# Patient Record
Sex: MALE | Race: WHITE | NOT HISPANIC OR LATINO | URBAN - METROPOLITAN AREA
[De-identification: names, ages, dates, MRNs, and addresses within clinical notes are randomized per-mention and may not be internally consistent; named-entity substitution may affect disease eponyms.]

---

## 2017-09-24 ENCOUNTER — EMERGENCY (EMERGENCY)
Facility: HOSPITAL | Age: 62
LOS: 1 days | Discharge: HOME | End: 2017-09-24
Attending: EMERGENCY MEDICINE | Admitting: EMERGENCY MEDICINE
Payer: COMMERCIAL

## 2017-09-24 VITALS
SYSTOLIC BLOOD PRESSURE: 140 MMHG | OXYGEN SATURATION: 97 % | DIASTOLIC BLOOD PRESSURE: 81 MMHG | RESPIRATION RATE: 17 BRPM | TEMPERATURE: 98 F | HEART RATE: 67 BPM

## 2017-09-24 VITALS
DIASTOLIC BLOOD PRESSURE: 90 MMHG | RESPIRATION RATE: 16 BRPM | HEART RATE: 68 BPM | SYSTOLIC BLOOD PRESSURE: 144 MMHG | HEIGHT: 67 IN | WEIGHT: 199.96 LBS | OXYGEN SATURATION: 97 % | TEMPERATURE: 98 F

## 2017-09-24 DIAGNOSIS — I10 ESSENTIAL (PRIMARY) HYPERTENSION: ICD-10-CM
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
## 2017-09-24 DIAGNOSIS — S60.466A: ICD-10-CM
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
## 2017-09-24 PROCEDURE — 99284 EMERGENCY DEPT VISIT MOD MDM: CPT | Mod: 25

## 2017-09-24 PROCEDURE — 99283 EMERGENCY DEPT VISIT LOW MDM: CPT

## 2017-09-24 RX ORDER — DIPHENHYDRAMINE HCL 50 MG
25 CAPSULE ORAL ONCE
Qty: 0 | Refills: 0 | Status: COMPLETED | OUTPATIENT
Start: 2017-09-24 | End: 2017-09-24

## 2017-09-24 RX ORDER — IBUPROFEN 200 MG
600 TABLET ORAL ONCE
Qty: 0 | Refills: 0 | Status: COMPLETED | OUTPATIENT
Start: 2017-09-24 | End: 2017-09-24

## 2017-09-24 RX ADMIN — Medication 1 TABLET(S): at 03:56

## 2017-09-24 RX ADMIN — Medication 60 MILLIGRAM(S): at 03:56

## 2017-09-24 RX ADMIN — Medication 25 MILLIGRAM(S): at 03:56

## 2017-09-24 RX ADMIN — Medication 600 MILLIGRAM(S): at 04:25

## 2017-09-24 RX ADMIN — Medication 600 MILLIGRAM(S): at 03:56

## 2017-09-24 NOTE — ED PROVIDER NOTE - PHYSICAL EXAMINATION
right 5th digit: +swelling and erythema and warmth along PIP and proximal phalanges, pain is relieved with extension of digit, no ttp along flexor tendon sheath

## 2017-09-24 NOTE — ED PROVIDER NOTE - MEDICAL DECISION MAKING DETAILS
insect bite to right 5th digit with possible early cellulitis and component of allergic reaction, PO abx, steroids, benadryl, and motrin

## 2017-09-24 NOTE — ED PROVIDER NOTE - OBJECTIVE STATEMENT
62 yo male s/p being stung by an insect/bee? yesterday to his right 5th digit at PIP joint c/o swelling, redness, pain, and itchiness.  No shortness of breath, no difficulty breathing.  Applied benadryl cream and took oral benadryl without much relief.  No fever/chills.  No numbness/tingling.

## 2017-09-24 NOTE — ED ADULT NURSE NOTE - OBJECTIVE STATEMENT
pt is a 62 y/o m presents to ed walk in c/o pain & swelling to right hand 5th digit, reports " I was stung by an insect yesterday". RUE- + swelling noted to hand 5th digit  able to move all digits, NVI, cap refill less than 2 seconds, motor/sensory intact . seen and evaluated by dr. Otoole. meds given as ordered, dispo pending, will continue to monitor.

## 2018-08-20 ENCOUNTER — EMERGENCY (EMERGENCY)
Facility: HOSPITAL | Age: 63
LOS: 1 days | Discharge: HOME | End: 2018-08-20
Attending: EMERGENCY MEDICINE
Payer: COMMERCIAL

## 2018-08-20 VITALS
HEART RATE: 70 BPM | WEIGHT: 199.96 LBS | OXYGEN SATURATION: 97 % | SYSTOLIC BLOOD PRESSURE: 161 MMHG | DIASTOLIC BLOOD PRESSURE: 90 MMHG | RESPIRATION RATE: 16 BRPM | TEMPERATURE: 98 F

## 2018-08-20 PROBLEM — M10.9 GOUT, UNSPECIFIED: Chronic | Status: ACTIVE | Noted: 2017-09-24

## 2018-08-20 PROCEDURE — 70450 CT HEAD/BRAIN W/O DYE: CPT

## 2018-08-20 PROCEDURE — 99284 EMERGENCY DEPT VISIT MOD MDM: CPT | Mod: 25

## 2018-08-20 PROCEDURE — 73030 X-RAY EXAM OF SHOULDER: CPT

## 2018-08-20 PROCEDURE — 72100 X-RAY EXAM L-S SPINE 2/3 VWS: CPT

## 2018-08-20 PROCEDURE — 70450 CT HEAD/BRAIN W/O DYE: CPT | Mod: 26

## 2018-08-20 PROCEDURE — 72125 CT NECK SPINE W/O DYE: CPT

## 2018-08-20 PROCEDURE — 72100 X-RAY EXAM L-S SPINE 2/3 VWS: CPT | Mod: 26

## 2018-08-20 PROCEDURE — 72125 CT NECK SPINE W/O DYE: CPT | Mod: 26

## 2018-08-20 PROCEDURE — 73030 X-RAY EXAM OF SHOULDER: CPT | Mod: 26,RT

## 2018-08-20 PROCEDURE — 99284 EMERGENCY DEPT VISIT MOD MDM: CPT

## 2018-08-20 NOTE — ED PROVIDER NOTE - CARE PLAN
Principal Discharge DX:	Acute pain of right shoulder  Secondary Diagnosis:	Back pain  Secondary Diagnosis:	Injury of head, initial encounter

## 2018-08-20 NOTE — ED PROVIDER NOTE - OBJECTIVE STATEMENT
62 y male presents with states was at work today, gate he was trying to pull down fell and struck his right shoulder,  and right side of his head, he then fell back wards onto ground,  denies loc,  states he did not hit his head on the ground.  denies nausea, no vomiting.  states he has lower back with rom, right shoulder pain with rom, tenderness right lateral scalp.   takes asa daily.  PMD Dr Villalba  patient refused pain medication  PMH: GERD (gastroesophageal reflux disease)  Gout  Hypertension  Prostate ca

## 2018-08-20 NOTE — ED PROVIDER NOTE - PROGRESS NOTE DETAILS
copy of results given,  advised follow up with his orthopedic and pmd , states he sees [facility],  patient states will take otc tylenol or motrin if has pain.  advised if any concerns return to ed copy of results given,  advised follow up with his orthopedic and pmd , states he sees [facility],  patient states will take otc tylenol or motrin if has pain.  advised if any concerns return to ed, patient refused sling

## 2018-08-20 NOTE — ED PROVIDER NOTE - ATTENDING CONTRIBUTION TO CARE
Pt is a 63 yo male who presents to the ED with a cc of pain s/p injury at work.  PMHx of GERD, gout, HTN, prostate cancer. 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  He reports that he was trying to work the lift gate when it got stuck.  In order to fix this he had to manually operate the handle.  He reports that this consists of loosening bolts on the lift.  While attempting to do this the gate fell striking him on the right side of his head, right shoulder and knocking his to the ground landing on his buttocks and low back.  Incident occurred around 11:30.  Pt did not seek medical attention at the time.  Denies LOC.  Pt takes ASA daily but no other blood thinners.  Denies N/V, CP, SOB, abd pain.  Reports pain to his head, right shoulder, and low back.  Denies ext numbness or weakness.  Denies loss of bowel or bladder function.  Pt is right hand dominant.  Tetanus is UTD.   On exam pt lying in bed NAD.  Contusion noted to right parietal scalp region, PERRL, EOMI, TMs clear no septal hematoma, heart RRR, lungs CTA, abd soft NT/ND.  Superficial abrasions noted to anterior right shoulder region no acute aldo deformity noted, sensation intact grossly to arm.  No midline C/T/L TTP no step offs or deformities noted.  TTP to bilateral lumbar paraspinal muscle region, no overlying skin changes noted.  Will obtain x-ray of right shoulder, lumbar region, CT head/cervical spine.  Agree with above plan of care

## 2018-08-20 NOTE — ED ADULT TRIAGE NOTE - CHIEF COMPLAINT QUOTE
c/o abrasion to the back of right shoulder and pain on the side of the head. patient states the lift gate for handicapped van gave out and hit him at around 11:30am. denies LOC.

## 2019-04-01 ENCOUNTER — INPATIENT (INPATIENT)
Facility: HOSPITAL | Age: 64
LOS: 1 days | Discharge: HOME | DRG: 814 | End: 2019-04-03
Attending: INTERNAL MEDICINE | Admitting: INTERNAL MEDICINE
Payer: COMMERCIAL

## 2019-04-01 VITALS
TEMPERATURE: 98 F | HEART RATE: 72 BPM | RESPIRATION RATE: 18 BRPM | WEIGHT: 210.1 LBS | OXYGEN SATURATION: 97 % | SYSTOLIC BLOOD PRESSURE: 163 MMHG | DIASTOLIC BLOOD PRESSURE: 104 MMHG | HEIGHT: 68 IN

## 2019-04-01 DIAGNOSIS — Z90.79 ACQUIRED ABSENCE OF OTHER GENITAL ORGAN(S): Chronic | ICD-10-CM

## 2019-04-01 DIAGNOSIS — R10.9 UNSPECIFIED ABDOMINAL PAIN: ICD-10-CM

## 2019-04-01 LAB
ALBUMIN SERPL ELPH-MCNC: 3.8 G/DL — SIGNIFICANT CHANGE UP (ref 3.3–5)
ALP SERPL-CCNC: 78 U/L — SIGNIFICANT CHANGE UP (ref 40–120)
ALT FLD-CCNC: 30 U/L — SIGNIFICANT CHANGE UP (ref 12–78)
ANION GAP SERPL CALC-SCNC: 9 MMOL/L — SIGNIFICANT CHANGE UP (ref 5–17)
APPEARANCE UR: CLEAR — SIGNIFICANT CHANGE UP
APTT BLD: 156.9 SEC — CRITICAL HIGH (ref 27.5–36.3)
APTT BLD: 168.9 SEC — CRITICAL HIGH (ref 27.5–36.3)
APTT BLD: 30.9 SEC — SIGNIFICANT CHANGE UP (ref 28.5–37)
AST SERPL-CCNC: 27 U/L — SIGNIFICANT CHANGE UP (ref 15–37)
BACTERIA # UR AUTO: ABNORMAL
BASOPHILS # BLD AUTO: 0.02 K/UL — SIGNIFICANT CHANGE UP (ref 0–0.2)
BASOPHILS NFR BLD AUTO: 0.1 % — SIGNIFICANT CHANGE UP (ref 0–2)
BILIRUB SERPL-MCNC: 0.5 MG/DL — SIGNIFICANT CHANGE UP (ref 0.2–1.2)
BILIRUB UR-MCNC: NEGATIVE — SIGNIFICANT CHANGE UP
BUN SERPL-MCNC: 23 MG/DL — SIGNIFICANT CHANGE UP (ref 7–23)
CALCIUM SERPL-MCNC: 8.6 MG/DL — SIGNIFICANT CHANGE UP (ref 8.5–10.1)
CHLORIDE SERPL-SCNC: 106 MMOL/L — SIGNIFICANT CHANGE UP (ref 96–108)
CO2 SERPL-SCNC: 24 MMOL/L — SIGNIFICANT CHANGE UP (ref 22–31)
COLOR SPEC: YELLOW — SIGNIFICANT CHANGE UP
CREAT SERPL-MCNC: 1.3 MG/DL — SIGNIFICANT CHANGE UP (ref 0.5–1.3)
DIFF PNL FLD: ABNORMAL
EOSINOPHIL # BLD AUTO: 0.02 K/UL — SIGNIFICANT CHANGE UP (ref 0–0.5)
EOSINOPHIL NFR BLD AUTO: 0.1 % — SIGNIFICANT CHANGE UP (ref 0–6)
EPI CELLS # UR: NEGATIVE — SIGNIFICANT CHANGE UP
GLUCOSE SERPL-MCNC: 118 MG/DL — HIGH (ref 70–99)
GLUCOSE UR QL: NEGATIVE — SIGNIFICANT CHANGE UP
HCT VFR BLD CALC: 51.9 % — HIGH (ref 39–50)
HCT VFR BLD CALC: 52 % — HIGH (ref 39–50)
HCV AB S/CO SERPL IA: 0.12 S/CO — SIGNIFICANT CHANGE UP (ref 0–0.79)
HCV AB SERPL-IMP: SIGNIFICANT CHANGE UP
HGB BLD-MCNC: 18.2 G/DL — HIGH (ref 13–17)
HGB BLD-MCNC: 18.3 G/DL — HIGH (ref 13–17)
IMM GRANULOCYTES NFR BLD AUTO: 0.4 % — SIGNIFICANT CHANGE UP (ref 0–1.5)
INR BLD: 1.08 RATIO — SIGNIFICANT CHANGE UP (ref 0.88–1.16)
KETONES UR-MCNC: NEGATIVE — SIGNIFICANT CHANGE UP
LACTATE SERPL-SCNC: 1.8 MMOL/L — SIGNIFICANT CHANGE UP (ref 0.7–2)
LEUKOCYTE ESTERASE UR-ACNC: NEGATIVE — SIGNIFICANT CHANGE UP
LIDOCAIN IGE QN: 137 U/L — SIGNIFICANT CHANGE UP (ref 73–393)
LYMPHOCYTES # BLD AUTO: 1.07 K/UL — SIGNIFICANT CHANGE UP (ref 1–3.3)
LYMPHOCYTES # BLD AUTO: 7.3 % — LOW (ref 13–44)
MCHC RBC-ENTMCNC: 29 PG — SIGNIFICANT CHANGE UP (ref 27–34)
MCHC RBC-ENTMCNC: 29.1 PG — SIGNIFICANT CHANGE UP (ref 27–34)
MCHC RBC-ENTMCNC: 35 GM/DL — SIGNIFICANT CHANGE UP (ref 32–36)
MCHC RBC-ENTMCNC: 35.3 GM/DL — SIGNIFICANT CHANGE UP (ref 32–36)
MCV RBC AUTO: 82.5 FL — SIGNIFICANT CHANGE UP (ref 80–100)
MCV RBC AUTO: 82.8 FL — SIGNIFICANT CHANGE UP (ref 80–100)
MONOCYTES # BLD AUTO: 0.54 K/UL — SIGNIFICANT CHANGE UP (ref 0–0.9)
MONOCYTES NFR BLD AUTO: 3.7 % — SIGNIFICANT CHANGE UP (ref 2–14)
NEUTROPHILS # BLD AUTO: 12.86 K/UL — HIGH (ref 1.8–7.4)
NEUTROPHILS NFR BLD AUTO: 88.4 % — HIGH (ref 43–77)
NITRITE UR-MCNC: NEGATIVE — SIGNIFICANT CHANGE UP
NRBC # BLD: 0 /100 WBCS — SIGNIFICANT CHANGE UP (ref 0–0)
NRBC # BLD: 0 /100 WBCS — SIGNIFICANT CHANGE UP (ref 0–0)
PH UR: 7 — SIGNIFICANT CHANGE UP (ref 5–8)
PLATELET # BLD AUTO: 249 K/UL — SIGNIFICANT CHANGE UP (ref 150–400)
PLATELET # BLD AUTO: 261 K/UL — SIGNIFICANT CHANGE UP (ref 150–400)
POTASSIUM SERPL-MCNC: 4 MMOL/L — SIGNIFICANT CHANGE UP (ref 3.5–5.3)
POTASSIUM SERPL-SCNC: 4 MMOL/L — SIGNIFICANT CHANGE UP (ref 3.5–5.3)
PROT SERPL-MCNC: 7.8 G/DL — SIGNIFICANT CHANGE UP (ref 6–8.3)
PROT UR-MCNC: 25 MG/DL
PROTHROM AB SERPL-ACNC: 12.4 SEC — SIGNIFICANT CHANGE UP (ref 10–12.9)
RBC # BLD: 6.28 M/UL — HIGH (ref 4.2–5.8)
RBC # BLD: 6.29 M/UL — HIGH (ref 4.2–5.8)
RBC # FLD: 14.1 % — SIGNIFICANT CHANGE UP (ref 10.3–14.5)
RBC # FLD: 14.1 % — SIGNIFICANT CHANGE UP (ref 10.3–14.5)
RBC CASTS # UR COMP ASSIST: SIGNIFICANT CHANGE UP /HPF (ref 0–4)
SODIUM SERPL-SCNC: 139 MMOL/L — SIGNIFICANT CHANGE UP (ref 135–145)
SP GR SPEC: 1.01 — SIGNIFICANT CHANGE UP (ref 1.01–1.02)
UROBILINOGEN FLD QL: NEGATIVE — SIGNIFICANT CHANGE UP
WBC # BLD: 14.57 K/UL — HIGH (ref 3.8–10.5)
WBC # BLD: 17.9 K/UL — HIGH (ref 3.8–10.5)
WBC # FLD AUTO: 14.57 K/UL — HIGH (ref 3.8–10.5)
WBC # FLD AUTO: 17.9 K/UL — HIGH (ref 3.8–10.5)
WBC UR QL: SIGNIFICANT CHANGE UP

## 2019-04-01 PROCEDURE — 99285 EMERGENCY DEPT VISIT HI MDM: CPT

## 2019-04-01 PROCEDURE — 71046 X-RAY EXAM CHEST 2 VIEWS: CPT | Mod: 26

## 2019-04-01 PROCEDURE — 74019 RADEX ABDOMEN 2 VIEWS: CPT | Mod: 26

## 2019-04-01 PROCEDURE — 93010 ELECTROCARDIOGRAM REPORT: CPT

## 2019-04-01 PROCEDURE — 74177 CT ABD & PELVIS W/CONTRAST: CPT | Mod: 26

## 2019-04-01 RX ORDER — DOCUSATE SODIUM 100 MG
100 CAPSULE ORAL
Qty: 0 | Refills: 0 | Status: DISCONTINUED | OUTPATIENT
Start: 2019-04-01 | End: 2019-04-03

## 2019-04-01 RX ORDER — DILTIAZEM HCL 120 MG
10 CAPSULE, EXT RELEASE 24 HR ORAL ONCE
Qty: 0 | Refills: 0 | Status: COMPLETED | OUTPATIENT
Start: 2019-04-01 | End: 2019-04-01

## 2019-04-01 RX ORDER — ACETAMINOPHEN 500 MG
650 TABLET ORAL EVERY 6 HOURS
Qty: 0 | Refills: 0 | Status: DISCONTINUED | OUTPATIENT
Start: 2019-04-01 | End: 2019-04-03

## 2019-04-01 RX ORDER — LOSARTAN POTASSIUM 100 MG/1
50 TABLET, FILM COATED ORAL DAILY
Qty: 0 | Refills: 0 | Status: DISCONTINUED | OUTPATIENT
Start: 2019-04-01 | End: 2019-04-03

## 2019-04-01 RX ORDER — MORPHINE SULFATE 50 MG/1
4 CAPSULE, EXTENDED RELEASE ORAL ONCE
Qty: 0 | Refills: 0 | Status: DISCONTINUED | OUTPATIENT
Start: 2019-04-01 | End: 2019-04-01

## 2019-04-01 RX ORDER — HYDROMORPHONE HYDROCHLORIDE 2 MG/ML
2 INJECTION INTRAMUSCULAR; INTRAVENOUS; SUBCUTANEOUS EVERY 4 HOURS
Qty: 0 | Refills: 0 | Status: DISCONTINUED | OUTPATIENT
Start: 2019-04-01 | End: 2019-04-01

## 2019-04-01 RX ORDER — HEPARIN SODIUM 5000 [USP'U]/ML
7500 INJECTION INTRAVENOUS; SUBCUTANEOUS EVERY 6 HOURS
Qty: 0 | Refills: 0 | Status: DISCONTINUED | OUTPATIENT
Start: 2019-04-01 | End: 2019-04-02

## 2019-04-01 RX ORDER — HEPARIN SODIUM 5000 [USP'U]/ML
INJECTION INTRAVENOUS; SUBCUTANEOUS
Qty: 25000 | Refills: 0 | Status: DISCONTINUED | OUTPATIENT
Start: 2019-04-01 | End: 2019-04-02

## 2019-04-01 RX ORDER — HYDROMORPHONE HYDROCHLORIDE 2 MG/ML
0.5 INJECTION INTRAMUSCULAR; INTRAVENOUS; SUBCUTANEOUS ONCE
Qty: 0 | Refills: 0 | Status: DISCONTINUED | OUTPATIENT
Start: 2019-04-01 | End: 2019-04-01

## 2019-04-01 RX ORDER — HYDRALAZINE HCL 50 MG
25 TABLET ORAL EVERY 6 HOURS
Qty: 0 | Refills: 0 | Status: DISCONTINUED | OUTPATIENT
Start: 2019-04-01 | End: 2019-04-03

## 2019-04-01 RX ORDER — HEPARIN SODIUM 5000 [USP'U]/ML
7500 INJECTION INTRAVENOUS; SUBCUTANEOUS ONCE
Qty: 0 | Refills: 0 | Status: COMPLETED | OUTPATIENT
Start: 2019-04-01 | End: 2019-04-01

## 2019-04-01 RX ORDER — SODIUM CHLORIDE 9 MG/ML
3 INJECTION INTRAMUSCULAR; INTRAVENOUS; SUBCUTANEOUS ONCE
Qty: 0 | Refills: 0 | Status: COMPLETED | OUTPATIENT
Start: 2019-04-01 | End: 2019-04-01

## 2019-04-01 RX ORDER — PANTOPRAZOLE SODIUM 20 MG/1
40 TABLET, DELAYED RELEASE ORAL DAILY
Qty: 0 | Refills: 0 | Status: DISCONTINUED | OUTPATIENT
Start: 2019-04-01 | End: 2019-04-03

## 2019-04-01 RX ORDER — ONDANSETRON 8 MG/1
4 TABLET, FILM COATED ORAL ONCE
Qty: 0 | Refills: 0 | Status: COMPLETED | OUTPATIENT
Start: 2019-04-01 | End: 2019-04-01

## 2019-04-01 RX ORDER — IOHEXOL 300 MG/ML
30 INJECTION, SOLUTION INTRAVENOUS ONCE
Qty: 0 | Refills: 0 | Status: COMPLETED | OUTPATIENT
Start: 2019-04-01 | End: 2019-04-01

## 2019-04-01 RX ORDER — HYDROMORPHONE HYDROCHLORIDE 2 MG/ML
0.5 INJECTION INTRAMUSCULAR; INTRAVENOUS; SUBCUTANEOUS EVERY 4 HOURS
Qty: 0 | Refills: 0 | Status: DISCONTINUED | OUTPATIENT
Start: 2019-04-01 | End: 2019-04-03

## 2019-04-01 RX ORDER — SODIUM CHLORIDE 9 MG/ML
1000 INJECTION INTRAMUSCULAR; INTRAVENOUS; SUBCUTANEOUS
Qty: 0 | Refills: 0 | Status: COMPLETED | OUTPATIENT
Start: 2019-04-01 | End: 2019-04-01

## 2019-04-01 RX ORDER — HEPARIN SODIUM 5000 [USP'U]/ML
3500 INJECTION INTRAVENOUS; SUBCUTANEOUS EVERY 6 HOURS
Qty: 0 | Refills: 0 | Status: DISCONTINUED | OUTPATIENT
Start: 2019-04-01 | End: 2019-04-02

## 2019-04-01 RX ORDER — METOPROLOL TARTRATE 50 MG
50 TABLET ORAL DAILY
Qty: 0 | Refills: 0 | Status: DISCONTINUED | OUTPATIENT
Start: 2019-04-01 | End: 2019-04-02

## 2019-04-01 RX ADMIN — HEPARIN SODIUM 7500 UNIT(S): 5000 INJECTION INTRAVENOUS; SUBCUTANEOUS at 04:53

## 2019-04-01 RX ADMIN — HYDROMORPHONE HYDROCHLORIDE 0.5 MILLIGRAM(S): 2 INJECTION INTRAMUSCULAR; INTRAVENOUS; SUBCUTANEOUS at 08:23

## 2019-04-01 RX ADMIN — HEPARIN SODIUM 0 UNIT(S)/HR: 5000 INJECTION INTRAVENOUS; SUBCUTANEOUS at 12:30

## 2019-04-01 RX ADMIN — MORPHINE SULFATE 4 MILLIGRAM(S): 50 CAPSULE, EXTENDED RELEASE ORAL at 01:10

## 2019-04-01 RX ADMIN — HEPARIN SODIUM 1100 UNIT(S)/HR: 5000 INJECTION INTRAVENOUS; SUBCUTANEOUS at 23:56

## 2019-04-01 RX ADMIN — HYDROMORPHONE HYDROCHLORIDE 0.5 MILLIGRAM(S): 2 INJECTION INTRAMUSCULAR; INTRAVENOUS; SUBCUTANEOUS at 07:43

## 2019-04-01 RX ADMIN — IOHEXOL 30 MILLILITER(S): 300 INJECTION, SOLUTION INTRAVENOUS at 00:55

## 2019-04-01 RX ADMIN — MORPHINE SULFATE 4 MILLIGRAM(S): 50 CAPSULE, EXTENDED RELEASE ORAL at 04:50

## 2019-04-01 RX ADMIN — MORPHINE SULFATE 4 MILLIGRAM(S): 50 CAPSULE, EXTENDED RELEASE ORAL at 00:55

## 2019-04-01 RX ADMIN — LOSARTAN POTASSIUM 50 MILLIGRAM(S): 100 TABLET, FILM COATED ORAL at 07:56

## 2019-04-01 RX ADMIN — MORPHINE SULFATE 4 MILLIGRAM(S): 50 CAPSULE, EXTENDED RELEASE ORAL at 02:48

## 2019-04-01 RX ADMIN — HYDROMORPHONE HYDROCHLORIDE 0.5 MILLIGRAM(S): 2 INJECTION INTRAMUSCULAR; INTRAVENOUS; SUBCUTANEOUS at 12:02

## 2019-04-01 RX ADMIN — SODIUM CHLORIDE 1000 MILLILITER(S): 9 INJECTION INTRAMUSCULAR; INTRAVENOUS; SUBCUTANEOUS at 02:55

## 2019-04-01 RX ADMIN — SODIUM CHLORIDE 1000 MILLILITER(S): 9 INJECTION INTRAMUSCULAR; INTRAVENOUS; SUBCUTANEOUS at 01:55

## 2019-04-01 RX ADMIN — HEPARIN SODIUM 1400 UNIT(S)/HR: 5000 INJECTION INTRAVENOUS; SUBCUTANEOUS at 15:01

## 2019-04-01 RX ADMIN — HYDROMORPHONE HYDROCHLORIDE 0.5 MILLIGRAM(S): 2 INJECTION INTRAMUSCULAR; INTRAVENOUS; SUBCUTANEOUS at 23:10

## 2019-04-01 RX ADMIN — HEPARIN SODIUM 1700 UNIT(S)/HR: 5000 INJECTION INTRAVENOUS; SUBCUTANEOUS at 04:54

## 2019-04-01 RX ADMIN — PANTOPRAZOLE SODIUM 40 MILLIGRAM(S): 20 TABLET, DELAYED RELEASE ORAL at 12:25

## 2019-04-01 RX ADMIN — HYDROMORPHONE HYDROCHLORIDE 0.5 MILLIGRAM(S): 2 INJECTION INTRAMUSCULAR; INTRAVENOUS; SUBCUTANEOUS at 22:19

## 2019-04-01 RX ADMIN — Medication 100 MILLIGRAM(S): at 18:09

## 2019-04-01 RX ADMIN — ONDANSETRON 4 MILLIGRAM(S): 8 TABLET, FILM COATED ORAL at 00:55

## 2019-04-01 RX ADMIN — HEPARIN SODIUM 0 UNIT(S)/HR: 5000 INJECTION INTRAVENOUS; SUBCUTANEOUS at 22:47

## 2019-04-01 RX ADMIN — Medication 50 MILLIGRAM(S): at 07:56

## 2019-04-01 RX ADMIN — Medication 650 MILLIGRAM(S): at 20:26

## 2019-04-01 RX ADMIN — SODIUM CHLORIDE 1000 MILLILITER(S): 9 INJECTION INTRAMUSCULAR; INTRAVENOUS; SUBCUTANEOUS at 00:55

## 2019-04-01 RX ADMIN — HYDROMORPHONE HYDROCHLORIDE 0.5 MILLIGRAM(S): 2 INJECTION INTRAMUSCULAR; INTRAVENOUS; SUBCUTANEOUS at 17:49

## 2019-04-01 RX ADMIN — HYDROMORPHONE HYDROCHLORIDE 0.5 MILLIGRAM(S): 2 INJECTION INTRAMUSCULAR; INTRAVENOUS; SUBCUTANEOUS at 11:32

## 2019-04-01 RX ADMIN — Medication 10 MILLIGRAM(S): at 04:50

## 2019-04-01 RX ADMIN — Medication 650 MILLIGRAM(S): at 20:45

## 2019-04-01 RX ADMIN — MORPHINE SULFATE 4 MILLIGRAM(S): 50 CAPSULE, EXTENDED RELEASE ORAL at 05:05

## 2019-04-01 RX ADMIN — SODIUM CHLORIDE 3 MILLILITER(S): 9 INJECTION INTRAMUSCULAR; INTRAVENOUS; SUBCUTANEOUS at 00:52

## 2019-04-01 RX ADMIN — MORPHINE SULFATE 4 MILLIGRAM(S): 50 CAPSULE, EXTENDED RELEASE ORAL at 03:03

## 2019-04-01 NOTE — ED PROVIDER NOTE - OBJECTIVE STATEMENT
62 yo M p/w mid abd pain tonight, assoc with nausea, vomiting. No cp/sob/palp. no diarrhea. no weak / dizzy /malaise. no fever/chills/recnet illness. Pt with nl BM this am, usually once daily. No recent trauma. No recent illness. no neck / back pain. no dysuria / hematuria. no agg/allev factors. No prior episodes. no other inj or co.

## 2019-04-01 NOTE — CONSULT NOTE ADULT - ASSESSMENT
63 y.o. male with htn. 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 The patient requires long term AC therapy. I would recommend cardiology input as to the best oral AC agent. The patient was given informed consent.

## 2019-04-01 NOTE — ED PROVIDER NOTE - PMH
GERD (gastroesophageal reflux disease)    Gout    Hypertension    Prostate ca Atrial fibrillation    GERD (gastroesophageal reflux disease)    Gout    Hypertension    Prostate ca

## 2019-04-01 NOTE — ED PROVIDER NOTE - PROGRESS NOTE DETAILS
paged vascular on call Dr. Pack, awaiting call back spoke with Dr. Pack, case discussed, suggested possible to [facility] hospital [facility] for [facility] still on hold with transfer center spoke with Dr. Osman, vascular, case discussed, no need for transfer, can be medically managed at [facility], no immediate surgical intervention needed called Dr. Pack for consult called Dr. Baldemar Freire, cardiology to consult

## 2019-04-01 NOTE — H&P ADULT - NSICDXPASTMEDICALHX_GEN_ALL_CORE_FT
PAST MEDICAL HISTORY:  Atrial fibrillation paroxysmal , seen cardio chose not to be on A/C due to extreme sports    GERD (gastroesophageal reflux disease)     Gout     Hypertension     Prostate ca

## 2019-04-01 NOTE — H&P ADULT - NSHPLABSRESULTS_GEN_ALL_CORE
< from: CT Abdomen and Pelvis w/ Oral Cont and w/ IV Cont (04.01.19 @ 03:41) >      EXAM:  CT ABDOMEN AND PELVIS OC IC                            PROCEDURE DATE:  04/01/2019          INTERPRETATION:  CLINICAL INFORMATION: Abdominal pain, nausea and   vomiting.    TECHNIQUE: Contrast enhanced CT of the abdomen and pelvis was performed   with coronal and sagittal reformats. 95 cc Omnipaque 350 were   administered intravenously and 5 cc were discarded. Omnipaque 300   administered orally.     COMPARISON: None available.    FINDINGS:    LOWER CHEST: Dependent atelectasis.    HEPATOBILIARY: Within normal limits.  PANCREAS: Within normal limits.  SPLEEN: Splenic artery dissection with distal thromboembolus at the   splenic hilum and acute splenic infarct involving the majority of the   spleen.  ADRENALS: Within normal limits.    KIDNEYS/URETERS/BLADDER: Symmetric corticomedullary phase of renal   enhancement. Hypodense bilateral renal lesions not well characterize. No   hydronephrosis or ureteral calculus. Bladder decompressed, limiting   evaluation.  REPRODUCTIVE ORGANS: Prostatectomy.    BOWEL/PERITONEUM: No bowel obstruction or pneumoperitoneum.  Normal   appendix. Portions of the gastrointestinal tract are collapsed, limiting   evaluation.     VESSELS:  Scattered atherosclerotic calcifications. No abdominal aortic   aneurysm. Splenic artery dissection with distal thromboembolus.  LYMPHATICS/RETROPERITONEUM: No lymphadenopathy. No retroperitoneal   hematoma.      SOFT TISSUES: Small fat-containing bilateral inguinal hernias. Tiny   fat-containing umbilical hernia.  BONES: Multilevel spinal degenerative changes.    IMPRESSION: Splenic artery dissection with distal thromboembolus at the   splenic hilum and acute splenic infarct involving the majority of the   spleen.    Call Back:  I discussed this case with  at 4:00 AM on   4/1/2019.  Hospital policies for call back including read back policy   were followed. The verbal communication call back supplements this   written report.

## 2019-04-01 NOTE — H&P ADULT - ASSESSMENT
· Subjective and Objective: 	  History of Present Illness: The patient is a 63 year old male with a history of HTN, gout, paroxysmal atrial fibrillation who presents with abdominal pain. The pain is left-sided and started acutely yesterday evening. Some associated nausea. No chest pain, shortness of breath, dizziness, palpitations. His BP meds have been changed recently. His irbesartan was discontinued due to mild PARKER and his diltiazem was changed to metoprolol due to interaction with colchicine.    Past Medical/Surgical History:  HTN, gout, paroxysmal atrial fibrillation    Medications:  Home Medications:  irbesartan-hydrochlorothiazide 300 mg-12.5 mg oral tablet: 1 tab(s) orally once a day (01 Apr 2019 00:20)  metoprolol tartrate 50 mg oral tablet: 1 tab(s) orally 2 times a day (01 Apr 2019 07:07)  Uloric:  orally  (01 Apr 2019 00:20)      Family History: Non-contributory family history of premature cardiovascular atherosclerotic disease    Social History: No tobacco, alcohol or drug use    admit for iv heparin , will get vascular and cardio opinion , better BP control with meds , and pain meds

## 2019-04-01 NOTE — ED PROVIDER NOTE - GASTROINTESTINAL, MLM
Abdomen soft, pos tender diffuse L side abd (upper / mid, slight lower), no guarding. no rebound. no hsm. no cvat. no  hernias palpated.

## 2019-04-01 NOTE — ED ADULT NURSE NOTE - NSIMPLEMENTINTERV_GEN_ALL_ED
Implemented All Universal Safety Interventions:  [place] to call system. Call bell, personal items and telephone within reach. Instruct patient to call for assistance. Room bathroom lighting operational. Non-slip footwear when patient is off stretcher. Physically safe environment: no spills, clutter or unnecessary equipment. Stretcher in lowest position, wheels locked, appropriate side rails in place.

## 2019-04-01 NOTE — ED ADULT TRIAGE NOTE - CHIEF COMPLAINT QUOTE
Abdominal pain started 6PM, patient states he ate something and noticed having gas pains and the pain got worst a little while after. +Nausea/Vomiting.

## 2019-04-01 NOTE — CONSULT NOTE ADULT - ASSESSMENT
The patient is a 63 year old male with a history of HTN, gout, paroxysmal atrial fibrillation who is admitted with splenic artery dissection with splenic infarct.    Plan:  - Etiology of splenic artery dissection unclear - hypertension related? No personal or family history of vascular issues.  - Restart metoprolol succinate 50 mg daily  - Start losartan 50 mg daily  - Start hydralazine 50 mg q6h prn elevated BP  - Started on heparin drip. Likely will transition to DOAC in the next 24 hours. Patient will likely remain on this for his atrial fibrillation as well.  - Vascular surgery to see patient  - Will also discuss with vascular medicine at [facility]  - May need further CT imaging to screen for other dissections or aneurysms

## 2019-04-01 NOTE — ED PROVIDER NOTE - CARE PLAN
Principal Discharge DX:	Abdominal pain, unspecified abdominal location Principal Discharge DX:	Abdominal pain, unspecified abdominal location  Secondary Diagnosis:	Dissection of other artery  Secondary Diagnosis:	Splenic infarction

## 2019-04-01 NOTE — CONSULT NOTE ADULT - SUBJECTIVE AND OBJECTIVE BOX
History of Present Illness: The patient is a 63 year old male with a history of HTN, gout, paroxysmal atrial fibrillation who presents with abdominal pain. The pain is left-sided and started acutely yesterday evening. Some associated nausea. No chest pain, shortness of breath, dizziness, palpitations. His BP meds have been changed recently. His irbesartan was discontinued due to mild PARKER and his diltiazem was changed to metoprolol due to interaction with colchicine.    Past Medical/Surgical History:  HTN, gout, paroxysmal atrial fibrillation    Medications:  Home Medications:  irbesartan-hydrochlorothiazide 300 mg-12.5 mg oral tablet: 1 tab(s) orally once a day (01 Apr 2019 00:20)  metoprolol tartrate 50 mg oral tablet: 1 tab(s) orally 2 times a day (01 Apr 2019 07:07)  Uloric:  orally  (01 Apr 2019 00:20)      Family History: Non-contributory family history of premature cardiovascular atherosclerotic disease    Social History: No tobacco, alcohol or drug use    Review of Systems:  General: No fevers, chills, weight loss or gain  Skin: No rashes, color changes  Cardiovascular: No chest pain, orthopnea  Respiratory: No shortness of breath, cough  Gastrointestinal: No nausea, abdominal pain  Genitourinary: No incontinence, pain with urination  Musculoskeletal: No pain, swelling, decreased range of motion  Neurological: No headache, weakness  Psychiatric: No depression, anxiety  Endocrine: No weight loss or gain, increased thirst  All other systems are comprehensively negative.    Physical Exam:  Vitals:        Vital Signs Last 24 Hrs  T(C): 37.2 (01 Apr 2019 05:20), Max: 37.2 (01 Apr 2019 05:20)  T(F): 98.9 (01 Apr 2019 05:20), Max: 98.9 (01 Apr 2019 05:20)  HR: 87 (01 Apr 2019 07:00) (65 - 87)  BP: 153/93 (01 Apr 2019 07:00) (149/91 - 167/97)  BP(mean): 115 (01 Apr 2019 07:00) (108 - 115)  RR: 18 (01 Apr 2019 07:00) (15 - 20)  SpO2: 100% (01 Apr 2019 07:00) (97% - 100%)  General: NAD  HEENT: MMM  Neck: No JVD, no carotid bruit  Lungs: CTAB  CV: Irregular, nl S1/S2, no M/R/G  Abdomen: S/NT/ND, +BS  Extremities: No LE edema, no cyanosis  Neuro: AAOx3, non-focal  Skin: No rash    Labs:                        18.2   14.57 )-----------( 261      ( 01 Apr 2019 00:54 )             52.0     04-01    139  |  106  |  23  ----------------------------<  118<H>  4.0   |  24  |  1.30    Ca    8.6      01 Apr 2019 00:54    TPro  7.8  /  Alb  3.8  /  TBili  0.5  /  DBili  x   /  AST  27  /  ALT  30  /  AlkPhos  78  04-01        PT/INR - ( 01 Apr 2019 00:54 )   PT: 12.4 sec;   INR: 1.08 ratio         PTT - ( 01 Apr 2019 00:54 )  PTT:30.9 sec    ECG: AF, LAD, nonspecific ST abnormality

## 2019-04-01 NOTE — CHART NOTE - NSCHARTNOTEFT_GEN_A_CORE
Nursing called to notify of elevated temperature 101. Patient seen and Tylenol 650mg PO x1 administered. Nursing called to notify of elevated temperature 101. Patient seen and examined at bedside. Patient resting comfortably in bed. Patient denied all fever, chills, CP, SOB,  and endorsed minor abd pain.       Vital Signs Last 24 Hrs  T(C): 36.9 (01 Apr 2019 21:43), Max: 38.3 (01 Apr 2019 20:30)  T(F): 98.4 (01 Apr 2019 21:43), Max: 101 (01 Apr 2019 20:30)  HR: 73 (01 Apr 2019 20:21) (65 - 87)  BP: 171/68 (01 Apr 2019 20:21) (133/87 - 171/68)  BP(mean): 115 (01 Apr 2019 07:00) (108 - 115)  RR: 17 (01 Apr 2019 20:21) (14 - 20)  SpO2: 98% (01 Apr 2019 20:21) (97% - 100%)    Physical Exam:  General: Well developed, well nourished, NAD  HEENT: NCAT, PERRLA, EOMI bl, moist mucous membranes   Neck: Supple, nontender, no mass  Neurology: A&Ox3, nonfocal, CN II-XII grossly intact, sensation intact, no gait abnormalities   Respiratory: CTA B/L, No W/R/R  CV: irregularly irregular rhythm, +S1/S2, no murmurs, rubs or gallops  Abdominal: Soft with moderate left sided abdominal tenderness to palpation. No palpable  Extremities: No C/C/E, + peripheral pulses  MSK: Normal ROM, no joint erythema or warmth, no joint swelling   Skin: warm, dry, normal color, no rash or abnormal lesions        A/P  64yo male admitted for acute splenic infarct, now seen for fever, resolved  - Tylenol 650mg PO x1 was administered  - Repeat T 98.4.  - In light of splenic injury, Will order blood cultures and UA, but given lack of any constitutional signs or symptoms, and recent CXR at 1300, will hold off on repeat CXR  - Will monitor

## 2019-04-01 NOTE — ED PROVIDER NOTE - CHPI ED SYMPTOMS NEG
no blood in stool/no abdominal distension/no burning urination/no chills/no hematuria/no dysuria/no fever

## 2019-04-01 NOTE — PHARMACOTHERAPY INTERVENTION NOTE - COMMENTS
Orders entered for patient for acetaminophen for mild pain with two orders entered for moderate pain (hydromorphone 0.5mg and 2mg IV Q4h PRN). Spoke with Dr. Villalba about duplicate pain indications - he requested the 2mg order be discontinued.

## 2019-04-01 NOTE — ED ADULT NURSE NOTE - OBJECTIVE STATEMENT
Pt. complaining of left sided abdominal pain starting at 6pm with nausea. Denied fevers, diarrhea or vomiting.

## 2019-04-01 NOTE — H&P ADULT - HISTORY OF PRESENT ILLNESS
· Subjective and Objective: 	  History of Present Illness: The patient is a 63 year old male with a history of HTN, gout, paroxysmal atrial fibrillation who presents with abdominal pain. The pain is left-sided and started acutely yesterday evening. Some associated nausea. No chest pain, shortness of breath, dizziness, palpitations. His BP meds have been changed recently. His irbesartan was discontinued due to mild PARKER and his diltiazem was changed to metoprolol due to interaction with colchicine.    Past Medical/Surgical History:  HTN, gout, paroxysmal atrial fibrillation    Medications:  Home Medications:  irbesartan-hydrochlorothiazide 300 mg-12.5 mg oral tablet: 1 tab(s) orally once a day (01 Apr 2019 00:20)  metoprolol tartrate 50 mg oral tablet: 1 tab(s) orally 2 times a day (01 Apr 2019 07:07)  Uloric:  orally  (01 Apr 2019 00:20)      Family History: Non-contributory family history of premature cardiovascular atherosclerotic disease    Social History: No tobacco, alcohol or drug use

## 2019-04-01 NOTE — ED ADULT NURSE REASSESSMENT NOTE - COMFORT CARE
side rails up/darkened lights/plan of care explained
wait time explained/darkened lights/plan of care explained/side rails up
side rails up/darkened lights
side rails up/wait time explained/repositioned/warm blanket provided/po fluids offered/darkened lights
plan of care explained/voided in urinal
warm blanket provided/repositioned/side rails up/wait time explained/darkened lights

## 2019-04-02 LAB
ALBUMIN SERPL ELPH-MCNC: 3.2 G/DL — LOW (ref 3.3–5)
ALP SERPL-CCNC: 56 U/L — SIGNIFICANT CHANGE UP (ref 40–120)
ALT FLD-CCNC: 23 U/L — SIGNIFICANT CHANGE UP (ref 12–78)
ANION GAP SERPL CALC-SCNC: 9 MMOL/L — SIGNIFICANT CHANGE UP (ref 5–17)
APTT BLD: 42.1 SEC — HIGH (ref 28.5–37)
AST SERPL-CCNC: 41 U/L — HIGH (ref 15–37)
BASOPHILS # BLD AUTO: 0.02 K/UL — SIGNIFICANT CHANGE UP (ref 0–0.2)
BASOPHILS NFR BLD AUTO: 0.1 % — SIGNIFICANT CHANGE UP (ref 0–2)
BILIRUB SERPL-MCNC: 1 MG/DL — SIGNIFICANT CHANGE UP (ref 0.2–1.2)
BUN SERPL-MCNC: 14 MG/DL — SIGNIFICANT CHANGE UP (ref 7–23)
CALCIUM SERPL-MCNC: 8 MG/DL — LOW (ref 8.5–10.1)
CHLORIDE SERPL-SCNC: 103 MMOL/L — SIGNIFICANT CHANGE UP (ref 96–108)
CO2 SERPL-SCNC: 26 MMOL/L — SIGNIFICANT CHANGE UP (ref 22–31)
CREAT SERPL-MCNC: 1 MG/DL — SIGNIFICANT CHANGE UP (ref 0.5–1.3)
EOSINOPHIL # BLD AUTO: 0.02 K/UL — SIGNIFICANT CHANGE UP (ref 0–0.5)
EOSINOPHIL NFR BLD AUTO: 0.1 % — SIGNIFICANT CHANGE UP (ref 0–6)
GLUCOSE SERPL-MCNC: 98 MG/DL — SIGNIFICANT CHANGE UP (ref 70–99)
HCT VFR BLD CALC: 50.1 % — HIGH (ref 39–50)
HGB BLD-MCNC: 17.4 G/DL — HIGH (ref 13–17)
IMM GRANULOCYTES NFR BLD AUTO: 0.4 % — SIGNIFICANT CHANGE UP (ref 0–1.5)
LYMPHOCYTES # BLD AUTO: 1.45 K/UL — SIGNIFICANT CHANGE UP (ref 1–3.3)
LYMPHOCYTES # BLD AUTO: 10.6 % — LOW (ref 13–44)
MCHC RBC-ENTMCNC: 28.8 PG — SIGNIFICANT CHANGE UP (ref 27–34)
MCHC RBC-ENTMCNC: 34.7 GM/DL — SIGNIFICANT CHANGE UP (ref 32–36)
MCV RBC AUTO: 82.8 FL — SIGNIFICANT CHANGE UP (ref 80–100)
MONOCYTES # BLD AUTO: 1.7 K/UL — HIGH (ref 0–0.9)
MONOCYTES NFR BLD AUTO: 12.4 % — SIGNIFICANT CHANGE UP (ref 2–14)
NEUTROPHILS # BLD AUTO: 10.45 K/UL — HIGH (ref 1.8–7.4)
NEUTROPHILS NFR BLD AUTO: 76.4 % — SIGNIFICANT CHANGE UP (ref 43–77)
NRBC # BLD: 0 /100 WBCS — SIGNIFICANT CHANGE UP (ref 0–0)
PLATELET # BLD AUTO: 204 K/UL — SIGNIFICANT CHANGE UP (ref 150–400)
POTASSIUM SERPL-MCNC: 3.4 MMOL/L — LOW (ref 3.5–5.3)
POTASSIUM SERPL-SCNC: 3.4 MMOL/L — LOW (ref 3.5–5.3)
PROT SERPL-MCNC: 6.7 G/DL — SIGNIFICANT CHANGE UP (ref 6–8.3)
RBC # BLD: 6.05 M/UL — HIGH (ref 4.2–5.8)
RBC # FLD: 14.4 % — SIGNIFICANT CHANGE UP (ref 10.3–14.5)
SODIUM SERPL-SCNC: 138 MMOL/L — SIGNIFICANT CHANGE UP (ref 135–145)
WBC # BLD: 13.69 K/UL — HIGH (ref 3.8–10.5)
WBC # FLD AUTO: 13.69 K/UL — HIGH (ref 3.8–10.5)

## 2019-04-02 RX ORDER — RIVAROXABAN 15 MG-20MG
20 KIT ORAL
Qty: 0 | Refills: 0 | Status: DISCONTINUED | OUTPATIENT
Start: 2019-04-02 | End: 2019-04-02

## 2019-04-02 RX ORDER — RIVAROXABAN 15 MG-20MG
20 KIT ORAL ONCE
Qty: 0 | Refills: 0 | Status: COMPLETED | OUTPATIENT
Start: 2019-04-02 | End: 2019-04-02

## 2019-04-02 RX ORDER — SIMETHICONE 80 MG/1
80 TABLET, CHEWABLE ORAL ONCE
Qty: 0 | Refills: 0 | Status: COMPLETED | OUTPATIENT
Start: 2019-04-02 | End: 2019-04-02

## 2019-04-02 RX ORDER — METOPROLOL TARTRATE 50 MG
50 TABLET ORAL ONCE
Qty: 0 | Refills: 0 | Status: COMPLETED | OUTPATIENT
Start: 2019-04-02 | End: 2019-04-02

## 2019-04-02 RX ORDER — METOPROLOL TARTRATE 50 MG
100 TABLET ORAL DAILY
Qty: 0 | Refills: 0 | Status: DISCONTINUED | OUTPATIENT
Start: 2019-04-03 | End: 2019-04-03

## 2019-04-02 RX ORDER — POTASSIUM CHLORIDE 20 MEQ
40 PACKET (EA) ORAL ONCE
Qty: 0 | Refills: 0 | Status: COMPLETED | OUTPATIENT
Start: 2019-04-02 | End: 2019-04-02

## 2019-04-02 RX ORDER — TRAMADOL HYDROCHLORIDE 50 MG/1
50 TABLET ORAL ONCE
Qty: 0 | Refills: 0 | Status: DISCONTINUED | OUTPATIENT
Start: 2019-04-02 | End: 2019-04-02

## 2019-04-02 RX ORDER — RIVAROXABAN 15 MG-20MG
20 KIT ORAL EVERY 24 HOURS
Qty: 0 | Refills: 0 | Status: DISCONTINUED | OUTPATIENT
Start: 2019-04-03 | End: 2019-04-03

## 2019-04-02 RX ADMIN — HYDROMORPHONE HYDROCHLORIDE 0.5 MILLIGRAM(S): 2 INJECTION INTRAMUSCULAR; INTRAVENOUS; SUBCUTANEOUS at 03:37

## 2019-04-02 RX ADMIN — HYDROMORPHONE HYDROCHLORIDE 0.5 MILLIGRAM(S): 2 INJECTION INTRAMUSCULAR; INTRAVENOUS; SUBCUTANEOUS at 04:31

## 2019-04-02 RX ADMIN — TRAMADOL HYDROCHLORIDE 50 MILLIGRAM(S): 50 TABLET ORAL at 03:15

## 2019-04-02 RX ADMIN — Medication 5 MILLIGRAM(S): at 02:39

## 2019-04-02 RX ADMIN — HYDROMORPHONE HYDROCHLORIDE 0.5 MILLIGRAM(S): 2 INJECTION INTRAMUSCULAR; INTRAVENOUS; SUBCUTANEOUS at 08:52

## 2019-04-02 RX ADMIN — RIVAROXABAN 20 MILLIGRAM(S): KIT at 10:55

## 2019-04-02 RX ADMIN — HEPARIN SODIUM 3500 UNIT(S): 5000 INJECTION INTRAVENOUS; SUBCUTANEOUS at 06:36

## 2019-04-02 RX ADMIN — SIMETHICONE 80 MILLIGRAM(S): 80 TABLET, CHEWABLE ORAL at 02:39

## 2019-04-02 RX ADMIN — Medication 40 MILLIEQUIVALENT(S): at 10:55

## 2019-04-02 RX ADMIN — HYDROMORPHONE HYDROCHLORIDE 0.5 MILLIGRAM(S): 2 INJECTION INTRAMUSCULAR; INTRAVENOUS; SUBCUTANEOUS at 21:08

## 2019-04-02 RX ADMIN — Medication 50 MILLIGRAM(S): at 04:38

## 2019-04-02 RX ADMIN — HEPARIN SODIUM 1300 UNIT(S)/HR: 5000 INJECTION INTRAVENOUS; SUBCUTANEOUS at 06:34

## 2019-04-02 RX ADMIN — LOSARTAN POTASSIUM 50 MILLIGRAM(S): 100 TABLET, FILM COATED ORAL at 04:38

## 2019-04-02 RX ADMIN — Medication 100 MILLIGRAM(S): at 17:35

## 2019-04-02 RX ADMIN — HYDROMORPHONE HYDROCHLORIDE 0.5 MILLIGRAM(S): 2 INJECTION INTRAMUSCULAR; INTRAVENOUS; SUBCUTANEOUS at 14:40

## 2019-04-02 RX ADMIN — Medication 50 MILLIGRAM(S): at 08:52

## 2019-04-02 RX ADMIN — HYDROMORPHONE HYDROCHLORIDE 0.5 MILLIGRAM(S): 2 INJECTION INTRAMUSCULAR; INTRAVENOUS; SUBCUTANEOUS at 09:22

## 2019-04-02 RX ADMIN — HYDROMORPHONE HYDROCHLORIDE 0.5 MILLIGRAM(S): 2 INJECTION INTRAMUSCULAR; INTRAVENOUS; SUBCUTANEOUS at 20:34

## 2019-04-02 RX ADMIN — HYDROMORPHONE HYDROCHLORIDE 0.5 MILLIGRAM(S): 2 INJECTION INTRAMUSCULAR; INTRAVENOUS; SUBCUTANEOUS at 14:07

## 2019-04-02 RX ADMIN — PANTOPRAZOLE SODIUM 40 MILLIGRAM(S): 20 TABLET, DELAYED RELEASE ORAL at 12:29

## 2019-04-02 RX ADMIN — Medication 100 MILLIGRAM(S): at 04:38

## 2019-04-02 RX ADMIN — TRAMADOL HYDROCHLORIDE 50 MILLIGRAM(S): 50 TABLET ORAL at 02:39

## 2019-04-02 NOTE — PROGRESS NOTE ADULT - SUBJECTIVE AND OBJECTIVE BOX
Chief Complaint: Abdominal pain    Interval Events: Tachycardic at times. Pain slightly improved but still significant.    Review of Systems:  General: No fevers, chills, weight loss or gain  Skin: No rashes, color changes  Cardiovascular: No chest pain, orthopnea  Respiratory: No shortness of breath, cough  Gastrointestinal: No nausea, abdominal pain  Genitourinary: No incontinence, pain with urination  Musculoskeletal: No pain, swelling, decreased range of motion  Neurological: No headache, weakness  Psychiatric: No depression, anxiety  Endocrine: No weight loss or gain, increased thirst  All other systems are comprehensively negative.    Physical Exam:  Vitals:        Vital Signs Last 24 Hrs  T(C): 37.7 (02 Apr 2019 07:24), Max: 38.3 (01 Apr 2019 20:30)  T(F): 99.9 (02 Apr 2019 07:24), Max: 101 (01 Apr 2019 20:30)  HR: 86 (02 Apr 2019 07:24) (71 - 101)  BP: 144/91 (02 Apr 2019 07:24) (128/86 - 171/68)  BP(mean): --  RR: 17 (02 Apr 2019 07:24) (14 - 17)  SpO2: 97% (02 Apr 2019 07:24) (96% - 98%)  General: NAD  HEENT: MMM  Neck: No JVD, no carotid bruit  Lungs: CTAB  CV: RRR, nl S1/S2, no M/R/G  Abdomen: S/NT/ND, +BS  Extremities: No LE edema, no cyanosis  Neuro: AAOx3, non-focal  Skin: No rash    Labs:                        17.4   13.69 )-----------( 204      ( 02 Apr 2019 06:10 )             50.1     04-02    138  |  103  |  14  ----------------------------<  98  3.4<L>   |  26  |  1.00    Ca    8.0<L>      02 Apr 2019 06:10    TPro  6.7  /  Alb  3.2<L>  /  TBili  1.0  /  DBili  x   /  AST  41<H>  /  ALT  23  /  AlkPhos  56  04-02        PT/INR - ( 01 Apr 2019 00:54 )   PT: 12.4 sec;   INR: 1.08 ratio         PTT - ( 02 Apr 2019 06:10 )  PTT:42.1 sec    Telemetry: AF , tachycardic at times

## 2019-04-02 NOTE — PROGRESS NOTE ADULT - ASSESSMENT
· Subjective and Objective: 	  History of Present Illness: The patient is a 63 year old male with a history of HTN, gout, paroxysmal atrial fibrillation who presents with abdominal pain. The pain is left-sided and started acutely yesterday evening. Some associated nausea. No chest pain, shortness of breath, dizziness, palpitations. His BP meds have been changed recently. His irbesartan was discontinued due to mild PARKER and his diltiazem was changed to metoprolol due to interaction with colchicine.    Past Medical/Surgical History:  HTN, gout, paroxysmal atrial fibrillation    Medications:  Home Medications:  irbesartan-hydrochlorothiazide 300 mg-12.5 mg oral tablet: 1 tab(s) orally once a day (01 Apr 2019 00:20)  metoprolol tartrate 50 mg oral tablet: 1 tab(s) orally 2 times a day (01 Apr 2019 07:07)  Uloric:  orally  (01 Apr 2019 00:20)      Family History: Non-contributory family history of premature cardiovascular atherosclerotic disease    Social History: No tobacco, alcohol or drug use    admit for iv heparin , will get vascular and cardio opinion , better BP control with meds , and pain meds   on 4/2/19 vital signs stable , bp better , decreased pain , placed on xarelto per cardio off heprin , ambulate , advance diet , plan dc in am if vital signs stable

## 2019-04-02 NOTE — PROGRESS NOTE ADULT - SUBJECTIVE AND OBJECTIVE BOX
PCP  Subjective:   in bed , awake     Objective:   Vital Signs Last 24 Hrs  T(C): 37.7 (19 @ 07:24), Max: 38.3 (19 @ 20:30)  T(F): 99.9 (19 @ 07:24), Max: 101 (19 @ 20:30)  HR: 86 (19 @ 07:24) (71 - 101)  BP: 144/91 (19 @ 07:24) (128/86 - 171/68)  BP(mean): --  RR: 17 (19 @ 07:24) (14 - 17)  SpO2: 97% (19 @ 07:24) (96% - 98%)  Daily     Daily Weight in k (2019 04:25)    GENERAL:  wdwn male , awake , alrt   EYES: eomi  NECK: supple  CHEST/LUNG: clear  HEART: s1 s2 regular   ABDOMEN:  soft mild luq pain  EXTREMITIES:  no edema   SKIN:  warm   CNS:  awake , alert non focal     Allergies: Allergies    No Known Allergies    Intolerances        Home Medications:  irbesartan-hydrochlorothiazide 300 mg-12.5 mg oral tablet: 1 tab(s) orally once a day (2019 11:22)  metoprolol tartrate 50 mg oral tablet: 1 tab(s) orally 2 times a day (2019 11:22)  Uloric:  orally  (2019 11:22)    Medications:   acetaminophen   Tablet .. 650 milliGRAM(s) Oral every 6 hours PRN  docusate sodium 100 milliGRAM(s) Oral two times a day  hydrALAZINE 25 milliGRAM(s) Oral every 6 hours PRN  HYDROmorphone  Injectable 0.5 milliGRAM(s) IV Push every 4 hours PRN  losartan 50 milliGRAM(s) Oral daily  metoprolol succinate ER 50 milliGRAM(s) Oral once  pantoprazole  Injectable 40 milliGRAM(s) IV Push daily  potassium chloride    Tablet ER 40 milliEquivalent(s) Oral once  rivaroxaban 20 milliGRAM(s) Oral <User Schedule>      LABS:                        17.4   13.69 )-----------( 204      ( 2019 06:10 )             50.1     04-02    138  |  103  |  14  ----------------------------<  98  3.4<L>   |  26  |  1.00    Ca    8.0<L>      2019 06:10    TPro  6.7  /  Alb  3.2<L>  /  TBili  1.0  /  DBili  x   /  AST  41<H>  /  ALT  23  /  AlkPhos  56  04-    PT/INR - ( 2019 00:54 )   PT: 12.4 sec;   INR: 1.08 ratio         PTT - ( 2019 06:10 )  PTT:42.1 sec   @ 00:54  INR 1.08    Urinalysis Basic - ( 2019 01:13 )    Color: Yellow / Appearance: Clear / S.010 / pH: x  Gluc: x / Ketone: Negative  / Bili: Negative / Urobili: Negative   Blood: x / Protein: 25 mg/dL / Nitrite: Negative   Leuk Esterase: Negative / RBC: 0-2 /HPF / WBC 0-2   Sq Epi: x / Non Sq Epi: Negative / Bacteria: Occasional        CAPILLARY BLOOD GLUCOSE              RECENT CULTURES:

## 2019-04-02 NOTE — PROGRESS NOTE ADULT - ASSESSMENT
The patient is a 63 year old male with a history of HTN, gout, paroxysmal atrial fibrillation who is admitted with splenic artery dissection with splenic infarct.    Plan:  - Increase metoprolol succinate to 10 mg daily  - Continue losartan 50 mg daily  - Continue hydralazine 50 mg q6h prn elevated BP  - Discontinue heparin drip  - Start rivaroxaban 20 mg daily for splenic artery dissection and paroxysmal atrial fibrillation  - Pain control  - Possible discharge tomorrow if pain improved The patient is a 63 year old male with a history of HTN, gout, paroxysmal atrial fibrillation who is admitted with splenic artery dissection with splenic infarct.    Plan:  - Tachycardia at times likely due to pain  - Increase metoprolol succinate to 100 mg daily  - Continue losartan 50 mg daily  - Continue hydralazine 50 mg q6h prn elevated BP  - Discontinue heparin drip  - Start rivaroxaban 20 mg daily for splenic artery dissection and paroxysmal atrial fibrillation  - Pain control  - Possible discharge tomorrow if pain improved

## 2019-04-03 ENCOUNTER — TRANSCRIPTION ENCOUNTER (OUTPATIENT)
Age: 64
End: 2019-04-03

## 2019-04-03 VITALS
OXYGEN SATURATION: 97 % | HEART RATE: 87 BPM | TEMPERATURE: 99 F | RESPIRATION RATE: 18 BRPM | DIASTOLIC BLOOD PRESSURE: 88 MMHG | SYSTOLIC BLOOD PRESSURE: 138 MMHG

## 2019-04-03 DIAGNOSIS — I48.0 PAROXYSMAL ATRIAL FIBRILLATION: ICD-10-CM

## 2019-04-03 LAB
HCT VFR BLD CALC: 51 % — HIGH (ref 39–50)
HGB BLD-MCNC: 17.4 G/DL — HIGH (ref 13–17)
MCHC RBC-ENTMCNC: 29 PG — SIGNIFICANT CHANGE UP (ref 27–34)
MCHC RBC-ENTMCNC: 34.1 GM/DL — SIGNIFICANT CHANGE UP (ref 32–36)
MCV RBC AUTO: 84.9 FL — SIGNIFICANT CHANGE UP (ref 80–100)
NRBC # BLD: 0 /100 WBCS — SIGNIFICANT CHANGE UP (ref 0–0)
PLATELET # BLD AUTO: 184 K/UL — SIGNIFICANT CHANGE UP (ref 150–400)
RBC # BLD: 6.01 M/UL — HIGH (ref 4.2–5.8)
RBC # FLD: 13.9 % — SIGNIFICANT CHANGE UP (ref 10.3–14.5)
WBC # BLD: 12.96 K/UL — HIGH (ref 3.8–10.5)
WBC # FLD AUTO: 12.96 K/UL — HIGH (ref 3.8–10.5)

## 2019-04-03 PROCEDURE — 96375 TX/PRO/DX INJ NEW DRUG ADDON: CPT

## 2019-04-03 PROCEDURE — 85610 PROTHROMBIN TIME: CPT

## 2019-04-03 PROCEDURE — 83605 ASSAY OF LACTIC ACID: CPT

## 2019-04-03 PROCEDURE — 87040 BLOOD CULTURE FOR BACTERIA: CPT

## 2019-04-03 PROCEDURE — 85027 COMPLETE CBC AUTOMATED: CPT

## 2019-04-03 PROCEDURE — 85730 THROMBOPLASTIN TIME PARTIAL: CPT

## 2019-04-03 PROCEDURE — 81001 URINALYSIS AUTO W/SCOPE: CPT

## 2019-04-03 PROCEDURE — 83690 ASSAY OF LIPASE: CPT

## 2019-04-03 PROCEDURE — 74019 RADEX ABDOMEN 2 VIEWS: CPT

## 2019-04-03 PROCEDURE — 36415 COLL VENOUS BLD VENIPUNCTURE: CPT

## 2019-04-03 PROCEDURE — 80053 COMPREHEN METABOLIC PANEL: CPT

## 2019-04-03 PROCEDURE — 96376 TX/PRO/DX INJ SAME DRUG ADON: CPT

## 2019-04-03 PROCEDURE — 74177 CT ABD & PELVIS W/CONTRAST: CPT

## 2019-04-03 PROCEDURE — 99285 EMERGENCY DEPT VISIT HI MDM: CPT | Mod: 25

## 2019-04-03 PROCEDURE — 71046 X-RAY EXAM CHEST 2 VIEWS: CPT

## 2019-04-03 PROCEDURE — 96374 THER/PROPH/DIAG INJ IV PUSH: CPT | Mod: XU

## 2019-04-03 PROCEDURE — 86803 HEPATITIS C AB TEST: CPT

## 2019-04-03 PROCEDURE — 93005 ELECTROCARDIOGRAM TRACING: CPT

## 2019-04-03 RX ORDER — METOPROLOL TARTRATE 50 MG
1 TABLET ORAL
Qty: 0 | Refills: 0 | COMMUNITY

## 2019-04-03 RX ORDER — LOSARTAN POTASSIUM 100 MG/1
100 TABLET, FILM COATED ORAL DAILY
Qty: 0 | Refills: 0 | Status: DISCONTINUED | OUTPATIENT
Start: 2019-04-04 | End: 2019-04-03

## 2019-04-03 RX ORDER — METOPROLOL TARTRATE 50 MG
1 TABLET ORAL
Qty: 30 | Refills: 0
Start: 2019-04-03

## 2019-04-03 RX ORDER — ACETAMINOPHEN 500 MG
2 TABLET ORAL
Qty: 0 | Refills: 0 | DISCHARGE
Start: 2019-04-03

## 2019-04-03 RX ORDER — HYDRALAZINE HCL 50 MG
1 TABLET ORAL
Qty: 120 | Refills: 0
Start: 2019-04-03

## 2019-04-03 RX ORDER — RIVAROXABAN 15 MG-20MG
1 KIT ORAL
Qty: 30 | Refills: 0
Start: 2019-04-03

## 2019-04-03 RX ORDER — POTASSIUM CHLORIDE 20 MEQ
20 PACKET (EA) ORAL ONCE
Qty: 0 | Refills: 0 | Status: COMPLETED | OUTPATIENT
Start: 2019-04-03 | End: 2019-04-03

## 2019-04-03 RX ORDER — DOCUSATE SODIUM 100 MG
1 CAPSULE ORAL
Qty: 0 | Refills: 0 | DISCHARGE
Start: 2019-04-03

## 2019-04-03 RX ORDER — LOSARTAN POTASSIUM 100 MG/1
50 TABLET, FILM COATED ORAL ONCE
Qty: 0 | Refills: 0 | Status: COMPLETED | OUTPATIENT
Start: 2019-04-03 | End: 2019-04-03

## 2019-04-03 RX ADMIN — LOSARTAN POTASSIUM 50 MILLIGRAM(S): 100 TABLET, FILM COATED ORAL at 09:45

## 2019-04-03 RX ADMIN — Medication 100 MILLIGRAM(S): at 05:10

## 2019-04-03 RX ADMIN — HYDROMORPHONE HYDROCHLORIDE 0.5 MILLIGRAM(S): 2 INJECTION INTRAMUSCULAR; INTRAVENOUS; SUBCUTANEOUS at 02:23

## 2019-04-03 RX ADMIN — Medication 20 MILLIEQUIVALENT(S): at 11:00

## 2019-04-03 RX ADMIN — HYDROMORPHONE HYDROCHLORIDE 0.5 MILLIGRAM(S): 2 INJECTION INTRAMUSCULAR; INTRAVENOUS; SUBCUTANEOUS at 05:38

## 2019-04-03 RX ADMIN — HYDROMORPHONE HYDROCHLORIDE 0.5 MILLIGRAM(S): 2 INJECTION INTRAMUSCULAR; INTRAVENOUS; SUBCUTANEOUS at 01:35

## 2019-04-03 RX ADMIN — HYDROMORPHONE HYDROCHLORIDE 0.5 MILLIGRAM(S): 2 INJECTION INTRAMUSCULAR; INTRAVENOUS; SUBCUTANEOUS at 06:04

## 2019-04-03 RX ADMIN — LOSARTAN POTASSIUM 50 MILLIGRAM(S): 100 TABLET, FILM COATED ORAL at 05:10

## 2019-04-03 RX ADMIN — PANTOPRAZOLE SODIUM 40 MILLIGRAM(S): 20 TABLET, DELAYED RELEASE ORAL at 12:00

## 2019-04-03 NOTE — DISCHARGE NOTE PROVIDER - NSDCCPCAREPLAN_GEN_ALL_CORE_FT
PRINCIPAL DISCHARGE DIAGNOSIS  Diagnosis: Abdominal pain, unspecified abdominal location  Assessment and Plan of Treatment: splenic artery dissection      SECONDARY DISCHARGE DIAGNOSES  Diagnosis: Splenic infarction  Assessment and Plan of Treatment:     Diagnosis: Dissection of other artery  Assessment and Plan of Treatment:

## 2019-04-03 NOTE — PROGRESS NOTE ADULT - ASSESSMENT
The patient is a 63 year old male with a history of HTN, gout, paroxysmal atrial fibrillation who is admitted with splenic artery dissection with splenic infarct.    Plan:  - Tachycardia at times likely due to pain  - Continue metoprolol succinate 100 mg daily  - Increase losartan to 100 mg daily  - Continue rivaroxaban 20 mg daily for splenic artery dissection and paroxysmal atrial fibrillation  - Pain control  - Ok for discharge from a cardiac perspective. Close outpatient follow-up for BP monitoring. Will obtain CTA A/P to evaluate the splenic artery in 3 months.

## 2019-04-03 NOTE — DISCHARGE NOTE PROVIDER - HOSPITAL COURSE
· Subjective and Objective:     History of Present Illness: The patient is a 63 year old male with a history of HTN, gout, paroxysmal atrial fibrillation who presents with abdominal pain. The pain is left-sided and started acutely yesterday evening. Some associated nausea. No chest pain, shortness of breath, dizziness, palpitations. His BP meds have been changed recently. His irbesartan was discontinued due to mild PARKER and his diltiazem was changed to metoprolol due to interaction with colchicine.        Past Medical/Surgical History:    HTN, gout, paroxysmal atrial fibrillation        Medications:    Home Medications:    irbesartan-hydrochlorothiazide 300 mg-12.5 mg oral tablet: 1 tab(s) orally once a day (01 Apr 2019 00:20)    metoprolol tartrate 50 mg oral tablet: 1 tab(s) orally 2 times a day (01 Apr 2019 07:07)    Uloric:  orally  (01 Apr 2019 00:20)            Family History: Non-contributory family history of premature cardiovascular atherosclerotic disease        Social History: No tobacco, alcohol or drug use        admit for iv heparin , will get vascular and cardio opinion , better BP control with meds , and pain meds     on 4/2/19 vital signs stable , bp better , decreased pain , placed on xarelto per cardio off heprin , ambulate , advance diet , plan dc in am if vital signs stable        on4/3/19 patient vital signs stable , feels well , supplement kcl , pain controlled , wants to go home , on xarelto , aware of risks of NOAC and bleeding and to avoid contact sports ,     on exam vital signs stable , lungs clear , heart irregular , abd soft non tender , ext no edema , neuro awake , alert non focal cn 2-12 intact     splenic artery disection and splenic infarct and a fib on xarelto

## 2019-04-03 NOTE — DISCHARGE NOTE PROVIDER - CARE PROVIDER_API CALL
Luis Fernando Villalba)  Internal Medicine  [address], [address]  [place] [address]  Phone: [phone]  Fax: [phone]  Follow Up Time:

## 2019-04-03 NOTE — DISCHARGE NOTE NURSING/CASE MANAGEMENT/SOCIAL WORK - NSDCDPATPORTLINK_GEN_ALL_CORE
You can access the [facility] Patient Portal, offered by [facility], by registering with the following website: [email or web address]

## 2019-04-03 NOTE — PROGRESS NOTE ADULT - SUBJECTIVE AND OBJECTIVE BOX
Chief Complaint: Abdominal pain    Interval Events: Overall improved.    Review of Systems:  General: No fevers, chills, weight loss or gain  Skin: No rashes, color changes  Cardiovascular: No chest pain, orthopnea  Respiratory: No shortness of breath, cough  Gastrointestinal: No nausea, abdominal pain  Genitourinary: No incontinence, pain with urination  Musculoskeletal: No pain, swelling, decreased range of motion  Neurological: No headache, weakness  Psychiatric: No depression, anxiety  Endocrine: No weight loss or gain, increased thirst  All other systems are comprehensively negative.    Physical Exam:  Vital Signs Last 24 Hrs  T(C): 37.4 (03 Apr 2019 04:29), Max: 37.6 (02 Apr 2019 21:21)  T(F): 99.3 (03 Apr 2019 04:29), Max: 99.6 (02 Apr 2019 21:21)  HR: 83 (03 Apr 2019 04:29) (76 - 104)  BP: 143/92 (03 Apr 2019 04:29) (130/87 - 147/90)  BP(mean): --  RR: 18 (03 Apr 2019 04:29) (17 - 18)  SpO2: 97% (03 Apr 2019 04:29) (95% - 98%)  General: NAD  HEENT: MMM  Neck: No JVD, no carotid bruit  Lungs: CTAB  CV: RRR, nl S1/S2, no M/R/G  Abdomen: S/NT/ND, +BS  Extremities: No LE edema, no cyanosis  Neuro: AAOx3, non-focal  Skin: No rash    Labs:    04-02    138  |  103  |  14  ----------------------------<  98  3.4<L>   |  26  |  1.00    Ca    8.0<L>      02 Apr 2019 06:10    TPro  6.7  /  Alb  3.2<L>  /  TBili  1.0  /  DBili  x   /  AST  41<H>  /  ALT  23  /  AlkPhos  56  04-02                        17.4   12.96 )-----------( 184      ( 03 Apr 2019 06:50 )             51.0       Telemetry: AF , tachycardic at times

## 2019-04-07 LAB
CULTURE RESULTS: SIGNIFICANT CHANGE UP
CULTURE RESULTS: SIGNIFICANT CHANGE UP
SPECIMEN SOURCE: SIGNIFICANT CHANGE UP
SPECIMEN SOURCE: SIGNIFICANT CHANGE UP

## 2019-04-24 ENCOUNTER — APPOINTMENT (OUTPATIENT)
Dept: CARDIOLOGY | Facility: CLINIC | Age: 64
End: 2019-04-24
Payer: COMMERCIAL

## 2019-04-24 VITALS
HEIGHT: 67 IN | TEMPERATURE: 98.5 F | SYSTOLIC BLOOD PRESSURE: 126 MMHG | BODY MASS INDEX: 32.33 KG/M2 | OXYGEN SATURATION: 99 % | WEIGHT: 206 LBS | DIASTOLIC BLOOD PRESSURE: 88 MMHG | HEART RATE: 84 BPM

## 2019-04-24 PROCEDURE — 99204 OFFICE O/P NEW MOD 45 MIN: CPT

## 2019-04-24 NOTE — ASSESSMENT
[FreeTextEntry1] : Assessment:\par 1.  Splenic infarct\par 2.  Unclear if true dissection vs. embolization from afib\par 3.  Afib\par 4.  HTN\par 5.  Overweight\par \par Plan\par 1.  Continue Xarelto 20mg daily with food\par 2.  Perform mesenteric duplex now\par 3.  In July (three months from his first CT) will perform CT angiogram Neck, Chest, Abdomen, pelvis to eval for dissection/aneurysm/FMD in his arteries. \par 4.  Return after #3.

## 2019-04-24 NOTE — PHYSICAL EXAM
[General Appearance - Well Developed] : well developed [Normal Appearance] : normal appearance [Well Groomed] : well groomed [No Deformities] : no deformities [General Appearance - Well Nourished] : well nourished [General Appearance - In No Acute Distress] : no acute distress [Normal Conjunctiva] : the conjunctiva exhibited no abnormalities [Eyelids - No Xanthelasma] : the eyelids demonstrated no xanthelasmas [Normal Oral Mucosa] : normal oral mucosa [No Oral Cyanosis] : no oral cyanosis [No Oral Pallor] : no oral pallor [Normal Jugular Venous A Waves Present] : normal jugular venous A waves present [Normal Jugular Venous V Waves Present] : normal jugular venous V waves present [No Jugular Venous Roger A Waves] : no jugular venous roger A waves [Heart Rate And Rhythm] : heart rate and rhythm were normal [Murmurs] : no murmurs present [Heart Sounds] : normal S1 and S2 [Respiration, Rhythm And Depth] : normal respiratory rhythm and effort [Exaggerated Use Of Accessory Muscles For Inspiration] : no accessory muscle use [Auscultation Breath Sounds / Voice Sounds] : lungs were clear to auscultation bilaterally [Abdomen Soft] : soft [Abdomen Tenderness] : non-tender [Abdomen Mass (___ Cm)] : no abdominal mass palpated [Abnormal Walk] : normal gait [Gait - Sufficient For Exercise Testing] : the gait was sufficient for exercise testing [Nail Clubbing] : no clubbing of the fingernails [Cyanosis, Localized] : no localized cyanosis [Petechial Hemorrhages (___cm)] : no petechial hemorrhages [Skin Color & Pigmentation] : normal skin color and pigmentation [] : no rash [No Skin Ulcers] : no skin ulcer [Skin Lesions] : no skin lesions [No Venous Stasis] : no venous stasis [No Xanthoma] : no  xanthoma was observed

## 2019-04-24 NOTE — REASON FOR VISIT
[Initial Evaluation] : an initial evaluation of [FreeTextEntry1] : 63M history of HTN, atrial fibrillation.  Admitted to [facility] of abd pains.  Found to have a splenic infarct by CT with IV and PO contrast.  The CT was not an angiogram.  It was interpreted as splenic artery dissection with thrombus.  He was started on Xarelto.  Abdominal pain has almost resolved, worse with deep breath.  Doing well with Xarelto without bleeding or bruising.  He states he likes to play gold, and raquet ball.  No family history of dissection, aneurysm, sudden cardiac death.  \par \par He is currently on Metoprolol, losartan, Xarelto 20mg daily, Amlodipine, Colchine.  \par \par He has had afib for many years - was not on a/c due to low chads vasc score.  He is followed by Dr. Baldemar Mckoy from Cardiology.\par \par He is in the ultrasound field.  He is good friends with Dr. Eisenberg.  \par \par  [FreeTextEntry2] : Splenic infarct

## 2019-05-02 ENCOUNTER — APPOINTMENT (OUTPATIENT)
Dept: ULTRASOUND IMAGING | Facility: HOSPITAL | Age: 64
End: 2019-05-02
Payer: COMMERCIAL

## 2019-05-02 ENCOUNTER — OUTPATIENT (OUTPATIENT)
Dept: OUTPATIENT SERVICES | Facility: HOSPITAL | Age: 64
LOS: 1 days | End: 2019-05-02
Payer: COMMERCIAL

## 2019-05-02 DIAGNOSIS — Z90.79 ACQUIRED ABSENCE OF OTHER GENITAL ORGAN(S): Chronic | ICD-10-CM

## 2019-05-02 DIAGNOSIS — D73.5 INFARCTION OF SPLEEN: ICD-10-CM

## 2019-05-02 DIAGNOSIS — Z00.00 ENCOUNTER FOR GENERAL ADULT MEDICAL EXAMINATION WITHOUT ABNORMAL FINDINGS: ICD-10-CM

## 2019-05-02 PROCEDURE — 93975 VASCULAR STUDY: CPT | Mod: 26

## 2019-05-02 PROCEDURE — 93975 VASCULAR STUDY: CPT

## 2019-05-07 NOTE — H&P ADULT - NSHPPOAPULMEMBOLUS_GEN_A_CORE
Problem: Self Care Deficits Care Plan (Adult) Goal: *Acute Goals and Plan of Care (Insert Text) Description Occupational Therapy Goals Initiated 5/7/2019 1. Patient will perform bathing with minimal assistance/contact guard assist using Reacher PRN within 7 days. 2.  Patient will perform lower body dressing with moderate assistance  using most appropriate DME within 7 days. 3.  Patient will toileting at supervision/set-up within 7 days. 4.  Patient will don/doff back brace at minimal assistance/contact guard assist within 7 days. 5.  Patient will verbalize/demonstrate 3/3 back precautions during ADL tasks without cues within 7 days. Outcome: Progressing Towards Goal 
 OCCUPATIONAL THERAPY EVALUATION Patient: Isabel Aviles (79 y.o. female) Date: 5/7/2019 Primary Diagnosis: Cervical stenosis of spinal canal [M48.02] Spinal stenosis of lumbar region at multiple levels [M48.061] Procedure(s) (LRB): STAGED CASE PART 2 - L2-5 POSTERIOR DECOMPRESSION AND FUSION (surg pain sol) (N/A) Day of Surgery Precautions:   Back, Fall, Other (comment)(brace when up) ASSESSMENT : 
Based on the objective data described below, the patient presents with generalized weakness, decreased endurance, strength, functional mobility and ADLs . Pt was living at home with family and was independent with ADLs PTA. Pt was cleared to be seen and was sitting up in chair with family in room and was drowsy. Pt was able to stand with CGA of 2 and with use of RW walked to door of room and now is able to walk the distance she needs for walking to bathroom. Pt was able to state back precautions. She was max to mod for donning back brace and doffing with min assist of 1. Pt was educated on log rolling for sit to supine and left on her side sleeping. Recommend that pt have further OT at discharge at home with home care OT and PT, pending progress. Ana Delaney Patient will benefit from skilled intervention to address the above impairments. Patient?s rehabilitation potential is considered to be Good Factors which may influence rehabilitation potential include: ? None noted ? Mental ability/status ? Medical condition: cervical precautions ? Home/family situation and support systems ? Safety awareness ? Pain tolerance/management ? Other: PLAN : 
Recommendations and Planned Interventions: 
?               Self Care Training                  ? Therapeutic Activities ? Functional Mobility Training    ? Cognitive Retraining 
? Therapeutic Exercises           ? Endurance Activities ? Balance Training                   ? Neuromuscular Re-Education ? Visual/Perceptual Training     ? Home Safety Training 
? Patient Education                 ? Family Training/Education ? Other (comment): Frequency/Duration: Patient will be followed by occupational therapy 4 times a week to address goals. Discharge Recommendations: Tk Ayala Further Equipment Recommendations for Discharge: tbd SUBJECTIVE:  
Patient stated ? I want to get up and walk.? OBJECTIVE DATA SUMMARY:  
HISTORY:  
Past Medical History:  
Diagnosis Date Arthritis CAD (coronary artery disease) 6 stents (CARMITA) 3/21/18 at [facility] Diabetes ([address] Dizziness Dyspepsia and other specified disorders of function of stomach GERD (gastroesophageal reflux disease) Headache(784.0) Hx of cardiac cath 08/2018  
 after abnormal stress test 3/13/18 Hypertension Loss of appetite Musculoskeletal disorder   
 back pain Other ill-defined conditions(799.89)   
 shingles Thyroid disease   
 hypothyroidism Past Surgical History:  
Procedure Laterality Date CARDIAC SURG PROCEDURE UNLIST    
 6 stents [address]  
 hysterectomy & 2 c-sections HX HEENT  1963  
 tonsils HX ORTHOPAEDIC Bilateral   
 carpal tunnel release HX ORTHOPAEDIC    
 neck surgery HX OTHER SURGICAL  1976  
 sweat glands removed @ [facility]  
 HX OTHER SURGICAL  2015  
 abscess removed from right back [facility] DRAIN SKIN ABSCESS SIMPLE  1/9/12 Prior Level of Function/Environment/Context: pt lives with family and was independent in all areas PTA.  
:  
Expanded or extensive additional review of patient history:  
 
Home Situation Home Environment: Private residence # Steps to Enter: 5 Rails to Enter: Yes One/Two Story Residence: One story Living Alone: No 
Support Systems: Child(delroy), Spouse/Significant Other/Partner Patient Expects to be Discharged to[de-identified] Private residence Current DME Used/Available at Home: Otto Benne, quad, Walker, rollator, Walker, rolling Tub or Shower Type: Shower Hand dominance: Right EXAMINATION OF PERFORMANCE DEFICITS: 
Cognitive/Behavioral Status: 
Neurologic State: Drowsy Orientation Level: Oriented X4 Cognition: Follows commands;Decreased attention/concentration Perception: Appears intact Perseveration: No perseveration noted Safety/Judgement: Fall prevention Skin: in good health Edema: none Hearing:apears intact Vision/Perceptual:   
    
   Appears intact Range of Motion: 
AROM: Generally decreased, functional 
  
  
   
Strength: 
Strength: Generally decreased, functional 
  
   
Coordination: 
  
Fine Motor Skills-Upper: Left Intact; Right Intact Gross Motor Skills-Upper: Left Intact; Right Intact Tone & Sensation: 
intact Balance: 
Sitting: Intact Standing: Impaired Standing - Static: Constant support;Good Standing - Dynamic : Constant support; Allison Monson Functional Mobility and Transfers for ADLs: 
Bed Mobility: 
Rolling: Minimum assistance;Assist x2 Sit to Supine: Assist x2;Minimum assistance Transfers: Sit to Stand: Contact guard assistance; Additional time;Assist x2 Stand to Sit: Contact guard assistance;Assist x2 Bed to Chair: Contact guard assistance Bathroom Mobility: Contact guard assistance Toilet Transfer : Contact guard assistance ADL Assessment: 
Feeding: Setup Oral Facial Hygiene/Grooming: Setup Bathing: Maximum assistance;Minimum assistance Upper Body Dressing: Setup Lower Body Dressing: Maximum assistance;Minimum assistance Toileting: Contact guard assistance Cognitive Retraining Safety/Judgement: Fall prevention Barthel Index: 
Bathin Bladder: 0 Bowels: 10 
Groomin Dressin Feeding: 10 Mobility: 10 Stairs: 5 Toilet Use: 5 Transfer (Bed to Chair and Back): 10 Total: 60/100 Percentage of impairment  
0% 1-19% 20-39% 40-59% 60-79% 80-99% 100% Barthel Score 0-100 100 99-80 79-60 59-40 20-39 1-19 
 0 The Barthel ADL Index: Guidelines 1. The index should be used as a record of what a patient does, not as a record of what a patient could do. 2. The main aim is to establish degree of independence from any help, physical or verbal, however minor and for whatever reason. 3. The need for supervision renders the patient not independent. 4. A patient's performance should be established using the best available evidence. Asking the patient, friends/relatives and nurses are the usual sources, but direct observation and common sense are also important. However direct testing is not needed. 5. Usually the patient's performance over the preceding 24-48 hours is important, but occasionally longer periods will be relevant. 6. Middle categories imply that the patient supplies over 50 per cent of the effort. 7. Use of aids to be independent is allowed. Polina Gonsalez., Barthel, D.W. ([id number]). Functional evaluation: the Barthel Index. [address] [facility] (14)2.  
MONE Maddox, Clem Crabtree., Fe Barth., Shayy Mack. (1999). Measuring the change indisability after inpatient rehabilitation; comparison of the responsiveness of the Barthel Index and Functional [place] Measure. Journal of Neurology, Neurosurgery, and Psychiatry, 66(4), 969-603. LAN Valero, YOSSI Merritt, & Dasha Limon M.A. (2004.) Assessment of post-stroke quality of life in cost-effectiveness studies: The usefulness of the Barthel Index and the EuroQoL-5D. [facility], 13, 689-45 Occupational Therapy Evaluation Charge Determination History Examination Decision-Making MEDIUM Complexity : Expanded review of history including physical, cognitive and psychosocial  history  LOW Complexity : 1-3 performance deficits relating to physical, cognitive , or psychosocial skils that result in activity limitations and / or participation restrictions  LOW Complexity : No comorbidities that affect functional and no verbal or physical assistance needed to complete eval tasks Based on the above components, the patient evaluation is determined to be of the following complexity level: LOW Pain: 
Pain Scale 1: Behavioral Pain Scale (BPS) Pain Intensity 1: 8 Pain Location 1: Back Pain Intervention(s) 1: Medication (see MAR) Activity Tolerance:  
fair Please refer to the flowsheet for vital signs taken during this treatment. After treatment:  
? Patient left in no apparent distress sitting up in chair ? Patient left in no apparent distress in bed 
? Call bell left within reach ? Nursing notified ? Caregiver present ? Bed alarm activated COMMUNICATION/EDUCATION:  
The patient?s plan of care was discussed with: Physical Therapist, Registered Nurse and family . ? Home safety education was provided and the patient/caregiver indicated understanding. ? Patient/family have participated as able in goal setting and plan of care. ? Patient/family agree to work toward stated goals and plan of care. ? Patient understands intent and goals of therapy, but is neutral about his/her participation. ? Patient is unable to participate in goal setting and plan of care. This patient?s plan of care is appropriate for delegation to [facility]. Thank you for this referral. 
Artie Mehta, OT Time Calculation: 17 mins no

## 2019-05-28 NOTE — ED ADULT NURSE NOTE - NSIMPLEMENTINTERV_GEN_ALL_ED
Problem: Communication  Goal: The ability to communicate needs accurately and effectively will improve  Outcome: PROGRESSING AS EXPECTED      Problem: Knowledge Deficit  Goal: Knowledge of disease process/condition, treatment plan, diagnostic tests, and medications will improve  Outcome: PROGRESSING AS EXPECTED      Problem: Pain Management  Goal: Pain level will decrease to patient's comfort goal  Outcome: PROGRESSING AS EXPECTED         Implemented All Universal Safety Interventions:  [place] to call system. Call bell, personal items and telephone within reach. Instruct patient to call for assistance. Room bathroom lighting operational. Non-slip footwear when patient is off stretcher. Physically safe environment: no spills, clutter or unnecessary equipment. Stretcher in lowest position, wheels locked, appropriate side rails in place.

## 2019-06-28 ENCOUNTER — APPOINTMENT (OUTPATIENT)
Dept: CT IMAGING | Facility: CLINIC | Age: 64
End: 2019-06-28

## 2019-06-28 ENCOUNTER — OUTPATIENT (OUTPATIENT)
Dept: OUTPATIENT SERVICES | Facility: HOSPITAL | Age: 64
LOS: 1 days | End: 2019-06-28
Payer: COMMERCIAL

## 2019-06-28 DIAGNOSIS — Z90.79 ACQUIRED ABSENCE OF OTHER GENITAL ORGAN(S): Chronic | ICD-10-CM

## 2019-06-28 DIAGNOSIS — Z00.8 ENCOUNTER FOR OTHER GENERAL EXAMINATION: ICD-10-CM

## 2019-06-28 PROCEDURE — 74174 CTA ABD&PLVS W/CONTRAST: CPT | Mod: 26

## 2019-06-28 PROCEDURE — 74174 CTA ABD&PLVS W/CONTRAST: CPT

## 2019-06-28 PROCEDURE — 70498 CT ANGIOGRAPHY NECK: CPT

## 2019-06-28 PROCEDURE — 71275 CT ANGIOGRAPHY CHEST: CPT | Mod: 26

## 2019-06-28 PROCEDURE — 71275 CT ANGIOGRAPHY CHEST: CPT

## 2019-06-28 PROCEDURE — 70498 CT ANGIOGRAPHY NECK: CPT | Mod: 26

## 2019-07-17 ENCOUNTER — APPOINTMENT (OUTPATIENT)
Dept: CARDIOLOGY | Facility: CLINIC | Age: 64
End: 2019-07-17
Payer: COMMERCIAL

## 2019-07-17 VITALS
BODY MASS INDEX: 33.59 KG/M2 | WEIGHT: 214 LBS | SYSTOLIC BLOOD PRESSURE: 120 MMHG | DIASTOLIC BLOOD PRESSURE: 80 MMHG | HEIGHT: 67 IN

## 2019-07-17 DIAGNOSIS — D73.5 INFARCTION OF SPLEEN: ICD-10-CM

## 2019-07-17 PROCEDURE — 99214 OFFICE O/P EST MOD 30 MIN: CPT

## 2019-07-17 NOTE — ASSESSMENT
[FreeTextEntry1] : Assessment:\par 1.  Splenic infarct\par 2.  Unclear if true dissection vs. embolization from afib\par 3.  Afib\par 4.  HTN\par 5.  Overweight\par \par Plan\par 1.  Continue Xarelto 20mg daily with food\par 2.  Doubt dissection - likely primarily from afib\par 3.  To see Dr. Mckoy for afib management\par 4.  In 1 year, repeat mesenteric artery duplex to assure stability of splenic artery\par \par

## 2019-07-17 NOTE — REASON FOR VISIT
[Follow-Up - Clinic] : a clinic follow-up of [FreeTextEntry2] : Splenic infarct [FreeTextEntry1] : Followup visit 7/17/2019\par Doing well. Remains on Xarelto. No bleeding or bruising\par Seeing dr. Mckoy soon for cardiology\par CT performed showed no arteriopathy/dissection or FMD, no aneurysm\par Otherwise feels well and is without complaints.\par \par \par intial visit\par \par 63M history of HTN, atrial fibrillation.  Admitted to [facility] of abd pains.  Found to have a splenic infarct by CT with IV and PO contrast.  The CT was not an angiogram.  It was interpreted as splenic artery dissection with thrombus.  He was started on Xarelto.  Abdominal pain has almost resolved, worse with deep breath.  Doing well with Xarelto without bleeding or bruising.  He states he likes to play gold, and raquet ball.  No family history of dissection, aneurysm, sudden cardiac death.  \par \par He is currently on Metoprolol, losartan, Xarelto 20mg daily, Amlodipine, Colchine.  \par \par He has had afib for many years - was not on a/c due to low chads vasc score.  He is followed by Dr. Baldemar Mckoy from Cardiology.\par \par He is in the ultrasound field.  He is good friends with Dr. Eisenberg.  \par \par

## 2019-07-17 NOTE — PHYSICAL EXAM
[General Appearance - Well Developed] : well developed [Normal Appearance] : normal appearance [Well Groomed] : well groomed [General Appearance - Well Nourished] : well nourished [No Deformities] : no deformities [General Appearance - In No Acute Distress] : no acute distress [Normal Conjunctiva] : the conjunctiva exhibited no abnormalities [Eyelids - No Xanthelasma] : the eyelids demonstrated no xanthelasmas [Normal Oral Mucosa] : normal oral mucosa [No Oral Pallor] : no oral pallor [No Oral Cyanosis] : no oral cyanosis [Normal Jugular Venous A Waves Present] : normal jugular venous A waves present [Normal Jugular Venous V Waves Present] : normal jugular venous V waves present [No Jugular Venous Roger A Waves] : no jugular venous roger A waves [Respiration, Rhythm And Depth] : normal respiratory rhythm and effort [Exaggerated Use Of Accessory Muscles For Inspiration] : no accessory muscle use [Auscultation Breath Sounds / Voice Sounds] : lungs were clear to auscultation bilaterally [Heart Rate And Rhythm] : heart rate and rhythm were normal [Heart Sounds] : normal S1 and S2 [Murmurs] : no murmurs present [Abdomen Soft] : soft [Abdomen Tenderness] : non-tender [Abdomen Mass (___ Cm)] : no abdominal mass palpated [Abnormal Walk] : normal gait [Gait - Sufficient For Exercise Testing] : the gait was sufficient for exercise testing [Nail Clubbing] : no clubbing of the fingernails [Cyanosis, Localized] : no localized cyanosis [Petechial Hemorrhages (___cm)] : no petechial hemorrhages [Skin Color & Pigmentation] : normal skin color and pigmentation [] : no rash [No Venous Stasis] : no venous stasis [Skin Lesions] : no skin lesions [No Skin Ulcers] : no skin ulcer [No Xanthoma] : no  xanthoma was observed

## 2019-09-26 NOTE — ED PROVIDER NOTE - MUSCULOSKELETAL NECK EXAM
Please notify T is normal.  Keep appt for vasectomy.   no deformity, pain or tenderness. no restriction of movement

## 2020-05-11 NOTE — ED ADULT TRIAGE NOTE - CADM TRG TX PRIOR TO ARRIVAL
none Erivedge Counseling- I discussed with the patient the risks of Erivedge including but not limited to nausea, vomiting, diarrhea, constipation, weight loss, changes in the sense of taste, decreased appetite, muscle spasms, and hair loss.  The patient verbalized understanding of the proper use and possible adverse effects of Erivedge.  All of the patient's questions and concerns were addressed.

## 2020-11-15 ENCOUNTER — EMERGENCY (EMERGENCY)
Facility: HOSPITAL | Age: 65
LOS: 1 days | Discharge: HOME | End: 2020-11-15
Attending: EMERGENCY MEDICINE | Admitting: EMERGENCY MEDICINE
Payer: COMMERCIAL

## 2020-11-15 VITALS
DIASTOLIC BLOOD PRESSURE: 88 MMHG | TEMPERATURE: 98 F | RESPIRATION RATE: 16 BRPM | HEART RATE: 67 BPM | SYSTOLIC BLOOD PRESSURE: 125 MMHG | OXYGEN SATURATION: 97 %

## 2020-11-15 VITALS
SYSTOLIC BLOOD PRESSURE: 150 MMHG | WEIGHT: 210.1 LBS | TEMPERATURE: 98 F | HEIGHT: 68 IN | DIASTOLIC BLOOD PRESSURE: 90 MMHG | RESPIRATION RATE: 18 BRPM | HEART RATE: 98 BPM | OXYGEN SATURATION: 97 %

## 2020-11-15 DIAGNOSIS — Z90.79 ACQUIRED ABSENCE OF OTHER GENITAL ORGAN(S): Chronic | ICD-10-CM

## 2020-11-15 LAB
ALBUMIN SERPL ELPH-MCNC: 3.6 G/DL — SIGNIFICANT CHANGE UP (ref 3.3–5)
ALP SERPL-CCNC: 77 U/L — SIGNIFICANT CHANGE UP (ref 40–120)
ALT FLD-CCNC: 28 U/L — SIGNIFICANT CHANGE UP (ref 12–78)
ANION GAP SERPL CALC-SCNC: 7 MMOL/L — SIGNIFICANT CHANGE UP (ref 5–17)
APTT BLD: 37.9 SEC — HIGH (ref 27.5–35.5)
AST SERPL-CCNC: 29 U/L — SIGNIFICANT CHANGE UP (ref 15–37)
BASOPHILS # BLD AUTO: 0.02 K/UL — SIGNIFICANT CHANGE UP (ref 0–0.2)
BASOPHILS NFR BLD AUTO: 0.4 % — SIGNIFICANT CHANGE UP (ref 0–2)
BILIRUB SERPL-MCNC: 0.7 MG/DL — SIGNIFICANT CHANGE UP (ref 0.2–1.2)
BUN SERPL-MCNC: 18 MG/DL — SIGNIFICANT CHANGE UP (ref 7–23)
CALCIUM SERPL-MCNC: 8.6 MG/DL — SIGNIFICANT CHANGE UP (ref 8.5–10.1)
CHLORIDE SERPL-SCNC: 110 MMOL/L — HIGH (ref 96–108)
CO2 SERPL-SCNC: 26 MMOL/L — SIGNIFICANT CHANGE UP (ref 22–31)
CREAT SERPL-MCNC: 1.2 MG/DL — SIGNIFICANT CHANGE UP (ref 0.5–1.3)
EOSINOPHIL # BLD AUTO: 0.08 K/UL — SIGNIFICANT CHANGE UP (ref 0–0.5)
EOSINOPHIL NFR BLD AUTO: 1.5 % — SIGNIFICANT CHANGE UP (ref 0–6)
GLUCOSE SERPL-MCNC: 114 MG/DL — HIGH (ref 70–99)
HCT VFR BLD CALC: 44.2 % — SIGNIFICANT CHANGE UP (ref 39–50)
HGB BLD-MCNC: 16 G/DL — SIGNIFICANT CHANGE UP (ref 13–17)
IMM GRANULOCYTES NFR BLD AUTO: 0.2 % — SIGNIFICANT CHANGE UP (ref 0–1.5)
INR BLD: 1.59 RATIO — HIGH (ref 0.88–1.16)
LACTATE SERPL-SCNC: 1.6 MMOL/L — SIGNIFICANT CHANGE UP (ref 0.7–2)
LIDOCAIN IGE QN: 154 U/L — SIGNIFICANT CHANGE UP (ref 73–393)
LYMPHOCYTES # BLD AUTO: 1.43 K/UL — SIGNIFICANT CHANGE UP (ref 1–3.3)
LYMPHOCYTES # BLD AUTO: 26.6 % — SIGNIFICANT CHANGE UP (ref 13–44)
MAGNESIUM SERPL-MCNC: 2.2 MG/DL — SIGNIFICANT CHANGE UP (ref 1.6–2.6)
MCHC RBC-ENTMCNC: 29.6 PG — SIGNIFICANT CHANGE UP (ref 27–34)
MCHC RBC-ENTMCNC: 36.2 GM/DL — HIGH (ref 32–36)
MCV RBC AUTO: 81.9 FL — SIGNIFICANT CHANGE UP (ref 80–100)
MONOCYTES # BLD AUTO: 0.52 K/UL — SIGNIFICANT CHANGE UP (ref 0–0.9)
MONOCYTES NFR BLD AUTO: 9.7 % — SIGNIFICANT CHANGE UP (ref 2–14)
NEUTROPHILS # BLD AUTO: 3.31 K/UL — SIGNIFICANT CHANGE UP (ref 1.8–7.4)
NEUTROPHILS NFR BLD AUTO: 61.6 % — SIGNIFICANT CHANGE UP (ref 43–77)
NRBC # BLD: 0 /100 WBCS — SIGNIFICANT CHANGE UP (ref 0–0)
NT-PROBNP SERPL-SCNC: 1921 PG/ML — HIGH (ref 0–125)
PLATELET # BLD AUTO: 229 K/UL — SIGNIFICANT CHANGE UP (ref 150–400)
POTASSIUM SERPL-MCNC: 3.4 MMOL/L — LOW (ref 3.5–5.3)
POTASSIUM SERPL-SCNC: 3.4 MMOL/L — LOW (ref 3.5–5.3)
PROT SERPL-MCNC: 7.3 G/DL — SIGNIFICANT CHANGE UP (ref 6–8.3)
PROTHROM AB SERPL-ACNC: 18.2 SEC — HIGH (ref 10.6–13.6)
RBC # BLD: 5.4 M/UL — SIGNIFICANT CHANGE UP (ref 4.2–5.8)
RBC # FLD: 12.9 % — SIGNIFICANT CHANGE UP (ref 10.3–14.5)
SODIUM SERPL-SCNC: 143 MMOL/L — SIGNIFICANT CHANGE UP (ref 135–145)
TROPONIN I SERPL-MCNC: <.015 NG/ML — SIGNIFICANT CHANGE UP (ref 0.01–0.04)
WBC # BLD: 5.37 K/UL — SIGNIFICANT CHANGE UP (ref 3.8–10.5)
WBC # FLD AUTO: 5.37 K/UL — SIGNIFICANT CHANGE UP (ref 3.8–10.5)

## 2020-11-15 PROCEDURE — 83690 ASSAY OF LIPASE: CPT

## 2020-11-15 PROCEDURE — 99284 EMERGENCY DEPT VISIT MOD MDM: CPT | Mod: 25

## 2020-11-15 PROCEDURE — 99284 EMERGENCY DEPT VISIT MOD MDM: CPT

## 2020-11-15 PROCEDURE — 74177 CT ABD & PELVIS W/CONTRAST: CPT

## 2020-11-15 PROCEDURE — 83880 ASSAY OF NATRIURETIC PEPTIDE: CPT

## 2020-11-15 PROCEDURE — 71045 X-RAY EXAM CHEST 1 VIEW: CPT | Mod: 26

## 2020-11-15 PROCEDURE — 83735 ASSAY OF MAGNESIUM: CPT

## 2020-11-15 PROCEDURE — 93010 ELECTROCARDIOGRAM REPORT: CPT

## 2020-11-15 PROCEDURE — 85730 THROMBOPLASTIN TIME PARTIAL: CPT

## 2020-11-15 PROCEDURE — 36415 COLL VENOUS BLD VENIPUNCTURE: CPT

## 2020-11-15 PROCEDURE — 96374 THER/PROPH/DIAG INJ IV PUSH: CPT | Mod: XU

## 2020-11-15 PROCEDURE — 80053 COMPREHEN METABOLIC PANEL: CPT

## 2020-11-15 PROCEDURE — 85025 COMPLETE CBC W/AUTO DIFF WBC: CPT

## 2020-11-15 PROCEDURE — 74177 CT ABD & PELVIS W/CONTRAST: CPT | Mod: 26

## 2020-11-15 PROCEDURE — 83605 ASSAY OF LACTIC ACID: CPT

## 2020-11-15 PROCEDURE — 85610 PROTHROMBIN TIME: CPT

## 2020-11-15 PROCEDURE — 93005 ELECTROCARDIOGRAM TRACING: CPT

## 2020-11-15 PROCEDURE — 84484 ASSAY OF TROPONIN QUANT: CPT

## 2020-11-15 PROCEDURE — 71045 X-RAY EXAM CHEST 1 VIEW: CPT

## 2020-11-15 RX ORDER — SODIUM CHLORIDE 9 MG/ML
1000 INJECTION INTRAMUSCULAR; INTRAVENOUS; SUBCUTANEOUS ONCE
Refills: 0 | Status: COMPLETED | OUTPATIENT
Start: 2020-11-15 | End: 2020-11-15

## 2020-11-15 RX ORDER — ONDANSETRON 8 MG/1
4 TABLET, FILM COATED ORAL ONCE
Refills: 0 | Status: COMPLETED | OUTPATIENT
Start: 2020-11-15 | End: 2020-11-15

## 2020-11-15 RX ORDER — FAMOTIDINE 10 MG/ML
20 INJECTION INTRAVENOUS ONCE
Refills: 0 | Status: COMPLETED | OUTPATIENT
Start: 2020-11-15 | End: 2020-11-15

## 2020-11-15 RX ORDER — MORPHINE SULFATE 50 MG/1
4 CAPSULE, EXTENDED RELEASE ORAL ONCE
Refills: 0 | Status: DISCONTINUED | OUTPATIENT
Start: 2020-11-15 | End: 2020-11-15

## 2020-11-15 RX ADMIN — FAMOTIDINE 20 MILLIGRAM(S): 10 INJECTION INTRAVENOUS at 11:56

## 2020-11-15 RX ADMIN — Medication 30 MILLILITER(S): at 11:54

## 2020-11-15 RX ADMIN — SODIUM CHLORIDE 1000 MILLILITER(S): 9 INJECTION INTRAMUSCULAR; INTRAVENOUS; SUBCUTANEOUS at 09:32

## 2020-11-15 NOTE — ED PROVIDER NOTE - OBJECTIVE STATEMENT
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 Patient states he had coffee and pancakes for breakfast, and then started with gurgling. PAtient c/o constant pain to upper abdominal pain. denies vomiting or diarrhea. reports constipation past few days  denies dysuria  denies any sob  denies fever or chills 66 yo male with hx htn, a fib on xarelto, dissection of splenic artery, c/o upper abdominal pain which started this am. Patient states he had coffee and pancakes for breakfast, and then started with gurgling. PAtient c/o constant pain to upper abdominal pain. denies vomiting or diarrhea. reports constipation past few days  denies dysuria  denies any sob  denies fever or chills

## 2020-11-15 NOTE — ED PROVIDER NOTE - CLINICAL SUMMARY MEDICAL DECISION MAKING FREE TEXT BOX
66 yo male with upper abdominal pain after eating;  no chest pain or abd tenderness    will check labs, ct abd

## 2020-11-15 NOTE — ED ADULT NURSE NOTE - OBJECTIVE STATEMENT
Pt p/w epigastric pain x hours. Per patient woke up feeling well, at breakfast and following breakfast pain began. pt denies vomiting and diarrhea. hx of splenic artery thrombosis.

## 2020-11-15 NOTE — ED ADULT NURSE NOTE - CHPI ED NUR SYMPTOMS NEG
no vomiting/no diarrhea/no fever/no nausea/no chills/no abdominal distension/no dysuria/no hematuria/no blood in stool/no burning urination

## 2020-11-15 NOTE — ED PROVIDER NOTE - PMH
Atrial fibrillation  paroxysmal , seen cardio chose not to be on A/C due to extreme sports  GERD (gastroesophageal reflux disease)    Gout    Hypertension    Prostate ca     never had blood transfusion before

## 2020-11-15 NOTE — ED PROVIDER NOTE - PATIENT PORTAL LINK FT
You can access the FollowMyHealth Patient Portal offered by [facility] by registering at the following website: [email or web address]. By joining [employer]’s FollowMyHealth portal, you will also be able to view your health information using other applications (apps) compatible with our system.

## 2020-11-15 NOTE — ED ADULT NURSE NOTE - PMH
Atrial fibrillation  paroxysmal , seen cardio chose not to be on A/C due to extreme sports  GERD (gastroesophageal reflux disease)    Gout    Hypertension    Prostate ca

## 2020-11-15 NOTE — ED PROVIDER NOTE - PROGRESS NOTE DETAILS
results d/w patient and need for follow up ; patient has protonix but doesn't take it; patient to follow up with GILDA martinez md

## 2021-06-18 NOTE — CONSULT NOTE ADULT - NSHPATTENDINGPLANDISCUSS_GEN_ALL_CORE
[facility] Call Center    Phone Message    May a detailed message be left on voicemail: yes     Reason for Call: Appointment Intake    Referring Provider Name: Tesfaye Lopez MD  Diagnosis and/or Symptoms: Gross Hematuria    Action Taken: Message routed to:  [facility] ([facility] uro    Travel Screening: Not Applicable                                                                      
Spoke with patient and got them schedule for a video visit with Estefany Medina on 6/29 Tues.   
Dr. Villalba

## 2021-09-09 ENCOUNTER — INPATIENT (INPATIENT)
Facility: HOSPITAL | Age: 66
LOS: 5 days | Discharge: HOME | DRG: 872 | End: 2021-09-15
Attending: FAMILY MEDICINE | Admitting: STUDENT IN AN ORGANIZED HEALTH CARE EDUCATION/TRAINING PROGRAM
Payer: COMMERCIAL

## 2021-09-09 DIAGNOSIS — Z90.79 ACQUIRED ABSENCE OF OTHER GENITAL ORGAN(S): Chronic | ICD-10-CM

## 2021-09-09 PROCEDURE — 99285 EMERGENCY DEPT VISIT HI MDM: CPT

## 2021-09-10 ENCOUNTER — TRANSCRIPTION ENCOUNTER (OUTPATIENT)
Age: 66
End: 2021-09-10

## 2021-09-10 VITALS
RESPIRATION RATE: 19 BRPM | OXYGEN SATURATION: 98 % | TEMPERATURE: 98 F | WEIGHT: 210.1 LBS | HEART RATE: 85 BPM | DIASTOLIC BLOOD PRESSURE: 104 MMHG | HEIGHT: 68 IN | SYSTOLIC BLOOD PRESSURE: 144 MMHG

## 2021-09-10 DIAGNOSIS — Z98.890 OTHER SPECIFIED POSTPROCEDURAL STATES: Chronic | ICD-10-CM

## 2021-09-10 DIAGNOSIS — K81.9 CHOLECYSTITIS, UNSPECIFIED: ICD-10-CM

## 2021-09-10 DIAGNOSIS — K81.0 ACUTE CHOLECYSTITIS: ICD-10-CM

## 2021-09-10 LAB
ALBUMIN SERPL ELPH-MCNC: 4.2 G/DL — SIGNIFICANT CHANGE UP (ref 3.3–5)
ALP SERPL-CCNC: 96 U/L — SIGNIFICANT CHANGE UP (ref 40–120)
ALT FLD-CCNC: 30 U/L — SIGNIFICANT CHANGE UP (ref 12–78)
AMYLASE P1 CFR SERPL: 76 U/L — SIGNIFICANT CHANGE UP (ref 25–125)
ANION GAP SERPL CALC-SCNC: 7 MMOL/L — SIGNIFICANT CHANGE UP (ref 5–17)
APTT BLD: 157.9 SEC — CRITICAL HIGH (ref 27.5–35.5)
APTT BLD: 35.8 SEC — HIGH (ref 27.5–35.5)
AST SERPL-CCNC: 22 U/L — SIGNIFICANT CHANGE UP (ref 15–37)
BASOPHILS # BLD AUTO: 0.03 K/UL — SIGNIFICANT CHANGE UP (ref 0–0.2)
BASOPHILS NFR BLD AUTO: 0.2 % — SIGNIFICANT CHANGE UP (ref 0–2)
BILIRUB SERPL-MCNC: 0.8 MG/DL — SIGNIFICANT CHANGE UP (ref 0.2–1.2)
BLD GP AB SCN SERPL QL: SIGNIFICANT CHANGE UP
BUN SERPL-MCNC: 19 MG/DL — SIGNIFICANT CHANGE UP (ref 7–23)
CALCIUM SERPL-MCNC: 9.2 MG/DL — SIGNIFICANT CHANGE UP (ref 8.5–10.1)
CHLORIDE SERPL-SCNC: 107 MMOL/L — SIGNIFICANT CHANGE UP (ref 96–108)
CO2 SERPL-SCNC: 25 MMOL/L — SIGNIFICANT CHANGE UP (ref 22–31)
CREAT SERPL-MCNC: 1.1 MG/DL — SIGNIFICANT CHANGE UP (ref 0.5–1.3)
EOSINOPHIL # BLD AUTO: 0.01 K/UL — SIGNIFICANT CHANGE UP (ref 0–0.5)
EOSINOPHIL NFR BLD AUTO: 0.1 % — SIGNIFICANT CHANGE UP (ref 0–6)
GLUCOSE SERPL-MCNC: 127 MG/DL — HIGH (ref 70–99)
HCT VFR BLD CALC: 43.1 % — SIGNIFICANT CHANGE UP (ref 39–50)
HCT VFR BLD CALC: 51.2 % — HIGH (ref 39–50)
HCT VFR BLD CALC: 51.4 % — HIGH (ref 39–50)
HGB BLD-MCNC: 14.9 G/DL — SIGNIFICANT CHANGE UP (ref 13–17)
HGB BLD-MCNC: 17.3 G/DL — HIGH (ref 13–17)
HGB BLD-MCNC: 17.5 G/DL — HIGH (ref 13–17)
IMM GRANULOCYTES NFR BLD AUTO: 0.4 % — SIGNIFICANT CHANGE UP (ref 0–1.5)
INR BLD: 1.48 RATIO — HIGH (ref 0.88–1.16)
LIDOCAIN IGE QN: 129 U/L — SIGNIFICANT CHANGE UP (ref 73–393)
LYMPHOCYTES # BLD AUTO: 1.13 K/UL — SIGNIFICANT CHANGE UP (ref 1–3.3)
LYMPHOCYTES # BLD AUTO: 9.3 % — LOW (ref 13–44)
MCHC RBC-ENTMCNC: 28.5 PG — SIGNIFICANT CHANGE UP (ref 27–34)
MCHC RBC-ENTMCNC: 28.6 PG — SIGNIFICANT CHANGE UP (ref 27–34)
MCHC RBC-ENTMCNC: 29.1 PG — SIGNIFICANT CHANGE UP (ref 27–34)
MCHC RBC-ENTMCNC: 33.8 GM/DL — SIGNIFICANT CHANGE UP (ref 32–36)
MCHC RBC-ENTMCNC: 34 GM/DL — SIGNIFICANT CHANGE UP (ref 32–36)
MCHC RBC-ENTMCNC: 34.6 GM/DL — SIGNIFICANT CHANGE UP (ref 32–36)
MCV RBC AUTO: 84.1 FL — SIGNIFICANT CHANGE UP (ref 80–100)
MCV RBC AUTO: 84.2 FL — SIGNIFICANT CHANGE UP (ref 80–100)
MCV RBC AUTO: 84.2 FL — SIGNIFICANT CHANGE UP (ref 80–100)
MONOCYTES # BLD AUTO: 0.7 K/UL — SIGNIFICANT CHANGE UP (ref 0–0.9)
MONOCYTES NFR BLD AUTO: 5.8 % — SIGNIFICANT CHANGE UP (ref 2–14)
NEUTROPHILS # BLD AUTO: 10.18 K/UL — HIGH (ref 1.8–7.4)
NEUTROPHILS NFR BLD AUTO: 84.2 % — HIGH (ref 43–77)
NRBC # BLD: 0 /100 WBCS — SIGNIFICANT CHANGE UP (ref 0–0)
PLATELET # BLD AUTO: 186 K/UL — SIGNIFICANT CHANGE UP (ref 150–400)
PLATELET # BLD AUTO: 233 K/UL — SIGNIFICANT CHANGE UP (ref 150–400)
PLATELET # BLD AUTO: 238 K/UL — SIGNIFICANT CHANGE UP (ref 150–400)
POTASSIUM SERPL-MCNC: 3.5 MMOL/L — SIGNIFICANT CHANGE UP (ref 3.5–5.3)
POTASSIUM SERPL-SCNC: 3.5 MMOL/L — SIGNIFICANT CHANGE UP (ref 3.5–5.3)
PROT SERPL-MCNC: 8.7 G/DL — HIGH (ref 6–8.3)
PROTHROM AB SERPL-ACNC: 17 SEC — HIGH (ref 10.6–13.6)
RBC # BLD: 5.12 M/UL — SIGNIFICANT CHANGE UP (ref 4.2–5.8)
RBC # BLD: 6.08 M/UL — HIGH (ref 4.2–5.8)
RBC # BLD: 6.11 M/UL — HIGH (ref 4.2–5.8)
RBC # FLD: 13.2 % — SIGNIFICANT CHANGE UP (ref 10.3–14.5)
RBC # FLD: 13.3 % — SIGNIFICANT CHANGE UP (ref 10.3–14.5)
RBC # FLD: 13.3 % — SIGNIFICANT CHANGE UP (ref 10.3–14.5)
SARS-COV-2 RNA SPEC QL NAA+PROBE: SIGNIFICANT CHANGE UP
SODIUM SERPL-SCNC: 139 MMOL/L — SIGNIFICANT CHANGE UP (ref 135–145)
WBC # BLD: 10.45 K/UL — SIGNIFICANT CHANGE UP (ref 3.8–10.5)
WBC # BLD: 12.1 K/UL — HIGH (ref 3.8–10.5)
WBC # BLD: 13.52 K/UL — HIGH (ref 3.8–10.5)
WBC # FLD AUTO: 10.45 K/UL — SIGNIFICANT CHANGE UP (ref 3.8–10.5)
WBC # FLD AUTO: 12.1 K/UL — HIGH (ref 3.8–10.5)
WBC # FLD AUTO: 13.52 K/UL — HIGH (ref 3.8–10.5)

## 2021-09-10 PROCEDURE — 12345: CPT | Mod: NC,GC

## 2021-09-10 PROCEDURE — 76705 ECHO EXAM OF ABDOMEN: CPT | Mod: 26,RT

## 2021-09-10 PROCEDURE — 99222 1ST HOSP IP/OBS MODERATE 55: CPT | Mod: GC

## 2021-09-10 PROCEDURE — 93010 ELECTROCARDIOGRAM REPORT: CPT

## 2021-09-10 PROCEDURE — 78226 HEPATOBILIARY SYSTEM IMAGING: CPT | Mod: 26

## 2021-09-10 PROCEDURE — 99285 EMERGENCY DEPT VISIT HI MDM: CPT

## 2021-09-10 PROCEDURE — 99223 1ST HOSP IP/OBS HIGH 75: CPT

## 2021-09-10 RX ORDER — ACETAMINOPHEN 500 MG
1000 TABLET ORAL EVERY 6 HOURS
Refills: 0 | Status: DISCONTINUED | OUTPATIENT
Start: 2021-09-10 | End: 2021-09-10

## 2021-09-10 RX ORDER — IBUPROFEN 200 MG
400 TABLET ORAL EVERY 8 HOURS
Refills: 0 | Status: DISCONTINUED | OUTPATIENT
Start: 2021-09-10 | End: 2021-09-11

## 2021-09-10 RX ORDER — SODIUM CHLORIDE 9 MG/ML
1000 INJECTION INTRAMUSCULAR; INTRAVENOUS; SUBCUTANEOUS
Refills: 0 | Status: DISCONTINUED | OUTPATIENT
Start: 2021-09-10 | End: 2021-09-10

## 2021-09-10 RX ORDER — SODIUM CHLORIDE 9 MG/ML
1000 INJECTION, SOLUTION INTRAVENOUS
Refills: 0 | Status: DISCONTINUED | OUTPATIENT
Start: 2021-09-10 | End: 2021-09-12

## 2021-09-10 RX ORDER — SODIUM CHLORIDE 9 MG/ML
1000 INJECTION INTRAMUSCULAR; INTRAVENOUS; SUBCUTANEOUS ONCE
Refills: 0 | Status: COMPLETED | OUTPATIENT
Start: 2021-09-10 | End: 2021-09-10

## 2021-09-10 RX ORDER — HEPARIN SODIUM 5000 [USP'U]/ML
3500 INJECTION INTRAVENOUS; SUBCUTANEOUS EVERY 6 HOURS
Refills: 0 | Status: DISCONTINUED | OUTPATIENT
Start: 2021-09-10 | End: 2021-09-13

## 2021-09-10 RX ORDER — PIPERACILLIN AND TAZOBACTAM 4; .5 G/20ML; G/20ML
3.38 INJECTION, POWDER, LYOPHILIZED, FOR SOLUTION INTRAVENOUS EVERY 8 HOURS
Refills: 0 | Status: DISCONTINUED | OUTPATIENT
Start: 2021-09-10 | End: 2021-09-13

## 2021-09-10 RX ORDER — ONDANSETRON 8 MG/1
4 TABLET, FILM COATED ORAL ONCE
Refills: 0 | Status: COMPLETED | OUTPATIENT
Start: 2021-09-10 | End: 2021-09-10

## 2021-09-10 RX ORDER — PIPERACILLIN AND TAZOBACTAM 4; .5 G/20ML; G/20ML
3.38 INJECTION, POWDER, LYOPHILIZED, FOR SOLUTION INTRAVENOUS ONCE
Refills: 0 | Status: COMPLETED | OUTPATIENT
Start: 2021-09-10 | End: 2021-09-10

## 2021-09-10 RX ORDER — TIMOLOL 0.5 %
1 DROPS OPHTHALMIC (EYE)
Refills: 0 | Status: DISCONTINUED | OUTPATIENT
Start: 2021-09-10 | End: 2021-09-13

## 2021-09-10 RX ORDER — PIPERACILLIN AND TAZOBACTAM 4; .5 G/20ML; G/20ML
3.38 INJECTION, POWDER, LYOPHILIZED, FOR SOLUTION INTRAVENOUS EVERY 8 HOURS
Refills: 0 | Status: DISCONTINUED | OUTPATIENT
Start: 2021-09-10 | End: 2021-09-10

## 2021-09-10 RX ORDER — LOSARTAN POTASSIUM 100 MG/1
100 TABLET, FILM COATED ORAL DAILY
Refills: 0 | Status: DISCONTINUED | OUTPATIENT
Start: 2021-09-10 | End: 2021-09-11

## 2021-09-10 RX ORDER — HEPARIN SODIUM 5000 [USP'U]/ML
7500 INJECTION INTRAVENOUS; SUBCUTANEOUS EVERY 6 HOURS
Refills: 0 | Status: DISCONTINUED | OUTPATIENT
Start: 2021-09-10 | End: 2021-09-13

## 2021-09-10 RX ORDER — OXYCODONE HYDROCHLORIDE 5 MG/1
5 TABLET ORAL ONCE
Refills: 0 | Status: DISCONTINUED | OUTPATIENT
Start: 2021-09-10 | End: 2021-09-12

## 2021-09-10 RX ORDER — BRIMONIDINE TARTRATE 2 MG/MG
1 SOLUTION/ DROPS OPHTHALMIC
Refills: 0 | Status: DISCONTINUED | OUTPATIENT
Start: 2021-09-10 | End: 2021-09-13

## 2021-09-10 RX ORDER — ENOXAPARIN SODIUM 100 MG/ML
40 INJECTION SUBCUTANEOUS DAILY
Refills: 0 | Status: DISCONTINUED | OUTPATIENT
Start: 2021-09-10 | End: 2021-09-10

## 2021-09-10 RX ORDER — LANOLIN ALCOHOL/MO/W.PET/CERES
3 CREAM (GRAM) TOPICAL AT BEDTIME
Refills: 0 | Status: DISCONTINUED | OUTPATIENT
Start: 2021-09-10 | End: 2021-09-13

## 2021-09-10 RX ORDER — HEPARIN SODIUM 5000 [USP'U]/ML
7500 INJECTION INTRAVENOUS; SUBCUTANEOUS ONCE
Refills: 0 | Status: COMPLETED | OUTPATIENT
Start: 2021-09-10 | End: 2021-09-10

## 2021-09-10 RX ORDER — HYDROMORPHONE HYDROCHLORIDE 2 MG/ML
0.5 INJECTION INTRAMUSCULAR; INTRAVENOUS; SUBCUTANEOUS ONCE
Refills: 0 | Status: DISCONTINUED | OUTPATIENT
Start: 2021-09-10 | End: 2021-09-10

## 2021-09-10 RX ORDER — HEPARIN SODIUM 5000 [USP'U]/ML
INJECTION INTRAVENOUS; SUBCUTANEOUS
Qty: 25000 | Refills: 0 | Status: DISCONTINUED | OUTPATIENT
Start: 2021-09-10 | End: 2021-09-13

## 2021-09-10 RX ORDER — ACETAMINOPHEN 500 MG
650 TABLET ORAL EVERY 6 HOURS
Refills: 0 | Status: DISCONTINUED | OUTPATIENT
Start: 2021-09-10 | End: 2021-09-13

## 2021-09-10 RX ORDER — COLCHICINE 0.6 MG
0.6 TABLET ORAL DAILY
Refills: 0 | Status: DISCONTINUED | OUTPATIENT
Start: 2021-09-10 | End: 2021-09-13

## 2021-09-10 RX ORDER — MORPHINE SULFATE 50 MG/1
4 CAPSULE, EXTENDED RELEASE ORAL ONCE
Refills: 0 | Status: DISCONTINUED | OUTPATIENT
Start: 2021-09-10 | End: 2021-09-10

## 2021-09-10 RX ORDER — HYDRALAZINE HCL 50 MG
25 TABLET ORAL
Refills: 0 | Status: DISCONTINUED | OUTPATIENT
Start: 2021-09-10 | End: 2021-09-13

## 2021-09-10 RX ORDER — ALLOPURINOL 300 MG
100 TABLET ORAL DAILY
Refills: 0 | Status: DISCONTINUED | OUTPATIENT
Start: 2021-09-10 | End: 2021-09-13

## 2021-09-10 RX ORDER — METOPROLOL TARTRATE 50 MG
100 TABLET ORAL DAILY
Refills: 0 | Status: DISCONTINUED | OUTPATIENT
Start: 2021-09-10 | End: 2021-09-13

## 2021-09-10 RX ORDER — SODIUM CHLORIDE 9 MG/ML
1000 INJECTION, SOLUTION INTRAVENOUS
Refills: 0 | Status: DISCONTINUED | OUTPATIENT
Start: 2021-09-10 | End: 2021-09-10

## 2021-09-10 RX ORDER — AMLODIPINE BESYLATE 2.5 MG/1
10 TABLET ORAL DAILY
Refills: 0 | Status: DISCONTINUED | OUTPATIENT
Start: 2021-09-10 | End: 2021-09-13

## 2021-09-10 RX ORDER — ONDANSETRON 8 MG/1
4 TABLET, FILM COATED ORAL EVERY 6 HOURS
Refills: 0 | Status: DISCONTINUED | OUTPATIENT
Start: 2021-09-10 | End: 2021-09-13

## 2021-09-10 RX ORDER — HYDROMORPHONE HYDROCHLORIDE 2 MG/ML
1 INJECTION INTRAMUSCULAR; INTRAVENOUS; SUBCUTANEOUS EVERY 4 HOURS
Refills: 0 | Status: DISCONTINUED | OUTPATIENT
Start: 2021-09-10 | End: 2021-09-13

## 2021-09-10 RX ADMIN — HYDROMORPHONE HYDROCHLORIDE 0.5 MILLIGRAM(S): 2 INJECTION INTRAMUSCULAR; INTRAVENOUS; SUBCUTANEOUS at 03:00

## 2021-09-10 RX ADMIN — SODIUM CHLORIDE 1000 MILLILITER(S): 9 INJECTION INTRAMUSCULAR; INTRAVENOUS; SUBCUTANEOUS at 02:52

## 2021-09-10 RX ADMIN — HYDROMORPHONE HYDROCHLORIDE 0.5 MILLIGRAM(S): 2 INJECTION INTRAMUSCULAR; INTRAVENOUS; SUBCUTANEOUS at 06:05

## 2021-09-10 RX ADMIN — MORPHINE SULFATE 4 MILLIGRAM(S): 50 CAPSULE, EXTENDED RELEASE ORAL at 00:52

## 2021-09-10 RX ADMIN — BRIMONIDINE TARTRATE 1 DROP(S): 2 SOLUTION/ DROPS OPHTHALMIC at 21:34

## 2021-09-10 RX ADMIN — Medication 1 DROP(S): at 21:34

## 2021-09-10 RX ADMIN — MORPHINE SULFATE 4 MILLIGRAM(S): 50 CAPSULE, EXTENDED RELEASE ORAL at 14:31

## 2021-09-10 RX ADMIN — PIPERACILLIN AND TAZOBACTAM 25 GRAM(S): 4; .5 INJECTION, POWDER, LYOPHILIZED, FOR SOLUTION INTRAVENOUS at 21:34

## 2021-09-10 RX ADMIN — Medication 0.6 MILLIGRAM(S): at 11:09

## 2021-09-10 RX ADMIN — PIPERACILLIN AND TAZOBACTAM 25 GRAM(S): 4; .5 INJECTION, POWDER, LYOPHILIZED, FOR SOLUTION INTRAVENOUS at 11:00

## 2021-09-10 RX ADMIN — Medication 25 MILLIGRAM(S): at 17:32

## 2021-09-10 RX ADMIN — ONDANSETRON 4 MILLIGRAM(S): 8 TABLET, FILM COATED ORAL at 00:52

## 2021-09-10 RX ADMIN — HEPARIN SODIUM 1700 UNIT(S)/HR: 5000 INJECTION INTRAVENOUS; SUBCUTANEOUS at 17:16

## 2021-09-10 RX ADMIN — MORPHINE SULFATE 4 MILLIGRAM(S): 50 CAPSULE, EXTENDED RELEASE ORAL at 14:25

## 2021-09-10 RX ADMIN — HYDROMORPHONE HYDROCHLORIDE 1 MILLIGRAM(S): 2 INJECTION INTRAMUSCULAR; INTRAVENOUS; SUBCUTANEOUS at 09:15

## 2021-09-10 RX ADMIN — SODIUM CHLORIDE 100 MILLILITER(S): 9 INJECTION, SOLUTION INTRAVENOUS at 16:29

## 2021-09-10 RX ADMIN — HYDROMORPHONE HYDROCHLORIDE 1 MILLIGRAM(S): 2 INJECTION INTRAMUSCULAR; INTRAVENOUS; SUBCUTANEOUS at 08:45

## 2021-09-10 RX ADMIN — HEPARIN SODIUM 7500 UNIT(S): 5000 INJECTION INTRAVENOUS; SUBCUTANEOUS at 17:17

## 2021-09-10 RX ADMIN — HYDROMORPHONE HYDROCHLORIDE 0.5 MILLIGRAM(S): 2 INJECTION INTRAMUSCULAR; INTRAVENOUS; SUBCUTANEOUS at 05:48

## 2021-09-10 RX ADMIN — Medication 25 MILLIGRAM(S): at 05:48

## 2021-09-10 RX ADMIN — HYDROMORPHONE HYDROCHLORIDE 0.5 MILLIGRAM(S): 2 INJECTION INTRAMUSCULAR; INTRAVENOUS; SUBCUTANEOUS at 02:42

## 2021-09-10 RX ADMIN — AMLODIPINE BESYLATE 10 MILLIGRAM(S): 2.5 TABLET ORAL at 05:48

## 2021-09-10 RX ADMIN — ONDANSETRON 4 MILLIGRAM(S): 8 TABLET, FILM COATED ORAL at 02:42

## 2021-09-10 RX ADMIN — Medication 100 MILLIGRAM(S): at 11:09

## 2021-09-10 RX ADMIN — Medication 100 MILLIGRAM(S): at 05:48

## 2021-09-10 RX ADMIN — PIPERACILLIN AND TAZOBACTAM 200 GRAM(S): 4; .5 INJECTION, POWDER, LYOPHILIZED, FOR SOLUTION INTRAVENOUS at 02:50

## 2021-09-10 RX ADMIN — MORPHINE SULFATE 4 MILLIGRAM(S): 50 CAPSULE, EXTENDED RELEASE ORAL at 01:10

## 2021-09-10 RX ADMIN — SODIUM CHLORIDE 1000 MILLILITER(S): 9 INJECTION INTRAMUSCULAR; INTRAVENOUS; SUBCUTANEOUS at 00:52

## 2021-09-10 RX ADMIN — LOSARTAN POTASSIUM 100 MILLIGRAM(S): 100 TABLET, FILM COATED ORAL at 05:48

## 2021-09-10 RX ADMIN — PIPERACILLIN AND TAZOBACTAM 3.38 GRAM(S): 4; .5 INJECTION, POWDER, LYOPHILIZED, FOR SOLUTION INTRAVENOUS at 03:26

## 2021-09-10 RX ADMIN — SODIUM CHLORIDE 125 MILLILITER(S): 9 INJECTION INTRAMUSCULAR; INTRAVENOUS; SUBCUTANEOUS at 05:48

## 2021-09-10 RX ADMIN — Medication 650 MILLIGRAM(S): at 20:41

## 2021-09-10 NOTE — PROGRESS NOTE ADULT - ASSESSMENT
65M with PMHx HTN, Afib (on xarelto), GERD, gout, prostate ca (s/p splenectomy 2010), splenic infarction, presenting with RUQ abdominal pain, nausea, ultrasound findings positive for acute cholecystitis admit for acute cholecystitis     #acute cholecystitis  - pt with RUQ pain that gradually worsened with associated nausea, found to have acute cholecystitis on RUQ ultrasound.  - pt is s/p 3.375g zosyn, continue 3.375g q8h  - NPO for now - surgery recs appreciated resume diet if patient is not going to OR until Monday  - IVF (NS at 125 cc/hr)  - motrin 400mg q8h prn for mild pain, oxycodone 5mg prn for moderate pain, dilaudid 1mg IV q4h prn for severe pain.  - gave dilaudid 0.5 mg IV X 1 for pain in the AM   - pt with no known history of ischemic or valvular disease. has a history of atrial fibrillation which is adequately rate controlled at this time. Pt appears euvolemic on exam. Can perform > 4 METs. RCRI class 1 risk. Pt is hypertensive in ED likely 2/2 pain. resume home bp medications. with adequate bp control pt is medically optimized for planned procedure.   - cardio Ean consulted: Hold furosemide; Hold rivaroxaban - last dose was 9/9 morning. Would wait until at least 9/11 morning prior to surgery if urgent (ideally would wait 72 hours). optimized from cardiac standpoint as per Dr. Mckoy     #Atrial Fibrillation   - pt with history of Afib, on metoprolol for rate control and xarelto for anticoagulation.  - continue with metoprolol with hold parameters  - hold xarelto as per cardio       #HTN  - chronic, pt is on losartan, hydralazine, amlodipine at home  - continue with home meds with hold parameters    # LE edema   - patient on lasix 1-2X a week PRN for LE swelling started by PCP, Dr. Villalba, patient states he started getting ankle swelling likely 2/2 to amlodipine  - hold lasix for now     #gout  - chronic stable, continue home meds allopurinol and colchicine    #gerd  - pt says he occasionally takes pantoprazole  - continue to monitor    #ppx  - hold xarelto as per cardio until 9/11  65M with PMHx HTN, Afib (on xarelto), GERD, gout, prostate ca (s/p splenectomy 2010), splenic infarction, presenting with RUQ abdominal pain, nausea, ultrasound findings positive for acute cholecystitis admit for acute cholecystitis     #acute cholecystitis  - pt with RUQ pain that gradually worsened with associated nausea, found to have acute cholecystitis on RUQ ultrasound.  - pt is s/p 3.375g zosyn, continue 3.375g q8h  - NPO for now - surgery recs appreciated resume diet as tolerated if patient is not going to OR until Monday  - IVF (NS + D5 @ 100 cc/hour)   - motrin 400mg q8h prn for mild pain, oxycodone 5mg prn for moderate pain, dilaudid 1mg IV q4h prn for severe pain.  - gave dilaudid 0.5 mg IV X 1 for pain in the AM   - pt with no known history of ischemic or valvular disease. has a history of atrial fibrillation which is adequately rate controlled at this time. Pt appears euvolemic on exam. Can perform > 4 METs. RCRI class 1 risk. Pt is hypertensive in ED likely 2/2 pain. resume home bp medications. with adequate bp control pt is medically optimized for planned procedure.   - cardio Ean consulted: Hold furosemide; Hold rivaroxaban - last dose was 9/9 morning. Would wait until at least 9/11 morning prior to surgery if urgent (ideally would wait 72 hours). optimized from cardiac standpoint as per Dr. Mckoy   - surgery following, further plan as per surgery     #Atrial Fibrillation   - pt with history of Afib, on metoprolol for rate control and xarelto for anticoagulation.  - continue with metoprolol with hold parameters  - hold xarelto as per cardio   - last TTE 5/2021, normal EF as per cardio       #HTN  - chronic, pt is on losartan, hydralazine, amlodipine at home  - continue with home meds with hold parameters    # LE edema   - patient on lasix 1-2X a week PRN for LE swelling started by PCP, Dr. Villalba, patient states he started getting ankle swelling likely 2/2 to amlodipine  - hold lasix for now     #gout  - chronic stable, continue home meds allopurinol and colchicine    #gerd  - pt says he occasionally takes pantoprazole  - continue to monitor    #ppx  - hold xarelto as per cardio until 9/11, will add SCDs in the meantime  65M with PMHx HTN, Afib (on xarelto), GERD, gout, prostate ca (s/p splenectomy 2010), splenic infarction, presenting with RUQ abdominal pain, nausea, ultrasound findings positive for acute cholecystitis admit for acute cholecystitis     #Acute cholecystitis  - pt with RUQ pain that gradually worsened with associated nausea, found to have acute cholecystitis on RUQ ultrasound.  - pt is s/p 3.375g zosyn, continue 3.375g q8h  - NPO for now - surgery recs appreciated resume diet as tolerated if patient is not going to OR until Monday  - IVF (NS + D5 @ 100 cc/hour)   - motrin 400mg q8h prn for mild pain, oxycodone 5mg prn for moderate pain, dilaudid 1mg IV q4h prn for severe pain.  - gave dilaudid 0.5 mg IV X 1 for pain in the AM   - pt with no known history of ischemic or valvular disease. has a history of atrial fibrillation which is adequately rate controlled at this time. Pt appears euvolemic on exam. Can perform > 4 METs. RCRI class 1 risk. Pt is hypertensive in ED likely 2/2 pain. resume home bp medications. with adequate bp control pt is medically optimized for planned procedure.   - cardio Ean consulted: Hold furosemide; Hold rivaroxaban - last dose was 9/9 morning. Would wait until at least 9/11 morning prior to surgery if urgent (ideally would wait 72 hours). optimized from cardiac standpoint as per Dr. Mckoy   - surgery following, further plan as per surgery     #Atrial Fibrillation   - pt with history of Afib, on metoprolol for rate control and xarelto for anticoagulation.  - continue with metoprolol with hold parameters  - hold xarelto as per cardio   - last TTE 5/2021, normal EF as per cardio       #HTN  - chronic, pt is on losartan, hydralazine, amlodipine at home  - continue with home meds with hold parameters    # LE edema   - patient on lasix 1-2X a week PRN for LE swelling started by PCP, Dr. Villalba, patient states he started getting ankle swelling likely 2/2 to amlodipine  - hold lasix for now     #gout  - chronic stable, continue home meds allopurinol and colchicine    #gerd  - pt says he occasionally takes pantoprazole  - continue to monitor    #ppx  - hold xarelto as per cardio until 9/11, will add SCDs in the meantime  65M with PMHx HTN, Afib (on xarelto), GERD, gout, prostate ca (s/p splenectomy 2010), splenic infarction, presenting with RUQ abdominal pain, nausea, ultrasound findings positive for acute cholecystitis admit for acute cholecystitis     #Acute cholecystitis  - pt with RUQ pain that gradually worsened with associated nausea, found to have acute cholecystitis on RUQ ultrasound.  - pt is s/p 3.375g zosyn, continue 3.375g q8h  - NPO for now - surgery recs appreciated resume diet as tolerated if patient is not going to OR until Monday  - IVF (NS + D5 @ 100 cc/hour)   - motrin 400mg q8h prn for mild pain, oxycodone 5mg prn for moderate pain, dilaudid 1mg IV q4h prn for severe pain.  - gave dilaudid 0.5 mg IV X 1 for pain in the AM   - pt with no known history of ischemic or valvular disease. has a history of atrial fibrillation which is adequately rate controlled at this time. Pt appears euvolemic on exam. Can perform > 4 METs. RCRI class 1 risk. Pt is hypertensive in ED likely 2/2 pain. resume home bp medications. with adequate bp control pt is medically optimized for planned procedure.   - cardio Ean consulted: Hold furosemide; Hold rivaroxaban - last dose was 9/9 morning. Would wait until at least 9/11 morning prior to surgery if urgent (ideally would wait 72 hours). optimized from cardiac standpoint as per Dr. Mckoy   - surgery following, further plan as per surgery   - HIDA Scan positive for cholecystitis    #Atrial Fibrillation   - pt with history of Afib, on metoprolol for rate control and xarelto for anticoagulation.  - continue with metoprolol with hold parameters  - hold xarelto as per cardio   - last TTE 5/2021, normal EF as per cardio   - may heparinize as per surgery, started hep gtt, patient scheduled for OR Monday 9/13 at 4 PM       #HTN  - chronic, pt is on losartan, hydralazine, amlodipine at home  - continue with home meds with hold parameters    # LE edema   - patient on lasix 1-2X a week PRN for LE swelling started by PCP, Dr. Villalba, patient states he started getting ankle swelling likely 2/2 to amlodipine  - hold lasix for now     #gout  - chronic stable, continue home meds allopurinol and colchicine    #gerd  - pt says he occasionally takes pantoprazole  - continue to monitor    #ppx  - hold xarelto as per cardio until 9/11, will add SCDs in the meantime

## 2021-09-10 NOTE — ED ADULT NURSE NOTE - OBJECTIVE STATEMENT
Pt comes from triage. Pt reports right upper abdominal pain with nausea and dry heaving starting after eating tonight. Pt breathing easy, unlabored, no signs of distress.

## 2021-09-10 NOTE — PROGRESS NOTE ADULT - SUBJECTIVE AND OBJECTIVE BOX
SURGERY PROGRESS NOTE      Patient admitted with acute cholecystitis      Subjective: patient was seen and examined at bedside, reports RUQ pain but has improved, he has also just been given pain medication. Denies nausea and vomiting. Last dose of Xarelto was Thursday morning, currently is it being held.        VITAL SIGNS:  T(C): 36.8 (09-10-21 @ 07:14), Max: 36.8 (09-10-21 @ 07:14)  HR: 88 (09-10-21 @ 09:10) (78 - 90)  BP: 148/96 (09-10-21 @ 09:10) (144/104 - 156/102)  RR: 16 (09-10-21 @ 09:10) (16 - 19)  SpO2: 97% (09-10-21 @ 09:10) (97% - 98%)      LABS:                        17.5   12.10 )-----------( 233      ( 10 Sep 2021 05:59 )             51.4     09-10    139  |  107  |  19  ----------------------------<  127<H>  3.5   |  25  |  1.10    Ca    9.2      10 Sep 2021 00:49    TPro  8.7<H>  /  Alb  4.2  /  TBili  0.8  /  DBili  x   /  AST  22  /  ALT  30  /  AlkPhos  96  09-10    PT/INR - ( 10 Sep 2021 01:03 )   PT: 17.0 sec;   INR: 1.48 ratio    PTT - ( 10 Sep 2021 01:03 )  PTT:35.8 sec      MEDICATIONS:  MEDICATIONS  (STANDING):  allopurinol 100 milliGRAM(s) Oral daily  amLODIPine   Tablet 10 milliGRAM(s) Oral daily  colchicine 0.6 milliGRAM(s) Oral daily  hydrALAZINE 25 milliGRAM(s) Oral two times a day  losartan 100 milliGRAM(s) Oral daily  metoprolol succinate  milliGRAM(s) Oral daily  piperacillin/tazobactam IVPB.. 3.375 Gram(s) IV Intermittent every 8 hours  sodium chloride 0.9%. 1000 milliLiter(s) (125 mL/Hr) IV Continuous <Continuous>    MEDICATIONS  (PRN):  acetaminophen   Tablet .. 650 milliGRAM(s) Oral every 6 hours PRN Temp greater or equal to 38C (100.4F), Mild Pain (1 - 3)  aluminum hydroxide/magnesium hydroxide/simethicone Suspension 30 milliLiter(s) Oral every 4 hours PRN Dyspepsia  HYDROmorphone  Injectable 1 milliGRAM(s) IV Push every 4 hours PRN Severe Pain (7 - 10)  ibuprofen  Tablet. 400 milliGRAM(s) Oral every 8 hours PRN Mild Pain (1 - 3)  melatonin 3 milliGRAM(s) Oral at bedtime PRN Insomnia  ondansetron Injectable 4 milliGRAM(s) IV Push every 6 hours PRN Nausea  oxyCODONE    IR 5 milliGRAM(s) Oral Once PRN Moderate Pain (4 - 6)        PHYSICAL EXAM:   General: well developed, well nourished, NAD  Neuro: awake, alert, responds to commands, nonfocal, LOYOLA x 4  Eyes: scleras clear b/l, PERRLA/ EOMI, Gross vision intact  ENT: Gross hearing intact, grossly patent pharynx, no stridor  Neck: Neck supple, trachea midline, No JVD  Respiratory: CTA B/L, No wheezing, rales, rhonchi  CV: RRR, +S1 +S2, no murmurs, rubs or gallops  Abdominal: Soft, NT, ND, no rebound, no guarding, + bowel sounds, +Luna's Sign  Extremities: No edema, + peripheral pulses  Skin: No Rashes, Hematoma, Ecchymosis, no jaundice  Lymphatic: No Neck, axilla, groin LAD  Psych: Oriented x 3, normal affect

## 2021-09-10 NOTE — CHART NOTE - NSCHARTNOTEFT_GEN_A_CORE
Called by RN for new temp of 101.7. Called by RN for new temp of 101.7. Patient admitted for acute cholecystitis and is scheduled for lap choley on Monday, 9/13 with Dr. Meza. Patient now meeting sepsis criteria with Temp and WBC > 12 with source of infection acute cholecystitis. Will draw blood cultures although patient currently on IV Zosyn, likely to be sterile. Patient also currently on IVF D5 LR at 100cc/hr. Continue Tylenol PRN for fever. Discussed patient with surgical PA, does not change current plan, surgery still scheduled for Monday 9/13. Will continue to monitor. RN to call with any concerns or changes. Called by RN for new temp of 101.7. Patient admitted for acute cholecystitis and is scheduled for lap choley on Monday, 9/13 with Dr. Meza. Patient now meeting sepsis criteria with Temp and WBC > 12 with source of infection acute cholecystitis. Will draw blood cultures although patient currently on IV Zosyn, likely to be sterile. Continue IVF D5 LR at 100cc/hr, Tylenol PRN for fever. Discussed case with surgical PA, does not change current plan, surgery still scheduled for Monday 9/13. Will continue to monitor. RN to call with any concerns or changes.

## 2021-09-10 NOTE — CONSULT NOTE ADULT - ASSESSMENT
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    Plan:  - ECG with nonspecific findings, likely LVH related. Known atrial fibrillation - rate controlled.  - Last echo 6/21 with normal LV systolic function, no significant valve issues  - Last stress test in 2019 which was normal  - Continue amlodipine 10 mg daily  - Continue metoprolol succinate 100 mg daily  - Continue losartan 100 mg daily  - Continue hydralazine 25 mg bid  - Hold furosemide  - Hold rivaroxaban - last dose was 9/9 morning. Would wait until at least 9/11 morning prior to surgery if urgent (ideally would wait 72 hours).  - Surgery follow-up  - There are no active cardiac issues. The patient is at intermediate risk for cardiac events for an intermediate risk surgery. He is optimized to proceed from a cardiac standpoint by tomorrow.
This is a 65y year old Male with PMHx of HTN, Afib, splenic infarct (On Xarelto), gout, GERD, prostate CA s/p prostatectomy 4/27/10 presenting with acute calculous cholecystitis.

## 2021-09-10 NOTE — DISCHARGE NOTE PROVIDER - NSDCFUADDINST_GEN_ALL_CORE_FT
Low fat Low fat diet    VNS instructions for perc zheng tube:  - Flush drain with 5cc of saline 2-3x per day  - Drain to leg bag and empty when full  - F/up with IR for possible tube check and eventual removal when appropriate per IR

## 2021-09-10 NOTE — H&P ADULT - ASSESSMENT
#acute cholecystitis    #Afib    #HTN     65M with PMHx HTN, Afib (on xarelto), GERD, gout, prostate ca (s/p splenectomy 2010), splenic infarction, presenting with RUQ abdominal pain, nausea, ultrasound findings positive for acute cholecystitis, admitting for medical optimization prior to surgery.    #acute cholecystitis  - pt with RUQ pain that gradually worsened with associated nausea, found to have acute cholecystitis on RUQ ultrasound.  - pt is s/p 3.375g zosyn, continue 3.375g q8h  - surgery consult, appreciate recs  - NPO for now - surgery recs appreciated re: diet if patient is not going to OR until Monday  - IVF (NS at 125 cc/hr)  - motrin 400mg q8h prn for mild pain, oxycodone 5mg prn for moderate pain, dilaudid 1mg IV q4h prn for severe pain.    #Paroxysmal Afib  - pt with history of Afib, on metoprolol for rate control and xarelto for anticoagulation.  - continue with metoprolol with hold parameters  - hold xarelto in favor of lovenox for dvt ppx      #HTN  - chronic, pt is on losartan, hydralazine, amlodipine at home  - continue with home meds with hold parameters  - pt hypertensive in /102, likely secondary to pain  - continue to monitor routine hemodynamics    #gout  - chronic stable, continue home meds allopurinol and colchicine    #gerd  - pt says he occasionally takes pantoprazole  - continue to monitor    #ppx  - lovenox 40mg subq qd     #optimization  - pt has a history of paroxysmal afib, is on metoprolol succinate 100mg qd for rate control, xarelto 20mg for anticoagulation. EKG in ED showing atrial fibrillation with ventricular rate 98bpm, nonspecific ST and T wave abnormality.  - pt reports a normal echocardiogram in May 2021. Sees Dr. Baldemar Freire for cardiology, will consult for optimization per surgery recommendation, appreciate recs  - pt leads an active lifestyle with frequent walks, golf two times per week, and 20-mile bike rides (pt reports he can bike up to 60 miles in one day, climbs stairs at home without difficulty).  - pt's exercise tolerance likely >10 METs at this time  - pt hypertensive in ED, likely secondary to pain, continuing with home medication regimen while in the hospital, monitoring routine hemodynamics  - pt will not require pre-operative treatment with insulin. pt Cr 1.1, no history of cerebrovascular disease, CHF, or ischemic heart disease. Per 2013 ACC/AHA guidelines, lap zheng would be elevated-risk surgery.  - pt's revised cardiac risk index score is 1, representing class II risk, 6.0% 30-day risk of death, MI, or cardiac arrest  - at this time, patient's modifiable risk factors are well controlled and patient is medically optimized for surgical procedure and general anesthesia, pending cardiology eval.   65M with PMHx HTN, Afib (on xarelto), GERD, gout, prostate ca (s/p splenectomy 2010), splenic infarction, presenting with RUQ abdominal pain, nausea, ultrasound findings positive for acute cholecystitis admit for acute cholecystitis     #acute cholecystitis  - pt with RUQ pain that gradually worsened with associated nausea, found to have acute cholecystitis on RUQ ultrasound.  - pt is s/p 3.375g zosyn, continue 3.375g q8h  - surgery consult, appreciate recs  - NPO for now - surgery recs appreciated resume diet if patient is not going to OR until Monday  - IVF (NS at 125 cc/hr)  - motrin 400mg q8h prn for mild pain, oxycodone 5mg prn for moderate pain, dilaudid 1mg IV q4h prn for severe pain.  - pt with no known history of ischemic or valvular disease. has a history of atrial fibrillation which is adequately rate controlled at this time. Pt appears euvolemic on exam. Can perform > 4 METs. RCRI class 1 risk. Pt is hypertensive in ED likely 2/2 pain. resume home bp medications. with adequate bp control pt is medically optimized for planned procedure. Pt is also awaiting evaluation by Dr. Mckoy for Cardiac optimization     #Atrial Fibrillation   - pt with history of Afib, on metoprolol for rate control and xarelto for anticoagulation.  - continue with metoprolol with hold parameters  - hold xarelto in favor of lovenox for dvt ppx while planning for OR      #HTN  - chronic, pt is on losartan, hydralazine, amlodipine at home  - continue with home meds with hold parameters  - pt hypertensive in /102, likely secondary to pain  - continue to monitor routine hemodynamics    #gout  - chronic stable, continue home meds allopurinol and colchicine    #gerd  - pt says he occasionally takes pantoprazole  - continue to monitor    #ppx  - lovenox 40mg subq qd      65M with PMHx HTN, Afib (on xarelto), GERD, gout, prostate ca (s/p splenectomy 2010), splenic infarction, presenting with RUQ abdominal pain, nausea, ultrasound findings positive for acute cholecystitis admit for acute cholecystitis     #acute cholecystitis  - pt with RUQ pain that gradually worsened with associated nausea, found to have acute cholecystitis on RUQ ultrasound.  - pt is s/p 3.375g zosyn, continue 3.375g q8h  - surgery consult, appreciate recs  - NPO for now - surgery recs appreciated resume diet if patient is not going to OR until Monday  - IVF (NS at 125 cc/hr)  - motrin 400mg q8h prn for mild pain, oxycodone 5mg prn for moderate pain, dilaudid 1mg IV q4h prn for severe pain.  - pt with no known history of ischemic or valvular disease. has a history of atrial fibrillation which is adequately rate controlled at this time. Pt appears euvolemic on exam. Can perform > 4 METs. RCRI class 1 risk. Pt is hypertensive in ED likely 2/2 pain. resume home bp medications. with adequate bp control pt is medically optimized for planned procedure. Pt is also awaiting evaluation by Dr. Mckoy for Cardiac optimization     #Atrial Fibrillation   - pt with history of Afib, on metoprolol for rate control and xarelto for anticoagulation.  - continue with metoprolol with hold parameters  - hold xarelto in favor of lovenox for dvt ppx while planning for OR      #HTN  - chronic, pt is on losartan, hydralazine, amlodipine at home  - continue with home meds with hold parameters  - pt hypertensive in /102, likely secondary to pain  - continue to monitor routine hemodynamics    #gout  - chronic stable, continue home meds allopurinol and colchicine    #gerd  - pt says he occasionally takes pantoprazole  - continue to monitor    #ppx  - unclear when pt will be scheduled for OR. need to start a/c if no immediate plan for OR.

## 2021-09-10 NOTE — H&P ADULT - NSICDXPASTMEDICALHX_GEN_ALL_CORE_FT
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

## 2021-09-10 NOTE — PROGRESS NOTE ADULT - ASSESSMENT
64 y/o male w/ HTN of Afib on Xarelto, HTN, Gout, GERD & Prostate Ca s/p Prostatectomy who has acute cholecystitis      PLAN  1. Pain management PRN  2. Can have clears  3. Continue to monitor daily labs including LFTs  4. Continue DVT PPx   5. If patient needs to remian on anticoagulation Heparin Drip or Lovenox is fine but will have to be held prior to surgery.  6. Plan for Lap Noy monday 9/13, please medically optimize and clear the patient for surgery  7. OOB & Ambulate        Discussed with Team at AM rounds.    Sincere Matthews PA-C, MPAS  General Surgery   Spectra [phone]

## 2021-09-10 NOTE — DISCHARGE NOTE PROVIDER - CARE PROVIDERS DIRECT ADDRESSES
,[email or web address],[email or web address]

## 2021-09-10 NOTE — ED PROVIDER NOTE - SIGNIFICANT NEGATIVE FINDINGS
no headache, no chest pain, no SOB, no palpitations, no urinary symptoms, no GI bleeding. no neuro changes.

## 2021-09-10 NOTE — DISCHARGE NOTE PROVIDER - PROVIDER TOKENS
PROVIDER:[TOKEN:[[id number]],FOLLOWUP:[1 week]],PROVIDER:[TOKEN:[[id number]],FOLLOWUP:[Routine],ESTABLISHEDPATIENT:[T]],PROVIDER:[TOKEN:[[id number]],FOLLOWUP:[2 weeks],ESTABLISHEDPATIENT:[T]]

## 2021-09-10 NOTE — CONSULT NOTE ADULT - PROBLEM SELECTOR RECOMMENDATION 9
-Discussed with Dr. Meza  -Admit to medicine for OR optimization  -Cardio consult in AM    -Will likely require OR, probably Monday for lap zheng  -NPO, IVF, supportive care  -IV antibiotics started  -Care per primary team    Surgical Team Contact Information  Spectralink: Ext: [phone] or [phone]  Pager: [phone]

## 2021-09-10 NOTE — PROGRESS NOTE ADULT - SUBJECTIVE AND OBJECTIVE BOX
Patient is a 65y old  Male who presents with a chief complaint of Acute cholecystitis (10 Sep 2021 03:28)      INTERVAL HPI/OVERNIGHT EVENTS: Patient seen and examined at bedside. Patient was admitted overnight for acute cholecystitis likely 2/2 to gall stones. Patient c/o 10/10 right upper quadrant abdominal pain. no nausea/vomiting. receieved dilaudid 0.5 IV X 1 at 5:30 AM.     MEDICATIONS  (STANDING):  allopurinol 100 milliGRAM(s) Oral daily  amLODIPine   Tablet 10 milliGRAM(s) Oral daily  colchicine 0.6 milliGRAM(s) Oral daily  hydrALAZINE 25 milliGRAM(s) Oral two times a day  HYDROmorphone  Injectable 0.5 milliGRAM(s) IV Push once  losartan 100 milliGRAM(s) Oral daily  metoprolol succinate  milliGRAM(s) Oral daily  piperacillin/tazobactam IVPB.. 3.375 Gram(s) IV Intermittent every 8 hours  sodium chloride 0.9%. 1000 milliLiter(s) (125 mL/Hr) IV Continuous <Continuous>    MEDICATIONS  (PRN):  acetaminophen   Tablet .. 650 milliGRAM(s) Oral every 6 hours PRN Temp greater or equal to 38C (100.4F), Mild Pain (1 - 3)  aluminum hydroxide/magnesium hydroxide/simethicone Suspension 30 milliLiter(s) Oral every 4 hours PRN Dyspepsia  HYDROmorphone  Injectable 1 milliGRAM(s) IV Push every 4 hours PRN Severe Pain (7 - 10)  ibuprofen  Tablet. 400 milliGRAM(s) Oral every 8 hours PRN Mild Pain (1 - 3)  melatonin 3 milliGRAM(s) Oral at bedtime PRN Insomnia  ondansetron Injectable 4 milliGRAM(s) IV Push every 6 hours PRN Nausea  oxyCODONE    IR 5 milliGRAM(s) Oral Once PRN Moderate Pain (4 - 6)      Allergies    No Known Allergies    Intolerances        REVIEW OF SYSTEMS:  CONSTITUTIONAL: No fever or chills  HEENT:  No headache, no sore throat  RESPIRATORY: No cough, wheezing, or shortness of breath  CARDIOVASCULAR: No chest pain, palpitations  GASTROINTESTINAL: ADMITST to abd pain, denies nausea, vomiting, or diarrhea  GENITOURINARY: No dysuria, frequency, or hematuria  NEUROLOGICAL: no focal weakness or dizziness  MUSCULOSKELETAL: no myalgias     Vital Signs Last 24 Hrs  T(C): 36.8 (10 Sep 2021 07:14), Max: 36.8 (10 Sep 2021 07:14)  T(F): 98.2 (10 Sep 2021 07:14), Max: 98.2 (10 Sep 2021 07:14)  HR: 90 (10 Sep 2021 07:14) (78 - 90)  BP: 146/98 (10 Sep 2021 07:14) (144/104 - 156/102)  BP(mean): --  RR: 16 (10 Sep 2021 07:14) (16 - 19)  SpO2: 97% (10 Sep 2021 07:14) (97% - 98%)    PHYSICAL EXAM:  GENERAL: NAD, sluggish, aao X 4   HEENT:  anicteric, moist mucous membranes  CHEST/LUNG:  CTA b/l, no rales, wheezes, or rhonchi  HEART:  + irregularly irregular rhythm, regular rate   ABDOMEN:  BS+, soft, TTP right upper quadrant, soft nondistended   EXTREMITIES: no edema, cyanosis, or calf tenderness  NERVOUS SYSTEM: answers questions and follows commands appropriately    LABS:                        17.5   12.10 )-----------( 233      ( 10 Sep 2021 05:59 )             51.4     CBC Full  -  ( 10 Sep 2021 05:59 )  WBC Count : 12.10 K/uL  Hemoglobin : 17.5 g/dL  Hematocrit : 51.4 %  Platelet Count - Automated : 233 K/uL  Mean Cell Volume : 84.1 fl  Mean Cell Hemoglobin : 28.6 pg  Mean Cell Hemoglobin Concentration : 34.0 gm/dL  Auto Neutrophil # : 10.18 K/uL  Auto Lymphocyte # : 1.13 K/uL  Auto Monocyte # : 0.70 K/uL  Auto Eosinophil # : 0.01 K/uL  Auto Basophil # : 0.03 K/uL  Auto Neutrophil % : 84.2 %  Auto Lymphocyte % : 9.3 %  Auto Monocyte % : 5.8 %  Auto Eosinophil % : 0.1 %  Auto Basophil % : 0.2 %    10 Sep 2021 00:49    139    |  107    |  19     ----------------------------<  127    3.5     |  25     |  1.10     Ca    9.2        10 Sep 2021 00:49    TPro  8.7    /  Alb  4.2    /  TBili  0.8    /  DBili  x      /  AST  22     /  ALT  30     /  AlkPhos  96     10 Sep 2021 00:49    PT/INR - ( 10 Sep 2021 01:03 )   PT: 17.0 sec;   INR: 1.48 ratio         PTT - ( 10 Sep 2021 01:03 )  PTT:35.8 sec    CAPILLARY BLOOD GLUCOSE              RADIOLOGY & ADDITIONAL TESTS:    Personally reviewed.     Consultant(s) Notes Reviewed:  [x] YES  [ ] NO

## 2021-09-10 NOTE — H&P ADULT - NSHPPHYSICALEXAM_GEN_ALL_CORE
T(C): 36.6 (09-10-21 @ 00:08), Max: 36.6 (09-10-21 @ 00:08)  HR: 84 (09-10-21 @ 02:30) (84 - 85)  BP: 156/102 (09-10-21 @ 02:30) (144/104 - 156/102)  RR: 16 (09-10-21 @ 02:30) (16 - 19)  SpO2: 98% (09-10-21 @ 02:30) (98% - 98%)    General: Well developed, well nourished, NAD, resting comfortably  HEENT: NCAT, PERRLA, EOMI bl, moist mucous membranes   Neck: Supple, nontender, no mass  Neurology: A&Ox3, nonfocal, CN II-XII grossly intact, sensation intact  Respiratory: CTA B/L, No W/R/R  CV: RRR, +S1/S2, no murmurs, rubs or gallops  Abdominal: Soft, +mildly tender to palpation in RRQ, no guarding, ND  Extremities: No C/C/E, + peripheral pulses (good DP pulses bilaterally)  MSK: Normal ROM, no joint erythema or warmth, no obvious joint swelling   Skin: warm, dry, normal color for race

## 2021-09-10 NOTE — H&P ADULT - NSHPSOCIALHISTORY_GEN_ALL_CORE
Denies tobacco and recreational drug use.  Occasional EtOH.    Lives with wife, daughter.    Active lifestyle - golf, walks, 20 mile bike rides.    Occupation - sells ultrasound equipment

## 2021-09-10 NOTE — ED PROVIDER NOTE - OBJECTIVE STATEMENT
upper Right quad abd pain with nausea beginning tonight.  abdominal pain 64 y/o male h/o AF, splenic infarct on  Xarelto c/o RUQ abd pain with nausea x few hours that began  after dinner   no fever, no chills, no CP, no SOB, no urinary symptoms, no GIB

## 2021-09-10 NOTE — ED ADULT NURSE REASSESSMENT NOTE - NS ED NURSE REASSESS COMMENT FT1
Per MD Virgen, pt fay to receive dilaudid, hydralazine, metoprolol, losartan and amlodipine at the same time.

## 2021-09-10 NOTE — DISCHARGE NOTE PROVIDER - NSDCMRMEDTOKEN_GEN_ALL_CORE_FT
allopurinol 100 mg oral tablet: 1 tab(s) orally once a day  amLODIPine 10 mg oral tablet: 1 tab(s) orally once a day  colchicine 0.6 mg oral tablet: 1 tab(s) orally once a day  Combigan 0.2%-0.5% ophthalmic solution: 1 drop(s) to each affected eye 2 times a day  furosemide 20 mg oral tablet: 1 tab(s) orally once a day, As Needed  hydrALAZINE 25 mg oral tablet: 1 tab(s) orally 2 times a day  losartan 100 mg oral tablet: 1 tab(s) orally once a day  metoprolol succinate 100 mg oral tablet, extended release: 1 tab(s) orally once a day  pantoprazole 40 mg oral delayed release tablet: 1 tab(s) orally once a day, As Needed  Xarelto 20 mg oral tablet: 1 tab(s) orally once a day   allopurinol 100 mg oral tablet: 1 tab(s) orally once a day  amLODIPine 10 mg oral tablet: 1 tab(s) orally once a day  cefpodoxime 200 mg oral tablet: 1 tab(s) orally every 12 hours   colchicine 0.6 mg oral tablet: 1 tab(s) orally once a day  Combigan 0.2%-0.5% ophthalmic solution: 1 drop(s) to each affected eye 2 times a day  furosemide 20 mg oral tablet: 1 tab(s) orally once a day, As Needed  hydrALAZINE 25 mg oral tablet: 1 tab(s) orally 2 times a day  lactobacillus acidophilus oral tablet, chewable: 1 tab(s) chewed 2 times a day (with meals)   losartan 100 mg oral tablet: 1 tab(s) orally once a day  metoprolol succinate 100 mg oral tablet, extended release: 1 tab(s) orally once a day  metroNIDAZOLE 500 mg oral tablet: 1 tab(s) orally 3 times a day   oxycodone-acetaminophen 5 mg-325 mg oral tablet: 1 tab(s) orally every 6 hours, As Needed -for severe pain MDD:4 tabs   pantoprazole 40 mg oral delayed release tablet: 1 tab(s) orally once a day, As Needed  Xarelto 20 mg oral tablet: 1 tab(s) orally once a day

## 2021-09-10 NOTE — ED ADULT NURSE NOTE - CARDIO ASSESSMENT
Pt responded to VM left this am by calling back to IOP. She reports that she has an URI. She said she had a call into Dr China Daigle who wanted her to attend IOP. She states that she does not feel the group setting will be helpful to her and that she has chronic sleep issues and it is difficult for her to get to IOP at 8:00 am.  Pt was encouraged to try IOP for 1 to 2 weeks before making a final decision. She says she will call back to [facility] with her decision after she speaks with Dr. China Daigle. Enrike Pettit therapist made aware. ---

## 2021-09-10 NOTE — DISCHARGE NOTE PROVIDER - CARE PROVIDER_API CALL
Kevin Meza)  Surgery  [address] [place] [address]  Phone: [phone]  Fax: [phone]  Follow Up Time: 1 week    Baldemar Mckoy)  Cardiovascular Disease; Internal Medicine  [address] [facility], [address]  [place] [address]  Phone: [phone]  Fax: [phone]  Established Patient  Follow Up Time: Routine    Luis Fernando Villalba)  Internal Medicine  [address] [facility], [address]  [place] [address]  Phone: [phone]  Fax: [phone]  Established Patient  Follow Up Time: 2 weeks

## 2021-09-10 NOTE — H&P ADULT - NSICDXPASTSURGICALHX_GEN_ALL_CORE_FT
PAST SURGICAL HISTORY:  H/O prostatectomy 4/27/10     PAST SURGICAL HISTORY:  H/O arthroscopy of right knee 1988    H/O prostatectomy 4/27/10

## 2021-09-10 NOTE — CONSULT NOTE ADULT - SUBJECTIVE AND OBJECTIVE BOX
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 There was associated nausea. No chest pain, dizziness, shortness of breath, palpitations. He was found to have acute cholecystitis and plan includes surgery. His last dose of rivaroxaban was yesterday morning. He had an echo done in June which revealed normal LV systolic function, no significant valve issues. Last stress test was in 2019 - exercised for 10 min with no ischemic changes.    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    Medications:  Home Medications:  allopurinol 100 mg oral tablet: 1 tab(s) orally once a day (10 Sep 2021 05:10)  amLODIPine 10 mg oral tablet: 1 tab(s) orally once a day (10 Sep 2021 05:10)  colchicine 0.6 mg oral tablet: 1 tab(s) orally once a day (10 Sep 2021 05:10)  Combigan 0.2%-0.5% ophthalmic solution: 1 drop(s) to each affected eye 2 times a day (10 Sep 2021 05:10)  furosemide 20 mg oral tablet: 1 tab(s) orally once a day, As Needed (10 Sep 2021 05:10)  hydrALAZINE 25 mg oral tablet: 1 tab(s) orally 2 times a day (10 Sep 2021 05:10)  pantoprazole 40 mg oral delayed release tablet: 1 tab(s) orally once a day, As Needed (10 Sep 2021 05:10)  Xarelto 20 mg oral tablet: 1 tab(s) orally once a day (10 Sep 2021 05:10)      Family History: Non-contributory family history of premature cardiovascular atherosclerotic disease    Social History: No tobacco, alcohol or drug use    Review of Systems:  General: No fevers, chills, weight loss or gain  Skin: No rashes, color changes  Cardiovascular: No chest pain, orthopnea  Respiratory: No shortness of breath, cough  Gastrointestinal: No nausea, (+) abdominal pain  Genitourinary: No incontinence, pain with urination  Musculoskeletal: No pain, swelling, decreased range of motion  Neurological: No headache, weakness  Psychiatric: No depression, anxiety  Endocrine: No weight loss or gain, increased thirst  All other systems are comprehensively negative.    Physical Exam:  Vitals:        Vital Signs Last 24 Hrs  T(C): 36.8 (10 Sep 2021 07:14), Max: 36.8 (10 Sep 2021 07:14)  T(F): 98.2 (10 Sep 2021 07:14), Max: 98.2 (10 Sep 2021 07:14)  HR: 90 (10 Sep 2021 07:14) (78 - 90)  BP: 146/98 (10 Sep 2021 07:14) (144/104 - 156/102)  BP(mean): --  RR: 16 (10 Sep 2021 07:14) (16 - 19)  SpO2: 97% (10 Sep 2021 07:14) (97% - 98%)  General: NAD  HEENT: MMM  Neck: No JVD, no carotid bruit  Lungs: CTAB  CV: RRR, nl S1/S2, no M/R/G  Abdomen: S/NT/ND, +BS  Extremities: No LE edema, no cyanosis  Neuro: AAOx3, non-focal  Skin: No rash    Labs:                        17.5   12.10 )-----------( 233      ( 10 Sep 2021 05:59 )             51.4     09-10    139  |  107  |  19  ----------------------------<  127<H>  3.5   |  25  |  1.10    Ca    9.2      10 Sep 2021 00:49    TPro  8.7<H>  /  Alb  4.2  /  TBili  0.8  /  DBili  x   /  AST  22  /  ALT  30  /  AlkPhos  96  09-10        PT/INR - ( 10 Sep 2021 01:03 )   PT: 17.0 sec;   INR: 1.48 ratio         PTT - ( 10 Sep 2021 01:03 )  PTT:35.8 sec    ECG: AF, nonspecific ST abnormality    
Surgery Consultation    This is a 65y year old Male with PMHx of HTN, Afib, splenic infarct (On Xarelto), gout, GERD, prostate CA s/p prostatectomy 4/27/10 presenting with complaint of RUQ pain since 9pm. Pt states pain started shortly after eating chips and spicy humus dip. Pain described as progressively worsening, constant, 10/10, non-radiating abdominal pain made worse with inspiration. Pt admits to similar episode earlier in year, but much milder. Admits to some nausea without vomiting. Last BM this AM; normal. Denies history of chest pain, shortness of breath, urinary complaints.     PAST MEDICAL & SURGICAL HISTORY:  GERD (gastroesophageal reflux disease)    Prostate ca    Hypertension    Gout    Atrial fibrillation  paroxysmal , seen cardio chose not to be on A/C due to extreme sports    H/O prostatectomy  4/27/10      Allergies:  No Known Allergies    Home Medications:  acetaminophen 325 mg oral tablet: 2 tab(s) orally every 6 hours, As needed, Temp greater or equal to 38C (100.4F), Mild Pain (1 - 3)  docusate sodium 100 mg oral capsule: 1 cap(s) orally 2 times a day      Family History:  FAMILY HISTORY:      ROS:  Constitutional: Denies fever, fatigue or weight loss.  Skin: Denies rash.  Eyes: Denies recent vision problems or eye pain.  ENT: Denies congestion, ear pain, or sore throat.  Endocrine: Denies thyroid problems.  Cardiovascular: Denies chest pain or palpation.  Respiratory: Denies cough, shortness of breath, congestion, or wheezing.  Gastrointestinal: SEE HPI  Genitourinary: Denies dysuria.  Musculoskeletal: Denies joint swelling.  Neurologic: Denies headache.      PHYSICAL EXAM:  GENERAL: No acute distress, well-developed  HEAD:  Atraumatic, Normocephalic  ABDOMEN: Soft, minimally-tender, mildly-distended; bowel sounds+  NEUROLOGY: A&O x 3, no focal deficits    Data:  T(C): 36.6 (09-10-21 @ 00:08), Max: 36.6 (09-10-21 @ 00:08)  HR: 84 (09-10-21 @ 02:30) (84 - 85)  BP: 156/102 (09-10-21 @ 02:30) (144/104 - 156/102)  RR: 16 (09-10-21 @ 02:30) (16 - 19)  SpO2: 98% (09-10-21 @ 02:30) (98% - 98%)                        17.3   10.45 )-----------( 238      ( 10 Sep 2021 00:49 )             51.2     09-10    139  |  107  |  19  ----------------------------<  127<H>  3.5   |  25  |  1.10    Ca    9.2      10 Sep 2021 00:49    TPro  8.7<H>  /  Alb  4.2  /  TBili  0.8  /  DBili  x   /  AST  22  /  ALT  30  /  AlkPhos  96  09-10      LIVER FUNCTIONS - ( 10 Sep 2021 00:49 )  Alb: 4.2 g/dL / Pro: 8.7 g/dL / ALK PHOS: 96 U/L / ALT: 30 U/L / AST: 22 U/L / GGT: x           Radiology:  < from: US Abdomen Upper Quadrant Right (09.10.21 @ 01:44) >  INTERPRETATION:  CLINICAL INFORMATION: Right upper quadrant pain    COMPARISON: None available.    TECHNIQUE: Sonography of the right upper quadrant.    FINDINGS:    Liver: Within normal limits.  Bile ducts: Normal caliber. Common bile duct measures 6 (mm).  Gallbladder: Gallstones and sludge, with gallbladder wall thickening and positive sonographic Luna's sign. Trace pericholecystic fluid.  Pancreas: Not visualized.  Right kidney: 11.0 cm. No hydronephrosis. 1.8 cm midpole cyst.  Ascites: None.  IVC: Visualized portions are within normal limits.    IMPRESSION:    Acute calculous cholecystitis.    --- End of Report ---  MEREDITH LAU MD; Attending Radiologist  This document has been electronically signed. Sep 10 2021  2:14AM    < end of copied text >

## 2021-09-10 NOTE — DISCHARGE NOTE PROVIDER - HOSPITAL COURSE
FROM ADMISSION H+P:   HPI:  The patient is a 65y Male with PMHx of HTN, Afib (on xarelto), splenic infarct, gout, GERD, prostate ca (s/p prostatectomy 2010) presenting with right upper quadrant abdominal pain since earlier tonight. He was in his usual state of health, around 9pm tonight watching TV with his wife and eating chips with spicy hummus when he noticed a pain in his right upper quadrant that did not go away and gradually got worse, eventually 10/10, non-radiating, worse with inspiration. He has had some associated nausea but no vomiting. He reports a previous episode of similar pain earlier this year that eventually resolved. Denies chest pain, shortness of breath, fever, chills.    In the ED,  VS: T 97.8, HR 84, /102, RR 16, SpO2 98% on RA  Labs: PT 17.0, INR 1.48, aPTT 35.8  Imaging: RUQ ultrasound with gallstones, sludge, gallbladder wall thickening, positive sonographic Luna's sign, trace pericholecystic fluid, acute calculous cholecystitis.  EKG: atrial fibrillation with a ventricular rate of 98bpm, nonspecific ST and T wave abnormality.  Received: morphine 4mg IV x1, dilaudid 0.5mg IV x1, zofran 4mg IV x2, zosyn 3.375g x1, 1L NS bolus x1   COVID vaccine: not immunized against COVID 19, reports COVID infection in February 2021, received monoclonal antibody infusion and got his antibody levels checked after that which showed he had immunity, per pt.    of note He says he is very active and frequently rides his bike, can bike 60 miles in one day (3 rides of 20 miles each). He goes on frequent walks. He golfs twice a week. His home office is in his basement and he frequently climbs the flight of stairs throughout the day without difficulty. He feels he could climb 10 flights of stairs before he would have to stop and catch his breath. He is not currently sexually active. He prefers the bike over running on the treadmill or using the elliptical due to chronic knee pain. He works with a .    He reports that in February 2019, he was diagnosed with Afib and met with his cardiologist Dr. Baldemar Freire and they decided to hold off on anticoagulation because he was on the "borderline" and was active at the time with juan luis. 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 He has been taking xarelto since that time. He had noticed some lower extremity edema and was given lasix 20mg to take (pt states he will take this medication once or twice a week), but states he got an echo in May 2021 that was normal with normal ejection fraction, per the patient. He is on metoprolol succinate 100mg and xarelto 20mg for his afib. He has a history of hypertension for which he takes losartan 100mg qd, amlodipine 10mg qd, and hydralazine 25mg bid. He has a history of gout for which he takes allopurinol 100mg qd and colchicine 0.6mg qd.     He has a history of prostate cancer s/p prostatectomy in 2010 and gets regular PSA checks. He has some mild urinary discomfort and occasional incontinence at baseline since his prostatectomy but denies history of UTI.    He states he has elevated pressure in his left eye but states he has not been diagnosed with glaucoma. He uses combigan eyedrops.     Patient reports taking "GOLO" supplement for weight loss that he bought online, for the last two weeks. (10 Sep 2021 03:28)      ---  HOSPITAL COURSE:   Patient was admitted for acute cholecystitis confirmed on HIDA Scan. Patient started on zosyn. Patient's pain was well controlled on opiates. Cardiology, Dr. Mckoy consulted for cardiac clearance, patient cardiac and medically cleared for surgeyr. Patient underwent a lap zheng on Monday 9/13 by Dr. Meza. The procedure was uncomplicated.   ---  CONSULTANTS:   Angel Meza   ---  TIME SPENT:  I, the attending physician, was physically present for the key portions of the evaluation and management (E/M) service provided. The total amount of time spent reviewing the hospital notes, laboratory values, imaging findings, assessing/counseling the patient, discussing with consultant physicians, social work, nursing staff was -- minutes    ---  Primary care provider was made aware of plan for discharge:      [  ] NO     [  ] YES   FROM ADMISSION H+P:   HPI:  The patient is a 65y Male with PMHx of HTN, Afib (on xarelto), splenic infarct, gout, GERD, prostate ca (s/p prostatectomy 2010) presenting with right upper quadrant abdominal pain since earlier tonight. He was in his usual state of health, around 9pm tonight watching TV with his wife and eating chips with spicy hummus when he noticed a pain in his right upper quadrant that did not go away and gradually got worse, eventually 10/10, non-radiating, worse with inspiration. He has had some associated nausea but no vomiting. He reports a previous episode of similar pain earlier this year that eventually resolved. Denies chest pain, shortness of breath, fever, chills.    In the ED,  VS: T 97.8, HR 84, /102, RR 16, SpO2 98% on RA  Labs: PT 17.0, INR 1.48, aPTT 35.8  Imaging: RUQ ultrasound with gallstones, sludge, gallbladder wall thickening, positive sonographic Luna's sign, trace pericholecystic fluid, acute calculous cholecystitis.  EKG: atrial fibrillation with a ventricular rate of 98bpm, nonspecific ST and T wave abnormality.  Received: morphine 4mg IV x1, dilaudid 0.5mg IV x1, zofran 4mg IV x2, zosyn 3.375g x1, 1L NS bolus x1   COVID vaccine: not immunized against COVID 19, reports COVID infection in February 2021, received monoclonal antibody infusion and got his antibody levels checked after that which showed he had immunity, per pt.    of note He says he is very active and frequently rides his bike, can bike 60 miles in one day (3 rides of 20 miles each). He goes on frequent walks. He golfs twice a week. His home office is in his basement and he frequently climbs the flight of stairs throughout the day without difficulty. He feels he could climb 10 flights of stairs before he would have to stop and catch his breath. He is not currently sexually active. He prefers the bike over running on the treadmill or using the elliptical due to chronic knee pain. He works with a .    He reports that in February 2019, he was diagnosed with Afib and met with his cardiologist Dr. Baldemar Freire and they decided to hold off on anticoagulation because he was on the "borderline" and was active at the time with juan luis. 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 He has been taking xarelto since that time. He had noticed some lower extremity edema and was given lasix 20mg to take (pt states he will take this medication once or twice a week), but states he got an echo in May 2021 that was normal with normal ejection fraction, per the patient. He is on metoprolol succinate 100mg and xarelto 20mg for his afib. He has a history of hypertension for which he takes losartan 100mg qd, amlodipine 10mg qd, and hydralazine 25mg bid. He has a history of gout for which he takes allopurinol 100mg qd and colchicine 0.6mg qd.     He has a history of prostate cancer s/p prostatectomy in 2010 and gets regular PSA checks. He has some mild urinary discomfort and occasional incontinence at baseline since his prostatectomy but denies history of UTI.    He states he has elevated pressure in his left eye but states he has not been diagnosed with glaucoma. He uses combigan eyedrops.     Patient reports taking "GOLO" supplement for weight loss that he bought online, for the last two weeks. (10 Sep 2021 03:28)      ---  HOSPITAL COURSE:   Patient was admitted for acute cholecystitis confirmed on HIDA Scan. Patient started on zosyn. Patient's pain was well controlled on opiates. Cardiology, Dr. Mckoy consulted for cardiac clearance, patient cardiac and medically cleared for surgeyr. Patient underwent a lap zheng on Monday 9/13 by Dr. Meza. The procedure was uncomplicated.   ---  CONSULTANTS:   Surgery- Dr Meza   Cardiology- Dr Baldemar Freire     ---  TIME SPENT:  I, the attending physician, was physically present for the key portions of the evaluation and management (E/M) service provided. The total amount of time spent reviewing the hospital notes, laboratory values, imaging findings, assessing/counseling the patient, discussing with consultant physicians, social work, nursing staff was -- minutes    ---  Primary care provider was made aware of plan for discharge:      [  ] NO     [  ] YES   FROM ADMISSION H+P:   HPI:  The patient is a 65y Male with PMHx of HTN, Afib (on xarelto), splenic infarct, gout, GERD, prostate ca (s/p prostatectomy 2010) presenting with right upper quadrant abdominal pain since earlier tonight. He was in his usual state of health, around 9pm tonight watching TV with his wife and eating chips with spicy hummus when he noticed a pain in his right upper quadrant that did not go away and gradually got worse, eventually 10/10, non-radiating, worse with inspiration. He has had some associated nausea but no vomiting. He reports a previous episode of similar pain earlier this year that eventually resolved. Denies chest pain, shortness of breath, fever, chills.    In the ED,  VS: T 97.8, HR 84, /102, RR 16, SpO2 98% on RA  Labs: PT 17.0, INR 1.48, aPTT 35.8  Imaging: RUQ ultrasound with gallstones, sludge, gallbladder wall thickening, positive sonographic Luna's sign, trace pericholecystic fluid, acute calculous cholecystitis.  EKG: atrial fibrillation with a ventricular rate of 98bpm, nonspecific ST and T wave abnormality.  Received: morphine 4mg IV x1, dilaudid 0.5mg IV x1, zofran 4mg IV x2, zosyn 3.375g x1, 1L NS bolus x1   COVID vaccine: not immunized against COVID 19, reports COVID infection in February 2021, received monoclonal antibody infusion and got his antibody levels checked after that which showed he had immunity, per pt.    of note He says he is very active and frequently rides his bike, can bike 60 miles in one day (3 rides of 20 miles each). He goes on frequent walks. He golfs twice a week. His home office is in his basement and he frequently climbs the flight of stairs throughout the day without difficulty. He feels he could climb 10 flights of stairs before he would have to stop and catch his breath. He is not currently sexually active. He prefers the bike over running on the treadmill or using the elliptical due to chronic knee pain. He works with a .    He reports that in February 2019, he was diagnosed with Afib and met with his cardiologist Dr. Baldemar Freire and they decided to hold off on anticoagulation because he was on the "borderline" and was active at the time with juan luis. 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 He has been taking xarelto since that time. He had noticed some lower extremity edema and was given lasix 20mg to take (pt states he will take this medication once or twice a week), but states he got an echo in May 2021 that was normal with normal ejection fraction, per the patient. He is on metoprolol succinate 100mg and xarelto 20mg for his afib. He has a history of hypertension for which he takes losartan 100mg qd, amlodipine 10mg qd, and hydralazine 25mg bid. He has a history of gout for which he takes allopurinol 100mg qd and colchicine 0.6mg qd.     He has a history of prostate cancer s/p prostatectomy in 2010 and gets regular PSA checks. He has some mild urinary discomfort and occasional incontinence at baseline since his prostatectomy but denies history of UTI.    He states he has elevated pressure in his left eye but states he has not been diagnosed with glaucoma. He uses combigan eyedrops.     Patient reports taking "GOLO" supplement for weight loss that he bought online, for the last two weeks. (10 Sep 2021 03:28)      ---  HOSPITAL COURSE:   Patient was admitted for acute cholecystitis confirmed on HIDA Scan. Patient started on zosyn. Patient's pain was well controlled on opiates. Cardiology, Dr. Mckoy consulted for cardiac clearance. Patient went for lap zheng on Monday 9/13 but went into Afib with RVR once anesthesia started, surgery terminated and he was sent back to floor started on diltiazem drip. HR normalized under very short time so cardio discontinued it and gave a dose of po metoprolol. Pt scheduled for IR on 9/14 for cholecystostomy tube placement. card. Plan per surgery is for patient to FOLLOW UP WITH SURGERY IN 2-3 WKS outpatient for interval cath study or CT scan with possible lap zheng.     ---  CONSULTANTS:   Surgery- Dr Meza   Cardiology- Dr Baldemar Freire     ---  TIME SPENT:  I, the attending physician, was physically present for the key portions of the evaluation and management (E/M) service provided. The total amount of time spent reviewing the hospital notes, laboratory values, imaging findings, assessing/counseling the patient, discussing with consultant physicians, social work, nursing staff was -- minutes    ---  Primary care provider was made aware of plan for discharge:      [  ] NO     [  ] YES   FROM ADMISSION H+P:   HPI:  The patient is a 65y Male with PMHx of HTN, Afib (on xarelto), splenic infarct, gout, GERD, prostate ca (s/p prostatectomy 2010) presenting with right upper quadrant abdominal pain since earlier tonight. He was in his usual state of health, around 9pm tonight watching TV with his wife and eating chips with spicy hummus when he noticed a pain in his right upper quadrant that did not go away and gradually got worse, eventually 10/10, non-radiating, worse with inspiration. He has had some associated nausea but no vomiting. He reports a previous episode of similar pain earlier this year that eventually resolved. Denies chest pain, shortness of breath, fever, chills.    In the ED,  VS: T 97.8, HR 84, /102, RR 16, SpO2 98% on RA  Labs: PT 17.0, INR 1.48, aPTT 35.8  Imaging: RUQ ultrasound with gallstones, sludge, gallbladder wall thickening, positive sonographic Luna's sign, trace pericholecystic fluid, acute calculous cholecystitis.  EKG: atrial fibrillation with a ventricular rate of 98bpm, nonspecific ST and T wave abnormality.  Received: morphine 4mg IV x1, dilaudid 0.5mg IV x1, zofran 4mg IV x2, zosyn 3.375g x1, 1L NS bolus x1   COVID vaccine: not immunized against COVID 19, reports COVID infection in February 2021, received monoclonal antibody infusion and got his antibody levels checked after that which showed he had immunity, per pt.    of note He says he is very active and frequently rides his bike, can bike 60 miles in one day (3 rides of 20 miles each). He goes on frequent walks. He golfs twice a week. His home office is in his basement and he frequently climbs the flight of stairs throughout the day without difficulty. He feels he could climb 10 flights of stairs before he would have to stop and catch his breath. He is not currently sexually active. He prefers the bike over running on the treadmill or using the elliptical due to chronic knee pain. He works with a .    He reports that in February 2019, he was diagnosed with Afib and met with his cardiologist Dr. Baldemar Freire and they decided to hold off on anticoagulation because he was on the "borderline" and was active at the time with juan luis. 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 He has been taking xarelto since that time. He had noticed some lower extremity edema and was given lasix 20mg to take (pt states he will take this medication once or twice a week), but states he got an echo in May 2021 that was normal with normal ejection fraction, per the patient. He is on metoprolol succinate 100mg and xarelto 20mg for his afib. He has a history of hypertension for which he takes losartan 100mg qd, amlodipine 10mg qd, and hydralazine 25mg bid. He has a history of gout for which he takes allopurinol 100mg qd and colchicine 0.6mg qd.     He has a history of prostate cancer s/p prostatectomy in 2010 and gets regular PSA checks. He has some mild urinary discomfort and occasional incontinence at baseline since his prostatectomy but denies history of UTI.    He states he has elevated pressure in his left eye but states he has not been diagnosed with glaucoma. He uses combigan eyedrops.     Patient reports taking "GOLO" supplement for weight loss that he bought online, for the last two weeks. (10 Sep 2021 03:28)      ---  HOSPITAL COURSE:   Patient was admitted for acute cholecystitis confirmed on HIDA Scan. Patient started on zosyn. Patient's pain was well controlled on opiates. Cardiology, Dr. Mckoy consulted for cardiac clearance. Patient went for lap zheng on Monday 9/13 but went into Afib with RVR once anesthesia started, surgery terminated and he was sent back to floor started on diltiazem drip. HR normalized under very short time so cardio discontinued it and gave a dose of po metoprolol. Pt scheduled for IR on 9/14 for cholecystostomy tube placement. Tube placed as planned with no complications. Plan per surgery is for patient to FOLLOW UP WITH SURGERY IN 2-3 WKS outpatient for interval cath study or CT scan with possible lap zheng.     Patient seen and examined on discharge. Patient is stable for discharge home with close outpatient follow-up.    T(C): 36.8 (09-15-21 @ 05:56), Max: 37.3 (09-14-21 @ 12:25)  HR: 72 (09-15-21 @ 05:56) (72 - 86)  BP: 134/96 (09-15-21 @ 05:56) (113/76 - 138/84)  RR: 17 (09-15-21 @ 05:56) (16 - 18)  SpO2: 93% (09-15-21 @ 05:56) (92% - 98%)    GENERAL: patient appears well, no acute distress, appropriate, pleasant  EYES: sclera clear, no exudates  ENMT: oropharynx clear without erythema, no exudates, moist mucous membranes  NECK: supple, soft, no thyromegaly noted  LUNGS: good air entry bilaterally, clear to auscultation, symmetric breath sounds, no wheezing or rhonchi appreciated  HEART: soft S1/S2, regular rate and rhythm, no murmurs noted, no lower extremity edema  GASTROINTESTINAL: abdomen is soft, mildly tender to palpation in RUQ, nondistended, normoactive bowel sounds, no palpable masses  INTEGUMENT: good skin turgor, no lesions noted  MUSCULOSKELETAL: no clubbing or cyanosis, no obvious deformity  NEUROLOGIC: awake, alert, oriented x3, no obvious sensory deficits  PSYCHIATRIC: mood is good, affect is congruent, linear and logical thought process  HEME/LYMPH: no palpable supraclavicular nodules, no obvious ecchymosis or petechiae     ---  CONSULTANTS:   Surgery- Dr Meza   Cardiology- Dr Baldemar Freire     ---  TIME SPENT:  I, the attending physician, was physically present for the key portions of the evaluation and management (E/M) service provided. The total amount of time spent reviewing the hospital notes, laboratory values, imaging findings, assessing/counseling the patient, discussing with consultant physicians, social work, nursing staff was -- minutes    ---  Primary care provider was made aware of plan for discharge:      [  ] NO     [  ] YES   FROM ADMISSION H+P:   HPI:  The patient is a 65y Male with PMHx of HTN, Afib (on xarelto), splenic infarct, gout, GERD, prostate ca (s/p prostatectomy 2010) presenting with right upper quadrant abdominal pain since earlier tonight. He was in his usual state of health, around 9pm tonight watching TV with his wife and eating chips with spicy hummus when he noticed a pain in his right upper quadrant that did not go away and gradually got worse, eventually 10/10, non-radiating, worse with inspiration. He has had some associated nausea but no vomiting. He reports a previous episode of similar pain earlier this year that eventually resolved. Denies chest pain, shortness of breath, fever, chills.    In the ED,  VS: T 97.8, HR 84, /102, RR 16, SpO2 98% on RA  Labs: PT 17.0, INR 1.48, aPTT 35.8  Imaging: RUQ ultrasound with gallstones, sludge, gallbladder wall thickening, positive sonographic Luna's sign, trace pericholecystic fluid, acute calculous cholecystitis.  EKG: atrial fibrillation with a ventricular rate of 98bpm, nonspecific ST and T wave abnormality.  Received: morphine 4mg IV x1, dilaudid 0.5mg IV x1, zofran 4mg IV x2, zosyn 3.375g x1, 1L NS bolus x1   COVID vaccine: not immunized against COVID 19, reports COVID infection in February 2021, received monoclonal antibody infusion and got his antibody levels checked after that which showed he had immunity, per pt.    of note He says he is very active and frequently rides his bike, can bike 60 miles in one day (3 rides of 20 miles each). He goes on frequent walks. He golfs twice a week. His home office is in his basement and he frequently climbs the flight of stairs throughout the day without difficulty. He feels he could climb 10 flights of stairs before he would have to stop and catch his breath. He is not currently sexually active. He prefers the bike over running on the treadmill or using the elliptical due to chronic knee pain. He works with a .    He reports that in February 2019, he was diagnosed with Afib and met with his cardiologist Dr. Baldemar Freire and they decided to hold off on anticoagulation because he was on the "borderline" and was active at the time with juan luis. 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 He has been taking xarelto since that time. He had noticed some lower extremity edema and was given lasix 20mg to take (pt states he will take this medication once or twice a week), but states he got an echo in May 2021 that was normal with normal ejection fraction, per the patient. He is on metoprolol succinate 100mg and xarelto 20mg for his afib. He has a history of hypertension for which he takes losartan 100mg qd, amlodipine 10mg qd, and hydralazine 25mg bid. He has a history of gout for which he takes allopurinol 100mg qd and colchicine 0.6mg qd.     He has a history of prostate cancer s/p prostatectomy in 2010 and gets regular PSA checks. He has some mild urinary discomfort and occasional incontinence at baseline since his prostatectomy but denies history of UTI.    He states he has elevated pressure in his left eye but states he has not been diagnosed with glaucoma. He uses combigan eyedrops.     Patient reports taking "GOLO" supplement for weight loss that he bought online, for the last two weeks. (10 Sep 2021 03:28)      ---  HOSPITAL COURSE:   Patient was admitted for acute cholecystitis confirmed on HIDA Scan. Patient started on zosyn. Patient's pain was well controlled on opiates. Cardiology, Dr. Mckoy consulted for cardiac clearance. Patient went for lap zheng on Monday 9/13 but went into Afib with RVR once anesthesia started, surgery terminated and he was sent back to floor started on diltiazem drip. HR normalized under very short time so cardio discontinued it and gave a dose of po metoprolol. Pt scheduled for IR on 9/14 for cholecystostomy tube placement. Tube placed as planned with no complications. Plan per surgery is for patient to FOLLOW UP WITH SURGERY IN 2-3 WKS outpatient for interval cath study or CT scan with possible lap zheng.     Patient seen and examined on discharge. Patient is stable for discharge home with close outpatient follow-up.    T(C): 36.8 (09-15-21 @ 05:56), Max: 37.3 (09-14-21 @ 12:25)  HR: 72 (09-15-21 @ 05:56) (72 - 86)  BP: 134/96 (09-15-21 @ 05:56) (113/76 - 138/84)  RR: 17 (09-15-21 @ 05:56) (16 - 18)  SpO2: 93% (09-15-21 @ 05:56) (92% - 98%)    GENERAL: patient appears well, no acute distress, appropriate, pleasant  EYES: sclera clear, no exudates  ENMT: oropharynx clear without erythema, no exudates, moist mucous membranes  NECK: supple, soft, no thyromegaly noted  LUNGS: good air entry bilaterally, clear to auscultation, symmetric breath sounds, no wheezing or rhonchi appreciated  HEART: soft S1/S2, regular rate and rhythm, no murmurs noted, no lower extremity edema  GASTROINTESTINAL: abdomen is soft, mildly tender to palpation in RUQ, nondistended, normoactive bowel sounds, no palpable masses, +cholecystostomy tube  MUSCULOSKELETAL: no clubbing or cyanosis, no obvious deformity  NEUROLOGIC: awake, alert, oriented x3, no obvious sensory deficits  PSYCHIATRIC: mood is good, affect is congruent, linear and logical thought process    ---  CONSULTANTS:   Surgery- Dr Meza   Cardiology- Dr Baldeamr Freire     ---  TIME SPENT:  I, the attending physician, was physically present for the key portions of the evaluation and management (E/M) service provided. The total amount of time spent reviewing the hospital notes, laboratory values, imaging findings, assessing/counseling the patient, discussing with consultant physicians, social work, nursing staff was 44 minutes    ---

## 2021-09-10 NOTE — H&P ADULT - NSHPLABSRESULTS_GEN_ALL_CORE
17.3   10.45 )-----------( 238      ( 10 Sep 2021 00:49 )             51.2     10 Sep 2021 00:49    139    |  107    |  19     ----------------------------<  127    3.5     |  25     |  1.10     Ca    9.2        10 Sep 2021 00:49    TPro  8.7    /  Alb  4.2    /  TBili  0.8    /  DBili  x      /  AST  22     /  ALT  30     /  AlkPhos  96     10 Sep 2021 00:49    LIVER FUNCTIONS - ( 10 Sep 2021 00:49 )  Alb: 4.2 g/dL / Pro: 8.7 g/dL / ALK PHOS: 96 U/L / ALT: 30 U/L / AST: 22 U/L / GGT: x           PT/INR - ( 10 Sep 2021 01:03 )   PT: 17.0 sec;   INR: 1.48 ratio         PTT - ( 10 Sep 2021 01:03 )  PTT:35.8 sec  CAPILLARY BLOOD GLUCOSE

## 2021-09-10 NOTE — H&P ADULT - NSHPREVIEWOFSYSTEMS_GEN_ALL_CORE
CONSTITUTIONAL: denies fever, chills, fatigue, weakness  HEENT: denies blurred vision, sore throat, +nasal congestion at baseline since his COVID infection  SKIN: denies new lesions, rash  CARDIOVASCULAR: denies chest pain, chest pressure, palpitations  RESPIRATORY: denies shortness of breath, sputum production  GASTROINTESTINAL: admits nausea, denies vomiting, denies diarrhea, admits abdominal pain as per HPI  GENITOURINARY: denies dysuria, discharge  NEUROLOGICAL: denies numbness, headache, focal weakness  MUSCULOSKELETAL: denies new joint pain, muscle aches. +chronic L shoulder pain, chronic knee pain  HEMATOLOGIC: denies gross bleeding, bruising  LYMPHATICS: denies enlarged lymph nodes, admits some lower extremity swelling (see HPI)  PSYCHIATRIC: denies recent changes in anxiety, depression  ENDOCRINOLOGIC: denies sweating, cold or heat intolerance

## 2021-09-10 NOTE — DISCHARGE NOTE PROVIDER - NSDCCPCAREPLAN_GEN_ALL_CORE_FT
PRINCIPAL DISCHARGE DIAGNOSIS  Diagnosis: Cholecystitis  Assessment and Plan of Treatment: You had inflammation of your gall bladder likely due to gall stones.  Dr. moore, general surgeon did a procedure to remove your gall bladder. Please maintain a low fat diet.      SECONDARY DISCHARGE DIAGNOSES  Diagnosis: Acute cholecystitis  Assessment and Plan of Treatment:     Diagnosis: Atrial fibrillation  Assessment and Plan of Treatment: Continue to take your xarelto and metoprolol daily.    Diagnosis: Hypertension  Assessment and Plan of Treatment: Continue to take your amlodipine, hydralazine.    Diagnosis: GERD (gastroesophageal reflux disease)  Assessment and Plan of Treatment: Continue to take your pantoprazole as needed for acid reflux.    Diagnosis: Gout  Assessment and Plan of Treatment: Continue to take your allopurinol.     PRINCIPAL DISCHARGE DIAGNOSIS  Diagnosis: Cholecystitis  Assessment and Plan of Treatment: You had inflammation of your gall bladder likely due to gall stones.  Treated with iv antibiotics in the hospital  Perc cholecystostomy tube placement on 9/14  Follow up with SURGERY IN 2-3 WKS (DR ZHAO) outpatient for interval cath study or CT scan with possible lap zheng.      SECONDARY DISCHARGE DIAGNOSES  Diagnosis: Acute cholecystitis  Assessment and Plan of Treatment:     Diagnosis: Atrial fibrillation  Assessment and Plan of Treatment: Continue to take your xarelto and metoprolol daily.    Diagnosis: Hypertension  Assessment and Plan of Treatment: Continue to take your amlodipine, hydralazine.    Diagnosis: GERD (gastroesophageal reflux disease)  Assessment and Plan of Treatment: Continue to take your pantoprazole as needed for acid reflux.    Diagnosis: Gout  Assessment and Plan of Treatment: Continue to take your allopurinol.     PRINCIPAL DISCHARGE DIAGNOSIS  Diagnosis: Cholecystitis  Assessment and Plan of Treatment: You had inflammation of your gall bladder likely due to gall stones.  Treated with iv antibiotics in the hospital  Perc cholecystostomy tube placement on 9/14  Follow up with SURGERY IN 2-3 WKS (DR ZHAO) outpatient for interval cath study or CT scan with possible lap zheng.      SECONDARY DISCHARGE DIAGNOSES  Diagnosis: Atrial fibrillation  Assessment and Plan of Treatment: Continue to take your xarelto and metoprolol daily.    Diagnosis: Hypertension  Assessment and Plan of Treatment: Continue to take your amlodipine, hydralazine.    Diagnosis: GERD (gastroesophageal reflux disease)  Assessment and Plan of Treatment: Continue to take your pantoprazole as needed for acid reflux.    Diagnosis: Gout  Assessment and Plan of Treatment: Continue to take your allopurinol.     PRINCIPAL DISCHARGE DIAGNOSIS  Diagnosis: Cholecystitis  Assessment and Plan of Treatment: You had inflammation of your gall bladder likely due to gall stones.  Treated with iv antibiotics in the hospital  Perc cholecystostomy tube placement on 9/14  Follow up with SURGERY IN 2-3 WKS (DR ZHAO) outpatient for interval cath study or CT scan with possible lap zheng.  Please complete course of oral antibiotics (vantin and metronidazole for 3 more days)  If experiencing worsening pain, fevers, chills, please return to ED      SECONDARY DISCHARGE DIAGNOSES  Diagnosis: Atrial fibrillation  Assessment and Plan of Treatment: Continue to take your xarelto and metoprolol daily.    Diagnosis: Hypertension  Assessment and Plan of Treatment: Continue to take your amlodipine, hydralazine, losartan and furosemide    Diagnosis: GERD (gastroesophageal reflux disease)  Assessment and Plan of Treatment: Continue to take your pantoprazole as needed for acid reflux.    Diagnosis: Gout  Assessment and Plan of Treatment: Continue to take your allopurinol.     PRINCIPAL DISCHARGE DIAGNOSIS  Diagnosis: Cholecystitis  Assessment and Plan of Treatment: You had inflammation of your gall bladder likely due to gall stones.  Treated with iv antibiotics in the hospital  Perc cholecystostomy tube placement on 9/14  Follow up with SURGERY IN 2-3 WKS (DR ZHAO) outpatient for interval cath study or CT scan with possible lap zheng.  Please complete course of oral antibiotics (vantin and metronidazole for 3 more days)  If experiencing worsening pain, fevers, chills, please return to ED  VNS instructions for perc zheng tube:  - Flush drain with 5cc of saline 2-3x per day  - Drain to leg bag and empty when full  - F/up with IR for possible tube check and eventual removal when appropriate per IR      SECONDARY DISCHARGE DIAGNOSES  Diagnosis: Atrial fibrillation  Assessment and Plan of Treatment: Continue to take your xarelto and metoprolol daily.    Diagnosis: Hypertension  Assessment and Plan of Treatment: Continue to take your amlodipine, hydralazine, losartan and furosemide    Diagnosis: GERD (gastroesophageal reflux disease)  Assessment and Plan of Treatment: Continue to take your pantoprazole as needed for acid reflux.    Diagnosis: Gout  Assessment and Plan of Treatment: Continue to take your allopurinol.

## 2021-09-10 NOTE — H&P ADULT - NSICDXFAMILYHX_GEN_ALL_CORE_FT
FAMILY HISTORY:  Family history of appendicitis    Father  Still living? Unknown  Family history of bone cancer, Age at diagnosis: Age Unknown  Family hx of prostate cancer, Age at diagnosis: Age Unknown    Mother  Still living? Unknown  Family history of diabetes mellitus (DM), Age at diagnosis: Age Unknown  Family history of TIAs, Age at diagnosis: Age Unknown

## 2021-09-10 NOTE — H&P ADULT - HISTORY OF PRESENT ILLNESS
The patient is a 65y Male with PMHx of HTN, Afib (on xarelto?), splenic infarct, gout, GERD, prostate ca (s/p prostatectomy 2010) presenting with right upper quadrant abdominal pain since earlier tonight. He was in his usual state of health when    **CHARTING IN PROGRESS**     In the ED,  VS: T 97.8, HR 84, /102, RR 16, SpO2 98% on RA  Labs: PT 17.0, INR 1.48, aPTT 35.8  Imaging: RUQ ultrasound with gallstones, sludge, gallbladder wall thickening, positive sonographic Luna's sign, trace pericholecystic fluid, acute calculous cholecystitis.  EKG: __________  Received: morphine 4mg IV x1, dilaudid 0.5mg IV x1, zofran 4mg IV x2, zosyn 3.375g x1, 1L NS bolus x1   COVID vaccine:  The patient is a 65y Male with PMHx of HTN, Afib (on xarelto?), splenic infarct, gout, GERD, prostate ca (s/p prostatectomy 2010) presenting with right upper quadrant abdominal pain since earlier tonight. He was in his usual state of health, around 9pm tonight watching TV with his wife and eating chips with spicy hummus when he noticed a pain in his right upper quadrant that did not go away and gradually got worse, eventually 10/10, non-radiating, worse with inspiration. He has had some associated nausea but no vomiting. He reports a previous episode of similar pain earlier this year that eventually resolved. Denies chest pain, shortness of breath, fever, chills.    In the ED,  VS: T 97.8, HR 84, /102, RR 16, SpO2 98% on RA  Labs: PT 17.0, INR 1.48, aPTT 35.8  Imaging: RUQ ultrasound with gallstones, sludge, gallbladder wall thickening, positive sonographic Luna's sign, trace pericholecystic fluid, acute calculous cholecystitis.  EKG: atrial fibrillation with a ventricular rate of 98bpm, nonspecific ST and T wave abnormality.  Received: morphine 4mg IV x1, dilaudid 0.5mg IV x1, zofran 4mg IV x2, zosyn 3.375g x1, 1L NS bolus x1   COVID vaccine: not immunized against COVID 19, reports COVID infection in February 2021, received monoclonal antibody infusion and got his antibody levels checked after that which showed he had immunity, per pt.    PMH:  He says he is very active and frequently rides his bike, can bike 60 miles in one day (3 rides of 20 miles each). He goes on frequent walks. He golfs twice a week. His home office is in his basement and he frequently climbs the flight of stairs throughout the day without difficulty. He feels he could climb 10 flights of stairs before he would have to stop and catch his breath. He is not currently sexually active. He prefers the bike over running on the treadmill or using the elliptical due to chronic knee pain. He works with a .    He reports that in February 2019, he was diagnosed with Afib and met with his cardiologist Dr. Baldemar Freire and they decided to hold off on anticoagulation because he was on the "borderline" and was active at the time with [employer]. 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 He has been taking xarelto since that time. He had noticed some lower extremity edema and was given lasix 20mg to take (pt states he will take this medication once or twice a week), but states he got an echo in May 2021 that was normal with normal ejection fraction, per the patient. He is on metoprolol succinate 100mg and xarelto 20mg for rate control and anticoagulation for his afib. He has a history of hypertension for which he takes losartan 100mg qd, amlodipine 10mg qd, and hydralazine 25mg bid. He has a history of gout for which he takes allopurinol 100mg qd and colchicine 0.6mg qd.     He has a history of prostate cancer s/p prostatectomy in 2010 and gets regular PSA checks. He has some mild urinary discomfort and occasional incontinence at baseline since his prostatectomy but denies history of UTI.    He states he has elevated pressure in his left eye but states he has not been diagnosed with glaucoma. He uses combigan eyedrops.     Patient reports taking "GOLO" supplement for weight loss that he bought online, for the last two weeks. The patient is a 65y Male with PMHx of HTN, Afib (on xarelto), splenic infarct, gout, GERD, prostate ca (s/p prostatectomy 2010) presenting with right upper quadrant abdominal pain since earlier tonight. He was in his usual state of health, around 9pm tonight watching TV with his wife and eating chips with spicy hummus when he noticed a pain in his right upper quadrant that did not go away and gradually got worse, eventually 10/10, non-radiating, worse with inspiration. He has had some associated nausea but no vomiting. He reports a previous episode of similar pain earlier this year that eventually resolved. Denies chest pain, shortness of breath, fever, chills.    In the ED,  VS: T 97.8, HR 84, /102, RR 16, SpO2 98% on RA  Labs: PT 17.0, INR 1.48, aPTT 35.8  Imaging: RUQ ultrasound with gallstones, sludge, gallbladder wall thickening, positive sonographic Luna's sign, trace pericholecystic fluid, acute calculous cholecystitis.  EKG: atrial fibrillation with a ventricular rate of 98bpm, nonspecific ST and T wave abnormality.  Received: morphine 4mg IV x1, dilaudid 0.5mg IV x1, zofran 4mg IV x2, zosyn 3.375g x1, 1L NS bolus x1   COVID vaccine: not immunized against COVID 19, reports COVID infection in February 2021, received monoclonal antibody infusion and got his antibody levels checked after that which showed he had immunity, per pt.    of note He says he is very active and frequently rides his bike, can bike 60 miles in one day (3 rides of 20 miles each). He goes on frequent walks. He golfs twice a week. His home office is in his basement and he frequently climbs the flight of stairs throughout the day without difficulty. He feels he could climb 10 flights of stairs before he would have to stop and catch his breath. He is not currently sexually active. He prefers the bike over running on the treadmill or using the elliptical due to chronic knee pain. He works with a .    He reports that in February 2019, he was diagnosed with Afib and met with his cardiologist Dr. Baldemar Freire and they decided to hold off on anticoagulation because he was on the "borderline" and was active at the time with [employer]. 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 He has been taking xarelto since that time. He had noticed some lower extremity edema and was given lasix 20mg to take (pt states he will take this medication once or twice a week), but states he got an echo in May 2021 that was normal with normal ejection fraction, per the patient. He is on metoprolol succinate 100mg and xarelto 20mg for his afib. He has a history of hypertension for which he takes losartan 100mg qd, amlodipine 10mg qd, and hydralazine 25mg bid. He has a history of gout for which he takes allopurinol 100mg qd and colchicine 0.6mg qd.     He has a history of prostate cancer s/p prostatectomy in 2010 and gets regular PSA checks. He has some mild urinary discomfort and occasional incontinence at baseline since his prostatectomy but denies history of UTI.    He states he has elevated pressure in his left eye but states he has not been diagnosed with glaucoma. He uses combigan eyedrops.     Patient reports taking "GOLO" supplement for weight loss that he bought online, for the last two weeks.

## 2021-09-11 LAB
ALBUMIN SERPL ELPH-MCNC: 2.8 G/DL — LOW (ref 3.3–5)
ALP SERPL-CCNC: 61 U/L — SIGNIFICANT CHANGE UP (ref 40–120)
ALT FLD-CCNC: 20 U/L — SIGNIFICANT CHANGE UP (ref 12–78)
ANION GAP SERPL CALC-SCNC: 5 MMOL/L — SIGNIFICANT CHANGE UP (ref 5–17)
APTT BLD: 58.6 SEC — HIGH (ref 27.5–35.5)
APTT BLD: 68.5 SEC — HIGH (ref 27.5–35.5)
AST SERPL-CCNC: 13 U/L — LOW (ref 15–37)
BASOPHILS # BLD AUTO: 0.02 K/UL — SIGNIFICANT CHANGE UP (ref 0–0.2)
BASOPHILS NFR BLD AUTO: 0.2 % — SIGNIFICANT CHANGE UP (ref 0–2)
BILIRUB SERPL-MCNC: 1.8 MG/DL — HIGH (ref 0.2–1.2)
BUN SERPL-MCNC: 14 MG/DL — SIGNIFICANT CHANGE UP (ref 7–23)
CALCIUM SERPL-MCNC: 8.4 MG/DL — LOW (ref 8.5–10.1)
CHLORIDE SERPL-SCNC: 105 MMOL/L — SIGNIFICANT CHANGE UP (ref 96–108)
CO2 SERPL-SCNC: 29 MMOL/L — SIGNIFICANT CHANGE UP (ref 22–31)
COVID-19 SPIKE DOMAIN AB INTERP: POSITIVE
COVID-19 SPIKE DOMAIN ANTIBODY RESULT: >250 U/ML — HIGH
CREAT SERPL-MCNC: 1.4 MG/DL — HIGH (ref 0.5–1.3)
EOSINOPHIL # BLD AUTO: 0.02 K/UL — SIGNIFICANT CHANGE UP (ref 0–0.5)
EOSINOPHIL NFR BLD AUTO: 0.2 % — SIGNIFICANT CHANGE UP (ref 0–6)
GLUCOSE SERPL-MCNC: 110 MG/DL — HIGH (ref 70–99)
HCT VFR BLD CALC: 42.8 % — SIGNIFICANT CHANGE UP (ref 39–50)
HGB BLD-MCNC: 14.7 G/DL — SIGNIFICANT CHANGE UP (ref 13–17)
IMM GRANULOCYTES NFR BLD AUTO: 0.3 % — SIGNIFICANT CHANGE UP (ref 0–1.5)
INR BLD: 1.77 RATIO — HIGH (ref 0.88–1.16)
LYMPHOCYTES # BLD AUTO: 1.48 K/UL — SIGNIFICANT CHANGE UP (ref 1–3.3)
LYMPHOCYTES # BLD AUTO: 12.7 % — LOW (ref 13–44)
MCHC RBC-ENTMCNC: 29.5 PG — SIGNIFICANT CHANGE UP (ref 27–34)
MCHC RBC-ENTMCNC: 34.3 GM/DL — SIGNIFICANT CHANGE UP (ref 32–36)
MCV RBC AUTO: 85.9 FL — SIGNIFICANT CHANGE UP (ref 80–100)
MELD SCORE WITH DIALYSIS: 28 POINTS — SIGNIFICANT CHANGE UP
MELD SCORE WITHOUT DIALYSIS: 18 POINTS — SIGNIFICANT CHANGE UP
MONOCYTES # BLD AUTO: 1.45 K/UL — HIGH (ref 0–0.9)
MONOCYTES NFR BLD AUTO: 12.4 % — SIGNIFICANT CHANGE UP (ref 2–14)
NEUTROPHILS # BLD AUTO: 8.66 K/UL — HIGH (ref 1.8–7.4)
NEUTROPHILS NFR BLD AUTO: 74.2 % — SIGNIFICANT CHANGE UP (ref 43–77)
NRBC # BLD: 0 /100 WBCS — SIGNIFICANT CHANGE UP (ref 0–0)
PLATELET # BLD AUTO: 171 K/UL — SIGNIFICANT CHANGE UP (ref 150–400)
POTASSIUM SERPL-MCNC: 3.7 MMOL/L — SIGNIFICANT CHANGE UP (ref 3.5–5.3)
POTASSIUM SERPL-SCNC: 3.7 MMOL/L — SIGNIFICANT CHANGE UP (ref 3.5–5.3)
PROT SERPL-MCNC: 6.7 G/DL — SIGNIFICANT CHANGE UP (ref 6–8.3)
PROTHROM AB SERPL-ACNC: 20.2 SEC — HIGH (ref 10.6–13.6)
RBC # BLD: 4.98 M/UL — SIGNIFICANT CHANGE UP (ref 4.2–5.8)
RBC # FLD: 13.2 % — SIGNIFICANT CHANGE UP (ref 10.3–14.5)
SARS-COV-2 IGG+IGM SERPL QL IA: >250 U/ML — HIGH
SARS-COV-2 IGG+IGM SERPL QL IA: POSITIVE
SODIUM SERPL-SCNC: 139 MMOL/L — SIGNIFICANT CHANGE UP (ref 135–145)
WBC # BLD: 11.67 K/UL — HIGH (ref 3.8–10.5)
WBC # FLD AUTO: 11.67 K/UL — HIGH (ref 3.8–10.5)

## 2021-09-11 PROCEDURE — 99233 SBSQ HOSP IP/OBS HIGH 50: CPT | Mod: GC

## 2021-09-11 RX ORDER — SENNA PLUS 8.6 MG/1
2 TABLET ORAL AT BEDTIME
Refills: 0 | Status: DISCONTINUED | OUTPATIENT
Start: 2021-09-11 | End: 2021-09-13

## 2021-09-11 RX ADMIN — BRIMONIDINE TARTRATE 1 DROP(S): 2 SOLUTION/ DROPS OPHTHALMIC at 05:39

## 2021-09-11 RX ADMIN — HEPARIN SODIUM 0 UNIT(S)/HR: 5000 INJECTION INTRAVENOUS; SUBCUTANEOUS at 00:10

## 2021-09-11 RX ADMIN — Medication 650 MILLIGRAM(S): at 15:30

## 2021-09-11 RX ADMIN — Medication 100 MILLIGRAM(S): at 05:39

## 2021-09-11 RX ADMIN — Medication 25 MILLIGRAM(S): at 05:39

## 2021-09-11 RX ADMIN — Medication 25 MILLIGRAM(S): at 18:55

## 2021-09-11 RX ADMIN — Medication 3 MILLIGRAM(S): at 21:56

## 2021-09-11 RX ADMIN — PIPERACILLIN AND TAZOBACTAM 25 GRAM(S): 4; .5 INJECTION, POWDER, LYOPHILIZED, FOR SOLUTION INTRAVENOUS at 18:57

## 2021-09-11 RX ADMIN — Medication 1 DROP(S): at 05:39

## 2021-09-11 RX ADMIN — HEPARIN SODIUM 1400 UNIT(S)/HR: 5000 INJECTION INTRAVENOUS; SUBCUTANEOUS at 01:13

## 2021-09-11 RX ADMIN — AMLODIPINE BESYLATE 10 MILLIGRAM(S): 2.5 TABLET ORAL at 05:39

## 2021-09-11 RX ADMIN — Medication 100 MILLIGRAM(S): at 12:08

## 2021-09-11 RX ADMIN — Medication 650 MILLIGRAM(S): at 14:30

## 2021-09-11 RX ADMIN — PIPERACILLIN AND TAZOBACTAM 25 GRAM(S): 4; .5 INJECTION, POWDER, LYOPHILIZED, FOR SOLUTION INTRAVENOUS at 12:08

## 2021-09-11 RX ADMIN — HEPARIN SODIUM 1400 UNIT(S)/HR: 5000 INJECTION INTRAVENOUS; SUBCUTANEOUS at 16:26

## 2021-09-11 RX ADMIN — SENNA PLUS 2 TABLET(S): 8.6 TABLET ORAL at 21:53

## 2021-09-11 RX ADMIN — LOSARTAN POTASSIUM 100 MILLIGRAM(S): 100 TABLET, FILM COATED ORAL at 05:39

## 2021-09-11 RX ADMIN — HEPARIN SODIUM 1400 UNIT(S)/HR: 5000 INJECTION INTRAVENOUS; SUBCUTANEOUS at 09:20

## 2021-09-11 RX ADMIN — Medication 1 DROP(S): at 18:55

## 2021-09-11 RX ADMIN — BRIMONIDINE TARTRATE 1 DROP(S): 2 SOLUTION/ DROPS OPHTHALMIC at 18:55

## 2021-09-11 RX ADMIN — PIPERACILLIN AND TAZOBACTAM 25 GRAM(S): 4; .5 INJECTION, POWDER, LYOPHILIZED, FOR SOLUTION INTRAVENOUS at 05:38

## 2021-09-11 NOTE — PROGRESS NOTE ADULT - SUBJECTIVE AND OBJECTIVE BOX
Patient is a 65y old  Male who presents with a chief complaint of Acute cholecystitis (11 Sep 2021 07:44)      Subjective:  INTERVAL HPI/OVERNIGHT EVENTS: Patient seen and examined at bedside. No overnight events occurred. Patient had a febrile episode with temp of 101F, Pt received a dose of Tylenol and temp normalized. Abdominal pain has improved, the pain is now rated 2/10. Denies chills, headache, lightheadedness, chest pain, dyspnea, abdominal pain, n/v/d/c.    MEDICATIONS  (STANDING):  allopurinol 100 milliGRAM(s) Oral daily  amLODIPine   Tablet 10 milliGRAM(s) Oral daily  brimonidine 0.2% Ophthalmic Solution 1 Drop(s) Both EYES two times a day  colchicine 0.6 milliGRAM(s) Oral daily  dextrose 5% + lactated ringers. 1000 milliLiter(s) (100 mL/Hr) IV Continuous <Continuous>  heparin  Infusion.  Unit(s)/Hr (17 mL/Hr) IV Continuous <Continuous>  hydrALAZINE 25 milliGRAM(s) Oral two times a day  losartan 100 milliGRAM(s) Oral daily  metoprolol succinate  milliGRAM(s) Oral daily  piperacillin/tazobactam IVPB.. 3.375 Gram(s) IV Intermittent every 8 hours  timolol 0.5% Solution 1 Drop(s) Both EYES two times a day    MEDICATIONS  (PRN):  acetaminophen   Tablet .. 650 milliGRAM(s) Oral every 6 hours PRN Temp greater or equal to 38C (100.4F), Mild Pain (1 - 3)  aluminum hydroxide/magnesium hydroxide/simethicone Suspension 30 milliLiter(s) Oral every 4 hours PRN Dyspepsia  heparin   Injectable 7500 Unit(s) IV Push every 6 hours PRN For aPTT less than 40  heparin   Injectable 3500 Unit(s) IV Push every 6 hours PRN For aPTT between 40 - 57  HYDROmorphone  Injectable 1 milliGRAM(s) IV Push every 4 hours PRN Severe Pain (7 - 10)  ibuprofen  Tablet. 400 milliGRAM(s) Oral every 8 hours PRN Mild Pain (1 - 3)  melatonin 3 milliGRAM(s) Oral at bedtime PRN Insomnia  ondansetron Injectable 4 milliGRAM(s) IV Push every 6 hours PRN Nausea  oxyCODONE    IR 5 milliGRAM(s) Oral Once PRN Moderate Pain (4 - 6)      Allergies    No Known Allergies    Intolerances        REVIEW OF SYSTEMS:  CONSTITUTIONAL: + fever; no chills  HEENT:  No headache, no sore throat  RESPIRATORY: No cough, wheezing, or shortness of breath  CARDIOVASCULAR: No chest pain, palpitations  GASTROINTESTINAL: + abd pain; no nausea, vomiting, or diarrhea  GENITOURINARY: No dysuria, frequency, or hematuria  NEUROLOGICAL: no focal weakness or dizziness  MUSCULOSKELETAL: no myalgias     Objective:  Vital Signs Last 24 Hrs  T(C): 37.1 (11 Sep 2021 07:26), Max: 38.7 (10 Sep 2021 19:55)  T(F): 98.8 (11 Sep 2021 07:26), Max: 101.7 (10 Sep 2021 19:55)  HR: 70 (11 Sep 2021 07:26) (70 - 90)  BP: 117/74 (11 Sep 2021 07:26) (117/74 - 148/96)  BP(mean): --  RR: 18 (11 Sep 2021 07:26) (16 - 18)  SpO2: 93% (11 Sep 2021 07:26) (93% - 97%)    GENERAL: NAD, lying in bed comfortably  HEAD:  Atraumatic, Normocephalic  EYES: EOMI, PERRLA, conjunctiva and sclera clear  ENT: Moist mucous membranes  NECK: Supple, No JVD  CHEST/LUNG: Clear to auscultation bilaterally;   HEART: RRR; s1 s2 heard   ABDOMEN: Tender on palpation of the RUQ; Bowel sounds present; Soft, Nondistended.   EXTREMITIES:  2+ Peripheral Pulses, brisk capillary refill.   NERVOUS SYSTEM:  Alert & Oriented X3, speech clear.   MSK: FROM all 4 extremities      LABS:                        14.9   13.52 )-----------( 186      ( 10 Sep 2021 23:32 )             43.1     CBC Full  -  ( 10 Sep 2021 23:32 )  WBC Count : 13.52 K/uL  Hemoglobin : 14.9 g/dL  Hematocrit : 43.1 %  Platelet Count - Automated : 186 K/uL  Mean Cell Volume : 84.2 fl  Mean Cell Hemoglobin : 29.1 pg  Mean Cell Hemoglobin Concentration : 34.6 gm/dL  Auto Neutrophil # : x  Auto Lymphocyte # : x  Auto Monocyte # : x  Auto Eosinophil # : x  Auto Basophil # : x  Auto Neutrophil % : x  Auto Lymphocyte % : x  Auto Monocyte % : x  Auto Eosinophil % : x  Auto Basophil % : x      Ca    9.2        10 Sep 2021 00:49      PT/INR - ( 10 Sep 2021 01:03 )   PT: 17.0 sec;   INR: 1.48 ratio         PTT - ( 10 Sep 2021 23:32 )  PTT:157.9 sec    CAPILLARY BLOOD GLUCOSE              RADIOLOGY & ADDITIONAL TESTS:    Personally reviewed.     Consultant(s) Notes Reviewed:  [x] YES  [ ] NO

## 2021-09-11 NOTE — PROGRESS NOTE ADULT - SUBJECTIVE AND OBJECTIVE BOX
KATIE VALLES  [id number] 65y    GENERAL SURGERY    NO N/V  ABDOMINAL PAIN IMPROVED     MEDICATIONS  (STANDING):  allopurinol 100 milliGRAM(s) Oral daily  amLODIPine   Tablet 10 milliGRAM(s) Oral daily  brimonidine 0.2% Ophthalmic Solution 1 Drop(s) Both EYES two times a day  colchicine 0.6 milliGRAM(s) Oral daily  dextrose 5% + lactated ringers. 1000 milliLiter(s) (100 mL/Hr) IV Continuous <Continuous>  heparin  Infusion.  Unit(s)/Hr (17 mL/Hr) IV Continuous <Continuous>  hydrALAZINE 25 milliGRAM(s) Oral two times a day  losartan 100 milliGRAM(s) Oral daily  metoprolol succinate  milliGRAM(s) Oral daily  piperacillin/tazobactam IVPB.. 3.375 Gram(s) IV Intermittent every 8 hours  timolol 0.5% Solution 1 Drop(s) Both EYES two times a day    MEDICATIONS  (PRN):  acetaminophen   Tablet .. 650 milliGRAM(s) Oral every 6 hours PRN Temp greater or equal to 38C (100.4F), Mild Pain (1 - 3)  aluminum hydroxide/magnesium hydroxide/simethicone Suspension 30 milliLiter(s) Oral every 4 hours PRN Dyspepsia  heparin   Injectable 7500 Unit(s) IV Push every 6 hours PRN For aPTT less than 40  heparin   Injectable 3500 Unit(s) IV Push every 6 hours PRN For aPTT between 40 - 57  HYDROmorphone  Injectable 1 milliGRAM(s) IV Push every 4 hours PRN Severe Pain (7 - 10)  ibuprofen  Tablet. 400 milliGRAM(s) Oral every 8 hours PRN Mild Pain (1 - 3)  melatonin 3 milliGRAM(s) Oral at bedtime PRN Insomnia  ondansetron Injectable 4 milliGRAM(s) IV Push every 6 hours PRN Nausea  oxyCODONE    IR 5 milliGRAM(s) Oral Once PRN Moderate Pain (4 - 6)      Vital Signs Last 24 Hrs  T(C): 37.8 (10 Sep 2021 21:48), Max: 38.7 (10 Sep 2021 19:55)  T(F): 100 (10 Sep 2021 21:48), Max: 101.7 (10 Sep 2021 19:55)  HR: 84 (10 Sep 2021 21:48) (83 - 90)  BP: 120/80 (10 Sep 2021 21:48) (120/80 - 148/96)  RR: 18 (10 Sep 2021 21:48) (16 - 18)  SpO2: 94% (10 Sep 2021 21:48) (94% - 97%)      LUNGS: CLEAR TO AUSCULTATION , NO W/R/R  ABDOMEN: OBESE, + BS, SOFT, NON DISTENDED, NO PALPABLE MASS, RUQ TENDERNESS ON DEEP PALPATION, NEGATIVE PENNY'S SIGN    EXTREMITY: NO EDEMA, NO CALF TENDERNESS                            14.9   13.52 )-----------( 186      ( 10 Sep 2021 23:32 )             43.1      09-10    139  |  107  |  19  ----------------------------<  127<H>  3.5   |  25  |  1.10    Ca    9.2      10 Sep 2021 00:49    TPro  8.7<H>  /  Alb  4.2  /  TBili  0.8  /  DBili  x   /  AST  22  /  ALT  30  /  AlkPhos  96  09-10       EXAM:  NM HEPATOBILIARY IMG                          PROCEDURE DATE:  09/10/2021      INTERPRETATION:  CLINICAL STATEMENT: 65-year-old male right upper quadrant abdominal pain    RADIOPHARMACEUTICAL: 3.0 mCi Tc-99m-Mebrofenin, I.V.; 2 doses    TECHNIQUE:  Dynamic images of the anterior abdomen were obtained for 1 hour following injection of radiotracer. Morphine 4 mg I.V. and a second dose of radiotracer were administered at 1 hour. Dynamic imaging was continued for 1 hour followed by static images of the abdomen in the right lateral and right anterior oblique views immediately thereafter.    FINDINGS: There is prompt, homogeneous uptake of radiotracer by the hepatocytes. Activity is first seen in the bowel at 15 minutes. The gallbladder is not visualized at any time during the study, despite administration of morphine. There is good clearance of activity from the liver at the end of the study.    IMPRESSION: Abnormal morphine-augmented hepatobiliary scan.    Findings consistent with acute cholecystitis.                       ASSESSMENT &  PLAN:     ACUTE CHOLECYSTITIS  HISTORY OF AFIB ON XARELTO AT HOME    NPO  ZOSYN  XARELTO ON HOLD, CONTINUE HEPARIN DRIP , CARDIOLOGY CONSULT AND CLEARANCE NOTED  MEDICAL FOLLOW UP  PLAN FOR LAPAROSCOPIC CHOLECYSTECTOMY ON MONDAY 09/13/2021

## 2021-09-11 NOTE — PROGRESS NOTE ADULT - ASSESSMENT
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    9/11/21  Seen at [facility]  No cardiac complaints  Feeling better  WBC-13.52  Temperature spike-101.7    Plan:  - ECG with nonspecific findings, likely LVH related. Known atrial fibrillation - rate controlled.  - Last echo 6/21 with normal LV systolic function, no significant valve issues  - Last stress test in 2019 which was normal  - Continue amlodipine 10 mg daily  - Continue metoprolol succinate 100 mg daily  - Continue losartan 100 mg daily  - Continue hydralazine 25 mg bid  - Hold furosemide  - Hold rivaroxaban and resume post-op when ok with surgery  - Surgery follow-up  - There are no active cardiac issues. The patient is at intermediate risk for cardiac events for an intermediate risk surgery. He is optimized to proceed from a cardiac standpoint  - No further additional cardiac tested required

## 2021-09-11 NOTE — PROGRESS NOTE ADULT - ASSESSMENT
65M with PMHx HTN, Afib (on xarelto), GERD, gout, prostate ca (s/p splenectomy 2010), splenic infarction, presenting with RUQ abdominal pain, nausea, ultrasound findings positive for acute cholecystitis admit for acute cholecystitis     #Acute cholecystitis  - pt with RUQ pain that gradually worsened with associated nausea, found to have acute cholecystitis on RUQ ultrasound.  - pt is s/p 3.375g zosyn, continue 3.375g q8h; IVF (NS + D5 @ 100 cc/hour)   - motrin 400mg q8h prn for mild pain, oxycodone 5mg prn for moderate pain, dilaudid 1mg IV q4h prn for severe pain.  - gave dilaudid 0.5 mg IV X 1 for pain in the AM   - Surgery is scheduled for Monday; will start Pt on clear liquid diets   - overnight npo on Sunday  - pt with no known history of ischemic or valvular disease. has a history of atrial fibrillation which is adequately rate controlled at this time. Pt appears euvolemic on exam. Can perform > 4 METs. RCRI class 1 risk. Pt is hypertensive in ED likely 2/2 pain. resume home bp medications. with adequate bp control pt is medically optimized for planned procedure.   - cardio Ean consulted: Hold furosemide; Hold rivaroxaban - last dose was 9/9 morning. Would wait until at least 9/11 morning prior to surgery if urgent (ideally would wait 72 hours). Optimized from cardiac standpoint as per Dr. Mckoy   - surgery following, further plan as per surgery   - US abdomen + HIDA Scan positive for cholecystitis    #Atrial Fibrillation   - pt with history of Afib, on metoprolol for rate control and xarelto for anticoagulation.  - continue with metoprolol with hold parameters  - hold xarelto as per cardio   - last TTE 5/2021, normal EF as per cardio   - may heparinize as per surgery, started hep gtt, patient scheduled for OR Monday 9/13 at 4 PM     #HTN  - chronic, pt is on losartan, hydralazine, amlodipine at home  - continue with home meds with hold parameters    # LE edema   - patient on lasix 1-2X a week PRN for LE swelling started by PCP, Dr. Villalba, patient states he started getting ankle swelling likely 2/2 to amlodipine  - hold lasix for now     #gout  - chronic stable, continue home meds allopurinol and colchicine    #gerd  - pt says he occasionally takes pantoprazole  - continue to monitor    #ppx  - hold xarelto as per cardio until 9/11, will add SCDs in the meantime  65M with PMHx HTN, Afib (on xarelto), GERD, gout, prostate ca (s/p splenectomy 2010), splenic infarction, presenting with RUQ abdominal pain, nausea, ultrasound findings positive for acute cholecystitis admit for acute cholecystitis.     #Acute cholecystitis  - pt with RUQ pain that gradually worsened with associated nausea, found to have acute cholecystitis on RUQ ultrasound.  - pt is s/p 3.375g zosyn, continue 3.375g q8h; IVF (NS + D5 @ 100 cc/hour)   - motrin 400mg q8h prn for mild pain, oxycodone 5mg prn for moderate pain, dilaudid 1mg IV q4h prn for severe pain.  - gave dilaudid 0.5 mg IV X 1 for pain in the AM   - Surgery is scheduled for Monday  - overnight npo on Sunday  - pt with no known history of ischemic or valvular disease. has a history of atrial fibrillation which is adequately rate controlled at this time. Pt appears euvolemic on exam. Can perform > 4 METs. RCRI class 1 risk. Pt is hypertensive in ED likely 2/2 pain. resume home bp medications. with adequate bp control pt is medically optimized for planned procedure.   - cardio Ean consulted: Hold furosemide; Hold rivaroxaban - last dose was 9/9 morning. Would wait until at least 9/11 morning prior to surgery if urgent (ideally would wait 72 hours). Optimized from cardiac standpoint as per Dr. Mckoy   - surgery following, further plan as per surgery   - US abdomen + HIDA Scan positive for cholecystitis    #Atrial Fibrillation   - pt with history of Afib, on metoprolol for rate control and xarelto for anticoagulation.  - continue with metoprolol with hold parameters  - hold xarelto as per cardio   - last TTE 5/2021, normal EF as per cardio   - may heparinize as per surgery, started hep gtt, patient scheduled for OR Monday 9/13 at 4 PM   -     #HTN  - chronic, pt is on losartan, hydralazine, amlodipine at home  - continue with home meds with hold parameters    # LE edema   - patient on lasix 1-2X a week PRN for LE swelling started by PCP, Dr. Villalba, patient states he started getting ankle swelling likely 2/2 to amlodipine  - hold lasix for now     #gout  - chronic stable, continue home meds allopurinol and colchicine    #gerd  - pt says he occasionally takes pantoprazole  - continue to monitor    #ppx  - hold xarelto as per cardio until 9/11, will add SCDs in the meantime  65M with PMHx HTN, Afib (on xarelto), GERD, gout, prostate ca (s/p splenectomy 2010), splenic infarction, presenting with RUQ abdominal pain, nausea, ultrasound findings positive for acute cholecystitis admit for acute cholecystitis.     #Acute cholecystitis  - pt with RUQ pain that gradually worsened with associated nausea, found to have acute cholecystitis on RUQ ultrasound.  - pt is s/p 3.375g zosyn, continue 3.375g q8h; IVF (NS + D5 @ 100 cc/hour)   - motrin 400mg q8h prn for mild pain, oxycodone 5mg prn for moderate pain, dilaudid 1mg IV q4h prn for severe pain.  - gave dilaudid 0.5 mg IV X 1 for pain in the AM   - Surgery is scheduled for Monday  - overnight npo on Sunday  - pt with no known history of ischemic or valvular disease. has a history of atrial fibrillation which is adequately rate controlled at this time. Pt appears euvolemic on exam. Can perform > 4 METs. RCRI class 1 risk. Pt is hypertensive in ED likely 2/2 pain. resume home bp medications. with adequate bp control pt is medically optimized for planned procedure.   - cardio Ean consulted: Hold furosemide; Hold rivaroxaban - last dose was 9/9 morning. Would wait until at least 9/11 morning prior to surgery if urgent (ideally would wait 72 hours). Optimized from cardiac standpoint as per Dr. Mckoy   - surgery following, further plan as per surgery   - US abdomen + HIDA Scan positive for cholecystitis    #Atrial Fibrillation   - pt with history of Afib, on metoprolol for rate control and xarelto for anticoagulation.  - continue with metoprolol with hold parameters  - hold xarelto as per cardio   - last TTE 5/2021, normal EF as per cardio   - may heparinize as per surgery, started hep gtt, patient scheduled for OR Monday 9/13 at 4 PM   - will resume Xarelto post op     #HTN  - chronic, pt is on losartan, hydralazine, amlodipine at home  - continue with home meds with hold parameters    # LE edema   - patient on lasix 1-2X a week PRN for LE swelling started by PCP, Dr. 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     #Sepsis 2/2 acute cholecystitis  - pt with RUQ pain that gradually worsened with associated nausea, found to have acute cholecystitis on RUQ ultrasound.  - pt is s/p 3.375g zosyn, continue 3.375g q8h; IVF (NS + D5 @ 100 cc/hour)   - motrin 400mg q8h prn for mild pain, oxycodone 5mg prn for moderate pain, dilaudid 1mg IV q4h prn for severe pain.  - gave dilaudid 0.5 mg IV X 1 for pain in the AM   - Surgery is scheduled for Monday  - overnight npo on Sunday  - pt with no known history of ischemic or valvular disease. has a history of atrial fibrillation which is adequately rate controlled at this time. Pt appears euvolemic on exam. Can perform > 4 METs. RCRI class 1 risk. Pt is hypertensive in ED likely 2/2 pain. resume home bp medications. with adequate bp control pt is medically optimized for planned procedure.   - cardio Ean consulted: Hold furosemide; Hold rivaroxaban - last dose was 9/9 morning. Would wait until at least 9/11 morning prior to surgery if urgent (ideally would wait 72 hours). Optimized from cardiac standpoint as per Dr. Mckoy   - surgery following, further plan as per surgery   - US abdomen + HIDA Scan positive for cholecystitis    #Atrial Fibrillation   - pt with history of Afib, on metoprolol for rate control and xarelto for anticoagulation.  - continue with metoprolol with hold parameters  - hold xarelto as per cardio   - last TTE 5/2021, normal EF as per cardio   - may heparinize as per surgery, started hep gtt, patient scheduled for OR Monday 9/13 at 4 PM   - will resume Xarelto post op     # Acute kidney injury, 9/11  - Cr 1.1 ->1.4  - check bladder scan, continue IV fluids  - lasix was on hold on admission, cont to hold  - continue losartan for now.     #HTN  - chronic, pt is on losartan, hydralazine, amlodipine at home  - continue with home meds with hold parameters    # LE edema   - patient on lasix 1-2X a week PRN for LE swelling started by PCP, Dr. Villalba, patient states he started getting ankle swelling likely 2/2 to amlodipine  - hold lasix for now     #gout  - chronic stable, continue home meds allopurinol and colchicine    #gerd  - pt says he occasionally takes pantoprazole  - continue to monitor    #ppx  - hold xarelto as per cardio   on heparin gtt

## 2021-09-11 NOTE — PROGRESS NOTE ADULT - SUBJECTIVE AND OBJECTIVE BOX
Patient is a 65y Male with a known history of :  Acute cholecystitis [K81.0]      HPI:  The patient is a 65y Male with PMHx of HTN, Afib (on xarelto), splenic infarct, gout, GERD, prostate ca (s/p prostatectomy 2010) presenting with right upper quadrant abdominal pain since earlier tonight. He was in his usual state of health, around 9pm tonight watching TV with his wife and eating chips with spicy hummus when he noticed a pain in his right upper quadrant that did not go away and gradually got worse, eventually 10/10, non-radiating, worse with inspiration. He has had some associated nausea but no vomiting. He reports a previous episode of similar pain earlier this year that eventually resolved. Denies chest pain, shortness of breath, fever, chills.    In the ED,  VS: T 97.8, HR 84, /102, RR 16, SpO2 98% on RA  Labs: PT 17.0, INR 1.48, aPTT 35.8  Imaging: RUQ ultrasound with gallstones, sludge, gallbladder wall thickening, positive sonographic Luna's sign, trace pericholecystic fluid, acute calculous cholecystitis.  EKG: atrial fibrillation with a ventricular rate of 98bpm, nonspecific ST and T wave abnormality.  Received: morphine 4mg IV x1, dilaudid 0.5mg IV x1, zofran 4mg IV x2, zosyn 3.375g x1, 1L NS bolus x1   COVID vaccine: not immunized against COVID 19, reports COVID infection in February 2021, received monoclonal antibody infusion and got his antibody levels checked after that which showed he had immunity, per pt.    of note He says he is very active and frequently rides his bike, can bike 60 miles in one day (3 rides of 20 miles each). He goes on frequent walks. He golfs twice a week. His home office is in his basement and he frequently climbs the flight of stairs throughout the day without difficulty. He feels he could climb 10 flights of stairs before he would have to stop and catch his breath. He is not currently sexually active. He prefers the bike over running on the treadmill or using the elliptical due to chronic knee pain. He works with a .    He reports that in February 2019, he was diagnosed with Afib and met with his cardiologist Dr. Baldemar Freire and they decided to hold off on anticoagulation because he was on the "borderline" and was active at the time with amberall. 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 He has been taking xarelto since that time. He had noticed some lower extremity edema and was given lasix 20mg to take (pt states he will take this medication once or twice a week), but states he got an echo in May 2021 that was normal with normal ejection fraction, per the patient. He is on metoprolol succinate 100mg and xarelto 20mg for his afib. He has a history of hypertension for which he takes losartan 100mg qd, amlodipine 10mg qd, and hydralazine 25mg bid. He has a history of gout for which he takes allopurinol 100mg qd and colchicine 0.6mg qd.     He has a history of prostate cancer s/p prostatectomy in 2010 and gets regular PSA checks. He has some mild urinary discomfort and occasional incontinence at baseline since his prostatectomy but denies history of UTI.    He states he has elevated pressure in his left eye but states he has not been diagnosed with glaucoma. He uses combigan eyedrops.     Patient reports taking "GOLO" supplement for weight loss that he bought online, for the last two weeks. (10 Sep 2021 03:28)      REVIEW OF SYSTEMS:    CONSTITUTIONAL: No fever, weight loss, or fatigue  EYES: No eye pain, visual disturbances, or discharge  ENMT:  No difficulty hearing, tinnitus, vertigo; No sinus or throat pain  NECK: No pain or stiffness  BREASTS: No pain, masses, or nipple discharge  RESPIRATORY: No cough, wheezing, chills or hemoptysis; No shortness of breath  CARDIOVASCULAR: No chest pain, palpitations, dizziness, or leg swelling  GASTROINTESTINAL: No abdominal or epigastric pain. No nausea, vomiting, or hematemesis; No diarrhea or constipation. No melena or hematochezia.  GENITOURINARY: No dysuria, frequency, hematuria, or incontinence  NEUROLOGICAL: No headaches, memory loss, loss of strength, numbness, or tremors  SKIN: No itching, burning, rashes, or lesions   LYMPH NODES: No enlarged glands  ENDOCRINE: No heat or cold intolerance; No hair loss  MUSCULOSKELETAL: No joint pain or swelling; No muscle, back, or extremity pain  PSYCHIATRIC: No depression, anxiety, mood swings, or difficulty sleeping  HEME/LYMPH: No easy bruising, or bleeding gums  ALLERGY AND IMMUNOLOGIC: No hives or eczema    MEDICATIONS  (STANDING):  allopurinol 100 milliGRAM(s) Oral daily  amLODIPine   Tablet 10 milliGRAM(s) Oral daily  brimonidine 0.2% Ophthalmic Solution 1 Drop(s) Both EYES two times a day  colchicine 0.6 milliGRAM(s) Oral daily  dextrose 5% + lactated ringers. 1000 milliLiter(s) (100 mL/Hr) IV Continuous <Continuous>  heparin  Infusion.  Unit(s)/Hr (17 mL/Hr) IV Continuous <Continuous>  hydrALAZINE 25 milliGRAM(s) Oral two times a day  losartan 100 milliGRAM(s) Oral daily  metoprolol succinate  milliGRAM(s) Oral daily  piperacillin/tazobactam IVPB.. 3.375 Gram(s) IV Intermittent every 8 hours  timolol 0.5% Solution 1 Drop(s) Both EYES two times a day    MEDICATIONS  (PRN):  acetaminophen   Tablet .. 650 milliGRAM(s) Oral every 6 hours PRN Temp greater or equal to 38C (100.4F), Mild Pain (1 - 3)  aluminum hydroxide/magnesium hydroxide/simethicone Suspension 30 milliLiter(s) Oral every 4 hours PRN Dyspepsia  heparin   Injectable 7500 Unit(s) IV Push every 6 hours PRN For aPTT less than 40  heparin   Injectable 3500 Unit(s) IV Push every 6 hours PRN For aPTT between 40 - 57  HYDROmorphone  Injectable 1 milliGRAM(s) IV Push every 4 hours PRN Severe Pain (7 - 10)  ibuprofen  Tablet. 400 milliGRAM(s) Oral every 8 hours PRN Mild Pain (1 - 3)  melatonin 3 milliGRAM(s) Oral at bedtime PRN Insomnia  ondansetron Injectable 4 milliGRAM(s) IV Push every 6 hours PRN Nausea  oxyCODONE    IR 5 milliGRAM(s) Oral Once PRN Moderate Pain (4 - 6)      ALLERGIES: No Known Allergies      FAMILY HISTORY:  Family history of diabetes mellitus (DM) (Mother)    Family history of TIAs (Mother)    Family hx of prostate cancer (Father)    Family history of bone cancer (Father)    Family history of appendicitis        Social history:  Alochol:   Smoking:   Drug Use:   Marital Status:     PHYSICAL EXAMINATION:  -----------------------------  T(C): 37.1 (09-11-21 @ 07:26), Max: 38.7 (09-10-21 @ 19:55)  HR: 70 (09-11-21 @ 07:26) (70 - 90)  BP: 117/74 (09-11-21 @ 07:26) (117/74 - 148/96)  RR: 18 (09-11-21 @ 07:26) (16 - 18)  SpO2: 93% (09-11-21 @ 07:26) (93% - 97%)  Wt(kg): --        Constitutional: well developed, normal appearance, well groomed, well nourished, no deformities and no acute distress.   Eyes: the conjunctiva exhibited no abnormalities and the eyelids demonstrated no xanthelasmas.   HEENT: normal oral mucosa, no oral pallor and no oral cyanosis.   Neck: normal jugular venous A waves present, normal jugular venous V waves present and no jugular venous carrillo A waves.   Pulmonary: no respiratory distress, normal respiratory rhythm and effort, no accessory muscle use and lungs were clear to auscultation bilaterally.   Cardiovascular: heart rate and rhythm were irreg/irreg, normal S1 and S2 and no murmur, gallop, rub, heave or thrill are present.   Musculoskeletal: the gait could not be assessed.  Extremities: no clubbing of the fingernails, no localized cyanosis, no petechial hemorrhages and no ischemic changes.   Skin: normal skin color and pigmentation, no rash, no venous stasis, no skin lesions, no skin ulcer and no xanthoma was observed.   Psychiatric: oriented to person, place, and time, the affect was normal, the mood was normal and not feeling anxious.     LABS:   --------  09-10    139  |  107  |  19  ----------------------------<  127<H>  3.5   |  25  |  1.10    Ca    9.2      10 Sep 2021 00:49    TPro  8.7<H>  /  Alb  4.2  /  TBili  0.8  /  DBili  x   /  AST  22  /  ALT  30  /  AlkPhos  96  09-10                         14.9   13.52 )-----------( 186      ( 10 Sep 2021 23:32 )             43.1     PT/INR - ( 10 Sep 2021 01:03 )   PT: 17.0 sec;   INR: 1.48 ratio         PTT - ( 10 Sep 2021 23:32 )  PTT:157.9 sec              Radiology:

## 2021-09-12 LAB
ALBUMIN SERPL ELPH-MCNC: 2.7 G/DL — LOW (ref 3.3–5)
ALP SERPL-CCNC: 64 U/L — SIGNIFICANT CHANGE UP (ref 40–120)
ALT FLD-CCNC: 19 U/L — SIGNIFICANT CHANGE UP (ref 12–78)
ANION GAP SERPL CALC-SCNC: 3 MMOL/L — LOW (ref 5–17)
APTT BLD: 47.1 SEC — HIGH (ref 27.5–35.5)
APTT BLD: 56.9 SEC — HIGH (ref 27.5–35.5)
APTT BLD: 75.6 SEC — HIGH (ref 27.5–35.5)
AST SERPL-CCNC: 12 U/L — LOW (ref 15–37)
BASOPHILS # BLD AUTO: 0.02 K/UL — SIGNIFICANT CHANGE UP (ref 0–0.2)
BASOPHILS NFR BLD AUTO: 0.2 % — SIGNIFICANT CHANGE UP (ref 0–2)
BILIRUB SERPL-MCNC: 0.9 MG/DL — SIGNIFICANT CHANGE UP (ref 0.2–1.2)
BUN SERPL-MCNC: 10 MG/DL — SIGNIFICANT CHANGE UP (ref 7–23)
CALCIUM SERPL-MCNC: 8.4 MG/DL — LOW (ref 8.5–10.1)
CHLORIDE SERPL-SCNC: 108 MMOL/L — SIGNIFICANT CHANGE UP (ref 96–108)
CO2 SERPL-SCNC: 31 MMOL/L — SIGNIFICANT CHANGE UP (ref 22–31)
CREAT SERPL-MCNC: 1.1 MG/DL — SIGNIFICANT CHANGE UP (ref 0.5–1.3)
CULTURE RESULTS: NO GROWTH — SIGNIFICANT CHANGE UP
EOSINOPHIL # BLD AUTO: 0.03 K/UL — SIGNIFICANT CHANGE UP (ref 0–0.5)
EOSINOPHIL NFR BLD AUTO: 0.3 % — SIGNIFICANT CHANGE UP (ref 0–6)
GLUCOSE SERPL-MCNC: 106 MG/DL — HIGH (ref 70–99)
HCT VFR BLD CALC: 42.7 % — SIGNIFICANT CHANGE UP (ref 39–50)
HGB BLD-MCNC: 14.3 G/DL — SIGNIFICANT CHANGE UP (ref 13–17)
IMM GRANULOCYTES NFR BLD AUTO: 0.5 % — SIGNIFICANT CHANGE UP (ref 0–1.5)
LYMPHOCYTES # BLD AUTO: 1.3 K/UL — SIGNIFICANT CHANGE UP (ref 1–3.3)
LYMPHOCYTES # BLD AUTO: 14.8 % — SIGNIFICANT CHANGE UP (ref 13–44)
MCHC RBC-ENTMCNC: 28.7 PG — SIGNIFICANT CHANGE UP (ref 27–34)
MCHC RBC-ENTMCNC: 33.5 GM/DL — SIGNIFICANT CHANGE UP (ref 32–36)
MCV RBC AUTO: 85.7 FL — SIGNIFICANT CHANGE UP (ref 80–100)
MONOCYTES # BLD AUTO: 0.95 K/UL — HIGH (ref 0–0.9)
MONOCYTES NFR BLD AUTO: 10.8 % — SIGNIFICANT CHANGE UP (ref 2–14)
NEUTROPHILS # BLD AUTO: 6.47 K/UL — SIGNIFICANT CHANGE UP (ref 1.8–7.4)
NEUTROPHILS NFR BLD AUTO: 73.4 % — SIGNIFICANT CHANGE UP (ref 43–77)
NRBC # BLD: 0 /100 WBCS — SIGNIFICANT CHANGE UP (ref 0–0)
PLATELET # BLD AUTO: 179 K/UL — SIGNIFICANT CHANGE UP (ref 150–400)
POTASSIUM SERPL-MCNC: 3.4 MMOL/L — LOW (ref 3.5–5.3)
POTASSIUM SERPL-SCNC: 3.4 MMOL/L — LOW (ref 3.5–5.3)
PROT SERPL-MCNC: 6.7 G/DL — SIGNIFICANT CHANGE UP (ref 6–8.3)
RBC # BLD: 4.98 M/UL — SIGNIFICANT CHANGE UP (ref 4.2–5.8)
RBC # FLD: 13.4 % — SIGNIFICANT CHANGE UP (ref 10.3–14.5)
SARS-COV-2 RNA SPEC QL NAA+PROBE: SIGNIFICANT CHANGE UP
SODIUM SERPL-SCNC: 142 MMOL/L — SIGNIFICANT CHANGE UP (ref 135–145)
SPECIMEN SOURCE: SIGNIFICANT CHANGE UP
WBC # BLD: 8.81 K/UL — SIGNIFICANT CHANGE UP (ref 3.8–10.5)
WBC # FLD AUTO: 8.81 K/UL — SIGNIFICANT CHANGE UP (ref 3.8–10.5)

## 2021-09-12 PROCEDURE — 99232 SBSQ HOSP IP/OBS MODERATE 35: CPT | Mod: GC

## 2021-09-12 RX ORDER — POTASSIUM CHLORIDE 20 MEQ
40 PACKET (EA) ORAL ONCE
Refills: 0 | Status: COMPLETED | OUTPATIENT
Start: 2021-09-12 | End: 2021-09-12

## 2021-09-12 RX ORDER — SODIUM CHLORIDE 9 MG/ML
1000 INJECTION, SOLUTION INTRAVENOUS
Refills: 0 | Status: DISCONTINUED | OUTPATIENT
Start: 2021-09-12 | End: 2021-09-13

## 2021-09-12 RX ORDER — POTASSIUM CHLORIDE 20 MEQ
20 PACKET (EA) ORAL
Refills: 0 | Status: DISCONTINUED | OUTPATIENT
Start: 2021-09-12 | End: 2021-09-12

## 2021-09-12 RX ORDER — DEXTROSE MONOHYDRATE, SODIUM CHLORIDE, AND POTASSIUM CHLORIDE 50; .745; 4.5 G/1000ML; G/1000ML; G/1000ML
1000 INJECTION, SOLUTION INTRAVENOUS
Refills: 0 | Status: DISCONTINUED | OUTPATIENT
Start: 2021-09-12 | End: 2021-09-12

## 2021-09-12 RX ORDER — POLYETHYLENE GLYCOL 3350 17 G/17G
17 POWDER, FOR SOLUTION ORAL DAILY
Refills: 0 | Status: DISCONTINUED | OUTPATIENT
Start: 2021-09-12 | End: 2021-09-13

## 2021-09-12 RX ADMIN — HYDROMORPHONE HYDROCHLORIDE 1 MILLIGRAM(S): 2 INJECTION INTRAMUSCULAR; INTRAVENOUS; SUBCUTANEOUS at 21:45

## 2021-09-12 RX ADMIN — Medication 40 MILLIEQUIVALENT(S): at 17:41

## 2021-09-12 RX ADMIN — BRIMONIDINE TARTRATE 1 DROP(S): 2 SOLUTION/ DROPS OPHTHALMIC at 17:42

## 2021-09-12 RX ADMIN — Medication 1 DROP(S): at 17:42

## 2021-09-12 RX ADMIN — Medication 25 MILLIGRAM(S): at 17:42

## 2021-09-12 RX ADMIN — HYDROMORPHONE HYDROCHLORIDE 1 MILLIGRAM(S): 2 INJECTION INTRAMUSCULAR; INTRAVENOUS; SUBCUTANEOUS at 22:15

## 2021-09-12 RX ADMIN — HEPARIN SODIUM 1800 UNIT(S)/HR: 5000 INJECTION INTRAVENOUS; SUBCUTANEOUS at 23:06

## 2021-09-12 RX ADMIN — HEPARIN SODIUM 1600 UNIT(S)/HR: 5000 INJECTION INTRAVENOUS; SUBCUTANEOUS at 08:58

## 2021-09-12 RX ADMIN — Medication 1 DROP(S): at 05:31

## 2021-09-12 RX ADMIN — PIPERACILLIN AND TAZOBACTAM 25 GRAM(S): 4; .5 INJECTION, POWDER, LYOPHILIZED, FOR SOLUTION INTRAVENOUS at 03:16

## 2021-09-12 RX ADMIN — Medication 100 MILLIGRAM(S): at 05:31

## 2021-09-12 RX ADMIN — HEPARIN SODIUM 3500 UNIT(S): 5000 INJECTION INTRAVENOUS; SUBCUTANEOUS at 23:09

## 2021-09-12 RX ADMIN — Medication 100 MILLIGRAM(S): at 12:11

## 2021-09-12 RX ADMIN — HEPARIN SODIUM 1600 UNIT(S)/HR: 5000 INJECTION INTRAVENOUS; SUBCUTANEOUS at 16:19

## 2021-09-12 RX ADMIN — OXYCODONE HYDROCHLORIDE 5 MILLIGRAM(S): 5 TABLET ORAL at 15:30

## 2021-09-12 RX ADMIN — Medication 650 MILLIGRAM(S): at 13:10

## 2021-09-12 RX ADMIN — AMLODIPINE BESYLATE 10 MILLIGRAM(S): 2.5 TABLET ORAL at 05:31

## 2021-09-12 RX ADMIN — Medication 0.6 MILLIGRAM(S): at 12:10

## 2021-09-12 RX ADMIN — Medication 25 MILLIGRAM(S): at 05:31

## 2021-09-12 RX ADMIN — OXYCODONE HYDROCHLORIDE 5 MILLIGRAM(S): 5 TABLET ORAL at 14:30

## 2021-09-12 RX ADMIN — Medication 650 MILLIGRAM(S): at 12:11

## 2021-09-12 RX ADMIN — HYDROMORPHONE HYDROCHLORIDE 1 MILLIGRAM(S): 2 INJECTION INTRAMUSCULAR; INTRAVENOUS; SUBCUTANEOUS at 17:57

## 2021-09-12 RX ADMIN — HEPARIN SODIUM 3500 UNIT(S): 5000 INJECTION INTRAVENOUS; SUBCUTANEOUS at 09:00

## 2021-09-12 RX ADMIN — BRIMONIDINE TARTRATE 1 DROP(S): 2 SOLUTION/ DROPS OPHTHALMIC at 05:31

## 2021-09-12 RX ADMIN — PIPERACILLIN AND TAZOBACTAM 25 GRAM(S): 4; .5 INJECTION, POWDER, LYOPHILIZED, FOR SOLUTION INTRAVENOUS at 18:24

## 2021-09-12 RX ADMIN — PIPERACILLIN AND TAZOBACTAM 25 GRAM(S): 4; .5 INJECTION, POWDER, LYOPHILIZED, FOR SOLUTION INTRAVENOUS at 12:10

## 2021-09-12 RX ADMIN — HYDROMORPHONE HYDROCHLORIDE 1 MILLIGRAM(S): 2 INJECTION INTRAMUSCULAR; INTRAVENOUS; SUBCUTANEOUS at 17:41

## 2021-09-12 NOTE — PROGRESS NOTE ADULT - ASSESSMENT
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    9/12/21  Seen at [facility] [place]  Patient lying flat, sleeping comfortably  Easily arousable  No cardiac complaints  Feeling better    Plan:  - ECG with nonspecific findings, likely LVH related. Known atrial fibrillation - rate controlled.  - Last echo 6/21 with normal LV systolic function, no significant valve issues  - Last stress test in 2019 which was normal  - Continue amlodipine 10 mg daily  - Continue metoprolol succinate 100 mg daily  - Continue losartan 100 mg daily  - Continue hydralazine 25 mg bid  - Hold furosemide  - Hold rivaroxaban and resume post-op when ok with surgery  - Surgery follow-up  - There are no active cardiac issues. The patient is at intermediate risk for cardiac events for an intermediate risk surgery. He is optimized to proceed from a cardiac standpoint  - No further additional cardiac tested required

## 2021-09-12 NOTE — PROGRESS NOTE ADULT - SUBJECTIVE AND OBJECTIVE BOX
Patient is a 65y Male with a known history of :  Acute cholecystitis [K81.0]      HPI:  The patient is a 65y Male with PMHx of HTN, Afib (on xarelto), splenic infarct, gout, GERD, prostate ca (s/p prostatectomy 2010) presenting with right upper quadrant abdominal pain since earlier tonight. He was in his usual state of health, around 9pm tonight watching TV with his wife and eating chips with spicy hummus when he noticed a pain in his right upper quadrant that did not go away and gradually got worse, eventually 10/10, non-radiating, worse with inspiration. He has had some associated nausea but no vomiting. He reports a previous episode of similar pain earlier this year that eventually resolved. Denies chest pain, shortness of breath, fever, chills.    In the ED,  VS: T 97.8, HR 84, /102, RR 16, SpO2 98% on RA  Labs: PT 17.0, INR 1.48, aPTT 35.8  Imaging: RUQ ultrasound with gallstones, sludge, gallbladder wall thickening, positive sonographic Luna's sign, trace pericholecystic fluid, acute calculous cholecystitis.  EKG: atrial fibrillation with a ventricular rate of 98bpm, nonspecific ST and T wave abnormality.  Received: morphine 4mg IV x1, dilaudid 0.5mg IV x1, zofran 4mg IV x2, zosyn 3.375g x1, 1L NS bolus x1   COVID vaccine: not immunized against COVID 19, reports COVID infection in February 2021, received monoclonal antibody infusion and got his antibody levels checked after that which showed he had immunity, per pt.    of note He says he is very active and frequently rides his bike, can bike 60 miles in one day (3 rides of 20 miles each). He goes on frequent walks. He golfs twice a week. His home office is in his basement and he frequently climbs the flight of stairs throughout the day without difficulty. He feels he could climb 10 flights of stairs before he would have to stop and catch his breath. He is not currently sexually active. He prefers the bike over running on the treadmill or using the elliptical due to chronic knee pain. He works with a .    He reports that in February 2019, he was diagnosed with Afib and met with his cardiologist Dr. Baldemar Freire and they decided to hold off on anticoagulation because he was on the "borderline" and was active at the time with amberall. 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 He has been taking xarelto since that time. He had noticed some lower extremity edema and was given lasix 20mg to take (pt states he will take this medication once or twice a week), but states he got an echo in May 2021 that was normal with normal ejection fraction, per the patient. He is on metoprolol succinate 100mg and xarelto 20mg for his afib. He has a history of hypertension for which he takes losartan 100mg qd, amlodipine 10mg qd, and hydralazine 25mg bid. He has a history of gout for which he takes allopurinol 100mg qd and colchicine 0.6mg qd.     He has a history of prostate cancer s/p prostatectomy in 2010 and gets regular PSA checks. He has some mild urinary discomfort and occasional incontinence at baseline since his prostatectomy but denies history of UTI.    He states he has elevated pressure in his left eye but states he has not been diagnosed with glaucoma. He uses combigan eyedrops.     Patient reports taking "GOLO" supplement for weight loss that he bought online, for the last two weeks. (10 Sep 2021 03:28)      REVIEW OF SYSTEMS:    CONSTITUTIONAL: No fever, weight loss, or fatigue  EYES: No eye pain, visual disturbances, or discharge  ENMT:  No difficulty hearing, tinnitus, vertigo; No sinus or throat pain  NECK: No pain or stiffness  BREASTS: No pain, masses, or nipple discharge  RESPIRATORY: No cough, wheezing, chills or hemoptysis; No shortness of breath  CARDIOVASCULAR: No chest pain, palpitations, dizziness, or leg swelling  GASTROINTESTINAL: No abdominal or epigastric pain. No nausea, vomiting, or hematemesis; No diarrhea or constipation. No melena or hematochezia.  GENITOURINARY: No dysuria, frequency, hematuria, or incontinence  NEUROLOGICAL: No headaches, memory loss, loss of strength, numbness, or tremors  SKIN: No itching, burning, rashes, or lesions   LYMPH NODES: No enlarged glands  ENDOCRINE: No heat or cold intolerance; No hair loss  MUSCULOSKELETAL: No joint pain or swelling; No muscle, back, or extremity pain  PSYCHIATRIC: No depression, anxiety, mood swings, or difficulty sleeping  HEME/LYMPH: No easy bruising, or bleeding gums  ALLERGY AND IMMUNOLOGIC: No hives or eczema    MEDICATIONS  (STANDING):  allopurinol 100 milliGRAM(s) Oral daily  amLODIPine   Tablet 10 milliGRAM(s) Oral daily  brimonidine 0.2% Ophthalmic Solution 1 Drop(s) Both EYES two times a day  colchicine 0.6 milliGRAM(s) Oral daily  dextrose 5% + lactated ringers. 1000 milliLiter(s) (100 mL/Hr) IV Continuous <Continuous>  heparin  Infusion.  Unit(s)/Hr (17 mL/Hr) IV Continuous <Continuous>  hydrALAZINE 25 milliGRAM(s) Oral two times a day  metoprolol succinate  milliGRAM(s) Oral daily  piperacillin/tazobactam IVPB.. 3.375 Gram(s) IV Intermittent every 8 hours  senna 2 Tablet(s) Oral at bedtime  timolol 0.5% Solution 1 Drop(s) Both EYES two times a day    MEDICATIONS  (PRN):  acetaminophen   Tablet .. 650 milliGRAM(s) Oral every 6 hours PRN Temp greater or equal to 38C (100.4F), Mild Pain (1 - 3)  aluminum hydroxide/magnesium hydroxide/simethicone Suspension 30 milliLiter(s) Oral every 4 hours PRN Dyspepsia  heparin   Injectable 7500 Unit(s) IV Push every 6 hours PRN For aPTT less than 40  heparin   Injectable 3500 Unit(s) IV Push every 6 hours PRN For aPTT between 40 - 57  HYDROmorphone  Injectable 1 milliGRAM(s) IV Push every 4 hours PRN Severe Pain (7 - 10)  melatonin 3 milliGRAM(s) Oral at bedtime PRN Insomnia  ondansetron Injectable 4 milliGRAM(s) IV Push every 6 hours PRN Nausea  oxyCODONE    IR 5 milliGRAM(s) Oral Once PRN Moderate Pain (4 - 6)      ALLERGIES: No Known Allergies      FAMILY HISTORY:  Family history of diabetes mellitus (DM) (Mother)    Family history of TIAs (Mother)    Family hx of prostate cancer (Father)    Family history of bone cancer (Father)    Family history of appendicitis        Social history:  Alochol:   Smoking:   Drug Use:   Marital Status:     PHYSICAL EXAMINATION:  -----------------------------  T(C): 37.5 (09-12-21 @ 04:30), Max: 37.6 (09-11-21 @ 11:55)  HR: 106 (09-12-21 @ 04:30) (73 - 106)  BP: 138/98 (09-12-21 @ 04:30) (125/80 - 144/81)  RR: 18 (09-12-21 @ 04:30) (17 - 18)  SpO2: 95% (09-12-21 @ 04:30) (94% - 95%)  Wt(kg): --    09-11 @ 07:01  -  09-12 @ 07:00  --------------------------------------------------------  IN:    dextrose 5% + lactated ringers: 1200 mL    Heparin Infusion: 84 mL  Total IN: 1284 mL    OUT:    Voided (mL): 425 mL  Total OUT: 425 mL    Total NET: 859 mL            Constitutional: well developed, normal appearance, well groomed, well nourished, no deformities and no acute distress.   Eyes: the conjunctiva exhibited no abnormalities and the eyelids demonstrated no xanthelasmas.   HEENT: normal oral mucosa, no oral pallor and no oral cyanosis.   Neck: normal jugular venous A waves present, normal jugular venous V waves present and no jugular venous carrillo A waves.   Pulmonary: no respiratory distress, normal respiratory rhythm and effort, no accessory muscle use and lungs were clear to auscultation bilaterally. Anteriorly  Cardiovascular: heart rate and rhythm were irreg/irreg, normal S1 and S2 and no murmur, gallop, rub, heave or thrill are present.    Musculoskeletal: the gait could not be assessed.  Extremities: no clubbing of the fingernails, no localized cyanosis, no petechial hemorrhages and no ischemic changes.   Skin: normal skin color and pigmentation, no rash, no venous stasis, no skin lesions, no skin ulcer and no xanthoma was observed.   Psychiatric: oriented to person, place, and time, the affect was normal, the mood was normal and not feeling anxious.     LABS:   --------  09-11    139  |  105  |  14  ----------------------------<  110<H>  3.7   |  29  |  1.40<H>    Ca    8.4<L>      11 Sep 2021 08:17    TPro  6.7  /  Alb  2.8<L>  /  TBili  1.8<H>  /  DBili  x   /  AST  13<L>  /  ALT  20  /  AlkPhos  61  09-11                         14.7   11.67 )-----------( 171      ( 11 Sep 2021 08:17 )             42.8     PT/INR - ( 11 Sep 2021 08:17 )   PT: 20.2 sec;   INR: 1.77 ratio         PTT - ( 11 Sep 2021 15:47 )  PTT:58.6 sec        Culture Results:   No growth to date. (09-11 @ 00:29)  Culture Results:   No growth to date. (09-11 @ 00:29)    09-11 @ 00:29    Organism --   Gram Stain Blood -- Gram Stain --  Specimen Source .Blood Blood  Culture-Blood --        Radiology:

## 2021-09-12 NOTE — PROGRESS NOTE ADULT - SUBJECTIVE AND OBJECTIVE BOX
Patient is a 65y old  Male who presents with a chief complaint of Acute cholecystitis (12 Sep 2021 10:36)      Subjective:  INTERVAL HPI/OVERNIGHT EVENTS: Patient seen and examined at bedside. No overnight events occurred. Patient has no complaints at this time. Abdominal pain has dissipate. constipation is alos dissipated 2/2 to senna with 2 episodes of BM this am. Denies fevers, chills, headache, lightheadedness, chest pain, dyspnea, abdominal pain, n/v/d/c.    MEDICATIONS  (STANDING):  allopurinol 100 milliGRAM(s) Oral daily  amLODIPine   Tablet 10 milliGRAM(s) Oral daily  brimonidine 0.2% Ophthalmic Solution 1 Drop(s) Both EYES two times a day  colchicine 0.6 milliGRAM(s) Oral daily  dextrose 5% + lactated ringers. 1000 milliLiter(s) (100 mL/Hr) IV Continuous <Continuous>  heparin  Infusion.  Unit(s)/Hr (17 mL/Hr) IV Continuous <Continuous>  hydrALAZINE 25 milliGRAM(s) Oral two times a day  metoprolol succinate  milliGRAM(s) Oral daily  piperacillin/tazobactam IVPB.. 3.375 Gram(s) IV Intermittent every 8 hours  potassium chloride    Tablet ER 40 milliEquivalent(s) Oral once  senna 2 Tablet(s) Oral at bedtime  timolol 0.5% Solution 1 Drop(s) Both EYES two times a day    MEDICATIONS  (PRN):  acetaminophen   Tablet .. 650 milliGRAM(s) Oral every 6 hours PRN Temp greater or equal to 38C (100.4F), Mild Pain (1 - 3)  aluminum hydroxide/magnesium hydroxide/simethicone Suspension 30 milliLiter(s) Oral every 4 hours PRN Dyspepsia  heparin   Injectable 7500 Unit(s) IV Push every 6 hours PRN For aPTT less than 40  heparin   Injectable 3500 Unit(s) IV Push every 6 hours PRN For aPTT between 40 - 57  HYDROmorphone  Injectable 1 milliGRAM(s) IV Push every 4 hours PRN Severe Pain (7 - 10)  melatonin 3 milliGRAM(s) Oral at bedtime PRN Insomnia  ondansetron Injectable 4 milliGRAM(s) IV Push every 6 hours PRN Nausea  oxyCODONE    IR 5 milliGRAM(s) Oral Once PRN Moderate Pain (4 - 6)  polyethylene glycol 3350 17 Gram(s) Oral daily PRN Constipation      Allergies    No Known Allergies    Intolerances        REVIEW OF SYSTEMS:  CONSTITUTIONAL: No fever or chills  HEENT:  No headache, no sore throat  RESPIRATORY: No cough, wheezing, or shortness of breath  CARDIOVASCULAR: No chest pain, palpitations  GASTROINTESTINAL: No abd pain, nausea, vomiting, or diarrhea  GENITOURINARY: No dysuria, frequency, or hematuria  NEUROLOGICAL: no focal weakness or dizziness  MUSCULOSKELETAL: no myalgias     Objective:  Vital Signs Last 24 Hrs  T(C): 37.5 (12 Sep 2021 04:30), Max: 37.6 (11 Sep 2021 11:55)  T(F): 99.5 (12 Sep 2021 04:30), Max: 99.7 (11 Sep 2021 11:55)  HR: 106 (12 Sep 2021 04:30) (73 - 106)  BP: 138/98 (12 Sep 2021 04:30) (125/80 - 144/81)  BP(mean): --  RR: 18 (12 Sep 2021 04:30) (17 - 18)  SpO2: 95% (12 Sep 2021 04:30) (94% - 95%)    GENERAL: NAD, lying in bed comfortably  HEAD:  Atraumatic, Normocephalic  EYES: EOMI, PERRLA, conjunctiva and sclera clear  ENT: Moist mucous membranes  NECK: Supple, No JVD  CHEST/LUNG: Clear to auscultation bilaterally;   HEART: Regular rate and rhythm; s1 s2 heard   ABDOMEN: Bowel sounds present; Soft, Nontender, Nondistended.   EXTREMITIES:  2+ Peripheral Pulses, brisk capillary refill.   NERVOUS SYSTEM:  Alert & Oriented X3, speech clear.  MSK: FROM all 4 extremities,       LABS:                        14.3   8.81  )-----------( 179      ( 12 Sep 2021 08:14 )             42.7     CBC Full  -  ( 12 Sep 2021 08:14 )  WBC Count : 8.81 K/uL  Hemoglobin : 14.3 g/dL  Hematocrit : 42.7 %  Platelet Count - Automated : 179 K/uL  Mean Cell Volume : 85.7 fl  Mean Cell Hemoglobin : 28.7 pg  Mean Cell Hemoglobin Concentration : 33.5 gm/dL  Auto Neutrophil # : 6.47 K/uL  Auto Lymphocyte # : 1.30 K/uL  Auto Monocyte # : 0.95 K/uL  Auto Eosinophil # : 0.03 K/uL  Auto Basophil # : 0.02 K/uL  Auto Neutrophil % : 73.4 %  Auto Lymphocyte % : 14.8 %  Auto Monocyte % : 10.8 %  Auto Eosinophil % : 0.3 %  Auto Basophil % : 0.2 %    12 Sep 2021 08:14    142    |  108    |  10     ----------------------------<  106    3.4     |  31     |  1.10     Ca    8.4        12 Sep 2021 08:14    TPro  6.7    /  Alb  2.7    /  TBili  0.9    /  DBili  x      /  AST  12     /  ALT  19     /  AlkPhos  64     12 Sep 2021 08:14    PT/INR - ( 11 Sep 2021 08:17 )   PT: 20.2 sec;   INR: 1.77 ratio         PTT - ( 12 Sep 2021 08:14 )  PTT:47.1 sec    CAPILLARY BLOOD GLUCOSE            Culture - Blood (collected 09-11-21 @ 00:29)  Source: .Blood Blood  Preliminary Report (09-12-21 @ 06:14):    No growth to date.    Culture - Blood (collected 09-11-21 @ 00:29)  Source: .Blood Blood  Preliminary Report (09-12-21 @ 06:14):    No growth to date.        RADIOLOGY & ADDITIONAL TESTS:    Personally reviewed.     Consultant(s) Notes Reviewed:  [x] YES  [ ] NO     Patient is a 65y old  Male who presents with a chief complaint of Acute cholecystitis (12 Sep 2021 10:36)      Subjective:  INTERVAL HPI/OVERNIGHT EVENTS: Patient seen and examined at bedside. No overnight events occurred. Patient has no complaints at this time. Abdominal pain has dissipated. constipation is also dissipated 2/2 to senna with 2 episodes of BM this am. Denies fevers, chills, headache, lightheadedness, chest pain, dyspnea, abdominal pain, n/v/d/c.    MEDICATIONS  (STANDING):  allopurinol 100 milliGRAM(s) Oral daily  amLODIPine   Tablet 10 milliGRAM(s) Oral daily  brimonidine 0.2% Ophthalmic Solution 1 Drop(s) Both EYES two times a day  colchicine 0.6 milliGRAM(s) Oral daily  dextrose 5% + lactated ringers. 1000 milliLiter(s) (100 mL/Hr) IV Continuous <Continuous>  heparin  Infusion.  Unit(s)/Hr (17 mL/Hr) IV Continuous <Continuous>  hydrALAZINE 25 milliGRAM(s) Oral two times a day  metoprolol succinate  milliGRAM(s) Oral daily  piperacillin/tazobactam IVPB.. 3.375 Gram(s) IV Intermittent every 8 hours  potassium chloride    Tablet ER 40 milliEquivalent(s) Oral once  senna 2 Tablet(s) Oral at bedtime  timolol 0.5% Solution 1 Drop(s) Both EYES two times a day    MEDICATIONS  (PRN):  acetaminophen   Tablet .. 650 milliGRAM(s) Oral every 6 hours PRN Temp greater or equal to 38C (100.4F), Mild Pain (1 - 3)  aluminum hydroxide/magnesium hydroxide/simethicone Suspension 30 milliLiter(s) Oral every 4 hours PRN Dyspepsia  heparin   Injectable 7500 Unit(s) IV Push every 6 hours PRN For aPTT less than 40  heparin   Injectable 3500 Unit(s) IV Push every 6 hours PRN For aPTT between 40 - 57  HYDROmorphone  Injectable 1 milliGRAM(s) IV Push every 4 hours PRN Severe Pain (7 - 10)  melatonin 3 milliGRAM(s) Oral at bedtime PRN Insomnia  ondansetron Injectable 4 milliGRAM(s) IV Push every 6 hours PRN Nausea  oxyCODONE    IR 5 milliGRAM(s) Oral Once PRN Moderate Pain (4 - 6)  polyethylene glycol 3350 17 Gram(s) Oral daily PRN Constipation      Allergies    No Known Allergies    Intolerances        REVIEW OF SYSTEMS:  CONSTITUTIONAL: No fever or chills  HEENT:  No headache, no sore throat  RESPIRATORY: No cough, wheezing, or shortness of breath  CARDIOVASCULAR: No chest pain, palpitations  GASTROINTESTINAL: No abd pain, nausea, vomiting, or diarrhea  GENITOURINARY: No dysuria, frequency, or hematuria  NEUROLOGICAL: no focal weakness or dizziness  MUSCULOSKELETAL: no myalgias     Objective:  Vital Signs Last 24 Hrs  T(C): 37.5 (12 Sep 2021 04:30), Max: 37.6 (11 Sep 2021 11:55)  T(F): 99.5 (12 Sep 2021 04:30), Max: 99.7 (11 Sep 2021 11:55)  HR: 106 (12 Sep 2021 04:30) (73 - 106)  BP: 138/98 (12 Sep 2021 04:30) (125/80 - 144/81)  BP(mean): --  RR: 18 (12 Sep 2021 04:30) (17 - 18)  SpO2: 95% (12 Sep 2021 04:30) (94% - 95%)    GENERAL: NAD, lying in bed comfortably  HEAD:  Atraumatic, Normocephalic  EYES: EOMI, PERRLA, conjunctiva and sclera clear  ENT: Moist mucous membranes  NECK: Supple, No JVD  CHEST/LUNG: Clear to auscultation bilaterally;   HEART: Regular rate and rhythm; s1 s2 heard   ABDOMEN: Bowel sounds present; Soft, Nontender, Nondistended.   EXTREMITIES:  2+ Peripheral Pulses, brisk capillary refill.   NERVOUS SYSTEM:  Alert & Oriented X3, speech clear.  MSK: FROM all 4 extremities,       LABS:                        14.3   8.81  )-----------( 179      ( 12 Sep 2021 08:14 )             42.7     CBC Full  -  ( 12 Sep 2021 08:14 )  WBC Count : 8.81 K/uL  Hemoglobin : 14.3 g/dL  Hematocrit : 42.7 %  Platelet Count - Automated : 179 K/uL  Mean Cell Volume : 85.7 fl  Mean Cell Hemoglobin : 28.7 pg  Mean Cell Hemoglobin Concentration : 33.5 gm/dL  Auto Neutrophil # : 6.47 K/uL  Auto Lymphocyte # : 1.30 K/uL  Auto Monocyte # : 0.95 K/uL  Auto Eosinophil # : 0.03 K/uL  Auto Basophil # : 0.02 K/uL  Auto Neutrophil % : 73.4 %  Auto Lymphocyte % : 14.8 %  Auto Monocyte % : 10.8 %  Auto Eosinophil % : 0.3 %  Auto Basophil % : 0.2 %    12 Sep 2021 08:14    142    |  108    |  10     ----------------------------<  106    3.4     |  31     |  1.10     Ca    8.4        12 Sep 2021 08:14    TPro  6.7    /  Alb  2.7    /  TBili  0.9    /  DBili  x      /  AST  12     /  ALT  19     /  AlkPhos  64     12 Sep 2021 08:14    PT/INR - ( 11 Sep 2021 08:17 )   PT: 20.2 sec;   INR: 1.77 ratio         PTT - ( 12 Sep 2021 08:14 )  PTT:47.1 sec    CAPILLARY BLOOD GLUCOSE            Culture - Blood (collected 09-11-21 @ 00:29)  Source: .Blood Blood  Preliminary Report (09-12-21 @ 06:14):    No growth to date.    Culture - Blood (collected 09-11-21 @ 00:29)  Source: .Blood Blood  Preliminary Report (09-12-21 @ 06:14):    No growth to date.        RADIOLOGY & ADDITIONAL TESTS:    Personally reviewed.     Consultant(s) Notes Reviewed:  [x] YES  [ ] NO

## 2021-09-12 NOTE — CHART NOTE - NSCHARTNOTEFT_GEN_A_CORE
Called by RN for Pt c/o RUQ abdominal pain.     Pt seen and examined @ bedside. Pt complaining of RUQ abdominal pain 2/2 to cholecystitis. Pain regimen as follows: motrin 400mg q8h prn for mild pain, oxycodone 5mg prn for moderate pain, dilaudid 1mg IV q4h prn for severe pain. Pt last dose of Dilaudid given @ 5:00pm. Pt scheduled for surgery tomorrow. Will administer Dilaudid earlier than scheduled time.        T(C): 36.8 (09-12-21 @ 20:23), Max: 37.5 (09-12-21 @ 04:30)  HR: 83 (09-12-21 @ 20:23) (72 - 106)  BP: 136/94 (09-12-21 @ 20:23) (136/94 - 152/97)  RR: 18 (09-12-21 @ 20:23) (17 - 18)  SpO2: 95% (09-12-21 @ 20:23) (94% - 95%)  Wt(kg): --    Physical :  Gen- NAD, ncat  Cardio - s+1,s+2, rrr, no murmur  Lung - cta b/l, no wheeze, no rhonchi, no rales   Abdomen- RUQ Tender to palpation + shen sign.   Ext- no edema, 2+ pulses b/l  Neuro- CN grossly intact, strength 5/5 b/l extrem    LABS:                        14.3   8.81  )-----------( 179      ( 12 Sep 2021 08:14 )             42.7     09-12    142  |  108  |  10  ----------------------------<  106<H>  3.4<L>   |  31  |  1.10    Ca    8.4<L>      12 Sep 2021 08:14    TPro  6.7  /  Alb  2.7<L>  /  TBili  0.9  /  DBili  x   /  AST  12<L>  /  ALT  19  /  AlkPhos  64  09-12    PT/INR - ( 11 Sep 2021 08:17 )   PT: 20.2 sec;   INR: 1.77 ratio         PTT - ( 12 Sep 2021 14:57 )  PTT:75.6 sec    Assessment/Plan  65M with PMHx HTN, Afib (on xarelto), GERD, gout, prostate ca (s/p prostatectomy 2010) , splenic infarction (due to thromboembolism, presenting with RUQ abdominal pain, nausea, ultrasound findings positive for acute cholecystitis admit for acute cholecystitis.  Called for severe RUQ pain   - Continue current pain management   - Administer Dilaudid earlier than scheduled dose   - Pt aware cholecystectomy scheduled tomorrow

## 2021-09-12 NOTE — PROGRESS NOTE ADULT - ASSESSMENT
This is a 65y year old Male with PMHx of HTN, Afib, splenic infarct (On Xarelto), gout, GERD, prostate CA s/p prostatectomy 4/27/10 presenting with acute calculous cholecystitis.

## 2021-09-12 NOTE — PROGRESS NOTE ADULT - SUBJECTIVE AND OBJECTIVE BOX
SUBJECTIVE:  Patient seen and examined at bedside.  No overnight events.  Patient with no new complaints at this time, admits to flatus and bowel movement. Patient denies any fevers, chills, chest pain, shortness of breath, nausea, vomiting or diarrhea.    Vital Signs Last 24 Hrs  T(C): 37.5 (12 Sep 2021 04:30), Max: 37.6 (11 Sep 2021 11:55)  T(F): 99.5 (12 Sep 2021 04:30), Max: 99.7 (11 Sep 2021 11:55)  HR: 106 (12 Sep 2021 04:30) (73 - 106)  BP: 138/98 (12 Sep 2021 04:30) (125/80 - 144/81)  BP(mean): --  RR: 18 (12 Sep 2021 04:30) (17 - 18)  SpO2: 95% (12 Sep 2021 04:30) (94% - 95%)    PHYSICAL EXAM:  GENERAL: No acute distress, well-developed  HEAD:  Atraumatic, Normocephalic  ABDOMEN: Soft, minimally-tender RUQ, minimally-distended; bowel sounds+  NEUROLOGY: A&O x 3, no focal deficits    I&O's Summary    11 Sep 2021 07:01  -  12 Sep 2021 07:00  --------------------------------------------------------  IN: 1284 mL / OUT: 425 mL / NET: 859 mL      I&O's Detail    11 Sep 2021 07:01  -  12 Sep 2021 07:00  --------------------------------------------------------  IN:    dextrose 5% + lactated ringers: 1200 mL    Heparin Infusion: 84 mL  Total IN: 1284 mL    OUT:    Voided (mL): 425 mL  Total OUT: 425 mL    Total NET: 859 mL        MEDICATIONS  (STANDING):  allopurinol 100 milliGRAM(s) Oral daily  amLODIPine   Tablet 10 milliGRAM(s) Oral daily  brimonidine 0.2% Ophthalmic Solution 1 Drop(s) Both EYES two times a day  colchicine 0.6 milliGRAM(s) Oral daily  dextrose 5% + lactated ringers. 1000 milliLiter(s) (100 mL/Hr) IV Continuous <Continuous>  heparin  Infusion.  Unit(s)/Hr (17 mL/Hr) IV Continuous <Continuous>  hydrALAZINE 25 milliGRAM(s) Oral two times a day  metoprolol succinate  milliGRAM(s) Oral daily  piperacillin/tazobactam IVPB.. 3.375 Gram(s) IV Intermittent every 8 hours  potassium chloride    Tablet ER 40 milliEquivalent(s) Oral once  senna 2 Tablet(s) Oral at bedtime  timolol 0.5% Solution 1 Drop(s) Both EYES two times a day    MEDICATIONS  (PRN):  acetaminophen   Tablet .. 650 milliGRAM(s) Oral every 6 hours PRN Temp greater or equal to 38C (100.4F), Mild Pain (1 - 3)  aluminum hydroxide/magnesium hydroxide/simethicone Suspension 30 milliLiter(s) Oral every 4 hours PRN Dyspepsia  heparin   Injectable 7500 Unit(s) IV Push every 6 hours PRN For aPTT less than 40  heparin   Injectable 3500 Unit(s) IV Push every 6 hours PRN For aPTT between 40 - 57  HYDROmorphone  Injectable 1 milliGRAM(s) IV Push every 4 hours PRN Severe Pain (7 - 10)  melatonin 3 milliGRAM(s) Oral at bedtime PRN Insomnia  ondansetron Injectable 4 milliGRAM(s) IV Push every 6 hours PRN Nausea  oxyCODONE    IR 5 milliGRAM(s) Oral Once PRN Moderate Pain (4 - 6)  polyethylene glycol 3350 17 Gram(s) Oral daily PRN Constipation    LABS:                        14.3   8.81  )-----------( 179      ( 12 Sep 2021 08:14 )             42.7     09-12    142  |  108  |  10  ----------------------------<  106<H>  3.4<L>   |  31  |  1.10    Ca    8.4<L>      12 Sep 2021 08:14    TPro  6.7  /  Alb  2.7<L>  /  TBili  0.9  /  DBili  x   /  AST  12<L>  /  ALT  19  /  AlkPhos  64  09-12    PT/INR - ( 11 Sep 2021 08:17 )   PT: 20.2 sec;   INR: 1.77 ratio         PTT - ( 12 Sep 2021 08:14 )  PTT:47.1 sec    RADIOLOGY & ADDITIONAL STUDIES:

## 2021-09-12 NOTE — PROGRESS NOTE ADULT - ASSESSMENT
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     #Sepsis 2/2 acute cholecystitis  - pt with RUQ pain that gradually worsened with associated nausea, found to have acute cholecystitis on RUQ ultrasound and HIDA.  - pt is s/p 3.375g zosyn, continue 3.375g q8h; IVF (NS + D5 @ 100 cc/hour)   - motrin 400mg q8h prn for mild pain, oxycodone 5mg prn for moderate pain, dilaudid 1mg IV q4h prn for severe pain.  - Surgery is scheduled for Monday; will hold heparin tomorrow at 6am.  - overnight npo on Sunday  - pt with no known history of ischemic or valvular disease. has a history of atrial fibrillation which is adequately rate controlled at this time. Pt appears euvolemic on exam. Can perform > 4 METs. RCRI class 1 risk. Pt is hypertensive in ED likely 2/2 pain. resume home bp medications. with adequate bp control pt is medically optimized for planned procedure.   - cardio Ean consulted: Hold furosemide; Hold rivaroxaban - last dose was 9/9 morning. Would wait until at least 9/11 morning prior to surgery if urgent (ideally would wait 72 hours). Optimized from cardiac standpoint as per Dr. Mckoy   - surgery following, further plan as per surgery     #Atrial Fibrillation   - pt with history of Afib, on metoprolol for rate control and xarelto for anticoagulation.  - continue with metoprolol with hold parameters  - hold xarelto as per cardio   - last TTE 5/2021, normal EF as per cardio   - may heparinize as per surgery, started hep gtt, patient scheduled for OR Monday 9/13 at 4 PM   - will resume Xarelto post op     Hypokalemia  - K of 3.4  - Repleted 40 po K   - f/u am CMP     # Acute kidney injury, 9/11  - RESOLVED   - lasix was on hold on admission, cont to hold  - continue losartan for now.   - continue to monitor     Constipation  - RESOLVED   - continue senna, Miralax prn for pain     #HTN  - chronic, pt is on losartan, hydralazine, amlodipine at home  - continue with home meds with hold parameters    # LE edema   - patient on lasix 1-2X a week PRN for LE swelling started by PCP, Dr. 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     #Sepsis 2/2 acute cholecystitis  - pt with RUQ pain that gradually worsened with associated nausea, found to have acute cholecystitis on RUQ ultrasound and HIDA.  - pt is s/p 3.375g zosyn, continue 3.375g q8h; IVF (NS + D5 @ 100 cc/hour)   - motrin 400mg q8h prn for mild pain, oxycodone 5mg prn for moderate pain, dilaudid 1mg IV q4h prn for severe pain.  - Surgery is scheduled for Monday; will hold heparin tomorrow at 6am.  - overnight npo on Sunday  - pt with no known history of ischemic or valvular disease. has a history of atrial fibrillation which is adequately rate controlled at this time. Pt appears euvolemic on exam. Can perform > 4 METs. RCRI class 1 risk. Pt is hypertensive in ED likely 2/2 pain. resume home bp medications. with adequate bp control pt is medically optimized for planned procedure.   - cardio Ean consulted: Hold furosemide; Hold rivaroxaban - last dose was 9/9 morning. Would wait until at least 9/11 morning prior to surgery if urgent (ideally would wait 72 hours). Optimized from cardiac standpoint as per Dr. Mckoy   - surgery following, further plan as per surgery     #Atrial Fibrillation   - pt with history of Afib, on metoprolol for rate control and xarelto for anticoagulation.  - continue with metoprolol with hold parameters  - hold xarelto as per cardio   - last TTE 5/2021, normal EF as per cardio   - may heparinize as per surgery, started hep gtt, patient scheduled for OR Monday 9/13 at 4 PM   - will resume Xarelto post op     Hypokalemia  - K of 3.4  - Repleted 40 po K   - f/u am CMP     # Acute kidney injury, 9/11  - RESOLVED   - lasix was on hold on admission, cont to hold  - continue losartan for now.   - continue to monitor     Constipation  - RESOLVED   - continue senna, Miralax prn for constipation     #HTN  - chronic, pt is on losartan, hydralazine, amlodipine at home  - continue with home meds with hold parameters    # LE edema   - patient on Lasix 1-2X a week PRN for LE swelling started by PCP, Dr. 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     #Sepsis 2/2 acute cholecystitis  - pt with RUQ pain that gradually worsened with associated nausea, found to have acute cholecystitis on RUQ ultrasound and HIDA.  - pt is s/p 3.375g zosyn, continue 3.375g q8h; IVF (NS + D5 @ 100 cc/hour)   - motrin 400mg q8h prn for mild pain, oxycodone 5mg prn for moderate pain, dilaudid 1mg IV q4h prn for severe pain.  - Surgery is scheduled for Monday; will hold heparin tomorrow at 6am.  - overnight npo on Sunday  - pt with no known history of ischemic or valvular disease. has a history of atrial fibrillation which is adequately rate controlled at this time. Pt appears euvolemic on exam. Can perform > 4 METs. RCRI class 1 risk. Pt is hypertensive in ED likely 2/2 pain. resume home bp medications. with adequate bp control pt is medically optimized for planned procedure.   - cardio Ean consulted: Hold furosemide; Hold rivaroxaban - last dose was 9/9 morning. Would wait until at least 9/11 morning prior to surgery if urgent (ideally would wait 72 hours).   - surgery following, further plan as per surgery     #Atrial Fibrillation   - pt with history of Afib, on metoprolol for rate control and xarelto for anticoagulation.  - continue with metoprolol with hold parameters  - hold xarelto as per cardio   - last TTE 5/2021, normal EF as per cardio   - may heparinize as per surgery, started hep gtt, patient scheduled for OR Monday 9/13 at 4 PM   - will resume Xarelto post op     Hypokalemia  - K of 3.4  - Repleted 40 po K   - f/u am CMP     # Acute kidney injury, 9/11  - RESOLVED   - lasix was on hold on admission, cont to hold  - continue losartan for now.   - continue to monitor     Constipation  - RESOLVED   - continue senna, Miralax prn for constipation     #HTN  - chronic, pt is on losartan, hydralazine, amlodipine at home  - continue with home meds with hold parameters    # LE edema   - patient on Lasix 1-2X a week PRN for LE swelling started by PCP, Dr. Villalba, patient states he started getting ankle swelling likely 2/2 to amlodipine  - hold Lasix for now     #gout  - chronic stable, continue home meds allopurinol and colchicine    #gerd  - pt says he occasionally takes pantoprazole  - continue to monitor    #ppx  - hold xarelto as per cardio   on heparin gtt

## 2021-09-13 LAB
ALBUMIN SERPL ELPH-MCNC: 2.7 G/DL — LOW (ref 3.3–5)
ALBUMIN SERPL ELPH-MCNC: 2.7 G/DL — LOW (ref 3.3–5)
ALP SERPL-CCNC: 87 U/L — SIGNIFICANT CHANGE UP (ref 40–120)
ALP SERPL-CCNC: 94 U/L — SIGNIFICANT CHANGE UP (ref 40–120)
ALT FLD-CCNC: 35 U/L — SIGNIFICANT CHANGE UP (ref 12–78)
ALT FLD-CCNC: 36 U/L — SIGNIFICANT CHANGE UP (ref 12–78)
ANION GAP SERPL CALC-SCNC: 5 MMOL/L — SIGNIFICANT CHANGE UP (ref 5–17)
ANION GAP SERPL CALC-SCNC: 5 MMOL/L — SIGNIFICANT CHANGE UP (ref 5–17)
ANION GAP SERPL CALC-SCNC: 6 MMOL/L — SIGNIFICANT CHANGE UP (ref 5–17)
APTT BLD: 31.4 SEC — SIGNIFICANT CHANGE UP (ref 27.5–35.5)
APTT BLD: 70.5 SEC — HIGH (ref 27.5–35.5)
AST SERPL-CCNC: 22 U/L — SIGNIFICANT CHANGE UP (ref 15–37)
AST SERPL-CCNC: 25 U/L — SIGNIFICANT CHANGE UP (ref 15–37)
BASOPHILS # BLD AUTO: 0.02 K/UL — SIGNIFICANT CHANGE UP (ref 0–0.2)
BASOPHILS NFR BLD AUTO: 0.2 % — SIGNIFICANT CHANGE UP (ref 0–2)
BILIRUB SERPL-MCNC: 1.2 MG/DL — SIGNIFICANT CHANGE UP (ref 0.2–1.2)
BILIRUB SERPL-MCNC: 1.8 MG/DL — HIGH (ref 0.2–1.2)
BUN SERPL-MCNC: 13 MG/DL — SIGNIFICANT CHANGE UP (ref 7–23)
BUN SERPL-MCNC: 7 MG/DL — SIGNIFICANT CHANGE UP (ref 7–23)
BUN SERPL-MCNC: 9 MG/DL — SIGNIFICANT CHANGE UP (ref 7–23)
CALCIUM SERPL-MCNC: 8.3 MG/DL — LOW (ref 8.5–10.1)
CALCIUM SERPL-MCNC: 8.6 MG/DL — SIGNIFICANT CHANGE UP (ref 8.5–10.1)
CALCIUM SERPL-MCNC: 8.8 MG/DL — SIGNIFICANT CHANGE UP (ref 8.5–10.1)
CHLORIDE SERPL-SCNC: 104 MMOL/L — SIGNIFICANT CHANGE UP (ref 96–108)
CHLORIDE SERPL-SCNC: 106 MMOL/L — SIGNIFICANT CHANGE UP (ref 96–108)
CHLORIDE SERPL-SCNC: 107 MMOL/L — SIGNIFICANT CHANGE UP (ref 96–108)
CO2 SERPL-SCNC: 26 MMOL/L — SIGNIFICANT CHANGE UP (ref 22–31)
CREAT SERPL-MCNC: 0.85 MG/DL — SIGNIFICANT CHANGE UP (ref 0.5–1.3)
CREAT SERPL-MCNC: 1 MG/DL — SIGNIFICANT CHANGE UP (ref 0.5–1.3)
CREAT SERPL-MCNC: 1.3 MG/DL — SIGNIFICANT CHANGE UP (ref 0.5–1.3)
EOSINOPHIL # BLD AUTO: 0.02 K/UL — SIGNIFICANT CHANGE UP (ref 0–0.5)
EOSINOPHIL NFR BLD AUTO: 0.2 % — SIGNIFICANT CHANGE UP (ref 0–6)
GLUCOSE SERPL-MCNC: 111 MG/DL — HIGH (ref 70–99)
GLUCOSE SERPL-MCNC: 116 MG/DL — HIGH (ref 70–99)
GLUCOSE SERPL-MCNC: 117 MG/DL — HIGH (ref 70–99)
HCT VFR BLD CALC: 40.7 % — SIGNIFICANT CHANGE UP (ref 39–50)
HGB BLD-MCNC: 13.9 G/DL — SIGNIFICANT CHANGE UP (ref 13–17)
IMM GRANULOCYTES NFR BLD AUTO: 0.3 % — SIGNIFICANT CHANGE UP (ref 0–1.5)
INR BLD: 1.42 RATIO — HIGH (ref 0.88–1.16)
LYMPHOCYTES # BLD AUTO: 1.11 K/UL — SIGNIFICANT CHANGE UP (ref 1–3.3)
LYMPHOCYTES # BLD AUTO: 9.7 % — LOW (ref 13–44)
MCHC RBC-ENTMCNC: 28.7 PG — SIGNIFICANT CHANGE UP (ref 27–34)
MCHC RBC-ENTMCNC: 34.2 GM/DL — SIGNIFICANT CHANGE UP (ref 32–36)
MCV RBC AUTO: 83.9 FL — SIGNIFICANT CHANGE UP (ref 80–100)
MONOCYTES # BLD AUTO: 1.27 K/UL — HIGH (ref 0–0.9)
MONOCYTES NFR BLD AUTO: 11 % — SIGNIFICANT CHANGE UP (ref 2–14)
NEUTROPHILS # BLD AUTO: 9.05 K/UL — HIGH (ref 1.8–7.4)
NEUTROPHILS NFR BLD AUTO: 78.6 % — HIGH (ref 43–77)
NRBC # BLD: 0 /100 WBCS — SIGNIFICANT CHANGE UP (ref 0–0)
PLATELET # BLD AUTO: 200 K/UL — SIGNIFICANT CHANGE UP (ref 150–400)
POTASSIUM SERPL-MCNC: 3.3 MMOL/L — LOW (ref 3.5–5.3)
POTASSIUM SERPL-MCNC: 3.8 MMOL/L — SIGNIFICANT CHANGE UP (ref 3.5–5.3)
POTASSIUM SERPL-MCNC: 3.9 MMOL/L — SIGNIFICANT CHANGE UP (ref 3.5–5.3)
POTASSIUM SERPL-SCNC: 3.3 MMOL/L — LOW (ref 3.5–5.3)
POTASSIUM SERPL-SCNC: 3.8 MMOL/L — SIGNIFICANT CHANGE UP (ref 3.5–5.3)
POTASSIUM SERPL-SCNC: 3.9 MMOL/L — SIGNIFICANT CHANGE UP (ref 3.5–5.3)
PROT SERPL-MCNC: 6.9 G/DL — SIGNIFICANT CHANGE UP (ref 6–8.3)
PROT SERPL-MCNC: 7.2 G/DL — SIGNIFICANT CHANGE UP (ref 6–8.3)
PROTHROM AB SERPL-ACNC: 16.3 SEC — HIGH (ref 10.6–13.6)
RBC # BLD: 4.85 M/UL — SIGNIFICANT CHANGE UP (ref 4.2–5.8)
RBC # FLD: 13.2 % — SIGNIFICANT CHANGE UP (ref 10.3–14.5)
SODIUM SERPL-SCNC: 136 MMOL/L — SIGNIFICANT CHANGE UP (ref 135–145)
SODIUM SERPL-SCNC: 137 MMOL/L — SIGNIFICANT CHANGE UP (ref 135–145)
SODIUM SERPL-SCNC: 138 MMOL/L — SIGNIFICANT CHANGE UP (ref 135–145)
TROPONIN I SERPL-MCNC: <.015 NG/ML — SIGNIFICANT CHANGE UP (ref 0.01–0.04)
WBC # BLD: 11.5 K/UL — HIGH (ref 3.8–10.5)
WBC # FLD AUTO: 11.5 K/UL — HIGH (ref 3.8–10.5)

## 2021-09-13 PROCEDURE — 71045 X-RAY EXAM CHEST 1 VIEW: CPT | Mod: 26

## 2021-09-13 PROCEDURE — 99232 SBSQ HOSP IP/OBS MODERATE 35: CPT | Mod: GC

## 2021-09-13 PROCEDURE — 99232 SBSQ HOSP IP/OBS MODERATE 35: CPT

## 2021-09-13 PROCEDURE — 93010 ELECTROCARDIOGRAM REPORT: CPT

## 2021-09-13 RX ORDER — METOPROLOL TARTRATE 50 MG
50 TABLET ORAL ONCE
Refills: 0 | Status: COMPLETED | OUTPATIENT
Start: 2021-09-13 | End: 2021-09-13

## 2021-09-13 RX ORDER — LANOLIN ALCOHOL/MO/W.PET/CERES
3 CREAM (GRAM) TOPICAL AT BEDTIME
Refills: 0 | Status: DISCONTINUED | OUTPATIENT
Start: 2021-09-13 | End: 2021-09-15

## 2021-09-13 RX ORDER — HYDROMORPHONE HYDROCHLORIDE 2 MG/ML
1 INJECTION INTRAMUSCULAR; INTRAVENOUS; SUBCUTANEOUS EVERY 4 HOURS
Refills: 0 | Status: DISCONTINUED | OUTPATIENT
Start: 2021-09-13 | End: 2021-09-15

## 2021-09-13 RX ORDER — POTASSIUM CHLORIDE 20 MEQ
10 PACKET (EA) ORAL ONCE
Refills: 0 | Status: COMPLETED | OUTPATIENT
Start: 2021-09-13 | End: 2021-09-13

## 2021-09-13 RX ORDER — LOSARTAN POTASSIUM 100 MG/1
100 TABLET, FILM COATED ORAL DAILY
Refills: 0 | Status: DISCONTINUED | OUTPATIENT
Start: 2021-09-13 | End: 2021-09-15

## 2021-09-13 RX ORDER — HYDRALAZINE HCL 50 MG
25 TABLET ORAL
Refills: 0 | Status: DISCONTINUED | OUTPATIENT
Start: 2021-09-13 | End: 2021-09-15

## 2021-09-13 RX ORDER — POTASSIUM CHLORIDE 20 MEQ
40 PACKET (EA) ORAL EVERY 4 HOURS
Refills: 0 | Status: DISCONTINUED | OUTPATIENT
Start: 2021-09-13 | End: 2021-09-13

## 2021-09-13 RX ORDER — ALLOPURINOL 300 MG
100 TABLET ORAL DAILY
Refills: 0 | Status: DISCONTINUED | OUTPATIENT
Start: 2021-09-13 | End: 2021-09-15

## 2021-09-13 RX ORDER — BRIMONIDINE TARTRATE 2 MG/MG
1 SOLUTION/ DROPS OPHTHALMIC
Refills: 0 | Status: DISCONTINUED | OUTPATIENT
Start: 2021-09-13 | End: 2021-09-15

## 2021-09-13 RX ORDER — SODIUM CHLORIDE 9 MG/ML
1000 INJECTION, SOLUTION INTRAVENOUS
Refills: 0 | Status: DISCONTINUED | OUTPATIENT
Start: 2021-09-13 | End: 2021-09-15

## 2021-09-13 RX ORDER — ACETAMINOPHEN 500 MG
650 TABLET ORAL EVERY 6 HOURS
Refills: 0 | Status: DISCONTINUED | OUTPATIENT
Start: 2021-09-13 | End: 2021-09-15

## 2021-09-13 RX ORDER — AMLODIPINE BESYLATE 2.5 MG/1
10 TABLET ORAL DAILY
Refills: 0 | Status: DISCONTINUED | OUTPATIENT
Start: 2021-09-13 | End: 2021-09-15

## 2021-09-13 RX ORDER — METOPROLOL TARTRATE 50 MG
100 TABLET ORAL DAILY
Refills: 0 | Status: DISCONTINUED | OUTPATIENT
Start: 2021-09-13 | End: 2021-09-15

## 2021-09-13 RX ORDER — DILTIAZEM HCL 120 MG
10 CAPSULE, EXT RELEASE 24 HR ORAL
Qty: 125 | Refills: 0 | Status: DISCONTINUED | OUTPATIENT
Start: 2021-09-13 | End: 2021-09-13

## 2021-09-13 RX ORDER — PIPERACILLIN AND TAZOBACTAM 4; .5 G/20ML; G/20ML
3.38 INJECTION, POWDER, LYOPHILIZED, FOR SOLUTION INTRAVENOUS EVERY 8 HOURS
Refills: 0 | Status: DISCONTINUED | OUTPATIENT
Start: 2021-09-13 | End: 2021-09-15

## 2021-09-13 RX ORDER — DILTIAZEM HCL 120 MG
5 CAPSULE, EXT RELEASE 24 HR ORAL
Qty: 125 | Refills: 0 | Status: DISCONTINUED | OUTPATIENT
Start: 2021-09-13 | End: 2021-09-13

## 2021-09-13 RX ORDER — ONDANSETRON 8 MG/1
4 TABLET, FILM COATED ORAL EVERY 6 HOURS
Refills: 0 | Status: DISCONTINUED | OUTPATIENT
Start: 2021-09-13 | End: 2021-09-15

## 2021-09-13 RX ORDER — COLCHICINE 0.6 MG
0.6 TABLET ORAL DAILY
Refills: 0 | Status: DISCONTINUED | OUTPATIENT
Start: 2021-09-13 | End: 2021-09-15

## 2021-09-13 RX ORDER — POLYETHYLENE GLYCOL 3350 17 G/17G
17 POWDER, FOR SOLUTION ORAL DAILY
Refills: 0 | Status: DISCONTINUED | OUTPATIENT
Start: 2021-09-13 | End: 2021-09-15

## 2021-09-13 RX ORDER — LOSARTAN POTASSIUM 100 MG/1
100 TABLET, FILM COATED ORAL DAILY
Refills: 0 | Status: DISCONTINUED | OUTPATIENT
Start: 2021-09-13 | End: 2021-09-13

## 2021-09-13 RX ORDER — SENNA PLUS 8.6 MG/1
2 TABLET ORAL AT BEDTIME
Refills: 0 | Status: DISCONTINUED | OUTPATIENT
Start: 2021-09-13 | End: 2021-09-15

## 2021-09-13 RX ORDER — TIMOLOL 0.5 %
1 DROPS OPHTHALMIC (EYE)
Refills: 0 | Status: DISCONTINUED | OUTPATIENT
Start: 2021-09-13 | End: 2021-09-15

## 2021-09-13 RX ADMIN — Medication 100 MILLIGRAM(S): at 17:51

## 2021-09-13 RX ADMIN — PIPERACILLIN AND TAZOBACTAM 25 GRAM(S): 4; .5 INJECTION, POWDER, LYOPHILIZED, FOR SOLUTION INTRAVENOUS at 21:00

## 2021-09-13 RX ADMIN — Medication 100 MILLIGRAM(S): at 05:02

## 2021-09-13 RX ADMIN — AMLODIPINE BESYLATE 10 MILLIGRAM(S): 2.5 TABLET ORAL at 05:02

## 2021-09-13 RX ADMIN — HYDROMORPHONE HYDROCHLORIDE 1 MILLIGRAM(S): 2 INJECTION INTRAMUSCULAR; INTRAVENOUS; SUBCUTANEOUS at 02:00

## 2021-09-13 RX ADMIN — Medication 1 DROP(S): at 05:02

## 2021-09-13 RX ADMIN — HYDROMORPHONE HYDROCHLORIDE 1 MILLIGRAM(S): 2 INJECTION INTRAMUSCULAR; INTRAVENOUS; SUBCUTANEOUS at 02:30

## 2021-09-13 RX ADMIN — LOSARTAN POTASSIUM 100 MILLIGRAM(S): 100 TABLET, FILM COATED ORAL at 17:50

## 2021-09-13 RX ADMIN — Medication 650 MILLIGRAM(S): at 06:29

## 2021-09-13 RX ADMIN — BRIMONIDINE TARTRATE 1 DROP(S): 2 SOLUTION/ DROPS OPHTHALMIC at 05:02

## 2021-09-13 RX ADMIN — Medication 50 MILLIGRAM(S): at 16:38

## 2021-09-13 RX ADMIN — SODIUM CHLORIDE 75 MILLILITER(S): 9 INJECTION, SOLUTION INTRAVENOUS at 09:03

## 2021-09-13 RX ADMIN — Medication 5 MG/HR: at 14:12

## 2021-09-13 RX ADMIN — Medication 1 DROP(S): at 17:50

## 2021-09-13 RX ADMIN — Medication 650 MILLIGRAM(S): at 04:47

## 2021-09-13 RX ADMIN — Medication 40 MILLIEQUIVALENT(S): at 09:02

## 2021-09-13 RX ADMIN — Medication 25 MILLIGRAM(S): at 05:02

## 2021-09-13 RX ADMIN — Medication 30 MILLILITER(S): at 17:51

## 2021-09-13 RX ADMIN — PIPERACILLIN AND TAZOBACTAM 25 GRAM(S): 4; .5 INJECTION, POWDER, LYOPHILIZED, FOR SOLUTION INTRAVENOUS at 03:05

## 2021-09-13 RX ADMIN — Medication 25 MILLIGRAM(S): at 17:51

## 2021-09-13 RX ADMIN — Medication 100 MILLIEQUIVALENT(S): at 09:59

## 2021-09-13 RX ADMIN — SODIUM CHLORIDE 100 MILLILITER(S): 9 INJECTION, SOLUTION INTRAVENOUS at 18:15

## 2021-09-13 RX ADMIN — HEPARIN SODIUM 1800 UNIT(S)/HR: 5000 INJECTION INTRAVENOUS; SUBCUTANEOUS at 06:01

## 2021-09-13 RX ADMIN — PIPERACILLIN AND TAZOBACTAM 25 GRAM(S): 4; .5 INJECTION, POWDER, LYOPHILIZED, FOR SOLUTION INTRAVENOUS at 12:52

## 2021-09-13 RX ADMIN — BRIMONIDINE TARTRATE 1 DROP(S): 2 SOLUTION/ DROPS OPHTHALMIC at 17:50

## 2021-09-13 NOTE — PROGRESS NOTE ADULT - ASSESSMENT
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    Plan:  - ECG with nonspecific findings, likely LVH related. Known atrial fibrillation - rate controlled.  - Last echo 6/21 with normal LV systolic function, no significant valve issues  - Last stress test in 2019 which was normal  - Continue amlodipine 10 mg daily  - Continue metoprolol succinate 100 mg daily  - Continue losartan 100 mg daily  - Continue hydralazine 25 mg bid  - Hold furosemide  - Resume rivaroxaban when ok from a surgical standpoint  - Surgery follow-up  - There are no active cardiac issues. The patient is at intermediate risk for cardiac events for an intermediate risk surgery. He is optimized to proceed from a cardiac standpoint.

## 2021-09-13 NOTE — PROGRESS NOTE ADULT - SUBJECTIVE AND OBJECTIVE BOX
Patient brought to the operating room and after induction it was noted that the patient was in atrial fibrillation with rapid ventricular response.  Patient was treated with beta blockers and phenylephrine, however the HR did not improve.  HR's in the 140s noted, after discussion with Dr. Meza the procedure was stopped before it had begun and the patient was brought to the recovery room.  Patient awake and alert, denies chest pain or other complaint.  Dr. Baldemar Mckoy called from cardiology for evaluation, 12 lead EKG ordered and troponins.  We will continue to evaluate the patient and treat the patient per cardiology recomendations.

## 2021-09-13 NOTE — CHART NOTE - NSCHARTNOTEFT_GEN_A_CORE
Upon arriving to OR and initiation of anesthesia, patient noted to become tachycardic to 120-130s.  Surgery cancelled and in PACU he was started on a diltiazem drip  Currently HR ranging 80-100s    Plan:  - Unclear why patient tachycardic in OR - HR  - Give metoprolol tartrate 50 mg PO now  - Discontinue diltiazem drip  - Resume prior PO medications (metoprolol succinate, amlodipine, losartan, hydralazine)  - No need for heparin drip - resume rivaroxaban after cholecystostomy tube  - Check CXR for cough

## 2021-09-13 NOTE — PROGRESS NOTE ADULT - SUBJECTIVE AND OBJECTIVE BOX
Chief Complaint: Abdominal pain    Interval Events: Abdominal pain overnight.    Review of Systems:  General: No fevers, chills, weight loss or gain  Skin: No rashes, color changes  Cardiovascular: No chest pain, orthopnea  Respiratory: No shortness of breath, cough  Gastrointestinal: No nausea, (+) abdominal pain  Genitourinary: No incontinence, pain with urination  Musculoskeletal: No pain, swelling, decreased range of motion  Neurological: No headache, weakness  Psychiatric: No depression, anxiety  Endocrine: No weight loss or gain, increased thirst  All other systems are comprehensively negative.    Physical Exam:  Vitals:        Vital Signs Last 24 Hrs  T(C): 37.1 (13 Sep 2021 07:23), Max: 38.1 (13 Sep 2021 04:36)  T(F): 98.7 (13 Sep 2021 07:23), Max: 100.5 (13 Sep 2021 04:36)  HR: 95 (13 Sep 2021 04:36) (72 - 95)  BP: 142/92 (13 Sep 2021 04:36) (136/94 - 152/97)  BP(mean): --  RR: 17 (13 Sep 2021 04:36) (17 - 18)  SpO2: 94% (13 Sep 2021 04:36) (94% - 95%)  General: NAD  HEENT: MMM  Neck: No JVD, no carotid bruit  Lungs: CTAB  CV: RRR, nl S1/S2, no M/R/G  Abdomen: S/NT/ND, +BS  Extremities: No LE edema, no cyanosis  Neuro: AAOx3, non-focal  Skin: No rash    Labs:                        13.9   11.50 )-----------( 200      ( 13 Sep 2021 05:28 )             40.7     09-13    137  |  106  |  7   ----------------------------<  117<H>  3.3<L>   |  26  |  0.85    Ca    8.3<L>      13 Sep 2021 05:28    TPro  6.9  /  Alb  2.7<L>  /  TBili  1.2  /  DBili  x   /  AST  22  /  ALT  35  /  AlkPhos  87  09-13        PT/INR - ( 13 Sep 2021 05:28 )   PT: 16.3 sec;   INR: 1.42 ratio         PTT - ( 13 Sep 2021 05:28 )  PTT:70.5 sec

## 2021-09-13 NOTE — PROGRESS NOTE ADULT - ASSESSMENT
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     #Sepsis 2/2 acute cholecystitis  - On IV zosyn and IVF  - Holding Xarelto, Hold heparin gtt this AM per surgery, scheduled for surgery today 9/13  - NPO  - Pt with no known history of ischemic or valvular disease. has a history of atrial fibrillation which is adequately rate controlled at this time. Pt appears euvolemic on exam. Can perform > 4 METs. RCRI class 1 risk.   Medically optimized for planned procedure.   - surgery following, further plan as per surgery     #Atrial Fibrillation   - pt with history of Afib, on metoprolol for rate control and xarelto for anticoagulation.  - continue with metoprolol with hold parameters  - hold xarelto as per cardio (on hep gtt while xarelto on hold)  - last TTE 5/2021, normal EF as per cardio   - may heparinize as per surgery, started hep gtt, patient scheduled for OR Monday 9/13 at 4 PM     Hypokalemia, acute  - Replete PRN    # Acute kidney injury, 9/11  - RESOLVED   - lasix was on hold on admission, cont to hold    Constipation  - RESOLVED   - continue senna, Miralax prn for constipation     #HTN, chronic  - chronic, pt is on losartan, hydralazine, amlodipine at home  - continue with home meds with hold parameters    # LE edema   - patient on Lasix 1-2X a week PRN for LE swelling started by PCP, Dr. Villalba, patient states he started getting ankle swelling likely 2/2 to amlodipine  - hold Lasix for now     #Gout, chronic  - continue home meds allopurinol and colchicine    #GERD, chronic  - pt says he occasionally takes pantoprazole    #Prophylaxis - hold xarelto as per cardio   on heparin gtt (hold hours prior to surgery)

## 2021-09-13 NOTE — PROGRESS NOTE ADULT - SUBJECTIVE AND OBJECTIVE BOX
KATIE VALLES  MRN-[id number] 65y    GENERAL SURGERY/ DR. ZHAO    LOW GRADE FEVER, NO CHILLS  NO N/V  ON/ OFF RUQ ABDOMINAL PAIN     MEDICATIONS  (STANDING):  allopurinol 100 milliGRAM(s) Oral daily  amLODIPine   Tablet 10 milliGRAM(s) Oral daily  brimonidine 0.2% Ophthalmic Solution 1 Drop(s) Both EYES two times a day  colchicine 0.6 milliGRAM(s) Oral daily  dextrose 5% + lactated ringers. 1000 milliLiter(s) (75 mL/Hr) IV Continuous <Continuous>  heparin  Infusion.  Unit(s)/Hr (17 mL/Hr) IV Continuous <Continuous>  hydrALAZINE 25 milliGRAM(s) Oral two times a day  metoprolol succinate  milliGRAM(s) Oral daily  piperacillin/tazobactam IVPB.. 3.375 Gram(s) IV Intermittent every 8 hours  potassium chloride    Tablet ER 40 milliEquivalent(s) Oral every 4 hours  potassium chloride  10 mEq/100 mL IVPB 10 milliEquivalent(s) IV Intermittent once  senna 2 Tablet(s) Oral at bedtime  timolol 0.5% Solution 1 Drop(s) Both EYES two times a day    MEDICATIONS  (PRN):  acetaminophen   Tablet .. 650 milliGRAM(s) Oral every 6 hours PRN Temp greater or equal to 38C (100.4F), Mild Pain (1 - 3)  aluminum hydroxide/magnesium hydroxide/simethicone Suspension 30 milliLiter(s) Oral every 4 hours PRN Dyspepsia  heparin   Injectable 7500 Unit(s) IV Push every 6 hours PRN For aPTT less than 40  heparin   Injectable 3500 Unit(s) IV Push every 6 hours PRN For aPTT between 40 - 57  HYDROmorphone  Injectable 1 milliGRAM(s) IV Push every 4 hours PRN Severe Pain (7 - 10)  melatonin 3 milliGRAM(s) Oral at bedtime PRN Insomnia  ondansetron Injectable 4 milliGRAM(s) IV Push every 6 hours PRN Nausea  polyethylene glycol 3350 17 Gram(s) Oral daily PRN Constipation    Vital Signs Last 24 Hrs  T(C): 37.1 (13 Sep 2021 07:23), Max: 38.1 (13 Sep 2021 04:36)  T(F): 98.7 (13 Sep 2021 07:23), Max: 100.5 (13 Sep 2021 04:36)  HR: 95 (13 Sep 2021 04:36) (72 - 95)  BP: 142/92 (13 Sep 2021 04:36) (136/94 - 152/97)  RR: 17 (13 Sep 2021 04:36) (17 - 18)  SpO2: 94% (13 Sep 2021 04:36) (94% - 95%)    09-12-21 @ 07:01  -  09-13-21 @ 07:00  --------------------------------------------------------  IN: 390 mL / OUT: 0 mL / NET: 390 mL      LUNGS: CLEAR TO AUSCULTATION , NO W/R/R  ABDOMEN: +BS, SOFT, NON DISTENDED, NO PALPABLE MASS , RUQ TENDERNESS ON DEEP PALPATION, NEGATIVE PENNY'S SIGN   EXTREMITY: NO EDEMA, NO CALF TENDERNESS                              13.9   11.50 )-----------( 200      ( 13 Sep 2021 05:28 )             40.7      09-13    137  |  106  |  7   ----------------------------<  117<H>  3.3<L>   |  26  |  0.85    Ca    8.3<L>      13 Sep 2021 05:28    TPro  6.9  /  Alb  2.7<L>  /  TBili  1.2  /  DBili  x   /  AST  22  /  ALT  35  /  AlkPhos  87  09-13    PT/INR - ( 13 Sep 2021 05:28 )   PT: 16.3 sec;   INR: 1.42 ratio    PTT - ( 13 Sep 2021 05:28 )  PTT:70.5 sec  ABO RH Interpretation: O POS (09.12.21 @ 08:14)     COVID-19 PCR: Not Detec (12 Sep 2021 07:11)  COVID-19 PCR: Not Detec (10 Sep 2021 01:05)                            ASSESSMENT &  PLAN:     ACUTE CHOLECYSTITIS   HYPOKALEMIA  HISTORY OF AFIB, HTN, SPLENIC INFARCT. ON XARELTO AT HOME, HELD SINCE ADMISSION. ON HEPARIN DRIP SINCE ADMISSION     NPO  ZOSYN  POTASSIUM SUPPLEMENTS, DISCUSSED WITH NURSING    HEPARIN DRIP ON HOLD SINCE 6 AM THIS MORNING, DISCUSSED WITH NURSING   CARDIOLOGY F/U NOTED, CLEAR / OPTIMIZED FOR SURGERY    MEDICAL F/U NOTED, CLEAR / OPTIMIZED FOR SURGERY   PLAN FOR LAPAROSCOPIC POSSIBLE OPEN  CHOLECYSTECTOMY TODAY  PLAN DISCUSSED WITH PATIENT AND NURSING.

## 2021-09-13 NOTE — PROGRESS NOTE ADULT - SUBJECTIVE AND OBJECTIVE BOX
Patient is a 65y old  Male who presents with a chief complaint of Acute cholecystitis (13 Sep 2021 08:45)      INTERVAL HPI/OVERNIGHT EVENTS: Patient seen and examined at bedside. Overnight events reviewed- pt had RUQ pain given dilaudid prn  Patient has no new complaints at this time, states abd pain is controlled.     MEDICATIONS  (STANDING):  allopurinol 100 milliGRAM(s) Oral daily  amLODIPine   Tablet 10 milliGRAM(s) Oral daily  brimonidine 0.2% Ophthalmic Solution 1 Drop(s) Both EYES two times a day  colchicine 0.6 milliGRAM(s) Oral daily  dextrose 5% + lactated ringers. 1000 milliLiter(s) (75 mL/Hr) IV Continuous <Continuous>  hydrALAZINE 25 milliGRAM(s) Oral two times a day  metoprolol succinate  milliGRAM(s) Oral daily  piperacillin/tazobactam IVPB.. 3.375 Gram(s) IV Intermittent every 8 hours  potassium chloride    Tablet ER 40 milliEquivalent(s) Oral every 4 hours  potassium chloride  10 mEq/100 mL IVPB 10 milliEquivalent(s) IV Intermittent once  senna 2 Tablet(s) Oral at bedtime  timolol 0.5% Solution 1 Drop(s) Both EYES two times a day    MEDICATIONS  (PRN):  acetaminophen   Tablet .. 650 milliGRAM(s) Oral every 6 hours PRN Temp greater or equal to 38C (100.4F), Mild Pain (1 - 3)  aluminum hydroxide/magnesium hydroxide/simethicone Suspension 30 milliLiter(s) Oral every 4 hours PRN Dyspepsia  heparin   Injectable 7500 Unit(s) IV Push every 6 hours PRN For aPTT less than 40  heparin   Injectable 3500 Unit(s) IV Push every 6 hours PRN For aPTT between 40 - 57  HYDROmorphone  Injectable 1 milliGRAM(s) IV Push every 4 hours PRN Severe Pain (7 - 10)  melatonin 3 milliGRAM(s) Oral at bedtime PRN Insomnia  ondansetron Injectable 4 milliGRAM(s) IV Push every 6 hours PRN Nausea  polyethylene glycol 3350 17 Gram(s) Oral daily PRN Constipation      Allergies    No Known Allergies    Intolerances    REVIEW OF SYSTEMS:  CONSTITUTIONAL: No fever or chills  HEENT:  No headache, no sore throat  RESPIRATORY: No cough, wheezing, or shortness of breath  CARDIOVASCULAR: No chest pain, palpitations  GASTROINTESTINAL: + abd pain , no N/v/D  GENITOURINARY: No dysuria, frequency, or hematuria  NEUROLOGICAL: no focal weakness or dizziness  MUSCULOSKELETAL: no myalgias     Vital Signs Last 24 Hrs  T(C): 37.1 (13 Sep 2021 07:23), Max: 38.1 (13 Sep 2021 04:36)  T(F): 98.7 (13 Sep 2021 07:23), Max: 100.5 (13 Sep 2021 04:36)  HR: 95 (13 Sep 2021 04:36) (72 - 95)  BP: 142/92 (13 Sep 2021 04:36) (136/94 - 152/97)  BP(mean): --  RR: 17 (13 Sep 2021 04:36) (17 - 18)  SpO2: 94% (13 Sep 2021 04:36) (94% - 95%)    PHYSICAL EXAM:  GENERAL: NAD  HEENT:  anicteric, moist mucous membranes  CHEST/LUNG:  CTA b/l, no rales, wheezes, or rhonchi  HEART:  RRR, S1, S2  ABDOMEN:  BS+, soft, very mild tender in ruq, nondistended  EXTREMITIES: no edema, cyanosis, or calf tenderness  NERVOUS SYSTEM: answers questions and follows commands appropriately    LABS:                        13.9   11.50 )-----------( 200      ( 13 Sep 2021 05:28 )             40.7     13 Sep 2021 05:28    137    |  106    |  7      ----------------------------<  117    3.3     |  26     |  0.85     Ca    8.3        13 Sep 2021 05:28    TPro  6.9    /  Alb  2.7    /  TBili  1.2    /  DBili  x      /  AST  22     /  ALT  35     /  AlkPhos  87     13 Sep 2021 05:28    PT/INR - ( 13 Sep 2021 05:28 )   PT: 16.3 sec;   INR: 1.42 ratio    PTT - ( 13 Sep 2021 05:28 )  PTT:70.5 sec    CAPILLARY BLOOD GLUCOSE    Culture - Urine (collected 09-11-21 @ 16:27)  Source: Clean Catch Clean Catch (Midstream)  Final Report (09-12-21 @ 13:12):    No growth    Culture - Blood (collected 09-11-21 @ 00:29)  Source: .Blood Blood  Preliminary Report (09-12-21 @ 06:14):    No growth to date.    Culture - Blood (collected 09-11-21 @ 00:29)  Source: .Blood Blood  Preliminary Report (09-12-21 @ 06:14):    No growth to date.    RADIOLOGY & ADDITIONAL TESTS:  Personally reviewed.   Consultant(s) Notes Reviewed:  [x] YES  [ ] NO

## 2021-09-13 NOTE — PROGRESS NOTE ADULT - SUBJECTIVE AND OBJECTIVE BOX
Pt brought to OR for Lap Noy.  Cleared by Med and Cardiology.  Off Heparin drip (hx of afib with Splenic Infarct, on Xarelto as outpt).    Upon induction/intubation the patient had multiple runs of A fib.  Unable to control with Beta blockers.    Determination collectively made to abort procedure for further medical management    Gallbladder will require Perc Drainage by IR.  Will help facilitate/coordinate procedure.    Discussed with Cardiologist (Dr Mckoy) and PMD (Dr Villalba).

## 2021-09-14 LAB
ALBUMIN SERPL ELPH-MCNC: 2.4 G/DL — LOW (ref 3.3–5)
ALP SERPL-CCNC: 87 U/L — SIGNIFICANT CHANGE UP (ref 40–120)
ALT FLD-CCNC: 35 U/L — SIGNIFICANT CHANGE UP (ref 12–78)
ANION GAP SERPL CALC-SCNC: 7 MMOL/L — SIGNIFICANT CHANGE UP (ref 5–17)
APTT BLD: 28.1 SEC — SIGNIFICANT CHANGE UP (ref 27.5–35.5)
AST SERPL-CCNC: 26 U/L — SIGNIFICANT CHANGE UP (ref 15–37)
BASOPHILS # BLD AUTO: 0.01 K/UL — SIGNIFICANT CHANGE UP (ref 0–0.2)
BASOPHILS NFR BLD AUTO: 0.1 % — SIGNIFICANT CHANGE UP (ref 0–2)
BILIRUB SERPL-MCNC: 1.5 MG/DL — HIGH (ref 0.2–1.2)
BUN SERPL-MCNC: 12 MG/DL — SIGNIFICANT CHANGE UP (ref 7–23)
CALCIUM SERPL-MCNC: 7.9 MG/DL — LOW (ref 8.5–10.1)
CHLORIDE SERPL-SCNC: 108 MMOL/L — SIGNIFICANT CHANGE UP (ref 96–108)
CO2 SERPL-SCNC: 24 MMOL/L — SIGNIFICANT CHANGE UP (ref 22–31)
CREAT SERPL-MCNC: 0.97 MG/DL — SIGNIFICANT CHANGE UP (ref 0.5–1.3)
EOSINOPHIL # BLD AUTO: 0.03 K/UL — SIGNIFICANT CHANGE UP (ref 0–0.5)
EOSINOPHIL NFR BLD AUTO: 0.3 % — SIGNIFICANT CHANGE UP (ref 0–6)
GLUCOSE SERPL-MCNC: 112 MG/DL — HIGH (ref 70–99)
GRAM STN FLD: SIGNIFICANT CHANGE UP
HCT VFR BLD CALC: 37.4 % — LOW (ref 39–50)
HGB BLD-MCNC: 12.9 G/DL — LOW (ref 13–17)
IMM GRANULOCYTES NFR BLD AUTO: 0.5 % — SIGNIFICANT CHANGE UP (ref 0–1.5)
INR BLD: 1.46 RATIO — HIGH (ref 0.88–1.16)
LYMPHOCYTES # BLD AUTO: 1.12 K/UL — SIGNIFICANT CHANGE UP (ref 1–3.3)
LYMPHOCYTES # BLD AUTO: 12 % — LOW (ref 13–44)
MCHC RBC-ENTMCNC: 29.4 PG — SIGNIFICANT CHANGE UP (ref 27–34)
MCHC RBC-ENTMCNC: 34.5 GM/DL — SIGNIFICANT CHANGE UP (ref 32–36)
MCV RBC AUTO: 85.2 FL — SIGNIFICANT CHANGE UP (ref 80–100)
MONOCYTES # BLD AUTO: 1.25 K/UL — HIGH (ref 0–0.9)
MONOCYTES NFR BLD AUTO: 13.4 % — SIGNIFICANT CHANGE UP (ref 2–14)
NEUTROPHILS # BLD AUTO: 6.86 K/UL — SIGNIFICANT CHANGE UP (ref 1.8–7.4)
NEUTROPHILS NFR BLD AUTO: 73.7 % — SIGNIFICANT CHANGE UP (ref 43–77)
NRBC # BLD: 0 /100 WBCS — SIGNIFICANT CHANGE UP (ref 0–0)
PLATELET # BLD AUTO: 186 K/UL — SIGNIFICANT CHANGE UP (ref 150–400)
POTASSIUM SERPL-MCNC: 3.4 MMOL/L — LOW (ref 3.5–5.3)
POTASSIUM SERPL-SCNC: 3.4 MMOL/L — LOW (ref 3.5–5.3)
PROT SERPL-MCNC: 6.7 G/DL — SIGNIFICANT CHANGE UP (ref 6–8.3)
PROTHROM AB SERPL-ACNC: 16.8 SEC — HIGH (ref 10.6–13.6)
RBC # BLD: 4.39 M/UL — SIGNIFICANT CHANGE UP (ref 4.2–5.8)
RBC # FLD: 13.2 % — SIGNIFICANT CHANGE UP (ref 10.3–14.5)
SODIUM SERPL-SCNC: 139 MMOL/L — SIGNIFICANT CHANGE UP (ref 135–145)
SPECIMEN SOURCE: SIGNIFICANT CHANGE UP
WBC # BLD: 9.32 K/UL — SIGNIFICANT CHANGE UP (ref 3.8–10.5)
WBC # FLD AUTO: 9.32 K/UL — SIGNIFICANT CHANGE UP (ref 3.8–10.5)

## 2021-09-14 PROCEDURE — 47490 INCISION OF GALLBLADDER: CPT

## 2021-09-14 PROCEDURE — 99232 SBSQ HOSP IP/OBS MODERATE 35: CPT | Mod: GC

## 2021-09-14 PROCEDURE — 99232 SBSQ HOSP IP/OBS MODERATE 35: CPT

## 2021-09-14 RX ORDER — FLUTICASONE PROPIONATE 50 MCG
1 SPRAY, SUSPENSION NASAL
Refills: 0 | Status: DISCONTINUED | OUTPATIENT
Start: 2021-09-14 | End: 2021-09-15

## 2021-09-14 RX ORDER — LOPERAMIDE HCL 2 MG
2 TABLET ORAL ONCE
Refills: 0 | Status: COMPLETED | OUTPATIENT
Start: 2021-09-14 | End: 2021-09-14

## 2021-09-14 RX ORDER — OXYCODONE AND ACETAMINOPHEN 5; 325 MG/1; MG/1
1 TABLET ORAL EVERY 6 HOURS
Refills: 0 | Status: DISCONTINUED | OUTPATIENT
Start: 2021-09-14 | End: 2021-09-15

## 2021-09-14 RX ORDER — POTASSIUM CHLORIDE 20 MEQ
40 PACKET (EA) ORAL ONCE
Refills: 0 | Status: COMPLETED | OUTPATIENT
Start: 2021-09-14 | End: 2021-09-14

## 2021-09-14 RX ADMIN — OXYCODONE AND ACETAMINOPHEN 1 TABLET(S): 5; 325 TABLET ORAL at 20:40

## 2021-09-14 RX ADMIN — PIPERACILLIN AND TAZOBACTAM 25 GRAM(S): 4; .5 INJECTION, POWDER, LYOPHILIZED, FOR SOLUTION INTRAVENOUS at 14:01

## 2021-09-14 RX ADMIN — Medication 1 DROP(S): at 17:02

## 2021-09-14 RX ADMIN — Medication 25 MILLIGRAM(S): at 05:33

## 2021-09-14 RX ADMIN — HYDROMORPHONE HYDROCHLORIDE 1 MILLIGRAM(S): 2 INJECTION INTRAMUSCULAR; INTRAVENOUS; SUBCUTANEOUS at 15:27

## 2021-09-14 RX ADMIN — HYDROMORPHONE HYDROCHLORIDE 1 MILLIGRAM(S): 2 INJECTION INTRAMUSCULAR; INTRAVENOUS; SUBCUTANEOUS at 11:44

## 2021-09-14 RX ADMIN — HYDROMORPHONE HYDROCHLORIDE 1 MILLIGRAM(S): 2 INJECTION INTRAMUSCULAR; INTRAVENOUS; SUBCUTANEOUS at 12:10

## 2021-09-14 RX ADMIN — Medication 100 MILLIGRAM(S): at 14:01

## 2021-09-14 RX ADMIN — SODIUM CHLORIDE 100 MILLILITER(S): 9 INJECTION, SOLUTION INTRAVENOUS at 04:29

## 2021-09-14 RX ADMIN — Medication 2 MILLIGRAM(S): at 00:40

## 2021-09-14 RX ADMIN — Medication 25 MILLIGRAM(S): at 17:04

## 2021-09-14 RX ADMIN — PIPERACILLIN AND TAZOBACTAM 25 GRAM(S): 4; .5 INJECTION, POWDER, LYOPHILIZED, FOR SOLUTION INTRAVENOUS at 05:32

## 2021-09-14 RX ADMIN — BRIMONIDINE TARTRATE 1 DROP(S): 2 SOLUTION/ DROPS OPHTHALMIC at 05:33

## 2021-09-14 RX ADMIN — PIPERACILLIN AND TAZOBACTAM 25 GRAM(S): 4; .5 INJECTION, POWDER, LYOPHILIZED, FOR SOLUTION INTRAVENOUS at 22:22

## 2021-09-14 RX ADMIN — BRIMONIDINE TARTRATE 1 DROP(S): 2 SOLUTION/ DROPS OPHTHALMIC at 17:02

## 2021-09-14 RX ADMIN — OXYCODONE AND ACETAMINOPHEN 1 TABLET(S): 5; 325 TABLET ORAL at 23:22

## 2021-09-14 RX ADMIN — Medication 100 MILLIGRAM(S): at 05:33

## 2021-09-14 RX ADMIN — Medication 1 DROP(S): at 05:32

## 2021-09-14 RX ADMIN — Medication 1 SPRAY(S): at 19:28

## 2021-09-14 RX ADMIN — Medication 40 MILLIEQUIVALENT(S): at 17:02

## 2021-09-14 RX ADMIN — AMLODIPINE BESYLATE 10 MILLIGRAM(S): 2.5 TABLET ORAL at 05:33

## 2021-09-14 RX ADMIN — LOSARTAN POTASSIUM 100 MILLIGRAM(S): 100 TABLET, FILM COATED ORAL at 05:33

## 2021-09-14 NOTE — PROGRESS NOTE ADULT - ASSESSMENT
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    Plan:  - ECG with nonspecific findings, likely LVH related. Known atrial fibrillation - rate controlled.  - Last echo 6/21 with normal LV systolic function, no significant valve issues  - Last stress test in 2019 which was normal  - Continue amlodipine 10 mg daily  - Continue metoprolol succinate 100 mg daily  - Continue losartan 100 mg daily  - Continue hydralazine 25 mg bid  - Hold furosemide  - Resume rivaroxaban when ok from a surgical standpoint  - Tachycardia resolved without much intervention - possibly due to coughing prior to OR  - Plan for cholecystostomy today  - Eventual plan for cholecystectomy. He is again optimized to proceed from a cardiac standpoint when he returns at a later date.

## 2021-09-14 NOTE — PROVIDER CONTACT NOTE (MEDICATION) - SITUATION
Is it okay to administer Dilaudid 1mg ivp 30 minutes earlier than scheduled time?   pt complained of pain 9/10

## 2021-09-14 NOTE — PROGRESS NOTE ADULT - SUBJECTIVE AND OBJECTIVE BOX
Patient is a 65y old  Male who presents with a chief complaint of Acute cholecystitis (14 Sep 2021 09:56)      INTERVAL HPI/OVERNIGHT EVENTS: Patient seen and examined at bedside. No overnight events occurred. Patient has no complaints at this time. Denies fevers, chills, headache, lightheadedness, chest pain, dyspnea, abdominal pain, n/v/d/c.    MEDICATIONS  (STANDING):  allopurinol 100 milliGRAM(s) Oral daily  amLODIPine   Tablet 10 milliGRAM(s) Oral daily  brimonidine 0.2% Ophthalmic Solution 1 Drop(s) Both EYES two times a day  colchicine 0.6 milliGRAM(s) Oral daily  dextrose 5% + sodium chloride 0.45%. 1000 milliLiter(s) (100 mL/Hr) IV Continuous <Continuous>  hydrALAZINE 25 milliGRAM(s) Oral two times a day  losartan 100 milliGRAM(s) Oral daily  metoprolol succinate  milliGRAM(s) Oral daily  piperacillin/tazobactam IVPB.. 3.375 Gram(s) IV Intermittent every 8 hours  senna 2 Tablet(s) Oral at bedtime  timolol 0.5% Solution 1 Drop(s) Both EYES two times a day    MEDICATIONS  (PRN):  acetaminophen   Tablet .. 650 milliGRAM(s) Oral every 6 hours PRN Temp greater or equal to 38C (100.4F), Mild Pain (1 - 3)  aluminum hydroxide/magnesium hydroxide/simethicone Suspension 30 milliLiter(s) Oral every 4 hours PRN Dyspepsia  guaiFENesin Oral Liquid (Sugar-Free) 100 milliGRAM(s) Oral every 6 hours PRN Cough  HYDROmorphone  Injectable 1 milliGRAM(s) IV Push every 4 hours PRN Severe Pain (7 - 10)  melatonin 3 milliGRAM(s) Oral at bedtime PRN Insomnia  ondansetron Injectable 4 milliGRAM(s) IV Push every 6 hours PRN Nausea  polyethylene glycol 3350 17 Gram(s) Oral daily PRN Constipation      Allergies    No Known Allergies    Intolerances        REVIEW OF SYSTEMS:  CONSTITUTIONAL: No fever or chills  HEENT:  No headache, no sore throat  RESPIRATORY: No cough, wheezing, or shortness of breath  CARDIOVASCULAR: No chest pain, palpitations  GASTROINTESTINAL: No abd pain, nausea, vomiting, or diarrhea  GENITOURINARY: No dysuria, frequency, or hematuria  NEUROLOGICAL: no focal weakness or dizziness  MUSCULOSKELETAL: no myalgias     Vital Signs Last 24 Hrs  T(C): 37.7 (14 Sep 2021 09:50), Max: 37.8 (13 Sep 2021 17:00)  T(F): 99.8 (14 Sep 2021 09:50), Max: 100.1 (13 Sep 2021 17:45)  HR: 98 (14 Sep 2021 09:50) (76 - 124)  BP: 125/77 (14 Sep 2021 09:50) (95/69 - 150/95)  BP(mean): --  RR: 20 (14 Sep 2021 09:50) (14 - 22)  SpO2: 96% (14 Sep 2021 09:50) (92% - 97%)    PHYSICAL EXAM:  GENERAL: NAD  HEENT:  anicteric, moist mucous membranes  CHEST/LUNG:  CTA b/l, no rales, wheezes, or rhonchi  HEART:  RRR, S1, S2  ABDOMEN:  BS+, soft, nontender, nondistended  EXTREMITIES: no edema, cyanosis, or calf tenderness  NERVOUS SYSTEM: answers questions and follows commands appropriately    LABS:                        12.9   9.32  )-----------( 186      ( 14 Sep 2021 08:43 )             37.4     CBC Full  -  ( 14 Sep 2021 08:43 )  WBC Count : 9.32 K/uL  Hemoglobin : 12.9 g/dL  Hematocrit : 37.4 %  Platelet Count - Automated : 186 K/uL  Mean Cell Volume : 85.2 fl  Mean Cell Hemoglobin : 29.4 pg  Mean Cell Hemoglobin Concentration : 34.5 gm/dL  Auto Neutrophil # : 6.86 K/uL  Auto Lymphocyte # : 1.12 K/uL  Auto Monocyte # : 1.25 K/uL  Auto Eosinophil # : 0.03 K/uL  Auto Basophil # : 0.01 K/uL  Auto Neutrophil % : 73.7 %  Auto Lymphocyte % : 12.0 %  Auto Monocyte % : 13.4 %  Auto Eosinophil % : 0.3 %  Auto Basophil % : 0.1 %    14 Sep 2021 08:43    139    |  108    |  12     ----------------------------<  112    3.4     |  24     |  0.97     Ca    7.9        14 Sep 2021 08:43    TPro  6.7    /  Alb  2.4    /  TBili  1.5    /  DBili  x      /  AST  26     /  ALT  35     /  AlkPhos  87     14 Sep 2021 08:43    PT/INR - ( 14 Sep 2021 08:43 )   PT: 16.8 sec;   INR: 1.46 ratio         PTT - ( 14 Sep 2021 08:43 )  PTT:28.1 sec    CAPILLARY BLOOD GLUCOSE            Culture - Urine (collected 09-11-21 @ 16:27)  Source: Clean Catch Clean Catch (Midstream)  Final Report (09-12-21 @ 13:12):    No growth    Culture - Blood (collected 09-11-21 @ 00:29)  Source: .Blood Blood  Preliminary Report (09-12-21 @ 06:14):    No growth to date.    Culture - Blood (collected 09-11-21 @ 00:29)  Source: .Blood Blood  Preliminary Report (09-12-21 @ 06:14):    No growth to date.        RADIOLOGY & ADDITIONAL TESTS:    Personally reviewed.     Consultant(s) Notes Reviewed:  [x] YES  [ ] NO     Patient is a 65y old  Male who presents with a chief complaint of Acute cholecystitis (14 Sep 2021 09:56)      INTERVAL HPI/OVERNIGHT EVENTS: Patient seen and examined at bedside this AM.   States abd pain improved, NPo, going for IR this AM    MEDICATIONS  (STANDING):  allopurinol 100 milliGRAM(s) Oral daily  amLODIPine   Tablet 10 milliGRAM(s) Oral daily  brimonidine 0.2% Ophthalmic Solution 1 Drop(s) Both EYES two times a day  colchicine 0.6 milliGRAM(s) Oral daily  dextrose 5% + sodium chloride 0.45%. 1000 milliLiter(s) (100 mL/Hr) IV Continuous <Continuous>  hydrALAZINE 25 milliGRAM(s) Oral two times a day  losartan 100 milliGRAM(s) Oral daily  metoprolol succinate  milliGRAM(s) Oral daily  piperacillin/tazobactam IVPB.. 3.375 Gram(s) IV Intermittent every 8 hours  senna 2 Tablet(s) Oral at bedtime  timolol 0.5% Solution 1 Drop(s) Both EYES two times a day    MEDICATIONS  (PRN):  acetaminophen   Tablet .. 650 milliGRAM(s) Oral every 6 hours PRN Temp greater or equal to 38C (100.4F), Mild Pain (1 - 3)  aluminum hydroxide/magnesium hydroxide/simethicone Suspension 30 milliLiter(s) Oral every 4 hours PRN Dyspepsia  guaiFENesin Oral Liquid (Sugar-Free) 100 milliGRAM(s) Oral every 6 hours PRN Cough  HYDROmorphone  Injectable 1 milliGRAM(s) IV Push every 4 hours PRN Severe Pain (7 - 10)  melatonin 3 milliGRAM(s) Oral at bedtime PRN Insomnia  ondansetron Injectable 4 milliGRAM(s) IV Push every 6 hours PRN Nausea  polyethylene glycol 3350 17 Gram(s) Oral daily PRN Constipation      Allergies    No Known Allergies    Intolerances        REVIEW OF SYSTEMS:  CONSTITUTIONAL: No fever or chills  HEENT:  No headache, no sore throat  RESPIRATORY: No cough, wheezing, or shortness of breath  CARDIOVASCULAR: No chest pain, palpitations  GASTROINTESTINAL: +ruq abd pain w/ deep breaths, nausea, vomiting, or diarrhea  GENITOURINARY: No dysuria, frequency, or hematuria  NEUROLOGICAL: no focal weakness or dizziness  MUSCULOSKELETAL: no myalgias     Vital Signs Last 24 Hrs  T(C): 37.7 (14 Sep 2021 09:50), Max: 37.8 (13 Sep 2021 17:00)  T(F): 99.8 (14 Sep 2021 09:50), Max: 100.1 (13 Sep 2021 17:45)  HR: 98 (14 Sep 2021 09:50) (76 - 124)  BP: 125/77 (14 Sep 2021 09:50) (95/69 - 150/95)  BP(mean): --  RR: 20 (14 Sep 2021 09:50) (14 - 22)  SpO2: 96% (14 Sep 2021 09:50) (92% - 97%)    PHYSICAL EXAM:  GENERAL: NAD  HEENT:  anicteric, moist mucous membranes  CHEST/LUNG:  CTA b/l, no rales, wheezes, or rhonchi  HEART:  RRR, S1, S2  ABDOMEN:  BS+, soft, mild tender RUQ nondistended  EXTREMITIES: no edema, cyanosis, or calf tenderness  NERVOUS SYSTEM: answers questions and follows commands appropriately    LABS:                        12.9   9.32  )-----------( 186      ( 14 Sep 2021 08:43 )             37.4     CBC Full  -  ( 14 Sep 2021 08:43 )  WBC Count : 9.32 K/uL  Hemoglobin : 12.9 g/dL  Hematocrit : 37.4 %  Platelet Count - Automated : 186 K/uL  Mean Cell Volume : 85.2 fl  Mean Cell Hemoglobin : 29.4 pg  Mean Cell Hemoglobin Concentration : 34.5 gm/dL  Auto Neutrophil # : 6.86 K/uL  Auto Lymphocyte # : 1.12 K/uL  Auto Monocyte # : 1.25 K/uL  Auto Eosinophil # : 0.03 K/uL  Auto Basophil # : 0.01 K/uL  Auto Neutrophil % : 73.7 %  Auto Lymphocyte % : 12.0 %  Auto Monocyte % : 13.4 %  Auto Eosinophil % : 0.3 %  Auto Basophil % : 0.1 %    14 Sep 2021 08:43    139    |  108    |  12     ----------------------------<  112    3.4     |  24     |  0.97     Ca    7.9        14 Sep 2021 08:43    TPro  6.7    /  Alb  2.4    /  TBili  1.5    /  DBili  x      /  AST  26     /  ALT  35     /  AlkPhos  87     14 Sep 2021 08:43    PT/INR - ( 14 Sep 2021 08:43 )   PT: 16.8 sec;   INR: 1.46 ratio         PTT - ( 14 Sep 2021 08:43 )  PTT:28.1 sec    CAPILLARY BLOOD GLUCOSE            Culture - Urine (collected 09-11-21 @ 16:27)  Source: Clean Catch Clean Catch (Midstream)  Final Report (09-12-21 @ 13:12):    No growth    Culture - Blood (collected 09-11-21 @ 00:29)  Source: .Blood Blood  Preliminary Report (09-12-21 @ 06:14):    No growth to date.    Culture - Blood (collected 09-11-21 @ 00:29)  Source: .Blood Blood  Preliminary Report (09-12-21 @ 06:14):    No growth to date.        RADIOLOGY & ADDITIONAL TESTS:    Personally reviewed.     Consultant(s) Notes Reviewed:  [x] YES  [ ] NO

## 2021-09-14 NOTE — DIETITIAN INITIAL EVALUATION ADULT. - OTHER INFO
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    Pt not available at time of visit, for percutaneous cholecystectomy in IR. NPO x 5 days. On IVF-D5 1/2NS @60cc/hr. Episode diarrhea 9/13- immodium rx. Per EMR pt states he is a very active individual; exercises regularly. Unknown if pt follow therapeutic diet pta. Will remain available for subjective interview post OR. 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    Pt not available at time of visit, for percutaneous drainage in IR. EMR reviewed. NPO x 5 days. On IVF-D5 1/2NS @60cc/hr.  No report N/V, improved abdominal pain. Episode diarrhea 9/13- immodium rx. Per EMR pt states he is a very active individual; exercises regularly. Unknown if pt follow therapeutic diet pta. Will remain available for subjective nutrition interview post OR and diet education prn.

## 2021-09-14 NOTE — PROGRESS NOTE ADULT - SUBJECTIVE AND OBJECTIVE BOX
Chief Complaint: Abdominal pain    Interval Events: Surgery cancelled yesterday due to tachycardia. Briefly on a diltiazem drip.    Review of Systems:  General: No fevers, chills, weight loss or gain  Skin: No rashes, color changes  Cardiovascular: No chest pain, orthopnea  Respiratory: No shortness of breath, cough  Gastrointestinal: No nausea, abdominal pain  Genitourinary: No incontinence, pain with urination  Musculoskeletal: No pain, swelling, decreased range of motion  Neurological: No headache, weakness  Psychiatric: No depression, anxiety  Endocrine: No weight loss or gain, increased thirst  All other systems are comprehensively negative.    Physical Exam:  Vitals:        Vital Signs Last 24 Hrs  T(C): 37.1 (13 Sep 2021 07:23), Max: 38.1 (13 Sep 2021 04:36)  T(F): 98.7 (13 Sep 2021 07:23), Max: 100.5 (13 Sep 2021 04:36)  HR: 95 (13 Sep 2021 04:36) (72 - 95)  BP: 142/92 (13 Sep 2021 04:36) (136/94 - 152/97)  BP(mean): --  RR: 17 (13 Sep 2021 04:36) (17 - 18)  SpO2: 94% (13 Sep 2021 04:36) (94% - 95%)  General: NAD  HEENT: MMM  Neck: No JVD, no carotid bruit  Lungs: CTAB  CV: RRR, nl S1/S2, no M/R/G  Abdomen: S/NT/ND, +BS  Extremities: No LE edema, no cyanosis  Neuro: AAOx3, non-focal  Skin: No rash    Labs:    09-13    136  |  104  |  13  ----------------------------<  111<H>  3.8   |  26  |  1.30    Ca    8.8      13 Sep 2021 20:58    TPro  7.2  /  Alb  2.7<L>  /  TBili  1.8<H>  /  DBili  x   /  AST  25  /  ALT  36  /  AlkPhos  94  09-13                        13.9   11.50 )-----------( 200      ( 13 Sep 2021 05:28 )             40.7        Chief Complaint: Abdominal pain    Interval Events: Surgery cancelled yesterday due to tachycardia. Briefly on a diltiazem drip.    Review of Systems:  General: No fevers, chills, weight loss or gain  Skin: No rashes, color changes  Cardiovascular: No chest pain, orthopnea  Respiratory: No shortness of breath, cough  Gastrointestinal: No nausea, abdominal pain  Genitourinary: No incontinence, pain with urination  Musculoskeletal: No pain, swelling, decreased range of motion  Neurological: No headache, weakness  Psychiatric: No depression, anxiety  Endocrine: No weight loss or gain, increased thirst  All other systems are comprehensively negative.    Physical Exam:  Vitals:        Vital Signs Last 24 Hrs  T(C): 37.1 (13 Sep 2021 07:23), Max: 38.1 (13 Sep 2021 04:36)  T(F): 98.7 (13 Sep 2021 07:23), Max: 100.5 (13 Sep 2021 04:36)  HR: 95 (13 Sep 2021 04:36) (72 - 95)  BP: 142/92 (13 Sep 2021 04:36) (136/94 - 152/97)  BP(mean): --  RR: 17 (13 Sep 2021 04:36) (17 - 18)  SpO2: 94% (13 Sep 2021 04:36) (94% - 95%)  General: NAD  HEENT: MMM  Neck: No JVD, no carotid bruit  Lungs: CTAB  CV: RRR, nl S1/S2, no M/R/G  Abdomen: S/NT/ND, +BS  Extremities: No LE edema, no cyanosis  Neuro: AAOx3, non-focal  Skin: No rash    Labs:    09-13    136  |  104  |  13  ----------------------------<  111<H>  3.8   |  26  |  1.30    Ca    8.8      13 Sep 2021 20:58    TPro  7.2  /  Alb  2.7<L>  /  TBili  1.8<H>  /  DBili  x   /  AST  25  /  ALT  36  /  AlkPhos  94  09-13                        13.9   11.50 )-----------( 200      ( 13 Sep 2021 05:28 )             40.7       Telemetry: AF

## 2021-09-14 NOTE — PROGRESS NOTE ADULT - ASSESSMENT
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     #Sepsis 2/2 acute cholecystitis  - On IV zosyn and IVF  - Holding Xarelto, Hold heparin gtt this AM per surgery, scheduled for surgery today 9/13  - NPO  - Pt with no known history of ischemic or valvular disease. has a history of atrial fibrillation which is adequately rate controlled at this time. Pt appears euvolemic on exam. Can perform > 4 METs. RCRI class 1 risk.   Medically optimized for planned procedure.   - surgery following, further plan as per surgery     #Atrial Fibrillation   - pt with history of Afib, on metoprolol for rate control and xarelto for anticoagulation.  - continue with metoprolol with hold parameters  - hold xarelto as per cardio (on hep gtt while xarelto on hold)  - last TTE 5/2021, normal EF as per cardio   - may heparinize as per surgery, started hep gtt, patient scheduled for OR Monday 9/13 at 4 PM     Hypokalemia, acute  - Replete PRN    # Acute kidney injury, 9/11  - RESOLVED   - lasix was on hold on admission, cont to hold    Constipation  - RESOLVED   - continue senna, Miralax prn for constipation     #HTN, chronic  - chronic, pt is on losartan, hydralazine, amlodipine at home  - continue with home meds with hold parameters    # LE edema   - patient on Lasix 1-2X a week PRN for LE swelling started by PCP, Dr. 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     #Sepsis 2/2 acute cholecystitis  - On IV zosyn and IVF, NPO  - Holding Xarelto, Hold heparin gtt this AM per surgery, scheduled for surgery today 9/13  - surgery following, further plan as per surgery   - Planned cholecystectomy 9/13 but pt went into Afib w/ RVR under anesthesia and procedure stopped, was unable to get surgert. Cardio was contacted and Dilt drip started but HR was controlled shortly after  - Plan per surg is for IR perc cholecys today 9/14    #Atrial Fibrillation   - pt with history of Afib, on metoprolol for rate control and xarelto for anticoagulation.  - continue with metoprolol with hold parameters - dose increased per cardiology given Afib w/ rvr on 9/13  - hold xarelto as per cardio (on hep gtt while xarelto on hold)  - last TTE 5/2021, normal EF as per cardio     Hypokalemia, acute  - Replete PRN    # Acute kidney injury, 9/11  - RESOLVED   - lasix was on hold on admission, cont to hold    Constipation  - RESOLVED   - continue senna, Miralax prn for constipation     #HTN, chronic  - chronic, pt is on losartan, hydralazine, amlodipine at home  - continue with home meds with hold parameters    # LE edema   - patient on Lasix 1-2X a week PRN for LE swelling started by PCP, Dr. 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     #Sepsis 2/2 acute cholecystitis  - On IV zosyn and IVF, NPO  - Holding Xarelto, Hold heparin gtt this AM per surgery, scheduled for surgery today 9/13  - surgery following, further plan as per surgery   - Planned cholecystectomy 9/13 but pt went into Afib w/ RVR under anesthesia and procedure stopped, was unable to get surgert. Cardio was contacted and Dilt drip started but HR was controlled shortly after  - Plan per surg is for IR perc cholecys today 9/14    #Atrial Fibrillation   - pt with history of Afib, on metoprolol for rate control and xarelto for anticoagulation.  - continue with metoprolol with hold parameters  - hold xarelto as per cardio (on hep gtt while xarelto on hold)  - last TTE 5/2021, normal EF as per cardio     Hypokalemia, acute  - Replete PRN    # Acute kidney injury, 9/11  - RESOLVED   - lasix was on hold on admission, cont to hold    Constipation  - RESOLVED   - continue senna, Miralax prn for constipation     #HTN, chronic  - chronic, pt is on losartan, hydralazine, amlodipine at home  - continue with home meds with hold parameters    # LE edema   - patient on Lasix 1-2X a week PRN for LE swelling started by PCP, Dr. Villalba, patient states he started getting ankle swelling likely 2/2 to amlodipine  - hold Lasix for now     #Gout, chronic  - continue home meds allopurinol and colchicine    #GERD, chronic  - pt says he occasionally takes pantoprazole    #Prophylaxis - hold xarelto as per cardio   on heparin gtt (hold hours prior to surgery)  SCDs  resume xarelto post op once cleared per IR

## 2021-09-14 NOTE — PROGRESS NOTE ADULT - SUBJECTIVE AND OBJECTIVE BOX
SUBJECTIVE:  Patient seen and examined at bedside. Patient states that his abdominal pain is "barely there" but he had a few episodes of diarrhea overnight that have since resolved with Immodium.  Patient denies any fevers, chills, chest pain, shortness of breath, abdominal pain, nausea, or vomiting.    Vital Signs Last 24 Hrs  T(C): 37.6 (14 Sep 2021 05:17), Max: 37.8 (13 Sep 2021 17:00)  T(F): 99.6 (14 Sep 2021 05:17), Max: 100.1 (13 Sep 2021 17:45)  HR: 92 (14 Sep 2021 05:17) (76 - 124)  BP: 135/86 (14 Sep 2021 05:17) (95/69 - 150/95)  BP(mean): --  RR: 18 (14 Sep 2021 05:17) (14 - 22)  SpO2: 92% (14 Sep 2021 05:17) (92% - 97%)    PHYSICAL EXAM:  GENERAL: No acute distress, well-developed  HEAD:  Atraumatic, Normocephalic  ABDOMEN: Soft, non-distended, tender to palpation in RUQ  NEUROLOGY: A&O x 3, no focal deficits    I&O's Summary    13 Sep 2021 07:01  -  14 Sep 2021 07:00  --------------------------------------------------------  IN: 2400 mL / OUT: 0 mL / NET: 2400 mL      MEDICATIONS  (STANDING):  allopurinol 100 milliGRAM(s) Oral daily  amLODIPine   Tablet 10 milliGRAM(s) Oral daily  brimonidine 0.2% Ophthalmic Solution 1 Drop(s) Both EYES two times a day  colchicine 0.6 milliGRAM(s) Oral daily  dextrose 5% + sodium chloride 0.45%. 1000 milliLiter(s) (100 mL/Hr) IV Continuous <Continuous>  hydrALAZINE 25 milliGRAM(s) Oral two times a day  losartan 100 milliGRAM(s) Oral daily  metoprolol succinate  milliGRAM(s) Oral daily  piperacillin/tazobactam IVPB.. 3.375 Gram(s) IV Intermittent every 8 hours  senna 2 Tablet(s) Oral at bedtime  timolol 0.5% Solution 1 Drop(s) Both EYES two times a day    MEDICATIONS  (PRN):  acetaminophen   Tablet .. 650 milliGRAM(s) Oral every 6 hours PRN Temp greater or equal to 38C (100.4F), Mild Pain (1 - 3)  aluminum hydroxide/magnesium hydroxide/simethicone Suspension 30 milliLiter(s) Oral every 4 hours PRN Dyspepsia  guaiFENesin Oral Liquid (Sugar-Free) 100 milliGRAM(s) Oral every 6 hours PRN Cough  HYDROmorphone  Injectable 1 milliGRAM(s) IV Push every 4 hours PRN Severe Pain (7 - 10)  melatonin 3 milliGRAM(s) Oral at bedtime PRN Insomnia  ondansetron Injectable 4 milliGRAM(s) IV Push every 6 hours PRN Nausea  polyethylene glycol 3350 17 Gram(s) Oral daily PRN Constipation    LABS: AM labs pending

## 2021-09-14 NOTE — PROCEDURE NOTE - PROCEDURE FINDINGS AND DETAILS
8.5 fr cholecystostomy tube placed. ~60cc black bile aspirated and sent for cx. drain connected to gravity bag.

## 2021-09-14 NOTE — PRE PROCEDURE NOTE - PRE PROCEDURE EVALUATION
Interventional Radiology Pre-Procedure Note    Patient is a 65y old Male who presents with acute cholecystitis and persistent ruq pain. Pt was planned for cholecystectomy yesterday, but the surgery was aborted after he went into afib. Pt is here for cholecystostomy tube placement.     PAST MEDICAL & SURGICAL HISTORY:  GERD (gastroesophageal reflux disease)    Prostate ca  s/p prostatectomy    Hypertension    Gout    Atrial fibrillation  paroxysmal    Splenic infarction  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    H/O prostatectomy  4/27/10    H/O arthroscopy of right knee  1988         Allergies: No Known Allergies      LABS:                        12.9   9.32  )-----------( 186      ( 14 Sep 2021 08:43 )             37.4     09-14    139  |  108  |  12  ----------------------------<  112<H>  3.4<L>   |  24  |  0.97    Ca    7.9<L>      14 Sep 2021 08:43    TPro  6.7  /  Alb  2.4<L>  /  TBili  1.5<H>  /  DBili  x   /  AST  26  /  ALT  35  /  AlkPhos  87  09-14    PT/INR - ( 14 Sep 2021 08:43 )   PT: 16.8 sec;   INR: 1.46 ratio         PTT - ( 14 Sep 2021 08:43 )  PTT:28.1 sec    Procedure/ risks/ benefits were explained, informed consent obtained from patient, verbalizes understanding.

## 2021-09-15 ENCOUNTER — TRANSCRIPTION ENCOUNTER (OUTPATIENT)
Age: 66
End: 2021-09-15

## 2021-09-15 VITALS
RESPIRATION RATE: 17 BRPM | OXYGEN SATURATION: 95 % | SYSTOLIC BLOOD PRESSURE: 134 MMHG | HEART RATE: 85 BPM | TEMPERATURE: 98 F | DIASTOLIC BLOOD PRESSURE: 82 MMHG

## 2021-09-15 PROBLEM — I48.91 UNSPECIFIED ATRIAL FIBRILLATION: Chronic | Status: ACTIVE | Noted: 2019-04-01

## 2021-09-15 LAB
ALBUMIN SERPL ELPH-MCNC: 2.3 G/DL — LOW (ref 3.3–5)
ALP SERPL-CCNC: 123 U/L — HIGH (ref 40–120)
ALT FLD-CCNC: 64 U/L — SIGNIFICANT CHANGE UP (ref 12–78)
ANION GAP SERPL CALC-SCNC: 5 MMOL/L — SIGNIFICANT CHANGE UP (ref 5–17)
AST SERPL-CCNC: 47 U/L — HIGH (ref 15–37)
BILIRUB SERPL-MCNC: 0.9 MG/DL — SIGNIFICANT CHANGE UP (ref 0.2–1.2)
BUN SERPL-MCNC: 11 MG/DL — SIGNIFICANT CHANGE UP (ref 7–23)
CALCIUM SERPL-MCNC: 8.3 MG/DL — LOW (ref 8.5–10.1)
CHLORIDE SERPL-SCNC: 110 MMOL/L — HIGH (ref 96–108)
CO2 SERPL-SCNC: 24 MMOL/L — SIGNIFICANT CHANGE UP (ref 22–31)
CREAT SERPL-MCNC: 0.93 MG/DL — SIGNIFICANT CHANGE UP (ref 0.5–1.3)
GLUCOSE SERPL-MCNC: 111 MG/DL — HIGH (ref 70–99)
HCT VFR BLD CALC: 38.9 % — LOW (ref 39–50)
HGB BLD-MCNC: 13 G/DL — SIGNIFICANT CHANGE UP (ref 13–17)
MAGNESIUM SERPL-MCNC: 2.2 MG/DL — SIGNIFICANT CHANGE UP (ref 1.6–2.6)
MCHC RBC-ENTMCNC: 28.7 PG — SIGNIFICANT CHANGE UP (ref 27–34)
MCHC RBC-ENTMCNC: 33.4 GM/DL — SIGNIFICANT CHANGE UP (ref 32–36)
MCV RBC AUTO: 85.9 FL — SIGNIFICANT CHANGE UP (ref 80–100)
NRBC # BLD: 0 /100 WBCS — SIGNIFICANT CHANGE UP (ref 0–0)
PHOSPHATE SERPL-MCNC: 2.8 MG/DL — SIGNIFICANT CHANGE UP (ref 2.5–4.5)
PLATELET # BLD AUTO: 214 K/UL — SIGNIFICANT CHANGE UP (ref 150–400)
POTASSIUM SERPL-MCNC: 3.6 MMOL/L — SIGNIFICANT CHANGE UP (ref 3.5–5.3)
POTASSIUM SERPL-SCNC: 3.6 MMOL/L — SIGNIFICANT CHANGE UP (ref 3.5–5.3)
PROT SERPL-MCNC: 6.5 G/DL — SIGNIFICANT CHANGE UP (ref 6–8.3)
RBC # BLD: 4.53 M/UL — SIGNIFICANT CHANGE UP (ref 4.2–5.8)
RBC # FLD: 13.3 % — SIGNIFICANT CHANGE UP (ref 10.3–14.5)
SODIUM SERPL-SCNC: 139 MMOL/L — SIGNIFICANT CHANGE UP (ref 135–145)
WBC # BLD: 7.93 K/UL — SIGNIFICANT CHANGE UP (ref 3.8–10.5)
WBC # FLD AUTO: 7.93 K/UL — SIGNIFICANT CHANGE UP (ref 3.8–10.5)

## 2021-09-15 PROCEDURE — 71045 X-RAY EXAM CHEST 1 VIEW: CPT

## 2021-09-15 PROCEDURE — 83690 ASSAY OF LIPASE: CPT

## 2021-09-15 PROCEDURE — 86850 RBC ANTIBODY SCREEN: CPT

## 2021-09-15 PROCEDURE — 85610 PROTHROMBIN TIME: CPT

## 2021-09-15 PROCEDURE — 87070 CULTURE OTHR SPECIMN AEROBIC: CPT

## 2021-09-15 PROCEDURE — 76000 FLUOROSCOPY <1 HR PHYS/QHP: CPT

## 2021-09-15 PROCEDURE — 96365 THER/PROPH/DIAG IV INF INIT: CPT

## 2021-09-15 PROCEDURE — 94640 AIRWAY INHALATION TREATMENT: CPT

## 2021-09-15 PROCEDURE — 80048 BASIC METABOLIC PNL TOTAL CA: CPT

## 2021-09-15 PROCEDURE — 87075 CULTR BACTERIA EXCEPT BLOOD: CPT

## 2021-09-15 PROCEDURE — 99285 EMERGENCY DEPT VISIT HI MDM: CPT

## 2021-09-15 PROCEDURE — 78226 HEPATOBILIARY SYSTEM IMAGING: CPT

## 2021-09-15 PROCEDURE — 87040 BLOOD CULTURE FOR BACTERIA: CPT

## 2021-09-15 PROCEDURE — 87186 SC STD MICRODIL/AGAR DIL: CPT

## 2021-09-15 PROCEDURE — 85730 THROMBOPLASTIN TIME PARTIAL: CPT

## 2021-09-15 PROCEDURE — 85027 COMPLETE CBC AUTOMATED: CPT

## 2021-09-15 PROCEDURE — 87635 SARS-COV-2 COVID-19 AMP PRB: CPT

## 2021-09-15 PROCEDURE — 36415 COLL VENOUS BLD VENIPUNCTURE: CPT

## 2021-09-15 PROCEDURE — 99239 HOSP IP/OBS DSCHRG MGMT >30: CPT | Mod: GC

## 2021-09-15 PROCEDURE — 93005 ELECTROCARDIOGRAM TRACING: CPT

## 2021-09-15 PROCEDURE — 96375 TX/PRO/DX INJ NEW DRUG ADDON: CPT

## 2021-09-15 PROCEDURE — 87077 CULTURE AEROBIC IDENTIFY: CPT

## 2021-09-15 PROCEDURE — 87205 SMEAR GRAM STAIN: CPT

## 2021-09-15 PROCEDURE — A9537: CPT

## 2021-09-15 PROCEDURE — C1729: CPT

## 2021-09-15 PROCEDURE — 85025 COMPLETE CBC W/AUTO DIFF WBC: CPT

## 2021-09-15 PROCEDURE — 83735 ASSAY OF MAGNESIUM: CPT

## 2021-09-15 PROCEDURE — C1769: CPT

## 2021-09-15 PROCEDURE — 87086 URINE CULTURE/COLONY COUNT: CPT

## 2021-09-15 PROCEDURE — 99232 SBSQ HOSP IP/OBS MODERATE 35: CPT

## 2021-09-15 PROCEDURE — 47490 INCISION OF GALLBLADDER: CPT

## 2021-09-15 PROCEDURE — 86900 BLOOD TYPING SEROLOGIC ABO: CPT

## 2021-09-15 PROCEDURE — 76705 ECHO EXAM OF ABDOMEN: CPT

## 2021-09-15 PROCEDURE — 86769 SARS-COV-2 COVID-19 ANTIBODY: CPT

## 2021-09-15 PROCEDURE — 80053 COMPREHEN METABOLIC PANEL: CPT

## 2021-09-15 PROCEDURE — 82150 ASSAY OF AMYLASE: CPT

## 2021-09-15 PROCEDURE — 84100 ASSAY OF PHOSPHORUS: CPT

## 2021-09-15 PROCEDURE — 86901 BLOOD TYPING SEROLOGIC RH(D): CPT

## 2021-09-15 PROCEDURE — 76942 ECHO GUIDE FOR BIOPSY: CPT

## 2021-09-15 PROCEDURE — 84484 ASSAY OF TROPONIN QUANT: CPT

## 2021-09-15 RX ORDER — RIVAROXABAN 15 MG-20MG
20 KIT ORAL DAILY
Refills: 0 | Status: DISCONTINUED | OUTPATIENT
Start: 2021-09-15 | End: 2021-09-15

## 2021-09-15 RX ORDER — CEFPODOXIME PROXETIL 100 MG
1 TABLET ORAL
Qty: 7 | Refills: 0
Start: 2021-09-15 | End: 2021-09-17

## 2021-09-15 RX ORDER — LACTOBACILLUS ACIDOPHILUS 100MM CELL
1 CAPSULE ORAL
Qty: 8 | Refills: 0
Start: 2021-09-15 | End: 2021-09-18

## 2021-09-15 RX ORDER — METRONIDAZOLE 500 MG
1 TABLET ORAL
Qty: 9 | Refills: 0
Start: 2021-09-15 | End: 2021-09-17

## 2021-09-15 RX ADMIN — AMLODIPINE BESYLATE 10 MILLIGRAM(S): 2.5 TABLET ORAL at 05:28

## 2021-09-15 RX ADMIN — BRIMONIDINE TARTRATE 1 DROP(S): 2 SOLUTION/ DROPS OPHTHALMIC at 05:30

## 2021-09-15 RX ADMIN — Medication 100 MILLIGRAM(S): at 12:56

## 2021-09-15 RX ADMIN — Medication 25 MILLIGRAM(S): at 05:38

## 2021-09-15 RX ADMIN — Medication 1 DROP(S): at 05:29

## 2021-09-15 RX ADMIN — Medication 0.6 MILLIGRAM(S): at 12:56

## 2021-09-15 RX ADMIN — Medication 650 MILLIGRAM(S): at 01:35

## 2021-09-15 RX ADMIN — PIPERACILLIN AND TAZOBACTAM 25 GRAM(S): 4; .5 INJECTION, POWDER, LYOPHILIZED, FOR SOLUTION INTRAVENOUS at 05:25

## 2021-09-15 RX ADMIN — OXYCODONE AND ACETAMINOPHEN 1 TABLET(S): 5; 325 TABLET ORAL at 15:00

## 2021-09-15 RX ADMIN — Medication 100 MILLIGRAM(S): at 05:29

## 2021-09-15 RX ADMIN — LOSARTAN POTASSIUM 100 MILLIGRAM(S): 100 TABLET, FILM COATED ORAL at 05:43

## 2021-09-15 RX ADMIN — OXYCODONE AND ACETAMINOPHEN 1 TABLET(S): 5; 325 TABLET ORAL at 05:36

## 2021-09-15 RX ADMIN — OXYCODONE AND ACETAMINOPHEN 1 TABLET(S): 5; 325 TABLET ORAL at 15:32

## 2021-09-15 RX ADMIN — OXYCODONE AND ACETAMINOPHEN 1 TABLET(S): 5; 325 TABLET ORAL at 07:00

## 2021-09-15 RX ADMIN — RIVAROXABAN 20 MILLIGRAM(S): KIT at 14:05

## 2021-09-15 NOTE — PROGRESS NOTE ADULT - PROBLEM SELECTOR PLAN 1
65y Male p/w acute cholecystitis s/p perc zheng procedure (9/14) as OR cancelled 9/12 d/t rapid afib after induction. Patient w/ minimal abdominal pain, tolerating CLD.  - Pt will follow up outpatient with Dr. Meza in 2-3 weeks and will have a catheter study/CT abd prior to interval lap zheng.  Will need VNS services while home for drain care.  - Advance to low fat, f/up jordan  - F/up AM labs  - IV Zosyn, will transition to PO abx on D/C  - Xarelto resumed per cardio  - pain control, supportive care, OOB  -Case discussed with Dr. Meza    Surgical Team Contact Information  Spectralink: Ext: [phone] or [phone]  Pager: [phone].
65y Male p/w acute cholecystitis. OR cancelled yesterday as patient went into a-fib after intubation. Patient w/ minimal abdominal pain, c/o diarrhea overnight that resolved with Immodium  - Perc zheng today with IR  - Cardio recs appreciated  - F/up AM labs  - NPO, IVF  - IV Zosyn  - pain control, supportive care, OOB  -Case discussed with Dr. Meza    Surgical Team Contact Information  Spectralink: Ext: [phone] or [phone]  Pager: [phone]
PLAN  - OR tomorrow 9/13  - Pt has been pre-opped in anticipation  - HOLD HEPARIN AT 6AM (ordered)  - pain control, supportive care  - OOB  - NPO, IVF   - serial abdominal exams  - labs in am    Surgical Team Contact Information  Spectralink: Ext: [phone] or [phone]  Pager: [phone]

## 2021-09-15 NOTE — PROGRESS NOTE ADULT - SUBJECTIVE AND OBJECTIVE BOX
Patient seen and examined at bedside. States he feels well. Patient eager to go home. S/P cholecystostomy tube placement, draining bilious fluid. Patient tolerating regular diet, with minimal pain on palpation RUQ.     Physical Exam:  GENERAL: NAD  HEENT:  anicteric, moist mucous membranes  CHEST/LUNG:  CTA b/l, no rales, wheezes, or rhonchi  HEART:  RRR, S1, S2  ABDOMEN:  BS+, soft, mild tender to palpation RUQ, nondistended, +cholecystostomy tube  EXTREMITIES: no edema, cyanosis, or calf tenderness  NERVOUS SYSTEM: answers questions and follows commands appropriately    Please refer to updated D/C note for further details.

## 2021-09-15 NOTE — PROGRESS NOTE ADULT - REASON FOR ADMISSION
Acute cholecystitis

## 2021-09-15 NOTE — PROGRESS NOTE ADULT - ATTENDING COMMENTS
Case discussed with HERRERA Medina overnight and seen this morning with HERRERA Powell.  Pt remains in ED.  Resting comfortably in no acute distress.  Now with elevated WBC at 12.  LFT's normal.    Admitted with Epigastric and RUQ pain.  Sono suggestive of dilated GB with mild wall thickening, pericholecystic fluid and sludge.  CBD normal.  Pt reports no significant history of similar episodes.  Denies ETOH or Tobacco.  Presently on Xarelto for a fib and diagnosis of splenic infarct.  Also History of Prostate CA.  S/P Robotic Prostatectomy.    On exam at this time.  Abdomen mildly distended.  Tender to deep palpation mor in mid epigastrium and less in RUQ.  No rebound or guarding.    Recommend HIDA scan to confirm cholecystitis as sonogram does not look all that impressive to me.  Possible CT scan as well to better assess GB and surrounding anatomy, reassess splenic infarct.    If HIDA positive, can plan for Lap Noy Monday  Hold Xarelto.  May heparinize.  Cardiology eval and management of a fib.
65M with PMHx HTN, Afib (on xarelto), GERD, gout, prostate ca (s/p splenectomy 2010), splenic infarction, presenting with RUQ abdominal pain, nausea, ultrasound findings positive for acute cholecystitis admit for acute cholecystitis.   - NPO, IVF, IV Zosyn, pain meds prn  - Hold xarelto per cards (till 9/11)  - Surgery following; will f/u when OR planned for  - Monitor for fevers, trend WBC
65M with PMHx HTN, Afib (on xarelto), GERD, gout, prostate ca (s/p splenectomy 2010), splenic infarction, presenting with RUQ abdominal pain, nausea, ultrasound findings positive for acute cholecystitis admit for acute cholecystitis.   - Pain controlled and mildly improved per patient, WBC down trend  - On IV zosyn, spiked fever overnight, blood/urine cultures ordered, continues on IV fluids.  - Surgery plans for surg 9/13  - Per cards, xarelto needs to be on hold for now, pt is on hep gtt, plan to dc some hours prior to surgery  - Pt is NPO except meds with sips of water, does not feel he can tolerate clear liquid diet at this time so will continue current diet  - PARKER: Cr 1.10 --> 1.40. No BUN elevation, and pt has been on IVF. Suspect postrenal disease - will check bladder scan.
65M with PMHx HTN, Afib (on xarelto), GERD, gout, prostate ca (s/p splenectomy 2010), splenic infarction, presenting with RUQ abdominal pain, nausea, ultrasound/HIDA findings positive for acute cholecystitis admit for acute cholecystitis.   - leukocytosis resolved  - On IV zosyn  - Surgery plans for surg 9/13  - Per cards, xarelto needs to be on hold for now, pt is on hep gtt  - npo  - dc heparin gtt per surgery (surgery to update on OR time)  - PARKER resolved: Cr 1.10 --> 1.40-->1.0. bladder scan negative  - Medically optimized for planned surgical procedure.
Patient was seen and examined along with the PA this morning.    Resting comfortably in bed in no acute distress.  Hemodynamically stable.  Low-grade temperature of 99.6F patient still complains of some mild epigastric and right upper quadrant pain particularly with deep inspiration and coughing.    Now hospital day 5 with acute cholecystitis.  Surgical intervention delayed secondary to patient having been on Xarelto.  He was taken to the operating room yesterday however after induction of anesthesia and intubation the patient went into rapid atrial fibrillation uncontrolled with medication and our anesthesiologist felt best to postpone surgery.    Determination was then made to pursue percutaneous drainage.  This has been discussed with interventional radiology who will plan to do a percutaneous drainage procedure today.    I have again discussed with the patient the role of percutaneous drainage versus surgery at this time and will continue supportive care and antibiotics with plans for interval cholecystectomy in the near future.
Seen by PA this morning and again by me this afternoon in conjunction with PA.  S/P perc drainage of GB.  Catheter remains in place to gravity drain (leg bag).  Kristina drainage.  Wife present at bedside all questions answered.  Discussed drain care, Bathing/showering, Diet, etc.  Discussed drain management and plans for further imaging prior to cholecystectomy all to be discussed further and arranged as an outpt.  Labs reviewed and appropriate.  Stable from surgical standpoint for discharge to home.  VNS for drain care follow up at home and education.  f/u with my office in 2 weeks.

## 2021-09-15 NOTE — PROGRESS NOTE ADULT - SUBJECTIVE AND OBJECTIVE BOX
Chief Complaint: Abdominal pain    Interval Events: Underwent cholecystostomy yesterday.    Review of Systems:  General: No fevers, chills, weight loss or gain  Skin: No rashes, color changes  Cardiovascular: No chest pain, orthopnea  Respiratory: No shortness of breath, cough  Gastrointestinal: No nausea, abdominal pain  Genitourinary: No incontinence, pain with urination  Musculoskeletal: No pain, swelling, decreased range of motion  Neurological: No headache, weakness  Psychiatric: No depression, anxiety  Endocrine: No weight loss or gain, increased thirst  All other systems are comprehensively negative.    Physical Exam:  Vital Signs Last 24 Hrs  T(C): 36.8 (15 Sep 2021 05:56), Max: 37.7 (14 Sep 2021 09:50)  T(F): 98.3 (15 Sep 2021 05:56), Max: 99.8 (14 Sep 2021 09:50)  HR: 72 (15 Sep 2021 05:56) (72 - 98)  BP: 134/96 (15 Sep 2021 05:56) (113/76 - 138/84)  BP(mean): --  RR: 17 (15 Sep 2021 05:56) (16 - 20)  SpO2: 93% (15 Sep 2021 05:56) (92% - 98%)  General: NAD  HEENT: MMM  Neck: No JVD, no carotid bruit  Lungs: CTAB  CV: Irregular, nl S1/S2, no M/R/G  Abdomen: S/NT/ND, +BS  Extremities: No LE edema, no cyanosis  Neuro: AAOx3, non-focal  Skin: No rash    Labs:  09-14    139  |  108  |  12  ----------------------------<  112<H>  3.4<L>   |  24  |  0.97    Ca    7.9<L>      14 Sep 2021 08:43    TPro  6.7  /  Alb  2.4<L>  /  TBili  1.5<H>  /  DBili  x   /  AST  26  /  ALT  35  /  AlkPhos  87  09-14                        12.9   9.32  )-----------( 186      ( 14 Sep 2021 08:43 )             37.4   Telemetry: AF

## 2021-09-15 NOTE — PROGRESS NOTE ADULT - PROVIDER SPECIALTY LIST ADULT
Hospitalist
Surgery
Anesthesia
Cardiology
Cardiology
Hospitalist
Hospitalist
Surgery
Surgery
Cardiology
Hospitalist
Hospitalist
Surgery
Cardiology
Cardiology
Surgery
Surgery
Hospitalist
Surgery

## 2021-09-15 NOTE — PROGRESS NOTE ADULT - SUBJECTIVE AND OBJECTIVE BOX
STATUS POST: Perc zheng placement (9/14)    SUBJECTIVE:  Patient seen and examined at bedside.  Patient doing well and eager to go home, just c/o some soreness at perc zheng insertion site. Tolerating CLD w/o n/v. Patient denies any fevers, chills, chest pain, shortness of breath, abdominal pain, or diarrhea.    Vital Signs Last 24 Hrs  T(C): 36.8 (15 Sep 2021 05:56), Max: 37.7 (14 Sep 2021 09:50)  T(F): 98.3 (15 Sep 2021 05:56), Max: 99.8 (14 Sep 2021 09:50)  HR: 72 (15 Sep 2021 05:56) (72 - 98)  BP: 134/96 (15 Sep 2021 05:56) (113/76 - 138/84)  BP(mean): --  RR: 17 (15 Sep 2021 05:56) (16 - 20)  SpO2: 93% (15 Sep 2021 05:56) (92% - 98%)    PHYSICAL EXAM:  GENERAL: No acute distress, well-developed  HEAD:  Atraumatic, Normocephalic  ABDOMEN: Soft, non-distended, tender in RUQ. Cholecystostomy in place with minimal serosanguinous drainage (recently changed), dressing C/D/I  NEUROLOGY: A&O x 3, no focal deficits    I&O's Summary    14 Sep 2021 07:01  -  15 Sep 2021 07:00  --------------------------------------------------------  IN: 0 mL / OUT: 995 mL / NET: -995 mL    MEDICATIONS  (STANDING):  allopurinol 100 milliGRAM(s) Oral daily  amLODIPine   Tablet 10 milliGRAM(s) Oral daily  brimonidine 0.2% Ophthalmic Solution 1 Drop(s) Both EYES two times a day  colchicine 0.6 milliGRAM(s) Oral daily  dextrose 5% + sodium chloride 0.45%. 1000 milliLiter(s) (100 mL/Hr) IV Continuous <Continuous>  fluticasone propionate 50 MICROgram(s)/spray Nasal Spray 1 Spray(s) Both Nostrils two times a day  hydrALAZINE 25 milliGRAM(s) Oral two times a day  losartan 100 milliGRAM(s) Oral daily  metoprolol succinate  milliGRAM(s) Oral daily  piperacillin/tazobactam IVPB.. 3.375 Gram(s) IV Intermittent every 8 hours  rivaroxaban 20 milliGRAM(s) Oral daily  senna 2 Tablet(s) Oral at bedtime  timolol 0.5% Solution 1 Drop(s) Both EYES two times a day    MEDICATIONS  (PRN):  acetaminophen   Tablet .. 650 milliGRAM(s) Oral every 6 hours PRN Temp greater or equal to 38C (100.4F), Mild Pain (1 - 3)  aluminum hydroxide/magnesium hydroxide/simethicone Suspension 30 milliLiter(s) Oral every 4 hours PRN Dyspepsia  guaiFENesin Oral Liquid (Sugar-Free) 100 milliGRAM(s) Oral every 6 hours PRN Cough  HYDROmorphone  Injectable 1 milliGRAM(s) IV Push every 4 hours PRN Severe Pain (7 - 10)  melatonin 3 milliGRAM(s) Oral at bedtime PRN Insomnia  ondansetron Injectable 4 milliGRAM(s) IV Push every 6 hours PRN Nausea  oxycodone    5 mG/acetaminophen 325 mG 1 Tablet(s) Oral every 6 hours PRN Moderate Pain (4 - 6)  polyethylene glycol 3350 17 Gram(s) Oral daily PRN Constipation    LABS: AM labs pending

## 2021-09-15 NOTE — DISCHARGE NOTE NURSING/CASE MANAGEMENT/SOCIAL WORK - PATIENT PORTAL LINK FT
You can access the FollowMyHealth Patient Portal offered by [facility] by registering at the following website: [email or web address]. By joining [facility]’s FollowMyHealth portal, you will also be able to view your health information using other applications (apps) compatible with our system.

## 2021-09-15 NOTE — PROGRESS NOTE ADULT - ASSESSMENT
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    Plan:  - ECG with nonspecific findings, likely LVH related. Known atrial fibrillation - rate controlled.  - Last echo 6/21 with normal LV systolic function, no significant valve issues  - Last stress test in 2019 which was normal  - Continue amlodipine 10 mg daily  - Continue metoprolol succinate 100 mg daily  - Continue losartan 100 mg daily  - Continue hydralazine 25 mg bid  - Hold furosemide - can resume as outpatient  - Resume rivaroxaban 20 mg daily  - Tachycardia resolved without much intervention - possibly due to coughing prior to OR  - Cholecystostomy placed  - Eventual plan for cholecystectomy. He is again optimized to proceed from a cardiac standpoint when he returns at a later date.

## 2021-09-16 LAB
-  AMIKACIN: SIGNIFICANT CHANGE UP
-  AMOXICILLIN/CLAVULANIC ACID: SIGNIFICANT CHANGE UP
-  AMPICILLIN/SULBACTAM: SIGNIFICANT CHANGE UP
-  AMPICILLIN: SIGNIFICANT CHANGE UP
-  AZTREONAM: SIGNIFICANT CHANGE UP
-  CEFAZOLIN: SIGNIFICANT CHANGE UP
-  CEFEPIME: SIGNIFICANT CHANGE UP
-  CEFOXITIN: SIGNIFICANT CHANGE UP
-  CEFTRIAXONE: SIGNIFICANT CHANGE UP
-  CIPROFLOXACIN: SIGNIFICANT CHANGE UP
-  ERTAPENEM: SIGNIFICANT CHANGE UP
-  GENTAMICIN: SIGNIFICANT CHANGE UP
-  IMIPENEM: SIGNIFICANT CHANGE UP
-  LEVOFLOXACIN: SIGNIFICANT CHANGE UP
-  MEROPENEM: SIGNIFICANT CHANGE UP
-  PIPERACILLIN/TAZOBACTAM: SIGNIFICANT CHANGE UP
-  TOBRAMYCIN: SIGNIFICANT CHANGE UP
-  TRIMETHOPRIM/SULFAMETHOXAZOLE: SIGNIFICANT CHANGE UP
CULTURE RESULTS: SIGNIFICANT CHANGE UP
CULTURE RESULTS: SIGNIFICANT CHANGE UP
METHOD TYPE: SIGNIFICANT CHANGE UP
SPECIMEN SOURCE: SIGNIFICANT CHANGE UP
SPECIMEN SOURCE: SIGNIFICANT CHANGE UP

## 2021-09-19 LAB
CULTURE RESULTS: SIGNIFICANT CHANGE UP
ORGANISM # SPEC MICROSCOPIC CNT: SIGNIFICANT CHANGE UP
ORGANISM # SPEC MICROSCOPIC CNT: SIGNIFICANT CHANGE UP
SPECIMEN SOURCE: SIGNIFICANT CHANGE UP

## 2021-09-20 RX ORDER — AZTREONAM 2 G
1 VIAL (EA) INJECTION
Qty: 14 | Refills: 0
Start: 2021-09-20 | End: 2021-09-26

## 2021-09-20 NOTE — CHART NOTE - NSCHARTNOTEFT_GEN_A_CORE
Received a call from [facility] that patient's body fluid (bile) culture resulted with enterobacter cloacae complex resistant to ceftriaxone, but sensitive to bactrim. Called patient, made him aware of finding. Patient states he has been feeling well, denied fevers, chills, states he has a follow up appointment with Dr. Meza (Surgeon) on 9/28/21. States he had spoken with Dr. Meza earlier this AM. Advised patient he needs to take bactrim twice a day for 7 days, prescription sent to pharmacy. Patient expressed understanding and stated his wife will  the medication.

## 2021-09-25 ENCOUNTER — NON-APPOINTMENT (OUTPATIENT)
Age: 66
End: 2021-09-25

## 2021-09-28 ENCOUNTER — APPOINTMENT (OUTPATIENT)
Dept: SURGERY | Facility: CLINIC | Age: 66
End: 2021-09-28
Payer: COMMERCIAL

## 2021-09-28 PROCEDURE — 99214 OFFICE O/P EST MOD 30 MIN: CPT

## 2021-09-28 NOTE — HISTORY OF PRESENT ILLNESS
[de-identified] : 66-year-old gentleman status post recent hospital admission for epigastric abdominal pain radiating to the right upper quadrant and diagnosed with acute cholecystitis and cholelithiasis.  The patient had marked inflammatory changes and a prolonged and protracted prehospital course thereby underwent percutaneous cholecystostomy drainage.  He returns today to discuss definitive management and plan for cholecystectomy.  Cholecystostomy tube has been functioning well.  Minimal bilious drainage 25 to 30 cc/day.

## 2021-09-28 NOTE — ASSESSMENT
[FreeTextEntry1] : s/p Cholecystostomy drain placement for acute cholecystitis.\par Draining bilious secretions.\par No pain, nausea or signs of jaundice.\par \par Will need cholecystectomy.\par Recommend, Catheter cholangiogram to assess patency of cystic duct and biliary anatomy prior to Lap Noy.  Referred to IR.\par CBC, CMP ordered.\par \par Risk, Benefits, and Alternatives to surgery have been discussed.  This includes but is not limited to bleeding, infection, damage to adjacent structures, need for additional surgery or interventions, adverse effects of anesthesia such as cardio-respiratory complications, prolonged intubation, cardiac arrhythmia, arrest, and or death.  Risks of forgoing surgery have also been discussed including progression of, and/or worsening of current condition which may then require urgent or emergent treatment or surgery.\par \par Educational materials courtesy of the [employer] with respect to cholecystectomy have been provided for the patient's review and all questions have been answered.\par \par Schedule electively for Lap Noy.  \par Routine PST to be arranged.\par \par f/u with me post operatively.

## 2021-09-28 NOTE — PHYSICAL EXAM
[Normal Breath Sounds] : Normal breath sounds [Wheezing] : wheezing was heard [Normal Rate and Rhythm] : normal rate and rhythm [de-identified] : Healthy-appearing gentleman in no acute distress  [de-identified] : JAMILAH LEIGH, CARLOS

## 2021-10-03 DIAGNOSIS — Z01.818 ENCOUNTER FOR OTHER PREPROCEDURAL EXAMINATION: ICD-10-CM

## 2021-10-04 ENCOUNTER — APPOINTMENT (OUTPATIENT)
Dept: DISASTER EMERGENCY | Facility: CLINIC | Age: 66
End: 2021-10-04

## 2021-10-05 LAB — SARS-COV-2 N GENE NPH QL NAA+PROBE: NOT DETECTED

## 2021-10-06 ENCOUNTER — OUTPATIENT (OUTPATIENT)
Dept: OUTPATIENT SERVICES | Facility: HOSPITAL | Age: 66
LOS: 1 days | End: 2021-10-06
Payer: COMMERCIAL

## 2021-10-06 VITALS
RESPIRATION RATE: 14 BRPM | HEIGHT: 67 IN | SYSTOLIC BLOOD PRESSURE: 124 MMHG | OXYGEN SATURATION: 98 % | TEMPERATURE: 97 F | WEIGHT: 209 LBS | HEART RATE: 76 BPM | DIASTOLIC BLOOD PRESSURE: 73 MMHG

## 2021-10-06 DIAGNOSIS — K91.5 POSTCHOLECYSTECTOMY SYNDROME: ICD-10-CM

## 2021-10-06 DIAGNOSIS — Z90.79 ACQUIRED ABSENCE OF OTHER GENITAL ORGAN(S): Chronic | ICD-10-CM

## 2021-10-06 DIAGNOSIS — Z01.818 ENCOUNTER FOR OTHER PREPROCEDURAL EXAMINATION: ICD-10-CM

## 2021-10-06 DIAGNOSIS — Z98.890 OTHER SPECIFIED POSTPROCEDURAL STATES: Chronic | ICD-10-CM

## 2021-10-06 DIAGNOSIS — K80.10 CALCULUS OF GALLBLADDER WITH CHRONIC CHOLECYSTITIS WITHOUT OBSTRUCTION: ICD-10-CM

## 2021-10-06 DIAGNOSIS — Z41.9 ENCOUNTER FOR PROCEDURE FOR PURPOSES OTHER THAN REMEDYING HEALTH STATE, UNSPECIFIED: Chronic | ICD-10-CM

## 2021-10-06 LAB
ALBUMIN SERPL ELPH-MCNC: 3.3 G/DL — SIGNIFICANT CHANGE UP (ref 3.3–5)
ALP SERPL-CCNC: 73 U/L — SIGNIFICANT CHANGE UP (ref 40–120)
ALT FLD-CCNC: 57 U/L — SIGNIFICANT CHANGE UP (ref 12–78)
AMYLASE P1 CFR SERPL: 66 U/L — SIGNIFICANT CHANGE UP (ref 25–125)
ANION GAP SERPL CALC-SCNC: 6 MMOL/L — SIGNIFICANT CHANGE UP (ref 5–17)
AST SERPL-CCNC: 24 U/L — SIGNIFICANT CHANGE UP (ref 15–37)
BILIRUB SERPL-MCNC: 0.6 MG/DL — SIGNIFICANT CHANGE UP (ref 0.2–1.2)
BUN SERPL-MCNC: 17 MG/DL — SIGNIFICANT CHANGE UP (ref 7–23)
CALCIUM SERPL-MCNC: 8.5 MG/DL — SIGNIFICANT CHANGE UP (ref 8.5–10.1)
CHLORIDE SERPL-SCNC: 111 MMOL/L — HIGH (ref 96–108)
CO2 SERPL-SCNC: 24 MMOL/L — SIGNIFICANT CHANGE UP (ref 22–31)
CREAT SERPL-MCNC: 1.2 MG/DL — SIGNIFICANT CHANGE UP (ref 0.5–1.3)
GLUCOSE SERPL-MCNC: 97 MG/DL — SIGNIFICANT CHANGE UP (ref 70–99)
HCT VFR BLD CALC: 41 % — SIGNIFICANT CHANGE UP (ref 39–50)
HGB BLD-MCNC: 13.9 G/DL — SIGNIFICANT CHANGE UP (ref 13–17)
LIDOCAIN IGE QN: 156 U/L — SIGNIFICANT CHANGE UP (ref 73–393)
MCHC RBC-ENTMCNC: 28.7 PG — SIGNIFICANT CHANGE UP (ref 27–34)
MCHC RBC-ENTMCNC: 33.9 GM/DL — SIGNIFICANT CHANGE UP (ref 32–36)
MCV RBC AUTO: 84.7 FL — SIGNIFICANT CHANGE UP (ref 80–100)
NRBC # BLD: 0 /100 WBCS — SIGNIFICANT CHANGE UP (ref 0–0)
PLATELET # BLD AUTO: 241 K/UL — SIGNIFICANT CHANGE UP (ref 150–400)
POTASSIUM SERPL-MCNC: 3.7 MMOL/L — SIGNIFICANT CHANGE UP (ref 3.5–5.3)
POTASSIUM SERPL-SCNC: 3.7 MMOL/L — SIGNIFICANT CHANGE UP (ref 3.5–5.3)
PROT SERPL-MCNC: 7.1 G/DL — SIGNIFICANT CHANGE UP (ref 6–8.3)
RBC # BLD: 4.84 M/UL — SIGNIFICANT CHANGE UP (ref 4.2–5.8)
RBC # FLD: 12.7 % — SIGNIFICANT CHANGE UP (ref 10.3–14.5)
SODIUM SERPL-SCNC: 141 MMOL/L — SIGNIFICANT CHANGE UP (ref 135–145)
WBC # BLD: 5.65 K/UL — SIGNIFICANT CHANGE UP (ref 3.8–10.5)
WBC # FLD AUTO: 5.65 K/UL — SIGNIFICANT CHANGE UP (ref 3.8–10.5)

## 2021-10-06 PROCEDURE — 86900 BLOOD TYPING SEROLOGIC ABO: CPT

## 2021-10-06 PROCEDURE — 82150 ASSAY OF AMYLASE: CPT

## 2021-10-06 PROCEDURE — G0463: CPT

## 2021-10-06 PROCEDURE — 86901 BLOOD TYPING SEROLOGIC RH(D): CPT

## 2021-10-06 PROCEDURE — 86850 RBC ANTIBODY SCREEN: CPT

## 2021-10-06 PROCEDURE — 83690 ASSAY OF LIPASE: CPT

## 2021-10-06 PROCEDURE — 36415 COLL VENOUS BLD VENIPUNCTURE: CPT

## 2021-10-06 PROCEDURE — 80053 COMPREHEN METABOLIC PANEL: CPT

## 2021-10-06 PROCEDURE — 85027 COMPLETE CBC AUTOMATED: CPT

## 2021-10-06 NOTE — H&P PST ADULT - NSICDXPASTSURGICALHX_GEN_ALL_CORE_FT
PAST SURGICAL HISTORY:  Elective surgery Percutaneous Cholecystostomy Drainage September 2021    H/O arthroscopy of right knee 1988    H/O colonoscopy     H/O endoscopy     H/O prostatectomy 4/27/10

## 2021-10-06 NOTE — H&P PST ADULT - GASTROINTESTINAL COMMENTS
Slight tenderness at drainage site- positive drainage in bag Positive percutaneous Cholecystostomy drain -

## 2021-10-06 NOTE — H&P PST ADULT - GENERAL COMMENTS
Denies any travel in the last 14 days - denies any recent exposure to anyone with known or suspected covid - denies any current covid symptoms

## 2021-10-06 NOTE — H&P PST ADULT - NSICDXPASTMEDICALHX_GEN_ALL_CORE_FT
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

## 2021-10-06 NOTE — H&P PST ADULT - PROBLEM SELECTOR PLAN 1
PST Labs; CBC, CMP, Amylase, Lipase, Type & Screen (EKG, CXR and US on chart from September admission). Pt has been seen for cardiac clearance with Dr Mckoy on 10/5/2021. Medical clearance scheduled with Dr Villalba on 10/8/2021. Pt instructed to stop any NSAIDS/Herbal Supplements between now and procedure. May take Tylenol if needed for pain between now and procedure. He will HOLD his Xarelto and Colchicine as instructed starting 10/15/2021. Morning of procedure he may take his Metoprolol, Losartan, Amlodipine, Allopurinol with small sip of water. Pre-op instructions as well as pre-op wash instructions given to pt with understanding verbalized. Discussed with pt he would need to have COVID swab at [facility] drive thru on 10/15/2021. Provided pt with address and phone number should he have any questions. Informed pt [place] admitting would call him to schedule appointment. pt verbalizes understanding of all instructions discussed today in PST. All questions addressed with pt prior to him leaving the PST department

## 2021-10-06 NOTE — H&P PST ADULT - ASSESSMENT
66 year old male PMH HTN, A- FIb, Prostate Cancer, Splenic Infarct, Subcutaneous Nodules;  now with Calculus of Gallbladder with Chronic Cholecystitis without obstruction Post Cholecystectomy Syndrome; scheduled for Laparoscopic Cholecystectomy with Dr Kevin Meza on 10/18/021.

## 2021-10-06 NOTE — H&P PST ADULT - HISTORY OF PRESENT ILLNESS
66 year old male PMH HTN, A- FIb, Prostate Cancer, Splenic Infarct, Subcutaneous Nodules;  now with Calculus of Gallbladder with Chronic Cholecystitis without obstruction Post Cholecystectomy Syndrome; presents today for PST prior to Laparoscopic Cholecystectomy with Dr Kevin Meza on 10/18/021. Pt notes he was recently hospitalized for epigastric abdominal pain which radiated to his RUQ. He was DX with acute cholecystitis and cholelithiasis as well as inflammatory changes. Pt underwent percutaneous cholecystostomy drainage.  Cholecystostomy tube in place and functioning well draining 70-99cc/day.Pt notes some continued discomfort however from the drain he notes not like when he was first admitted. Denies any current N/V.  Following discussions with Dr Meza regarding treatment options pt is electing for scheduled procedure.

## 2021-10-07 ENCOUNTER — RESULT REVIEW (OUTPATIENT)
Age: 66
End: 2021-10-07

## 2021-10-07 ENCOUNTER — OUTPATIENT (OUTPATIENT)
Dept: OUTPATIENT SERVICES | Facility: HOSPITAL | Age: 66
LOS: 1 days | End: 2021-10-07
Payer: COMMERCIAL

## 2021-10-07 DIAGNOSIS — K80.20 CALCULUS OF GALLBLADDER WITHOUT CHOLECYSTITIS WITHOUT OBSTRUCTION: ICD-10-CM

## 2021-10-07 DIAGNOSIS — Z41.9 ENCOUNTER FOR PROCEDURE FOR PURPOSES OTHER THAN REMEDYING HEALTH STATE, UNSPECIFIED: Chronic | ICD-10-CM

## 2021-10-07 DIAGNOSIS — Z98.890 OTHER SPECIFIED POSTPROCEDURAL STATES: Chronic | ICD-10-CM

## 2021-10-07 DIAGNOSIS — Z90.79 ACQUIRED ABSENCE OF OTHER GENITAL ORGAN(S): Chronic | ICD-10-CM

## 2021-10-07 PROCEDURE — 76000 FLUOROSCOPY <1 HR PHYS/QHP: CPT

## 2021-10-07 PROCEDURE — 49424 ASSESS CYST CONTRAST INJECT: CPT

## 2021-10-07 PROCEDURE — 47536 EXCHANGE BILIARY DRG CATH: CPT

## 2021-10-12 PROBLEM — U07.1 COVID-19: Chronic | Status: ACTIVE | Noted: 2021-10-06

## 2021-10-14 RX ORDER — SODIUM CHLORIDE 9 MG/ML
1000 INJECTION, SOLUTION INTRAVENOUS
Refills: 0 | Status: DISCONTINUED | OUTPATIENT
Start: 2021-10-18 | End: 2021-11-01

## 2021-10-15 ENCOUNTER — OUTPATIENT (OUTPATIENT)
Dept: OUTPATIENT SERVICES | Facility: HOSPITAL | Age: 66
LOS: 1 days | End: 2021-10-15
Payer: COMMERCIAL

## 2021-10-15 DIAGNOSIS — Z90.79 ACQUIRED ABSENCE OF OTHER GENITAL ORGAN(S): Chronic | ICD-10-CM

## 2021-10-15 DIAGNOSIS — Z20.828 CONTACT WITH AND (SUSPECTED) EXPOSURE TO OTHER VIRAL COMMUNICABLE DISEASES: ICD-10-CM

## 2021-10-15 DIAGNOSIS — Z98.890 OTHER SPECIFIED POSTPROCEDURAL STATES: Chronic | ICD-10-CM

## 2021-10-15 DIAGNOSIS — Z41.9 ENCOUNTER FOR PROCEDURE FOR PURPOSES OTHER THAN REMEDYING HEALTH STATE, UNSPECIFIED: Chronic | ICD-10-CM

## 2021-10-15 LAB — SARS-COV-2 RNA SPEC QL NAA+PROBE: SIGNIFICANT CHANGE UP

## 2021-10-15 PROCEDURE — U0005: CPT

## 2021-10-15 PROCEDURE — U0003: CPT

## 2021-10-17 ENCOUNTER — TRANSCRIPTION ENCOUNTER (OUTPATIENT)
Age: 66
End: 2021-10-17

## 2021-10-18 ENCOUNTER — APPOINTMENT (OUTPATIENT)
Dept: SURGERY | Facility: HOSPITAL | Age: 66
End: 2021-10-18

## 2021-10-18 ENCOUNTER — OUTPATIENT (OUTPATIENT)
Dept: OUTPATIENT SERVICES | Facility: HOSPITAL | Age: 66
LOS: 1 days | End: 2021-10-18
Payer: COMMERCIAL

## 2021-10-18 ENCOUNTER — RESULT REVIEW (OUTPATIENT)
Age: 66
End: 2021-10-18

## 2021-10-18 VITALS
RESPIRATION RATE: 15 BRPM | WEIGHT: 209 LBS | OXYGEN SATURATION: 97 % | HEART RATE: 92 BPM | SYSTOLIC BLOOD PRESSURE: 135 MMHG | HEIGHT: 67 IN | DIASTOLIC BLOOD PRESSURE: 96 MMHG | TEMPERATURE: 98 F

## 2021-10-18 VITALS
DIASTOLIC BLOOD PRESSURE: 70 MMHG | RESPIRATION RATE: 18 BRPM | OXYGEN SATURATION: 98 % | SYSTOLIC BLOOD PRESSURE: 145 MMHG | HEART RATE: 85 BPM

## 2021-10-18 DIAGNOSIS — K80.10 CALCULUS OF GALLBLADDER WITH CHRONIC CHOLECYSTITIS WITHOUT OBSTRUCTION: ICD-10-CM

## 2021-10-18 DIAGNOSIS — K91.5 POSTCHOLECYSTECTOMY SYNDROME: ICD-10-CM

## 2021-10-18 DIAGNOSIS — Z41.9 ENCOUNTER FOR PROCEDURE FOR PURPOSES OTHER THAN REMEDYING HEALTH STATE, UNSPECIFIED: Chronic | ICD-10-CM

## 2021-10-18 DIAGNOSIS — Z90.79 ACQUIRED ABSENCE OF OTHER GENITAL ORGAN(S): Chronic | ICD-10-CM

## 2021-10-18 DIAGNOSIS — Z98.890 OTHER SPECIFIED POSTPROCEDURAL STATES: Chronic | ICD-10-CM

## 2021-10-18 DIAGNOSIS — Z01.818 ENCOUNTER FOR OTHER PREPROCEDURAL EXAMINATION: ICD-10-CM

## 2021-10-18 LAB
APTT BLD: 29.4 SEC — SIGNIFICANT CHANGE UP (ref 27.5–35.5)
INR BLD: 1.05 RATIO — SIGNIFICANT CHANGE UP (ref 0.88–1.16)
PROTHROM AB SERPL-ACNC: 12.3 SEC — SIGNIFICANT CHANGE UP (ref 10.6–13.6)

## 2021-10-18 PROCEDURE — 88304 TISSUE EXAM BY PATHOLOGIST: CPT | Mod: 26

## 2021-10-18 PROCEDURE — C1889: CPT

## 2021-10-18 PROCEDURE — 47562 LAPAROSCOPIC CHOLECYSTECTOMY: CPT

## 2021-10-18 PROCEDURE — 85730 THROMBOPLASTIN TIME PARTIAL: CPT

## 2021-10-18 PROCEDURE — 36415 COLL VENOUS BLD VENIPUNCTURE: CPT

## 2021-10-18 PROCEDURE — 85610 PROTHROMBIN TIME: CPT

## 2021-10-18 PROCEDURE — 47562 LAPAROSCOPIC CHOLECYSTECTOMY: CPT | Mod: AS

## 2021-10-18 PROCEDURE — 88304 TISSUE EXAM BY PATHOLOGIST: CPT

## 2021-10-18 RX ORDER — ONDANSETRON 8 MG/1
4 TABLET, FILM COATED ORAL ONCE
Refills: 0 | Status: DISCONTINUED | OUTPATIENT
Start: 2021-10-18 | End: 2021-10-18

## 2021-10-18 RX ORDER — OXYCODONE AND ACETAMINOPHEN 5; 325 MG/1; MG/1
1 TABLET ORAL
Qty: 15 | Refills: 0
Start: 2021-10-18

## 2021-10-18 RX ORDER — LOSARTAN POTASSIUM 100 MG/1
1 TABLET, FILM COATED ORAL
Qty: 30 | Refills: 0

## 2021-10-18 RX ORDER — HYDROMORPHONE HYDROCHLORIDE 2 MG/ML
0.5 INJECTION INTRAMUSCULAR; INTRAVENOUS; SUBCUTANEOUS
Refills: 0 | Status: DISCONTINUED | OUTPATIENT
Start: 2021-10-18 | End: 2021-10-18

## 2021-10-18 RX ORDER — LOSARTAN POTASSIUM 100 MG/1
1 TABLET, FILM COATED ORAL
Qty: 0 | Refills: 0 | DISCHARGE
Start: 2021-10-18

## 2021-10-18 RX ORDER — METOPROLOL TARTRATE 50 MG
1 TABLET ORAL
Qty: 0 | Refills: 0 | DISCHARGE
Start: 2021-10-18

## 2021-10-18 RX ORDER — OXYCODONE HYDROCHLORIDE 5 MG/1
5 TABLET ORAL ONCE
Refills: 0 | Status: DISCONTINUED | OUTPATIENT
Start: 2021-10-18 | End: 2021-10-18

## 2021-10-18 RX ORDER — CEFAZOLIN SODIUM 1 G
2000 VIAL (EA) INJECTION ONCE
Refills: 0 | Status: COMPLETED | OUTPATIENT
Start: 2021-10-18 | End: 2021-10-18

## 2021-10-18 RX ORDER — SODIUM CHLORIDE 9 MG/ML
1000 INJECTION, SOLUTION INTRAVENOUS
Refills: 0 | Status: DISCONTINUED | OUTPATIENT
Start: 2021-10-18 | End: 2021-10-18

## 2021-10-18 RX ADMIN — HYDROMORPHONE HYDROCHLORIDE 0.5 MILLIGRAM(S): 2 INJECTION INTRAMUSCULAR; INTRAVENOUS; SUBCUTANEOUS at 14:00

## 2021-10-18 RX ADMIN — HYDROMORPHONE HYDROCHLORIDE 0.5 MILLIGRAM(S): 2 INJECTION INTRAMUSCULAR; INTRAVENOUS; SUBCUTANEOUS at 13:35

## 2021-10-18 RX ADMIN — HYDROMORPHONE HYDROCHLORIDE 0.5 MILLIGRAM(S): 2 INJECTION INTRAMUSCULAR; INTRAVENOUS; SUBCUTANEOUS at 14:17

## 2021-10-18 RX ADMIN — OXYCODONE HYDROCHLORIDE 5 MILLIGRAM(S): 5 TABLET ORAL at 14:47

## 2021-10-18 RX ADMIN — SODIUM CHLORIDE 75 MILLILITER(S): 9 INJECTION, SOLUTION INTRAVENOUS at 10:41

## 2021-10-18 RX ADMIN — HYDROMORPHONE HYDROCHLORIDE 0.5 MILLIGRAM(S): 2 INJECTION INTRAMUSCULAR; INTRAVENOUS; SUBCUTANEOUS at 13:47

## 2021-10-18 RX ADMIN — OXYCODONE HYDROCHLORIDE 5 MILLIGRAM(S): 5 TABLET ORAL at 14:17

## 2021-10-18 RX ADMIN — SODIUM CHLORIDE 75 MILLILITER(S): 9 INJECTION, SOLUTION INTRAVENOUS at 13:37

## 2021-10-18 NOTE — ASU DISCHARGE PLAN (ADULT/PEDIATRIC) - ASU DC SPECIAL INSTRUCTIONSFT
Band aids may be removed in 24 hours  May shower in 24 hours- let soapy water run over abdomen- pat dry  May remove bandages to right upper quadrant and over umbilicus in 72 hours  Leave steri strips intact   No reaching, pulling, pushing, lifting >15 lbs  No strenuous activity  Regular ambulation  Deep breathing.incentive spirometer. ***TO RESTART XARELTO TOMORROW 10/19***  Band aids may be removed in 24 hours  May shower in 24 hours- let soapy water run over abdomen- pat dry  May remove bandages to right upper quadrant and over umbilicus in 72 hours  Leave steri strips intact   No reaching, pulling, pushing, lifting >15 lbs  No strenuous activity  Regular ambulation  Deep breathing.incentive spirometer.

## 2021-10-18 NOTE — ASU DISCHARGE PLAN (ADULT/PEDIATRIC) - NO SPORTS/GYM DURATION
[Normal] : soft, non-tender, non-distended, no masses palpated, no HSM and normal bowel sounds
until  cleared by Md Meza

## 2021-10-18 NOTE — ASU DISCHARGE PLAN (ADULT/PEDIATRIC) - CARE PROVIDER_API CALL
Kevin Meza)  Surgery  [address] [place] [address]  Phone: [phone]  Fax: [phone]  Follow Up Time:

## 2021-10-18 NOTE — BRIEF OPERATIVE NOTE - OPERATION/FINDINGS
Cholecystostomy drain noted in gallbladder.  Removed with gallbladder.  + Fibrinous tissue noted around gallbladder.

## 2021-10-26 ENCOUNTER — APPOINTMENT (OUTPATIENT)
Dept: SURGERY | Facility: CLINIC | Age: 66
End: 2021-10-26
Payer: COMMERCIAL

## 2021-10-26 DIAGNOSIS — K80.01 CALCULUS OF GALLBLADDER WITH ACUTE CHOLECYSTITIS WITH OBSTRUCTION: ICD-10-CM

## 2021-10-26 PROCEDURE — 99024 POSTOP FOLLOW-UP VISIT: CPT

## 2021-10-26 NOTE — PHYSICAL EXAM
[Normal Breath Sounds] : Normal breath sounds [Wheezing] : wheezing was heard [Normal Rate and Rhythm] : normal rate and rhythm [No Rash or Lesion] : No rash or lesion [Alert] : alert [Oriented to Person] : oriented to person [Oriented to Place] : oriented to place [Oriented to Time] : oriented to time [Calm] : calm [de-identified] : Healthy-appearing gentleman in no acute distress  [de-identified] : JAMILAH LEIGH, CARLOS [de-identified] : Soft, nontender nondistended, positive bowel sounds in all four quads.  No hernia or masses. No rebound or guarding.  Surgical incisions remain well approximated and healing appropriately without erythema, induration or fluctuance. [de-identified] : Ambulating without difficulty or assistance

## 2021-10-26 NOTE — ASSESSMENT
[FreeTextEntry1] : Routine postoperative follow-up status post laparoscopic cholecystectomy with removal of cholecystostomy tube.  Nearly postoperative.  The patient had persistent right upper quadrant symptoms which seem to be subsiding at this time.  Surgical incisions remain well approximated and healing appropriately without erythema, induration or fluctuance.  No evidence of hernia or masses.  No rebound or guarding.  No scleral icterus or jaundice.\par \par Pathology is reviewed and discussed with the patient findings are consistent with gallbladder and contents with cholecystostomy tube demonstrating acute and chronic cholecystitis severe with cholelithiasis.\par \par No further surgical intervention is indicated at this time.\par \par Postoperative instructions have been provided including avoidance of heavy lifting and strenuous activities in excess of 20-25 pounds for duration of 4-6 weeks. The patient may shower keeping the incisions clean, dry, covered as needed.\par \par Follow-up as needed

## 2021-10-26 NOTE — HISTORY OF PRESENT ILLNESS
[de-identified] : Routine postoperative follow-up status post laparoscopic cholecystectomy and removal of cholecystostomy drain.  Unremarkable postoperative course.  The patient's right upper quadrant symptoms are subsiding.

## 2021-11-16 ENCOUNTER — INPATIENT (INPATIENT)
Facility: HOSPITAL | Age: 66
LOS: 0 days | Discharge: HOME | DRG: 816 | End: 2021-11-17
Attending: INTERNAL MEDICINE | Admitting: INTERNAL MEDICINE
Payer: COMMERCIAL

## 2021-11-16 VITALS
SYSTOLIC BLOOD PRESSURE: 167 MMHG | OXYGEN SATURATION: 100 % | WEIGHT: 199.96 LBS | RESPIRATION RATE: 18 BRPM | HEART RATE: 87 BPM | HEIGHT: 67 IN | TEMPERATURE: 97 F | DIASTOLIC BLOOD PRESSURE: 94 MMHG

## 2021-11-16 DIAGNOSIS — Z98.890 OTHER SPECIFIED POSTPROCEDURAL STATES: Chronic | ICD-10-CM

## 2021-11-16 DIAGNOSIS — Z90.79 ACQUIRED ABSENCE OF OTHER GENITAL ORGAN(S): Chronic | ICD-10-CM

## 2021-11-16 DIAGNOSIS — D73.5 INFARCTION OF SPLEEN: ICD-10-CM

## 2021-11-16 DIAGNOSIS — Z41.9 ENCOUNTER FOR PROCEDURE FOR PURPOSES OTHER THAN REMEDYING HEALTH STATE, UNSPECIFIED: Chronic | ICD-10-CM

## 2021-11-16 LAB
ALBUMIN SERPL ELPH-MCNC: 3.7 G/DL — SIGNIFICANT CHANGE UP (ref 3.3–5)
ALP SERPL-CCNC: 91 U/L — SIGNIFICANT CHANGE UP (ref 40–120)
ALT FLD-CCNC: 42 U/L — SIGNIFICANT CHANGE UP (ref 12–78)
AMYLASE P1 CFR SERPL: 62 U/L — SIGNIFICANT CHANGE UP (ref 25–125)
ANION GAP SERPL CALC-SCNC: 4 MMOL/L — LOW (ref 5–17)
APPEARANCE UR: CLEAR — SIGNIFICANT CHANGE UP
APTT BLD: 31 SEC — SIGNIFICANT CHANGE UP (ref 27.5–35.5)
APTT BLD: 82.1 SEC — HIGH (ref 27.5–35.5)
APTT BLD: >200 SEC — CRITICAL HIGH (ref 27.5–35.5)
AST SERPL-CCNC: 38 U/L — HIGH (ref 15–37)
BILIRUB SERPL-MCNC: 0.5 MG/DL — SIGNIFICANT CHANGE UP (ref 0.2–1.2)
BILIRUB UR-MCNC: NEGATIVE — SIGNIFICANT CHANGE UP
BLD GP AB SCN SERPL QL: SIGNIFICANT CHANGE UP
BUN SERPL-MCNC: 15 MG/DL — SIGNIFICANT CHANGE UP (ref 7–23)
CALCIUM SERPL-MCNC: 9.4 MG/DL — SIGNIFICANT CHANGE UP (ref 8.5–10.1)
CHLORIDE SERPL-SCNC: 105 MMOL/L — SIGNIFICANT CHANGE UP (ref 96–108)
CO2 SERPL-SCNC: 31 MMOL/L — SIGNIFICANT CHANGE UP (ref 22–31)
COLOR SPEC: YELLOW — SIGNIFICANT CHANGE UP
CREAT SERPL-MCNC: 1 MG/DL — SIGNIFICANT CHANGE UP (ref 0.5–1.3)
DIFF PNL FLD: ABNORMAL
EPI CELLS # UR: SIGNIFICANT CHANGE UP
GLUCOSE SERPL-MCNC: 111 MG/DL — HIGH (ref 70–99)
GLUCOSE UR QL: NEGATIVE — SIGNIFICANT CHANGE UP
HCT VFR BLD CALC: 45 % — SIGNIFICANT CHANGE UP (ref 39–50)
HCT VFR BLD CALC: 47.7 % — SIGNIFICANT CHANGE UP (ref 39–50)
HGB BLD-MCNC: 16 G/DL — SIGNIFICANT CHANGE UP (ref 13–17)
HGB BLD-MCNC: 16.4 G/DL — SIGNIFICANT CHANGE UP (ref 13–17)
INR BLD: 1.11 RATIO — SIGNIFICANT CHANGE UP (ref 0.88–1.16)
KETONES UR-MCNC: NEGATIVE — SIGNIFICANT CHANGE UP
LACTATE SERPL-SCNC: 0.7 MMOL/L — SIGNIFICANT CHANGE UP (ref 0.7–2)
LDH SERPL L TO P-CCNC: 226 U/L — SIGNIFICANT CHANGE UP (ref 50–242)
LEUKOCYTE ESTERASE UR-ACNC: NEGATIVE — SIGNIFICANT CHANGE UP
LIDOCAIN IGE QN: 135 U/L — SIGNIFICANT CHANGE UP (ref 73–393)
MCHC RBC-ENTMCNC: 29.4 PG — SIGNIFICANT CHANGE UP (ref 27–34)
MCHC RBC-ENTMCNC: 29.7 PG — SIGNIFICANT CHANGE UP (ref 27–34)
MCHC RBC-ENTMCNC: 34.4 GM/DL — SIGNIFICANT CHANGE UP (ref 32–36)
MCHC RBC-ENTMCNC: 35.6 GM/DL — SIGNIFICANT CHANGE UP (ref 32–36)
MCV RBC AUTO: 83.5 FL — SIGNIFICANT CHANGE UP (ref 80–100)
MCV RBC AUTO: 85.5 FL — SIGNIFICANT CHANGE UP (ref 80–100)
NITRITE UR-MCNC: NEGATIVE — SIGNIFICANT CHANGE UP
NRBC # BLD: 0 /100 WBCS — SIGNIFICANT CHANGE UP (ref 0–0)
NRBC # BLD: 0 /100 WBCS — SIGNIFICANT CHANGE UP (ref 0–0)
PH UR: 7 — SIGNIFICANT CHANGE UP (ref 5–8)
PLATELET # BLD AUTO: 186 K/UL — SIGNIFICANT CHANGE UP (ref 150–400)
PLATELET # BLD AUTO: 234 K/UL — SIGNIFICANT CHANGE UP (ref 150–400)
POTASSIUM SERPL-MCNC: 3.5 MMOL/L — SIGNIFICANT CHANGE UP (ref 3.5–5.3)
POTASSIUM SERPL-SCNC: 3.5 MMOL/L — SIGNIFICANT CHANGE UP (ref 3.5–5.3)
PROT SERPL-MCNC: 7.8 G/DL — SIGNIFICANT CHANGE UP (ref 6–8.3)
PROT UR-MCNC: 100
PROTHROM AB SERPL-ACNC: 12.9 SEC — SIGNIFICANT CHANGE UP (ref 10.6–13.6)
RAPID RVP RESULT: SIGNIFICANT CHANGE UP
RBC # BLD: 5.39 M/UL — SIGNIFICANT CHANGE UP (ref 4.2–5.8)
RBC # BLD: 5.58 M/UL — SIGNIFICANT CHANGE UP (ref 4.2–5.8)
RBC # FLD: 13.1 % — SIGNIFICANT CHANGE UP (ref 10.3–14.5)
RBC # FLD: 13.2 % — SIGNIFICANT CHANGE UP (ref 10.3–14.5)
RBC CASTS # UR COMP ASSIST: ABNORMAL /HPF (ref 0–4)
SARS-COV-2 RNA SPEC QL NAA+PROBE: SIGNIFICANT CHANGE UP
SODIUM SERPL-SCNC: 140 MMOL/L — SIGNIFICANT CHANGE UP (ref 135–145)
SP GR SPEC: 1.01 — SIGNIFICANT CHANGE UP (ref 1.01–1.02)
UROBILINOGEN FLD QL: NEGATIVE — SIGNIFICANT CHANGE UP
WBC # BLD: 10.8 K/UL — HIGH (ref 3.8–10.5)
WBC # BLD: 9.01 K/UL — SIGNIFICANT CHANGE UP (ref 3.8–10.5)
WBC # FLD AUTO: 10.8 K/UL — HIGH (ref 3.8–10.5)
WBC # FLD AUTO: 9.01 K/UL — SIGNIFICANT CHANGE UP (ref 3.8–10.5)
WBC UR QL: SIGNIFICANT CHANGE UP

## 2021-11-16 PROCEDURE — 93010 ELECTROCARDIOGRAM REPORT: CPT

## 2021-11-16 PROCEDURE — 74177 CT ABD & PELVIS W/CONTRAST: CPT | Mod: 26,MA

## 2021-11-16 PROCEDURE — 99232 SBSQ HOSP IP/OBS MODERATE 35: CPT | Mod: 24

## 2021-11-16 PROCEDURE — 99285 EMERGENCY DEPT VISIT HI MDM: CPT

## 2021-11-16 RX ORDER — MAGNESIUM HYDROXIDE 400 MG/1
30 TABLET, CHEWABLE ORAL
Refills: 0 | Status: DISCONTINUED | OUTPATIENT
Start: 2021-11-16 | End: 2021-11-17

## 2021-11-16 RX ORDER — AMLODIPINE BESYLATE 2.5 MG/1
10 TABLET ORAL DAILY
Refills: 0 | Status: DISCONTINUED | OUTPATIENT
Start: 2021-11-16 | End: 2021-11-17

## 2021-11-16 RX ORDER — LOSARTAN POTASSIUM 100 MG/1
100 TABLET, FILM COATED ORAL DAILY
Refills: 0 | Status: DISCONTINUED | OUTPATIENT
Start: 2021-11-16 | End: 2021-11-17

## 2021-11-16 RX ORDER — HYDROMORPHONE HYDROCHLORIDE 2 MG/ML
4 INJECTION INTRAMUSCULAR; INTRAVENOUS; SUBCUTANEOUS
Refills: 0 | Status: DISCONTINUED | OUTPATIENT
Start: 2021-11-16 | End: 2021-11-17

## 2021-11-16 RX ORDER — SENNA PLUS 8.6 MG/1
2 TABLET ORAL AT BEDTIME
Refills: 0 | Status: DISCONTINUED | OUTPATIENT
Start: 2021-11-16 | End: 2021-11-17

## 2021-11-16 RX ORDER — HYDROMORPHONE HYDROCHLORIDE 2 MG/ML
0.5 INJECTION INTRAMUSCULAR; INTRAVENOUS; SUBCUTANEOUS ONCE
Refills: 0 | Status: DISCONTINUED | OUTPATIENT
Start: 2021-11-16 | End: 2021-11-16

## 2021-11-16 RX ORDER — PANTOPRAZOLE SODIUM 20 MG/1
40 TABLET, DELAYED RELEASE ORAL
Refills: 0 | Status: DISCONTINUED | OUTPATIENT
Start: 2021-11-16 | End: 2021-11-17

## 2021-11-16 RX ORDER — IOHEXOL 300 MG/ML
30 INJECTION, SOLUTION INTRAVENOUS ONCE
Refills: 0 | Status: COMPLETED | OUTPATIENT
Start: 2021-11-16 | End: 2021-11-16

## 2021-11-16 RX ORDER — TIMOLOL 0.5 %
1 DROPS OPHTHALMIC (EYE)
Refills: 0 | Status: DISCONTINUED | OUTPATIENT
Start: 2021-11-16 | End: 2021-11-17

## 2021-11-16 RX ORDER — HYDRALAZINE HCL 50 MG
25 TABLET ORAL
Refills: 0 | Status: DISCONTINUED | OUTPATIENT
Start: 2021-11-16 | End: 2021-11-17

## 2021-11-16 RX ORDER — COLCHICINE 0.6 MG
0.6 TABLET ORAL DAILY
Refills: 0 | Status: DISCONTINUED | OUTPATIENT
Start: 2021-11-16 | End: 2021-11-17

## 2021-11-16 RX ORDER — HEPARIN SODIUM 5000 [USP'U]/ML
3500 INJECTION INTRAVENOUS; SUBCUTANEOUS EVERY 6 HOURS
Refills: 0 | Status: DISCONTINUED | OUTPATIENT
Start: 2021-11-16 | End: 2021-11-17

## 2021-11-16 RX ORDER — FUROSEMIDE 40 MG
20 TABLET ORAL DAILY
Refills: 0 | Status: DISCONTINUED | OUTPATIENT
Start: 2021-11-16 | End: 2021-11-17

## 2021-11-16 RX ORDER — MORPHINE SULFATE 50 MG/1
4 CAPSULE, EXTENDED RELEASE ORAL ONCE
Refills: 0 | Status: DISCONTINUED | OUTPATIENT
Start: 2021-11-16 | End: 2021-11-16

## 2021-11-16 RX ORDER — HEPARIN SODIUM 5000 [USP'U]/ML
7500 INJECTION INTRAVENOUS; SUBCUTANEOUS EVERY 6 HOURS
Refills: 0 | Status: DISCONTINUED | OUTPATIENT
Start: 2021-11-16 | End: 2021-11-17

## 2021-11-16 RX ORDER — HEPARIN SODIUM 5000 [USP'U]/ML
INJECTION INTRAVENOUS; SUBCUTANEOUS
Qty: 25000 | Refills: 0 | Status: DISCONTINUED | OUTPATIENT
Start: 2021-11-16 | End: 2021-11-17

## 2021-11-16 RX ORDER — POLYETHYLENE GLYCOL 3350 17 G/17G
17 POWDER, FOR SOLUTION ORAL DAILY
Refills: 0 | Status: DISCONTINUED | OUTPATIENT
Start: 2021-11-16 | End: 2021-11-17

## 2021-11-16 RX ORDER — LOSARTAN POTASSIUM 100 MG/1
100 TABLET, FILM COATED ORAL DAILY
Refills: 0 | Status: DISCONTINUED | OUTPATIENT
Start: 2021-11-16 | End: 2021-11-16

## 2021-11-16 RX ORDER — METOPROLOL TARTRATE 50 MG
100 TABLET ORAL ONCE
Refills: 0 | Status: COMPLETED | OUTPATIENT
Start: 2021-11-16 | End: 2021-11-16

## 2021-11-16 RX ORDER — AMLODIPINE BESYLATE 2.5 MG/1
10 TABLET ORAL DAILY
Refills: 0 | Status: DISCONTINUED | OUTPATIENT
Start: 2021-11-16 | End: 2021-11-16

## 2021-11-16 RX ORDER — LACTOBACILLUS ACIDOPHILUS 100MM CELL
1 CAPSULE ORAL
Refills: 0 | Status: DISCONTINUED | OUTPATIENT
Start: 2021-11-16 | End: 2021-11-17

## 2021-11-16 RX ORDER — ONDANSETRON 8 MG/1
4 TABLET, FILM COATED ORAL ONCE
Refills: 0 | Status: COMPLETED | OUTPATIENT
Start: 2021-11-16 | End: 2021-11-16

## 2021-11-16 RX ORDER — METOPROLOL TARTRATE 50 MG
100 TABLET ORAL DAILY
Refills: 0 | Status: DISCONTINUED | OUTPATIENT
Start: 2021-11-16 | End: 2021-11-17

## 2021-11-16 RX ORDER — BRIMONIDINE TARTRATE 2 MG/MG
1 SOLUTION/ DROPS OPHTHALMIC
Refills: 0 | Status: DISCONTINUED | OUTPATIENT
Start: 2021-11-16 | End: 2021-11-17

## 2021-11-16 RX ORDER — SODIUM CHLORIDE 9 MG/ML
1000 INJECTION INTRAMUSCULAR; INTRAVENOUS; SUBCUTANEOUS ONCE
Refills: 0 | Status: COMPLETED | OUTPATIENT
Start: 2021-11-16 | End: 2021-11-16

## 2021-11-16 RX ORDER — METOPROLOL TARTRATE 50 MG
100 TABLET ORAL DAILY
Refills: 0 | Status: DISCONTINUED | OUTPATIENT
Start: 2021-11-16 | End: 2021-11-16

## 2021-11-16 RX ORDER — HEPARIN SODIUM 5000 [USP'U]/ML
7500 INJECTION INTRAVENOUS; SUBCUTANEOUS ONCE
Refills: 0 | Status: COMPLETED | OUTPATIENT
Start: 2021-11-16 | End: 2021-11-16

## 2021-11-16 RX ORDER — HYDRALAZINE HCL 50 MG
25 TABLET ORAL
Refills: 0 | Status: DISCONTINUED | OUTPATIENT
Start: 2021-11-16 | End: 2021-11-16

## 2021-11-16 RX ADMIN — BRIMONIDINE TARTRATE 1 DROP(S): 2 SOLUTION/ DROPS OPHTHALMIC at 18:21

## 2021-11-16 RX ADMIN — AMLODIPINE BESYLATE 10 MILLIGRAM(S): 2.5 TABLET ORAL at 12:43

## 2021-11-16 RX ADMIN — HYDROMORPHONE HYDROCHLORIDE 4 MILLIGRAM(S): 2 INJECTION INTRAMUSCULAR; INTRAVENOUS; SUBCUTANEOUS at 10:54

## 2021-11-16 RX ADMIN — SODIUM CHLORIDE 1000 MILLILITER(S): 9 INJECTION INTRAMUSCULAR; INTRAVENOUS; SUBCUTANEOUS at 02:26

## 2021-11-16 RX ADMIN — ONDANSETRON 4 MILLIGRAM(S): 8 TABLET, FILM COATED ORAL at 02:25

## 2021-11-16 RX ADMIN — Medication 1 TABLET(S): at 18:21

## 2021-11-16 RX ADMIN — SENNA PLUS 2 TABLET(S): 8.6 TABLET ORAL at 21:47

## 2021-11-16 RX ADMIN — HEPARIN SODIUM 1700 UNIT(S)/HR: 5000 INJECTION INTRAVENOUS; SUBCUTANEOUS at 06:18

## 2021-11-16 RX ADMIN — HEPARIN SODIUM 7500 UNIT(S): 5000 INJECTION INTRAVENOUS; SUBCUTANEOUS at 06:08

## 2021-11-16 RX ADMIN — Medication 0.6 MILLIGRAM(S): at 12:43

## 2021-11-16 RX ADMIN — HEPARIN SODIUM 1400 UNIT(S)/HR: 5000 INJECTION INTRAVENOUS; SUBCUTANEOUS at 22:40

## 2021-11-16 RX ADMIN — Medication 1 DROP(S): at 18:21

## 2021-11-16 RX ADMIN — IOHEXOL 30 MILLILITER(S): 300 INJECTION, SOLUTION INTRAVENOUS at 02:41

## 2021-11-16 RX ADMIN — MORPHINE SULFATE 4 MILLIGRAM(S): 50 CAPSULE, EXTENDED RELEASE ORAL at 05:07

## 2021-11-16 RX ADMIN — HEPARIN SODIUM 0 UNIT(S)/HR: 5000 INJECTION INTRAVENOUS; SUBCUTANEOUS at 14:40

## 2021-11-16 RX ADMIN — MORPHINE SULFATE 4 MILLIGRAM(S): 50 CAPSULE, EXTENDED RELEASE ORAL at 04:59

## 2021-11-16 RX ADMIN — POLYETHYLENE GLYCOL 3350 17 GRAM(S): 17 POWDER, FOR SOLUTION ORAL at 18:21

## 2021-11-16 RX ADMIN — HYDROMORPHONE HYDROCHLORIDE 0.5 MILLIGRAM(S): 2 INJECTION INTRAMUSCULAR; INTRAVENOUS; SUBCUTANEOUS at 06:44

## 2021-11-16 RX ADMIN — PANTOPRAZOLE SODIUM 40 MILLIGRAM(S): 20 TABLET, DELAYED RELEASE ORAL at 07:42

## 2021-11-16 RX ADMIN — Medication 25 MILLIGRAM(S): at 18:21

## 2021-11-16 RX ADMIN — HEPARIN SODIUM 1400 UNIT(S)/HR: 5000 INJECTION INTRAVENOUS; SUBCUTANEOUS at 15:46

## 2021-11-16 RX ADMIN — HYDROMORPHONE HYDROCHLORIDE 4 MILLIGRAM(S): 2 INJECTION INTRAMUSCULAR; INTRAVENOUS; SUBCUTANEOUS at 21:47

## 2021-11-16 RX ADMIN — Medication 100 MILLIGRAM(S): at 06:04

## 2021-11-16 RX ADMIN — LOSARTAN POTASSIUM 100 MILLIGRAM(S): 100 TABLET, FILM COATED ORAL at 12:43

## 2021-11-16 RX ADMIN — HYDROMORPHONE HYDROCHLORIDE 4 MILLIGRAM(S): 2 INJECTION INTRAMUSCULAR; INTRAVENOUS; SUBCUTANEOUS at 22:41

## 2021-11-16 RX ADMIN — MORPHINE SULFATE 4 MILLIGRAM(S): 50 CAPSULE, EXTENDED RELEASE ORAL at 02:26

## 2021-11-16 NOTE — ED ADULT NURSE NOTE - CHIEF COMPLAINT QUOTE
66 yr old male c/o abdominal pain since today. Had dental procedure done today. Took tylenol and amoxicillin at 2100 tonight. Stopped taking his xarelto on friday.

## 2021-11-16 NOTE — SBIRT NOTE ADULT - NSSBIRTALCPOSREINDET_GEN_A_CORE
Patient states he drinks beer or wine a few times per month, denied any issues. Positive reinforcement provided re. healthy drinking habits.

## 2021-11-16 NOTE — ED ADULT NURSE NOTE - NSFALLRSKPASTHIST_ED_ALL_ED
INR is 1.7 today.  Dr. Acosta was notified (covering MD for Dr. Roe today).    
V.O. Dr. Acosta/Curry, HEATH  Change Coumadin to 12 mg po tonight, and 11 mg po 9/11-9/13/18.  Recheck INR Friday, 9/14/18.  Called pt and informed him of result and these Coumadin orders.  Pt. verbalizes understanding, agreement.  Lab appt scheduled.  See AMB Anticoag flowsheet.    
no

## 2021-11-16 NOTE — CONSULT NOTE ADULT - ATTENDING COMMENTS
Case discussed with Cardiologist.  Seen by my PA.  Findings as documented above.  Pt seen independently by myself this evening.  Pt resting comfortably in bed, no distress.  Tolerating regular diet.  Heparin drip in progress.  Describes waves of LUQ crampy pains.  Has been off Xarelto following recent dental implant.  Was supposed to resume today.    CT scan personally reviewed and compared to previous CT scans.  Spleen appears unchanged.  Difference in uptake of contrast within the spleen suggestive of infarct.  No splenic artery aneurysmal disease.  No atherosclerotic disease in celiac axis.  No perisplenic fluid collection or stranding.    No role for acute surgical intervention.  Splenic infarct likely secondary to chronic a fib particularly in face of being off anticoagulation.    Agree with Heparinization and resumption of oral anticoagulation.  Given history of splenic infarct consider prophylactic immunizations for encapsulated organisms.  ID consultation.  Pt state he has received Pneumonia and Flu vaccines, unsure of Meningococcal.  He is reluctant to obtain COVID vaccine as he had already contracted COVID in the spring and believes his own immunity should suffice.  I've encouraged him to reconsider.      Once pain controlled, appropriately anticoagulated and afib controlled to content of cardiology, I see no contraindications for discharge home.

## 2021-11-16 NOTE — ED PROVIDER NOTE - CLINICAL SUMMARY MEDICAL DECISION MAKING FREE TEXT BOX
h/o AF,  splenic infarct, off oral anticoagulants x 4 days for dental procedure when he developed acute LUQ pain  CT splenic infarct  Rx analgesia, IV heparin and admit to medicine OUSMANE Poon

## 2021-11-16 NOTE — H&P ADULT - NSHPLABSRESULTS_GEN_ALL_CORE
< from: CT Abdomen and Pelvis w/ Oral Cont and w/ IV Cont (11.16.21 @ 04:44) >      EXAM:  CT ABDOMEN AND PELVIS OC IC                            PROCEDURE DATE:  11/16/2021          INTERPRETATION:  CLINICAL INFORMATION: Acute abdominal pain, recent cholecystectomy and splenic infarction    COMPARISON: Ultrasound from September 14, 2021 and CT from November 15, 2020    CONTRAST/COMPLICATIONS:  IV Contrast: Omnipaque 350  90 cc administered   10 cc discarded  Oral Contrast: Omnipaque 300  Complications: None reported at time of study completion    PROCEDURE:  CT of the Abdomenand Pelvis was performed.  Sagittal and coronal reformats were performed.    FINDINGS:  LOWER CHEST: Clear    LIVER: Within normal limits.  BILE DUCTS: Normal caliber.  GALLBLADDER: Cholecystectomy.  SPLEEN: Lobulation and scarring of the spleen withmultiple hypoenhancing areas in the inferior spleen.  PANCREAS: Within normal limits.  ADRENALS: Within normal limits.  KIDNEYS/URETERS: Within normal limits.    BLADDER: Within normal limits.  REPRODUCTIVE ORGANS: Prostatectomy.    BOWEL: No bowel obstruction. Normal appendix.  PERITONEUM: No free air or free fluid  VESSELS: Atherosclerotic change of the abdominal aorta without aneurysm. The main splenic artery is patent. The splenic vein is patent.  RETROPERITONEUM/LYMPH NODES: No hemorrhage oradenopathy  ABDOMINAL WALL: Tiny fat-containing umbilical/periumbilical hernia  BONES: Partial synostosis of the sacroiliac joints.    IMPRESSION:    1. Acute splenic infarcts, superimposed upon sequela of prior/chronic infarcts.        --- End of Report ---        < end of copied text >

## 2021-11-16 NOTE — CONSULT NOTE ADULT - SUBJECTIVE AND OBJECTIVE BOX
History of Present Illness: The patient is a 66 year old male with a history of HTN, gout, atrial fibrillation, splenic infarct, cholecystectomy who presents with abdominal pain. 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 He states he had held his rivaroxaban for 4 days prior to dental work done yesterday. He noted LUQ abdominal pain starting last night. Some qualities of pain similar to prior splenic infarct but not as intense.    Past Medical/Surgical History:  HTN, gout, atrial fibrillation, splenic infarct, cholecystectomy    Medications:  Home Medications:  allopurinol 100 mg oral tablet: 1 tab(s) orally once a day (16 Nov 2021 01:43)  amLODIPine 10 mg oral tablet: 1 tab(s) orally once a day (16 Nov 2021 01:43)  amoxicillin 875 mg oral tablet: 1 tab(s) orally every 12 hours (16 Nov 2021 10:22)  colchicine 0.6 mg oral tablet: 1 tab(s) orally once a day (16 Nov 2021 01:43)  Combigan 0.2%-0.5% ophthalmic solution: 1 drop(s) to each affected eye 2 times a day (16 Nov 2021 01:43)  furosemide 20 mg oral tablet: 1 tab(s) orally once a day, As Needed (16 Nov 2021 01:43)  hydrALAZINE 25 mg oral tablet: 1 tab(s) orally 2 times a day (16 Nov 2021 01:43)  losartan 100 mg oral tablet: 1 tab(s) orally once a day (16 Nov 2021 01:43)  Metoprolol Succinate  mg oral tablet, extended release: 1 tab(s) orally once a day (18 Oct 2021 15:37)  pantoprazole 40 mg oral delayed release tablet: 1 tab(s) orally once a day, As Needed (18 Oct 2021 10:15)  Xarelto 20 mg oral tablet: 1 tab(s) orally once a day (18 Oct 2021 10:15)      Family History: Non-contributory family history of premature cardiovascular atherosclerotic disease    Social History: No tobacco, alcohol or drug use    Review of Systems:  General: No fevers, chills, weight gain  Skin: No rashes, color changes  Cardiovascular: No chest pain, orthopnea  Respiratory: No shortness of breath, cough  Gastrointestinal: No nausea, abdominal pain  Genitourinary: No incontinence, pain with urination  Musculoskeletal: No pain, swelling, decreased range of motion  Neurological: No headache, weakness  Psychiatric: No depression, anxiety  Endocrine: No weight gain, increased thirst  All other systems are comprehensively negative.    Physical Exam:  Vitals:        Vital Signs Last 24 Hrs  T(C): 36.4 (16 Nov 2021 09:20), Max: 36.5 (16 Nov 2021 07:31)  T(F): 97.6 (16 Nov 2021 09:20), Max: 97.7 (16 Nov 2021 07:31)  HR: 87 (16 Nov 2021 12:38) (79 - 114)  BP: 129/86 (16 Nov 2021 12:38) (129/86 - 174/101)  BP(mean): --  RR: 16 (16 Nov 2021 09:20) (16 - 18)  SpO2: 97% (16 Nov 2021 09:20) (95% - 100%)  General: NAD  HEENT: MMM  Neck: No JVD, no carotid bruit  Lungs: CTAB  CV: RRR, nl S1/S2, no M/R/G  Abdomen: S/NT/ND, +BS  Extremities: No LE edema, no cyanosis  Neuro: AAOx3, non-focal  Skin: No rash    Labs:                        16.0   10.80 )-----------( 186      ( 16 Nov 2021 13:32 )             45.0     11-16    140  |  105  |  15  ----------------------------<  111<H>  3.5   |  31  |  1.00    Ca    9.4      16 Nov 2021 02:35    TPro  7.8  /  Alb  3.7  /  TBili  0.5  /  DBili  x   /  AST  38<H>  /  ALT  42  /  AlkPhos  91  11-16        PT/INR - ( 16 Nov 2021 02:35 )   PT: 12.9 sec;   INR: 1.11 ratio         PTT - ( 16 Nov 2021 02:35 )  PTT:31.0 sec    Telemetry: Atrial fibrillation

## 2021-11-16 NOTE — ED ADULT TRIAGE NOTE - CHIEF COMPLAINT QUOTE
66 yr old male c/o abdominal pain since today. Had dental procedure done today. Took tylenol and amoxicillin at 2100 tonight. Stopped taking his xarelto on friday. 66 yr old male c/o abdominal pain since today. Had dental procedure done today. Took tylenol and amoxicillin at 2100 tonight. Stopped taking his xarelto on friday. Melissa KEITA

## 2021-11-16 NOTE — ED PROVIDER NOTE - OBJECTIVE STATEMENT
66 year old male PMH HTN, A- FIb, Prostate Cancer, Splenic Infarct, H/O chronic cholecystitis,  S/P cholecystostomy tube and then Laparoscopic Cholecystectomy with Dr Kevin Meza on 10/18/021. Pt is experiencing LUQ pain, sudden onset this evening. He stopped taking his Xarelto for the past four days because he had a dental implant yesterday morning. no CP, no SOB, no fever, no chills, no urinary symptoms.

## 2021-11-16 NOTE — H&P ADULT - HISTORY OF PRESENT ILLNESS
Chief Complaint: abdominal pain.    · Chief Complaint: The patient is a 66y Male complaining of abdominal pain.  · HPI Objective Statement: 66 year old male PMH HTN, A- FIb, Prostate Cancer, Splenic Infarct, H/O chronic cholecystitis,  S/P cholecystostomy tube and then Laparoscopic Cholecystectomy with Dr Kevin Meza on 10/18/021. Pt is experiencing LUQ pain, sudden onset this evening. He stopped taking his Xarelto for the past four days because he had a dental implant yesterday morning. no CP, no SOB, no fever, no chills, no urinary symptoms.

## 2021-11-16 NOTE — ED ADULT NURSE NOTE - OBJECTIVE STATEMENT
Received Pt awake and alert, c/o left side abdominal pain that started around 11:30pm, pt sts the pain never goes away but the intensity come and goes.

## 2021-11-16 NOTE — PHYSICAL THERAPY INITIAL EVALUATION ADULT - ADDITIONAL COMMENTS
Pt lives with his spouse in a house, + steps to basement. Pt is independent without device and independent with ADLs. + driving

## 2021-11-16 NOTE — CONSULT NOTE ADULT - SUBJECTIVE AND OBJECTIVE BOX
**CHARTING IN PROGRESS**    HPI:  66 year old male with PMH Afib on xarelto, HTN, hx prostate cancer s/p prostatectomy () hx splenic infarct (2019), hx CCY 1 month ago, presents with 8/10 constant LUQ and fadia umbilical pain which started last night around 2330 and gradually worsened over the course of 5 minutes, worsened by deep breaths and sitting upright, with associated mild nausea.  Now states pain is 5-7/10 waxing and waning.  Patient denies any fever, chills, chest pain, shortness of breath, vomiting, or history of stroke/PE/DVT.  CT abd/pel on admission revealed acute splenic infarcts, superimposed upon sequela of prior/chronic infarcts.  Patient was recently off his xarelto x 5 days for a dental procedure.  Patient reports 1 similar episode in 2019, found to have a splenic infarct, was seen by Dr. Pack (vascular) and Dr. Mckoy (cardio), and started on xarelto.  Patient also states he underwent outpatient vascular workup with a vascular technician in , including venous and arterial dopplers, which were unremarkable (read by Dr. Pack, but did not see him at that time).  Of note, patient was recently admitted for acute cholecystitis 9/10/21, was scheduled for laparoscopic cholecystectomy with Dr. Meza, however case was cancelled due to multiple runs of Afib during induction/intubation, which could not be controlled with beta blockers, and a percutaneous cholecystostomy drain was placed instead, followed by interval laparoscopic cholecystectomy on 10/18/21.    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 No nausea, vomiting, or hematemesis; No diarrhea or constipation. No melena or hematochezia.  GENITOURINARY: No dysuria, frequency or hematuria  NEUROLOGICAL: No numbness or weakness  SKIN: No itching, burning, rashes, or lesions   All other review of systems is negative unless indicated above.    MEDICATIONS  (STANDING):  amLODIPine   Tablet 10 milliGRAM(s) Oral daily  amoxicillin  875 milliGRAM(s)/clavulanate 1 Tablet(s) Oral two times a day  brimonidine 0.2% Ophthalmic Solution 1 Drop(s) Both EYES two times a day  colchicine 0.6 milliGRAM(s) Oral daily  furosemide    Tablet 20 milliGRAM(s) Oral daily  heparin  Infusion.  Unit(s)/Hr (17 mL/Hr) IV Continuous <Continuous>  hydrALAZINE 25 milliGRAM(s) Oral two times a day  lactobacillus acidophilus 1 Tablet(s) Oral two times a day  losartan 100 milliGRAM(s) Oral daily  metoprolol succinate  milliGRAM(s) Oral daily  pantoprazole    Tablet 40 milliGRAM(s) Oral before breakfast  polyethylene glycol 3350 17 Gram(s) Oral daily  senna 2 Tablet(s) Oral at bedtime  timolol 0.25% Solution 1 Drop(s) Both EYES two times a day    MEDICATIONS  (PRN):  heparin   Injectable 7500 Unit(s) IV Push every 6 hours PRN For aPTT less than 40  heparin   Injectable 3500 Unit(s) IV Push every 6 hours PRN For aPTT between 40 - 57  HYDROmorphone   Tablet 4 milliGRAM(s) Oral four times a day PRN Moderate Pain (4 - 6)  magnesium hydroxide Suspension 30 milliLiter(s) Oral two times a day PRN Constipation    Allergies  No Known Allergies    SOCIAL HISTORY:  Nonsmoker.  No ETOH or illicit drug use    FAMILY HISTORY:  Family history of diabetes mellitus (DM) (Mother)  Family history of TIAs (Mother)  Family hx of prostate cancer (Father)  Family history of bone cancer (Father)  Family history of appendicitis    Vital Signs Last 24 Hrs  T(C): 36.4 (2021 09:20), Max: 36.5 (2021 07:31)  T(F): 97.6 (2021 09:20), Max: 97.7 (2021 07:31)  HR: 87 (2021 12:38) (79 - 114)  BP: 129/86 (2021 12:38) (129/86 - 174/101)  RR: 16 (2021 09:20) (16 - 18)  SpO2: 97% (2021 09:20) (95% - 100%)    PHYSICAL EXAM  GENERAL:  Well-nourished, well-developed Male lying comfortably in bed in NAD  HEENT:  Sclera white. Mucous membranes moist.  CARDIO:  Regular rate and rhythm.  No murmur, gallop or rub appreciated.  RESPIRATORY:  Clear to auscultation bilaterally.  No wheezing, rales or rhonchi appreciated.  ABDOMEN:  Soft, nondistended, nontender; BS appreciated on auscultation.  No rebound tenderness or guarding.  EXTREMITIES: No calf tenderness  SKIN:  No jaundice, pallor, or cyanosis  NEURO:  A&O x 3    LABS:                        16.0   10.80 )-----------( 186      ( 2021 13:32 )             45.0     11-16    140  |  105  |  15  ----------------------------<  111<H>  3.5   |  31  |  1.00    Ca    9.4      2021 02:35    TPro  7.8  /  Alb  3.7  /  TBili  0.5  /  DBili  x   /  AST  38<H>  /  ALT  42  /  AlkPhos  91  11-16    PT/INR - ( 2021 02:35 )   PT: 12.9 sec;   INR: 1.11 ratio  PTT - ( 2021 13:32 )  PTT:>200.0 sec    Urinalysis Basic - ( 2021 02:36 )  Color: Yellow / Appearance: Clear / S.010 / pH: x  Gluc: x / Ketone: Negative  / Bili: Negative / Urobili: Negative  Blood: x / Protein: 100 / Nitrite: Negative   Leuk Esterase: Negative / RBC: 3-5 /HPF / WBC 0-2  Sq Epi: x / Non Sq Epi: Occasional / Bacteria: x    RADIOLOGY & ADDITIONAL STUDIES:     HPI:  66 year old male with PMH Afib on xarelto, HTN, hx prostate cancer s/p prostatectomy (2010) hx splenic infarct (2019), hx CCY 1 month ago, presents with 8/10 constant LUQ and fadia umbilical pain which started last night around 2330 and gradually worsened over the course of 5 minutes, worsened by deep breaths and sitting upright, with associated mild nausea.  Now states pain is 5-7/10 waxing and waning.  Patient denies any fever, chills, chest pain, shortness of breath, vomiting, or history of stroke/PE/DVT.  CT abd/pel on admission revealed acute splenic infarcts, superimposed upon sequela of prior/chronic infarcts.  Patient was recently off his xarelto x 5 days for a dental procedure.  Patient reports 1 similar episode in 2019, found to have a splenic infarct, was seen by Dr. Pack (vascular) and Dr. Mckoy (cardio), and started on xarelto.  Patient also states he underwent outpatient vascular workup with a vascular technician in 2016, including venous and arterial dopplers, which were unremarkable (read by Dr. Pack, but did not see him at that time).  Of note, patient was recently admitted for acute cholecystitis 9/10/21, was scheduled for laparoscopic cholecystectomy with Dr. Meza, however case was cancelled due to multiple runs of Afib during induction/intubation, which could not be controlled with beta blockers, and a percutaneous cholecystostomy drain was placed instead, followed by interval laparoscopic cholecystectomy on 10/18/21.    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 No nausea, vomiting, or hematemesis; No diarrhea or constipation. No melena or hematochezia.  GENITOURINARY: No dysuria, frequency or hematuria  NEUROLOGICAL: No numbness or weakness  SKIN: No itching, burning, rashes, or lesions   All other review of systems is negative unless indicated above.    MEDICATIONS  (STANDING):  amLODIPine   Tablet 10 milliGRAM(s) Oral daily  amoxicillin  875 milliGRAM(s)/clavulanate 1 Tablet(s) Oral two times a day  brimonidine 0.2% Ophthalmic Solution 1 Drop(s) Both EYES two times a day  colchicine 0.6 milliGRAM(s) Oral daily  furosemide    Tablet 20 milliGRAM(s) Oral daily  heparin  Infusion.  Unit(s)/Hr (17 mL/Hr) IV Continuous <Continuous>  hydrALAZINE 25 milliGRAM(s) Oral two times a day  lactobacillus acidophilus 1 Tablet(s) Oral two times a day  losartan 100 milliGRAM(s) Oral daily  metoprolol succinate  milliGRAM(s) Oral daily  pantoprazole    Tablet 40 milliGRAM(s) Oral before breakfast  polyethylene glycol 3350 17 Gram(s) Oral daily  senna 2 Tablet(s) Oral at bedtime  timolol 0.25% Solution 1 Drop(s) Both EYES two times a day    MEDICATIONS  (PRN):  heparin   Injectable 7500 Unit(s) IV Push every 6 hours PRN For aPTT less than 40  heparin   Injectable 3500 Unit(s) IV Push every 6 hours PRN For aPTT between 40 - 57  HYDROmorphone   Tablet 4 milliGRAM(s) Oral four times a day PRN Moderate Pain (4 - 6)  magnesium hydroxide Suspension 30 milliLiter(s) Oral two times a day PRN Constipation    Allergies  No Known Allergies    SOCIAL HISTORY:  Nonsmoker.  No ETOH or illicit drug use    FAMILY HISTORY:  Family history of diabetes mellitus (DM) (Mother)  Family history of TIAs (Mother)  Family hx of prostate cancer (Father)  Family history of bone cancer (Father)  Family history of appendicitis    Vital Signs Last 24 Hrs  T(C): 36.4 (2021 09:20), Max: 36.5 (2021 07:31)  T(F): 97.6 (2021 09:20), Max: 97.7 (2021 07:31)  HR: 87 (2021 12:38) (79 - 114)  BP: 129/86 (2021 12:38) (129/86 - 174/101)  RR: 16 (2021 09:20) (16 - 18)  SpO2: 97% (2021 09:20) (95% - 100%)    PHYSICAL EXAM  GENERAL:  Well-nourished, well-developed Male lying comfortably in bed in NAD  HEENT:  Sclera white. Mucous membranes moist.  CARDIO:  Regular rate and rhythm.  No murmur, gallop or rub appreciated.  RESPIRATORY:  Clear to auscultation bilaterally.  No wheezing, rales or rhonchi appreciated.  ABDOMEN:  Soft, nondistended, nontender; BS appreciated on auscultation.  No rebound tenderness or guarding.  EXTREMITIES: No calf tenderness  SKIN:  No jaundice, pallor, or cyanosis  NEURO:  A&O x 3    LABS:                        16.0   10.80 )-----------( 186      ( 2021 13:32 )             45.0     11-16    140  |  105  |  15  ----------------------------<  111<H>  3.5   |  31  |  1.00    Ca    9.4      2021 02:35    TPro  7.8  /  Alb  3.7  /  TBili  0.5  /  DBili  x   /  AST  38<H>  /  ALT  42  /  AlkPhos  91  11-16    PT/INR - ( 2021 02:35 )   PT: 12.9 sec;   INR: 1.11 ratio  PTT - ( 2021 13:32 )  PTT:>200.0 sec    Urinalysis Basic - ( 2021 02:36 )  Color: Yellow / Appearance: Clear / S.010 / pH: x  Gluc: x / Ketone: Negative  / Bili: Negative / Urobili: Negative  Blood: x / Protein: 100 / Nitrite: Negative   Leuk Esterase: Negative / RBC: 3-5 /HPF / WBC 0-2  Sq Epi: x / Non Sq Epi: Occasional / Bacteria: x    RADIOLOGY & ADDITIONAL STUDIES:     HPI:  66 year old male with PMH Afib on xarelto, HTN, hx prostate cancer s/p prostatectomy (2010) hx splenic infarct (2019), hx CCY 1 month ago, presents with 8/10 constant LUQ and fadia umbilical pain which started last night around 2330 and gradually worsened over the course of 5 minutes, worsened by deep breaths and sitting upright, with associated mild nausea.  Now states pain is 5-7/10 waxing and waning.  Patient denies any fever, chills, chest pain, shortness of breath, vomiting, or history of stroke/PE/DVT.  CT abd/pel on admission revealed acute splenic infarcts, superimposed upon sequela of prior/chronic infarcts.  Patient was recently off his xarelto x 5 days for a dental procedure.  Patient reports 1 similar episode in 2019, found to have a splenic infarct, was seen by Dr. Pack (vascular) and Dr. Mckoy (cardio), and started on xarelto.  Patient also states he underwent outpatient vascular workup with a vascular technician in 2016, including venous and arterial dopplers, which were unremarkable (read by Dr. Pack, but did not see him at that time).  Of note, patient was recently admitted for acute cholecystitis 9/10/21, was scheduled for laparoscopic cholecystectomy with Dr. Meza, however case was cancelled due to multiple runs of Afib during induction/intubation, which could not be controlled with beta blockers, and a percutaneous cholecystostomy drain was placed instead, followed by interval laparoscopic cholecystectomy on 10/18/21.    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 No vomiting or hematemesis; No diarrhea or constipation. No melena or hematochezia.  GENITOURINARY: No dysuria, frequency or hematuria  NEUROLOGICAL: No numbness or weakness  SKIN: No itching, burning, rashes, or lesions   All other review of systems is negative unless indicated above.    MEDICATIONS  (STANDING):  amLODIPine   Tablet 10 milliGRAM(s) Oral daily  amoxicillin  875 milliGRAM(s)/clavulanate 1 Tablet(s) Oral two times a day  brimonidine 0.2% Ophthalmic Solution 1 Drop(s) Both EYES two times a day  colchicine 0.6 milliGRAM(s) Oral daily  furosemide    Tablet 20 milliGRAM(s) Oral daily  heparin  Infusion.  Unit(s)/Hr (17 mL/Hr) IV Continuous <Continuous>  hydrALAZINE 25 milliGRAM(s) Oral two times a day  lactobacillus acidophilus 1 Tablet(s) Oral two times a day  losartan 100 milliGRAM(s) Oral daily  metoprolol succinate  milliGRAM(s) Oral daily  pantoprazole    Tablet 40 milliGRAM(s) Oral before breakfast  polyethylene glycol 3350 17 Gram(s) Oral daily  senna 2 Tablet(s) Oral at bedtime  timolol 0.25% Solution 1 Drop(s) Both EYES two times a day    MEDICATIONS  (PRN):  heparin   Injectable 7500 Unit(s) IV Push every 6 hours PRN For aPTT less than 40  heparin   Injectable 3500 Unit(s) IV Push every 6 hours PRN For aPTT between 40 - 57  HYDROmorphone   Tablet 4 milliGRAM(s) Oral four times a day PRN Moderate Pain (4 - 6)  magnesium hydroxide Suspension 30 milliLiter(s) Oral two times a day PRN Constipation    Allergies  No Known Allergies    SOCIAL HISTORY:  Nonsmoker.  No ETOH or illicit drug use    FAMILY HISTORY:  Family history of diabetes mellitus (DM) (Mother)  Family history of TIAs (Mother)  Family hx of prostate cancer (Father)  Family history of bone cancer (Father)  Family history of appendicitis    Vital Signs Last 24 Hrs  T(C): 36.4 (2021 09:20), Max: 36.5 (2021 07:31)  T(F): 97.6 (2021 09:20), Max: 97.7 (2021 07:31)  HR: 87 (2021 12:38) (79 - 114)  BP: 129/86 (2021 12:38) (129/86 - 174/101)  RR: 16 (2021 09:20) (16 - 18)  SpO2: 97% (2021 09:20) (95% - 100%)    PHYSICAL EXAM  GENERAL:  Well-nourished, well-developed male lying comfortably in bed in NAD  HEENT:  Sclera white. Mucous membranes moist.  CARDIO:  Regular rate and rhythm.  No murmur, gallop or rub appreciated.  RESPIRATORY:  Clear to auscultation bilaterally.  No wheezing, rales or rhonchi appreciated.  ABDOMEN:  Soft, nondistended, +mild tenderness to the left of the umblicus; CCY trocar sites healed without signs of infection.  No rebound tenderness or guarding.  No CVA tenderness.  EXTREMITIES: Bilateral lower extremities warm, nontender, gross motor function and sensation intact.  No ulcers or edema.  No calf tenderness.  Bilateral radial, ulnar, brachial, PT, DP pulses palpable.  SKIN:  No jaundice, pallor, or cyanosis  NEURO:  A&O x 3    LABS:                        16.0   10.80 )-----------( 186      ( 2021 13:32 )             45.0     11-16    140  |  105  |  15  ----------------------------<  111<H>  3.5   |  31  |  1.00    Ca    9.4      2021 02:35    TPro  7.8  /  Alb  3.7  /  TBili  0.5  /  DBili  x   /  AST  38<H>  /  ALT  42  /  AlkPhos  91  11-16    PT/INR - ( 2021 02:35 )   PT: 12.9 sec;   INR: 1.11 ratio  PTT - ( 2021 13:32 )  PTT:>200.0 sec    Urinalysis Basic - ( 2021 02:36 )  Color: Yellow / Appearance: Clear / S.010 / pH: x  Gluc: x / Ketone: Negative  / Bili: Negative / Urobili: Negative  Blood: x / Protein: 100 / Nitrite: Negative   Leuk Esterase: Negative / RBC: 3-5 /HPF / WBC 0-2  Sq Epi: x / Non Sq Epi: Occasional / Bacteria: x    RADIOLOGY & ADDITIONAL STUDIES:  < from: CT Abdomen and Pelvis w/ Oral Cont and w/ IV Cont (16. @ 04:44) >  FINDINGS:  LOWER CHEST: Clear    LIVER: Within normal limits.  BILE DUCTS: Normal caliber.  GALLBLADDER: Cholecystectomy.  SPLEEN: Lobulation and scarring of the spleen withmultiple hypoenhancing areas in the inferior spleen.  PANCREAS: Within normal limits.  ADRENALS: Within normal limits.  KIDNEYS/URETERS: Within normal limits.    BLADDER: Within normal limits.  REPRODUCTIVE ORGANS: Prostatectomy.    BOWEL: No bowel obstruction. Normal appendix.  PERITONEUM: No free air or free fluid  VESSELS: Atherosclerotic change of the abdominal aorta without aneurysm. The main splenic artery is patent. The splenic vein is patent.  RETROPERITONEUM/LYMPH NODES: No hemorrhage oradenopathy  ABDOMINAL WALL: Tiny fat-containing umbilical/periumbilical hernia  BONES: Partial synostosis of the sacroiliac joints.    IMPRESSION:  1. Acute splenic infarcts, superimposed upon sequela of prior/chronic infarcts.

## 2021-11-17 ENCOUNTER — TRANSCRIPTION ENCOUNTER (OUTPATIENT)
Age: 66
End: 2021-11-17

## 2021-11-17 VITALS
DIASTOLIC BLOOD PRESSURE: 83 MMHG | TEMPERATURE: 99 F | RESPIRATION RATE: 18 BRPM | HEART RATE: 81 BPM | SYSTOLIC BLOOD PRESSURE: 121 MMHG | OXYGEN SATURATION: 95 %

## 2021-11-17 DIAGNOSIS — I48.91 UNSPECIFIED ATRIAL FIBRILLATION: ICD-10-CM

## 2021-11-17 LAB
ANION GAP SERPL CALC-SCNC: 7 MMOL/L — SIGNIFICANT CHANGE UP (ref 5–17)
APTT BLD: 80.9 SEC — HIGH (ref 27.5–35.5)
BASOPHILS # BLD AUTO: 0.03 K/UL — SIGNIFICANT CHANGE UP (ref 0–0.2)
BASOPHILS NFR BLD AUTO: 0.3 % — SIGNIFICANT CHANGE UP (ref 0–2)
BUN SERPL-MCNC: 15 MG/DL — SIGNIFICANT CHANGE UP (ref 7–23)
CALCIUM SERPL-MCNC: 9 MG/DL — SIGNIFICANT CHANGE UP (ref 8.5–10.1)
CHLORIDE SERPL-SCNC: 102 MMOL/L — SIGNIFICANT CHANGE UP (ref 96–108)
CO2 SERPL-SCNC: 28 MMOL/L — SIGNIFICANT CHANGE UP (ref 22–31)
COVID-19 NUCLEOCAPSID GAM AB INTERP: POSITIVE
COVID-19 NUCLEOCAPSID TOTAL GAM ANTIBODY RESULT: 136 INDEX — HIGH
COVID-19 SPIKE DOMAIN AB INTERP: POSITIVE
COVID-19 SPIKE DOMAIN ANTIBODY RESULT: >250 U/ML — HIGH
CREAT SERPL-MCNC: 1.3 MG/DL — SIGNIFICANT CHANGE UP (ref 0.5–1.3)
EOSINOPHIL # BLD AUTO: 0.03 K/UL — SIGNIFICANT CHANGE UP (ref 0–0.5)
EOSINOPHIL NFR BLD AUTO: 0.3 % — SIGNIFICANT CHANGE UP (ref 0–6)
GLUCOSE SERPL-MCNC: 96 MG/DL — SIGNIFICANT CHANGE UP (ref 70–99)
HCT VFR BLD CALC: 47 % — SIGNIFICANT CHANGE UP (ref 39–50)
HGB BLD-MCNC: 16 G/DL — SIGNIFICANT CHANGE UP (ref 13–17)
IMM GRANULOCYTES NFR BLD AUTO: 0.3 % — SIGNIFICANT CHANGE UP (ref 0–1.5)
LYMPHOCYTES # BLD AUTO: 1.64 K/UL — SIGNIFICANT CHANGE UP (ref 1–3.3)
LYMPHOCYTES # BLD AUTO: 14.7 % — SIGNIFICANT CHANGE UP (ref 13–44)
MCHC RBC-ENTMCNC: 28.8 PG — SIGNIFICANT CHANGE UP (ref 27–34)
MCHC RBC-ENTMCNC: 34 GM/DL — SIGNIFICANT CHANGE UP (ref 32–36)
MCV RBC AUTO: 84.7 FL — SIGNIFICANT CHANGE UP (ref 80–100)
MONOCYTES # BLD AUTO: 1.27 K/UL — HIGH (ref 0–0.9)
MONOCYTES NFR BLD AUTO: 11.3 % — SIGNIFICANT CHANGE UP (ref 2–14)
NEUTROPHILS # BLD AUTO: 8.19 K/UL — HIGH (ref 1.8–7.4)
NEUTROPHILS NFR BLD AUTO: 73.1 % — SIGNIFICANT CHANGE UP (ref 43–77)
NRBC # BLD: 0 /100 WBCS — SIGNIFICANT CHANGE UP (ref 0–0)
PLATELET # BLD AUTO: 153 K/UL — SIGNIFICANT CHANGE UP (ref 150–400)
POTASSIUM SERPL-MCNC: 3.6 MMOL/L — SIGNIFICANT CHANGE UP (ref 3.5–5.3)
POTASSIUM SERPL-SCNC: 3.6 MMOL/L — SIGNIFICANT CHANGE UP (ref 3.5–5.3)
RBC # BLD: 5.55 M/UL — SIGNIFICANT CHANGE UP (ref 4.2–5.8)
RBC # FLD: 13.2 % — SIGNIFICANT CHANGE UP (ref 10.3–14.5)
SARS-COV-2 IGG+IGM SERPL QL IA: 136 INDEX — HIGH
SARS-COV-2 IGG+IGM SERPL QL IA: >250 U/ML — HIGH
SARS-COV-2 IGG+IGM SERPL QL IA: POSITIVE
SARS-COV-2 IGG+IGM SERPL QL IA: POSITIVE
SODIUM SERPL-SCNC: 137 MMOL/L — SIGNIFICANT CHANGE UP (ref 135–145)
WBC # BLD: 11.19 K/UL — HIGH (ref 3.8–10.5)
WBC # FLD AUTO: 11.19 K/UL — HIGH (ref 3.8–10.5)

## 2021-11-17 PROCEDURE — 0225U NFCT DS DNA&RNA 21 SARSCOV2: CPT

## 2021-11-17 PROCEDURE — 86900 BLOOD TYPING SEROLOGIC ABO: CPT

## 2021-11-17 PROCEDURE — 85025 COMPLETE CBC W/AUTO DIFF WBC: CPT

## 2021-11-17 PROCEDURE — 99285 EMERGENCY DEPT VISIT HI MDM: CPT | Mod: 25

## 2021-11-17 PROCEDURE — 85730 THROMBOPLASTIN TIME PARTIAL: CPT

## 2021-11-17 PROCEDURE — 80053 COMPREHEN METABOLIC PANEL: CPT

## 2021-11-17 PROCEDURE — 83615 LACTATE (LD) (LDH) ENZYME: CPT

## 2021-11-17 PROCEDURE — 81001 URINALYSIS AUTO W/SCOPE: CPT

## 2021-11-17 PROCEDURE — 86901 BLOOD TYPING SEROLOGIC RH(D): CPT

## 2021-11-17 PROCEDURE — 83690 ASSAY OF LIPASE: CPT

## 2021-11-17 PROCEDURE — 97161 PT EVAL LOW COMPLEX 20 MIN: CPT

## 2021-11-17 PROCEDURE — 85027 COMPLETE CBC AUTOMATED: CPT

## 2021-11-17 PROCEDURE — 85610 PROTHROMBIN TIME: CPT

## 2021-11-17 PROCEDURE — 74177 CT ABD & PELVIS W/CONTRAST: CPT | Mod: MA

## 2021-11-17 PROCEDURE — 86850 RBC ANTIBODY SCREEN: CPT

## 2021-11-17 PROCEDURE — 96374 THER/PROPH/DIAG INJ IV PUSH: CPT

## 2021-11-17 PROCEDURE — 36415 COLL VENOUS BLD VENIPUNCTURE: CPT

## 2021-11-17 PROCEDURE — 96375 TX/PRO/DX INJ NEW DRUG ADDON: CPT

## 2021-11-17 PROCEDURE — 86769 SARS-COV-2 COVID-19 ANTIBODY: CPT

## 2021-11-17 PROCEDURE — 96376 TX/PRO/DX INJ SAME DRUG ADON: CPT

## 2021-11-17 PROCEDURE — 99231 SBSQ HOSP IP/OBS SF/LOW 25: CPT | Mod: 24

## 2021-11-17 PROCEDURE — 83605 ASSAY OF LACTIC ACID: CPT

## 2021-11-17 PROCEDURE — 80048 BASIC METABOLIC PNL TOTAL CA: CPT

## 2021-11-17 PROCEDURE — 93005 ELECTROCARDIOGRAM TRACING: CPT

## 2021-11-17 PROCEDURE — 82150 ASSAY OF AMYLASE: CPT

## 2021-11-17 RX ORDER — POLYETHYLENE GLYCOL 3350 17 G/17G
17 POWDER, FOR SOLUTION ORAL
Qty: 0 | Refills: 0 | DISCHARGE
Start: 2021-11-17

## 2021-11-17 RX ORDER — ACETAMINOPHEN 500 MG
650 TABLET ORAL ONCE
Refills: 0 | Status: COMPLETED | OUTPATIENT
Start: 2021-11-17 | End: 2021-11-17

## 2021-11-17 RX ORDER — AMOXICILLIN 250 MG/5ML
1 SUSPENSION, RECONSTITUTED, ORAL (ML) ORAL
Qty: 0 | Refills: 0 | DISCHARGE

## 2021-11-17 RX ORDER — RIVAROXABAN 15 MG-20MG
20 KIT ORAL DAILY
Refills: 0 | Status: DISCONTINUED | OUTPATIENT
Start: 2021-11-17 | End: 2021-11-17

## 2021-11-17 RX ORDER — LACTOBACILLUS ACIDOPHILUS 100MM CELL
1 CAPSULE ORAL
Qty: 0 | Refills: 0 | DISCHARGE
Start: 2021-11-17

## 2021-11-17 RX ORDER — ACETAMINOPHEN 500 MG
650 TABLET ORAL EVERY 6 HOURS
Refills: 0 | Status: DISCONTINUED | OUTPATIENT
Start: 2021-11-17 | End: 2021-11-17

## 2021-11-17 RX ORDER — ACETAMINOPHEN 500 MG
2 TABLET ORAL
Qty: 0 | Refills: 0 | DISCHARGE
Start: 2021-11-17

## 2021-11-17 RX ADMIN — HEPARIN SODIUM 1400 UNIT(S)/HR: 5000 INJECTION INTRAVENOUS; SUBCUTANEOUS at 05:20

## 2021-11-17 RX ADMIN — Medication 650 MILLIGRAM(S): at 12:29

## 2021-11-17 RX ADMIN — HYDROMORPHONE HYDROCHLORIDE 4 MILLIGRAM(S): 2 INJECTION INTRAMUSCULAR; INTRAVENOUS; SUBCUTANEOUS at 05:12

## 2021-11-17 RX ADMIN — Medication 100 MILLIGRAM(S): at 05:13

## 2021-11-17 RX ADMIN — Medication 1 TABLET(S): at 05:12

## 2021-11-17 RX ADMIN — Medication 1 DROP(S): at 05:16

## 2021-11-17 RX ADMIN — BRIMONIDINE TARTRATE 1 DROP(S): 2 SOLUTION/ DROPS OPHTHALMIC at 05:13

## 2021-11-17 RX ADMIN — LOSARTAN POTASSIUM 100 MILLIGRAM(S): 100 TABLET, FILM COATED ORAL at 05:13

## 2021-11-17 RX ADMIN — AMLODIPINE BESYLATE 10 MILLIGRAM(S): 2.5 TABLET ORAL at 05:12

## 2021-11-17 RX ADMIN — HYDROMORPHONE HYDROCHLORIDE 4 MILLIGRAM(S): 2 INJECTION INTRAMUSCULAR; INTRAVENOUS; SUBCUTANEOUS at 06:46

## 2021-11-17 RX ADMIN — Medication 0.6 MILLIGRAM(S): at 14:28

## 2021-11-17 RX ADMIN — RIVAROXABAN 20 MILLIGRAM(S): KIT at 14:28

## 2021-11-17 RX ADMIN — Medication 25 MILLIGRAM(S): at 05:12

## 2021-11-17 NOTE — DISCHARGE NOTE PROVIDER - CARE PROVIDER_API CALL
Luis Fernando Villalba)  Internal Medicine  [address], [address]  [place] [address]  Phone: [phone]  Fax: [phone]  Follow Up Time:

## 2021-11-17 NOTE — PROGRESS NOTE ADULT - ATTENDING COMMENTS
Resting comfortably.  LUQ pain resolving.  H/H remains stable  Continues on IV heparin  Should resume Oral Anticoagulation as per Cardiology and Hematology recommendations  No acute surgical intervention indicated.  ID follow up to address appropriate vaccination to encapsulated organisms.

## 2021-11-17 NOTE — CONSULT NOTE ADULT - ASSESSMENT
The patient is a 66 year old male with a history of HTN, gout, atrial fibrillation, splenic infarct, cholecystectomy who presents with abdominal pain in the setting of splenic infarct.    Plan:  - Thromboembolism would be an unusual etiology for recurrent splenic infarct. Given the pathway for a clot to travel and tortuosity of the vessel, and for this to happen twice with no other distal locations, would be very rare and unlikely. A more proximal issue, such as dissection of the vessel or atherosclerotic plaque rupture would be more likely.  - Continue heparin drip  - If no other intervention, would transition back to rivaroxaban - would not call this a failure of rivaroxaban as anticoagulation was interrupted for a procedure  - For any future procedures, would need to be bridged with enoxaparin for heparin drip  - Continue metoprolol succinate 100 mg daily  - Continue amlodipine 10 mg daily  - Continue hydralazine 25 mg bid  - Continue losartan 100 mg daily  - Hematology and surgery evals  - May need dedicated CTA A/P 
[ASSESSMENT and  PLAN]  66 year old male with a history of HTN, gout, atrial fibrillation, splenic infarct, cholecystectomy who presents with abdominal pain in the setting of splenic infarct.     Not on ASA.     Elevated Hgb    Hx of COVID19, at least once prior Feb 2021 11/17/21     COVID19 Issa IgG <>250     COVID19 nc IgG 136    Colonoscopy and EGD, Dr Jerry, 2021.     Hx prostate cancer, s/p robotic prostatectomy 2010.     RECOMMENDATIONS  Continue IV heparin  Pimentel stable change to PO DOAC.   Discussed q12h Eliquis or Xarelto q24h, pt reports difficulty with BID meds, misses doses.     Would evaluated for ET or PV.     Further workup outpt for cause.     Thank you for consulting us.   Follow in office in next week post DC for further workup and treatment.     I have discussed the above plan of care with patient in detail. They expressed understanding of the treatment plan . Risks, benefits and alternatives discussed in detail. I have asked if they have any questions or concerns and appropriately addressed them; all questions answered to their satisfactions and in lay terms.     Discussed with Dr Villalba

## 2021-11-17 NOTE — DISCHARGE NOTE PROVIDER - HOSPITAL COURSE
abd pain  Chief Complaint: abdominal pain.    · Chief Complaint: The patient is a 66y Male complaining of abdominal pain.  · HPI Objective Statement: 66 year old male PMH HTN, A- FIb, Prostate Cancer, Splenic Infarct, H/O chronic cholecystitis,  S/P cholecystostomy tube and then Laparoscopic Cholecystectomy with Dr Kevin Meza on 10/18/021. Pt is experiencing LUQ pain, sudden onset this evening. He stopped taking his Xarelto for the past four days because he had a dental implant yesterday morning. no CP, no SOB, no fever, no chills, no urinary symptoms.    splenic infarc acute on ct after he stopped xarelto for dental implant , on IV heparin , and follow up labs , consider restarting xarelto  on 11.17.21  vital signs stable , feels better , plan start xarelto  may need hematology work upa s out patient ,   surgery and cardio noted , dc home on xarelto     Problem/Plan - 1:  ·  Problem: Splenic infarction.   ·  Plan: recurrent when he came of xarelto for dental implants   may need hematology work up as out patient , on iv heparin , will restart xarelto for a fib.     Problem/Plan - 2:  ·  Problem: Atrial fibrillation.   ·  Plan: xarelto, follow up cardio.     Problem/Plan - 3:  ·  Problem: R/O Splenic infarction.   ·  Plan: as above , seen bu surgeon.       abd pain, weak  Chief Complaint: abdominal pain.    · Chief Complaint: The patient is a 66y Male complaining of abdominal pain.  · HPI Objective Statement: 66 year old male PMH HTN, A- FIb, Prostate Cancer, Splenic Infarct, H/O chronic cholecystitis,  S/P cholecystostomy tube and then Laparoscopic Cholecystectomy with Dr Kevin Meza on 10/18/021. Pt is experiencing LUQ pain, sudden onset this evening. He stopped taking his Xarelto for the past four days because he had a dental implant yesterday morning. no CP, no SOB, no fever, no chills, no urinary symptoms.    splenic infarc acute on ct after he stopped xarelto for dental implant , on IV heparin , and follow up labs , consider restarting xarelto  on 11.17.21  vital signs stable , feels better , plan start xarelto  may need hematology work upa s out patient ,   surgery and cardio noted , dc home on xarelto     Problem/Plan - 1:  ·  Problem: Splenic infarction.   ·  Plan: recurrent when he came of xarelto for dental implants   may need hematology work up as out patient , on iv heparin , will restart xarelto for a fib.     Problem/Plan - 2:  ·  Problem: Atrial fibrillation.   ·  Plan: xarelto, follow up cardio.     Problem/Plan - 3:  ·  Problem: R/O Splenic infarction.   ·  Plan: as above , seen bu surgeon.

## 2021-11-17 NOTE — DISCHARGE NOTE PROVIDER - NSDCMRMEDTOKEN_GEN_ALL_CORE_FT
acetaminophen 325 mg oral tablet: 2 tab(s) orally every 6 hours, As needed, Temp greater or equal to 38C (100.4F), Mild Pain (1 - 3)  allopurinol 100 mg oral tablet: 1 tab(s) orally once a day  amLODIPine 10 mg oral tablet: 1 tab(s) orally once a day  amoxicillin-clavulanate 875 mg-125 mg oral tablet: 1 tab(s) orally 2 times a day  colchicine 0.6 mg oral tablet: 1 tab(s) orally once a day  Combigan 0.2%-0.5% ophthalmic solution: 1 drop(s) to each affected eye 2 times a day  furosemide 20 mg oral tablet: 1 tab(s) orally once a day, As Needed  hydrALAZINE 25 mg oral tablet: 1 tab(s) orally 2 times a day  lactobacillus acidophilus oral capsule: 1 tab(s) orally 2 times a day  losartan 100 mg oral tablet: 1 tab(s) orally once a day  Metoprolol Succinate  mg oral tablet, extended release: 1 tab(s) orally once a day  pantoprazole 40 mg oral delayed release tablet: 1 tab(s) orally once a day, As Needed  polyethylene glycol 3350 oral powder for reconstitution: 17 gram(s) orally once a day  Xarelto 20 mg oral tablet: 1 tab(s) orally once a day

## 2021-11-17 NOTE — PROGRESS NOTE ADULT - SUBJECTIVE AND OBJECTIVE BOX
Chief Complaint: Abdominal pain    Interval Events: No events overnight.    Review of Systems:  General: No fevers, chills, weight gain  Skin: No rashes, color changes  Cardiovascular: No chest pain, orthopnea  Respiratory: No shortness of breath, cough  Gastrointestinal: No nausea, abdominal pain  Genitourinary: No incontinence, pain with urination  Musculoskeletal: No pain, swelling, decreased range of motion  Neurological: No headache, weakness  Psychiatric: No depression, anxiety  Endocrine: No weight gain, increased thirst  All other systems are comprehensively negative.    Physical Exam:  Vitals:        Vital Signs Last 24 Hrs  T(C): 37.7 (17 Nov 2021 05:00), Max: 37.7 (17 Nov 2021 05:00)  T(F): 99.8 (17 Nov 2021 05:00), Max: 99.8 (17 Nov 2021 05:00)  HR: 89 (17 Nov 2021 05:00) (71 - 89)  BP: 135/66 (17 Nov 2021 05:05) (117/82 - 142/91)  BP(mean): --  RR: 18 (17 Nov 2021 05:00) (16 - 18)  SpO2: 94% (17 Nov 2021 05:00) (94% - 97%)  General: NAD  HEENT: MMM  Neck: No JVD, no carotid bruit  Lungs: CTAB  CV: RRR, nl S1/S2, no M/R/G  Abdomen: S/NT/ND, +BS  Extremities: No LE edema, no cyanosis  Neuro: AAOx3, non-focal  Skin: No rash    Labs:                        16.0   11.19 )-----------( 153      ( 17 Nov 2021 08:11 )             47.0     11-17    x   |  x   |  15  ----------------------------<  96  x    |  28  |  1.30    Ca    9.0      17 Nov 2021 08:11    TPro  7.8  /  Alb  3.7  /  TBili  0.5  /  DBili  x   /  AST  38<H>  /  ALT  42  /  AlkPhos  91  11-16        PT/INR - ( 16 Nov 2021 02:35 )   PT: 12.9 sec;   INR: 1.11 ratio         PTT - ( 17 Nov 2021 05:01 )  PTT:80.9 sec    Telemetry: AF
Hospital day 1    66y Male admitted with Infarction of spleen      Patient seen and examined bedside resting comfortably.  No overnight events.  Tolerating diet, ambulating.  Currently on a heparin drip.   Mild luq abdominal complaints.     T(F): 99.8 (11-17-21 @ 05:00), Max: 99.8 (11-17-21 @ 05:00)  HR: 89 (11-17-21 @ 05:00) (71 - 89)  BP: 135/66 (11-17-21 @ 05:05) (117/82 - 142/91)  RR: 18 (11-17-21 @ 05:00) (18 - 18)  SpO2: 94% (11-17-21 @ 05:00) (94% - 95%)  Wt(kg): --  CAPILLARY BLOOD GLUCOSE          PHYSICAL EXAM:  General: NAD  Neuro: A+O x3  CV: +S1+S2 regular rate and rhythm  Lung: clear to ausculation bilaterally  Abdomen: BS+ Soft. Mild LUQ discomfort to palpation  Extremities: no pedal edema or calf tenderness noted       LABS:                        16.0   11.19 )-----------( 153      ( 17 Nov 2021 08:11 )             47.0     11-17    137  |  102  |  15  ----------------------------<  96  3.6   |  28  |  1.30    Ca    9.0      17 Nov 2021 08:11    TPro  7.8  /  Alb  3.7  /  TBili  0.5  /  DBili  x   /  AST  38<H>  /  ALT  42  /  AlkPhos  91  11-16    PT/INR - ( 16 Nov 2021 02:35 )   PT: 12.9 sec;   INR: 1.11 ratio         PTT - ( 17 Nov 2021 05:01 )  PTT:80.9 sec  I&O's Detail    16 Nov 2021 07:01  -  17 Nov 2021 07:00  --------------------------------------------------------  IN:    Heparin Infusion: 309 mL  Total IN: 309 mL    OUT:    Voided (mL): 1025 mL  Total OUT: 1025 mL    Total NET: -716 mL            RADIOLOGY:    
PCP  Subjective:   in bed , awke , ambulates , dec pain    Objective:   Vital Signs Last 24 Hrs  T(C): 37.3 (21 @ 10:58), Max: 37.7 (21 @ 05:00)  T(F): 99.1 (21 @ 10:58), Max: 99.8 (21 @ 05:00)  HR: 81 (21 @ 10:58) (71 - 89)  BP: 121/83 (21 @ 10:58) (117/82 - 142/91)  BP(mean): --  RR: 18 (21 @ 10:58) (18 - 18)  SpO2: 95% (21 @ 10:58) (94% - 95%)  Daily     Daily Weight in k.5 (2021 05:00)    GENERAL:  wdwn male , awake   EYES: eomi  NECK: supple  CHEST/LUNG: clear  HEART: irregularly irregular s1 s2   ABDOMEN:  soft nt + bs  EXTREMITIES:  no edema   SKIN:  warm   CNS:  awake , alert non focal     Allergies: Allergies    No Known Allergies    Intolerances        Home Medications:  allopurinol 100 mg oral tablet: 1 tab(s) orally once a day (2021 01:43)  amLODIPine 10 mg oral tablet: 1 tab(s) orally once a day (:43)  amoxicillin 875 mg oral tablet: 1 tab(s) orally every 12 hours (2021 10:22)  colchicine 0.6 mg oral tablet: 1 tab(s) orally once a day (:43)  Combigan 0.2%-0.5% ophthalmic solution: 1 drop(s) to each affected eye 2 times a day (:43)  furosemide 20 mg oral tablet: 1 tab(s) orally once a day, As Needed (:43)  hydrALAZINE 25 mg oral tablet: 1 tab(s) orally 2 times a day (:43)  losartan 100 mg oral tablet: 1 tab(s) orally once a day (:43)  Metoprolol Succinate  mg oral tablet, extended release: 1 tab(s) orally once a day (18 Oct 2021 15:37)  pantoprazole 40 mg oral delayed release tablet: 1 tab(s) orally once a day, As Needed (18 Oct 2021 10:15)  Xarelto 20 mg oral tablet: 1 tab(s) orally once a day (18 Oct 2021 10:15)    Medications:   amLODIPine   Tablet 10 milliGRAM(s) Oral daily  amoxicillin  875 milliGRAM(s)/clavulanate 1 Tablet(s) Oral two times a day  brimonidine 0.2% Ophthalmic Solution 1 Drop(s) Both EYES two times a day  colchicine 0.6 milliGRAM(s) Oral daily  furosemide    Tablet 20 milliGRAM(s) Oral daily  heparin   Injectable 7500 Unit(s) IV Push every 6 hours PRN  heparin   Injectable 3500 Unit(s) IV Push every 6 hours PRN  heparin  Infusion.  Unit(s)/Hr IV Continuous <Continuous>  hydrALAZINE 25 milliGRAM(s) Oral two times a day  HYDROmorphone   Tablet 4 milliGRAM(s) Oral four times a day PRN  lactobacillus acidophilus 1 Tablet(s) Oral two times a day  losartan 100 milliGRAM(s) Oral daily  magnesium hydroxide Suspension 30 milliLiter(s) Oral two times a day PRN  metoprolol succinate  milliGRAM(s) Oral daily  pantoprazole    Tablet 40 milliGRAM(s) Oral before breakfast  polyethylene glycol 3350 17 Gram(s) Oral daily  senna 2 Tablet(s) Oral at bedtime  timolol 0.25% Solution 1 Drop(s) Both EYES two times a day      LABS:                        16.0   11.19 )-----------( 153      ( 2021 08:11 )             47.0     -    137  |  102  |  15  ----------------------------<  96  3.6   |  28  |  1.30    Ca    9.0      2021 08:11    TPro  7.8  /  Alb  3.7  /  TBili  0.5  /  DBili  x   /  AST  38<H>  /  ALT  42  /  AlkPhos  91  11-16    PT/INR - ( 2021 02:35 )   PT: 12.9 sec;   INR: 1.11 ratio         PTT - ( 2021 05:01 )  PTT:80.9 sec  11-16 @ 02:35  INR 1.11    Urinalysis Basic - ( 2021 02:36 )    Color: Yellow / Appearance: Clear / S.010 / pH: x  Gluc: x / Ketone: Negative  / Bili: Negative / Urobili: Negative   Blood: x / Protein: 100 / Nitrite: Negative   Leuk Esterase: Negative / RBC: 3-5 /HPF / WBC 0-2   Sq Epi: x / Non Sq Epi: Occasional / Bacteria: x        CAPILLARY BLOOD GLUCOSE              RECENT CULTURES:

## 2021-11-17 NOTE — PROGRESS NOTE ADULT - PROBLEM SELECTOR PLAN 1
recurrent when he came of xarelto for dental implants   may need hematology work up as out patient , on iv heparin , will restart xarelto for a fib

## 2021-11-17 NOTE — DISCHARGE NOTE NURSING/CASE MANAGEMENT/SOCIAL WORK - PATIENT PORTAL LINK FT
You can access the FollowMyHealth Patient Portal offered by [facility] by registering at the following website: [email or web address]. By joining [facility]’s FollowMyHealth portal, you will also be able to view your health information using other applications (apps) compatible with our system.

## 2021-11-17 NOTE — CONSULT NOTE ADULT - SUBJECTIVE AND OBJECTIVE BOX
INCOMPLETE NOTE.  Documentation in Progress  PT SEEN AND EVALUATED.   FULL/ADDITIONAL RECOMMENDATIONS TO FOLLOW   ***************************************************************    Patient is a 66y old  Male who presents with a chief complaint of left upper quadrant pain (2021 12:20)      HPI:  Chief Complaint: abdominal pain.    · Chief Complaint: The patient is a 66y Male complaining of abdominal pain.  · HPI Objective Statement: 66 year old male PMH HTN, A- FIb, Prostate Cancer, Splenic Infarct, H/O chronic cholecystitis,  S/P cholecystostomy tube and then Laparoscopic Cholecystectomy with Dr Kevin Meza on 10/18/021. Pt is experiencing LUQ pain, sudden onset this evening. He stopped taking his Xarelto for the past four days because he had a dental implant yesterday morning. no CP, no SOB, no fever, no chills, no urinary symptoms.     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    Atrial fibrillation  paroxysmal            Infarction of spleen [D73.5]    GERD (gastroesophageal reflux disease) [530.81]    Prostate ca [185]    Hypertension [401.9]    Gout [M10.9]    Atrial fibrillation [I48.91]    Splenic infarction [D73.5]     novel coronavirus disease (COVID-19) [U07.1]    No significant past surgical history [139351851]    H/O prostatectomy [Z90.79]    H/O arthroscopy of right knee [Z98.890]    H/O colonoscopy [Z98.890]    H/O endoscopy [Z98.890]    Elective surgery [Z41.9]        FAMILY HISTORY:  Family history of diabetes mellitus (DM) (Mother)    Family history of TIAs (Mother)    Family hx of prostate cancer (Father)    Family history of bone cancer (Father)    Family history of appendicitis          [SOCIAL HISTORY: ]     smoking:       EtOH:       illicit drugs:       occupation:       marital status:       Other:       [ALLERGIES/INTOLERANCES:]  Allergies    No Known Allergies    Intolerances          [MEDICATIONS]  MEDICATIONS  (STANDING):  amLODIPine   Tablet 10 milliGRAM(s) Oral daily  amoxicillin  875 milliGRAM(s)/clavulanate 1 Tablet(s) Oral two times a day  brimonidine 0.2% Ophthalmic Solution 1 Drop(s) Both EYES two times a day  colchicine 0.6 milliGRAM(s) Oral daily  furosemide    Tablet 20 milliGRAM(s) Oral daily  hydrALAZINE 25 milliGRAM(s) Oral two times a day  lactobacillus acidophilus 1 Tablet(s) Oral two times a day  losartan 100 milliGRAM(s) Oral daily  metoprolol succinate  milliGRAM(s) Oral daily  pantoprazole    Tablet 40 milliGRAM(s) Oral before breakfast  polyethylene glycol 3350 17 Gram(s) Oral daily  rivaroxaban 20 milliGRAM(s) Oral daily  senna 2 Tablet(s) Oral at bedtime  timolol 0.25% Solution 1 Drop(s) Both EYES two times a day    MEDICATIONS  (PRN):  acetaminophen     Tablet .. 650 milliGRAM(s) Oral every 6 hours PRN Temp greater or equal to 38C (100.4F), Mild Pain (1 - 3)  HYDROmorphone   Tablet 4 milliGRAM(s) Oral four times a day PRN Moderate Pain (4 - 6)  magnesium hydroxide Suspension 30 milliLiter(s) Oral two times a day PRN Constipation        [REVIEW OF SYSTEMS: ]  CONSTITUTIONAL: normal, no fever, no shakes, no chills   EYES: No eye pain, no visual disturbances, no discharge  ENMT:  no discharge  NECK: No pain, no stiffness  BREASTS: No pain, no masses, no nipple discharge  RESPIRATORY: No cough, no wheezing, no chills, no hemoptysis; No shortness of breath  CARDIOVASCULAR: No chest pain, no palpitations, no dizziness, no leg swelling  GASTROINTESTINAL: No abdominal, no epigastric pain. No nausea, no vomiting, no hematemesis; No diarrhea , no constipation. No melena, no hematochezia.  GENITOURINARY: No dysuria, no frequency, no hematuria, no incontinence  NEUROLOGICAL: No headaches, no memory loss, no loss of strength, no numbness, no tremors  SKIN: No itching, no burning, no rashes, no lesions   LYMPH NODES: No enlarged glands  ENDOCRINE: No heat or cold intolerance; No hair loss  MUSCULOSKELETAL: No joint pain or swelling; No muscle, no back, no extremity pain  PSYCHIATRIC: No depression, no anxiety, no mood swings, no difficulty sleeping  HEME/LYMPH: No easy bruising, no bleeding gums      [VITALS SIGNS 24hrs]  Vital Signs Last 24 Hrs  T(C): 37.3 (2021 10:58), Max: 37.7 (2021 05:00)  T(F): 99.1 (2021 10:58), Max: 99.8 (2021 05:00)  HR: 81 (2021 10:58) (71 - 89)  BP: 121/83 (2021 10:58) (117/82 - 142/91)  BP(mean): --  RR: 18 (2021 10:58) (18 - 18)  SpO2: 95% (2021 10:58) (94% - 95%)  Daily     Daily Weight in k.5 (2021 05:00)    I&O's Summary    2021 07:01  -  2021 07:00  --------------------------------------------------------  IN: 309 mL / OUT: 1025 mL / NET: -716 mL        Last Menstrual Period      [PHYSICAL EXAM]  General: adult in NAD,  WN,  WD.  HEENT: clear oropharynx, anicteric sclera, pink conjunctivae.  Neck: supple, no masses.  CV: normal S1S2, no murmur, no rubs, no gallops.  Lungs: clear to auscultation, no wheezes, no rales, no rhonchi.  Abdomen: soft, non-tender, non-distended, no hepatosplenomegaly, normal BS, no guarding.  Ext: no clubbing, no cyanosis, no edema.  Skin: no rashes,  no petechiae, no venous stasis changes.  Neuro: alert and oriented X3, no focal motor deficits.  LN: no SC NOVA.      [LABS: ]                        16.0   11.19 )-----------( 153      ( 2021 08:11 )             47.0     CBC Full  -  ( 2021 08:11 )  WBC Count : 11.19 K/uL  RBC Count : 5.55 M/uL  Hemoglobin : 16.0 g/dL  Hematocrit : 47.0 %  Platelet Count - Automated : 153 K/uL  Mean Cell Volume : 84.7 fl  Mean Cell Hemoglobin : 28.8 pg  Mean Cell Hemoglobin Concentration : 34.0 gm/dL  Auto Neutrophil # : 8.19 K/uL  Auto Lymphocyte # : 1.64 K/uL  Auto Monocyte # : 1.27 K/uL  Auto Eosinophil # : 0.03 K/uL  Auto Basophil # : 0.03 K/uL  Auto Neutrophil % : 73.1 %  Auto Lymphocyte % : 14.7 %  Auto Monocyte % : 11.3 %  Auto Eosinophil % : 0.3 %  Auto Basophil % : 0.3 %        137  |  102  |  15  ----------------------------<  96  3.6   |  28  |  1.30    Ca    9.0      2021 08:11    TPro  7.8  /  Alb  3.7  /  TBili  0.5  /  DBili  x   /  AST  38<H>  /  ALT  42  /  AlkPhos  91  11-16    PT/INR - ( 2021 02:35 )   PT: 12.9 sec;   INR: 1.11 ratio         PTT - ( 2021 05:01 )  PTT:80.9 sec  LIVER FUNCTIONS - ( 2021 02:35 )  Alb: 3.7 g/dL / Pro: 7.8 g/dL / ALK PHOS: 91 U/L / ALT: 42 U/L / AST: 38 U/L / GGT: x               Urinalysis Basic - ( 2021 02:36 )    Color: Yellow / Appearance: Clear / S.010 / pH: x  Gluc: x / Ketone: Negative  / Bili: Negative / Urobili: Negative   Blood: x / Protein: 100 / Nitrite: Negative   Leuk Esterase: Negative / RBC: 3-5 /HPF / WBC 0-2   Sq Epi: x / Non Sq Epi: Occasional / Bacteria: x            CBC TREND (5 Days)  WBC Count: 11.19 K/uL ( @ 08:11)  WBC Count: 10.80 K/uL ( @ 13:32)  WBC Count: 9.01 K/uL ( @ 02:35)    Hemoglobin: 16.0 g/dL ( @ 08:11)  Hemoglobin: 16.0 g/dL ( 13:32)  Hemoglobin: 16.4 g/dL ( @ 02:35)    Hematocrit: 47.0 % ( @ 08:11)  Hematocrit: 45.0 % ( @ 13:32)  Hematocrit: 47.7 % ( @ 02:35)    Platelet Count - Automated: 153 K/uL ( @ 08:11)  Platelet Count - Automated: 186 K/uL ( @ 13:32)  Platelet Count - Automated: 234 K/uL ( @ 02:35)      Anemia Studies                      ***********************  Myeloma Studies                          [MICROBIOLOGY /  VIROLOGY:]  SARS-CoV-2: NotDetec (2021 02:36)  COVID-19 PCR: NotDetec (15 Oct 2021 14:18)  COVID-19 PCR: NotDetec (12 Sep 2021 07:11)  COVID-19 PCR: NotDetec (10 Sep 2021 01:05)            [RADIOLOGY & ADDITIONAL STUDIES:]        Patient is a 66y old  Male who presents with a chief complaint of left upper quadrant pain (2021 12:20)    HPI:  Chief Complaint: abdominal pain.    · Chief Complaint: The patient is a 66y Male complaining of abdominal pain.  · HPI Objective Statement: 66 year old male PMH HTN, A- FIb, Prostate Cancer, Splenic Infarct, H/O chronic cholecystitis,  S/P cholecystostomy tube and then Laparoscopic Cholecystectomy with Dr Kevin Meza on 10/18/021. Pt is experiencing LUQ pain, sudden onset this evening. He stopped taking his Xarelto for the past four days because he had a dental implant yesterday morning. no CP, no SOB, no fever, no chills, no urinary symptoms.   (2021 06:33)    Heme asked to see pt for Splenic infarct.   Hx Afib, dx  but at time intermittent paraxysomal.   In 2019, with with Splenic infarct, placed on Xarelto.       PAST MEDICAL & SURGICAL HISTORY:  GERD (gastroesophageal reflux disease)  Prostate ca, s/p prostatectomy  Hypertension  Gout  Atrial fibrillation, paroxysmal  Splenic infarction, 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   novel coronavirus disease (COVID-19),  - received monoclonal AB infusion  H/O prostatectomy, 4/27/10  H/O arthroscopy of right knee,   H/O colonoscopy  H/O endoscopy  Elective surgery, Percutaneous Cholecystostomy Drainage 2021      HEALTH ISSUES - PROBLEM Dx:  Splenic infarction, 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  Atrial fibrillation, paroxysmal      Infarction of spleen [D73.5]  GERD (gastroesophageal reflux disease) [530.81]  Prostate ca [185]  Hypertension [401.9]  Gout [M10.9]  Atrial fibrillation [I48.91]  Splenic infarction [D73.5]   novel coronavirus disease (COVID-19) [U07.1]  No significant past surgical history [154402670]  H/O prostatectomy [Z90.79]  H/O arthroscopy of right knee [Z98.890]  H/O colonoscopy [Z98.890]  H/O endoscopy [Z98.890]  Elective surgery [Z41.9]      FAMILY HISTORY:  Family history of diabetes mellitus (DM) (Mother)  Family history of TIAs (Mother)  Family hx of prostate cancer (Father)  Family history of bone cancer (Father)  Family history of appendicitis    mother  88yo, TIA  father  81yo, prostate cancer  maternal uncle,  66yo, obesity and complications  older brother Nickolas, alive. Hx Crohns, s/o abd surgery  dtr Aretha, 38yo, thoracic outlet syndrome, s/p corrective surgery      [SOCIAL HISTORY: ]     smoking:  tried smoking in teenage years     EtOH:  social     illicit drugs:  never     occupation:  salesman for [employer]     marital status:       Other: 1 dtr 38yo, Aretha      [ALLERGIES/INTOLERANCES:]  Allergies     No Known Allergies  Intolerances      [MEDICATIONS]  MEDICATIONS  (STANDING):  amLODIPine   Tablet 10 milliGRAM(s) Oral daily  amoxicillin  875 milliGRAM(s)/clavulanate 1 Tablet(s) Oral two times a day  brimonidine 0.2% Ophthalmic Solution 1 Drop(s) Both EYES two times a day  colchicine 0.6 milliGRAM(s) Oral daily  furosemide    Tablet 20 milliGRAM(s) Oral daily  hydrALAZINE 25 milliGRAM(s) Oral two times a day  lactobacillus acidophilus 1 Tablet(s) Oral two times a day  losartan 100 milliGRAM(s) Oral daily  metoprolol succinate  milliGRAM(s) Oral daily  pantoprazole    Tablet 40 milliGRAM(s) Oral before breakfast  polyethylene glycol 3350 17 Gram(s) Oral daily  rivaroxaban 20 milliGRAM(s) Oral daily  senna 2 Tablet(s) Oral at bedtime  timolol 0.25% Solution 1 Drop(s) Both EYES two times a day    MEDICATIONS  (PRN):  acetaminophen     Tablet .. 650 milliGRAM(s) Oral every 6 hours PRN Temp greater or equal to 38C (100.4F), Mild Pain (1 - 3)  HYDROmorphone   Tablet 4 milliGRAM(s) Oral four times a day PRN Moderate Pain (4 - 6)  magnesium hydroxide Suspension 30 milliLiter(s) Oral two times a day PRN Constipation      [REVIEW OF SYSTEMS: ]  CONSTITUTIONAL: normal, no fever, no shakes, no chills   EYES: No eye pain, no visual disturbances, no discharge  ENMT:  no discharge  NECK: No pain, no stiffness  BREASTS: No pain, no masses, no nipple discharge  RESPIRATORY: No cough, no wheezing, no chills, no hemoptysis; No shortness of breath  CARDIOVASCULAR: No chest pain, no palpitations, no dizziness, no leg swelling  GASTROINTESTINAL: +abdominal, no epigastric pain. No nausea, no vomiting, no hematemesis; No diarrhea , no constipation. No melena, no hematochezia.  GENITOURINARY: No dysuria, no frequency, no hematuria, no incontinence  NEUROLOGICAL: No headaches, no memory loss, no loss of strength, no numbness, no tremors  SKIN: No itching, no burning, no rashes, no lesions   LYMPH NODES: No enlarged glands  ENDOCRINE: No heat or cold intolerance; No hair loss  MUSCULOSKELETAL: No joint pain or swelling; No muscle, no back, no extremity pain  PSYCHIATRIC: No depression, no anxiety, no mood swings, no difficulty sleeping  HEME/LYMPH: No easy bruising, no bleeding gums      [VITALS SIGNS 24hrs]  Vital Signs Last 24 Hrs  T(C): 37.3 (2021 10:58), Max: 37.7 (2021 05:00)  T(F): 99.1 (2021 10:58), Max: 99.8 (2021 05:00)  HR: 81 (2021 10:58) (71 - 89)  BP: 121/83 (2021 10:58) (117/82 - 142/91)  BP(mean): --  RR: 18 (2021 10:58) (18 - 18)  SpO2: 95% (2021 10:58) (94% - 95%)  Daily     Daily Weight in k.5 (2021 05:00)    I&O's Summary  2021 07:01  -  2021 07:00  --------------------------------------------------------  IN: 309 mL / OUT: 1025 mL / NET: -716 mL      [PHYSICAL EXAM]  General: adult in NAD,  WN,  WD.  HEENT: clear oropharynx, anicteric sclera, pink conjunctivae.  Neck: supple, no masses.  CV: normal S1S2, no murmur, no rubs, no gallops.  Lungs: clear to auscultation, no wheezes, no rales, no rhonchi.  Abdomen: soft, non-tender, non-distended, no hepatosplenomegaly, normal BS, no guarding.  Ext: no clubbing, no cyanosis, no edema.  Skin: no rashes,  no petechiae, no venous stasis changes.  Neuro: alert and oriented X3, no focal motor deficits.  LN: no SC NOVA.      [LABS: ]                        16.0   11.19 )-----------( 153      ( 2021 08:11 )             47.0     CBC Full  -  ( 2021 08:11 )  WBC Count : 11.19 K/uL  RBC Count : 5.55 M/uL  Hemoglobin : 16.0 g/dL  Hematocrit : 47.0 %  Platelet Count - Automated : 153 K/uL  Mean Cell Volume : 84.7 fl  Mean Cell Hemoglobin : 28.8 pg  Mean Cell Hemoglobin Concentration : 34.0 gm/dL  Auto Neutrophil # : 8.19 K/uL  Auto Lymphocyte # : 1.64 K/uL  Auto Monocyte # : 1.27 K/uL  Auto Eosinophil # : 0.03 K/uL  Auto Basophil # : 0.03 K/uL  Auto Neutrophil % : 73.1 %  Auto Lymphocyte % : 14.7 %  Auto Monocyte % : 11.3 %  Auto Eosinophil % : 0.3 %  Auto Basophil % : 0.3 %    -17    137  |  102  |  15  ----------------------------<  96  3.6   |  28  |  1.30    Ca    9.0      2021 08:11    TPro  7.8  /  Alb  3.7  /  TBili  0.5  /  DBili  x   /  AST  38<H>  /  ALT  42  /  AlkPhos  91  11-16  PT/INR - ( 2021 02:35 )   PT: 12.9 sec;   INR: 1.11 ratio    PTT - ( 2021 05:01 )  PTT:80.9 sec  LIVER FUNCTIONS - ( 2021 02:35 )  Alb: 3.7 g/dL / Pro: 7.8 g/dL / ALK PHOS: 91 U/L / ALT: 42 U/L / AST: 38 U/L / GGT: x           Urinalysis Basic - ( 2021 02:36 )  Color: Yellow / Appearance: Clear / S.010 / pH: x  Gluc: x / Ketone: Negative  / Bili: Negative / Urobili: Negative   Blood: x / Protein: 100 / Nitrite: Negative   Leuk Esterase: Negative / RBC: 3-5 /HPF / WBC 0-2   Sq Epi: x / Non Sq Epi: Occasional / Bacteria: x      CBC TREND (5 Days)  WBC Count: 11.19 K/uL ( @ 08:11)  WBC Count: 10.80 K/uL ( @ 13:32)  WBC Count: 9.01 K/uL ( @ 02:35)    Hemoglobin: 16.0 g/dL ( @ 08:11)  Hemoglobin: 16.0 g/dL ( @ 13:32)  Hemoglobin: 16.4 g/dL ( @ 02:35)    Hematocrit: 47.0 % ( @ 08:11)  Hematocrit: 45.0 % ( @ 13:32)  Hematocrit: 47.7 % ( @ 02:35)    Platelet Count - Automated: 153 K/uL ( @ 08:11)  Platelet Count - Automated: 186 K/uL ( @ 13:32)  Platelet Count - Automated: 234 K/uL ( @ 02:35)       [MICROBIOLOGY /  VIROLOGY:]  SARS-CoV-2: NotDetec (2021 02:36)  COVID-19 PCR: NotDetec (15 Oct 2021 14:18)  COVID-19 PCR: NotDetec (12 Sep 2021 07:11)  COVID-19 PCR: NotDetec (10 Sep 2021 01:05)      [RADIOLOGY & ADDITIONAL STUDIES:]     < from: CT Abdomen and Pelvis w/ Oral Cont and w/ IV Cont (. @ 04:44) >  EXAM:  CT ABDOMEN AND PELVIS OC IC                        PROCEDURE DATE:  2021    INTERPRETATION:  CLINICAL INFORMATION: Acute abdominal pain, recent cholecystectomy and splenic infarction  COMPARISON: Ultrasound from 2021 and CT from November 15, 2020  PROCEDURE:  CT of the Abdomenand Pelvis was performed.  Sagittal and coronal reformats were performed.  FINDINGS:  ·	LOWER CHEST: Clear  ·	LIVER: Within normal limits.  ·	BILE DUCTS: Normal caliber.  ·	GALLBLADDER: Cholecystectomy.  ·	SPLEEN: Lobulation and scarring of the spleen withmultiple hypoenhancing areas in the inferior spleen.  ·	PANCREAS: Within normal limits.  ·	ADRENALS: Within normal limits.  ·	KIDNEYS/URETERS: Within normal limits.  ·	BLADDER: Within normal limits.  ·	REPRODUCTIVE ORGANS: Prostatectomy.  ·	BOWEL: No bowel obstruction. Normal appendix.  ·	PERITONEUM: No free air or free fluid  ·	VESSELS: Atherosclerotic change of the abdominal aorta without aneurysm. The main splenic artery is patent. The splenic vein is patent.  ·	RETROPERITONEUM/LYMPH NODES: No hemorrhage oradenopathy  ·	ABDOMINAL WALL: Tiny fat-containing umbilical/periumbilical hernia  ·	BONES: Partial synostosis of the sacroiliac joints.  IMPRESSION:  1. Acute splenic infarcts, superimposed upon sequela of prior/chronic infarcts.  --- End of Report ---  PASTOR FERGUSON MD; Attending Radiologist  This document has been electronically signed. 2021  5:21AM  < end of copied text >

## 2021-11-17 NOTE — PROGRESS NOTE ADULT - ASSESSMENT
67 yo male here for splenic infarct, currently on heparin drip.  
Chief Complaint: abdominal pain.    · Chief Complaint: The patient is a 66y Male complaining of abdominal pain.  · HPI Objective Statement: 66 year old male PMH HTN, A- FIb, Prostate Cancer, Splenic Infarct, H/O chronic cholecystitis,  S/P cholecystostomy tube and then Laparoscopic Cholecystectomy with Dr Kevin Meza on 10/18/021. Pt is experiencing LUQ pain, sudden onset this evening. He stopped taking his Xarelto for the past four days because he had a dental implant yesterday morning. no CP, no SOB, no fever, no chills, no urinary symptoms.    splenic infarc acute on ct after he stopped xarelto for dental implant , on IV heparin , and follow up labs , consider restarting xarelto  on 11.17.21  vital signs stable , feels better , plan start xarelto  may need hematology work upa s out patient ,   surgery and cardio noted , dc home on xarelto
The patient is a 66 year old male with a history of HTN, gout, atrial fibrillation, splenic infarct, cholecystectomy who presents with abdominal pain in the setting of splenic infarct.    Plan:  - Thromboembolism would be an unusual etiology for recurrent splenic infarct. Given the pathway for a clot to travel and tortuosity of the vessel, and for this to happen twice with no other distal locations, would be very rare and unlikely. More likely there is a more localized process, such as in-situ thrombosis or dissection in the splenic artery.  - Continue metoprolol succinate 100 mg daily  - Continue amlodipine 10 mg daily  - Continue hydralazine 25 mg bid  - Continue losartan 100 mg daily  - For any future procedures, would need to be bridged with enoxaparin for heparin drip  - Surgery eval noted  - Hematology eval  - If ok with hematology, transition back to rivaroxaban 20 mg daily and discontinue heparin drip  - Ok for discharge from a cardiac standpoint once on oral anticoagulation

## 2021-11-17 NOTE — PHARMACOTHERAPY INTERVENTION NOTE - COMMENTS
Patient is a 66 year old male ordered for a heparin drip and Xarelto 20mg for afib. Discussed plan with Dr. Villalba and suggested discontinuing inactive heparin orders and retiming the Xarelto for a STAT dose followed by daily administration at 12:00. Also suggested addition of special instructions for administration with food, MD agreed, order was updated to reflect changes.

## 2021-11-17 NOTE — PROGRESS NOTE ADULT - PROBLEM SELECTOR PLAN 1
Cardio recs appreciated.   Continue on heparin Drip until cleared to restart PO AC  No surgical intervention warranted at this time.  Pt seen and examined with Dr. Meza

## 2021-12-03 ENCOUNTER — OUTPATIENT (OUTPATIENT)
Dept: OUTPATIENT SERVICES | Facility: HOSPITAL | Age: 66
LOS: 1 days | End: 2021-12-03
Payer: COMMERCIAL

## 2021-12-03 ENCOUNTER — APPOINTMENT (OUTPATIENT)
Dept: ULTRASOUND IMAGING | Facility: HOSPITAL | Age: 66
End: 2021-12-03

## 2021-12-03 DIAGNOSIS — Z41.9 ENCOUNTER FOR PROCEDURE FOR PURPOSES OTHER THAN REMEDYING HEALTH STATE, UNSPECIFIED: Chronic | ICD-10-CM

## 2021-12-03 DIAGNOSIS — Z98.890 OTHER SPECIFIED POSTPROCEDURAL STATES: Chronic | ICD-10-CM

## 2021-12-03 DIAGNOSIS — Z00.00 ENCOUNTER FOR GENERAL ADULT MEDICAL EXAMINATION WITHOUT ABNORMAL FINDINGS: ICD-10-CM

## 2021-12-03 DIAGNOSIS — Z90.79 ACQUIRED ABSENCE OF OTHER GENITAL ORGAN(S): Chronic | ICD-10-CM

## 2021-12-03 PROCEDURE — 76978 US TRGT DYN MBUBB 1ST LES: CPT | Mod: 26

## 2021-12-03 PROCEDURE — 93975 VASCULAR STUDY: CPT

## 2021-12-03 PROCEDURE — 76978 US TRGT DYN MBUBB 1ST LES: CPT

## 2021-12-03 PROCEDURE — 93975 VASCULAR STUDY: CPT | Mod: 26

## 2021-12-07 DIAGNOSIS — D73.5 INFARCTION OF SPLEEN: ICD-10-CM

## 2022-01-01 ENCOUNTER — RESULT REVIEW (OUTPATIENT)
Age: 67
End: 2022-01-01

## 2022-01-01 ENCOUNTER — APPOINTMENT (OUTPATIENT)
Dept: HEMATOLOGY ONCOLOGY | Facility: CLINIC | Age: 67
End: 2022-01-01

## 2022-01-01 ENCOUNTER — NON-APPOINTMENT (OUTPATIENT)
Age: 67
End: 2022-01-01

## 2022-01-01 ENCOUNTER — APPOINTMENT (OUTPATIENT)
Dept: NEUROLOGY | Facility: CLINIC | Age: 67
End: 2022-01-01

## 2022-01-01 ENCOUNTER — APPOINTMENT (OUTPATIENT)
Dept: ULTRASOUND IMAGING | Facility: IMAGING CENTER | Age: 67
End: 2022-01-01

## 2022-01-01 ENCOUNTER — OUTPATIENT (OUTPATIENT)
Dept: OUTPATIENT SERVICES | Facility: HOSPITAL | Age: 67
LOS: 1 days | Discharge: HOME | End: 2022-01-01

## 2022-01-01 ENCOUNTER — TRANSCRIPTION ENCOUNTER (OUTPATIENT)
Age: 67
End: 2022-01-01

## 2022-01-01 ENCOUNTER — OUTPATIENT (OUTPATIENT)
Dept: OUTPATIENT SERVICES | Facility: HOSPITAL | Age: 67
LOS: 1 days | End: 2022-01-01
Payer: MEDICARE

## 2022-01-01 VITALS
OXYGEN SATURATION: 98 % | SYSTOLIC BLOOD PRESSURE: 128 MMHG | HEART RATE: 82 BPM | RESPIRATION RATE: 16 BRPM | DIASTOLIC BLOOD PRESSURE: 76 MMHG

## 2022-01-01 VITALS
BODY MASS INDEX: 31.78 KG/M2 | OXYGEN SATURATION: 98 % | TEMPERATURE: 97.88 F | HEART RATE: 75 BPM | RESPIRATION RATE: 17 BRPM | SYSTOLIC BLOOD PRESSURE: 127 MMHG | DIASTOLIC BLOOD PRESSURE: 88 MMHG | WEIGHT: 202.94 LBS

## 2022-01-01 VITALS
HEART RATE: 72 BPM | RESPIRATION RATE: 17 BRPM | BODY MASS INDEX: 32.18 KG/M2 | DIASTOLIC BLOOD PRESSURE: 90 MMHG | WEIGHT: 205.03 LBS | TEMPERATURE: 97.4 F | HEIGHT: 67 IN | OXYGEN SATURATION: 96 % | SYSTOLIC BLOOD PRESSURE: 133 MMHG

## 2022-01-01 VITALS
HEART RATE: 81 BPM | DIASTOLIC BLOOD PRESSURE: 84 MMHG | WEIGHT: 209.1 LBS | SYSTOLIC BLOOD PRESSURE: 142 MMHG | TEMPERATURE: 97.7 F | RESPIRATION RATE: 18 BRPM | OXYGEN SATURATION: 97 % | BODY MASS INDEX: 32.82 KG/M2 | HEIGHT: 67 IN

## 2022-01-01 VITALS
DIASTOLIC BLOOD PRESSURE: 78 MMHG | RESPIRATION RATE: 16 BRPM | SYSTOLIC BLOOD PRESSURE: 124 MMHG | HEART RATE: 84 BPM | OXYGEN SATURATION: 98 %

## 2022-01-01 VITALS
RESPIRATION RATE: 17 BRPM | HEIGHT: 67 IN | DIASTOLIC BLOOD PRESSURE: 88 MMHG | TEMPERATURE: 98.78 F | WEIGHT: 214.88 LBS | OXYGEN SATURATION: 97 % | SYSTOLIC BLOOD PRESSURE: 126 MMHG | BODY MASS INDEX: 33.73 KG/M2 | HEART RATE: 81 BPM

## 2022-01-01 VITALS
HEIGHT: 67 IN | HEART RATE: 77 BPM | DIASTOLIC BLOOD PRESSURE: 90 MMHG | TEMPERATURE: 97.16 F | RESPIRATION RATE: 18 BRPM | OXYGEN SATURATION: 97 % | WEIGHT: 207 LBS | BODY MASS INDEX: 32.49 KG/M2 | SYSTOLIC BLOOD PRESSURE: 130 MMHG

## 2022-01-01 VITALS
BODY MASS INDEX: 33.06 KG/M2 | WEIGHT: 210.65 LBS | OXYGEN SATURATION: 99 % | HEART RATE: 69 BPM | SYSTOLIC BLOOD PRESSURE: 115 MMHG | TEMPERATURE: 99.5 F | HEIGHT: 67 IN | DIASTOLIC BLOOD PRESSURE: 80 MMHG | RESPIRATION RATE: 17 BRPM

## 2022-01-01 VITALS
BODY MASS INDEX: 33.59 KG/M2 | DIASTOLIC BLOOD PRESSURE: 70 MMHG | SYSTOLIC BLOOD PRESSURE: 124 MMHG | RESPIRATION RATE: 16 BRPM | HEIGHT: 67 IN | OXYGEN SATURATION: 98 % | WEIGHT: 214 LBS | HEART RATE: 82 BPM

## 2022-01-01 VITALS
DIASTOLIC BLOOD PRESSURE: 70 MMHG | OXYGEN SATURATION: 98 % | RESPIRATION RATE: 16 BRPM | SYSTOLIC BLOOD PRESSURE: 118 MMHG | HEART RATE: 74 BPM

## 2022-01-01 VITALS
DIASTOLIC BLOOD PRESSURE: 91 MMHG | TEMPERATURE: 97.7 F | HEIGHT: 67 IN | WEIGHT: 201 LBS | OXYGEN SATURATION: 96 % | BODY MASS INDEX: 31.55 KG/M2 | HEART RATE: 80 BPM | SYSTOLIC BLOOD PRESSURE: 152 MMHG | RESPIRATION RATE: 18 BRPM

## 2022-01-01 DIAGNOSIS — Z98.890 OTHER SPECIFIED POSTPROCEDURAL STATES: Chronic | ICD-10-CM

## 2022-01-01 DIAGNOSIS — B37.0 CANDIDAL STOMATITIS: ICD-10-CM

## 2022-01-01 DIAGNOSIS — C71.9 MALIGNANT NEOPLASM OF BRAIN, UNSPECIFIED: ICD-10-CM

## 2022-01-01 DIAGNOSIS — D64.9 ANEMIA, UNSPECIFIED: ICD-10-CM

## 2022-01-01 DIAGNOSIS — Z41.9 ENCOUNTER FOR PROCEDURE FOR PURPOSES OTHER THAN REMEDYING HEALTH STATE, UNSPECIFIED: Chronic | ICD-10-CM

## 2022-01-01 DIAGNOSIS — Z90.79 ACQUIRED ABSENCE OF OTHER GENITAL ORGAN(S): Chronic | ICD-10-CM

## 2022-01-01 LAB
BASOPHILS # BLD AUTO: 0.01 K/UL — SIGNIFICANT CHANGE UP (ref 0–0.2)
BASOPHILS # BLD AUTO: 0.01 K/UL — SIGNIFICANT CHANGE UP (ref 0–0.2)
BASOPHILS # BLD AUTO: 0.02 K/UL — SIGNIFICANT CHANGE UP (ref 0–0.2)
BASOPHILS # BLD AUTO: 0.04 K/UL — SIGNIFICANT CHANGE UP (ref 0–0.2)
BASOPHILS NFR BLD AUTO: 0.1 % — SIGNIFICANT CHANGE UP (ref 0–2)
BASOPHILS NFR BLD AUTO: 0.1 % — SIGNIFICANT CHANGE UP (ref 0–2)
BASOPHILS NFR BLD AUTO: 0.2 % — SIGNIFICANT CHANGE UP (ref 0–2)
BASOPHILS NFR BLD AUTO: 0.2 % — SIGNIFICANT CHANGE UP (ref 0–2)
BASOPHILS NFR BLD AUTO: 0.3 % — SIGNIFICANT CHANGE UP (ref 0–2)
BASOPHILS NFR BLD AUTO: 0.5 % — SIGNIFICANT CHANGE UP (ref 0–2)
EOSINOPHIL # BLD AUTO: 0 K/UL — SIGNIFICANT CHANGE UP (ref 0–0.5)
EOSINOPHIL # BLD AUTO: 0.01 K/UL — SIGNIFICANT CHANGE UP (ref 0–0.5)
EOSINOPHIL # BLD AUTO: 0.01 K/UL — SIGNIFICANT CHANGE UP (ref 0–0.5)
EOSINOPHIL # BLD AUTO: 0.02 K/UL — SIGNIFICANT CHANGE UP (ref 0–0.5)
EOSINOPHIL # BLD AUTO: 0.03 K/UL — SIGNIFICANT CHANGE UP (ref 0–0.5)
EOSINOPHIL # BLD AUTO: 0.04 K/UL — SIGNIFICANT CHANGE UP (ref 0–0.5)
EOSINOPHIL NFR BLD AUTO: 0 % — SIGNIFICANT CHANGE UP (ref 0–6)
EOSINOPHIL NFR BLD AUTO: 0.1 % — SIGNIFICANT CHANGE UP (ref 0–6)
EOSINOPHIL NFR BLD AUTO: 0.1 % — SIGNIFICANT CHANGE UP (ref 0–6)
EOSINOPHIL NFR BLD AUTO: 0.2 % — SIGNIFICANT CHANGE UP (ref 0–6)
EOSINOPHIL NFR BLD AUTO: 0.4 % — SIGNIFICANT CHANGE UP (ref 0–6)
EOSINOPHIL NFR BLD AUTO: 0.8 % — SIGNIFICANT CHANGE UP (ref 0–6)
HCT VFR BLD CALC: 43.9 % — SIGNIFICANT CHANGE UP (ref 39–50)
HCT VFR BLD CALC: 45.9 % — SIGNIFICANT CHANGE UP (ref 39–50)
HCT VFR BLD CALC: 46.9 % — SIGNIFICANT CHANGE UP (ref 39–50)
HCT VFR BLD CALC: 47 % — SIGNIFICANT CHANGE UP (ref 39–50)
HCT VFR BLD CALC: 47.1 % — SIGNIFICANT CHANGE UP (ref 39–50)
HCT VFR BLD CALC: 47.2 % — SIGNIFICANT CHANGE UP (ref 39–50)
HGB BLD-MCNC: 15.3 G/DL — SIGNIFICANT CHANGE UP (ref 13–17)
HGB BLD-MCNC: 15.5 G/DL — SIGNIFICANT CHANGE UP (ref 13–17)
HGB BLD-MCNC: 15.9 G/DL — SIGNIFICANT CHANGE UP (ref 13–17)
HGB BLD-MCNC: 16 G/DL — SIGNIFICANT CHANGE UP (ref 13–17)
HGB BLD-MCNC: 16.1 G/DL — SIGNIFICANT CHANGE UP (ref 13–17)
HGB BLD-MCNC: 16.1 G/DL — SIGNIFICANT CHANGE UP (ref 13–17)
IMM GRANULOCYTES NFR BLD AUTO: 1.3 % — HIGH (ref 0–0.9)
IMM GRANULOCYTES NFR BLD AUTO: 2.1 % — HIGH (ref 0–0.9)
IMM GRANULOCYTES NFR BLD AUTO: 2.4 % — HIGH (ref 0–0.9)
IMM GRANULOCYTES NFR BLD AUTO: 2.6 % — HIGH (ref 0–0.9)
IMM GRANULOCYTES NFR BLD AUTO: 2.8 % — HIGH (ref 0–0.9)
IMM GRANULOCYTES NFR BLD AUTO: 3.8 % — HIGH (ref 0–0.9)
LYMPHOCYTES # BLD AUTO: 0.55 K/UL — LOW (ref 1–3.3)
LYMPHOCYTES # BLD AUTO: 0.58 K/UL — LOW (ref 1–3.3)
LYMPHOCYTES # BLD AUTO: 0.58 K/UL — LOW (ref 1–3.3)
LYMPHOCYTES # BLD AUTO: 0.59 K/UL — LOW (ref 1–3.3)
LYMPHOCYTES # BLD AUTO: 0.67 K/UL — LOW (ref 1–3.3)
LYMPHOCYTES # BLD AUTO: 0.69 K/UL — LOW (ref 1–3.3)
LYMPHOCYTES # BLD AUTO: 11.1 % — LOW (ref 13–44)
LYMPHOCYTES # BLD AUTO: 4.3 % — LOW (ref 13–44)
LYMPHOCYTES # BLD AUTO: 7.6 % — LOW (ref 13–44)
LYMPHOCYTES # BLD AUTO: 7.9 % — LOW (ref 13–44)
LYMPHOCYTES # BLD AUTO: 8.1 % — LOW (ref 13–44)
LYMPHOCYTES # BLD AUTO: 8.1 % — LOW (ref 13–44)
MCHC RBC-ENTMCNC: 29.7 PG — SIGNIFICANT CHANGE UP (ref 27–34)
MCHC RBC-ENTMCNC: 30 PG — SIGNIFICANT CHANGE UP (ref 27–34)
MCHC RBC-ENTMCNC: 30.5 PG — SIGNIFICANT CHANGE UP (ref 27–34)
MCHC RBC-ENTMCNC: 30.6 PG — SIGNIFICANT CHANGE UP (ref 27–34)
MCHC RBC-ENTMCNC: 30.7 PG — SIGNIFICANT CHANGE UP (ref 27–34)
MCHC RBC-ENTMCNC: 31 PG — SIGNIFICANT CHANGE UP (ref 27–34)
MCHC RBC-ENTMCNC: 33.7 G/DL — SIGNIFICANT CHANGE UP (ref 32–36)
MCHC RBC-ENTMCNC: 33.8 G/DL — SIGNIFICANT CHANGE UP (ref 32–36)
MCHC RBC-ENTMCNC: 34.1 G/DL — SIGNIFICANT CHANGE UP (ref 32–36)
MCHC RBC-ENTMCNC: 34.2 G/DL — SIGNIFICANT CHANGE UP (ref 32–36)
MCHC RBC-ENTMCNC: 34.3 G/DL — SIGNIFICANT CHANGE UP (ref 32–36)
MCHC RBC-ENTMCNC: 34.9 G/DL — SIGNIFICANT CHANGE UP (ref 32–36)
MCV RBC AUTO: 86.7 FL — SIGNIFICANT CHANGE UP (ref 80–100)
MCV RBC AUTO: 88.9 FL — SIGNIFICANT CHANGE UP (ref 80–100)
MCV RBC AUTO: 89.1 FL — SIGNIFICANT CHANGE UP (ref 80–100)
MCV RBC AUTO: 89.5 FL — SIGNIFICANT CHANGE UP (ref 80–100)
MCV RBC AUTO: 89.8 FL — SIGNIFICANT CHANGE UP (ref 80–100)
MCV RBC AUTO: 90.4 FL — SIGNIFICANT CHANGE UP (ref 80–100)
MONOCYTES # BLD AUTO: 0.37 K/UL — SIGNIFICANT CHANGE UP (ref 0–0.9)
MONOCYTES # BLD AUTO: 0.54 K/UL — SIGNIFICANT CHANGE UP (ref 0–0.9)
MONOCYTES # BLD AUTO: 0.57 K/UL — SIGNIFICANT CHANGE UP (ref 0–0.9)
MONOCYTES # BLD AUTO: 0.61 K/UL — SIGNIFICANT CHANGE UP (ref 0–0.9)
MONOCYTES # BLD AUTO: 0.69 K/UL — SIGNIFICANT CHANGE UP (ref 0–0.9)
MONOCYTES # BLD AUTO: 0.82 K/UL — SIGNIFICANT CHANGE UP (ref 0–0.9)
MONOCYTES NFR BLD AUTO: 10.2 % — SIGNIFICANT CHANGE UP (ref 2–14)
MONOCYTES NFR BLD AUTO: 10.7 % — SIGNIFICANT CHANGE UP (ref 2–14)
MONOCYTES NFR BLD AUTO: 4.8 % — SIGNIFICANT CHANGE UP (ref 2–14)
MONOCYTES NFR BLD AUTO: 6.1 % — SIGNIFICANT CHANGE UP (ref 2–14)
MONOCYTES NFR BLD AUTO: 6.5 % — SIGNIFICANT CHANGE UP (ref 2–14)
MONOCYTES NFR BLD AUTO: 6.9 % — SIGNIFICANT CHANGE UP (ref 2–14)
NEUTROPHILS # BLD AUTO: 11.85 K/UL — HIGH (ref 1.8–7.4)
NEUTROPHILS # BLD AUTO: 3.95 K/UL — SIGNIFICANT CHANGE UP (ref 1.8–7.4)
NEUTROPHILS # BLD AUTO: 5.24 K/UL — SIGNIFICANT CHANGE UP (ref 1.8–7.4)
NEUTROPHILS # BLD AUTO: 6.52 K/UL — SIGNIFICANT CHANGE UP (ref 1.8–7.4)
NEUTROPHILS # BLD AUTO: 6.85 K/UL — SIGNIFICANT CHANGE UP (ref 1.8–7.4)
NEUTROPHILS # BLD AUTO: 7.21 K/UL — SIGNIFICANT CHANGE UP (ref 1.8–7.4)
NEUTROPHILS NFR BLD AUTO: 74.4 % — SIGNIFICANT CHANGE UP (ref 43–77)
NEUTROPHILS NFR BLD AUTO: 77.5 % — HIGH (ref 43–77)
NEUTROPHILS NFR BLD AUTO: 82.1 % — HIGH (ref 43–77)
NEUTROPHILS NFR BLD AUTO: 82.5 % — HIGH (ref 43–77)
NEUTROPHILS NFR BLD AUTO: 85 % — HIGH (ref 43–77)
NEUTROPHILS NFR BLD AUTO: 88.2 % — HIGH (ref 43–77)
NRBC # BLD: 0 /100 WBCS — SIGNIFICANT CHANGE UP (ref 0–0)
PLATELET # BLD AUTO: 118 K/UL — LOW (ref 150–400)
PLATELET # BLD AUTO: 147 K/UL — LOW (ref 150–400)
PLATELET # BLD AUTO: 151 K/UL — SIGNIFICANT CHANGE UP (ref 150–400)
PLATELET # BLD AUTO: 158 K/UL — SIGNIFICANT CHANGE UP (ref 150–400)
PLATELET # BLD AUTO: 208 K/UL — SIGNIFICANT CHANGE UP (ref 150–400)
PLATELET # BLD AUTO: 221 K/UL — SIGNIFICANT CHANGE UP (ref 150–400)
RBC # BLD: 4.94 M/UL — SIGNIFICANT CHANGE UP (ref 4.2–5.8)
RBC # BLD: 5.08 M/UL — SIGNIFICANT CHANGE UP (ref 4.2–5.8)
RBC # BLD: 5.22 M/UL — SIGNIFICANT CHANGE UP (ref 4.2–5.8)
RBC # BLD: 5.26 M/UL — SIGNIFICANT CHANGE UP (ref 4.2–5.8)
RBC # BLD: 5.3 M/UL — SIGNIFICANT CHANGE UP (ref 4.2–5.8)
RBC # BLD: 5.42 M/UL — SIGNIFICANT CHANGE UP (ref 4.2–5.8)
RBC # FLD: 15.2 % — HIGH (ref 10.3–14.5)
RBC # FLD: 16.1 % — HIGH (ref 10.3–14.5)
RBC # FLD: 16.3 % — HIGH (ref 10.3–14.5)
RBC # FLD: 16.5 % — HIGH (ref 10.3–14.5)
RBC # FLD: 16.7 % — HIGH (ref 10.3–14.5)
RBC # FLD: 17.2 % — HIGH (ref 10.3–14.5)
WBC # BLD: 13.44 K/UL — HIGH (ref 3.8–10.5)
WBC # BLD: 5.31 K/UL — SIGNIFICANT CHANGE UP (ref 3.8–10.5)
WBC # BLD: 6.77 K/UL — SIGNIFICANT CHANGE UP (ref 3.8–10.5)
WBC # BLD: 7.67 K/UL — SIGNIFICANT CHANGE UP (ref 3.8–10.5)
WBC # BLD: 8.31 K/UL — SIGNIFICANT CHANGE UP (ref 3.8–10.5)
WBC # BLD: 8.78 K/UL — SIGNIFICANT CHANGE UP (ref 3.8–10.5)
WBC # FLD AUTO: 13.44 K/UL — HIGH (ref 3.8–10.5)
WBC # FLD AUTO: 5.31 K/UL — SIGNIFICANT CHANGE UP (ref 3.8–10.5)
WBC # FLD AUTO: 6.77 K/UL — SIGNIFICANT CHANGE UP (ref 3.8–10.5)
WBC # FLD AUTO: 7.67 K/UL — SIGNIFICANT CHANGE UP (ref 3.8–10.5)
WBC # FLD AUTO: 8.31 K/UL — SIGNIFICANT CHANGE UP (ref 3.8–10.5)
WBC # FLD AUTO: 8.78 K/UL — SIGNIFICANT CHANGE UP (ref 3.8–10.5)

## 2022-01-01 PROCEDURE — 99215 OFFICE O/P EST HI 40 MIN: CPT

## 2022-01-01 PROCEDURE — 77387B: CUSTOM | Mod: 26

## 2022-01-01 PROCEDURE — 77014: CPT | Mod: 26

## 2022-01-01 PROCEDURE — 77427 RADIATION TX MANAGEMENT X5: CPT

## 2022-01-01 PROCEDURE — 93970 EXTREMITY STUDY: CPT | Mod: 26

## 2022-01-01 PROCEDURE — 93970 EXTREMITY STUDY: CPT

## 2022-01-01 RX ORDER — COLCHICINE 0.6 MG/1
0.6 TABLET ORAL
Qty: 90 | Refills: 0 | Status: DISCONTINUED | COMMUNITY
Start: 2022-04-04

## 2022-01-01 RX ORDER — DEXAMETHASONE 4 MG/1
4 TABLET ORAL TWICE DAILY
Qty: 60 | Refills: 0 | Status: DISCONTINUED | COMMUNITY
Start: 2022-01-01 | End: 2022-01-01

## 2022-01-01 RX ORDER — AZITHROMYCIN 250 MG/1
250 TABLET, FILM COATED ORAL
Qty: 6 | Refills: 0 | Status: DISCONTINUED | COMMUNITY
Start: 2022-09-13

## 2022-01-01 RX ORDER — ENOXAPARIN SODIUM 40 MG/.4ML
40 INJECTION, SOLUTION SUBCUTANEOUS
Qty: 2 | Refills: 0 | Status: DISCONTINUED | COMMUNITY
Start: 2022-09-02

## 2022-01-01 RX ORDER — DEXAMETHASONE 4 MG/1
4 TABLET ORAL
Qty: 60 | Refills: 2 | Status: DISCONTINUED | COMMUNITY
Start: 2022-01-01 | End: 2022-01-01

## 2022-01-01 RX ORDER — BRIMONIDINE TARTRATE 2 MG/MG
0.2 SOLUTION/ DROPS OPHTHALMIC
Qty: 15 | Refills: 0 | Status: DISCONTINUED | COMMUNITY
Start: 2022-01-01

## 2022-01-01 RX ORDER — DEXAMETHASONE 2 MG/1
2 TABLET ORAL
Qty: 22 | Refills: 0 | Status: DISCONTINUED | COMMUNITY
Start: 2022-09-02

## 2022-01-01 RX ORDER — OXYCODONE 5 MG/1
5 TABLET ORAL
Qty: 8 | Refills: 0 | Status: DISCONTINUED | COMMUNITY
Start: 2022-09-02

## 2022-01-01 RX ORDER — DEXAMETHASONE 1 MG/1
1 TABLET ORAL DAILY
Qty: 30 | Refills: 0 | Status: DISCONTINUED | COMMUNITY
Start: 2022-01-01 | End: 2022-01-01

## 2022-01-01 RX ORDER — TIMOLOL MALEATE 5 MG/ML
0.5 SOLUTION OPHTHALMIC
Qty: 10 | Refills: 0 | Status: DISCONTINUED | COMMUNITY
Start: 2022-03-23

## 2022-01-18 NOTE — ED ADULT NURSE NOTE - ED STAT RN HANDOFF DETAILS
182.88 Assuming patient's care for coverage. Report received from RN and patient informed during rounding. Assessment available on KB. Will continue to monitor

## 2022-02-02 NOTE — PATIENT PROFILE ADULT - NSSCCAGEALCOHOLANNOYEDYOU_GEN_A_NUR
Render In Strict Bullet Format?: No
Detail Level: Zone
Continue Regimen: clobetasol scalp solution daily as needed
no

## 2022-08-26 ENCOUNTER — INPATIENT (INPATIENT)
Facility: HOSPITAL | Age: 67
LOS: 6 days | Discharge: HOME | DRG: 25 | End: 2022-09-02
Attending: STUDENT IN AN ORGANIZED HEALTH CARE EDUCATION/TRAINING PROGRAM | Admitting: HOSPITALIST
Payer: MEDICARE

## 2022-08-26 ENCOUNTER — EMERGENCY (EMERGENCY)
Facility: HOSPITAL | Age: 67
LOS: 1 days | Discharge: ANOTHER HOSPITAL | End: 2022-08-26
Attending: EMERGENCY MEDICINE | Admitting: EMERGENCY MEDICINE
Payer: MEDICARE

## 2022-08-26 VITALS
DIASTOLIC BLOOD PRESSURE: 105 MMHG | TEMPERATURE: 98 F | WEIGHT: 214.07 LBS | SYSTOLIC BLOOD PRESSURE: 157 MMHG | HEIGHT: 67 IN | HEART RATE: 121 BPM | OXYGEN SATURATION: 98 % | RESPIRATION RATE: 18 BRPM

## 2022-08-26 VITALS
OXYGEN SATURATION: 97 % | TEMPERATURE: 98 F | HEART RATE: 83 BPM | RESPIRATION RATE: 18 BRPM | WEIGHT: 213.85 LBS | DIASTOLIC BLOOD PRESSURE: 98 MMHG | SYSTOLIC BLOOD PRESSURE: 144 MMHG | HEIGHT: 67 IN

## 2022-08-26 VITALS — SYSTOLIC BLOOD PRESSURE: 153 MMHG | DIASTOLIC BLOOD PRESSURE: 96 MMHG

## 2022-08-26 DIAGNOSIS — Z98.890 OTHER SPECIFIED POSTPROCEDURAL STATES: Chronic | ICD-10-CM

## 2022-08-26 DIAGNOSIS — Z90.79 ACQUIRED ABSENCE OF OTHER GENITAL ORGAN(S): Chronic | ICD-10-CM

## 2022-08-26 DIAGNOSIS — Z41.9 ENCOUNTER FOR PROCEDURE FOR PURPOSES OTHER THAN REMEDYING HEALTH STATE, UNSPECIFIED: Chronic | ICD-10-CM

## 2022-08-26 LAB
ALBUMIN SERPL ELPH-MCNC: 4.2 G/DL — SIGNIFICANT CHANGE UP (ref 3.3–5)
ALBUMIN SERPL ELPH-MCNC: 4.4 G/DL — SIGNIFICANT CHANGE UP (ref 3.3–5)
ALP SERPL-CCNC: 78 U/L — SIGNIFICANT CHANGE UP (ref 40–120)
ALP SERPL-CCNC: 89 U/L — SIGNIFICANT CHANGE UP (ref 40–120)
ALT FLD-CCNC: 25 U/L — SIGNIFICANT CHANGE UP (ref 10–45)
ALT FLD-CCNC: 32 U/L — SIGNIFICANT CHANGE UP (ref 12–78)
ANION GAP SERPL CALC-SCNC: 12 MMOL/L — SIGNIFICANT CHANGE UP (ref 5–17)
ANION GAP SERPL CALC-SCNC: 4 MMOL/L — LOW (ref 5–17)
APTT BLD: 33.7 SEC — SIGNIFICANT CHANGE UP (ref 27.5–35.5)
APTT BLD: 38.6 SEC — HIGH (ref 27.5–35.5)
AST SERPL-CCNC: 25 U/L — SIGNIFICANT CHANGE UP (ref 15–37)
AST SERPL-CCNC: 27 U/L — SIGNIFICANT CHANGE UP (ref 10–40)
BASOPHILS # BLD AUTO: 0.02 K/UL — SIGNIFICANT CHANGE UP (ref 0–0.2)
BASOPHILS # BLD AUTO: 0.03 K/UL — SIGNIFICANT CHANGE UP (ref 0–0.2)
BASOPHILS NFR BLD AUTO: 0.3 % — SIGNIFICANT CHANGE UP (ref 0–2)
BASOPHILS NFR BLD AUTO: 0.4 % — SIGNIFICANT CHANGE UP (ref 0–2)
BILIRUB SERPL-MCNC: 0.9 MG/DL — SIGNIFICANT CHANGE UP (ref 0.2–1.2)
BILIRUB SERPL-MCNC: 0.9 MG/DL — SIGNIFICANT CHANGE UP (ref 0.2–1.2)
BLD GP AB SCN SERPL QL: NEGATIVE — SIGNIFICANT CHANGE UP
BUN SERPL-MCNC: 17 MG/DL — SIGNIFICANT CHANGE UP (ref 7–23)
BUN SERPL-MCNC: 22 MG/DL — SIGNIFICANT CHANGE UP (ref 7–23)
CALCIUM SERPL-MCNC: 9.2 MG/DL — SIGNIFICANT CHANGE UP (ref 8.5–10.1)
CALCIUM SERPL-MCNC: 9.6 MG/DL — SIGNIFICANT CHANGE UP (ref 8.4–10.5)
CHLORIDE SERPL-SCNC: 104 MMOL/L — SIGNIFICANT CHANGE UP (ref 96–108)
CHLORIDE SERPL-SCNC: 110 MMOL/L — HIGH (ref 96–108)
CHOLEST SERPL-MCNC: 199 MG/DL — SIGNIFICANT CHANGE UP
CO2 SERPL-SCNC: 26 MMOL/L — SIGNIFICANT CHANGE UP (ref 22–31)
CO2 SERPL-SCNC: 27 MMOL/L — SIGNIFICANT CHANGE UP (ref 22–31)
CREAT SERPL-MCNC: 1.15 MG/DL — SIGNIFICANT CHANGE UP (ref 0.5–1.3)
CREAT SERPL-MCNC: 1.3 MG/DL — SIGNIFICANT CHANGE UP (ref 0.5–1.3)
EGFR: 61 ML/MIN/1.73M2 — SIGNIFICANT CHANGE UP
EGFR: 70 ML/MIN/1.73M2 — SIGNIFICANT CHANGE UP
EOSINOPHIL # BLD AUTO: 0.08 K/UL — SIGNIFICANT CHANGE UP (ref 0–0.5)
EOSINOPHIL # BLD AUTO: 0.12 K/UL — SIGNIFICANT CHANGE UP (ref 0–0.5)
EOSINOPHIL NFR BLD AUTO: 1.1 % — SIGNIFICANT CHANGE UP (ref 0–6)
EOSINOPHIL NFR BLD AUTO: 1.6 % — SIGNIFICANT CHANGE UP (ref 0–6)
GLUCOSE SERPL-MCNC: 108 MG/DL — HIGH (ref 70–99)
GLUCOSE SERPL-MCNC: 99 MG/DL — SIGNIFICANT CHANGE UP (ref 70–99)
HCT VFR BLD CALC: 50.1 % — HIGH (ref 39–50)
HCT VFR BLD CALC: 50.8 % — HIGH (ref 39–50)
HDLC SERPL-MCNC: 46 MG/DL — SIGNIFICANT CHANGE UP
HGB BLD-MCNC: 16.8 G/DL — SIGNIFICANT CHANGE UP (ref 13–17)
HGB BLD-MCNC: 17.1 G/DL — HIGH (ref 13–17)
IMM GRANULOCYTES NFR BLD AUTO: 0.3 % — SIGNIFICANT CHANGE UP (ref 0–1.5)
IMM GRANULOCYTES NFR BLD AUTO: 0.3 % — SIGNIFICANT CHANGE UP (ref 0–1.5)
INR BLD: 1.65 RATIO — HIGH (ref 0.88–1.16)
INR BLD: 1.66 RATIO — HIGH (ref 0.88–1.16)
LIPID PNL WITH DIRECT LDL SERPL: 126 MG/DL — HIGH
LYMPHOCYTES # BLD AUTO: 1.93 K/UL — SIGNIFICANT CHANGE UP (ref 1–3.3)
LYMPHOCYTES # BLD AUTO: 2.37 K/UL — SIGNIFICANT CHANGE UP (ref 1–3.3)
LYMPHOCYTES # BLD AUTO: 26.2 % — SIGNIFICANT CHANGE UP (ref 13–44)
LYMPHOCYTES # BLD AUTO: 31.1 % — SIGNIFICANT CHANGE UP (ref 13–44)
MCHC RBC-ENTMCNC: 28.9 PG — SIGNIFICANT CHANGE UP (ref 27–34)
MCHC RBC-ENTMCNC: 29 PG — SIGNIFICANT CHANGE UP (ref 27–34)
MCHC RBC-ENTMCNC: 33.5 GM/DL — SIGNIFICANT CHANGE UP (ref 32–36)
MCHC RBC-ENTMCNC: 33.7 GM/DL — SIGNIFICANT CHANGE UP (ref 32–36)
MCV RBC AUTO: 86.1 FL — SIGNIFICANT CHANGE UP (ref 80–100)
MCV RBC AUTO: 86.1 FL — SIGNIFICANT CHANGE UP (ref 80–100)
MONOCYTES # BLD AUTO: 0.81 K/UL — SIGNIFICANT CHANGE UP (ref 0–0.9)
MONOCYTES # BLD AUTO: 0.82 K/UL — SIGNIFICANT CHANGE UP (ref 0–0.9)
MONOCYTES NFR BLD AUTO: 10.6 % — SIGNIFICANT CHANGE UP (ref 2–14)
MONOCYTES NFR BLD AUTO: 11.1 % — SIGNIFICANT CHANGE UP (ref 2–14)
NEUTROPHILS # BLD AUTO: 4.27 K/UL — SIGNIFICANT CHANGE UP (ref 1.8–7.4)
NEUTROPHILS # BLD AUTO: 4.51 K/UL — SIGNIFICANT CHANGE UP (ref 1.8–7.4)
NEUTROPHILS NFR BLD AUTO: 56 % — SIGNIFICANT CHANGE UP (ref 43–77)
NEUTROPHILS NFR BLD AUTO: 61 % — SIGNIFICANT CHANGE UP (ref 43–77)
NON HDL CHOLESTEROL: 154 MG/DL — HIGH
NRBC # BLD: 0 /100 WBCS — SIGNIFICANT CHANGE UP (ref 0–0)
NRBC # BLD: 0 /100 WBCS — SIGNIFICANT CHANGE UP (ref 0–0)
PLATELET # BLD AUTO: 255 K/UL — SIGNIFICANT CHANGE UP (ref 150–400)
PLATELET # BLD AUTO: 260 K/UL — SIGNIFICANT CHANGE UP (ref 150–400)
POTASSIUM SERPL-MCNC: 3.4 MMOL/L — LOW (ref 3.5–5.3)
POTASSIUM SERPL-MCNC: 4 MMOL/L — SIGNIFICANT CHANGE UP (ref 3.5–5.3)
POTASSIUM SERPL-SCNC: 3.4 MMOL/L — LOW (ref 3.5–5.3)
POTASSIUM SERPL-SCNC: 4 MMOL/L — SIGNIFICANT CHANGE UP (ref 3.5–5.3)
PROT SERPL-MCNC: 7.2 G/DL — SIGNIFICANT CHANGE UP (ref 6–8.3)
PROT SERPL-MCNC: 8 G/DL — SIGNIFICANT CHANGE UP (ref 6–8.3)
PROTHROM AB SERPL-ACNC: 19.1 SEC — HIGH (ref 10.5–13.4)
PROTHROM AB SERPL-ACNC: 19.5 SEC — HIGH (ref 10.5–13.4)
RBC # BLD: 5.82 M/UL — HIGH (ref 4.2–5.8)
RBC # BLD: 5.9 M/UL — HIGH (ref 4.2–5.8)
RBC # FLD: 13.1 % — SIGNIFICANT CHANGE UP (ref 10.3–14.5)
RBC # FLD: 13.1 % — SIGNIFICANT CHANGE UP (ref 10.3–14.5)
RH IG SCN BLD-IMP: POSITIVE — SIGNIFICANT CHANGE UP
SARS-COV-2 RNA SPEC QL NAA+PROBE: SIGNIFICANT CHANGE UP
SODIUM SERPL-SCNC: 141 MMOL/L — SIGNIFICANT CHANGE UP (ref 135–145)
SODIUM SERPL-SCNC: 142 MMOL/L — SIGNIFICANT CHANGE UP (ref 135–145)
TRIGL SERPL-MCNC: 137 MG/DL — SIGNIFICANT CHANGE UP
TROPONIN I, HIGH SENSITIVITY RESULT: 6.8 NG/L — SIGNIFICANT CHANGE UP
WBC # BLD: 7.38 K/UL — SIGNIFICANT CHANGE UP (ref 3.8–10.5)
WBC # BLD: 7.62 K/UL — SIGNIFICANT CHANGE UP (ref 3.8–10.5)
WBC # FLD AUTO: 7.38 K/UL — SIGNIFICANT CHANGE UP (ref 3.8–10.5)
WBC # FLD AUTO: 7.62 K/UL — SIGNIFICANT CHANGE UP (ref 3.8–10.5)

## 2022-08-26 PROCEDURE — 85025 COMPLETE CBC W/AUTO DIFF WBC: CPT

## 2022-08-26 PROCEDURE — 80061 LIPID PANEL: CPT

## 2022-08-26 PROCEDURE — 85610 PROTHROMBIN TIME: CPT

## 2022-08-26 PROCEDURE — 71045 X-RAY EXAM CHEST 1 VIEW: CPT | Mod: 26

## 2022-08-26 PROCEDURE — 0042T: CPT | Mod: MA

## 2022-08-26 PROCEDURE — 86850 RBC ANTIBODY SCREEN: CPT

## 2022-08-26 PROCEDURE — 70498 CT ANGIOGRAPHY NECK: CPT | Mod: MA

## 2022-08-26 PROCEDURE — 82962 GLUCOSE BLOOD TEST: CPT

## 2022-08-26 PROCEDURE — 80053 COMPREHEN METABOLIC PANEL: CPT

## 2022-08-26 PROCEDURE — 93010 ELECTROCARDIOGRAM REPORT: CPT

## 2022-08-26 PROCEDURE — 84484 ASSAY OF TROPONIN QUANT: CPT

## 2022-08-26 PROCEDURE — 70553 MRI BRAIN STEM W/O & W/DYE: CPT | Mod: MA

## 2022-08-26 PROCEDURE — 86901 BLOOD TYPING SEROLOGIC RH(D): CPT

## 2022-08-26 PROCEDURE — 87635 SARS-COV-2 COVID-19 AMP PRB: CPT

## 2022-08-26 PROCEDURE — A9579: CPT

## 2022-08-26 PROCEDURE — 93005 ELECTROCARDIOGRAM TRACING: CPT

## 2022-08-26 PROCEDURE — 99285 EMERGENCY DEPT VISIT HI MDM: CPT | Mod: 25

## 2022-08-26 PROCEDURE — 71045 X-RAY EXAM CHEST 1 VIEW: CPT

## 2022-08-26 PROCEDURE — 70553 MRI BRAIN STEM W/O & W/DYE: CPT | Mod: 26,MA

## 2022-08-26 PROCEDURE — 99291 CRITICAL CARE FIRST HOUR: CPT

## 2022-08-26 PROCEDURE — 70496 CT ANGIOGRAPHY HEAD: CPT | Mod: MA

## 2022-08-26 PROCEDURE — 96365 THER/PROPH/DIAG IV INF INIT: CPT | Mod: XU

## 2022-08-26 PROCEDURE — 99285 EMERGENCY DEPT VISIT HI MDM: CPT | Mod: GC

## 2022-08-26 PROCEDURE — 86900 BLOOD TYPING SEROLOGIC ABO: CPT

## 2022-08-26 PROCEDURE — 36415 COLL VENOUS BLD VENIPUNCTURE: CPT

## 2022-08-26 PROCEDURE — 70450 CT HEAD/BRAIN W/O DYE: CPT | Mod: MA

## 2022-08-26 PROCEDURE — 85730 THROMBOPLASTIN TIME PARTIAL: CPT

## 2022-08-26 PROCEDURE — 70498 CT ANGIOGRAPHY NECK: CPT | Mod: 26,MA

## 2022-08-26 PROCEDURE — 70496 CT ANGIOGRAPHY HEAD: CPT | Mod: 26,MA

## 2022-08-26 RX ORDER — PANTOPRAZOLE SODIUM 20 MG/1
1 TABLET, DELAYED RELEASE ORAL
Qty: 0 | Refills: 0 | DISCHARGE

## 2022-08-26 RX ORDER — LEVETIRACETAM 250 MG/1
1000 TABLET, FILM COATED ORAL ONCE
Refills: 0 | Status: COMPLETED | OUTPATIENT
Start: 2022-08-26 | End: 2022-08-26

## 2022-08-26 RX ORDER — LABETALOL HCL 100 MG
10 TABLET ORAL ONCE
Refills: 0 | Status: DISCONTINUED | OUTPATIENT
Start: 2022-08-26 | End: 2022-08-26

## 2022-08-26 RX ORDER — LEVETIRACETAM 250 MG/1
500 TABLET, FILM COATED ORAL
Refills: 0 | Status: DISCONTINUED | OUTPATIENT
Start: 2022-08-26 | End: 2022-08-29

## 2022-08-26 RX ADMIN — LEVETIRACETAM 1000 MILLIGRAM(S): 250 TABLET, FILM COATED ORAL at 15:40

## 2022-08-26 RX ADMIN — LEVETIRACETAM 440 MILLIGRAM(S): 250 TABLET, FILM COATED ORAL at 15:24

## 2022-08-26 NOTE — ED ADULT NURSE NOTE - NSFALLRSKPASTHIST_ED_ALL_ED
See scanned report. Dr. Brian Han ordered study and Dr. Brian Han read study. ID verified per protocol. Explained procedure to patient. Obtaining IV access, radiation exposure, risks and discomforts (for exercise- change to lexiwalk stress). , waiting between injection(s) and obtaining images. All concerns and questions addressed prior to obtaining consent test. Patient developed dyspnea during test. At 1 minute in recovery, patient without symptoms or complaints voiced.
no

## 2022-08-26 NOTE — ED PROVIDER NOTE - CONSTITUTIONAL, MLM
Well appearing, awake, alert, oriented to person, place, time/situation and in no apparent distress. he ambulated into er with normal gait normal...

## 2022-08-26 NOTE — ED PROVIDER NOTE - CLINICAL SUMMARY MEDICAL DECISION MAKING FREE TEXT BOX
67yo M w/ PMHx of prostate cancer presented to [facility] after episode of neck stiffness and head shake found to have new brain lesions concerning for metastatic disease. Neurosurgery consulted, no indication for intervention, Keppra 500mg BID and Dex 4mg q6h and admission to medicine for further management.

## 2022-08-26 NOTE — ED ADULT NURSE NOTE - OBJECTIVE STATEMENT
Received the patient in the ER. Ambulatory.  Sent in from PCP for slurred speech and left sided facial droop that began about 1 hour ago. Patient was driving and didn't feel good. Went to PCP and PCP send him here. Alert and oriented. Equal strength in all extremities. patient is on Xarelto. .

## 2022-08-26 NOTE — ED PROVIDER NOTE - OBJECTIVE STATEMENT
67 y/o M with PMHx of Afib, GERD, Cholecystostomy, and HTN presents to the ED for neck spasm at 10AM this morning. Pt reports he was driving when he yawned and suddenly had his neck stuck in a place. As the pt tried to move his neck, pt started having a spasm and his head shook violently. Pt was able to park his car and sat on the curb until his spasm stopped. Pt reported the incident lasted 20 minutes and symptoms have since resolved. Pt reports visiting his primary provider after the incident and was referred to the ED for further evaluation. Pt reported a brief loss (lasting for 15 seconds) of control over speech articulation and production. Pt reports he sees his urologist for follow ups. Denies fever, chills, SOB, CP, vision change, body weakness, and loss over arm and leg function. 67 y/o M with PMHx of Afib on Xarelto, GERD, Cholecystostomy, GERD, and HTN and PSHx of Prostatectomy and Cholecystostomy Drainage presents to the ED for neck spasm at 10AM this morning. Pt reports he was driving when he yawned and suddenly had his neck stuck in a place. As the pt tried to move his neck, pt started having a spasm and his head shook violently. Pt was able to park his car and sat on the curb until his spasm stopped. Pt reported the incident lasted 20 minutes and symptoms have since resolved. Pt reports visiting his primary provider after the incident and was referred to the ED for further evaluation. Pt's PCP reported Pt had a one-sided facial droop earlier this morning. Pt reported a brief loss (lasting for 15 seconds) of control over speech articulation and production. Pt reports he sees his urologist for follow ups and has regular age-related check ups with his PCP. Denies fever, chills, SOB, CP, vision change, body weakness, and loss over arm and leg function and movement. 65 y/o M with PMHx of Afib on Xarelto, GERD, Cholecystostomy, GERD, and HTN and PSHx of Prostatectomy and Cholecystostomy Drainage presents to the ED was transferred from [place] where he presented for slurred speech and facial droop. Pt reports he was driving when he yawned and suddenly had his neck stuck in a place. As the pt tried to move his neck, pt started having pain like that felt like a spam and his head shook violently. Pt was able to park his car and sat on the curb until symptoms stopped. Pt reported the incident lasted 20 minutes and symptoms have since resolved. Pt reports visiting his primary provider after the incident and was referred to the ED for further evaluation. Pt's PCP reported Pt had a one-sided facial droop earlier this morning. Pt reported a brief loss (lasting for 15 seconds) of control over speech articulation and production. Pt reports he sees his urologist for follow ups and has regular age-related check ups with his PCP. Denies fever, chills, SOB, CP, vision change, body weakness, and loss over arm and leg function and movement. 67 y/o M with PMHx of Afib on Xarelto, GERD, Cholecystostomy, GERD, and HTN and PSHx of Prostatectomy and Cholecystostomy Drainage presents to the ED was transferred from [place] where he presented for slurred speech and facial droop. Pt reports he was driving when he yawned and suddenly had his neck stuck in a place. As the pt tried to move his neck, pt started having pain like that felt like a spam and his head shook violently. Pt was able to park his car and sat on the curb until symptoms stopped. Pt reported the incident lasted 20 minutes and symptoms have since resolved. Pt reports visiting his primary provider after the incident and was referred to the ED for further evaluation. Pt's PCP reported Pt had a one-sided facial droop earlier this morning. Pt reported a brief loss (lasting for 15 seconds) of control over speech articulation and production. Pt reports he sees his urologist for follow ups and has regular age-related check ups with his PCP. Denies fever, chills, SOB, CP, vision change, body weakness, and loss over arm and leg function and movement.    MRI brain was performed which showed R frontal enhancing lesions.

## 2022-08-26 NOTE — ED PROVIDER NOTE - ENMT, MLM
Airway patent, Nasal mucosa clear. Mouth with normal mucosa. Throat has no vesicles, no oropharyngeal exudates and uvula is midline. his tongue deviates to left very slightly, he has normal sense of smell (alcohol pad) and mild left facial weakness no other findings noted.

## 2022-08-26 NOTE — ED PROVIDER NOTE - OBJECTIVE STATEMENT
Patient is a 66-year-old male who was sent in by his primary care physician Dr. Villalba, for the concern of a sudden stroke or TIA.  Patient has a history of hypertension splenic infarcts for which she is on Xarelto, prostate cancer gout.  He is not a smoker a social drinker and occasionally uses his wife's cannabis oil.  He has no drug allergies.  Today at 9:45 AM while driving he yawned, and felt a sudden discomfort in his head, his head shook and he had to pull over until the symptoms got better.  He drove directly to his primary care physician who noted left facial weakness, speech changes with an otherwise normal exam.  Patient denies any symptoms other than the speech changes.  Cysts he has no headache no chest pain no shortness of breath.  He has no other weakness, no changes in his gait or vision.  He has no history of same.  His last meal was 7:00 this morning where he ate breakfast.

## 2022-08-26 NOTE — ED PROVIDER NOTE - ATTENDING CONTRIBUTION TO CARE
Attending (Drew Starr M.D.):  I have personally seen and examined this patient. I have performed a substantive portion of the visit including all aspects of the medical decision making. Resident and/or ACP note reviewed. I agree on the plan of care except where noted.

## 2022-08-26 NOTE — ED ADULT TRIAGE NOTE - CHIEF COMPLAINT QUOTE
Patient A/Ox4 with steady gait. Sent in from PCP for slurred speech and left sided facial droop that began about 1 hour ago. Patient taken immediately to exam room. MD Loja and MD Camilo made aware. Stroke code called.

## 2022-08-26 NOTE — ED PROVIDER NOTE - CLINICAL SUMMARY MEDICAL DECISION MAKING FREE TEXT BOX
Patient is a 66-year-old male with a history of labile hypertension on multiple medications, gout never smoker who has sudden onset of neurologic symptoms after yawning while driving.  Sent in by his primary care physician to rule out a stroke for speech changes and mild facial weakness.  We must exclude hypoglycemia, sudden onset of subarachnoid hemorrhage, aneurysmal disease or stroke.  We will obtain a CT, CT angio of the head and neck, EKG to rule out cardiac arrhythmia or STEMI, laboratory studies including baseline electrolytes and renal function.  Patient was consented and agrees to have the IV contrasted CAT scan bypassing labs for this emergent condition.  Will consult neurology for the symptoms once the CTs are complete and and read by radiology.

## 2022-08-26 NOTE — ED PROVIDER NOTE - NEUROLOGICAL, MLM
Alert and oriented, no focal deficits, no motor or sensory deficits. Alert and oriented, no focal deficits, no motor or sensory deficits. Facial symmetry, speech was clear

## 2022-08-26 NOTE — ED ADULT NURSE NOTE - OBJECTIVE STATEMENT
67y/o M coming ot the ED transferred from [place] s/p abnormal MRI. Pt states that around 9:40AM he was driving when he had "seizure-like" head shaking uncontrollably a/w slurred speech while driving and was able to pull over. Pt was seen @ [place], given 1G Keppra & had MRI which showed brain lesions. Pt denies any CP/SOB/Fever/Chills/N/V/D. On exam PT A&Ox3, neg facial drop, neg slurred speech, PERRL 3mm, denies blurred vision, equal  strength and sensations in all extremities. Denies numbness, tingling, or weakness. Heart monitor placed, NSR. ABdomen soft, nontender, nondistended. VSS. Neuro flowsheet placed in chart.

## 2022-08-26 NOTE — ED PROVIDER NOTE - PROGRESS NOTE DETAILS
I had a bedside discussion with the patient, regarding tPA use and DOAC contraindications.  He took his last DOAC dose this morning with his routine medications and this is an absolute contraindication to tPA administration for the acute stroke.  He is aware of this, CTs are pending. radiology called pt has r mca stroke he is looking for lvo will call back radiology called back pt has 3-4 enhancing brain lesions in lvo region needs mri of brain  ro mass dw pt regarding ekg he stated he has hx of atrial fibrillation and this was not new, he forgot to tell me due to suddenness of presentation.  he is aware of the ct cta reports need for mri and agrees to [facility] transfer  [facility] transfer center called for tele stroke consult. neurologist will review studies and call back no vessel impingement, ct read with perfusion no infarction, Dw dr Mason, continue xarelto  no need for urgent transfer  dr burnham paged for plv neuro consultation no vessel impingement, ct read with perfusion no infarction, Dw dr Mason, continue xarelto  no need for urgent transfer  dr tej savage for plv neuro consultation, cov by dr Oconnor- janette at 12:27 dw dr [facility] Dr. Trimble neurosurgeon, tx to [place] ED.  Case haris Donovan ED attending at [place]

## 2022-08-27 DIAGNOSIS — G93.9 DISORDER OF BRAIN, UNSPECIFIED: ICD-10-CM

## 2022-08-27 DIAGNOSIS — I48.91 UNSPECIFIED ATRIAL FIBRILLATION: ICD-10-CM

## 2022-08-27 DIAGNOSIS — I10 ESSENTIAL (PRIMARY) HYPERTENSION: ICD-10-CM

## 2022-08-27 DIAGNOSIS — R73.9 HYPERGLYCEMIA, UNSPECIFIED: ICD-10-CM

## 2022-08-27 DIAGNOSIS — Z85.46 PERSONAL HISTORY OF MALIGNANT NEOPLASM OF PROSTATE: ICD-10-CM

## 2022-08-27 DIAGNOSIS — G93.89 OTHER SPECIFIED DISORDERS OF BRAIN: ICD-10-CM

## 2022-08-27 DIAGNOSIS — M10.9 GOUT, UNSPECIFIED: ICD-10-CM

## 2022-08-27 DIAGNOSIS — Z29.9 ENCOUNTER FOR PROPHYLACTIC MEASURES, UNSPECIFIED: ICD-10-CM

## 2022-08-27 DIAGNOSIS — I48.20 CHRONIC ATRIAL FIBRILLATION, UNSPECIFIED: ICD-10-CM

## 2022-08-27 LAB
GLUCOSE BLDC GLUCOMTR-MCNC: 152 MG/DL — HIGH (ref 70–99)
GLUCOSE BLDC GLUCOMTR-MCNC: 159 MG/DL — HIGH (ref 70–99)
GLUCOSE BLDC GLUCOMTR-MCNC: 173 MG/DL — HIGH (ref 70–99)

## 2022-08-27 PROCEDURE — G1004: CPT

## 2022-08-27 PROCEDURE — 99223 1ST HOSP IP/OBS HIGH 75: CPT | Mod: GC

## 2022-08-27 PROCEDURE — 71260 CT THORAX DX C+: CPT | Mod: 26,MG

## 2022-08-27 PROCEDURE — 74177 CT ABD & PELVIS W/CONTRAST: CPT | Mod: 26,MG

## 2022-08-27 RX ORDER — TIMOLOL 0.5 %
1 DROPS OPHTHALMIC (EYE)
Refills: 0 | Status: DISCONTINUED | OUTPATIENT
Start: 2022-08-27 | End: 2022-08-31

## 2022-08-27 RX ORDER — LOSARTAN POTASSIUM 100 MG/1
100 TABLET, FILM COATED ORAL DAILY
Refills: 0 | Status: DISCONTINUED | OUTPATIENT
Start: 2022-08-27 | End: 2022-08-28

## 2022-08-27 RX ORDER — RIVAROXABAN 15 MG-20MG
20 KIT ORAL DAILY
Refills: 0 | Status: DISCONTINUED | OUTPATIENT
Start: 2022-08-27 | End: 2022-08-27

## 2022-08-27 RX ORDER — HEPARIN SODIUM 5000 [USP'U]/ML
8000 INJECTION INTRAVENOUS; SUBCUTANEOUS EVERY 6 HOURS
Refills: 0 | Status: DISCONTINUED | OUTPATIENT
Start: 2022-08-27 | End: 2022-08-28

## 2022-08-27 RX ORDER — AMLODIPINE BESYLATE 2.5 MG/1
10 TABLET ORAL DAILY
Refills: 0 | Status: DISCONTINUED | OUTPATIENT
Start: 2022-08-27 | End: 2022-08-31

## 2022-08-27 RX ORDER — POTASSIUM CHLORIDE 20 MEQ
20 PACKET (EA) ORAL ONCE
Refills: 0 | Status: COMPLETED | OUTPATIENT
Start: 2022-08-27 | End: 2022-08-27

## 2022-08-27 RX ORDER — DEXTROSE 50 % IN WATER 50 %
15 SYRINGE (ML) INTRAVENOUS ONCE
Refills: 0 | Status: DISCONTINUED | OUTPATIENT
Start: 2022-08-27 | End: 2022-08-31

## 2022-08-27 RX ORDER — RIVAROXABAN 15 MG-20MG
20 KIT ORAL
Refills: 0 | Status: DISCONTINUED | OUTPATIENT
Start: 2022-08-27 | End: 2022-08-27

## 2022-08-27 RX ORDER — DEXTROSE 50 % IN WATER 50 %
25 SYRINGE (ML) INTRAVENOUS ONCE
Refills: 0 | Status: DISCONTINUED | OUTPATIENT
Start: 2022-08-27 | End: 2022-08-31

## 2022-08-27 RX ORDER — SODIUM CHLORIDE 9 MG/ML
1000 INJECTION, SOLUTION INTRAVENOUS
Refills: 0 | Status: DISCONTINUED | OUTPATIENT
Start: 2022-08-27 | End: 2022-08-31

## 2022-08-27 RX ORDER — HEPARIN SODIUM 5000 [USP'U]/ML
4000 INJECTION INTRAVENOUS; SUBCUTANEOUS EVERY 6 HOURS
Refills: 0 | Status: DISCONTINUED | OUTPATIENT
Start: 2022-08-27 | End: 2022-08-28

## 2022-08-27 RX ORDER — ACETAMINOPHEN 500 MG
975 TABLET ORAL ONCE
Refills: 0 | Status: COMPLETED | OUTPATIENT
Start: 2022-08-27 | End: 2022-08-27

## 2022-08-27 RX ORDER — DEXAMETHASONE 0.5 MG/5ML
4 ELIXIR ORAL EVERY 6 HOURS
Refills: 0 | Status: DISCONTINUED | OUTPATIENT
Start: 2022-08-27 | End: 2022-08-31

## 2022-08-27 RX ORDER — LEVETIRACETAM 250 MG/1
500 TABLET, FILM COATED ORAL
Refills: 0 | Status: DISCONTINUED | OUTPATIENT
Start: 2022-08-28 | End: 2022-08-31

## 2022-08-27 RX ORDER — COLCHICINE 0.6 MG
0.6 TABLET ORAL DAILY
Refills: 0 | Status: DISCONTINUED | OUTPATIENT
Start: 2022-08-27 | End: 2022-08-31

## 2022-08-27 RX ORDER — METOPROLOL TARTRATE 50 MG
100 TABLET ORAL DAILY
Refills: 0 | Status: DISCONTINUED | OUTPATIENT
Start: 2022-08-27 | End: 2022-08-31

## 2022-08-27 RX ORDER — HYDRALAZINE HCL 50 MG
25 TABLET ORAL
Refills: 0 | Status: DISCONTINUED | OUTPATIENT
Start: 2022-08-27 | End: 2022-08-31

## 2022-08-27 RX ORDER — DEXTROSE 50 % IN WATER 50 %
12.5 SYRINGE (ML) INTRAVENOUS ONCE
Refills: 0 | Status: DISCONTINUED | OUTPATIENT
Start: 2022-08-27 | End: 2022-08-31

## 2022-08-27 RX ORDER — HEPARIN SODIUM 5000 [USP'U]/ML
INJECTION INTRAVENOUS; SUBCUTANEOUS
Qty: 25000 | Refills: 0 | Status: DISCONTINUED | OUTPATIENT
Start: 2022-08-27 | End: 2022-08-28

## 2022-08-27 RX ORDER — LANOLIN ALCOHOL/MO/W.PET/CERES
3 CREAM (GRAM) TOPICAL AT BEDTIME
Refills: 0 | Status: DISCONTINUED | OUTPATIENT
Start: 2022-08-27 | End: 2022-08-30

## 2022-08-27 RX ORDER — ALLOPURINOL 300 MG
100 TABLET ORAL DAILY
Refills: 0 | Status: DISCONTINUED | OUTPATIENT
Start: 2022-08-27 | End: 2022-08-31

## 2022-08-27 RX ORDER — BRIMONIDINE TARTRATE 2 MG/MG
1 SOLUTION/ DROPS OPHTHALMIC
Refills: 0 | Status: DISCONTINUED | OUTPATIENT
Start: 2022-08-27 | End: 2022-08-31

## 2022-08-27 RX ORDER — PANTOPRAZOLE SODIUM 20 MG/1
40 TABLET, DELAYED RELEASE ORAL
Refills: 0 | Status: DISCONTINUED | OUTPATIENT
Start: 2022-08-27 | End: 2022-08-31

## 2022-08-27 RX ORDER — INSULIN LISPRO 100/ML
VIAL (ML) SUBCUTANEOUS
Refills: 0 | Status: DISCONTINUED | OUTPATIENT
Start: 2022-08-27 | End: 2022-08-31

## 2022-08-27 RX ORDER — GLUCAGON INJECTION, SOLUTION 0.5 MG/.1ML
1 INJECTION, SOLUTION SUBCUTANEOUS ONCE
Refills: 0 | Status: DISCONTINUED | OUTPATIENT
Start: 2022-08-27 | End: 2022-08-31

## 2022-08-27 RX ORDER — ACETAMINOPHEN 500 MG
650 TABLET ORAL EVERY 6 HOURS
Refills: 0 | Status: DISCONTINUED | OUTPATIENT
Start: 2022-08-27 | End: 2022-08-31

## 2022-08-27 RX ORDER — INSULIN LISPRO 100/ML
VIAL (ML) SUBCUTANEOUS AT BEDTIME
Refills: 0 | Status: DISCONTINUED | OUTPATIENT
Start: 2022-08-27 | End: 2022-08-31

## 2022-08-27 RX ADMIN — Medication 975 MILLIGRAM(S): at 01:49

## 2022-08-27 RX ADMIN — Medication 0.6 MILLIGRAM(S): at 11:50

## 2022-08-27 RX ADMIN — HEPARIN SODIUM 1800 UNIT(S)/HR: 5000 INJECTION INTRAVENOUS; SUBCUTANEOUS at 15:42

## 2022-08-27 RX ADMIN — Medication 4 MILLIGRAM(S): at 18:13

## 2022-08-27 RX ADMIN — Medication 100 MILLIGRAM(S): at 11:50

## 2022-08-27 RX ADMIN — LOSARTAN POTASSIUM 100 MILLIGRAM(S): 100 TABLET, FILM COATED ORAL at 11:50

## 2022-08-27 RX ADMIN — Medication 1 DROP(S): at 18:13

## 2022-08-27 RX ADMIN — Medication 4 MILLIGRAM(S): at 11:51

## 2022-08-27 RX ADMIN — Medication 975 MILLIGRAM(S): at 02:49

## 2022-08-27 RX ADMIN — Medication 20 MILLIEQUIVALENT(S): at 11:50

## 2022-08-27 RX ADMIN — Medication 25 MILLIGRAM(S): at 18:14

## 2022-08-27 RX ADMIN — BRIMONIDINE TARTRATE 1 DROP(S): 2 SOLUTION/ DROPS OPHTHALMIC at 18:13

## 2022-08-27 RX ADMIN — Medication 4 MILLIGRAM(S): at 06:33

## 2022-08-27 RX ADMIN — AMLODIPINE BESYLATE 10 MILLIGRAM(S): 2.5 TABLET ORAL at 11:50

## 2022-08-27 RX ADMIN — Medication 4 MILLIGRAM(S): at 00:50

## 2022-08-27 NOTE — H&P ADULT - NSHPPHYSICALEXAM_GEN_ALL_CORE
General: NAD, well-groomed, well-developed  HEENT: NC/AT, clear sclera, MMM  Lungs: Clear to auscultation in anterior lung fields, no wheezes/rhonchi/rales  Heart: RRR, +s1/s2, no murmurs/rubs/gallops  Abdomen: Soft, non-tender to palpation, non-distended, no rebound/guarding/rigidity  Extremities: No peripheral edema bilaterally, moving extremities spontaneously  Skin: No rashes noted  Neuro: A&Ox3, 5/5 strength in bilateral upper and lower extremities, no focal deficits General: NAD, well-groomed, well-developed  HEENT: NC/AT, clear sclera, MMM  Lungs: Clear to auscultation in anterior lung fields, no wheezes/rhonchi/rales  Heart: RRR, +s1/s2, no murmurs/rubs/gallops  Abdomen: Soft, non-tender to palpation, non-distended, no rebound/guarding/rigidity  Extremities: No peripheral edema bilaterally, moving extremities spontaneously  Skin: No rashes noted  Musculoskeletal : pos ROM of extremities , no joint swelling   Psych : Affect is appropriate   Neuro: A&Ox3, 5/5 strength in bilateral upper and lower extremities, no focal deficits  Vascular : good dorsalis pedis pulses

## 2022-08-27 NOTE — H&P ADULT - PROBLEM SELECTOR PLAN 4
- S/p prostatectomy in 2010, has been following up regularly and did not require further treatment   - Last PSA several months ago was undetectable per patient - S/p prostatectomy in 2010, has been following up regularly and did not require further treatment   - Last PSA several months ago was undetectable per patient  > Continue f/u outpatient > F/u Hgb a1c  > SSI while on steroids

## 2022-08-27 NOTE — H&P ADULT - PROBLEM SELECTOR PLAN 5
> Continue home Allopurinol 100mg daily  > Continue home Colchicine 0.6mg daily - S/p prostatectomy in 2010, has been following up regularly and did not require further treatment   - Last PSA several months ago was undetectable per patient  > Continue f/u outpatient 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  > Heparin gtt in anticipation of tissue biopsy

## 2022-08-27 NOTE — H&P ADULT - PROBLEM SELECTOR PLAN 3
> Continue home Losartan 100mg daily  > Continue home Amlodipine 10mg daily  > Continue home Metoprolol Succinate ER 100mg daily  > Continue home Hydralazine 25mg BID > F/u Hgb a1c  > SSI while on steroids

## 2022-08-27 NOTE — H&P ADULT - NSICDXPASTMEDICALHX_GEN_ALL_CORE_FT
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

## 2022-08-27 NOTE — H&P ADULT - ATTENDING COMMENTS
INCOMPLETE  :       5 y/o M with history of Afib (on Xarelto), HTN, gout, chronic cholecystitis s/p cholecystectomy, Splenic infarct and prostate cancer s/p prostatectomy (in 2010) presenting to ED as a transfer from [place] for slurred speech and facial droop. On morning of 8/26, patient was driving in his car when he yawned and felt his neck being stuck in a position with spasms and uncontrolled head shaking. He never lost control of his driving, but he had to support his neck/face to stabilize. He eventually stopped his car and his symptoms resolved. He then went straight to his doctor's office who noticed facial droop and sent him over to [place] ----> CT/MRI of brain with multiple enchancing lesions on the Rt frontal lobe with concern for malignancy with surrounding edema and mass effects and was sent to [facility] to be evaluated by Neurosurgery who has seen pt and recommended Steroid, and Seizure prophylaxis and neuro onc eval---> will place pt on Heparin drip after discussion with pt in anticipation for tissue biopsy.          Hermila noel   hospitalist 65 y/o M with history of Afib (on Xarelto), HTN, gout, chronic cholecystitis s/p cholecystectomy, Splenic infarct , S/P COVID in 2021 ( no hosp) and prostate cancer s/p prostatectomy (in 2010/ and followed every 6 months with recent stable PSA) Presented to [facility]  for slurred speech and facial droop. On morning of 8/26, patient was driving in his car when he yawned and felt his neck being stuck in a position with spasms and uncontrolled head shaking. He never lost control of his driving, but he had to support his neck/face to stabilize. He eventually stopped his car and his symptoms resolved. He then went straight to his doctor's office who noticed facial droop and sent him over to [place] where----> CT/MRI of brain with multiple enchancing lesions on the Rt frontal lobe with concern for malignancy with surrounding edema and mass effects and was transferred to [facility] to be evaluated by Neurosurgery who has seen pt and recommended Steroid, and Seizure prophylaxis and neuro onc eval.  Patient was seen with no current complaints   # Rt Frontal Brain Lesions ( about 5 lesions) of unclear etiology with high concern for malignancy      No clear evidence of infection      H/O prostate CA s/p treatment and had recent PSA about 3 months or so which pt states was stable       Colonoscopy/EGD done <5 Years ago with no reported abnormal findings       CT of chest with few scattered 1-2 mm nodules on the RT and Left lungs ( insignificant cig smoking and stopped >40years ago )       Will need tissue biopsy/ F/up Neurosurgery and neuro onc and might need Medical onc eval      Steroid as per Neurosurgery and PPI and Glucose monitoring       monitor mentation    # Pre Op evaluation       RCRI of 0 -1 ( low risk patient ) ---> no H/O CAD, CVA, no insulin use, no CHF and renal dysfunction , the benefit of the surgical intervention for the      purpose of tissue biopsy outweigh the risk , No current medical contraindication for the much needed intervention   #  Chronic Atrial Fibrillation with h/o splenic infarcts       HOLD Xarelto --> transition to the Heparin drip , risk of Bleed is explained to patient and he understands and agrees with plan   cont to closely monitor patient.    rest is as per resident     Hermila noel   hospitalist  [employer] via [employer] 67 y/o M with history of Afib (on Xarelto), HTN, gout, chronic cholecystitis s/p cholecystectomy, Splenic infarct , S/P COVID in 2021 ( no hosp) and prostate cancer s/p prostatectomy (in 2010/ and followed every 6 months with recent stable PSA) Presented to [facility]  for slurred speech and facial droop. On morning of 8/26, patient was driving in his car when he yawned and felt his neck being stuck in a position with spasms and uncontrolled head shaking. He never lost control of his driving, but he had to support his neck/face to stabilize. He eventually stopped his car and his symptoms resolved. He then went straight to his doctor's office who noticed facial droop and sent him over to [place] where----> CT/MRI of brain with multiple enchancing lesions on the Rt frontal lobe with concern for malignancy with surrounding edema and mass effects and was transferred to [facility] to be evaluated by Neurosurgery who has seen pt and recommended Steroid, and Seizure prophylaxis and neuro onc eval.  Patient was seen with no current complaints   # Rt Frontal Brain Lesions ( about 5 lesions) of unclear etiology with high concern for malignancy      No clear evidence of infection      H/O prostate CA s/p treatment and had recent PSA about 3 months or so which pt states was stable       Colonoscopy/EGD done <5 Years ago with no reported abnormal findings       CT of chest with few scattered 1-2 mm nodules on the RT and Left lungs ( insignificant cig smoking and stopped >40years ago )       Will need tissue biopsy/ F/up Neurosurgery and neuro onc and might need Medical onc eval      Steroid as per Neurosurgery and PPI and Glucose monitoring       monitor mentation    # Pre Op evaluation       RCRI of 0 -1 ( low risk patient ) ---> no H/O CAD, CVA, no insulin use, no CHF and renal dysfunction , the benefit of the surgical intervention for the      purpose of tissue biopsy outweigh the risk , No current medical contraindication for the much needed intervention   #  Chronic Atrial Fibrillation with h/o splenic infarcts       HOLD Xarelto --> transition to the Heparin drip , Monitor for bleed and monitor PTT,  risk of Bleed is explained to patient and he understands and agrees with plan   cont to closely monitor patient.    rest is as per resident     Hermila noel   hospitalist  [employer] via [employer]

## 2022-08-27 NOTE — H&P ADULT - NSHPSOCIALHISTORY_GEN_ALL_CORE
Occasional EtOH use  History of tobacco and drug use when younger  Lives at home with wife and children   Retired, worked at [email or web address] for many years

## 2022-08-27 NOTE — H&P ADULT - ASSESSMENT
67 y/o M with history of Afib (on Xarelto), HTN, gout, and prostate cancer s/p prostatectomy in 2010 presenting to [place] on 8/26 s/p episode of facial droop and likely seizures, transferred to [facility] for further evaluation of newly found lesions on R frontal lobe c/f primary malignancy vs metastases.

## 2022-08-27 NOTE — H&P ADULT - NSHPLABSRESULTS_GEN_ALL_CORE
LABS:                         16.8   7.62  )-----------( 255      ( 26 Aug 2022 23:08 )             50.1     08-26    142  |  104  |  17  ----------------------------<  108<H>  3.4<L>   |  26  |  1.15    Ca    9.6      26 Aug 2022 23:08    TPro  7.2  /  Alb  4.4  /  TBili  0.9  /  DBili  x   /  AST  27  /  ALT  25  /  AlkPhos  78  08-26    PT/INR - ( 26 Aug 2022 23:08 )   PT: 19.1 sec;   INR: 1.65 ratio      PTT - ( 26 Aug 2022 23:08 )  PTT:33.7 sec      RADIOLOGY, EKG & ADDITIONAL TESTS:    CTH w/o Cont: Suspected developing right MCA infarction. No acute intracranial hemorrhage.  CTA Neck: No vaso-occlusive disease.  CTA Head: No vaso-occlusive disease.  CT Perfusion: Multiple peripherally enhancing and hyper-perfusing lesions in the right lateral frontal lobe corresponding with mass effect seen on the   comparison noncontrast CT head. Metastases or primary neoplasm is suggested. MRI of the brain with IV contrast is recommended.  CT C/A/P: A few scattered nonspecific 1 to 2 mm pulmonary nodules. Otherwise no definite evidence of malignancy in the chest, abdomen, and pelvis.   MRI Brain: Peripherally enhancing lesion is seen involving the right frontal region which are suspicious for underlying metastasis. LABS are reviewed and discussed with the team :                         16.8   7.62  )-----------( 255      ( 26 Aug 2022 23:08 )             50.1     08-26    142  |  104  |  17  ----------------------------<  108<H>  3.4<L>   |  26  |  1.15    Ca    9.6      26 Aug 2022 23:08    TPro  7.2  /  Alb  4.4  /  TBili  0.9  /  DBili  x   /  AST  27  /  ALT  25  /  AlkPhos  78  08-26    PT/INR - ( 26 Aug 2022 23:08 )   PT: 19.1 sec;   INR: 1.65 ratio      PTT - ( 26 Aug 2022 23:08 )  PTT:33.7 sec      RADIOLOGY, EKG & ADDITIONAL TESTS:    CTH w/o Cont: Suspected developing right MCA infarction. No acute intracranial hemorrhage.  CTA Neck: No vaso-occlusive disease.  CTA Head: No vaso-occlusive disease.  CT Perfusion: Multiple peripherally enhancing and hyper-perfusing lesions in the right lateral frontal lobe corresponding with mass effect seen on the   comparison noncontrast CT head. Metastases or primary neoplasm is suggested. MRI of the brain with IV contrast is recommended.  CT C/A/P: A few scattered nonspecific 1 to 2 mm pulmonary nodules. Otherwise no definite evidence of malignancy in the chest, abdomen, and pelvis.   MRI Brain: Peripherally enhancing lesion is seen involving the right frontal region which are suspicious for underlying metastasis.

## 2022-08-27 NOTE — ED ADULT NURSE REASSESSMENT NOTE - NS ED NURSE REASSESS COMMENT FT1
0740 Pt in ER rm 11 gold. Awaiting bed. TBA. No beds available. A&Ox4. MAEx4. PERRL. Speech clear. Color pink. skin W&D. Denies dizziness or HA. See neuro flow sheet. IVL intact RACF without sx of infilt. Denies dizziness, HA. Fall risk precautions maintained.

## 2022-08-27 NOTE — H&P ADULT - PROBLEM SELECTOR PLAN 1
- Presenting with a brief episode of facial droop, dysarthria, and "head shaking," likely seizure-like activity i/s/o newly found lesions on R frontal lobe  - CTH: no acute intracranial hemorrhage  - CT perfusion: multiple peripherally enhancing and hyper-perfusing lesions on R lateral frontal lobe suggestive of metastases or primary neoplasm  - MRI brain: peripherally enhancing lesion in same region suspicious for underlying metastasis  - CT C/A/P: few scattered 1-2 mm pulmonary nodules, otherwise no definitive evidence of malignancy   > Evaluated by nsgy, continued Keppra 500mg BID and Dexamethasone 4mg q6h   > q4h neurochecks  > Rad onc consult  > Will need tissue biopsy to confirm diagnosis per nsgy - Presenting with a brief episode of facial droop, dysarthria, and "head shaking," likely seizure-like activity i/s/o newly found lesions on R frontal lobe  - CTH: no acute intracranial hemorrhage  - CT perfusion: multiple peripherally enhancing and hyper-perfusing lesions on R lateral frontal lobe suggestive of metastases or primary neoplasm  - MRI brain: peripherally enhancing lesion in same region suspicious for underlying metastasis  - CT C/A/P: few scattered 1-2 mm pulmonary nodules, otherwise no definitive evidence of malignancy   > Evaluated by nsgy, continued Keppra 500mg BID and Dexamethasone 4mg q6h   > q4h neurochecks  > Neuro onc consulted, f/u whether tissue biopsy indicated  > Rad onc consulted

## 2022-08-27 NOTE — H&P ADULT - PROBLEM SELECTOR PLAN 6
- DVT ppx:  - Diet: DASH/TLC  - Dispo: pending further work-up of brain lesion - DVT ppx: heparin gtt vs home Xarelto  - Diet: DASH/TLC  - Dispo: pending further work-up of brain lesion > Continue home Allopurinol 100mg daily  > Continue home Colchicine 0.6mg daily

## 2022-08-27 NOTE — CONSULT NOTE ADULT - SUBJECTIVE AND OBJECTIVE BOX
p ([id number])     HPI:  66M, PLV txfer, [facility] prostate cancer s/p total prostatectomy 12 years ago. 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 Had "head shaking" ~2mins while driving with dysarthria ~5mins on 8/26 AM. Maintained consciousness. Saw PCP later in the day, who sent him to ED after he noticed a Left facial droop. MRI shows 5-6 small Right frontal lobe ring-enhancing lesions, no mass effect, with vasogenic edema. No sig diffusion restriction Exam: Slight Left nasolabial flattening, otherwise intact    =====================  PAST MEDICAL HISTORY   GERD (gastroesophageal reflux disease)    Prostate ca    Hypertension    Gout    Atrial fibrillation    Splenic infarction    2019 novel coronavirus disease (COVID-19)      PAST SURGICAL HISTORY   No significant past surgical history    H/O prostatectomy    H/O arthroscopy of right knee    H/O colonoscopy    H/O endoscopy    Elective surgery          MEDICATIONS:  Antibiotics:    Neuro:  acetaminophen     Tablet .. 975 milliGRAM(s) Oral once    Other:  dexAMETHasone  Injectable 4 milliGRAM(s) IV Push every 6 hours      SOCIAL HISTORY:   Occupation:   Marital Status:     FAMILY HISTORY:  No pertinent family history in first degree relatives    Family history of diabetes mellitus (DM) (Mother)    Family history of TIAs (Mother)    Family hx of prostate cancer (Father)    Family history of bone cancer (Father)    Family history of appendicitis        ROS: Negative except per HPI    LABS:  PT/INR - ( 26 Aug 2022 23:08 )   PT: 19.1 sec;   INR: 1.65 ratio         PTT - ( 26 Aug 2022 23:08 )  PTT:33.7 sec                        16.8   7.62  )-----------( 255      ( 26 Aug 2022 23:08 )             50.1     08-26    142  |  104  |  17  ----------------------------<  108<H>  3.4<L>   |  26  |  1.15    Ca    9.6      26 Aug 2022 23:08    TPro  7.2  /  Alb  4.4  /  TBili  0.9  /  DBili  x   /  AST  27  /  ALT  25  /  AlkPhos  78  08-26

## 2022-08-27 NOTE — H&P ADULT - PROBLEM SELECTOR PLAN 7
- DVT ppx: heparin gtt vs home Xarelto  - Diet: DASH/TLC  - Dispo: pending further work-up of brain lesion - DVT ppx: heparin gtt   - Diet: DASH/TLC  - Dispo: pending further work-up of brain lesion

## 2022-08-27 NOTE — CONSULT NOTE ADULT - SUBJECTIVE AND OBJECTIVE BOX
Neurology - Consult Note    Spectra: [phone] ([facility]), [phone] ([facility])    HPI:  65 y/o M with history of Afib (on Xarelto), HTN, gout, chronic cholecystitis s/p cholecystectomy, and prostate cancer s/p prostatectomy (in 2010) presenting to ED as a transfer from [place] for slurred speech and facial droop. On morning of 8/26, patient was driving in his car when he yawned and felt his neck being stuck in a position with spasms and uncontrolled head shaking. He never lost control of his driving, but he had to support his neck/face to stabilize. He eventually stopped his car and his symptoms resolved. He then went straight to his doctor's office who noticed facial droop and sent him over to [place]. Otherwise denies CP, SOB, fevers, chills, vision change, extremity weakness.  (27 Aug 2022 10:22)      Review of Systems:  INCOMPLETE   CONSTITUTIONAL: No fevers or chills  EYES AND ENT: No visual changes or no throat pain   NECK: No pain or stiffness  RESPIRATORY: No hemoptysis or shortness of breath  CARDIOVASCULAR: No chest pain or palpitations  GASTROINTESTINAL: No melena or hematochezia  GENITOURINARY: No dysuria or hematuria  NEUROLOGICAL: +As stated in HPI above  SKIN: No itching, burning, rashes, or lesions   All other review of systems is negative unless indicated above.    Allergies:      PMHx/PSHx/Family Hx: As above, otherwise see below   GERD (gastroesophageal reflux disease)    Prostate ca    Hypertension    Gout    Atrial fibrillation    Splenic infarction    2019 novel coronavirus disease (COVID-19)    GERD (gastroesophageal reflux disease)        Social Hx:  Never smoker; no current use of tobacco, alcohol, or illicit drugs  Lives with ***; occupation ***, baseline functional status is ***    Medications:  MEDICATIONS  (STANDING):  allopurinol 100 milliGRAM(s) Oral daily  amLODIPine   Tablet 10 milliGRAM(s) Oral daily  brimonidine 0.2% Ophthalmic Solution 1 Drop(s) Both EYES two times a day  colchicine 0.6 milliGRAM(s) Oral daily  dexAMETHasone  Injectable 4 milliGRAM(s) IV Push every 6 hours  dextrose 5%. 1000 milliLiter(s) (50 mL/Hr) IV Continuous <Continuous>  dextrose 5%. 1000 milliLiter(s) (100 mL/Hr) IV Continuous <Continuous>  dextrose 50% Injectable 25 Gram(s) IV Push once  dextrose 50% Injectable 12.5 Gram(s) IV Push once  dextrose 50% Injectable 25 Gram(s) IV Push once  glucagon  Injectable 1 milliGRAM(s) IntraMuscular once  heparin  Infusion.  Unit(s)/Hr (18 mL/Hr) IV Continuous <Continuous>  hydrALAZINE 25 milliGRAM(s) Oral two times a day  insulin lispro (ADMELOG) corrective regimen sliding scale   SubCutaneous three times a day before meals  insulin lispro (ADMELOG) corrective regimen sliding scale   SubCutaneous at bedtime  losartan 100 milliGRAM(s) Oral daily  metoprolol succinate  milliGRAM(s) Oral daily  pantoprazole    Tablet 40 milliGRAM(s) Oral before breakfast  timolol 0.5% Solution 1 Drop(s) Both EYES two times a day    MEDICATIONS  (PRN):  acetaminophen     Tablet .. 650 milliGRAM(s) Oral every 6 hours PRN Temp greater or equal to 38C (100.4F), Mild Pain (1 - 3)  aluminum hydroxide/magnesium hydroxide/simethicone Suspension 30 milliLiter(s) Oral every 4 hours PRN Dyspepsia  dextrose Oral Gel 15 Gram(s) Oral once PRN Blood Glucose LESS THAN 70 milliGRAM(s)/deciliter  heparin   Injectable 8000 Unit(s) IV Push every 6 hours PRN For aPTT less than 40  heparin   Injectable 4000 Unit(s) IV Push every 6 hours PRN For aPTT between 40 - 57  melatonin 3 milliGRAM(s) Oral at bedtime PRN Insomnia      Vitals:  T(C): 36.6 (08-27-22 @ 16:15), Max: 37.1 (08-27-22 @ 14:25)  HR: 90 (08-27-22 @ 16:15) (61 - 99)  BP: 144/90 (08-27-22 @ 16:15) (121/83 - 161/101)  RR: 18 (08-27-22 @ 16:15) (16 - 18)  SpO2: 99% (08-27-22 @ 16:15) (97% - 99%)    Physical Examination: INCOMPLETE  General - non-toxic appearing male/female in no acute distress  Cardiovascular - peripheral pulses palpable, no edema  Respiratory - breathing comfortably with no increased work of breathing    Neurologic Exam:  Mental status - Awake, Alert, Oriented to person, place, and time. Speech fluent, repetition and naming intact. Follows simple and complex commands. Attention/concentration, recent and remote memory (including registration 3/3 and recall 3/3), and fund of knowledge intact    Cranial nerves - PERRLA, VFF, EOMI, face sensation (V1-V3) intact b/l, facial strength intact without asymmetry b/l, hearing intact b/l, palate with symmetric elevation, trapezius OR sternocleidomastiod 5/5 strength b/l, tongue midline on protrusion with full lateral movement    Motor - Normal bulk and tone throughout. No pronator drift.    Strength testing            Deltoid      Biceps      Triceps     Wrist Extension    Wrist Flexion     Interossei         R            5                 5               5                     5                              5                        5                 5  L             5                 5               5                     5                              5                        5                 5              Hip Flexion    Hip Extension    Knee Flexion    Knee Extension    Dorsiflexion    Plantar Flexion  R              5                         5                       5                           5                            5                          5  L              5                         5                        5                           5                            5                          5    Sensation - Light touch/temperature OR pain/vibration intact throughout    DTR's -             Biceps      Triceps     Brachioradialis      Patellar    Ankle    Toes/plantar response  R             2+             2+                  2+                       2+            2+                 Down  L              2+             2+                 2+                        2+           2+                 Down    Coordination - Finger to Nose intact b/l. No tremors appreciated    Gait and station - Normal casual gait. Romberg (-)    Labs:                        16.8   7.62  )-----------( 255      ( 26 Aug 2022 23:08 )             50.1     08-26    142  |  104  |  17  ----------------------------<  108<H>  3.4<L>   |  26  |  1.15    Ca    9.6      26 Aug 2022 23:08    TPro  7.2  /  Alb  4.4  /  TBili  0.9  /  DBili  x   /  AST  27  /  ALT  25  /  AlkPhos  78  08-26    CAPILLARY BLOOD GLUCOSE      POCT Blood Glucose.: 173 mg/dL (27 Aug 2022 16:43)    LIVER FUNCTIONS - ( 26 Aug 2022 23:08 )  Alb: 4.4 g/dL / Pro: 7.2 g/dL / ALK PHOS: 78 U/L / ALT: 25 U/L / AST: 27 U/L / GGT: x             PT/INR - ( 26 Aug 2022 23:08 )   PT: 19.1 sec;   INR: 1.65 ratio         PTT - ( 26 Aug 2022 23:08 )  PTT:33.7 sec  CSF:                  Radiology:  MR Head w/wo IV Cont:  (26 Aug 2022 18:21)     Neurology - Consult Note    Spectra: [phone] ([facility]), [phone] ([facility])    HPI:  66M PMHx Afib (on Xarelto), HTN, gout, chronic cholecystitis s/p cholecystectomy, and prostate cancer s/p prostatectomy (in 2010) presenting to ED as a transfer from [place] after being found to have abnormal MRI findings concerning for possible brain mets. Pt initially visited [facility] for slurred speech and facial droop. On 8/26, pt was driving to an 10am appointment and while yawning in the car, he noticed sudden onset of the R-side of his neck stiffen and his entire head starting shaking from side to side "like you're on a roller coaster." Pt remembers     On morning of 8/26, patient was driving in his car when he yawned and felt his neck being stuck in a position with spasms and uncontrolled head shaking. He never lost control of his driving, but he had to support his neck/face to stabilize. He eventually stopped his car and his symptoms resolved. He then went straight to his doctor's office who noticed facial droop and sent him over to [place]. Otherwise denies CP, SOB, fevers, chills, vision change, extremity weakness.  (27 Aug 2022 10:22)      Review of Systems:  INCOMPLETE   CONSTITUTIONAL: No fevers or chills  EYES AND ENT: No visual changes or no throat pain   NECK: No pain or stiffness  RESPIRATORY: No hemoptysis or shortness of breath  CARDIOVASCULAR: No chest pain or palpitations  GASTROINTESTINAL: No melena or hematochezia  GENITOURINARY: No dysuria or hematuria  NEUROLOGICAL: +As stated in HPI above  SKIN: No itching, burning, rashes, or lesions   All other review of systems is negative unless indicated above.    Allergies:      PMHx/PSHx/Family Hx: As above, otherwise see below   GERD (gastroesophageal reflux disease)    Prostate ca    Hypertension    Gout    Atrial fibrillation    Splenic infarction    2019 novel coronavirus disease (COVID-19)    GERD (gastroesophageal reflux disease)        Social Hx:  Never smoker; no current use of tobacco, alcohol, or illicit drugs  Lives with ***; occupation ***, baseline functional status is ***    Medications:  MEDICATIONS  (STANDING):  allopurinol 100 milliGRAM(s) Oral daily  amLODIPine   Tablet 10 milliGRAM(s) Oral daily  brimonidine 0.2% Ophthalmic Solution 1 Drop(s) Both EYES two times a day  colchicine 0.6 milliGRAM(s) Oral daily  dexAMETHasone  Injectable 4 milliGRAM(s) IV Push every 6 hours  dextrose 5%. 1000 milliLiter(s) (50 mL/Hr) IV Continuous <Continuous>  dextrose 5%. 1000 milliLiter(s) (100 mL/Hr) IV Continuous <Continuous>  dextrose 50% Injectable 25 Gram(s) IV Push once  dextrose 50% Injectable 12.5 Gram(s) IV Push once  dextrose 50% Injectable 25 Gram(s) IV Push once  glucagon  Injectable 1 milliGRAM(s) IntraMuscular once  heparin  Infusion.  Unit(s)/Hr (18 mL/Hr) IV Continuous <Continuous>  hydrALAZINE 25 milliGRAM(s) Oral two times a day  insulin lispro (ADMELOG) corrective regimen sliding scale   SubCutaneous three times a day before meals  insulin lispro (ADMELOG) corrective regimen sliding scale   SubCutaneous at bedtime  losartan 100 milliGRAM(s) Oral daily  metoprolol succinate  milliGRAM(s) Oral daily  pantoprazole    Tablet 40 milliGRAM(s) Oral before breakfast  timolol 0.5% Solution 1 Drop(s) Both EYES two times a day    MEDICATIONS  (PRN):  acetaminophen     Tablet .. 650 milliGRAM(s) Oral every 6 hours PRN Temp greater or equal to 38C (100.4F), Mild Pain (1 - 3)  aluminum hydroxide/magnesium hydroxide/simethicone Suspension 30 milliLiter(s) Oral every 4 hours PRN Dyspepsia  dextrose Oral Gel 15 Gram(s) Oral once PRN Blood Glucose LESS THAN 70 milliGRAM(s)/deciliter  heparin   Injectable 8000 Unit(s) IV Push every 6 hours PRN For aPTT less than 40  heparin   Injectable 4000 Unit(s) IV Push every 6 hours PRN For aPTT between 40 - 57  melatonin 3 milliGRAM(s) Oral at bedtime PRN Insomnia      Vitals:  T(C): 36.6 (08-27-22 @ 16:15), Max: 37.1 (08-27-22 @ 14:25)  HR: 90 (08-27-22 @ 16:15) (61 - 99)  BP: 144/90 (08-27-22 @ 16:15) (121/83 - 161/101)  RR: 18 (08-27-22 @ 16:15) (16 - 18)  SpO2: 99% (08-27-22 @ 16:15) (97% - 99%)    Physical Examination: INCOMPLETE  General - non-toxic appearing male/female in no acute distress  Cardiovascular - peripheral pulses palpable, no edema  Respiratory - breathing comfortably with no increased work of breathing    Neurologic Exam:  Mental status - Awake, Alert, Oriented to person, place, and time. Speech fluent, repetition and naming intact. Follows simple and complex commands. Attention/concentration, recent and remote memory (including registration 3/3 and recall 3/3), and fund of knowledge intact    Cranial nerves - PERRLA, VFF, EOMI, face sensation (V1-V3) intact b/l, facial strength intact without asymmetry b/l, hearing intact b/l, palate with symmetric elevation, trapezius OR sternocleidomastiod 5/5 strength b/l, tongue midline on protrusion with full lateral movement    Motor - Normal bulk and tone throughout. No pronator drift.    Strength testing            Deltoid      Biceps      Triceps     Wrist Extension    Wrist Flexion     Interossei         R            5                 5               5                     5                              5                        5                 5  L             5                 5               5                     5                              5                        5                 5              Hip Flexion    Hip Extension    Knee Flexion    Knee Extension    Dorsiflexion    Plantar Flexion  R              5                         5                       5                           5                            5                          5  L              5                         5                        5                           5                            5                          5    Sensation - Light touch/temperature OR pain/vibration intact throughout    DTR's -             Biceps      Triceps     Brachioradialis      Patellar    Ankle    Toes/plantar response  R             2+             2+                  2+                       2+            2+                 Down  L              2+             2+                 2+                        2+           2+                 Down    Coordination - Finger to Nose intact b/l. No tremors appreciated    Gait and station - Normal casual gait. Romberg (-)    Labs:                        16.8   7.62  )-----------( 255      ( 26 Aug 2022 23:08 )             50.1     08-26    142  |  104  |  17  ----------------------------<  108<H>  3.4<L>   |  26  |  1.15    Ca    9.6      26 Aug 2022 23:08    TPro  7.2  /  Alb  4.4  /  TBili  0.9  /  DBili  x   /  AST  27  /  ALT  25  /  AlkPhos  78  08-26    CAPILLARY BLOOD GLUCOSE      POCT Blood Glucose.: 173 mg/dL (27 Aug 2022 16:43)    LIVER FUNCTIONS - ( 26 Aug 2022 23:08 )  Alb: 4.4 g/dL / Pro: 7.2 g/dL / ALK PHOS: 78 U/L / ALT: 25 U/L / AST: 27 U/L / GGT: x             PT/INR - ( 26 Aug 2022 23:08 )   PT: 19.1 sec;   INR: 1.65 ratio         PTT - ( 26 Aug 2022 23:08 )  PTT:33.7 sec  CSF:                  Radiology:  MR Head w/wo IV Cont:  (26 Aug 2022 18:21)     Neurology - Consult Note    Spectra: [phone] ([facility]), [phone] ([facility])    HPI:  66M PMHx Afib (on Xarelto), HTN, gout, chronic cholecystitis s/p cholecystectomy, and prostate cancer s/p prostatectomy (in 2010) presenting to ED as a transfer from [place] after being found to have abnormal MRI findings concerning for possible brain mets. Pt initially visited [facility] for slurred speech and facial droop. On 8/26, pt was driving to an 10am appointment and while yawning in the car, he noticed sudden onset of the R-side of his neck stiffen and his entire head starting shaking from side to side "like you're on a roller coaster." Pt remembers the entire episode and states that he had to use his R hand to support his head and use his L hand to drive. Pt was able to safely drive his car to a nearby [employer] parking lot and by the time he parked, all symptoms resolved. Pt then visited his PCP and pt was noted to have a facial droop and was sent immediately to [place] ED. Stroke work-up was initiated at [place] and MRI demonstrated multiple ring-enhancing lesions in the R frontal region. Pt was transferred to [facility] for further evaluation by the neurosurgical and neuro-oncology team.     Review of Systems:  CONSTITUTIONAL: No fevers or chills  EYES AND ENT: No visual changes or no throat pain   NECK: No pain or stiffness  RESPIRATORY: No hemoptysis or shortness of breath  CARDIOVASCULAR: No chest pain or palpitations  GASTROINTESTINAL: No N/V, melena or hematochezia  NEUROLOGICAL: +As stated in HPI above  SKIN: No itching, burning, rashes, or lesions   All other review of systems is negative unless indicated above.    Allergies: NKDA    PMHx/PSHx/Family Hx: As above, otherwise see below   GERD (gastroesophageal reflux disease)    Prostate ca    Hypertension    Gout    Atrial fibrillation    Splenic infarction    2019 novel coronavirus disease (COVID-19)    GERD (gastroesophageal reflux disease)    Social Hx:  Former smoker; no current use of tobacco, alcohol, or illicit drugs  Lives with wife and children at home. Retired; worked at [employer]    Medications:  MEDICATIONS  (STANDING):  allopurinol 100 milliGRAM(s) Oral daily  amLODIPine   Tablet 10 milliGRAM(s) Oral daily  brimonidine 0.2% Ophthalmic Solution 1 Drop(s) Both EYES two times a day  colchicine 0.6 milliGRAM(s) Oral daily  dexAMETHasone  Injectable 4 milliGRAM(s) IV Push every 6 hours  dextrose 5%. 1000 milliLiter(s) (50 mL/Hr) IV Continuous <Continuous>  dextrose 5%. 1000 milliLiter(s) (100 mL/Hr) IV Continuous <Continuous>  dextrose 50% Injectable 25 Gram(s) IV Push once  dextrose 50% Injectable 12.5 Gram(s) IV Push once  dextrose 50% Injectable 25 Gram(s) IV Push once  glucagon  Injectable 1 milliGRAM(s) IntraMuscular once  heparin  Infusion.  Unit(s)/Hr (18 mL/Hr) IV Continuous <Continuous>  hydrALAZINE 25 milliGRAM(s) Oral two times a day  insulin lispro (ADMELOG) corrective regimen sliding scale   SubCutaneous three times a day before meals  insulin lispro (ADMELOG) corrective regimen sliding scale   SubCutaneous at bedtime  losartan 100 milliGRAM(s) Oral daily  metoprolol succinate  milliGRAM(s) Oral daily  pantoprazole    Tablet 40 milliGRAM(s) Oral before breakfast  timolol 0.5% Solution 1 Drop(s) Both EYES two times a day    MEDICATIONS  (PRN):  acetaminophen     Tablet .. 650 milliGRAM(s) Oral every 6 hours PRN Temp greater or equal to 38C (100.4F), Mild Pain (1 - 3)  aluminum hydroxide/magnesium hydroxide/simethicone Suspension 30 milliLiter(s) Oral every 4 hours PRN Dyspepsia  dextrose Oral Gel 15 Gram(s) Oral once PRN Blood Glucose LESS THAN 70 milliGRAM(s)/deciliter  heparin   Injectable 8000 Unit(s) IV Push every 6 hours PRN For aPTT less than 40  heparin   Injectable 4000 Unit(s) IV Push every 6 hours PRN For aPTT between 40 - 57  melatonin 3 milliGRAM(s) Oral at bedtime PRN Insomnia    Vitals:  T(C): 36.6 (08-27-22 @ 16:15), Max: 37.1 (08-27-22 @ 14:25)  HR: 90 (08-27-22 @ 16:15) (61 - 99)  BP: 144/90 (08-27-22 @ 16:15) (121/83 - 161/101)  RR: 18 (08-27-22 @ 16:15) (16 - 18)  SpO2: 99% (08-27-22 @ 16:15) (97% - 99%)    Physical Examination:  General - non-toxic appearing male in no acute distress  Cardiovascular - peripheral pulses palpable, no edema  Respiratory - breathing comfortably with no increased work of breathing    Neurologic Exam:  Mental status - Awake, Alert, Oriented to person, place, and time. Speech fluent, repetition and naming intact. Follows simple and complex commands. Attention/concentration, recent and remote memory (including registration 3/3 and recall 3/3), and fund of knowledge intact    Cranial nerves - PERRLA, VFF, EOMI, face sensation (V1-V3) intact b/l, facial strength intact without asymmetry b/l, hearing intact b/l, palate with symmetric elevation, trapezius 5/5 strength b/l, tongue midline on protrusion with full lateral movement    Motor - Normal bulk and tone throughout. No pronator drift.    Strength testing            Deltoid      Biceps      Triceps     Wrist Extension    Wrist Flexion     Interossei         R            5                 5               5                     5                   5                        5           5  L             5                 5               5                     5                   5                        5           5              Hip Flexion    Hip Extension    Knee Flexion    Knee Extension    Dorsiflexion    Plantar Flexion  R              5                         5                       5                5                        5                          5  L              5                         5                        5                5                       5                          5    Sensation - Light touch intact throughout    DTR's -             Biceps      Triceps     Brachioradialis      Patellar    Ankle    Toes/plantar response  R             2+             2+                  2+                1+          1+                 mute  L              2+             2+                 2+                 1+           1+                mute    Coordination - Finger to Nose intact b/l. No tremors appreciated    Gait and station - Normal casual gait.    Labs:                        16.8   7.62  )-----------( 255      ( 26 Aug 2022 23:08 )             50.1     08-26    142  |  104  |  17  ----------------------------<  108<H>  3.4<L>   |  26  |  1.15    Ca    9.6      26 Aug 2022 23:08    TPro  7.2  /  Alb  4.4  /  TBili  0.9  /  DBili  x   /  AST  27  /  ALT  25  /  AlkPhos  78  08-26    CAPILLARY BLOOD GLUCOSE  POCT Blood Glucose.: 173 mg/dL (27 Aug 2022 16:43)    LIVER FUNCTIONS - ( 26 Aug 2022 23:08 )  Alb: 4.4 g/dL / Pro: 7.2 g/dL / ALK PHOS: 78 U/L / ALT: 25 U/L / AST: 27 U/L / GGT: x           PT/INR - ( 26 Aug 2022 23:08 )   PT: 19.1 sec;   INR: 1.65 ratio       PTT - ( 26 Aug 2022 23:08 )  PTT:33.7 sec      Radiology:  MR Head w/wo IV Cont:  (26 Aug 2022 18:21)  < from: MR Head w/wo IV Cont (08.26.22 @ 18:21) >  Multiple peripherally (5) enhancing lesions are seen involving the right   posterior frontal region. One particular lesion appears dural based and   measures approximately 1.7 x 1.4cm. The larger of the intra-axial   lesions measures approximately 1.3 x 1.3 cm. These lesions could be   compatible with underlying neoplastic process such as metastasis. The   possibility of infectious etiology cannot be excluded though appears less   likely given that there is no associated areas of abnormal restricted   diffusion seen. There are small areas of surrounding edema identified.   There is localized mass effect seen. No significant shift or herniation   is seen.    Nonspecific nonenhancing T2 prolongation in the left corona radiata   region is identified. Postinfusion    The large vessels demonstrate normal flow voids.    The visualized paranasal sinuses mastoid and middle ear regions appear   clear.    IMPRESSION: Peripherallyenhancing lesion is seen involving the right   frontal region which are suspicious for underlying metastasis. Clinical   correlation and continued close interval follow-up is recommended.    < end of copied text >  < from: CT Abdomen and Pelvis w/ IV Cont (08.27.22 @ 02:22) >  IMPRESSION:  A few scattered nonspecific 1 to 2 mm pulmonary nodules. Otherwise no   definite evidence of malignancy in the chest, abdomen, and pelvis      < end of copied text >

## 2022-08-27 NOTE — CONSULT NOTE ADULT - ATTENDING COMMENTS
HPI outlined above. Patient currently at baseline    On exam: Speech fluent, no aphasia. EOMI. VFF No flattening of NLF. Tongue ML  Strength 5/5 throughout  Intact LT b/l  FNF intact n/l   DTRs 2+ b/l.     MRI brain (reviewed) several small ring enhancing lesions in right frontal lobe.   CT c/a/p: few scattered small pulmonary nodules (1-2mm)    Imp: Right frontal lobes lesions that are suspicious for metastatic disease trigger possible new onset seizure event. No other signs of systemic infection at this time.     - Agree with keppra 500mg BID   - currently on steroids per NSY  - will wait results of biopsy   - can check HIV status

## 2022-08-27 NOTE — PATIENT PROFILE ADULT - FALL HARM RISK - RISK INTERVENTIONS

## 2022-08-27 NOTE — H&P ADULT - HISTORY OF PRESENT ILLNESS
65 y/o M with history of Afib (on Xarelto), HTN, gout, chronic cholecystitis s/p cholecystectomy, and prostate cancer s/p prostatectomy (in 2010) presenting to ED as a transfer from [place] for slurred speech and facial droop. On morning of 8/26, patient was driving in his car when he yawned and felt his neck being stuck in a position with spasms and uncontrolled head shaking. He never lost control of his driving, but he had to support his neck/face to stabilize. He eventually stopped his car and his symptoms resolved. He then went straight to his doctor's office who noticed facial droop and sent him over to [place]. Otherwise denies CP, SOB, fevers, chills, vision change, extremity weakness.  67 y/o M with history of  chronic Afib (on Xarelto), HTN, gout, chronic cholecystitis s/p cholecystectomy, and prostate cancer s/p prostatectomy (in 2010) presenting to ED as a transfer from [place] for slurred speech and facial droop. On morning of 8/26, patient was driving in his car when he yawned and felt his neck being stuck in a position with spasms and uncontrolled head shaking. He never lost control of his driving, but he had to support his neck/face to stabilize. He eventually stopped his car and his symptoms resolved. He then went straight to his doctor's office who noticed facial droop and sent him over to [place]. Otherwise denies CP, SOB, fevers, chills, vision change, extremity weakness.

## 2022-08-27 NOTE — H&P ADULT - PROBLEM SELECTOR PLAN 2
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

## 2022-08-27 NOTE — PATIENT PROFILE ADULT - FUNCTIONAL ASSESSMENT - DAILY ACTIVITY ASSESSMENT TYPE
[[id number]] : 0 [Eats meals with family] : eats meals with family [Mother] : mother [Father] : father [Sister] : sister [Eats regular meals including adequate fruits and vegetables] : eats regular meals including adequate fruits and vegetables [Uses tobacco/alcohol/drugs] : does not use tobacco/alcohol/drugs [Home is free of violence] : home is free of violence [Sexually Active] : The patient is not sexually active [FreeTextEntry2] : 9 Admission

## 2022-08-28 DIAGNOSIS — N17.9 ACUTE KIDNEY FAILURE, UNSPECIFIED: ICD-10-CM

## 2022-08-28 DIAGNOSIS — Z01.818 ENCOUNTER FOR OTHER PREPROCEDURAL EXAMINATION: ICD-10-CM

## 2022-08-28 LAB
A1C WITH ESTIMATED AVERAGE GLUCOSE RESULT: 5.5 % — SIGNIFICANT CHANGE UP (ref 4–5.6)
ALBUMIN SERPL ELPH-MCNC: 4.2 G/DL — SIGNIFICANT CHANGE UP (ref 3.3–5)
ALP SERPL-CCNC: 76 U/L — SIGNIFICANT CHANGE UP (ref 40–120)
ALT FLD-CCNC: 21 U/L — SIGNIFICANT CHANGE UP (ref 10–45)
ANION GAP SERPL CALC-SCNC: 16 MMOL/L — SIGNIFICANT CHANGE UP (ref 5–17)
APTT BLD: 109.1 SEC — HIGH (ref 27.5–35.5)
APTT BLD: 120.7 SEC — CRITICAL HIGH (ref 27.5–35.5)
APTT BLD: 58.8 SEC — HIGH (ref 27.5–35.5)
AST SERPL-CCNC: 19 U/L — SIGNIFICANT CHANGE UP (ref 10–40)
BILIRUB SERPL-MCNC: 0.6 MG/DL — SIGNIFICANT CHANGE UP (ref 0.2–1.2)
BLD GP AB SCN SERPL QL: NEGATIVE — SIGNIFICANT CHANGE UP
BUN SERPL-MCNC: 43 MG/DL — HIGH (ref 7–23)
CALCIUM SERPL-MCNC: 9.4 MG/DL — SIGNIFICANT CHANGE UP (ref 8.4–10.5)
CHLORIDE SERPL-SCNC: 100 MMOL/L — SIGNIFICANT CHANGE UP (ref 96–108)
CO2 SERPL-SCNC: 21 MMOL/L — LOW (ref 22–31)
CREAT SERPL-MCNC: 1.55 MG/DL — HIGH (ref 0.5–1.3)
EGFR: 49 ML/MIN/1.73M2 — LOW
ESTIMATED AVERAGE GLUCOSE: 111 MG/DL — SIGNIFICANT CHANGE UP (ref 68–114)
GLUCOSE BLDC GLUCOMTR-MCNC: 127 MG/DL — HIGH (ref 70–99)
GLUCOSE BLDC GLUCOMTR-MCNC: 128 MG/DL — HIGH (ref 70–99)
GLUCOSE BLDC GLUCOMTR-MCNC: 139 MG/DL — HIGH (ref 70–99)
GLUCOSE BLDC GLUCOMTR-MCNC: 140 MG/DL — HIGH (ref 70–99)
GLUCOSE SERPL-MCNC: 137 MG/DL — HIGH (ref 70–99)
HCT VFR BLD CALC: 49.3 % — SIGNIFICANT CHANGE UP (ref 39–50)
HCT VFR BLD CALC: 49.8 % — SIGNIFICANT CHANGE UP (ref 39–50)
HCT VFR BLD CALC: 51.4 % — HIGH (ref 39–50)
HGB BLD-MCNC: 16.6 G/DL — SIGNIFICANT CHANGE UP (ref 13–17)
HGB BLD-MCNC: 17 G/DL — SIGNIFICANT CHANGE UP (ref 13–17)
HGB BLD-MCNC: 17.2 G/DL — HIGH (ref 13–17)
HIV 1+2 AB+HIV1 P24 AG SERPL QL IA: SIGNIFICANT CHANGE UP
INR BLD: 1.26 RATIO — HIGH (ref 0.88–1.16)
MAGNESIUM SERPL-MCNC: 2.1 MG/DL — SIGNIFICANT CHANGE UP (ref 1.6–2.6)
MCHC RBC-ENTMCNC: 29.1 PG — SIGNIFICANT CHANGE UP (ref 27–34)
MCHC RBC-ENTMCNC: 29.2 PG — SIGNIFICANT CHANGE UP (ref 27–34)
MCHC RBC-ENTMCNC: 29.2 PG — SIGNIFICANT CHANGE UP (ref 27–34)
MCHC RBC-ENTMCNC: 33.3 GM/DL — SIGNIFICANT CHANGE UP (ref 32–36)
MCHC RBC-ENTMCNC: 33.5 GM/DL — SIGNIFICANT CHANGE UP (ref 32–36)
MCHC RBC-ENTMCNC: 34.5 GM/DL — SIGNIFICANT CHANGE UP (ref 32–36)
MCV RBC AUTO: 84.4 FL — SIGNIFICANT CHANGE UP (ref 80–100)
MCV RBC AUTO: 87.3 FL — SIGNIFICANT CHANGE UP (ref 80–100)
MCV RBC AUTO: 87.5 FL — SIGNIFICANT CHANGE UP (ref 80–100)
MRSA PCR RESULT.: SIGNIFICANT CHANGE UP
NRBC # BLD: 0 /100 WBCS — SIGNIFICANT CHANGE UP (ref 0–0)
PHOSPHATE SERPL-MCNC: 3.5 MG/DL — SIGNIFICANT CHANGE UP (ref 2.5–4.5)
PLATELET # BLD AUTO: 259 K/UL — SIGNIFICANT CHANGE UP (ref 150–400)
PLATELET # BLD AUTO: 279 K/UL — SIGNIFICANT CHANGE UP (ref 150–400)
PLATELET # BLD AUTO: 280 K/UL — SIGNIFICANT CHANGE UP (ref 150–400)
POTASSIUM SERPL-MCNC: 3.4 MMOL/L — LOW (ref 3.5–5.3)
POTASSIUM SERPL-SCNC: 3.4 MMOL/L — LOW (ref 3.5–5.3)
PROT SERPL-MCNC: 7 G/DL — SIGNIFICANT CHANGE UP (ref 6–8.3)
PROTHROM AB SERPL-ACNC: 14.5 SEC — HIGH (ref 10.5–13.4)
PSA FLD-MCNC: <0.01 NG/ML — SIGNIFICANT CHANGE UP (ref 0–4)
RBC # BLD: 5.69 M/UL — SIGNIFICANT CHANGE UP (ref 4.2–5.8)
RBC # BLD: 5.84 M/UL — HIGH (ref 4.2–5.8)
RBC # BLD: 5.89 M/UL — HIGH (ref 4.2–5.8)
RBC # FLD: 13.1 % — SIGNIFICANT CHANGE UP (ref 10.3–14.5)
RBC # FLD: 13.2 % — SIGNIFICANT CHANGE UP (ref 10.3–14.5)
RBC # FLD: 13.3 % — SIGNIFICANT CHANGE UP (ref 10.3–14.5)
RH IG SCN BLD-IMP: POSITIVE — SIGNIFICANT CHANGE UP
S AUREUS DNA NOSE QL NAA+PROBE: SIGNIFICANT CHANGE UP
SODIUM SERPL-SCNC: 137 MMOL/L — SIGNIFICANT CHANGE UP (ref 135–145)
WBC # BLD: 14.63 K/UL — HIGH (ref 3.8–10.5)
WBC # BLD: 17.61 K/UL — HIGH (ref 3.8–10.5)
WBC # BLD: 20.19 K/UL — HIGH (ref 3.8–10.5)
WBC # FLD AUTO: 14.63 K/UL — HIGH (ref 3.8–10.5)
WBC # FLD AUTO: 17.61 K/UL — HIGH (ref 3.8–10.5)
WBC # FLD AUTO: 20.19 K/UL — HIGH (ref 3.8–10.5)

## 2022-08-28 PROCEDURE — 99233 SBSQ HOSP IP/OBS HIGH 50: CPT | Mod: GC

## 2022-08-28 PROCEDURE — 99222 1ST HOSP IP/OBS MODERATE 55: CPT

## 2022-08-28 PROCEDURE — 93970 EXTREMITY STUDY: CPT | Mod: 26

## 2022-08-28 PROCEDURE — 93306 TTE W/DOPPLER COMPLETE: CPT | Mod: 26

## 2022-08-28 RX ORDER — SODIUM CHLORIDE 9 MG/ML
1000 INJECTION, SOLUTION INTRAVENOUS
Refills: 0 | Status: DISCONTINUED | OUTPATIENT
Start: 2022-08-28 | End: 2022-08-29

## 2022-08-28 RX ORDER — HEPARIN SODIUM 5000 [USP'U]/ML
1600 INJECTION INTRAVENOUS; SUBCUTANEOUS
Qty: 25000 | Refills: 0 | Status: DISCONTINUED | OUTPATIENT
Start: 2022-08-28 | End: 2022-08-29

## 2022-08-28 RX ORDER — HEPARIN SODIUM 5000 [USP'U]/ML
8000 INJECTION INTRAVENOUS; SUBCUTANEOUS EVERY 6 HOURS
Refills: 0 | Status: DISCONTINUED | OUTPATIENT
Start: 2022-08-28 | End: 2022-08-29

## 2022-08-28 RX ORDER — HEPARIN SODIUM 5000 [USP'U]/ML
4000 INJECTION INTRAVENOUS; SUBCUTANEOUS EVERY 6 HOURS
Refills: 0 | Status: DISCONTINUED | OUTPATIENT
Start: 2022-08-28 | End: 2022-08-29

## 2022-08-28 RX ADMIN — BRIMONIDINE TARTRATE 1 DROP(S): 2 SOLUTION/ DROPS OPHTHALMIC at 05:58

## 2022-08-28 RX ADMIN — LOSARTAN POTASSIUM 100 MILLIGRAM(S): 100 TABLET, FILM COATED ORAL at 05:48

## 2022-08-28 RX ADMIN — Medication 1 DROP(S): at 05:58

## 2022-08-28 RX ADMIN — Medication 4 MILLIGRAM(S): at 13:08

## 2022-08-28 RX ADMIN — HEPARIN SODIUM 1400 UNIT(S)/HR: 5000 INJECTION INTRAVENOUS; SUBCUTANEOUS at 19:10

## 2022-08-28 RX ADMIN — HEPARIN SODIUM 1800 UNIT(S)/HR: 5000 INJECTION INTRAVENOUS; SUBCUTANEOUS at 09:38

## 2022-08-28 RX ADMIN — PANTOPRAZOLE SODIUM 40 MILLIGRAM(S): 20 TABLET, DELAYED RELEASE ORAL at 09:04

## 2022-08-28 RX ADMIN — Medication 4 MILLIGRAM(S): at 00:03

## 2022-08-28 RX ADMIN — Medication 100 MILLIGRAM(S): at 13:08

## 2022-08-28 RX ADMIN — AMLODIPINE BESYLATE 10 MILLIGRAM(S): 2.5 TABLET ORAL at 05:47

## 2022-08-28 RX ADMIN — Medication 4 MILLIGRAM(S): at 18:05

## 2022-08-28 RX ADMIN — Medication 4 MILLIGRAM(S): at 22:13

## 2022-08-28 RX ADMIN — Medication 650 MILLIGRAM(S): at 01:03

## 2022-08-28 RX ADMIN — HEPARIN SODIUM 1800 UNIT(S)/HR: 5000 INJECTION INTRAVENOUS; SUBCUTANEOUS at 01:30

## 2022-08-28 RX ADMIN — LEVETIRACETAM 500 MILLIGRAM(S): 250 TABLET, FILM COATED ORAL at 18:05

## 2022-08-28 RX ADMIN — Medication 25 MILLIGRAM(S): at 05:48

## 2022-08-28 RX ADMIN — Medication 650 MILLIGRAM(S): at 23:08

## 2022-08-28 RX ADMIN — Medication 650 MILLIGRAM(S): at 22:13

## 2022-08-28 RX ADMIN — Medication 650 MILLIGRAM(S): at 00:03

## 2022-08-28 RX ADMIN — Medication 4 MILLIGRAM(S): at 05:48

## 2022-08-28 RX ADMIN — Medication 25 MILLIGRAM(S): at 18:04

## 2022-08-28 RX ADMIN — Medication 3 MILLIGRAM(S): at 22:13

## 2022-08-28 RX ADMIN — Medication 100 MILLIGRAM(S): at 05:48

## 2022-08-28 RX ADMIN — Medication 1 DROP(S): at 18:04

## 2022-08-28 RX ADMIN — HEPARIN SODIUM 1600 UNIT(S)/HR: 5000 INJECTION INTRAVENOUS; SUBCUTANEOUS at 10:27

## 2022-08-28 RX ADMIN — HEPARIN SODIUM 1400 UNIT(S)/HR: 5000 INJECTION INTRAVENOUS; SUBCUTANEOUS at 18:44

## 2022-08-28 RX ADMIN — BRIMONIDINE TARTRATE 1 DROP(S): 2 SOLUTION/ DROPS OPHTHALMIC at 18:04

## 2022-08-28 RX ADMIN — Medication 0.6 MILLIGRAM(S): at 13:08

## 2022-08-28 RX ADMIN — SODIUM CHLORIDE 100 MILLILITER(S): 9 INJECTION, SOLUTION INTRAVENOUS at 15:28

## 2022-08-28 NOTE — PROGRESS NOTE ADULT - PROBLEM SELECTOR PLAN 6
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

## 2022-08-28 NOTE — PROGRESS NOTE ADULT - SUBJECTIVE AND OBJECTIVE BOX
***************************************************************  Julius Fitzpatrick, PGY 1  Internal Medicine   pager: [phone]  ***************************************************************    KATIE VALLES  MRN: [id number]    Patient is a 65 y/o M who presents with a chief complaint of R frontal lobe lesions (27 Aug 2022 17:51)      Subjective:   No acute events overnight.  Denies fever, CP, SOB, abn pain, n/v, dysuria.   Tolerating diet. Last BM .      MEDICATIONS  (STANDING):  allopurinol 100 milliGRAM(s) Oral daily  amLODIPine   Tablet 10 milliGRAM(s) Oral daily  brimonidine 0.2% Ophthalmic Solution 1 Drop(s) Both EYES two times a day  colchicine 0.6 milliGRAM(s) Oral daily  dexAMETHasone  Injectable 4 milliGRAM(s) IV Push every 6 hours  dextrose 5%. 1000 milliLiter(s) (50 mL/Hr) IV Continuous <Continuous>  dextrose 5%. 1000 milliLiter(s) (100 mL/Hr) IV Continuous <Continuous>  dextrose 50% Injectable 25 Gram(s) IV Push once  dextrose 50% Injectable 12.5 Gram(s) IV Push once  dextrose 50% Injectable 25 Gram(s) IV Push once  glucagon  Injectable 1 milliGRAM(s) IntraMuscular once  heparin  Infusion.  Unit(s)/Hr (18 mL/Hr) IV Continuous <Continuous>  hydrALAZINE 25 milliGRAM(s) Oral two times a day  insulin lispro (ADMELOG) corrective regimen sliding scale   SubCutaneous three times a day before meals  insulin lispro (ADMELOG) corrective regimen sliding scale   SubCutaneous at bedtime  levETIRAcetam 500 milliGRAM(s) Oral two times a day  losartan 100 milliGRAM(s) Oral daily  metoprolol succinate  milliGRAM(s) Oral daily  pantoprazole    Tablet 40 milliGRAM(s) Oral before breakfast  timolol 0.5% Solution 1 Drop(s) Both EYES two times a day      MEDICATIONS  (PRN):  acetaminophen     Tablet .. 650 milliGRAM(s) Oral every 6 hours PRN Temp greater or equal to 38C (100.4F), Mild Pain (1 - 3)  aluminum hydroxide/magnesium hydroxide/simethicone Suspension 30 milliLiter(s) Oral every 4 hours PRN Dyspepsia  dextrose Oral Gel 15 Gram(s) Oral once PRN Blood Glucose LESS THAN 70 milliGRAM(s)/deciliter  heparin   Injectable 8000 Unit(s) IV Push every 6 hours PRN For aPTT less than 40  heparin   Injectable 4000 Unit(s) IV Push every 6 hours PRN For aPTT between 40 - 57  melatonin 3 milliGRAM(s) Oral at bedtime PRN Insomnia      Objective:  Vitals: Vital Signs Last 24 Hrs  T(C): 36.6 (08-28-22 @ 05:14), Max: 37.1 (08-27-22 @ 14:25)  T(F): 97.8 (08-28-22 @ 05:14), Max: 98.8 (08-27-22 @ 14:25)  HR: 83 (08-28-22 @ 05:14) (65 - 92)  BP: 123/84 (08-28-22 @ 05:14) (123/84 - 144/90)  BP(mean): --  RR: 18 (08-28-22 @ 05:14) (16 - 18)  SpO2: 98% (08-28-22 @ 05:14) (97% - 99%)               I&O's Summary    27 Aug 2022 07:01  -  28 Aug 2022 07:00  --------------------------------------------------------  IN: 240 mL / OUT: 0 mL / NET: 240 mL      PHYSICAL EXAM:  General: NAD, resting in bed, on RA  HEENT: AT/NC, EOMI, PERRLA, clear conjunctiva and sclera  Lungs: clear to auscultation in anterior lung fields, no wheezes/rhonchi/rales  Heart: +s1/s2, RRR, no murmurs/gallops/rubs  Abdomen: soft, non-distended, non-tender to palpation, no rebound/guarding/rigidity, bowel sounds present  Extremities: +DP pulse bilaterally, no cyanosis/clubbing/edema  Neuro: AAOx3, no focal deficits      LABS:  08-26    142  |  104  |  17  ----------------------------<  108<H>  3.4<L>   |  26  |  1.15  08-26    141  |  110<H>  |  22  ----------------------------<  99  4.0   |  27  |  1.30    Ca    9.6      26 Aug 2022 23:08  Ca    9.2      26 Aug 2022 10:48    TPro  7.2  /  Alb  4.4  /  TBili  0.9  /  DBili  x   /  AST  27  /  ALT  25  /  AlkPhos  78  08-26  TPro  8.0  /  Alb  4.2  /  TBili  0.9  /  DBili  x   /  AST  25  /  ALT  32  /  AlkPhos  89  08-26      PT/INR - ( 26 Aug 2022 23:08 )   PT: 19.1 sec;   INR: 1.65 ratio         PTT - ( 27 Aug 2022 23:56 )  PTT:58.8 sec                                        17.0   14.63 )-----------( 280      ( 27 Aug 2022 23:56 )             49.3                         16.8   7.62  )-----------( 255      ( 26 Aug 2022 23:08 )             50.1                         17.1   7.38  )-----------( 260      ( 26 Aug 2022 10:48 )             50.8     CAPILLARY BLOOD GLUCOSE    POCT Blood Glucose.: 152 mg/dL (27 Aug 2022 21:07)  POCT Blood Glucose.: 173 mg/dL (27 Aug 2022 16:43)  POCT Blood Glucose.: 159 mg/dL (27 Aug 2022 14:00)   ***************************************************************  Julius Fitzpatrick, PGY 1  Internal Medicine   pager: [phone]  ***************************************************************    KATIE VALLES  MRN: [id number]    Patient is a 67 y/o M who presents with a chief complaint of R frontal lobe lesions (27 Aug 2022 17:51)      Subjective:   No acute events overnight.  Denies CP, SOB, n/v, abdominal pain. Urinating well. Tolerating diet.       MEDICATIONS  (STANDING):  allopurinol 100 milliGRAM(s) Oral daily  amLODIPine   Tablet 10 milliGRAM(s) Oral daily  brimonidine 0.2% Ophthalmic Solution 1 Drop(s) Both EYES two times a day  colchicine 0.6 milliGRAM(s) Oral daily  dexAMETHasone  Injectable 4 milliGRAM(s) IV Push every 6 hours  dextrose 5%. 1000 milliLiter(s) (50 mL/Hr) IV Continuous <Continuous>  dextrose 5%. 1000 milliLiter(s) (100 mL/Hr) IV Continuous <Continuous>  dextrose 50% Injectable 25 Gram(s) IV Push once  dextrose 50% Injectable 12.5 Gram(s) IV Push once  dextrose 50% Injectable 25 Gram(s) IV Push once  glucagon  Injectable 1 milliGRAM(s) IntraMuscular once  heparin  Infusion.  Unit(s)/Hr (18 mL/Hr) IV Continuous <Continuous>  hydrALAZINE 25 milliGRAM(s) Oral two times a day  insulin lispro (ADMELOG) corrective regimen sliding scale   SubCutaneous three times a day before meals  insulin lispro (ADMELOG) corrective regimen sliding scale   SubCutaneous at bedtime  levETIRAcetam 500 milliGRAM(s) Oral two times a day  losartan 100 milliGRAM(s) Oral daily  metoprolol succinate  milliGRAM(s) Oral daily  pantoprazole    Tablet 40 milliGRAM(s) Oral before breakfast  timolol 0.5% Solution 1 Drop(s) Both EYES two times a day      MEDICATIONS  (PRN):  acetaminophen     Tablet .. 650 milliGRAM(s) Oral every 6 hours PRN Temp greater or equal to 38C (100.4F), Mild Pain (1 - 3)  aluminum hydroxide/magnesium hydroxide/simethicone Suspension 30 milliLiter(s) Oral every 4 hours PRN Dyspepsia  dextrose Oral Gel 15 Gram(s) Oral once PRN Blood Glucose LESS THAN 70 milliGRAM(s)/deciliter  heparin   Injectable 8000 Unit(s) IV Push every 6 hours PRN For aPTT less than 40  heparin   Injectable 4000 Unit(s) IV Push every 6 hours PRN For aPTT between 40 - 57  melatonin 3 milliGRAM(s) Oral at bedtime PRN Insomnia      Objective:  Vitals: Vital Signs Last 24 Hrs  T(C): 36.6 (08-28-22 @ 05:14), Max: 37.1 (08-27-22 @ 14:25)  T(F): 97.8 (08-28-22 @ 05:14), Max: 98.8 (08-27-22 @ 14:25)  HR: 83 (08-28-22 @ 05:14) (65 - 92)  BP: 123/84 (08-28-22 @ 05:14) (123/84 - 144/90)  BP(mean): --  RR: 18 (08-28-22 @ 05:14) (16 - 18)  SpO2: 98% (08-28-22 @ 05:14) (97% - 99%)               I&O's Summary    27 Aug 2022 07:01  -  28 Aug 2022 07:00  --------------------------------------------------------  IN: 240 mL / OUT: 0 mL / NET: 240 mL      PHYSICAL EXAM:  General: NAD, resting in bed, on RA  Lungs: clear to auscultation in anterior lung fields, no wheezes/rhonchi/rales  Heart: +s1/s2, RRR, no murmurs/gallops/rubs  Abdomen: soft, non-distended, non-tender to palpation, no rebound/guarding/rigidity  Extremities: no peripheral edema bilaterally  Neuro: AAOx3, 5/5 strength in bilateral LE      LABS:  08-26    142  |  104  |  17  ----------------------------<  108<H>  3.4<L>   |  26  |  1.15  08-26    141  |  110<H>  |  22  ----------------------------<  99  4.0   |  27  |  1.30    Ca    9.6      26 Aug 2022 23:08  Ca    9.2      26 Aug 2022 10:48    TPro  7.2  /  Alb  4.4  /  TBili  0.9  /  DBili  x   /  AST  27  /  ALT  25  /  AlkPhos  78  08-26  TPro  8.0  /  Alb  4.2  /  TBili  0.9  /  DBili  x   /  AST  25  /  ALT  32  /  AlkPhos  89  08-26      PT/INR - ( 26 Aug 2022 23:08 )   PT: 19.1 sec;   INR: 1.65 ratio         PTT - ( 27 Aug 2022 23:56 )  PTT:58.8 sec                                        17.0   14.63 )-----------( 280      ( 27 Aug 2022 23:56 )             49.3                         16.8   7.62  )-----------( 255      ( 26 Aug 2022 23:08 )             50.1                         17.1   7.38  )-----------( 260      ( 26 Aug 2022 10:48 )             50.8     CAPILLARY BLOOD GLUCOSE    POCT Blood Glucose.: 152 mg/dL (27 Aug 2022 21:07)  POCT Blood Glucose.: 173 mg/dL (27 Aug 2022 16:43)  POCT Blood Glucose.: 159 mg/dL (27 Aug 2022 14:00)

## 2022-08-28 NOTE — CONSULT NOTE ADULT - SUBJECTIVE AND OBJECTIVE BOX
CHIEF COMPLAINT:Patient is a 66y old  Male who presents with a chief complaint of R frontal lobe lesions (28 Aug 2022 07:39)      HISTORY OF PRESENT ILLNESS:      66 male with history as below, namely longstanding afib on a/c, history of splenic artery dissection and infarction , HTN , Gout  prostate cancer s/p prostatectomy in 2010 presenting to [facility] on 8/26 s/p episode of facial droop and likely seizures, transferred to [facility] for further evaluation of newly found lesions on R frontal lobe c/f primary malignancy vs metastases planned for brain lesion biopsy   pt denies any chest pain, sob, palpitation, dizziness or syncope.  exercise capacity is greater than 4 METs he usually bikes 24 miles and its very active without any symptoms or limitation     PAST MEDICAL & SURGICAL HISTORY:  Prostate ca  s/p prostatectomy      Hypertension      Gout      Atrial fibrillation  paroxysmal      Splenic infarction  in 2019 and 2021, 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      2019 novel coronavirus disease (COVID-19)  February 20201 - received monoclonal AB infusion      GERD (gastroesophageal reflux disease)      H/O prostatectomy  4/27/10      H/O arthroscopy of right knee  1988      H/O colonoscopy      H/O endoscopy      Elective surgery  Percutaneous Cholecystostomy Drainage September 2021              MEDICATIONS:  amLODIPine   Tablet 10 milliGRAM(s) Oral daily  heparin   Injectable 8000 Unit(s) IV Push every 6 hours PRN  heparin   Injectable 4000 Unit(s) IV Push every 6 hours PRN  heparin  Infusion.  Unit(s)/Hr IV Continuous <Continuous>  hydrALAZINE 25 milliGRAM(s) Oral two times a day  losartan 100 milliGRAM(s) Oral daily  metoprolol succinate  milliGRAM(s) Oral daily        acetaminophen     Tablet .. 650 milliGRAM(s) Oral every 6 hours PRN  levETIRAcetam 500 milliGRAM(s) Oral two times a day  melatonin 3 milliGRAM(s) Oral at bedtime PRN    aluminum hydroxide/magnesium hydroxide/simethicone Suspension 30 milliLiter(s) Oral every 4 hours PRN  pantoprazole    Tablet 40 milliGRAM(s) Oral before breakfast    allopurinol 100 milliGRAM(s) Oral daily  colchicine 0.6 milliGRAM(s) Oral daily  dexAMETHasone  Injectable 4 milliGRAM(s) IV Push every 6 hours  dextrose 50% Injectable 25 Gram(s) IV Push once  dextrose 50% Injectable 12.5 Gram(s) IV Push once  dextrose 50% Injectable 25 Gram(s) IV Push once  dextrose Oral Gel 15 Gram(s) Oral once PRN  glucagon  Injectable 1 milliGRAM(s) IntraMuscular once  insulin lispro (ADMELOG) corrective regimen sliding scale   SubCutaneous three times a day before meals  insulin lispro (ADMELOG) corrective regimen sliding scale   SubCutaneous at bedtime    brimonidine 0.2% Ophthalmic Solution 1 Drop(s) Both EYES two times a day  dextrose 5%. 1000 milliLiter(s) IV Continuous <Continuous>  dextrose 5%. 1000 milliLiter(s) IV Continuous <Continuous>  timolol 0.5% Solution 1 Drop(s) Both EYES two times a day      FAMILY HISTORY:  Family history of diabetes mellitus (DM) (Mother)    Family history of TIAs (Mother)    Family hx of prostate cancer (Father)    Family history of bone cancer (Father)    Family history of appendicitis        Non-contributory    SOCIAL HISTORY:    No tobacco, drugs or etoh    Allergies    No Known Allergies    Intolerances    	    REVIEW OF SYSTEMS:  as above  The rest of the 14 points ROS reviewed and except above they are unremarkable.        PHYSICAL EXAM:  T(C): 36.6 (08-28-22 @ 05:14), Max: 37.1 (08-27-22 @ 14:25)  HR: 83 (08-28-22 @ 05:14) (82 - 92)  BP: 123/84 (08-28-22 @ 05:14) (123/84 - 144/90)  RR: 18 (08-28-22 @ 05:14) (16 - 18)  SpO2: 98% (08-28-22 @ 05:14) (97% - 99%)  Wt(kg): --  I&O's Summary    27 Aug 2022 07:01  -  28 Aug 2022 07:00  --------------------------------------------------------  IN: 240 mL / OUT: 0 mL / NET: 240 mL        JVP: Normal  Neck: supple  Lung: clear   CV: S1 S2 , Murmur:  Abd: soft  Ext: No edema  neuro: Awake / alert  Psych: flat affect  Skin: normal    LABS/DATA:    TELEMETRY: 	    ECG:  	ATRIAL FIBRILLATION WITH RAPID VENTRICULAR RESPONSE WITH PREMATURE VENTRICULAR  OR ABERRANTLY CONDUCTED COMPLEXES  RIGHT AXIS DEVIATION  ST & T WAVE ABNORMALITY, CONSIDER LATERAL ISCHEMIA  ABNORMAL ECG  NO PREVIOUS ECGS AVAILABLE  Confirmed by LEANN SEGUNDO MD [id number]) on 8/27/2022 12:35:38 PM,    	  CARDIAC MARKERS:                                      17.0   14.63 )-----------( 280      ( 27 Aug 2022 23:56 )             49.3     08-26    142  |  104  |  17  ----------------------------<  108<H>  3.4<L>   |  26  |  1.15    Ca    9.6      26 Aug 2022 23:08    TPro  7.2  /  Alb  4.4  /  TBili  0.9  /  DBili  x   /  AST  27  /  ALT  25  /  AlkPhos  78  08-26    proBNP:   Lipid Profile:   HgA1c:   TSH:     < from: CT Chest w/ IV Cont (08.27.22 @ 02:23) >  IMPRESSION:  A few scattered nonspecific 1 to 2 mm pulmonary nodules. Otherwise no   definite evidence of malignancy in the chest, abdomen, and pelvis        < end of copied text >  < from: MR Head w/wo IV Cont (08.26.22 @ 18:21) >  IMPRESSION: Peripherallyenhancing lesion is seen involving the right   frontal region which are suspicious for underlying metastasis. Clinical   correlation and continued close interval follow-up is recommended.    --- End of Report ---    < end of copied text >

## 2022-08-28 NOTE — PROGRESS NOTE ADULT - ASSESSMENT
67 y/o M with history of Afib (on Xarelto), HTN, gout, and prostate cancer s/p prostatectomy in 2010 presenting to [place] on 8/26 s/p episode of facial droop and likely seizures, transferred to [facility] for further evaluation of newly found lesions on R frontal lobe c/f primary malignancy vs metastases.  67 y/o M with history of Afib (on Xarelto), HTN, gout, and prostate cancer s/p prostatectomy in 2010 presenting to [place] on 8/26 s/p episode of facial droop and likely seizures, transferred to [facility] for further evaluation of newly found lesions on R frontal lobe c/f primary malignancy vs metastases. Pending brain tissue biopsy (and possibly LP) on Wednesday 8/31 with nsgy.

## 2022-08-28 NOTE — PROGRESS NOTE ADULT - ASSESSMENT
66M, PLV txfer, [facility] prostate cancer s/p total prostatectomy 12 years ago. 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 Had "head shaking" ~2mins while driving with dysarthria ~5mins on 8/26 AM. Maintained consciousness. Saw PCP later in the day, who sent him to ED after he noticed a Left facial droop. MRI shows 5-6 small Right frontal lobe ring-enhancing lesions, no mass effect, with vasogenic edema. No sig diffusion restriction Exam: Slight Left nasolabial flattening, otherwise intact  8/28:   -OR for brain biopsy Wednesday  -Adm Medicine, q4h neurochecks  -Keppra 500BID, Dex 4q6  -CT CAP grossly neg for malig  -Consult Rad Onc for outpt SRS  -On hep drip for history of Afib and splenic infarcts   -[place] to see for preop Cards risk assessment

## 2022-08-28 NOTE — PROGRESS NOTE ADULT - PROBLEM SELECTOR PLAN 7
- DVT ppx: heparin gtt   - Diet: DASH/TLC  - Dispo: pending further work-up of brain lesion > Continue home Allopurinol 100mg daily  > Continue home Colchicine 0.6mg daily 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  > Heparin gtt in anticipation of tissue biopsy

## 2022-08-28 NOTE — PROGRESS NOTE ADULT - PROBLEM SELECTOR PLAN 8
- DVT ppx: heparin gtt   - Diet: DASH/TLC  - Dispo: pending further work-up of brain lesion > Continue home Allopurinol 100mg daily  > Continue home Colchicine 0.6mg daily

## 2022-08-28 NOTE — PROGRESS NOTE ADULT - PROBLEM SELECTOR PLAN 5
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  > Heparin gtt in anticipation of tissue biopsy - S/p prostatectomy in 2010, has been following up regularly and did not require further treatment   - Last PSA several months ago was undetectable per patient  > Continue f/u outpatient - Hgb a1c 5.5  > SSI while on steroids

## 2022-08-28 NOTE — CONSULT NOTE ADULT - ASSESSMENT
66M, PLV txfer, [facility] prostate cancer s/p total prostatectomy 12 years ago. 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 Had "head shaking" ~2mins while driving with dysarthria ~5mins on 8/26 AM. Maintained consciousness. Saw PCP later in the day, who sent him to ED after he noticed a Left facial droop. MRI shows 5-6 small Right frontal lobe ring-enhancing lesions, no mass effect, with vasogenic edema. No sig diffusion restriction Exam: Slight Left nasolabial flattening, otherwise intact  -Adm Medicine, q4h neurochecks  -Keppra 500BID, Dex 4q6  -CT CAP  -Consult Rad Onc for outpt SRS  
Brain mass  ? metastatic disease   plan for biopsy   fu with nsx  fu with med    Long standing afib  HR stable  cont BB  on heparin infusion     HTN  stable    Pre-Operative Cardiac Risk Stratification and Optimization  Based on patient history and physical exam, the patient is considered to have elevated risk   however has no active CV symptoms   good ET  HR is controlled  will get Echo to eval LV / RV function 
66M PMHx Afib (on Xarelto), HTN, gout, chronic cholecystitis s/p cholecystectomy, and prostate cancer s/p prostatectomy (2010) presenting to ED as a transfer from [place] after being found to have abnormal MRI findings concerning for possible brain mets. Pt non-focal on exam. Vitals stable. CBC and CMP WNL. MRI revealed multiple peripherally ring-enhancing lesion located in the R frontal region. CT C/A/P showing scattered nonspecific 1 to 2mm pulm nodules.     IMPRESSION: Multiple R frontal ring-enhancing peripheral lesions likely 2/2 brain metastasis. R/o infectious etiology    RECOMMENDATIONS:  [] Follow up neurosurgery recommendations  [] Lumbar puncture to r/o infectious etiology  [] Send CSF for total cell count, glucose, protein, gram stain, acid fast, fungal cx, PCR panel, cryptococcal, Histo, Toxoplasma, flow cytometry, non-gyn cytopathology  [] Send HIV  [] Continue with Keppra 500mg BID for seizure ppx  [] Neurochecks and vitals per unit protocol  [] Rest of care per primary team      Case to be seen and d/w neurology attending during morning rounds.

## 2022-08-28 NOTE — PROGRESS NOTE ADULT - SUBJECTIVE AND OBJECTIVE BOX
Patient seen and examined at bedside.    --Anticoagulation--  heparin   Injectable 8000 Unit(s) IV Push every 6 hours PRN  heparin   Injectable 4000 Unit(s) IV Push every 6 hours PRN  heparin  Infusion.  Unit(s)/Hr IV Continuous <Continuous>    T(C): 36.6 (08-28-22 @ 05:14), Max: 37.1 (08-27-22 @ 14:25)  HR: 83 (08-28-22 @ 05:14) (82 - 92)  BP: 123/84 (08-28-22 @ 05:14) (123/84 - 144/90)  RR: 18 (08-28-22 @ 05:14) (16 - 18)  SpO2: 98% (08-28-22 @ 05:14) (97% - 99%)  Wt(kg): --    Exam: Slight Left nasolabial flattening, otherwise intact

## 2022-08-28 NOTE — PROGRESS NOTE ADULT - PROBLEM SELECTOR PLAN 2
> Continue home Losartan 100mg daily  > Continue home Amlodipine 10mg daily  > Continue home Metoprolol Succinate ER 100mg daily  > Continue home Hydralazine 25mg BID - Patient's Revised Cardiac Risk Index score is 0 points, which translates to 3.9% 30-day risk of death, MI, or cardiac arrest  - No history of CAD, CVA, CHF, renal dysfunction, no insulin use  - TTE on 8/28: EF 58%, normal LVSF   - Patient is medically optimized for brain tissue biopsy. The benefit of the surgical intervention for the purpose of biopsy outweigh the risk.

## 2022-08-28 NOTE — PROGRESS NOTE ADULT - PROBLEM SELECTOR PLAN 1
- Presenting with a brief episode of facial droop, dysarthria, and "head shaking," likely seizure-like activity i/s/o newly found lesions on R frontal lobe  - CTH: no acute intracranial hemorrhage  - CT perfusion: multiple peripherally enhancing and hyper-perfusing lesions on R lateral frontal lobe suggestive of metastases or primary neoplasm  - MRI brain: peripherally enhancing lesion in same region suspicious for underlying metastasis  - CT C/A/P: few scattered 1-2 mm pulmonary nodules, otherwise no definitive evidence of malignancy   > Evaluated by nsgy, continued Keppra 500mg BID and Dexamethasone 4mg q6h   > q4h neurochecks  > Neuro onc consulted, f/u whether tissue biopsy indicated  > Rad onc consulted - Presenting with a brief episode of facial droop, dysarthria, and "head shaking," likely seizure-like activity i/s/o newly found lesions on R frontal lobe  - CTH: no acute intracranial hemorrhage  - CT perfusion: multiple peripherally enhancing and hyper-perfusing lesions on R lateral frontal lobe suggestive of metastases or primary neoplasm  - MRI brain: peripherally enhancing lesion in same region suspicious for underlying metastasis  - CT C/A/P: few scattered 1-2 mm pulmonary nodules, otherwise no definitive evidence of malignancy   > Continue Keppra 500mg BID and Dexamethasone 4mg q6h   > Planning for brain tissue biopsy on Wed with nsgy  > Touch base with Neuro on Monday whether LP is still indicated  > q4h neurochecks  > Neuro onc and rad onc consulted

## 2022-08-28 NOTE — PROGRESS NOTE ADULT - PROBLEM SELECTOR PLAN 4
- S/p prostatectomy in 2010, has been following up regularly and did not require further treatment   - Last PSA several months ago was undetectable per patient  > Continue f/u outpatient > F/u Hgb a1c  > SSI while on steroids > Continue home Amlodipine 10mg daily  > Continue home Metoprolol Succinate ER 100mg daily  > Continue home Hydralazine 25mg BID  > Hold home Losartan in setting of PARKER

## 2022-08-28 NOTE — PROGRESS NOTE ADULT - PROBLEM SELECTOR PLAN 3
> F/u Hgb a1c  > SSI while on steroids > Continue home Losartan 100mg daily  > Continue home Amlodipine 10mg daily  > Continue home Metoprolol Succinate ER 100mg daily  > Continue home Hydralazine 25mg BID - Cr 1.55 today  - Likely related to contrast patient received from CT  - Appropriate UOP  > Gentle hydration with LR 100cc/hr  > Monitor Cr, UOP

## 2022-08-29 ENCOUNTER — TRANSCRIPTION ENCOUNTER (OUTPATIENT)
Age: 67
End: 2022-08-29

## 2022-08-29 PROBLEM — K21.9 GASTRO-ESOPHAGEAL REFLUX DISEASE WITHOUT ESOPHAGITIS: Chronic | Status: ACTIVE | Noted: 2022-08-27

## 2022-08-29 PROBLEM — D73.5 INFARCTION OF SPLEEN: Chronic | Status: ACTIVE | Noted: 2021-09-10

## 2022-08-29 LAB
ANION GAP SERPL CALC-SCNC: 11 MMOL/L — SIGNIFICANT CHANGE UP (ref 5–17)
APTT BLD: 118.7 SEC — HIGH (ref 27.5–35.5)
APTT BLD: 140.8 SEC — CRITICAL HIGH (ref 27.5–35.5)
APTT BLD: 50.6 SEC — HIGH (ref 27.5–35.5)
APTT BLD: 57.8 SEC — HIGH (ref 27.5–35.5)
BUN SERPL-MCNC: 40 MG/DL — HIGH (ref 7–23)
CALCIUM SERPL-MCNC: 9.1 MG/DL — SIGNIFICANT CHANGE UP (ref 8.4–10.5)
CHLORIDE SERPL-SCNC: 106 MMOL/L — SIGNIFICANT CHANGE UP (ref 96–108)
CO2 SERPL-SCNC: 21 MMOL/L — LOW (ref 22–31)
CREAT SERPL-MCNC: 1.34 MG/DL — HIGH (ref 0.5–1.3)
EGFR: 58 ML/MIN/1.73M2 — LOW
GLUCOSE BLDC GLUCOMTR-MCNC: 126 MG/DL — HIGH (ref 70–99)
GLUCOSE BLDC GLUCOMTR-MCNC: 133 MG/DL — HIGH (ref 70–99)
GLUCOSE BLDC GLUCOMTR-MCNC: 141 MG/DL — HIGH (ref 70–99)
GLUCOSE BLDC GLUCOMTR-MCNC: 144 MG/DL — HIGH (ref 70–99)
GLUCOSE SERPL-MCNC: 131 MG/DL — HIGH (ref 70–99)
HCT VFR BLD CALC: 47.2 % — SIGNIFICANT CHANGE UP (ref 39–50)
HCT VFR BLD CALC: 47.8 % — SIGNIFICANT CHANGE UP (ref 39–50)
HGB BLD-MCNC: 15.8 G/DL — SIGNIFICANT CHANGE UP (ref 13–17)
HGB BLD-MCNC: 16.1 G/DL — SIGNIFICANT CHANGE UP (ref 13–17)
MAGNESIUM SERPL-MCNC: 2.2 MG/DL — SIGNIFICANT CHANGE UP (ref 1.6–2.6)
MCHC RBC-ENTMCNC: 29.2 PG — SIGNIFICANT CHANGE UP (ref 27–34)
MCHC RBC-ENTMCNC: 29.2 PG — SIGNIFICANT CHANGE UP (ref 27–34)
MCHC RBC-ENTMCNC: 33.5 GM/DL — SIGNIFICANT CHANGE UP (ref 32–36)
MCHC RBC-ENTMCNC: 33.7 GM/DL — SIGNIFICANT CHANGE UP (ref 32–36)
MCV RBC AUTO: 86.6 FL — SIGNIFICANT CHANGE UP (ref 80–100)
MCV RBC AUTO: 87.1 FL — SIGNIFICANT CHANGE UP (ref 80–100)
NRBC # BLD: 0 /100 WBCS — SIGNIFICANT CHANGE UP (ref 0–0)
NRBC # BLD: 0 /100 WBCS — SIGNIFICANT CHANGE UP (ref 0–0)
PHOSPHATE SERPL-MCNC: 3.8 MG/DL — SIGNIFICANT CHANGE UP (ref 2.5–4.5)
PLATELET # BLD AUTO: 237 K/UL — SIGNIFICANT CHANGE UP (ref 150–400)
PLATELET # BLD AUTO: 257 K/UL — SIGNIFICANT CHANGE UP (ref 150–400)
POTASSIUM SERPL-MCNC: 4.1 MMOL/L — SIGNIFICANT CHANGE UP (ref 3.5–5.3)
POTASSIUM SERPL-SCNC: 4.1 MMOL/L — SIGNIFICANT CHANGE UP (ref 3.5–5.3)
RBC # BLD: 5.42 M/UL — SIGNIFICANT CHANGE UP (ref 4.2–5.8)
RBC # BLD: 5.52 M/UL — SIGNIFICANT CHANGE UP (ref 4.2–5.8)
RBC # FLD: 13.4 % — SIGNIFICANT CHANGE UP (ref 10.3–14.5)
RBC # FLD: 13.5 % — SIGNIFICANT CHANGE UP (ref 10.3–14.5)
SODIUM SERPL-SCNC: 138 MMOL/L — SIGNIFICANT CHANGE UP (ref 135–145)
WBC # BLD: 17.04 K/UL — HIGH (ref 3.8–10.5)
WBC # BLD: 17.68 K/UL — HIGH (ref 3.8–10.5)
WBC # FLD AUTO: 17.04 K/UL — HIGH (ref 3.8–10.5)
WBC # FLD AUTO: 17.68 K/UL — HIGH (ref 3.8–10.5)

## 2022-08-29 PROCEDURE — 99233 SBSQ HOSP IP/OBS HIGH 50: CPT | Mod: GC

## 2022-08-29 PROCEDURE — 99222 1ST HOSP IP/OBS MODERATE 55: CPT

## 2022-08-29 RX ORDER — HEPARIN SODIUM 5000 [USP'U]/ML
8000 INJECTION INTRAVENOUS; SUBCUTANEOUS EVERY 6 HOURS
Refills: 0 | Status: DISCONTINUED | OUTPATIENT
Start: 2022-08-29 | End: 2022-08-31

## 2022-08-29 RX ORDER — HEPARIN SODIUM 5000 [USP'U]/ML
9000 INJECTION INTRAVENOUS; SUBCUTANEOUS ONCE
Refills: 0 | Status: DISCONTINUED | OUTPATIENT
Start: 2022-08-29 | End: 2022-08-29

## 2022-08-29 RX ORDER — HEPARIN SODIUM 5000 [USP'U]/ML
4000 INJECTION INTRAVENOUS; SUBCUTANEOUS EVERY 6 HOURS
Refills: 0 | Status: DISCONTINUED | OUTPATIENT
Start: 2022-08-29 | End: 2022-08-31

## 2022-08-29 RX ORDER — SODIUM CHLORIDE 9 MG/ML
1000 INJECTION, SOLUTION INTRAVENOUS
Refills: 0 | Status: DISCONTINUED | OUTPATIENT
Start: 2022-08-29 | End: 2022-08-31

## 2022-08-29 RX ORDER — HEPARIN SODIUM 5000 [USP'U]/ML
900 INJECTION INTRAVENOUS; SUBCUTANEOUS
Qty: 25000 | Refills: 0 | Status: DISCONTINUED | OUTPATIENT
Start: 2022-08-29 | End: 2022-08-30

## 2022-08-29 RX ADMIN — HEPARIN SODIUM 900 UNIT(S)/HR: 5000 INJECTION INTRAVENOUS; SUBCUTANEOUS at 19:01

## 2022-08-29 RX ADMIN — AMLODIPINE BESYLATE 10 MILLIGRAM(S): 2.5 TABLET ORAL at 05:58

## 2022-08-29 RX ADMIN — HEPARIN SODIUM 900 UNIT(S)/HR: 5000 INJECTION INTRAVENOUS; SUBCUTANEOUS at 17:10

## 2022-08-29 RX ADMIN — HEPARIN SODIUM 1200 UNIT(S)/HR: 5000 INJECTION INTRAVENOUS; SUBCUTANEOUS at 07:17

## 2022-08-29 RX ADMIN — Medication 1 DROP(S): at 17:09

## 2022-08-29 RX ADMIN — HEPARIN SODIUM 4000 UNIT(S): 5000 INJECTION INTRAVENOUS; SUBCUTANEOUS at 23:56

## 2022-08-29 RX ADMIN — SODIUM CHLORIDE 100 MILLILITER(S): 9 INJECTION, SOLUTION INTRAVENOUS at 10:38

## 2022-08-29 RX ADMIN — Medication 25 MILLIGRAM(S): at 05:58

## 2022-08-29 RX ADMIN — Medication 100 MILLIGRAM(S): at 12:23

## 2022-08-29 RX ADMIN — Medication 3 MILLIGRAM(S): at 22:46

## 2022-08-29 RX ADMIN — Medication 4 MILLIGRAM(S): at 22:47

## 2022-08-29 RX ADMIN — Medication 100 MILLIGRAM(S): at 05:58

## 2022-08-29 RX ADMIN — HEPARIN SODIUM 1200 UNIT(S)/HR: 5000 INJECTION INTRAVENOUS; SUBCUTANEOUS at 01:42

## 2022-08-29 RX ADMIN — PANTOPRAZOLE SODIUM 40 MILLIGRAM(S): 20 TABLET, DELAYED RELEASE ORAL at 05:58

## 2022-08-29 RX ADMIN — Medication 4 MILLIGRAM(S): at 12:23

## 2022-08-29 RX ADMIN — HEPARIN SODIUM 900 UNIT(S)/HR: 5000 INJECTION INTRAVENOUS; SUBCUTANEOUS at 09:59

## 2022-08-29 RX ADMIN — Medication 4 MILLIGRAM(S): at 05:58

## 2022-08-29 RX ADMIN — LEVETIRACETAM 500 MILLIGRAM(S): 250 TABLET, FILM COATED ORAL at 05:58

## 2022-08-29 RX ADMIN — Medication 0.6 MILLIGRAM(S): at 12:23

## 2022-08-29 RX ADMIN — BRIMONIDINE TARTRATE 1 DROP(S): 2 SOLUTION/ DROPS OPHTHALMIC at 05:59

## 2022-08-29 RX ADMIN — Medication 1 DROP(S): at 05:59

## 2022-08-29 RX ADMIN — BRIMONIDINE TARTRATE 1 DROP(S): 2 SOLUTION/ DROPS OPHTHALMIC at 17:10

## 2022-08-29 RX ADMIN — LEVETIRACETAM 500 MILLIGRAM(S): 250 TABLET, FILM COATED ORAL at 17:09

## 2022-08-29 RX ADMIN — HEPARIN SODIUM 1100 UNIT(S)/HR: 5000 INJECTION INTRAVENOUS; SUBCUTANEOUS at 23:55

## 2022-08-29 RX ADMIN — Medication 4 MILLIGRAM(S): at 17:09

## 2022-08-29 RX ADMIN — Medication 25 MILLIGRAM(S): at 17:09

## 2022-08-29 NOTE — CONSULT NOTE ADULT - SUBJECTIVE AND OBJECTIVE BOX
HPI:  67 y/o M with history of  chronic Afib (on Xarelto), HTN, gout, chronic cholecystitis s/p cholecystectomy, and prostate cancer s/p prostatectomy (in 2010) presenting to ED as a transfer from [place] for slurred speech and facial droop. On morning of 8/26, patient was driving in his car when he yawned and felt his neck being stuck in a position with spasms and uncontrolled head shaking. He never lost control of his driving, but he had to support his neck/face to stabilize. He eventually stopped his car and his symptoms resolved. He then went straight to his doctor's office who noticed facial droop and sent him over to [place]. Otherwise denies CP, SOB, fevers, chills, vision change, extremity weakness.  (27 Aug 2022 10:22)    Seen by neurosurgery, planned for OR biopsy on Wednesday.  His PSA is <0.1.   MRI shows intracranial lesions, right frontal 1.7x1.4cm.     < from: MR Head w/wo IV Cont (08.26.22 @ 18:21) >  Multiple peripherally (5) enhancing lesions are seen involving the right   posterior frontal region. One particular lesion appears dural based and   measures approximately 1.7 x 1.4cm. The larger of the intra-axial   lesions measures approximately 1.3 x 1.3 cm. These lesions could be   compatible with underlying neoplastic process such as metastasis. The   possibility of infectious etiology cannot be excluded though appears less   likely given that there is no associated areas of abnormal restricted   diffusion seen. There are small areas of surrounding edema identified.   There is localized mass effect seen. No significant shift or herniation   is seen.    Nonspecific nonenhancing T2 prolongation in the left corona radiata   region is identified. Postinfusion    The large vessels demonstrate normal flow voids.    The visualized paranasal sinuses mastoid and middle ear regions appear   clear.    IMPRESSION: Peripherallyenhancing lesion is seen involving the right   frontal region which are suspicious for underlying metastasis. Clinical   correlation and continued close interval follow-up is recommended.      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    2019 novel coronavirus disease (COVID-19)  February 20201 - received monoclonal AB infusion    GERD (gastroesophageal reflux disease)    No significant past surgical history    H/O prostatectomy  4/27/10    H/O arthroscopy of right knee  1988    H/O colonoscopy    H/O endoscopy    Elective surgery  Percutaneous Cholecystostomy Drainage September 2021        FAMILY HISTORY:  Family history of diabetes mellitus (DM) (Mother)    Family history of TIAs (Mother)    Family hx of prostate cancer (Father)    Family history of bone cancer (Father)    Family history of appendicitis        MEDICATIONS  (STANDING):  allopurinol 100 milliGRAM(s) Oral daily  amLODIPine   Tablet 10 milliGRAM(s) Oral daily  brimonidine 0.2% Ophthalmic Solution 1 Drop(s) Both EYES two times a day  colchicine 0.6 milliGRAM(s) Oral daily  dexAMETHasone  Injectable 4 milliGRAM(s) IV Push every 6 hours  dextrose 5%. 1000 milliLiter(s) (50 mL/Hr) IV Continuous <Continuous>  dextrose 5%. 1000 milliLiter(s) (100 mL/Hr) IV Continuous <Continuous>  dextrose 50% Injectable 25 Gram(s) IV Push once  dextrose 50% Injectable 12.5 Gram(s) IV Push once  dextrose 50% Injectable 25 Gram(s) IV Push once  glucagon  Injectable 1 milliGRAM(s) IntraMuscular once  heparin  Infusion. 900 Unit(s)/Hr (9 mL/Hr) IV Continuous <Continuous>  hydrALAZINE 25 milliGRAM(s) Oral two times a day  insulin lispro (ADMELOG) corrective regimen sliding scale   SubCutaneous three times a day before meals  insulin lispro (ADMELOG) corrective regimen sliding scale   SubCutaneous at bedtime  lactated ringers. 1000 milliLiter(s) (100 mL/Hr) IV Continuous <Continuous>  levETIRAcetam 500 milliGRAM(s) Oral two times a day  metoprolol succinate  milliGRAM(s) Oral daily  pantoprazole    Tablet 40 milliGRAM(s) Oral before breakfast  timolol 0.5% Solution 1 Drop(s) Both EYES two times a day    MEDICATIONS  (PRN):  acetaminophen     Tablet .. 650 milliGRAM(s) Oral every 6 hours PRN Temp greater or equal to 38C (100.4F), Mild Pain (1 - 3)  aluminum hydroxide/magnesium hydroxide/simethicone Suspension 30 milliLiter(s) Oral every 4 hours PRN Dyspepsia  dextrose Oral Gel 15 Gram(s) Oral once PRN Blood Glucose LESS THAN 70 milliGRAM(s)/deciliter  heparin   Injectable 8000 Unit(s) IV Push every 6 hours PRN For aPTT less than 40  heparin   Injectable 4000 Unit(s) IV Push every 6 hours PRN For aPTT between 40 - 57  melatonin 3 milliGRAM(s) Oral at bedtime PRN Insomnia      PHYSICAL EXAM  Vital Signs Last 24 Hrs  T(C): 36.4 (29 Aug 2022 12:00), Max: 36.6 (28 Aug 2022 16:45)  T(F): 97.6 (29 Aug 2022 12:00), Max: 97.8 (28 Aug 2022 16:45)  HR: 61 (29 Aug 2022 12:00) (61 - 85)  BP: 120/79 (29 Aug 2022 12:00) (107/72 - 120/79)  BP(mean): --  RR: 17 (29 Aug 2022 12:00) (17 - 18)  SpO2: 96% (29 Aug 2022 12:00) (96% - 99%)    Parameters below as of 29 Aug 2022 12:00  Patient On (Oxygen Delivery Method): room air        General: Well nourished, well developed, no acute distress  HEENT: NC/AT; EOMI, PERRL, sclera nonicteric; external ears normal; no rhinorrhea or epistaxis; mucous membranes moist; oropharynx clear and without erythema    Neurologic Exam:  Mental status - Awake, Alert, Oriented to person, place, and time. Speech fluent, repetition and naming intact. Follows simple and complex commands. Attention/concentration, recent and remote memory (including registration 3/3 and recall 3/3), and fund of knowledge intact    Cranial nerves - PERRLA, VFF, EOMI, face sensation (V1-V3) intact b/l, facial strength intact without asymmetry b/l, hearing intact b/l, palate with symmetric elevation, trapezius 5/5 strength b/l, tongue midline on protrusion with full lateral movement    IMAGING/LABS/PATHOLOGY: I have personally reviewed the relevant labs, pathology, and imaging as noted in the HPI.  In addition,                          16.1   17.68 )-----------( 237      ( 29 Aug 2022 06:26 )             47.8     08-29    138  |  106  |  40<H>  ----------------------------<  131<H>  4.1   |  21<L>  |  1.34<H>    Ca    9.1      29 Aug 2022 06:27  Phos  3.8     08-29  Mg     2.2     08-29    TPro  7.0  /  Alb  4.2  /  TBili  0.6  /  DBili  x   /  AST  19  /  ALT  21  /  AlkPhos  76  08-28        ASSESSMENT/PLAN  66y.o. M, h/o prostate cancer s/p prostatectomy 2010, was driving on 8/26/22 when he experienced spasm like head shaking but did not lose consciousness.  He went to [facility] and transferred to [facility].   He had an MRI of the brain showing intracranial lesions largest 1.7cm.  Seen by neurosurgery and planned for the OR, biopsy on Wednesday to obtain pathology.  NS recommends outpatient  SRS.  Case d/w Dr. Hernandez who agreed to outpatient rad onc evaluation after brain biopsy for possible SRS planning.  Will follow.  Please let us know when discharge is being planned.   Thank you.

## 2022-08-29 NOTE — DISCHARGE NOTE PROVIDER - PROVIDER TOKENS
PROVIDER:[TOKEN:[[id number]],FOLLOWUP:[1 week]],PROVIDER:[TOKEN:[[id number]],FOLLOWUP:[1 week]]

## 2022-08-29 NOTE — DISCHARGE NOTE PROVIDER - NSDCFUSCHEDAPPT_GEN_ALL_CORE_FT
Mansoor Trimble Physician [facility]  NEUROSURG [address]  Scheduled Appointment: 08/31/2022

## 2022-08-29 NOTE — PROVIDER CONTACT NOTE (CRITICAL VALUE NOTIFICATION) - BACKGROUND
pt on heparin gtt at 12.  per nomogram RN turned off heparin( x1 hr)   Rate to be adjusted -3 ml/hr (rate to be at 9) and to begin at 0915

## 2022-08-29 NOTE — CONSULT NOTE ADULT - CONSULT REASON
brain mets, planned for OR biopsy to obtain pathology
concerns for brain mets
Intracranial lesions
Pre-Operative Cardiac Risk Stratification and Optimization

## 2022-08-29 NOTE — PROGRESS NOTE ADULT - ASSESSMENT
· Assessment	  67 y/o M with history of Afib (on Xarelto), HTN, gout, and prostate cancer s/p prostatectomy in 2010 presenting to [place] on 8/26 s/p episode of facial droop and likely seizures, transferred to [facility] for further evaluation of newly found lesions on R frontal lobe c/f primary malignancy vs metastases. Pending brain tissue biopsy (and possibly LP) on Wednesday 8/31 with nsgy.     Problem/Plan - 1:  ·  Problem: Lesion of right frontal lobe of brain.   ·  Plan: - Presenting with a brief episode of facial droop, dysarthria, and "head shaking," likely seizure-like activity i/s/o newly found lesions on R frontal lobe  - CTH: no acute intracranial hemorrhage  - CT perfusion: multiple peripherally enhancing and hyper-perfusing lesions on R lateral frontal lobe suggestive of metastases or primary neoplasm  - MRI brain: peripherally enhancing lesion in same region suspicious for underlying metastasis  - CT C/A/P: few scattered 1-2 mm pulmonary nodules, otherwise no definitive evidence of malignancy   > Continue Keppra 500mg BID and Dexamethasone 4mg q6h   > Planning for brain tissue biopsy on Wed with nsgy  > Touch base with Neuro on Monday whether LP is still indicated  > q4h neurochecks  > Neuro onc and rad onc consulted.     Problem/Plan - 2:  ·  Problem: Pre-op evaluation.   ·  Plan: - Patient's Revised Cardiac Risk Index score is 0 points, which translates to 3.9% 30-day risk of death, MI, or cardiac arrest  - No history of CAD, CVA, CHF, renal dysfunction, no insulin use  - TTE on 8/28: EF 58%, normal LVSF   - Patient is medically optimized for brain tissue biopsy. The benefit of the surgical intervention for the purpose of biopsy outweigh the risk.     Problem/Plan - 3:  ·  Problem: PARKER (acute kidney injury).   ·  Plan: - Cr 1.55 today  - Likely related to contrast patient received from CT  - Appropriate UOP  > Gentle hydration with LR 100cc/hr  > Monitor Cr, UOP.     Problem/Plan - 4:  ·  Problem: HTN (hypertension).   ·  Plan: > Continue home Amlodipine 10mg daily  > Continue home Metoprolol Succinate ER 100mg daily  > Continue home Hydralazine 25mg BID  > Hold home Losartan in setting of PARKER.     Problem/Plan - 5:  ·  Problem: Hyperglycemia.   ·  Plan: - Hgb a1c 5.5  > SSI while on steroids.     Problem/Plan - 6:  ·  Problem: History of prostate cancer.   ·  Plan: - S/p prostatectomy in 2010, has been following up regularly and did not require further treatment   - Last PSA several months ago was undetectable per patient  > Continue f/u outpatient.     Problem/Plan - 7:  ·  Problem: Chronic atrial fibrillation.   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     Problem/Plan - 8:  ·  Problem: Gout.   ·  Plan: > Continue home Allopurinol 100mg daily  > Continue home Colchicine 0.6mg daily.     Problem/Plan - 9:  ·  Problem: Prophylactic measure.   ·  Plan: - DVT ppx: heparin gtt   - Diet: DASH/TLC  - Dispo: pending brain biopsy.   · Assessment	  65 y/o M with history of Afib (on Xarelto), HTN, gout, and prostate cancer s/p prostatectomy in 2010 presenting to [place] on 8/26 s/p episode of facial droop and likely seizures, transferred to [facility] for further evaluation of newly found lesions on R frontal lobe c/f primary malignancy vs metastases. Pending brain tissue biopsy (and possibly LP) on Wednesday 8/31 with nsgy.     Problem/Plan - 1:  ·  Problem: Lesion of right frontal lobe of brain.   ·  Plan: - Presenting with a brief episode of facial droop, dysarthria, and "head shaking," likely seizure-like activity i/s/o newly found lesions on R frontal lobe  - CTH: no acute intracranial hemorrhage  - CT perfusion: multiple peripherally enhancing and hyper-perfusing lesions on R lateral frontal lobe suggestive of metastases or primary neoplasm  - MRI brain: peripherally enhancing lesion in same region suspicious for underlying metastasis  - CT C/A/P: few scattered 1-2 mm pulmonary nodules, otherwise no definitive evidence of malignancy   > Continue Keppra 500mg BID and Dexamethasone 4mg q6h   > Planning for brain tissue biopsy on Wed with nsgy  > Neuro will hold off on LP for now pending no delay with scheduled biopsy   > q8h neurochecks  > Neuro onc and rad onc consulted, will f/u     Problem/Plan - 2:  ·  Problem: Pre-op evaluation.   ·  Plan: - Patient's Revised Cardiac Risk Index score is 0 points, which translates to 3.9% 30-day risk of death, MI, or cardiac arrest  - No history of CAD, CVA, CHF, renal dysfunction, no insulin use  - TTE on 8/28: EF 58%, normal LVSF   - Patient is medically optimized for brain tissue biopsy. The benefit of the surgical intervention for the purpose of biopsy outweigh the risk.     Problem/Plan - 3:  ·  Problem: PARKER (acute kidney injury).   - Cr improved to 1.34, previous was 1.55   - Likely related to contrast patient received from CT  - Appropriate UOP  > Gentle hydration with LR 100cc/hr  > Monitor Cr, UOP.     Problem/Plan - 4:  ·  Problem: HTN (hypertension).   ·  Plan: > Continue home Amlodipine 10mg daily  > Continue home Metoprolol Succinate ER 100mg daily  > Continue home Hydralazine 25mg BID  > Hold home Losartan in setting of PARKER.     Problem/Plan - 5:  ·  Problem: Hyperglycemia.   ·  Plan: - Hgb a1c 5.5  > SSI while on steroids.     Problem/Plan - 6:  ·  Problem: History of prostate cancer.   ·  Plan: - S/p prostatectomy in 2010, has been following up regularly and did not require further treatment   - Last PSA several months ago was undetectable per patient  > Continue f/u outpatient.     Problem/Plan - 7:  ·  Problem: Chronic atrial fibrillation.   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     Problem/Plan - 8:  ·  Problem: Gout.   ·  Plan: > Continue home Allopurinol 100mg daily  > Continue home Colchicine 0.6mg daily.     Problem/Plan - 9:  ·  Problem: Prophylactic measure.   ·  Plan: - DVT ppx: heparin gtt   - Diet: DASH/TLC  - Dispo: pending brain biopsy.

## 2022-08-29 NOTE — DISCHARGE NOTE PROVIDER - NSDCFUADDAPPT_GEN_ALL_CORE_FT
Please follow up with your primary care doctor within 1 week of discharge regarding your recent diagnosis.  Please follow up with your primary care doctor within 1 week of discharge regarding your recent diagnosis and when to restart your Losartan.     You will receive a call from Dr. Trujillo on Tuesday 9/6 to schedule a follow up appointment for next week. If you do not receive a phone call please call [phone].  Dr. Trujillo will also arrange an appointment with you with Dr. Carias, a neuro oncologist for sometime next week as well.  Please follow up with your primary care doctor within 1 week of discharge regarding your recent diagnosis and when to restart your Losartan and Furosemide.     You will receive a call from Dr. Trujillo on Tuesday 9/6 to schedule a follow up appointment for next week. If you do not receive a phone call please call [phone].  Dr. Trujillo will also arrange an appointment with you with Dr. Carias, a neuro oncologist for sometime next week as well.

## 2022-08-29 NOTE — PROGRESS NOTE ADULT - ASSESSMENT
Brain mass  ? metastatic disease   plan for biopsy   fu with nsx  fu with med    Long standing afib  HR stable  cont BB  on heparin infusion     HTN  stable    Pre-Operative Cardiac Risk Stratification and Optimization  Based on patient history and physical exam, the patient is considered to have elevated risk   however has no active CV symptoms   good ET  HR is controlled  echo reviewed has normal LV function   can proceed to this time sensitive procedure

## 2022-08-29 NOTE — DISCHARGE NOTE PROVIDER - CARE PROVIDER_API CALL
Shaun Trujillo  RADIATION ONCOLOGY  [address] [place] [address] A - [place] [address]  Phone: [phone]  Fax: [phone]  Follow Up Time: 1 week    Janette Carias)  Neurology  [address] [place] [id number]  Phone: [phone]  Fax: [phone]  Follow Up Time: 1 week

## 2022-08-29 NOTE — PROGRESS NOTE ADULT - SUBJECTIVE AND OBJECTIVE BOX
DATE OF SERVICE: 08-29-22 @ 08:00    Subjective: Patient seen and examined. No new events except as noted.     SUBJECTIVE/ROS:  No chest pain, dyspnea, palpitation, or dizziness.       MEDICATIONS:  MEDICATIONS  (STANDING):  allopurinol 100 milliGRAM(s) Oral daily  amLODIPine   Tablet 10 milliGRAM(s) Oral daily  brimonidine 0.2% Ophthalmic Solution 1 Drop(s) Both EYES two times a day  colchicine 0.6 milliGRAM(s) Oral daily  dexAMETHasone  Injectable 4 milliGRAM(s) IV Push every 6 hours  dextrose 5%. 1000 milliLiter(s) (50 mL/Hr) IV Continuous <Continuous>  dextrose 5%. 1000 milliLiter(s) (100 mL/Hr) IV Continuous <Continuous>  dextrose 50% Injectable 25 Gram(s) IV Push once  dextrose 50% Injectable 12.5 Gram(s) IV Push once  dextrose 50% Injectable 25 Gram(s) IV Push once  glucagon  Injectable 1 milliGRAM(s) IntraMuscular once  heparin  Infusion. 1600 Unit(s)/Hr (16 mL/Hr) IV Continuous <Continuous>  hydrALAZINE 25 milliGRAM(s) Oral two times a day  insulin lispro (ADMELOG) corrective regimen sliding scale   SubCutaneous three times a day before meals  insulin lispro (ADMELOG) corrective regimen sliding scale   SubCutaneous at bedtime  lactated ringers. 1000 milliLiter(s) (100 mL/Hr) IV Continuous <Continuous>  levETIRAcetam 500 milliGRAM(s) Oral two times a day  metoprolol succinate  milliGRAM(s) Oral daily  pantoprazole    Tablet 40 milliGRAM(s) Oral before breakfast  timolol 0.5% Solution 1 Drop(s) Both EYES two times a day      PHYSICAL EXAM:  T(C): 36.4 (08-29-22 @ 05:30), Max: 36.6 (08-28-22 @ 08:48)  HR: 73 (08-29-22 @ 05:30) (72 - 85)  BP: 107/72 (08-29-22 @ 05:30) (107/72 - 132/95)  RR: 18 (08-29-22 @ 05:30) (18 - 18)  SpO2: 99% (08-29-22 @ 05:30) (96% - 99%)  Wt(kg): --  I&O's Summary    28 Aug 2022 07:01  -  29 Aug 2022 07:00  --------------------------------------------------------  IN: 1884 mL / OUT: 1600 mL / NET: 284 mL            JVP: Normal  Neck: supple  Lung: clear   CV: S1 S2 , Murmur:  Abd: soft  Ext: No edema  neuro: Awake / alert  Psych: flat affect  Skin: normal``    LABS/DATA:    CARDIAC MARKERS:    < from: Transthoracic Echocardiogram (08.28.22 @ 10:17) >  ---  Conclusions:  1. Moderately dilated left atrium.  LA volume index = 43  cc/m2.  2. Normal left ventricular internal dimensions and wall  thicknesses.  3. Normal left ventricular systolic function. No segmental  wall motion abnormalities.  4. Normal right ventricular size and systolic function.  ------------------------------------------------------------------------  Confirmed on  8/28/2022 - 13:10:45 by Lilli Reagan M.D.  ------------------------------------------------------------------------    < end of copied text >                              16.1   17.68 )-----------( 237      ( 29 Aug 2022 06:26 )             47.8     08-29    138  |  106  |  40<H>  ----------------------------<  131<H>  4.1   |  21<L>  |  1.34<H>    Ca    9.1      29 Aug 2022 06:27  Phos  3.8     08-29  Mg     2.2     08-29    TPro  7.0  /  Alb  4.2  /  TBili  0.6  /  DBili  x   /  AST  19  /  ALT  21  /  AlkPhos  76  08-28    proBNP:   Lipid Profile:   HgA1c:   TSH:     TELE:  EKG:

## 2022-08-29 NOTE — DISCHARGE NOTE PROVIDER - NSDCCPTREATMENT_GEN_ALL_CORE_FT
PRINCIPAL PROCEDURE  Procedure: MRI brain w/w/o contrast  Findings and Treatment: MRI of the brain was performed using sagittal T1 axial T1 T2 T2 FLAIR   diffusion and [place] sequence. The patient was injected with approximately   90 cc of gadavist IV with 1 cc of contrast discarded. Coronal T2-weighted   sequence was performed as well. Sagittal coronal and axial T1-weighted   sequences were performed.  Multiple peripherally (5) enhancing lesions are seen involving the right   posterior frontal region. One particular lesion appears dural based and   measures approximately 1.7 x 1.4cm. The larger of the intra-axial   lesions measures approximately 1.3 x 1.3 cm. These lesions could be   compatible with underlying neoplastic process such as metastasis. The   possibility of infectious etiology cannot be excluded though appears less   likely given that there is no associated areas of abnormal restricted   diffusion seen. There are small areas of surrounding edema identified.   There is localized mass effect seen. No significant shift or herniation   is seen.  Nonspecific nonenhancing T2 prolongation in the left corona radiata   region is identified. Postinfusion  The large vessels demonstrate normal flow voids.  The visualized paranasal sinuses mastoid and middle ear regions appear   clear.  IMPRESSION: Peripherallyenhancing lesion is seen involving the right   frontal region which are suspicious for underlying metastasis. Clinical   correlation and continued close interval follow-up is recommended.         PRINCIPAL PROCEDURE  Procedure: MRI brain w/w/o contrast  Findings and Treatment: MRI of the brain was performed using sagittal T1 axial T1 T2 T2 FLAIR   diffusion and [place] sequence. The patient was injected with approximately   90 cc of gadavist IV with 1 cc of contrast discarded. Coronal T2-weighted   sequence was performed as well. Sagittal coronal and axial T1-weighted   sequences were performed.  Multiple peripherally (5) enhancing lesions are seen involving the right   posterior frontal region. One particular lesion appears dural based and   measures approximately 1.7 x 1.4cm. The larger of the intra-axial   lesions measures approximately 1.3 x 1.3 cm. These lesions could be   compatible with underlying neoplastic process such as metastasis. The   possibility of infectious etiology cannot be excluded though appears less   likely given that there is no associated areas of abnormal restricted   diffusion seen. There are small areas of surrounding edema identified.   There is localized mass effect seen. No significant shift or herniation   is seen.  Nonspecific nonenhancing T2 prolongation in the left corona radiata   region is identified. Postinfusion  The large vessels demonstrate normal flow voids.  The visualized paranasal sinuses mastoid and middle ear regions appear   clear.  IMPRESSION: Peripherallyenhancing lesion is seen involving the right   frontal region which are suspicious for underlying metastasis. Clinical   correlation and continued close interval follow-up is recommended.        SECONDARY PROCEDURE  Procedure: Brain biopsy  Findings and Treatment: Right frontal brain tumor  Final Diagnosis  1.  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

## 2022-08-29 NOTE — DISCHARGE NOTE PROVIDER - CARE PROVIDERS DIRECT ADDRESSES
[email or web address]

## 2022-08-29 NOTE — PROGRESS NOTE ADULT - SUBJECTIVE AND OBJECTIVE BOX
Patient is a 66y old  Male who presents with a chief complaint of R frontal lobe lesions (29 Aug 2022 13:36)     INTERVAL HPI/OVERNIGHT EVENTS:  - No acute events overnight    SUBJECTIVE  - Patient seen and evaluated at bedside.  - Patient reports absence of fevers, chills, headache, lightheadedness, dizziness, nausea, emesis, chest pain, dyspnea, palpitations, abdominal pain, diarrhea, urinary symptoms, skin color changes or rashes, or LE edema     MEDICATIONS  (STANDING):  allopurinol 100 milliGRAM(s) Oral daily  amLODIPine   Tablet 10 milliGRAM(s) Oral daily  brimonidine 0.2% Ophthalmic Solution 1 Drop(s) Both EYES two times a day  colchicine 0.6 milliGRAM(s) Oral daily  dexAMETHasone  Injectable 4 milliGRAM(s) IV Push every 6 hours  dextrose 5%. 1000 milliLiter(s) (50 mL/Hr) IV Continuous <Continuous>  dextrose 5%. 1000 milliLiter(s) (100 mL/Hr) IV Continuous <Continuous>  dextrose 50% Injectable 25 Gram(s) IV Push once  dextrose 50% Injectable 12.5 Gram(s) IV Push once  dextrose 50% Injectable 25 Gram(s) IV Push once  glucagon  Injectable 1 milliGRAM(s) IntraMuscular once  heparin  Infusion. 900 Unit(s)/Hr (9 mL/Hr) IV Continuous <Continuous>  hydrALAZINE 25 milliGRAM(s) Oral two times a day  insulin lispro (ADMELOG) corrective regimen sliding scale   SubCutaneous three times a day before meals  insulin lispro (ADMELOG) corrective regimen sliding scale   SubCutaneous at bedtime  lactated ringers. 1000 milliLiter(s) (100 mL/Hr) IV Continuous <Continuous>  levETIRAcetam 500 milliGRAM(s) Oral two times a day  metoprolol succinate  milliGRAM(s) Oral daily  pantoprazole    Tablet 40 milliGRAM(s) Oral before breakfast  timolol 0.5% Solution 1 Drop(s) Both EYES two times a day    MEDICATIONS  (PRN):  acetaminophen     Tablet .. 650 milliGRAM(s) Oral every 6 hours PRN Temp greater or equal to 38C (100.4F), Mild Pain (1 - 3)  aluminum hydroxide/magnesium hydroxide/simethicone Suspension 30 milliLiter(s) Oral every 4 hours PRN Dyspepsia  dextrose Oral Gel 15 Gram(s) Oral once PRN Blood Glucose LESS THAN 70 milliGRAM(s)/deciliter  heparin   Injectable 8000 Unit(s) IV Push every 6 hours PRN For aPTT less than 40  heparin   Injectable 4000 Unit(s) IV Push every 6 hours PRN For aPTT between 40 - 57  melatonin 3 milliGRAM(s) Oral at bedtime PRN Insomnia    Allergies:  No Known Allergies    Intolerances:      REVIEW OF SYSTEMS: As indicated above; otherwise, negative    VITAL SIGNS:  T(F): 97.6 (08-29-22 @ 12:00), Max: 97.8 (08-28-22 @ 16:45)  HR: 61 (08-29-22 @ 12:00) (61 - 85)  BP: 120/79 (08-29-22 @ 12:00) (107/72 - 120/79)  RR: 17 (08-29-22 @ 12:00) (17 - 18)  SpO2: 96% (08-29-22 @ 12:00) (96% - 99%)    PHYSICAL EXAM:  General: NAD, well-groomed, well-developed  Eyes: Pupils equal, round, reactive to light, extraocular muscles intact, anicteric sclera   Neck: No thyromegaly, no JVD, no lymphadenopathy   Chest: Clear to auscultation bilaterally; no rales, rhonchi, or wheezing  Heart: Regular rate and rhythm; normal S1 and S2; no murmurs, rubs, or gallops  Abd: Soft, nontender, nondistended, normoactive bowel sounds   Extremities: Warm, well perfused, no lower extremity edema bilaterally  Neuro: AAOX3  Psych: Appropriate affect      LABS:                        16.1   17.68 )-----------( 237      ( 29 Aug 2022 06:26 )             47.8     29 Aug 2022 06:27    138    |  106    |  40     ----------------------------<  131    4.1     |  21     |  1.34     Ca    9.1        29 Aug 2022 06:27  Phos  3.8       29 Aug 2022 06:27  Mg     2.2       29 Aug 2022 06:27    PTT - ( 29 Aug 2022 06:26 )  PTT:140.8 sec  CAPILLARY BLOOD GLUCOSE      POCT Blood Glucose.: 141 mg/dL (29 Aug 2022 12:14)  POCT Blood Glucose.: 126 mg/dL (29 Aug 2022 08:20)  POCT Blood Glucose.: 139 mg/dL (28 Aug 2022 21:07)  POCT Blood Glucose.: 128 mg/dL (28 Aug 2022 17:58)    BLOOD CULTURE    RADIOLOGY & ADDITIONAL TESTS:    Imaging Personally Reviewed:  [X ] YES     Consultant(s) Notes Reviewed:  Yes    Care Discussed with Consultants/Other Providers: Yes

## 2022-08-29 NOTE — PROGRESS NOTE ADULT - ASSESSMENT
66M, [facility] txfer, [facility] prostate cancer s/p total prostatectomy 12 years ago. 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 Had "head shaking" ~2mins while driving with dysarthria ~5mins on 8/26 AM. Maintained consciousness. Saw PCP later in the day, who sent him to ED after he noticed a Left facial droop. MRI shows 5-6 small Right frontal lobe ring-enhancing lesions, no mass effect, with vasogenic edema. No sig diffusion restriction Exam: Slight Left nasolabial flattening, otherwise intact  8/29:   -OR for brain biopsy Wednesday  -Adm Medicine, q4h neurochecks  -Keppra 500BID, Dex 4q6  -CT CAP grossly neg for malig  -Consult Rad Onc for outpt SRS  -On hep drip for history of Afib and splenic infarcts   -Heparin supra-therapeutic, please consider titrating down to reduce bleeding diathesis

## 2022-08-29 NOTE — DISCHARGE NOTE PROVIDER - NSDCCPCAREPLAN_GEN_ALL_CORE_FT
PRINCIPAL DISCHARGE DIAGNOSIS  Diagnosis: Brain mass  Assessment and Plan of Treatment: You presented to the hospital after having an episode of head shaking and facial droop. You had multiple imagings of the brain, which showed enhancing lesions on the right frontal lobe concerning for cancer. You underwent biopsy of the lesion for definitive diagnosis.       PRINCIPAL DISCHARGE DIAGNOSIS  Diagnosis: Brain mass  Assessment and Plan of Treatment: You presented to the hospital after having an episode of head shaking and facial droop. You had multiple imagings of the brain, which showed enhancing lesions on the right frontal lobe concerning for cancer. You underwent biopsy of the lesion for definitive diagnosis.      SECONDARY DISCHARGE DIAGNOSES  Diagnosis: Afib  Assessment and Plan of Treatment: Because of your afib you normally take xarelto. However, because you had a brain biospy surgery, xarelto needs to be witheld for a period of time to minimize your risk of developing a brain bleed, which could be life-threatening. As a result, you will be giving yourself a daily injection of enoxaparin to limit the risk of devoloping a deep vein thrombosis until this Wednesay, 9/7. On thursday 9/8, you can resume taking your xarelto as previosuly prescribed     PRINCIPAL DISCHARGE DIAGNOSIS  Diagnosis: Brain mass  Assessment and Plan of Treatment: You presented to the hospital after having an episode of head shaking and facial droop. You had multiple imagings of the brain, which showed enhancing lesions on the right frontal lobe concerning for cancer. You underwent biopsy of the lesion for definitive diagnosis. The biopsy showed that you had a cancer called Grade 4  glioblastoma.   While you were in the hospital you were started on a medication called Keppra to prevent seizures please continue taking this medication as prescribed. You were also started on steroids while you were in the hospital. Please continue taking the steroids as prescribed to complete a steroid taper.   Please follow up with Radiation Oncologist Dr. Trujillo and Neuro oncologist Dr. Carias. You will receive a call next tuesday to schedule your appointments with these doctors.   If you develop severe headaches, dizziness, weakness or other neurological symoptoms then please seeek medical attention immediately.         SECONDARY DISCHARGE DIAGNOSES  Diagnosis: Afib  Assessment and Plan of Treatment: Because of your afib you normally take xarelto. However, because you had a brain biospy surgery, xarelto needs to be witheld for a period of time to minimize your risk of developing a brain bleed, which could be life-threatening. As a result, you will be giving yourself a daily injection of enoxaparin to limit the risk of devoloping a deep vein thrombosis until this Wednesay, 9/7. On thursday 9/8, you can resume taking your xarelto as previosuly prescribed.    Diagnosis: HTN (hypertension)  Assessment and Plan of Treatment: You have a history of high blood pressure. Before you came to the hospital you were taking losartan, amlodipine, Metoprolol Succinate, and Hydralazine. While you were in the hospital your were noted to have acute kidney injury, as noted by your elevated creatinine, blood test that assesses kidney function. Initially your kidney injury was thought to be due to  the contrast from the CT scan. Your kidney function improved with IV fluids.  Please refrain from taking the losartan until you follow up with your PCP about when you should restart this medication.        PRINCIPAL DISCHARGE DIAGNOSIS  Diagnosis: Brain mass  Assessment and Plan of Treatment: You presented to the hospital after having an episode of head shaking and facial droop. You had multiple imagings of the brain, which showed enhancing lesions on the right frontal lobe concerning for cancer. You underwent biopsy of the lesion for definitive diagnosis. The biopsy showed that you had a cancer called Grade 4  glioblastoma.   While you were in the hospital you were started on a medication called Keppra to prevent seizures please continue taking this medication as prescribed. You were also started on steroids while you were in the hospital. Please continue taking the steroids as prescribed to complete a steroid taper.   Please follow up with Radiation Oncologist Dr. Trujillo and Neuro oncologist Dr. Carias. You will receive a call next tuesday to schedule your appointments with these doctors.   If you develop severe headaches, dizziness, weakness or other neurological symoptoms then please seeek medical attention immediately.         SECONDARY DISCHARGE DIAGNOSES  Diagnosis: Afib  Assessment and Plan of Treatment: Because of your afib you normally take xarelto. However, because you had a brain biospy surgery, xarelto needs to be witheld for a period of time to minimize your risk of developing a brain bleed, which could be life-threatening. As a result, you will be giving yourself a daily injection of enoxaparin to limit the risk of devoloping a deep vein thrombosis until this Wednesay, 9/7. On thursday 9/8, you can resume taking your xarelto as previosuly prescribed.    Diagnosis: PARKER (acute kidney injury)  Assessment and Plan of Treatment: You have a history of high blood pressure. Before you came to the hospital you were taking losartan, amlodipine, Metoprolol Succinate, and Hydralazine. While you were in the hospital your were noted to have acute kidney injury, as noted by your elevated creatinine, blood test that assesses kidney function. Initially your kidney injury was thought to be due to  the contrast from the CT scan. Your kidney function improved with IV fluids.  Please refrain from taking the losartan until you follow up with your PCP about when you should restart this medication as this medication can affect your kidney. You also take furosemide as needed for leg swelling. Please follow up with your PCP about restarting this medication as this can affect your kidney function as well.

## 2022-08-29 NOTE — PROGRESS NOTE ADULT - SUBJECTIVE AND OBJECTIVE BOX
Patient seen and examined at bedside.    --Anticoagulation--  heparin   Injectable 8000 Unit(s) IV Push every 6 hours PRN  heparin   Injectable 4000 Unit(s) IV Push every 6 hours PRN  heparin  Infusion. 1600 Unit(s)/Hr IV Continuous <Continuous>    T(C): 36.4 (08-29-22 @ 00:20), Max: 36.6 (08-28-22 @ 05:14)  HR: 72 (08-29-22 @ 00:20) (72 - 85)  BP: 117/74 (08-29-22 @ 00:20) (116/82 - 132/95)  RR: 18 (08-29-22 @ 00:20) (18 - 18)  SpO2: 99% (08-29-22 @ 00:20) (96% - 99%)  Wt(kg): --    Exam: Slight Left nasolabial flattening, otherwise intact

## 2022-08-29 NOTE — DISCHARGE NOTE PROVIDER - HOSPITAL COURSE
67 y/o M with history of  chronic Afib (on Xarelto), HTN, gout, chronic cholecystitis s/p cholecystectomy, and prostate cancer s/p prostatectomy (in 2010) presenting to ED as a transfer from [place] for further work-up and management of abnormal MRI brain findings. Patient initially presented to [place] after experiencing ~ 20 min of seizure like activity (head shaking) and dysarthria. Stroke work-up was initiated and patient was found to have multiple ring enhancing lesions in R frontal lobe concerning for metastases. Neurosurgery evaluated the patient and was started on Keppra and Dexamethasone. Heparin gtt was started for AC in anticipation of brain tissue biopsy. Neuro-onc and rad onc were consulted. 65 y/o M with history of  chronic Afib (on Xarelto), HTN, gout, chronic cholecystitis s/p cholecystectomy, and prostate cancer s/p prostatectomy (in 2010) presenting to ED as a transfer from [place] for further work-up and management of abnormal MRI brain findings. Patient initially presented to [place] after experiencing ~ 20 min of seizure like activity (head shaking) and dysarthria. Stroke work-up was initiated and patient was found to have multiple ring enhancing lesions in R frontal lobe concerning for metastases. Neurosurgery evaluated the patient and was started on Keppra and Dexamethasone. Neurosurgery recommended an radiation oncology consult for outpatient management. Heparin gtt was started for AC in anticipation of brain tissue biopsy. Neuro-onc and rad onc were consulted. Patient went for brain tissue biopsy without any complication. Following procedure, patient underwent further imaging with 2 more CTH and 1 MRI, which showed no evidence of bleeding. Following unremarkable CT scans and MRI patient was started on DVT prophylaxis with subcutaneous heparin and a dexamethasone taper. At this point, patient is medically stable and no longer requires inpatient management. 67 y/o M with history of  chronic Afib (on Xarelto), HTN, gout, chronic cholecystitis s/p cholecystectomy, and prostate cancer s/p prostatectomy (in 2010) presenting to ED as a transfer from [place] for further work-up and management of abnormal MRI brain findings. Patient initially presented to [place] after experiencing ~ 20 min of seizure like activity (head shaking) and dysarthria. Stroke work-up was initiated and patient was found to have multiple ring enhancing lesions in R frontal lobe concerning for metastases. Neurosurgery evaluated the patient and was started on Keppra and Dexamethasone. Neurosurgery recommended an radiation oncology consult for outpatient management. Heparin gtt was started for AC in anticipation of brain tissue biopsy. Patient went for brain tissue biopsy without any complication. Following procedure, patient underwent further imaging with 2 more CTH and 1 MRI, which showed no evidence of bleeding. Following unremarkable CT scans and MRI patient was started on DVT prophylaxis with subcutaneous heparin and a dexamethasone taper. Brain biopsy results showed High grade glioma, consistent with glioblastoma WHO grade 4.     Patient will follow up outpatient with radiation oncology and neuro oncology.    At the time of discharge, the patient was medically optimized and hemodynamically stable.

## 2022-08-29 NOTE — DISCHARGE NOTE PROVIDER - NSDCMRMEDTOKEN_GEN_ALL_CORE_FT
allopurinol 100 mg oral tablet: 1 tab(s) orally once a day  amLODIPine 10 mg oral tablet: 1 tab(s) orally once a day  colchicine 0.6 mg oral tablet: 1 tab(s) orally once a day  Combigan 0.2%-0.5% ophthalmic solution: 1 drop(s) to each affected eye 2 times a day  furosemide 20 mg oral tablet: 1 tab(s) orally once a day, As Needed  hydrALAZINE 25 mg oral tablet: 1 tab(s) orally 2 times a day  losartan 100 mg oral tablet: 1 tab(s) orally once a day  Metoprolol Succinate  mg oral tablet, extended release: 1 tab(s) orally once a day  Xarelto 20 mg oral tablet: 1 tab(s) orally once a day   allopurinol 100 mg oral tablet: 1 tab(s) orally once a day  amLODIPine 10 mg oral tablet: 1 tab(s) orally once a day  colchicine 0.6 mg oral tablet: 1 tab(s) orally once a day  Combigan 0.2%-0.5% ophthalmic solution: 1 drop(s) to each affected eye 2 times a day  dexamethasone 2 mg oral tablet: 9/2:2 tablets orally once at 10pm   9/3-9/4: 1 tablet orally every 6 hours  9/5-9/6: 1 tablet orally every 8 hours  9/7-9/8: 1 tablet orally every 12 hours   9/9-9/10: 1 tablet orally once a day    hydrALAZINE 25 mg oral tablet: 1 tab(s) orally 2 times a day  levETIRAcetam 500 mg oral tablet: 1 tab(s) orally 2 times a day  Lovenox 40 mg/0.4 mL injectable solution: 40 milligram(s) subcutaneously once a day (at bedtime)  from 9/3-9/7.   Metoprolol Succinate  mg oral tablet, extended release: 1 tab(s) orally once a day  pantoprazole 40 mg oral delayed release tablet: 1 tab(s) orally once a day (before a meal)  polyethylene glycol 3350 oral powder for reconstitution: 17 gram(s) orally once a day, As needed, Constipation  senna leaf extract oral tablet: 2 tab(s) orally once a day (at bedtime), As Needed   Xarelto 20 mg oral tablet: 1 tab(s) orally once a day. Please restart the xarelto on 9/8.

## 2022-08-30 ENCOUNTER — TRANSCRIPTION ENCOUNTER (OUTPATIENT)
Age: 67
End: 2022-08-30

## 2022-08-30 LAB
ANION GAP SERPL CALC-SCNC: 12 MMOL/L — SIGNIFICANT CHANGE UP (ref 5–17)
APTT BLD: 115 SEC — HIGH (ref 27.5–35.5)
APTT BLD: 46 SEC — HIGH (ref 27.5–35.5)
APTT BLD: 46.6 SEC — HIGH (ref 27.5–35.5)
BLD GP AB SCN SERPL QL: NEGATIVE — SIGNIFICANT CHANGE UP
BUN SERPL-MCNC: 33 MG/DL — HIGH (ref 7–23)
CALCIUM SERPL-MCNC: 8.9 MG/DL — SIGNIFICANT CHANGE UP (ref 8.4–10.5)
CHLORIDE SERPL-SCNC: 108 MMOL/L — SIGNIFICANT CHANGE UP (ref 96–108)
CO2 SERPL-SCNC: 22 MMOL/L — SIGNIFICANT CHANGE UP (ref 22–31)
CREAT SERPL-MCNC: 1.25 MG/DL — SIGNIFICANT CHANGE UP (ref 0.5–1.3)
EGFR: 64 ML/MIN/1.73M2 — SIGNIFICANT CHANGE UP
GLUCOSE BLDC GLUCOMTR-MCNC: 102 MG/DL — HIGH (ref 70–99)
GLUCOSE BLDC GLUCOMTR-MCNC: 102 MG/DL — HIGH (ref 70–99)
GLUCOSE BLDC GLUCOMTR-MCNC: 115 MG/DL — HIGH (ref 70–99)
GLUCOSE BLDC GLUCOMTR-MCNC: 143 MG/DL — HIGH (ref 70–99)
GLUCOSE BLDC GLUCOMTR-MCNC: 168 MG/DL — HIGH (ref 70–99)
GLUCOSE SERPL-MCNC: 128 MG/DL — HIGH (ref 70–99)
HCT VFR BLD CALC: 45.9 % — SIGNIFICANT CHANGE UP (ref 39–50)
HGB BLD-MCNC: 15.3 G/DL — SIGNIFICANT CHANGE UP (ref 13–17)
MAGNESIUM SERPL-MCNC: 2.2 MG/DL — SIGNIFICANT CHANGE UP (ref 1.6–2.6)
MCHC RBC-ENTMCNC: 28.6 PG — SIGNIFICANT CHANGE UP (ref 27–34)
MCHC RBC-ENTMCNC: 33.3 GM/DL — SIGNIFICANT CHANGE UP (ref 32–36)
MCV RBC AUTO: 85.8 FL — SIGNIFICANT CHANGE UP (ref 80–100)
NRBC # BLD: 0 /100 WBCS — SIGNIFICANT CHANGE UP (ref 0–0)
PHOSPHATE SERPL-MCNC: 3 MG/DL — SIGNIFICANT CHANGE UP (ref 2.5–4.5)
PLATELET # BLD AUTO: 242 K/UL — SIGNIFICANT CHANGE UP (ref 150–400)
POTASSIUM SERPL-MCNC: 4.1 MMOL/L — SIGNIFICANT CHANGE UP (ref 3.5–5.3)
POTASSIUM SERPL-SCNC: 4.1 MMOL/L — SIGNIFICANT CHANGE UP (ref 3.5–5.3)
RBC # BLD: 5.35 M/UL — SIGNIFICANT CHANGE UP (ref 4.2–5.8)
RBC # FLD: 13.5 % — SIGNIFICANT CHANGE UP (ref 10.3–14.5)
RH IG SCN BLD-IMP: POSITIVE — SIGNIFICANT CHANGE UP
SARS-COV-2 RNA SPEC QL NAA+PROBE: SIGNIFICANT CHANGE UP
SODIUM SERPL-SCNC: 142 MMOL/L — SIGNIFICANT CHANGE UP (ref 135–145)
WBC # BLD: 15.01 K/UL — HIGH (ref 3.8–10.5)
WBC # FLD AUTO: 15.01 K/UL — HIGH (ref 3.8–10.5)

## 2022-08-30 PROCEDURE — 99233 SBSQ HOSP IP/OBS HIGH 50: CPT | Mod: GC

## 2022-08-30 RX ORDER — LANOLIN ALCOHOL/MO/W.PET/CERES
5 CREAM (GRAM) TOPICAL AT BEDTIME
Refills: 0 | Status: DISCONTINUED | OUTPATIENT
Start: 2022-08-30 | End: 2022-08-31

## 2022-08-30 RX ADMIN — Medication 100 MILLIGRAM(S): at 12:34

## 2022-08-30 RX ADMIN — LEVETIRACETAM 500 MILLIGRAM(S): 250 TABLET, FILM COATED ORAL at 05:35

## 2022-08-30 RX ADMIN — Medication 1 DROP(S): at 05:36

## 2022-08-30 RX ADMIN — Medication 4 MILLIGRAM(S): at 23:54

## 2022-08-30 RX ADMIN — HEPARIN SODIUM 900 UNIT(S)/HR: 5000 INJECTION INTRAVENOUS; SUBCUTANEOUS at 08:30

## 2022-08-30 RX ADMIN — BRIMONIDINE TARTRATE 1 DROP(S): 2 SOLUTION/ DROPS OPHTHALMIC at 05:35

## 2022-08-30 RX ADMIN — Medication 650 MILLIGRAM(S): at 23:53

## 2022-08-30 RX ADMIN — Medication 25 MILLIGRAM(S): at 17:42

## 2022-08-30 RX ADMIN — Medication 5 MILLIGRAM(S): at 23:53

## 2022-08-30 RX ADMIN — Medication 4 MILLIGRAM(S): at 12:33

## 2022-08-30 RX ADMIN — HEPARIN SODIUM 1100 UNIT(S)/HR: 5000 INJECTION INTRAVENOUS; SUBCUTANEOUS at 07:15

## 2022-08-30 RX ADMIN — BRIMONIDINE TARTRATE 1 DROP(S): 2 SOLUTION/ DROPS OPHTHALMIC at 17:41

## 2022-08-30 RX ADMIN — HEPARIN SODIUM 900 UNIT(S)/HR: 5000 INJECTION INTRAVENOUS; SUBCUTANEOUS at 19:32

## 2022-08-30 RX ADMIN — Medication 1 DROP(S): at 17:41

## 2022-08-30 RX ADMIN — AMLODIPINE BESYLATE 10 MILLIGRAM(S): 2.5 TABLET ORAL at 05:34

## 2022-08-30 RX ADMIN — HEPARIN SODIUM 1100 UNIT(S)/HR: 5000 INJECTION INTRAVENOUS; SUBCUTANEOUS at 21:55

## 2022-08-30 RX ADMIN — Medication 4 MILLIGRAM(S): at 05:35

## 2022-08-30 RX ADMIN — SODIUM CHLORIDE 100 MILLILITER(S): 9 INJECTION, SOLUTION INTRAVENOUS at 08:30

## 2022-08-30 RX ADMIN — LEVETIRACETAM 500 MILLIGRAM(S): 250 TABLET, FILM COATED ORAL at 17:40

## 2022-08-30 RX ADMIN — Medication 0.6 MILLIGRAM(S): at 12:33

## 2022-08-30 RX ADMIN — HEPARIN SODIUM 900 UNIT(S)/HR: 5000 INJECTION INTRAVENOUS; SUBCUTANEOUS at 18:11

## 2022-08-30 RX ADMIN — Medication 100 MILLIGRAM(S): at 05:34

## 2022-08-30 RX ADMIN — Medication 25 MILLIGRAM(S): at 05:35

## 2022-08-30 RX ADMIN — PANTOPRAZOLE SODIUM 40 MILLIGRAM(S): 20 TABLET, DELAYED RELEASE ORAL at 05:35

## 2022-08-30 RX ADMIN — Medication 4 MILLIGRAM(S): at 17:40

## 2022-08-30 NOTE — PROGRESS NOTE ADULT - SUBJECTIVE AND OBJECTIVE BOX
Patient is a 66y old  Male who presents with a chief complaint of R frontal lobe lesions (30 Aug 2022 05:12)     INTERVAL HPI/OVERNIGHT EVENTS:  - No acute events overnight    SUBJECTIVE  - Patient seen and evaluated at bedside  - Patient reports presence of   - Patient reports absence of fevers, chills, HA, lightheadedness, dizziness, nausea, emesis, chest pain, dyspnea, palpitations, abd pain, diarrhea, urinary symptoms, skin color changes or rashes, or LE edema     MEDICATIONS  (STANDING):  allopurinol 100 milliGRAM(s) Oral daily  amLODIPine   Tablet 10 milliGRAM(s) Oral daily  brimonidine 0.2% Ophthalmic Solution 1 Drop(s) Both EYES two times a day  colchicine 0.6 milliGRAM(s) Oral daily  dexAMETHasone  Injectable 4 milliGRAM(s) IV Push every 6 hours  dextrose 5%. 1000 milliLiter(s) (100 mL/Hr) IV Continuous <Continuous>  dextrose 5%. 1000 milliLiter(s) (50 mL/Hr) IV Continuous <Continuous>  dextrose 50% Injectable 25 Gram(s) IV Push once  dextrose 50% Injectable 12.5 Gram(s) IV Push once  dextrose 50% Injectable 25 Gram(s) IV Push once  glucagon  Injectable 1 milliGRAM(s) IntraMuscular once  heparin  Infusion. 900 Unit(s)/Hr (9 mL/Hr) IV Continuous <Continuous>  hydrALAZINE 25 milliGRAM(s) Oral two times a day  insulin lispro (ADMELOG) corrective regimen sliding scale   SubCutaneous three times a day before meals  insulin lispro (ADMELOG) corrective regimen sliding scale   SubCutaneous at bedtime  lactated ringers. 1000 milliLiter(s) (100 mL/Hr) IV Continuous <Continuous>  levETIRAcetam 500 milliGRAM(s) Oral two times a day  metoprolol succinate  milliGRAM(s) Oral daily  pantoprazole    Tablet 40 milliGRAM(s) Oral before breakfast  timolol 0.5% Solution 1 Drop(s) Both EYES two times a day    MEDICATIONS  (PRN):  acetaminophen     Tablet .. 650 milliGRAM(s) Oral every 6 hours PRN Temp greater or equal to 38C (100.4F), Mild Pain (1 - 3)  aluminum hydroxide/magnesium hydroxide/simethicone Suspension 30 milliLiter(s) Oral every 4 hours PRN Dyspepsia  dextrose Oral Gel 15 Gram(s) Oral once PRN Blood Glucose LESS THAN 70 milliGRAM(s)/deciliter  heparin   Injectable 8000 Unit(s) IV Push every 6 hours PRN For aPTT less than 40  heparin   Injectable 4000 Unit(s) IV Push every 6 hours PRN For aPTT between 40 - 57  melatonin 3 milliGRAM(s) Oral at bedtime PRN Insomnia    Allergies:  No Known Allergies    Intolerances:      REVIEW OF SYSTEMS: As indicated above; otherwise, negative    VITAL SIGNS:  T(F): 97.7 (08-30-22 @ 05:51), Max: 97.9 (08-29-22 @ 19:56)  HR: 65 (08-30-22 @ 05:51) (61 - 85)  BP: 134/90 (08-30-22 @ 05:51) (112/78 - 138/82)  RR: 18 (08-30-22 @ 05:51) (17 - 18)  SpO2: 99% (08-30-22 @ 05:51) (96% - 99%)    PHYSICAL EXAM:  General: NAD, well-groomed, well-developed  Eyes: Conjunctiva and sclera clear  ENMT: Moist mucous membranes  Neck: No palpable pre-auricular, post-auricular, occipital, mandibular, submental, supra-clavicular, or infra-clavicular lymph nodes   Chest: Clear to auscultation bilaterally; no rales, rhonchi, or wheezing  Heart: Regular rate and rhythm; normal S1 and S2; no murmurs, rubs, or gallops  Abd: Soft, nontender, nondistended  Nervous System: AAOX3  Psych: Appropriate affect  Ext: no peripheral LE edema bilaterally    LABS:                        15.3   15.01 )-----------( 242      ( 30 Aug 2022 06:06 )             45.9     30 Aug 2022 06:08    142    |  108    |  33     ----------------------------<  128    4.1     |  22     |  1.25     Ca    8.9        30 Aug 2022 06:08  Phos  3.0       30 Aug 2022 06:08  Mg     2.2       30 Aug 2022 06:08    PTT - ( 29 Aug 2022 23:09 )  PTT:50.6 sec  CAPILLARY BLOOD GLUCOSE      POCT Blood Glucose.: 144 mg/dL (29 Aug 2022 21:36)  POCT Blood Glucose.: 133 mg/dL (29 Aug 2022 17:03)  POCT Blood Glucose.: 141 mg/dL (29 Aug 2022 12:14)  POCT Blood Glucose.: 126 mg/dL (29 Aug 2022 08:20)    BLOOD CULTURE    RADIOLOGY & ADDITIONAL TESTS:    Imaging Personally Reviewed:  [X ] YES     Consultant(s) Notes Reviewed:  Yes    Care Discussed with Consultants/Other Providers: Yes Patient is a 66y old  Male who presents with a chief complaint of R frontal lobe lesions (30 Aug 2022 05:12)     INTERVAL HPI/OVERNIGHT EVENTS:  - No acute events overnight    SUBJECTIVE  - Patient seen and evaluated at bedside. Patient states that he was unable to sleep last night because he had a lot on his mind in anticipation of the biopsy, Patient said he tried taking melatonin for his insomnia, which is what he takes at home when he cannot sleep, but that did not help him last night. Other than the insomnia, patient states he feels fine.    - Patient reports absence of fevers, chills, headache, lightheadedness, dizziness, nausea, emesis, chest pain, dyspnea, palpitations, abdominal pain, diarrhea, urinary symptoms, skin color changes or rashes, or LE edema     MEDICATIONS  (STANDING):  allopurinol 100 milliGRAM(s) Oral daily  amLODIPine   Tablet 10 milliGRAM(s) Oral daily  brimonidine 0.2% Ophthalmic Solution 1 Drop(s) Both EYES two times a day  colchicine 0.6 milliGRAM(s) Oral daily  dexAMETHasone  Injectable 4 milliGRAM(s) IV Push every 6 hours  dextrose 5%. 1000 milliLiter(s) (100 mL/Hr) IV Continuous <Continuous>  dextrose 5%. 1000 milliLiter(s) (50 mL/Hr) IV Continuous <Continuous>  dextrose 50% Injectable 25 Gram(s) IV Push once  dextrose 50% Injectable 12.5 Gram(s) IV Push once  dextrose 50% Injectable 25 Gram(s) IV Push once  glucagon  Injectable 1 milliGRAM(s) IntraMuscular once  heparin  Infusion. 900 Unit(s)/Hr (9 mL/Hr) IV Continuous <Continuous>  hydrALAZINE 25 milliGRAM(s) Oral two times a day  insulin lispro (ADMELOG) corrective regimen sliding scale   SubCutaneous three times a day before meals  insulin lispro (ADMELOG) corrective regimen sliding scale   SubCutaneous at bedtime  lactated ringers. 1000 milliLiter(s) (100 mL/Hr) IV Continuous <Continuous>  levETIRAcetam 500 milliGRAM(s) Oral two times a day  metoprolol succinate  milliGRAM(s) Oral daily  pantoprazole    Tablet 40 milliGRAM(s) Oral before breakfast  timolol 0.5% Solution 1 Drop(s) Both EYES two times a day    MEDICATIONS  (PRN):  acetaminophen     Tablet .. 650 milliGRAM(s) Oral every 6 hours PRN Temp greater or equal to 38C (100.4F), Mild Pain (1 - 3)  aluminum hydroxide/magnesium hydroxide/simethicone Suspension 30 milliLiter(s) Oral every 4 hours PRN Dyspepsia  dextrose Oral Gel 15 Gram(s) Oral once PRN Blood Glucose LESS THAN 70 milliGRAM(s)/deciliter  heparin   Injectable 8000 Unit(s) IV Push every 6 hours PRN For aPTT less than 40  heparin   Injectable 4000 Unit(s) IV Push every 6 hours PRN For aPTT between 40 - 57  melatonin 3 milliGRAM(s) Oral at bedtime PRN Insomnia    Allergies:  No Known Allergies    Intolerances:      REVIEW OF SYSTEMS: As indicated above; otherwise, negative    VITAL SIGNS:  T(F): 97.7 (08-30-22 @ 05:51), Max: 97.9 (08-29-22 @ 19:56)  HR: 65 (08-30-22 @ 05:51) (61 - 85)  BP: 134/90 (08-30-22 @ 05:51) (112/78 - 138/82)  RR: 18 (08-30-22 @ 05:51) (17 - 18)  SpO2: 99% (08-30-22 @ 05:51) (96% - 99%)    PHYSICAL EXAM:  General: NAD, well-groomed, well-developed  Eyes: Pupils equal, round, reactive to light, extraocular muscles intact, anicteric sclera   Neck: No thyromegaly, no JVD, no lymphadenopathy   Chest: Clear to auscultation bilaterally; no rales, rhonchi, or wheezing  Heart: Regular rate and rhythm; normal S1 and S2; no murmurs, rubs, or gallops  Abd: Soft, nontender, nondistended, normoactive bowel sounds   Extremities: Warm, well perfused, no lower extremity edema bilaterally  Neuro: AAOX3  Psych: Appropriate affect    LABS:                        15.3   15.01 )-----------( 242      ( 30 Aug 2022 06:06 )             45.9     30 Aug 2022 06:08    142    |  108    |  33     ----------------------------<  128    4.1     |  22     |  1.25     Ca    8.9        30 Aug 2022 06:08  Phos  3.0       30 Aug 2022 06:08  Mg     2.2       30 Aug 2022 06:08    PTT - ( 29 Aug 2022 23:09 )  PTT:50.6 sec  CAPILLARY BLOOD GLUCOSE      POCT Blood Glucose.: 144 mg/dL (29 Aug 2022 21:36)  POCT Blood Glucose.: 133 mg/dL (29 Aug 2022 17:03)  POCT Blood Glucose.: 141 mg/dL (29 Aug 2022 12:14)  POCT Blood Glucose.: 126 mg/dL (29 Aug 2022 08:20)    BLOOD CULTURE    RADIOLOGY & ADDITIONAL TESTS:    Imaging Personally Reviewed:  [X ] YES     Consultant(s) Notes Reviewed:  Yes    Care Discussed with Consultants/Other Providers: Yes

## 2022-08-30 NOTE — PROGRESS NOTE ADULT - SUBJECTIVE AND OBJECTIVE BOX
DATE OF SERVICE: 08-30-22 @ 07:26    Subjective: Patient seen and examined. No new events except as noted.     SUBJECTIVE/ROS:  resting       MEDICATIONS:  MEDICATIONS  (STANDING):  allopurinol 100 milliGRAM(s) Oral daily  amLODIPine   Tablet 10 milliGRAM(s) Oral daily  brimonidine 0.2% Ophthalmic Solution 1 Drop(s) Both EYES two times a day  colchicine 0.6 milliGRAM(s) Oral daily  dexAMETHasone  Injectable 4 milliGRAM(s) IV Push every 6 hours  dextrose 5%. 1000 milliLiter(s) (100 mL/Hr) IV Continuous <Continuous>  dextrose 5%. 1000 milliLiter(s) (50 mL/Hr) IV Continuous <Continuous>  dextrose 50% Injectable 25 Gram(s) IV Push once  dextrose 50% Injectable 12.5 Gram(s) IV Push once  dextrose 50% Injectable 25 Gram(s) IV Push once  glucagon  Injectable 1 milliGRAM(s) IntraMuscular once  heparin  Infusion. 900 Unit(s)/Hr (9 mL/Hr) IV Continuous <Continuous>  hydrALAZINE 25 milliGRAM(s) Oral two times a day  insulin lispro (ADMELOG) corrective regimen sliding scale   SubCutaneous three times a day before meals  insulin lispro (ADMELOG) corrective regimen sliding scale   SubCutaneous at bedtime  lactated ringers. 1000 milliLiter(s) (100 mL/Hr) IV Continuous <Continuous>  levETIRAcetam 500 milliGRAM(s) Oral two times a day  metoprolol succinate  milliGRAM(s) Oral daily  pantoprazole    Tablet 40 milliGRAM(s) Oral before breakfast  timolol 0.5% Solution 1 Drop(s) Both EYES two times a day      PHYSICAL EXAM:  T(C): 36.5 (08-30-22 @ 05:51), Max: 36.6 (08-29-22 @ 08:53)  HR: 65 (08-30-22 @ 05:51) (61 - 85)  BP: 134/90 (08-30-22 @ 05:51) (112/78 - 138/82)  RR: 18 (08-30-22 @ 05:51) (17 - 18)  SpO2: 99% (08-30-22 @ 05:51) (96% - 99%)  Wt(kg): --  I&O's Summary    29 Aug 2022 07:01  -  30 Aug 2022 07:00  --------------------------------------------------------  IN: 2887 mL / OUT: 900 mL / NET: 1987 mL            JVP: Normal  Neck: supple  Lung: clear   CV: S1 S2 , Murmur:  Abd: soft  Ext: No edema  neuro: Awake / alert  Psych: flat affect  Skin: normal``    LABS/DATA:    CARDIAC MARKERS:                                15.3   15.01 )-----------( 242      ( 30 Aug 2022 06:06 )             45.9     08-30    142  |  108  |  33<H>  ----------------------------<  128<H>  4.1   |  22  |  1.25    Ca    8.9      30 Aug 2022 06:08  Phos  3.0     08-30  Mg     2.2     08-30    TPro  7.0  /  Alb  4.2  /  TBili  0.6  /  DBili  x   /  AST  19  /  ALT  21  /  AlkPhos  76  08-28    proBNP:   Lipid Profile:   HgA1c:   TSH:     TELE:  EKG:

## 2022-08-30 NOTE — PROGRESS NOTE ADULT - ASSESSMENT
65 y/o M with history of Afib (on Xarelto), HTN, gout, and prostate cancer s/p prostatectomy in 2010 presenting to [place] on 8/26 s/p episode of facial droop and likely seizures, transferred to [facility] for further evaluation of newly found lesions on R frontal lobe c/f primary malignancy vs metastases. Pending brain tissue biopsy (and possibly LP) on Wednesday 8/31 with nsgy.     Problem/Plan - 1:  ·  Problem: Lesion of right frontal lobe of brain.   ·  Plan: - Presenting with a brief episode of facial droop, dysarthria, and "head shaking," likely seizure-like activity i/s/o newly found lesions on R frontal lobe  - CTH: no acute intracranial hemorrhage  - CT perfusion: multiple peripherally enhancing and hyper-perfusing lesions on R lateral frontal lobe suggestive of metastases or primary neoplasm  - MRI brain: peripherally enhancing lesion in same region suspicious for underlying metastasis  - CT C/A/P: few scattered 1-2 mm pulmonary nodules, otherwise no definitive evidence of malignancy   > Continue Keppra 500mg BID and Dexamethasone 4mg q6h   > Planning for brain tissue biopsy on Wed with nsgy  > Neuro will hold off on LP for now pending no delay with scheduled biopsy   > q8h neurochecks  > Neuro onc and rad onc consulted, will f/u     Problem/Plan - 2:  ·  Problem: Pre-op evaluation.   ·  Plan: - Patient's Revised Cardiac Risk Index score is 0 points, which translates to 3.9% 30-day risk of death, MI, or cardiac arrest  - No history of CAD, CVA, CHF, renal dysfunction, no insulin use  - TTE on 8/28: EF 58%, normal LVSF   - Patient is medically optimized for brain tissue biopsy. The benefit of the surgical intervention for the purpose of biopsy outweigh the risk.     Problem/Plan - 3:  ·  Problem: PARKER (acute kidney injury).   - Cr improved to 1.34, previous was 1.55   - Likely related to contrast patient received from CT  - Appropriate UOP  > Gentle hydration with LR 100cc/hr  > Monitor Cr, UOP.     Problem/Plan - 4:  ·  Problem: HTN (hypertension).   ·  Plan: > Continue home Amlodipine 10mg daily  > Continue home Metoprolol Succinate ER 100mg daily  > Continue home Hydralazine 25mg BID  > Hold home Losartan in setting of PARKER.     Problem/Plan - 5:  ·  Problem: Hyperglycemia.   ·  Plan: - Hgb a1c 5.5  > SSI while on steroids.     Problem/Plan - 6:  ·  Problem: History of prostate cancer.   ·  Plan: - S/p prostatectomy in 2010, has been following up regularly and did not require further treatment   - Last PSA several months ago was undetectable per patient  > Continue f/u outpatient.     Problem/Plan - 7:  ·  Problem: Chronic atrial fibrillation.   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     Problem/Plan - 8:  ·  Problem: Gout.   ·  Plan: > Continue home Allopurinol 100mg daily  > Continue home Colchicine 0.6mg daily.     Problem/Plan - 9:  ·  Problem: Prophylactic measure.   ·  Plan: - DVT ppx: heparin gtt   - Diet: DASH/TLC  - Dispo: pending brain biopsy.   65 y/o M with history of Afib (on Xarelto), HTN, gout, and prostate cancer s/p prostatectomy in 2010 presenting to [place] on 8/26 s/p episode of facial droop and likely seizures, transferred to [facility] for further evaluation of newly found lesions on R frontal lobe c/f primary malignancy vs metastases. Pending brain tissue biopsy (and possibly LP) on Wednesday 8/31 with nsgy.     Problem/Plan - 1:  ·  Problem: Lesion of right frontal lobe of brain.   ·  Plan: - Presenting with a brief episode of facial droop, dysarthria, and "head shaking," likely seizure-like activity i/s/o newly found lesions on R frontal lobe  - CTH: no acute intracranial hemorrhage  - CT perfusion: multiple peripherally enhancing and hyper-perfusing lesions on R lateral frontal lobe suggestive of metastases or primary neoplasm  - MRI brain: peripherally enhancing lesion in same region suspicious for underlying metastasis  - CT C/A/P: few scattered 1-2 mm pulmonary nodules, otherwise no definitive evidence of malignancy   > Continue Keppra 500mg BID and Dexamethasone 4mg q6h   > Planning for brain tissue biopsy on Wed with nsgy  > Neuro will hold off on LP for now pending no delay with scheduled biopsy   > q8h neurochecks  > Rad onc consulted, will see in outpatient setting for evaluation      Problem/Plan - 2:  ·  Problem: Pre-op evaluation.   ·  Plan: - Patient's Revised Cardiac Risk Index score is 0 points, which translates to 3.9% 30-day risk of death, MI, or cardiac arrest  - No history of CAD, CVA, CHF, renal dysfunction, no insulin use  - TTE on 8/28: EF 58%, normal LVSF   - Patient is medically optimized for brain tissue biopsy. The benefit of the surgical intervention for the purpose of biopsy outweigh the risk.     Problem/Plan - 3:  ·  Problem: PARKER (acute kidney injury).   - Cr continues to improve, currently at 1.25    - Likely related to contrast patient received from CT  - Appropriate UOP  > Gentle hydration with LR 100cc/hr  > Monitor Cr, UOP.     Problem/Plan - 4:  ·  Problem: HTN (hypertension).   ·  Plan: > Continue home Amlodipine 10mg daily  > Continue home Metoprolol Succinate ER 100mg daily  > Continue home Hydralazine 25mg BID  > Hold home Losartan in setting of PARKER.     Problem/Plan - 5:  ·  Problem: Hyperglycemia.   ·  Plan: - Hgb a1c 5.5  > SSI while on steroids.     Problem/Plan - 6:  ·  Problem: History of prostate cancer.   ·  Plan: - S/p prostatectomy in 2010, has been following up regularly and did not require further treatment   - Last PSA several months ago was undetectable per patient  > Continue f/u outpatient.     Problem/Plan - 7:  ·  Problem: Chronic atrial fibrillation.   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     Problem/Plan - 8:  ·  Problem: Gout.   ·  Plan: > Continue home Allopurinol 100mg daily  > Continue home Colchicine 0.6mg daily.     Problem/Plan - 9:  ·  Problem: Prophylactic measure.   ·  Plan: - DVT ppx: heparin gtt   - Diet: DASH/TLC  - Dispo: pending brain biopsy.   65 y/o M with history of Afib (on Xarelto), HTN, gout, and prostate cancer s/p prostatectomy in 2010 presenting to [place] on 8/26 s/p episode of facial droop and likely seizures, transferred to [facility] for further evaluation of newly found lesions on R frontal lobe c/f primary malignancy vs metastases. Pending brain tissue biopsy (and possibly LP) on Wednesday 8/31 with nsgy.     Problem/Plan - 1:  ·  Problem: Lesion of right frontal lobe of brain.   ·  Plan: - Presenting with a brief episode of facial droop, dysarthria, and "head shaking," likely seizure-like activity i/s/o newly found lesions on R frontal lobe  - CTH: no acute intracranial hemorrhage  - CT perfusion: multiple peripherally enhancing and hyper-perfusing lesions on R lateral frontal lobe suggestive of metastases or primary neoplasm  - MRI brain: peripherally enhancing lesion in same region suspicious for underlying metastasis  - CT C/A/P: few scattered 1-2 mm pulmonary nodules, otherwise no definitive evidence of malignancy   > Continue Keppra 500mg BID and Dexamethasone 4mg q6h   > Planning for brain tissue biopsy on Wed with nsgy  > Neuro will hold off on LP for now pending no delay with scheduled biopsy   > q8h neurochecks  > Rad onc consulted, will see in outpatient setting for evaluation      Problem/Plan - 2:  ·  Problem: Pre-op evaluation.   ·  Plan: - Patient's Revised Cardiac Risk Index score is 0 points, which translates to 3.9% 30-day risk of death, MI, or cardiac arrest  - No history of CAD, CVA, CHF, renal dysfunction, no insulin use  - TTE on 8/28: EF 58%, normal LVSF   - Patient is medically optimized for brain tissue biopsy. The benefit of the surgical intervention for the purpose of biopsy outweigh the risk.     Problem/Plan - 3:  ·  Problem: PARKER (acute kidney injury).   - Cr continues to improve, currently at 1.25    - Likely related to contrast patient received from CT  - Appropriate UOP  > Gentle hydration with LR 100cc/hr  > Monitor Cr, UOP.     Problem/Plan - 4:  ·  Problem: HTN (hypertension).   ·  Plan: > Continue home Amlodipine 10mg daily  > Continue home Metoprolol Succinate ER 100mg daily  > Continue home Hydralazine 25mg BID  > Hold home Losartan in setting of PARKER.     Problem/Plan - 5:  ·  Problem: Hyperglycemia.   ·  Plan: - Hgb a1c 5.5  > SSI while on steroids.     Problem/Plan - 6:  ·  Problem: History of prostate cancer.   ·  Plan: - S/p prostatectomy in 2010, has been following up regularly and did not require further treatment   - Last PSA several months ago was undetectable per patient  > Continue f/u outpatient.     Problem/Plan - 7:  ·  Problem: Chronic atrial fibrillation.   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     Problem/Plan - 8:  ·  Problem: Gout.   ·  Plan: > Continue home Allopurinol 100mg daily  > Continue home Colchicine 0.6mg daily.     Problem/Plan - 9:  ·  Problem: Prophylactic measure.   ·  Plan: - DVT ppx: heparin gtt   - Diet: DASH/TLC  - Dispo: pending brain biopsy.  - Contact: Aretha Jalloh (daughter) [phone]

## 2022-08-30 NOTE — PROGRESS NOTE ADULT - SUBJECTIVE AND OBJECTIVE BOX
Patient seen and examined at bedside.    --Anticoagulation--  heparin   Injectable 8000 Unit(s) IV Push every 6 hours PRN  heparin   Injectable 4000 Unit(s) IV Push every 6 hours PRN  heparin  Infusion. 900 Unit(s)/Hr IV Continuous <Continuous>    T(C): 36.6 (08-30-22 @ 00:02), Max: 36.6 (08-29-22 @ 08:53)  HR: 82 (08-30-22 @ 00:02) (61 - 85)  BP: 122/86 (08-30-22 @ 00:02) (107/72 - 138/82)  RR: 18 (08-30-22 @ 00:02) (17 - 18)  SpO2: 97% (08-30-22 @ 00:02) (96% - 99%)  Wt(kg): --    Exam: Slight Left nasolabial flattening, otherwise intact

## 2022-08-30 NOTE — PROGRESS NOTE ADULT - ASSESSMENT
66M, PLV txfer, [facility] prostate cancer s/p total prostatectomy 12 years ago. 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 Had "head shaking" ~2mins while driving with dysarthria ~5mins on 8/26 AM. Maintained consciousness. Saw PCP later in the day, who sent him to ED after he noticed a Left facial droop. MRI shows 5-6 small Right frontal lobe ring-enhancing lesions, no mass effect, with vasogenic edema. No sig diffusion restriction Exam: Slight Left nasolabial flattening, otherwise intact  8/30:   -OR for brain biopsy Wednesday  -[facility], q4h neurochecks  -Keppra 500BID, Dex 4q6  -CT CAP grossly neg for malig  -Consult Rad Onc for outpt SRS  -On hep drip for history of Afib and splenic infarcts, will discontinue Heparin at 10pm, repeat PTT at 3AM for OR  -NPO@ midnight for OR tomorrow

## 2022-08-31 ENCOUNTER — RESULT REVIEW (OUTPATIENT)
Age: 67
End: 2022-08-31

## 2022-08-31 ENCOUNTER — APPOINTMENT (OUTPATIENT)
Dept: NEUROSURGERY | Facility: HOSPITAL | Age: 67
End: 2022-08-31

## 2022-08-31 LAB
ALBUMIN SERPL ELPH-MCNC: 3.8 G/DL — SIGNIFICANT CHANGE UP (ref 3.3–5)
ALP SERPL-CCNC: 67 U/L — SIGNIFICANT CHANGE UP (ref 40–120)
ALT FLD-CCNC: 41 U/L — SIGNIFICANT CHANGE UP (ref 10–45)
ANION GAP SERPL CALC-SCNC: 10 MMOL/L — SIGNIFICANT CHANGE UP (ref 5–17)
ANION GAP SERPL CALC-SCNC: 13 MMOL/L — SIGNIFICANT CHANGE UP (ref 5–17)
APTT BLD: 21.9 SEC — LOW (ref 27.5–35.5)
AST SERPL-CCNC: 28 U/L — SIGNIFICANT CHANGE UP (ref 10–40)
BILIRUB SERPL-MCNC: 0.4 MG/DL — SIGNIFICANT CHANGE UP (ref 0.2–1.2)
BUN SERPL-MCNC: 27 MG/DL — HIGH (ref 7–23)
BUN SERPL-MCNC: 33 MG/DL — HIGH (ref 7–23)
CALCIUM SERPL-MCNC: 8.7 MG/DL — SIGNIFICANT CHANGE UP (ref 8.4–10.5)
CALCIUM SERPL-MCNC: 9.5 MG/DL — SIGNIFICANT CHANGE UP (ref 8.4–10.5)
CHLORIDE SERPL-SCNC: 105 MMOL/L — SIGNIFICANT CHANGE UP (ref 96–108)
CHLORIDE SERPL-SCNC: 107 MMOL/L — SIGNIFICANT CHANGE UP (ref 96–108)
CO2 SERPL-SCNC: 23 MMOL/L — SIGNIFICANT CHANGE UP (ref 22–31)
CO2 SERPL-SCNC: 23 MMOL/L — SIGNIFICANT CHANGE UP (ref 22–31)
CREAT SERPL-MCNC: 0.98 MG/DL — SIGNIFICANT CHANGE UP (ref 0.5–1.3)
CREAT SERPL-MCNC: 1.21 MG/DL — SIGNIFICANT CHANGE UP (ref 0.5–1.3)
EGFR: 66 ML/MIN/1.73M2 — SIGNIFICANT CHANGE UP
EGFR: 85 ML/MIN/1.73M2 — SIGNIFICANT CHANGE UP
GAS PNL BLDA: SIGNIFICANT CHANGE UP
GAS PNL BLDA: SIGNIFICANT CHANGE UP
GLUCOSE BLDC GLUCOMTR-MCNC: 101 MG/DL — HIGH (ref 70–99)
GLUCOSE BLDC GLUCOMTR-MCNC: 115 MG/DL — HIGH (ref 70–99)
GLUCOSE BLDC GLUCOMTR-MCNC: 138 MG/DL — HIGH (ref 70–99)
GLUCOSE SERPL-MCNC: 122 MG/DL — HIGH (ref 70–99)
GLUCOSE SERPL-MCNC: 125 MG/DL — HIGH (ref 70–99)
HCT VFR BLD CALC: 46 % — SIGNIFICANT CHANGE UP (ref 39–50)
HCT VFR BLD CALC: 47 % — SIGNIFICANT CHANGE UP (ref 39–50)
HGB BLD-MCNC: 15.6 G/DL — SIGNIFICANT CHANGE UP (ref 13–17)
HGB BLD-MCNC: 16 G/DL — SIGNIFICANT CHANGE UP (ref 13–17)
INR BLD: 1.25 RATIO — HIGH (ref 0.88–1.16)
MAGNESIUM SERPL-MCNC: 2.1 MG/DL — SIGNIFICANT CHANGE UP (ref 1.6–2.6)
MCHC RBC-ENTMCNC: 28.9 PG — SIGNIFICANT CHANGE UP (ref 27–34)
MCHC RBC-ENTMCNC: 29.3 PG — SIGNIFICANT CHANGE UP (ref 27–34)
MCHC RBC-ENTMCNC: 33.9 GM/DL — SIGNIFICANT CHANGE UP (ref 32–36)
MCHC RBC-ENTMCNC: 34 GM/DL — SIGNIFICANT CHANGE UP (ref 32–36)
MCV RBC AUTO: 84.8 FL — SIGNIFICANT CHANGE UP (ref 80–100)
MCV RBC AUTO: 86.3 FL — SIGNIFICANT CHANGE UP (ref 80–100)
NRBC # BLD: 0 /100 WBCS — SIGNIFICANT CHANGE UP (ref 0–0)
NRBC # BLD: 0 /100 WBCS — SIGNIFICANT CHANGE UP (ref 0–0)
PHOSPHATE SERPL-MCNC: 3.4 MG/DL — SIGNIFICANT CHANGE UP (ref 2.5–4.5)
PLATELET # BLD AUTO: 229 K/UL — SIGNIFICANT CHANGE UP (ref 150–400)
PLATELET # BLD AUTO: 242 K/UL — SIGNIFICANT CHANGE UP (ref 150–400)
POTASSIUM SERPL-MCNC: 4 MMOL/L — SIGNIFICANT CHANGE UP (ref 3.5–5.3)
POTASSIUM SERPL-MCNC: 4 MMOL/L — SIGNIFICANT CHANGE UP (ref 3.5–5.3)
POTASSIUM SERPL-SCNC: 4 MMOL/L — SIGNIFICANT CHANGE UP (ref 3.5–5.3)
POTASSIUM SERPL-SCNC: 4 MMOL/L — SIGNIFICANT CHANGE UP (ref 3.5–5.3)
PROT SERPL-MCNC: 5.8 G/DL — LOW (ref 6–8.3)
PROTHROM AB SERPL-ACNC: 14.4 SEC — HIGH (ref 10.5–13.4)
RBC # BLD: 5.33 M/UL — SIGNIFICANT CHANGE UP (ref 4.2–5.8)
RBC # BLD: 5.54 M/UL — SIGNIFICANT CHANGE UP (ref 4.2–5.8)
RBC # FLD: 13.1 % — SIGNIFICANT CHANGE UP (ref 10.3–14.5)
RBC # FLD: 13.2 % — SIGNIFICANT CHANGE UP (ref 10.3–14.5)
SODIUM SERPL-SCNC: 138 MMOL/L — SIGNIFICANT CHANGE UP (ref 135–145)
SODIUM SERPL-SCNC: 143 MMOL/L — SIGNIFICANT CHANGE UP (ref 135–145)
WBC # BLD: 12.69 K/UL — HIGH (ref 3.8–10.5)
WBC # BLD: 13.52 K/UL — HIGH (ref 3.8–10.5)
WBC # FLD AUTO: 12.69 K/UL — HIGH (ref 3.8–10.5)
WBC # FLD AUTO: 13.52 K/UL — HIGH (ref 3.8–10.5)

## 2022-08-31 PROCEDURE — 88307 TISSUE EXAM BY PATHOLOGIST: CPT | Mod: 26

## 2022-08-31 PROCEDURE — 88360 TUMOR IMMUNOHISTOCHEM/MANUAL: CPT | Mod: 26

## 2022-08-31 PROCEDURE — 99233 SBSQ HOSP IP/OBS HIGH 50: CPT

## 2022-08-31 PROCEDURE — 88334 PATH CONSLTJ SURG CYTO XM EA: CPT | Mod: 26,59

## 2022-08-31 PROCEDURE — 99233 SBSQ HOSP IP/OBS HIGH 50: CPT | Mod: GC

## 2022-08-31 PROCEDURE — 88341 IMHCHEM/IMCYTCHM EA ADD ANTB: CPT | Mod: 26,59

## 2022-08-31 PROCEDURE — 61510 CRNEC TREPH EXC BRN TUM STTL: CPT

## 2022-08-31 PROCEDURE — 88331 PATH CONSLTJ SURG 1 BLK 1SPC: CPT | Mod: 26

## 2022-08-31 PROCEDURE — 61781 SCAN PROC CRANIAL INTRA: CPT

## 2022-08-31 PROCEDURE — 88342 IMHCHEM/IMCYTCHM 1ST ANTB: CPT | Mod: 26,59

## 2022-08-31 PROCEDURE — 70450 CT HEAD/BRAIN W/O DYE: CPT | Mod: 26

## 2022-08-31 DEVICE — SURGIFLO MATRIX WITH THROMBIN KIT: Type: IMPLANTABLE DEVICE | Site: RIGHT | Status: FUNCTIONAL

## 2022-08-31 DEVICE — SURGICEL FIBRILLAR 2 X 4": Type: IMPLANTABLE DEVICE | Site: RIGHT | Status: FUNCTIONAL

## 2022-08-31 DEVICE — MAYFIELD SKULL PIN ADULT PLASTIC: Type: IMPLANTABLE DEVICE | Site: RIGHT | Status: FUNCTIONAL

## 2022-08-31 DEVICE — SURGICEL SNOW 2 X 4": Type: IMPLANTABLE DEVICE | Site: RIGHT | Status: FUNCTIONAL

## 2022-08-31 DEVICE — SURGIFOAM PAD 8CM X 12.5CM X 10MM (100): Type: IMPLANTABLE DEVICE | Site: RIGHT | Status: FUNCTIONAL

## 2022-08-31 DEVICE — SCREW UN3 AXS SELF DRILL 1.5X4MM 5/PK: Type: IMPLANTABLE DEVICE | Site: RIGHT | Status: FUNCTIONAL

## 2022-08-31 DEVICE — KIT A-LINE 1LUM 20G X 12CM SAFE KIT: Type: IMPLANTABLE DEVICE | Site: RIGHT | Status: FUNCTIONAL

## 2022-08-31 DEVICE — PLATE UN3 2 HOLE: Type: IMPLANTABLE DEVICE | Site: RIGHT | Status: FUNCTIONAL

## 2022-08-31 DEVICE — PLATE COVER BURRHOLE UN3 W/TAB 14MM: Type: IMPLANTABLE DEVICE | Site: RIGHT | Status: FUNCTIONAL

## 2022-08-31 RX ORDER — DEXTROSE 50 % IN WATER 50 %
25 SYRINGE (ML) INTRAVENOUS ONCE
Refills: 0 | Status: DISCONTINUED | OUTPATIENT
Start: 2022-08-31 | End: 2022-09-02

## 2022-08-31 RX ORDER — ACETAMINOPHEN 500 MG
650 TABLET ORAL EVERY 6 HOURS
Refills: 0 | Status: DISCONTINUED | OUTPATIENT
Start: 2022-08-31 | End: 2022-09-02

## 2022-08-31 RX ORDER — INSULIN LISPRO 100/ML
VIAL (ML) SUBCUTANEOUS
Refills: 0 | Status: DISCONTINUED | OUTPATIENT
Start: 2022-08-31 | End: 2022-09-02

## 2022-08-31 RX ORDER — ALLOPURINOL 300 MG
100 TABLET ORAL DAILY
Refills: 0 | Status: DISCONTINUED | OUTPATIENT
Start: 2022-08-31 | End: 2022-09-02

## 2022-08-31 RX ORDER — SODIUM CHLORIDE 9 MG/ML
1000 INJECTION, SOLUTION INTRAVENOUS
Refills: 0 | Status: DISCONTINUED | OUTPATIENT
Start: 2022-08-31 | End: 2022-09-02

## 2022-08-31 RX ORDER — DEXTROSE 50 % IN WATER 50 %
12.5 SYRINGE (ML) INTRAVENOUS ONCE
Refills: 0 | Status: DISCONTINUED | OUTPATIENT
Start: 2022-08-31 | End: 2022-09-02

## 2022-08-31 RX ORDER — BENZOCAINE AND MENTHOL 5; 1 G/100ML; G/100ML
1 LIQUID ORAL ONCE
Refills: 0 | Status: COMPLETED | OUTPATIENT
Start: 2022-08-31 | End: 2022-08-31

## 2022-08-31 RX ORDER — ONDANSETRON 8 MG/1
4 TABLET, FILM COATED ORAL ONCE
Refills: 0 | Status: DISCONTINUED | OUTPATIENT
Start: 2022-08-31 | End: 2022-08-31

## 2022-08-31 RX ORDER — CEFAZOLIN SODIUM 1 G
2000 VIAL (EA) INJECTION EVERY 8 HOURS
Refills: 0 | Status: COMPLETED | OUTPATIENT
Start: 2022-08-31 | End: 2022-08-31

## 2022-08-31 RX ORDER — PANTOPRAZOLE SODIUM 20 MG/1
40 TABLET, DELAYED RELEASE ORAL
Refills: 0 | Status: DISCONTINUED | OUTPATIENT
Start: 2022-08-31 | End: 2022-09-02

## 2022-08-31 RX ORDER — OXYCODONE HYDROCHLORIDE 5 MG/1
5 TABLET ORAL ONCE
Refills: 0 | Status: DISCONTINUED | OUTPATIENT
Start: 2022-08-31 | End: 2022-08-31

## 2022-08-31 RX ORDER — LEVETIRACETAM 250 MG/1
500 TABLET, FILM COATED ORAL
Refills: 0 | Status: DISCONTINUED | OUTPATIENT
Start: 2022-08-31 | End: 2022-09-02

## 2022-08-31 RX ORDER — METOPROLOL TARTRATE 50 MG
100 TABLET ORAL DAILY
Refills: 0 | Status: DISCONTINUED | OUTPATIENT
Start: 2022-08-31 | End: 2022-09-02

## 2022-08-31 RX ORDER — BRIMONIDINE TARTRATE 2 MG/MG
1 SOLUTION/ DROPS OPHTHALMIC
Refills: 0 | Status: DISCONTINUED | OUTPATIENT
Start: 2022-08-31 | End: 2022-09-02

## 2022-08-31 RX ORDER — LANOLIN ALCOHOL/MO/W.PET/CERES
5 CREAM (GRAM) TOPICAL AT BEDTIME
Refills: 0 | Status: DISCONTINUED | OUTPATIENT
Start: 2022-08-31 | End: 2022-09-02

## 2022-08-31 RX ORDER — TIMOLOL 0.5 %
1 DROPS OPHTHALMIC (EYE)
Refills: 0 | Status: DISCONTINUED | OUTPATIENT
Start: 2022-08-31 | End: 2022-09-02

## 2022-08-31 RX ORDER — DEXAMETHASONE 0.5 MG/5ML
4 ELIXIR ORAL EVERY 6 HOURS
Refills: 0 | Status: DISCONTINUED | OUTPATIENT
Start: 2022-08-31 | End: 2022-09-01

## 2022-08-31 RX ORDER — COLCHICINE 0.6 MG
0.6 TABLET ORAL DAILY
Refills: 0 | Status: DISCONTINUED | OUTPATIENT
Start: 2022-08-31 | End: 2022-09-02

## 2022-08-31 RX ORDER — ONDANSETRON 8 MG/1
4 TABLET, FILM COATED ORAL ONCE
Refills: 0 | Status: DISCONTINUED | OUTPATIENT
Start: 2022-08-31 | End: 2022-09-01

## 2022-08-31 RX ORDER — INSULIN LISPRO 100/ML
VIAL (ML) SUBCUTANEOUS AT BEDTIME
Refills: 0 | Status: DISCONTINUED | OUTPATIENT
Start: 2022-08-31 | End: 2022-09-02

## 2022-08-31 RX ORDER — SODIUM CHLORIDE 9 MG/ML
1000 INJECTION INTRAMUSCULAR; INTRAVENOUS; SUBCUTANEOUS
Refills: 0 | Status: DISCONTINUED | OUTPATIENT
Start: 2022-08-31 | End: 2022-08-31

## 2022-08-31 RX ORDER — DEXTROSE 50 % IN WATER 50 %
15 SYRINGE (ML) INTRAVENOUS ONCE
Refills: 0 | Status: DISCONTINUED | OUTPATIENT
Start: 2022-08-31 | End: 2022-09-02

## 2022-08-31 RX ORDER — HYDROMORPHONE HYDROCHLORIDE 2 MG/ML
0.5 INJECTION INTRAMUSCULAR; INTRAVENOUS; SUBCUTANEOUS
Refills: 0 | Status: DISCONTINUED | OUTPATIENT
Start: 2022-08-31 | End: 2022-08-31

## 2022-08-31 RX ORDER — AMLODIPINE BESYLATE 2.5 MG/1
10 TABLET ORAL DAILY
Refills: 0 | Status: DISCONTINUED | OUTPATIENT
Start: 2022-08-31 | End: 2022-09-02

## 2022-08-31 RX ORDER — HYDRALAZINE HCL 50 MG
25 TABLET ORAL
Refills: 0 | Status: DISCONTINUED | OUTPATIENT
Start: 2022-08-31 | End: 2022-09-02

## 2022-08-31 RX ORDER — OXYCODONE HYDROCHLORIDE 5 MG/1
5 TABLET ORAL EVERY 6 HOURS
Refills: 0 | Status: DISCONTINUED | OUTPATIENT
Start: 2022-08-31 | End: 2022-09-02

## 2022-08-31 RX ORDER — GLUCAGON INJECTION, SOLUTION 0.5 MG/.1ML
1 INJECTION, SOLUTION SUBCUTANEOUS ONCE
Refills: 0 | Status: DISCONTINUED | OUTPATIENT
Start: 2022-08-31 | End: 2022-09-02

## 2022-08-31 RX ADMIN — Medication 650 MILLIGRAM(S): at 17:14

## 2022-08-31 RX ADMIN — OXYCODONE HYDROCHLORIDE 5 MILLIGRAM(S): 5 TABLET ORAL at 21:30

## 2022-08-31 RX ADMIN — Medication 100 MILLIGRAM(S): at 06:05

## 2022-08-31 RX ADMIN — PANTOPRAZOLE SODIUM 40 MILLIGRAM(S): 20 TABLET, DELAYED RELEASE ORAL at 06:05

## 2022-08-31 RX ADMIN — Medication 25 MILLIGRAM(S): at 17:34

## 2022-08-31 RX ADMIN — Medication 4 MILLIGRAM(S): at 23:30

## 2022-08-31 RX ADMIN — OXYCODONE HYDROCHLORIDE 5 MILLIGRAM(S): 5 TABLET ORAL at 21:00

## 2022-08-31 RX ADMIN — Medication 100 MILLIGRAM(S): at 17:17

## 2022-08-31 RX ADMIN — BENZOCAINE AND MENTHOL 1 LOZENGE: 5; 1 LIQUID ORAL at 15:03

## 2022-08-31 RX ADMIN — LEVETIRACETAM 500 MILLIGRAM(S): 250 TABLET, FILM COATED ORAL at 06:06

## 2022-08-31 RX ADMIN — Medication 4 MILLIGRAM(S): at 13:50

## 2022-08-31 RX ADMIN — BRIMONIDINE TARTRATE 1 DROP(S): 2 SOLUTION/ DROPS OPHTHALMIC at 06:35

## 2022-08-31 RX ADMIN — Medication 1 DROP(S): at 06:36

## 2022-08-31 RX ADMIN — Medication 100 MILLIGRAM(S): at 14:58

## 2022-08-31 RX ADMIN — BRIMONIDINE TARTRATE 1 DROP(S): 2 SOLUTION/ DROPS OPHTHALMIC at 17:35

## 2022-08-31 RX ADMIN — AMLODIPINE BESYLATE 10 MILLIGRAM(S): 2.5 TABLET ORAL at 06:06

## 2022-08-31 RX ADMIN — Medication 4 MILLIGRAM(S): at 17:34

## 2022-08-31 RX ADMIN — Medication 4 MILLIGRAM(S): at 06:04

## 2022-08-31 RX ADMIN — Medication 1 DROP(S): at 17:35

## 2022-08-31 RX ADMIN — LEVETIRACETAM 500 MILLIGRAM(S): 250 TABLET, FILM COATED ORAL at 17:34

## 2022-08-31 RX ADMIN — Medication 25 MILLIGRAM(S): at 06:05

## 2022-08-31 RX ADMIN — Medication 650 MILLIGRAM(S): at 23:30

## 2022-08-31 RX ADMIN — Medication 100 MILLIGRAM(S): at 21:20

## 2022-08-31 RX ADMIN — Medication 650 MILLIGRAM(S): at 18:28

## 2022-08-31 NOTE — PROGRESS NOTE ADULT - SUBJECTIVE AND OBJECTIVE BOX
Patient seen and examined s/p R mini craniotomy for biopsy/partial resection.    Doing well. Awake, alert, oriented x 3, mild L facial droop, no drift, LOYOLA strongly. SILT. Wound intact.    T(C): 36.2 (08-31-22 @ 12:27), Max: 36.9 (08-31-22 @ 04:53)  HR: 79 (08-31-22 @ 18:00) (73 - 127)  BP: 147/99 (08-31-22 @ 18:00) (131/86 - 166/101)  RR: 14 (08-31-22 @ 18:00) (14 - 18)  SpO2: 95% (08-31-22 @ 18:00) (93% - 100%)                          16.0   13.52 )-----------( 242      ( 31 Aug 2022 13:51 )             47.0     08-31    143  |  107  |  27<H>  ----------------------------<  122<H>  4.0   |  23  |  0.98    Ca    9.5      31 Aug 2022 13:51  Phos  3.4     08-31  Mg     2.1     08-31    TPro  5.8<L>  /  Alb  3.8  /  TBili  0.4  /  DBili  x   /  AST  28  /  ALT  41  /  AlkPhos  67  08-31    PT/INR - ( 31 Aug 2022 04:19 )   PT: 14.4 sec;   INR: 1.25 ratio         PTT - ( 31 Aug 2022 04:19 )  PTT:21.9 sec        CAPILLARY BLOOD GLUCOSE      POCT Blood Glucose.: 115 mg/dL (31 Aug 2022 17:45)  POCT Blood Glucose.: 101 mg/dL (31 Aug 2022 13:24)  POCT Blood Glucose.: 115 mg/dL (30 Aug 2022 21:51)

## 2022-08-31 NOTE — PROGRESS NOTE ADULT - ASSESSMENT
ASSESSMENT/PLAN:  65 y/o M with history of Afib (on Xarelto), HTN, gout, and prostate cancer s/p prostatectomy in 2010 presenting to [place] on 8/26 s/p episode of facial droop and likely seizures, transferred to [facility] for further evaluation of newly found lesions on R frontal lobe c/f primary malignancy vs metastases. Pending brain tissue biopsy (and possibly LP) on Wednesday 8/31 with nsgy.    NEURO:  q1h NC   Keppra BID   Decadron cerebral edema   CTH tonight if stable, floor vs medicine service?   Hold AC for now until cleared from NSGY standpoint   MRI brain post operatively   Activity: [] mobilize as tolerated [] Bedrest [] PT [] OT [] PMNR    PULM:    CV:  SBP goal    RENAL:  Fluids:    GI:  Diet:  GI prophylaxis [] not indicated [] PPI [] other:  Bowel regimen [] colace [] senna [] other:    ENDO:   Goal euglycemia (-180)    HEME/ONC:  VTE prophylaxis: [] SCDs [] chemoprophylaxis [] hold chemoprophylaxis due to: [] high risk of DVT/PE on admission due to:    ID:    MISC:    SOCIAL/FAMILY:  [] awaiting [] updated at bedside [] family meeting    CODE STATUS:  [] Full Code [] DNR [] DNI [] Palliative/Comfort Care    DISPOSITION:  [] ICU [] Stroke Unit [] Floor [] EMU [] RCU [] PCU    [] Patient is at high risk of neurologic deterioration/death due to:     Time seen:  Time spent: ___ [] critical care minutes    Contact: [phone] ASSESSMENT/PLAN:  65 y/o M with history of Afib (on Xarelto), HTN, gout, and prostate cancer s/p prostatectomy in 2010 who originally presented to [place] on 8/26 s/p episode of facial droop and likely seizures "head shaking" (about 2 minutes). He presented to his PCP later that day who sent him immediately to the ED. MRI brain showed 5-6 small right frontal lobe ring enhancing lesions, without mass effect, with vasogenic edema. He was transferred to [facility] for further evaluation due to newly found lesions. At [facility], he underwent a brain biopsy on 8/31 without any issues. He was started on keppra and dex. To note, CT CAP was completed for malignancy workup which came back negative.       -Keppra 500BID, Dex 4q6  -CT CAP grossly neg for malig  -Consult Rad Onc for outpt SRS  -On hep drip for history of Afib and splenic infarcts, will discontinue Heparin at 10pm, repeat PTT at 3AM for OR      NEURO:  #MRI brain showed 5-6 small right frontal lobe ring enhancing lesions, without mass effect, with vasogenic edema.   q1h NC   -s/p brain biopsy on 8/31  -continue Keppra 500mg BID   -continue decadron taper for cerebral edema   -CTH scan in the PM   -MRI head w/wo contrast   -hold AC for now until cleared by NSGY   -pain control-tylenol   Activity: [] mobilize as tolerated [] Bedrest [x] PT [] OT [] PMNR    PULM:  -saturations >94%  -continue to monitor respiratory status     CV:  #HTN   -continue amlodipine 10mg daily  -continue hydralazine IV push every 6h  #hx of afib with splenic infarcts   -was on hep gtt   -fu plan for anticoagulation     RENAL:  -daily BMP   -monitor I&Os   -continue little, plan to dc     GI: DASH diet   -continue protonix     ENDO:   Goal euglycemia (-180)  -continue ISS while on dex     HEME/ONC:  VTE prophylaxis: [] SCDs [] chemoprophylaxis [] hold chemoprophylaxis due to: [] high risk of DVT/PE on admission due to:    ID:  -continue to monitor fever curve     SOCIAL/FAMILY:  [x] awaiting [] updated at bedside [] family meeting    CODE STATUS:  [x] Full Code [] DNR [] DNI [] Palliative/Comfort Care    DISPOSITION:  [] ICU [] Stroke Unit [] Floor [] EMU [] RCU [] PCU

## 2022-08-31 NOTE — PROGRESS NOTE ADULT - SUBJECTIVE AND OBJECTIVE BOX
Patient is a 66y old  Male who presents with a chief complaint of R frontal lobe lesions (31 Aug 2022 05:10)     INTERVAL HPI/OVERNIGHT EVENTS:  - No acute events overnight    SUBJECTIVE  - Patient seen and evaluated at bedside  - Patient reports presence of   - Patient reports absence of fevers, chills, HA, lightheadedness, dizziness, nausea, emesis, chest pain, dyspnea, palpitations, abd pain, diarrhea, urinary symptoms, skin color changes or rashes, or LE edema     MEDICATIONS  (STANDING):  allopurinol 100 milliGRAM(s) Oral daily  amLODIPine   Tablet 10 milliGRAM(s) Oral daily  brimonidine 0.2% Ophthalmic Solution 1 Drop(s) Both EYES two times a day  ceFAZolin   IVPB 2000 milliGRAM(s) IV Intermittent every 8 hours  colchicine 0.6 milliGRAM(s) Oral daily  dexAMETHasone  Injectable 4 milliGRAM(s) IV Push every 6 hours  dextrose 5%. 1000 milliLiter(s) (50 mL/Hr) IV Continuous <Continuous>  dextrose 5%. 1000 milliLiter(s) (100 mL/Hr) IV Continuous <Continuous>  dextrose 50% Injectable 25 Gram(s) IV Push once  dextrose 50% Injectable 12.5 Gram(s) IV Push once  dextrose 50% Injectable 25 Gram(s) IV Push once  glucagon  Injectable 1 milliGRAM(s) IntraMuscular once  hydrALAZINE 25 milliGRAM(s) Oral two times a day  insulin lispro (ADMELOG) corrective regimen sliding scale   SubCutaneous three times a day before meals  insulin lispro (ADMELOG) corrective regimen sliding scale   SubCutaneous at bedtime  lactated ringers. 1000 milliLiter(s) (100 mL/Hr) IV Continuous <Continuous>  levETIRAcetam 500 milliGRAM(s) Oral two times a day  metoprolol succinate  milliGRAM(s) Oral daily  pantoprazole    Tablet 40 milliGRAM(s) Oral before breakfast  sodium chloride 0.9%. 1000 milliLiter(s) (75 mL/Hr) IV Continuous <Continuous>  timolol 0.5% Solution 1 Drop(s) Both EYES two times a day    MEDICATIONS  (PRN):  acetaminophen     Tablet .. 650 milliGRAM(s) Oral every 6 hours PRN Temp greater or equal to 38C (100.4F), Mild Pain (1 - 3)  aluminum hydroxide/magnesium hydroxide/simethicone Suspension 30 milliLiter(s) Oral every 4 hours PRN Dyspepsia  dextrose Oral Gel 15 Gram(s) Oral once PRN Blood Glucose LESS THAN 70 milliGRAM(s)/deciliter  melatonin 5 milliGRAM(s) Oral at bedtime PRN Insomnia  ondansetron Injectable 4 milliGRAM(s) IV Push once PRN Nausea and/or Vomiting    Allergies:  No Known Allergies    Intolerances:      REVIEW OF SYSTEMS: As indicated above; otherwise, negative    VITAL SIGNS:  T(F): 97.2 (08-31-22 @ 12:27), Max: 98.4 (08-31-22 @ 04:53)  HR: 86 (08-31-22 @ 13:45) (73 - 127)  BP: 138/96 (08-31-22 @ 13:45) (134/87 - 164/90)  RR: 15 (08-31-22 @ 13:45) (14 - 18)  SpO2: 98% (08-31-22 @ 13:45) (93% - 98%)    PHYSICAL EXAM:  General: NAD, well-groomed, well-developed  Eyes: Conjunctiva and sclera clear  ENMT: Moist mucous membranes  Neck: No palpable pre-auricular, post-auricular, occipital, mandibular, submental, supra-clavicular, or infra-clavicular lymph nodes   Chest: Clear to auscultation bilaterally; no rales, rhonchi, or wheezing  Heart: Regular rate and rhythm; normal S1 and S2; no murmurs, rubs, or gallops  Abd: Soft, nontender, nondistended  Nervous System: AAOX3  Psych: Appropriate affect  Ext: no peripheral LE edema bilaterally    LABS:                        16.0   13.52 )-----------( 242      ( 31 Aug 2022 13:51 )             47.0     31 Aug 2022 04:19    138    |  105    |  33     ----------------------------<  125    4.0     |  23     |  1.21     Ca    8.7        31 Aug 2022 04:19  Phos  3.4       31 Aug 2022 04:19  Mg     2.1       31 Aug 2022 04:19    PT/INR - ( 31 Aug 2022 04:19 )   PT: 14.4 sec;   INR: 1.25 ratio         PTT - ( 31 Aug 2022 04:19 )  PTT:21.9 sec  CAPILLARY BLOOD GLUCOSE      POCT Blood Glucose.: 101 mg/dL (31 Aug 2022 13:24)  POCT Blood Glucose.: 115 mg/dL (30 Aug 2022 21:51)  POCT Blood Glucose.: 102 mg/dL (30 Aug 2022 17:04)    BLOOD CULTURE    RADIOLOGY & ADDITIONAL TESTS:    Imaging Personally Reviewed:  [X ] YES     Consultant(s) Notes Reviewed:  Yes    Care Discussed with Consultants/Other Providers: Yes Patient is a 66y old  Male who presents with a chief complaint of R frontal lobe lesions (31 Aug 2022 05:10)     INTERVAL HPI/OVERNIGHT EVENTS:  - No acute events overnight    SUBJECTIVE  - Patient seen and evaluated in PACU. Patient feels relieved following brain biopsy. Patient states that it hurts for him to move his jaw or chew, but was told that would be expected following the procedure.    - Patient reports absence of fevers, chills, lightheadedness, dizziness, nausea, emesis, chest pain, dyspnea, palpitations, abdominal pain, diarrhea, urinary symptoms, skin color changes or rashes, or LE edema     MEDICATIONS  (STANDING):  allopurinol 100 milliGRAM(s) Oral daily  amLODIPine   Tablet 10 milliGRAM(s) Oral daily  brimonidine 0.2% Ophthalmic Solution 1 Drop(s) Both EYES two times a day  ceFAZolin   IVPB 2000 milliGRAM(s) IV Intermittent every 8 hours  colchicine 0.6 milliGRAM(s) Oral daily  dexAMETHasone  Injectable 4 milliGRAM(s) IV Push every 6 hours  dextrose 5%. 1000 milliLiter(s) (50 mL/Hr) IV Continuous <Continuous>  dextrose 5%. 1000 milliLiter(s) (100 mL/Hr) IV Continuous <Continuous>  dextrose 50% Injectable 25 Gram(s) IV Push once  dextrose 50% Injectable 12.5 Gram(s) IV Push once  dextrose 50% Injectable 25 Gram(s) IV Push once  glucagon  Injectable 1 milliGRAM(s) IntraMuscular once  hydrALAZINE 25 milliGRAM(s) Oral two times a day  insulin lispro (ADMELOG) corrective regimen sliding scale   SubCutaneous three times a day before meals  insulin lispro (ADMELOG) corrective regimen sliding scale   SubCutaneous at bedtime  lactated ringers. 1000 milliLiter(s) (100 mL/Hr) IV Continuous <Continuous>  levETIRAcetam 500 milliGRAM(s) Oral two times a day  metoprolol succinate  milliGRAM(s) Oral daily  pantoprazole    Tablet 40 milliGRAM(s) Oral before breakfast  sodium chloride 0.9%. 1000 milliLiter(s) (75 mL/Hr) IV Continuous <Continuous>  timolol 0.5% Solution 1 Drop(s) Both EYES two times a day    MEDICATIONS  (PRN):  acetaminophen     Tablet .. 650 milliGRAM(s) Oral every 6 hours PRN Temp greater or equal to 38C (100.4F), Mild Pain (1 - 3)  aluminum hydroxide/magnesium hydroxide/simethicone Suspension 30 milliLiter(s) Oral every 4 hours PRN Dyspepsia  dextrose Oral Gel 15 Gram(s) Oral once PRN Blood Glucose LESS THAN 70 milliGRAM(s)/deciliter  melatonin 5 milliGRAM(s) Oral at bedtime PRN Insomnia  ondansetron Injectable 4 milliGRAM(s) IV Push once PRN Nausea and/or Vomiting    Allergies:  No Known Allergies    Intolerances:      REVIEW OF SYSTEMS: As indicated above; otherwise, negative    VITAL SIGNS:  T(F): 97.2 (08-31-22 @ 12:27), Max: 98.4 (08-31-22 @ 04:53)  HR: 86 (08-31-22 @ 13:45) (73 - 127)  BP: 138/96 (08-31-22 @ 13:45) (134/87 - 164/90)  RR: 15 (08-31-22 @ 13:45) (14 - 18)  SpO2: 98% (08-31-22 @ 13:45) (93% - 98%)    PHYSICAL EXAM:  General: NAD, well-groomed, well-developed  Eyes: Pupils equal, round, reactive to light, extraocular muscles intact, anicteric sclera   Neck: No thyromegaly, no JVD, no lymphadenopathy   Chest: Clear to auscultation bilaterally; no rales, rhonchi, or wheezing  Heart: Regular rate and rhythm; normal S1 and S2; no murmurs, rubs, or gallops  Abdomen: Soft, nontender, nondistended, normoactive bowel sounds   Extremities: Warm, well perfused, no lower extremity edema bilaterally, 5/5 muscle strength for all extremities, sensation intact for all extremities   Neuro: AAOX3, no focal neurological deficits   Psych: Appropriate affect      LABS:                        16.0   13.52 )-----------( 242      ( 31 Aug 2022 13:51 )             47.0     31 Aug 2022 04:19    138    |  105    |  33     ----------------------------<  125    4.0     |  23     |  1.21     Ca    8.7        31 Aug 2022 04:19  Phos  3.4       31 Aug 2022 04:19  Mg     2.1       31 Aug 2022 04:19    PT/INR - ( 31 Aug 2022 04:19 )   PT: 14.4 sec;   INR: 1.25 ratio         PTT - ( 31 Aug 2022 04:19 )  PTT:21.9 sec  CAPILLARY BLOOD GLUCOSE      POCT Blood Glucose.: 101 mg/dL (31 Aug 2022 13:24)  POCT Blood Glucose.: 115 mg/dL (30 Aug 2022 21:51)  POCT Blood Glucose.: 102 mg/dL (30 Aug 2022 17:04)    BLOOD CULTURE    RADIOLOGY & ADDITIONAL TESTS:    Imaging Personally Reviewed:  [X ] YES     Consultant(s) Notes Reviewed:  Yes    Care Discussed with Consultants/Other Providers: Yes

## 2022-08-31 NOTE — PROGRESS NOTE ADULT - ASSESSMENT
· Assessment	  67 y/o M with history of Afib (on Xarelto), HTN, gout, and prostate cancer s/p prostatectomy in 2010 presenting to [place] on 8/26 s/p episode of facial droop and likely seizures, transferred to [facility] for further evaluation of newly found lesions on R frontal lobe c/f primary malignancy vs metastases. Pending brain tissue biopsy (and possibly LP) on Wednesday 8/31 with nsgy.     Problem/Plan - 1:  ·  Problem: Lesion of right frontal lobe of brain.   ·  Plan: - Presenting with a brief episode of facial droop, dysarthria, and "head shaking," likely seizure-like activity i/s/o newly found lesions on R frontal lobe  - CTH: no acute intracranial hemorrhage  - CT perfusion: multiple peripherally enhancing and hyper-perfusing lesions on R lateral frontal lobe suggestive of metastases or primary neoplasm  - MRI brain: peripherally enhancing lesion in same region suspicious for underlying metastasis  - CT C/A/P: few scattered 1-2 mm pulmonary nodules, otherwise no definitive evidence of malignancy   - Keppra as per neurosurgery  - Dexamethasone as per neurosurgery   - Neuro will hold off on LP for now   - S/p brain biopsy 8/31  - q4h neurochecks  - Rad onc consulted, will see in outpatient setting for evaluation   - Neurosurgery following, will f/u further recommendations   - Patient's repeat CTH following procedure was negative, will be going for MELINDA tomorrow, 9/1     Problem/Plan - 2:  ·  Problem: PARKER (acute kidney injury).   - Cr continues to improve, currently at .98  - Likely related to contrast patient received from CT  - Appropriate UOP  > Gentle hydration with LR 100cc/hr  > Monitor Cr, UOP.     Problem/Plan - 4:  ·  Problem: HTN (hypertension).   ·  Plan: > Continue home Amlodipine 10mg daily  > Continue home Metoprolol Succinate ER 100mg daily  > Continue home Hydralazine 25mg BID  > Hold home Losartan in setting of PARKER.     Problem/Plan - 5:  ·  Problem: Hyperglycemia.   ·  Plan: - Hgb a1c 5.5  > SSI while on steroids.     Problem/Plan - 6:  ·  Problem: History of prostate cancer.   ·  Plan: - S/p prostatectomy in 2010, has been following up regularly and did not require further treatment   - Last PSA several months ago was undetectable per patient  > Continue f/u outpatient.     Problem/Plan - 7:  ·  Problem: Chronic atrial fibrillation.   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   ·  Plan: > Continue home Allopurinol 100mg daily  > Continue home Colchicine 0.6mg daily.     Problem/Plan - 9:  ·  Problem: Prophylactic measure.   ·  Plan: - DVT ppx: SCD's for now  - Diet: DASH/TLC  - Dispo: pending brain biopsy.  - Contact: Aretha Jalloh (daughter) [phone]   · Assessment	  67 y/o M with history of Afib (on Xarelto), HTN, gout, and prostate cancer s/p prostatectomy in 2010 presenting to [place] on 8/26 s/p episode of facial droop and likely seizures, transferred to [facility] for further evaluation of newly found lesions on R frontal lobe c/f primary malignancy vs metastases. Pending brain tissue biopsy (and possibly LP) on Wednesday 8/31 with nsgy.     Problem/Plan - 1:  ·  Problem: Lesion of right frontal lobe of brain.   ·  Plan: - Presenting with a brief episode of facial droop, dysarthria, and "head shaking," likely seizure-like activity i/s/o newly found lesions on R frontal lobe  - CTH: no acute intracranial hemorrhage  - CT perfusion: multiple peripherally enhancing and hyper-perfusing lesions on R lateral frontal lobe suggestive of metastases or primary neoplasm  - MRI brain: peripherally enhancing lesion in same region suspicious for underlying metastasis  - CT C/A/P: few scattered 1-2 mm pulmonary nodules, otherwise no definitive evidence of malignancy   - Keppra as per neurosurgery  - Dexamethasone as per neurosurgery   - Neuro will hold off on LP for now   - S/p brain biopsy 8/31  - q4h neurochecks  - Rad onc consulted, will see in outpatient setting for evaluation   - Neurosurgery following, will f/u further recommendations   - Patient's repeat CTH following procedure was negative, will be going for another CTH and MRI tomorrow, 9/1     Problem/Plan - 2:  ·  Problem: PARKER (acute kidney injury).   - Cr continues to improve, currently at .98  - Likely related to contrast patient received from CT  - Appropriate UOP  > Gentle hydration with LR 100cc/hr  > Monitor Cr, UOP.     Problem/Plan - 4:  ·  Problem: HTN (hypertension).   ·  Plan: > Continue home Amlodipine 10mg daily  > Continue home Metoprolol Succinate ER 100mg daily  > Continue home Hydralazine 25mg BID  > Hold home Losartan in setting of PARKER.     Problem/Plan - 5:  ·  Problem: Hyperglycemia.   ·  Plan: - Hgb a1c 5.5  > SSI while on steroids.     Problem/Plan - 6:  ·  Problem: History of prostate cancer.   ·  Plan: - S/p prostatectomy in 2010, has been following up regularly and did not require further treatment   - Last PSA several months ago was undetectable per patient  > Continue f/u outpatient.     Problem/Plan - 7:  ·  Problem: Chronic atrial fibrillation.   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   ·  Plan: > Continue home Allopurinol 100mg daily  > Continue home Colchicine 0.6mg daily.     Problem/Plan - 9:  ·  Problem: Prophylactic measure.   ·  Plan: - DVT ppx: SCD's for now  - Diet: DASH/TLC  - Dispo: pending brain biopsy.  - Contact: Aretha Jalloh (daughter) [phone]   · Assessment	  65 y/o M with history of Afib (on Xarelto), HTN, gout, and prostate cancer s/p prostatectomy in 2010 presenting to [place] on 8/26 s/p episode of facial droop and likely seizures, transferred to [facility] for further evaluation of newly found lesions on R frontal lobe c/f primary malignancy vs metastases. Pending brain tissue biopsy (and possibly LP) on Wednesday 8/31 with nsgy.     Problem/Plan - 1:  ·  Problem: Lesion of right frontal lobe of brain.   ·  Plan: - Presenting with a brief episode of facial droop, dysarthria, and "head shaking," likely seizure-like activity i/s/o newly found lesions on R frontal lobe  - CTH: no acute intracranial hemorrhage  - CT perfusion: multiple peripherally enhancing and hyper-perfusing lesions on R lateral frontal lobe suggestive of metastases or primary neoplasm  - MRI brain: peripherally enhancing lesion in same region suspicious for underlying metastasis  - CT C/A/P: few scattered 1-2 mm pulmonary nodules, otherwise no definitive evidence of malignancy   - Keppra as per neurosurgery  - Dexamethasone as per neurosurgery   - Neuro will hold off on LP for now   - S/p brain biopsy 8/31  - q4h neurochecks  - Rad onc consulted, will see in outpatient setting for evaluation   - Neurosurgery following, will f/u further recommendations   - Patient's repeat CTH following procedure was negative, will be going for another CTH tomorrow, 9/1, as well as an MRI     Problem/Plan - 2:  ·  Problem: PARKER (acute kidney injury).   - Cr continues to improve, currently at .98  - Likely related to contrast patient received from CT  - Appropriate UOP  > Gentle hydration with LR 100cc/hr  > Monitor Cr, UOP.     Problem/Plan - 4:  ·  Problem: HTN (hypertension).   ·  Plan: > Continue home Amlodipine 10mg daily  > Continue home Metoprolol Succinate ER 100mg daily  > Continue home Hydralazine 25mg BID  > Hold home Losartan in setting of PARKER.     Problem/Plan - 5:  ·  Problem: Hyperglycemia.   ·  Plan: - Hgb a1c 5.5  > SSI while on steroids.     Problem/Plan - 6:  ·  Problem: History of prostate cancer.   ·  Plan: - S/p prostatectomy in 2010, has been following up regularly and did not require further treatment   - Last PSA several months ago was undetectable per patient  > Continue f/u outpatient.     Problem/Plan - 7:  ·  Problem: Chronic atrial fibrillation.   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   ·  Plan: > Continue home Allopurinol 100mg daily  > Continue home Colchicine 0.6mg daily.     Problem/Plan - 9:  ·  Problem: Prophylactic measure.   - DVT ppx: SCD's for now  - Diet: DASH/TLC  - Dispo: pending brain biopsy.  - Contact: Aretha Jalloh (daughter) [phone]

## 2022-08-31 NOTE — PROGRESS NOTE ADULT - SUBJECTIVE AND OBJECTIVE BOX
Patient seen and examined at bedside.    --Anticoagulation--    T(C): 36.9 (08-31-22 @ 04:53), Max: 36.9 (08-31-22 @ 04:53)  HR: 73 (08-31-22 @ 04:53) (65 - 86)  BP: 141/98 (08-31-22 @ 04:53) (134/87 - 145/92)  RR: 18 (08-31-22 @ 04:53) (18 - 18)  SpO2: 96% (08-31-22 @ 04:53) (96% - 99%)  Wt(kg): --    Exam: Slight Left nasolabial flattening, otherwise intact

## 2022-08-31 NOTE — PROGRESS NOTE ADULT - SUBJECTIVE AND OBJECTIVE BOX
INTERVAL HISTORY: HPI:  66M, PLV txfer, [facility] prostate cancer s/p total prostatectomy 12 years ago. 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 Had "head shaking" ~2mins while driving with dysarthria ~5mins on 8/26 AM. Maintained consciousness. Saw PCP later in the day, who sent him to ED in [place] after he noticed a Left facial droop. MRI showed 5-6 small Right frontal lobe ring-enhancing lesions, no mass effect, with vasogenic edema. No sig diffusion restriction. He was then transferred for further care and management. CT a/p was completed that was negative for malignancy. The patient went to the OR for brain biopsy on 8/31.     8/30:   -OR for brain biopsy Wednesday  -Adm Medicine, q4h neurochecks  -Keppra 500BID, Dex 4q6  -CT CAP grossly neg for malig  -Consult Rad Onc for outpt SRS  -On hep drip for history of Afib and splenic infarcts, will discontinue Heparin at 10pm, repeat PTT at 3AM for OR  -NPO@ midnight for OR tomorrow    24 hour: admitted     MEDICATIONS  (STANDING):  allopurinol 100 milliGRAM(s) Oral daily  amLODIPine   Tablet 10 milliGRAM(s) Oral daily  brimonidine 0.2% Ophthalmic Solution 1 Drop(s) Both EYES two times a day  ceFAZolin   IVPB 2000 milliGRAM(s) IV Intermittent every 8 hours  colchicine 0.6 milliGRAM(s) Oral daily  dexAMETHasone  Injectable 4 milliGRAM(s) IV Push every 6 hours  dextrose 5%. 1000 milliLiter(s) (100 mL/Hr) IV Continuous <Continuous>  dextrose 5%. 1000 milliLiter(s) (50 mL/Hr) IV Continuous <Continuous>  dextrose 50% Injectable 25 Gram(s) IV Push once  dextrose 50% Injectable 12.5 Gram(s) IV Push once  dextrose 50% Injectable 25 Gram(s) IV Push once  glucagon  Injectable 1 milliGRAM(s) IntraMuscular once  hydrALAZINE 25 milliGRAM(s) Oral two times a day  insulin lispro (ADMELOG) corrective regimen sliding scale   SubCutaneous three times a day before meals  insulin lispro (ADMELOG) corrective regimen sliding scale   SubCutaneous at bedtime  lactated ringers. 1000 milliLiter(s) (100 mL/Hr) IV Continuous <Continuous>  levETIRAcetam 500 milliGRAM(s) Oral two times a day  metoprolol succinate  milliGRAM(s) Oral daily  pantoprazole    Tablet 40 milliGRAM(s) Oral before breakfast  sodium chloride 0.9%. 1000 milliLiter(s) (75 mL/Hr) IV Continuous <Continuous>  timolol 0.5% Solution 1 Drop(s) Both EYES two times a day    MEDICATIONS  (PRN):  acetaminophen     Tablet .. 650 milliGRAM(s) Oral every 6 hours PRN Temp greater or equal to 38C (100.4F), Mild Pain (1 - 3)  aluminum hydroxide/magnesium hydroxide/simethicone Suspension 30 milliLiter(s) Oral every 4 hours PRN Dyspepsia  dextrose Oral Gel 15 Gram(s) Oral once PRN Blood Glucose LESS THAN 70 milliGRAM(s)/deciliter  melatonin 5 milliGRAM(s) Oral at bedtime PRN Insomnia  ondansetron Injectable 4 milliGRAM(s) IV Push once PRN Nausea and/or Vomiting      Drug Dosing Weight  Height (cm): 170.2 (31 Aug 2022 09:06)  Weight (kg): 97 (31 Aug 2022 09:06)  BMI (kg/m2): 33.5 (31 Aug 2022 09:06)  BSA (m2): 2.08 (31 Aug 2022 09:06)    PAST MEDICAL & SURGICAL HISTORY:  Prostate ca  s/p prostatectomy  Hypertension  Gout  Atrial fibrillation  paroxysmal  Splenic infarction  in 2019 and 2021, 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  2019 novel coronavirus disease (COVID-19)  February 20201 - received monoclonal AB infusion  GERD (gastroesophageal reflux disease)  H/O prostatectomy  4/27/10  H/O arthroscopy of right knee  1988  H/O colonoscopy  H/O endoscopy    Elective surgery  Percutaneous Cholecystostomy Drainage September 2021    REVIEW OF SYSTEMS: [ ] Unable to Assess due to neurologic exam   [x] All ROS addressed below are non-contributory, except:  Neuro: [ ] Headache [ ] Back pain [ ] Numbness [ ] Weakness [ ] Ataxia [ ] Dizziness [ ] Aphasia [ ] Dysarthria [ ] Visual disturbance  Resp: [ ] Shortness of breath/dyspnea, [ ] Orthopnea [ ] Cough  CV: [ ] Chest pain [ ] Palpitation [ ] Lightheadedness [ ] Syncope  Renal: [ ] Thirst [ ] Edema  GI: [ ] Nausea [ ] Emesis [ ] Abdominal pain [ ] Constipation [ ] Diarrhea  Hem: [ ] Hematemesis [ ] bright red blood per rectum  ID: [ ] Fever [ ] Chills [ ] Dysuria  ENT: [ ] Rhinorrhea    PHYSICAL EXAM:  General: No Acute Distress   Neurological: Awake, alert oriented to person, place and time, Following Commands, PERRL, EOMI, Face Symmetrical, Speech Fluent, Moving all extremities, Muscle Strength normal in all four extremities, No Drift, Sensation to Light Touch Intact  Pulmonary: Clear to Auscultation, No Rales, No Rhonchi, No Wheezes   Cardiovascular: S1, S2, Regular Rate and Rhythm   Gastrointestinal: Soft, Nontender, Nondistended   Extremities: No calf tenderness     MEDICATIONS  (STANDING):  allopurinol 100 milliGRAM(s) Oral daily  amLODIPine   Tablet 10 milliGRAM(s) Oral daily  brimonidine 0.2% Ophthalmic Solution 1 Drop(s) Both EYES two times a day  ceFAZolin   IVPB 2000 milliGRAM(s) IV Intermittent every 8 hours  colchicine 0.6 milliGRAM(s) Oral daily  dexAMETHasone  Injectable 4 milliGRAM(s) IV Push every 6 hours  dextrose 5%. 1000 milliLiter(s) (100 mL/Hr) IV Continuous <Continuous>  dextrose 5%. 1000 milliLiter(s) (50 mL/Hr) IV Continuous <Continuous>  dextrose 50% Injectable 25 Gram(s) IV Push once  dextrose 50% Injectable 12.5 Gram(s) IV Push once  dextrose 50% Injectable 25 Gram(s) IV Push once  glucagon  Injectable 1 milliGRAM(s) IntraMuscular once  hydrALAZINE 25 milliGRAM(s) Oral two times a day  insulin lispro (ADMELOG) corrective regimen sliding scale   SubCutaneous three times a day before meals  insulin lispro (ADMELOG) corrective regimen sliding scale   SubCutaneous at bedtime  lactated ringers. 1000 milliLiter(s) (100 mL/Hr) IV Continuous <Continuous>  levETIRAcetam 500 milliGRAM(s) Oral two times a day  metoprolol succinate  milliGRAM(s) Oral daily  pantoprazole    Tablet 40 milliGRAM(s) Oral before breakfast  sodium chloride 0.9%. 1000 milliLiter(s) (75 mL/Hr) IV Continuous <Continuous>  timolol 0.5% Solution 1 Drop(s) Both EYES two times a day    MEDICATIONS  (PRN):  acetaminophen     Tablet .. 650 milliGRAM(s) Oral every 6 hours PRN Temp greater or equal to 38C (100.4F), Mild Pain (1 - 3)  aluminum hydroxide/magnesium hydroxide/simethicone Suspension 30 milliLiter(s) Oral every 4 hours PRN Dyspepsia  dextrose Oral Gel 15 Gram(s) Oral once PRN Blood Glucose LESS THAN 70 milliGRAM(s)/deciliter  melatonin 5 milliGRAM(s) Oral at bedtime PRN Insomnia  ondansetron Injectable 4 milliGRAM(s) IV Push once PRN Nausea and/or Vomiting      Drug Dosing Weight  Height (cm): 170.2 (31 Aug 2022 09:06)  Weight (kg): 97 (31 Aug 2022 09:06)  BMI (kg/m2): 33.5 (31 Aug 2022 09:06)  BSA (m2): 2.08 (31 Aug 2022 09:06)    ICU Vital Signs Last 24 Hrs  T(C): 36.2 (31 Aug 2022 12:27), Max: 36.9 (31 Aug 2022 04:53)  T(F): 97.2 (31 Aug 2022 12:27), Max: 98.4 (31 Aug 2022 04:53)  HR: 84 (31 Aug 2022 14:30) (73 - 127)  BP: 160/87 (31 Aug 2022 14:30) (134/87 - 164/90)  BP(mean): 115 (31 Aug 2022 14:30) (101 - 122)  ABP: 145/89 (31 Aug 2022 14:30) (133/86 - 155/147)  ABP(mean): 113 (31 Aug 2022 14:30) (99 - 150)  RR: 16 (31 Aug 2022 14:30) (14 - 18)  SpO2: 99% (31 Aug 2022 14:30) (93% - 99%)    O2 Parameters below as of 31 Aug 2022 13:00  Patient On (Oxygen Delivery Method): nasal cannula  O2 Flow (L/min): 2    ABG - ( 31 Aug 2022 11:27 )  pH, Arterial: 7.45  pH, Blood: x     /  pCO2: 32    /  pO2: 134   / HCO3: 22    / Base Excess: -0.9  /  SaO2: 99.9      I&O's Detail    30 Aug 2022 07:01  -  31 Aug 2022 07:00  --------------------------------------------------------  IN:    Heparin Infusion: 108 mL    Lactated Ringers: 4 mL    Oral Fluid: 780 mL  Total IN: 892 mL    OUT:    Voided (mL): 1250 mL  Total OUT: 1250 mL    Total NET: -358 mL      31 Aug 2022 07:01  -  31 Aug 2022 15:31  --------------------------------------------------------  IN:  Total IN: 0 mL    OUT:    Indwelling Catheter - Urethral (mL): 750 mL  Total OUT: 750 mL    Total NET: -750 mL    LABS:  CBC Full  -  ( 31 Aug 2022 13:51 )  WBC Count : 13.52 K/uL  RBC Count : 5.54 M/uL  Hemoglobin : 16.0 g/dL  Hematocrit : 47.0 %  Platelet Count - Automated : 242 K/uL  Mean Cell Volume : 84.8 fl  Mean Cell Hemoglobin : 28.9 pg  Mean Cell Hemoglobin Concentration : 34.0 gm/dL  Auto Neutrophil # : x  Auto Lymphocyte # : x  Auto Monocyte # : x  Auto Eosinophil # : x  Auto Basophil # : x  Auto Neutrophil % : x  Auto Lymphocyte % : x  Auto Monocyte % : x  Auto Eosinophil % : x  Auto Basophil % : x    08-31    143  |  107  |  27<H>  ----------------------------<  122<H>  4.0   |  23  |  0.98    Ca    9.5      31 Aug 2022 13:51  Phos  3.4     08-31  Mg     2.1     08-31    TPro  5.8<L>  /  Alb  3.8  /  TBili  0.4  /  DBili  x   /  AST  28  /  ALT  41  /  AlkPhos  67  08-31    PT/INR - ( 31 Aug 2022 04:19 )   PT: 14.4 sec;   INR: 1.25 ratio         PTT - ( 31 Aug 2022 04:19 )  PTT:21.9 sec      RADIOLOGY & ADDITIONAL STUDIES:

## 2022-08-31 NOTE — PROGRESS NOTE ADULT - SUBJECTIVE AND OBJECTIVE BOX
Patient is a 66y old  Male who presents with a chief complaint of R frontal lobe lesions (31 Aug 2022 05:10)     INTERVAL HPI/OVERNIGHT EVENTS:  - No acute events overnight    SUBJECTIVE  - Patient seen and evaluated at bedside  - Patient reports presence of   - Patient reports absence of fevers, chills, HA, lightheadedness, dizziness, nausea, emesis, chest pain, dyspnea, palpitations, abd pain, diarrhea, urinary symptoms, skin color changes or rashes, or LE edema     MEDICATIONS  (STANDING):  allopurinol 100 milliGRAM(s) Oral daily  amLODIPine   Tablet 10 milliGRAM(s) Oral daily  brimonidine 0.2% Ophthalmic Solution 1 Drop(s) Both EYES two times a day  colchicine 0.6 milliGRAM(s) Oral daily  dexAMETHasone  Injectable 4 milliGRAM(s) IV Push every 6 hours  dextrose 5%. 1000 milliLiter(s) (50 mL/Hr) IV Continuous <Continuous>  dextrose 5%. 1000 milliLiter(s) (100 mL/Hr) IV Continuous <Continuous>  dextrose 50% Injectable 25 Gram(s) IV Push once  dextrose 50% Injectable 12.5 Gram(s) IV Push once  dextrose 50% Injectable 25 Gram(s) IV Push once  glucagon  Injectable 1 milliGRAM(s) IntraMuscular once  hydrALAZINE 25 milliGRAM(s) Oral two times a day  insulin lispro (ADMELOG) corrective regimen sliding scale   SubCutaneous three times a day before meals  insulin lispro (ADMELOG) corrective regimen sliding scale   SubCutaneous at bedtime  lactated ringers. 1000 milliLiter(s) (100 mL/Hr) IV Continuous <Continuous>  levETIRAcetam 500 milliGRAM(s) Oral two times a day  metoprolol succinate  milliGRAM(s) Oral daily  pantoprazole    Tablet 40 milliGRAM(s) Oral before breakfast  timolol 0.5% Solution 1 Drop(s) Both EYES two times a day    MEDICATIONS  (PRN):  acetaminophen     Tablet .. 650 milliGRAM(s) Oral every 6 hours PRN Temp greater or equal to 38C (100.4F), Mild Pain (1 - 3)  aluminum hydroxide/magnesium hydroxide/simethicone Suspension 30 milliLiter(s) Oral every 4 hours PRN Dyspepsia  dextrose Oral Gel 15 Gram(s) Oral once PRN Blood Glucose LESS THAN 70 milliGRAM(s)/deciliter  melatonin 5 milliGRAM(s) Oral at bedtime PRN Insomnia    Allergies:  No Known Allergies    Intolerances:      REVIEW OF SYSTEMS: As indicated above; otherwise, negative    VITAL SIGNS:  T(F): 98.4 (08-31-22 @ 06:55), Max: 98.4 (08-31-22 @ 04:53)  HR: 79 (08-31-22 @ 06:55) (70 - 86)  BP: 142/93 (08-31-22 @ 06:55) (134/87 - 145/92)  RR: 14 (08-31-22 @ 06:55) (14 - 18)  SpO2: 96% (08-31-22 @ 06:55) (96% - 97%)    PHYSICAL EXAM:  General: NAD, well-groomed, well-developed  Eyes: Conjunctiva and sclera clear  ENMT: Moist mucous membranes  Neck: No palpable pre-auricular, post-auricular, occipital, mandibular, submental, supra-clavicular, or infra-clavicular lymph nodes   Chest: Clear to auscultation bilaterally; no rales, rhonchi, or wheezing  Heart: Regular rate and rhythm; normal S1 and S2; no murmurs, rubs, or gallops  Abd: Soft, nontender, nondistended  Nervous System: AAOX3  Psych: Appropriate affect  Ext: no peripheral LE edema bilaterally    LABS:                        15.6   12.69 )-----------( 229      ( 31 Aug 2022 04:19 )             46.0     31 Aug 2022 04:19    138    |  105    |  33     ----------------------------<  125    4.0     |  23     |  1.21     Ca    8.7        31 Aug 2022 04:19  Phos  3.4       31 Aug 2022 04:19  Mg     2.1       31 Aug 2022 04:19    PT/INR - ( 31 Aug 2022 04:19 )   PT: 14.4 sec;   INR: 1.25 ratio         PTT - ( 31 Aug 2022 04:19 )  PTT:21.9 sec  CAPILLARY BLOOD GLUCOSE      POCT Blood Glucose.: 115 mg/dL (30 Aug 2022 21:51)  POCT Blood Glucose.: 102 mg/dL (30 Aug 2022 17:04)  POCT Blood Glucose.: 143 mg/dL (30 Aug 2022 13:18)  POCT Blood Glucose.: 168 mg/dL (30 Aug 2022 12:00)  POCT Blood Glucose.: 102 mg/dL (30 Aug 2022 08:15)    BLOOD CULTURE    RADIOLOGY & ADDITIONAL TESTS:    Imaging Personally Reviewed:  [X ] YES     Consultant(s) Notes Reviewed:  Yes    Care Discussed with Consultants/Other Providers: Yes

## 2022-08-31 NOTE — PROGRESS NOTE ADULT - ASSESSMENT
Patient seen and examined at bedside Patient seen and examined s/p R mini craniotomy for biopsy/partial resection. Doing well. Awake, alert, oriented x 3, mild L facial droop, no drift, LOYOLA strongly. SILT. Wound intact. Post-op CT without heme.    -adm under medicine, q4 neurochecks  --160  -CTH in AM, if stable DVT prophylaxis can start in PM  -Dex 4q6 if CTH stable can taper to 4q8 Day1  2q6 Day3 2q8 Day5 2q12 Day7 2qD Day9 Off Day11  -MRI brain w/wo  -Hold AC/AP for now

## 2022-08-31 NOTE — PROGRESS NOTE ADULT - ASSESSMENT
66M, PLV txfer, [facility] prostate cancer s/p total prostatectomy 12 years ago. 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 Had "head shaking" ~2mins while driving with dysarthria ~5mins on 8/26 AM. Maintained consciousness. Saw PCP later in the day, who sent him to ED after he noticed a Left facial droop. MRI shows 5-6 small Right frontal lobe ring-enhancing lesions, no mass effect, with vasogenic edema. No sig diffusion restriction Exam: Slight Left nasolabial flattening, otherwise intact  8/31:   -OR for brain biopsy today  -Adm Medicine, q4h neurochecks  -Keppra 500BID, Dex 4q6  -CT CAP grossly neg for malig  -Consult Rad Onc for outpt SRS  -Heparin turned off at midnight, AM INR 1.25 PTT 21.9, ok for OR  -NPO   66[facility] s/p total prostatectomy 12 years ago. 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 Had "head shaking" ~2mins while driving with dysarthria ~5mins on 8/26 AM. Maintained consciousness. Saw PCP later in the day, who sent him to ED after he noticed a Left facial droop. MRI shows 5-6 small Right frontal lobe ring-enhancing lesions, no mass effect, with vasogenic edema. No sig diffusion restriction Exam: Slight Left nasolabial flattening, otherwise intact  8/31:   -OR for brain biopsy today  -Adm Medicine, q4h neurochecks  -Keppra 500BID, Dex 4q6  -CT CAP grossly neg for malig  -Consult Rad Onc for outpt SRS  -Heparin turned off at midnight, AM INR 1.25 PTT 21.9, ok for OR  -NPO    Risks and benefits were explained including infection,seizures, bleeding, increased neurologic deficit and possible need for more surgery.

## 2022-08-31 NOTE — PROGRESS NOTE ADULT - ASSESSMENT
65 y/o M with history of Afib (on Xarelto), HTN, gout, and prostate cancer s/p prostatectomy in 2010 presenting to [place] on 8/26 s/p episode of facial droop and likely seizures, transferred to [facility] for further evaluation of newly found lesions on R frontal lobe c/f primary malignancy vs metastases. Pending brain tissue biopsy (and possibly LP) on Wednesday 8/31 with nsgy.     Problem/Plan - 1:  ·  Problem: Lesion of right frontal lobe of brain.   ·  Plan: - Presenting with a brief episode of facial droop, dysarthria, and "head shaking," likely seizure-like activity i/s/o newly found lesions on R frontal lobe  - CTH: no acute intracranial hemorrhage  - CT perfusion: multiple peripherally enhancing and hyper-perfusing lesions on R lateral frontal lobe suggestive of metastases or primary neoplasm  - MRI brain: peripherally enhancing lesion in same region suspicious for underlying metastasis  - CT C/A/P: few scattered 1-2 mm pulmonary nodules, otherwise no definitive evidence of malignancy   > Continue Keppra 500mg BID and Dexamethasone 4mg q6h   > Planning for brain tissue biopsy on Wed with nsgy  > Neuro will hold off on LP for now pending no delay with scheduled biopsy   > q8h neurochecks  > Rad onc consulted, will see in outpatient setting for evaluation      Problem/Plan - 2:  ·  Problem: Pre-op evaluation.   ·  Plan: - Patient's Revised Cardiac Risk Index score is 0 points, which translates to 3.9% 30-day risk of death, MI, or cardiac arrest  - No history of CAD, CVA, CHF, renal dysfunction, no insulin use  - TTE on 8/28: EF 58%, normal LVSF   - Patient is medically optimized for brain tissue biopsy. The benefit of the surgical intervention for the purpose of biopsy outweigh the risk.     Problem/Plan - 3:  ·  Problem: PARKER (acute kidney injury).   - Cr continues to improve, currently at 1.25    - Likely related to contrast patient received from CT  - Appropriate UOP  > Gentle hydration with LR 100cc/hr  > Monitor Cr, UOP.     Problem/Plan - 4:  ·  Problem: HTN (hypertension).   ·  Plan: > Continue home Amlodipine 10mg daily  > Continue home Metoprolol Succinate ER 100mg daily  > Continue home Hydralazine 25mg BID  > Hold home Losartan in setting of PARKER.     Problem/Plan - 5:  ·  Problem: Hyperglycemia.   ·  Plan: - Hgb a1c 5.5  > SSI while on steroids.     Problem/Plan - 6:  ·  Problem: History of prostate cancer.   ·  Plan: - S/p prostatectomy in 2010, has been following up regularly and did not require further treatment   - Last PSA several months ago was undetectable per patient  > Continue f/u outpatient.     Problem/Plan - 7:  ·  Problem: Chronic atrial fibrillation.   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     Problem/Plan - 8:  ·  Problem: Gout.   ·  Plan: > Continue home Allopurinol 100mg daily  > Continue home Colchicine 0.6mg daily.     Problem/Plan - 9:  ·  Problem: Prophylactic measure.   ·  Plan: - DVT ppx: heparin gtt   - Diet: DASH/TLC  - Dispo: pending brain biopsy.  - Contact: Aretha Jalloh (daughter) [phone]

## 2022-09-01 LAB
ANION GAP SERPL CALC-SCNC: 12 MMOL/L — SIGNIFICANT CHANGE UP (ref 5–17)
BUN SERPL-MCNC: 31 MG/DL — HIGH (ref 7–23)
CALCIUM SERPL-MCNC: 8.8 MG/DL — SIGNIFICANT CHANGE UP (ref 8.4–10.5)
CHLORIDE SERPL-SCNC: 101 MMOL/L — SIGNIFICANT CHANGE UP (ref 96–108)
CO2 SERPL-SCNC: 26 MMOL/L — SIGNIFICANT CHANGE UP (ref 22–31)
CREAT SERPL-MCNC: 1.39 MG/DL — HIGH (ref 0.5–1.3)
EGFR: 56 ML/MIN/1.73M2 — LOW
GLUCOSE BLDC GLUCOMTR-MCNC: 110 MG/DL — HIGH (ref 70–99)
GLUCOSE BLDC GLUCOMTR-MCNC: 113 MG/DL — HIGH (ref 70–99)
GLUCOSE BLDC GLUCOMTR-MCNC: 135 MG/DL — HIGH (ref 70–99)
GLUCOSE BLDC GLUCOMTR-MCNC: 136 MG/DL — HIGH (ref 70–99)
GLUCOSE SERPL-MCNC: 117 MG/DL — HIGH (ref 70–99)
HCT VFR BLD CALC: 46.6 % — SIGNIFICANT CHANGE UP (ref 39–50)
HGB BLD-MCNC: 16 G/DL — SIGNIFICANT CHANGE UP (ref 13–17)
MAGNESIUM SERPL-MCNC: 2.3 MG/DL — SIGNIFICANT CHANGE UP (ref 1.6–2.6)
MCHC RBC-ENTMCNC: 29 PG — SIGNIFICANT CHANGE UP (ref 27–34)
MCHC RBC-ENTMCNC: 34.3 GM/DL — SIGNIFICANT CHANGE UP (ref 32–36)
MCV RBC AUTO: 84.4 FL — SIGNIFICANT CHANGE UP (ref 80–100)
NRBC # BLD: 0 /100 WBCS — SIGNIFICANT CHANGE UP (ref 0–0)
PHOSPHATE SERPL-MCNC: 4.2 MG/DL — SIGNIFICANT CHANGE UP (ref 2.5–4.5)
PLATELET # BLD AUTO: 222 K/UL — SIGNIFICANT CHANGE UP (ref 150–400)
POTASSIUM SERPL-MCNC: 4.3 MMOL/L — SIGNIFICANT CHANGE UP (ref 3.5–5.3)
POTASSIUM SERPL-SCNC: 4.3 MMOL/L — SIGNIFICANT CHANGE UP (ref 3.5–5.3)
RBC # BLD: 5.52 M/UL — SIGNIFICANT CHANGE UP (ref 4.2–5.8)
RBC # FLD: 13.1 % — SIGNIFICANT CHANGE UP (ref 10.3–14.5)
SODIUM SERPL-SCNC: 139 MMOL/L — SIGNIFICANT CHANGE UP (ref 135–145)
WBC # BLD: 16.3 K/UL — HIGH (ref 3.8–10.5)
WBC # FLD AUTO: 16.3 K/UL — HIGH (ref 3.8–10.5)

## 2022-09-01 PROCEDURE — 70450 CT HEAD/BRAIN W/O DYE: CPT | Mod: 26

## 2022-09-01 PROCEDURE — 99233 SBSQ HOSP IP/OBS HIGH 50: CPT | Mod: GC

## 2022-09-01 PROCEDURE — 70553 MRI BRAIN STEM W/O & W/DYE: CPT | Mod: 26

## 2022-09-01 RX ORDER — POLYETHYLENE GLYCOL 3350 17 G/17G
17 POWDER, FOR SOLUTION ORAL DAILY
Refills: 0 | Status: DISCONTINUED | OUTPATIENT
Start: 2022-09-01 | End: 2022-09-02

## 2022-09-01 RX ORDER — HEPARIN SODIUM 5000 [USP'U]/ML
5000 INJECTION INTRAVENOUS; SUBCUTANEOUS EVERY 8 HOURS
Refills: 0 | Status: DISCONTINUED | OUTPATIENT
Start: 2022-09-01 | End: 2022-09-02

## 2022-09-01 RX ORDER — SODIUM CHLORIDE 9 MG/ML
1000 INJECTION, SOLUTION INTRAVENOUS
Refills: 0 | Status: DISCONTINUED | OUTPATIENT
Start: 2022-09-01 | End: 2022-09-02

## 2022-09-01 RX ORDER — SENNA PLUS 8.6 MG/1
2 TABLET ORAL AT BEDTIME
Refills: 0 | Status: DISCONTINUED | OUTPATIENT
Start: 2022-09-01 | End: 2022-09-02

## 2022-09-01 RX ORDER — DEXAMETHASONE 0.5 MG/5ML
4 ELIXIR ORAL EVERY 8 HOURS
Refills: 0 | Status: DISCONTINUED | OUTPATIENT
Start: 2022-09-01 | End: 2022-09-02

## 2022-09-01 RX ADMIN — OXYCODONE HYDROCHLORIDE 5 MILLIGRAM(S): 5 TABLET ORAL at 02:31

## 2022-09-01 RX ADMIN — Medication 4 MILLIGRAM(S): at 06:04

## 2022-09-01 RX ADMIN — Medication 650 MILLIGRAM(S): at 05:30

## 2022-09-01 RX ADMIN — HEPARIN SODIUM 5000 UNIT(S): 5000 INJECTION INTRAVENOUS; SUBCUTANEOUS at 21:15

## 2022-09-01 RX ADMIN — OXYCODONE HYDROCHLORIDE 5 MILLIGRAM(S): 5 TABLET ORAL at 03:00

## 2022-09-01 RX ADMIN — AMLODIPINE BESYLATE 10 MILLIGRAM(S): 2.5 TABLET ORAL at 06:03

## 2022-09-01 RX ADMIN — Medication 1 DROP(S): at 17:13

## 2022-09-01 RX ADMIN — POLYETHYLENE GLYCOL 3350 17 GRAM(S): 17 POWDER, FOR SOLUTION ORAL at 11:49

## 2022-09-01 RX ADMIN — Medication 25 MILLIGRAM(S): at 17:13

## 2022-09-01 RX ADMIN — Medication 4 MILLIGRAM(S): at 21:15

## 2022-09-01 RX ADMIN — OXYCODONE HYDROCHLORIDE 5 MILLIGRAM(S): 5 TABLET ORAL at 09:30

## 2022-09-01 RX ADMIN — Medication 650 MILLIGRAM(S): at 00:30

## 2022-09-01 RX ADMIN — Medication 0.6 MILLIGRAM(S): at 11:48

## 2022-09-01 RX ADMIN — OXYCODONE HYDROCHLORIDE 5 MILLIGRAM(S): 5 TABLET ORAL at 08:39

## 2022-09-01 RX ADMIN — BRIMONIDINE TARTRATE 1 DROP(S): 2 SOLUTION/ DROPS OPHTHALMIC at 17:44

## 2022-09-01 RX ADMIN — SODIUM CHLORIDE 100 MILLILITER(S): 9 INJECTION, SOLUTION INTRAVENOUS at 21:22

## 2022-09-01 RX ADMIN — Medication 650 MILLIGRAM(S): at 21:16

## 2022-09-01 RX ADMIN — BRIMONIDINE TARTRATE 1 DROP(S): 2 SOLUTION/ DROPS OPHTHALMIC at 06:10

## 2022-09-01 RX ADMIN — OXYCODONE HYDROCHLORIDE 5 MILLIGRAM(S): 5 TABLET ORAL at 18:12

## 2022-09-01 RX ADMIN — Medication 25 MILLIGRAM(S): at 06:09

## 2022-09-01 RX ADMIN — LEVETIRACETAM 500 MILLIGRAM(S): 250 TABLET, FILM COATED ORAL at 17:12

## 2022-09-01 RX ADMIN — Medication 4 MILLIGRAM(S): at 13:41

## 2022-09-01 RX ADMIN — PANTOPRAZOLE SODIUM 40 MILLIGRAM(S): 20 TABLET, DELAYED RELEASE ORAL at 06:47

## 2022-09-01 RX ADMIN — Medication 1 DROP(S): at 06:05

## 2022-09-01 RX ADMIN — LEVETIRACETAM 500 MILLIGRAM(S): 250 TABLET, FILM COATED ORAL at 06:04

## 2022-09-01 RX ADMIN — OXYCODONE HYDROCHLORIDE 5 MILLIGRAM(S): 5 TABLET ORAL at 17:42

## 2022-09-01 RX ADMIN — SODIUM CHLORIDE 100 MILLILITER(S): 9 INJECTION, SOLUTION INTRAVENOUS at 10:33

## 2022-09-01 RX ADMIN — Medication 100 MILLIGRAM(S): at 11:46

## 2022-09-01 RX ADMIN — Medication 650 MILLIGRAM(S): at 06:00

## 2022-09-01 RX ADMIN — Medication 650 MILLIGRAM(S): at 11:23

## 2022-09-01 RX ADMIN — SENNA PLUS 2 TABLET(S): 8.6 TABLET ORAL at 21:15

## 2022-09-01 RX ADMIN — Medication 5 MILLIGRAM(S): at 21:18

## 2022-09-01 RX ADMIN — Medication 100 MILLIGRAM(S): at 06:05

## 2022-09-01 RX ADMIN — Medication 650 MILLIGRAM(S): at 11:36

## 2022-09-01 RX ADMIN — Medication 650 MILLIGRAM(S): at 22:00

## 2022-09-01 NOTE — PHYSICAL THERAPY INITIAL EVALUATION ADULT - PERTINENT HX OF CURRENT PROBLEM, REHAB EVAL
66[facility]p total prostatectomy 12 years ago. 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 Had "head shaking" ~2mins while driving with dysarthria ~5mins on 8/26 AM. Maintained consciousness. Saw PCP later in the day, who sent him to ED in [place] after he noticed a Left facial droop. MRI showed 5-6 small Right frontal lobe ring-enhancing lesions, no mass effect, with vasogenic edema. No sig diffusion restriction. He was then transferred for further care and management. CT a/p was completed that was negative for malignancy. The patient went to the OR for brain biopsy on 8/31.

## 2022-09-01 NOTE — PROGRESS NOTE ADULT - SUBJECTIVE AND OBJECTIVE BOX
Patient is a 66y old  Male who presents with a chief complaint of R frontal lobe lesions (01 Sep 2022 05:38)     INTERVAL HPI/OVERNIGHT EVENTS:  - No acute events overnight    SUBJECTIVE  - Patient seen and evaluated at bedside  - Patient reports presence of   - Patient reports absence of fevers, chills, HA, lightheadedness, dizziness, nausea, emesis, chest pain, dyspnea, palpitations, abd pain, diarrhea, urinary symptoms, skin color changes or rashes, or LE edema     MEDICATIONS  (STANDING):  allopurinol 100 milliGRAM(s) Oral daily  amLODIPine   Tablet 10 milliGRAM(s) Oral daily  brimonidine 0.2% Ophthalmic Solution 1 Drop(s) Both EYES two times a day  colchicine 0.6 milliGRAM(s) Oral daily  dexAMETHasone  Injectable 4 milliGRAM(s) IV Push every 6 hours  dextrose 5%. 1000 milliLiter(s) (50 mL/Hr) IV Continuous <Continuous>  dextrose 5%. 1000 milliLiter(s) (100 mL/Hr) IV Continuous <Continuous>  dextrose 50% Injectable 25 Gram(s) IV Push once  dextrose 50% Injectable 12.5 Gram(s) IV Push once  dextrose 50% Injectable 25 Gram(s) IV Push once  glucagon  Injectable 1 milliGRAM(s) IntraMuscular once  hydrALAZINE 25 milliGRAM(s) Oral two times a day  insulin lispro (ADMELOG) corrective regimen sliding scale   SubCutaneous three times a day before meals  insulin lispro (ADMELOG) corrective regimen sliding scale   SubCutaneous at bedtime  levETIRAcetam 500 milliGRAM(s) Oral two times a day  metoprolol succinate  milliGRAM(s) Oral daily  pantoprazole    Tablet 40 milliGRAM(s) Oral before breakfast  timolol 0.5% Solution 1 Drop(s) Both EYES two times a day    MEDICATIONS  (PRN):  acetaminophen     Tablet .. 650 milliGRAM(s) Oral every 6 hours PRN Temp greater or equal to 38C (100.4F), Mild Pain (1 - 3)  aluminum hydroxide/magnesium hydroxide/simethicone Suspension 30 milliLiter(s) Oral every 4 hours PRN Dyspepsia  dextrose Oral Gel 15 Gram(s) Oral once PRN Blood Glucose LESS THAN 70 milliGRAM(s)/deciliter  melatonin 5 milliGRAM(s) Oral at bedtime PRN Insomnia  ondansetron Injectable 4 milliGRAM(s) IV Push once PRN Nausea and/or Vomiting  oxyCODONE    IR 5 milliGRAM(s) Oral every 6 hours PRN Moderate Pain (4 - 6)    Allergies:  No Known Allergies    Intolerances:      REVIEW OF SYSTEMS: As indicated above; otherwise, negative    VITAL SIGNS:  T(F): 97 (09-01-22 @ 04:00), Max: 97.9 (08-31-22 @ 22:00)  HR: 81 (09-01-22 @ 07:30) (70 - 127)  BP: 157/96 (09-01-22 @ 07:30) (120/90 - 166/101)  RR: 16 (09-01-22 @ 07:30) (14 - 16)  SpO2: 99% (09-01-22 @ 07:30) (93% - 100%)    PHYSICAL EXAM:  General: NAD, well-groomed, well-developed  Eyes: Conjunctiva and sclera clear  ENMT: Moist mucous membranes  Neck: No palpable pre-auricular, post-auricular, occipital, mandibular, submental, supra-clavicular, or infra-clavicular lymph nodes   Chest: Clear to auscultation bilaterally; no rales, rhonchi, or wheezing  Heart: Regular rate and rhythm; normal S1 and S2; no murmurs, rubs, or gallops  Abd: Soft, nontender, nondistended  Nervous System: AAOX3  Psych: Appropriate affect  Ext: no peripheral LE edema bilaterally    LABS:                        16.0   16.30 )-----------( 222      ( 01 Sep 2022 05:55 )             46.6     01 Sep 2022 05:55    139    |  101    |  31     ----------------------------<  117    4.3     |  26     |  1.39     Ca    8.8        01 Sep 2022 05:55  Phos  4.2       01 Sep 2022 05:55  Mg     2.3       01 Sep 2022 05:55    TPro  5.8    /  Alb  3.8    /  TBili  0.4    /  DBili  x      /  AST  28     /  ALT  41     /  AlkPhos  67     31 Aug 2022 13:51    CAPILLARY BLOOD GLUCOSE      POCT Blood Glucose.: 110 mg/dL (01 Sep 2022 06:56)  POCT Blood Glucose.: 138 mg/dL (31 Aug 2022 21:20)  POCT Blood Glucose.: 115 mg/dL (31 Aug 2022 17:45)  POCT Blood Glucose.: 101 mg/dL (31 Aug 2022 13:24)    BLOOD CULTURE    RADIOLOGY & ADDITIONAL TESTS:    Imaging Personally Reviewed:  [X ] YES     Consultant(s) Notes Reviewed:  Yes    Care Discussed with Consultants/Other Providers: Yes Patient is a 66y old  Male who presents with a chief complaint of R frontal lobe lesions (01 Sep 2022 05:38)     INTERVAL HPI/OVERNIGHT EVENTS:  - Overnight patient had a significant pain and pressure over surgical area, which resolved after getting his oxycodone 5mg.     SUBJECTIVE  - Patient seen and evaluated at bedside. Patient still feels pressure over the surgical area and states it is hard to move his jaw and chew, but it is slightly better than yesterday.  - Patient reports absence of fevers, chills, lightheadedness, dizziness, nausea, emesis, chest pain, dyspnea, palpitations, abdominal pain, diarrhea, urinary symptoms, skin color changes or rashes, or LE edema     MEDICATIONS  (STANDING):  allopurinol 100 milliGRAM(s) Oral daily  amLODIPine   Tablet 10 milliGRAM(s) Oral daily  brimonidine 0.2% Ophthalmic Solution 1 Drop(s) Both EYES two times a day  colchicine 0.6 milliGRAM(s) Oral daily  dexAMETHasone  Injectable 4 milliGRAM(s) IV Push every 6 hours  dextrose 5%. 1000 milliLiter(s) (50 mL/Hr) IV Continuous <Continuous>  dextrose 5%. 1000 milliLiter(s) (100 mL/Hr) IV Continuous <Continuous>  dextrose 50% Injectable 25 Gram(s) IV Push once  dextrose 50% Injectable 12.5 Gram(s) IV Push once  dextrose 50% Injectable 25 Gram(s) IV Push once  glucagon  Injectable 1 milliGRAM(s) IntraMuscular once  hydrALAZINE 25 milliGRAM(s) Oral two times a day  insulin lispro (ADMELOG) corrective regimen sliding scale   SubCutaneous three times a day before meals  insulin lispro (ADMELOG) corrective regimen sliding scale   SubCutaneous at bedtime  levETIRAcetam 500 milliGRAM(s) Oral two times a day  metoprolol succinate  milliGRAM(s) Oral daily  pantoprazole    Tablet 40 milliGRAM(s) Oral before breakfast  timolol 0.5% Solution 1 Drop(s) Both EYES two times a day    MEDICATIONS  (PRN):  acetaminophen     Tablet .. 650 milliGRAM(s) Oral every 6 hours PRN Temp greater or equal to 38C (100.4F), Mild Pain (1 - 3)  aluminum hydroxide/magnesium hydroxide/simethicone Suspension 30 milliLiter(s) Oral every 4 hours PRN Dyspepsia  dextrose Oral Gel 15 Gram(s) Oral once PRN Blood Glucose LESS THAN 70 milliGRAM(s)/deciliter  melatonin 5 milliGRAM(s) Oral at bedtime PRN Insomnia  ondansetron Injectable 4 milliGRAM(s) IV Push once PRN Nausea and/or Vomiting  oxyCODONE    IR 5 milliGRAM(s) Oral every 6 hours PRN Moderate Pain (4 - 6)    Allergies:  No Known Allergies    Intolerances:      REVIEW OF SYSTEMS: As indicated above; otherwise, negative    VITAL SIGNS:  T(F): 97 (09-01-22 @ 04:00), Max: 97.9 (08-31-22 @ 22:00)  HR: 81 (09-01-22 @ 07:30) (70 - 127)  BP: 157/96 (09-01-22 @ 07:30) (120/90 - 166/101)  RR: 16 (09-01-22 @ 07:30) (14 - 16)  SpO2: 99% (09-01-22 @ 07:30) (93% - 100%)    PHYSICAL EXAM:    General: NAD, well-groomed, well-developed  Eyes: Pupils equal, round, reactive to light, extraocular muscles intact, anicteric sclera   Neck: No thyromegaly, no JVD, no lymphadenopathy   Chest: Clear to auscultation bilaterally; no rales, rhonchi, or wheezing  Heart: Regular rate and rhythm; normal S1 and S2; no murmurs, rubs, or gallops  Abdomen: Soft, nontender, nondistended, normoactive bowel sounds   Extremities: Warm, well perfused, no lower extremity edema bilaterally, 5/5 muscle strength for all extremities, sensation intact for all extremities   Neuro: AAOX3, no focal neurological deficits   Psych: Appropriate affect    LABS:                        16.0   16.30 )-----------( 222      ( 01 Sep 2022 05:55 )             46.6     01 Sep 2022 05:55    139    |  101    |  31     ----------------------------<  117    4.3     |  26     |  1.39     Ca    8.8        01 Sep 2022 05:55  Phos  4.2       01 Sep 2022 05:55  Mg     2.3       01 Sep 2022 05:55    TPro  5.8    /  Alb  3.8    /  TBili  0.4    /  DBili  x      /  AST  28     /  ALT  41     /  AlkPhos  67     31 Aug 2022 13:51    CAPILLARY BLOOD GLUCOSE      POCT Blood Glucose.: 110 mg/dL (01 Sep 2022 06:56)  POCT Blood Glucose.: 138 mg/dL (31 Aug 2022 21:20)  POCT Blood Glucose.: 115 mg/dL (31 Aug 2022 17:45)  POCT Blood Glucose.: 101 mg/dL (31 Aug 2022 13:24)    BLOOD CULTURE    RADIOLOGY & ADDITIONAL TESTS:    Imaging Personally Reviewed:  [X ] YES     Consultant(s) Notes Reviewed:  Yes    Care Discussed with Consultants/Other Providers: Yes

## 2022-09-01 NOTE — PROGRESS NOTE ADULT - ASSESSMENT
67 y/o M with history of Afib (on Xarelto), HTN, gout, and prostate cancer s/p prostatectomy in 2010 presenting to [place] on 8/26 s/p episode of facial droop and likely seizures, transferred to [facility] for further evaluation of newly found lesions on R frontal lobe c/f primary malignancy vs metastases. Pending brain tissue biopsy (and possibly LP) on Wednesday 8/31 with nsgy.     Problem/Plan - 1:  ·  Problem: Lesion of right frontal lobe of brain.   ·  Plan: - Presenting with a brief episode of facial droop, dysarthria, and "head shaking," likely seizure-like activity i/s/o newly found lesions on R frontal lobe  - CTH: no acute intracranial hemorrhage  - CT perfusion: multiple peripherally enhancing and hyper-perfusing lesions on R lateral frontal lobe suggestive of metastases or primary neoplasm  - MRI brain: peripherally enhancing lesion in same region suspicious for underlying metastasis  - CT C/A/P: few scattered 1-2 mm pulmonary nodules, otherwise no definitive evidence of malignancy   - Keppra as per neurosurgery  - Dexamethasone as per neurosurgery   - Neuro will hold off on LP for now   - S/p brain biopsy 8/31  - q4h neurochecks  - Rad onc consulted, will see in outpatient setting for evaluation   - Neurosurgery following, will f/u further recommendations   - Patient's repeat CTH following procedure was negative, will be going for another CTH tomorrow, 9/1, as well as an MRI     Problem/Plan - 2:  ·  Problem: PARKER (acute kidney injury).   - Cr continues to improve, currently at .98  - Likely related to contrast patient received from CT  - Appropriate UOP  > Gentle hydration with LR 100cc/hr  > Monitor Cr, UOP.     Problem/Plan - 4:  ·  Problem: HTN (hypertension).   ·  Plan: > Continue home Amlodipine 10mg daily  > Continue home Metoprolol Succinate ER 100mg daily  > Continue home Hydralazine 25mg BID  > Hold home Losartan in setting of PARKER.     Problem/Plan - 5:  ·  Problem: Hyperglycemia.   ·  Plan: - Hgb a1c 5.5  > SSI while on steroids.     Problem/Plan - 6:  ·  Problem: History of prostate cancer.   ·  Plan: - S/p prostatectomy in 2010, has been following up regularly and did not require further treatment   - Last PSA several months ago was undetectable per patient  > Continue f/u outpatient.     Problem/Plan - 7:  ·  Problem: Chronic atrial fibrillation.   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   ·  Plan: > Continue home Allopurinol 100mg daily  > Continue home Colchicine 0.6mg daily.     Problem/Plan - 9:  ·  Problem: Prophylactic measure.   - DVT ppx: SCD's for now  - Diet: DASH/TLC  - Dispo: pending brain biopsy.  - Contact: Aretha Jalloh (daughter) [phone]   65 y/o M with history of Afib (on Xarelto), HTN, gout, and prostate cancer s/p prostatectomy in 2010 presenting to [place] on 8/26 s/p episode of facial droop and likely seizures, transferred to [facility] for further evaluation of newly found lesions on R frontal lobe c/f primary malignancy vs metastases. Pending brain tissue biopsy (and possibly LP) on Wednesday 8/31 with nsgy.     Problem/Plan - 1:  ·  Problem: Lesion of right frontal lobe of brain.   ·  Plan: - Presenting with a brief episode of facial droop, dysarthria, and "head shaking," likely seizure-like activity i/s/o newly found lesions on R frontal lobe  - CTH: no acute intracranial hemorrhage  - CT perfusion: multiple peripherally enhancing and hyper-perfusing lesions on R lateral frontal lobe suggestive of metastases or primary neoplasm  - MRI brain: peripherally enhancing lesion in same region suspicious for underlying metastasis  - CT C/A/P: few scattered 1-2 mm pulmonary nodules, otherwise no definitive evidence of malignancy   - Keppra 500mg BID  - Dexamethasone taper as per neurosurgery   - Neuro will hold off on LP for now   - S/p brain biopsy 8/31  - q4h neurochecks  - Rad onc consulted, will see in outpatient setting for evaluation   - Neurosurgery following, will f/u further recommendations   - Patient's repeat CTH following procedure was negative, as well as CTH on 9/1. MRI results pending   - F/u biopsy results when available      Problem/Plan - 2:  ·  Problem: PARKER (acute kidney injury).   - Cr slightly bumped after procedure currently at 1.39, previous was .98  - Appropriate UOP  > Gentle hydration with LR 100cc/hr  > Monitor Cr, UOP.     Problem/Plan - 4:  ·  Problem: HTN (hypertension).   ·  Plan: > Continue home Amlodipine 10mg daily  > Continue home Metoprolol Succinate ER 100mg daily  > Continue home Hydralazine 25mg BID  > Hold home Losartan in setting of PARKER.     Problem/Plan - 5:  ·  Problem: Hyperglycemia.   ·  Plan: - Hgb a1c 5.5  > SSI while on steroids.     Problem/Plan - 6:  ·  Problem: History of prostate cancer.   ·  Plan: - S/p prostatectomy in 2010, has been following up regularly and did not require further treatment   - Last PSA several months ago was undetectable per patient  > Continue f/u outpatient.     Problem/Plan - 7:  ·  Problem: Chronic atrial fibrillation.   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   ·  Plan: > Continue home Allopurinol 100mg daily  > Continue home Colchicine 0.6mg daily.     Problem/Plan - 9:  ·  Problem: Prophylactic measure.   - DVT ppx: will start subcutaneous heparin tonight   - Diet: DASH/TLC  - Dispo: pending brain biopsy.  - Contact: Aretha Jalloh (daughter) [phone]

## 2022-09-01 NOTE — PROGRESS NOTE ADULT - ASSESSMENT
Brain mass  ? metastatic disease   s/p biopsy   fu with nsx  fu with med    Long standing afib  HR stable  cont BB  resume a/c once deemed safe from nsx point of view     HTN  cont current meds

## 2022-09-01 NOTE — CHART NOTE - NSCHARTNOTEFT_GEN_A_CORE
AM CTH is stable compared to post op scan. Patient is ok to start SQL tonight. Additionally the patient is ok to start the dex taper as per our last progress note.
Plan for OR for brain biopsy this week: Monday vs Wednesday. Please document medical and cardiac clearance/risk stratification. Thank you    Neurosurgery Pager  [phone]

## 2022-09-01 NOTE — PHYSICAL THERAPY INITIAL EVALUATION ADULT - WORK/LEISURE ACTIVITY, REHAB EVAL
independent Double Island Pedicle Flap Text: The defect edges were debeveled with a #15 scalpel blade.  Given the location of the defect, shape of the defect and the proximity to free margins a double island pedicle advancement flap was deemed most appropriate.  Using a sterile surgical marker, an appropriate advancement flap was drawn incorporating the defect, outlining the appropriate donor tissue and placing the expected incisions within the relaxed skin tension lines where possible.    The area thus outlined was incised deep to adipose tissue with a #15 scalpel blade.  The skin margins were undermined to an appropriate distance in all directions around the primary defect and laterally outward around the island pedicle utilizing iris scissors.  There was minimal undermining beneath the pedicle flap.

## 2022-09-01 NOTE — PROGRESS NOTE ADULT - SUBJECTIVE AND OBJECTIVE BOX
Patient seen and examined at bedside.    --Anticoagulation--    T(C): 36.1 (09-01-22 @ 04:00), Max: 36.9 (08-31-22 @ 06:55)  HR: 70 (09-01-22 @ 04:00) (70 - 127)  BP: 152/96 (09-01-22 @ 04:00) (120/90 - 166/101)  RR: 16 (09-01-22 @ 04:00) (14 - 16)  SpO2: 97% (09-01-22 @ 04:00) (93% - 100%)  Wt(kg): --    Exam: Slight Left nasolabial flattening, otherwise intact

## 2022-09-01 NOTE — PROGRESS NOTE ADULT - SUBJECTIVE AND OBJECTIVE BOX
DATE OF SERVICE: 09-01-22 @ 08:35    Subjective: Patient seen and examined. No new events except as noted.     SUBJECTIVE/ROS:    nad feels ok   No chest pain, dyspnea, palpitation, or dizziness.     MEDICATIONS:  MEDICATIONS  (STANDING):  allopurinol 100 milliGRAM(s) Oral daily  amLODIPine   Tablet 10 milliGRAM(s) Oral daily  brimonidine 0.2% Ophthalmic Solution 1 Drop(s) Both EYES two times a day  colchicine 0.6 milliGRAM(s) Oral daily  dexAMETHasone  Injectable 4 milliGRAM(s) IV Push every 6 hours  dextrose 5%. 1000 milliLiter(s) (50 mL/Hr) IV Continuous <Continuous>  dextrose 5%. 1000 milliLiter(s) (100 mL/Hr) IV Continuous <Continuous>  dextrose 50% Injectable 25 Gram(s) IV Push once  dextrose 50% Injectable 12.5 Gram(s) IV Push once  dextrose 50% Injectable 25 Gram(s) IV Push once  glucagon  Injectable 1 milliGRAM(s) IntraMuscular once  hydrALAZINE 25 milliGRAM(s) Oral two times a day  insulin lispro (ADMELOG) corrective regimen sliding scale   SubCutaneous three times a day before meals  insulin lispro (ADMELOG) corrective regimen sliding scale   SubCutaneous at bedtime  levETIRAcetam 500 milliGRAM(s) Oral two times a day  metoprolol succinate  milliGRAM(s) Oral daily  pantoprazole    Tablet 40 milliGRAM(s) Oral before breakfast  timolol 0.5% Solution 1 Drop(s) Both EYES two times a day      PHYSICAL EXAM:  T(C): 36.1 (09-01-22 @ 04:00), Max: 36.9 (08-31-22 @ 09:06)  HR: 88 (09-01-22 @ 08:00) (70 - 127)  BP: 146/92 (09-01-22 @ 08:00) (120/90 - 166/101)  RR: 16 (09-01-22 @ 08:00) (14 - 16)  SpO2: 98% (09-01-22 @ 08:00) (93% - 100%)  Wt(kg): --  I&O's Summary    31 Aug 2022 07:01  -  01 Sep 2022 07:00  --------------------------------------------------------  IN: 1880 mL / OUT: 2530 mL / NET: -650 mL    01 Sep 2022 07:01  -  01 Sep 2022 08:35  --------------------------------------------------------  IN: 480 mL / OUT: 0 mL / NET: 480 mL      Height (cm): 170.2 (08-31 @ 09:06)  Weight (kg): 97 (08-31 @ 09:06)  BMI (kg/m2): 33.5 (08-31 @ 09:06)  BSA (m2): 2.08 (08-31 @ 09:06)      JVP: Normal  Neck: supple  Lung: clear   CV: S1 S2 , Murmur:  Abd: soft  Ext: No edema  neuro: Awake / alert  Psych: flat affect  Skin: normal``    LABS/DATA:    CARDIAC MARKERS:                                16.0   16.30 )-----------( 222      ( 01 Sep 2022 05:55 )             46.6     09-01    139  |  101  |  31<H>  ----------------------------<  117<H>  4.3   |  26  |  1.39<H>    Ca    8.8      01 Sep 2022 05:55  Phos  4.2     09-01  Mg     2.3     09-01    TPro  5.8<L>  /  Alb  3.8  /  TBili  0.4  /  DBili  x   /  AST  28  /  ALT  41  /  AlkPhos  67  08-31    proBNP:   Lipid Profile:   HgA1c:   TSH:     TELE:  EKG:

## 2022-09-01 NOTE — PROGRESS NOTE ADULT - ASSESSMENT
Patient seen and examined at bedside Patient seen and examined s/p R mini craniotomy for biopsy/partial resection. Doing well. Awake, alert, oriented x 3, mild L facial droop, no drift, LOYOLA strongly. SILT. Wound intact. Post-op CT without heme.    9/1  -adm under medicine, neurochecks q4  --160  -CTH in AM, if stable DVT prophylaxis can start in PM  -Dex 4q6 if CTH stable can taper to 4q8 Day1  2q6 Day3 2q8 Day5 2q12 Day7 2qD Day9 Off Day11  -MRI brain w/wo  -Hold AC/AP for now

## 2022-09-01 NOTE — PHYSICAL THERAPY INITIAL EVALUATION ADULT - ADDITIONAL COMMENTS
Pt lives with family in a private home with a flight of stairs to negotiate. Pt is (I) with ADLs/functional mobility. Pt reports living a very active lifestyle, golfs, bikes 20+ miles.

## 2022-09-02 ENCOUNTER — TRANSCRIPTION ENCOUNTER (OUTPATIENT)
Age: 67
End: 2022-09-02

## 2022-09-02 VITALS
DIASTOLIC BLOOD PRESSURE: 93 MMHG | OXYGEN SATURATION: 96 % | HEART RATE: 66 BPM | TEMPERATURE: 98 F | SYSTOLIC BLOOD PRESSURE: 145 MMHG | RESPIRATION RATE: 18 BRPM

## 2022-09-02 DIAGNOSIS — K59.00 CONSTIPATION, UNSPECIFIED: ICD-10-CM

## 2022-09-02 LAB
ANION GAP SERPL CALC-SCNC: 11 MMOL/L — SIGNIFICANT CHANGE UP (ref 5–17)
BUN SERPL-MCNC: 36 MG/DL — HIGH (ref 7–23)
CALCIUM SERPL-MCNC: 8.2 MG/DL — LOW (ref 8.4–10.5)
CHLORIDE SERPL-SCNC: 103 MMOL/L — SIGNIFICANT CHANGE UP (ref 96–108)
CO2 SERPL-SCNC: 25 MMOL/L — SIGNIFICANT CHANGE UP (ref 22–31)
CREAT SERPL-MCNC: 1.38 MG/DL — HIGH (ref 0.5–1.3)
EGFR: 56 ML/MIN/1.73M2 — LOW
GLUCOSE BLDC GLUCOMTR-MCNC: 114 MG/DL — HIGH (ref 70–99)
GLUCOSE BLDC GLUCOMTR-MCNC: 149 MG/DL — HIGH (ref 70–99)
GLUCOSE BLDC GLUCOMTR-MCNC: 154 MG/DL — HIGH (ref 70–99)
GLUCOSE SERPL-MCNC: 121 MG/DL — HIGH (ref 70–99)
HCT VFR BLD CALC: 46.4 % — SIGNIFICANT CHANGE UP (ref 39–50)
HGB BLD-MCNC: 15.6 G/DL — SIGNIFICANT CHANGE UP (ref 13–17)
MAGNESIUM SERPL-MCNC: 2.4 MG/DL — SIGNIFICANT CHANGE UP (ref 1.6–2.6)
MCHC RBC-ENTMCNC: 28.7 PG — SIGNIFICANT CHANGE UP (ref 27–34)
MCHC RBC-ENTMCNC: 33.6 GM/DL — SIGNIFICANT CHANGE UP (ref 32–36)
MCV RBC AUTO: 85.3 FL — SIGNIFICANT CHANGE UP (ref 80–100)
NRBC # BLD: 0 /100 WBCS — SIGNIFICANT CHANGE UP (ref 0–0)
PHOSPHATE SERPL-MCNC: 3.7 MG/DL — SIGNIFICANT CHANGE UP (ref 2.5–4.5)
PLATELET # BLD AUTO: 217 K/UL — SIGNIFICANT CHANGE UP (ref 150–400)
POTASSIUM SERPL-MCNC: 4.3 MMOL/L — SIGNIFICANT CHANGE UP (ref 3.5–5.3)
POTASSIUM SERPL-SCNC: 4.3 MMOL/L — SIGNIFICANT CHANGE UP (ref 3.5–5.3)
RBC # BLD: 5.44 M/UL — SIGNIFICANT CHANGE UP (ref 4.2–5.8)
RBC # FLD: 13.4 % — SIGNIFICANT CHANGE UP (ref 10.3–14.5)
SODIUM SERPL-SCNC: 139 MMOL/L — SIGNIFICANT CHANGE UP (ref 135–145)
SURGICAL PATHOLOGY STUDY: SIGNIFICANT CHANGE UP
WBC # BLD: 16.96 K/UL — HIGH (ref 3.8–10.5)
WBC # FLD AUTO: 16.96 K/UL — HIGH (ref 3.8–10.5)

## 2022-09-02 PROCEDURE — 88342 IMHCHEM/IMCYTCHM 1ST ANTB: CPT

## 2022-09-02 PROCEDURE — 88331 PATH CONSLTJ SURG 1 BLK 1SPC: CPT

## 2022-09-02 PROCEDURE — 85018 HEMOGLOBIN: CPT

## 2022-09-02 PROCEDURE — 86850 RBC ANTIBODY SCREEN: CPT

## 2022-09-02 PROCEDURE — 85025 COMPLETE CBC W/AUTO DIFF WBC: CPT

## 2022-09-02 PROCEDURE — G1004: CPT

## 2022-09-02 PROCEDURE — 82947 ASSAY GLUCOSE BLOOD QUANT: CPT

## 2022-09-02 PROCEDURE — 82962 GLUCOSE BLOOD TEST: CPT

## 2022-09-02 PROCEDURE — 70450 CT HEAD/BRAIN W/O DYE: CPT

## 2022-09-02 PROCEDURE — 71260 CT THORAX DX C+: CPT | Mod: MG

## 2022-09-02 PROCEDURE — 87640 STAPH A DNA AMP PROBE: CPT

## 2022-09-02 PROCEDURE — A9585: CPT

## 2022-09-02 PROCEDURE — 84132 ASSAY OF SERUM POTASSIUM: CPT

## 2022-09-02 PROCEDURE — C1769: CPT

## 2022-09-02 PROCEDURE — 83605 ASSAY OF LACTIC ACID: CPT

## 2022-09-02 PROCEDURE — 82565 ASSAY OF CREATININE: CPT

## 2022-09-02 PROCEDURE — C1889: CPT

## 2022-09-02 PROCEDURE — 97162 PT EVAL MOD COMPLEX 30 MIN: CPT

## 2022-09-02 PROCEDURE — 36415 COLL VENOUS BLD VENIPUNCTURE: CPT

## 2022-09-02 PROCEDURE — 87641 MR-STAPH DNA AMP PROBE: CPT

## 2022-09-02 PROCEDURE — 82803 BLOOD GASES ANY COMBINATION: CPT

## 2022-09-02 PROCEDURE — 88307 TISSUE EXAM BY PATHOLOGIST: CPT

## 2022-09-02 PROCEDURE — 84295 ASSAY OF SERUM SODIUM: CPT

## 2022-09-02 PROCEDURE — 83735 ASSAY OF MAGNESIUM: CPT

## 2022-09-02 PROCEDURE — 86900 BLOOD TYPING SEROLOGIC ABO: CPT

## 2022-09-02 PROCEDURE — 80053 COMPREHEN METABOLIC PANEL: CPT

## 2022-09-02 PROCEDURE — 80048 BASIC METABOLIC PNL TOTAL CA: CPT

## 2022-09-02 PROCEDURE — 85610 PROTHROMBIN TIME: CPT

## 2022-09-02 PROCEDURE — 88341 IMHCHEM/IMCYTCHM EA ADD ANTB: CPT

## 2022-09-02 PROCEDURE — 85027 COMPLETE CBC AUTOMATED: CPT

## 2022-09-02 PROCEDURE — 86901 BLOOD TYPING SEROLOGIC RH(D): CPT

## 2022-09-02 PROCEDURE — 83036 HEMOGLOBIN GLYCOSYLATED A1C: CPT

## 2022-09-02 PROCEDURE — G0103: CPT

## 2022-09-02 PROCEDURE — 84100 ASSAY OF PHOSPHORUS: CPT

## 2022-09-02 PROCEDURE — 93306 TTE W/DOPPLER COMPLETE: CPT

## 2022-09-02 PROCEDURE — U0003: CPT

## 2022-09-02 PROCEDURE — 88334 PATH CONSLTJ SURG CYTO XM EA: CPT

## 2022-09-02 PROCEDURE — 74177 CT ABD & PELVIS W/CONTRAST: CPT | Mod: MG

## 2022-09-02 PROCEDURE — 88360 TUMOR IMMUNOHISTOCHEM/MANUAL: CPT

## 2022-09-02 PROCEDURE — U0005: CPT

## 2022-09-02 PROCEDURE — 99239 HOSP IP/OBS DSCHRG MGMT >30: CPT

## 2022-09-02 PROCEDURE — 85014 HEMATOCRIT: CPT

## 2022-09-02 PROCEDURE — 82435 ASSAY OF BLOOD CHLORIDE: CPT

## 2022-09-02 PROCEDURE — 93970 EXTREMITY STUDY: CPT

## 2022-09-02 PROCEDURE — 85730 THROMBOPLASTIN TIME PARTIAL: CPT

## 2022-09-02 PROCEDURE — 70553 MRI BRAIN STEM W/O & W/DYE: CPT

## 2022-09-02 PROCEDURE — 99285 EMERGENCY DEPT VISIT HI MDM: CPT

## 2022-09-02 PROCEDURE — C1713: CPT

## 2022-09-02 PROCEDURE — 96374 THER/PROPH/DIAG INJ IV PUSH: CPT

## 2022-09-02 PROCEDURE — 87389 HIV-1 AG W/HIV-1&-2 AB AG IA: CPT

## 2022-09-02 PROCEDURE — 82330 ASSAY OF CALCIUM: CPT

## 2022-09-02 RX ORDER — DEXAMETHASONE 0.5 MG/5ML
2 ELIXIR ORAL
Qty: 22 | Refills: 0
Start: 2022-09-02

## 2022-09-02 RX ORDER — POLYETHYLENE GLYCOL 3350 17 G/17G
17 POWDER, FOR SOLUTION ORAL
Qty: 238 | Refills: 0
Start: 2022-09-02 | End: 2022-09-15

## 2022-09-02 RX ORDER — OXYCODONE HYDROCHLORIDE 5 MG/1
1 TABLET ORAL
Qty: 8 | Refills: 0
Start: 2022-09-02 | End: 2022-09-03

## 2022-09-02 RX ORDER — SENNA PLUS 8.6 MG/1
2 TABLET ORAL
Qty: 28 | Refills: 0
Start: 2022-09-02 | End: 2022-09-15

## 2022-09-02 RX ORDER — SODIUM CHLORIDE 9 MG/ML
1000 INJECTION, SOLUTION INTRAVENOUS
Refills: 0 | Status: DISCONTINUED | OUTPATIENT
Start: 2022-09-02 | End: 2022-09-02

## 2022-09-02 RX ORDER — FUROSEMIDE 40 MG
1 TABLET ORAL
Qty: 0 | Refills: 0 | DISCHARGE

## 2022-09-02 RX ORDER — DEXAMETHASONE 0.5 MG/5ML
4 ELIXIR ORAL ONCE
Refills: 0 | Status: COMPLETED | OUTPATIENT
Start: 2022-09-02 | End: 2022-09-02

## 2022-09-02 RX ORDER — LEVETIRACETAM 250 MG/1
1 TABLET, FILM COATED ORAL
Qty: 60 | Refills: 0
Start: 2022-09-02 | End: 2022-10-01

## 2022-09-02 RX ORDER — ENOXAPARIN SODIUM 100 MG/ML
40 INJECTION SUBCUTANEOUS
Qty: 0.2 | Refills: 0
Start: 2022-09-02 | End: 2022-09-06

## 2022-09-02 RX ORDER — RIVAROXABAN 15 MG-20MG
1 KIT ORAL
Qty: 0 | Refills: 0 | DISCHARGE

## 2022-09-02 RX ORDER — PANTOPRAZOLE SODIUM 20 MG/1
1 TABLET, DELAYED RELEASE ORAL
Qty: 30 | Refills: 0
Start: 2022-09-02 | End: 2022-10-01

## 2022-09-02 RX ADMIN — Medication 100 MILLIGRAM(S): at 05:17

## 2022-09-02 RX ADMIN — Medication 4 MILLIGRAM(S): at 14:22

## 2022-09-02 RX ADMIN — Medication 1: at 12:49

## 2022-09-02 RX ADMIN — HEPARIN SODIUM 5000 UNIT(S): 5000 INJECTION INTRAVENOUS; SUBCUTANEOUS at 05:17

## 2022-09-02 RX ADMIN — Medication 1 DROP(S): at 05:18

## 2022-09-02 RX ADMIN — AMLODIPINE BESYLATE 10 MILLIGRAM(S): 2.5 TABLET ORAL at 05:18

## 2022-09-02 RX ADMIN — BRIMONIDINE TARTRATE 1 DROP(S): 2 SOLUTION/ DROPS OPHTHALMIC at 17:45

## 2022-09-02 RX ADMIN — Medication 100 MILLIGRAM(S): at 12:06

## 2022-09-02 RX ADMIN — Medication 650 MILLIGRAM(S): at 12:07

## 2022-09-02 RX ADMIN — Medication 25 MILLIGRAM(S): at 17:16

## 2022-09-02 RX ADMIN — Medication 1 DROP(S): at 17:46

## 2022-09-02 RX ADMIN — Medication 25 MILLIGRAM(S): at 05:17

## 2022-09-02 RX ADMIN — Medication 0.6 MILLIGRAM(S): at 12:07

## 2022-09-02 RX ADMIN — LEVETIRACETAM 500 MILLIGRAM(S): 250 TABLET, FILM COATED ORAL at 17:16

## 2022-09-02 RX ADMIN — HEPARIN SODIUM 5000 UNIT(S): 5000 INJECTION INTRAVENOUS; SUBCUTANEOUS at 12:08

## 2022-09-02 RX ADMIN — OXYCODONE HYDROCHLORIDE 5 MILLIGRAM(S): 5 TABLET ORAL at 01:00

## 2022-09-02 RX ADMIN — Medication 4 MILLIGRAM(S): at 05:17

## 2022-09-02 RX ADMIN — OXYCODONE HYDROCHLORIDE 5 MILLIGRAM(S): 5 TABLET ORAL at 05:22

## 2022-09-02 RX ADMIN — Medication 650 MILLIGRAM(S): at 12:37

## 2022-09-02 RX ADMIN — SENNA PLUS 2 TABLET(S): 8.6 TABLET ORAL at 17:16

## 2022-09-02 RX ADMIN — LEVETIRACETAM 500 MILLIGRAM(S): 250 TABLET, FILM COATED ORAL at 05:17

## 2022-09-02 RX ADMIN — OXYCODONE HYDROCHLORIDE 5 MILLIGRAM(S): 5 TABLET ORAL at 00:02

## 2022-09-02 RX ADMIN — Medication 650 MILLIGRAM(S): at 03:00

## 2022-09-02 RX ADMIN — BRIMONIDINE TARTRATE 1 DROP(S): 2 SOLUTION/ DROPS OPHTHALMIC at 05:18

## 2022-09-02 RX ADMIN — PANTOPRAZOLE SODIUM 40 MILLIGRAM(S): 20 TABLET, DELAYED RELEASE ORAL at 07:51

## 2022-09-02 RX ADMIN — Medication 650 MILLIGRAM(S): at 02:47

## 2022-09-02 NOTE — PROGRESS NOTE ADULT - REASON FOR ADMISSION
R frontal lobe lesions

## 2022-09-02 NOTE — PROGRESS NOTE ADULT - ASSESSMENT
Patient seen and examined at bedside Patient seen and examined s/p R mini craniotomy for biopsy/partial resection. Doing well. Awake, alert, oriented x 3, mild L facial droop, no drift, LOYOLA strongly. SILT. Wound intact. Post-op CT without heme.    9/2  -adm under medicine, neurochecks q4  --160  -CTH was stable, ok for DVT ppr and dex taper  -Dex 4q8 Day1  2q6 Day3 2q8 Day5 2q12 Day7 2qD Day9 Off Day11  -MRI brain w/wo shows multiple ring enhancing lesions and expected surgical changes  -Hold AC/AP for now

## 2022-09-02 NOTE — DISCHARGE NOTE NURSING/CASE MANAGEMENT/SOCIAL WORK - PATIENT PORTAL LINK FT
You can access the FollowMyHealth Patient Portal offered by [facility] by registering at the following website: [email or web address]. By joining [employer]’s FollowMyHealth portal, you will also be able to view your health information using other applications (apps) compatible with our system.

## 2022-09-02 NOTE — PROGRESS NOTE ADULT - SUBJECTIVE AND OBJECTIVE BOX
Patient seen and examined at bedside.    --Anticoagulation--  heparin   Injectable 5000 Unit(s) SubCutaneous every 8 hours    T(C): 37.1 (09-02-22 @ 00:45), Max: 37.1 (09-02-22 @ 00:45)  HR: 75 (09-02-22 @ 00:45) (74 - 88)  BP: 138/78 (09-02-22 @ 00:45) (125/84 - 157/96)  RR: 18 (09-02-22 @ 00:45) (16 - 18)  SpO2: 97% (09-02-22 @ 00:45) (97% - 100%)  Wt(kg): --    Exam: Slight Left nasolabial flattening, otherwise intact

## 2022-09-02 NOTE — DISCHARGE NOTE NURSING/CASE MANAGEMENT/SOCIAL WORK - NSDPDISTO_GEN_ALL_CORE
MEDICARE WELLNESS VISIT NOTE      HISTORY OF PRESENT ILLNESS:   Ishaan Thomas presents for his Subsequent Annual Medicare Wellness Visit.   He has complaints or concerns which include somewhat decreased hearing. But is not worried about it. Not could have an exam.      Patient Care Team:  Beto Alvarez PA-C as PCP - General (Physician Assistant)  Jacky Jones OD as Referring Provider (Optometry)        Patient Active Problem List    Diagnosis Date Noted   • Sleep disorder breathing 02/23/2017     Priority: Low   • Medicare annual wellness visit, subsequent 02/23/2017     Priority: Low   • Nosebleed 02/13/2017     Priority: Low   • Moderate nonproliferative diabetic retinopathy(362.05) 11/24/2015     Priority: Low   • NS (nuclear sclerosis) 11/24/2015     Priority: Low   • DM II (diabetes mellitus, type II), controlled (CMS/[facility]) 11/24/2015     Priority: Low   • Type I (juvenile type) diabetes mellitus without mention of complication, not stated as uncontrolled 03/24/2014     Priority: Low   • Coronary atherosclerosis of native coronary artery 03/24/2014     Priority: Low   • Pedal edema 03/24/2014     Priority: Low   • Elevated PSA 03/24/2014     Priority: Low   • Diabetic nephropathy with proteinuria 11/14/2012     Priority: Low   • Anemia      Priority: Low   • CHF (congestive heart failure) (CMS/[facility])      Priority: Low   • Hyperlipidemia      Priority: Low   • DMII (diabetes mellitus, type 2) (CMS/[facility])      Priority: Low   • GERD (gastroesophageal reflux disease)      Priority: Low   • HTN (hypertension)      Priority: Low   • History of tobacco abuse      Priority: Low   • Long term (current) use of anticoagulants 10/03/2012     Priority: Low   • Heart valve replaced 09/30/2012     Priority: Low   • Arrhythmia atrial 09/30/2012     Priority: Low         Past Medical History:   Diagnosis Date   • Amblyopia     left eye   • Anemia    • Arthritis    • CAD (coronary artery disease)    • CHF (congestive  heart failure) (CMS/HCC)    • Chronic kidney disease    • Colon polyps    • Disorder of eye movements    • DMII (diabetes mellitus, type 2) (CMS/HCC)    • Elevated PSA    • GERD (gastroesophageal reflux disease)    • Hematoma     hematoma in back    • HTN (hypertension)    • Hyperlipidemia    • Malignant neoplasm (CMS/HCC)     prostate cancer, no treatment yet,   • Paroxysmal atrial fibrillation (CMS/HCC)    • Prostate cancer (CMS/HCC)    • Sleep apnea    • Thyroid disease          Past Surgical History:   Procedure Laterality Date   • Colonoscopy diagnostic  2011, 16    repeat in 3 years (2019)   • Coronary artery bypass graft     • Esophagogastroduodenoscopy transoral flex w/bx single or mult  2011, 16    repeat in 3 years (2019)   • Eye surgery      Strabismus at age 6 or 67years old   • Knee arthroscopy w/ meniscal repair Left    • Vascular surgery           Social History     Tobacco Use   • Smoking status: Former Smoker     Packs/day: 1.00     Years: 3.00     Pack years: 3.00     Types: Cigarettes, Cigars     Last attempt to quit: 1972     Years since quittin.1   • Smokeless tobacco: Never Used   • Tobacco comment: declines    Substance Use Topics   • Alcohol use: No   • Drug use: No     Drug use:    Drug Use:    No              Family History - Reviewed; patient has no significant family history    Current Outpatient Medications   Medication Sig Dispense Refill   • warfarin (COUMADIN) 5 MG tablet Take 1 tab (5 mg) daily except take with 2.5 mg tab on Wed, and Fri to equal 7.5 mg 100 tablet 1   • warfarin (COUMADIN) 2.5 MG tablet Take 1 tab on Wed and Fri along with 5 mg tab to equal 7.5 mg 90 tablet 1   • ketoconazole (NIZORAL) 2 % cream Apply topically 2 times daily. 60 g 0   • mirtazapine (REMERON SOL-TAB) 30 MG disintegrating tablet Take 1 tablet by mouth nightly. Due for labs and office visit 30 tablet 0   • indapamide (LOZOL) 2.5 MG tablet Take 1 tablet by mouth daily.  Due for labs and office visit 30 tablet 0   • metformin (GLUCOPHAGE-XR) 500 MG 24 hr tablet Take 1 tablet by mouth three times daily 270 tablet 0   • omeprazole (PRILOSEC) 20 MG capsule TAKE 1 CAPSULE BY MOUTH TWICE DAILY 180 capsule 0   • blood glucose (ONE TOUCH ULTRA TEST) test strip USE TO TEST BLOOD SUGAR ONCE A DAY AS DIRECTED 100 strip 3   • B-D U/F PEN NEEDLE 5/16\" 31G X 8 MM Misc USE EVERY 6 TO 8 HOURS WITH INSULIN PENS AS DIRECTED 200 each 1   • NOVOLOG FLEXPEN 100 UNIT/ML pen-injector INJECT 26 UNITS SUBCUTANEOUSLY 4 TIMES DAILY WITH MEALS AND AT NIGHT; DOSE MAY BE ADJUSTED DOWNWARDS AS NEEDED 45 mL 3   • levothyroxine (SYNTHROID, LEVOTHROID) 50 MCG tablet TAKE 1 TABLET BY MOUTH ONCE DAILY. PATIENT IS DUE FOR LABS 90 tablet 3   • lisinopril (PRINIVIL,ZESTRIL) 40 MG tablet TAKE 1 TABLET BY MOUTH TWICE DAILY 180 tablet 1   • amLODIPine (NORVASC) 10 MG tablet TAKE 1 TABLET BY MOUTH ONCE DAILY 90 tablet 1   • atorvastatin (LIPITOR) 80 MG tablet TAKE ONE TABLET BY MOUTH ONCE DAILY 90 tablet 1   • insulin detemir (LEVEMIR FLEXTOUCH) 100 UNIT/ML pen-injector Inject 70 Units into the skin nightly. 90 mL 5   • furosemide (LASIX) 20 MG tablet TAKE ONE TABLET BY MOUTH TWICE DAILY 180 tablet 3   • cholecalciferol (VITAMIN D3) 1000 UNITS tablet Take 2,000 Units by mouth daily. During the winter months     • metoPROLOL (LOPRESSOR) 25 MG tablet Take 0.5 tablets by mouth 2 times daily. 90 tablet 1   • Vitamin K, Phytonadione, 100 MCG Tab Take 1 tablet by mouth daily. (Patient taking differently: Take 100 mcg by mouth daily as needed. Takes when INR is over 3.5) 30 tablet 0   • aspirin 81 MG tablet Take 1 tablet by mouth daily.     • Cinnamon 500 MG CAPS Take 1 capsule by mouth 2 times daily.     • folic acid (FOLATE) 1 MG tablet Take 1 tablet by mouth daily.     • ferrous fumarate (MOHAN-SEQUELS) 50 MG CR tablet Take 1 tablet by mouth daily.     • nicotinic acid (SLO-NIACIN) 500 MG tablet Take 1 tablet by mouth 2 times  daily.       No current facility-administered medications for this visit.         The following items on the Medicare Health Risk Assessment were found to be positive  5.) Do you do moderate to strenuous exercise (brisk walk) for about 20 minutes for 3 or more days per week?:  No, I usually do not exercise this much     6 a.) How many servings of Fruits and Vegetables do you have each day ( 1 serving = 1 piece of fruit, 1/2 cup fruits or vegetables):  1 per day     6 b.) How many servings of High Fiber / Whole Grain Foods to you have each day ( 1 serving = 1 cup cold cereal, 1/2 cup cooked cereal, 1 slice bread):  1 per day     11h.) Problems with your hearing:  Often         Vision and hearing screens: performed and reviewed he does see his eye doctor twice year because of his diabetes.  Does have cerumen in left ear canal, will use ear wax softener ×3 days.  Advance Directive document: Yes  The patient has the following Advance Directive documents:  Power of  for Health Care  Cognitive Assessment: no evidence of cognitive dysfunction by direct observation    Recent PHQ 2/9 Score    PHQ 2:  Date Adult PHQ 2 Score   2/1/2019 0       PHQ 9:       DEPRESSION ASSESSMENT/PLAN:  Depression screening is negative no further plan needed.     Body mass index is 39.13 kg/m².    BMI ASSESSMENT/PLAN:  Journal food intake daily     Needed Screening/Treatment:   Colorectal cancer screening   Needed follow up:  None    See orders.   See Patient Instructions section.   Return in about 1 year (around 2/1/2020) for Medicare Wellness Visit.   Home

## 2022-09-02 NOTE — PROGRESS NOTE ADULT - SUBJECTIVE AND OBJECTIVE BOX
Patient is a 66y old  Male who presents with a chief complaint of R frontal lobe lesions (02 Sep 2022 08:23)     INTERVAL HPI/OVERNIGHT EVENTS:  - No acute events overnight    SUBJECTIVE  - Patient seen and evaluated at bedside. Patient states that he still has moderate pain over the area of his head, where the biopsy was performed, but the pain is slighly   - Patient reports absence of fevers, chills, HA, lightheadedness, dizziness, nausea, emesis, chest pain, dyspnea, palpitations, abd pain, diarrhea, urinary symptoms, skin color changes or rashes, or LE edema     MEDICATIONS  (STANDING):  allopurinol 100 milliGRAM(s) Oral daily  amLODIPine   Tablet 10 milliGRAM(s) Oral daily  brimonidine 0.2% Ophthalmic Solution 1 Drop(s) Both EYES two times a day  colchicine 0.6 milliGRAM(s) Oral daily  dexAMETHasone  Injectable 4 milliGRAM(s) IV Push every 8 hours  dextrose 5%. 1000 milliLiter(s) (50 mL/Hr) IV Continuous <Continuous>  dextrose 5%. 1000 milliLiter(s) (100 mL/Hr) IV Continuous <Continuous>  dextrose 50% Injectable 25 Gram(s) IV Push once  dextrose 50% Injectable 12.5 Gram(s) IV Push once  dextrose 50% Injectable 25 Gram(s) IV Push once  glucagon  Injectable 1 milliGRAM(s) IntraMuscular once  heparin   Injectable 5000 Unit(s) SubCutaneous every 8 hours  hydrALAZINE 25 milliGRAM(s) Oral two times a day  insulin lispro (ADMELOG) corrective regimen sliding scale   SubCutaneous three times a day before meals  insulin lispro (ADMELOG) corrective regimen sliding scale   SubCutaneous at bedtime  lactated ringers. 1000 milliLiter(s) (100 mL/Hr) IV Continuous <Continuous>  levETIRAcetam 500 milliGRAM(s) Oral two times a day  metoprolol succinate  milliGRAM(s) Oral daily  pantoprazole    Tablet 40 milliGRAM(s) Oral before breakfast  senna 2 Tablet(s) Oral at bedtime  timolol 0.5% Solution 1 Drop(s) Both EYES two times a day    MEDICATIONS  (PRN):  acetaminophen     Tablet .. 650 milliGRAM(s) Oral every 6 hours PRN Temp greater or equal to 38C (100.4F), Mild Pain (1 - 3)  aluminum hydroxide/magnesium hydroxide/simethicone Suspension 30 milliLiter(s) Oral every 4 hours PRN Dyspepsia  dextrose Oral Gel 15 Gram(s) Oral once PRN Blood Glucose LESS THAN 70 milliGRAM(s)/deciliter  melatonin 5 milliGRAM(s) Oral at bedtime PRN Insomnia  oxyCODONE    IR 5 milliGRAM(s) Oral every 6 hours PRN Moderate Pain (4 - 6)  polyethylene glycol 3350 17 Gram(s) Oral daily PRN Constipation    Allergies:  No Known Allergies    Intolerances:      REVIEW OF SYSTEMS: As indicated above; otherwise, negative    VITAL SIGNS:  T(F): 98.5 (09-02-22 @ 05:16), Max: 98.7 (09-02-22 @ 00:45)  HR: 75 (09-02-22 @ 05:16) (74 - 87)  BP: 136/93 (09-02-22 @ 05:16) (125/84 - 138/78)  RR: 18 (09-02-22 @ 05:16) (18 - 18)  SpO2: 97% (09-02-22 @ 05:16) (97% - 100%)    PHYSICAL EXAM:  General: NAD, well-groomed, well-developed  Eyes: Conjunctiva and sclera clear  ENMT: Moist mucous membranes  Neck: No palpable pre-auricular, post-auricular, occipital, mandibular, submental, supra-clavicular, or infra-clavicular lymph nodes   Chest: Clear to auscultation bilaterally; no rales, rhonchi, or wheezing  Heart: Regular rate and rhythm; normal S1 and S2; no murmurs, rubs, or gallops  Abd: Soft, nontender, nondistended  Nervous System: AAOX3  Psych: Appropriate affect  Ext: no peripheral LE edema bilaterally    LABS:                        15.6   16.96 )-----------( 217      ( 02 Sep 2022 07:35 )             46.4     02 Sep 2022 07:35    139    |  103    |  36     ----------------------------<  121    4.3     |  25     |  1.38     Ca    8.2        02 Sep 2022 07:35  Phos  3.7       02 Sep 2022 07:35  Mg     2.4       02 Sep 2022 07:35      CAPILLARY BLOOD GLUCOSE      POCT Blood Glucose.: 114 mg/dL (02 Sep 2022 07:36)  POCT Blood Glucose.: 135 mg/dL (01 Sep 2022 21:53)  POCT Blood Glucose.: 113 mg/dL (01 Sep 2022 16:31)  POCT Blood Glucose.: 136 mg/dL (01 Sep 2022 11:21)    BLOOD CULTURE    RADIOLOGY & ADDITIONAL TESTS:    Imaging Personally Reviewed:  [X ] YES     Consultant(s) Notes Reviewed:  Yes    Care Discussed with Consultants/Other Providers: Yes Patient is a 66y old  Male who presents with a chief complaint of R frontal lobe lesions (02 Sep 2022 08:23)     INTERVAL HPI/OVERNIGHT EVENTS:  - No acute events overnight    SUBJECTIVE  - Patient seen and evaluated at bedside. Patient states that he still has moderate pain over the area of his head, where the biopsy was performed, but the pain is slightly less than it was yesterday. Patient says that his right jaw is no longer in pain, it is just sensitive and eating is still difficult. Patient states he has not had a bowel movement since Tuesday.   - Patient reports absence of fevers, chills, lightheadedness, dizziness, nausea, emesis, chest pain, dyspnea, palpitations, abdominal pain, diarrhea, urinary symptoms, skin color changes or rashes, or LE edema     MEDICATIONS  (STANDING):  allopurinol 100 milliGRAM(s) Oral daily  amLODIPine   Tablet 10 milliGRAM(s) Oral daily  brimonidine 0.2% Ophthalmic Solution 1 Drop(s) Both EYES two times a day  colchicine 0.6 milliGRAM(s) Oral daily  dexAMETHasone  Injectable 4 milliGRAM(s) IV Push every 8 hours  dextrose 5%. 1000 milliLiter(s) (50 mL/Hr) IV Continuous <Continuous>  dextrose 5%. 1000 milliLiter(s) (100 mL/Hr) IV Continuous <Continuous>  dextrose 50% Injectable 25 Gram(s) IV Push once  dextrose 50% Injectable 12.5 Gram(s) IV Push once  dextrose 50% Injectable 25 Gram(s) IV Push once  glucagon  Injectable 1 milliGRAM(s) IntraMuscular once  heparin   Injectable 5000 Unit(s) SubCutaneous every 8 hours  hydrALAZINE 25 milliGRAM(s) Oral two times a day  insulin lispro (ADMELOG) corrective regimen sliding scale   SubCutaneous three times a day before meals  insulin lispro (ADMELOG) corrective regimen sliding scale   SubCutaneous at bedtime  lactated ringers. 1000 milliLiter(s) (100 mL/Hr) IV Continuous <Continuous>  levETIRAcetam 500 milliGRAM(s) Oral two times a day  metoprolol succinate  milliGRAM(s) Oral daily  pantoprazole    Tablet 40 milliGRAM(s) Oral before breakfast  senna 2 Tablet(s) Oral at bedtime  timolol 0.5% Solution 1 Drop(s) Both EYES two times a day    MEDICATIONS  (PRN):  acetaminophen     Tablet .. 650 milliGRAM(s) Oral every 6 hours PRN Temp greater or equal to 38C (100.4F), Mild Pain (1 - 3)  aluminum hydroxide/magnesium hydroxide/simethicone Suspension 30 milliLiter(s) Oral every 4 hours PRN Dyspepsia  dextrose Oral Gel 15 Gram(s) Oral once PRN Blood Glucose LESS THAN 70 milliGRAM(s)/deciliter  melatonin 5 milliGRAM(s) Oral at bedtime PRN Insomnia  oxyCODONE    IR 5 milliGRAM(s) Oral every 6 hours PRN Moderate Pain (4 - 6)  polyethylene glycol 3350 17 Gram(s) Oral daily PRN Constipation    Allergies:  No Known Allergies    Intolerances:      REVIEW OF SYSTEMS: As indicated above; otherwise, negative    VITAL SIGNS:  T(F): 98.5 (09-02-22 @ 05:16), Max: 98.7 (09-02-22 @ 00:45)  HR: 75 (09-02-22 @ 05:16) (74 - 87)  BP: 136/93 (09-02-22 @ 05:16) (125/84 - 138/78)  RR: 18 (09-02-22 @ 05:16) (18 - 18)  SpO2: 97% (09-02-22 @ 05:16) (97% - 100%)    PHYSICAL EXAM:  General: NAD, well-groomed, well-developed  Eyes: Pupils equal, round, reactive to light, extraocular muscles intact, anicteric sclera   Neck: No thyromegaly, no JVD, no lymphadenopathy   Chest: Clear to auscultation bilaterally; no rales, rhonchi, or wheezing  Heart: Regular rate and rhythm; normal S1 and S2; no murmurs, rubs, or gallops  Abdomen: Soft, nontender, nondistended, normoactive bowel sounds   Extremities: Warm, well perfused, no lower extremity edema bilaterally, 5/5 muscle strength for all extremities, sensation intact for all extremities   Neuro: AAOX3, no focal neurological deficits   Psych: Appropriate affect    LABS:                        15.6   16.96 )-----------( 217      ( 02 Sep 2022 07:35 )             46.4     02 Sep 2022 07:35    139    |  103    |  36     ----------------------------<  121    4.3     |  25     |  1.38     Ca    8.2        02 Sep 2022 07:35  Phos  3.7       02 Sep 2022 07:35  Mg     2.4       02 Sep 2022 07:35      CAPILLARY BLOOD GLUCOSE      POCT Blood Glucose.: 114 mg/dL (02 Sep 2022 07:36)  POCT Blood Glucose.: 135 mg/dL (01 Sep 2022 21:53)  POCT Blood Glucose.: 113 mg/dL (01 Sep 2022 16:31)  POCT Blood Glucose.: 136 mg/dL (01 Sep 2022 11:21)    BLOOD CULTURE    RADIOLOGY & ADDITIONAL TESTS:    Imaging Personally Reviewed:  [X ] YES     Consultant(s) Notes Reviewed:  Yes    Care Discussed with Consultants/Other Providers: Yes

## 2022-09-02 NOTE — PROGRESS NOTE ADULT - SUBJECTIVE AND OBJECTIVE BOX
DATE OF SERVICE: 09-02-22 @ 08:23    Subjective: Patient seen and examined. No new events except as noted.     SUBJECTIVE/ROS:  feels ok       MEDICATIONS:  MEDICATIONS  (STANDING):  allopurinol 100 milliGRAM(s) Oral daily  amLODIPine   Tablet 10 milliGRAM(s) Oral daily  brimonidine 0.2% Ophthalmic Solution 1 Drop(s) Both EYES two times a day  colchicine 0.6 milliGRAM(s) Oral daily  dexAMETHasone  Injectable 4 milliGRAM(s) IV Push every 8 hours  dextrose 5%. 1000 milliLiter(s) (50 mL/Hr) IV Continuous <Continuous>  dextrose 5%. 1000 milliLiter(s) (100 mL/Hr) IV Continuous <Continuous>  dextrose 50% Injectable 25 Gram(s) IV Push once  dextrose 50% Injectable 12.5 Gram(s) IV Push once  dextrose 50% Injectable 25 Gram(s) IV Push once  glucagon  Injectable 1 milliGRAM(s) IntraMuscular once  heparin   Injectable 5000 Unit(s) SubCutaneous every 8 hours  hydrALAZINE 25 milliGRAM(s) Oral two times a day  insulin lispro (ADMELOG) corrective regimen sliding scale   SubCutaneous three times a day before meals  insulin lispro (ADMELOG) corrective regimen sliding scale   SubCutaneous at bedtime  lactated ringers. 1000 milliLiter(s) (100 mL/Hr) IV Continuous <Continuous>  levETIRAcetam 500 milliGRAM(s) Oral two times a day  metoprolol succinate  milliGRAM(s) Oral daily  pantoprazole    Tablet 40 milliGRAM(s) Oral before breakfast  senna 2 Tablet(s) Oral at bedtime  timolol 0.5% Solution 1 Drop(s) Both EYES two times a day      PHYSICAL EXAM:  T(C): 36.9 (09-02-22 @ 05:16), Max: 37.1 (09-02-22 @ 00:45)  HR: 75 (09-02-22 @ 05:16) (74 - 87)  BP: 136/93 (09-02-22 @ 05:16) (125/84 - 138/96)  RR: 18 (09-02-22 @ 05:16) (16 - 18)  SpO2: 97% (09-02-22 @ 05:16) (97% - 100%)  Wt(kg): --  I&O's Summary    01 Sep 2022 07:01  -  02 Sep 2022 07:00  --------------------------------------------------------  IN: 1700 mL / OUT: 1625 mL / NET: 75 mL            JVP: Normal  Neck: supple  Lung: clear   CV: S1 S2 , Murmur:  Abd: soft  Ext: No edema  neuro: Awake   Psych: flat affect  Skin: normal``    LABS/DATA:    CARDIAC MARKERS:                                15.6   16.96 )-----------( 217      ( 02 Sep 2022 07:35 )             46.4     09-01    139  |  101  |  31<H>  ----------------------------<  117<H>  4.3   |  26  |  1.39<H>    Ca    8.8      01 Sep 2022 05:55  Phos  4.2     09-01  Mg     2.3     09-01    TPro  5.8<L>  /  Alb  3.8  /  TBili  0.4  /  DBili  x   /  AST  28  /  ALT  41  /  AlkPhos  67  08-31    proBNP:   Lipid Profile:   HgA1c:   TSH:     TELE:  EKG:

## 2022-09-02 NOTE — PROGRESS NOTE ADULT - PROVIDER SPECIALTY LIST ADULT
Internal Medicine
NSICU
Cardiology
Internal Medicine
Internal Medicine
Neurosurgery
Cardiology
Internal Medicine
Neurosurgery
Internal Medicine

## 2022-09-02 NOTE — DISCHARGE NOTE NURSING/CASE MANAGEMENT/SOCIAL WORK - NSDCFUADDAPPT_GEN_ALL_CORE_FT
Please follow up with your primary care doctor within 1 week of discharge regarding your recent diagnosis and when to restart your Losartan and Furosemide.     You will receive a call from Dr. Trujillo on Tuesday 9/6 to schedule a follow up appointment for next week. If you do not receive a phone call please call [phone].  Dr. Trujillo will also arrange an appointment with you with Dr. Carias, a neuro oncologist for sometime next week as well.

## 2022-09-02 NOTE — PROGRESS NOTE ADULT - ATTENDING COMMENTS
67 y/o M with history of Afib (on Xarelto) presented to [facility]  for slurred speech and facial droop. Found to have multiple enchancing lesions on the Rt frontal lobe with concern for malignancy with surrounding edema and mass effect.  Neurosurgery planning biopsy of lesion Wednesday. On steroids and seizure ppx  CT chest with small nodule but otherwise no lesions in CT abd/pelvis. Per patient lung nodules old  Team d/w neurology- can hold off on LP given tissue biopsy more useful.   Cardiology cleared for procedure.   No medical contraindication for procedure.   PARKER - contrast induced. improving w/ gentle IV hydration, continue. holding ARB  chronic afib - adjust heparin gtt, supratherapeutic   Rest as above.
Seen in PACU.  Pending MRI today. CTH this AM stable.   Per neurosurgery can start DVT ppx tonight.  Pain controlled, endorses urinating well. Some constipation - start bowel regimen   PARKER today - possibly pre-renal from not drinking much yesterday. trial of IVF   f/u further neurosurgery recs after MRI    Rest as above.
Agree with above.
65 y/o M with history of Afib (on Xarelto) presented to [facility]  for slurred speech and facial droop. Found to have multiple enchancing lesions on the Rt frontal lobe with concern for malignancy with surrounding edema and mass effect.  Neurosurgery planning biopsy of lesion tomorrow. On steroids and seizure ppx  NPO MN  d/w neurosurgery, patient will be monitored on their service post biopsy  No medical contraindication to proceed.   PARKER - contrast induced. resolved after IV hydration. holding ARB  chronic afib - hep gtt to be off at 10pm  Rest as above.
Seen in PACU post procedure.   Feels well, no pain. Awake, alert, conversant.   d/w neurosurgery ACP Bill, pending CTH post procedure. Further recs to follow regarding DVT ppx and MRI after CTH  Neurosurgery to follow as consultant service and patient to remain under medicine for now. Service to comment on steroid taper on discharge.   Hold therapeutic AC and chem DVT ppx until cleared by neurosurgery.     Rest as above.
MRI with expected postsurgical changes. Neurosurgery cleared for discharge home. They cleared patient for DVT ppx x 1 week, then full dose AC  Can send on ppx lovenox dosing. Will need injection teaching   Cr stable, bladder scan neg. Upon review of prior cr, appears to fluctuate so this could be within baseline. Hold ARB on d/c, re-check BMP outpatient.  Path with glio.    Stable for f/u with neurosurgery, PCP, Nephrology, cardiology. Outpatient rad onc f/u  Time spent on discharge: 43 minutes     Rest as above.
65 y/o M with history of Afib (on Xarelto) presented to [facility]  for slurred speech and facial droop. Found to have multiple enchancing lesions on the Rt frontal lobe with concern for malignancy with surrounding edema and mass effect.  Neurosurgery planning biopsy of lesion Wednesday. On steroids and seizure ppx  CT chest with small nodule  Cardiology consulted for clearance - pending TTE   Unclear if LP beneficial at this stage - will f/u with neurology   With PARKER today - likely contrast induced given recent studies. No retention or UTI symptoms, urinating well. Trial of gentle hydration, d/c ARB  On heparin gtt for afib given upcoming procedure  Rest as above.

## 2022-09-02 NOTE — PROGRESS NOTE ADULT - ASSESSMENT
67 y/o M with history of Afib (on Xarelto), HTN, gout, and prostate cancer s/p prostatectomy in 2010 presenting to [place] on 8/26 s/p episode of facial droop and likely seizures, transferred to [facility] for further evaluation of newly found lesions on R frontal lobe c/f primary malignancy vs metastases. Pending brain tissue biopsy (and possibly LP) on Wednesday 8/31 with nsgy.     Problem/Plan - 1:  ·  Problem: Lesion of right frontal lobe of brain.   ·  Plan: - Presenting with a brief episode of facial droop, dysarthria, and "head shaking," likely seizure-like activity i/s/o newly found lesions on R frontal lobe  - CTH: no acute intracranial hemorrhage  - CT perfusion: multiple peripherally enhancing and hyper-perfusing lesions on R lateral frontal lobe suggestive of metastases or primary neoplasm  - MRI brain: peripherally enhancing lesion in same region suspicious for underlying metastasis  - CT C/A/P: few scattered 1-2 mm pulmonary nodules, otherwise no definitive evidence of malignancy   - Keppra 500mg BID  - Dexamethasone taper as per neurosurgery   - Neuro will hold off on LP for now   - S/p brain biopsy 8/31  - q4h neurochecks  - Rad onc consulted, will see in outpatient setting for evaluation   - Neurosurgery following, will f/u further recommendations   - Patient's repeat CTH following procedure was negative, as well as CTH on 9/1. MRI results pending   - F/u biopsy results when available      Problem/Plan - 2:  ·  Problem: PARKER (acute kidney injury).   - Cr slightly bumped after procedure currently at 1.39, previous was .98  - Appropriate UOP  > Gentle hydration with LR 100cc/hr  > Monitor Cr, UOP.     Problem/Plan - 4:  ·  Problem: HTN (hypertension).   ·  Plan: > Continue home Amlodipine 10mg daily  > Continue home Metoprolol Succinate ER 100mg daily  > Continue home Hydralazine 25mg BID  > Hold home Losartan in setting of PARKER.     Problem/Plan - 5:  ·  Problem: Hyperglycemia.   ·  Plan: - Hgb a1c 5.5  > SSI while on steroids.     Problem/Plan - 6:  ·  Problem: History of prostate cancer.   ·  Plan: - S/p prostatectomy in 2010, has been following up regularly and did not require further treatment   - Last PSA several months ago was undetectable per patient  > Continue f/u outpatient.     Problem/Plan - 7:  ·  Problem: Chronic atrial fibrillation.   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   ·  Plan: > Continue home Allopurinol 100mg daily  > Continue home Colchicine 0.6mg daily.     Problem/Plan - 9:  ·  Problem: Prophylactic measure.   - DVT ppx: will start subcutaneous heparin tonight   - Diet: DASH/TLC  - Dispo: pending brain biopsy.  - Contact: Aretha Jalloh (daughter) [phone]   65 y/o M with history of Afib (on Xarelto), HTN, gout, and prostate cancer s/p prostatectomy in 2010 presenting to [place] on 8/26 s/p episode of facial droop and likely seizures, transferred to [facility] for further evaluation of newly found lesions on R frontal lobe c/f primary malignancy vs metastases. Pending brain tissue biopsy (and possibly LP) on Wednesday 8/31 with nsgy.     Problem/Plan - 1:  ·  Problem: Lesion of right frontal lobe of brain.   ·  Plan: - Presenting with a brief episode of facial droop, dysarthria, and "head shaking," likely seizure-like activity i/s/o newly found lesions on R frontal lobe  - CTH: no acute intracranial hemorrhage  - CT perfusion: multiple peripherally enhancing and hyper-perfusing lesions on R lateral frontal lobe suggestive of metastases or primary neoplasm  - MRI brain: peripherally enhancing lesion in same region suspicious for underlying metastasis  - CT C/A/P: few scattered 1-2 mm pulmonary nodules, otherwise no definitive evidence of malignancy   - Keppra 500mg BID  - Dexamethasone taper as per neurosurgery   - Neuro will hold off on LP for now   - S/p brain biopsy 8/31  - q4h neurochecks  - Rad onc consulted, will see in outpatient setting for evaluation   - Neurosurgery following, will f/u further recommendations   - Patient's repeat CTH following procedure was negative, as well as CTH on 9/1. MRI results pending   - F/u biopsy results when available      Problem/Plan - 2:  ·  Problem: PARKER (acute kidney injury).   - Cr slightly bumped after procedure currently at 1.38, previous was 1.39  - Appropriate UOP  - Monitor Cr, UOP.  - Bladder scan, will f/u     Problem/Plan - 4:  ·  Problem: HTN (hypertension).   ·  Plan: > Continue home Amlodipine 10mg daily  > Continue home Metoprolol Succinate ER 100mg daily  > Continue home Hydralazine 25mg BID  > Hold home Losartan in setting of PARKER.     Problem/Plan - 5:  ·  Problem: Hyperglycemia.   ·  Plan: - Hgb a1c 5.5  > SSI while on steroids.     Problem/Plan - 6:  ·  Problem: History of prostate cancer.   ·  Plan: - S/p prostatectomy in 2010, has been following up regularly and did not require further treatment   - Last PSA several months ago was undetectable per patient  > Continue f/u outpatient.     Problem/Plan - 7:  ·  Problem: Chronic atrial fibrillation.   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   ·  Plan: > Continue home Allopurinol 100mg daily  > Continue home Colchicine 0.6mg daily.     Problem/Plan - 9:  ·  Problem: Prophylactic measure.   - DVT ppx: will start subcutaneous heparin tonight   - Diet: DASH/TLC  - Dispo: pending brain biopsy.  - Contact: Aretha Jalloh (daughter) [phone]   67 y/o M with history of Afib (on Xarelto), HTN, gout, and prostate cancer s/p prostatectomy in 2010 presenting to [place] on 8/26 s/p episode of facial droop and likely seizures, transferred to [facility] for further evaluation of newly found lesions on R frontal lobe c/f primary malignancy vs metastases. Pending brain tissue biopsy (and possibly LP) on Wednesday 8/31 with nsgy.     Problem/Plan - 1:  ·  Problem: Lesion of right frontal lobe of brain.   ·  Plan: - Presenting with a brief episode of facial droop, dysarthria, and "head shaking," likely seizure-like activity i/s/o newly found lesions on R frontal lobe  - CTH: no acute intracranial hemorrhage  - CT perfusion: multiple peripherally enhancing and hyper-perfusing lesions on R lateral frontal lobe suggestive of metastases or primary neoplasm  - MRI brain: peripherally enhancing lesion in same region suspicious for underlying metastasis  - CT C/A/P: few scattered 1-2 mm pulmonary nodules, otherwise no definitive evidence of malignancy   - Keppra 500mg BID  - Dexamethasone taper as per neurosurgery   - Neuro will hold off on LP for now   - S/p brain biopsy 8/31  - q4h neurochecks  - Rad onc consulted, will see in outpatient setting for evaluation   - Neurosurgery following, will f/u further recommendations   - Patient's repeat CTH following procedure was negative, as well as CTH on 9/1. MRI results showed no acute changes cornering for a bleed   - F/u biopsy results when available      Problem/Plan - 2:  ·  Problem: PARKER (acute kidney injury).   - Cr slightly bumped after procedure currently at 1.38, previous was 1.39  - Appropriate UOP  - Monitor Cr, UOP.  - Bladder scan, will f/u     Problem/Plan - 4:  ·  Problem: HTN (hypertension).   ·  Plan: > Continue home Amlodipine 10mg daily  > Continue home Metoprolol Succinate ER 100mg daily  > Continue home Hydralazine 25mg BID  > Hold home Losartan in setting of PARKER.     Problem/Plan - 5:  ·  Problem: Hyperglycemia.   ·  Plan: - Hgb a1c 5.5  > SSI while on steroids.     Problem/Plan - 6:  ·  Problem: History of prostate cancer.   ·  Plan: - S/p prostatectomy in 2010, has been following up regularly and did not require further treatment   - Last PSA several months ago was undetectable per patient  > Continue f/u outpatient.     Problem/Plan - 7:  ·  Problem: Chronic atrial fibrillation.   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   ·  Plan: > Continue home Allopurinol 100mg daily  > Continue home Colchicine 0.6mg daily.     Problem/Plan - 9:  ·  Problem: Prophylactic measure.   - DVT ppx: will start subcutaneous heparin tonight   - Diet: DASH/TLC  - Dispo: patient is being discharged today, 9/2  - Contact: Aretha Jalloh (daughter) [phone]

## 2022-09-08 ENCOUNTER — APPOINTMENT (OUTPATIENT)
Dept: NEUROSURGERY | Facility: CLINIC | Age: 67
End: 2022-09-08

## 2022-09-08 VITALS
TEMPERATURE: 97.4 F | WEIGHT: 200 LBS | HEART RATE: 75 BPM | OXYGEN SATURATION: 98 % | BODY MASS INDEX: 31.39 KG/M2 | HEIGHT: 67 IN | SYSTOLIC BLOOD PRESSURE: 140 MMHG | DIASTOLIC BLOOD PRESSURE: 96 MMHG

## 2022-09-08 DIAGNOSIS — C71.9 MALIGNANT NEOPLASM OF BRAIN, UNSPECIFIED: ICD-10-CM

## 2022-09-08 PROCEDURE — 99024 POSTOP FOLLOW-UP VISIT: CPT

## 2022-09-13 ENCOUNTER — APPOINTMENT (OUTPATIENT)
Dept: RADIATION ONCOLOGY | Facility: CLINIC | Age: 67
End: 2022-09-13

## 2022-09-13 ENCOUNTER — NON-APPOINTMENT (OUTPATIENT)
Age: 67
End: 2022-09-13

## 2022-09-13 VITALS
DIASTOLIC BLOOD PRESSURE: 78 MMHG | BODY MASS INDEX: 31.76 KG/M2 | OXYGEN SATURATION: 98 % | TEMPERATURE: 98.78 F | RESPIRATION RATE: 18 BRPM | HEIGHT: 67 IN | WEIGHT: 202.38 LBS | HEART RATE: 85 BPM | SYSTOLIC BLOOD PRESSURE: 111 MMHG

## 2022-09-13 DIAGNOSIS — Z78.9 OTHER SPECIFIED HEALTH STATUS: ICD-10-CM

## 2022-09-13 DIAGNOSIS — Z87.898 PERSONAL HISTORY OF OTHER SPECIFIED CONDITIONS: ICD-10-CM

## 2022-09-13 DIAGNOSIS — C71.9 MALIGNANT NEOPLASM OF BRAIN, UNSPECIFIED: ICD-10-CM

## 2022-09-13 PROCEDURE — 99215 OFFICE O/P EST HI 40 MIN: CPT | Mod: 25

## 2022-09-14 PROBLEM — C71.9 MALIGNANT BRAIN TUMOR: Status: ACTIVE | Noted: 2022-09-14

## 2022-09-14 NOTE — ASSESSMENT
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 Pathology results discussed with pt. Pt was counseled. Pt to f/u with hematology/oncology team 09/17/22. Plan of care discussed with team. Pt to undergo radiation therapy for tx. \par Path.\par Final Diagnosis\par \par 1.  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

## 2022-09-14 NOTE — DATA REVIEWED
[de-identified] : Final Diagnosis\par \par 1.  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

## 2022-09-14 NOTE — PHYSICAL EXAM
[General Appearance - Alert] : alert [General Appearance - In No Acute Distress] : in no acute distress [Longitudinal] : longitudinal [Clean] : clean [Dry] : dry [Healing Well] : healing well [Well-Healed] : well-healed [Intact] : intact [Staple Intact] : closed with intact staples [No Drainage] : without drainage [Person] : oriented to person [Place] : oriented to place [Time] : oriented to time [I: Normal Smell] : smell intact bilaterally [Cranial Nerves Optic (II)] : visual acuity intact bilaterally,  pupils equal round and reactive to light [Cranial Nerves Oculomotor (III)] : extraocular motion intact [Cranial Nerves Trigeminal (V)] : facial sensation intact symmetrically [Cranial Nerves Facial (VII)] : face symmetrical [Cranial Nerves Vestibulocochlear (VIII)] : hearing was intact bilaterally [Cranial Nerves Glossopharyngeal (IX)] : tongue and palate midline [Cranial Nerves Accessory (XI - Cranial And Spinal)] : head turning and shoulder shrug symmetric [Cranial Nerves Hypoglossal (XII)] : there was no tongue deviation with protrusion [Abnormal Walk] : normal gait [2+] : Patella left 2+ [Erythema] : not erythematous [Tender] : not tender [Warm] : not warm [Indurated] : not indurated

## 2022-09-14 NOTE — HISTORY OF PRESENT ILLNESS
[FreeTextEntry1] : Mr. Jalloh is a 65 y/o, male, here for post op s/p R frontal craniotomy. Pathology was positive for high grade glioblastoma. Pt was seen by heme/oncology team earlier this morning, and will be f/u 09/17. Pt will be initiating radiation therapy. He reports his wife is also is battling with cancer and at this point he would like to go to [place] with his daughter the first week of October and continue with tx of plan by heme/onc.

## 2022-09-16 ENCOUNTER — OUTPATIENT (OUTPATIENT)
Dept: OUTPATIENT SERVICES | Facility: HOSPITAL | Age: 67
LOS: 1 days | Discharge: HOME | End: 2022-09-16

## 2022-09-16 ENCOUNTER — NON-APPOINTMENT (OUTPATIENT)
Age: 67
End: 2022-09-16

## 2022-09-16 DIAGNOSIS — Z98.890 OTHER SPECIFIED POSTPROCEDURAL STATES: Chronic | ICD-10-CM

## 2022-09-16 DIAGNOSIS — Z41.9 ENCOUNTER FOR PROCEDURE FOR PURPOSES OTHER THAN REMEDYING HEALTH STATE, UNSPECIFIED: Chronic | ICD-10-CM

## 2022-09-16 DIAGNOSIS — Z90.79 ACQUIRED ABSENCE OF OTHER GENITAL ORGAN(S): Chronic | ICD-10-CM

## 2022-09-16 PROCEDURE — 77334 RADIATION TREATMENT AID(S): CPT | Mod: 26

## 2022-09-20 ENCOUNTER — APPOINTMENT (OUTPATIENT)
Dept: RADIATION ONCOLOGY | Facility: CLINIC | Age: 67
End: 2022-09-20

## 2022-09-20 DIAGNOSIS — F41.9 ANXIETY DISORDER, UNSPECIFIED: ICD-10-CM

## 2022-09-20 PROBLEM — C71.9 GLIOMA: Status: ACTIVE | Noted: 2022-09-20

## 2022-09-20 PROCEDURE — 99213 OFFICE O/P EST LOW 20 MIN: CPT | Mod: 25,95

## 2022-09-20 PROCEDURE — 99203 OFFICE O/P NEW LOW 30 MIN: CPT | Mod: 25,95

## 2022-09-20 NOTE — DISEASE MANAGEMENT
[Pathological] : TNM Stage: p [N/A] : Currently not applicable [FreeTextEntry4] : WHO grade IV GBM [TTNM] : x [NTNM] : x [MTNM] : x

## 2022-09-20 NOTE — HISTORY OF PRESENT ILLNESS
[FreeTextEntry1] : Mr. Azar Jalloh presents following recent diagnosis of high grade glioma for consideration of adjuvant treatment.  \par \par ONCOLOGIC HISTORY\par Mr. Jalloh is a 67 yo M with PMH HTN, a fib (on Xarelto), splenic infarcts , prostate cancer s/p prostatectomy (~2010) who presented to [facility] on 8/26/2022 due to left facial weakness (reported by PCP), neck muscle contracture, violent head shaking, and head discomfort that lasted about 20 minutes. Patient also reported approx 15 second episode of aphasia. MRI head performed on 8/26/2022 revealed 5 peripherally enhancing lesions in the R posterior frontal region which appeared to be metastatic lesions vs a multifocal primary. He underwent a R craniotomy and resection. 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 Patient was also started on Keppra 500mg BID and a dexamethasone taper.\par \par 9/13/2022: Patient presents for initial consult today and consideration of post-op RT.\par Today he feels well. he has tapered off dexamethasone and remains on keppra. no headaches, no nausea, no focal weakness. Played 18 holes of golf the other day. plans travel 10/9-10/14.\par \par

## 2022-09-20 NOTE — HISTORY OF PRESENT ILLNESS
[FreeTextEntry1] : The patient is a 66 year old male with newly diagnosed with a GBM in August 2022 requesting telehealth consultation for medical cannabis.\par \par He has a history of prostate cancer s/p prostatectomy in 2010. He presented to [facility] with a seizure including left facial weakness, neck muscle contracture, head shaking, head pain, and aphasia. Inpatient MRI on 8/26/22 showed 5 peripherally enhancing lesions in the right posterior frontal region, suspicious for underlying metastasis. Right frontal craniotomy performed on 8/31/22 showed a high grade glioblastoma, WHO grade IV IDH WT.. He is currently on Keppra and has tapered off of Dexamethasone. He had a consultation with Dr. Trujillo for post op RT on 9/13/22 and is planning to begin Stupp protocol in mid October 2022. \par  He consented to a telehealth consult for certification for medical cannabis. 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 He also report neuropathy of the feet. He has tried marijuana and does not want to smoke or vape but would like the oil to help his symptoms.

## 2022-09-20 NOTE — REVIEW OF SYSTEMS
[Fever] : no fever [Chills] : no chills [Night Sweats] : no night sweats [Eye Pain] : no eye pain [Red Eyes] : eyes not red [Dry Eyes] : no dryness of the eyes [Dysphagia] : no dysphagia [Loss of Hearing] : no loss of hearing [Chest Pain] : no chest pain [Palpitations] : no palpitations [Shortness Of Breath] : no shortness of breath [Confused] : no confusion [Dizziness] : no dizziness [Fainting] : no fainting [Difficulty Walking] : no difficulty walking [FreeTextEntry9] : denies focal weakness

## 2022-09-20 NOTE — REASON FOR VISIT
[Home] : at home, [unfilled] , at the time of the visit. [Medical Office: ([facility])___] : at the medical office located in  [Patient] : the patient [FreeTextEntry2] : Azar Jalloh [Consideration for Non-Curative Therapy] : consideration for non-curative therapy for [Brain Metastasis] : brain metastasis

## 2022-09-21 ENCOUNTER — APPOINTMENT (OUTPATIENT)
Dept: HEMATOLOGY ONCOLOGY | Facility: CLINIC | Age: 67
End: 2022-09-21

## 2022-09-21 ENCOUNTER — RESULT REVIEW (OUTPATIENT)
Age: 67
End: 2022-09-21

## 2022-09-21 ENCOUNTER — APPOINTMENT (OUTPATIENT)
Dept: NEUROLOGY | Facility: CLINIC | Age: 67
End: 2022-09-21

## 2022-09-21 ENCOUNTER — NON-APPOINTMENT (OUTPATIENT)
Age: 67
End: 2022-09-21

## 2022-09-21 ENCOUNTER — OUTPATIENT (OUTPATIENT)
Dept: OUTPATIENT SERVICES | Facility: HOSPITAL | Age: 67
LOS: 1 days | Discharge: HOME | End: 2022-09-21

## 2022-09-21 VITALS — HEIGHT: 67 IN | WEIGHT: 202 LBS | BODY MASS INDEX: 31.71 KG/M2

## 2022-09-21 DIAGNOSIS — I10 ESSENTIAL (PRIMARY) HYPERTENSION: ICD-10-CM

## 2022-09-21 DIAGNOSIS — Z98.890 OTHER SPECIFIED POSTPROCEDURAL STATES: Chronic | ICD-10-CM

## 2022-09-21 DIAGNOSIS — H40.9 UNSPECIFIED GLAUCOMA: ICD-10-CM

## 2022-09-21 DIAGNOSIS — M10.9 GOUT, UNSPECIFIED: ICD-10-CM

## 2022-09-21 DIAGNOSIS — Z41.9 ENCOUNTER FOR PROCEDURE FOR PURPOSES OTHER THAN REMEDYING HEALTH STATE, UNSPECIFIED: Chronic | ICD-10-CM

## 2022-09-21 DIAGNOSIS — R56.9 UNSPECIFIED CONVULSIONS: ICD-10-CM

## 2022-09-21 DIAGNOSIS — D64.9 ANEMIA, UNSPECIFIED: ICD-10-CM

## 2022-09-21 DIAGNOSIS — Z90.79 ACQUIRED ABSENCE OF OTHER GENITAL ORGAN(S): Chronic | ICD-10-CM

## 2022-09-21 DIAGNOSIS — I48.91 UNSPECIFIED ATRIAL FIBRILLATION: ICD-10-CM

## 2022-09-21 LAB
BASOPHILS # BLD AUTO: 0.04 K/UL — SIGNIFICANT CHANGE UP (ref 0–0.2)
BASOPHILS NFR BLD AUTO: 0.7 % — SIGNIFICANT CHANGE UP (ref 0–2)
EOSINOPHIL # BLD AUTO: 0.14 K/UL — SIGNIFICANT CHANGE UP (ref 0–0.5)
EOSINOPHIL NFR BLD AUTO: 2.5 % — SIGNIFICANT CHANGE UP (ref 0–6)
HCT VFR BLD CALC: 45.9 % — SIGNIFICANT CHANGE UP (ref 39–50)
HGB BLD-MCNC: 15.6 G/DL — SIGNIFICANT CHANGE UP (ref 13–17)
IMM GRANULOCYTES NFR BLD AUTO: 0.4 % — SIGNIFICANT CHANGE UP (ref 0–0.9)
LYMPHOCYTES # BLD AUTO: 1.8 K/UL — SIGNIFICANT CHANGE UP (ref 1–3.3)
LYMPHOCYTES # BLD AUTO: 32.3 % — SIGNIFICANT CHANGE UP (ref 13–44)
MCHC RBC-ENTMCNC: 29.7 PG — SIGNIFICANT CHANGE UP (ref 27–34)
MCHC RBC-ENTMCNC: 34 G/DL — SIGNIFICANT CHANGE UP (ref 32–36)
MCV RBC AUTO: 87.4 FL — SIGNIFICANT CHANGE UP (ref 80–100)
MONOCYTES # BLD AUTO: 0.62 K/UL — SIGNIFICANT CHANGE UP (ref 0–0.9)
MONOCYTES NFR BLD AUTO: 11.1 % — SIGNIFICANT CHANGE UP (ref 2–14)
NEUTROPHILS # BLD AUTO: 2.96 K/UL — SIGNIFICANT CHANGE UP (ref 1.8–7.4)
NEUTROPHILS NFR BLD AUTO: 53 % — SIGNIFICANT CHANGE UP (ref 43–77)
NRBC # BLD: 0 /100 WBCS — SIGNIFICANT CHANGE UP (ref 0–0)
PLATELET # BLD AUTO: 156 K/UL — SIGNIFICANT CHANGE UP (ref 150–400)
RBC # BLD: 5.25 M/UL — SIGNIFICANT CHANGE UP (ref 4.2–5.8)
RBC # FLD: 14 % — SIGNIFICANT CHANGE UP (ref 10.3–14.5)
WBC # BLD: 5.58 K/UL — SIGNIFICANT CHANGE UP (ref 3.8–10.5)
WBC # FLD AUTO: 5.58 K/UL — SIGNIFICANT CHANGE UP (ref 3.8–10.5)

## 2022-09-21 PROCEDURE — 99215 OFFICE O/P EST HI 40 MIN: CPT

## 2022-09-21 RX ORDER — ALLOPURINOL 100 MG/1
100 TABLET ORAL DAILY
Qty: 30 | Refills: 0 | Status: ACTIVE | COMMUNITY
Start: 2022-09-13

## 2022-09-21 RX ORDER — COLCHICINE 0.6 MG/1
0.6 CAPSULE ORAL AS DIRECTED
Qty: 60 | Refills: 0 | Status: ACTIVE | COMMUNITY
Start: 2022-09-13

## 2022-09-21 RX ORDER — RIVAROXABAN 20 MG/1
20 TABLET, FILM COATED ORAL
Qty: 30 | Refills: 3 | Status: ACTIVE | COMMUNITY
Start: 2022-09-13

## 2022-09-21 NOTE — DISCUSSION/SUMMARY
[FreeTextEntry1] : In summary, this is a 67 yo man with a newly diagnosed right posterior frontal GBM, IDH wt.\par He is neurologically intact. Seizures are controlled with Keppra and he is not driving.\par Nature of disease treatment discussed with patient.\par Baseline blood work will be obtained today and if ok will order Temodar chemotherapy. He will call when he received this and we will set up a teaching session.\par Should he have any problems in the interim, he knows to call me.

## 2022-09-21 NOTE — HISTORY OF PRESENT ILLNESS
[FreeTextEntry1] : Mr. Jalloh was seen in neuron oncologic evaluation for management of a newly diagnosed brain tumor.\par \par Patient is a 65 yo right handed man who was well until 8/26 when he was driving and had a focal seizure with his head and neck turning to the right. He was remarkably able to get off of the highway and park at which time the episode had stopped. He looked in the mirror and noted that his face was "distorted" and that his speech was slurred.He went to his PMD who called the patient's daughter to bring him to the Emergency Room.\par \par He was initially at [facility] where imaging suggest abnormality in the right frontal lobe. He was subsequently transferred to [facility].\par \par \par MR brain 8/26/2022 reviewed - in the posterior right frontal region there are 5 peripherally enhancing nodules within an area of T2/flair abnormality.\par \par CT C/A/P was unrevealing.\par \par Biopsy was performed on 8/31 by Dr. 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 IDH wt\par \par PMH notable for prostate ca, s/p prostatectomy in 2010. He also has a history of atrial fibrillation, hypertension, splenic infarct. and cholecystectomy.\par \par Today, patient reports he is feeling well. He has been playing golf and doing well. He denies any headaches, focal weakness, numbness, coordination issue or gait change. He has been successfully tapered off of Decadron and remains on Keppra 500 mg twice daily and has been compliant. He has an aide now to help him with driving.\par \par Patient is  - wife is currently in rehab after falling down a flight of stairs - she has carcinoid.\par He has one healthy daughter.\par \par He is retired and had worked for [employer]

## 2022-09-21 NOTE — PHYSICAL EXAM
[FreeTextEntry1] : He is awake and alert - oriented and fluent\par EOMI, VFF\par Face is symmetric and tongue is midline\par There is no drift\par Tone and bulk are normal in UE and LE.\par FFM are equal bilaterally\par Strength is full bilaterally in UE and LE\par He is ambulating independently and steadily.

## 2022-09-23 PROCEDURE — 77301 RADIOTHERAPY DOSE PLAN IMRT: CPT | Mod: 26

## 2022-09-23 PROCEDURE — 77300 RADIATION THERAPY DOSE PLAN: CPT | Mod: 26

## 2022-09-23 PROCEDURE — 77338 DESIGN MLC DEVICE FOR IMRT: CPT | Mod: 26

## 2022-09-26 ENCOUNTER — NON-APPOINTMENT (OUTPATIENT)
Age: 67
End: 2022-09-26

## 2022-09-30 LAB
ALBUMIN SERPL ELPH-MCNC: 4.3 G/DL
ALP BLD-CCNC: 73 U/L
ALT SERPL-CCNC: 36 U/L
ANION GAP SERPL CALC-SCNC: 10 MMOL/L
AST SERPL-CCNC: 22 U/L
BILIRUB SERPL-MCNC: 0.6 MG/DL
BUN SERPL-MCNC: 18 MG/DL
CALCIUM SERPL-MCNC: 9.7 MG/DL
CHLORIDE SERPL-SCNC: 106 MMOL/L
CO2 SERPL-SCNC: 25 MMOL/L
CREAT SERPL-MCNC: 1.13 MG/DL
EGFR: 72 ML/MIN/1.73M2
GLUCOSE SERPL-MCNC: 97 MG/DL
POTASSIUM SERPL-SCNC: 4.8 MMOL/L
PROT SERPL-MCNC: 6.1 G/DL
SODIUM SERPL-SCNC: 141 MMOL/L

## 2022-10-04 NOTE — HISTORY OF PRESENT ILLNESS
[FreeTextEntry1] : Mr. Jalloh was seen in neuron oncologic follow up for a newly diagnosed malignant brain tumor\par In brief\par \par PMH notable for prostate ca, s/p prostatectomy in 2010. He also has a history of atrial fibrillation, hypertension, splenic infarct. and cholecystectomy.\par \par Patient is a 67 yo right handed man who was well until 8/26 when he was driving and had a focal seizure with his head and neck turning to the right. He was remarkably able to get off of the highway and park at which time the episode had stopped. He looked in the mirror and noted that his face was "distorted" and that his speech was slurred.He went to his PMD who called the patient's daughter to bring him to the Emergency Room.\par \par He was initially at [facility] where imaging suggest abnormality in the right frontal lobe. He was subsequently transferred to [facility].\par \par MR brain 8/26/2022 reviewed - in the posterior right frontal region there are 5 peripherally enhancing nodules within an area of T2/flair abnormality.\par \par CT C/A/P was unrevealing.\par \par Biopsy was performed on 8/31 by Dr. 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 IDH wt\par 10/4/2022 - Here for a follow up. Reports he is more fatigued and has loss of taste. For past couple of weeks he noticed he is more tired , last couple of days he is sleeping on and off throughout the day\par \par He did not complain of headache but admitted to headache on Saturday but none yesterday or today.  He complains of a change in taste and feels that food is unappealing to him - no clear nausea or vomiting.\par He denies focal weakness, numbness or gait change.\par \par He was here today for scheduled chemotherapy teaching.

## 2022-10-04 NOTE — PHYSICAL EXAM
[General Appearance - Alert] : alert [General Appearance - In No Acute Distress] : in no acute distress [General Appearance - Well Nourished] : well nourished [] : no respiratory distress [Edema] : there was no peripheral edema [FreeTextEntry1] : Patient seen and examined\par Alert, awake , Oriented X 3. Speech clear, slow and fluent\par PERRLA, EOMI, VFF\par mild left NLF flattening - some drooling on the left. Tongue protrudes midline\par + Thrush\par LOYOLA x 4 with good and equal strength\par FFM, coordination equal bilaterally\par Sensation intact bilaterally\par Gait steady. Ambulating independently\par

## 2022-10-04 NOTE — DISCUSSION/SUMMARY
[FreeTextEntry1] : Patient seen and examined\par GBM - Appears more fatigued and mildly slow to respond - Left facial weakness - mild but new.\par Will start on Dexamethasone - Today - Take 8 mg now and then 4 mg in the evening\par Take 4 mg bid starting tomorrow \par On Pantoprazole for GI prophylaxis\par ORAL THRUSH - Will start on Nystatin swish and swallow\par FOLLOW UP- Will do a clinical follow up on Thursday. In the interim, if any concerns patient knows to call our office.

## 2022-10-06 NOTE — DISCUSSION/SUMMARY
[FreeTextEntry1] : Patient seen and examined\par GBM -  Improved\par Continue Dexamethasone  4 mg bid\par During the day before travel both to and fro- recommended patient to take 8 mg bid \par On Pantoprazole for GI prophylaxis\par ORAL THRUSH -  Nystatin swish and swallow helping. Continue Rx\par Temozolomide instructions reviewed with patient\par Explained to take Zofran 2 ½ hours after dinner followed by TMZ 30 minutes later. Patient to remain NPO till morning after taking chemo pill.\par Explained medication side effects in detail. \par Written instructions given\par Reinforced weekly CBC- Script given\par Patient to take TDD of 150 mg X 42 days\par FOLLOW UP- Will do a clinical follow up during the week of October 25.  In the interim, if any concerns patient knows to call our office. \par

## 2022-10-06 NOTE — PHYSICAL EXAM
[General Appearance - Alert] : alert [General Appearance - In No Acute Distress] : in no acute distress [General Appearance - Well Nourished] : well nourished [] : no respiratory distress [Respiration, Rhythm And Depth] : normal respiratory rhythm and effort [Exaggerated Use Of Accessory Muscles For Inspiration] : no accessory muscle use [Edema] : there was no peripheral edema [FreeTextEntry1] : Patient seen and examined\par Alert, awake , Oriented X 3. Speech clear and fluent\par PERRLA, EOMI, VFF\par Mils NLF flattening. Tongue protrudes midline\par LOYOLA x 4 with good and equal strength\par FFM, coordination equal bilaterally\par Sensation intact bilaterally\par Gait steady. Ambulating independently\par

## 2022-10-06 NOTE — HISTORY OF PRESENT ILLNESS
[FreeTextEntry1] : Mr. Jalloh was seen in neuron oncologic follow up for a newly diagnosed malignant brain tumor\par In brief\par \par PMH notable for prostate ca, s/p prostatectomy in 2010. He also has a history of atrial fibrillation, hypertension, splenic infarct. and cholecystectomy.\par \par Patient is a 67 yo right handed man who was well until 8/26 when he was driving and had a focal seizure with his head and neck turning to the right. He was remarkably able to get off of the highway and park at which time the episode had stopped. He looked in the mirror and noted that his face was "distorted" and that his speech was slurred.He went to his PMD who called the patient's daughter to bring him to the Emergency Room.\par \par He was initially at [facility] where imaging suggest abnormality in the right frontal lobe. He was subsequently transferred to [facility].\par \par MR brain 8/26/2022 reviewed - in the posterior right frontal region there are 5 peripherally enhancing nodules within an area of T2/flair abnormality.\par \par CT C/A/P was unrevealing.\par \par Biopsy was performed on 8/31 by Dr. 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 Reports he is more fatigued and has loss of taste. For past couple of weeks he noticed he is more tired , last couple of days he is sleeping on and off throughout the dayHe did not complain of headache but admitted to headache on Saturday but none yesterday or today. He complains of a change in taste and feels that food is unappealing to him - no clear nausea or vomiting. Recommended to start taking Dexamethasone 12 mg today and then TDD of 8 mg daily\par 10/6/2022 - Patient here for a follow up. Reports he is feeling better comparing with couple of days ago. His energy got improved. He noticed his taste improved.\par

## 2022-10-17 PROBLEM — B37.0 OROPHARYNGEAL CANDIDIASIS: Status: ACTIVE | Noted: 2022-01-01

## 2022-10-18 NOTE — HISTORY OF PRESENT ILLNESS
[FreeTextEntry1] : Mr. Azar Jalloh presented to me initially following recent diagnosis of high grade glioma for consideration of adjuvant treatment.  \par \par ONCOLOGIC HISTORY\par Mr. Jalloh is a 67 yo M with PMH HTN, a fib (on Xarelto), splenic infarcts , prostate cancer s/p prostatectomy (~2010) who presented to [facility] on 8/26/2022 due to left facial weakness (reported by PCP), neck muscle contracture, violent head shaking, and head discomfort that lasted about 20 minutes. Patient also reported approx 15 second episode of aphasia. MRI head performed on 8/26/2022 revealed 5 peripherally enhancing lesions in the R posterior frontal region which appeared to be metastatic lesions vs a multifocal primary. He underwent a R craniotomy and resection. 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 Patient was also started on Keppra 500mg BID and a dexamethasone taper.\par \par 9/13/2022: Patient presents for initial consult today and consideration of post-op RT.\par Today he feels well. he has tapered off dexamethasone and remains on keppra. no headaches, no nausea, no focal weakness. Played 18 holes of golf the other day. plans travel 10/9-10/14.\par \par 10/18/2022- Mr. Jalloh presents today for on treatment visit. He has completed 200/6000 cgy of radiation to the right partial brain.\par He was started on dexamethasone 4mg BID on 10/4. feeling well. has some trouble sleeping. no headaches, no nausea, no vomiting, no confusion or dizziness or fainting. slight LE edema. some fatigue.

## 2022-10-18 NOTE — DISEASE MANAGEMENT
[Pathological] : TNM Stage: p [N/A] : Currently not applicable [FreeTextEntry4] : WHO grade IV GBM [TTNM] : x [NTNM] : x [MTNM] : x [de-identified] : 200 cgy [de-identified] : 6000 cgy [de-identified] : right partial brain

## 2022-10-18 NOTE — PHYSICAL EXAM
[General Appearance - Well Developed] : well developed [] : no respiratory distress [Heart Sounds] : normal S1 and S2 [Normal] : no focal deficits

## 2022-10-18 NOTE — REVIEW OF SYSTEMS
[Negative] : Genitourinary [Fatigue] : fatigue [Fever] : no fever [Chills] : no chills [Night Sweats] : no night sweats [Eye Pain] : no eye pain [Red Eyes] : eyes not red [Dry Eyes] : no dryness of the eyes [Dysphagia] : no dysphagia [Loss of Hearing] : no loss of hearing [Chest Pain] : no chest pain [Palpitations] : no palpitations [Shortness Of Breath] : no shortness of breath [Confused] : no confusion [Dizziness] : no dizziness [Fainting] : no fainting [Difficulty Walking] : no difficulty walking [FreeTextEntry2] : some trouble sleeping [FreeTextEntry9] : denies focal weakness [de-identified] : denies headaches

## 2022-10-25 NOTE — DISCUSSION/SUMMARY
[FreeTextEntry1] : Patient seen and examined\par GBM - Neurologically stable\par Continue Dexamethasone 4 - 2 mg\par Continue  mg at bedtime as recommended\par On Pantoprazole for GI prophylaxis\par Reinforced weekly CBC\par ORAL THRUSH - Continue Diflucan  Rx\par \par FOLLOW UP- Will do a clinical follow up during the week of November 29. In the interim, if any concerns patient knows to call our office.

## 2022-10-25 NOTE — PHYSICAL EXAM
[General Appearance - Well Developed] : well developed [] : no respiratory distress [Heart Sounds] : normal S1 and S2 [Normal] : oriented to person, place and time, the affect was normal, the mood was normal and not anxious

## 2022-10-25 NOTE — PHYSICAL EXAM
[General Appearance - Alert] : alert [General Appearance - In No Acute Distress] : in no acute distress [] : no respiratory distress [Respiration, Rhythm And Depth] : normal respiratory rhythm and effort [Exaggerated Use Of Accessory Muscles For Inspiration] : no accessory muscle use [Edema] : there was no peripheral edema [FreeTextEntry1] : \par Patient seen and examined\par Alert, awake , Oriented X 3. Speech clear and fluent\par PERRLA, EOMI, VFF\par Mils NLF flattening. Tongue protrudes midline\par LOYOLA x 4 with good strength - Left LE mildly weak\par FFM, coordination equal bilaterally\par Sensation intact bilaterally\par Gait steady. Ambulating independently
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
## 2022-10-27 NOTE — DISEASE MANAGEMENT
[Pathological] : TNM Stage: p [N/A] : Currently not applicable [FreeTextEntry4] : WHO grade IV GBM [TTNM] : x [NTNM] : x [MTNM] : x [de-identified] : 1600 cgy [de-identified] : 6000 cgy [de-identified] : right partial brain

## 2022-10-27 NOTE — HISTORY OF PRESENT ILLNESS
[FreeTextEntry1] : Mr. Azar Jalloh presented to me initially following recent diagnosis of high grade glioma for consideration of adjuvant treatment.  \par \par ONCOLOGIC HISTORY\par Mr. Jalloh is a 67 yo M with PMH HTN, a fib (on Xarelto), splenic infarcts , prostate cancer s/p prostatectomy (~2010) who presented to [facility] on 8/26/2022 due to left facial weakness (reported by PCP), neck muscle contracture, violent head shaking, and head discomfort that lasted about 20 minutes. Patient also reported approx 15 second episode of aphasia. MRI head performed on 8/26/2022 revealed 5 peripherally enhancing lesions in the R posterior frontal region which appeared to be metastatic lesions vs a multifocal primary. He underwent a R craniotomy and resection. 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 Patient was also started on Keppra 500mg BID and a dexamethasone taper.\par \par 9/13/2022: Patient presents for initial consult today and consideration of post-op RT.\par Today he feels well. he has tapered off dexamethasone and remains on keppra. no headaches, no nausea, no focal weakness. Played 18 holes of golf the other day. plans travel 10/9-10/14.\par \par 10/18/2022- Mr. Jalloh presents today for on treatment visit. He has completed 200/6000 cgy of radiation to the right partial brain.\par He was started on dexamethasone 4mg BID on 10/4. feeling well. has some trouble sleeping. no headaches, no nausea, no vomiting, no confusion or dizziness or fainting. slight LE edema. some fatigue.\par \par 10/25/2022- Mr. Jalloh presents today for on treatment visit. he has completed 1600/6000 cgy of radiation to the right partial brain. feeling well. notes a lot of fatigue and is struggling with constipation. no headaches, no nausea, no focal weakness. no confusion.

## 2022-10-27 NOTE — REVIEW OF SYSTEMS
[Fatigue] : fatigue [Negative] : Genitourinary [Fever] : no fever [Chills] : no chills [Night Sweats] : no night sweats [Eye Pain] : no eye pain [Red Eyes] : eyes not red [Dry Eyes] : no dryness of the eyes [Dysphagia] : no dysphagia [Loss of Hearing] : no loss of hearing [Chest Pain] : no chest pain [Palpitations] : no palpitations [Shortness Of Breath] : no shortness of breath [Confused] : no confusion [Dizziness] : no dizziness [Fainting] : no fainting [Difficulty Walking] : no difficulty walking [FreeTextEntry2] : some trouble sleeping [FreeTextEntry9] : denies focal weakness [de-identified] : denies headaches

## 2022-11-02 NOTE — DISEASE MANAGEMENT
[FreeTextEntry4] : WHO grade IV GBM [TTNM] : x [NTNM] : x [MTNM] : x [de-identified] : 2200 cgy [de-identified] : 6000 cgy [de-identified] : right partial brain

## 2022-11-02 NOTE — HISTORY OF PRESENT ILLNESS
[FreeTextEntry1] : Mr. Azar Jalloh presented to me initially following recent diagnosis of high grade glioma for consideration of adjuvant treatment.  \par \par ONCOLOGIC HISTORY\par Mr. Jalloh is a 65 yo M with PMH HTN, a fib (on Xarelto), splenic infarcts , prostate cancer s/p prostatectomy (~2010) who presented to [facility] on 8/26/2022 due to left facial weakness (reported by PCP), neck muscle contracture, violent head shaking, and head discomfort that lasted about 20 minutes. Patient also reported approx 15 second episode of aphasia. MRI head performed on 8/26/2022 revealed 5 peripherally enhancing lesions in the R posterior frontal region which appeared to be metastatic lesions vs a multifocal primary. He underwent a R craniotomy and resection. 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 Patient was also started on Keppra 500mg BID and a dexamethasone taper.\par \par 9/13/2022: Patient presents for initial consult today and consideration of post-op RT.\par Today he feels well. he has tapered off dexamethasone and remains on keppra. no headaches, no nausea, no focal weakness. Played 18 holes of golf the other day. plans travel 10/9-10/14.\par \par 10/18/2022- Mr. Jalloh presents today for on treatment visit. He has completed 200/6000 cgy of radiation to the right partial brain.\par He was started on dexamethasone 4mg BID on 10/4. feeling well. has some trouble sleeping. no headaches, no nausea, no vomiting, no confusion or dizziness or fainting. slight LE edema. some fatigue.\par \par 10/25/2022- Mr. Jalloh presents today for on treatment visit. he has completed 1600/6000 cgy of radiation to the right partial brain. feeling well. notes a lot of fatigue and is struggling with constipation. no headaches, no nausea, no focal weakness. no confusion.\par \par 11/1/11- Mr. Jalloh presents today for on treatment visit. he has completed 2200/6000 cgy of radiation to the right partial brain. continues on dexamethasone 4mg in AM, 2mg in PM. feels well overall. constipation has improved. now taking miralax BID and senna TID. notes proximal myopathy in legs. no headaches, no nausea, no focal weakness.

## 2022-11-02 NOTE — REVIEW OF SYSTEMS
[Fever] : no fever [Chills] : no chills [Night Sweats] : no night sweats [Eye Pain] : no eye pain [Red Eyes] : eyes not red [Dry Eyes] : no dryness of the eyes [Dysphagia] : no dysphagia [Loss of Hearing] : no loss of hearing [Chest Pain] : no chest pain [Palpitations] : no palpitations [Shortness Of Breath] : no shortness of breath [Confused] : no confusion [Dizziness] : no dizziness [Fainting] : no fainting [Difficulty Walking] : no difficulty walking [FreeTextEntry2] : some trouble sleeping [FreeTextEntry9] : denies focal weakness [de-identified] : denies headaches

## 2022-11-08 NOTE — HISTORY OF PRESENT ILLNESS
[FreeTextEntry1] : Mr. Azar Jalloh presented to me initially following recent diagnosis of high grade glioma for consideration of adjuvant treatment.  \par \par ONCOLOGIC HISTORY\par Mr. Jalloh is a 67 yo M with PMH HTN, a fib (on Xarelto), splenic infarcts , prostate cancer s/p prostatectomy (~2010) who presented to [facility] on 8/26/2022 due to left facial weakness (reported by PCP), neck muscle contracture, violent head shaking, and head discomfort that lasted about 20 minutes. Patient also reported approx 15 second episode of aphasia. MRI head performed on 8/26/2022 revealed 5 peripherally enhancing lesions in the R posterior frontal region which appeared to be metastatic lesions vs a multifocal primary. He underwent a R craniotomy and resection. 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 Patient was also started on Keppra 500mg BID and a dexamethasone taper.\par \par 9/13/2022: Patient presents for initial consult today and consideration of post-op RT.\par Today he feels well. he has tapered off dexamethasone and remains on keppra. no headaches, no nausea, no focal weakness. Played 18 holes of golf the other day. plans travel 10/9-10/14.\par \par 10/18/2022- Mr. Jalloh presents today for on treatment visit. He has completed 200/6000 cgy of radiation to the right partial brain.\par He was started on dexamethasone 4mg BID on 10/4. feeling well. has some trouble sleeping. no headaches, no nausea, no vomiting, no confusion or dizziness or fainting. slight LE edema. some fatigue.\par \par 10/25/2022- Mr. Jalloh presents today for on treatment visit. he has completed 1600/6000 cgy of radiation to the right partial brain. feeling well. notes a lot of fatigue and is struggling with constipation. no headaches, no nausea, no focal weakness. no confusion.\par \par 11/1/11- Mr. Jalloh presents today for on treatment visit. he has completed 2200/6000 cgy of radiation to the right partial brain. continues on dexamethasone 4mg in AM, 2mg in PM. feels well overall. constipation has improved. now taking miralax BID and senna TID. notes proximal myopathy in legs. no headaches, no nausea, no focal weakness.\par \par 11/8/2022- Mr. Jalloh presents today for on treatment visit. currently taking dexamethasone 2mg BID. has completed 2800/6000 cgy of radiation to the right partial brain. no headaches, no nausea, no focal weakness. legs feel a bit better on dexamethasone 2mg BID.

## 2022-11-08 NOTE — REVIEW OF SYSTEMS
[Fever] : no fever [Chills] : no chills [Night Sweats] : no night sweats [Fatigue] : fatigue [Eye Pain] : no eye pain [Red Eyes] : eyes not red [Dry Eyes] : no dryness of the eyes [Dysphagia] : no dysphagia [Loss of Hearing] : no loss of hearing [Chest Pain] : no chest pain [Palpitations] : no palpitations [Shortness Of Breath] : no shortness of breath [Confused] : no confusion [Dizziness] : no dizziness [Fainting] : no fainting [Difficulty Walking] : no difficulty walking [Negative] : Genitourinary [FreeTextEntry2] : some trouble sleeping [FreeTextEntry9] : denies focal weakness [de-identified] : denies headaches

## 2022-11-08 NOTE — DISEASE MANAGEMENT
[Pathological] : TNM Stage: p [FreeTextEntry4] : WHO grade IV GBM [TTNM] : x [NTNM] : x [MTNM] : x [N/A] : Currently not applicable [de-identified] : 2800 cgy [de-identified] : 6000 cgy [de-identified] : right partial brain

## 2022-11-23 NOTE — REVIEW OF SYSTEMS
[Fever] : no fever [Chills] : no chills [Night Sweats] : no night sweats [Eye Pain] : no eye pain [Red Eyes] : eyes not red [Dry Eyes] : no dryness of the eyes [Dysphagia] : no dysphagia [Loss of Hearing] : no loss of hearing [Chest Pain] : no chest pain [Palpitations] : no palpitations [Shortness Of Breath] : no shortness of breath [Confused] : no confusion [Dizziness] : no dizziness [Fainting] : no fainting [Difficulty Walking] : no difficulty walking [FreeTextEntry2] : some trouble sleeping [FreeTextEntry9] : notes weakness to bilateral thighs. [de-identified] : denies headaches

## 2022-11-23 NOTE — DISEASE MANAGEMENT
[FreeTextEntry4] : WHO grade IV GBM [TTNM] : x [NTNM] : x [MTNM] : x [de-identified] : 3800 cgy [de-identified] : 6000 cgy [de-identified] : right partial brain

## 2022-11-23 NOTE — DISEASE MANAGEMENT
[FreeTextEntry4] : WHO grade IV GBM [TTNM] : x [NTNM] : x [MTNM] : x [de-identified] : 3800 cgy [de-identified] : 6000 cgy [de-identified] : right partial brain

## 2022-11-23 NOTE — HISTORY OF PRESENT ILLNESS
[FreeTextEntry1] : Mr. Azar Jalloh presented to me initially following recent diagnosis of high grade glioma for consideration of adjuvant treatment.  \par \par ONCOLOGIC HISTORY\par Mr. Jalloh is a 68 yo M with PMH HTN, a fib (on Xarelto), splenic infarcts , prostate cancer s/p prostatectomy (~2010) who presented to [facility] on 8/26/2022 due to left facial weakness (reported by PCP), neck muscle contracture, violent head shaking, and head discomfort that lasted about 20 minutes. Patient also reported approx 15 second episode of aphasia. MRI head performed on 8/26/2022 revealed 5 peripherally enhancing lesions in the R posterior frontal region which appeared to be metastatic lesions vs a multifocal primary. He underwent a R craniotomy and resection. 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 Patient was also started on Keppra 500mg BID and a dexamethasone taper.\par \par 9/13/2022: Patient presents for initial consult today and consideration of post-op RT.\par Today he feels well. he has tapered off dexamethasone and remains on keppra. no headaches, no nausea, no focal weakness. Played 18 holes of golf the other day. plans travel 10/9-10/14.\par \par 10/18/2022- Mr. Jalloh presents today for on treatment visit. He has completed 200/6000 cgy of radiation to the right partial brain.\par He was started on dexamethasone 4mg BID on 10/4. feeling well. has some trouble sleeping. no headaches, no nausea, no vomiting, no confusion or dizziness or fainting. slight LE edema. some fatigue.\par \par 10/25/2022- Mr. Jalloh presents today for on treatment visit. he has completed 1600/6000 cgy of radiation to the right partial brain. feeling well. notes a lot of fatigue and is struggling with constipation. no headaches, no nausea, no focal weakness. no confusion.\par \par 11/1/11- Mr. Jalloh presents today for on treatment visit. he has completed 2200/6000 cgy of radiation to the right partial brain. continues on dexamethasone 4mg in AM, 2mg in PM. feels well overall. constipation has improved. now taking miralax BID and senna TID. notes proximal myopathy in legs. no headaches, no nausea, no focal weakness.\par \par 11/8/2022- Mr. Jalloh presents today for on treatment visit. currently taking dexamethasone 2mg BID. has completed 2800/6000 cgy of radiation to the right partial brain. no headaches, no nausea, no focal weakness. legs feel a bit better on dexamethasone 2mg BID.\par \par 11/15/2022- Ms. Jalloh presents today for on treatment visit. has completed 3800/6000 cgy of radiation therapy to the right partial brain. no headaches, no nausea, no vomiting. feels tired. has progressive weakness to bilateral LE. \par \par 11/22 - Currently on dexamethasone 2 mg daily.  Overall feels stable.  Energy improved slightly.

## 2022-11-23 NOTE — HISTORY OF PRESENT ILLNESS
[FreeTextEntry1] : Mr. Azar Jalloh presented to me initially following recent diagnosis of high grade glioma for consideration of adjuvant treatment.  \par \par ONCOLOGIC HISTORY\par Mr. Jalloh is a 65 yo M with PMH HTN, a fib (on Xarelto), splenic infarcts , prostate cancer s/p prostatectomy (~2010) who presented to [facility] on 8/26/2022 due to left facial weakness (reported by PCP), neck muscle contracture, violent head shaking, and head discomfort that lasted about 20 minutes. Patient also reported approx 15 second episode of aphasia. MRI head performed on 8/26/2022 revealed 5 peripherally enhancing lesions in the R posterior frontal region which appeared to be metastatic lesions vs a multifocal primary. He underwent a R craniotomy and resection. 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 Patient was also started on Keppra 500mg BID and a dexamethasone taper.\par \par 9/13/2022: Patient presents for initial consult today and consideration of post-op RT.\par Today he feels well. he has tapered off dexamethasone and remains on keppra. no headaches, no nausea, no focal weakness. Played 18 holes of golf the other day. plans travel 10/9-10/14.\par \par 10/18/2022- Mr. Jalloh presents today for on treatment visit. He has completed 200/6000 cgy of radiation to the right partial brain.\par He was started on dexamethasone 4mg BID on 10/4. feeling well. has some trouble sleeping. no headaches, no nausea, no vomiting, no confusion or dizziness or fainting. slight LE edema. some fatigue.\par \par 10/25/2022- Mr. Jalloh presents today for on treatment visit. he has completed 1600/6000 cgy of radiation to the right partial brain. feeling well. notes a lot of fatigue and is struggling with constipation. no headaches, no nausea, no focal weakness. no confusion.\par \par 11/1/11- Mr. Jalloh presents today for on treatment visit. he has completed 2200/6000 cgy of radiation to the right partial brain. continues on dexamethasone 4mg in AM, 2mg in PM. feels well overall. constipation has improved. now taking miralax BID and senna TID. notes proximal myopathy in legs. no headaches, no nausea, no focal weakness.\par \par 11/8/2022- Mr. Jalloh presents today for on treatment visit. currently taking dexamethasone 2mg BID. has completed 2800/6000 cgy of radiation to the right partial brain. no headaches, no nausea, no focal weakness. legs feel a bit better on dexamethasone 2mg BID.\par \par 11/15/2022- Ms. Jalloh presents today for on treatment visit. has completed 3800/6000 cgy of radiation therapy to the right partial brain. no headaches, no nausea, no vomiting. feels tired. has progressive weakness to bilateral LE.

## 2022-11-23 NOTE — REVIEW OF SYSTEMS
[Fever] : no fever [Chills] : no chills [Night Sweats] : no night sweats [Eye Pain] : no eye pain [Red Eyes] : eyes not red [Dry Eyes] : no dryness of the eyes [Dysphagia] : no dysphagia [Loss of Hearing] : no loss of hearing [Chest Pain] : no chest pain [Palpitations] : no palpitations [Shortness Of Breath] : no shortness of breath [Confused] : no confusion [Dizziness] : no dizziness [Fainting] : no fainting [Difficulty Walking] : no difficulty walking [FreeTextEntry2] : some trouble sleeping [FreeTextEntry9] : notes weakness to bilateral thighs. [de-identified] : denies headaches

## 2022-11-29 NOTE — DISCUSSION/SUMMARY
[FreeTextEntry1] : Patient seen and examined\par GBM - Neurologically stable\par ChemoRT to be completed on 12/1/2022\par Take Dexamethasone 1 mg starting tomorrow. Patient will call with an update in  a week. If tolerating taper well, will stop dexamethasone.\par On Pantoprazole for GI prophylaxis.\par \par ORAL THRUSH - Improved.\par \par FOLLOW UP- Will do a clinical follow up along with an MRI on 1/3/2023.  In the interim, if any concerns patient knows to call our office. \par
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
## 2022-11-29 NOTE — HISTORY OF PRESENT ILLNESS
[FreeTextEntry1] : Mr. Azar Jalloh presented to me initially following recent diagnosis of high grade glioma for consideration of adjuvant treatment.  \par \par ONCOLOGIC HISTORY\par Mr. Jalloh is a 68 yo M with PMH HTN, a fib (on Xarelto), splenic infarcts , prostate cancer s/p prostatectomy (~2010) who presented to [facility] on 8/26/2022 due to left facial weakness (reported by PCP), neck muscle contracture, violent head shaking, and head discomfort that lasted about 20 minutes. Patient also reported approx 15 second episode of aphasia. MRI head performed on 8/26/2022 revealed 5 peripherally enhancing lesions in the R posterior frontal region which appeared to be metastatic lesions vs a multifocal primary. He underwent a R craniotomy and resection. 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 Patient was also started on Keppra 500mg BID and a dexamethasone taper.\par \par 9/13/2022: Patient presents for initial consult today and consideration of post-op RT.\par Today he feels well. he has tapered off dexamethasone and remains on keppra. no headaches, no nausea, no focal weakness. Played 18 holes of golf the other day. plans travel 10/9-10/14.\par \par 10/18/2022- Mr. Jalloh presents today for on treatment visit. He has completed 200/6000 cgy of radiation to the right partial brain.\par He was started on dexamethasone 4mg BID on 10/4. feeling well. has some trouble sleeping. no headaches, no nausea, no vomiting, no confusion or dizziness or fainting. slight LE edema. some fatigue.\par \par 10/25/2022- Mr. Jalloh presents today for on treatment visit. he has completed 1600/6000 cgy of radiation to the right partial brain. feeling well. notes a lot of fatigue and is struggling with constipation. no headaches, no nausea, no focal weakness. no confusion.\par \par 11/1/11- Mr. Jalloh presents today for on treatment visit. he has completed 2200/6000 cgy of radiation to the right partial brain. continues on dexamethasone 4mg in AM, 2mg in PM. feels well overall. constipation has improved. now taking miralax BID and senna TID. notes proximal myopathy in legs. no headaches, no nausea, no focal weakness.\par \par 11/8/2022- Mr. Jalloh presents today for on treatment visit. currently taking dexamethasone 2mg BID. has completed 2800/6000 cgy of radiation to the right partial brain. no headaches, no nausea, no focal weakness. legs feel a bit better on dexamethasone 2mg BID.\par \par 11/15/2022- Ms. Jalloh presents today for on treatment visit. has completed 3800/6000 cgy of radiation therapy to the right partial brain. no headaches, no nausea, no vomiting. feels tired. has progressive weakness to bilateral LE. \par \par 11/22 - Currently on dexamethasone 2 mg daily.  Overall feels stable.  Energy improved slightly.  \par \par 11/29/22: Mr. Jalloh present today for on treatment visit s/p 28/30 fractions. He has completed 5600/6000 cgy of radiation to the right partial brain.

## 2022-11-29 NOTE — REVIEW OF SYSTEMS
16-Jun-2022 16-Jun-2022 21:02 [Fever] : no fever [Chills] : no chills [Night Sweats] : no night sweats [Fatigue] : no fatigue [Eye Pain] : no eye pain [Red Eyes] : eyes not red [Dry Eyes] : no dryness of the eyes [Dysphagia] : no dysphagia [Loss of Hearing] : no loss of hearing [Chest Pain] : no chest pain [Palpitations] : no palpitations [Shortness Of Breath] : no shortness of breath [Confused] : no confusion [Dizziness] : no dizziness [Fainting] : no fainting [Difficulty Walking] : no difficulty walking

## 2022-11-29 NOTE — DISEASE MANAGEMENT
[Pathological] : TNM Stage: p [N/A] : Currently not applicable [FreeTextEntry4] : WHO grade IV GBM [TTNM] : x [NTNM] : x [MTNM] : x [de-identified] : 5600 cgy [de-identified] : 6000 cgy [de-identified] : right partial brain

## 2022-11-29 NOTE — PHYSICAL EXAM
[General Appearance - Alert] : alert [General Appearance - In No Acute Distress] : in no acute distress [General Appearance - Well Nourished] : well nourished [] : no respiratory distress [Respiration, Rhythm And Depth] : normal respiratory rhythm and effort [Exaggerated Use Of Accessory Muscles For Inspiration] : no accessory muscle use [Edema] : there was no peripheral edema [FreeTextEntry1] : Patient seen and examined\par Alert, awake , Oriented X 3. Speech clear and fluent\par PERRLA, EOMI, VFF\par Mils NLF flattening. Tongue protrudes midline\par LOYOLA x 4 with good strength - Left LE mildly weak\par FFM, coordination equal bilaterally\par Sensation intact bilaterally\par Gait steady. Ambulating independently.

## 2022-12-12 NOTE — END OF VISIT
[Time Spent: ___ minutes] : I have spent [unfilled] minutes of time on the encounter. [>50% of the face to face encounter time was spent on counseling and/or coordination of care for ___] : Greater than 50% of the face to face encounter time was spent on counseling and/or coordination of care for [unfilled] Estlander Flap (Upper To Lower Lip) Text: The defect of the lower lip was assessed and measured.  Given the location and size of the defect, an Estlander flap was deemed most appropriate.  Using a sterile surgical marker, an appropriate Estlander flap was measured and drawn on the upper lip. Local anesthesia was then infiltrated. A scalpel was then used to incise the lateral aspect of the flap, through skin, muscle and mucosa, leaving the flap pedicled medially.  The flap was then rotated and positioned to fill the lower lip defect.  The flap was then sutured into place with a three layer technique, closing the orbicularis oris muscle layer with subcutaneous buried sutures, followed by a mucosal layer and an epidermal layer.

## 2023-01-01 ENCOUNTER — RESULT REVIEW (OUTPATIENT)
Age: 68
End: 2023-01-01

## 2023-01-01 ENCOUNTER — APPOINTMENT (OUTPATIENT)
Dept: HEMATOLOGY ONCOLOGY | Facility: CLINIC | Age: 68
End: 2023-01-01

## 2023-01-01 ENCOUNTER — APPOINTMENT (OUTPATIENT)
Dept: INFUSION THERAPY | Facility: HOSPITAL | Age: 68
End: 2023-01-01

## 2023-01-01 ENCOUNTER — OUTPATIENT (OUTPATIENT)
Dept: OUTPATIENT SERVICES | Facility: HOSPITAL | Age: 68
LOS: 1 days | End: 2023-01-01
Payer: MEDICARE

## 2023-01-01 ENCOUNTER — RX RENEWAL (OUTPATIENT)
Age: 68
End: 2023-01-01

## 2023-01-01 ENCOUNTER — TRANSCRIPTION ENCOUNTER (OUTPATIENT)
Age: 68
End: 2023-01-01

## 2023-01-01 ENCOUNTER — APPOINTMENT (OUTPATIENT)
Dept: MRI IMAGING | Facility: IMAGING CENTER | Age: 68
End: 2023-01-01
Payer: MEDICARE

## 2023-01-01 ENCOUNTER — APPOINTMENT (OUTPATIENT)
Dept: NEUROLOGY | Facility: CLINIC | Age: 68
End: 2023-01-01

## 2023-01-01 ENCOUNTER — INPATIENT (INPATIENT)
Facility: HOSPITAL | Age: 68
LOS: 9 days | Discharge: NURSING, REHAB OR LONG-TERM CARE | End: 2023-07-14
Attending: STUDENT IN AN ORGANIZED HEALTH CARE EDUCATION/TRAINING PROGRAM | Admitting: STUDENT IN AN ORGANIZED HEALTH CARE EDUCATION/TRAINING PROGRAM
Payer: MEDICARE

## 2023-01-01 ENCOUNTER — APPOINTMENT (OUTPATIENT)
Dept: NEUROLOGY | Facility: CLINIC | Age: 68
End: 2023-01-01
Payer: MEDICARE

## 2023-01-01 ENCOUNTER — OUTPATIENT (OUTPATIENT)
Dept: OUTPATIENT SERVICES | Facility: HOSPITAL | Age: 68
LOS: 1 days | Discharge: HOME | End: 2023-01-01
Payer: MEDICARE

## 2023-01-01 ENCOUNTER — INPATIENT (INPATIENT)
Facility: HOSPITAL | Age: 68
LOS: 7 days | Discharge: NURSING, REHAB OR LONG-TERM CARE | DRG: 871 | End: 2023-07-24
Attending: INTERNAL MEDICINE | Admitting: STUDENT IN AN ORGANIZED HEALTH CARE EDUCATION/TRAINING PROGRAM
Payer: MEDICARE

## 2023-01-01 ENCOUNTER — LABORATORY RESULT (OUTPATIENT)
Age: 68
End: 2023-01-01

## 2023-01-01 ENCOUNTER — APPOINTMENT (OUTPATIENT)
Dept: MRI IMAGING | Facility: CLINIC | Age: 68
End: 2023-01-01

## 2023-01-01 ENCOUNTER — APPOINTMENT (OUTPATIENT)
Dept: CARDIOLOGY | Facility: CLINIC | Age: 68
End: 2023-01-01

## 2023-01-01 ENCOUNTER — OUTPATIENT (OUTPATIENT)
Dept: OUTPATIENT SERVICES | Facility: HOSPITAL | Age: 68
LOS: 1 days | Discharge: HOME | End: 2023-01-01

## 2023-01-01 ENCOUNTER — NON-APPOINTMENT (OUTPATIENT)
Age: 68
End: 2023-01-01

## 2023-01-01 ENCOUNTER — INPATIENT (INPATIENT)
Facility: HOSPITAL | Age: 68
LOS: 25 days | DRG: 80 | End: 2023-10-03
Attending: INTERNAL MEDICINE | Admitting: HOSPITALIST
Payer: MEDICARE

## 2023-01-01 ENCOUNTER — APPOINTMENT (OUTPATIENT)
Dept: RADIATION ONCOLOGY | Facility: CLINIC | Age: 68
End: 2023-01-01
Payer: MEDICARE

## 2023-01-01 VITALS
SYSTOLIC BLOOD PRESSURE: 110 MMHG | TEMPERATURE: 97.9 F | HEART RATE: 52 BPM | BODY MASS INDEX: 32.18 KG/M2 | OXYGEN SATURATION: 98 % | HEIGHT: 67 IN | RESPIRATION RATE: 16 BRPM | WEIGHT: 205 LBS | DIASTOLIC BLOOD PRESSURE: 68 MMHG

## 2023-01-01 VITALS
TEMPERATURE: 98 F | RESPIRATION RATE: 18 BRPM | OXYGEN SATURATION: 100 % | SYSTOLIC BLOOD PRESSURE: 129 MMHG | HEART RATE: 92 BPM | DIASTOLIC BLOOD PRESSURE: 94 MMHG

## 2023-01-01 VITALS
DIASTOLIC BLOOD PRESSURE: 188 MMHG | SYSTOLIC BLOOD PRESSURE: 155 MMHG | HEIGHT: 68 IN | HEART RATE: 150 BPM | OXYGEN SATURATION: 100 % | RESPIRATION RATE: 12 BRPM

## 2023-01-01 VITALS
RESPIRATION RATE: 16 BRPM | DIASTOLIC BLOOD PRESSURE: 91 MMHG | HEIGHT: 67 IN | WEIGHT: 210 LBS | BODY MASS INDEX: 32.96 KG/M2 | SYSTOLIC BLOOD PRESSURE: 144 MMHG | HEART RATE: 60 BPM | OXYGEN SATURATION: 98 %

## 2023-01-01 VITALS
TEMPERATURE: 97.9 F | BODY MASS INDEX: 32.65 KG/M2 | HEART RATE: 115 BPM | HEIGHT: 67 IN | RESPIRATION RATE: 16 BRPM | WEIGHT: 208 LBS | DIASTOLIC BLOOD PRESSURE: 109 MMHG | SYSTOLIC BLOOD PRESSURE: 156 MMHG | OXYGEN SATURATION: 99 %

## 2023-01-01 VITALS
SYSTOLIC BLOOD PRESSURE: 137 MMHG | TEMPERATURE: 98 F | HEIGHT: 68 IN | WEIGHT: 178.79 LBS | DIASTOLIC BLOOD PRESSURE: 97 MMHG | RESPIRATION RATE: 18 BRPM | HEART RATE: 99 BPM | OXYGEN SATURATION: 100 %

## 2023-01-01 VITALS
WEIGHT: 250 LBS | HEART RATE: 105 BPM | HEIGHT: 73 IN | SYSTOLIC BLOOD PRESSURE: 85 MMHG | OXYGEN SATURATION: 98 % | TEMPERATURE: 99 F | RESPIRATION RATE: 22 BRPM | DIASTOLIC BLOOD PRESSURE: 60 MMHG

## 2023-01-01 VITALS
HEIGHT: 67 IN | HEART RATE: 74 BPM | WEIGHT: 210 LBS | DIASTOLIC BLOOD PRESSURE: 86 MMHG | OXYGEN SATURATION: 98 % | RESPIRATION RATE: 16 BRPM | SYSTOLIC BLOOD PRESSURE: 121 MMHG | BODY MASS INDEX: 32.96 KG/M2

## 2023-01-01 VITALS
SYSTOLIC BLOOD PRESSURE: 125 MMHG | TEMPERATURE: 99 F | OXYGEN SATURATION: 96 % | DIASTOLIC BLOOD PRESSURE: 83 MMHG | RESPIRATION RATE: 18 BRPM | HEART RATE: 90 BPM

## 2023-01-01 VITALS
HEART RATE: 70 BPM | TEMPERATURE: 99 F | SYSTOLIC BLOOD PRESSURE: 90 MMHG | OXYGEN SATURATION: 97 % | DIASTOLIC BLOOD PRESSURE: 61 MMHG | RESPIRATION RATE: 28 BRPM

## 2023-01-01 VITALS
DIASTOLIC BLOOD PRESSURE: 89 MMHG | RESPIRATION RATE: 17 BRPM | WEIGHT: 210.76 LBS | HEART RATE: 73 BPM | HEIGHT: 67 IN | SYSTOLIC BLOOD PRESSURE: 125 MMHG | BODY MASS INDEX: 33.08 KG/M2 | TEMPERATURE: 97.7 F | OXYGEN SATURATION: 98 %

## 2023-01-01 DIAGNOSIS — Z98.890 OTHER SPECIFIED POSTPROCEDURAL STATES: Chronic | ICD-10-CM

## 2023-01-01 DIAGNOSIS — C71.9 MALIGNANT NEOPLASM OF BRAIN, UNSPECIFIED: ICD-10-CM

## 2023-01-01 DIAGNOSIS — I48.91 UNSPECIFIED ATRIAL FIBRILLATION: ICD-10-CM

## 2023-01-01 DIAGNOSIS — Z90.79 ACQUIRED ABSENCE OF OTHER GENITAL ORGAN(S): Chronic | ICD-10-CM

## 2023-01-01 DIAGNOSIS — Z51.5 ENCOUNTER FOR PALLIATIVE CARE: ICD-10-CM

## 2023-01-01 DIAGNOSIS — G93.40 ENCEPHALOPATHY, UNSPECIFIED: ICD-10-CM

## 2023-01-01 DIAGNOSIS — I48.20 CHRONIC ATRIAL FIBRILLATION, UNSPECIFIED: ICD-10-CM

## 2023-01-01 DIAGNOSIS — B34.8 OTHER VIRAL INFECTIONS OF UNSPECIFIED SITE: ICD-10-CM

## 2023-01-01 DIAGNOSIS — Z41.9 ENCOUNTER FOR PROCEDURE FOR PURPOSES OTHER THAN REMEDYING HEALTH STATE, UNSPECIFIED: Chronic | ICD-10-CM

## 2023-01-01 DIAGNOSIS — R13.10 DYSPHAGIA, UNSPECIFIED: ICD-10-CM

## 2023-01-01 DIAGNOSIS — Z71.89 OTHER SPECIFIED COUNSELING: ICD-10-CM

## 2023-01-01 DIAGNOSIS — L03.90 CELLULITIS, UNSPECIFIED: ICD-10-CM

## 2023-01-01 DIAGNOSIS — I82.409 ACUTE EMBOLISM AND THROMBOSIS OF UNSPECIFIED DEEP VEINS OF UNSPECIFIED LOWER EXTREMITY: ICD-10-CM

## 2023-01-01 DIAGNOSIS — Z91.89 OTHER SPECIFIED PERSONAL RISK FACTORS, NOT ELSEWHERE CLASSIFIED: ICD-10-CM

## 2023-01-01 DIAGNOSIS — I51.9 HEART DISEASE, UNSPECIFIED: ICD-10-CM

## 2023-01-01 DIAGNOSIS — I50.9 HEART FAILURE, UNSPECIFIED: ICD-10-CM

## 2023-01-01 DIAGNOSIS — Z29.9 ENCOUNTER FOR PROPHYLACTIC MEASURES, UNSPECIFIED: ICD-10-CM

## 2023-01-01 DIAGNOSIS — D64.9 ANEMIA, UNSPECIFIED: ICD-10-CM

## 2023-01-01 DIAGNOSIS — J12.9 VIRAL PNEUMONIA, UNSPECIFIED: ICD-10-CM

## 2023-01-01 DIAGNOSIS — R06.02 SHORTNESS OF BREATH: ICD-10-CM

## 2023-01-01 DIAGNOSIS — G40.909 EPILEPSY, UNSPECIFIED, NOT INTRACTABLE, WITHOUT STATUS EPILEPTICUS: ICD-10-CM

## 2023-01-01 DIAGNOSIS — R90.89 OTHER ABNORMAL FINDINGS ON DIAGNOSTIC IMAGING OF CENTRAL NERVOUS SYSTEM: ICD-10-CM

## 2023-01-01 DIAGNOSIS — A41.9 SEPSIS, UNSPECIFIED ORGANISM: ICD-10-CM

## 2023-01-01 DIAGNOSIS — R09.89 OTHER SPECIFIED SYMPTOMS AND SIGNS INVOLVING THE CIRCULATORY AND RESPIRATORY SYSTEMS: ICD-10-CM

## 2023-01-01 DIAGNOSIS — I10 ESSENTIAL (PRIMARY) HYPERTENSION: ICD-10-CM

## 2023-01-01 DIAGNOSIS — R31.9 HEMATURIA, UNSPECIFIED: ICD-10-CM

## 2023-01-01 DIAGNOSIS — Z51.11 ENCOUNTER FOR ANTINEOPLASTIC CHEMOTHERAPY: ICD-10-CM

## 2023-01-01 DIAGNOSIS — R56.9 UNSPECIFIED CONVULSIONS: ICD-10-CM

## 2023-01-01 DIAGNOSIS — R53.2 FUNCTIONAL QUADRIPLEGIA: ICD-10-CM

## 2023-01-01 DIAGNOSIS — Z02.9 ENCOUNTER FOR ADMINISTRATIVE EXAMINATIONS, UNSPECIFIED: ICD-10-CM

## 2023-01-01 DIAGNOSIS — I48.0 PAROXYSMAL ATRIAL FIBRILLATION: ICD-10-CM

## 2023-01-01 DIAGNOSIS — F32.A DEPRESSION, UNSPECIFIED: ICD-10-CM

## 2023-01-01 DIAGNOSIS — K59.01 SLOW TRANSIT CONSTIPATION: ICD-10-CM

## 2023-01-01 DIAGNOSIS — R53.81 OTHER MALAISE: ICD-10-CM

## 2023-01-01 DIAGNOSIS — Z93.1 GASTROSTOMY STATUS: ICD-10-CM

## 2023-01-01 DIAGNOSIS — E78.5 HYPERLIPIDEMIA, UNSPECIFIED: ICD-10-CM

## 2023-01-01 LAB
A1C WITH ESTIMATED AVERAGE GLUCOSE RESULT: 5.5 % — SIGNIFICANT CHANGE UP (ref 4–5.6)
ALBUMIN SERPL ELPH-MCNC: 2.6 G/DL — LOW (ref 3.3–5)
ALBUMIN SERPL ELPH-MCNC: 2.7 G/DL — LOW (ref 3.3–5)
ALBUMIN SERPL ELPH-MCNC: 2.7 G/DL — LOW (ref 3.3–5)
ALBUMIN SERPL ELPH-MCNC: 2.8 G/DL — LOW (ref 3.3–5)
ALBUMIN SERPL ELPH-MCNC: 2.9 G/DL — LOW (ref 3.3–5)
ALBUMIN SERPL ELPH-MCNC: 3 G/DL — LOW (ref 3.3–5)
ALBUMIN SERPL ELPH-MCNC: 3.1 G/DL — LOW (ref 3.3–5)
ALBUMIN SERPL ELPH-MCNC: 3.1 G/DL — LOW (ref 3.3–5)
ALBUMIN SERPL ELPH-MCNC: 3.2 G/DL — LOW (ref 3.3–5)
ALBUMIN SERPL ELPH-MCNC: 3.3 G/DL — SIGNIFICANT CHANGE UP (ref 3.3–5)
ALBUMIN SERPL ELPH-MCNC: 3.5 G/DL — SIGNIFICANT CHANGE UP (ref 3.3–5)
ALBUMIN SERPL ELPH-MCNC: 4 G/DL
ALBUMIN SERPL ELPH-MCNC: 4.1 G/DL — SIGNIFICANT CHANGE UP (ref 3.3–5)
ALBUMIN SERPL ELPH-MCNC: 4.2 G/DL
ALBUMIN SERPL ELPH-MCNC: 4.2 G/DL
ALBUMIN SERPL ELPH-MCNC: 4.4 G/DL
ALP BLD-CCNC: 47 U/L
ALP BLD-CCNC: 59 U/L
ALP BLD-CCNC: 62 U/L
ALP BLD-CCNC: 68 U/L
ALP SERPL-CCNC: 105 U/L — SIGNIFICANT CHANGE UP (ref 40–120)
ALP SERPL-CCNC: 51 U/L — SIGNIFICANT CHANGE UP (ref 40–120)
ALP SERPL-CCNC: 54 U/L — SIGNIFICANT CHANGE UP (ref 40–120)
ALP SERPL-CCNC: 54 U/L — SIGNIFICANT CHANGE UP (ref 40–120)
ALP SERPL-CCNC: 56 U/L — SIGNIFICANT CHANGE UP (ref 40–120)
ALP SERPL-CCNC: 57 U/L — SIGNIFICANT CHANGE UP (ref 40–120)
ALP SERPL-CCNC: 58 U/L — SIGNIFICANT CHANGE UP (ref 40–120)
ALP SERPL-CCNC: 61 U/L — SIGNIFICANT CHANGE UP (ref 40–120)
ALP SERPL-CCNC: 67 U/L — SIGNIFICANT CHANGE UP (ref 40–120)
ALP SERPL-CCNC: 68 U/L — SIGNIFICANT CHANGE UP (ref 40–120)
ALP SERPL-CCNC: 70 U/L — SIGNIFICANT CHANGE UP (ref 40–120)
ALP SERPL-CCNC: 71 U/L — SIGNIFICANT CHANGE UP (ref 40–120)
ALP SERPL-CCNC: 71 U/L — SIGNIFICANT CHANGE UP (ref 40–120)
ALP SERPL-CCNC: 73 U/L — SIGNIFICANT CHANGE UP (ref 40–120)
ALP SERPL-CCNC: 74 U/L — SIGNIFICANT CHANGE UP (ref 40–120)
ALP SERPL-CCNC: 77 U/L — SIGNIFICANT CHANGE UP (ref 40–120)
ALT FLD-CCNC: 30 U/L — SIGNIFICANT CHANGE UP (ref 10–45)
ALT FLD-CCNC: 32 U/L — SIGNIFICANT CHANGE UP (ref 10–45)
ALT FLD-CCNC: 33 U/L — SIGNIFICANT CHANGE UP (ref 10–45)
ALT FLD-CCNC: 35 U/L — SIGNIFICANT CHANGE UP (ref 4–41)
ALT FLD-CCNC: 40 U/L — SIGNIFICANT CHANGE UP (ref 4–41)
ALT FLD-CCNC: 48 U/L — HIGH (ref 10–45)
ALT FLD-CCNC: 49 U/L — HIGH (ref 10–45)
ALT FLD-CCNC: 53 U/L — HIGH (ref 10–45)
ALT FLD-CCNC: 54 U/L — HIGH (ref 10–45)
ALT FLD-CCNC: 54 U/L — HIGH (ref 10–45)
ALT FLD-CCNC: 54 U/L — HIGH (ref 4–41)
ALT FLD-CCNC: 55 U/L — HIGH (ref 10–45)
ALT FLD-CCNC: 64 U/L — HIGH (ref 10–45)
ALT FLD-CCNC: 66 U/L — HIGH (ref 10–45)
ALT FLD-CCNC: 66 U/L — HIGH (ref 10–45)
ALT FLD-CCNC: 67 U/L — HIGH (ref 10–45)
ALT FLD-CCNC: 70 U/L — HIGH (ref 10–45)
ALT FLD-CCNC: 80 U/L — HIGH (ref 10–45)
ALT SERPL-CCNC: 23 U/L
ALT SERPL-CCNC: 24 U/L
ALT SERPL-CCNC: 32 U/L
ALT SERPL-CCNC: 33 U/L
AMMONIA BLD-MCNC: 38 UMOL/L — SIGNIFICANT CHANGE UP (ref 11–55)
ANION GAP SERPL CALC-SCNC: 1 MMOL/L — LOW (ref 7–14)
ANION GAP SERPL CALC-SCNC: 11 MMOL/L
ANION GAP SERPL CALC-SCNC: 11 MMOL/L — SIGNIFICANT CHANGE UP (ref 5–17)
ANION GAP SERPL CALC-SCNC: 11 MMOL/L — SIGNIFICANT CHANGE UP (ref 7–14)
ANION GAP SERPL CALC-SCNC: 11 MMOL/L — SIGNIFICANT CHANGE UP (ref 7–14)
ANION GAP SERPL CALC-SCNC: 12 MMOL/L
ANION GAP SERPL CALC-SCNC: 12 MMOL/L — SIGNIFICANT CHANGE UP (ref 5–17)
ANION GAP SERPL CALC-SCNC: 12 MMOL/L — SIGNIFICANT CHANGE UP (ref 7–14)
ANION GAP SERPL CALC-SCNC: 13 MMOL/L
ANION GAP SERPL CALC-SCNC: 13 MMOL/L
ANION GAP SERPL CALC-SCNC: 13 MMOL/L — SIGNIFICANT CHANGE UP (ref 5–17)
ANION GAP SERPL CALC-SCNC: 13 MMOL/L — SIGNIFICANT CHANGE UP (ref 7–14)
ANION GAP SERPL CALC-SCNC: 14 MMOL/L — SIGNIFICANT CHANGE UP (ref 5–17)
ANION GAP SERPL CALC-SCNC: 15 MMOL/L — SIGNIFICANT CHANGE UP (ref 5–17)
ANION GAP SERPL CALC-SCNC: 16 MMOL/L — SIGNIFICANT CHANGE UP (ref 5–17)
ANION GAP SERPL CALC-SCNC: 18 MMOL/L — HIGH (ref 5–17)
ANION GAP SERPL CALC-SCNC: 18 MMOL/L — HIGH (ref 5–17)
ANION GAP SERPL CALC-SCNC: 18 MMOL/L — HIGH (ref 7–14)
ANION GAP SERPL CALC-SCNC: 7 MMOL/L — SIGNIFICANT CHANGE UP (ref 7–14)
ANION GAP SERPL CALC-SCNC: 9 MMOL/L — SIGNIFICANT CHANGE UP (ref 5–17)
ANION GAP SERPL CALC-SCNC: 9 MMOL/L — SIGNIFICANT CHANGE UP (ref 7–14)
APPEARANCE UR: ABNORMAL
APPEARANCE UR: CLEAR — SIGNIFICANT CHANGE UP
APPEARANCE: CLEAR
APTT BLD: 182 SEC — CRITICAL HIGH (ref 27.5–35.5)
APTT BLD: 24.9 SEC — SIGNIFICANT CHANGE UP (ref 24.5–35.6)
APTT BLD: 25.5 SEC — LOW (ref 27–36.3)
APTT BLD: 27.1 SEC — SIGNIFICANT CHANGE UP (ref 24.5–35.6)
APTT BLD: 27.2 SEC — LOW (ref 27.5–35.5)
APTT BLD: 28.2 SEC — SIGNIFICANT CHANGE UP (ref 24.5–35.6)
APTT BLD: 28.5 SEC — SIGNIFICANT CHANGE UP (ref 24.5–35.6)
APTT BLD: 36.3 SEC — HIGH (ref 27.5–35.5)
APTT BLD: 51.3 SEC — HIGH (ref 27.5–35.5)
APTT BLD: 57.8 SEC — HIGH (ref 27.5–35.5)
APTT BLD: 64 SEC — HIGH (ref 27.5–35.5)
APTT BLD: 67.3 SEC — HIGH (ref 27.5–35.5)
APTT BLD: 82.1 SEC — HIGH (ref 27.5–35.5)
APTT BLD: 82.6 SEC — HIGH (ref 27.5–35.5)
APTT BLD: >200 SEC — CRITICAL HIGH (ref 27.5–35.5)
AST SERPL-CCNC: 18 U/L — SIGNIFICANT CHANGE UP (ref 4–40)
AST SERPL-CCNC: 19 U/L
AST SERPL-CCNC: 21 U/L — SIGNIFICANT CHANGE UP (ref 10–40)
AST SERPL-CCNC: 21 U/L — SIGNIFICANT CHANGE UP (ref 4–40)
AST SERPL-CCNC: 22 U/L
AST SERPL-CCNC: 24 U/L — SIGNIFICANT CHANGE UP (ref 10–40)
AST SERPL-CCNC: 25 U/L — SIGNIFICANT CHANGE UP (ref 10–40)
AST SERPL-CCNC: 26 U/L — SIGNIFICANT CHANGE UP (ref 10–40)
AST SERPL-CCNC: 30 U/L — SIGNIFICANT CHANGE UP (ref 10–40)
AST SERPL-CCNC: 32 U/L — SIGNIFICANT CHANGE UP (ref 10–40)
AST SERPL-CCNC: 34 U/L — SIGNIFICANT CHANGE UP (ref 10–40)
AST SERPL-CCNC: 38 U/L — SIGNIFICANT CHANGE UP (ref 10–40)
AST SERPL-CCNC: 39 U/L — SIGNIFICANT CHANGE UP (ref 10–40)
AST SERPL-CCNC: 41 U/L — HIGH (ref 10–40)
AST SERPL-CCNC: 42 U/L — HIGH (ref 10–40)
AST SERPL-CCNC: 42 U/L — HIGH (ref 10–40)
AST SERPL-CCNC: 50 U/L — HIGH (ref 10–40)
AST SERPL-CCNC: 83 U/L — HIGH (ref 4–40)
B2 GLYCOPROT1 AB SER QL: NEGATIVE — SIGNIFICANT CHANGE UP
BACTERIA # UR AUTO: NEGATIVE /HPF — SIGNIFICANT CHANGE UP
BACTERIA # UR AUTO: NEGATIVE — SIGNIFICANT CHANGE UP
BASE EXCESS BLDV CALC-SCNC: -0.4 MMOL/L — SIGNIFICANT CHANGE UP (ref -2–3)
BASE EXCESS BLDV CALC-SCNC: -6.5 MMOL/L — LOW (ref -2–3)
BASE EXCESS BLDV CALC-SCNC: 2.5 MMOL/L — SIGNIFICANT CHANGE UP (ref -2–3)
BASE EXCESS BLDV CALC-SCNC: 3.4 MMOL/L — HIGH (ref -2–3)
BASE EXCESS BLDV CALC-SCNC: 4.3 MMOL/L — HIGH (ref -2–3)
BASE EXCESS BLDV CALC-SCNC: 4.8 MMOL/L — HIGH (ref -2–3)
BASE EXCESS BLDV CALC-SCNC: 7.1 MMOL/L — HIGH (ref -2–3)
BASOPHILS # BLD AUTO: 0 K/UL — SIGNIFICANT CHANGE UP (ref 0–0.2)
BASOPHILS # BLD AUTO: 0 K/UL — SIGNIFICANT CHANGE UP (ref 0–0.2)
BASOPHILS # BLD AUTO: 0.01 K/UL
BASOPHILS # BLD AUTO: 0.01 K/UL — SIGNIFICANT CHANGE UP (ref 0–0.2)
BASOPHILS # BLD AUTO: 0.02 K/UL
BASOPHILS # BLD AUTO: 0.02 K/UL
BASOPHILS # BLD AUTO: 0.02 K/UL — SIGNIFICANT CHANGE UP (ref 0–0.2)
BASOPHILS # BLD AUTO: 0.03 K/UL
BASOPHILS # BLD AUTO: 0.03 K/UL — SIGNIFICANT CHANGE UP (ref 0–0.2)
BASOPHILS # BLD AUTO: 0.04 K/UL
BASOPHILS NFR BLD AUTO: 0 % — SIGNIFICANT CHANGE UP (ref 0–2)
BASOPHILS NFR BLD AUTO: 0 % — SIGNIFICANT CHANGE UP (ref 0–2)
BASOPHILS NFR BLD AUTO: 0.1 % — SIGNIFICANT CHANGE UP (ref 0–2)
BASOPHILS NFR BLD AUTO: 0.2 %
BASOPHILS NFR BLD AUTO: 0.3 % — SIGNIFICANT CHANGE UP (ref 0–2)
BASOPHILS NFR BLD AUTO: 0.4 %
BASOPHILS NFR BLD AUTO: 0.4 % — SIGNIFICANT CHANGE UP (ref 0–2)
BASOPHILS NFR BLD AUTO: 0.5 %
BASOPHILS NFR BLD AUTO: 0.6 %
BILIRUB DIRECT SERPL-MCNC: <0.2 MG/DL — SIGNIFICANT CHANGE UP (ref 0–0.3)
BILIRUB DIRECT SERPL-MCNC: <0.2 MG/DL — SIGNIFICANT CHANGE UP (ref 0–0.3)
BILIRUB INDIRECT FLD-MCNC: >0.3 MG/DL — SIGNIFICANT CHANGE UP (ref 0–1)
BILIRUB INDIRECT FLD-MCNC: >0.3 MG/DL — SIGNIFICANT CHANGE UP (ref 0–1)
BILIRUB SERPL-MCNC: 0.2 MG/DL — SIGNIFICANT CHANGE UP (ref 0.2–1.2)
BILIRUB SERPL-MCNC: 0.3 MG/DL — SIGNIFICANT CHANGE UP (ref 0.2–1.2)
BILIRUB SERPL-MCNC: 0.4 MG/DL
BILIRUB SERPL-MCNC: 0.4 MG/DL — SIGNIFICANT CHANGE UP (ref 0.2–1.2)
BILIRUB SERPL-MCNC: 0.5 MG/DL
BILIRUB SERPL-MCNC: 0.5 MG/DL — SIGNIFICANT CHANGE UP (ref 0.2–1.2)
BILIRUB SERPL-MCNC: 0.6 MG/DL
BILIRUB SERPL-MCNC: 0.6 MG/DL — SIGNIFICANT CHANGE UP (ref 0.2–1.2)
BILIRUB SERPL-MCNC: 0.7 MG/DL — SIGNIFICANT CHANGE UP (ref 0.2–1.2)
BILIRUB SERPL-MCNC: 0.8 MG/DL
BILIRUB UR-MCNC: NEGATIVE — SIGNIFICANT CHANGE UP
BILIRUBIN URINE: NEGATIVE
BLD GP AB SCN SERPL QL: NEGATIVE — SIGNIFICANT CHANGE UP
BLOOD GAS VENOUS - CREATININE: SIGNIFICANT CHANGE UP MG/DL (ref 0.5–1.3)
BLOOD URINE: NEGATIVE
BUN SERPL-MCNC: 13 MG/DL — SIGNIFICANT CHANGE UP (ref 7–23)
BUN SERPL-MCNC: 14 MG/DL — SIGNIFICANT CHANGE UP (ref 7–23)
BUN SERPL-MCNC: 15 MG/DL — SIGNIFICANT CHANGE UP (ref 7–23)
BUN SERPL-MCNC: 16 MG/DL — SIGNIFICANT CHANGE UP (ref 7–23)
BUN SERPL-MCNC: 17 MG/DL — SIGNIFICANT CHANGE UP (ref 7–23)
BUN SERPL-MCNC: 18 MG/DL
BUN SERPL-MCNC: 18 MG/DL — SIGNIFICANT CHANGE UP (ref 7–23)
BUN SERPL-MCNC: 19 MG/DL — SIGNIFICANT CHANGE UP (ref 7–23)
BUN SERPL-MCNC: 20 MG/DL — SIGNIFICANT CHANGE UP (ref 7–23)
BUN SERPL-MCNC: 21 MG/DL
BUN SERPL-MCNC: 21 MG/DL — SIGNIFICANT CHANGE UP (ref 7–23)
BUN SERPL-MCNC: 21 MG/DL — SIGNIFICANT CHANGE UP (ref 7–23)
BUN SERPL-MCNC: 22 MG/DL — SIGNIFICANT CHANGE UP (ref 7–23)
BUN SERPL-MCNC: 23 MG/DL
BUN SERPL-MCNC: 23 MG/DL — SIGNIFICANT CHANGE UP (ref 7–23)
BUN SERPL-MCNC: 25 MG/DL
BUN SERPL-MCNC: 26 MG/DL — HIGH (ref 7–23)
BUN SERPL-MCNC: 26 MG/DL — HIGH (ref 7–23)
BUN SERPL-MCNC: 27 MG/DL — HIGH (ref 7–23)
BUN SERPL-MCNC: 33 MG/DL — HIGH (ref 7–23)
CA-I SERPL-SCNC: 1.08 MMOL/L — LOW (ref 1.15–1.33)
CA-I SERPL-SCNC: 1.1 MMOL/L — LOW (ref 1.15–1.33)
CA-I SERPL-SCNC: 1.11 MMOL/L — LOW (ref 1.15–1.33)
CA-I SERPL-SCNC: 1.14 MMOL/L — LOW (ref 1.15–1.33)
CA-I SERPL-SCNC: 1.17 MMOL/L — SIGNIFICANT CHANGE UP (ref 1.15–1.33)
CA-I SERPL-SCNC: 1.18 MMOL/L — SIGNIFICANT CHANGE UP (ref 1.15–1.33)
CALCIUM SERPL-MCNC: 7.5 MG/DL — LOW (ref 8.4–10.5)
CALCIUM SERPL-MCNC: 7.5 MG/DL — LOW (ref 8.4–10.5)
CALCIUM SERPL-MCNC: 7.7 MG/DL — LOW (ref 8.4–10.5)
CALCIUM SERPL-MCNC: 7.8 MG/DL — LOW (ref 8.4–10.5)
CALCIUM SERPL-MCNC: 7.9 MG/DL — LOW (ref 8.4–10.5)
CALCIUM SERPL-MCNC: 8.1 MG/DL — LOW (ref 8.4–10.5)
CALCIUM SERPL-MCNC: 8.2 MG/DL — LOW (ref 8.4–10.5)
CALCIUM SERPL-MCNC: 8.3 MG/DL — LOW (ref 8.4–10.5)
CALCIUM SERPL-MCNC: 8.4 MG/DL — SIGNIFICANT CHANGE UP (ref 8.4–10.5)
CALCIUM SERPL-MCNC: 8.5 MG/DL — SIGNIFICANT CHANGE UP (ref 8.4–10.5)
CALCIUM SERPL-MCNC: 8.7 MG/DL — SIGNIFICANT CHANGE UP (ref 8.4–10.5)
CALCIUM SERPL-MCNC: 8.7 MG/DL — SIGNIFICANT CHANGE UP (ref 8.4–10.5)
CALCIUM SERPL-MCNC: 8.8 MG/DL — SIGNIFICANT CHANGE UP (ref 8.4–10.5)
CALCIUM SERPL-MCNC: 8.9 MG/DL — SIGNIFICANT CHANGE UP (ref 8.4–10.5)
CALCIUM SERPL-MCNC: 8.9 MG/DL — SIGNIFICANT CHANGE UP (ref 8.4–10.5)
CALCIUM SERPL-MCNC: 9 MG/DL — SIGNIFICANT CHANGE UP (ref 8.4–10.5)
CALCIUM SERPL-MCNC: 9.1 MG/DL — SIGNIFICANT CHANGE UP (ref 8.4–10.5)
CALCIUM SERPL-MCNC: 9.2 MG/DL
CALCIUM SERPL-MCNC: 9.3 MG/DL
CALCIUM SERPL-MCNC: 9.3 MG/DL — SIGNIFICANT CHANGE UP (ref 8.4–10.5)
CALCIUM SERPL-MCNC: 9.5 MG/DL
CALCIUM SERPL-MCNC: 9.5 MG/DL
CARDIOLIPIN AB SER-ACNC: NEGATIVE — SIGNIFICANT CHANGE UP
CAST: 0 /LPF — SIGNIFICANT CHANGE UP (ref 0–4)
CHLORIDE BLDV-SCNC: 100 MMOL/L — SIGNIFICANT CHANGE UP (ref 96–108)
CHLORIDE BLDV-SCNC: 102 MMOL/L — SIGNIFICANT CHANGE UP (ref 96–108)
CHLORIDE BLDV-SCNC: 104 MMOL/L — SIGNIFICANT CHANGE UP (ref 96–108)
CHLORIDE BLDV-SCNC: 98 MMOL/L — SIGNIFICANT CHANGE UP (ref 96–108)
CHLORIDE SERPL-SCNC: 100 MMOL/L — SIGNIFICANT CHANGE UP (ref 96–108)
CHLORIDE SERPL-SCNC: 101 MMOL/L — SIGNIFICANT CHANGE UP (ref 96–108)
CHLORIDE SERPL-SCNC: 102 MMOL/L — SIGNIFICANT CHANGE UP (ref 96–108)
CHLORIDE SERPL-SCNC: 102 MMOL/L — SIGNIFICANT CHANGE UP (ref 98–107)
CHLORIDE SERPL-SCNC: 103 MMOL/L — SIGNIFICANT CHANGE UP (ref 96–108)
CHLORIDE SERPL-SCNC: 103 MMOL/L — SIGNIFICANT CHANGE UP (ref 98–107)
CHLORIDE SERPL-SCNC: 104 MMOL/L
CHLORIDE SERPL-SCNC: 104 MMOL/L — SIGNIFICANT CHANGE UP (ref 96–108)
CHLORIDE SERPL-SCNC: 104 MMOL/L — SIGNIFICANT CHANGE UP (ref 98–107)
CHLORIDE SERPL-SCNC: 104 MMOL/L — SIGNIFICANT CHANGE UP (ref 98–107)
CHLORIDE SERPL-SCNC: 105 MMOL/L — SIGNIFICANT CHANGE UP (ref 96–108)
CHLORIDE SERPL-SCNC: 105 MMOL/L — SIGNIFICANT CHANGE UP (ref 98–107)
CHLORIDE SERPL-SCNC: 106 MMOL/L
CHLORIDE SERPL-SCNC: 106 MMOL/L — SIGNIFICANT CHANGE UP (ref 96–108)
CHLORIDE SERPL-SCNC: 106 MMOL/L — SIGNIFICANT CHANGE UP (ref 98–107)
CHLORIDE SERPL-SCNC: 106 MMOL/L — SIGNIFICANT CHANGE UP (ref 98–107)
CHLORIDE SERPL-SCNC: 107 MMOL/L
CHLORIDE SERPL-SCNC: 107 MMOL/L — SIGNIFICANT CHANGE UP (ref 96–108)
CHLORIDE SERPL-SCNC: 107 MMOL/L — SIGNIFICANT CHANGE UP (ref 96–108)
CHLORIDE SERPL-SCNC: 107 MMOL/L — SIGNIFICANT CHANGE UP (ref 98–107)
CHLORIDE SERPL-SCNC: 107 MMOL/L — SIGNIFICANT CHANGE UP (ref 98–107)
CHLORIDE SERPL-SCNC: 108 MMOL/L
CHLORIDE SERPL-SCNC: 109 MMOL/L — HIGH (ref 98–107)
CHLORIDE SERPL-SCNC: 99 MMOL/L — SIGNIFICANT CHANGE UP (ref 96–108)
CHOLEST SERPL-MCNC: 166 MG/DL — SIGNIFICANT CHANGE UP
CK MB CFR SERPL CALC: 4.5 NG/ML — SIGNIFICANT CHANGE UP (ref 0–6.7)
CK SERPL-CCNC: 51 U/L — SIGNIFICANT CHANGE UP (ref 30–200)
CO2 BLDV-SCNC: 25 MMOL/L — SIGNIFICANT CHANGE UP (ref 22–26)
CO2 BLDV-SCNC: 27 MMOL/L — HIGH (ref 22–26)
CO2 BLDV-SCNC: 28 MMOL/L — HIGH (ref 22–26)
CO2 BLDV-SCNC: 31 MMOL/L — HIGH (ref 22–26)
CO2 BLDV-SCNC: 35 MMOL/L — HIGH (ref 22–26)
CO2 SERPL-SCNC: 16 MMOL/L — LOW (ref 22–31)
CO2 SERPL-SCNC: 17 MMOL/L — LOW (ref 22–31)
CO2 SERPL-SCNC: 18 MMOL/L — LOW (ref 22–31)
CO2 SERPL-SCNC: 19 MMOL/L — LOW (ref 22–31)
CO2 SERPL-SCNC: 20 MMOL/L — LOW (ref 22–31)
CO2 SERPL-SCNC: 21 MMOL/L — LOW (ref 22–31)
CO2 SERPL-SCNC: 22 MMOL/L
CO2 SERPL-SCNC: 22 MMOL/L — SIGNIFICANT CHANGE UP (ref 22–31)
CO2 SERPL-SCNC: 23 MMOL/L — SIGNIFICANT CHANGE UP (ref 22–31)
CO2 SERPL-SCNC: 24 MMOL/L — SIGNIFICANT CHANGE UP (ref 22–31)
CO2 SERPL-SCNC: 25 MMOL/L
CO2 SERPL-SCNC: 25 MMOL/L
CO2 SERPL-SCNC: 25 MMOL/L — SIGNIFICANT CHANGE UP (ref 22–31)
CO2 SERPL-SCNC: 26 MMOL/L
CO2 SERPL-SCNC: 26 MMOL/L — SIGNIFICANT CHANGE UP (ref 22–31)
CO2 SERPL-SCNC: 27 MMOL/L — SIGNIFICANT CHANGE UP (ref 22–31)
CO2 SERPL-SCNC: 28 MMOL/L — SIGNIFICANT CHANGE UP (ref 22–31)
COLOR SPEC: SIGNIFICANT CHANGE UP
COLOR SPEC: YELLOW — SIGNIFICANT CHANGE UP
COLOR: YELLOW
CREAT SERPL-MCNC: 0.3 MG/DL — LOW (ref 0.5–1.3)
CREAT SERPL-MCNC: 0.31 MG/DL — LOW (ref 0.5–1.3)
CREAT SERPL-MCNC: 0.32 MG/DL — LOW (ref 0.5–1.3)
CREAT SERPL-MCNC: 0.33 MG/DL — LOW (ref 0.5–1.3)
CREAT SERPL-MCNC: 0.35 MG/DL — LOW (ref 0.5–1.3)
CREAT SERPL-MCNC: 0.39 MG/DL — LOW (ref 0.5–1.3)
CREAT SERPL-MCNC: 0.39 MG/DL — LOW (ref 0.5–1.3)
CREAT SERPL-MCNC: 0.4 MG/DL — LOW (ref 0.5–1.3)
CREAT SERPL-MCNC: 0.4 MG/DL — LOW (ref 0.5–1.3)
CREAT SERPL-MCNC: 0.43 MG/DL — LOW (ref 0.5–1.3)
CREAT SERPL-MCNC: 0.44 MG/DL — LOW (ref 0.5–1.3)
CREAT SERPL-MCNC: 0.44 MG/DL — LOW (ref 0.5–1.3)
CREAT SERPL-MCNC: 0.45 MG/DL — LOW (ref 0.5–1.3)
CREAT SERPL-MCNC: 0.47 MG/DL — LOW (ref 0.5–1.3)
CREAT SERPL-MCNC: 0.48 MG/DL — LOW (ref 0.5–1.3)
CREAT SERPL-MCNC: 0.48 MG/DL — LOW (ref 0.5–1.3)
CREAT SERPL-MCNC: 0.49 MG/DL — LOW (ref 0.5–1.3)
CREAT SERPL-MCNC: 0.5 MG/DL — SIGNIFICANT CHANGE UP (ref 0.5–1.3)
CREAT SERPL-MCNC: 0.5 MG/DL — SIGNIFICANT CHANGE UP (ref 0.5–1.3)
CREAT SERPL-MCNC: 0.51 MG/DL — SIGNIFICANT CHANGE UP (ref 0.5–1.3)
CREAT SERPL-MCNC: 0.51 MG/DL — SIGNIFICANT CHANGE UP (ref 0.5–1.3)
CREAT SERPL-MCNC: 0.52 MG/DL — SIGNIFICANT CHANGE UP (ref 0.5–1.3)
CREAT SERPL-MCNC: 0.53 MG/DL — SIGNIFICANT CHANGE UP (ref 0.5–1.3)
CREAT SERPL-MCNC: 0.54 MG/DL — SIGNIFICANT CHANGE UP (ref 0.5–1.3)
CREAT SERPL-MCNC: 0.54 MG/DL — SIGNIFICANT CHANGE UP (ref 0.5–1.3)
CREAT SERPL-MCNC: 0.55 MG/DL — SIGNIFICANT CHANGE UP (ref 0.5–1.3)
CREAT SERPL-MCNC: 0.57 MG/DL — SIGNIFICANT CHANGE UP (ref 0.5–1.3)
CREAT SERPL-MCNC: 0.6 MG/DL — SIGNIFICANT CHANGE UP (ref 0.5–1.3)
CREAT SERPL-MCNC: 0.64 MG/DL — SIGNIFICANT CHANGE UP (ref 0.5–1.3)
CREAT SERPL-MCNC: 0.66 MG/DL — SIGNIFICANT CHANGE UP (ref 0.5–1.3)
CREAT SERPL-MCNC: 0.67 MG/DL — SIGNIFICANT CHANGE UP (ref 0.5–1.3)
CREAT SERPL-MCNC: 0.68 MG/DL — SIGNIFICANT CHANGE UP (ref 0.5–1.3)
CREAT SERPL-MCNC: 0.7 MG/DL — SIGNIFICANT CHANGE UP (ref 0.5–1.3)
CREAT SERPL-MCNC: 0.7 MG/DL — SIGNIFICANT CHANGE UP (ref 0.5–1.3)
CREAT SERPL-MCNC: 0.71 MG/DL — SIGNIFICANT CHANGE UP (ref 0.5–1.3)
CREAT SERPL-MCNC: 0.79 MG/DL — SIGNIFICANT CHANGE UP (ref 0.5–1.3)
CREAT SERPL-MCNC: 0.85 MG/DL — SIGNIFICANT CHANGE UP (ref 0.5–1.3)
CREAT SERPL-MCNC: 1.08 MG/DL — SIGNIFICANT CHANGE UP (ref 0.5–1.3)
CREAT SERPL-MCNC: 1.1 MG/DL
CREAT SERPL-MCNC: 1.2 MG/DL
CREAT SERPL-MCNC: 1.23 MG/DL — SIGNIFICANT CHANGE UP (ref 0.5–1.3)
CREAT SERPL-MCNC: 1.25 MG/DL
CREAT SERPL-MCNC: 1.37 MG/DL
CREAT SERPL-MCNC: <0.3 MG/DL — LOW (ref 0.5–1.3)
CULTURE RESULTS: SIGNIFICANT CHANGE UP
DIFF PNL FLD: ABNORMAL
DIFF PNL FLD: NEGATIVE — SIGNIFICANT CHANGE UP
DRVVT RATIO: 0.98 RATIO — SIGNIFICANT CHANGE UP (ref 0–1.21)
DRVVT SCREEN TO CONFIRM RATIO: SIGNIFICANT CHANGE UP
EGFR: 101 ML/MIN/1.73M2 — SIGNIFICANT CHANGE UP
EGFR: 102 ML/MIN/1.73M2 — SIGNIFICANT CHANGE UP
EGFR: 102 ML/MIN/1.73M2 — SIGNIFICANT CHANGE UP
EGFR: 103 ML/MIN/1.73M2 — SIGNIFICANT CHANGE UP
EGFR: 104 ML/MIN/1.73M2 — SIGNIFICANT CHANGE UP
EGFR: 106 ML/MIN/1.73M2 — SIGNIFICANT CHANGE UP
EGFR: 107 ML/MIN/1.73M2 — SIGNIFICANT CHANGE UP
EGFR: 109 ML/MIN/1.73M2 — SIGNIFICANT CHANGE UP
EGFR: 110 ML/MIN/1.73M2 — SIGNIFICANT CHANGE UP
EGFR: 110 ML/MIN/1.73M2 — SIGNIFICANT CHANGE UP
EGFR: 111 ML/MIN/1.73M2 — SIGNIFICANT CHANGE UP
EGFR: 111 ML/MIN/1.73M2 — SIGNIFICANT CHANGE UP
EGFR: 112 ML/MIN/1.73M2 — SIGNIFICANT CHANGE UP
EGFR: 113 ML/MIN/1.73M2 — SIGNIFICANT CHANGE UP
EGFR: 113 ML/MIN/1.73M2 — SIGNIFICANT CHANGE UP
EGFR: 114 ML/MIN/1.73M2 — SIGNIFICANT CHANGE UP
EGFR: 115 ML/MIN/1.73M2 — SIGNIFICANT CHANGE UP
EGFR: 116 ML/MIN/1.73M2 — SIGNIFICANT CHANGE UP
EGFR: 116 ML/MIN/1.73M2 — SIGNIFICANT CHANGE UP
EGFR: 117 ML/MIN/1.73M2 — SIGNIFICANT CHANGE UP
EGFR: 120 ML/MIN/1.73M2 — SIGNIFICANT CHANGE UP
EGFR: 125 ML/MIN/1.73M2 — SIGNIFICANT CHANGE UP
EGFR: 127 ML/MIN/1.73M2 — SIGNIFICANT CHANGE UP
EGFR: 128 ML/MIN/1.73M2 — SIGNIFICANT CHANGE UP
EGFR: 129 ML/MIN/1.73M2 — SIGNIFICANT CHANGE UP
EGFR: 130 ML/MIN/1.73M2 — SIGNIFICANT CHANGE UP
EGFR: 57 ML/MIN/1.73M2
EGFR: 63 ML/MIN/1.73M2
EGFR: 64 ML/MIN/1.73M2 — SIGNIFICANT CHANGE UP
EGFR: 66 ML/MIN/1.73M2
EGFR: 74 ML/MIN/1.73M2
EGFR: 75 ML/MIN/1.73M2 — SIGNIFICANT CHANGE UP
EGFR: 95 ML/MIN/1.73M2 — SIGNIFICANT CHANGE UP
EGFR: 97 ML/MIN/1.73M2 — SIGNIFICANT CHANGE UP
ELLIPTOCYTES BLD QL SMEAR: SLIGHT — SIGNIFICANT CHANGE UP
EOSINOPHIL # BLD AUTO: 0 K/UL — SIGNIFICANT CHANGE UP (ref 0–0.5)
EOSINOPHIL # BLD AUTO: 0.01 K/UL — SIGNIFICANT CHANGE UP (ref 0–0.5)
EOSINOPHIL # BLD AUTO: 0.01 K/UL — SIGNIFICANT CHANGE UP (ref 0–0.5)
EOSINOPHIL # BLD AUTO: 0.02 K/UL — SIGNIFICANT CHANGE UP (ref 0–0.5)
EOSINOPHIL # BLD AUTO: 0.02 K/UL — SIGNIFICANT CHANGE UP (ref 0–0.5)
EOSINOPHIL # BLD AUTO: 0.03 K/UL
EOSINOPHIL # BLD AUTO: 0.03 K/UL — SIGNIFICANT CHANGE UP (ref 0–0.5)
EOSINOPHIL # BLD AUTO: 0.03 K/UL — SIGNIFICANT CHANGE UP (ref 0–0.5)
EOSINOPHIL # BLD AUTO: 0.04 K/UL
EOSINOPHIL # BLD AUTO: 0.07 K/UL
EOSINOPHIL # BLD AUTO: 0.08 K/UL
EOSINOPHIL # BLD AUTO: 0.08 K/UL
EOSINOPHIL # BLD AUTO: 0.09 K/UL
EOSINOPHIL # BLD AUTO: 0.1 K/UL — SIGNIFICANT CHANGE UP (ref 0–0.5)
EOSINOPHIL # BLD AUTO: 0.1 K/UL — SIGNIFICANT CHANGE UP (ref 0–0.5)
EOSINOPHIL # BLD AUTO: 0.11 K/UL
EOSINOPHIL NFR BLD AUTO: 0 % — SIGNIFICANT CHANGE UP (ref 0–6)
EOSINOPHIL NFR BLD AUTO: 0.1 % — SIGNIFICANT CHANGE UP (ref 0–6)
EOSINOPHIL NFR BLD AUTO: 0.1 % — SIGNIFICANT CHANGE UP (ref 0–6)
EOSINOPHIL NFR BLD AUTO: 0.3 % — SIGNIFICANT CHANGE UP (ref 0–6)
EOSINOPHIL NFR BLD AUTO: 0.3 % — SIGNIFICANT CHANGE UP (ref 0–6)
EOSINOPHIL NFR BLD AUTO: 0.4 %
EOSINOPHIL NFR BLD AUTO: 0.4 % — SIGNIFICANT CHANGE UP (ref 0–6)
EOSINOPHIL NFR BLD AUTO: 0.5 %
EOSINOPHIL NFR BLD AUTO: 0.5 %
EOSINOPHIL NFR BLD AUTO: 0.6 % — SIGNIFICANT CHANGE UP (ref 0–6)
EOSINOPHIL NFR BLD AUTO: 0.9 %
EOSINOPHIL NFR BLD AUTO: 0.9 %
EOSINOPHIL NFR BLD AUTO: 1 %
EOSINOPHIL NFR BLD AUTO: 1.1 %
EOSINOPHIL NFR BLD AUTO: 1.1 %
EOSINOPHIL NFR BLD AUTO: 1.3 %
EOSINOPHIL NFR BLD AUTO: 1.4 % — SIGNIFICANT CHANGE UP (ref 0–6)
EOSINOPHIL NFR BLD AUTO: 1.7 %
EOSINOPHIL NFR BLD AUTO: 1.7 %
EOSINOPHIL NFR BLD AUTO: 1.8 % — SIGNIFICANT CHANGE UP (ref 0–6)
EPI CELLS # UR: 0 /HPF — SIGNIFICANT CHANGE UP
EPI CELLS # UR: 1 /HPF — SIGNIFICANT CHANGE UP
EPI CELLS # UR: 1 /HPF — SIGNIFICANT CHANGE UP (ref 0–5)
EPI CELLS # UR: 2 /HPF — SIGNIFICANT CHANGE UP
ESTIMATED AVERAGE GLUCOSE: 111 — SIGNIFICANT CHANGE UP
GAS PNL BLDA: SIGNIFICANT CHANGE UP
GAS PNL BLDV: 133 MMOL/L — LOW (ref 136–145)
GAS PNL BLDV: 134 MMOL/L — LOW (ref 136–145)
GAS PNL BLDV: 134 MMOL/L — LOW (ref 136–145)
GAS PNL BLDV: 135 MMOL/L — LOW (ref 136–145)
GAS PNL BLDV: 135 MMOL/L — LOW (ref 136–145)
GAS PNL BLDV: 138 MMOL/L — SIGNIFICANT CHANGE UP (ref 136–145)
GAS PNL BLDV: SIGNIFICANT CHANGE UP
GLUCOSE BLDC GLUCOMTR-MCNC: 100 MG/DL — HIGH (ref 70–99)
GLUCOSE BLDC GLUCOMTR-MCNC: 101 MG/DL — HIGH (ref 70–99)
GLUCOSE BLDC GLUCOMTR-MCNC: 102 MG/DL — HIGH (ref 70–99)
GLUCOSE BLDC GLUCOMTR-MCNC: 103 MG/DL — HIGH (ref 70–99)
GLUCOSE BLDC GLUCOMTR-MCNC: 104 MG/DL — HIGH (ref 70–99)
GLUCOSE BLDC GLUCOMTR-MCNC: 105 MG/DL — HIGH (ref 70–99)
GLUCOSE BLDC GLUCOMTR-MCNC: 105 MG/DL — HIGH (ref 70–99)
GLUCOSE BLDC GLUCOMTR-MCNC: 106 MG/DL — HIGH (ref 70–99)
GLUCOSE BLDC GLUCOMTR-MCNC: 107 MG/DL — HIGH (ref 70–99)
GLUCOSE BLDC GLUCOMTR-MCNC: 108 MG/DL — HIGH (ref 70–99)
GLUCOSE BLDC GLUCOMTR-MCNC: 109 MG/DL — HIGH (ref 70–99)
GLUCOSE BLDC GLUCOMTR-MCNC: 109 MG/DL — HIGH (ref 70–99)
GLUCOSE BLDC GLUCOMTR-MCNC: 110 MG/DL — HIGH (ref 70–99)
GLUCOSE BLDC GLUCOMTR-MCNC: 111 MG/DL — HIGH (ref 70–99)
GLUCOSE BLDC GLUCOMTR-MCNC: 112 MG/DL — HIGH (ref 70–99)
GLUCOSE BLDC GLUCOMTR-MCNC: 113 MG/DL — HIGH (ref 70–99)
GLUCOSE BLDC GLUCOMTR-MCNC: 113 MG/DL — HIGH (ref 70–99)
GLUCOSE BLDC GLUCOMTR-MCNC: 114 MG/DL — HIGH (ref 70–99)
GLUCOSE BLDC GLUCOMTR-MCNC: 115 MG/DL — HIGH (ref 70–99)
GLUCOSE BLDC GLUCOMTR-MCNC: 116 MG/DL — HIGH (ref 70–99)
GLUCOSE BLDC GLUCOMTR-MCNC: 117 MG/DL — HIGH (ref 70–99)
GLUCOSE BLDC GLUCOMTR-MCNC: 118 MG/DL — HIGH (ref 70–99)
GLUCOSE BLDC GLUCOMTR-MCNC: 118 MG/DL — HIGH (ref 70–99)
GLUCOSE BLDC GLUCOMTR-MCNC: 119 MG/DL — HIGH (ref 70–99)
GLUCOSE BLDC GLUCOMTR-MCNC: 120 MG/DL — HIGH (ref 70–99)
GLUCOSE BLDC GLUCOMTR-MCNC: 121 MG/DL — HIGH (ref 70–99)
GLUCOSE BLDC GLUCOMTR-MCNC: 121 MG/DL — HIGH (ref 70–99)
GLUCOSE BLDC GLUCOMTR-MCNC: 122 MG/DL — HIGH (ref 70–99)
GLUCOSE BLDC GLUCOMTR-MCNC: 123 MG/DL — HIGH (ref 70–99)
GLUCOSE BLDC GLUCOMTR-MCNC: 124 MG/DL — HIGH (ref 70–99)
GLUCOSE BLDC GLUCOMTR-MCNC: 125 MG/DL — HIGH (ref 70–99)
GLUCOSE BLDC GLUCOMTR-MCNC: 126 MG/DL — HIGH (ref 70–99)
GLUCOSE BLDC GLUCOMTR-MCNC: 127 MG/DL — HIGH (ref 70–99)
GLUCOSE BLDC GLUCOMTR-MCNC: 130 MG/DL — HIGH (ref 70–99)
GLUCOSE BLDC GLUCOMTR-MCNC: 132 MG/DL — HIGH (ref 70–99)
GLUCOSE BLDC GLUCOMTR-MCNC: 133 MG/DL — HIGH (ref 70–99)
GLUCOSE BLDC GLUCOMTR-MCNC: 134 MG/DL — HIGH (ref 70–99)
GLUCOSE BLDC GLUCOMTR-MCNC: 135 MG/DL — HIGH (ref 70–99)
GLUCOSE BLDC GLUCOMTR-MCNC: 136 MG/DL — HIGH (ref 70–99)
GLUCOSE BLDC GLUCOMTR-MCNC: 137 MG/DL — HIGH (ref 70–99)
GLUCOSE BLDC GLUCOMTR-MCNC: 138 MG/DL — HIGH (ref 70–99)
GLUCOSE BLDC GLUCOMTR-MCNC: 138 MG/DL — HIGH (ref 70–99)
GLUCOSE BLDC GLUCOMTR-MCNC: 139 MG/DL — HIGH (ref 70–99)
GLUCOSE BLDC GLUCOMTR-MCNC: 139 MG/DL — HIGH (ref 70–99)
GLUCOSE BLDC GLUCOMTR-MCNC: 150 MG/DL — HIGH (ref 70–99)
GLUCOSE BLDC GLUCOMTR-MCNC: 157 MG/DL — HIGH (ref 70–99)
GLUCOSE BLDC GLUCOMTR-MCNC: 164 MG/DL — HIGH (ref 70–99)
GLUCOSE BLDC GLUCOMTR-MCNC: 172 MG/DL — HIGH (ref 70–99)
GLUCOSE BLDC GLUCOMTR-MCNC: 181 MG/DL — HIGH (ref 70–99)
GLUCOSE BLDC GLUCOMTR-MCNC: 81 MG/DL — SIGNIFICANT CHANGE UP (ref 70–99)
GLUCOSE BLDC GLUCOMTR-MCNC: 82 MG/DL — SIGNIFICANT CHANGE UP (ref 70–99)
GLUCOSE BLDC GLUCOMTR-MCNC: 84 MG/DL — SIGNIFICANT CHANGE UP (ref 70–99)
GLUCOSE BLDC GLUCOMTR-MCNC: 88 MG/DL — SIGNIFICANT CHANGE UP (ref 70–99)
GLUCOSE BLDC GLUCOMTR-MCNC: 88 MG/DL — SIGNIFICANT CHANGE UP (ref 70–99)
GLUCOSE BLDC GLUCOMTR-MCNC: 90 MG/DL — SIGNIFICANT CHANGE UP (ref 70–99)
GLUCOSE BLDC GLUCOMTR-MCNC: 92 MG/DL — SIGNIFICANT CHANGE UP (ref 70–99)
GLUCOSE BLDC GLUCOMTR-MCNC: 93 MG/DL — SIGNIFICANT CHANGE UP (ref 70–99)
GLUCOSE BLDC GLUCOMTR-MCNC: 94 MG/DL — SIGNIFICANT CHANGE UP (ref 70–99)
GLUCOSE BLDC GLUCOMTR-MCNC: 96 MG/DL — SIGNIFICANT CHANGE UP (ref 70–99)
GLUCOSE BLDC GLUCOMTR-MCNC: 97 MG/DL — SIGNIFICANT CHANGE UP (ref 70–99)
GLUCOSE BLDC GLUCOMTR-MCNC: 98 MG/DL — SIGNIFICANT CHANGE UP (ref 70–99)
GLUCOSE BLDC GLUCOMTR-MCNC: 99 MG/DL — SIGNIFICANT CHANGE UP (ref 70–99)
GLUCOSE BLDC GLUCOMTR-MCNC: 99 MG/DL — SIGNIFICANT CHANGE UP (ref 70–99)
GLUCOSE BLDV-MCNC: 106 MG/DL — HIGH (ref 70–99)
GLUCOSE BLDV-MCNC: 107 MG/DL — HIGH (ref 70–99)
GLUCOSE BLDV-MCNC: 113 MG/DL — HIGH (ref 70–99)
GLUCOSE BLDV-MCNC: 123 MG/DL — HIGH (ref 70–99)
GLUCOSE BLDV-MCNC: 124 MG/DL — HIGH (ref 70–99)
GLUCOSE BLDV-MCNC: 126 MG/DL — HIGH (ref 70–99)
GLUCOSE QUALITATIVE U: NEGATIVE
GLUCOSE SERPL-MCNC: 100 MG/DL — HIGH (ref 70–99)
GLUCOSE SERPL-MCNC: 101 MG/DL
GLUCOSE SERPL-MCNC: 101 MG/DL — HIGH (ref 70–99)
GLUCOSE SERPL-MCNC: 102 MG/DL — HIGH (ref 70–99)
GLUCOSE SERPL-MCNC: 102 MG/DL — HIGH (ref 70–99)
GLUCOSE SERPL-MCNC: 104 MG/DL — HIGH (ref 70–99)
GLUCOSE SERPL-MCNC: 105 MG/DL — HIGH (ref 70–99)
GLUCOSE SERPL-MCNC: 107 MG/DL — HIGH (ref 70–99)
GLUCOSE SERPL-MCNC: 107 MG/DL — HIGH (ref 70–99)
GLUCOSE SERPL-MCNC: 109 MG/DL
GLUCOSE SERPL-MCNC: 109 MG/DL — HIGH (ref 70–99)
GLUCOSE SERPL-MCNC: 109 MG/DL — HIGH (ref 70–99)
GLUCOSE SERPL-MCNC: 110 MG/DL — HIGH (ref 70–99)
GLUCOSE SERPL-MCNC: 111 MG/DL — HIGH (ref 70–99)
GLUCOSE SERPL-MCNC: 112 MG/DL — HIGH (ref 70–99)
GLUCOSE SERPL-MCNC: 114 MG/DL — HIGH (ref 70–99)
GLUCOSE SERPL-MCNC: 114 MG/DL — HIGH (ref 70–99)
GLUCOSE SERPL-MCNC: 116 MG/DL
GLUCOSE SERPL-MCNC: 116 MG/DL — HIGH (ref 70–99)
GLUCOSE SERPL-MCNC: 116 MG/DL — HIGH (ref 70–99)
GLUCOSE SERPL-MCNC: 117 MG/DL — HIGH (ref 70–99)
GLUCOSE SERPL-MCNC: 118 MG/DL — HIGH (ref 70–99)
GLUCOSE SERPL-MCNC: 119 MG/DL — HIGH (ref 70–99)
GLUCOSE SERPL-MCNC: 119 MG/DL — HIGH (ref 70–99)
GLUCOSE SERPL-MCNC: 120 MG/DL — HIGH (ref 70–99)
GLUCOSE SERPL-MCNC: 121 MG/DL — HIGH (ref 70–99)
GLUCOSE SERPL-MCNC: 121 MG/DL — HIGH (ref 70–99)
GLUCOSE SERPL-MCNC: 125 MG/DL — HIGH (ref 70–99)
GLUCOSE SERPL-MCNC: 126 MG/DL — HIGH (ref 70–99)
GLUCOSE SERPL-MCNC: 131 MG/DL — HIGH (ref 70–99)
GLUCOSE SERPL-MCNC: 131 MG/DL — HIGH (ref 70–99)
GLUCOSE SERPL-MCNC: 144 MG/DL — HIGH (ref 70–99)
GLUCOSE SERPL-MCNC: 208 MG/DL — HIGH (ref 70–99)
GLUCOSE SERPL-MCNC: 67 MG/DL — LOW (ref 70–99)
GLUCOSE SERPL-MCNC: 82 MG/DL — SIGNIFICANT CHANGE UP (ref 70–99)
GLUCOSE SERPL-MCNC: 84 MG/DL — SIGNIFICANT CHANGE UP (ref 70–99)
GLUCOSE SERPL-MCNC: 84 MG/DL — SIGNIFICANT CHANGE UP (ref 70–99)
GLUCOSE SERPL-MCNC: 85 MG/DL — SIGNIFICANT CHANGE UP (ref 70–99)
GLUCOSE SERPL-MCNC: 89 MG/DL — SIGNIFICANT CHANGE UP (ref 70–99)
GLUCOSE SERPL-MCNC: 90 MG/DL — SIGNIFICANT CHANGE UP (ref 70–99)
GLUCOSE SERPL-MCNC: 91 MG/DL — SIGNIFICANT CHANGE UP (ref 70–99)
GLUCOSE SERPL-MCNC: 91 MG/DL — SIGNIFICANT CHANGE UP (ref 70–99)
GLUCOSE SERPL-MCNC: 93 MG/DL — SIGNIFICANT CHANGE UP (ref 70–99)
GLUCOSE SERPL-MCNC: 96 MG/DL — SIGNIFICANT CHANGE UP (ref 70–99)
GLUCOSE SERPL-MCNC: 99 MG/DL
GLUCOSE SERPL-MCNC: 99 MG/DL — SIGNIFICANT CHANGE UP (ref 70–99)
GLUCOSE UR QL: NEGATIVE MG/DL — SIGNIFICANT CHANGE UP
GLUCOSE UR QL: NEGATIVE — SIGNIFICANT CHANGE UP
HAV IGM SER QL: NONREACTIVE
HBV CORE IGM SER QL: NONREACTIVE
HBV SURFACE AB SER QL: NONREACTIVE
HBV SURFACE AG SER QL: NONREACTIVE
HCO3 BLDV-SCNC: 23 MMOL/L — SIGNIFICANT CHANGE UP (ref 22–29)
HCO3 BLDV-SCNC: 26 MMOL/L — SIGNIFICANT CHANGE UP (ref 22–29)
HCO3 BLDV-SCNC: 26 MMOL/L — SIGNIFICANT CHANGE UP (ref 22–29)
HCO3 BLDV-SCNC: 29 MMOL/L — SIGNIFICANT CHANGE UP (ref 22–29)
HCO3 BLDV-SCNC: 29 MMOL/L — SIGNIFICANT CHANGE UP (ref 22–29)
HCO3 BLDV-SCNC: 30 MMOL/L — HIGH (ref 22–29)
HCO3 BLDV-SCNC: 33 MMOL/L — HIGH (ref 22–29)
HCT VFR BLD CALC: 34.7 % — LOW (ref 39–50)
HCT VFR BLD CALC: 35.3 % — LOW (ref 39–50)
HCT VFR BLD CALC: 35.6 % — LOW (ref 39–50)
HCT VFR BLD CALC: 35.8 % — LOW (ref 39–50)
HCT VFR BLD CALC: 36.5 % — LOW (ref 39–50)
HCT VFR BLD CALC: 37 % — LOW (ref 39–50)
HCT VFR BLD CALC: 37.5 % — LOW (ref 39–50)
HCT VFR BLD CALC: 37.7 % — LOW (ref 39–50)
HCT VFR BLD CALC: 37.9 % — LOW (ref 39–50)
HCT VFR BLD CALC: 38.1 % — LOW (ref 39–50)
HCT VFR BLD CALC: 38.3 % — LOW (ref 39–50)
HCT VFR BLD CALC: 38.9 % — LOW (ref 39–50)
HCT VFR BLD CALC: 38.9 % — LOW (ref 39–50)
HCT VFR BLD CALC: 39.4 % — SIGNIFICANT CHANGE UP (ref 39–50)
HCT VFR BLD CALC: 39.6 % — SIGNIFICANT CHANGE UP (ref 39–50)
HCT VFR BLD CALC: 39.9 % — SIGNIFICANT CHANGE UP (ref 39–50)
HCT VFR BLD CALC: 40 % — SIGNIFICANT CHANGE UP (ref 39–50)
HCT VFR BLD CALC: 40.2 % — SIGNIFICANT CHANGE UP (ref 39–50)
HCT VFR BLD CALC: 40.5 % — SIGNIFICANT CHANGE UP (ref 39–50)
HCT VFR BLD CALC: 40.8 % — SIGNIFICANT CHANGE UP (ref 39–50)
HCT VFR BLD CALC: 41.1 % — SIGNIFICANT CHANGE UP (ref 39–50)
HCT VFR BLD CALC: 41.2 % — SIGNIFICANT CHANGE UP (ref 39–50)
HCT VFR BLD CALC: 41.2 % — SIGNIFICANT CHANGE UP (ref 39–50)
HCT VFR BLD CALC: 41.5 % — SIGNIFICANT CHANGE UP (ref 39–50)
HCT VFR BLD CALC: 41.8 % — SIGNIFICANT CHANGE UP (ref 39–50)
HCT VFR BLD CALC: 42.3 % — SIGNIFICANT CHANGE UP (ref 39–50)
HCT VFR BLD CALC: 42.4 % — SIGNIFICANT CHANGE UP (ref 39–50)
HCT VFR BLD CALC: 42.4 % — SIGNIFICANT CHANGE UP (ref 39–50)
HCT VFR BLD CALC: 42.5 % — SIGNIFICANT CHANGE UP (ref 39–50)
HCT VFR BLD CALC: 42.6 % — SIGNIFICANT CHANGE UP (ref 39–50)
HCT VFR BLD CALC: 42.9 % — SIGNIFICANT CHANGE UP (ref 39–50)
HCT VFR BLD CALC: 43.5 % — SIGNIFICANT CHANGE UP (ref 39–50)
HCT VFR BLD CALC: 43.7 % — SIGNIFICANT CHANGE UP (ref 39–50)
HCT VFR BLD CALC: 43.8 % — SIGNIFICANT CHANGE UP (ref 39–50)
HCT VFR BLD CALC: 43.9 %
HCT VFR BLD CALC: 43.9 %
HCT VFR BLD CALC: 44.2 % — SIGNIFICANT CHANGE UP (ref 39–50)
HCT VFR BLD CALC: 44.4 % — SIGNIFICANT CHANGE UP (ref 39–50)
HCT VFR BLD CALC: 45 % — SIGNIFICANT CHANGE UP (ref 39–50)
HCT VFR BLD CALC: 45.2 % — SIGNIFICANT CHANGE UP (ref 39–50)
HCT VFR BLD CALC: 45.7 %
HCT VFR BLD CALC: 46.2 %
HCT VFR BLD CALC: 46.3 %
HCT VFR BLD CALC: 46.3 % — SIGNIFICANT CHANGE UP (ref 39–50)
HCT VFR BLD CALC: 46.4 %
HCT VFR BLD CALC: 46.7 % — SIGNIFICANT CHANGE UP (ref 39–50)
HCT VFR BLD CALC: 46.7 % — SIGNIFICANT CHANGE UP (ref 39–50)
HCT VFR BLD CALC: 46.9 %
HCT VFR BLD CALC: 47.2 % — SIGNIFICANT CHANGE UP (ref 39–50)
HCT VFR BLD CALC: 47.8 %
HCT VFR BLD CALC: 47.9 %
HCT VFR BLD CALC: 47.9 %
HCT VFR BLD CALC: 47.9 % — SIGNIFICANT CHANGE UP (ref 39–50)
HCT VFR BLD CALC: 48.5 % — SIGNIFICANT CHANGE UP (ref 39–50)
HCT VFR BLD CALC: 48.6 %
HCT VFR BLDA CALC: 37 % — LOW (ref 39–51)
HCT VFR BLDA CALC: 38 % — LOW (ref 39–51)
HCT VFR BLDA CALC: 38 % — LOW (ref 39–51)
HCT VFR BLDA CALC: 39 % — SIGNIFICANT CHANGE UP (ref 39–51)
HCT VFR BLDA CALC: 42 % — SIGNIFICANT CHANGE UP (ref 39–51)
HCT VFR BLDA CALC: 46 % — SIGNIFICANT CHANGE UP (ref 39–51)
HCV AB SER QL: NONREACTIVE
HCV RNA FLD QL NAA+PROBE: NORMAL
HCV RNA SPEC QL PROBE+SIG AMP: NOT DETECTED
HCV S/CO RATIO: 0.12 S/CO
HDLC SERPL-MCNC: 50 MG/DL — SIGNIFICANT CHANGE UP
HGB BLD CALC-MCNC: 12.2 G/DL — LOW (ref 12.6–17.4)
HGB BLD CALC-MCNC: 12.5 G/DL — LOW (ref 12.6–17.4)
HGB BLD CALC-MCNC: 12.7 G/DL — SIGNIFICANT CHANGE UP (ref 12.6–17.4)
HGB BLD CALC-MCNC: 13 G/DL — SIGNIFICANT CHANGE UP (ref 12.6–17.4)
HGB BLD CALC-MCNC: 13.9 G/DL — SIGNIFICANT CHANGE UP (ref 12.6–17.4)
HGB BLD CALC-MCNC: 15.4 G/DL — SIGNIFICANT CHANGE UP (ref 12.6–17.4)
HGB BLD-MCNC: 11.3 G/DL — LOW (ref 13–17)
HGB BLD-MCNC: 11.8 G/DL — LOW (ref 13–17)
HGB BLD-MCNC: 11.8 G/DL — LOW (ref 13–17)
HGB BLD-MCNC: 11.9 G/DL — LOW (ref 13–17)
HGB BLD-MCNC: 11.9 G/DL — LOW (ref 13–17)
HGB BLD-MCNC: 12.1 G/DL — LOW (ref 13–17)
HGB BLD-MCNC: 12.1 G/DL — LOW (ref 13–17)
HGB BLD-MCNC: 12.2 G/DL — LOW (ref 13–17)
HGB BLD-MCNC: 12.3 G/DL — LOW (ref 13–17)
HGB BLD-MCNC: 12.4 G/DL — LOW (ref 13–17)
HGB BLD-MCNC: 12.7 G/DL — LOW (ref 13–17)
HGB BLD-MCNC: 12.8 G/DL — LOW (ref 13–17)
HGB BLD-MCNC: 12.9 G/DL — LOW (ref 13–17)
HGB BLD-MCNC: 13 G/DL — SIGNIFICANT CHANGE UP (ref 13–17)
HGB BLD-MCNC: 13 G/DL — SIGNIFICANT CHANGE UP (ref 13–17)
HGB BLD-MCNC: 13.1 G/DL — SIGNIFICANT CHANGE UP (ref 13–17)
HGB BLD-MCNC: 13.3 G/DL — SIGNIFICANT CHANGE UP (ref 13–17)
HGB BLD-MCNC: 13.5 G/DL — SIGNIFICANT CHANGE UP (ref 13–17)
HGB BLD-MCNC: 13.6 G/DL — SIGNIFICANT CHANGE UP (ref 13–17)
HGB BLD-MCNC: 13.7 G/DL — SIGNIFICANT CHANGE UP (ref 13–17)
HGB BLD-MCNC: 13.8 G/DL — SIGNIFICANT CHANGE UP (ref 13–17)
HGB BLD-MCNC: 13.9 G/DL — SIGNIFICANT CHANGE UP (ref 13–17)
HGB BLD-MCNC: 13.9 G/DL — SIGNIFICANT CHANGE UP (ref 13–17)
HGB BLD-MCNC: 14 G/DL — SIGNIFICANT CHANGE UP (ref 13–17)
HGB BLD-MCNC: 14.1 G/DL — SIGNIFICANT CHANGE UP (ref 13–17)
HGB BLD-MCNC: 14.6 G/DL — SIGNIFICANT CHANGE UP (ref 13–17)
HGB BLD-MCNC: 14.7 G/DL
HGB BLD-MCNC: 14.7 G/DL — SIGNIFICANT CHANGE UP (ref 13–17)
HGB BLD-MCNC: 14.8 G/DL — SIGNIFICANT CHANGE UP (ref 13–17)
HGB BLD-MCNC: 14.9 G/DL
HGB BLD-MCNC: 14.9 G/DL
HGB BLD-MCNC: 14.9 G/DL — SIGNIFICANT CHANGE UP (ref 13–17)
HGB BLD-MCNC: 15 G/DL
HGB BLD-MCNC: 15.1 G/DL
HGB BLD-MCNC: 15.2 G/DL
HGB BLD-MCNC: 15.2 G/DL — SIGNIFICANT CHANGE UP (ref 13–17)
HGB BLD-MCNC: 15.3 G/DL — SIGNIFICANT CHANGE UP (ref 13–17)
HGB BLD-MCNC: 15.4 G/DL
HGB BLD-MCNC: 15.4 G/DL — SIGNIFICANT CHANGE UP (ref 13–17)
HGB BLD-MCNC: 15.5 G/DL — SIGNIFICANT CHANGE UP (ref 13–17)
HGB BLD-MCNC: 15.6 G/DL — SIGNIFICANT CHANGE UP (ref 13–17)
HGB BLD-MCNC: 15.7 G/DL
HGB BLD-MCNC: 15.7 G/DL
HGB BLD-MCNC: 15.8 G/DL
HGB BLD-MCNC: 15.8 G/DL — SIGNIFICANT CHANGE UP (ref 13–17)
HGB BLD-MCNC: 16.3 G/DL
HOROWITZ INDEX BLDV+IHG-RTO: 21 — SIGNIFICANT CHANGE UP
HOROWITZ INDEX BLDV+IHG-RTO: 21 — SIGNIFICANT CHANGE UP
HPIV3 RNA SPEC QL NAA+PROBE: DETECTED
HPIV3 RNA SPEC QL NAA+PROBE: DETECTED
HYALINE CASTS # UR AUTO: 1 /LPF — SIGNIFICANT CHANGE UP (ref 0–7)
HYALINE CASTS # UR AUTO: 18 /LPF — HIGH (ref 0–2)
HYALINE CASTS # UR AUTO: 3 /LPF — HIGH (ref 0–2)
HYALINE CASTS # UR AUTO: 4 /LPF — HIGH (ref 0–2)
IANC: 5.33 K/UL — SIGNIFICANT CHANGE UP (ref 1.8–7.4)
IANC: 5.87 K/UL — SIGNIFICANT CHANGE UP (ref 1.8–7.4)
IMM GRANULOCYTES NFR BLD AUTO: 0.4 %
IMM GRANULOCYTES NFR BLD AUTO: 0.4 %
IMM GRANULOCYTES NFR BLD AUTO: 0.5 %
IMM GRANULOCYTES NFR BLD AUTO: 0.5 % — SIGNIFICANT CHANGE UP (ref 0–0.9)
IMM GRANULOCYTES NFR BLD AUTO: 0.6 %
IMM GRANULOCYTES NFR BLD AUTO: 0.6 % — SIGNIFICANT CHANGE UP (ref 0–0.9)
IMM GRANULOCYTES NFR BLD AUTO: 0.7 % — SIGNIFICANT CHANGE UP (ref 0–0.9)
IMM GRANULOCYTES NFR BLD AUTO: 0.8 %
IMM GRANULOCYTES NFR BLD AUTO: 0.8 %
IMM GRANULOCYTES NFR BLD AUTO: 0.8 % — SIGNIFICANT CHANGE UP (ref 0–0.9)
IMM GRANULOCYTES NFR BLD AUTO: 0.8 % — SIGNIFICANT CHANGE UP (ref 0–0.9)
IMM GRANULOCYTES NFR BLD AUTO: 0.9 %
IMM GRANULOCYTES NFR BLD AUTO: 0.9 % — SIGNIFICANT CHANGE UP (ref 0–0.9)
IMM GRANULOCYTES NFR BLD AUTO: 1 %
IMM GRANULOCYTES NFR BLD AUTO: 1.5 %
INR BLD: 1.04 RATIO — SIGNIFICANT CHANGE UP (ref 0.88–1.16)
INR BLD: 1.1 RATIO — SIGNIFICANT CHANGE UP (ref 0.85–1.18)
INR BLD: 1.12 RATIO — SIGNIFICANT CHANGE UP (ref 0.88–1.16)
INR BLD: 1.14 RATIO — SIGNIFICANT CHANGE UP (ref 0.88–1.16)
INR BLD: 1.24 RATIO — HIGH (ref 0.85–1.18)
INR BLD: 1.66 RATIO — HIGH (ref 0.88–1.16)
INR BLD: 1.88 RATIO — HIGH (ref 0.88–1.16)
KETONES UR-MCNC: ABNORMAL
KETONES UR-MCNC: NEGATIVE MG/DL — SIGNIFICANT CHANGE UP
KETONES UR-MCNC: NEGATIVE — SIGNIFICANT CHANGE UP
KETONES UR-MCNC: SIGNIFICANT CHANGE UP
KETONES URINE: NEGATIVE
LACTATE BLDV-MCNC: 2 MMOL/L — SIGNIFICANT CHANGE UP (ref 0.5–2)
LACTATE BLDV-MCNC: 2.1 MMOL/L — HIGH (ref 0.5–2)
LACTATE BLDV-MCNC: 2.4 MMOL/L — HIGH (ref 0.5–2)
LACTATE BLDV-MCNC: 2.7 MMOL/L — HIGH (ref 0.5–2)
LACTATE BLDV-MCNC: 2.8 MMOL/L — HIGH (ref 0.5–2)
LACTATE BLDV-MCNC: 3.2 MMOL/L — HIGH (ref 0.5–2)
LACTATE BLDV-MCNC: 3.9 MMOL/L — HIGH (ref 0.5–2)
LACTATE SERPL-SCNC: 1.6 MMOL/L — SIGNIFICANT CHANGE UP (ref 0.5–2)
LACTATE SERPL-SCNC: 2.1 MMOL/L — HIGH (ref 0.5–2)
LACTATE SERPL-SCNC: 2.5 MMOL/L — HIGH (ref 0.5–2)
LACTATE SERPL-SCNC: 2.9 MMOL/L — HIGH (ref 0.5–2)
LACTATE SERPL-SCNC: 3.1 MMOL/L — HIGH (ref 0.5–2)
LEUKOCYTE ESTERASE UR-ACNC: ABNORMAL
LEUKOCYTE ESTERASE UR-ACNC: ABNORMAL
LEUKOCYTE ESTERASE UR-ACNC: NEGATIVE — SIGNIFICANT CHANGE UP
LEUKOCYTE ESTERASE URINE: NEGATIVE
LEVETIRACETAM SERPL-MCNC: 33.9 UG/ML — SIGNIFICANT CHANGE UP (ref 10–40)
LIPID PNL WITH DIRECT LDL SERPL: 94 MG/DL — SIGNIFICANT CHANGE UP
LYMPHOCYTES # BLD AUTO: 0.25 K/UL — LOW (ref 1–3.3)
LYMPHOCYTES # BLD AUTO: 0.6 K/UL
LYMPHOCYTES # BLD AUTO: 0.62 K/UL
LYMPHOCYTES # BLD AUTO: 0.7 K/UL
LYMPHOCYTES # BLD AUTO: 0.77 K/UL — LOW (ref 1–3.3)
LYMPHOCYTES # BLD AUTO: 0.87 K/UL
LYMPHOCYTES # BLD AUTO: 0.87 K/UL — LOW (ref 1–3.3)
LYMPHOCYTES # BLD AUTO: 0.88 K/UL — LOW (ref 1–3.3)
LYMPHOCYTES # BLD AUTO: 0.89 K/UL
LYMPHOCYTES # BLD AUTO: 0.95 K/UL — LOW (ref 1–3.3)
LYMPHOCYTES # BLD AUTO: 0.96 K/UL — LOW (ref 1–3.3)
LYMPHOCYTES # BLD AUTO: 0.97 K/UL
LYMPHOCYTES # BLD AUTO: 1.01 K/UL — SIGNIFICANT CHANGE UP (ref 1–3.3)
LYMPHOCYTES # BLD AUTO: 1.01 K/UL — SIGNIFICANT CHANGE UP (ref 1–3.3)
LYMPHOCYTES # BLD AUTO: 1.02 K/UL
LYMPHOCYTES # BLD AUTO: 1.16 K/UL
LYMPHOCYTES # BLD AUTO: 1.34 K/UL — SIGNIFICANT CHANGE UP (ref 1–3.3)
LYMPHOCYTES # BLD AUTO: 1.36 K/UL — SIGNIFICANT CHANGE UP (ref 1–3.3)
LYMPHOCYTES # BLD AUTO: 1.44 K/UL — SIGNIFICANT CHANGE UP (ref 1–3.3)
LYMPHOCYTES # BLD AUTO: 1.45 K/UL
LYMPHOCYTES # BLD AUTO: 1.51 K/UL — SIGNIFICANT CHANGE UP (ref 1–3.3)
LYMPHOCYTES # BLD AUTO: 1.77 K/UL
LYMPHOCYTES # BLD AUTO: 1.98 K/UL
LYMPHOCYTES # BLD AUTO: 10.9 % — LOW (ref 13–44)
LYMPHOCYTES # BLD AUTO: 11.3 % — LOW (ref 13–44)
LYMPHOCYTES # BLD AUTO: 13 % — SIGNIFICANT CHANGE UP (ref 13–44)
LYMPHOCYTES # BLD AUTO: 13 % — SIGNIFICANT CHANGE UP (ref 13–44)
LYMPHOCYTES # BLD AUTO: 13.5 % — SIGNIFICANT CHANGE UP (ref 13–44)
LYMPHOCYTES # BLD AUTO: 14.9 % — SIGNIFICANT CHANGE UP (ref 13–44)
LYMPHOCYTES # BLD AUTO: 18.3 % — SIGNIFICANT CHANGE UP (ref 13–44)
LYMPHOCYTES # BLD AUTO: 18.4 % — SIGNIFICANT CHANGE UP (ref 13–44)
LYMPHOCYTES # BLD AUTO: 19.6 % — SIGNIFICANT CHANGE UP (ref 13–44)
LYMPHOCYTES # BLD AUTO: 19.6 % — SIGNIFICANT CHANGE UP (ref 13–44)
LYMPHOCYTES # BLD AUTO: 24.7 % — SIGNIFICANT CHANGE UP (ref 13–44)
LYMPHOCYTES # BLD AUTO: 4.4 % — LOW (ref 13–44)
LYMPHOCYTES NFR BLD AUTO: 12 %
LYMPHOCYTES NFR BLD AUTO: 12.9 %
LYMPHOCYTES NFR BLD AUTO: 13.1 %
LYMPHOCYTES NFR BLD AUTO: 13.6 %
LYMPHOCYTES NFR BLD AUTO: 14.1 %
LYMPHOCYTES NFR BLD AUTO: 15.7 %
LYMPHOCYTES NFR BLD AUTO: 16.1 %
LYMPHOCYTES NFR BLD AUTO: 21.4 %
LYMPHOCYTES NFR BLD AUTO: 24.5 %
LYMPHOCYTES NFR BLD AUTO: 33.8 %
LYMPHOCYTES NFR BLD AUTO: 9.5 %
MAGNESIUM SERPL-MCNC: 1.8 MG/DL — SIGNIFICANT CHANGE UP (ref 1.6–2.6)
MAGNESIUM SERPL-MCNC: 1.9 MG/DL — SIGNIFICANT CHANGE UP (ref 1.6–2.6)
MAGNESIUM SERPL-MCNC: 2 MG/DL — SIGNIFICANT CHANGE UP (ref 1.6–2.6)
MAGNESIUM SERPL-MCNC: 2.1 MG/DL — SIGNIFICANT CHANGE UP (ref 1.6–2.6)
MAGNESIUM SERPL-MCNC: 2.2 MG/DL — SIGNIFICANT CHANGE UP (ref 1.6–2.6)
MAGNESIUM SERPL-MCNC: 2.3 MG/DL — SIGNIFICANT CHANGE UP (ref 1.6–2.6)
MAGNESIUM SERPL-MCNC: 2.4 MG/DL — SIGNIFICANT CHANGE UP (ref 1.6–2.6)
MAN DIFF?: NORMAL
MANUAL SMEAR VERIFICATION: SIGNIFICANT CHANGE UP
MANUAL SMEAR VERIFICATION: SIGNIFICANT CHANGE UP
MCHC RBC-ENTMCNC: 29.8 PG — SIGNIFICANT CHANGE UP (ref 27–34)
MCHC RBC-ENTMCNC: 29.9 PG — SIGNIFICANT CHANGE UP (ref 27–34)
MCHC RBC-ENTMCNC: 30.1 PG — SIGNIFICANT CHANGE UP (ref 27–34)
MCHC RBC-ENTMCNC: 30.1 PG — SIGNIFICANT CHANGE UP (ref 27–34)
MCHC RBC-ENTMCNC: 30.2 PG — SIGNIFICANT CHANGE UP (ref 27–34)
MCHC RBC-ENTMCNC: 30.2 PG — SIGNIFICANT CHANGE UP (ref 27–34)
MCHC RBC-ENTMCNC: 30.3 PG — SIGNIFICANT CHANGE UP (ref 27–34)
MCHC RBC-ENTMCNC: 30.3 PG — SIGNIFICANT CHANGE UP (ref 27–34)
MCHC RBC-ENTMCNC: 30.4 PG — SIGNIFICANT CHANGE UP (ref 27–34)
MCHC RBC-ENTMCNC: 30.5 PG — SIGNIFICANT CHANGE UP (ref 27–34)
MCHC RBC-ENTMCNC: 30.6 PG — SIGNIFICANT CHANGE UP (ref 27–34)
MCHC RBC-ENTMCNC: 30.7 PG — SIGNIFICANT CHANGE UP (ref 27–34)
MCHC RBC-ENTMCNC: 30.8 PG — SIGNIFICANT CHANGE UP (ref 27–34)
MCHC RBC-ENTMCNC: 30.9 PG
MCHC RBC-ENTMCNC: 30.9 PG — SIGNIFICANT CHANGE UP (ref 27–34)
MCHC RBC-ENTMCNC: 30.9 PG — SIGNIFICANT CHANGE UP (ref 27–34)
MCHC RBC-ENTMCNC: 31 PG
MCHC RBC-ENTMCNC: 31 PG
MCHC RBC-ENTMCNC: 31 PG — SIGNIFICANT CHANGE UP (ref 27–34)
MCHC RBC-ENTMCNC: 31.1 PG
MCHC RBC-ENTMCNC: 31.1 PG — SIGNIFICANT CHANGE UP (ref 27–34)
MCHC RBC-ENTMCNC: 31.2 PG
MCHC RBC-ENTMCNC: 31.2 PG — SIGNIFICANT CHANGE UP (ref 27–34)
MCHC RBC-ENTMCNC: 31.3 PG
MCHC RBC-ENTMCNC: 31.3 PG
MCHC RBC-ENTMCNC: 31.3 PG — SIGNIFICANT CHANGE UP (ref 27–34)
MCHC RBC-ENTMCNC: 31.3 PG — SIGNIFICANT CHANGE UP (ref 27–34)
MCHC RBC-ENTMCNC: 31.4 PG
MCHC RBC-ENTMCNC: 31.4 PG — SIGNIFICANT CHANGE UP (ref 27–34)
MCHC RBC-ENTMCNC: 31.5 PG — SIGNIFICANT CHANGE UP (ref 27–34)
MCHC RBC-ENTMCNC: 31.6 PG — SIGNIFICANT CHANGE UP (ref 27–34)
MCHC RBC-ENTMCNC: 31.6 PG — SIGNIFICANT CHANGE UP (ref 27–34)
MCHC RBC-ENTMCNC: 31.8 GM/DL — LOW (ref 32–36)
MCHC RBC-ENTMCNC: 31.8 GM/DL — LOW (ref 32–36)
MCHC RBC-ENTMCNC: 32 GM/DL — SIGNIFICANT CHANGE UP (ref 32–36)
MCHC RBC-ENTMCNC: 32 GM/DL — SIGNIFICANT CHANGE UP (ref 32–36)
MCHC RBC-ENTMCNC: 32.1 GM/DL — SIGNIFICANT CHANGE UP (ref 32–36)
MCHC RBC-ENTMCNC: 32.2 GM/DL
MCHC RBC-ENTMCNC: 32.2 PG — SIGNIFICANT CHANGE UP (ref 27–34)
MCHC RBC-ENTMCNC: 32.3 GM/DL
MCHC RBC-ENTMCNC: 32.3 GM/DL
MCHC RBC-ENTMCNC: 32.3 GM/DL — SIGNIFICANT CHANGE UP (ref 32–36)
MCHC RBC-ENTMCNC: 32.4 PG
MCHC RBC-ENTMCNC: 32.5 GM/DL — SIGNIFICANT CHANGE UP (ref 32–36)
MCHC RBC-ENTMCNC: 32.5 GM/DL — SIGNIFICANT CHANGE UP (ref 32–36)
MCHC RBC-ENTMCNC: 32.6 G/DL — SIGNIFICANT CHANGE UP (ref 32–36)
MCHC RBC-ENTMCNC: 32.6 GM/DL — SIGNIFICANT CHANGE UP (ref 32–36)
MCHC RBC-ENTMCNC: 32.7 GM/DL — SIGNIFICANT CHANGE UP (ref 32–36)
MCHC RBC-ENTMCNC: 32.7 GM/DL — SIGNIFICANT CHANGE UP (ref 32–36)
MCHC RBC-ENTMCNC: 32.8 GM/DL
MCHC RBC-ENTMCNC: 32.8 GM/DL — SIGNIFICANT CHANGE UP (ref 32–36)
MCHC RBC-ENTMCNC: 32.9 GM/DL — SIGNIFICANT CHANGE UP (ref 32–36)
MCHC RBC-ENTMCNC: 33 GM/DL
MCHC RBC-ENTMCNC: 33 GM/DL
MCHC RBC-ENTMCNC: 33 GM/DL — SIGNIFICANT CHANGE UP (ref 32–36)
MCHC RBC-ENTMCNC: 33 GM/DL — SIGNIFICANT CHANGE UP (ref 32–36)
MCHC RBC-ENTMCNC: 33.1 GM/DL — SIGNIFICANT CHANGE UP (ref 32–36)
MCHC RBC-ENTMCNC: 33.2 G/DL — SIGNIFICANT CHANGE UP (ref 32–36)
MCHC RBC-ENTMCNC: 33.2 GM/DL — SIGNIFICANT CHANGE UP (ref 32–36)
MCHC RBC-ENTMCNC: 33.3 GM/DL — SIGNIFICANT CHANGE UP (ref 32–36)
MCHC RBC-ENTMCNC: 33.4 GM/DL — SIGNIFICANT CHANGE UP (ref 32–36)
MCHC RBC-ENTMCNC: 33.5 GM/DL
MCHC RBC-ENTMCNC: 33.6 GM/DL — SIGNIFICANT CHANGE UP (ref 32–36)
MCHC RBC-ENTMCNC: 33.6 GM/DL — SIGNIFICANT CHANGE UP (ref 32–36)
MCHC RBC-ENTMCNC: 33.9 GM/DL — SIGNIFICANT CHANGE UP (ref 32–36)
MCHC RBC-ENTMCNC: 34 GM/DL
MCHC RBC-ENTMCNC: 34 GM/DL — SIGNIFICANT CHANGE UP (ref 32–36)
MCHC RBC-ENTMCNC: 34.1 G/DL — SIGNIFICANT CHANGE UP (ref 32–36)
MCHC RBC-ENTMCNC: 34.2 GM/DL
MCHC RBC-ENTMCNC: 34.3 G/DL — SIGNIFICANT CHANGE UP (ref 32–36)
MCHC RBC-ENTMCNC: 34.3 GM/DL — SIGNIFICANT CHANGE UP (ref 32–36)
MCHC RBC-ENTMCNC: 34.8 G/DL — SIGNIFICANT CHANGE UP (ref 32–36)
MCHC RBC-ENTMCNC: 35.1 G/DL — SIGNIFICANT CHANGE UP (ref 32–36)
MCHC RBC-ENTMCNC: 35.2 G/DL — SIGNIFICANT CHANGE UP (ref 32–36)
MCHC RBC-ENTMCNC: 35.2 G/DL — SIGNIFICANT CHANGE UP (ref 32–36)
MCV RBC AUTO: 100.4 FL
MCV RBC AUTO: 86.8 FL — SIGNIFICANT CHANGE UP (ref 80–100)
MCV RBC AUTO: 88.2 FL — SIGNIFICANT CHANGE UP (ref 80–100)
MCV RBC AUTO: 88.4 FL — SIGNIFICANT CHANGE UP (ref 80–100)
MCV RBC AUTO: 88.6 FL — SIGNIFICANT CHANGE UP (ref 80–100)
MCV RBC AUTO: 89.3 FL — SIGNIFICANT CHANGE UP (ref 80–100)
MCV RBC AUTO: 90.3 FL
MCV RBC AUTO: 90.8 FL — SIGNIFICANT CHANGE UP (ref 80–100)
MCV RBC AUTO: 91.1 FL — SIGNIFICANT CHANGE UP (ref 80–100)
MCV RBC AUTO: 91.7 FL — SIGNIFICANT CHANGE UP (ref 80–100)
MCV RBC AUTO: 91.8 FL — SIGNIFICANT CHANGE UP (ref 80–100)
MCV RBC AUTO: 91.8 FL — SIGNIFICANT CHANGE UP (ref 80–100)
MCV RBC AUTO: 91.9 FL — SIGNIFICANT CHANGE UP (ref 80–100)
MCV RBC AUTO: 92.1 FL
MCV RBC AUTO: 92.1 FL — SIGNIFICANT CHANGE UP (ref 80–100)
MCV RBC AUTO: 92.2 FL — SIGNIFICANT CHANGE UP (ref 80–100)
MCV RBC AUTO: 92.4 FL
MCV RBC AUTO: 92.4 FL — SIGNIFICANT CHANGE UP (ref 80–100)
MCV RBC AUTO: 93 FL — SIGNIFICANT CHANGE UP (ref 80–100)
MCV RBC AUTO: 93.1 FL — SIGNIFICANT CHANGE UP (ref 80–100)
MCV RBC AUTO: 93.2 FL — SIGNIFICANT CHANGE UP (ref 80–100)
MCV RBC AUTO: 93.2 FL — SIGNIFICANT CHANGE UP (ref 80–100)
MCV RBC AUTO: 93.3 FL — SIGNIFICANT CHANGE UP (ref 80–100)
MCV RBC AUTO: 93.4 FL — SIGNIFICANT CHANGE UP (ref 80–100)
MCV RBC AUTO: 93.5 FL
MCV RBC AUTO: 93.5 FL — SIGNIFICANT CHANGE UP (ref 80–100)
MCV RBC AUTO: 94 FL — SIGNIFICANT CHANGE UP (ref 80–100)
MCV RBC AUTO: 94 FL — SIGNIFICANT CHANGE UP (ref 80–100)
MCV RBC AUTO: 94.1 FL — SIGNIFICANT CHANGE UP (ref 80–100)
MCV RBC AUTO: 94.3 FL — SIGNIFICANT CHANGE UP (ref 80–100)
MCV RBC AUTO: 94.6 FL — SIGNIFICANT CHANGE UP (ref 80–100)
MCV RBC AUTO: 94.7 FL
MCV RBC AUTO: 94.7 FL
MCV RBC AUTO: 94.7 FL — SIGNIFICANT CHANGE UP (ref 80–100)
MCV RBC AUTO: 94.7 FL — SIGNIFICANT CHANGE UP (ref 80–100)
MCV RBC AUTO: 94.9 FL — SIGNIFICANT CHANGE UP (ref 80–100)
MCV RBC AUTO: 94.9 FL — SIGNIFICANT CHANGE UP (ref 80–100)
MCV RBC AUTO: 95 FL — SIGNIFICANT CHANGE UP (ref 80–100)
MCV RBC AUTO: 95.1 FL — SIGNIFICANT CHANGE UP (ref 80–100)
MCV RBC AUTO: 95.2 FL
MCV RBC AUTO: 95.2 FL — SIGNIFICANT CHANGE UP (ref 80–100)
MCV RBC AUTO: 95.4 FL — SIGNIFICANT CHANGE UP (ref 80–100)
MCV RBC AUTO: 95.5 FL
MCV RBC AUTO: 96 FL — SIGNIFICANT CHANGE UP (ref 80–100)
MCV RBC AUTO: 96.1 FL — SIGNIFICANT CHANGE UP (ref 80–100)
MCV RBC AUTO: 96.3 FL
MCV RBC AUTO: 96.4 FL — SIGNIFICANT CHANGE UP (ref 80–100)
MCV RBC AUTO: 96.5 FL
MCV RBC AUTO: 96.5 FL — SIGNIFICANT CHANGE UP (ref 80–100)
MCV RBC AUTO: 96.5 FL — SIGNIFICANT CHANGE UP (ref 80–100)
MONOCYTES # BLD AUTO: 0.34 K/UL — SIGNIFICANT CHANGE UP (ref 0–0.9)
MONOCYTES # BLD AUTO: 0.44 K/UL
MONOCYTES # BLD AUTO: 0.47 K/UL
MONOCYTES # BLD AUTO: 0.47 K/UL
MONOCYTES # BLD AUTO: 0.47 K/UL — SIGNIFICANT CHANGE UP (ref 0–0.9)
MONOCYTES # BLD AUTO: 0.49 K/UL
MONOCYTES # BLD AUTO: 0.52 K/UL — SIGNIFICANT CHANGE UP (ref 0–0.9)
MONOCYTES # BLD AUTO: 0.53 K/UL — SIGNIFICANT CHANGE UP (ref 0–0.9)
MONOCYTES # BLD AUTO: 0.54 K/UL
MONOCYTES # BLD AUTO: 0.54 K/UL — SIGNIFICANT CHANGE UP (ref 0–0.9)
MONOCYTES # BLD AUTO: 0.59 K/UL
MONOCYTES # BLD AUTO: 0.61 K/UL — SIGNIFICANT CHANGE UP (ref 0–0.9)
MONOCYTES # BLD AUTO: 0.64 K/UL — SIGNIFICANT CHANGE UP (ref 0–0.9)
MONOCYTES # BLD AUTO: 0.64 K/UL — SIGNIFICANT CHANGE UP (ref 0–0.9)
MONOCYTES # BLD AUTO: 0.69 K/UL — SIGNIFICANT CHANGE UP (ref 0–0.9)
MONOCYTES # BLD AUTO: 0.71 K/UL
MONOCYTES # BLD AUTO: 0.75 K/UL
MONOCYTES # BLD AUTO: 0.77 K/UL
MONOCYTES # BLD AUTO: 0.8 K/UL — SIGNIFICANT CHANGE UP (ref 0–0.9)
MONOCYTES # BLD AUTO: 0.81 K/UL
MONOCYTES # BLD AUTO: 0.85 K/UL — SIGNIFICANT CHANGE UP (ref 0–0.9)
MONOCYTES # BLD AUTO: 0.9 K/UL — SIGNIFICANT CHANGE UP (ref 0–0.9)
MONOCYTES # BLD AUTO: 1.11 K/UL
MONOCYTES NFR BLD AUTO: 10.1 %
MONOCYTES NFR BLD AUTO: 10.1 %
MONOCYTES NFR BLD AUTO: 11.6 % — SIGNIFICANT CHANGE UP (ref 2–14)
MONOCYTES NFR BLD AUTO: 12 %
MONOCYTES NFR BLD AUTO: 12.3 % — SIGNIFICANT CHANGE UP (ref 2–14)
MONOCYTES NFR BLD AUTO: 12.4 %
MONOCYTES NFR BLD AUTO: 12.5 % — SIGNIFICANT CHANGE UP (ref 2–14)
MONOCYTES NFR BLD AUTO: 12.5 % — SIGNIFICANT CHANGE UP (ref 2–14)
MONOCYTES NFR BLD AUTO: 13.5 %
MONOCYTES NFR BLD AUTO: 13.8 %
MONOCYTES NFR BLD AUTO: 6.1 % — SIGNIFICANT CHANGE UP (ref 2–14)
MONOCYTES NFR BLD AUTO: 6.7 %
MONOCYTES NFR BLD AUTO: 6.8 %
MONOCYTES NFR BLD AUTO: 7 % — SIGNIFICANT CHANGE UP (ref 2–14)
MONOCYTES NFR BLD AUTO: 7.1 % — SIGNIFICANT CHANGE UP (ref 2–14)
MONOCYTES NFR BLD AUTO: 7.2 %
MONOCYTES NFR BLD AUTO: 7.4 % — SIGNIFICANT CHANGE UP (ref 2–14)
MONOCYTES NFR BLD AUTO: 7.6 % — SIGNIFICANT CHANGE UP (ref 2–14)
MONOCYTES NFR BLD AUTO: 7.7 % — SIGNIFICANT CHANGE UP (ref 2–14)
MONOCYTES NFR BLD AUTO: 9.1 %
MONOCYTES NFR BLD AUTO: 9.4 %
MONOCYTES NFR BLD AUTO: 9.7 % — SIGNIFICANT CHANGE UP (ref 2–14)
MONOCYTES NFR BLD AUTO: 9.9 % — SIGNIFICANT CHANGE UP (ref 2–14)
MRSA PCR RESULT.: SIGNIFICANT CHANGE UP
NEUTROPHILS # BLD AUTO: 2.62 K/UL
NEUTROPHILS # BLD AUTO: 3.04 K/UL
NEUTROPHILS # BLD AUTO: 3.29 K/UL — SIGNIFICANT CHANGE UP (ref 1.8–7.4)
NEUTROPHILS # BLD AUTO: 3.41 K/UL — SIGNIFICANT CHANGE UP (ref 1.8–7.4)
NEUTROPHILS # BLD AUTO: 3.45 K/UL
NEUTROPHILS # BLD AUTO: 4.37 K/UL
NEUTROPHILS # BLD AUTO: 4.76 K/UL
NEUTROPHILS # BLD AUTO: 4.79 K/UL — SIGNIFICANT CHANGE UP (ref 1.8–7.4)
NEUTROPHILS # BLD AUTO: 4.83 K/UL
NEUTROPHILS # BLD AUTO: 4.86 K/UL — SIGNIFICANT CHANGE UP (ref 1.8–7.4)
NEUTROPHILS # BLD AUTO: 5 K/UL — SIGNIFICANT CHANGE UP (ref 1.8–7.4)
NEUTROPHILS # BLD AUTO: 5 K/UL — SIGNIFICANT CHANGE UP (ref 1.8–7.4)
NEUTROPHILS # BLD AUTO: 5.09 K/UL
NEUTROPHILS # BLD AUTO: 5.33 K/UL — SIGNIFICANT CHANGE UP (ref 1.8–7.4)
NEUTROPHILS # BLD AUTO: 5.36 K/UL
NEUTROPHILS # BLD AUTO: 5.41 K/UL — SIGNIFICANT CHANGE UP (ref 1.8–7.4)
NEUTROPHILS # BLD AUTO: 5.47 K/UL
NEUTROPHILS # BLD AUTO: 5.75 K/UL — SIGNIFICANT CHANGE UP (ref 1.8–7.4)
NEUTROPHILS # BLD AUTO: 5.77 K/UL
NEUTROPHILS # BLD AUTO: 5.87 K/UL — SIGNIFICANT CHANGE UP (ref 1.8–7.4)
NEUTROPHILS # BLD AUTO: 5.9 K/UL — SIGNIFICANT CHANGE UP (ref 1.8–7.4)
NEUTROPHILS # BLD AUTO: 6.06 K/UL
NEUTROPHILS # BLD AUTO: 6.42 K/UL — SIGNIFICANT CHANGE UP (ref 1.8–7.4)
NEUTROPHILS NFR BLD AUTO: 50 %
NEUTROPHILS NFR BLD AUTO: 59.7 % — SIGNIFICANT CHANGE UP (ref 43–77)
NEUTROPHILS NFR BLD AUTO: 59.8 %
NEUTROPHILS NFR BLD AUTO: 64.8 %
NEUTROPHILS NFR BLD AUTO: 66.3 % — SIGNIFICANT CHANGE UP (ref 43–77)
NEUTROPHILS NFR BLD AUTO: 66.3 % — SIGNIFICANT CHANGE UP (ref 43–77)
NEUTROPHILS NFR BLD AUTO: 68.2 % — SIGNIFICANT CHANGE UP (ref 43–77)
NEUTROPHILS NFR BLD AUTO: 69.6 %
NEUTROPHILS NFR BLD AUTO: 71.9 % — SIGNIFICANT CHANGE UP (ref 43–77)
NEUTROPHILS NFR BLD AUTO: 73.3 %
NEUTROPHILS NFR BLD AUTO: 73.7 %
NEUTROPHILS NFR BLD AUTO: 73.8 %
NEUTROPHILS NFR BLD AUTO: 74 %
NEUTROPHILS NFR BLD AUTO: 74.1 % — SIGNIFICANT CHANGE UP (ref 43–77)
NEUTROPHILS NFR BLD AUTO: 77.6 %
NEUTROPHILS NFR BLD AUTO: 78.4 % — HIGH (ref 43–77)
NEUTROPHILS NFR BLD AUTO: 78.5 % — HIGH (ref 43–77)
NEUTROPHILS NFR BLD AUTO: 79.1 % — HIGH (ref 43–77)
NEUTROPHILS NFR BLD AUTO: 79.6 %
NEUTROPHILS NFR BLD AUTO: 79.7 % — HIGH (ref 43–77)
NEUTROPHILS NFR BLD AUTO: 80.1 % — HIGH (ref 43–77)
NEUTROPHILS NFR BLD AUTO: 82 %
NEUTROPHILS NFR BLD AUTO: 86.9 % — HIGH (ref 43–77)
NEUTS BAND # BLD: 2.6 % — SIGNIFICANT CHANGE UP (ref 0–8)
NITRITE UR-MCNC: NEGATIVE — SIGNIFICANT CHANGE UP
NITRITE URINE: NEGATIVE
NON HDL CHOLESTEROL: 116 MG/DL — SIGNIFICANT CHANGE UP
NORMALIZED SCT PPP-RTO: 0.81 RATIO — SIGNIFICANT CHANGE UP (ref 0–1.16)
NORMALIZED SCT PPP-RTO: SIGNIFICANT CHANGE UP
NRBC # BLD: 0 /100 WBCS — SIGNIFICANT CHANGE UP (ref 0–0)
NRBC # BLD: 1 /100 WBCS — HIGH (ref 0–0)
NRBC # BLD: 1 /100 — HIGH (ref 0–0)
NRBC # BLD: 2 /100 WBCS — HIGH (ref 0–0)
NRBC # FLD: 0 K/UL — SIGNIFICANT CHANGE UP (ref 0–0)
NT-PROBNP SERPL-SCNC: 2489 PG/ML — HIGH (ref 0–300)
OVALOCYTES BLD QL SMEAR: SLIGHT — SIGNIFICANT CHANGE UP
PCO2 BLDV: 37 MMHG — LOW (ref 42–55)
PCO2 BLDV: 44 MMHG — SIGNIFICANT CHANGE UP (ref 42–55)
PCO2 BLDV: 45 MMHG — SIGNIFICANT CHANGE UP (ref 42–55)
PCO2 BLDV: 48 MMHG — SIGNIFICANT CHANGE UP (ref 42–55)
PCO2 BLDV: 50 MMHG — SIGNIFICANT CHANGE UP (ref 42–55)
PCO2 BLDV: 51 MMHG — SIGNIFICANT CHANGE UP (ref 42–55)
PCO2 BLDV: 61 MMHG — HIGH (ref 42–55)
PH BLDV: 7.18 — CRITICAL LOW (ref 7.32–7.43)
PH BLDV: 7.33 — SIGNIFICANT CHANGE UP (ref 7.32–7.43)
PH BLDV: 7.39 — SIGNIFICANT CHANGE UP (ref 7.32–7.43)
PH BLDV: 7.42 — SIGNIFICANT CHANGE UP (ref 7.32–7.43)
PH BLDV: 7.43 — SIGNIFICANT CHANGE UP (ref 7.32–7.43)
PH BLDV: 7.43 — SIGNIFICANT CHANGE UP (ref 7.32–7.43)
PH BLDV: 7.46 — HIGH (ref 7.32–7.43)
PH UR: 5.5 — SIGNIFICANT CHANGE UP (ref 5–8)
PH UR: 6 — SIGNIFICANT CHANGE UP (ref 5–8)
PH URINE: 6
PHOSPHATE SERPL-MCNC: 2.1 MG/DL — LOW (ref 2.5–4.5)
PHOSPHATE SERPL-MCNC: 2.4 MG/DL — LOW (ref 2.5–4.5)
PHOSPHATE SERPL-MCNC: 2.6 MG/DL — SIGNIFICANT CHANGE UP (ref 2.5–4.5)
PHOSPHATE SERPL-MCNC: 2.7 MG/DL — SIGNIFICANT CHANGE UP (ref 2.5–4.5)
PHOSPHATE SERPL-MCNC: 2.8 MG/DL — SIGNIFICANT CHANGE UP (ref 2.5–4.5)
PHOSPHATE SERPL-MCNC: 2.9 MG/DL — SIGNIFICANT CHANGE UP (ref 2.5–4.5)
PHOSPHATE SERPL-MCNC: 2.9 MG/DL — SIGNIFICANT CHANGE UP (ref 2.5–4.5)
PHOSPHATE SERPL-MCNC: 3 MG/DL — SIGNIFICANT CHANGE UP (ref 2.5–4.5)
PHOSPHATE SERPL-MCNC: 3.1 MG/DL — SIGNIFICANT CHANGE UP (ref 2.5–4.5)
PHOSPHATE SERPL-MCNC: 3.1 MG/DL — SIGNIFICANT CHANGE UP (ref 2.5–4.5)
PHOSPHATE SERPL-MCNC: 3.2 MG/DL — SIGNIFICANT CHANGE UP (ref 2.5–4.5)
PHOSPHATE SERPL-MCNC: 3.3 MG/DL — SIGNIFICANT CHANGE UP (ref 2.5–4.5)
PHOSPHATE SERPL-MCNC: 3.3 MG/DL — SIGNIFICANT CHANGE UP (ref 2.5–4.5)
PHOSPHATE SERPL-MCNC: 3.4 MG/DL — SIGNIFICANT CHANGE UP (ref 2.5–4.5)
PHOSPHATE SERPL-MCNC: 3.4 MG/DL — SIGNIFICANT CHANGE UP (ref 2.5–4.5)
PHOSPHATE SERPL-MCNC: 3.5 MG/DL — SIGNIFICANT CHANGE UP (ref 2.5–4.5)
PHOSPHATE SERPL-MCNC: 3.5 MG/DL — SIGNIFICANT CHANGE UP (ref 2.5–4.5)
PHOSPHATE SERPL-MCNC: 3.6 MG/DL — SIGNIFICANT CHANGE UP (ref 2.5–4.5)
PHOSPHATE SERPL-MCNC: 3.6 MG/DL — SIGNIFICANT CHANGE UP (ref 2.5–4.5)
PHOSPHATE SERPL-MCNC: 3.7 MG/DL — SIGNIFICANT CHANGE UP (ref 2.5–4.5)
PHOSPHATE SERPL-MCNC: 3.7 MG/DL — SIGNIFICANT CHANGE UP (ref 2.5–4.5)
PHOSPHATE SERPL-MCNC: 3.9 MG/DL — SIGNIFICANT CHANGE UP (ref 2.5–4.5)
PHOSPHATE SERPL-MCNC: 4 MG/DL — SIGNIFICANT CHANGE UP (ref 2.5–4.5)
PHOSPHATE SERPL-MCNC: 4 MG/DL — SIGNIFICANT CHANGE UP (ref 2.5–4.5)
PHOSPHATE SERPL-MCNC: 5 MG/DL — HIGH (ref 2.5–4.5)
PLAT MORPH BLD: NORMAL — SIGNIFICANT CHANGE UP
PLAT MORPH BLD: NORMAL — SIGNIFICANT CHANGE UP
PLATELET # BLD AUTO: 108 K/UL — LOW (ref 150–400)
PLATELET # BLD AUTO: 109 K/UL — LOW (ref 150–400)
PLATELET # BLD AUTO: 112 K/UL — LOW (ref 150–400)
PLATELET # BLD AUTO: 114 K/UL — LOW (ref 150–400)
PLATELET # BLD AUTO: 115 K/UL — LOW (ref 150–400)
PLATELET # BLD AUTO: 119 K/UL — LOW (ref 150–400)
PLATELET # BLD AUTO: 119 K/UL — LOW (ref 150–400)
PLATELET # BLD AUTO: 128 K/UL — LOW (ref 150–400)
PLATELET # BLD AUTO: 130 K/UL
PLATELET # BLD AUTO: 140 K/UL — LOW (ref 150–400)
PLATELET # BLD AUTO: 143 K/UL — LOW (ref 150–400)
PLATELET # BLD AUTO: 143 K/UL — LOW (ref 150–400)
PLATELET # BLD AUTO: 148 K/UL — LOW (ref 150–400)
PLATELET # BLD AUTO: 150 K/UL
PLATELET # BLD AUTO: 151 K/UL — SIGNIFICANT CHANGE UP (ref 150–400)
PLATELET # BLD AUTO: 153 K/UL — SIGNIFICANT CHANGE UP (ref 150–400)
PLATELET # BLD AUTO: 158 K/UL — SIGNIFICANT CHANGE UP (ref 150–400)
PLATELET # BLD AUTO: 159 K/UL — SIGNIFICANT CHANGE UP (ref 150–400)
PLATELET # BLD AUTO: 162 K/UL — SIGNIFICANT CHANGE UP (ref 150–400)
PLATELET # BLD AUTO: 162 K/UL — SIGNIFICANT CHANGE UP (ref 150–400)
PLATELET # BLD AUTO: 164 K/UL
PLATELET # BLD AUTO: 166 K/UL — SIGNIFICANT CHANGE UP (ref 150–400)
PLATELET # BLD AUTO: 167 K/UL — SIGNIFICANT CHANGE UP (ref 150–400)
PLATELET # BLD AUTO: 167 K/UL — SIGNIFICANT CHANGE UP (ref 150–400)
PLATELET # BLD AUTO: 168 K/UL — SIGNIFICANT CHANGE UP (ref 150–400)
PLATELET # BLD AUTO: 170 K/UL — SIGNIFICANT CHANGE UP (ref 150–400)
PLATELET # BLD AUTO: 172 K/UL — SIGNIFICANT CHANGE UP (ref 150–400)
PLATELET # BLD AUTO: 174 K/UL
PLATELET # BLD AUTO: 174 K/UL — SIGNIFICANT CHANGE UP (ref 150–400)
PLATELET # BLD AUTO: 175 K/UL — SIGNIFICANT CHANGE UP (ref 150–400)
PLATELET # BLD AUTO: 176 K/UL — SIGNIFICANT CHANGE UP (ref 150–400)
PLATELET # BLD AUTO: 177 K/UL — SIGNIFICANT CHANGE UP (ref 150–400)
PLATELET # BLD AUTO: 177 K/UL — SIGNIFICANT CHANGE UP (ref 150–400)
PLATELET # BLD AUTO: 181 K/UL — SIGNIFICANT CHANGE UP (ref 150–400)
PLATELET # BLD AUTO: 182 K/UL
PLATELET # BLD AUTO: 183 K/UL — SIGNIFICANT CHANGE UP (ref 150–400)
PLATELET # BLD AUTO: 184 K/UL
PLATELET # BLD AUTO: 186 K/UL
PLATELET # BLD AUTO: 187 K/UL — SIGNIFICANT CHANGE UP (ref 150–400)
PLATELET # BLD AUTO: 189 K/UL
PLATELET # BLD AUTO: 190 K/UL — SIGNIFICANT CHANGE UP (ref 150–400)
PLATELET # BLD AUTO: 192 K/UL — SIGNIFICANT CHANGE UP (ref 150–400)
PLATELET # BLD AUTO: 196 K/UL — SIGNIFICANT CHANGE UP (ref 150–400)
PLATELET # BLD AUTO: 196 K/UL — SIGNIFICANT CHANGE UP (ref 150–400)
PLATELET # BLD AUTO: 197 K/UL — SIGNIFICANT CHANGE UP (ref 150–400)
PLATELET # BLD AUTO: 205 K/UL — SIGNIFICANT CHANGE UP (ref 150–400)
PLATELET # BLD AUTO: 206 K/UL — SIGNIFICANT CHANGE UP (ref 150–400)
PLATELET # BLD AUTO: 206 K/UL — SIGNIFICANT CHANGE UP (ref 150–400)
PLATELET # BLD AUTO: 208 K/UL — SIGNIFICANT CHANGE UP (ref 150–400)
PLATELET # BLD AUTO: 214 K/UL — SIGNIFICANT CHANGE UP (ref 150–400)
PLATELET # BLD AUTO: 219 K/UL
PLATELET # BLD AUTO: 224 K/UL — SIGNIFICANT CHANGE UP (ref 150–400)
PLATELET # BLD AUTO: 225 K/UL
PLATELET # BLD AUTO: 225 K/UL — SIGNIFICANT CHANGE UP (ref 150–400)
PLATELET # BLD AUTO: 227 K/UL — SIGNIFICANT CHANGE UP (ref 150–400)
PLATELET # BLD AUTO: 230 K/UL — SIGNIFICANT CHANGE UP (ref 150–400)
PLATELET # BLD AUTO: 276 K/UL
PLATELET COUNT - ESTIMATE: NORMAL — SIGNIFICANT CHANGE UP
PO2 BLDV: 168 MMHG — HIGH (ref 25–45)
PO2 BLDV: 22 MMHG — LOW (ref 25–45)
PO2 BLDV: 28 MMHG — SIGNIFICANT CHANGE UP (ref 25–45)
PO2 BLDV: 39 MMHG — SIGNIFICANT CHANGE UP (ref 25–45)
PO2 BLDV: 40 MMHG — SIGNIFICANT CHANGE UP (ref 25–45)
PO2 BLDV: 40 MMHG — SIGNIFICANT CHANGE UP (ref 25–45)
PO2 BLDV: 65 MMHG — HIGH (ref 25–45)
POIKILOCYTOSIS BLD QL AUTO: SLIGHT — SIGNIFICANT CHANGE UP
POIKILOCYTOSIS BLD QL AUTO: SLIGHT — SIGNIFICANT CHANGE UP
POLYCHROMASIA BLD QL SMEAR: SLIGHT — SIGNIFICANT CHANGE UP
POLYCHROMASIA BLD QL SMEAR: SLIGHT — SIGNIFICANT CHANGE UP
POTASSIUM BLDV-SCNC: 3.1 MMOL/L — LOW (ref 3.5–5.1)
POTASSIUM BLDV-SCNC: 3.3 MMOL/L — LOW (ref 3.5–5.1)
POTASSIUM BLDV-SCNC: 3.4 MMOL/L — LOW (ref 3.5–5.1)
POTASSIUM BLDV-SCNC: 3.9 MMOL/L — SIGNIFICANT CHANGE UP (ref 3.5–5.1)
POTASSIUM BLDV-SCNC: 3.9 MMOL/L — SIGNIFICANT CHANGE UP (ref 3.5–5.1)
POTASSIUM BLDV-SCNC: 4 MMOL/L — SIGNIFICANT CHANGE UP (ref 3.5–5.1)
POTASSIUM SERPL-MCNC: 2.7 MMOL/L — CRITICAL LOW (ref 3.5–5.3)
POTASSIUM SERPL-MCNC: 3.2 MMOL/L — LOW (ref 3.5–5.3)
POTASSIUM SERPL-MCNC: 3.3 MMOL/L — LOW (ref 3.5–5.3)
POTASSIUM SERPL-MCNC: 3.4 MMOL/L — LOW (ref 3.5–5.3)
POTASSIUM SERPL-MCNC: 3.5 MMOL/L — SIGNIFICANT CHANGE UP (ref 3.5–5.3)
POTASSIUM SERPL-MCNC: 3.6 MMOL/L — SIGNIFICANT CHANGE UP (ref 3.5–5.3)
POTASSIUM SERPL-MCNC: 3.7 MMOL/L — SIGNIFICANT CHANGE UP (ref 3.5–5.3)
POTASSIUM SERPL-MCNC: 3.8 MMOL/L — SIGNIFICANT CHANGE UP (ref 3.5–5.3)
POTASSIUM SERPL-MCNC: 3.9 MMOL/L — SIGNIFICANT CHANGE UP (ref 3.5–5.3)
POTASSIUM SERPL-MCNC: 4 MMOL/L — SIGNIFICANT CHANGE UP (ref 3.5–5.3)
POTASSIUM SERPL-MCNC: 4.1 MMOL/L — SIGNIFICANT CHANGE UP (ref 3.5–5.3)
POTASSIUM SERPL-MCNC: 4.2 MMOL/L — SIGNIFICANT CHANGE UP (ref 3.5–5.3)
POTASSIUM SERPL-MCNC: 4.2 MMOL/L — SIGNIFICANT CHANGE UP (ref 3.5–5.3)
POTASSIUM SERPL-MCNC: 4.4 MMOL/L — SIGNIFICANT CHANGE UP (ref 3.5–5.3)
POTASSIUM SERPL-MCNC: 4.5 MMOL/L — SIGNIFICANT CHANGE UP (ref 3.5–5.3)
POTASSIUM SERPL-MCNC: 4.5 MMOL/L — SIGNIFICANT CHANGE UP (ref 3.5–5.3)
POTASSIUM SERPL-MCNC: 4.8 MMOL/L — SIGNIFICANT CHANGE UP (ref 3.5–5.3)
POTASSIUM SERPL-MCNC: 5.1 MMOL/L — SIGNIFICANT CHANGE UP (ref 3.5–5.3)
POTASSIUM SERPL-MCNC: 5.3 MMOL/L — SIGNIFICANT CHANGE UP (ref 3.5–5.3)
POTASSIUM SERPL-MCNC: 5.4 MMOL/L — HIGH (ref 3.5–5.3)
POTASSIUM SERPL-SCNC: 2.7 MMOL/L — CRITICAL LOW (ref 3.5–5.3)
POTASSIUM SERPL-SCNC: 3.2 MMOL/L — LOW (ref 3.5–5.3)
POTASSIUM SERPL-SCNC: 3.3 MMOL/L — LOW (ref 3.5–5.3)
POTASSIUM SERPL-SCNC: 3.4 MMOL/L — LOW (ref 3.5–5.3)
POTASSIUM SERPL-SCNC: 3.5 MMOL/L — SIGNIFICANT CHANGE UP (ref 3.5–5.3)
POTASSIUM SERPL-SCNC: 3.6 MMOL/L — SIGNIFICANT CHANGE UP (ref 3.5–5.3)
POTASSIUM SERPL-SCNC: 3.7 MMOL/L — SIGNIFICANT CHANGE UP (ref 3.5–5.3)
POTASSIUM SERPL-SCNC: 3.8 MMOL/L — SIGNIFICANT CHANGE UP (ref 3.5–5.3)
POTASSIUM SERPL-SCNC: 3.9 MMOL/L — SIGNIFICANT CHANGE UP (ref 3.5–5.3)
POTASSIUM SERPL-SCNC: 4 MMOL/L — SIGNIFICANT CHANGE UP (ref 3.5–5.3)
POTASSIUM SERPL-SCNC: 4.1 MMOL/L
POTASSIUM SERPL-SCNC: 4.1 MMOL/L
POTASSIUM SERPL-SCNC: 4.1 MMOL/L — SIGNIFICANT CHANGE UP (ref 3.5–5.3)
POTASSIUM SERPL-SCNC: 4.2 MMOL/L — SIGNIFICANT CHANGE UP (ref 3.5–5.3)
POTASSIUM SERPL-SCNC: 4.2 MMOL/L — SIGNIFICANT CHANGE UP (ref 3.5–5.3)
POTASSIUM SERPL-SCNC: 4.4 MMOL/L
POTASSIUM SERPL-SCNC: 4.4 MMOL/L
POTASSIUM SERPL-SCNC: 4.4 MMOL/L — SIGNIFICANT CHANGE UP (ref 3.5–5.3)
POTASSIUM SERPL-SCNC: 4.5 MMOL/L — SIGNIFICANT CHANGE UP (ref 3.5–5.3)
POTASSIUM SERPL-SCNC: 4.5 MMOL/L — SIGNIFICANT CHANGE UP (ref 3.5–5.3)
POTASSIUM SERPL-SCNC: 4.8 MMOL/L — SIGNIFICANT CHANGE UP (ref 3.5–5.3)
POTASSIUM SERPL-SCNC: 5.1 MMOL/L — SIGNIFICANT CHANGE UP (ref 3.5–5.3)
POTASSIUM SERPL-SCNC: 5.3 MMOL/L — SIGNIFICANT CHANGE UP (ref 3.5–5.3)
POTASSIUM SERPL-SCNC: 5.4 MMOL/L — HIGH (ref 3.5–5.3)
PROCALCITONIN SERPL-MCNC: 0.09 NG/ML — SIGNIFICANT CHANGE UP (ref 0.02–0.1)
PROCALCITONIN SERPL-MCNC: 0.14 NG/ML — HIGH (ref 0.02–0.1)
PROCALCITONIN SERPL-MCNC: 2 NG/ML — HIGH (ref 0.02–0.1)
PROT SERPL-MCNC: 4.5 G/DL — LOW (ref 6–8.3)
PROT SERPL-MCNC: 4.5 G/DL — LOW (ref 6–8.3)
PROT SERPL-MCNC: 4.8 G/DL — LOW (ref 6–8.3)
PROT SERPL-MCNC: 4.8 G/DL — LOW (ref 6–8.3)
PROT SERPL-MCNC: 4.9 G/DL — LOW (ref 6–8.3)
PROT SERPL-MCNC: 5 G/DL — LOW (ref 6–8.3)
PROT SERPL-MCNC: 5 G/DL — LOW (ref 6–8.3)
PROT SERPL-MCNC: 5.1 G/DL — LOW (ref 6–8.3)
PROT SERPL-MCNC: 5.1 G/DL — LOW (ref 6–8.3)
PROT SERPL-MCNC: 5.2 G/DL — LOW (ref 6–8.3)
PROT SERPL-MCNC: 5.2 G/DL — LOW (ref 6–8.3)
PROT SERPL-MCNC: 5.4 G/DL — LOW (ref 6–8.3)
PROT SERPL-MCNC: 5.8 G/DL — LOW (ref 6–8.3)
PROT SERPL-MCNC: 5.9 G/DL — LOW (ref 6–8.3)
PROT SERPL-MCNC: 6 G/DL — SIGNIFICANT CHANGE UP (ref 6–8.3)
PROT SERPL-MCNC: 6.1 G/DL — SIGNIFICANT CHANGE UP (ref 6–8.3)
PROT SERPL-MCNC: 6.2 G/DL
PROT SERPL-MCNC: 6.2 G/DL — SIGNIFICANT CHANGE UP (ref 6–8.3)
PROT SERPL-MCNC: 6.3 G/DL
PROT SERPL-MCNC: 6.3 G/DL
PROT SERPL-MCNC: 6.4 G/DL
PROT SERPL-MCNC: 6.4 G/DL — SIGNIFICANT CHANGE UP (ref 6–8.3)
PROT UR-MCNC: 30 MG/DL
PROT UR-MCNC: ABNORMAL
PROT UR-MCNC: SIGNIFICANT CHANGE UP
PROTEIN URINE: ABNORMAL
PROTHROM AB SERPL-ACNC: 11.5 SEC — SIGNIFICANT CHANGE UP (ref 9.5–13)
PROTHROM AB SERPL-ACNC: 12.1 SEC — SIGNIFICANT CHANGE UP (ref 10.5–13.4)
PROTHROM AB SERPL-ACNC: 13 SEC — SIGNIFICANT CHANGE UP (ref 10.5–13.4)
PROTHROM AB SERPL-ACNC: 13.1 SEC — SIGNIFICANT CHANGE UP (ref 10.5–13.4)
PROTHROM AB SERPL-ACNC: 13.6 SEC — HIGH (ref 9.5–13)
PROTHROM AB SERPL-ACNC: 19.2 SEC — HIGH (ref 10.5–13.4)
PROTHROM AB SERPL-ACNC: 21.8 SEC — HIGH (ref 10.5–13.4)
RAPID RVP RESULT: DETECTED
RAPID RVP RESULT: DETECTED
RAPID RVP RESULT: SIGNIFICANT CHANGE UP
RBC # BLD: 3.6 M/UL — LOW (ref 4.2–5.8)
RBC # BLD: 3.81 M/UL — LOW (ref 4.2–5.8)
RBC # BLD: 3.83 M/UL — LOW (ref 4.2–5.8)
RBC # BLD: 3.84 M/UL — LOW (ref 4.2–5.8)
RBC # BLD: 3.84 M/UL — LOW (ref 4.2–5.8)
RBC # BLD: 3.88 M/UL — LOW (ref 4.2–5.8)
RBC # BLD: 3.88 M/UL — LOW (ref 4.2–5.8)
RBC # BLD: 3.97 M/UL — LOW (ref 4.2–5.8)
RBC # BLD: 4.01 M/UL — LOW (ref 4.2–5.8)
RBC # BLD: 4.02 M/UL — LOW (ref 4.2–5.8)
RBC # BLD: 4.08 M/UL — LOW (ref 4.2–5.8)
RBC # BLD: 4.09 M/UL — LOW (ref 4.2–5.8)
RBC # BLD: 4.11 M/UL — LOW (ref 4.2–5.8)
RBC # BLD: 4.12 M/UL — LOW (ref 4.2–5.8)
RBC # BLD: 4.21 M/UL — SIGNIFICANT CHANGE UP (ref 4.2–5.8)
RBC # BLD: 4.22 M/UL — SIGNIFICANT CHANGE UP (ref 4.2–5.8)
RBC # BLD: 4.23 M/UL — SIGNIFICANT CHANGE UP (ref 4.2–5.8)
RBC # BLD: 4.27 M/UL — SIGNIFICANT CHANGE UP (ref 4.2–5.8)
RBC # BLD: 4.29 M/UL — SIGNIFICANT CHANGE UP (ref 4.2–5.8)
RBC # BLD: 4.29 M/UL — SIGNIFICANT CHANGE UP (ref 4.2–5.8)
RBC # BLD: 4.33 M/UL — SIGNIFICANT CHANGE UP (ref 4.2–5.8)
RBC # BLD: 4.34 M/UL — SIGNIFICANT CHANGE UP (ref 4.2–5.8)
RBC # BLD: 4.35 M/UL — SIGNIFICANT CHANGE UP (ref 4.2–5.8)
RBC # BLD: 4.36 M/UL — SIGNIFICANT CHANGE UP (ref 4.2–5.8)
RBC # BLD: 4.37 M/UL — SIGNIFICANT CHANGE UP (ref 4.2–5.8)
RBC # BLD: 4.46 M/UL — SIGNIFICANT CHANGE UP (ref 4.2–5.8)
RBC # BLD: 4.47 M/UL — SIGNIFICANT CHANGE UP (ref 4.2–5.8)
RBC # BLD: 4.48 M/UL — SIGNIFICANT CHANGE UP (ref 4.2–5.8)
RBC # BLD: 4.48 M/UL — SIGNIFICANT CHANGE UP (ref 4.2–5.8)
RBC # BLD: 4.5 M/UL — SIGNIFICANT CHANGE UP (ref 4.2–5.8)
RBC # BLD: 4.54 M/UL — SIGNIFICANT CHANGE UP (ref 4.2–5.8)
RBC # BLD: 4.59 M/UL — SIGNIFICANT CHANGE UP (ref 4.2–5.8)
RBC # BLD: 4.6 M/UL — SIGNIFICANT CHANGE UP (ref 4.2–5.8)
RBC # BLD: 4.63 M/UL — SIGNIFICANT CHANGE UP (ref 4.2–5.8)
RBC # BLD: 4.71 M/UL — SIGNIFICANT CHANGE UP (ref 4.2–5.8)
RBC # BLD: 4.72 M/UL — SIGNIFICANT CHANGE UP (ref 4.2–5.8)
RBC # BLD: 4.73 M/UL — SIGNIFICANT CHANGE UP (ref 4.2–5.8)
RBC # BLD: 4.75 M/UL
RBC # BLD: 4.79 M/UL
RBC # BLD: 4.81 M/UL
RBC # BLD: 4.84 M/UL
RBC # BLD: 4.86 M/UL
RBC # BLD: 4.87 M/UL — SIGNIFICANT CHANGE UP (ref 4.2–5.8)
RBC # BLD: 4.89 M/UL
RBC # BLD: 4.9 M/UL
RBC # BLD: 4.91 M/UL
RBC # BLD: 4.91 M/UL — SIGNIFICANT CHANGE UP (ref 4.2–5.8)
RBC # BLD: 4.92 M/UL — SIGNIFICANT CHANGE UP (ref 4.2–5.8)
RBC # BLD: 4.93 M/UL — SIGNIFICANT CHANGE UP (ref 4.2–5.8)
RBC # BLD: 4.95 M/UL — SIGNIFICANT CHANGE UP (ref 4.2–5.8)
RBC # BLD: 5.01 M/UL — SIGNIFICANT CHANGE UP (ref 4.2–5.8)
RBC # BLD: 5.01 M/UL — SIGNIFICANT CHANGE UP (ref 4.2–5.8)
RBC # BLD: 5.02 M/UL
RBC # BLD: 5.02 M/UL — SIGNIFICANT CHANGE UP (ref 4.2–5.8)
RBC # BLD: 5.03 M/UL — SIGNIFICANT CHANGE UP (ref 4.2–5.8)
RBC # BLD: 5.06 M/UL
RBC # BLD: 5.06 M/UL — SIGNIFICANT CHANGE UP (ref 4.2–5.8)
RBC # BLD: 5.2 M/UL
RBC # BLD: 5.24 M/UL — SIGNIFICANT CHANGE UP (ref 4.2–5.8)
RBC # FLD: 12.8 % — SIGNIFICANT CHANGE UP (ref 10.3–14.5)
RBC # FLD: 13.1 %
RBC # FLD: 13.2 %
RBC # FLD: 13.2 %
RBC # FLD: 13.2 % — SIGNIFICANT CHANGE UP (ref 10.3–14.5)
RBC # FLD: 13.3 %
RBC # FLD: 13.3 %
RBC # FLD: 13.3 % — SIGNIFICANT CHANGE UP (ref 10.3–14.5)
RBC # FLD: 13.3 % — SIGNIFICANT CHANGE UP (ref 10.3–14.5)
RBC # FLD: 13.5 %
RBC # FLD: 13.5 % — SIGNIFICANT CHANGE UP (ref 10.3–14.5)
RBC # FLD: 13.6 %
RBC # FLD: 13.6 %
RBC # FLD: 13.6 % — SIGNIFICANT CHANGE UP (ref 10.3–14.5)
RBC # FLD: 14 % — SIGNIFICANT CHANGE UP (ref 10.3–14.5)
RBC # FLD: 14.1 % — SIGNIFICANT CHANGE UP (ref 10.3–14.5)
RBC # FLD: 14.2 %
RBC # FLD: 14.3 % — SIGNIFICANT CHANGE UP (ref 10.3–14.5)
RBC # FLD: 14.4 % — SIGNIFICANT CHANGE UP (ref 10.3–14.5)
RBC # FLD: 14.5 %
RBC # FLD: 14.5 % — SIGNIFICANT CHANGE UP (ref 10.3–14.5)
RBC # FLD: 14.5 % — SIGNIFICANT CHANGE UP (ref 10.3–14.5)
RBC # FLD: 14.6 %
RBC # FLD: 14.6 % — HIGH (ref 10.3–14.5)
RBC # FLD: 14.6 % — HIGH (ref 10.3–14.5)
RBC # FLD: 14.7 % — HIGH (ref 10.3–14.5)
RBC # FLD: 14.7 % — HIGH (ref 10.3–14.5)
RBC # FLD: 14.8 % — HIGH (ref 10.3–14.5)
RBC # FLD: 14.9 % — HIGH (ref 10.3–14.5)
RBC # FLD: 15 % — HIGH (ref 10.3–14.5)
RBC # FLD: 15.1 % — HIGH (ref 10.3–14.5)
RBC # FLD: 15.2 % — HIGH (ref 10.3–14.5)
RBC # FLD: 15.3 % — HIGH (ref 10.3–14.5)
RBC # FLD: 15.3 % — HIGH (ref 10.3–14.5)
RBC # FLD: 15.4 % — HIGH (ref 10.3–14.5)
RBC # FLD: 15.5 % — HIGH (ref 10.3–14.5)
RBC # FLD: 15.7 % — HIGH (ref 10.3–14.5)
RBC # FLD: 15.7 % — HIGH (ref 10.3–14.5)
RBC # FLD: 15.8 % — HIGH (ref 10.3–14.5)
RBC BLD AUTO: ABNORMAL
RBC BLD AUTO: SIGNIFICANT CHANGE UP
RBC CASTS # UR COMP ASSIST: 1 /HPF — SIGNIFICANT CHANGE UP (ref 0–4)
RBC CASTS # UR COMP ASSIST: 1 /HPF — SIGNIFICANT CHANGE UP (ref 0–4)
RBC CASTS # UR COMP ASSIST: 2 /HPF — SIGNIFICANT CHANGE UP (ref 0–4)
RBC CASTS # UR COMP ASSIST: 4 /HPF — SIGNIFICANT CHANGE UP (ref 0–4)
RBC CASTS # UR COMP ASSIST: 4095 /HPF — HIGH (ref 0–4)
RH IG SCN BLD-IMP: POSITIVE — SIGNIFICANT CHANGE UP
RV+EV RNA SPEC QL NAA+PROBE: DETECTED
RV+EV RNA SPEC QL NAA+PROBE: DETECTED
S AUREUS DNA NOSE QL NAA+PROBE: SIGNIFICANT CHANGE UP
SAO2 % BLDV: 26.7 % — LOW (ref 67–88)
SAO2 % BLDV: 46.7 % — LOW (ref 67–88)
SAO2 % BLDV: 67.6 % — SIGNIFICANT CHANGE UP (ref 67–88)
SAO2 % BLDV: 68.1 % — SIGNIFICANT CHANGE UP (ref 67–88)
SAO2 % BLDV: 69.7 % — SIGNIFICANT CHANGE UP (ref 67–88)
SAO2 % BLDV: 88.7 % — HIGH (ref 67–88)
SAO2 % BLDV: 99.2 % — HIGH (ref 67–88)
SARS-COV-2 RNA SPEC QL NAA+PROBE: SIGNIFICANT CHANGE UP
SODIUM SERPL-SCNC: 134 MMOL/L — LOW (ref 135–145)
SODIUM SERPL-SCNC: 136 MMOL/L — SIGNIFICANT CHANGE UP (ref 135–145)
SODIUM SERPL-SCNC: 137 MMOL/L — SIGNIFICANT CHANGE UP (ref 135–145)
SODIUM SERPL-SCNC: 138 MMOL/L — SIGNIFICANT CHANGE UP (ref 135–145)
SODIUM SERPL-SCNC: 139 MMOL/L — SIGNIFICANT CHANGE UP (ref 135–145)
SODIUM SERPL-SCNC: 140 MMOL/L — SIGNIFICANT CHANGE UP (ref 135–145)
SODIUM SERPL-SCNC: 141 MMOL/L — SIGNIFICANT CHANGE UP (ref 135–145)
SODIUM SERPL-SCNC: 141 MMOL/L — SIGNIFICANT CHANGE UP (ref 135–145)
SODIUM SERPL-SCNC: 142 MMOL/L
SODIUM SERPL-SCNC: 142 MMOL/L — SIGNIFICANT CHANGE UP (ref 135–145)
SODIUM SERPL-SCNC: 143 MMOL/L
SODIUM SERPL-SCNC: 143 MMOL/L — SIGNIFICANT CHANGE UP (ref 135–145)
SODIUM SERPL-SCNC: 144 MMOL/L
SODIUM SERPL-SCNC: 144 MMOL/L
SP GR SPEC: 1.02 — SIGNIFICANT CHANGE UP (ref 1.01–1.02)
SP GR SPEC: 1.02 — SIGNIFICANT CHANGE UP (ref 1.01–1.05)
SP GR SPEC: 1.03 — HIGH (ref 1.01–1.02)
SP GR SPEC: 1.03 — HIGH (ref 1.01–1.02)
SP GR SPEC: 1.03 — HIGH (ref 1–1.03)
SP GR SPEC: >1.05 (ref 1.01–1.02)
SPECIFIC GRAVITY URINE: 1.05
SPECIMEN SOURCE: SIGNIFICANT CHANGE UP
SQUAMOUS # UR AUTO: 0 /HPF — SIGNIFICANT CHANGE UP (ref 0–5)
TRIGL SERPL-MCNC: 112 MG/DL — SIGNIFICANT CHANGE UP
TROPONIN T, HIGH SENSITIVITY RESULT: 30 NG/L — SIGNIFICANT CHANGE UP (ref 0–51)
TROPONIN T, HIGH SENSITIVITY RESULT: 75 NG/L — HIGH (ref 0–51)
TROPONIN T, HIGH SENSITIVITY RESULT: 79 NG/L — HIGH (ref 0–51)
UROBILINOGEN FLD QL: 0.2 MG/DL — SIGNIFICANT CHANGE UP (ref 0.2–1)
UROBILINOGEN FLD QL: NEGATIVE — SIGNIFICANT CHANGE UP
UROBILINOGEN FLD QL: SIGNIFICANT CHANGE UP
UROBILINOGEN FLD QL: SIGNIFICANT CHANGE UP
UROBILINOGEN URINE: NORMAL
VALPROATE SERPL-MCNC: 20 UG/ML — LOW (ref 50–100)
VALPROATE SERPL-MCNC: 22 UG/ML — LOW (ref 50–100)
VANCOMYCIN TROUGH SERPL-MCNC: 10.5 UG/ML — SIGNIFICANT CHANGE UP (ref 10–20)
VANCOMYCIN TROUGH SERPL-MCNC: 17.5 UG/ML — SIGNIFICANT CHANGE UP (ref 10–20)
WBC # BLD: 10.48 K/UL — SIGNIFICANT CHANGE UP (ref 3.8–10.5)
WBC # BLD: 11.02 K/UL — HIGH (ref 3.8–10.5)
WBC # BLD: 11.25 K/UL — HIGH (ref 3.8–10.5)
WBC # BLD: 5.14 K/UL — SIGNIFICANT CHANGE UP (ref 3.8–10.5)
WBC # BLD: 5.51 K/UL — SIGNIFICANT CHANGE UP (ref 3.8–10.5)
WBC # BLD: 5.54 K/UL — SIGNIFICANT CHANGE UP (ref 3.8–10.5)
WBC # BLD: 5.59 K/UL — SIGNIFICANT CHANGE UP (ref 3.8–10.5)
WBC # BLD: 5.77 K/UL — SIGNIFICANT CHANGE UP (ref 3.8–10.5)
WBC # BLD: 5.8 K/UL — SIGNIFICANT CHANGE UP (ref 3.8–10.5)
WBC # BLD: 5.86 K/UL — SIGNIFICANT CHANGE UP (ref 3.8–10.5)
WBC # BLD: 6.01 K/UL — SIGNIFICANT CHANGE UP (ref 3.8–10.5)
WBC # BLD: 6.07 K/UL — SIGNIFICANT CHANGE UP (ref 3.8–10.5)
WBC # BLD: 6.29 K/UL — SIGNIFICANT CHANGE UP (ref 3.8–10.5)
WBC # BLD: 6.36 K/UL — SIGNIFICANT CHANGE UP (ref 3.8–10.5)
WBC # BLD: 6.46 K/UL — SIGNIFICANT CHANGE UP (ref 3.8–10.5)
WBC # BLD: 6.56 K/UL — SIGNIFICANT CHANGE UP (ref 3.8–10.5)
WBC # BLD: 6.58 K/UL — SIGNIFICANT CHANGE UP (ref 3.8–10.5)
WBC # BLD: 6.75 K/UL — SIGNIFICANT CHANGE UP (ref 3.8–10.5)
WBC # BLD: 6.79 K/UL — SIGNIFICANT CHANGE UP (ref 3.8–10.5)
WBC # BLD: 6.84 K/UL — SIGNIFICANT CHANGE UP (ref 3.8–10.5)
WBC # BLD: 6.96 K/UL — SIGNIFICANT CHANGE UP (ref 3.8–10.5)
WBC # BLD: 6.97 K/UL — SIGNIFICANT CHANGE UP (ref 3.8–10.5)
WBC # BLD: 7.27 K/UL — SIGNIFICANT CHANGE UP (ref 3.8–10.5)
WBC # BLD: 7.3 K/UL — SIGNIFICANT CHANGE UP (ref 3.8–10.5)
WBC # BLD: 7.32 K/UL — SIGNIFICANT CHANGE UP (ref 3.8–10.5)
WBC # BLD: 7.33 K/UL — SIGNIFICANT CHANGE UP (ref 3.8–10.5)
WBC # BLD: 7.4 K/UL — SIGNIFICANT CHANGE UP (ref 3.8–10.5)
WBC # BLD: 7.48 K/UL — SIGNIFICANT CHANGE UP (ref 3.8–10.5)
WBC # BLD: 7.53 K/UL — SIGNIFICANT CHANGE UP (ref 3.8–10.5)
WBC # BLD: 7.7 K/UL — SIGNIFICANT CHANGE UP (ref 3.8–10.5)
WBC # BLD: 7.79 K/UL — SIGNIFICANT CHANGE UP (ref 3.8–10.5)
WBC # BLD: 7.95 K/UL — SIGNIFICANT CHANGE UP (ref 3.8–10.5)
WBC # BLD: 8 K/UL — SIGNIFICANT CHANGE UP (ref 3.8–10.5)
WBC # BLD: 8.02 K/UL — SIGNIFICANT CHANGE UP (ref 3.8–10.5)
WBC # BLD: 8.04 K/UL — SIGNIFICANT CHANGE UP (ref 3.8–10.5)
WBC # BLD: 8.05 K/UL — SIGNIFICANT CHANGE UP (ref 3.8–10.5)
WBC # BLD: 8.21 K/UL — SIGNIFICANT CHANGE UP (ref 3.8–10.5)
WBC # BLD: 8.26 K/UL — SIGNIFICANT CHANGE UP (ref 3.8–10.5)
WBC # BLD: 8.66 K/UL — SIGNIFICANT CHANGE UP (ref 3.8–10.5)
WBC # BLD: 8.87 K/UL — SIGNIFICANT CHANGE UP (ref 3.8–10.5)
WBC # BLD: 9.01 K/UL — SIGNIFICANT CHANGE UP (ref 3.8–10.5)
WBC # BLD: 9.12 K/UL — SIGNIFICANT CHANGE UP (ref 3.8–10.5)
WBC # BLD: 9.2 K/UL — SIGNIFICANT CHANGE UP (ref 3.8–10.5)
WBC # BLD: 9.23 K/UL — SIGNIFICANT CHANGE UP (ref 3.8–10.5)
WBC # BLD: 9.55 K/UL — SIGNIFICANT CHANGE UP (ref 3.8–10.5)
WBC # BLD: 9.59 K/UL — SIGNIFICANT CHANGE UP (ref 3.8–10.5)
WBC # FLD AUTO: 10.48 K/UL — SIGNIFICANT CHANGE UP (ref 3.8–10.5)
WBC # FLD AUTO: 11.02 K/UL — HIGH (ref 3.8–10.5)
WBC # FLD AUTO: 11.25 K/UL — HIGH (ref 3.8–10.5)
WBC # FLD AUTO: 4.36 K/UL
WBC # FLD AUTO: 4.66 K/UL
WBC # FLD AUTO: 5.14 K/UL — SIGNIFICANT CHANGE UP (ref 3.8–10.5)
WBC # FLD AUTO: 5.24 K/UL
WBC # FLD AUTO: 5.51 K/UL — SIGNIFICANT CHANGE UP (ref 3.8–10.5)
WBC # FLD AUTO: 5.54 K/UL — SIGNIFICANT CHANGE UP (ref 3.8–10.5)
WBC # FLD AUTO: 5.59 K/UL — SIGNIFICANT CHANGE UP (ref 3.8–10.5)
WBC # FLD AUTO: 5.77 K/UL — SIGNIFICANT CHANGE UP (ref 3.8–10.5)
WBC # FLD AUTO: 5.8 K/UL — SIGNIFICANT CHANGE UP (ref 3.8–10.5)
WBC # FLD AUTO: 5.86 K/UL — SIGNIFICANT CHANGE UP (ref 3.8–10.5)
WBC # FLD AUTO: 6.01 K/UL — SIGNIFICANT CHANGE UP (ref 3.8–10.5)
WBC # FLD AUTO: 6.07 K/UL — SIGNIFICANT CHANGE UP (ref 3.8–10.5)
WBC # FLD AUTO: 6.29 K/UL — SIGNIFICANT CHANGE UP (ref 3.8–10.5)
WBC # FLD AUTO: 6.36 K/UL — SIGNIFICANT CHANGE UP (ref 3.8–10.5)
WBC # FLD AUTO: 6.46 K/UL — SIGNIFICANT CHANGE UP (ref 3.8–10.5)
WBC # FLD AUTO: 6.49 K/UL
WBC # FLD AUTO: 6.53 K/UL
WBC # FLD AUTO: 6.55 K/UL
WBC # FLD AUTO: 6.56 K/UL — SIGNIFICANT CHANGE UP (ref 3.8–10.5)
WBC # FLD AUTO: 6.58 K/UL — SIGNIFICANT CHANGE UP (ref 3.8–10.5)
WBC # FLD AUTO: 6.75 K/UL — SIGNIFICANT CHANGE UP (ref 3.8–10.5)
WBC # FLD AUTO: 6.76 K/UL
WBC # FLD AUTO: 6.79 K/UL — SIGNIFICANT CHANGE UP (ref 3.8–10.5)
WBC # FLD AUTO: 6.84 K/UL — SIGNIFICANT CHANGE UP (ref 3.8–10.5)
WBC # FLD AUTO: 6.96 K/UL — SIGNIFICANT CHANGE UP (ref 3.8–10.5)
WBC # FLD AUTO: 6.97 K/UL — SIGNIFICANT CHANGE UP (ref 3.8–10.5)
WBC # FLD AUTO: 7.25 K/UL
WBC # FLD AUTO: 7.27 K/UL — SIGNIFICANT CHANGE UP (ref 3.8–10.5)
WBC # FLD AUTO: 7.3 K/UL — SIGNIFICANT CHANGE UP (ref 3.8–10.5)
WBC # FLD AUTO: 7.32 K/UL — SIGNIFICANT CHANGE UP (ref 3.8–10.5)
WBC # FLD AUTO: 7.33 K/UL — SIGNIFICANT CHANGE UP (ref 3.8–10.5)
WBC # FLD AUTO: 7.4 K/UL — SIGNIFICANT CHANGE UP (ref 3.8–10.5)
WBC # FLD AUTO: 7.42 K/UL
WBC # FLD AUTO: 7.48 K/UL — SIGNIFICANT CHANGE UP (ref 3.8–10.5)
WBC # FLD AUTO: 7.53 K/UL — SIGNIFICANT CHANGE UP (ref 3.8–10.5)
WBC # FLD AUTO: 7.7 K/UL — SIGNIFICANT CHANGE UP (ref 3.8–10.5)
WBC # FLD AUTO: 7.79 K/UL — SIGNIFICANT CHANGE UP (ref 3.8–10.5)
WBC # FLD AUTO: 7.95 K/UL — SIGNIFICANT CHANGE UP (ref 3.8–10.5)
WBC # FLD AUTO: 8 K/UL — SIGNIFICANT CHANGE UP (ref 3.8–10.5)
WBC # FLD AUTO: 8.02 K/UL — SIGNIFICANT CHANGE UP (ref 3.8–10.5)
WBC # FLD AUTO: 8.04 K/UL — SIGNIFICANT CHANGE UP (ref 3.8–10.5)
WBC # FLD AUTO: 8.05 K/UL — SIGNIFICANT CHANGE UP (ref 3.8–10.5)
WBC # FLD AUTO: 8.07 K/UL
WBC # FLD AUTO: 8.21 K/UL
WBC # FLD AUTO: 8.21 K/UL — SIGNIFICANT CHANGE UP (ref 3.8–10.5)
WBC # FLD AUTO: 8.26 K/UL — SIGNIFICANT CHANGE UP (ref 3.8–10.5)
WBC # FLD AUTO: 8.66 K/UL — SIGNIFICANT CHANGE UP (ref 3.8–10.5)
WBC # FLD AUTO: 8.87 K/UL — SIGNIFICANT CHANGE UP (ref 3.8–10.5)
WBC # FLD AUTO: 9.01 K/UL — SIGNIFICANT CHANGE UP (ref 3.8–10.5)
WBC # FLD AUTO: 9.12 K/UL — SIGNIFICANT CHANGE UP (ref 3.8–10.5)
WBC # FLD AUTO: 9.2 K/UL — SIGNIFICANT CHANGE UP (ref 3.8–10.5)
WBC # FLD AUTO: 9.23 K/UL — SIGNIFICANT CHANGE UP (ref 3.8–10.5)
WBC # FLD AUTO: 9.55 K/UL — SIGNIFICANT CHANGE UP (ref 3.8–10.5)
WBC # FLD AUTO: 9.59 K/UL — SIGNIFICANT CHANGE UP (ref 3.8–10.5)
WBC UR QL: 0 /HPF — SIGNIFICANT CHANGE UP (ref 0–5)
WBC UR QL: 1 /HPF — SIGNIFICANT CHANGE UP (ref 0–5)
WBC UR QL: 10 /HPF — HIGH (ref 0–5)
WBC UR QL: 13 /HPF — HIGH (ref 0–5)
WBC UR QL: 2 /HPF — SIGNIFICANT CHANGE UP (ref 0–5)

## 2023-01-01 PROCEDURE — 99233 SBSQ HOSP IP/OBS HIGH 50: CPT

## 2023-01-01 PROCEDURE — 71045 X-RAY EXAM CHEST 1 VIEW: CPT | Mod: 26

## 2023-01-01 PROCEDURE — 84100 ASSAY OF PHOSPHORUS: CPT

## 2023-01-01 PROCEDURE — 95714 VEEG EA 12-26 HR UNMNTR: CPT

## 2023-01-01 PROCEDURE — 93970 EXTREMITY STUDY: CPT | Mod: 26

## 2023-01-01 PROCEDURE — 93005 ELECTROCARDIOGRAM TRACING: CPT

## 2023-01-01 PROCEDURE — 99232 SBSQ HOSP IP/OBS MODERATE 35: CPT

## 2023-01-01 PROCEDURE — 82962 GLUCOSE BLOOD TEST: CPT

## 2023-01-01 PROCEDURE — 85025 COMPLETE CBC W/AUTO DIFF WBC: CPT

## 2023-01-01 PROCEDURE — 93010 ELECTROCARDIOGRAM REPORT: CPT

## 2023-01-01 PROCEDURE — 82947 ASSAY GLUCOSE BLOOD QUANT: CPT

## 2023-01-01 PROCEDURE — 70552 MRI BRAIN STEM W/DYE: CPT | Mod: 26

## 2023-01-01 PROCEDURE — 99223 1ST HOSP IP/OBS HIGH 75: CPT

## 2023-01-01 PROCEDURE — 99233 SBSQ HOSP IP/OBS HIGH 50: CPT | Mod: GC

## 2023-01-01 PROCEDURE — 84295 ASSAY OF SERUM SODIUM: CPT

## 2023-01-01 PROCEDURE — 83880 ASSAY OF NATRIURETIC PEPTIDE: CPT

## 2023-01-01 PROCEDURE — 99239 HOSP IP/OBS DSCHRG MGMT >30: CPT

## 2023-01-01 PROCEDURE — 99285 EMERGENCY DEPT VISIT HI MDM: CPT | Mod: GC

## 2023-01-01 PROCEDURE — 70450 CT HEAD/BRAIN W/O DYE: CPT | Mod: 26

## 2023-01-01 PROCEDURE — 70553 MRI BRAIN STEM W/O & W/DYE: CPT | Mod: 26,MH

## 2023-01-01 PROCEDURE — 99291 CRITICAL CARE FIRST HOUR: CPT

## 2023-01-01 PROCEDURE — 36415 COLL VENOUS BLD VENIPUNCTURE: CPT

## 2023-01-01 PROCEDURE — 83605 ASSAY OF LACTIC ACID: CPT

## 2023-01-01 PROCEDURE — 97110 THERAPEUTIC EXERCISES: CPT

## 2023-01-01 PROCEDURE — 82435 ASSAY OF BLOOD CHLORIDE: CPT

## 2023-01-01 PROCEDURE — 99214 OFFICE O/P EST MOD 30 MIN: CPT

## 2023-01-01 PROCEDURE — 0225U NFCT DS DNA&RNA 21 SARSCOV2: CPT

## 2023-01-01 PROCEDURE — 96365 THER/PROPH/DIAG IV INF INIT: CPT

## 2023-01-01 PROCEDURE — 71045 X-RAY EXAM CHEST 1 VIEW: CPT

## 2023-01-01 PROCEDURE — 83735 ASSAY OF MAGNESIUM: CPT

## 2023-01-01 PROCEDURE — A9585: CPT

## 2023-01-01 PROCEDURE — 82330 ASSAY OF CALCIUM: CPT

## 2023-01-01 PROCEDURE — 85027 COMPLETE CBC AUTOMATED: CPT

## 2023-01-01 PROCEDURE — 99232 SBSQ HOSP IP/OBS MODERATE 35: CPT | Mod: GC

## 2023-01-01 PROCEDURE — 87086 URINE CULTURE/COLONY COUNT: CPT

## 2023-01-01 PROCEDURE — 96361 HYDRATE IV INFUSION ADD-ON: CPT

## 2023-01-01 PROCEDURE — 95718 EEG PHYS/QHP 2-12 HR W/VEEG: CPT

## 2023-01-01 PROCEDURE — 94002 VENT MGMT INPAT INIT DAY: CPT

## 2023-01-01 PROCEDURE — 97112 NEUROMUSCULAR REEDUCATION: CPT

## 2023-01-01 PROCEDURE — 99223 1ST HOSP IP/OBS HIGH 75: CPT | Mod: GC

## 2023-01-01 PROCEDURE — 80053 COMPREHEN METABOLIC PANEL: CPT

## 2023-01-01 PROCEDURE — 92526 ORAL FUNCTION THERAPY: CPT

## 2023-01-01 PROCEDURE — 96375 TX/PRO/DX INJ NEW DRUG ADDON: CPT

## 2023-01-01 PROCEDURE — 36569 INSJ PICC 5 YR+ W/O IMAGING: CPT

## 2023-01-01 PROCEDURE — 70450 CT HEAD/BRAIN W/O DYE: CPT | Mod: MA

## 2023-01-01 PROCEDURE — 87040 BLOOD CULTURE FOR BACTERIA: CPT

## 2023-01-01 PROCEDURE — 81001 URINALYSIS AUTO W/SCOPE: CPT

## 2023-01-01 PROCEDURE — 92612 ENDOSCOPY SWALLOW (FEES) VID: CPT

## 2023-01-01 PROCEDURE — 82803 BLOOD GASES ANY COMBINATION: CPT

## 2023-01-01 PROCEDURE — 70553 MRI BRAIN STEM W/O & W/DYE: CPT

## 2023-01-01 PROCEDURE — 99215 OFFICE O/P EST HI 40 MIN: CPT

## 2023-01-01 PROCEDURE — 93970 EXTREMITY STUDY: CPT

## 2023-01-01 PROCEDURE — 95711 VEEG 2-12 HR UNMONITORED: CPT

## 2023-01-01 PROCEDURE — 94660 CPAP INITIATION&MGMT: CPT

## 2023-01-01 PROCEDURE — 70450 CT HEAD/BRAIN W/O DYE: CPT | Mod: 26,MA

## 2023-01-01 PROCEDURE — 85014 HEMATOCRIT: CPT

## 2023-01-01 PROCEDURE — C9254: CPT

## 2023-01-01 PROCEDURE — 85610 PROTHROMBIN TIME: CPT

## 2023-01-01 PROCEDURE — 87640 STAPH A DNA AMP PROBE: CPT

## 2023-01-01 PROCEDURE — 80048 BASIC METABOLIC PNL TOTAL CA: CPT

## 2023-01-01 PROCEDURE — 74177 CT ABD & PELVIS W/CONTRAST: CPT | Mod: 26,MD

## 2023-01-01 PROCEDURE — 86850 RBC ANTIBODY SCREEN: CPT

## 2023-01-01 PROCEDURE — 93306 TTE W/DOPPLER COMPLETE: CPT | Mod: 26

## 2023-01-01 PROCEDURE — 86900 BLOOD TYPING SEROLOGIC ABO: CPT

## 2023-01-01 PROCEDURE — 86901 BLOOD TYPING SEROLOGIC RH(D): CPT

## 2023-01-01 PROCEDURE — 94640 AIRWAY INHALATION TREATMENT: CPT

## 2023-01-01 PROCEDURE — 95720 EEG PHY/QHP EA INCR W/VEEG: CPT

## 2023-01-01 PROCEDURE — 96374 THER/PROPH/DIAG INJ IV PUSH: CPT

## 2023-01-01 PROCEDURE — 70553 MRI BRAIN STEM W/O & W/DYE: CPT | Mod: 26

## 2023-01-01 PROCEDURE — 80164 ASSAY DIPROPYLACETIC ACD TOT: CPT

## 2023-01-01 PROCEDURE — 85613 RUSSELL VIPER VENOM DILUTED: CPT

## 2023-01-01 PROCEDURE — 82140 ASSAY OF AMMONIA: CPT

## 2023-01-01 PROCEDURE — 85018 HEMOGLOBIN: CPT

## 2023-01-01 PROCEDURE — 99024 POSTOP FOLLOW-UP VISIT: CPT

## 2023-01-01 PROCEDURE — 92610 EVALUATE SWALLOWING FUNCTION: CPT

## 2023-01-01 PROCEDURE — 71275 CT ANGIOGRAPHY CHEST: CPT | Mod: 26,MD

## 2023-01-01 PROCEDURE — C8929: CPT

## 2023-01-01 PROCEDURE — 74177 CT ABD & PELVIS W/CONTRAST: CPT | Mod: 26

## 2023-01-01 PROCEDURE — 99238 HOSP IP/OBS DSCHRG MGMT 30/<: CPT

## 2023-01-01 PROCEDURE — 85730 THROMBOPLASTIN TIME PARTIAL: CPT

## 2023-01-01 PROCEDURE — 80202 ASSAY OF VANCOMYCIN: CPT

## 2023-01-01 PROCEDURE — 99222 1ST HOSP IP/OBS MODERATE 55: CPT

## 2023-01-01 PROCEDURE — 84145 PROCALCITONIN (PCT): CPT

## 2023-01-01 PROCEDURE — C1751: CPT

## 2023-01-01 PROCEDURE — 71275 CT ANGIOGRAPHY CHEST: CPT | Mod: MD

## 2023-01-01 PROCEDURE — 93971 EXTREMITY STUDY: CPT

## 2023-01-01 PROCEDURE — 86146 BETA-2 GLYCOPROTEIN ANTIBODY: CPT

## 2023-01-01 PROCEDURE — 74177 CT ABD & PELVIS W/CONTRAST: CPT

## 2023-01-01 PROCEDURE — 74220 X-RAY XM ESOPHAGUS 1CNTRST: CPT | Mod: 26

## 2023-01-01 PROCEDURE — 74177 CT ABD & PELVIS W/CONTRAST: CPT | Mod: MD

## 2023-01-01 PROCEDURE — 96367 TX/PROPH/DG ADDL SEQ IV INF: CPT

## 2023-01-01 PROCEDURE — 82565 ASSAY OF CREATININE: CPT

## 2023-01-01 PROCEDURE — 74230 X-RAY XM SWLNG FUNCJ C+: CPT | Mod: 26

## 2023-01-01 PROCEDURE — 97161 PT EVAL LOW COMPLEX 20 MIN: CPT

## 2023-01-01 PROCEDURE — 99285 EMERGENCY DEPT VISIT HI MDM: CPT | Mod: 25

## 2023-01-01 PROCEDURE — 93971 EXTREMITY STUDY: CPT | Mod: 26,LT

## 2023-01-01 PROCEDURE — 97162 PT EVAL MOD COMPLEX 30 MIN: CPT

## 2023-01-01 PROCEDURE — 99497 ADVNCD CARE PLAN 30 MIN: CPT | Mod: 25

## 2023-01-01 PROCEDURE — 84132 ASSAY OF SERUM POTASSIUM: CPT

## 2023-01-01 PROCEDURE — 82553 CREATINE MB FRACTION: CPT

## 2023-01-01 PROCEDURE — 99223 1ST HOSP IP/OBS HIGH 75: CPT | Mod: FS

## 2023-01-01 PROCEDURE — 84484 ASSAY OF TROPONIN QUANT: CPT

## 2023-01-01 PROCEDURE — 95700 EEG CONT REC W/VID EEG TECH: CPT

## 2023-01-01 PROCEDURE — 82550 ASSAY OF CK (CPK): CPT

## 2023-01-01 PROCEDURE — 87641 MR-STAPH DNA AMP PROBE: CPT

## 2023-01-01 PROCEDURE — 80177 DRUG SCRN QUAN LEVETIRACETAM: CPT

## 2023-01-01 PROCEDURE — 86147 CARDIOLIPIN ANTIBODY EA IG: CPT

## 2023-01-01 PROCEDURE — 87635 SARS-COV-2 COVID-19 AMP PRB: CPT

## 2023-01-01 PROCEDURE — 70450 CT HEAD/BRAIN W/O DYE: CPT

## 2023-01-01 RX ORDER — LOSARTAN POTASSIUM 100 MG/1
1 TABLET, FILM COATED ORAL
Qty: 30 | Refills: 0
Start: 2023-01-01 | End: 2023-01-01

## 2023-01-01 RX ORDER — FLUOXETINE HYDROCHLORIDE 20 MG/1
20 CAPSULE ORAL
Qty: 30 | Refills: 3 | Status: ACTIVE | COMMUNITY
Start: 2023-01-01

## 2023-01-01 RX ORDER — LEVETIRACETAM 250 MG/1
1 TABLET, FILM COATED ORAL
Refills: 0 | DISCHARGE

## 2023-01-01 RX ORDER — LEVETIRACETAM 250 MG/1
1000 TABLET, FILM COATED ORAL ONCE
Refills: 0 | Status: DISCONTINUED | OUTPATIENT
Start: 2023-01-01 | End: 2023-01-01

## 2023-01-01 RX ORDER — FENTANYL CITRATE 50 UG/ML
50 INJECTION INTRAVENOUS ONCE
Refills: 0 | Status: DISCONTINUED | OUTPATIENT
Start: 2023-01-01 | End: 2023-01-01

## 2023-01-01 RX ORDER — HYDROCORTISONE 20 MG
50 TABLET ORAL EVERY 6 HOURS
Refills: 0 | Status: DISCONTINUED | OUTPATIENT
Start: 2023-01-01 | End: 2023-01-01

## 2023-01-01 RX ORDER — VALSARTAN 80 MG/1
320 TABLET ORAL DAILY
Refills: 0 | Status: DISCONTINUED | OUTPATIENT
Start: 2023-01-01 | End: 2023-01-01

## 2023-01-01 RX ORDER — ACETAMINOPHEN 500 MG
650 TABLET ORAL EVERY 6 HOURS
Refills: 0 | Status: DISCONTINUED | OUTPATIENT
Start: 2023-01-01 | End: 2023-01-01

## 2023-01-01 RX ORDER — PHENAZOPYRIDINE HCL 100 MG
100 TABLET ORAL EVERY 8 HOURS
Refills: 0 | Status: COMPLETED | OUTPATIENT
Start: 2023-01-01 | End: 2023-01-01

## 2023-01-01 RX ORDER — INSULIN LISPRO 100/ML
VIAL (ML) SUBCUTANEOUS EVERY 6 HOURS
Refills: 0 | Status: DISCONTINUED | OUTPATIENT
Start: 2023-01-01 | End: 2023-01-01

## 2023-01-01 RX ORDER — VALPROIC ACID (AS SODIUM SALT) 250 MG/5ML
250 SOLUTION, ORAL ORAL THREE TIMES A DAY
Refills: 0 | Status: DISCONTINUED | OUTPATIENT
Start: 2023-01-01 | End: 2023-01-01

## 2023-01-01 RX ORDER — PIPERACILLIN AND TAZOBACTAM 4; .5 G/20ML; G/20ML
3.38 INJECTION, POWDER, LYOPHILIZED, FOR SOLUTION INTRAVENOUS EVERY 8 HOURS
Refills: 0 | Status: DISCONTINUED | OUTPATIENT
Start: 2023-01-01 | End: 2023-01-01

## 2023-01-01 RX ORDER — RIVAROXABAN 15 MG-20MG
20 KIT ORAL
Refills: 0 | Status: DISCONTINUED | OUTPATIENT
Start: 2023-01-01 | End: 2023-01-01

## 2023-01-01 RX ORDER — FLUCONAZOLE 100 MG/1
100 TABLET ORAL
Qty: 15 | Refills: 0 | Status: DISCONTINUED | COMMUNITY
Start: 2022-01-01 | End: 2023-01-01

## 2023-01-01 RX ORDER — PANTOPRAZOLE 40 MG/1
40 TABLET, DELAYED RELEASE ORAL
Qty: 30 | Refills: 6 | Status: DISCONTINUED | COMMUNITY
Start: 2022-09-13 | End: 2023-01-01

## 2023-01-01 RX ORDER — SODIUM CHLORIDE 9 MG/ML
1000 INJECTION, SOLUTION INTRAVENOUS ONCE
Refills: 0 | Status: COMPLETED | OUTPATIENT
Start: 2023-01-01 | End: 2023-01-01

## 2023-01-01 RX ORDER — SODIUM,POTASSIUM PHOSPHATES 278-250MG
1 POWDER IN PACKET (EA) ORAL ONCE
Refills: 0 | Status: COMPLETED | OUTPATIENT
Start: 2023-01-01 | End: 2023-01-01

## 2023-01-01 RX ORDER — LACOSAMIDE 200 MG/1
200 TABLET ORAL
Qty: 60 | Refills: 4 | Status: ACTIVE | COMMUNITY
Start: 2023-01-01

## 2023-01-01 RX ORDER — ALLOPURINOL 300 MG
100 TABLET ORAL DAILY
Refills: 0 | Status: DISCONTINUED | OUTPATIENT
Start: 2023-01-01 | End: 2023-01-01

## 2023-01-01 RX ORDER — ACETAMINOPHEN 500 MG
1 TABLET ORAL
Refills: 0 | DISCHARGE

## 2023-01-01 RX ORDER — METOPROLOL TARTRATE 50 MG
12.5 TABLET ORAL ONCE
Refills: 0 | Status: COMPLETED | OUTPATIENT
Start: 2023-01-01 | End: 2023-01-01

## 2023-01-01 RX ORDER — HYDRALAZINE HCL 50 MG
25 TABLET ORAL THREE TIMES A DAY
Refills: 0 | Status: DISCONTINUED | OUTPATIENT
Start: 2023-01-01 | End: 2023-01-01

## 2023-01-01 RX ORDER — METOPROLOL TARTRATE 50 MG
5 TABLET ORAL ONCE
Refills: 0 | Status: COMPLETED | OUTPATIENT
Start: 2023-01-01 | End: 2023-01-01

## 2023-01-01 RX ORDER — AMLODIPINE BESYLATE 2.5 MG/1
5 TABLET ORAL DAILY
Refills: 0 | Status: DISCONTINUED | OUTPATIENT
Start: 2023-01-01 | End: 2023-01-01

## 2023-01-01 RX ORDER — DEXAMETHASONE 0.5 MG/5ML
4 ELIXIR ORAL EVERY 12 HOURS
Refills: 0 | Status: DISCONTINUED | OUTPATIENT
Start: 2023-01-01 | End: 2023-01-01

## 2023-01-01 RX ORDER — NYSTATIN 100000 [USP'U]/ML
100000 SUSPENSION ORAL 4 TIMES DAILY
Qty: 1 | Refills: 2 | Status: DISCONTINUED | COMMUNITY
Start: 2022-01-01 | End: 2023-01-01

## 2023-01-01 RX ORDER — LIDOCAINE HCL 20 MG/ML
5 VIAL (ML) INJECTION ONCE
Refills: 0 | Status: COMPLETED | OUTPATIENT
Start: 2023-01-01 | End: 2023-01-01

## 2023-01-01 RX ORDER — ACETAMINOPHEN 325 MG/1
325 TABLET, FILM COATED ORAL EVERY 6 HOURS
Qty: 60 | Refills: 0 | Status: ACTIVE | COMMUNITY
Start: 2023-01-01

## 2023-01-01 RX ORDER — HYDRALAZINE HCL 50 MG
1 TABLET ORAL
Qty: 0 | Refills: 0 | DISCHARGE

## 2023-01-01 RX ORDER — CLONAZEPAM 1 MG
0.5 TABLET ORAL
Refills: 0 | Status: DISCONTINUED | OUTPATIENT
Start: 2023-01-01 | End: 2023-01-01

## 2023-01-01 RX ORDER — SENNA PLUS 8.6 MG/1
2 TABLET ORAL
Qty: 0 | Refills: 0 | DISCHARGE
Start: 2023-01-01

## 2023-01-01 RX ORDER — HYDROCODONE/CHLORPHEN P-STIREX 10-8MG/5ML
5 SUSPENSION, EXTENDED RELEASE 12 HR ORAL
Refills: 0 | DISCHARGE

## 2023-01-01 RX ORDER — LACOSAMIDE 50 MG/1
1 TABLET ORAL
Refills: 0 | DISCHARGE

## 2023-01-01 RX ORDER — NADOLOL 80 MG/1
60 TABLET ORAL
Refills: 0 | Status: DISCONTINUED | OUTPATIENT
Start: 2023-01-01 | End: 2023-01-01

## 2023-01-01 RX ORDER — POTASSIUM CHLORIDE 20 MEQ
40 PACKET (EA) ORAL ONCE
Refills: 0 | Status: COMPLETED | OUTPATIENT
Start: 2023-01-01 | End: 2023-01-01

## 2023-01-01 RX ORDER — AMLODIPINE BESYLATE 10 MG/1
10 TABLET ORAL DAILY
Qty: 30 | Refills: 0 | Status: DISCONTINUED | COMMUNITY
Start: 2022-09-13 | End: 2023-01-01

## 2023-01-01 RX ORDER — SENNOSIDES 8.6 MG/1
8.6 CAPSULE, GELATIN COATED ORAL
Qty: 30 | Refills: 0 | Status: ACTIVE | COMMUNITY
Start: 2023-01-01

## 2023-01-01 RX ORDER — ACETAMINOPHEN 500 MG
1000 TABLET ORAL ONCE
Refills: 0 | Status: COMPLETED | OUTPATIENT
Start: 2023-01-01 | End: 2023-01-01

## 2023-01-01 RX ORDER — LOSARTAN POTASSIUM 100 MG/1
1 TABLET, FILM COATED ORAL
Refills: 0 | DISCHARGE

## 2023-01-01 RX ORDER — CHLORHEXIDINE GLUCONATE 213 G/1000ML
15 SOLUTION TOPICAL EVERY 12 HOURS
Refills: 0 | Status: DISCONTINUED | OUTPATIENT
Start: 2023-01-01 | End: 2023-01-01

## 2023-01-01 RX ORDER — ACETYLCYSTEINE 200 MG/ML
20 SOLUTION ORAL; RESPIRATORY (INHALATION)
Qty: 1 | Refills: 0 | Status: ACTIVE | COMMUNITY
Start: 2023-01-01

## 2023-01-01 RX ORDER — PANTOPRAZOLE SODIUM 20 MG/1
40 TABLET, DELAYED RELEASE ORAL EVERY 24 HOURS
Refills: 0 | Status: DISCONTINUED | OUTPATIENT
Start: 2023-01-01 | End: 2023-01-01

## 2023-01-01 RX ORDER — FLUOXETINE HCL 10 MG
20 CAPSULE ORAL DAILY
Refills: 0 | Status: DISCONTINUED | OUTPATIENT
Start: 2023-01-01 | End: 2023-01-01

## 2023-01-01 RX ORDER — ROBINUL 0.2 MG/ML
0.4 INJECTION INTRAMUSCULAR; INTRAVENOUS EVERY 6 HOURS
Refills: 0 | Status: DISCONTINUED | OUTPATIENT
Start: 2023-01-01 | End: 2023-01-01

## 2023-01-01 RX ORDER — BRIMONIDINE TARTRATE, TIMOLOL MALEATE 2; 5 MG/ML; MG/ML
1 SOLUTION/ DROPS OPHTHALMIC
Qty: 0 | Refills: 0 | DISCHARGE

## 2023-01-01 RX ORDER — CHLORHEXIDINE GLUCONATE 213 G/1000ML
1 SOLUTION TOPICAL DAILY
Refills: 0 | Status: DISCONTINUED | OUTPATIENT
Start: 2023-01-01 | End: 2023-01-01

## 2023-01-01 RX ORDER — BRIMONIDINE TARTRATE 2 MG/MG
1 SOLUTION/ DROPS OPHTHALMIC
Refills: 0 | Status: DISCONTINUED | OUTPATIENT
Start: 2023-01-01 | End: 2023-01-01

## 2023-01-01 RX ORDER — VALPROIC ACID (AS SODIUM SALT) 250 MG/5ML
250 SOLUTION, ORAL ORAL
Qty: 30 | Refills: 0
Start: 2023-01-01 | End: 2023-01-01

## 2023-01-01 RX ORDER — IPRATROPIUM/ALBUTEROL SULFATE 18-103MCG
3 AEROSOL WITH ADAPTER (GRAM) INHALATION EVERY 6 HOURS
Refills: 0 | Status: DISCONTINUED | OUTPATIENT
Start: 2023-01-01 | End: 2023-01-01

## 2023-01-01 RX ORDER — INSULIN LISPRO 100/ML
VIAL (ML) SUBCUTANEOUS AT BEDTIME
Refills: 0 | Status: DISCONTINUED | OUTPATIENT
Start: 2023-01-01 | End: 2023-01-01

## 2023-01-01 RX ORDER — POLYETHYLENE GLYCOL 3350 17 G/17G
17 POWDER, FOR SOLUTION ORAL DAILY
Refills: 0 | Status: DISCONTINUED | OUTPATIENT
Start: 2023-01-01 | End: 2023-01-01

## 2023-01-01 RX ORDER — NIFEDIPINE 30 MG
30 TABLET, EXTENDED RELEASE 24 HR ORAL DAILY
Refills: 0 | Status: DISCONTINUED | OUTPATIENT
Start: 2023-01-01 | End: 2023-01-01

## 2023-01-01 RX ORDER — CHLORHEXIDINE GLUCONATE 213 G/1000ML
1 SOLUTION TOPICAL
Qty: 0 | Refills: 0 | DISCHARGE
Start: 2023-01-01

## 2023-01-01 RX ORDER — NOREPINEPHRINE BITARTRATE/D5W 8 MG/250ML
0.05 PLASTIC BAG, INJECTION (ML) INTRAVENOUS
Qty: 8 | Refills: 0 | Status: DISCONTINUED | OUTPATIENT
Start: 2023-01-01 | End: 2023-01-01

## 2023-01-01 RX ORDER — LEVETIRACETAM 250 MG/1
1000 TABLET, FILM COATED ORAL
Refills: 0 | Status: DISCONTINUED | OUTPATIENT
Start: 2023-01-01 | End: 2023-01-01

## 2023-01-01 RX ORDER — MAGNESIUM SULFATE 500 MG/ML
1 VIAL (ML) INJECTION ONCE
Refills: 0 | Status: COMPLETED | OUTPATIENT
Start: 2023-01-01 | End: 2023-01-01

## 2023-01-01 RX ORDER — DEXAMETHASONE 0.5 MG/5ML
3 ELIXIR ORAL DAILY
Refills: 0 | Status: DISCONTINUED | OUTPATIENT
Start: 2023-01-01 | End: 2023-01-01

## 2023-01-01 RX ORDER — RIVAROXABAN 15 MG-20MG
1 KIT ORAL
Refills: 0 | DISCHARGE

## 2023-01-01 RX ORDER — HYDROCORTISONE 20 MG
100 TABLET ORAL ONCE
Refills: 0 | Status: COMPLETED | OUTPATIENT
Start: 2023-01-01 | End: 2023-01-01

## 2023-01-01 RX ORDER — SODIUM CHLORIDE 9 MG/ML
1000 INJECTION INTRAMUSCULAR; INTRAVENOUS; SUBCUTANEOUS ONCE
Refills: 0 | Status: COMPLETED | OUTPATIENT
Start: 2023-01-01 | End: 2023-01-01

## 2023-01-01 RX ORDER — PANTOPRAZOLE 40 MG/1
40 TABLET, DELAYED RELEASE ORAL
Qty: 30 | Refills: 6 | Status: ACTIVE | COMMUNITY
Start: 2023-01-01

## 2023-01-01 RX ORDER — IPRATROPIUM BROMIDE AND ALBUTEROL SULFATE 2.5; .5 MG/3ML; MG/3ML
0.5-2.5 (3) SOLUTION RESPIRATORY (INHALATION) 4 TIMES DAILY
Qty: 1 | Refills: 0 | Status: ACTIVE | COMMUNITY
Start: 2023-01-01

## 2023-01-01 RX ORDER — AZITHROMYCIN 500 MG/1
500 TABLET, FILM COATED ORAL EVERY 24 HOURS
Refills: 0 | Status: DISCONTINUED | OUTPATIENT
Start: 2023-01-01 | End: 2023-01-01

## 2023-01-01 RX ORDER — LEVETIRACETAM 250 MG/1
1000 TABLET, FILM COATED ORAL EVERY 12 HOURS
Refills: 0 | Status: DISCONTINUED | OUTPATIENT
Start: 2023-01-01 | End: 2023-01-01

## 2023-01-01 RX ORDER — VANCOMYCIN HCL 1 G
1500 VIAL (EA) INTRAVENOUS EVERY 24 HOURS
Refills: 0 | Status: DISCONTINUED | OUTPATIENT
Start: 2023-01-01 | End: 2023-01-01

## 2023-01-01 RX ORDER — AMLODIPINE BESYLATE 2.5 MG/1
5 TABLET ORAL ONCE
Refills: 0 | Status: COMPLETED | OUTPATIENT
Start: 2023-01-01 | End: 2023-01-01

## 2023-01-01 RX ORDER — DILTIAZEM HCL 120 MG
10 CAPSULE, EXT RELEASE 24 HR ORAL ONCE
Refills: 0 | Status: COMPLETED | OUTPATIENT
Start: 2023-01-01 | End: 2023-01-01

## 2023-01-01 RX ORDER — ONDANSETRON 8 MG/1
8 TABLET ORAL
Qty: 30 | Refills: 2 | Status: DISCONTINUED | COMMUNITY
Start: 2022-09-21 | End: 2023-01-01

## 2023-01-01 RX ORDER — DICLOFENAC SODIUM 30 MG/G
2 GEL TOPICAL
Refills: 0 | DISCHARGE

## 2023-01-01 RX ORDER — FAMOTIDINE 10 MG/ML
5 INJECTION INTRAVENOUS
Refills: 0 | DISCHARGE

## 2023-01-01 RX ORDER — TIMOLOL 0.5 %
1 DROPS OPHTHALMIC (EYE)
Refills: 0 | Status: DISCONTINUED | OUTPATIENT
Start: 2023-01-01 | End: 2023-01-01

## 2023-01-01 RX ORDER — FAMOTIDINE 40 MG/5ML
40 POWDER, FOR SUSPENSION ORAL TWICE DAILY
Qty: 30 | Refills: 0 | Status: ACTIVE | COMMUNITY
Start: 2023-01-01

## 2023-01-01 RX ORDER — ROCURONIUM BROMIDE 10 MG/ML
100 VIAL (ML) INTRAVENOUS ONCE
Refills: 0 | Status: COMPLETED | OUTPATIENT
Start: 2023-01-01 | End: 2023-01-01

## 2023-01-01 RX ORDER — LANOLIN ALCOHOL/MO/W.PET/CERES
3 CREAM (GRAM) TOPICAL AT BEDTIME
Refills: 0 | Status: DISCONTINUED | OUTPATIENT
Start: 2023-01-01 | End: 2023-01-01

## 2023-01-01 RX ORDER — ATORVASTATIN CALCIUM 80 MG/1
20 TABLET, FILM COATED ORAL AT BEDTIME
Refills: 0 | Status: DISCONTINUED | OUTPATIENT
Start: 2023-01-01 | End: 2023-01-01

## 2023-01-01 RX ORDER — METOPROLOL TARTRATE 50 MG
5 TABLET ORAL ONCE
Refills: 0 | Status: DISCONTINUED | OUTPATIENT
Start: 2023-01-01 | End: 2023-01-01

## 2023-01-01 RX ORDER — VANCOMYCIN HCL 1 G
1000 VIAL (EA) INTRAVENOUS ONCE
Refills: 0 | Status: COMPLETED | OUTPATIENT
Start: 2023-01-01 | End: 2023-01-01

## 2023-01-01 RX ORDER — CLONAZEPAM 1 MG
1 TABLET ORAL
Qty: 0 | Refills: 0 | DISCHARGE
Start: 2023-01-01

## 2023-01-01 RX ORDER — POLYETHYLENE GLYCOL 3350 17 G/17G
17 POWDER, FOR SOLUTION ORAL
Qty: 0 | Refills: 0 | DISCHARGE
Start: 2023-01-01

## 2023-01-01 RX ORDER — DIPHENHYDRAMINE HCL 50 MG
25 CAPSULE ORAL ONCE
Refills: 0 | Status: DISCONTINUED | OUTPATIENT
Start: 2023-01-01 | End: 2023-01-01

## 2023-01-01 RX ORDER — DEXAMETHASONE 0.5 MG/5ML
4 ELIXIR ORAL DAILY
Refills: 0 | Status: DISCONTINUED | OUTPATIENT
Start: 2023-01-01 | End: 2023-01-01

## 2023-01-01 RX ORDER — ALBUTEROL 90 UG/1
2.5 AEROSOL, METERED ORAL
Refills: 0 | DISCHARGE

## 2023-01-01 RX ORDER — FUROSEMIDE 40 MG
1 TABLET ORAL
Refills: 0 | DISCHARGE

## 2023-01-01 RX ORDER — PIPERACILLIN AND TAZOBACTAM 4; .5 G/20ML; G/20ML
3.38 INJECTION, POWDER, LYOPHILIZED, FOR SOLUTION INTRAVENOUS ONCE
Refills: 0 | Status: COMPLETED | OUTPATIENT
Start: 2023-01-01 | End: 2023-01-01

## 2023-01-01 RX ORDER — VALSARTAN 80 MG/1
160 TABLET ORAL ONCE
Refills: 0 | Status: COMPLETED | OUTPATIENT
Start: 2023-01-01 | End: 2023-01-01

## 2023-01-01 RX ORDER — ENOXAPARIN SODIUM 100 MG/ML
110 INJECTION SUBCUTANEOUS EVERY 12 HOURS
Refills: 0 | Status: DISCONTINUED | OUTPATIENT
Start: 2023-01-01 | End: 2023-01-01

## 2023-01-01 RX ORDER — ONDANSETRON 8 MG/1
4 TABLET, FILM COATED ORAL EVERY 8 HOURS
Refills: 0 | Status: DISCONTINUED | OUTPATIENT
Start: 2023-01-01 | End: 2023-01-01

## 2023-01-01 RX ORDER — BRIMONIDINE TARTRATE 2 MG/MG
1 SOLUTION/ DROPS OPHTHALMIC
Qty: 1 | Refills: 0
Start: 2023-01-01 | End: 2023-10-31

## 2023-01-01 RX ORDER — POTASSIUM CHLORIDE 20 MEQ
20 PACKET (EA) ORAL ONCE
Refills: 0 | Status: COMPLETED | OUTPATIENT
Start: 2023-01-01 | End: 2023-01-01

## 2023-01-01 RX ORDER — LACOSAMIDE 50 MG/1
200 TABLET ORAL
Refills: 0 | Status: DISCONTINUED | OUTPATIENT
Start: 2023-01-01 | End: 2023-01-01

## 2023-01-01 RX ORDER — CEFAZOLIN SODIUM 1 G
1000 VIAL (EA) INJECTION EVERY 8 HOURS
Refills: 0 | Status: DISCONTINUED | OUTPATIENT
Start: 2023-01-01 | End: 2023-01-01

## 2023-01-01 RX ORDER — TEMOZOLOMIDE 250 MG/1
250 CAPSULE ORAL
Qty: 5 | Refills: 0 | Status: DISCONTINUED | COMMUNITY
Start: 2023-01-01 | End: 2023-01-01

## 2023-01-01 RX ORDER — METOPROLOL TARTRATE 50 MG
1 TABLET ORAL
Qty: 60 | Refills: 0
Start: 2023-01-01 | End: 2023-01-01

## 2023-01-01 RX ORDER — COLCHICINE 0.6 MG
1 TABLET ORAL
Qty: 0 | Refills: 0 | DISCHARGE

## 2023-01-01 RX ORDER — ACETAMINOPHEN 500 MG
2 TABLET ORAL
Qty: 0 | Refills: 0 | DISCHARGE
Start: 2023-01-01

## 2023-01-01 RX ORDER — PHENAZOPYRIDINE HCL 100 MG
100 TABLET ORAL EVERY 8 HOURS
Refills: 0 | Status: DISCONTINUED | OUTPATIENT
Start: 2023-01-01 | End: 2023-01-01

## 2023-01-01 RX ORDER — CLOTRIMAZOLE 10 MG/1
10 LOZENGE ORAL 3 TIMES DAILY
Qty: 90 | Refills: 2 | Status: DISCONTINUED | COMMUNITY
Start: 2023-01-01 | End: 2023-01-01

## 2023-01-01 RX ORDER — METOPROLOL TARTRATE 50 MG
25 TABLET ORAL
Refills: 0 | Status: DISCONTINUED | OUTPATIENT
Start: 2023-01-01 | End: 2023-01-01

## 2023-01-01 RX ORDER — DEXAMETHASONE 1.5 MG/1
1.5 TABLET ORAL DAILY
Qty: 60 | Refills: 0 | Status: ACTIVE | COMMUNITY
Start: 2023-01-01

## 2023-01-01 RX ORDER — FUROSEMIDE 40 MG
1 TABLET ORAL
Qty: 30 | Refills: 0
Start: 2023-01-01 | End: 2023-10-31

## 2023-01-01 RX ORDER — ATORVASTATIN CALCIUM 80 MG/1
1 TABLET, FILM COATED ORAL
Qty: 30 | Refills: 0
Start: 2023-01-01 | End: 2023-10-31

## 2023-01-01 RX ORDER — HYDRALAZINE HYDROCHLORIDE 25 MG/1
25 TABLET ORAL
Qty: 60 | Refills: 0 | Status: DISCONTINUED | COMMUNITY
Start: 2022-09-13 | End: 2023-01-01

## 2023-01-01 RX ORDER — CEFEPIME 1 G/1
2000 INJECTION, POWDER, FOR SOLUTION INTRAMUSCULAR; INTRAVENOUS ONCE
Refills: 0 | Status: COMPLETED | OUTPATIENT
Start: 2023-01-01 | End: 2023-01-01

## 2023-01-01 RX ORDER — CHLORHEXIDINE GLUCONATE 213 G/1000ML
1 SOLUTION TOPICAL
Refills: 0 | Status: DISCONTINUED | OUTPATIENT
Start: 2023-01-01 | End: 2023-01-01

## 2023-01-01 RX ORDER — ATORVASTATIN CALCIUM 80 MG/1
1 TABLET, FILM COATED ORAL
Qty: 30 | Refills: 1
Start: 2023-01-01

## 2023-01-01 RX ORDER — FUROSEMIDE 20 MG/1
20 TABLET ORAL DAILY
Qty: 30 | Refills: 0 | Status: ACTIVE | COMMUNITY
Start: 2023-01-01

## 2023-01-01 RX ORDER — POTASSIUM CHLORIDE 20 MEQ
10 PACKET (EA) ORAL
Refills: 0 | Status: COMPLETED | OUTPATIENT
Start: 2023-01-01 | End: 2023-01-01

## 2023-01-01 RX ORDER — METOPROLOL TARTRATE 50 MG
1 TABLET ORAL
Refills: 0 | DISCHARGE

## 2023-01-01 RX ORDER — LOSARTAN POTASSIUM 100 MG/1
25 TABLET, FILM COATED ORAL DAILY
Refills: 0 | Status: DISCONTINUED | OUTPATIENT
Start: 2023-01-01 | End: 2023-01-01

## 2023-01-01 RX ORDER — LOSARTAN POTASSIUM 25 MG/1
25 TABLET, FILM COATED ORAL DAILY
Qty: 30 | Refills: 0 | Status: ACTIVE | COMMUNITY
Start: 2022-09-16

## 2023-01-01 RX ORDER — METOPROLOL TARTRATE 50 MG
50 TABLET ORAL THREE TIMES A DAY
Refills: 0 | Status: DISCONTINUED | OUTPATIENT
Start: 2023-01-01 | End: 2023-01-01

## 2023-01-01 RX ORDER — PIPERACILLIN AND TAZOBACTAM 4; .5 G/20ML; G/20ML
3.38 INJECTION, POWDER, LYOPHILIZED, FOR SOLUTION INTRAVENOUS ONCE
Refills: 0 | Status: DISCONTINUED | OUTPATIENT
Start: 2023-01-01 | End: 2023-01-01

## 2023-01-01 RX ORDER — DIPHENHYDRAMINE HCL 50 MG
25 CAPSULE ORAL ONCE
Refills: 0 | Status: COMPLETED | OUTPATIENT
Start: 2023-01-01 | End: 2023-01-01

## 2023-01-01 RX ORDER — POTASSIUM CHLORIDE 20 MEQ
40 PACKET (EA) ORAL EVERY 12 HOURS
Refills: 0 | Status: DISCONTINUED | OUTPATIENT
Start: 2023-01-01 | End: 2023-01-01

## 2023-01-01 RX ORDER — AMLODIPINE BESYLATE 2.5 MG/1
1 TABLET ORAL
Qty: 0 | Refills: 0 | DISCHARGE

## 2023-01-01 RX ORDER — FLUOXETINE HCL 10 MG
1 CAPSULE ORAL
Qty: 0 | Refills: 0 | DISCHARGE

## 2023-01-01 RX ORDER — NADOLOL 80 MG/1
40 TABLET ORAL
Refills: 0 | Status: DISCONTINUED | OUTPATIENT
Start: 2023-01-01 | End: 2023-01-01

## 2023-01-01 RX ORDER — ACETYLCYSTEINE 200 MG/ML
4 VIAL (ML) MISCELLANEOUS
Qty: 0 | Refills: 0 | DISCHARGE
Start: 2023-01-01

## 2023-01-01 RX ORDER — ACETYLCYSTEINE 200 MG/ML
4 VIAL (ML) MISCELLANEOUS EVERY 6 HOURS
Refills: 0 | Status: DISCONTINUED | OUTPATIENT
Start: 2023-01-01 | End: 2023-01-01

## 2023-01-01 RX ORDER — AMLODIPINE BESYLATE 2.5 MG/1
10 TABLET ORAL DAILY
Refills: 0 | Status: DISCONTINUED | OUTPATIENT
Start: 2023-01-01 | End: 2023-01-01

## 2023-01-01 RX ORDER — MORPHINE SULFATE 50 MG/1
2 CAPSULE, EXTENDED RELEASE ORAL ONCE
Refills: 0 | Status: DISCONTINUED | OUTPATIENT
Start: 2023-01-01 | End: 2023-01-01

## 2023-01-01 RX ORDER — VANCOMYCIN HCL 1 G
1500 VIAL (EA) INTRAVENOUS EVERY 12 HOURS
Refills: 0 | Status: DISCONTINUED | OUTPATIENT
Start: 2023-01-01 | End: 2023-01-01

## 2023-01-01 RX ORDER — FLUOXETINE HCL 10 MG
1 CAPSULE ORAL
Refills: 0 | DISCHARGE

## 2023-01-01 RX ORDER — INSULIN LISPRO 100/ML
VIAL (ML) SUBCUTANEOUS
Refills: 0 | Status: DISCONTINUED | OUTPATIENT
Start: 2023-01-01 | End: 2023-01-01

## 2023-01-01 RX ORDER — MAGNESIUM SULFATE 500 MG/ML
2 VIAL (ML) INJECTION ONCE
Refills: 0 | Status: COMPLETED | OUTPATIENT
Start: 2023-01-01 | End: 2023-01-01

## 2023-01-01 RX ORDER — VANCOMYCIN HCL 1 G
1500 VIAL (EA) INTRAVENOUS ONCE
Refills: 0 | Status: COMPLETED | OUTPATIENT
Start: 2023-01-01 | End: 2023-01-01

## 2023-01-01 RX ORDER — AZITHROMYCIN 500 MG/1
1 TABLET, FILM COATED ORAL
Refills: 0 | DISCHARGE

## 2023-01-01 RX ORDER — DEXAMETHASONE 0.5 MG/5ML
2 ELIXIR ORAL
Qty: 0 | Refills: 0 | DISCHARGE
Start: 2023-01-01

## 2023-01-01 RX ORDER — CEFAZOLIN SODIUM 1 G
VIAL (EA) INJECTION
Refills: 0 | Status: DISCONTINUED | OUTPATIENT
Start: 2023-01-01 | End: 2023-01-01

## 2023-01-01 RX ORDER — VALPROIC ACID (AS SODIUM SALT) 250 MG/5ML
5 SOLUTION, ORAL ORAL
Refills: 0 | DISCHARGE

## 2023-01-01 RX ORDER — DEXAMETHASONE 0.5 MG/5ML
4 ELIXIR ORAL ONCE
Refills: 0 | Status: COMPLETED | OUTPATIENT
Start: 2023-01-01 | End: 2023-01-01

## 2023-01-01 RX ORDER — METOPROLOL TARTRATE 50 MG
12.5 TABLET ORAL
Refills: 0 | Status: DISCONTINUED | OUTPATIENT
Start: 2023-01-01 | End: 2023-01-01

## 2023-01-01 RX ORDER — LEVETIRACETAM 250 MG/1
1 TABLET, FILM COATED ORAL
Qty: 60 | Refills: 0
Start: 2023-01-01 | End: 2023-01-01

## 2023-01-01 RX ORDER — DEXAMETHASONE 1 MG/1
1 TABLET ORAL
Qty: 30 | Refills: 0 | Status: DISCONTINUED | COMMUNITY
Start: 2022-01-01 | End: 2023-01-01

## 2023-01-01 RX ORDER — ATORVASTATIN CALCIUM 80 MG/1
1 TABLET, FILM COATED ORAL
Refills: 0 | DISCHARGE

## 2023-01-01 RX ORDER — TEMOZOLOMIDE 20 MG/1
20 CAPSULE ORAL
Qty: 5 | Refills: 0 | Status: DISCONTINUED | COMMUNITY
Start: 2023-01-01 | End: 2023-01-01

## 2023-01-01 RX ORDER — PANTOPRAZOLE SODIUM 20 MG/1
40 TABLET, DELAYED RELEASE ORAL DAILY
Refills: 0 | Status: DISCONTINUED | OUTPATIENT
Start: 2023-01-01 | End: 2023-01-01

## 2023-01-01 RX ORDER — ZINC OXIDE 200 MG/G
20 OINTMENT TOPICAL
Qty: 1 | Refills: 0 | Status: ACTIVE | COMMUNITY
Start: 2023-01-01

## 2023-01-01 RX ORDER — DEXAMETHASONE 0.5 MG/5ML
4 ELIXIR ORAL
Refills: 0 | Status: DISCONTINUED | OUTPATIENT
Start: 2023-01-01 | End: 2023-01-01

## 2023-01-01 RX ORDER — TIMOLOL 0.5 %
1 DROPS OPHTHALMIC (EYE)
Refills: 0 | DISCHARGE

## 2023-01-01 RX ORDER — PANTOPRAZOLE SODIUM 20 MG/1
40 TABLET, DELAYED RELEASE ORAL
Refills: 0 | Status: DISCONTINUED | OUTPATIENT
Start: 2023-01-01 | End: 2023-01-01

## 2023-01-01 RX ORDER — ATENOLOL 25 MG/1
25 TABLET ORAL
Refills: 0 | Status: DISCONTINUED | OUTPATIENT
Start: 2023-01-01 | End: 2023-01-01

## 2023-01-01 RX ORDER — AMLODIPINE BESYLATE 2.5 MG/1
1 TABLET ORAL
Qty: 0 | Refills: 0 | DISCHARGE
Start: 2023-01-01

## 2023-01-01 RX ORDER — HYDRALAZINE HCL 50 MG
1 TABLET ORAL
Refills: 0 | DISCHARGE

## 2023-01-01 RX ORDER — RIVAROXABAN 15 MG-20MG
20 KIT ORAL DAILY
Refills: 0 | Status: DISCONTINUED | OUTPATIENT
Start: 2023-01-01 | End: 2023-01-01

## 2023-01-01 RX ORDER — SENNA PLUS 8.6 MG/1
2 TABLET ORAL AT BEDTIME
Refills: 0 | Status: DISCONTINUED | OUTPATIENT
Start: 2023-01-01 | End: 2023-01-01

## 2023-01-01 RX ORDER — ALLOPURINOL 300 MG
1 TABLET ORAL
Refills: 0 | DISCHARGE

## 2023-01-01 RX ORDER — TEMOZOLOMIDE 5 MG/1
5 CAPSULE ORAL AT BEDTIME
Qty: 5 | Refills: 0 | Status: DISCONTINUED | COMMUNITY
Start: 2023-01-01 | End: 2023-01-01

## 2023-01-01 RX ORDER — DEXMEDETOMIDINE HYDROCHLORIDE IN 0.9% SODIUM CHLORIDE 4 UG/ML
0.05 INJECTION INTRAVENOUS
Qty: 200 | Refills: 0 | Status: DISCONTINUED | OUTPATIENT
Start: 2023-01-01 | End: 2023-01-01

## 2023-01-01 RX ORDER — TEMOZOLOMIDE 140 MG/1
140 CAPSULE ORAL
Qty: 5 | Refills: 0 | Status: DISCONTINUED | COMMUNITY
Start: 2023-01-01 | End: 2023-01-01

## 2023-01-01 RX ORDER — VALPROIC ACID 250 MG/5ML
250 SOLUTION ORAL 3 TIMES DAILY
Qty: 250 | Refills: 0 | Status: ACTIVE | COMMUNITY
Start: 2023-01-01

## 2023-01-01 RX ORDER — MULTIVIT-MIN/FERROUS GLUCONATE 9 MG/15 ML
15 LIQUID (ML) ORAL DAILY
Refills: 0 | Status: DISCONTINUED | OUTPATIENT
Start: 2023-01-01 | End: 2023-01-01

## 2023-01-01 RX ORDER — AZITHROMYCIN 500 MG/1
500 TABLET, FILM COATED ORAL ONCE
Refills: 0 | Status: COMPLETED | OUTPATIENT
Start: 2023-01-01 | End: 2023-01-01

## 2023-01-01 RX ORDER — BRIMONIDINE TARTRATE 2 MG/MG
0.2 SOLUTION/ DROPS OPHTHALMIC
Qty: 60 | Refills: 0 | Status: ACTIVE | COMMUNITY
Start: 2023-01-01

## 2023-01-01 RX ORDER — HEPARIN SODIUM 5000 [USP'U]/ML
5000 INJECTION INTRAVENOUS; SUBCUTANEOUS EVERY 8 HOURS
Refills: 0 | Status: DISCONTINUED | OUTPATIENT
Start: 2023-01-01 | End: 2023-01-01

## 2023-01-01 RX ORDER — MUPIROCIN 20 MG/G
1 OINTMENT TOPICAL
Refills: 0 | DISCHARGE

## 2023-01-01 RX ORDER — LEVETIRACETAM 1000 MG/1
1000 TABLET, FILM COATED ORAL TWICE DAILY
Qty: 60 | Refills: 5 | Status: ACTIVE | COMMUNITY
Start: 2023-01-01

## 2023-01-01 RX ORDER — BUMETANIDE 0.25 MG/ML
2 INJECTION INTRAMUSCULAR; INTRAVENOUS
Qty: 20 | Refills: 0 | Status: DISCONTINUED | OUTPATIENT
Start: 2023-01-01 | End: 2023-01-01

## 2023-01-01 RX ORDER — ATORVASTATIN CALCIUM 80 MG/1
1 TABLET, FILM COATED ORAL
Qty: 0 | Refills: 0 | DISCHARGE
Start: 2023-01-01

## 2023-01-01 RX ORDER — RIVAROXABAN 15 MG-20MG
1 KIT ORAL
Qty: 0 | Refills: 0 | DISCHARGE

## 2023-01-01 RX ORDER — HEPARIN SODIUM 5000 [USP'U]/ML
INJECTION INTRAVENOUS; SUBCUTANEOUS
Qty: 25000 | Refills: 0 | Status: DISCONTINUED | OUTPATIENT
Start: 2023-01-01 | End: 2023-01-01

## 2023-01-01 RX ORDER — SODIUM CHLORIDE 9 MG/ML
500 INJECTION, SOLUTION INTRAVENOUS
Refills: 0 | Status: DISCONTINUED | OUTPATIENT
Start: 2023-01-01 | End: 2023-01-01

## 2023-01-01 RX ORDER — PANTOPRAZOLE SODIUM 20 MG/1
1 TABLET, DELAYED RELEASE ORAL
Qty: 0 | Refills: 0 | DISCHARGE
Start: 2023-01-01

## 2023-01-01 RX ORDER — AZITHROMYCIN 500 MG/1
TABLET, FILM COATED ORAL
Refills: 0 | Status: DISCONTINUED | OUTPATIENT
Start: 2023-01-01 | End: 2023-01-01

## 2023-01-01 RX ORDER — ALLOPURINOL 300 MG
1 TABLET ORAL
Qty: 0 | Refills: 0 | DISCHARGE

## 2023-01-01 RX ORDER — IPRATROPIUM/ALBUTEROL SULFATE 18-103MCG
3 AEROSOL WITH ADAPTER (GRAM) INHALATION
Qty: 0 | Refills: 0 | DISCHARGE
Start: 2023-01-01

## 2023-01-01 RX ORDER — HYDROMORPHONE HYDROCHLORIDE 2 MG/ML
0.5 INJECTION INTRAMUSCULAR; INTRAVENOUS; SUBCUTANEOUS
Refills: 0 | Status: DISCONTINUED | OUTPATIENT
Start: 2023-01-01 | End: 2023-01-01

## 2023-01-01 RX ORDER — TEMOZOLOMIDE 5 MG/1
5 CAPSULE ORAL
Qty: 84 | Refills: 0 | Status: DISCONTINUED | COMMUNITY
Start: 2022-09-21 | End: 2023-01-01

## 2023-01-01 RX ORDER — LEVETIRACETAM 500 MG/1
500 TABLET, FILM COATED ORAL
Qty: 60 | Refills: 6 | Status: DISCONTINUED | COMMUNITY
Start: 2023-01-01 | End: 2023-01-01

## 2023-01-01 RX ORDER — PHENYLEPHRINE HYDROCHLORIDE 10 MG/ML
1 INJECTION INTRAVENOUS
Qty: 40 | Refills: 0 | Status: DISCONTINUED | OUTPATIENT
Start: 2023-01-01 | End: 2023-01-01

## 2023-01-01 RX ORDER — BRIMONIDINE TARTRATE, TIMOLOL MALEATE 2; 5 MG/ML; MG/ML
0.2-0.5 SOLUTION/ DROPS TOPICAL
Qty: 30 | Refills: 0 | Status: DISCONTINUED | COMMUNITY
Start: 2022-09-13 | End: 2023-01-01

## 2023-01-01 RX ORDER — TEMOZOLOMIDE 100 MG/1
100 CAPSULE ORAL AT BEDTIME
Qty: 15 | Refills: 0 | Status: DISCONTINUED | COMMUNITY
Start: 2023-01-01 | End: 2023-01-01

## 2023-01-01 RX ORDER — LANOLIN ALCOHOL/MO/W.PET/CERES
1 CREAM (GRAM) TOPICAL
Qty: 0 | Refills: 0 | DISCHARGE
Start: 2023-01-01

## 2023-01-01 RX ORDER — ENOXAPARIN SODIUM 100 MG/ML
100 INJECTION SUBCUTANEOUS EVERY 12 HOURS
Refills: 0 | Status: DISCONTINUED | OUTPATIENT
Start: 2023-01-01 | End: 2023-01-01

## 2023-01-01 RX ORDER — ZINC OXIDE 200 MG/G
0 OINTMENT TOPICAL
Refills: 0 | DISCHARGE

## 2023-01-01 RX ORDER — BRIMONIDINE TARTRATE 2 MG/MG
1 SOLUTION/ DROPS OPHTHALMIC
Refills: 0 | DISCHARGE

## 2023-01-01 RX ORDER — LOSARTAN POTASSIUM 100 MG/1
50 TABLET, FILM COATED ORAL DAILY
Refills: 0 | Status: DISCONTINUED | OUTPATIENT
Start: 2023-01-01 | End: 2023-01-01

## 2023-01-01 RX ORDER — METOPROLOL TARTRATE 50 MG
12.5 TABLET ORAL EVERY 12 HOURS
Refills: 0 | Status: DISCONTINUED | OUTPATIENT
Start: 2023-01-01 | End: 2023-01-01

## 2023-01-01 RX ORDER — CLONAZEPAM 1 MG
1 TABLET ORAL
Refills: 0 | DISCHARGE

## 2023-01-01 RX ORDER — ONDANSETRON 8 MG/1
4 TABLET, FILM COATED ORAL ONCE
Refills: 0 | Status: DISCONTINUED | OUTPATIENT
Start: 2023-01-01 | End: 2023-01-01

## 2023-01-01 RX ORDER — DOBUTAMINE HCL 250MG/20ML
5 VIAL (ML) INTRAVENOUS
Qty: 500 | Refills: 0 | Status: DISCONTINUED | OUTPATIENT
Start: 2023-01-01 | End: 2023-01-01

## 2023-01-01 RX ORDER — LIDOCAINE 4 G/100G
1 CREAM TOPICAL ONCE
Refills: 0 | Status: COMPLETED | OUTPATIENT
Start: 2023-01-01 | End: 2023-01-01

## 2023-01-01 RX ORDER — LOSARTAN POTASSIUM 100 MG/1
100 TABLET, FILM COATED ORAL DAILY
Refills: 0 | Status: DISCONTINUED | OUTPATIENT
Start: 2023-01-01 | End: 2023-01-01

## 2023-01-01 RX ORDER — EZETIMIBE 10 MG/1
1 TABLET ORAL
Refills: 0 | DISCHARGE

## 2023-01-01 RX ORDER — METOPROLOL TARTRATE 25 MG/1
25 TABLET, FILM COATED ORAL TWICE DAILY
Qty: 30 | Refills: 0 | Status: ACTIVE | COMMUNITY
Start: 2023-01-01

## 2023-01-01 RX ORDER — SODIUM CHLORIDE 9 MG/ML
500 INJECTION INTRAMUSCULAR; INTRAVENOUS; SUBCUTANEOUS ONCE
Refills: 0 | Status: COMPLETED | OUTPATIENT
Start: 2023-01-01 | End: 2023-01-01

## 2023-01-01 RX ORDER — HEPARIN SODIUM 5000 [USP'U]/ML
1200 INJECTION INTRAVENOUS; SUBCUTANEOUS
Qty: 25000 | Refills: 0 | Status: DISCONTINUED | OUTPATIENT
Start: 2023-01-01 | End: 2023-01-01

## 2023-01-01 RX ORDER — METOPROLOL SUCCINATE 100 MG/1
100 TABLET, EXTENDED RELEASE ORAL DAILY
Qty: 30 | Refills: 0 | Status: DISCONTINUED | COMMUNITY
Start: 2022-09-13 | End: 2023-01-01

## 2023-01-01 RX ORDER — DEXAMETHASONE 0.5 MG/.5MG
0.5 TABLET ORAL
Qty: 30 | Refills: 0 | Status: DISCONTINUED | COMMUNITY
Start: 2023-01-01 | End: 2023-01-01

## 2023-01-01 RX ORDER — BUMETANIDE 0.25 MG/ML
2 INJECTION INTRAMUSCULAR; INTRAVENOUS ONCE
Refills: 0 | Status: COMPLETED | OUTPATIENT
Start: 2023-01-01 | End: 2023-01-01

## 2023-01-01 RX ORDER — TIMOLOL MALEATE 3.4 MG/ML
0.25 SOLUTION/ DROPS OPHTHALMIC
Qty: 30 | Refills: 0 | Status: ACTIVE | COMMUNITY
Start: 2023-01-01

## 2023-01-01 RX ORDER — PROPOFOL 10 MG/ML
10 INJECTION, EMULSION INTRAVENOUS
Qty: 500 | Refills: 0 | Status: DISCONTINUED | OUTPATIENT
Start: 2023-01-01 | End: 2023-01-01

## 2023-01-01 RX ORDER — MULTIVIT-MIN/FERROUS GLUCONATE 9 MG/15 ML
15 LIQUID (ML) ORAL
Qty: 0 | Refills: 0 | DISCHARGE
Start: 2023-01-01

## 2023-01-01 RX ORDER — NADOLOL 80 MG/1
20 TABLET ORAL ONCE
Refills: 0 | Status: COMPLETED | OUTPATIENT
Start: 2023-01-01 | End: 2023-01-01

## 2023-01-01 RX ORDER — FUROSEMIDE 40 MG
20 TABLET ORAL DAILY
Refills: 0 | Status: DISCONTINUED | OUTPATIENT
Start: 2023-01-01 | End: 2023-01-01

## 2023-01-01 RX ORDER — LOSARTAN POTASSIUM 100 MG/1
1 TABLET, FILM COATED ORAL
Qty: 0 | Refills: 0 | DISCHARGE
Start: 2023-01-01

## 2023-01-01 RX ORDER — VALPROIC ACID (AS SODIUM SALT) 250 MG/5ML
250 SOLUTION, ORAL ORAL EVERY 8 HOURS
Refills: 0 | Status: DISCONTINUED | OUTPATIENT
Start: 2023-01-01 | End: 2023-01-01

## 2023-01-01 RX ORDER — LACOSAMIDE 50 MG/1
200 TABLET ORAL EVERY 12 HOURS
Refills: 0 | Status: DISCONTINUED | OUTPATIENT
Start: 2023-01-01 | End: 2023-01-01

## 2023-01-01 RX ORDER — FAMOTIDINE 10 MG/ML
40 INJECTION INTRAVENOUS DAILY
Refills: 0 | Status: DISCONTINUED | OUTPATIENT
Start: 2023-01-01 | End: 2023-01-01

## 2023-01-01 RX ORDER — CEFAZOLIN SODIUM 1 G
1000 VIAL (EA) INJECTION ONCE
Refills: 0 | Status: COMPLETED | OUTPATIENT
Start: 2023-01-01 | End: 2023-01-01

## 2023-01-01 RX ORDER — VANCOMYCIN HCL 1 G
VIAL (EA) INTRAVENOUS
Refills: 0 | Status: DISCONTINUED | OUTPATIENT
Start: 2023-01-01 | End: 2023-01-01

## 2023-01-01 RX ORDER — TEMOZOLOMIDE 140 MG/1
140 CAPSULE ORAL
Qty: 42 | Refills: 0 | Status: DISCONTINUED | COMMUNITY
Start: 2022-09-21 | End: 2023-01-01

## 2023-01-01 RX ORDER — ATORVASTATIN CALCIUM 20 MG/1
20 TABLET, FILM COATED ORAL
Qty: 30 | Refills: 0 | Status: ACTIVE | COMMUNITY
Start: 2023-01-01

## 2023-01-01 RX ORDER — FUROSEMIDE 40 MG
20 TABLET ORAL ONCE
Refills: 0 | Status: COMPLETED | OUTPATIENT
Start: 2023-01-01 | End: 2023-01-01

## 2023-01-01 RX ORDER — DEXAMETHASONE 0.5 MG/5ML
1 ELIXIR ORAL
Refills: 0 | DISCHARGE

## 2023-01-01 RX ORDER — FAMOTIDINE 10 MG/ML
2.5 INJECTION INTRAVENOUS
Qty: 0 | Refills: 0 | DISCHARGE

## 2023-01-01 RX ORDER — CLONAZEPAM 0.5 MG/1
0.5 TABLET ORAL
Qty: 60 | Refills: 0 | Status: ACTIVE | COMMUNITY
Start: 2023-01-01

## 2023-01-01 RX ADMIN — Medication 250 MILLIGRAM(S): at 13:13

## 2023-01-01 RX ADMIN — BRIMONIDINE TARTRATE 1 DROP(S): 2 SOLUTION/ DROPS OPHTHALMIC at 17:50

## 2023-01-01 RX ADMIN — HEPARIN SODIUM 0 UNIT(S)/HR: 5000 INJECTION INTRAVENOUS; SUBCUTANEOUS at 00:53

## 2023-01-01 RX ADMIN — ATORVASTATIN CALCIUM 20 MILLIGRAM(S): 80 TABLET, FILM COATED ORAL at 21:21

## 2023-01-01 RX ADMIN — Medication 1 DROP(S): at 07:20

## 2023-01-01 RX ADMIN — Medication 1 DROP(S): at 06:27

## 2023-01-01 RX ADMIN — ENOXAPARIN SODIUM 110 MILLIGRAM(S): 100 INJECTION SUBCUTANEOUS at 17:54

## 2023-01-01 RX ADMIN — ATORVASTATIN CALCIUM 20 MILLIGRAM(S): 80 TABLET, FILM COATED ORAL at 23:14

## 2023-01-01 RX ADMIN — Medication 50 MILLIGRAM(S): at 15:00

## 2023-01-01 RX ADMIN — BRIMONIDINE TARTRATE 1 DROP(S): 2 SOLUTION/ DROPS OPHTHALMIC at 17:04

## 2023-01-01 RX ADMIN — Medication 0.5 MILLIGRAM(S): at 05:17

## 2023-01-01 RX ADMIN — Medication 100 MILLIGRAM(S): at 11:31

## 2023-01-01 RX ADMIN — Medication 1 DROP(S): at 05:17

## 2023-01-01 RX ADMIN — PANTOPRAZOLE SODIUM 40 MILLIGRAM(S): 20 TABLET, DELAYED RELEASE ORAL at 12:32

## 2023-01-01 RX ADMIN — LEVETIRACETAM 1000 MILLIGRAM(S): 250 TABLET, FILM COATED ORAL at 04:57

## 2023-01-01 RX ADMIN — BRIMONIDINE TARTRATE 1 DROP(S): 2 SOLUTION/ DROPS OPHTHALMIC at 05:48

## 2023-01-01 RX ADMIN — Medication 650 MILLIGRAM(S): at 17:35

## 2023-01-01 RX ADMIN — Medication 3 MILLILITER(S): at 17:10

## 2023-01-01 RX ADMIN — NADOLOL 60 MILLIGRAM(S): 80 TABLET ORAL at 18:50

## 2023-01-01 RX ADMIN — PIPERACILLIN AND TAZOBACTAM 200 GRAM(S): 4; .5 INJECTION, POWDER, LYOPHILIZED, FOR SOLUTION INTRAVENOUS at 15:02

## 2023-01-01 RX ADMIN — LEVETIRACETAM 1000 MILLIGRAM(S): 250 TABLET, FILM COATED ORAL at 17:05

## 2023-01-01 RX ADMIN — HEPARIN SODIUM 900 UNIT(S)/HR: 5000 INJECTION INTRAVENOUS; SUBCUTANEOUS at 19:55

## 2023-01-01 RX ADMIN — Medication 50 MILLIGRAM(S): at 21:26

## 2023-01-01 RX ADMIN — RIVAROXABAN 20 MILLIGRAM(S): KIT at 17:15

## 2023-01-01 RX ADMIN — LACOSAMIDE 140 MILLIGRAM(S): 50 TABLET ORAL at 08:27

## 2023-01-01 RX ADMIN — LEVETIRACETAM 400 MILLIGRAM(S): 250 TABLET, FILM COATED ORAL at 17:37

## 2023-01-01 RX ADMIN — Medication 4 MILLIGRAM(S): at 05:11

## 2023-01-01 RX ADMIN — Medication 100 MILLIGRAM(S): at 13:22

## 2023-01-01 RX ADMIN — Medication 25 MILLIGRAM(S): at 05:19

## 2023-01-01 RX ADMIN — Medication 650 MILLIGRAM(S): at 00:13

## 2023-01-01 RX ADMIN — Medication 1 DROP(S): at 17:47

## 2023-01-01 RX ADMIN — Medication 13.7 MICROGRAM(S)/KG/MIN: at 11:00

## 2023-01-01 RX ADMIN — LEVETIRACETAM 1000 MILLIGRAM(S): 250 TABLET, FILM COATED ORAL at 18:05

## 2023-01-01 RX ADMIN — Medication 4 MILLIGRAM(S): at 04:58

## 2023-01-01 RX ADMIN — LACOSAMIDE 140 MILLIGRAM(S): 50 TABLET ORAL at 18:03

## 2023-01-01 RX ADMIN — Medication 250 MILLIGRAM(S): at 14:48

## 2023-01-01 RX ADMIN — POLYETHYLENE GLYCOL 3350 17 GRAM(S): 17 POWDER, FOR SOLUTION ORAL at 11:06

## 2023-01-01 RX ADMIN — Medication 1 DROP(S): at 17:52

## 2023-01-01 RX ADMIN — CHLORHEXIDINE GLUCONATE 1 APPLICATION(S): 213 SOLUTION TOPICAL at 14:06

## 2023-01-01 RX ADMIN — Medication 12.5 MILLIGRAM(S): at 18:19

## 2023-01-01 RX ADMIN — Medication 4 MILLIGRAM(S): at 05:03

## 2023-01-01 RX ADMIN — Medication 100 MILLIGRAM(S): at 15:10

## 2023-01-01 RX ADMIN — FAMOTIDINE 40 MILLIGRAM(S): 10 INJECTION INTRAVENOUS at 13:14

## 2023-01-01 RX ADMIN — PANTOPRAZOLE SODIUM 40 MILLIGRAM(S): 20 TABLET, DELAYED RELEASE ORAL at 13:22

## 2023-01-01 RX ADMIN — Medication 25 MILLIGRAM(S): at 23:20

## 2023-01-01 RX ADMIN — BRIMONIDINE TARTRATE 1 DROP(S): 2 SOLUTION/ DROPS OPHTHALMIC at 06:18

## 2023-01-01 RX ADMIN — Medication 650 MILLIGRAM(S): at 23:00

## 2023-01-01 RX ADMIN — Medication 3 MILLILITER(S): at 23:54

## 2023-01-01 RX ADMIN — Medication 20 MILLIGRAM(S): at 11:03

## 2023-01-01 RX ADMIN — AMLODIPINE BESYLATE 5 MILLIGRAM(S): 2.5 TABLET ORAL at 06:20

## 2023-01-01 RX ADMIN — Medication 100 MILLIGRAM(S): at 02:05

## 2023-01-01 RX ADMIN — Medication 250 MILLIGRAM(S): at 05:47

## 2023-01-01 RX ADMIN — Medication 1 DROP(S): at 18:05

## 2023-01-01 RX ADMIN — LEVETIRACETAM 1000 MILLIGRAM(S): 250 TABLET, FILM COATED ORAL at 17:31

## 2023-01-01 RX ADMIN — ENOXAPARIN SODIUM 110 MILLIGRAM(S): 100 INJECTION SUBCUTANEOUS at 05:01

## 2023-01-01 RX ADMIN — Medication 15 MILLILITER(S): at 12:44

## 2023-01-01 RX ADMIN — Medication 4 MILLIGRAM(S): at 17:47

## 2023-01-01 RX ADMIN — Medication 100 MILLIGRAM(S): at 12:25

## 2023-01-01 RX ADMIN — Medication 1 DROP(S): at 06:07

## 2023-01-01 RX ADMIN — Medication 650 MILLIGRAM(S): at 22:00

## 2023-01-01 RX ADMIN — HEPARIN SODIUM 1700 UNIT(S)/HR: 5000 INJECTION INTRAVENOUS; SUBCUTANEOUS at 11:53

## 2023-01-01 RX ADMIN — LACOSAMIDE 200 MILLIGRAM(S): 50 TABLET ORAL at 05:45

## 2023-01-01 RX ADMIN — Medication 250 MILLIGRAM(S): at 05:10

## 2023-01-01 RX ADMIN — Medication 1 DROP(S): at 05:11

## 2023-01-01 RX ADMIN — CHLORHEXIDINE GLUCONATE 1 APPLICATION(S): 213 SOLUTION TOPICAL at 05:19

## 2023-01-01 RX ADMIN — Medication 1 DROP(S): at 05:16

## 2023-01-01 RX ADMIN — NADOLOL 40 MILLIGRAM(S): 80 TABLET ORAL at 17:53

## 2023-01-01 RX ADMIN — Medication 400 MILLIGRAM(S): at 03:33

## 2023-01-01 RX ADMIN — Medication 3 MILLILITER(S): at 11:45

## 2023-01-01 RX ADMIN — Medication 250 MILLIGRAM(S): at 11:46

## 2023-01-01 RX ADMIN — CHLORHEXIDINE GLUCONATE 1 APPLICATION(S): 213 SOLUTION TOPICAL at 12:40

## 2023-01-01 RX ADMIN — BRIMONIDINE TARTRATE 1 DROP(S): 2 SOLUTION/ DROPS OPHTHALMIC at 05:49

## 2023-01-01 RX ADMIN — ATORVASTATIN CALCIUM 20 MILLIGRAM(S): 80 TABLET, FILM COATED ORAL at 21:32

## 2023-01-01 RX ADMIN — LACOSAMIDE 200 MILLIGRAM(S): 50 TABLET ORAL at 04:55

## 2023-01-01 RX ADMIN — Medication 25 MILLIGRAM(S): at 08:12

## 2023-01-01 RX ADMIN — Medication 25 MILLIGRAM(S): at 13:59

## 2023-01-01 RX ADMIN — Medication 3 MILLILITER(S): at 05:14

## 2023-01-01 RX ADMIN — Medication 50 MILLIGRAM(S): at 13:22

## 2023-01-01 RX ADMIN — Medication 3 MILLILITER(S): at 13:26

## 2023-01-01 RX ADMIN — Medication 4 MILLIGRAM(S): at 05:13

## 2023-01-01 RX ADMIN — LOSARTAN POTASSIUM 50 MILLIGRAM(S): 100 TABLET, FILM COATED ORAL at 05:15

## 2023-01-01 RX ADMIN — ENOXAPARIN SODIUM 100 MILLIGRAM(S): 100 INJECTION SUBCUTANEOUS at 17:18

## 2023-01-01 RX ADMIN — Medication 250 MILLIGRAM(S): at 12:47

## 2023-01-01 RX ADMIN — Medication 3 MILLIGRAM(S): at 00:14

## 2023-01-01 RX ADMIN — ENOXAPARIN SODIUM 110 MILLIGRAM(S): 100 INJECTION SUBCUTANEOUS at 18:33

## 2023-01-01 RX ADMIN — Medication 250 MILLIGRAM(S): at 16:22

## 2023-01-01 RX ADMIN — Medication 20 MILLIGRAM(S): at 11:52

## 2023-01-01 RX ADMIN — LEVETIRACETAM 1000 MILLIGRAM(S): 250 TABLET, FILM COATED ORAL at 05:39

## 2023-01-01 RX ADMIN — Medication 50 MILLIGRAM(S): at 21:50

## 2023-01-01 RX ADMIN — Medication 3 MILLILITER(S): at 01:18

## 2023-01-01 RX ADMIN — ATORVASTATIN CALCIUM 20 MILLIGRAM(S): 80 TABLET, FILM COATED ORAL at 21:09

## 2023-01-01 RX ADMIN — Medication 650 MILLIGRAM(S): at 18:00

## 2023-01-01 RX ADMIN — Medication 100 MILLIGRAM(S): at 00:56

## 2023-01-01 RX ADMIN — Medication 0.1 MILLIGRAM(S): at 05:57

## 2023-01-01 RX ADMIN — LACOSAMIDE 140 MILLIGRAM(S): 50 TABLET ORAL at 06:08

## 2023-01-01 RX ADMIN — BRIMONIDINE TARTRATE 1 DROP(S): 2 SOLUTION/ DROPS OPHTHALMIC at 17:07

## 2023-01-01 RX ADMIN — ENOXAPARIN SODIUM 100 MILLIGRAM(S): 100 INJECTION SUBCUTANEOUS at 05:48

## 2023-01-01 RX ADMIN — LACOSAMIDE 140 MILLIGRAM(S): 50 TABLET ORAL at 05:29

## 2023-01-01 RX ADMIN — VALSARTAN 320 MILLIGRAM(S): 80 TABLET ORAL at 05:26

## 2023-01-01 RX ADMIN — ATORVASTATIN CALCIUM 20 MILLIGRAM(S): 80 TABLET, FILM COATED ORAL at 22:58

## 2023-01-01 RX ADMIN — LEVETIRACETAM 400 MILLIGRAM(S): 250 TABLET, FILM COATED ORAL at 05:28

## 2023-01-01 RX ADMIN — Medication 3 MILLILITER(S): at 18:55

## 2023-01-01 RX ADMIN — BRIMONIDINE TARTRATE 1 DROP(S): 2 SOLUTION/ DROPS OPHTHALMIC at 06:17

## 2023-01-01 RX ADMIN — LACOSAMIDE 200 MILLIGRAM(S): 50 TABLET ORAL at 05:22

## 2023-01-01 RX ADMIN — Medication 300 MILLIGRAM(S): at 06:13

## 2023-01-01 RX ADMIN — BRIMONIDINE TARTRATE 1 DROP(S): 2 SOLUTION/ DROPS OPHTHALMIC at 17:15

## 2023-01-01 RX ADMIN — Medication 100 MILLIGRAM(S): at 21:50

## 2023-01-01 RX ADMIN — Medication 1000 MILLIGRAM(S): at 03:30

## 2023-01-01 RX ADMIN — HEPARIN SODIUM 1100 UNIT(S)/HR: 5000 INJECTION INTRAVENOUS; SUBCUTANEOUS at 00:46

## 2023-01-01 RX ADMIN — Medication 250 MILLIGRAM(S): at 05:20

## 2023-01-01 RX ADMIN — BRIMONIDINE TARTRATE 1 DROP(S): 2 SOLUTION/ DROPS OPHTHALMIC at 05:07

## 2023-01-01 RX ADMIN — AZITHROMYCIN 255 MILLIGRAM(S): 500 TABLET, FILM COATED ORAL at 14:51

## 2023-01-01 RX ADMIN — Medication 50 MILLIGRAM(S): at 15:13

## 2023-01-01 RX ADMIN — LEVETIRACETAM 1000 MILLIGRAM(S): 250 TABLET, FILM COATED ORAL at 06:28

## 2023-01-01 RX ADMIN — Medication 4 MILLIGRAM(S): at 18:34

## 2023-01-01 RX ADMIN — HEPARIN SODIUM 5000 UNIT(S): 5000 INJECTION INTRAVENOUS; SUBCUTANEOUS at 07:29

## 2023-01-01 RX ADMIN — ATORVASTATIN CALCIUM 20 MILLIGRAM(S): 80 TABLET, FILM COATED ORAL at 22:08

## 2023-01-01 RX ADMIN — Medication 1 DROP(S): at 06:29

## 2023-01-01 RX ADMIN — PANTOPRAZOLE SODIUM 40 MILLIGRAM(S): 20 TABLET, DELAYED RELEASE ORAL at 11:46

## 2023-01-01 RX ADMIN — Medication 3 MILLIGRAM(S): at 05:09

## 2023-01-01 RX ADMIN — ENOXAPARIN SODIUM 110 MILLIGRAM(S): 100 INJECTION SUBCUTANEOUS at 05:29

## 2023-01-01 RX ADMIN — LACOSAMIDE 140 MILLIGRAM(S): 50 TABLET ORAL at 17:53

## 2023-01-01 RX ADMIN — Medication 3 MILLILITER(S): at 23:53

## 2023-01-01 RX ADMIN — BRIMONIDINE TARTRATE 1 DROP(S): 2 SOLUTION/ DROPS OPHTHALMIC at 17:46

## 2023-01-01 RX ADMIN — ENOXAPARIN SODIUM 110 MILLIGRAM(S): 100 INJECTION SUBCUTANEOUS at 17:37

## 2023-01-01 RX ADMIN — Medication 1 DROP(S): at 17:57

## 2023-01-01 RX ADMIN — NADOLOL 60 MILLIGRAM(S): 80 TABLET ORAL at 05:21

## 2023-01-01 RX ADMIN — ENOXAPARIN SODIUM 100 MILLIGRAM(S): 100 INJECTION SUBCUTANEOUS at 17:48

## 2023-01-01 RX ADMIN — Medication 1 DROP(S): at 18:21

## 2023-01-01 RX ADMIN — Medication 3 MILLILITER(S): at 12:25

## 2023-01-01 RX ADMIN — Medication 650 MILLIGRAM(S): at 03:28

## 2023-01-01 RX ADMIN — Medication 4 MILLIGRAM(S): at 16:41

## 2023-01-01 RX ADMIN — Medication 3 MILLILITER(S): at 12:23

## 2023-01-01 RX ADMIN — Medication 4 MILLIGRAM(S): at 01:38

## 2023-01-01 RX ADMIN — ENOXAPARIN SODIUM 110 MILLIGRAM(S): 100 INJECTION SUBCUTANEOUS at 05:46

## 2023-01-01 RX ADMIN — Medication 50 MILLIGRAM(S): at 05:01

## 2023-01-01 RX ADMIN — Medication 250 MILLIGRAM(S): at 21:44

## 2023-01-01 RX ADMIN — Medication 1 DROP(S): at 17:18

## 2023-01-01 RX ADMIN — Medication 4 MILLIGRAM(S): at 17:10

## 2023-01-01 RX ADMIN — LEVETIRACETAM 1000 MILLIGRAM(S): 250 TABLET, FILM COATED ORAL at 05:52

## 2023-01-01 RX ADMIN — Medication 1 DROP(S): at 05:19

## 2023-01-01 RX ADMIN — LACOSAMIDE 140 MILLIGRAM(S): 50 TABLET ORAL at 23:04

## 2023-01-01 RX ADMIN — Medication 250 MILLIGRAM(S): at 06:06

## 2023-01-01 RX ADMIN — Medication 50 MILLIGRAM(S): at 21:08

## 2023-01-01 RX ADMIN — LACOSAMIDE 140 MILLIGRAM(S): 50 TABLET ORAL at 17:28

## 2023-01-01 RX ADMIN — ATORVASTATIN CALCIUM 20 MILLIGRAM(S): 80 TABLET, FILM COATED ORAL at 21:55

## 2023-01-01 RX ADMIN — PHENYLEPHRINE HYDROCHLORIDE 37.5 MICROGRAM(S)/KG/MIN: 10 INJECTION INTRAVENOUS at 07:57

## 2023-01-01 RX ADMIN — Medication 100 MILLIGRAM(S): at 11:28

## 2023-01-01 RX ADMIN — Medication 100 MILLIGRAM(S): at 08:27

## 2023-01-01 RX ADMIN — Medication 25 MILLIGRAM(S): at 05:32

## 2023-01-01 RX ADMIN — LOSARTAN POTASSIUM 50 MILLIGRAM(S): 100 TABLET, FILM COATED ORAL at 05:05

## 2023-01-01 RX ADMIN — FAMOTIDINE 40 MILLIGRAM(S): 10 INJECTION INTRAVENOUS at 11:23

## 2023-01-01 RX ADMIN — LOSARTAN POTASSIUM 100 MILLIGRAM(S): 100 TABLET, FILM COATED ORAL at 05:16

## 2023-01-01 RX ADMIN — Medication 100 MILLIGRAM(S): at 11:52

## 2023-01-01 RX ADMIN — ENOXAPARIN SODIUM 110 MILLIGRAM(S): 100 INJECTION SUBCUTANEOUS at 17:10

## 2023-01-01 RX ADMIN — VALSARTAN 320 MILLIGRAM(S): 80 TABLET ORAL at 06:28

## 2023-01-01 RX ADMIN — Medication 250 MILLIGRAM(S): at 21:21

## 2023-01-01 RX ADMIN — FENTANYL CITRATE 50 MICROGRAM(S): 50 INJECTION INTRAVENOUS at 02:47

## 2023-01-01 RX ADMIN — NADOLOL 20 MILLIGRAM(S): 80 TABLET ORAL at 15:04

## 2023-01-01 RX ADMIN — BRIMONIDINE TARTRATE 1 DROP(S): 2 SOLUTION/ DROPS OPHTHALMIC at 17:31

## 2023-01-01 RX ADMIN — LACOSAMIDE 200 MILLIGRAM(S): 50 TABLET ORAL at 17:38

## 2023-01-01 RX ADMIN — LEVETIRACETAM 400 MILLIGRAM(S): 250 TABLET, FILM COATED ORAL at 00:37

## 2023-01-01 RX ADMIN — ATORVASTATIN CALCIUM 20 MILLIGRAM(S): 80 TABLET, FILM COATED ORAL at 22:09

## 2023-01-01 RX ADMIN — BRIMONIDINE TARTRATE 1 DROP(S): 2 SOLUTION/ DROPS OPHTHALMIC at 05:25

## 2023-01-01 RX ADMIN — Medication 4 MILLIGRAM(S): at 17:54

## 2023-01-01 RX ADMIN — LEVETIRACETAM 1000 MILLIGRAM(S): 250 TABLET, FILM COATED ORAL at 05:27

## 2023-01-01 RX ADMIN — Medication 20 MILLIGRAM(S): at 12:15

## 2023-01-01 RX ADMIN — Medication 3 MILLILITER(S): at 23:14

## 2023-01-01 RX ADMIN — Medication 650 MILLIGRAM(S): at 21:10

## 2023-01-01 RX ADMIN — PANTOPRAZOLE SODIUM 40 MILLIGRAM(S): 20 TABLET, DELAYED RELEASE ORAL at 12:46

## 2023-01-01 RX ADMIN — BRIMONIDINE TARTRATE 1 DROP(S): 2 SOLUTION/ DROPS OPHTHALMIC at 17:03

## 2023-01-01 RX ADMIN — LACOSAMIDE 200 MILLIGRAM(S): 50 TABLET ORAL at 05:23

## 2023-01-01 RX ADMIN — Medication 1 DROP(S): at 17:48

## 2023-01-01 RX ADMIN — PANTOPRAZOLE SODIUM 40 MILLIGRAM(S): 20 TABLET, DELAYED RELEASE ORAL at 11:04

## 2023-01-01 RX ADMIN — Medication 5 MILLIGRAM(S): at 00:20

## 2023-01-01 RX ADMIN — Medication 1 DROP(S): at 06:12

## 2023-01-01 RX ADMIN — Medication 20 MILLIGRAM(S): at 12:01

## 2023-01-01 RX ADMIN — Medication 1: at 13:24

## 2023-01-01 RX ADMIN — Medication 100 MILLIGRAM(S): at 22:32

## 2023-01-01 RX ADMIN — HYDROMORPHONE HYDROCHLORIDE 0.5 MILLIGRAM(S): 2 INJECTION INTRAMUSCULAR; INTRAVENOUS; SUBCUTANEOUS at 01:38

## 2023-01-01 RX ADMIN — LACOSAMIDE 140 MILLIGRAM(S): 50 TABLET ORAL at 18:02

## 2023-01-01 RX ADMIN — Medication 20 MILLIGRAM(S): at 12:46

## 2023-01-01 RX ADMIN — Medication 250 MILLIGRAM(S): at 13:30

## 2023-01-01 RX ADMIN — Medication 250 MILLIGRAM(S): at 14:17

## 2023-01-01 RX ADMIN — Medication 50 MILLIGRAM(S): at 14:53

## 2023-01-01 RX ADMIN — SENNA PLUS 2 TABLET(S): 8.6 TABLET ORAL at 21:25

## 2023-01-01 RX ADMIN — LEVETIRACETAM 400 MILLIGRAM(S): 250 TABLET, FILM COATED ORAL at 17:07

## 2023-01-01 RX ADMIN — Medication 1 DROP(S): at 17:14

## 2023-01-01 RX ADMIN — Medication 25 MILLIGRAM(S): at 22:11

## 2023-01-01 RX ADMIN — LOSARTAN POTASSIUM 100 MILLIGRAM(S): 100 TABLET, FILM COATED ORAL at 04:55

## 2023-01-01 RX ADMIN — LEVETIRACETAM 400 MILLIGRAM(S): 250 TABLET, FILM COATED ORAL at 05:52

## 2023-01-01 RX ADMIN — Medication 250 MILLIGRAM(S): at 13:41

## 2023-01-01 RX ADMIN — BRIMONIDINE TARTRATE 1 DROP(S): 2 SOLUTION/ DROPS OPHTHALMIC at 17:25

## 2023-01-01 RX ADMIN — Medication 1 DROP(S): at 17:23

## 2023-01-01 RX ADMIN — Medication 4 MILLIGRAM(S): at 05:55

## 2023-01-01 RX ADMIN — LEVETIRACETAM 1000 MILLIGRAM(S): 250 TABLET, FILM COATED ORAL at 17:47

## 2023-01-01 RX ADMIN — ENOXAPARIN SODIUM 110 MILLIGRAM(S): 100 INJECTION SUBCUTANEOUS at 05:20

## 2023-01-01 RX ADMIN — Medication 1 DROP(S): at 18:04

## 2023-01-01 RX ADMIN — Medication 5 MILLIGRAM(S): at 14:29

## 2023-01-01 RX ADMIN — Medication 3 MILLIGRAM(S): at 17:36

## 2023-01-01 RX ADMIN — Medication 50 MILLIGRAM(S): at 21:32

## 2023-01-01 RX ADMIN — Medication 1 DROP(S): at 17:49

## 2023-01-01 RX ADMIN — Medication 4 MILLIGRAM(S): at 06:17

## 2023-01-01 RX ADMIN — Medication 1 DROP(S): at 17:07

## 2023-01-01 RX ADMIN — Medication 250 MILLIGRAM(S): at 21:48

## 2023-01-01 RX ADMIN — Medication 100 MILLIGRAM(S): at 02:36

## 2023-01-01 RX ADMIN — Medication 4 MILLILITER(S): at 12:44

## 2023-01-01 RX ADMIN — Medication 250 MILLIGRAM(S): at 05:46

## 2023-01-01 RX ADMIN — Medication 1 DROP(S): at 05:53

## 2023-01-01 RX ADMIN — Medication 3 MILLILITER(S): at 05:40

## 2023-01-01 RX ADMIN — Medication 4 MILLIGRAM(S): at 18:52

## 2023-01-01 RX ADMIN — Medication 4 MILLIGRAM(S): at 17:24

## 2023-01-01 RX ADMIN — Medication 4 MILLIGRAM(S): at 05:46

## 2023-01-01 RX ADMIN — Medication 250 MILLIGRAM(S): at 22:10

## 2023-01-01 RX ADMIN — Medication 50 MILLIGRAM(S): at 05:30

## 2023-01-01 RX ADMIN — BRIMONIDINE TARTRATE 1 DROP(S): 2 SOLUTION/ DROPS OPHTHALMIC at 17:47

## 2023-01-01 RX ADMIN — Medication 250 MILLIGRAM(S): at 21:33

## 2023-01-01 RX ADMIN — ENOXAPARIN SODIUM 100 MILLIGRAM(S): 100 INJECTION SUBCUTANEOUS at 21:51

## 2023-01-01 RX ADMIN — Medication 250 MILLIGRAM(S): at 12:34

## 2023-01-01 RX ADMIN — Medication 1 DROP(S): at 17:35

## 2023-01-01 RX ADMIN — HYDROMORPHONE HYDROCHLORIDE 0.5 MILLIGRAM(S): 2 INJECTION INTRAMUSCULAR; INTRAVENOUS; SUBCUTANEOUS at 00:20

## 2023-01-01 RX ADMIN — BRIMONIDINE TARTRATE 1 DROP(S): 2 SOLUTION/ DROPS OPHTHALMIC at 17:18

## 2023-01-01 RX ADMIN — Medication 20 MILLIGRAM(S): at 13:22

## 2023-01-01 RX ADMIN — Medication 3 MILLILITER(S): at 18:15

## 2023-01-01 RX ADMIN — Medication 0.1 MILLIGRAM(S): at 21:52

## 2023-01-01 RX ADMIN — BRIMONIDINE TARTRATE 1 DROP(S): 2 SOLUTION/ DROPS OPHTHALMIC at 17:48

## 2023-01-01 RX ADMIN — Medication 3 MILLILITER(S): at 05:31

## 2023-01-01 RX ADMIN — Medication 250 MILLIGRAM(S): at 17:37

## 2023-01-01 RX ADMIN — Medication 3 MILLIGRAM(S): at 02:03

## 2023-01-01 RX ADMIN — Medication 4 MILLIGRAM(S): at 05:27

## 2023-01-01 RX ADMIN — BRIMONIDINE TARTRATE 1 DROP(S): 2 SOLUTION/ DROPS OPHTHALMIC at 05:11

## 2023-01-01 RX ADMIN — BRIMONIDINE TARTRATE 1 DROP(S): 2 SOLUTION/ DROPS OPHTHALMIC at 17:11

## 2023-01-01 RX ADMIN — FAMOTIDINE 40 MILLIGRAM(S): 10 INJECTION INTRAVENOUS at 12:35

## 2023-01-01 RX ADMIN — LACOSAMIDE 140 MILLIGRAM(S): 50 TABLET ORAL at 19:05

## 2023-01-01 RX ADMIN — PANTOPRAZOLE SODIUM 40 MILLIGRAM(S): 20 TABLET, DELAYED RELEASE ORAL at 13:54

## 2023-01-01 RX ADMIN — Medication 250 MILLIGRAM(S): at 16:23

## 2023-01-01 RX ADMIN — Medication 25 MILLIGRAM(S): at 17:52

## 2023-01-01 RX ADMIN — Medication 15 MILLILITER(S): at 13:13

## 2023-01-01 RX ADMIN — Medication 25 MILLIGRAM(S): at 17:30

## 2023-01-01 RX ADMIN — PANTOPRAZOLE SODIUM 40 MILLIGRAM(S): 20 TABLET, DELAYED RELEASE ORAL at 12:21

## 2023-01-01 RX ADMIN — Medication 650 MILLIGRAM(S): at 17:06

## 2023-01-01 RX ADMIN — LEVETIRACETAM 400 MILLIGRAM(S): 250 TABLET, FILM COATED ORAL at 05:15

## 2023-01-01 RX ADMIN — SODIUM CHLORIDE 2000 MILLILITER(S): 9 INJECTION INTRAMUSCULAR; INTRAVENOUS; SUBCUTANEOUS at 02:44

## 2023-01-01 RX ADMIN — Medication 250 MILLIGRAM(S): at 06:17

## 2023-01-01 RX ADMIN — BUMETANIDE 10 MG/HR: 0.25 INJECTION INTRAMUSCULAR; INTRAVENOUS at 21:28

## 2023-01-01 RX ADMIN — Medication 25 MILLIGRAM(S): at 18:00

## 2023-01-01 RX ADMIN — Medication 25 MILLIGRAM(S): at 17:05

## 2023-01-01 RX ADMIN — Medication 1 DROP(S): at 17:08

## 2023-01-01 RX ADMIN — CHLORHEXIDINE GLUCONATE 15 MILLILITER(S): 213 SOLUTION TOPICAL at 07:29

## 2023-01-01 RX ADMIN — Medication 9.38 MICROGRAM(S)/KG/MIN: at 03:29

## 2023-01-01 RX ADMIN — Medication 25 MILLIGRAM(S): at 14:48

## 2023-01-01 RX ADMIN — HEPARIN SODIUM 900 UNIT(S)/HR: 5000 INJECTION INTRAVENOUS; SUBCUTANEOUS at 12:16

## 2023-01-01 RX ADMIN — Medication 4 MILLIGRAM(S): at 05:20

## 2023-01-01 RX ADMIN — Medication 25 MILLIGRAM(S): at 15:10

## 2023-01-01 RX ADMIN — Medication 1 DROP(S): at 05:10

## 2023-01-01 RX ADMIN — Medication 250 MILLIGRAM(S): at 08:12

## 2023-01-01 RX ADMIN — Medication 3 MILLILITER(S): at 12:18

## 2023-01-01 RX ADMIN — RIVAROXABAN 20 MILLIGRAM(S): KIT at 18:52

## 2023-01-01 RX ADMIN — Medication 1000 MILLIGRAM(S): at 04:00

## 2023-01-01 RX ADMIN — Medication 20 MILLIGRAM(S): at 11:42

## 2023-01-01 RX ADMIN — ENOXAPARIN SODIUM 110 MILLIGRAM(S): 100 INJECTION SUBCUTANEOUS at 17:03

## 2023-01-01 RX ADMIN — LACOSAMIDE 140 MILLIGRAM(S): 50 TABLET ORAL at 05:32

## 2023-01-01 RX ADMIN — LACOSAMIDE 200 MILLIGRAM(S): 50 TABLET ORAL at 17:27

## 2023-01-01 RX ADMIN — LEVETIRACETAM 400 MILLIGRAM(S): 250 TABLET, FILM COATED ORAL at 18:11

## 2023-01-01 RX ADMIN — Medication 3 MILLILITER(S): at 12:46

## 2023-01-01 RX ADMIN — LEVETIRACETAM 400 MILLIGRAM(S): 250 TABLET, FILM COATED ORAL at 02:44

## 2023-01-01 RX ADMIN — Medication 1 DROP(S): at 17:15

## 2023-01-01 RX ADMIN — Medication 4 MILLIGRAM(S): at 05:41

## 2023-01-01 RX ADMIN — LACOSAMIDE 140 MILLIGRAM(S): 50 TABLET ORAL at 17:25

## 2023-01-01 RX ADMIN — BRIMONIDINE TARTRATE 1 DROP(S): 2 SOLUTION/ DROPS OPHTHALMIC at 17:13

## 2023-01-01 RX ADMIN — Medication 250 MILLIGRAM(S): at 13:22

## 2023-01-01 RX ADMIN — FAMOTIDINE 40 MILLIGRAM(S): 10 INJECTION INTRAVENOUS at 15:09

## 2023-01-01 RX ADMIN — ENOXAPARIN SODIUM 110 MILLIGRAM(S): 100 INJECTION SUBCUTANEOUS at 17:23

## 2023-01-01 RX ADMIN — LACOSAMIDE 200 MILLIGRAM(S): 50 TABLET ORAL at 17:28

## 2023-01-01 RX ADMIN — Medication 20 MILLIGRAM(S): at 13:26

## 2023-01-01 RX ADMIN — BRIMONIDINE TARTRATE 1 DROP(S): 2 SOLUTION/ DROPS OPHTHALMIC at 17:42

## 2023-01-01 RX ADMIN — Medication 3 MILLILITER(S): at 17:29

## 2023-01-01 RX ADMIN — Medication 3 MILLILITER(S): at 06:29

## 2023-01-01 RX ADMIN — Medication 25 MILLIGRAM(S): at 18:02

## 2023-01-01 RX ADMIN — Medication 4 MILLIGRAM(S): at 17:59

## 2023-01-01 RX ADMIN — LEVETIRACETAM 400 MILLIGRAM(S): 250 TABLET, FILM COATED ORAL at 17:46

## 2023-01-01 RX ADMIN — Medication 4 MILLIGRAM(S): at 05:15

## 2023-01-01 RX ADMIN — Medication 3 MILLILITER(S): at 00:30

## 2023-01-01 RX ADMIN — LEVETIRACETAM 400 MILLIGRAM(S): 250 TABLET, FILM COATED ORAL at 21:10

## 2023-01-01 RX ADMIN — LEVETIRACETAM 400 MILLIGRAM(S): 250 TABLET, FILM COATED ORAL at 06:19

## 2023-01-01 RX ADMIN — Medication 100 MILLIGRAM(S): at 12:15

## 2023-01-01 RX ADMIN — Medication 250 MILLIGRAM(S): at 05:27

## 2023-01-01 RX ADMIN — ENOXAPARIN SODIUM 100 MILLIGRAM(S): 100 INJECTION SUBCUTANEOUS at 18:31

## 2023-01-01 RX ADMIN — Medication 25 MILLIGRAM(S): at 05:38

## 2023-01-01 RX ADMIN — Medication 0.1 MILLIGRAM(S): at 17:49

## 2023-01-01 RX ADMIN — VALSARTAN 320 MILLIGRAM(S): 80 TABLET ORAL at 06:07

## 2023-01-01 RX ADMIN — Medication 100 MILLIGRAM(S): at 22:11

## 2023-01-01 RX ADMIN — Medication 250 MILLIGRAM(S): at 13:26

## 2023-01-01 RX ADMIN — LACOSAMIDE 200 MILLIGRAM(S): 50 TABLET ORAL at 05:19

## 2023-01-01 RX ADMIN — LOSARTAN POTASSIUM 25 MILLIGRAM(S): 100 TABLET, FILM COATED ORAL at 05:17

## 2023-01-01 RX ADMIN — Medication 3 MILLILITER(S): at 05:45

## 2023-01-01 RX ADMIN — PANTOPRAZOLE SODIUM 40 MILLIGRAM(S): 20 TABLET, DELAYED RELEASE ORAL at 05:16

## 2023-01-01 RX ADMIN — Medication 0.1 MILLIGRAM(S): at 17:04

## 2023-01-01 RX ADMIN — Medication 1 DROP(S): at 04:58

## 2023-01-01 RX ADMIN — PANTOPRAZOLE SODIUM 40 MILLIGRAM(S): 20 TABLET, DELAYED RELEASE ORAL at 05:30

## 2023-01-01 RX ADMIN — LOSARTAN POTASSIUM 100 MILLIGRAM(S): 100 TABLET, FILM COATED ORAL at 06:07

## 2023-01-01 RX ADMIN — LEVETIRACETAM 1000 MILLIGRAM(S): 250 TABLET, FILM COATED ORAL at 17:22

## 2023-01-01 RX ADMIN — LACOSAMIDE 140 MILLIGRAM(S): 50 TABLET ORAL at 06:45

## 2023-01-01 RX ADMIN — BRIMONIDINE TARTRATE 1 DROP(S): 2 SOLUTION/ DROPS OPHTHALMIC at 06:27

## 2023-01-01 RX ADMIN — Medication 250 MILLIGRAM(S): at 05:40

## 2023-01-01 RX ADMIN — Medication 3 MILLILITER(S): at 11:02

## 2023-01-01 RX ADMIN — Medication 650 MILLIGRAM(S): at 17:24

## 2023-01-01 RX ADMIN — PANTOPRAZOLE SODIUM 40 MILLIGRAM(S): 20 TABLET, DELAYED RELEASE ORAL at 11:19

## 2023-01-01 RX ADMIN — LEVETIRACETAM 1000 MILLIGRAM(S): 250 TABLET, FILM COATED ORAL at 05:50

## 2023-01-01 RX ADMIN — CHLORHEXIDINE GLUCONATE 1 APPLICATION(S): 213 SOLUTION TOPICAL at 11:56

## 2023-01-01 RX ADMIN — BRIMONIDINE TARTRATE 1 DROP(S): 2 SOLUTION/ DROPS OPHTHALMIC at 04:58

## 2023-01-01 RX ADMIN — Medication 200 MILLIGRAM(S): at 17:37

## 2023-01-01 RX ADMIN — Medication 25 MILLIGRAM(S): at 06:18

## 2023-01-01 RX ADMIN — Medication 20 MILLIGRAM(S): at 12:07

## 2023-01-01 RX ADMIN — BRIMONIDINE TARTRATE 1 DROP(S): 2 SOLUTION/ DROPS OPHTHALMIC at 06:07

## 2023-01-01 RX ADMIN — Medication 25 GRAM(S): at 20:41

## 2023-01-01 RX ADMIN — PANTOPRAZOLE SODIUM 40 MILLIGRAM(S): 20 TABLET, DELAYED RELEASE ORAL at 08:12

## 2023-01-01 RX ADMIN — Medication 100 MILLIGRAM(S): at 12:08

## 2023-01-01 RX ADMIN — LACOSAMIDE 200 MILLIGRAM(S): 50 TABLET ORAL at 18:22

## 2023-01-01 RX ADMIN — Medication 0.1 MILLIGRAM(S): at 14:31

## 2023-01-01 RX ADMIN — Medication 0.1 MILLIGRAM(S): at 06:28

## 2023-01-01 RX ADMIN — Medication 3 MILLILITER(S): at 11:04

## 2023-01-01 RX ADMIN — ATORVASTATIN CALCIUM 20 MILLIGRAM(S): 80 TABLET, FILM COATED ORAL at 21:56

## 2023-01-01 RX ADMIN — Medication 3 MILLILITER(S): at 05:13

## 2023-01-01 RX ADMIN — LEVETIRACETAM 1000 MILLIGRAM(S): 250 TABLET, FILM COATED ORAL at 04:14

## 2023-01-01 RX ADMIN — VALSARTAN 320 MILLIGRAM(S): 80 TABLET ORAL at 05:29

## 2023-01-01 RX ADMIN — LACOSAMIDE 140 MILLIGRAM(S): 50 TABLET ORAL at 17:56

## 2023-01-01 RX ADMIN — Medication 100 MILLIEQUIVALENT(S): at 01:28

## 2023-01-01 RX ADMIN — ATORVASTATIN CALCIUM 20 MILLIGRAM(S): 80 TABLET, FILM COATED ORAL at 21:51

## 2023-01-01 RX ADMIN — LACOSAMIDE 200 MILLIGRAM(S): 50 TABLET ORAL at 17:21

## 2023-01-01 RX ADMIN — Medication 0.5 MILLIGRAM(S): at 22:18

## 2023-01-01 RX ADMIN — LEVETIRACETAM 1000 MILLIGRAM(S): 250 TABLET, FILM COATED ORAL at 17:04

## 2023-01-01 RX ADMIN — FAMOTIDINE 40 MILLIGRAM(S): 10 INJECTION INTRAVENOUS at 13:19

## 2023-01-01 RX ADMIN — PIPERACILLIN AND TAZOBACTAM 25 GRAM(S): 4; .5 INJECTION, POWDER, LYOPHILIZED, FOR SOLUTION INTRAVENOUS at 01:50

## 2023-01-01 RX ADMIN — Medication 12.5 MILLIGRAM(S): at 05:21

## 2023-01-01 RX ADMIN — PHENYLEPHRINE HYDROCHLORIDE 37.5 MICROGRAM(S)/KG/MIN: 10 INJECTION INTRAVENOUS at 04:19

## 2023-01-01 RX ADMIN — RIVAROXABAN 20 MILLIGRAM(S): KIT at 17:22

## 2023-01-01 RX ADMIN — LACOSAMIDE 200 MILLIGRAM(S): 50 TABLET ORAL at 05:46

## 2023-01-01 RX ADMIN — Medication 250 MILLIGRAM(S): at 17:53

## 2023-01-01 RX ADMIN — Medication 250 MILLIGRAM(S): at 05:53

## 2023-01-01 RX ADMIN — LEVETIRACETAM 1000 MILLIGRAM(S): 250 TABLET, FILM COATED ORAL at 17:15

## 2023-01-01 RX ADMIN — Medication 3 MILLILITER(S): at 05:51

## 2023-01-01 RX ADMIN — Medication 3 MILLILITER(S): at 12:01

## 2023-01-01 RX ADMIN — BRIMONIDINE TARTRATE 1 DROP(S): 2 SOLUTION/ DROPS OPHTHALMIC at 18:05

## 2023-01-01 RX ADMIN — LOSARTAN POTASSIUM 50 MILLIGRAM(S): 100 TABLET, FILM COATED ORAL at 05:14

## 2023-01-01 RX ADMIN — Medication 300 MILLIGRAM(S): at 17:46

## 2023-01-01 RX ADMIN — Medication 650 MILLIGRAM(S): at 06:40

## 2023-01-01 RX ADMIN — BRIMONIDINE TARTRATE 1 DROP(S): 2 SOLUTION/ DROPS OPHTHALMIC at 16:44

## 2023-01-01 RX ADMIN — LACOSAMIDE 140 MILLIGRAM(S): 50 TABLET ORAL at 17:30

## 2023-01-01 RX ADMIN — Medication 250 MILLIGRAM(S): at 05:28

## 2023-01-01 RX ADMIN — Medication 25 MILLIGRAM(S): at 05:53

## 2023-01-01 RX ADMIN — Medication 1 DROP(S): at 05:07

## 2023-01-01 RX ADMIN — ENOXAPARIN SODIUM 110 MILLIGRAM(S): 100 INJECTION SUBCUTANEOUS at 06:24

## 2023-01-01 RX ADMIN — Medication 1 PACKET(S): at 12:34

## 2023-01-01 RX ADMIN — Medication 250 MILLIGRAM(S): at 21:51

## 2023-01-01 RX ADMIN — Medication 25 MILLIGRAM(S): at 17:39

## 2023-01-01 RX ADMIN — Medication 4 MILLIGRAM(S): at 18:10

## 2023-01-01 RX ADMIN — NADOLOL 40 MILLIGRAM(S): 80 TABLET ORAL at 17:07

## 2023-01-01 RX ADMIN — Medication 3 MILLILITER(S): at 21:53

## 2023-01-01 RX ADMIN — Medication 3 MILLILITER(S): at 00:20

## 2023-01-01 RX ADMIN — Medication 250 MILLIGRAM(S): at 15:46

## 2023-01-01 RX ADMIN — Medication 40 MILLIEQUIVALENT(S): at 05:17

## 2023-01-01 RX ADMIN — Medication 100 MILLIGRAM(S): at 11:45

## 2023-01-01 RX ADMIN — Medication 3 MILLILITER(S): at 11:28

## 2023-01-01 RX ADMIN — POLYETHYLENE GLYCOL 3350 17 GRAM(S): 17 POWDER, FOR SOLUTION ORAL at 13:51

## 2023-01-01 RX ADMIN — Medication 1 DROP(S): at 05:48

## 2023-01-01 RX ADMIN — LOSARTAN POTASSIUM 50 MILLIGRAM(S): 100 TABLET, FILM COATED ORAL at 08:12

## 2023-01-01 RX ADMIN — ATORVASTATIN CALCIUM 20 MILLIGRAM(S): 80 TABLET, FILM COATED ORAL at 21:25

## 2023-01-01 RX ADMIN — LEVETIRACETAM 1000 MILLIGRAM(S): 250 TABLET, FILM COATED ORAL at 18:23

## 2023-01-01 RX ADMIN — ENOXAPARIN SODIUM 110 MILLIGRAM(S): 100 INJECTION SUBCUTANEOUS at 17:05

## 2023-01-01 RX ADMIN — BRIMONIDINE TARTRATE 1 DROP(S): 2 SOLUTION/ DROPS OPHTHALMIC at 17:36

## 2023-01-01 RX ADMIN — Medication 3 MILLILITER(S): at 05:28

## 2023-01-01 RX ADMIN — BRIMONIDINE TARTRATE 1 DROP(S): 2 SOLUTION/ DROPS OPHTHALMIC at 18:21

## 2023-01-01 RX ADMIN — ATORVASTATIN CALCIUM 20 MILLIGRAM(S): 80 TABLET, FILM COATED ORAL at 21:33

## 2023-01-01 RX ADMIN — LOSARTAN POTASSIUM 50 MILLIGRAM(S): 100 TABLET, FILM COATED ORAL at 05:53

## 2023-01-01 RX ADMIN — Medication 4 MILLIGRAM(S): at 05:19

## 2023-01-01 RX ADMIN — Medication 100 MILLIGRAM(S): at 13:14

## 2023-01-01 RX ADMIN — Medication 3 MILLILITER(S): at 06:24

## 2023-01-01 RX ADMIN — Medication 20 MILLIGRAM(S): at 13:28

## 2023-01-01 RX ADMIN — Medication 650 MILLIGRAM(S): at 21:40

## 2023-01-01 RX ADMIN — Medication 1 DROP(S): at 17:36

## 2023-01-01 RX ADMIN — Medication 25 MILLIGRAM(S): at 06:04

## 2023-01-01 RX ADMIN — Medication 25 GRAM(S): at 07:57

## 2023-01-01 RX ADMIN — BRIMONIDINE TARTRATE 1 DROP(S): 2 SOLUTION/ DROPS OPHTHALMIC at 21:50

## 2023-01-01 RX ADMIN — LOSARTAN POTASSIUM 50 MILLIGRAM(S): 100 TABLET, FILM COATED ORAL at 06:25

## 2023-01-01 RX ADMIN — Medication 1 DROP(S): at 16:44

## 2023-01-01 RX ADMIN — LEVETIRACETAM 1000 MILLIGRAM(S): 250 TABLET, FILM COATED ORAL at 18:52

## 2023-01-01 RX ADMIN — Medication 4 MILLIGRAM(S): at 17:01

## 2023-01-01 RX ADMIN — Medication 4 MILLIGRAM(S): at 06:07

## 2023-01-01 RX ADMIN — Medication 3 MILLILITER(S): at 13:06

## 2023-01-01 RX ADMIN — Medication 15 MILLILITER(S): at 12:03

## 2023-01-01 RX ADMIN — NADOLOL 60 MILLIGRAM(S): 80 TABLET ORAL at 17:26

## 2023-01-01 RX ADMIN — Medication 1 DROP(S): at 18:29

## 2023-01-01 RX ADMIN — BRIMONIDINE TARTRATE 1 DROP(S): 2 SOLUTION/ DROPS OPHTHALMIC at 17:05

## 2023-01-01 RX ADMIN — LACOSAMIDE 140 MILLIGRAM(S): 50 TABLET ORAL at 05:56

## 2023-01-01 RX ADMIN — BRIMONIDINE TARTRATE 1 DROP(S): 2 SOLUTION/ DROPS OPHTHALMIC at 18:04

## 2023-01-01 RX ADMIN — PANTOPRAZOLE SODIUM 40 MILLIGRAM(S): 20 TABLET, DELAYED RELEASE ORAL at 09:31

## 2023-01-01 RX ADMIN — NADOLOL 60 MILLIGRAM(S): 80 TABLET ORAL at 21:51

## 2023-01-01 RX ADMIN — LOSARTAN POTASSIUM 100 MILLIGRAM(S): 100 TABLET, FILM COATED ORAL at 05:45

## 2023-01-01 RX ADMIN — Medication 300 MILLIGRAM(S): at 21:50

## 2023-01-01 RX ADMIN — LEVETIRACETAM 1000 MILLIGRAM(S): 250 TABLET, FILM COATED ORAL at 05:46

## 2023-01-01 RX ADMIN — Medication 100 MILLIGRAM(S): at 13:45

## 2023-01-01 RX ADMIN — Medication 4 MILLIGRAM(S): at 06:22

## 2023-01-01 RX ADMIN — Medication 4 MILLIGRAM(S): at 05:40

## 2023-01-01 RX ADMIN — LEVETIRACETAM 1000 MILLIGRAM(S): 250 TABLET, FILM COATED ORAL at 17:11

## 2023-01-01 RX ADMIN — LEVETIRACETAM 400 MILLIGRAM(S): 250 TABLET, FILM COATED ORAL at 17:01

## 2023-01-01 RX ADMIN — Medication 3 MILLILITER(S): at 11:51

## 2023-01-01 RX ADMIN — BRIMONIDINE TARTRATE 1 DROP(S): 2 SOLUTION/ DROPS OPHTHALMIC at 05:10

## 2023-01-01 RX ADMIN — Medication 3 MILLILITER(S): at 11:43

## 2023-01-01 RX ADMIN — Medication 0.5 MILLIGRAM(S): at 20:55

## 2023-01-01 RX ADMIN — Medication 25 MILLIGRAM(S): at 13:18

## 2023-01-01 RX ADMIN — Medication 300 MILLIGRAM(S): at 15:30

## 2023-01-01 RX ADMIN — Medication 0.1 MILLIGRAM(S): at 17:46

## 2023-01-01 RX ADMIN — LEVETIRACETAM 400 MILLIGRAM(S): 250 TABLET, FILM COATED ORAL at 17:52

## 2023-01-01 RX ADMIN — Medication 0.5 MILLIGRAM(S): at 23:29

## 2023-01-01 RX ADMIN — Medication 25 MILLIGRAM(S): at 05:18

## 2023-01-01 RX ADMIN — Medication 25 MILLIGRAM(S): at 21:58

## 2023-01-01 RX ADMIN — Medication 20 MILLIGRAM(S): at 11:46

## 2023-01-01 RX ADMIN — Medication 4 MILLIGRAM(S): at 18:04

## 2023-01-01 RX ADMIN — NADOLOL 40 MILLIGRAM(S): 80 TABLET ORAL at 05:29

## 2023-01-01 RX ADMIN — LEVETIRACETAM 1000 MILLIGRAM(S): 250 TABLET, FILM COATED ORAL at 05:03

## 2023-01-01 RX ADMIN — LACOSAMIDE 200 MILLIGRAM(S): 50 TABLET ORAL at 06:17

## 2023-01-01 RX ADMIN — Medication 250 MILLIGRAM(S): at 05:01

## 2023-01-01 RX ADMIN — Medication 50 MILLIGRAM(S): at 15:25

## 2023-01-01 RX ADMIN — Medication 25 MILLIGRAM(S): at 05:10

## 2023-01-01 RX ADMIN — PIPERACILLIN AND TAZOBACTAM 25 GRAM(S): 4; .5 INJECTION, POWDER, LYOPHILIZED, FOR SOLUTION INTRAVENOUS at 05:49

## 2023-01-01 RX ADMIN — RIVAROXABAN 20 MILLIGRAM(S): KIT at 17:47

## 2023-01-01 RX ADMIN — Medication 25 MILLIGRAM(S): at 05:47

## 2023-01-01 RX ADMIN — Medication 25 MILLIGRAM(S): at 05:50

## 2023-01-01 RX ADMIN — Medication 100 MILLIGRAM(S): at 11:44

## 2023-01-01 RX ADMIN — LEVETIRACETAM 400 MILLIGRAM(S): 250 TABLET, FILM COATED ORAL at 16:48

## 2023-01-01 RX ADMIN — Medication 0.1 MILLIGRAM(S): at 05:02

## 2023-01-01 RX ADMIN — PIPERACILLIN AND TAZOBACTAM 25 GRAM(S): 4; .5 INJECTION, POWDER, LYOPHILIZED, FOR SOLUTION INTRAVENOUS at 05:42

## 2023-01-01 RX ADMIN — Medication 0.1 MILLIGRAM(S): at 17:17

## 2023-01-01 RX ADMIN — Medication 25 MILLIGRAM(S): at 06:19

## 2023-01-01 RX ADMIN — LACOSAMIDE 200 MILLIGRAM(S): 50 TABLET ORAL at 06:08

## 2023-01-01 RX ADMIN — LACOSAMIDE 200 MILLIGRAM(S): 50 TABLET ORAL at 17:16

## 2023-01-01 RX ADMIN — PANTOPRAZOLE SODIUM 40 MILLIGRAM(S): 20 TABLET, DELAYED RELEASE ORAL at 12:23

## 2023-01-01 RX ADMIN — FAMOTIDINE 40 MILLIGRAM(S): 10 INJECTION INTRAVENOUS at 22:31

## 2023-01-01 RX ADMIN — Medication 50 MILLIGRAM(S): at 17:18

## 2023-01-01 RX ADMIN — Medication 4 MILLIGRAM(S): at 19:16

## 2023-01-01 RX ADMIN — Medication 0.1 MILLIGRAM(S): at 05:46

## 2023-01-01 RX ADMIN — FAMOTIDINE 40 MILLIGRAM(S): 10 INJECTION INTRAVENOUS at 18:22

## 2023-01-01 RX ADMIN — Medication 250 MILLIGRAM(S): at 05:13

## 2023-01-01 RX ADMIN — LEVETIRACETAM 400 MILLIGRAM(S): 250 TABLET, FILM COATED ORAL at 05:01

## 2023-01-01 RX ADMIN — Medication 650 MILLIGRAM(S): at 01:13

## 2023-01-01 RX ADMIN — Medication 3 MILLILITER(S): at 00:02

## 2023-01-01 RX ADMIN — LACOSAMIDE 200 MILLIGRAM(S): 50 TABLET ORAL at 17:15

## 2023-01-01 RX ADMIN — LOSARTAN POTASSIUM 50 MILLIGRAM(S): 100 TABLET, FILM COATED ORAL at 05:07

## 2023-01-01 RX ADMIN — Medication 20 MILLIGRAM(S): at 13:07

## 2023-01-01 RX ADMIN — Medication 1 DROP(S): at 21:50

## 2023-01-01 RX ADMIN — Medication 3 MILLILITER(S): at 17:07

## 2023-01-01 RX ADMIN — ATORVASTATIN CALCIUM 20 MILLIGRAM(S): 80 TABLET, FILM COATED ORAL at 22:01

## 2023-01-01 RX ADMIN — Medication 100 GRAM(S): at 14:29

## 2023-01-01 RX ADMIN — Medication 200 MILLIGRAM(S): at 06:26

## 2023-01-01 RX ADMIN — Medication 40 MILLIEQUIVALENT(S): at 03:07

## 2023-01-01 RX ADMIN — Medication 4 MILLIGRAM(S): at 13:42

## 2023-01-01 RX ADMIN — VALSARTAN 320 MILLIGRAM(S): 80 TABLET ORAL at 06:21

## 2023-01-01 RX ADMIN — MORPHINE SULFATE 2 MILLIGRAM(S): 50 CAPSULE, EXTENDED RELEASE ORAL at 21:52

## 2023-01-01 RX ADMIN — Medication 250 MILLIGRAM(S): at 05:55

## 2023-01-01 RX ADMIN — BRIMONIDINE TARTRATE 1 DROP(S): 2 SOLUTION/ DROPS OPHTHALMIC at 05:20

## 2023-01-01 RX ADMIN — ATORVASTATIN CALCIUM 20 MILLIGRAM(S): 80 TABLET, FILM COATED ORAL at 23:08

## 2023-01-01 RX ADMIN — Medication 4 MILLIGRAM(S): at 17:26

## 2023-01-01 RX ADMIN — LOSARTAN POTASSIUM 100 MILLIGRAM(S): 100 TABLET, FILM COATED ORAL at 05:19

## 2023-01-01 RX ADMIN — Medication 3 MILLILITER(S): at 12:15

## 2023-01-01 RX ADMIN — AMLODIPINE BESYLATE 5 MILLIGRAM(S): 2.5 TABLET ORAL at 13:32

## 2023-01-01 RX ADMIN — Medication 1 DROP(S): at 18:22

## 2023-01-01 RX ADMIN — Medication 4 MILLIGRAM(S): at 18:02

## 2023-01-01 RX ADMIN — Medication 100 MILLIGRAM(S): at 11:20

## 2023-01-01 RX ADMIN — NADOLOL 60 MILLIGRAM(S): 80 TABLET ORAL at 17:38

## 2023-01-01 RX ADMIN — Medication 25 MILLIGRAM(S): at 18:09

## 2023-01-01 RX ADMIN — Medication 250 MILLIGRAM(S): at 23:20

## 2023-01-01 RX ADMIN — LEVETIRACETAM 400 MILLIGRAM(S): 250 TABLET, FILM COATED ORAL at 06:25

## 2023-01-01 RX ADMIN — Medication 15 MILLILITER(S): at 15:10

## 2023-01-01 RX ADMIN — LACOSAMIDE 200 MILLIGRAM(S): 50 TABLET ORAL at 17:55

## 2023-01-01 RX ADMIN — Medication 3 MILLILITER(S): at 06:44

## 2023-01-01 RX ADMIN — Medication 3 MILLILITER(S): at 23:39

## 2023-01-01 RX ADMIN — Medication 4 MILLIGRAM(S): at 06:43

## 2023-01-01 RX ADMIN — SENNA PLUS 2 TABLET(S): 8.6 TABLET ORAL at 21:31

## 2023-01-01 RX ADMIN — Medication 0.5 MILLIGRAM(S): at 21:32

## 2023-01-01 RX ADMIN — ATENOLOL 25 MILLIGRAM(S): 25 TABLET ORAL at 05:09

## 2023-01-01 RX ADMIN — Medication 1 DROP(S): at 18:53

## 2023-01-01 RX ADMIN — Medication 1 DROP(S): at 06:17

## 2023-01-01 RX ADMIN — LACOSAMIDE 200 MILLIGRAM(S): 50 TABLET ORAL at 21:51

## 2023-01-01 RX ADMIN — Medication 20 MILLIGRAM(S): at 12:23

## 2023-01-01 RX ADMIN — Medication 3 MILLILITER(S): at 17:54

## 2023-01-01 RX ADMIN — PIPERACILLIN AND TAZOBACTAM 25 GRAM(S): 4; .5 INJECTION, POWDER, LYOPHILIZED, FOR SOLUTION INTRAVENOUS at 00:07

## 2023-01-01 RX ADMIN — Medication 300 MILLIGRAM(S): at 07:06

## 2023-01-01 RX ADMIN — Medication 4 MILLIGRAM(S): at 05:17

## 2023-01-01 RX ADMIN — Medication 250 MILLIGRAM(S): at 22:11

## 2023-01-01 RX ADMIN — Medication 100 MILLIGRAM(S): at 22:06

## 2023-01-01 RX ADMIN — LACOSAMIDE 200 MILLIGRAM(S): 50 TABLET ORAL at 05:26

## 2023-01-01 RX ADMIN — Medication 250 MILLIGRAM(S): at 21:52

## 2023-01-01 RX ADMIN — LACOSAMIDE 140 MILLIGRAM(S): 50 TABLET ORAL at 18:26

## 2023-01-01 RX ADMIN — Medication 200 MILLIGRAM(S): at 23:29

## 2023-01-01 RX ADMIN — Medication 25 MILLIGRAM(S): at 16:24

## 2023-01-01 RX ADMIN — BRIMONIDINE TARTRATE 1 DROP(S): 2 SOLUTION/ DROPS OPHTHALMIC at 05:29

## 2023-01-01 RX ADMIN — CHLORHEXIDINE GLUCONATE 1 APPLICATION(S): 213 SOLUTION TOPICAL at 08:39

## 2023-01-01 RX ADMIN — LOSARTAN POTASSIUM 25 MILLIGRAM(S): 100 TABLET, FILM COATED ORAL at 05:10

## 2023-01-01 RX ADMIN — LACOSAMIDE 140 MILLIGRAM(S): 50 TABLET ORAL at 05:18

## 2023-01-01 RX ADMIN — Medication 100 MILLIGRAM(S): at 12:58

## 2023-01-01 RX ADMIN — Medication 3 MILLILITER(S): at 21:51

## 2023-01-01 RX ADMIN — LACOSAMIDE 200 MILLIGRAM(S): 50 TABLET ORAL at 18:23

## 2023-01-01 RX ADMIN — BRIMONIDINE TARTRATE 1 DROP(S): 2 SOLUTION/ DROPS OPHTHALMIC at 05:14

## 2023-01-01 RX ADMIN — Medication 250 MILLIGRAM(S): at 05:52

## 2023-01-01 RX ADMIN — Medication 50 MILLIGRAM(S): at 14:52

## 2023-01-01 RX ADMIN — LOSARTAN POTASSIUM 50 MILLIGRAM(S): 100 TABLET, FILM COATED ORAL at 05:32

## 2023-01-01 RX ADMIN — Medication 1 DROP(S): at 06:00

## 2023-01-01 RX ADMIN — NADOLOL 60 MILLIGRAM(S): 80 TABLET ORAL at 17:17

## 2023-01-01 RX ADMIN — Medication 4 MILLIGRAM(S): at 05:02

## 2023-01-01 RX ADMIN — Medication 1: at 11:22

## 2023-01-01 RX ADMIN — LEVETIRACETAM 1000 MILLIGRAM(S): 250 TABLET, FILM COATED ORAL at 17:24

## 2023-01-01 RX ADMIN — Medication 250 MILLIGRAM(S): at 09:43

## 2023-01-01 RX ADMIN — Medication 1 DROP(S): at 17:04

## 2023-01-01 RX ADMIN — Medication 3 MILLILITER(S): at 05:21

## 2023-01-01 RX ADMIN — Medication 3 MILLILITER(S): at 03:50

## 2023-01-01 RX ADMIN — Medication 25 MILLIGRAM(S): at 18:11

## 2023-01-01 RX ADMIN — LACOSAMIDE 140 MILLIGRAM(S): 50 TABLET ORAL at 06:20

## 2023-01-01 RX ADMIN — LACOSAMIDE 140 MILLIGRAM(S): 50 TABLET ORAL at 05:06

## 2023-01-01 RX ADMIN — BRIMONIDINE TARTRATE 1 DROP(S): 2 SOLUTION/ DROPS OPHTHALMIC at 05:18

## 2023-01-01 RX ADMIN — Medication 250 MILLIGRAM(S): at 15:25

## 2023-01-01 RX ADMIN — Medication 3 MILLILITER(S): at 17:37

## 2023-01-01 RX ADMIN — Medication 25 MILLIGRAM(S): at 17:55

## 2023-01-01 RX ADMIN — CHLORHEXIDINE GLUCONATE 1 APPLICATION(S): 213 SOLUTION TOPICAL at 17:27

## 2023-01-01 RX ADMIN — ATORVASTATIN CALCIUM 20 MILLIGRAM(S): 80 TABLET, FILM COATED ORAL at 21:45

## 2023-01-01 RX ADMIN — Medication 3 MILLILITER(S): at 17:26

## 2023-01-01 RX ADMIN — LACOSAMIDE 140 MILLIGRAM(S): 50 TABLET ORAL at 17:46

## 2023-01-01 RX ADMIN — NADOLOL 40 MILLIGRAM(S): 80 TABLET ORAL at 05:15

## 2023-01-01 RX ADMIN — LACOSAMIDE 200 MILLIGRAM(S): 50 TABLET ORAL at 06:04

## 2023-01-01 RX ADMIN — Medication 4 MILLIGRAM(S): at 06:25

## 2023-01-01 RX ADMIN — RIVAROXABAN 20 MILLIGRAM(S): KIT at 17:38

## 2023-01-01 RX ADMIN — PANTOPRAZOLE SODIUM 40 MILLIGRAM(S): 20 TABLET, DELAYED RELEASE ORAL at 13:25

## 2023-01-01 RX ADMIN — Medication 20 MILLIGRAM(S): at 14:17

## 2023-01-01 RX ADMIN — Medication 25 MILLIGRAM(S): at 02:08

## 2023-01-01 RX ADMIN — Medication 250 MILLIGRAM(S): at 21:31

## 2023-01-01 RX ADMIN — SODIUM CHLORIDE 250 MILLILITER(S): 9 INJECTION INTRAMUSCULAR; INTRAVENOUS; SUBCUTANEOUS at 14:36

## 2023-01-01 RX ADMIN — Medication 0.5 MILLIGRAM(S): at 18:48

## 2023-01-01 RX ADMIN — Medication 250 MILLIGRAM(S): at 21:08

## 2023-01-01 RX ADMIN — LACOSAMIDE 140 MILLIGRAM(S): 50 TABLET ORAL at 05:02

## 2023-01-01 RX ADMIN — Medication 12.5 MILLIGRAM(S): at 17:46

## 2023-01-01 RX ADMIN — Medication 3 MILLILITER(S): at 08:11

## 2023-01-01 RX ADMIN — LACOSAMIDE 140 MILLIGRAM(S): 50 TABLET ORAL at 17:39

## 2023-01-01 RX ADMIN — Medication 25 MILLIGRAM(S): at 05:17

## 2023-01-01 RX ADMIN — ENOXAPARIN SODIUM 110 MILLIGRAM(S): 100 INJECTION SUBCUTANEOUS at 05:11

## 2023-01-01 RX ADMIN — ATORVASTATIN CALCIUM 20 MILLIGRAM(S): 80 TABLET, FILM COATED ORAL at 21:11

## 2023-01-01 RX ADMIN — Medication 4 MILLIGRAM(S): at 17:53

## 2023-01-01 RX ADMIN — Medication 300 MILLIGRAM(S): at 13:22

## 2023-01-01 RX ADMIN — BRIMONIDINE TARTRATE 1 DROP(S): 2 SOLUTION/ DROPS OPHTHALMIC at 18:29

## 2023-01-01 RX ADMIN — Medication 25 MILLIGRAM(S): at 20:56

## 2023-01-01 RX ADMIN — LACOSAMIDE 200 MILLIGRAM(S): 50 TABLET ORAL at 18:52

## 2023-01-01 RX ADMIN — Medication 100 MILLIGRAM(S): at 12:35

## 2023-01-01 RX ADMIN — Medication 3 MILLILITER(S): at 00:56

## 2023-01-01 RX ADMIN — Medication 1 DROP(S): at 05:41

## 2023-01-01 RX ADMIN — LACOSAMIDE 140 MILLIGRAM(S): 50 TABLET ORAL at 18:30

## 2023-01-01 RX ADMIN — SENNA PLUS 2 TABLET(S): 8.6 TABLET ORAL at 21:33

## 2023-01-01 RX ADMIN — BRIMONIDINE TARTRATE 1 DROP(S): 2 SOLUTION/ DROPS OPHTHALMIC at 05:47

## 2023-01-01 RX ADMIN — BRIMONIDINE TARTRATE 1 DROP(S): 2 SOLUTION/ DROPS OPHTHALMIC at 17:22

## 2023-01-01 RX ADMIN — PIPERACILLIN AND TAZOBACTAM 200 GRAM(S): 4; .5 INJECTION, POWDER, LYOPHILIZED, FOR SOLUTION INTRAVENOUS at 13:52

## 2023-01-01 RX ADMIN — Medication 1 DROP(S): at 05:51

## 2023-01-01 RX ADMIN — PANTOPRAZOLE SODIUM 40 MILLIGRAM(S): 20 TABLET, DELAYED RELEASE ORAL at 11:02

## 2023-01-01 RX ADMIN — LOSARTAN POTASSIUM 100 MILLIGRAM(S): 100 TABLET, FILM COATED ORAL at 05:53

## 2023-01-01 RX ADMIN — LACOSAMIDE 140 MILLIGRAM(S): 50 TABLET ORAL at 05:16

## 2023-01-01 RX ADMIN — Medication 3 MILLILITER(S): at 06:03

## 2023-01-01 RX ADMIN — Medication 4 MILLIGRAM(S): at 17:38

## 2023-01-01 RX ADMIN — BRIMONIDINE TARTRATE 1 DROP(S): 2 SOLUTION/ DROPS OPHTHALMIC at 05:21

## 2023-01-01 RX ADMIN — PANTOPRAZOLE SODIUM 40 MILLIGRAM(S): 20 TABLET, DELAYED RELEASE ORAL at 11:43

## 2023-01-01 RX ADMIN — Medication 300 MILLIGRAM(S): at 17:56

## 2023-01-01 RX ADMIN — Medication 1 DROP(S): at 16:47

## 2023-01-01 RX ADMIN — Medication 3 MILLILITER(S): at 05:19

## 2023-01-01 RX ADMIN — Medication 250 MILLIGRAM(S): at 14:06

## 2023-01-01 RX ADMIN — Medication 1 DROP(S): at 17:55

## 2023-01-01 RX ADMIN — Medication 1 DROP(S): at 17:33

## 2023-01-01 RX ADMIN — CHLORHEXIDINE GLUCONATE 1 APPLICATION(S): 213 SOLUTION TOPICAL at 11:48

## 2023-01-01 RX ADMIN — Medication 250 MILLIGRAM(S): at 20:56

## 2023-01-01 RX ADMIN — ENOXAPARIN SODIUM 110 MILLIGRAM(S): 100 INJECTION SUBCUTANEOUS at 06:20

## 2023-01-01 RX ADMIN — Medication 250 MILLIGRAM(S): at 17:22

## 2023-01-01 RX ADMIN — BRIMONIDINE TARTRATE 1 DROP(S): 2 SOLUTION/ DROPS OPHTHALMIC at 17:24

## 2023-01-01 RX ADMIN — Medication 25 MILLIGRAM(S): at 06:26

## 2023-01-01 RX ADMIN — LEVETIRACETAM 400 MILLIGRAM(S): 250 TABLET, FILM COATED ORAL at 18:00

## 2023-01-01 RX ADMIN — Medication 25 MILLIGRAM(S): at 13:14

## 2023-01-01 RX ADMIN — ATORVASTATIN CALCIUM 20 MILLIGRAM(S): 80 TABLET, FILM COATED ORAL at 23:53

## 2023-01-01 RX ADMIN — LIDOCAINE 1 PATCH: 4 CREAM TOPICAL at 15:21

## 2023-01-01 RX ADMIN — AMLODIPINE BESYLATE 5 MILLIGRAM(S): 2.5 TABLET ORAL at 12:08

## 2023-01-01 RX ADMIN — LEVETIRACETAM 400 MILLIGRAM(S): 250 TABLET, FILM COATED ORAL at 17:53

## 2023-01-01 RX ADMIN — ATORVASTATIN CALCIUM 20 MILLIGRAM(S): 80 TABLET, FILM COATED ORAL at 21:26

## 2023-01-01 RX ADMIN — Medication 25 MILLIGRAM(S): at 12:35

## 2023-01-01 RX ADMIN — BRIMONIDINE TARTRATE 1 DROP(S): 2 SOLUTION/ DROPS OPHTHALMIC at 18:10

## 2023-01-01 RX ADMIN — Medication 100 MILLIGRAM(S): at 05:09

## 2023-01-01 RX ADMIN — BRIMONIDINE TARTRATE 1 DROP(S): 2 SOLUTION/ DROPS OPHTHALMIC at 18:23

## 2023-01-01 RX ADMIN — Medication 250 MILLIGRAM(S): at 23:15

## 2023-01-01 RX ADMIN — SODIUM CHLORIDE 100 MILLILITER(S): 9 INJECTION, SOLUTION INTRAVENOUS at 05:17

## 2023-01-01 RX ADMIN — Medication 250 MILLIGRAM(S): at 13:04

## 2023-01-01 RX ADMIN — LEVETIRACETAM 400 MILLIGRAM(S): 250 TABLET, FILM COATED ORAL at 17:54

## 2023-01-01 RX ADMIN — Medication 25 MILLIGRAM(S): at 05:40

## 2023-01-01 RX ADMIN — Medication 3 MILLILITER(S): at 13:55

## 2023-01-01 RX ADMIN — Medication 0.5 MILLIGRAM(S): at 00:19

## 2023-01-01 RX ADMIN — Medication 650 MILLIGRAM(S): at 00:49

## 2023-01-01 RX ADMIN — Medication 50 MILLIGRAM(S): at 21:05

## 2023-01-01 RX ADMIN — Medication 100 MILLIGRAM(S): at 05:40

## 2023-01-01 RX ADMIN — AMLODIPINE BESYLATE 5 MILLIGRAM(S): 2.5 TABLET ORAL at 05:45

## 2023-01-01 RX ADMIN — Medication 250 MILLIGRAM(S): at 05:51

## 2023-01-01 RX ADMIN — BRIMONIDINE TARTRATE 1 DROP(S): 2 SOLUTION/ DROPS OPHTHALMIC at 08:13

## 2023-01-01 RX ADMIN — NADOLOL 60 MILLIGRAM(S): 80 TABLET ORAL at 17:46

## 2023-01-01 RX ADMIN — Medication 3 MILLILITER(S): at 11:30

## 2023-01-01 RX ADMIN — LEVETIRACETAM 1000 MILLIGRAM(S): 250 TABLET, FILM COATED ORAL at 05:47

## 2023-01-01 RX ADMIN — PANTOPRAZOLE SODIUM 40 MILLIGRAM(S): 20 TABLET, DELAYED RELEASE ORAL at 12:57

## 2023-01-01 RX ADMIN — Medication 250 MILLIGRAM(S): at 12:46

## 2023-01-01 RX ADMIN — CHLORHEXIDINE GLUCONATE 1 APPLICATION(S): 213 SOLUTION TOPICAL at 12:52

## 2023-01-01 RX ADMIN — Medication 25 MILLIGRAM(S): at 17:38

## 2023-01-01 RX ADMIN — ENOXAPARIN SODIUM 110 MILLIGRAM(S): 100 INJECTION SUBCUTANEOUS at 05:07

## 2023-01-01 RX ADMIN — FAMOTIDINE 40 MILLIGRAM(S): 10 INJECTION INTRAVENOUS at 13:58

## 2023-01-01 RX ADMIN — LEVETIRACETAM 400 MILLIGRAM(S): 250 TABLET, FILM COATED ORAL at 17:30

## 2023-01-01 RX ADMIN — LACOSAMIDE 200 MILLIGRAM(S): 50 TABLET ORAL at 05:15

## 2023-01-01 RX ADMIN — Medication 3 MILLILITER(S): at 06:17

## 2023-01-01 RX ADMIN — Medication 300 MILLIGRAM(S): at 00:47

## 2023-01-01 RX ADMIN — Medication 400 MILLIGRAM(S): at 19:21

## 2023-01-01 RX ADMIN — Medication 4 MILLIGRAM(S): at 05:18

## 2023-01-01 RX ADMIN — Medication 250 MILLIGRAM(S): at 13:59

## 2023-01-01 RX ADMIN — Medication 100 MILLIGRAM(S): at 12:47

## 2023-01-01 RX ADMIN — CHLORHEXIDINE GLUCONATE 1 APPLICATION(S): 213 SOLUTION TOPICAL at 05:02

## 2023-01-01 RX ADMIN — Medication 4 MILLIGRAM(S): at 05:10

## 2023-01-01 RX ADMIN — Medication 250 MILLIGRAM(S): at 15:29

## 2023-01-01 RX ADMIN — Medication 250 MILLIGRAM(S): at 11:30

## 2023-01-01 RX ADMIN — Medication 1 DROP(S): at 05:44

## 2023-01-01 RX ADMIN — Medication 250 MILLIGRAM(S): at 21:53

## 2023-01-01 RX ADMIN — Medication 3 MILLILITER(S): at 23:47

## 2023-01-01 RX ADMIN — Medication 1 DROP(S): at 06:05

## 2023-01-01 RX ADMIN — Medication 3 MILLILITER(S): at 18:02

## 2023-01-01 RX ADMIN — Medication 0.5 MILLIGRAM(S): at 21:33

## 2023-01-01 RX ADMIN — Medication 50 MILLIGRAM(S): at 05:06

## 2023-01-01 RX ADMIN — Medication 3 MILLILITER(S): at 00:42

## 2023-01-01 RX ADMIN — LACOSAMIDE 200 MILLIGRAM(S): 50 TABLET ORAL at 18:40

## 2023-01-01 RX ADMIN — Medication 40 MILLIEQUIVALENT(S): at 13:58

## 2023-01-01 RX ADMIN — Medication 250 MILLIGRAM(S): at 22:08

## 2023-01-01 RX ADMIN — ENOXAPARIN SODIUM 100 MILLIGRAM(S): 100 INJECTION SUBCUTANEOUS at 06:19

## 2023-01-01 RX ADMIN — Medication 300 MILLIGRAM(S): at 09:12

## 2023-01-01 RX ADMIN — Medication 15 MILLILITER(S): at 13:18

## 2023-01-01 RX ADMIN — RIVAROXABAN 20 MILLIGRAM(S): KIT at 17:50

## 2023-01-01 RX ADMIN — Medication 1 DROP(S): at 18:10

## 2023-01-01 RX ADMIN — Medication 650 MILLIGRAM(S): at 17:25

## 2023-01-01 RX ADMIN — Medication 1 DROP(S): at 18:16

## 2023-01-01 RX ADMIN — PANTOPRAZOLE SODIUM 40 MILLIGRAM(S): 20 TABLET, DELAYED RELEASE ORAL at 17:31

## 2023-01-01 RX ADMIN — LACOSAMIDE 200 MILLIGRAM(S): 50 TABLET ORAL at 17:46

## 2023-01-01 RX ADMIN — PANTOPRAZOLE SODIUM 40 MILLIGRAM(S): 20 TABLET, DELAYED RELEASE ORAL at 12:26

## 2023-01-01 RX ADMIN — Medication 3 MILLILITER(S): at 23:29

## 2023-01-01 RX ADMIN — VALSARTAN 320 MILLIGRAM(S): 80 TABLET ORAL at 05:21

## 2023-01-01 RX ADMIN — Medication 650 MILLIGRAM(S): at 03:31

## 2023-01-01 RX ADMIN — Medication 650 MILLIGRAM(S): at 06:00

## 2023-01-01 RX ADMIN — LACOSAMIDE 140 MILLIGRAM(S): 50 TABLET ORAL at 18:49

## 2023-01-01 RX ADMIN — Medication 3 MILLILITER(S): at 17:34

## 2023-01-01 RX ADMIN — LEVETIRACETAM 1000 MILLIGRAM(S): 250 TABLET, FILM COATED ORAL at 05:45

## 2023-01-01 RX ADMIN — Medication 3 MILLILITER(S): at 06:09

## 2023-01-01 RX ADMIN — Medication 25 MILLIGRAM(S): at 05:41

## 2023-01-01 RX ADMIN — BRIMONIDINE TARTRATE 1 DROP(S): 2 SOLUTION/ DROPS OPHTHALMIC at 17:33

## 2023-01-01 RX ADMIN — Medication 250 MILLIGRAM(S): at 13:24

## 2023-01-01 RX ADMIN — LEVETIRACETAM 1000 MILLIGRAM(S): 250 TABLET, FILM COATED ORAL at 05:18

## 2023-01-01 RX ADMIN — Medication 250 MILLIGRAM(S): at 06:04

## 2023-01-01 RX ADMIN — Medication 250 MILLIGRAM(S): at 13:32

## 2023-01-01 RX ADMIN — LOSARTAN POTASSIUM 25 MILLIGRAM(S): 100 TABLET, FILM COATED ORAL at 12:27

## 2023-01-01 RX ADMIN — LEVETIRACETAM 1000 MILLIGRAM(S): 250 TABLET, FILM COATED ORAL at 05:49

## 2023-01-01 RX ADMIN — Medication 50 MILLIGRAM(S): at 05:16

## 2023-01-01 RX ADMIN — LOSARTAN POTASSIUM 100 MILLIGRAM(S): 100 TABLET, FILM COATED ORAL at 05:40

## 2023-01-01 RX ADMIN — PANTOPRAZOLE SODIUM 40 MILLIGRAM(S): 20 TABLET, DELAYED RELEASE ORAL at 05:02

## 2023-01-01 RX ADMIN — Medication 4 MILLIGRAM(S): at 17:25

## 2023-01-01 RX ADMIN — Medication 25 MILLIGRAM(S): at 12:50

## 2023-01-01 RX ADMIN — Medication 250 MILLIGRAM(S): at 22:32

## 2023-01-01 RX ADMIN — PANTOPRAZOLE SODIUM 40 MILLIGRAM(S): 20 TABLET, DELAYED RELEASE ORAL at 12:14

## 2023-01-01 RX ADMIN — Medication 250 MILLIGRAM(S): at 13:45

## 2023-01-01 RX ADMIN — SODIUM CHLORIDE 1000 MILLILITER(S): 9 INJECTION, SOLUTION INTRAVENOUS at 15:01

## 2023-01-01 RX ADMIN — ATORVASTATIN CALCIUM 20 MILLIGRAM(S): 80 TABLET, FILM COATED ORAL at 21:08

## 2023-01-01 RX ADMIN — SODIUM CHLORIDE 1000 MILLILITER(S): 9 INJECTION, SOLUTION INTRAVENOUS at 16:01

## 2023-01-01 RX ADMIN — LACOSAMIDE 200 MILLIGRAM(S): 50 TABLET ORAL at 05:49

## 2023-01-01 RX ADMIN — LACOSAMIDE 140 MILLIGRAM(S): 50 TABLET ORAL at 06:46

## 2023-01-01 RX ADMIN — Medication 100 MILLIGRAM(S): at 15:53

## 2023-01-01 RX ADMIN — Medication 0.5 MILLIGRAM(S): at 06:15

## 2023-01-01 RX ADMIN — Medication 25 MILLIGRAM(S): at 06:08

## 2023-01-01 RX ADMIN — ENOXAPARIN SODIUM 100 MILLIGRAM(S): 100 INJECTION SUBCUTANEOUS at 06:09

## 2023-01-01 RX ADMIN — Medication 4 MILLIGRAM(S): at 21:10

## 2023-01-01 RX ADMIN — Medication 650 MILLIGRAM(S): at 01:00

## 2023-01-01 RX ADMIN — Medication 250 MILLIGRAM(S): at 23:53

## 2023-01-01 RX ADMIN — RIVAROXABAN 20 MILLIGRAM(S): KIT at 12:28

## 2023-01-01 RX ADMIN — LOSARTAN POTASSIUM 50 MILLIGRAM(S): 100 TABLET, FILM COATED ORAL at 05:09

## 2023-01-01 RX ADMIN — Medication 4 MILLIGRAM(S): at 09:03

## 2023-01-01 RX ADMIN — ATORVASTATIN CALCIUM 20 MILLIGRAM(S): 80 TABLET, FILM COATED ORAL at 21:06

## 2023-01-01 RX ADMIN — BRIMONIDINE TARTRATE 1 DROP(S): 2 SOLUTION/ DROPS OPHTHALMIC at 16:47

## 2023-01-01 RX ADMIN — PIPERACILLIN AND TAZOBACTAM 25 GRAM(S): 4; .5 INJECTION, POWDER, LYOPHILIZED, FOR SOLUTION INTRAVENOUS at 16:31

## 2023-01-01 RX ADMIN — Medication 250 MILLIGRAM(S): at 21:07

## 2023-01-01 RX ADMIN — LOSARTAN POTASSIUM 50 MILLIGRAM(S): 100 TABLET, FILM COATED ORAL at 11:45

## 2023-01-01 RX ADMIN — Medication 25 MILLIGRAM(S): at 05:13

## 2023-01-01 RX ADMIN — Medication 250 MILLIGRAM(S): at 05:11

## 2023-01-01 RX ADMIN — Medication 25 MILLIGRAM(S): at 17:31

## 2023-01-01 RX ADMIN — BRIMONIDINE TARTRATE 1 DROP(S): 2 SOLUTION/ DROPS OPHTHALMIC at 05:02

## 2023-01-01 RX ADMIN — Medication 250 MILLIGRAM(S): at 06:08

## 2023-01-01 RX ADMIN — Medication 100 MILLIGRAM(S): at 11:03

## 2023-01-01 RX ADMIN — HEPARIN SODIUM 0 UNIT(S)/HR: 5000 INJECTION INTRAVENOUS; SUBCUTANEOUS at 18:07

## 2023-01-01 RX ADMIN — Medication 250 MILLIGRAM(S): at 22:07

## 2023-01-01 RX ADMIN — RIVAROXABAN 20 MILLIGRAM(S): KIT at 18:05

## 2023-01-01 RX ADMIN — NADOLOL 60 MILLIGRAM(S): 80 TABLET ORAL at 05:47

## 2023-01-01 RX ADMIN — Medication 250 MILLIGRAM(S): at 22:09

## 2023-01-01 RX ADMIN — BRIMONIDINE TARTRATE 1 DROP(S): 2 SOLUTION/ DROPS OPHTHALMIC at 17:27

## 2023-01-01 RX ADMIN — LACOSAMIDE 200 MILLIGRAM(S): 50 TABLET ORAL at 06:28

## 2023-01-01 RX ADMIN — LEVETIRACETAM 400 MILLIGRAM(S): 250 TABLET, FILM COATED ORAL at 06:21

## 2023-01-01 RX ADMIN — Medication 100 MILLIGRAM(S): at 12:03

## 2023-01-01 RX ADMIN — LEVETIRACETAM 1000 MILLIGRAM(S): 250 TABLET, FILM COATED ORAL at 06:08

## 2023-01-01 RX ADMIN — Medication 3 MILLILITER(S): at 17:09

## 2023-01-01 RX ADMIN — NADOLOL 60 MILLIGRAM(S): 80 TABLET ORAL at 06:18

## 2023-01-01 RX ADMIN — Medication 250 MILLIGRAM(S): at 15:10

## 2023-01-01 RX ADMIN — BRIMONIDINE TARTRATE 1 DROP(S): 2 SOLUTION/ DROPS OPHTHALMIC at 17:23

## 2023-01-01 RX ADMIN — Medication 20 MILLIGRAM(S): at 13:45

## 2023-01-01 RX ADMIN — BRIMONIDINE TARTRATE 1 DROP(S): 2 SOLUTION/ DROPS OPHTHALMIC at 05:39

## 2023-01-01 RX ADMIN — LEVETIRACETAM 400 MILLIGRAM(S): 250 TABLET, FILM COATED ORAL at 05:13

## 2023-01-01 RX ADMIN — SENNA PLUS 2 TABLET(S): 8.6 TABLET ORAL at 21:44

## 2023-01-01 RX ADMIN — Medication 3 MILLILITER(S): at 17:31

## 2023-01-01 RX ADMIN — Medication 20 MILLIGRAM(S): at 12:25

## 2023-01-01 RX ADMIN — ATORVASTATIN CALCIUM 20 MILLIGRAM(S): 80 TABLET, FILM COATED ORAL at 21:52

## 2023-01-01 RX ADMIN — Medication 3 MILLILITER(S): at 18:03

## 2023-01-01 RX ADMIN — LACOSAMIDE 140 MILLIGRAM(S): 50 TABLET ORAL at 17:51

## 2023-01-01 RX ADMIN — Medication 650 MILLIGRAM(S): at 14:20

## 2023-01-01 RX ADMIN — AZITHROMYCIN 500 MILLIGRAM(S): 500 TABLET, FILM COATED ORAL at 18:28

## 2023-01-01 RX ADMIN — Medication 0.1 MILLIGRAM(S): at 17:24

## 2023-01-01 RX ADMIN — FENTANYL CITRATE 50 MICROGRAM(S): 50 INJECTION INTRAVENOUS at 03:17

## 2023-01-01 RX ADMIN — ENOXAPARIN SODIUM 110 MILLIGRAM(S): 100 INJECTION SUBCUTANEOUS at 05:27

## 2023-01-01 RX ADMIN — BRIMONIDINE TARTRATE 1 DROP(S): 2 SOLUTION/ DROPS OPHTHALMIC at 05:41

## 2023-01-01 RX ADMIN — Medication 4 MILLIGRAM(S): at 06:19

## 2023-01-01 RX ADMIN — LACOSAMIDE 200 MILLIGRAM(S): 50 TABLET ORAL at 17:05

## 2023-01-01 RX ADMIN — Medication 4 MILLIGRAM(S): at 05:28

## 2023-01-01 RX ADMIN — LEVETIRACETAM 1000 MILLIGRAM(S): 250 TABLET, FILM COATED ORAL at 17:39

## 2023-01-01 RX ADMIN — Medication 4 MILLIGRAM(S): at 10:22

## 2023-01-01 RX ADMIN — BRIMONIDINE TARTRATE 1 DROP(S): 2 SOLUTION/ DROPS OPHTHALMIC at 18:16

## 2023-01-01 RX ADMIN — PANTOPRAZOLE SODIUM 40 MILLIGRAM(S): 20 TABLET, DELAYED RELEASE ORAL at 11:44

## 2023-01-01 RX ADMIN — Medication 250 MILLIGRAM(S): at 05:05

## 2023-01-01 RX ADMIN — Medication 50 MILLIGRAM(S): at 13:55

## 2023-01-01 RX ADMIN — CHLORHEXIDINE GLUCONATE 1 APPLICATION(S): 213 SOLUTION TOPICAL at 05:07

## 2023-01-01 RX ADMIN — Medication 0.1 MILLIGRAM(S): at 06:18

## 2023-01-01 RX ADMIN — LOSARTAN POTASSIUM 100 MILLIGRAM(S): 100 TABLET, FILM COATED ORAL at 05:12

## 2023-01-01 RX ADMIN — HYDROMORPHONE HYDROCHLORIDE 0.5 MILLIGRAM(S): 2 INJECTION INTRAMUSCULAR; INTRAVENOUS; SUBCUTANEOUS at 04:09

## 2023-01-01 RX ADMIN — Medication 250 MILLIGRAM(S): at 22:35

## 2023-01-01 RX ADMIN — Medication 250 MILLIGRAM(S): at 06:09

## 2023-01-01 RX ADMIN — Medication 650 MILLIGRAM(S): at 05:28

## 2023-01-01 RX ADMIN — AMLODIPINE BESYLATE 5 MILLIGRAM(S): 2.5 TABLET ORAL at 05:20

## 2023-01-01 RX ADMIN — LACOSAMIDE 200 MILLIGRAM(S): 50 TABLET ORAL at 05:40

## 2023-01-01 RX ADMIN — LACOSAMIDE 200 MILLIGRAM(S): 50 TABLET ORAL at 17:17

## 2023-01-01 RX ADMIN — Medication 650 MILLIGRAM(S): at 16:51

## 2023-01-01 RX ADMIN — ENOXAPARIN SODIUM 110 MILLIGRAM(S): 100 INJECTION SUBCUTANEOUS at 05:21

## 2023-01-01 RX ADMIN — LACOSAMIDE 200 MILLIGRAM(S): 50 TABLET ORAL at 17:04

## 2023-01-01 RX ADMIN — Medication 5 MILLIGRAM(S): at 23:11

## 2023-01-01 RX ADMIN — Medication 40 MILLIEQUIVALENT(S): at 20:44

## 2023-01-01 RX ADMIN — LEVETIRACETAM 400 MILLIGRAM(S): 250 TABLET, FILM COATED ORAL at 05:46

## 2023-01-01 RX ADMIN — Medication 3 MILLILITER(S): at 00:28

## 2023-01-01 RX ADMIN — Medication 3 MILLILITER(S): at 13:23

## 2023-01-01 RX ADMIN — LACOSAMIDE 200 MILLIGRAM(S): 50 TABLET ORAL at 05:09

## 2023-01-01 RX ADMIN — Medication 4 MILLIGRAM(S): at 18:40

## 2023-01-01 RX ADMIN — Medication 50 MILLIGRAM(S): at 22:05

## 2023-01-01 RX ADMIN — Medication 250 MILLIGRAM(S): at 05:19

## 2023-01-01 RX ADMIN — LEVETIRACETAM 1000 MILLIGRAM(S): 250 TABLET, FILM COATED ORAL at 05:11

## 2023-01-01 RX ADMIN — RIVAROXABAN 20 MILLIGRAM(S): KIT at 17:14

## 2023-01-01 RX ADMIN — LOSARTAN POTASSIUM 50 MILLIGRAM(S): 100 TABLET, FILM COATED ORAL at 05:47

## 2023-01-01 RX ADMIN — CHLORHEXIDINE GLUCONATE 1 APPLICATION(S): 213 SOLUTION TOPICAL at 14:02

## 2023-01-01 RX ADMIN — Medication 4 MILLIGRAM(S): at 06:14

## 2023-01-01 RX ADMIN — LACOSAMIDE 200 MILLIGRAM(S): 50 TABLET ORAL at 06:18

## 2023-01-01 RX ADMIN — Medication 25 MILLIGRAM(S): at 17:27

## 2023-01-01 RX ADMIN — Medication 650 MILLIGRAM(S): at 05:41

## 2023-01-01 RX ADMIN — BRIMONIDINE TARTRATE 1 DROP(S): 2 SOLUTION/ DROPS OPHTHALMIC at 17:58

## 2023-01-01 RX ADMIN — LEVETIRACETAM 400 MILLIGRAM(S): 250 TABLET, FILM COATED ORAL at 06:27

## 2023-01-01 RX ADMIN — NADOLOL 60 MILLIGRAM(S): 80 TABLET ORAL at 17:01

## 2023-01-01 RX ADMIN — Medication 0.5 MILLIGRAM(S): at 17:19

## 2023-01-01 RX ADMIN — Medication 3 MILLILITER(S): at 00:38

## 2023-01-01 RX ADMIN — Medication 4 MILLIGRAM(S): at 00:10

## 2023-01-01 RX ADMIN — NADOLOL 60 MILLIGRAM(S): 80 TABLET ORAL at 06:28

## 2023-01-01 RX ADMIN — HEPARIN SODIUM 1200 UNIT(S)/HR: 5000 INJECTION INTRAVENOUS; SUBCUTANEOUS at 18:35

## 2023-01-01 RX ADMIN — Medication 250 MILLIGRAM(S): at 05:39

## 2023-01-01 RX ADMIN — BUMETANIDE 2 MILLIGRAM(S): 0.25 INJECTION INTRAMUSCULAR; INTRAVENOUS at 09:49

## 2023-01-01 RX ADMIN — Medication 50 MILLIGRAM(S): at 14:48

## 2023-01-01 RX ADMIN — Medication 4 MILLIGRAM(S): at 05:32

## 2023-01-01 RX ADMIN — Medication 250 MILLIGRAM(S): at 13:42

## 2023-01-01 RX ADMIN — BRIMONIDINE TARTRATE 1 DROP(S): 2 SOLUTION/ DROPS OPHTHALMIC at 06:09

## 2023-01-01 RX ADMIN — Medication 3 MILLILITER(S): at 21:21

## 2023-01-01 RX ADMIN — ATENOLOL 25 MILLIGRAM(S): 25 TABLET ORAL at 16:46

## 2023-01-01 RX ADMIN — Medication 1 DROP(S): at 05:13

## 2023-01-01 RX ADMIN — LOSARTAN POTASSIUM 50 MILLIGRAM(S): 100 TABLET, FILM COATED ORAL at 06:06

## 2023-01-01 RX ADMIN — LEVETIRACETAM 400 MILLIGRAM(S): 250 TABLET, FILM COATED ORAL at 05:10

## 2023-01-01 RX ADMIN — Medication 4 MILLIGRAM(S): at 17:04

## 2023-01-01 RX ADMIN — LEVETIRACETAM 400 MILLIGRAM(S): 250 TABLET, FILM COATED ORAL at 08:09

## 2023-01-01 RX ADMIN — ENOXAPARIN SODIUM 110 MILLIGRAM(S): 100 INJECTION SUBCUTANEOUS at 18:03

## 2023-01-01 RX ADMIN — Medication 25 MILLIGRAM(S): at 17:54

## 2023-01-01 RX ADMIN — Medication 50 MILLIGRAM(S): at 21:27

## 2023-01-01 RX ADMIN — Medication 5 MILLIGRAM(S): at 04:36

## 2023-01-01 RX ADMIN — ENOXAPARIN SODIUM 110 MILLIGRAM(S): 100 INJECTION SUBCUTANEOUS at 18:15

## 2023-01-01 RX ADMIN — LEVETIRACETAM 400 MILLIGRAM(S): 250 TABLET, FILM COATED ORAL at 17:10

## 2023-01-01 RX ADMIN — Medication 3 MILLILITER(S): at 17:04

## 2023-01-01 RX ADMIN — Medication 3 MILLILITER(S): at 05:47

## 2023-01-01 RX ADMIN — Medication 4 MILLIGRAM(S): at 06:00

## 2023-01-01 RX ADMIN — Medication 250 MILLIGRAM(S): at 05:49

## 2023-01-01 RX ADMIN — LEVETIRACETAM 400 MILLIGRAM(S): 250 TABLET, FILM COATED ORAL at 05:16

## 2023-01-01 RX ADMIN — Medication 50 MILLIGRAM(S): at 06:06

## 2023-01-01 RX ADMIN — LOSARTAN POTASSIUM 100 MILLIGRAM(S): 100 TABLET, FILM COATED ORAL at 06:19

## 2023-01-01 RX ADMIN — LEVETIRACETAM 400 MILLIGRAM(S): 250 TABLET, FILM COATED ORAL at 18:03

## 2023-01-01 RX ADMIN — Medication 5 MILLILITER(S): at 23:24

## 2023-01-01 RX ADMIN — HEPARIN SODIUM 2100 UNIT(S)/HR: 5000 INJECTION INTRAVENOUS; SUBCUTANEOUS at 18:00

## 2023-01-01 RX ADMIN — LEVETIRACETAM 400 MILLIGRAM(S): 250 TABLET, FILM COATED ORAL at 17:51

## 2023-01-01 RX ADMIN — Medication 300 MILLIGRAM(S): at 00:01

## 2023-01-01 RX ADMIN — LACOSAMIDE 200 MILLIGRAM(S): 50 TABLET ORAL at 18:09

## 2023-01-01 RX ADMIN — Medication 10 MILLIGRAM(S): at 03:08

## 2023-01-01 RX ADMIN — ATORVASTATIN CALCIUM 20 MILLIGRAM(S): 80 TABLET, FILM COATED ORAL at 22:07

## 2023-01-01 RX ADMIN — LACOSAMIDE 140 MILLIGRAM(S): 50 TABLET ORAL at 17:33

## 2023-01-01 RX ADMIN — Medication 0.5 MILLIGRAM(S): at 18:21

## 2023-01-01 RX ADMIN — LEVETIRACETAM 1000 MILLIGRAM(S): 250 TABLET, FILM COATED ORAL at 05:22

## 2023-01-01 RX ADMIN — Medication 3 MILLILITER(S): at 13:28

## 2023-01-01 RX ADMIN — LEVETIRACETAM 400 MILLIGRAM(S): 250 TABLET, FILM COATED ORAL at 17:56

## 2023-01-01 RX ADMIN — Medication 300 MILLIGRAM(S): at 21:27

## 2023-01-01 RX ADMIN — Medication 1 DROP(S): at 17:11

## 2023-01-01 RX ADMIN — Medication 4 MILLIGRAM(S): at 05:16

## 2023-01-01 RX ADMIN — Medication 300 MILLIGRAM(S): at 13:03

## 2023-01-01 RX ADMIN — Medication 25 MILLIGRAM(S): at 18:27

## 2023-01-01 RX ADMIN — Medication 3 MILLILITER(S): at 05:09

## 2023-01-01 RX ADMIN — VALSARTAN 320 MILLIGRAM(S): 80 TABLET ORAL at 05:47

## 2023-01-01 RX ADMIN — Medication 250 MILLIGRAM(S): at 06:19

## 2023-01-01 RX ADMIN — AMLODIPINE BESYLATE 5 MILLIGRAM(S): 2.5 TABLET ORAL at 05:18

## 2023-01-01 RX ADMIN — NADOLOL 40 MILLIGRAM(S): 80 TABLET ORAL at 17:24

## 2023-01-01 RX ADMIN — Medication 3 MILLILITER(S): at 21:48

## 2023-01-01 RX ADMIN — Medication 250 MILLIGRAM(S): at 05:18

## 2023-01-01 RX ADMIN — CHLORHEXIDINE GLUCONATE 1 APPLICATION(S): 213 SOLUTION TOPICAL at 13:23

## 2023-01-01 RX ADMIN — Medication 300 MILLIGRAM(S): at 19:14

## 2023-01-01 RX ADMIN — Medication 250 MILLIGRAM(S): at 11:43

## 2023-01-01 RX ADMIN — PHENYLEPHRINE HYDROCHLORIDE 37.5 MICROGRAM(S)/KG/MIN: 10 INJECTION INTRAVENOUS at 21:28

## 2023-01-01 RX ADMIN — Medication 3 MILLILITER(S): at 01:13

## 2023-01-01 RX ADMIN — Medication 20 MILLIGRAM(S): at 11:43

## 2023-01-01 RX ADMIN — LACOSAMIDE 200 MILLIGRAM(S): 50 TABLET ORAL at 18:04

## 2023-01-01 RX ADMIN — Medication 20 MILLIGRAM(S): at 18:00

## 2023-01-01 RX ADMIN — Medication 650 MILLIGRAM(S): at 13:51

## 2023-01-01 RX ADMIN — Medication 20 MILLIGRAM(S): at 15:25

## 2023-01-01 RX ADMIN — Medication 3 MILLILITER(S): at 05:02

## 2023-01-01 RX ADMIN — Medication 250 MILLIGRAM(S): at 13:38

## 2023-01-01 RX ADMIN — LACOSAMIDE 200 MILLIGRAM(S): 50 TABLET ORAL at 17:45

## 2023-01-01 RX ADMIN — Medication 25 MILLIGRAM(S): at 12:04

## 2023-01-01 RX ADMIN — Medication 3 MILLILITER(S): at 12:21

## 2023-01-01 RX ADMIN — PANTOPRAZOLE SODIUM 40 MILLIGRAM(S): 20 TABLET, DELAYED RELEASE ORAL at 12:07

## 2023-01-01 RX ADMIN — Medication 100 MILLIGRAM(S): at 12:28

## 2023-01-01 RX ADMIN — Medication 100 MILLIGRAM(S): at 13:19

## 2023-01-01 RX ADMIN — Medication 250 MILLIGRAM(S): at 22:58

## 2023-01-01 RX ADMIN — PANTOPRAZOLE SODIUM 40 MILLIGRAM(S): 20 TABLET, DELAYED RELEASE ORAL at 05:29

## 2023-01-01 RX ADMIN — Medication 250 MILLIGRAM(S): at 13:15

## 2023-01-01 RX ADMIN — Medication 100 MILLIGRAM(S): at 12:23

## 2023-01-01 RX ADMIN — Medication 100 MILLIGRAM(S): at 08:12

## 2023-01-01 RX ADMIN — LACOSAMIDE 140 MILLIGRAM(S): 50 TABLET ORAL at 17:18

## 2023-01-01 RX ADMIN — BRIMONIDINE TARTRATE 1 DROP(S): 2 SOLUTION/ DROPS OPHTHALMIC at 18:27

## 2023-01-01 RX ADMIN — Medication 12.5 MILLIGRAM(S): at 17:07

## 2023-01-01 RX ADMIN — Medication 4 MILLIGRAM(S): at 08:13

## 2023-01-01 RX ADMIN — Medication 100 MILLIGRAM(S): at 11:23

## 2023-01-01 RX ADMIN — BRIMONIDINE TARTRATE 1 DROP(S): 2 SOLUTION/ DROPS OPHTHALMIC at 05:16

## 2023-01-01 RX ADMIN — Medication 100 MILLIGRAM(S): at 13:06

## 2023-01-01 RX ADMIN — LACOSAMIDE 200 MILLIGRAM(S): 50 TABLET ORAL at 17:53

## 2023-01-01 RX ADMIN — LOSARTAN POTASSIUM 50 MILLIGRAM(S): 100 TABLET, FILM COATED ORAL at 05:46

## 2023-01-01 RX ADMIN — PANTOPRAZOLE SODIUM 40 MILLIGRAM(S): 20 TABLET, DELAYED RELEASE ORAL at 05:17

## 2023-01-01 RX ADMIN — Medication 3 MILLILITER(S): at 00:54

## 2023-01-01 RX ADMIN — CHLORHEXIDINE GLUCONATE 1 APPLICATION(S): 213 SOLUTION TOPICAL at 11:59

## 2023-01-01 RX ADMIN — Medication 25 MILLIGRAM(S): at 05:01

## 2023-01-01 RX ADMIN — PHENYLEPHRINE HYDROCHLORIDE 37.5 MICROGRAM(S)/KG/MIN: 10 INJECTION INTRAVENOUS at 07:59

## 2023-01-01 RX ADMIN — NADOLOL 40 MILLIGRAM(S): 80 TABLET ORAL at 05:26

## 2023-01-01 RX ADMIN — Medication 25 MILLIGRAM(S): at 22:35

## 2023-01-01 RX ADMIN — RIVAROXABAN 20 MILLIGRAM(S): KIT at 18:09

## 2023-01-01 RX ADMIN — Medication 0.5 MILLIGRAM(S): at 17:05

## 2023-01-01 RX ADMIN — Medication 5 MILLIGRAM(S): at 05:38

## 2023-01-01 RX ADMIN — Medication 3 MILLIGRAM(S): at 21:58

## 2023-01-01 RX ADMIN — Medication 100 MILLIGRAM(S): at 12:56

## 2023-01-01 RX ADMIN — Medication 4 MILLIGRAM(S): at 05:53

## 2023-01-01 RX ADMIN — BRIMONIDINE TARTRATE 1 DROP(S): 2 SOLUTION/ DROPS OPHTHALMIC at 05:46

## 2023-01-01 RX ADMIN — Medication 0.5 MILLIGRAM(S): at 21:25

## 2023-01-01 RX ADMIN — Medication 100 MILLIGRAM(S): at 06:03

## 2023-01-01 RX ADMIN — ENOXAPARIN SODIUM 110 MILLIGRAM(S): 100 INJECTION SUBCUTANEOUS at 05:16

## 2023-01-01 RX ADMIN — LEVETIRACETAM 1000 MILLIGRAM(S): 250 TABLET, FILM COATED ORAL at 17:17

## 2023-01-01 RX ADMIN — Medication 650 MILLIGRAM(S): at 18:14

## 2023-01-01 RX ADMIN — Medication 20 MILLIGRAM(S): at 11:28

## 2023-01-01 RX ADMIN — LEVETIRACETAM 1000 MILLIGRAM(S): 250 TABLET, FILM COATED ORAL at 17:13

## 2023-01-01 RX ADMIN — CEFEPIME 100 MILLIGRAM(S): 1 INJECTION, POWDER, FOR SOLUTION INTRAMUSCULAR; INTRAVENOUS at 07:59

## 2023-01-01 RX ADMIN — Medication 4 MILLIGRAM(S): at 17:52

## 2023-01-01 RX ADMIN — LACOSAMIDE 200 MILLIGRAM(S): 50 TABLET ORAL at 05:50

## 2023-01-01 RX ADMIN — Medication 0.1 MILLIGRAM(S): at 17:38

## 2023-01-01 RX ADMIN — Medication 250 MILLIGRAM(S): at 21:59

## 2023-01-01 RX ADMIN — Medication 20 MILLIGRAM(S): at 12:32

## 2023-01-01 RX ADMIN — LEVETIRACETAM 1000 MILLIGRAM(S): 250 TABLET, FILM COATED ORAL at 05:30

## 2023-01-01 RX ADMIN — Medication 0.5 MILLIGRAM(S): at 23:25

## 2023-01-01 RX ADMIN — LACOSAMIDE 140 MILLIGRAM(S): 50 TABLET ORAL at 07:14

## 2023-01-01 RX ADMIN — Medication 250 MILLIGRAM(S): at 05:22

## 2023-01-01 RX ADMIN — AMLODIPINE BESYLATE 5 MILLIGRAM(S): 2.5 TABLET ORAL at 06:08

## 2023-01-01 RX ADMIN — CHLORHEXIDINE GLUCONATE 1 APPLICATION(S): 213 SOLUTION TOPICAL at 12:15

## 2023-01-01 RX ADMIN — CHLORHEXIDINE GLUCONATE 1 APPLICATION(S): 213 SOLUTION TOPICAL at 12:47

## 2023-01-01 RX ADMIN — ATORVASTATIN CALCIUM 20 MILLIGRAM(S): 80 TABLET, FILM COATED ORAL at 21:48

## 2023-01-01 RX ADMIN — HEPARIN SODIUM 1100 UNIT(S)/HR: 5000 INJECTION INTRAVENOUS; SUBCUTANEOUS at 07:27

## 2023-01-01 RX ADMIN — Medication 25 MILLIGRAM(S): at 17:01

## 2023-01-01 RX ADMIN — LACOSAMIDE 140 MILLIGRAM(S): 50 TABLET ORAL at 05:10

## 2023-01-01 RX ADMIN — RIVAROXABAN 20 MILLIGRAM(S): KIT at 11:53

## 2023-01-01 RX ADMIN — Medication 400 MILLIGRAM(S): at 03:00

## 2023-01-01 RX ADMIN — Medication 100 MILLIGRAM(S): at 05:31

## 2023-01-01 RX ADMIN — RIVAROXABAN 20 MILLIGRAM(S): KIT at 11:49

## 2023-01-01 RX ADMIN — LACOSAMIDE 140 MILLIGRAM(S): 50 TABLET ORAL at 18:00

## 2023-01-01 RX ADMIN — Medication 15 MILLILITER(S): at 11:23

## 2023-01-01 RX ADMIN — PIPERACILLIN AND TAZOBACTAM 3.38 GRAM(S): 4; .5 INJECTION, POWDER, LYOPHILIZED, FOR SOLUTION INTRAVENOUS at 15:30

## 2023-01-01 RX ADMIN — ATORVASTATIN CALCIUM 20 MILLIGRAM(S): 80 TABLET, FILM COATED ORAL at 21:41

## 2023-01-01 RX ADMIN — HEPARIN SODIUM 1100 UNIT(S)/HR: 5000 INJECTION INTRAVENOUS; SUBCUTANEOUS at 01:16

## 2023-01-01 RX ADMIN — Medication 50 MILLIGRAM(S): at 06:50

## 2023-01-01 RX ADMIN — LACOSAMIDE 140 MILLIGRAM(S): 50 TABLET ORAL at 07:19

## 2023-01-01 RX ADMIN — Medication 1 DROP(S): at 17:29

## 2023-01-01 RX ADMIN — CHLORHEXIDINE GLUCONATE 1 APPLICATION(S): 213 SOLUTION TOPICAL at 11:09

## 2023-01-01 RX ADMIN — BRIMONIDINE TARTRATE 1 DROP(S): 2 SOLUTION/ DROPS OPHTHALMIC at 05:44

## 2023-01-01 RX ADMIN — Medication 0.1 MILLIGRAM(S): at 06:08

## 2023-01-01 RX ADMIN — LACOSAMIDE 140 MILLIGRAM(S): 50 TABLET ORAL at 07:49

## 2023-01-01 RX ADMIN — Medication 100 MILLIGRAM(S): at 05:07

## 2023-01-01 RX ADMIN — Medication 40 MILLIEQUIVALENT(S): at 00:06

## 2023-01-01 RX ADMIN — Medication 25 MILLIGRAM(S): at 05:45

## 2023-01-01 RX ADMIN — Medication 1 DROP(S): at 05:47

## 2023-01-01 RX ADMIN — ATORVASTATIN CALCIUM 20 MILLIGRAM(S): 80 TABLET, FILM COATED ORAL at 22:36

## 2023-01-01 RX ADMIN — Medication 3 MILLILITER(S): at 12:44

## 2023-01-01 RX ADMIN — Medication 100 MILLIGRAM(S): at 12:57

## 2023-01-01 RX ADMIN — Medication 1 DROP(S): at 05:50

## 2023-01-01 RX ADMIN — BUMETANIDE 10 MG/HR: 0.25 INJECTION INTRAMUSCULAR; INTRAVENOUS at 09:49

## 2023-01-01 RX ADMIN — Medication 20 MILLIGRAM(S): at 11:58

## 2023-01-01 RX ADMIN — LACOSAMIDE 200 MILLIGRAM(S): 50 TABLET ORAL at 17:08

## 2023-01-01 RX ADMIN — ENOXAPARIN SODIUM 100 MILLIGRAM(S): 100 INJECTION SUBCUTANEOUS at 05:50

## 2023-01-01 RX ADMIN — NADOLOL 60 MILLIGRAM(S): 80 TABLET ORAL at 06:07

## 2023-01-01 RX ADMIN — RIVAROXABAN 20 MILLIGRAM(S): KIT at 17:26

## 2023-01-01 RX ADMIN — LACOSAMIDE 140 MILLIGRAM(S): 50 TABLET ORAL at 07:58

## 2023-01-01 RX ADMIN — Medication 1 DROP(S): at 17:21

## 2023-01-01 RX ADMIN — RIVAROXABAN 20 MILLIGRAM(S): KIT at 17:59

## 2023-01-01 RX ADMIN — BRIMONIDINE TARTRATE 1 DROP(S): 2 SOLUTION/ DROPS OPHTHALMIC at 17:08

## 2023-01-01 RX ADMIN — Medication 100 MILLIGRAM(S): at 17:26

## 2023-01-01 RX ADMIN — Medication 4 MILLIGRAM(S): at 05:06

## 2023-01-01 RX ADMIN — Medication 4 MILLIGRAM(S): at 05:45

## 2023-01-01 RX ADMIN — Medication 4 MILLIGRAM(S): at 05:47

## 2023-01-01 RX ADMIN — Medication 1: at 00:06

## 2023-01-01 RX ADMIN — Medication 20 MILLIGRAM(S): at 11:05

## 2023-01-01 RX ADMIN — Medication 100 MILLIEQUIVALENT(S): at 00:06

## 2023-01-01 RX ADMIN — LEVETIRACETAM 1000 MILLIGRAM(S): 250 TABLET, FILM COATED ORAL at 17:26

## 2023-01-01 RX ADMIN — PANTOPRAZOLE SODIUM 40 MILLIGRAM(S): 20 TABLET, DELAYED RELEASE ORAL at 13:07

## 2023-01-01 RX ADMIN — VALSARTAN 160 MILLIGRAM(S): 80 TABLET ORAL at 12:36

## 2023-01-01 RX ADMIN — LACOSAMIDE 200 MILLIGRAM(S): 50 TABLET ORAL at 05:29

## 2023-01-01 RX ADMIN — Medication 250 MILLIGRAM(S): at 21:10

## 2023-01-01 RX ADMIN — Medication 25 MILLIGRAM(S): at 04:55

## 2023-01-01 RX ADMIN — Medication 650 MILLIGRAM(S): at 05:01

## 2023-01-01 RX ADMIN — LACOSAMIDE 140 MILLIGRAM(S): 50 TABLET ORAL at 17:07

## 2023-01-01 RX ADMIN — Medication 250 MILLIGRAM(S): at 06:24

## 2023-01-01 RX ADMIN — Medication 4 MILLIGRAM(S): at 05:49

## 2023-01-01 RX ADMIN — BRIMONIDINE TARTRATE 1 DROP(S): 2 SOLUTION/ DROPS OPHTHALMIC at 17:55

## 2023-01-01 RX ADMIN — Medication 100 MILLIGRAM(S): at 13:15

## 2023-01-01 RX ADMIN — CHLORHEXIDINE GLUCONATE 1 APPLICATION(S): 213 SOLUTION TOPICAL at 12:42

## 2023-01-01 RX ADMIN — LEVETIRACETAM 1000 MILLIGRAM(S): 250 TABLET, FILM COATED ORAL at 21:51

## 2023-01-01 RX ADMIN — CHLORHEXIDINE GLUCONATE 1 APPLICATION(S): 213 SOLUTION TOPICAL at 05:29

## 2023-01-01 RX ADMIN — Medication 250 MILLIGRAM(S): at 06:28

## 2023-01-01 RX ADMIN — FAMOTIDINE 40 MILLIGRAM(S): 10 INJECTION INTRAVENOUS at 12:27

## 2023-01-01 RX ADMIN — LEVETIRACETAM 400 MILLIGRAM(S): 250 TABLET, FILM COATED ORAL at 17:16

## 2023-01-01 RX ADMIN — LEVETIRACETAM 400 MILLIGRAM(S): 250 TABLET, FILM COATED ORAL at 17:42

## 2023-01-01 RX ADMIN — Medication 250 MILLIGRAM(S): at 05:31

## 2023-01-01 RX ADMIN — BRIMONIDINE TARTRATE 1 DROP(S): 2 SOLUTION/ DROPS OPHTHALMIC at 06:05

## 2023-01-01 RX ADMIN — Medication 3 MILLILITER(S): at 12:32

## 2023-01-01 RX ADMIN — LACOSAMIDE 140 MILLIGRAM(S): 50 TABLET ORAL at 05:52

## 2023-01-01 RX ADMIN — Medication 20 MILLIGRAM(S): at 12:56

## 2023-01-01 RX ADMIN — Medication 25 MILLIGRAM(S): at 12:29

## 2023-01-01 RX ADMIN — ENOXAPARIN SODIUM 100 MILLIGRAM(S): 100 INJECTION SUBCUTANEOUS at 17:53

## 2023-01-01 RX ADMIN — LACOSAMIDE 140 MILLIGRAM(S): 50 TABLET ORAL at 17:54

## 2023-01-01 RX ADMIN — RIVAROXABAN 20 MILLIGRAM(S): KIT at 17:11

## 2023-01-01 RX ADMIN — HEPARIN SODIUM 900 UNIT(S)/HR: 5000 INJECTION INTRAVENOUS; SUBCUTANEOUS at 05:34

## 2023-01-01 RX ADMIN — HYDROMORPHONE HYDROCHLORIDE 0.5 MILLIGRAM(S): 2 INJECTION INTRAMUSCULAR; INTRAVENOUS; SUBCUTANEOUS at 23:05

## 2023-01-01 RX ADMIN — Medication 1 DROP(S): at 05:30

## 2023-01-01 RX ADMIN — RIVAROXABAN 20 MILLIGRAM(S): KIT at 12:59

## 2023-01-01 RX ADMIN — PANTOPRAZOLE SODIUM 40 MILLIGRAM(S): 20 TABLET, DELAYED RELEASE ORAL at 12:36

## 2023-01-01 RX ADMIN — LEVETIRACETAM 1000 MILLIGRAM(S): 250 TABLET, FILM COATED ORAL at 06:17

## 2023-01-01 RX ADMIN — CHLORHEXIDINE GLUCONATE 1 APPLICATION(S): 213 SOLUTION TOPICAL at 11:44

## 2023-01-01 RX ADMIN — Medication 20 MILLIEQUIVALENT(S): at 11:45

## 2023-01-01 RX ADMIN — LACOSAMIDE 200 MILLIGRAM(S): 50 TABLET ORAL at 05:01

## 2023-01-01 RX ADMIN — LEVETIRACETAM 400 MILLIGRAM(S): 250 TABLET, FILM COATED ORAL at 18:39

## 2023-01-01 RX ADMIN — Medication 250 MILLIGRAM(S): at 21:55

## 2023-01-01 RX ADMIN — Medication 20 MILLIGRAM(S): at 11:31

## 2023-01-01 RX ADMIN — HEPARIN SODIUM 1200 UNIT(S)/HR: 5000 INJECTION INTRAVENOUS; SUBCUTANEOUS at 21:14

## 2023-01-01 RX ADMIN — Medication 25 MILLIGRAM(S): at 21:32

## 2023-01-01 RX ADMIN — Medication 1 DROP(S): at 05:03

## 2023-01-01 RX ADMIN — LEVETIRACETAM 400 MILLIGRAM(S): 250 TABLET, FILM COATED ORAL at 05:18

## 2023-01-01 RX ADMIN — Medication 1000 MILLIGRAM(S): at 16:46

## 2023-01-01 RX ADMIN — Medication 250 MILLIGRAM(S): at 15:05

## 2023-01-01 RX ADMIN — Medication 100 MILLIGRAM(S): at 11:04

## 2023-01-01 RX ADMIN — Medication 1 DROP(S): at 17:26

## 2023-01-01 RX ADMIN — LEVETIRACETAM 1000 MILLIGRAM(S): 250 TABLET, FILM COATED ORAL at 06:07

## 2023-01-01 RX ADMIN — Medication 20 MILLIGRAM(S): at 11:19

## 2023-01-01 RX ADMIN — RIVAROXABAN 20 MILLIGRAM(S): KIT at 11:16

## 2023-01-01 RX ADMIN — NADOLOL 60 MILLIGRAM(S): 80 TABLET ORAL at 05:19

## 2023-01-01 RX ADMIN — Medication 3 MILLILITER(S): at 00:01

## 2023-01-01 RX ADMIN — Medication 4 MILLIGRAM(S): at 17:17

## 2023-01-01 RX ADMIN — Medication 3 MILLILITER(S): at 16:42

## 2023-01-01 RX ADMIN — NADOLOL 60 MILLIGRAM(S): 80 TABLET ORAL at 18:26

## 2023-01-01 RX ADMIN — Medication 4 MILLIGRAM(S): at 05:31

## 2023-01-01 RX ADMIN — ROBINUL 0.4 MILLIGRAM(S): 0.2 INJECTION INTRAMUSCULAR; INTRAVENOUS at 05:52

## 2023-01-01 RX ADMIN — Medication 0.1 MILLIGRAM(S): at 17:26

## 2023-01-01 RX ADMIN — Medication 100 MILLIGRAM(S): at 07:10

## 2023-01-01 RX ADMIN — Medication 250 MILLIGRAM(S): at 13:07

## 2023-01-01 RX ADMIN — BRIMONIDINE TARTRATE 1 DROP(S): 2 SOLUTION/ DROPS OPHTHALMIC at 06:00

## 2023-01-01 RX ADMIN — Medication 50 MILLIGRAM(S): at 09:43

## 2023-01-01 RX ADMIN — VALSARTAN 320 MILLIGRAM(S): 80 TABLET ORAL at 05:57

## 2023-01-01 RX ADMIN — LEVETIRACETAM 1000 MILLIGRAM(S): 250 TABLET, FILM COATED ORAL at 06:19

## 2023-01-01 RX ADMIN — LEVETIRACETAM 1000 MILLIGRAM(S): 250 TABLET, FILM COATED ORAL at 17:45

## 2023-01-01 RX ADMIN — Medication 4 MILLIGRAM(S): at 17:05

## 2023-01-01 RX ADMIN — Medication 3 MILLILITER(S): at 05:18

## 2023-01-01 RX ADMIN — Medication 1 DROP(S): at 17:24

## 2023-01-01 RX ADMIN — Medication 100 MILLIGRAM(S): at 13:26

## 2023-01-01 RX ADMIN — Medication 25 MILLIGRAM(S): at 16:43

## 2023-01-01 RX ADMIN — Medication 250 MILLIGRAM(S): at 21:17

## 2023-01-01 RX ADMIN — BRIMONIDINE TARTRATE 1 DROP(S): 2 SOLUTION/ DROPS OPHTHALMIC at 05:53

## 2023-01-01 RX ADMIN — HEPARIN SODIUM 0 UNIT(S)/HR: 5000 INJECTION INTRAVENOUS; SUBCUTANEOUS at 04:33

## 2023-01-01 RX ADMIN — Medication 650 MILLIGRAM(S): at 01:02

## 2023-01-01 RX ADMIN — AMLODIPINE BESYLATE 5 MILLIGRAM(S): 2.5 TABLET ORAL at 04:55

## 2023-01-01 RX ADMIN — Medication 3 MILLILITER(S): at 17:51

## 2023-01-01 RX ADMIN — AMLODIPINE BESYLATE 5 MILLIGRAM(S): 2.5 TABLET ORAL at 05:12

## 2023-01-01 RX ADMIN — Medication 1 DROP(S): at 05:39

## 2023-01-01 RX ADMIN — BRIMONIDINE TARTRATE 1 DROP(S): 2 SOLUTION/ DROPS OPHTHALMIC at 06:11

## 2023-01-01 RX ADMIN — HEPARIN SODIUM 1800 UNIT(S)/HR: 5000 INJECTION INTRAVENOUS; SUBCUTANEOUS at 01:54

## 2023-01-01 RX ADMIN — LEVETIRACETAM 400 MILLIGRAM(S): 250 TABLET, FILM COATED ORAL at 19:50

## 2023-01-01 RX ADMIN — Medication 100 MILLIGRAM(S): at 12:21

## 2023-01-01 RX ADMIN — PANTOPRAZOLE SODIUM 40 MILLIGRAM(S): 20 TABLET, DELAYED RELEASE ORAL at 04:07

## 2023-01-01 RX ADMIN — NADOLOL 40 MILLIGRAM(S): 80 TABLET ORAL at 05:01

## 2023-01-01 RX ADMIN — PANTOPRAZOLE SODIUM 40 MILLIGRAM(S): 20 TABLET, DELAYED RELEASE ORAL at 06:15

## 2023-01-01 RX ADMIN — NADOLOL 60 MILLIGRAM(S): 80 TABLET ORAL at 05:57

## 2023-01-01 RX ADMIN — HEPARIN SODIUM 0 UNIT(S)/HR: 5000 INJECTION INTRAVENOUS; SUBCUTANEOUS at 10:04

## 2023-01-01 RX ADMIN — Medication 1 DROP(S): at 05:20

## 2023-01-01 RX ADMIN — Medication 0.5 MILLIGRAM(S): at 17:39

## 2023-01-01 RX ADMIN — Medication 250 MILLIGRAM(S): at 22:18

## 2023-01-01 RX ADMIN — LEVETIRACETAM 1000 MILLIGRAM(S): 250 TABLET, FILM COATED ORAL at 05:09

## 2023-01-01 RX ADMIN — PROPOFOL 6 MICROGRAM(S)/KG/MIN: 10 INJECTION, EMULSION INTRAVENOUS at 05:36

## 2023-01-01 RX ADMIN — ENOXAPARIN SODIUM 100 MILLIGRAM(S): 100 INJECTION SUBCUTANEOUS at 06:49

## 2023-01-01 RX ADMIN — Medication 250 MILLIGRAM(S): at 08:26

## 2023-01-01 RX ADMIN — Medication 650 MILLIGRAM(S): at 06:34

## 2023-01-01 RX ADMIN — Medication 25 GRAM(S): at 11:07

## 2023-01-01 RX ADMIN — LEVETIRACETAM 1000 MILLIGRAM(S): 250 TABLET, FILM COATED ORAL at 06:04

## 2023-01-01 RX ADMIN — Medication 20 MILLIGRAM(S): at 05:51

## 2023-01-01 RX ADMIN — Medication 25 MILLIGRAM(S): at 05:07

## 2023-01-01 RX ADMIN — LACOSAMIDE 200 MILLIGRAM(S): 50 TABLET ORAL at 17:57

## 2023-01-01 RX ADMIN — PROPOFOL 6 MICROGRAM(S)/KG/MIN: 10 INJECTION, EMULSION INTRAVENOUS at 07:59

## 2023-01-01 RX ADMIN — PANTOPRAZOLE SODIUM 40 MILLIGRAM(S): 20 TABLET, DELAYED RELEASE ORAL at 11:31

## 2023-01-01 RX ADMIN — Medication 50 MILLIGRAM(S): at 05:18

## 2023-01-01 RX ADMIN — LEVETIRACETAM 1000 MILLIGRAM(S): 250 TABLET, FILM COATED ORAL at 17:23

## 2023-01-01 RX ADMIN — Medication 100 MILLIGRAM(S): at 12:01

## 2023-01-01 RX ADMIN — Medication 1 DROP(S): at 06:26

## 2023-01-01 RX ADMIN — Medication 4 MILLIGRAM(S): at 06:03

## 2023-01-01 RX ADMIN — BUMETANIDE 10 MG/HR: 0.25 INJECTION INTRAMUSCULAR; INTRAVENOUS at 07:57

## 2023-01-01 RX ADMIN — LEVETIRACETAM 1000 MILLIGRAM(S): 250 TABLET, FILM COATED ORAL at 17:06

## 2023-01-01 RX ADMIN — Medication 250 MILLIGRAM(S): at 05:17

## 2023-01-01 RX ADMIN — PANTOPRAZOLE SODIUM 40 MILLIGRAM(S): 20 TABLET, DELAYED RELEASE ORAL at 11:29

## 2023-01-01 RX ADMIN — Medication 1 DROP(S): at 17:42

## 2023-01-01 RX ADMIN — Medication 15 MILLILITER(S): at 12:35

## 2023-01-01 RX ADMIN — LOSARTAN POTASSIUM 25 MILLIGRAM(S): 100 TABLET, FILM COATED ORAL at 06:04

## 2023-01-01 RX ADMIN — Medication 0.1 MILLIGRAM(S): at 18:26

## 2023-01-01 RX ADMIN — Medication 250 MILLIGRAM(S): at 05:02

## 2023-01-01 RX ADMIN — LACOSAMIDE 140 MILLIGRAM(S): 50 TABLET ORAL at 06:42

## 2023-01-01 RX ADMIN — Medication 250 MILLIGRAM(S): at 21:25

## 2023-01-01 RX ADMIN — LEVETIRACETAM 400 MILLIGRAM(S): 250 TABLET, FILM COATED ORAL at 05:06

## 2023-01-01 RX ADMIN — Medication 3 MILLILITER(S): at 05:53

## 2023-01-01 RX ADMIN — Medication 15 MILLILITER(S): at 12:28

## 2023-01-01 RX ADMIN — Medication 50 MILLIGRAM(S): at 23:33

## 2023-01-01 RX ADMIN — Medication 1 DROP(S): at 17:50

## 2023-01-01 RX ADMIN — CHLORHEXIDINE GLUCONATE 1 APPLICATION(S): 213 SOLUTION TOPICAL at 18:45

## 2023-01-01 RX ADMIN — Medication 0.1 MILLIGRAM(S): at 05:26

## 2023-01-01 RX ADMIN — Medication 0.1 MILLIGRAM(S): at 05:19

## 2023-01-01 RX ADMIN — Medication 0.1 MILLIGRAM(S): at 05:22

## 2023-01-01 RX ADMIN — BRIMONIDINE TARTRATE 1 DROP(S): 2 SOLUTION/ DROPS OPHTHALMIC at 17:52

## 2023-01-01 RX ADMIN — Medication 650 MILLIGRAM(S): at 02:45

## 2023-01-01 RX ADMIN — Medication 4 MILLIGRAM(S): at 17:31

## 2023-01-01 RX ADMIN — LEVETIRACETAM 400 MILLIGRAM(S): 250 TABLET, FILM COATED ORAL at 05:30

## 2023-01-01 RX ADMIN — ENOXAPARIN SODIUM 110 MILLIGRAM(S): 100 INJECTION SUBCUTANEOUS at 18:11

## 2023-01-01 RX ADMIN — Medication 3 MILLILITER(S): at 17:23

## 2023-01-01 RX ADMIN — SENNA PLUS 2 TABLET(S): 8.6 TABLET ORAL at 21:26

## 2023-01-01 RX ADMIN — Medication 40 MILLIEQUIVALENT(S): at 17:31

## 2023-01-01 RX ADMIN — LACOSAMIDE 140 MILLIGRAM(S): 50 TABLET ORAL at 05:14

## 2023-01-01 RX ADMIN — Medication 250 MILLIGRAM(S): at 04:56

## 2023-01-01 RX ADMIN — Medication 650 MILLIGRAM(S): at 17:02

## 2023-01-01 RX ADMIN — PANTOPRAZOLE SODIUM 40 MILLIGRAM(S): 20 TABLET, DELAYED RELEASE ORAL at 06:04

## 2023-01-01 RX ADMIN — Medication 100 MILLIGRAM(S): at 11:43

## 2023-01-01 RX ADMIN — LEVETIRACETAM 1000 MILLIGRAM(S): 250 TABLET, FILM COATED ORAL at 18:39

## 2023-01-01 RX ADMIN — Medication 25 MILLIGRAM(S): at 05:37

## 2023-01-01 RX ADMIN — VALSARTAN 320 MILLIGRAM(S): 80 TABLET ORAL at 05:20

## 2023-01-01 RX ADMIN — Medication 40 MILLIEQUIVALENT(S): at 18:02

## 2023-01-01 RX ADMIN — ENOXAPARIN SODIUM 110 MILLIGRAM(S): 100 INJECTION SUBCUTANEOUS at 16:43

## 2023-01-01 RX ADMIN — Medication 20 MILLIGRAM(S): at 12:47

## 2023-01-01 RX ADMIN — Medication 250 MILLIGRAM(S): at 21:41

## 2023-01-01 RX ADMIN — Medication 250 MILLIGRAM(S): at 00:55

## 2023-01-01 RX ADMIN — LEVETIRACETAM 1000 MILLIGRAM(S): 250 TABLET, FILM COATED ORAL at 05:19

## 2023-01-01 RX ADMIN — BRIMONIDINE TARTRATE 1 DROP(S): 2 SOLUTION/ DROPS OPHTHALMIC at 06:29

## 2023-01-01 RX ADMIN — BRIMONIDINE TARTRATE 1 DROP(S): 2 SOLUTION/ DROPS OPHTHALMIC at 05:17

## 2023-01-01 RX ADMIN — Medication 1 DROP(S): at 06:19

## 2023-01-01 RX ADMIN — Medication 250 MILLIGRAM(S): at 05:59

## 2023-01-01 RX ADMIN — Medication 100 MILLIGRAM(S): at 12:44

## 2023-01-01 RX ADMIN — ATORVASTATIN CALCIUM 20 MILLIGRAM(S): 80 TABLET, FILM COATED ORAL at 21:18

## 2023-01-01 RX ADMIN — RIVAROXABAN 20 MILLIGRAM(S): KIT at 17:01

## 2023-01-01 RX ADMIN — Medication 250 MILLIGRAM(S): at 05:15

## 2023-01-01 RX ADMIN — BRIMONIDINE TARTRATE 1 DROP(S): 2 SOLUTION/ DROPS OPHTHALMIC at 05:08

## 2023-01-01 RX ADMIN — Medication 1 DROP(S): at 17:05

## 2023-01-01 RX ADMIN — LACOSAMIDE 200 MILLIGRAM(S): 50 TABLET ORAL at 17:29

## 2023-01-01 RX ADMIN — Medication 1 DROP(S): at 05:25

## 2023-01-01 RX ADMIN — Medication 25 MILLIGRAM(S): at 05:05

## 2023-01-01 RX ADMIN — Medication 250 MILLIGRAM(S): at 13:18

## 2023-01-01 RX ADMIN — Medication 3 MILLILITER(S): at 05:24

## 2023-01-01 RX ADMIN — LACOSAMIDE 140 MILLIGRAM(S): 50 TABLET ORAL at 06:41

## 2023-01-01 RX ADMIN — LEVETIRACETAM 400 MILLIGRAM(S): 250 TABLET, FILM COATED ORAL at 07:20

## 2023-01-01 RX ADMIN — ATORVASTATIN CALCIUM 20 MILLIGRAM(S): 80 TABLET, FILM COATED ORAL at 21:53

## 2023-01-01 RX ADMIN — Medication 100 MILLIGRAM(S): at 12:46

## 2023-01-01 RX ADMIN — Medication 3 MILLIGRAM(S): at 03:48

## 2023-01-01 RX ADMIN — Medication 50 MILLIGRAM(S): at 11:07

## 2023-01-01 RX ADMIN — Medication 3 MILLILITER(S): at 12:57

## 2023-01-01 RX ADMIN — PIPERACILLIN AND TAZOBACTAM 25 GRAM(S): 4; .5 INJECTION, POWDER, LYOPHILIZED, FOR SOLUTION INTRAVENOUS at 23:24

## 2023-01-01 RX ADMIN — Medication 3 MILLILITER(S): at 16:43

## 2023-01-01 RX ADMIN — Medication 1 DROP(S): at 18:18

## 2023-01-01 RX ADMIN — PANTOPRAZOLE SODIUM 40 MILLIGRAM(S): 20 TABLET, DELAYED RELEASE ORAL at 11:52

## 2023-01-01 RX ADMIN — LOSARTAN POTASSIUM 50 MILLIGRAM(S): 100 TABLET, FILM COATED ORAL at 05:40

## 2023-01-01 RX ADMIN — BRIMONIDINE TARTRATE 1 DROP(S): 2 SOLUTION/ DROPS OPHTHALMIC at 05:51

## 2023-01-01 RX ADMIN — HEPARIN SODIUM 1200 UNIT(S)/HR: 5000 INJECTION INTRAVENOUS; SUBCUTANEOUS at 00:54

## 2023-01-01 RX ADMIN — Medication 1 DROP(S): at 18:27

## 2023-01-01 RX ADMIN — LEVETIRACETAM 400 MILLIGRAM(S): 250 TABLET, FILM COATED ORAL at 05:40

## 2023-01-01 RX ADMIN — Medication 25 MILLIGRAM(S): at 22:19

## 2023-01-01 RX ADMIN — Medication 100 MILLIGRAM(S): at 12:32

## 2023-01-01 RX ADMIN — SENNA PLUS 2 TABLET(S): 8.6 TABLET ORAL at 21:56

## 2023-01-01 RX ADMIN — Medication 4 MILLIGRAM(S): at 17:37

## 2023-01-01 RX ADMIN — RIVAROXABAN 20 MILLIGRAM(S): KIT at 18:22

## 2023-01-01 RX ADMIN — VALSARTAN 320 MILLIGRAM(S): 80 TABLET ORAL at 06:18

## 2023-01-01 RX ADMIN — Medication 1 DROP(S): at 05:46

## 2023-01-01 RX ADMIN — RIVAROXABAN 20 MILLIGRAM(S): KIT at 17:31

## 2023-01-01 RX ADMIN — Medication 1 DROP(S): at 05:29

## 2023-01-01 RX ADMIN — NADOLOL 40 MILLIGRAM(S): 80 TABLET ORAL at 18:39

## 2023-01-01 RX ADMIN — Medication 1 DROP(S): at 05:21

## 2023-01-01 RX ADMIN — Medication 100 MILLIEQUIVALENT(S): at 03:09

## 2023-01-01 RX ADMIN — Medication 3 MILLILITER(S): at 05:01

## 2023-01-01 RX ADMIN — HEPARIN SODIUM 1500 UNIT(S)/HR: 5000 INJECTION INTRAVENOUS; SUBCUTANEOUS at 11:06

## 2023-01-01 RX ADMIN — Medication 25 MILLIGRAM(S): at 05:14

## 2023-01-01 RX ADMIN — Medication 1 DROP(S): at 05:02

## 2023-01-03 NOTE — DISCUSSION/SUMMARY
[FreeTextEntry1] : Patient seen and examined\par GBM - Neurologically stable. MRI reviewed from today, cystic lesions to right posterior frontal area appears to be increased comparing MRI from 9/2022. Official report to follow.Will discuss at TB tomorrow\par CEREBRAL EDEMA - will stop dexamethasone.\par On Pantoprazole for GI prophylaxis.\par \par ORAL THRUSH - Recommended to continue Nystatin swish and swallow\par \par FOLLOW UP- Will discuss MRI at [facility] tomorrow. Further plan to be determined  In the interim, if any concerns patient knows to call our office. \par

## 2023-01-03 NOTE — PHYSICAL EXAM
[General Appearance - Alert] : alert [General Appearance - In No Acute Distress] : in no acute distress [] : no respiratory distress [Respiration, Rhythm And Depth] : normal respiratory rhythm and effort [Exaggerated Use Of Accessory Muscles For Inspiration] : no accessory muscle use [Edema] : there was no peripheral edema [FreeTextEntry1] : Patient seen and examined\par Alert, awake , Oriented X 3. Speech clear and fluent\par PERRLA, EOMI, VFF\par Mild NLF flattening. Tongue protrudes midline\par LOYOLA x 4 with good strength -no LE weakness appreciated on exam.\par FFM, coordination equal bilaterally\par Sensation intact bilaterally\par Gait steady. Ambulating independently.
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
## 2023-01-09 NOTE — ED PROVIDER NOTE - NIH STROKE SCALE: 6A. MOTOR LEG, LEFT, QM
Last Appointment   11/21/2022  Next Appointment  4/7/2023
(0) No drift; leg holds 30 degree position for full 5 secs

## 2023-01-11 NOTE — PHYSICAL EXAM
[General Appearance - Well Developed] : well developed [] : no respiratory distress [Normal] : no focal deficits [No Focal Deficits] : no focal deficits

## 2023-01-16 NOTE — ED ADULT NURSE NOTE - NSFALLRSKASSESSDT_ED_ALL_ED
10-Sep-2021 00:13 Bactrim Pregnancy And Lactation Text: This medication is Pregnancy Category D and is known to cause fetal risk.  It is also excreted in breast milk.
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
## 2023-01-18 NOTE — DISCUSSION/SUMMARY
[FreeTextEntry1] : In summary, Mr. Jalloh is here with complaint of resolved lightheadedness - CBC today reviewed and is ok. Vitals all ok. No headache.\par Encouraged him to be slow when getting out of bed and changing position.\par No other intervention made.

## 2023-01-18 NOTE — PHYSICAL EXAM
[FreeTextEntry1] : He is awake and alert - oriented and fluent\par EOMI, VFF\par Mild left NLF flattening - tongue midline - no thrush\par No drift\par FFM are equal\par Strength full in UE and LE\par Ambulating independently and steadily.

## 2023-01-21 PROBLEM — C71.9 GLIOBLASTOMA: Status: ACTIVE | Noted: 2022-09-20

## 2023-01-21 NOTE — HISTORY OF PRESENT ILLNESS
[FreeTextEntry1] : Mr. Azar Jalloh presents following recent diagnosis of high grade glioma for consideration of adjuvant treatment.  He completed radiation therapy 30 fractions for a total of 6000 cgy on 12/1/2023 to the right brain.\par \par ONCOLOGIC HISTORY\par Mr. Jalloh is a 67 yo M with PMH HTN, a fib (on Xarelto), splenic infarcts , prostate cancer s/p prostatectomy (~2010) who presented to [facility] on 8/26/2022 due to left facial weakness (reported by PCP), neck muscle contracture, violent head shaking, and head discomfort that lasted about 20 minutes. Patient also reported approx 15 second episode of aphasia. MRI head performed on 8/26/2022 revealed 5 peripherally enhancing lesions in the R posterior frontal region which appeared to be metastatic lesions vs a multifocal primary. He underwent a R craniotomy and resection. 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 Patient was also started on Keppra 500mg BID and a dexamethasone taper.\par \par 9/13/2022: Patient presents for initial consult today and consideration of post-op RT.\par Today he feels well. he has tapered off dexamethasone and remains on keppra. no headaches, no nausea, no focal weakness. Played 18 holes of golf the other day. plans travel 10/9-10/14.\par \par 1/11/2023- Mr. Jalloh presents today for follow up. MRI brain on 1/3/2023 showed There is slight change in the right frontal opercular ring-enhancing mass with confluence of the ring-enhancing lesions. There is no increased perfusion there is no increased mass effect or edema. However to suggest tumor progression.\par \par Feeling well overall. had a seizure last week that consisted of trouble controlling his jaw and tongue. Keppra was increased. now back on dexamethasone 1mg daily. no regular headaches, no focal weakness. no numbness.

## 2023-01-21 NOTE — REVIEW OF SYSTEMS
[Negative] : Genitourinary [Fever] : no fever [Chills] : no chills [Night Sweats] : no night sweats [Eye Pain] : no eye pain [Red Eyes] : eyes not red [Dry Eyes] : no dryness of the eyes [Dysphagia] : no dysphagia [Loss of Hearing] : no loss of hearing [Chest Pain] : no chest pain [Palpitations] : no palpitations [Shortness Of Breath] : no shortness of breath [Dizziness] : no dizziness [Confused] : no confusion [Fainting] : no fainting [Difficulty Walking] : no difficulty walking [FreeTextEntry9] : denies focal weakness. with slight weakness to bilateral legs still  [de-identified] : denies headaches. speech gets slower as they day goes on

## 2023-02-02 NOTE — PHYSICAL EXAM
[General Appearance - Alert] : alert [General Appearance - In No Acute Distress] : in no acute distress [Oriented To Time, Place, And Person] : oriented to person, place, and time [] : no respiratory distress [Respiration, Rhythm And Depth] : normal respiratory rhythm and effort [Exaggerated Use Of Accessory Muscles For Inspiration] : no accessory muscle use [Heart Rate And Rhythm] : heart rate was normal and rhythm regular [___ +] : bilateral [unfilled]+ pitting edema to the ankles [FreeTextEntry1] : Patient seen and examined\par Alert, awake , Oriented X 3. Speech clear and fluent\par PERRLA, EOMI, VFF\par Mild NLF flattening. Tongue protrudes midline\par LOYOLA x 4 with good strength -no LE weakness appreciated on exam.\par FFM, coordination equal bilaterally\par Sensation intact bilaterally\par Gait steady. Ambulating independently.
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
## 2023-02-02 NOTE — DISCUSSION/SUMMARY
[FreeTextEntry1] : Patient seen and examined\par GBM - Neurologically stable. MRI reviewed from today, cystic lesions to right posterior frontal area appears to be stable , if not mildly improved comparing MRI from 1/2023 and , 9/2022. Official report to follow.\par CBC, CMP today\par Will order TMZ cycle # 2 - pending BW review\par CEREBRAL EDEMA - Continue dexamethasone 1 mg daily\par On Pantoprazole for GI prophylaxis.\par Recommended to take 8 mg of dexamethasone on the day of  travel both back and forth\par FOLLOW UP- Will  do a clinical follow up in 3-4 weeks.  In the interim, if any concerns patient knows to call our office.
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
## 2023-02-28 NOTE — DISCUSSION/SUMMARY
[FreeTextEntry1] : In summary, Mr. Jalloh is doing well -  s/p right frontal biopsy 8/31/2022 (Dr. Trimble) - pathology WHO 4, IDH wt - treated with chemo/RT 10/17 - 12/1/2022.\par Now post 3 cycles of adjuvant Temodar.\par Neurologically doing well - will reduce Decadron from 1 mg daily to 0.5 mg daily - aim to discontinue altogether hopefully next well.\par Will check CBC and CMP today and if ok order cycle #4 TMZ due to begin @ 3/7.\par Follow up with new MRI scan last week of March.

## 2023-02-28 NOTE — PHYSICAL EXAM
[FreeTextEntry1] : Patient seen and examined\par Alert, awake , Oriented X 3. Speech clear and fluent\par PERRLA, EOMI, VFF\par Mild NLF flattening. Tongue protrudes midline\par LOYOLA x 4 with good strength -no LE weakness appreciated on exam.\par FFM, coordination equal bilaterally\par Sensation intact bilaterally\par Gait steady. Ambulating independently.

## 2023-03-20 NOTE — ED ADULT NURSE NOTE - HIV OFFER
Previously Declined (within the last year) Opt out Nsaids Counseling: NSAID Counseling: I discussed with the patient that NSAIDs should be taken with food. Prolonged use of NSAIDs can result in the development of stomach ulcers.  Patient advised to stop taking NSAIDs if abdominal pain occurs.  The patient verbalized understanding of the proper use and possible adverse effects of NSAIDs.  All of the patient's questions and concerns were addressed.

## 2023-03-28 NOTE — DISCUSSION/SUMMARY
[FreeTextEntry1] : In summary, Mr. Jalloh is doing well - s/p right frontal biopsy 8/31/2022 (Dr. Trimble) - pathology WHO 4, IDH wt - treated with chemo/RT 10/17 - 12/1/2022.\par Now post 4 cycles of adjuvant Temodar.\par Neurologically not worse \par MRI from today reviewed and to my review it showed multiple cystic lesions to right posterior frontal area and some lesions have slightly increased in size \par CBC, CMP , Hepatitis panel, Urinalysis today \par Will order 5th cycle of TMZ - pending blood work review\par CEREBRAL EDEMA - Will raise Dexamethasone to 1 mg daily. Will add IV Avastin / Bevacizumab every 2 weeks\par ORAL THRUSH - Will start on Clotrimazole lozenge\par FOLLOW UP - Will follow up prior to the first IV Avastin infusion. In the interim, if any concerns patient knows to call our office\par

## 2023-03-28 NOTE — PHYSICAL EXAM
[General Appearance - Alert] : alert [General Appearance - In No Acute Distress] : in no acute distress [General Appearance - Well Nourished] : well nourished [Oriented To Time, Place, And Person] : oriented to person, place, and time [Impaired Insight] : insight and judgment were intact [Affect] : the affect was normal [] : no respiratory distress [Respiration, Rhythm And Depth] : normal respiratory rhythm and effort [Exaggerated Use Of Accessory Muscles For Inspiration] : no accessory muscle use [Edema] : there was no peripheral edema [FreeTextEntry1] : \par Patient seen and examined\par Alert, awake , Oriented X 3. Speech clear and fluent\par PERRLA, EOMI, VFF\par Mild NLF flattening. Tongue protrudes midline\par LOYOLA x 4 with good strength -no LE weakness appreciated on exam.\par FFM, coordination equal bilaterally\par Sensation intact bilaterally\par Gait steady. Ambulating independently.
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
## 2023-04-02 NOTE — ED PROVIDER NOTE - COVID-19  TEST TYPE
Positive TM Added On [employer] log    Awaiting for TM to complete SBAR questionnaire via email    
MOLECULAR PCR
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
## 2023-04-04 NOTE — DISCUSSION/SUMMARY
[FreeTextEntry1] : Labs reviewed - risks and benefits discussed and consent obtained.\par Patient for IV Avastin today and to start TMZ tomorrow (4/5)\par Follow up in one month.

## 2023-04-18 NOTE — ED ADULT NURSE NOTE - NS ED NURSE LEVEL OF CONSCIOUSNESS AFFECT
Health Maintenance Due   Topic Date Due   • COVID-19 Vaccine (1) Never done   • HPV Vaccine (1 - 2-dose series) Never done   • Depression Screening  Never done   • Hepatitis C Screening  Never done   • Cervical Cancer Screening - <30 3 year  Never done       Patient is due for topics as listed above but is not proceeding with Immunization(s) COVID-19 and HPV at this time. Education provided for .   Appropriate

## 2023-05-11 ENCOUNTER — ANESTHESIA EVENT (OUTPATIENT)
Dept: INTENSIVE CARE | Facility: HOSPITAL | Age: 68
DRG: 004 | End: 2023-05-11
Payer: MEDICARE

## 2023-05-11 ENCOUNTER — HOSPITAL ENCOUNTER (INPATIENT)
Facility: HOSPITAL | Age: 68
LOS: 14 days | Discharge: NURSING, REHAB OR LONG-TERM CARE | DRG: 004 | End: 2023-05-25
Attending: EMERGENCY MEDICINE | Admitting: STUDENT IN AN ORGANIZED HEALTH CARE EDUCATION/TRAINING PROGRAM
Payer: MEDICARE

## 2023-05-11 ENCOUNTER — ANESTHESIA (OUTPATIENT)
Dept: INTENSIVE CARE | Facility: HOSPITAL | Age: 68
DRG: 004 | End: 2023-05-11
Payer: MEDICARE

## 2023-05-11 DIAGNOSIS — R64 INANITION: ICD-10-CM

## 2023-05-11 DIAGNOSIS — J96.01 ACUTE HYPOXEMIC RESPIRATORY FAILURE: Primary | ICD-10-CM

## 2023-05-11 DIAGNOSIS — I63.9 ACUTE CVA (CEREBROVASCULAR ACCIDENT): ICD-10-CM

## 2023-05-11 DIAGNOSIS — I48.91 ATRIAL FIBRILLATION WITH RVR: ICD-10-CM

## 2023-05-11 DIAGNOSIS — G40.901 STATUS EPILEPTICUS: ICD-10-CM

## 2023-05-11 DIAGNOSIS — I48.91 A-FIB: ICD-10-CM

## 2023-05-11 PROBLEM — R56.9 SEIZURE: Status: ACTIVE | Noted: 2023-05-11

## 2023-05-11 LAB
ALBUMIN SERPL BCP-MCNC: 4.1 G/DL (ref 3.5–5.2)
ALLENS TEST: ABNORMAL
ALLENS TEST: ABNORMAL
ALP SERPL-CCNC: 52 U/L (ref 55–135)
ALT SERPL W/O P-5'-P-CCNC: 43 U/L (ref 10–44)
ANION GAP SERPL CALC-SCNC: 6 MMOL/L (ref 8–16)
AST SERPL-CCNC: 39 U/L (ref 10–40)
BASOPHILS # BLD AUTO: 0.03 K/UL (ref 0–0.2)
BASOPHILS NFR BLD: 0.4 % (ref 0–1.9)
BILIRUB SERPL-MCNC: 1.1 MG/DL (ref 0.1–1)
BUN SERPL-MCNC: 23 MG/DL (ref 8–23)
CALCIUM SERPL-MCNC: 9 MG/DL (ref 8.7–10.5)
CHLORIDE SERPL-SCNC: 105 MMOL/L (ref 95–110)
CHOLEST SERPL-MCNC: 248 MG/DL (ref 120–199)
CHOLEST/HDLC SERPL: 3.9 {RATIO} (ref 2–5)
CO2 SERPL-SCNC: 27 MMOL/L (ref 23–29)
CREAT SERPL-MCNC: 1 MG/DL (ref 0.5–1.4)
CREAT SERPL-MCNC: 1.3 MG/DL (ref 0.5–1.4)
DELSYS: ABNORMAL
DELSYS: ABNORMAL
DIFFERENTIAL METHOD: ABNORMAL
EOSINOPHIL # BLD AUTO: 0 K/UL (ref 0–0.5)
EOSINOPHIL NFR BLD: 0.4 % (ref 0–8)
ERYTHROCYTE [DISTWIDTH] IN BLOOD BY AUTOMATED COUNT: 14.5 % (ref 11.5–14.5)
ERYTHROCYTE [SEDIMENTATION RATE] IN BLOOD BY WESTERGREN METHOD: 18 MM/H
ERYTHROCYTE [SEDIMENTATION RATE] IN BLOOD BY WESTERGREN METHOD: 24 MM/H
EST. GFR  (NO RACE VARIABLE): >60 ML/MIN/1.73 M^2
FIO2: 100
FIO2: 80
GLUCOSE SERPL-MCNC: 102 MG/DL (ref 70–110)
GLUCOSE SERPL-MCNC: 111 MG/DL (ref 70–110)
GLUCOSE SERPL-MCNC: 144 MG/DL (ref 70–110)
GLUCOSE SERPL-MCNC: 267 MG/DL (ref 70–110)
GLUCOSE SERPL-MCNC: 94 MG/DL (ref 70–110)
HCO3 UR-SCNC: 24.9 MMOL/L (ref 24–28)
HCO3 UR-SCNC: 25.1 MMOL/L (ref 24–28)
HCT VFR BLD AUTO: 47.4 % (ref 40–54)
HCT VFR BLD CALC: 52 %PCV (ref 36–54)
HDLC SERPL-MCNC: 64 MG/DL (ref 40–75)
HDLC SERPL: 25.8 % (ref 20–50)
HGB BLD-MCNC: 16.2 G/DL (ref 14–18)
IMM GRANULOCYTES # BLD AUTO: 0.05 K/UL (ref 0–0.04)
IMM GRANULOCYTES NFR BLD AUTO: 0.6 % (ref 0–0.5)
INR PPP: 1.3 (ref 0.8–1.2)
LDLC SERPL CALC-MCNC: 165.6 MG/DL (ref 63–159)
LYMPHOCYTES # BLD AUTO: 1 K/UL (ref 1–4.8)
LYMPHOCYTES NFR BLD: 12.2 % (ref 18–48)
MCH RBC QN AUTO: 31.4 PG (ref 27–31)
MCHC RBC AUTO-ENTMCNC: 34.2 G/DL (ref 32–36)
MCV RBC AUTO: 92 FL (ref 82–98)
MIN VOL: 12.6
MODE: ABNORMAL
MODE: ABNORMAL
MONOCYTES # BLD AUTO: 0.7 K/UL (ref 0.3–1)
MONOCYTES NFR BLD: 8 % (ref 4–15)
NEUTROPHILS # BLD AUTO: 6.7 K/UL (ref 1.8–7.7)
NEUTROPHILS NFR BLD: 78.4 % (ref 38–73)
NONHDLC SERPL-MCNC: 184 MG/DL
NRBC BLD-RTO: 0 /100 WBC
PCO2 BLDA: 34.3 MMHG (ref 35–45)
PCO2 BLDA: 49.5 MMHG (ref 35–45)
PEEP: 5
PEEP: 5
PH SMN: 7.31 [PH] (ref 7.35–7.45)
PH SMN: 7.47 [PH] (ref 7.35–7.45)
PLATELET # BLD AUTO: 219 K/UL (ref 150–450)
PMV BLD AUTO: 10.1 FL (ref 9.2–12.9)
PO2 BLDA: 129 MMHG (ref 80–100)
PO2 BLDA: 155 MMHG (ref 80–100)
POC BE: -1 MMOL/L
POC BE: 1 MMOL/L
POC IONIZED CALCIUM: 1.22 MMOL/L (ref 1.06–1.42)
POC SATURATED O2: 100 % (ref 95–100)
POC SATURATED O2: 99 % (ref 95–100)
POC TCO2: 26 MMOL/L (ref 23–27)
POC TCO2: 27 MMOL/L (ref 23–27)
POTASSIUM BLD-SCNC: 4.2 MMOL/L (ref 3.5–5.1)
POTASSIUM SERPL-SCNC: 3.5 MMOL/L (ref 3.5–5.1)
PROT SERPL-MCNC: 6.8 G/DL (ref 6–8.4)
PROTHROMBIN TIME: 13.9 SEC (ref 9–12.5)
RBC # BLD AUTO: 5.16 M/UL (ref 4.6–6.2)
SAMPLE: ABNORMAL
SAMPLE: ABNORMAL
SAMPLE: NORMAL
SITE: ABNORMAL
SITE: ABNORMAL
SODIUM BLD-SCNC: 139 MMOL/L (ref 136–145)
SODIUM SERPL-SCNC: 138 MMOL/L (ref 136–145)
TRIGL SERPL-MCNC: 92 MG/DL (ref 30–150)
TSH SERPL DL<=0.005 MIU/L-ACNC: 3.9 UIU/ML (ref 0.34–5.6)
VT: 500
VT: 500
WBC # BLD AUTO: 8.54 K/UL (ref 3.9–12.7)

## 2023-05-11 PROCEDURE — 63600175 PHARM REV CODE 636 W HCPCS: Performed by: EMERGENCY MEDICINE

## 2023-05-11 PROCEDURE — 84295 ASSAY OF SERUM SODIUM: CPT

## 2023-05-11 PROCEDURE — 82803 BLOOD GASES ANY COMBINATION: CPT

## 2023-05-11 PROCEDURE — 80061 LIPID PANEL: CPT | Performed by: EMERGENCY MEDICINE

## 2023-05-11 PROCEDURE — 99291 PR CRITICAL CARE, E/M 30-74 MINUTES: ICD-10-PCS | Mod: ,,, | Performed by: INTERNAL MEDICINE

## 2023-05-11 PROCEDURE — 27000221 HC OXYGEN, UP TO 24 HOURS

## 2023-05-11 PROCEDURE — 95714 VEEG EA 12-26 HR UNMNTR: CPT

## 2023-05-11 PROCEDURE — 82962 GLUCOSE BLOOD TEST: CPT

## 2023-05-11 PROCEDURE — 37799 UNLISTED PX VASCULAR SURGERY: CPT

## 2023-05-11 PROCEDURE — 25000003 PHARM REV CODE 250: Performed by: STUDENT IN AN ORGANIZED HEALTH CARE EDUCATION/TRAINING PROGRAM

## 2023-05-11 PROCEDURE — 95700 EEG CONT REC W/VID EEG TECH: CPT

## 2023-05-11 PROCEDURE — 63600175 PHARM REV CODE 636 W HCPCS: Performed by: STUDENT IN AN ORGANIZED HEALTH CARE EDUCATION/TRAINING PROGRAM

## 2023-05-11 PROCEDURE — 96376 TX/PRO/DX INJ SAME DRUG ADON: CPT

## 2023-05-11 PROCEDURE — 94761 N-INVAS EAR/PLS OXIMETRY MLT: CPT

## 2023-05-11 PROCEDURE — 84132 ASSAY OF SERUM POTASSIUM: CPT

## 2023-05-11 PROCEDURE — 94002 VENT MGMT INPAT INIT DAY: CPT

## 2023-05-11 PROCEDURE — 93010 ELECTROCARDIOGRAM REPORT: CPT | Mod: ,,, | Performed by: GENERAL PRACTICE

## 2023-05-11 PROCEDURE — 95819 EEG AWAKE AND ASLEEP: CPT

## 2023-05-11 PROCEDURE — 93005 ELECTROCARDIOGRAM TRACING: CPT | Performed by: GENERAL PRACTICE

## 2023-05-11 PROCEDURE — 36620 INSERTION CATHETER ARTERY: CPT | Mod: ,,, | Performed by: ANESTHESIOLOGY

## 2023-05-11 PROCEDURE — 93010 EKG 12-LEAD: ICD-10-PCS | Mod: ,,, | Performed by: GENERAL PRACTICE

## 2023-05-11 PROCEDURE — 36620 ARTERIAL LINE: ICD-10-PCS | Mod: ,,, | Performed by: ANESTHESIOLOGY

## 2023-05-11 PROCEDURE — 95711 VEEG 2-12 HR UNMONITORED: CPT

## 2023-05-11 PROCEDURE — 96375 TX/PRO/DX INJ NEW DRUG ADDON: CPT

## 2023-05-11 PROCEDURE — 99900035 HC TECH TIME PER 15 MIN (STAT)

## 2023-05-11 PROCEDURE — 85610 PROTHROMBIN TIME: CPT | Performed by: EMERGENCY MEDICINE

## 2023-05-11 PROCEDURE — 95813 EEG EXTND MNTR 61-119 MIN: CPT

## 2023-05-11 PROCEDURE — 85014 HEMATOCRIT: CPT

## 2023-05-11 PROCEDURE — 84443 ASSAY THYROID STIM HORMONE: CPT | Performed by: EMERGENCY MEDICINE

## 2023-05-11 PROCEDURE — 95812 EEG 41-60 MINUTES: CPT

## 2023-05-11 PROCEDURE — 80053 COMPREHEN METABOLIC PANEL: CPT | Performed by: EMERGENCY MEDICINE

## 2023-05-11 PROCEDURE — 25000003 PHARM REV CODE 250: Performed by: EMERGENCY MEDICINE

## 2023-05-11 PROCEDURE — 85025 COMPLETE CBC W/AUTO DIFF WBC: CPT | Performed by: EMERGENCY MEDICINE

## 2023-05-11 PROCEDURE — 99291 CRITICAL CARE FIRST HOUR: CPT | Mod: ,,, | Performed by: INTERNAL MEDICINE

## 2023-05-11 PROCEDURE — 93005 ELECTROCARDIOGRAM TRACING: CPT | Performed by: SPECIALIST

## 2023-05-11 PROCEDURE — 96365 THER/PROPH/DIAG IV INF INIT: CPT

## 2023-05-11 PROCEDURE — 99447 PR INTERPROF, PHONE/INTERNET/EHR, CONSULT, 11-20 MINS: ICD-10-PCS | Mod: ,,, | Performed by: STUDENT IN AN ORGANIZED HEALTH CARE EDUCATION/TRAINING PROGRAM

## 2023-05-11 PROCEDURE — 99291 CRITICAL CARE FIRST HOUR: CPT

## 2023-05-11 PROCEDURE — 82330 ASSAY OF CALCIUM: CPT

## 2023-05-11 PROCEDURE — 20000000 HC ICU ROOM

## 2023-05-11 PROCEDURE — 25500020 PHARM REV CODE 255: Performed by: EMERGENCY MEDICINE

## 2023-05-11 PROCEDURE — 63600175 PHARM REV CODE 636 W HCPCS

## 2023-05-11 PROCEDURE — 99447 NTRPROF PH1/NTRNET/EHR 11-20: CPT | Mod: ,,, | Performed by: STUDENT IN AN ORGANIZED HEALTH CARE EDUCATION/TRAINING PROGRAM

## 2023-05-11 RX ORDER — AMLODIPINE BESYLATE 5 MG/1
10 TABLET ORAL DAILY
Status: DISCONTINUED | OUTPATIENT
Start: 2023-05-11 | End: 2023-05-13

## 2023-05-11 RX ORDER — ALLOPURINOL 100 MG/1
100 TABLET ORAL DAILY
Status: DISCONTINUED | OUTPATIENT
Start: 2023-05-11 | End: 2023-05-13

## 2023-05-11 RX ORDER — HYDRALAZINE HYDROCHLORIDE 20 MG/ML
10 INJECTION INTRAMUSCULAR; INTRAVENOUS
Status: ACTIVE | OUTPATIENT
Start: 2023-05-11 | End: 2023-05-12

## 2023-05-11 RX ORDER — MECLIZINE HCL 12.5 MG 12.5 MG/1
25 TABLET ORAL 3 TIMES DAILY
Status: DISCONTINUED | OUTPATIENT
Start: 2023-05-11 | End: 2023-05-13

## 2023-05-11 RX ORDER — HYDRALAZINE HYDROCHLORIDE 20 MG/ML
10 INJECTION INTRAMUSCULAR; INTRAVENOUS EVERY 6 HOURS PRN
Status: DISCONTINUED | OUTPATIENT
Start: 2023-05-11 | End: 2023-05-25 | Stop reason: HOSPADM

## 2023-05-11 RX ORDER — METOPROLOL TARTRATE 1 MG/ML
5 INJECTION, SOLUTION INTRAVENOUS EVERY 5 MIN PRN
Status: DISCONTINUED | OUTPATIENT
Start: 2023-05-11 | End: 2023-05-25 | Stop reason: HOSPADM

## 2023-05-11 RX ORDER — CLOTRIMAZOLE 10 MG/1
1 LOZENGE ORAL; TOPICAL 3 TIMES DAILY
COMMUNITY
Start: 2023-04-25 | End: 2023-05-25

## 2023-05-11 RX ORDER — LEVETIRACETAM 10 MG/ML
1000 INJECTION INTRAVASCULAR
Status: ACTIVE | OUTPATIENT
Start: 2023-05-11 | End: 2023-05-12

## 2023-05-11 RX ORDER — LORAZEPAM 2 MG/ML
2 INJECTION INTRAMUSCULAR ONCE
Status: COMPLETED | OUTPATIENT
Start: 2023-05-11 | End: 2023-05-11

## 2023-05-11 RX ORDER — COLCHICINE 0.6 MG/1
0.6 TABLET ORAL DAILY
COMMUNITY
End: 2023-05-25

## 2023-05-11 RX ORDER — PANTOPRAZOLE SODIUM 40 MG/1
40 TABLET, DELAYED RELEASE ORAL DAILY
COMMUNITY
End: 2023-05-25

## 2023-05-11 RX ORDER — LEVETIRACETAM 10 MG/ML
1000 INJECTION INTRAVASCULAR EVERY 12 HOURS
Status: DISCONTINUED | OUTPATIENT
Start: 2023-05-11 | End: 2023-05-12

## 2023-05-11 RX ORDER — ACETAMINOPHEN 325 MG/1
650 TABLET ORAL EVERY 8 HOURS PRN
Status: DISCONTINUED | OUTPATIENT
Start: 2023-05-11 | End: 2023-05-13

## 2023-05-11 RX ORDER — PROPOFOL 10 MG/ML
INJECTION, EMULSION INTRAVENOUS
Status: COMPLETED
Start: 2023-05-11 | End: 2023-05-14

## 2023-05-11 RX ORDER — ALLOPURINOL 100 MG/1
100 TABLET ORAL DAILY
COMMUNITY
End: 2023-05-25

## 2023-05-11 RX ORDER — ONDANSETRON 2 MG/ML
4 INJECTION INTRAMUSCULAR; INTRAVENOUS EVERY 8 HOURS PRN
Status: DISCONTINUED | OUTPATIENT
Start: 2023-05-11 | End: 2023-05-25 | Stop reason: HOSPADM

## 2023-05-11 RX ORDER — LORAZEPAM 2 MG/ML
2 INJECTION INTRAMUSCULAR
Status: COMPLETED | OUTPATIENT
Start: 2023-05-11 | End: 2023-05-14

## 2023-05-11 RX ORDER — MECLIZINE HCL 12.5 MG 12.5 MG/1
25 TABLET ORAL 3 TIMES DAILY
COMMUNITY
Start: 2023-01-27 | End: 2023-05-25

## 2023-05-11 RX ORDER — POLYETHYLENE GLYCOL 3350 17 G/17G
17 POWDER, FOR SOLUTION ORAL DAILY
Status: DISCONTINUED | OUTPATIENT
Start: 2023-05-11 | End: 2023-05-13

## 2023-05-11 RX ORDER — PANTOPRAZOLE SODIUM 40 MG/1
40 TABLET, DELAYED RELEASE ORAL
Status: DISCONTINUED | OUTPATIENT
Start: 2023-05-11 | End: 2023-05-13

## 2023-05-11 RX ORDER — PROPOFOL 10 MG/ML
0-50 INJECTION, EMULSION INTRAVENOUS CONTINUOUS
Status: DISCONTINUED | OUTPATIENT
Start: 2023-05-11 | End: 2023-05-12

## 2023-05-11 RX ORDER — LEVETIRACETAM 10 MG/ML
1000 INJECTION INTRAVASCULAR
Status: COMPLETED | OUTPATIENT
Start: 2023-05-11 | End: 2023-05-11

## 2023-05-11 RX ORDER — TALC
6 POWDER (GRAM) TOPICAL NIGHTLY PRN
Status: DISCONTINUED | OUTPATIENT
Start: 2023-05-11 | End: 2023-05-11

## 2023-05-11 RX ORDER — TIMOLOL MALEATE 5 MG/ML
1 SOLUTION/ DROPS OPHTHALMIC 2 TIMES DAILY
COMMUNITY
Start: 2023-04-25 | End: 2023-05-25

## 2023-05-11 RX ORDER — LORAZEPAM 2 MG/ML
INJECTION INTRAMUSCULAR
Status: COMPLETED
Start: 2023-05-11 | End: 2023-05-14

## 2023-05-11 RX ORDER — METOPROLOL SUCCINATE 100 MG/1
100 TABLET, EXTENDED RELEASE ORAL DAILY
COMMUNITY
End: 2023-05-25

## 2023-05-11 RX ORDER — DILTIAZEM HYDROCHLORIDE 5 MG/ML
10 INJECTION INTRAVENOUS
Status: DISPENSED | OUTPATIENT
Start: 2023-05-11 | End: 2023-05-11

## 2023-05-11 RX ORDER — HYDROCODONE BITARTRATE AND ACETAMINOPHEN 5; 325 MG/1; MG/1
1 TABLET ORAL EVERY 4 HOURS PRN
Status: DISCONTINUED | OUTPATIENT
Start: 2023-05-11 | End: 2023-05-13

## 2023-05-11 RX ORDER — HYDRALAZINE HYDROCHLORIDE 25 MG/1
25 TABLET, FILM COATED ORAL 3 TIMES DAILY
COMMUNITY
End: 2023-05-25

## 2023-05-11 RX ORDER — TIMOLOL MALEATE 2.5 MG/ML
1 SOLUTION/ DROPS OPHTHALMIC 2 TIMES DAILY
Status: DISCONTINUED | OUTPATIENT
Start: 2023-05-11 | End: 2023-05-25 | Stop reason: HOSPADM

## 2023-05-11 RX ORDER — LEVETIRACETAM 500 MG/1
500 TABLET, EXTENDED RELEASE ORAL DAILY
COMMUNITY
End: 2023-05-11 | Stop reason: ALTCHOICE

## 2023-05-11 RX ORDER — RIVAROXABAN 20 MG/1
20 TABLET, FILM COATED ORAL DAILY
COMMUNITY
Start: 2023-03-16 | End: 2023-05-25

## 2023-05-11 RX ORDER — AMLODIPINE BESYLATE 10 MG/1
10 TABLET ORAL DAILY
COMMUNITY
End: 2023-05-25

## 2023-05-11 RX ORDER — LORAZEPAM 2 MG/ML
1 INJECTION INTRAMUSCULAR
Status: COMPLETED | OUTPATIENT
Start: 2023-05-11 | End: 2023-05-11

## 2023-05-11 RX ORDER — METOPROLOL SUCCINATE 50 MG/1
100 TABLET, EXTENDED RELEASE ORAL DAILY
Status: DISCONTINUED | OUTPATIENT
Start: 2023-05-11 | End: 2023-05-13

## 2023-05-11 RX ORDER — COLCHICINE 0.6 MG/1
0.6 TABLET, FILM COATED ORAL DAILY
Status: DISCONTINUED | OUTPATIENT
Start: 2023-05-11 | End: 2023-05-13

## 2023-05-11 RX ORDER — SODIUM CHLORIDE 9 MG/ML
1000 INJECTION, SOLUTION INTRAVENOUS
Status: DISCONTINUED | OUTPATIENT
Start: 2023-05-11 | End: 2023-05-11

## 2023-05-11 RX ORDER — LEVETIRACETAM 500 MG/1
500 TABLET ORAL 2 TIMES DAILY
COMMUNITY
Start: 2023-04-24 | End: 2023-05-25

## 2023-05-11 RX ORDER — SODIUM CHLORIDE 0.9 % (FLUSH) 0.9 %
10 SYRINGE (ML) INJECTION
Status: DISCONTINUED | OUTPATIENT
Start: 2023-05-11 | End: 2023-05-25 | Stop reason: HOSPADM

## 2023-05-11 RX ORDER — DEXAMETHASONE 1 MG/1
1 TABLET ORAL EVERY 12 HOURS
Status: DISCONTINUED | OUTPATIENT
Start: 2023-05-11 | End: 2023-05-13

## 2023-05-11 RX ORDER — DEXAMETHASONE 1 MG/1
1 TABLET ORAL EVERY 12 HOURS
COMMUNITY
End: 2023-05-25

## 2023-05-11 RX ADMIN — DILTIAZEM HYDROCHLORIDE 5 MG/HR: 100 INJECTION, POWDER, LYOPHILIZED, FOR SOLUTION INTRAVENOUS at 08:05

## 2023-05-11 RX ADMIN — IOHEXOL 100 ML: 350 INJECTION, SOLUTION INTRAVENOUS at 07:05

## 2023-05-11 RX ADMIN — PROPOFOL 40 MCG/KG/MIN: 10 INJECTION, EMULSION INTRAVENOUS at 09:05

## 2023-05-11 RX ADMIN — PROPOFOL 1000 MG: 10 INJECTION, EMULSION INTRAVENOUS at 05:05

## 2023-05-11 RX ADMIN — MECLIZINE HYDROCHLORIDE 25 MG: 12.5 TABLET ORAL at 09:05

## 2023-05-11 RX ADMIN — LORAZEPAM 2 MG: 2 INJECTION INTRAMUSCULAR; INTRAVENOUS at 02:05

## 2023-05-11 RX ADMIN — LORAZEPAM 1 MG: 2 INJECTION INTRAMUSCULAR; INTRAVENOUS at 07:05

## 2023-05-11 RX ADMIN — LEVETIRACETAM 1000 MG: 10 INJECTION INTRAVENOUS at 09:05

## 2023-05-11 RX ADMIN — DEXTROSE 1500 MG: 50 INJECTION, SOLUTION INTRAVENOUS at 02:05

## 2023-05-11 RX ADMIN — DEXAMETHASONE 1 MG: 1 TABLET ORAL at 09:05

## 2023-05-11 RX ADMIN — LEVETIRACETAM 1000 MG: 10 INJECTION INTRAVENOUS at 07:05

## 2023-05-11 RX ADMIN — DEXTROSE 500 MG: 50 INJECTION, SOLUTION INTRAVENOUS at 09:05

## 2023-05-11 RX ADMIN — DILTIAZEM HYDROCHLORIDE 10 MG/HR: 100 INJECTION, POWDER, LYOPHILIZED, FOR SOLUTION INTRAVENOUS at 05:05

## 2023-05-11 NOTE — ED PROVIDER NOTES
Encounter Date: 5/11/2023       History     Chief Complaint   Patient presents with    FACIAL TWITCHING    SLURRED SPEECH     Patient presents with history of glioblastoma concurrently getting chemo in previous radiation complaining of facial twitching, facial droop, slurred speech, left arm weakness.  Symptom onset at 3:30 a.m..  Patient states at 3:30 a.m. he was playing on a game and then began to have the twitching of the face with associated droop with slurred speech and arm weakness.  Patient states he has had similar episodes in the past secondary to the glioblastoma.  He has not had the left arm weakness.  Patient states he is on Keppra and did take an extra Keppra dose this morning additionally patient is on Xarelto.  At the worst symptoms are severe.    Review of patient's allergies indicates:  No Known Allergies  Past Medical History:   Diagnosis Date    GERD (gastroesophageal reflux disease)     Glioblastoma     H/O blood clots     Hypertension     Paroxysmal atrial fibrillation     Seizures      No past surgical history on file.  No family history on file.     Review of Systems   All other systems reviewed and are negative.    Physical Exam     Initial Vitals [05/11/23 0641]   BP Pulse Resp Temp SpO2   (!) 128/95 (!) 112 16 98.6 °F (37 °C) 98 %      MAP       --         Physical Exam    Nursing note and vitals reviewed.  Constitutional: He appears well-developed and well-nourished. He is not diaphoretic. No distress.   HENT:   Head: Normocephalic and atraumatic.   Eyes: EOM are normal.   Neck: Neck supple.   Normal range of motion.  Cardiovascular:  Intact distal pulses.           Pulmonary/Chest: No respiratory distress.   Musculoskeletal:      Cervical back: Normal range of motion and neck supple.     Neurological: He is alert and oriented to person, place, and time.   Vision-normal  Neglect-normal  Aphasia - there is aphasia as well as facial droop and muscle twitching  Pronator drift - there is left  arm weakness  Cerebellum - normal   Skin: Skin is warm and dry.   Psychiatric: He has a normal mood and affect. His behavior is normal. Judgment and thought content normal.       ED Course   Procedures  Labs Reviewed   CBC W/ AUTO DIFFERENTIAL - Abnormal; Notable for the following components:       Result Value    MCH 31.4 (*)     Immature Granulocytes 0.6 (*)     Immature Grans (Abs) 0.05 (*)     Gran % 78.4 (*)     Lymph % 12.2 (*)     All other components within normal limits   COMPREHENSIVE METABOLIC PANEL - Abnormal; Notable for the following components:    Glucose 111 (*)     Total Bilirubin 1.1 (*)     Alkaline Phosphatase 52 (*)     Anion Gap 6 (*)     All other components within normal limits   PROTIME-INR - Abnormal; Notable for the following components:    Prothrombin Time 13.9 (*)     INR 1.3 (*)     All other components within normal limits   LIPID PANEL - Abnormal; Notable for the following components:    Cholesterol 248 (*)     LDL Cholesterol 165.6 (*)     All other components within normal limits   TSH   ISTAT CREATININE   POCT GLUCOSE MONITORING CONTINUOUS   POCT CREATININE        ECG Results              ECG 12 lead (In process)  Result time 05/11/23 08:03:32      In process by Interface, Lab In [facility] (05/11/23 08:03:32)                   Narrative:    Test Reason : I63.9,    Vent. Rate : 120 BPM     Atrial Rate : 150 BPM     P-R Int : 000 ms          QRS Dur : 104 ms      QT Int : 370 ms       P-R-T Axes : 000 016 -12 degrees     QTc Int : 522 ms    Atrial fibrillation with rapid ventricular response  Nonspecific ST abnormality  Prolonged QT  Abnormal ECG  No previous ECGs available    Referred By: [email or web address]   SELF           Confirmed By:                                   Imaging Results              X-Ray Chest AP Portable (Final result)  Result time 05/11/23 08:32:40      Final result by Solomon Tobias MD (05/11/23 08:32:40)                   Narrative:    CLINICAL HISTORY:  67 years  (1955) Male Stroke    TECHNIQUE:  Portable AP radiograph the chest.    COMPARISON:  None available.    FINDINGS:  Nonspecific minimal faint interstitial opacity at the left costophrenic.  No pneumothorax is identified. The heart is mildly enlarged.  Osseous structures show degenerative changes in the spine. The visualized upper abdomen is unremarkable.    IMPRESSION:  Nonspecific minimal faint interstitial opacity at the left costophrenic sulcus possibly representing atelectasis, scarring, trace pleural fluid, and less likely aspiration or pneumonia.                  .            Electronically signed by:  Solomon Tobias MD  5/11/2023 8:32 AM CDT Workstation: [id number]                                     CTA Head and Neck (xpd) (Final result)  Result time 05/11/23 07:43:42      Final result by Yi Hernandez MD (05/11/23 07:43:42)                   Narrative:    CMS EXAMINATION:  CTA HEAD AND NECK (XPD)    CLINICAL INDICATION: Male, 67 years old. Stroke/TIA, determine embolic source history of glioblastoma    TECHNIQUE: Axial CT angiogram of the head and neck was performed with contrast. Multiplanar reformations as well as 3-D volume rendered imaging were reviewed at the workstation. Degree of stenosis was measured utilizing the NASCET method.    CONTRAST: 100 mL mL of Omnipaque 350 IV.    COMPARISON: None    FINDINGS:  CTA brain: The distal vertebral arteries, basilar artery and cavernous portions of the internal carotid arteries are widely patent. The anterior cerebral arteries, middle cerebral arteries and posterior cerebral arteries are widely patent without evidence of large vessel occlusion, significant stenosis, AVM or aneurysm. The dural venous sinuses are patent.    There is no pathologic enhancement.    Aortic Arch and Great Vessel Origins: 2 vessel aortic arch which is widely patent  Subclavian Arteries: Widely patent.  Right Common Carotid Artery: Widely patent.  Right Internal Carotid  Artery: Widely patent.  Right External Carotid Artery: Widely patent.  Left Common Carotid Artery: Widely patent.  Left Internal Carotid Artery: Widely patent.  Left External Carotid Artery: Widely patent.  Right Vertebral Artery: Widely patent.  Left Vertebral Artery: Widely patent.    The paraspinous soft tissues are unremarkable. There are mild degenerative changes of the cervical spine. The lung apices are clear.    IMPRESSION:  Negative CTA of the head and neck.    This exam was performed according to our departmental dose-optimization program which includes automated exposure control, adjustment of the mA and/or kV according to patient size and/or use of iterative reconstruction technique.    Electronically signed by:  Yi Hernandez MD  5/11/2023 7:43 AM CDT Workstation: [id number]                                     CT HEAD FOR STROKE (Final result)  Result time 05/11/23 07:10:25   Procedure changed from CT Head Without Contrast     Final result by Yi Hernandez MD (05/11/23 07:10:25)                   Impression:      Mild cerebral atrophy with no acute intracranial process    Prior right frontal temporal craniotomy    Findings were called to Dr. Zhou in the emergency department at 07:09 a.m.      Electronically signed by: Yi Hernandez MD  Date:    05/11/2023  Time:    07:10               Narrative:      CMS MANDATED QUALITY DATA - CT RADIATION - 436    All CT scans at this facility utilize dose modulation, iterative reconstruction, and/or weight based dosing when appropriate to reduce radiation dose to as low as reasonably achievable.    EXAMINATION:  CT HEAD FOR STROKE    CLINICAL HISTORY:  Neuro deficit, acute, stroke suspected;  history of glioblastoma    TECHNIQUE:  CT without IV contrast    COMPARISON:  None    FINDINGS:  There are postsurgical changes from prior right frontal temporal    These craniotomy.  The ventricles and sulci are mildly prominent compatible with generalized  cerebral atrophy.  There is no hemorrhage, mass or midline shift.  There are no extra-axial fluid collections.  The cerebellum and brainstem are normal.  The gray-white differentiation is maintained.  The orbits are unremarkable.    The paranasal sinuses and mastoid air cells are clear.                                       Medications   diltiaZEM injection 10 mg (10 mg Intravenous Not Given 5/11/23 0801)   diltiaZEM 100 mg in dextrose 5% 100 mL IVPB (ready to mix) (titrating) (5 mg/hr Intravenous New Bag 5/11/23 0800)   LORazepam injection 1 mg (1 mg Intravenous Given 5/11/23 0719)   levETIRAcetam in NaCl (iso-os) IVPB 1,000 mg (1,000 mg Intravenous New Bag 5/11/23 0720)   iohexoL (OMNIPAQUE 350) injection 100 mL (100 mLs Intravenous Given 5/11/23 0728)     Medical Decision Making:   Initial Assessment:   Seizure-like activity  Differential Diagnosis:   Status epilepticus, CVA, atrial fibrillation with rapid ventricular rate, electrolyte abnormalities, dehydration  Clinical Tests:   Lab Tests: Reviewed and Ordered  Radiological Study: Ordered and Reviewed  Medical Tests: Reviewed and Ordered  ED Management:  [facility]    Patient presents for emergent evaluation of acute seizure-like activity that poses a possible threat to life and/or bodily function.    In the ED patient found to have acute status epilepticus.   I ordered labs and personally reviewed them.  Labs significant for white blood cell count of 8, hemoglobin 16, INR 1.3, sodium 138 kidney function within normal limits.    I ordered X-rays and personally reviewed them and reviewed the radiologist interpretation.  Xray significant for atelectasis.    I ordered EKG and personally reviewed it.  EKG significant for irregularly irregular with rapid rate.    I ordered CT scan and personally reviewed it and reviewed the radiologist interpretation.  CT significant for acute abnormality, CTA without LVO.      Admission MDM  I discussed the patient presentation labs, ekg,  X-rays, CT findings with the consultant(s) tele neurologist with [facility].  They agree patient not a candidate for thrombolytics secondary to Xarelto in glioblastoma history.  Patient does not have an LVo    Patient was managed in the ED with IV Cardizem, Ativan, Keppra.    The response to treatment was seizure-like activity has improved.    Patient required emergent consultation to hospitalist for admission.    Attending Critical Care:   Critical Care Times:   Direct Patient Care (initial evaluation, reassessments, and time considering the case)................................................................10 minutes.   Additional History from reviewing old medical records or taking additional history from the family, EMS, PCP, etc.......................10 minutes.   Ordering, Reviewing, and Interpreting Diagnostic Studies...............................................................................................................10 minutes.   Documentation..................................................................................................................................................................................5 minutes.   ==============================================================  · Total Critical Care Time - exclusive of procedural time: 35 minutes.  ==============================================================  Critical care was necessary to treat or prevent imminent or life-threatening deterioration of the following conditions:  Status epilepticus, atrial fibrillation with rapid ventricular rate.   Critical care was time spent personally by me on the following activities: obtaining history from patient or relative, examination of patient, review of x-rays / CT sent with the patient, ordering lab, x-rays, and/or EKG, development of treatment plan with patient or relative, ordering and performing treatments and interventions, interpretation of cardiac measurements and re-evaluation of  patient's conition.   Critical Care Condition: potentially life-threatening                         Clinical Impression:   Final diagnoses:  [G40.901] Status epilepticus (Primary)  [I48.91] Atrial fibrillation with RVR        ED Disposition Condition    Admit Stable                Isreal Zhou MD  05/11/23 0854

## 2023-05-11 NOTE — CONSULTS
Pulmonary/Critical Care Consult      PATIENT NAME: Teto Mendez  MRN: [id number]  TODAY'S DATE: 2023  12:50 PM  ADMIT DATE: 2023  AGE: 67 y.o. : 1955    CONSULT REQUESTED BY: Carlos Arce, *    REASON FOR CONSULT:   Critical care management    HPI:  Called by nurse because they could not reach anesthesia to intubate a patient who was being bagged.  The patient had a change in mental status in the emergency room after having had a CTA earlier in the morning which was negative.  In CT, the patient was not breathing effectively and was cyanotic and the scanner broke so the patient was brought up to the ICU emergently.  The patient's past medical history is significant for glioblastoma multiforme for which he has been receiving radiation and chemotherapy.  He also has had a right frontal craniotomy.  At 3:30 a.m. in the morning the patient was playing a game and noted twitching of his face with slurred speech and weakness to his left arm.  He took an extra Keppra at that time.  He arrived at the ER at 6:45 a.m..    REVIEW OF SYSTEMS      ALLERGIES  Review of patient's allergies indicates:  No Known Allergies    INPATIENT SCHEDULED MEDICATIONS   allopurinoL  100 mg Oral Daily    amLODIPine  10 mg Oral Daily    colchicine  0.6 mg Oral Daily    dexAMETHasone  1 mg Oral Q12H    diltiaZEM  10 mg Intravenous ED 1 Time    hydrALAZINE  10 mg Intravenous ED 1 Time    levetiracetam IV  1,000 mg Intravenous ED 1 Time    meclizine  25 mg Oral TID    metoprolol succinate  100 mg Oral Daily    pantoprazole  40 mg Oral Before breakfast    polyethylene glycol  17 g Oral Daily    rivaroxaban  20 mg Oral Daily with dinner    timolol maleate 0.5%  1 drop Both Eyes BID      dilTIAZem 10 mg/hr (23 1106)       MEDICAL AND SURGICAL HISTORY  Past Medical History:   Diagnosis Date    GERD (gastroesophageal reflux disease)     Glioblastoma     H/O blood clots     Hypertension     Paroxysmal atrial fibrillation      Seizures      No past surgical history on file.    ALCOHOL, TOBACCO AND DRUG USE  Social History     Tobacco Use   Smoking Status Not on file   Smokeless Tobacco Not on file     Social History     Substance and Sexual Activity   Alcohol Use Not on file     Social History     Substance and Sexual Activity   Drug Use Not on file       FAMILY HISTORY  No family history on file.    VITAL SIGNS (MOST RECENT)  Temp: 98.6 °F (37 °C) (05/11/23 0641)  Pulse: (!) 124 (05/11/23 1141)  Resp: (!) 21 (05/11/23 1141)  BP: (!) 170/118 (05/11/23 1141)  SpO2: (!) 87 % (05/11/23 1141)    INTAKE AND OUTPUT (LAST 24 HOURS):  Intake/Output Summary (Last 24 hours) at 5/11/2023 1250  Last data filed at 5/11/2023 0750  Gross per 24 hour   Intake 100 ml   Output --   Net 100 ml       WEIGHT  Wt Readings from Last 1 Encounters:   05/11/23 83.9 kg (185 lb)       PHYSICAL EXAM  GENERAL: Older patient, now intubated, unresponsive  HEENT: Pupils equal and reactive. Extraocular movements intact. Nose intact. Pharynx moist.  NECK: Supple.   HEART: Regular rate and rhythm. No murmur or gallop auscultated.  LUNGS: Clear to auscultation and percussion. Lung excursion symmetrical. No change in fremitus. No adventitial noises.  ABDOMEN: Bowel sounds present. Non-tender, no masses palpated.  : Normal anatomy.  EXTREMITIES: Normal muscle tone and joint movement, no cyanosis or clubbing.   LYMPHATICS: No adenopathy palpated, no edema.  SKIN: Dry, intact, no lesions.   NEURO: Cranial nerves not tested but pupils equal and reactive.  The patient has also been seen to blink.  PSYCH:  Unable to assess      CBC LAST (LAST 24 HOURS)  Recent Labs   Lab 05/11/23  0657   WBC 8.54   RBC 5.16   HGB 16.2   HCT 47.4   MCV 92   MCH 31.4*   MCHC 34.2   RDW 14.5      MPV 10.1   GRAN 78.4*  6.7   LYMPH 12.2*  1.0   MONO 8.0  0.7   BASO 0.03   NRBC 0       CHEMISTRY LAST (LAST 24 HOURS)  Recent Labs   Lab 05/11/23  0657      K 3.5      CO2 27    ANIONGAP 6*   BUN 23   CREATININE 1.0   *   CALCIUM 9.0   ALBUMIN 4.1   PROT 6.8   ALKPHOS 52*   ALT 43   AST 39   BILITOT 1.1*       COAGULATION LAST (LAST 24 HOURS)  Recent Labs   Lab 05/11/23  0657   INR 1.3*       CARDIAC PROFILE (LAST 24 HOURS)  No results for input(s): BNP, CPK, CPKMB, LDH, TROPONINI, TROPONINIHS in the last 168 hours.    LAST 7 DAYS MICROBIOLOGY   Microbiology Results (last 7 days)       ** No results found for the last 168 hours. **            MOST RECENT IMAGING  X-Ray Chest AP Portable  CLINICAL HISTORY:  67 years (1955) Male Stroke    TECHNIQUE:  Portable AP radiograph the chest.    COMPARISON:  None available.    FINDINGS:  Nonspecific minimal faint interstitial opacity at the left costophrenic.  No pneumothorax is identified. The heart is mildly enlarged.  Osseous structures show degenerative changes in the spine. The visualized upper abdomen is unremarkable.    IMPRESSION:  Nonspecific minimal faint interstitial opacity at the left costophrenic sulcus possibly representing atelectasis, scarring, trace pleural fluid, and less likely aspiration or pneumonia.    .    Electronically signed by:  Solomon Tobias MD  5/11/2023 8:32 AM CDT Workstation: [id number]  CTA Head and Neck (xpd)  CMS EXAMINATION:  CTA HEAD AND NECK (XPD)    CLINICAL INDICATION: Male, 67 years old. Stroke/TIA, determine embolic source history of glioblastoma    TECHNIQUE: Axial CT angiogram of the head and neck was performed with contrast. Multiplanar reformations as well as 3-D volume rendered imaging were reviewed at the workstation. Degree of stenosis was measured utilizing the NASCET method.    CONTRAST: 100 mL mL of Omnipaque 350 IV.    COMPARISON: None    FINDINGS:  CTA brain: The distal vertebral arteries, basilar artery and cavernous portions of the internal carotid arteries are widely patent. The anterior cerebral arteries, middle cerebral arteries and posterior cerebral arteries are widely  patent without evidence of large vessel occlusion, significant stenosis, AVM or aneurysm. The dural venous sinuses are patent.    There is no pathologic enhancement.    Aortic Arch and Great Vessel Origins: 2 vessel aortic arch which is widely patent  Subclavian Arteries: Widely patent.  Right Common Carotid Artery: Widely patent.  Right Internal Carotid Artery: Widely patent.  Right External Carotid Artery: Widely patent.  Left Common Carotid Artery: Widely patent.  Left Internal Carotid Artery: Widely patent.  Left External Carotid Artery: Widely patent.  Right Vertebral Artery: Widely patent.  Left Vertebral Artery: Widely patent.    The paraspinous soft tissues are unremarkable. There are mild degenerative changes of the cervical spine. The lung apices are clear.    IMPRESSION:  Negative CTA of the head and neck.    This exam was performed according to our departmental dose-optimization program which includes automated exposure control, adjustment of the mA and/or kV according to patient size and/or use of iterative reconstruction technique.    Electronically signed by:  Yi Hernandez MD  5/11/2023 7:43 AM CDT Workstation: [id number]  CT HEAD FOR STROKE  Narrative: CMS MANDATED QUALITY DATA - CT RADIATION - 436    All CT scans at this facility utilize dose modulation, iterative reconstruction, and/or weight based dosing when appropriate to reduce radiation dose to as low as reasonably achievable.    EXAMINATION:  CT HEAD FOR STROKE    CLINICAL HISTORY:  Neuro deficit, acute, stroke suspected;  history of glioblastoma    TECHNIQUE:  CT without IV contrast    COMPARISON:  None    FINDINGS:  There are postsurgical changes from prior right frontal temporal    These craniotomy.  The ventricles and sulci are mildly prominent compatible with generalized cerebral atrophy.  There is no hemorrhage, mass or midline shift.  There are no extra-axial fluid collections.  The cerebellum and brainstem are normal.  The  gray-white differentiation is maintained.  The orbits are unremarkable.    The paranasal sinuses and mastoid air cells are clear.  Impression: Mild cerebral atrophy with no acute intracranial process    Prior right frontal temporal craniotomy    Findings were called to Dr. Zhou in the emergency department at 07:09 a.m.    Electronically signed by: Yi Hernandez MD  Date:    05/11/2023  Time:    07:10      CURRENT VISIT EKG  Results for orders placed or performed during the hospital encounter of 05/11/23   ECG 12 lead    Narrative    Test Reason : I63.9,    Vent. Rate : 120 BPM     Atrial Rate : 150 BPM     P-R Int : 000 ms          QRS Dur : 104 ms      QT Int : 370 ms       P-R-T Axes : 000 016 -12 degrees     QTc Int : 522 ms    Atrial fibrillation with rapid ventricular response  Nonspecific ST abnormality  Prolonged QT  Abnormal ECG  No previous ECGs available    Referred By: PADMINI   SELF           Confirmed By:        ECHOCARDIOGRAM RESULTS  No results found for this or any previous visit.        VENTILATOR INFORMATION              LAST ARTERIAL BLOOD GAS  ABG  No results for input(s): PH, PO2, PCO2, HCO3, BE in the last 168 hours.    IMPRESSION AND PLAN  Status post acute mental status changes  History of glioblastoma multiforme under treatment   History of seizures  Lack of respiratory drive requiring intubation and mechanical ventilation  Atrial fibrillation  Hypertension    Cardizem drip infusing  CT head as quickly as possible  Adjust ventilator as necessary  Control blood pressure once we know whether this is a hemorrhagic CVA versus ischemic versus status epilepticus    Critical care time spent reviewing the chart, examining the patient, reviewing the labs, reviewing the radiological findings, discussing care with nursing, physicians, and respiratory and creating the note and  has been greater than 35 minutes  Mery Milner MD  Date of Service: 05/11/2023  12:50 PM

## 2023-05-11 NOTE — ED NOTES
Pt noted decreased loc. Dr. Zhou informed. Bedside for reassess. Pt noted with increased lethargy, difficulty speaking.

## 2023-05-11 NOTE — H&P
[facility] Medicine History & Physical Examination   Patient Name: Teto Mendez  MRN: [id number]  Patient Class: IP- Inpatient   Admission Date: 5/11/2023  6:39 AM  Length of Stay: 0  Attending Physician: Shruthi Elmore MD  Primary Care Provider: Primary Doctor No  Face-to-Face encounter date: 05/11/2023  Code Status: Full Code  MPOA:  Chief Complaint: FACIAL TWITCHING and SLURRED SPEECH        HISTORY OF PRESENT ILLNESS:     Teto Mendez is a 67 y.o. White male with known history of  glioblastoma s/p resection, seizures p.afib on xarelto who presented to ER this morning with left arm weakness, facial twitching and change in his speech that he suspected was seizure activity.  He took an extra keppra and presented to ER around 6:30-7 am.  Head CT/CTA in ER without hemorrhage or LVO and he was not a candidate for tPA regardless.  He was given lorazepam and keppra with positive response, however, while still in ER he became less responsive and repeat stat CT was ordered.  While in CT, he became hypoxic with depressed respiratory status and required bag-mask ventilation and emergent intubation on return to ICU.  History was obtained from the ER physician Sign-out.       REVIEW OF SYSTEMS:   10 Point Review of System was performed and was found to be negative except for that mentioned already in the HPI above.     PAST MEDICAL HISTORY:     Past Medical History:   Diagnosis Date    GERD (gastroesophageal reflux disease)     Glioblastoma     H/O blood clots     Hypertension     Paroxysmal atrial fibrillation     Seizures        PAST SURGICAL HISTORY:     Resection of glioblastoma     ALLERGIES:   Patient has no known allergies.    FAMILY HISTORY:   No family history on file.    SOCIAL HISTORY:     Social History     Tobacco Use    Smoking status: Not on file    Smokeless tobacco: Not on file   Substance Use Topics    Alcohol use: Not on file        Social History     Substance and Sexual  "Activity   Sexual Activity Not on file        HOME MEDICATIONS:     Prior to Admission medications    Medication Sig Start Date End Date Taking? Authorizing Provider   allopurinoL (ZYLOPRIM) 100 MG tablet Take 100 mg by mouth once daily.   Yes Historical Provider   amLODIPine (NORVASC) 10 MG tablet Take 10 mg by mouth once daily.   Yes Historical Provider   dexAMETHasone (DECADRON) 1 MG Tab Take 1 mg by mouth every 12 (twelve) hours.   Yes Historical Provider   hydrALAZINE (APRESOLINE) 25 MG tablet Take 25 mg by mouth 3 (three) times daily.   Yes Historical Provider   levETIRAcetam (KEPPRA) 500 MG Tab Take 500 mg by mouth 2 (two) times daily. 4/24/23  Yes Historical Provider   metoprolol succinate (TOPROL-XL) 100 MG 24 hr tablet Take 100 mg by mouth once daily.   Yes Historical Provider   pantoprazole (PROTONIX) 40 MG tablet Take 40 mg by mouth once daily.   Yes Historical Provider   XARELTO 20 mg Tab Take 20 mg by mouth once daily. 3/16/23  Yes Historical Provider   clotrimazole (MYCELEX) 10 mg kathleen Take 1 tablet by mouth 3 (three) times daily. 4/25/23   Historical Provider   colchicine (COLCRYS) 0.6 mg tablet Take 0.6 mg by mouth once daily.    Historical Provider   meclizine (ANTIVERT) 12.5 mg tablet Take 25 mg by mouth 3 (three) times daily. 1/27/23   Historical Provider   rivaroxaban (XARELTO) 20 mg Tab Take 20 mg by mouth daily with dinner or evening meal.    Historical Provider   timolol maleate 0.5% (TIMOPTIC) 0.5 % Drop Place 1 drop into both eyes 2 (two) times daily. 4/25/23   Historical Provider   levetiracetam XR (KEPPRA XR) 500 mg Tb24 24 hr tablet Take 500 mg by mouth once daily.  5/11/23  Historical Provider         PHYSICAL EXAM:   BP (!) 170/118   Pulse (!) 56   Temp 98.6 °F (37 °C) (Oral)   Resp 20   Ht 5' 5" (1.651 m)   Wt 83.9 kg (185 lb)   SpO2 100%   BMI 30.79 kg/m²   Vitals Reviewed  General appearance: intubated, unresponsive  Skin: No Rash.   Neuro: intubated and sedated, " JASON  HENT: Atraumatic head. Moist mucous membranes of oral cavity.   Lungs: intubated  Heart: tachy   Abdomen: Soft, non-distended, non-tender. No rebound tenderness/guarding.   Extremities: No cyanosis, clubbing.    EMERGENCY DEPARTMENT LABS AND IMAGING:     Labs Reviewed   CBC W/ AUTO DIFFERENTIAL - Abnormal; Notable for the following components:       Result Value    MCH 31.4 (*)     Immature Granulocytes 0.6 (*)     Immature Grans (Abs) 0.05 (*)     Gran % 78.4 (*)     Lymph % 12.2 (*)     All other components within normal limits   COMPREHENSIVE METABOLIC PANEL - Abnormal; Notable for the following components:    Glucose 111 (*)     Total Bilirubin 1.1 (*)     Alkaline Phosphatase 52 (*)     Anion Gap 6 (*)     All other components within normal limits   PROTIME-INR - Abnormal; Notable for the following components:    Prothrombin Time 13.9 (*)     INR 1.3 (*)     All other components within normal limits   LIPID PANEL - Abnormal; Notable for the following components:    Cholesterol 248 (*)     LDL Cholesterol 165.6 (*)     All other components within normal limits   TSH   ISTAT CREATININE   POCT GLUCOSE MONITORING CONTINUOUS   POCT CREATININE       CTA Head and Neck (xpd)   Final Result      CT Head Without Contrast   Final Result      X-Ray Chest AP Portable   Final Result      CTA Head and Neck (xpd)   Final Result      CT HEAD FOR STROKE   Final Result      Mild cerebral atrophy with no acute intracranial process      Prior right frontal temporal craniotomy      Findings were called to Dr. Zhou in the emergency department at 07:09 a.m.         Electronically signed by: Yi Hernandez MD   Date:    05/11/2023   Time:    07:10          ASSESSMENT & PLAN:     Teto Mendez is a 67 y.o. male admitted for seizure-like activity, concern for status epilepticus, inability to protect airway and respiratory failure     Active Hospital Problems    Diagnosis    *Seizure        Plan    Seizure-like  activity  Concerning for status epilepticus  History of seizure  History of glioblastoma s/p resection  -Admit to ICU  -emergent intubation  -initial head CT/CTA without bleed or LVO  -repeat CT was aborted due to resp failure, re-attempt as soon as safely possible  -EEG  -proprofol per neuro recs  -continue keppra  -lorazepam IV prn   -consider MRI, defer to neurology    Afib/RVR  -continue cardizem infusion  -he received metoprolol IV push around time of intubations  -metoprolol 5 mg IV prn HR >120  -continue xarelto when ok with neurology    HTN  -a-line was placed  -hydralazine 10 mg IV prn sbp >170 sustained    Diet: NPO    DVT Prophylaxis: SCD's while in bed and elaquis ,Encourage ambulation. OOB as tolerated.     Discharge Planning and Disposition:  Home with assistance from family    Expected LOS: 2-3    This patient is high risk for life-threatening deterioration and death secondary to above comorbidities and need for IV treatment. This patient meets inpatient criteria.   ________________________________________________________________________________    INPATIENT LIST OF MEDICATIONS     Current Facility-Administered Medications:     acetaminophen tablet 650 mg, 650 mg, Oral, Q8H PRN, Carlos Arce MD    allopurinoL tablet 100 mg, 100 mg, Oral, Daily, Carlos Arce MD    amLODIPine tablet 10 mg, 10 mg, Oral, Daily, Carlos Arce MD    colchicine capsule/tablet 0.6 mg, 0.6 mg, Oral, Daily, Carlos Arce MD    dexAMETHasone tablet 1 mg, 1 mg, Oral, Q12H, Carlos Arce MD    diltiaZEM 100 mg in dextrose 5% 100 mL IVPB (ready to mix) (titrating), 0-15 mg/hr, Intravenous, Continuous, Isreal Zhou MD, Last Rate: 10 mL/hr at 05/11/23 1740, 10 mg/hr at 05/11/23 1740    diltiaZEM injection 10 mg, 10 mg, Intravenous, ED 1 Time, Isreal Zhou MD    hydrALAZINE injection 10 mg, 10 mg, Intravenous, ED 1 Time, Isreal Zhou MD    HYDROcodone-acetaminophen 5-325 mg  per tablet 1 tablet, 1 tablet, Oral, Q4H PRN, Carlos Arce MD    levETIRAcetam in NaCl (iso-os) IVPB 1,000 mg, 1,000 mg, Intravenous, ED 1 Time, Isreal Zhou MD    LORazepam (ATIVAN) 2 mg/mL injection, , , ,     meclizine tablet 25 mg, 25 mg, Oral, TID, Carlos Arce MD    melatonin tablet 6 mg, 6 mg, Oral, Nightly PRN, Carlos Arce MD    metoprolol injection 5 mg, 5 mg, Intravenous, Q5 Min PRN, Carlos Arce MD    metoprolol succinate (TOPROL-XL) 24 hr tablet 100 mg, 100 mg, Oral, Daily, Carlos Arce MD    ondansetron injection 4 mg, 4 mg, Intravenous, Q8H PRN, Carlos Arce MD    pantoprazole EC tablet 40 mg, 40 mg, Oral, Before breakfast, Carlos Arce MD    polyethylene glycol packet 17 g, 17 g, Oral, Daily, Carlos Arce MD    propofol (DIPRIVAN) 10 mg/mL infusion, 0-50 mcg/kg/min, Intravenous, Continuous, Carlos Arce MD    rivaroxaban tablet 20 mg, 20 mg, Oral, Daily with dinner, Carlos Arce MD    sodium chloride 0.9% flush 10 mL, 10 mL, Intravenous, PRN, Carlos Arce MD    timolol maleate 0.5% ophthalmic solution 1 drop, 1 drop, Both Eyes, BID, Carlos Arce MD    valproate (DEPACON) 500 mg in dextrose 5 % (D5W) 50 mL IVPB, 500 mg, Intravenous, Q8H, Meredith Navarro MD      Scheduled Meds:   allopurinoL  100 mg Oral Daily    amLODIPine  10 mg Oral Daily    colchicine  0.6 mg Oral Daily    dexAMETHasone  1 mg Oral Q12H    diltiaZEM  10 mg Intravenous ED 1 Time    hydrALAZINE  10 mg Intravenous ED 1 Time    levetiracetam IV  1,000 mg Intravenous ED 1 Time    LORazepam        meclizine  25 mg Oral TID    metoprolol succinate  100 mg Oral Daily    pantoprazole  40 mg Oral Before breakfast    polyethylene glycol  17 g Oral Daily    rivaroxaban  20 mg Oral Daily with dinner    timolol maleate 0.5%  1 drop Both Eyes BID    valproate sodium (DEPACON) IVPB  500 mg Intravenous Q8H      Continuous Infusions:   dilTIAZem 10 mg/hr (05/11/23 1730)    propofoL       PRN Meds:.acetaminophen, HYDROcodone-acetaminophen, melatonin, metoprolol, ondansetron, sodium chloride 0.9%      Carlos Arce  [facility] Hospitalist  05/11/2023

## 2023-05-11 NOTE — CONSULTS
Telestroke Consultation converted to phone consult due to  cart, no provider in the room to assist, patient w/ focal left facial intermittent twitching, did not respond to verbal commands at the time of call.   . Connected to provider over the phone. Discussed w/ Dr. Zhou @ [place] [facility].   68 yo male w/ R frontal GBM s/p resection currently receiving chemo/radiation. Hx of seizures on Keppra, chronic AC for PAfib ( Xarelto) who presents w/  left facial twitching, LUE weakness and dysarthria.   LKW 3:30.   Not a TNK candidate due to full dose AC use and Hx of GBM s/p resection.   CTH w/o acute intracranial abnormalities.   CTA w/o LVO or severe stenosis.     Agree with Ativan dose and Keppra load at this time.   Continue current stroke prevention modalities w/ AC.   Recommend admission w/ neurology consult and EEG, as well as possible MRI Brain w/ and w/o contrast.     Total time spent on encounter including chart review, discussing of imaging, triage & evaluation and planning for diagnostics and management ~ 10-15 minutes, >50% of this time was spent communicating with consulting provider.    Angela Harrell MD  Vascular Neurology

## 2023-05-11 NOTE — ANESTHESIA PROCEDURE NOTES
Ad Hoc Intubation    Date/Time: 5/11/2023 12:38 PM  Performed by: Ilir Herrera MD  Authorized by: Ilir Herrera MD     Indications:  Respiratory failure and airway protection  Diagnosis:  CVA  Provider Requesting Consult:  Alf  Patient Location:  ICU  Staff:     Anesthesiologist Present: Yes    Intubation:     Intubated:  N/a    Mask Ventilation:  Moderately difficult with oral airway    Attempts:  1    Attempted By:  Staff anesthesiologist    Method of Intubation:  Video laryngoscopy    Blade:  Glidescope 3    Laryngeal View Grade: Grade IIA - cords partially seen      Difficult Airway Encountered?: No      Complications:  None    Airway Device:  Oral endotracheal tube    Airway Device Size:  8.0    Style/Cuff Inflation:  Cuffed (inflated to minimal occlusive pressure)    Tube secured:  24    Secured at:  The lips    Placement Verified By:  Colorimetric ETCO2 device    Complicating Factors:  Obesity and oropharyngeal edema or fat    Findings Post-Intubation:  BS equal bilateral  Notes:      Called for emergency intubation and arterial line placement.  Upon my arrival, patient completely unresponsive and being mask ventilated.  Intubation started with no induction drugs but vocal cords moving too much (patient was not gagging or otherwise moving) and so paralyzed with succinylcholine 120 mg.  I then intubated patient quickly and easily, no oxygen desaturation.  Metoprolol 5 mg given IV for heart rate in the 150s and mild hypertension, with good response.  Before intubation, heart rate was in the 130s with mild hypertension.

## 2023-05-11 NOTE — CONSULTS
Critical access hospital  Neurology Consult    Patient Name: Teto Mendez  MRN: [id number]  : 1955  TODAY'S DATE: 2023  ADMIT DATE: 2023  6:39 AM                                          CONSULTED PROVIDER: Meredith Dugan MD, Neurologist. On-call Phone: [phone]  CONSULT REQUESTED BY: Carlos Arce, *     Chief Complaint   Patient presents with    FACIAL TWITCHING    SLURRED SPEECH       HPI per EMR:  Patient presents with history of glioblastoma concurrently getting chemo in previous radiation complaining of facial twitching, facial droop, slurred speech, left arm weakness.  Symptom onset at 3:30 a.m..  Patient states at 3:30 a.m. he was playing on a game and then began to have the twitching of the face with associated droop with slurred speech and arm weakness.  Patient states he has had similar episodes in the past secondary to the glioblastoma.  He has not had the left arm weakness.  Patient states he is on Keppra and did take an extra Keppra dose this morning additionally patient is on Xarelto.  At the worst symptoms are severe.    Neurology Consult:  Patient was seen and examined by me today. He is a 67-year-old man with history of glioblastoma multiforme s/p craniectomy, chemotherapy and radiation diagnosed in 2022, as well as seizure disorder, who presented to the ED with left facial twitching, left facial droop, slurred speech and left arm weakness that began the morning of his admission at around 3 am. He was recently decreased on his home Keppra in the setting of side effects as per daughter. Upon arrival to the ED, he was loaded with 1 gr of Keppra and given 2 mg of ativan IV, which seemed to break the seizure. As per ED physician, patient was awake, alert and able to answer questions. At around 11:50 am, I was contacted by ED provider in the setting of sudden worsening of mental status, unresponsiveness, weakness and aphasia, so he was taken to CT scan for  repeat CT brain to rule out LVO stroke or hemorrhage. I evaluated patient while on CT, and noticed O2 Sats were in the high 80s. Due to technical malfunction, CT could not be completed, at which point I decided to abort procedure and transport patient to the ICU immediately. When we arrived to the 3rd floor ICU, patient seemed to be on respiratory failure, with toe and fingertip cyanosis, unable to maintain O2 Sats, so started on manual assisted ventilation and called anesthesia for emergent intubation. Patient was successfully intubated and sedation with propofol was started, but facial twitching recurred, so ativan was given and propofol bolus was administered, after which seizure activity seemed to cease. He was taken for CT after stabilization which showed no evidence of acute stroke or intracranial hemorrhage, and CTA showed no LVO. He is now being monitored on 24H continuous EEG for management of status epilepticus.    Review of Systems:    Unable to obtain due to acuity of condition.    Past Medical History:  has a past medical history of GERD (gastroesophageal reflux disease), Glioblastoma, H/O blood clots, Hypertension, Paroxysmal atrial fibrillation, and Seizures.    Past Surgical History:  has no past surgical history on file.    Family Medical History: family history is not on file.    Social History:      PCP: Primary Doctor No    Allergies: Review of patient's allergies indicates:  No Known Allergies    Medications:   No current facility-administered medications on file prior to encounter.     Current Outpatient Medications on File Prior to Encounter   Medication Sig Dispense Refill    allopurinoL (ZYLOPRIM) 100 MG tablet Take 100 mg by mouth once daily.      amLODIPine (NORVASC) 10 MG tablet Take 10 mg by mouth once daily.      dexAMETHasone (DECADRON) 1 MG Tab Take 1 mg by mouth every 12 (twelve) hours.      hydrALAZINE (APRESOLINE) 25 MG tablet Take 25 mg by mouth 3 (three) times daily.       levETIRAcetam (KEPPRA) 500 MG Tab Take 500 mg by mouth 2 (two) times daily.      metoprolol succinate (TOPROL-XL) 100 MG 24 hr tablet Take 100 mg by mouth once daily.      pantoprazole (PROTONIX) 40 MG tablet Take 40 mg by mouth once daily.      XARELTO 20 mg Tab Take 20 mg by mouth once daily.      clotrimazole (MYCELEX) 10 mg kathleen Take 1 tablet by mouth 3 (three) times daily.      colchicine (COLCRYS) 0.6 mg tablet Take 0.6 mg by mouth once daily.      meclizine (ANTIVERT) 12.5 mg tablet Take 25 mg by mouth 3 (three) times daily.      rivaroxaban (XARELTO) 20 mg Tab Take 20 mg by mouth daily with dinner or evening meal.      timolol maleate 0.5% (TIMOPTIC) 0.5 % Drop Place 1 drop into both eyes 2 (two) times daily.      [DISCONTINUED] levetiracetam XR (KEPPRA XR) 500 mg Tb24 24 hr tablet Take 500 mg by mouth once daily.       Scheduled Meds:   allopurinoL  100 mg Oral Daily    amLODIPine  10 mg Oral Daily    colchicine  0.6 mg Oral Daily    dexAMETHasone  1 mg Oral Q12H    hydrALAZINE  10 mg Intravenous ED 1 Time    levetiracetam IV  1,000 mg Intravenous ED 1 Time    levetiracetam IV  1,000 mg Intravenous Q12H    LORazepam        meclizine  25 mg Oral TID    metoprolol succinate  100 mg Oral Daily    pantoprazole  40 mg Oral Before breakfast    polyethylene glycol  17 g Oral Daily    rivaroxaban  20 mg Oral Daily with dinner    timolol maleate 0.5%  1 drop Both Eyes BID    valproate sodium (DEPACON) IVPB  500 mg Intravenous Q8H     Continuous Infusions:   dilTIAZem 10 mg/hr (05/11/23 1740)    propofoL       PRN Meds:.acetaminophen, hydrALAZINE, HYDROcodone-acetaminophen, lorazepam, metoprolol, ondansetron, sodium chloride 0.9%      Physical Exam  Current Vitals:  Vitals:    05/11/23 1730   BP:    Pulse: (!) 56   Resp: 20   Temp:      General appearance: intubated, unresponsive  Cardiovascular: Afib with RVR  Pulmonary: on mechanical ventilation  GI: soft  MSK: normal passive ROM  Skin: normal color, no  lesions    NEUROLOGICAL EXAM:    Mental status: unresponsive, sedated, intubated          Intubated: yes          Sedated: yes  Response to noxious stimulation: no, patient on deep sedation  Breathes above ventilator: yes  Opens eyes: no  Pupils: symmetric          Left: reactive          Right: reactive  Eye movements: No pathologic movements such as nystagmus, ocular bobbing, dipping or roving. No facial twitching seen.  Corneal reflex: present  Oculocephalic reflex: present  Cough: absent  Gag: absent  Motor exam: Normal muscle tone. Normal muscle bulk. No abnormal movements such as tremors, myoclonus or twitching.  No response to painful stimuli, but patient is on deep sedation  DTRs: 2+ throughout. Babinski response absent.      Laboratory Data & Studies    Recent Labs   Lab 05/11/23  0657   WBC 8.54   HGB 16.2      MCV 92       Recent Labs   Lab 05/11/23  0657      K 3.5      CO2 27   BUN 23   *   CALCIUM 9.0       Recent Labs   Lab 05/11/23  0657   PROT 6.8   ALBUMIN 4.1   BILITOT 1.1*   AST 39   ALT 43   ALKPHOS 52*       Recent Labs   Lab 05/11/23  0657   INR 1.3*       Recent Labs   Lab 05/11/23  0657   CHOL 248*   TRIG 92   LDLCALC 165.6*   HDL 64   TSH 3.900         Microbiology:  Microbiology Results (last 7 days)       Procedure Component Value Units Date/Time    Culture, Respiratory with Gram Stain [[id number]]     Order Status: No result Specimen: Respiratory               Imaging:  CT Head Without Contrast    Result Date: 5/11/2023  CMS MANDATED QUALITY DATA - CT RADIATION  436 All CT scans at this facility utilize dose modulation, iterative reconstruction, and/or weight based dosing when appropriate to reduce radiation dose to as low as reasonably achievable. CT HEAD WITHOUT IV CONTRAST CLINICAL HISTORY: 67 years Male Mental status change, unknown cause COMPARISON: Noncontrast CT head performed earlier today 6:56 AM FINDINGS: Negative for acute intracranial hemorrhage,  midline shift, or mass effect. Parenchymal calcifications within the right cerebral hemisphere are stable compared to prior. Patient is status post right pterional craniotomy. Ventricles and sulci are normal in size. Gray-white differentiation is maintained. Cerebellar hemispheres and brainstem are unremarkable. No calvarial lesion or fracture. Mastoid air cells are clear. IMPRESSION: Stable exam. No CT evidence of acute intracranial pathology. Electronically signed by:  Matt Cole MD  5/11/2023 1:41 PM CDT Workstation: [id number]    X-Ray Chest AP Portable    Result Date: 5/11/2023  CLINICAL HISTORY: 67 years (1955) Male Stroke TECHNIQUE: Portable AP radiograph the chest. COMPARISON: None available. FINDINGS: Nonspecific minimal faint interstitial opacity at the left costophrenic.  No pneumothorax is identified. The heart is mildly enlarged.  Osseous structures show degenerative changes in the spine. The visualized upper abdomen is unremarkable. IMPRESSION: Nonspecific minimal faint interstitial opacity at the left costophrenic sulcus possibly representing atelectasis, scarring, trace pleural fluid, and less likely aspiration or pneumonia. . Electronically signed by:  Solomon Tobias MD  5/11/2023 8:32 AM CDT Workstation: [id number]    CT HEAD FOR STROKE    Result Date: 5/11/2023  CMS MANDATED QUALITY DATA - CT RADIATION - 436 All CT scans at this facility utilize dose modulation, iterative reconstruction, and/or weight based dosing when appropriate to reduce radiation dose to as low as reasonably achievable. EXAMINATION: CT HEAD FOR STROKE CLINICAL HISTORY: Neuro deficit, acute, stroke suspected;  history of glioblastoma TECHNIQUE: CT without IV contrast COMPARISON: None FINDINGS: There are postsurgical changes from prior right frontal temporal These craniotomy.  The ventricles and sulci are mildly prominent compatible with generalized cerebral atrophy.  There is no hemorrhage, mass or midline shift.   There are no extra-axial fluid collections.  The cerebellum and brainstem are normal.  The gray-white differentiation is maintained.  The orbits are unremarkable. The paranasal sinuses and mastoid air cells are clear.     Mild cerebral atrophy with no acute intracranial process Prior right frontal temporal craniotomy Findings were called to Dr. Zhou in the emergency department at 07:09 a.m. Electronically signed by: Yi Hernandez MD Date:    05/11/2023 Time:    07:10    CTA Head and Neck (xpd)    Result Date: 5/11/2023  CMS MANDATED QUALITY DATA - CT RADIATION - 436 All CT scans at this facility utilize dose modulation, iterative reconstruction, and/or weight based dosing when appropriate to reduce radiation dose to as low as reasonably achievable. CMS MANDATED QUALITY DATA - CAROTID - 195 All measurements and percent stenosis described below were determined using NASCET criteria or criteria similar to NASCET, as defined by the Society of Radiologists in Ultrasound Consensus Conference, Radiology, 2003 Reason: Stroke/TIA, determine embolic source Technique: CT angiography of brain and neck with 100 mL Omnipaque 350.  Maximum intensity projection coronal reformations were obtained at a separate workstation and stored in the patient's permanent medical record. COMPARISON: 5/11/2023 CTA BRAIN: VASCULAR FINDINGS: Major intracranial arteries are widely patent with no stenosis, intraluminal filling, or dissection. Minimal atherosclerotic plaque affects the cavernous segments of bilateral internal carotid arteries. Negative for aneurysm or evidence of vasculitis. Opacified visualized dural venous sinuses are unremarkable. NONVASCULAR FINDINGS: No abnormal intra-axial or extra-axial enhancement. No midline shift. Postsurgical changes of right pterional craniotomy are noted. 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 Minimal  atherosclerotic plaque affects left carotid bulb. Left carotid arteries are otherwise without plaque or stenosis. Left vertebral artery is patent. Minimal atherosclerotic plaque affects the right carotid bulb. Right carotid arteries are without additional plaque and remain widely patent. Right vertebral artery is patent. NONVASCULAR FINDINGS: Endotracheal tube has tip proximal to mio. Visualized lung apices show minor dependent atelectasis. Cervical soft tissues are unremarkable. IMPRESSION: 1. Trace atherosclerotic plaque in intracranial internal carotid arteries, without other abnormality of the major intracranial arteries. 2. Trace atherosclerotic plaque in bilateral carotid bulbs, with no stenosis or other abnormality of cervical ICAs or vertebral arteries. Electronically signed by:  Compa Lopez MD  5/11/2023 2:01 PM CDT Workstation: [id number]    CTA Head and Neck (xpd)    Result Date: 5/11/2023  CMS EXAMINATION: CTA HEAD AND NECK (XPD) CLINICAL INDICATION: Male, 67 years old. Stroke/TIA, determine embolic source history of glioblastoma TECHNIQUE: Axial CT angiogram of the head and neck was performed with contrast. Multiplanar reformations as well as 3-D volume rendered imaging were reviewed at the workstation. Degree of stenosis was measured utilizing the NASCET method. CONTRAST: 100 mL mL of Omnipaque 350 IV. COMPARISON: None FINDINGS: CTA brain: The distal vertebral arteries, basilar artery and cavernous portions of the internal carotid arteries are widely patent. The anterior cerebral arteries, middle cerebral arteries and posterior cerebral arteries are widely patent without evidence of large vessel occlusion, significant stenosis, AVM or aneurysm. The dural venous sinuses are patent. There is no pathologic enhancement. Aortic Arch and Great Vessel Origins: 2 vessel aortic arch which is widely patent Subclavian Arteries: Widely patent. Right Common Carotid Artery: Widely patent. Right Internal Carotid  Artery: Widely patent. Right External Carotid Artery: Widely patent. Left Common Carotid Artery: Widely patent. Left Internal Carotid Artery: Widely patent. Left External Carotid Artery: Widely patent. Right Vertebral Artery: Widely patent. Left Vertebral Artery: Widely patent. The paraspinous soft tissues are unremarkable. There are mild degenerative changes of the cervical spine. The lung apices are clear. IMPRESSION: Negative CTA of the head and neck. This exam was performed according to our departmental dose-optimization program which includes automated exposure control, adjustment of the mA and/or kV according to patient size and/or use of iterative reconstruction technique. Electronically signed by:  Yi Hernandez MD  5/11/2023 7:43 AM CDT Workstation: [id number]        Assessment and Plan:    Status Epilepticus  History of Glioblastoma Multiforme s/p craniectomy, chemo and radiotherapy  67-year-old man with history of glioblastoma multiforme s/p craniectomy, chemotherapy and radiation diagnosed in August 2022, as well as seizure disorder, who presented to the ED with left facial twitching, left facial droop, slurred speech and left arm weakness. Upon arrival to the ED, he was loaded with 1 gr of Keppra and given 2 mg of ativan IV, which seemed to break the seizure. However, he experienced sudden worsening of mental status, with unresponsiveness, weakness and aphasia, so he was taken to CT scan for repeat CT brain to rule out LVO stroke or hemorrhage, but CT had a malfunction and patient was taken to the ICU, where he was intubated, sedated and placed on mechanical ventilation. Clinical picture more consistent with status epilepticus. Will order brain imaging to better assess status of GBM and rule out stroke.  - Admitted to hospital medicine in the ICU with q2 hour neuro checks, on telemetry, continuous pulse oximetry  - Seizure precautions while inpatient  - He is now being monitored on 24H continuous  EEG for management of status epilepticus. Plan is to maintain deep sedation with propofol (50-60 mcg/kg/min) to achieve burst-suppression and keep him for 24 hours before attempting to wean sedation down  - Received loading dose of Keppra 2.5 gr IV. Continue Keppra 1000 mg BID IV  - Received load of Depacon 1500 mg IV. Continue Depacon 500 mg TID IV  - Ordered valproic acid levels in serum for tomorrow  - Ordered MRI brain with and without contrast to assess GBM and rule out stroke  - Secondary stroke prevention with Xarelto 20 mg daily  - Avoid seizure threshold lowering medications such as:  Bupropion, diphenhydramine, tramadol, certain antibiotics  - patient will need follow-up as outpatient with Neurology for long-term EEG monitoring to better characterize epileptiform abnormalities  - Maintain Euthermia with Tylenol prn temp > 37.2 degrees C.  - Assessment for rehab with PT/OT/SLP evaluation and treatment.  - workup and treatment of metabolic and infectious abnormalities as per primary team  - Will follow along      DVT prophylaxis with chemo/SCD prophylaxis      [phone] 3 days from discharge.        All questions were answered.                              Thank you kindly for including us in the care of this patient. Please do not hesitate to contact us with any questions.       Critical Care:  120 minutes of critical care time has been spent evaluating with the patient. Time includes chart review not limited to diagnostic imaging, labs, and vitals, patient assessment, discussion with family and nursing, current order evaluations, and new order entries.      Meredith Dugan MD  Neurology

## 2023-05-11 NOTE — ANESTHESIA PROCEDURE NOTES
Arterial line    Diagnosis: Respiratory failure  Doctor requesting consult: Alf    Patient location during procedure: ICU  Procedure start time: 5/11/2023 12:45 PM  Timeout: 5/11/2023 12:45 PM  Procedure end time: 5/11/2023 12:50 PM    Staffing  Authorizing Provider: Ilir Herrera MD  Performing Provider: Ilir Herrera MD    Anesthesiologist was present at the time of the procedure.    Preanesthetic Checklist  Completed: monitors and equipment checkedArterial line  Skin Prep: chlorhexidine gluconate  Local Infiltration: none  Orientation: left  Location: radial    Catheter Size: 20 G Insertion Attempts: 1  Assessment  Dressing: sutured in place and taped and tegaderm  Patient: Tolerated well

## 2023-05-11 NOTE — PROCEDURES
EEG REPORT    NAME: Teto Mendez  : 1955  MRN: [id number]    DATE of EE2023    CLINICAL INDICATION: This is a 67 y.o. male with history of glioblastoma multiforme s/p craniotomy, chemotherapy and radiotherapy, as well as seizure disorder being evaluated for status epilepticus.    MEDICATIONS:  Scheduled Meds:   allopurinoL  100 mg Oral Daily    amLODIPine  10 mg Oral Daily    colchicine  0.6 mg Oral Daily    dexAMETHasone  1 mg Oral Q12H    diltiaZEM  10 mg Intravenous ED 1 Time    hydrALAZINE  10 mg Intravenous ED 1 Time    levetiracetam IV  1,000 mg Intravenous ED 1 Time    LORazepam        meclizine  25 mg Oral TID    metoprolol succinate  100 mg Oral Daily    pantoprazole  40 mg Oral Before breakfast    polyethylene glycol  17 g Oral Daily    rivaroxaban  20 mg Oral Daily with dinner    timolol maleate 0.5%  1 drop Both Eyes BID    valproate sodium (DEPACON) IVPB  500 mg Intravenous Q8H     Continuous Infusions:   dilTIAZem 10 mg/hr (23 1740)    propofoL       PRN Meds:.acetaminophen, HYDROcodone-acetaminophen, melatonin, metoprolol, ondansetron, sodium chloride 0.9%    EEG DESCRIPTION:  A 16-channel EEG was performed with electrode placement in 10/20 International System. Longitudinal bipolar and referential montages were utilized in analysis. Total duration of this study was 25 minutes. Patient being set up for continuous EEG monitoring for 24 hours.    At the beginning of the recording there is a posterior dominant rhythm of 7-8 Hz which is interspersed with slower theta frequencies and occasional delta frequencies during wakefulness. Stage II sleep structures are not seen. There is continuous focal slowing in the delta range on the right frontoparietal leads. There are also Lateralized Periodic Epileptiform Discharges (LPEDs) originating from the right frontoparietal leads with no generalization or evolution into seizures. 5 minutes after study started, propofol dose increased from 15  to 50 mcg/kg/min. Around 5 minutes after dose was increased, LPEDs began to decrease in frequency and amplitude. At the end of the recording, there is emergence of short periods of low voltage activity alternating with prior background, but still no burst-suppression pattern is seen.    Photic stimulation was performed with no abnormal reactivity noted. Hyperventilation was not performed.    No seizures are captured.    Impression:  This is an abnormal routine EEG due to diffuse slowing of the background activity consistent with a moderate encephalopathy. LPEDs seen throughout the recording originating from right frontotemporal leads, improved when dose of propofol increased, and indicative of focal cortical dysfunction with epileptogenic potential. No seizures were captured, so status epilepticus is now resolved. Further clinical correlation is advised.      Meredith Dugan MD  Neurology

## 2023-05-11 NOTE — PHARMACY MED REC
"  Admission Medication History     The home medication history was taken by Holli Campos.    You may go to "Admission" then "Reconcile Home Medications" tabs to review and/or act upon these items.     The home medication list has been updated by the Pharmacy department.   Please read ALL comments highlighted in yellow.   Please address this information as you see fit.    Feel free to contact us if you have any questions or require assistance.        Medications listed below were obtained from: Patient/family  No current facility-administered medications on file prior to encounter.     Current Outpatient Medications on File Prior to Encounter   Medication Sig Dispense Refill    allopurinoL (ZYLOPRIM) 100 MG tablet Take 100 mg by mouth once daily.      amLODIPine (NORVASC) 10 MG tablet Take 10 mg by mouth once daily.      dexAMETHasone (DECADRON) 1 MG Tab Take 1 mg by mouth every 12 (twelve) hours.      hydrALAZINE (APRESOLINE) 25 MG tablet Take 25 mg by mouth 3 (three) times daily.      levETIRAcetam (KEPPRA) 500 MG Tab Take 500 mg by mouth 2 (two) times daily.      metoprolol succinate (TOPROL-XL) 100 MG 24 hr tablet Take 100 mg by mouth once daily.      pantoprazole (PROTONIX) 40 MG tablet Take 40 mg by mouth once daily.      XARELTO 20 mg Tab Take 20 mg by mouth once daily.      clotrimazole (MYCELEX) 10 mg kathleen Take 1 tablet by mouth 3 (three) times daily.      colchicine (COLCRYS) 0.6 mg tablet Take 0.6 mg by mouth once daily.      meclizine (ANTIVERT) 12.5 mg tablet Take 25 mg by mouth 3 (three) times daily.      timolol maleate 0.5% (TIMOPTIC) 0.5 % Drop Place 1 drop into both eyes 2 (two) times daily.      [DISCONTINUED] levetiracetam XR (KEPPRA XR) 500 mg Tb24 24 hr tablet Take 500 mg by mouth once daily.         Holli Campos  [id number]               .        "

## 2023-05-12 PROBLEM — I48.0 PAROXYSMAL ATRIAL FIBRILLATION: Status: ACTIVE | Noted: 2023-05-12

## 2023-05-12 PROBLEM — C71.9 GLIOBLASTOMA: Status: ACTIVE | Noted: 2023-05-12

## 2023-05-12 PROBLEM — J96.02 ACUTE RESPIRATORY FAILURE WITH HYPOXIA AND HYPERCAPNIA: Status: ACTIVE | Noted: 2023-05-11

## 2023-05-12 PROBLEM — I10 ESSENTIAL HYPERTENSION: Status: ACTIVE | Noted: 2023-05-12

## 2023-05-12 PROBLEM — G40.901 STATUS EPILEPTICUS: Status: ACTIVE | Noted: 2023-05-12

## 2023-05-12 PROBLEM — E87.6 HYPOKALEMIA: Status: ACTIVE | Noted: 2023-05-12

## 2023-05-12 LAB
ALBUMIN SERPL BCP-MCNC: 3.5 G/DL (ref 3.5–5.2)
ALLENS TEST: ABNORMAL
ALLENS TEST: ABNORMAL
ALP SERPL-CCNC: 47 U/L (ref 55–135)
ALT SERPL W/O P-5'-P-CCNC: 107 U/L (ref 10–44)
ANION GAP SERPL CALC-SCNC: 11 MMOL/L (ref 8–16)
AST SERPL-CCNC: 69 U/L (ref 10–40)
BASOPHILS # BLD AUTO: 0 K/UL (ref 0–0.2)
BASOPHILS NFR BLD: 0 % (ref 0–1.9)
BILIRUB SERPL-MCNC: 1.2 MG/DL (ref 0.1–1)
BUN SERPL-MCNC: 19 MG/DL (ref 8–23)
CALCIUM SERPL-MCNC: 8.8 MG/DL (ref 8.7–10.5)
CHLORIDE SERPL-SCNC: 109 MMOL/L (ref 95–110)
CO2 SERPL-SCNC: 22 MMOL/L (ref 23–29)
CREAT SERPL-MCNC: 1 MG/DL (ref 0.5–1.4)
DELSYS: ABNORMAL
DELSYS: ABNORMAL
DIFFERENTIAL METHOD: ABNORMAL
EOSINOPHIL # BLD AUTO: 0 K/UL (ref 0–0.5)
EOSINOPHIL NFR BLD: 0.3 % (ref 0–8)
ERYTHROCYTE [DISTWIDTH] IN BLOOD BY AUTOMATED COUNT: 14.1 % (ref 11.5–14.5)
ERYTHROCYTE [SEDIMENTATION RATE] IN BLOOD BY WESTERGREN METHOD: 16 MM/H
EST. GFR  (NO RACE VARIABLE): >60 ML/MIN/1.73 M^2
FIO2: 30
GLUCOSE SERPL-MCNC: 94 MG/DL (ref 70–110)
GLUCOSE SERPL-MCNC: 97 MG/DL (ref 70–110)
GLUCOSE SERPL-MCNC: 99 MG/DL (ref 70–110)
HCO3 UR-SCNC: 22 MMOL/L (ref 24–28)
HCO3 UR-SCNC: 24.1 MMOL/L (ref 24–28)
HCT VFR BLD AUTO: 46 % (ref 40–54)
HCT VFR BLD CALC: 45 %PCV (ref 36–54)
HCT VFR BLD CALC: 45 %PCV (ref 36–54)
HGB BLD-MCNC: 15.9 G/DL (ref 14–18)
IMM GRANULOCYTES # BLD AUTO: 0.04 K/UL (ref 0–0.04)
IMM GRANULOCYTES NFR BLD AUTO: 0.5 % (ref 0–0.5)
LYMPHOCYTES # BLD AUTO: 0.8 K/UL (ref 1–4.8)
LYMPHOCYTES NFR BLD: 10.4 % (ref 18–48)
MCH RBC QN AUTO: 30.9 PG (ref 27–31)
MCHC RBC AUTO-ENTMCNC: 34.6 G/DL (ref 32–36)
MCV RBC AUTO: 89 FL (ref 82–98)
MODE: ABNORMAL
MONOCYTES # BLD AUTO: 0.5 K/UL (ref 0.3–1)
MONOCYTES NFR BLD: 6.7 % (ref 4–15)
NEUTROPHILS # BLD AUTO: 6.4 K/UL (ref 1.8–7.7)
NEUTROPHILS NFR BLD: 82.1 % (ref 38–73)
NRBC BLD-RTO: 0 /100 WBC
PCO2 BLDA: 25.6 MMHG (ref 35–45)
PCO2 BLDA: 31.1 MMHG (ref 35–45)
PEEP: 5
PH SMN: 7.5 [PH] (ref 7.35–7.45)
PH SMN: 7.54 [PH] (ref 7.35–7.45)
PLATELET # BLD AUTO: 191 K/UL (ref 150–450)
PMV BLD AUTO: 9.8 FL (ref 9.2–12.9)
PO2 BLDA: 83 MMHG (ref 80–100)
PO2 BLDA: 96 MMHG (ref 80–100)
POC BE: -1 MMOL/L
POC BE: 1 MMOL/L
POC IONIZED CALCIUM: 1.18 MMOL/L (ref 1.06–1.42)
POC IONIZED CALCIUM: 1.19 MMOL/L (ref 1.06–1.42)
POC SATURATED O2: 98 % (ref 95–100)
POC SATURATED O2: 98 % (ref 95–100)
POC TCO2: 23 MMOL/L (ref 23–27)
POC TCO2: 25 MMOL/L (ref 23–27)
POTASSIUM BLD-SCNC: 3.2 MMOL/L (ref 3.5–5.1)
POTASSIUM BLD-SCNC: 3.4 MMOL/L (ref 3.5–5.1)
POTASSIUM SERPL-SCNC: 3.3 MMOL/L (ref 3.5–5.1)
PROT SERPL-MCNC: 6.1 G/DL (ref 6–8.4)
RBC # BLD AUTO: 5.15 M/UL (ref 4.6–6.2)
SAMPLE: ABNORMAL
SAMPLE: ABNORMAL
SITE: ABNORMAL
SITE: ABNORMAL
SODIUM BLD-SCNC: 142 MMOL/L (ref 136–145)
SODIUM BLD-SCNC: 142 MMOL/L (ref 136–145)
SODIUM SERPL-SCNC: 142 MMOL/L (ref 136–145)
SP02: 98
VALPROATE SERPL-MCNC: 77.1 UG/ML (ref 50–100)
VT: 500
WBC # BLD AUTO: 7.76 K/UL (ref 3.9–12.7)

## 2023-05-12 PROCEDURE — 25000003 PHARM REV CODE 250: Performed by: STUDENT IN AN ORGANIZED HEALTH CARE EDUCATION/TRAINING PROGRAM

## 2023-05-12 PROCEDURE — 99900035 HC TECH TIME PER 15 MIN (STAT)

## 2023-05-12 PROCEDURE — 25000003 PHARM REV CODE 250: Performed by: HOSPITALIST

## 2023-05-12 PROCEDURE — 82803 BLOOD GASES ANY COMBINATION: CPT

## 2023-05-12 PROCEDURE — 20000000 HC ICU ROOM

## 2023-05-12 PROCEDURE — 80164 ASSAY DIPROPYLACETIC ACD TOT: CPT | Performed by: STUDENT IN AN ORGANIZED HEALTH CARE EDUCATION/TRAINING PROGRAM

## 2023-05-12 PROCEDURE — 84132 ASSAY OF SERUM POTASSIUM: CPT

## 2023-05-12 PROCEDURE — 37799 UNLISTED PX VASCULAR SURGERY: CPT

## 2023-05-12 PROCEDURE — 95812 EEG 41-60 MINUTES: CPT

## 2023-05-12 PROCEDURE — 85014 HEMATOCRIT: CPT

## 2023-05-12 PROCEDURE — 95813 EEG EXTND MNTR 61-119 MIN: CPT

## 2023-05-12 PROCEDURE — 94761 N-INVAS EAR/PLS OXIMETRY MLT: CPT

## 2023-05-12 PROCEDURE — 80053 COMPREHEN METABOLIC PANEL: CPT | Performed by: STUDENT IN AN ORGANIZED HEALTH CARE EDUCATION/TRAINING PROGRAM

## 2023-05-12 PROCEDURE — 95819 EEG AWAKE AND ASLEEP: CPT

## 2023-05-12 PROCEDURE — 82330 ASSAY OF CALCIUM: CPT

## 2023-05-12 PROCEDURE — 25000003 PHARM REV CODE 250

## 2023-05-12 PROCEDURE — 99900026 HC AIRWAY MAINTENANCE (STAT)

## 2023-05-12 PROCEDURE — 95700 EEG CONT REC W/VID EEG TECH: CPT

## 2023-05-12 PROCEDURE — 94003 VENT MGMT INPAT SUBQ DAY: CPT

## 2023-05-12 PROCEDURE — 99291 PR CRITICAL CARE, E/M 30-74 MINUTES: ICD-10-PCS | Mod: ,,, | Performed by: INTERNAL MEDICINE

## 2023-05-12 PROCEDURE — S5010 5% DEXTROSE AND 0.45% SALINE: HCPCS | Performed by: HOSPITALIST

## 2023-05-12 PROCEDURE — 99291 CRITICAL CARE FIRST HOUR: CPT | Mod: ,,, | Performed by: INTERNAL MEDICINE

## 2023-05-12 PROCEDURE — 85025 COMPLETE CBC W/AUTO DIFF WBC: CPT | Performed by: STUDENT IN AN ORGANIZED HEALTH CARE EDUCATION/TRAINING PROGRAM

## 2023-05-12 PROCEDURE — 27000221 HC OXYGEN, UP TO 24 HOURS

## 2023-05-12 PROCEDURE — 84295 ASSAY OF SERUM SODIUM: CPT

## 2023-05-12 PROCEDURE — 63600175 PHARM REV CODE 636 W HCPCS: Performed by: STUDENT IN AN ORGANIZED HEALTH CARE EDUCATION/TRAINING PROGRAM

## 2023-05-12 PROCEDURE — 63600175 PHARM REV CODE 636 W HCPCS: Performed by: HOSPITALIST

## 2023-05-12 PROCEDURE — 25000003 PHARM REV CODE 250: Performed by: EMERGENCY MEDICINE

## 2023-05-12 RX ORDER — FUROSEMIDE 20 MG/1
20 TABLET ORAL DAILY
Status: DISCONTINUED | OUTPATIENT
Start: 2023-05-12 | End: 2023-05-13

## 2023-05-12 RX ORDER — DILTIAZEM HYDROCHLORIDE 30 MG/1
60 TABLET, FILM COATED ORAL EVERY 8 HOURS
Status: DISCONTINUED | OUTPATIENT
Start: 2023-05-12 | End: 2023-05-13

## 2023-05-12 RX ORDER — LEVETIRACETAM 15 MG/ML
1500 INJECTION INTRAVASCULAR EVERY 12 HOURS
Status: DISCONTINUED | OUTPATIENT
Start: 2023-05-12 | End: 2023-05-25 | Stop reason: HOSPADM

## 2023-05-12 RX ORDER — LANOLIN ALCOHOL/MO/W.PET/CERES
800 CREAM (GRAM) TOPICAL
Status: DISCONTINUED | OUTPATIENT
Start: 2023-05-12 | End: 2023-05-13

## 2023-05-12 RX ORDER — LEVETIRACETAM 500 MG/5ML
1500 INJECTION, SOLUTION, CONCENTRATE INTRAVENOUS EVERY 12 HOURS
Status: DISCONTINUED | OUTPATIENT
Start: 2023-05-12 | End: 2023-05-12

## 2023-05-12 RX ORDER — CHLORHEXIDINE GLUCONATE ORAL RINSE 1.2 MG/ML
15 SOLUTION DENTAL 2 TIMES DAILY
Status: COMPLETED | OUTPATIENT
Start: 2023-05-12 | End: 2023-05-16

## 2023-05-12 RX ORDER — DEXTROSE MONOHYDRATE AND SODIUM CHLORIDE 5; .45 G/100ML; G/100ML
INJECTION, SOLUTION INTRAVENOUS CONTINUOUS
Status: DISCONTINUED | OUTPATIENT
Start: 2023-05-12 | End: 2023-05-13

## 2023-05-12 RX ORDER — FUROSEMIDE 20 MG/1
20 TABLET ORAL DAILY
COMMUNITY
End: 2023-05-25

## 2023-05-12 RX ORDER — MUPIROCIN 20 MG/G
OINTMENT TOPICAL 2 TIMES DAILY
Status: COMPLETED | OUTPATIENT
Start: 2023-05-12 | End: 2023-05-16

## 2023-05-12 RX ORDER — PROPOFOL 10 MG/ML
0-60 INJECTION, EMULSION INTRAVENOUS CONTINUOUS
Status: DISCONTINUED | OUTPATIENT
Start: 2023-05-12 | End: 2023-05-16

## 2023-05-12 RX ORDER — FUROSEMIDE 20 MG/1
20 TABLET ORAL DAILY
Status: DISCONTINUED | OUTPATIENT
Start: 2023-05-12 | End: 2023-05-12

## 2023-05-12 RX ADMIN — DEXTROSE 500 MG: 50 INJECTION, SOLUTION INTRAVENOUS at 01:05

## 2023-05-12 RX ADMIN — FUROSEMIDE 20 MG: 20 TABLET ORAL at 03:05

## 2023-05-12 RX ADMIN — DEXAMETHASONE 1 MG: 1 TABLET ORAL at 09:05

## 2023-05-12 RX ADMIN — DILTIAZEM HYDROCHLORIDE 5 MG/HR: 100 INJECTION, POWDER, LYOPHILIZED, FOR SOLUTION INTRAVENOUS at 08:05

## 2023-05-12 RX ADMIN — MUPIROCIN 1 G: 20 OINTMENT TOPICAL at 10:05

## 2023-05-12 RX ADMIN — AMLODIPINE BESYLATE 10 MG: 5 TABLET ORAL at 08:05

## 2023-05-12 RX ADMIN — DEXTROSE 500 MG: 50 INJECTION, SOLUTION INTRAVENOUS at 04:05

## 2023-05-12 RX ADMIN — LEVETIRACETAM 1000 MG: 10 INJECTION INTRAVENOUS at 08:05

## 2023-05-12 RX ADMIN — METOPROLOL SUCCINATE 100 MG: 50 TABLET, FILM COATED, EXTENDED RELEASE ORAL at 10:05

## 2023-05-12 RX ADMIN — LORAZEPAM 2 MG: 2 INJECTION INTRAMUSCULAR; INTRAVENOUS at 04:05

## 2023-05-12 RX ADMIN — PROPOFOL 50 MCG/KG/MIN: 10 INJECTION, EMULSION INTRAVENOUS at 08:05

## 2023-05-12 RX ADMIN — CHLORHEXIDINE GLUCONATE 15 ML: 1.2 RINSE ORAL at 09:05

## 2023-05-12 RX ADMIN — POTASSIUM BICARBONATE 35 MEQ: 391 TABLET, EFFERVESCENT ORAL at 08:05

## 2023-05-12 RX ADMIN — MUPIROCIN 1 G: 20 OINTMENT TOPICAL at 09:05

## 2023-05-12 RX ADMIN — MECLIZINE HYDROCHLORIDE 25 MG: 12.5 TABLET ORAL at 09:05

## 2023-05-12 RX ADMIN — PROPOFOL 50 MCG/KG/MIN: 10 INJECTION, EMULSION INTRAVENOUS at 04:05

## 2023-05-12 RX ADMIN — CHLORHEXIDINE GLUCONATE 15 ML: 1.2 RINSE ORAL at 10:05

## 2023-05-12 RX ADMIN — PROPOFOL 60 MCG/KG/MIN: 10 INJECTION, EMULSION INTRAVENOUS at 02:05

## 2023-05-12 RX ADMIN — TIMOLOL MALEATE 1 DROP: 2.5 SOLUTION/ DROPS OPHTHALMIC at 08:05

## 2023-05-12 RX ADMIN — DEXTROSE AND SODIUM CHLORIDE: 5; .45 INJECTION, SOLUTION INTRAVENOUS at 06:05

## 2023-05-12 RX ADMIN — COLCHICINE 0.6 MG: 0.6 TABLET, FILM COATED ORAL at 08:05

## 2023-05-12 RX ADMIN — POTASSIUM BICARBONATE 35 MEQ: 391 TABLET, EFFERVESCENT ORAL at 10:05

## 2023-05-12 RX ADMIN — ALLOPURINOL 100 MG: 100 TABLET ORAL at 08:05

## 2023-05-12 RX ADMIN — DEXAMETHASONE 1 MG: 1 TABLET ORAL at 08:05

## 2023-05-12 RX ADMIN — PROPOFOL 60 MCG/KG/MIN: 10 INJECTION, EMULSION INTRAVENOUS at 05:05

## 2023-05-12 RX ADMIN — DILTIAZEM HYDROCHLORIDE 60 MG: 30 TABLET, FILM COATED ORAL at 09:05

## 2023-05-12 RX ADMIN — DEXTROSE 500 MG: 50 INJECTION, SOLUTION INTRAVENOUS at 09:05

## 2023-05-12 RX ADMIN — TIMOLOL MALEATE 1 DROP: 2.5 SOLUTION/ DROPS OPHTHALMIC at 09:05

## 2023-05-12 RX ADMIN — PROPOFOL 60 MCG/KG/MIN: 10 INJECTION, EMULSION INTRAVENOUS at 10:05

## 2023-05-12 RX ADMIN — PROPOFOL 60 MCG/KG/MIN: 10 INJECTION, EMULSION INTRAVENOUS at 09:05

## 2023-05-12 RX ADMIN — MECLIZINE HYDROCHLORIDE 25 MG: 12.5 TABLET ORAL at 08:05

## 2023-05-12 RX ADMIN — MECLIZINE HYDROCHLORIDE 25 MG: 12.5 TABLET ORAL at 04:05

## 2023-05-12 RX ADMIN — PROPOFOL 50 MCG/KG/MIN: 10 INJECTION, EMULSION INTRAVENOUS at 12:05

## 2023-05-12 RX ADMIN — RIVAROXABAN 20 MG: 20 TABLET, FILM COATED ORAL at 04:05

## 2023-05-12 RX ADMIN — PANTOPRAZOLE SODIUM 40 MG: 40 TABLET, DELAYED RELEASE ORAL at 04:05

## 2023-05-12 RX ADMIN — POLYETHYLENE GLYCOL 3350 17 G: 17 POWDER, FOR SOLUTION ORAL at 08:05

## 2023-05-12 RX ADMIN — LEVETIRACETAM INJECTION 1500 MG: 15 INJECTION INTRAVENOUS at 08:05

## 2023-05-12 NOTE — CARE UPDATE
05/11/23 1956   Patient Assessment/Suction   Level of Consciousness (AVPU) unresponsive   All Lung Fields Breath Sounds clear   Rhythm/Pattern, Respiratory assisted mechanically   PRE-TX-O2   Device (Oxygen Therapy) ventilator   $ Is the patient on Low Flow Oxygen? Yes   Oxygen Concentration (%) 40   Pulse Oximetry Type Continuous   $ Pulse Oximetry - Multiple Charge Pulse Oximetry - Multiple   Positioning   Head of Bed (HOB) Positioning HOB at 30 degrees      Airway Anesthesia 05/11/23   Placement Date/Time: 05/11/23 (c) 3908   Method of Intubation: Video Laryngoscopy  Airway Device: Endotracheal Tube  Mask Ventilation: Moderately difficult with oral airway  Intubated: Other (see comments)  Blade: Glidescope #3  Airway Device Size: 8....   Secured at 24 cm   Measured At Lips   Secured Location Center   Secured by Commercial tube barboza   Vent Select   Charged w/in last 24h YES   Preset Conventional Ventilator Settings   Vent ID [id number]   Vent Type    Ventilation Type VC   Vent Mode A/C   Humidity HME   Set Rate 20 BPM   Vt Set 500 mL   PEEP/CPAP 5 cmH20   Peak Flow 65 L/min   Conventional Ventilator Alarms   Alarms On Y   Ready to Wean/Extubation Screen   FIO2<=50 (chart decimal) 0.4   PEEP <=8 (chart #) 5

## 2023-05-12 NOTE — RESPIRATORY THERAPY
05/11/23 1956   Patient Assessment/Suction   Level of Consciousness (AVPU) unresponsive   All Lung Fields Breath Sounds clear   Rhythm/Pattern, Respiratory assisted mechanically   PRE-TX-O2   Device (Oxygen Therapy) ventilator   $ Is the patient on Low Flow Oxygen? Yes   Oxygen Concentration (%) 40   SpO2 100 %   Pulse Oximetry Type Continuous   $ Pulse Oximetry - Multiple Charge Pulse Oximetry - Multiple   Pulse 62   Resp 20   Positioning   Head of Bed (HOB) Positioning HOB at 30 degrees      Airway Anesthesia 05/11/23   Placement Date/Time: 05/11/23 (c) 1280   Method of Intubation: Video Laryngoscopy  Airway Device: Endotracheal Tube  Mask Ventilation: Moderately difficult with oral airway  Intubated: Other (see comments)  Blade: Glidescope #3  Airway Device Size: 8....   Secured at 24 cm   Measured At Lips   Secured Location Center   Secured by Commercial tube barboza   Vent Select   Charged w/in last 24h YES   Preset Conventional Ventilator Settings   Vent ID 8   Vent Type    Ventilation Type VC   Vent Mode A/C   Humidity HME   Set Rate 20 BPM   Vt Set 500 mL   PEEP/CPAP 5 cmH20   Peak Flow 65 L/min   Conventional Ventilator Alarms   Alarms On Y   Ready to Wean/Extubation Screen   FIO2<=50 (chart decimal) 0.4   PEEP <=8 (chart #) 5

## 2023-05-12 NOTE — PLAN OF CARE
[facility]  Initial Discharge Assessment       Primary Care Provider: Primary Doctor No    Admission Diagnosis: Status epilepticus [G40.901]    Admission Date: 5/11/2023  Expected Discharge Date: 5/17/2023    Assessment completed at bedside with Pt's daughter ((CHERRIE CARPIO (Relative)   [phone])). Insurance, demographics, and PCP verified. Pt is visiting family from [place]. Pt is patient of Dr. Sharpe in [place]. No home health. No dialysis. DME listed below. Pt's daughter verbalized plan is unknown but stated she would employ private homemaker services ([employer]) again if discharge plan is to return home. Pt has no other needs to be addressed at this time.    Transition of Care Barriers: None    Payor: MEDICARE / Plan: MEDICARE PART A & B / Product Type: Government /     Extended Emergency Contact Information  Primary Emergency Contact: BALAJICHERRIE  Mobile Phone: [phone]  Relation: Relative  Preferred language: English   needed? No    Discharge Plan A: Home with family  Discharge Plan B: Other      [id number] - [place], [place] - [address]  [address] [address]  Phone: [phone] Fax: [phone]      Initial Assessment (most recent)       Adult Discharge Assessment - 05/12/23 1523          Discharge Assessment    Assessment Type Discharge Planning Assessment     Confirmed/corrected address, phone number and insurance Yes     Confirmed Demographics Correct on Facesheet     Source of Information family     If unable to respond/provide information was family/caregiver contacted? Yes     Contact Name/Number CHERRIE CARPIO (Relative)   [phone]     Does patient/caregiver understand observation status Yes     Communicated BARBARA with patient/caregiver Yes     Reason For Admission Acute hypoxemic respiratory failure     People in Home child(brandon), adult     Facility Arrived From: Cousin's home     Do you have help at home or someone to  help you manage your care at home? Yes     Who are your caregiver(s) and their phone number(s)? CHERRIE CARPIO (Relative)   [phone]     Prior to hospitilization cognitive status: Unable to Assess     Current cognitive status: Coma/Sedated/Intubated     Home Accessibility stairs within home     Equipment Currently Used at Home none     Readmission within 30 days? No     Patient currently being followed by outpatient case management? No     Do you currently have service(s) that help you manage your care at home? No   Pt and spouse previously had [facility] for Homemaker services    Do you take prescription medications? Yes     Do you have prescription coverage? Yes     Coverage Medicare A & B     Do you have any problems affording any of your prescribed medications? No     Is the patient taking medications as prescribed? yes     Who is going to help you get home at discharge? CHERRIE CARPIO (Daughter)   [phone]     How do you get to doctors appointments? family or friend will provide     Are you on dialysis? No     Do you take coumadin? No   Xarelto    Discharge Plan A Home with family     Discharge Plan B Other     DME Needed Upon Discharge  none     Discharge Plan discussed with: Adult children     Transition of Care Barriers None

## 2023-05-12 NOTE — PLAN OF CARE
Problem: Feeding Intolerance (Enteral Nutrition)  Goal: Feeding Tolerance  Outcome: Ongoing, Progressing  Intervention: Prevent and Manage Feeding Intolerance  Flowsheets (Taken 5/12/2023 1148)  Nutrition Support Management: tube feeding initiated

## 2023-05-12 NOTE — PLAN OF CARE
05/12/23 0808   Patient Assessment/Suction   Level of Consciousness (AVPU) unresponsive   Respiratory Effort Normal;Unlabored   Expansion/Accessory Muscles/Retractions no use of accessory muscles;no retractions   All Lung Fields Breath Sounds clear   Rhythm/Pattern, Respiratory assisted mechanically;unlabored;pattern regular;depth regular      Airway Anesthesia 05/11/23   Placement Date/Time: 05/11/23 (c) 2376   Method of Intubation: Video Laryngoscopy  Airway Device: Endotracheal Tube  Mask Ventilation: Moderately difficult with oral airway  Intubated: Other (see comments)  Blade: Glidescope #3  Airway Device Size: 8....   Secured at 24 cm   Measured At Lips   Secured Location Center   Secured by Commercial tube barboza   Vent Select   Conventional Vent Y   $ Ventilator Subsequent 1   Charged w/in last 24h YES   Preset Conventional Ventilator Settings   Vent ID 8   Vent Type    Ventilation Type VC   Vent Mode A/C   Humidity HME   Set Rate 12 BPM   Vt Set 500 mL   PEEP/CPAP 5 cmH20   Peak Flow 65 L/min   Peak End Inspiratory Pressure 18 cmH20   I-Trigger Type  V-TRIG   Trigger Sensitivity Flow/I-Trigger 3 L/min   Patient Ventilator Parameters   Resp Rate Total 18 br/min   Peak Airway Pressure 20 cmH20   Mean Airway Pressure 9 cmH20   Plateau Pressure 0 cmH20   Exhaled Vt 523 mL   Total Ve 9.64 L/m   I:E Ratio Measured 1:2.40   Auto PEEP 0 cmH20   Tubing ID (mm) 8 mm   Tube Type ET   Conventional Ventilator Alarms   Alarms On Y   Resp Rate High Alarm 40 br/min   Press High Alarm 60 cmH2O   Apnea Rate 10   Apnea Volume (mL) 0 mL   Apnea Oxygen Concentration  100   Apnea Flow Rate (L/min) 44   T Apnea 20 sec(s)   Ready to Wean/Extubation Screen   MV<16L (chart vol.) 9.64   PEEP <=8 (chart #) 5

## 2023-05-12 NOTE — PROGRESS NOTES
Pulmonary/Critical Care Progress Note      PATIENT NAME: Teto Mendez  MRN: [id number]  TODAY'S DATE: 2023  12:50 PM  ADMIT DATE: 2023  AGE: 67 y.o. : 1955      HPI:  Called by nurse because they could not reach anesthesia to intubate a patient who was being bagged.  The patient had a change in mental status in the emergency room after having had a CTA earlier in the morning which was negative.  In CT, the patient was not breathing effectively and was cyanotic and the scanner broke so the patient was brought up to the ICU emergently.  The patient's past medical history is significant for glioblastoma multiforme for which he has been receiving radiation and chemotherapy.  He also has had a right frontal craniotomy.  At 3:30 a.m. in the morning the patient was playing a game and noted twitching of his face with slurred speech and weakness to his left arm.  He took an extra Keppra at that time.  He arrived at the ER at 6:45 a.m..     the patient remains on the ventilator and sedated with 60 of propofol.  He is also on 5 Cardizem.  Had further seizure activity during the night.    REVIEW OF SYSTEMS  Unobtainable    No change in the patient's Past Medical History, Past Surgical History, Social History or Family History since admission.      VITAL SIGNS (MOST RECENT)  Temp: 97.7 °F (36.5 °C) (23 0745)  Pulse: 69 (23 0923)  Resp: 20 (23 09)  BP: 120/72 (23 1730)  SpO2: 98 % (23 0923)    INTAKE AND OUTPUT (LAST 24 HOURS):  Intake/Output Summary (Last 24 hours) at 2023 1026  Last data filed at 2023 0539  Gross per 24 hour   Intake 848.91 ml   Output 1950 ml   Net -1101.09 ml       WEIGHT  Wt Readings from Last 1 Encounters:   23 84 kg (185 lb 3 oz)       PHYSICAL EXAM  GENERAL: Older patient, now intubated, unresponsive  HEENT: Pupils equal and reactive. Extraocular movements intact. Nose intact. Pharynx intubated with ET tube and OG tube.  NECK: Supple.    HEART: Regular rate and rhythm. No murmur or gallop auscultated.  LUNGS: Clear to auscultation and percussion. Lung excursion symmetrical. No change in fremitus. No adventitial noises.  ABDOMEN: Bowel sounds present. Non-tender, no masses palpated.  : Normal anatomy.  EXTREMITIES: Normal muscle tone and joint movement, no cyanosis or clubbing.   LYMPHATICS: No adenopathy palpated, no edema.  SKIN: Dry, intact, no lesions.   NEURO:  Sedated on propofol  PSYCH:  Unable to assess      CBC LAST (LAST 24 HOURS)  Recent Labs   Lab 05/12/23 0357 05/12/23 0410 05/12/23  0532   WBC 7.76  --   --    RBC 5.15  --   --    HGB 15.9  --   --    HCT 46.0   < > 45   MCV 89  --   --    MCH 30.9  --   --    MCHC 34.6  --   --    RDW 14.1  --   --      --   --    MPV 9.8  --   --    GRAN 82.1*  6.4  --   --    LYMPH 10.4*  0.8*  --   --    MONO 6.7  0.5  --   --    BASO 0.00  --   --    NRBC 0  --   --     < > = values in this interval not displayed.       CHEMISTRY LAST (LAST 24 HOURS)  Recent Labs   Lab 05/12/23 0357 05/12/23 0410 05/12/23  0532     --   --    K 3.3*  --   --      --   --    CO2 22*  --   --    ANIONGAP 11  --   --    BUN 19  --   --    CREATININE 1.0  --   --    GLU 97  --   --    CALCIUM 8.8  --   --    PH  --    < > 7.497*   ALBUMIN 3.5  --   --    PROT 6.1  --   --    ALKPHOS 47*  --   --    *  --   --    AST 69*  --   --    BILITOT 1.2*  --   --     < > = values in this interval not displayed.           LAST 7 DAYS MICROBIOLOGY   Microbiology Results (last 7 days)       Procedure Component Value Units Date/Time    Culture, Respiratory with Gram Stain [[id number]]     Order Status: No result Specimen: Respiratory             MOST RECENT IMAGING  CTA Head and Neck (xpd)  CMS MANDATED QUALITY DATA - CT RADIATION - 436    All CT scans at this facility utilize dose modulation, iterative reconstruction, and/or weight based dosing when appropriate to reduce radiation dose to as low  as reasonably achievable.    CMS MANDATED QUALITY DATA - CAROTID - 195    All measurements and percent stenosis described below were determined using NASCET criteria or criteria similar to NASCET, as defined by the Society of Radiologists in Ultrasound Consensus Conference, Radiology, 2003    Reason: Stroke/TIA, determine embolic source    Technique: CT angiography of brain and neck with 100 mL Omnipaque 350.  Maximum intensity projection coronal reformations were obtained at a separate workstation and stored in the patient's permanent medical record.    COMPARISON: 5/11/2023    CTA BRAIN:  VASCULAR FINDINGS:  Major intracranial arteries are widely patent with no stenosis, intraluminal filling, or dissection. Minimal atherosclerotic plaque affects the cavernous segments of bilateral internal carotid arteries. Negative for aneurysm or evidence of vasculitis.    Opacified visualized dural venous sinuses are unremarkable.    NONVASCULAR FINDINGS:  No abnormal intra-axial or extra-axial enhancement. No midline shift. Postsurgical changes of right pterional craniotomy are noted.    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    Minimal atherosclerotic plaque affects left carotid bulb. Left carotid arteries are otherwise without plaque or stenosis. Left vertebral artery is patent.    Minimal atherosclerotic plaque affects the right carotid bulb. Right carotid arteries are without additional plaque and remain widely patent. Right vertebral artery is patent.    NONVASCULAR FINDINGS:  Endotracheal tube has tip proximal to mio. Visualized lung apices show minor dependent atelectasis. Cervical soft tissues are unremarkable.    IMPRESSION:    1. Trace atherosclerotic plaque in intracranial internal carotid arteries, without other abnormality of the major intracranial arteries.  2. Trace atherosclerotic plaque in bilateral carotid bulbs,  with no stenosis or other abnormality of cervical ICAs or vertebral arteries.    Electronically signed by:  Compa Lopez MD  5/11/2023 2:01 PM CDT Workstation: [id number]  CT Head Without Contrast  CMS MANDATED QUALITY DATA - CT RADIATION  436    All CT scans at this facility utilize dose modulation, iterative reconstruction, and/or weight based dosing when appropriate to reduce radiation dose to as low as reasonably achievable.    CT HEAD WITHOUT IV CONTRAST    CLINICAL HISTORY:  67 years Male Mental status change, unknown cause    COMPARISON: Noncontrast CT head performed earlier today 6:56 AM    FINDINGS: Negative for acute intracranial hemorrhage, midline shift, or mass effect. Parenchymal calcifications within the right cerebral hemisphere are stable compared to prior. Patient is status post right pterional craniotomy. Ventricles and sulci are normal in size. Gray-white differentiation is maintained. Cerebellar hemispheres and brainstem are unremarkable.    No calvarial lesion or fracture. Mastoid air cells are clear.    IMPRESSION:    Stable exam. No CT evidence of acute intracranial pathology.    Electronically signed by:  Matt Cole MD  5/11/2023 1:41 PM CDT Workstation: [id number]  X-Ray Chest AP Portable  CLINICAL HISTORY:  67 years (1955) Male Stroke    TECHNIQUE:  Portable AP radiograph the chest.    COMPARISON:  None available.    FINDINGS:  Nonspecific minimal faint interstitial opacity at the left costophrenic.  No pneumothorax is identified. The heart is mildly enlarged.  Osseous structures show degenerative changes in the spine. The visualized upper abdomen is unremarkable.    IMPRESSION:  Nonspecific minimal faint interstitial opacity at the left costophrenic sulcus possibly representing atelectasis, scarring, trace pleural fluid, and less likely aspiration or pneumonia.    .    Electronically signed by:  Solomon Tobias MD  5/11/2023 8:32 AM CDT Workstation: [id number]  CTA Head and  Neck (xpd)  CMS EXAMINATION:  CTA HEAD AND NECK (XPD)    CLINICAL INDICATION: Male, 67 years old. Stroke/TIA, determine embolic source history of glioblastoma    TECHNIQUE: Axial CT angiogram of the head and neck was performed with contrast. Multiplanar reformations as well as 3-D volume rendered imaging were reviewed at the workstation. Degree of stenosis was measured utilizing the NASCET method.    CONTRAST: 100 mL mL of Omnipaque 350 IV.    COMPARISON: None    FINDINGS:  CTA brain: The distal vertebral arteries, basilar artery and cavernous portions of the internal carotid arteries are widely patent. The anterior cerebral arteries, middle cerebral arteries and posterior cerebral arteries are widely patent without evidence of large vessel occlusion, significant stenosis, AVM or aneurysm. The dural venous sinuses are patent.    There is no pathologic enhancement.    Aortic Arch and Great Vessel Origins: 2 vessel aortic arch which is widely patent  Subclavian Arteries: Widely patent.  Right Common Carotid Artery: Widely patent.  Right Internal Carotid Artery: Widely patent.  Right External Carotid Artery: Widely patent.  Left Common Carotid Artery: Widely patent.  Left Internal Carotid Artery: Widely patent.  Left External Carotid Artery: Widely patent.  Right Vertebral Artery: Widely patent.  Left Vertebral Artery: Widely patent.    The paraspinous soft tissues are unremarkable. There are mild degenerative changes of the cervical spine. The lung apices are clear.    IMPRESSION:  Negative CTA of the head and neck.    This exam was performed according to our departmental dose-optimization program which includes automated exposure control, adjustment of the mA and/or kV according to patient size and/or use of iterative reconstruction technique.    Electronically signed by:  Yi Hernandez MD  5/11/2023 7:43 AM CDT Workstation: [id number]  CT HEAD FOR STROKE  Narrative: CMS MANDATED QUALITY DATA - CT RADIATION -  436    All CT scans at this facility utilize dose modulation, iterative reconstruction, and/or weight based dosing when appropriate to reduce radiation dose to as low as reasonably achievable.    EXAMINATION:  CT HEAD FOR STROKE    CLINICAL HISTORY:  Neuro deficit, acute, stroke suspected;  history of glioblastoma    TECHNIQUE:  CT without IV contrast    COMPARISON:  None    FINDINGS:  There are postsurgical changes from prior right frontal temporal    These craniotomy.  The ventricles and sulci are mildly prominent compatible with generalized cerebral atrophy.  There is no hemorrhage, mass or midline shift.  There are no extra-axial fluid collections.  The cerebellum and brainstem are normal.  The gray-white differentiation is maintained.  The orbits are unremarkable.    The paranasal sinuses and mastoid air cells are clear.  Impression: Mild cerebral atrophy with no acute intracranial process    Prior right frontal temporal craniotomy    Findings were called to Dr. Zhou in the emergency department at 07:09 a.m.    Electronically signed by: Yi Hernandez MD  Date:    05/11/2023  Time:    07:10      CURRENT VISIT EKG  Results for orders placed or performed during the hospital encounter of 05/11/23   ECG 12 lead    Narrative    Test Reason : I63.9,    Vent. Rate : 120 BPM     Atrial Rate : 150 BPM     P-R Int : 000 ms          QRS Dur : 104 ms      QT Int : 370 ms       P-R-T Axes : 000 016 -12 degrees     QTc Int : 522 ms    Atrial fibrillation with rapid ventricular response  Nonspecific ST abnormality  Prolonged QT  Abnormal ECG  No previous ECGs available    Referred By: [email or web address]   SELF           Confirmed By:        ECHOCARDIOGRAM RESULTS  No results found for this or any previous visit.        VENTILATOR INFORMATION  Vent Mode: A/C  Oxygen Concentration (%):  [] 30  Resp Rate Total:  [16 br/min-24 br/min] 18 br/min  Vt Set:  [500 mL] 500 mL  PEEP/CPAP:  [5 cmH20] 5 cmH20  Mean Airway Pressure:  [8.6  dvE79-48 cmH20] 9.1 cmH20           LAST ARTERIAL BLOOD GAS  ABG  Recent Labs   Lab 05/12/23  0532   PH 7.497*   PO2 96   PCO2 31.1*   HCO3 24.1   BE 1       IMPRESSION AND PLAN  Status epilepticus  -focus likely his glioblastoma site  -history of seizures prior to this admission  -MRI ordered, pending  History of glioblastoma multiforme under treatment in [place]  Mechanical ventilation  -to protect airway and maintain ventilatory drive with his status  -alkalotic on this morning's gas, will decrease rate, but patient over breathing the ventilator  Atrial fibrillation  -rate controlled on 5 mg Cardizem  -controlled with metoprolol 100 succinate normally and on Xarelto  -will stop Cardizem and resume metoprolol tartrate 100 b.i.d.  Hypertension  -metoprolol  Hypokalemia  -replace potassium  -Trend labs  Transaminitis  -Trend labs    Spoke with his oncologist from [place]    Critical care time spent reviewing the chart, examining the patient, reviewing the labs, reviewing the radiological findings, discussing care with nursing, physicians, and respiratory and creating the note and  has been greater than 35 minutes    Mery Milner MD  Date of Service: 05/12/2023  12:50 PM

## 2023-05-12 NOTE — CONSULTS
[facility]  Adult Nutrition   Consult Note (Nutrition Support Management)    SUMMARY     Recommendations  Recommendation/Intervention: 1. Initiate trickle feeds. Vital HP @ 10 ml/hr (to provide 240 kcal/day, 21 g/day protein, and 201 ml/day free water). 2. FWF 30 ml every 4 hours (180 ml/day). 3. Noted hypokalemia. Recommend continued monitoring and management of electrolytes. 4. RD to continue to monitor ventilator status, sedation rate, tube feeding tolerance, weight and labs and make recommendations and manage enteral nutrition accordingly.  Goals: 1. Pt to tolerate TF. 2. Enteral nutrition to be advanced as medically able, nutrition provision to meet at least 75% EEN/EPN. 3. Electrolytes to trend towards target range.  Nutrition Goal Status: new  Communication of RD Recs: reviewed with RN (reviewed with physician)    Dietitian Rounds Brief  Patient assessed 2' ICU status.  History of glioblastoma multiforme s/p craniotomy, chemotherapy and radiotherapy, as well as seizure disorder being evaluated for status epilepticus. Intubated and sedated. Propofol @ 25.2 ml/hr (665 kcal/day). RD recommends starting trickle feeds, Dr. Conde agrees. Obtained consult for TF MGMT. RD ordered TF to be initiated. Goal rate dependent of sedation rate/vent status.   With current sedation would recommend goal rate of 10 ml with addition of modulars to better meet protein needs - 4 packets of proTgold daily. Would provide 1185 kcal/day (101% EEN), 89 g/day protein (72% EPN).   LBM: 05/10/23    Reason for Assessment  Reason For Assessment: identified at risk by screening criteria (ICU)  Diagnosis: seizures  Relevant Medical History: GERD; gioblastoma; h/o blood clots; HTN; paroxysmal A.fib.; seizures    Nutrition Risk Screen  Nutrition Risk Screen: no indicators present     MST Score: 0  Have you recently lost weight without trying?: No  Weight loss score: 0  Have you been eating poorly because of a decreased  "appetite?: No  Appetite score: 0       Nutrition/Diet History  Food Allergies: NKFA  Factors Affecting Nutritional Intake: NPO, on mechanical ventilation    Anthropometrics  Temp: 97.7 °F (36.5 °C)  Height Method: Estimated  Height: 5' 5" (165.1 cm)  Height (inches): 65 in  Weight Method: Estimated  Weight: 84 kg (185 lb 3 oz)  Weight (lb): 185.19 lb  Ideal Body Weight (IBW), Male: 136 lb  % Ideal Body Weight, Male (lb): 136.17 %  BMI (Calculated): 30.8  BMI Grade: 30 - 34.9- obesity - grade I       Weight History:  Wt Readings from Last 10 Encounters:   05/11/23 84 kg (185 lb 3 oz)       Lab/Procedures/Meds: Pertinent Labs Reviewed    Clinical Chemistry:  Recent Labs   Lab 05/12/23  0357      K 3.3*      CO2 22*   GLU 97   BUN 19   CREATININE 1.0   CALCIUM 8.8   PROT 6.1   ALBUMIN 3.5   BILITOT 1.2*   ALKPHOS 47*   AST 69*   *   ANIONGAP 11       CBC:   Recent Labs   Lab 05/12/23  0357 05/12/23  0410 05/12/23  0532   WBC 7.76  --   --    RBC 5.15  --   --    HGB 15.9  --   --    HCT 46.0   < > 45     --   --    MCV 89  --   --    MCH 30.9  --   --    MCHC 34.6  --   --     < > = values in this interval not displayed.       Lipid Panel:  Recent Labs   Lab 05/11/23  0657   CHOL 248*   HDL 64   LDLCALC 165.6*   TRIG 92   CHOLHDL 25.8         Thyroid & Parathyroid:  Recent Labs   Lab 05/11/23  0657   TSH 3.900         Medications: Pertinent Medications reviewed    Scheduled Meds:   allopurinoL  100 mg Oral Daily    amLODIPine  10 mg Oral Daily    chlorhexidine  15 mL Mouth/Throat BID    colchicine  0.6 mg Oral Daily    dexAMETHasone  1 mg Oral Q12H    levetiracetam IV  1,500 mg Intravenous Q12H    meclizine  25 mg Oral TID    metoprolol succinate  100 mg Oral Daily    mupirocin   Nasal BID    pantoprazole  40 mg Oral Before breakfast    polyethylene glycol  17 g Oral Daily    rivaroxaban  20 mg Oral Daily with dinner    timolol maleate 0.25%  1 drop Both Eyes BID    valproate sodium (DEPACON) " IVPB  500 mg Intravenous Q8H       Continuous Infusions:   dilTIAZem 5 mg/hr (05/12/23 0809)    propofoL 60 mcg/kg/min (05/12/23 1041)       PRN Meds:.acetaminophen, hydrALAZINE, HYDROcodone-acetaminophen, lorazepam, magnesium oxide, magnesium oxide, metoprolol, ondansetron, potassium bicarbonate, potassium bicarbonate, potassium bicarbonate, sodium chloride 0.9%    Estimated/Assessed Needs  Weight Used For Calorie Calculations: 84 kg (185 lb 3 oz)  Energy Calorie Requirements (kcal): 924 - 1176 (11 - 14 kcal/kg)  Energy Need Method: Kcal/kg  Protein Requirements: 124 (2 g/kg IBW)  Weight Used For Protein Calculations: 62 kg (136 lb 11 oz)  Fluid Requirements (mL): 1860 (30 ml/kg IBW)  Estimated Fluid Requirement Method: other (see comments)  RDA Method (mL): 924       Nutrition Prescription Ordered  Current Diet Order: NPO    Evaluation of Received Nutrient/Fluid Intake  Other Calories (kcal): 665 (propofol @ 25.2 ml/hr)  Energy Calories Required: not meeting needs  Protein Required: not meeting needs  Fluid Required: meeting needs     Intake/Output Summary (Last 24 hours) at 5/12/2023 1145  Last data filed at 5/12/2023 0539  Gross per 24 hour   Intake 848.91 ml   Output 1950 ml   Net -1101.09 ml      % Intake of Estimated Energy Needs: 50 - 75 % (72% of lower end of EEN with sedation only)   % Meal Intake: NPO    Nutrition Risk  Level of Risk/Frequency of Follow-up: high     Monitor and Evaluation  Food and Nutrient Intake: energy intake, enteral nutrition intake  Food and Nutrient Adminstration: enteral and parenteral nutrition administration  Physical Activity and Function: nutrition-related ADLs and IADLs, factors affecting access to physical activity  Anthropometric Measurements: weight, weight change, body mass index  Biochemical Data, Medical Tests and Procedures: electrolyte and renal panel, inflammatory profile, gastrointestinal profile, lipid profile, glucose/endocrine profile  Nutrition-Focused Physical  Findings: overall appearance     Nutrition Follow-Up  RD Follow-up?: Yes    Alberta Milner RD 05/12/2023 11:46 AM

## 2023-05-13 ENCOUNTER — CLINICAL SUPPORT (OUTPATIENT)
Dept: CARDIOLOGY | Facility: HOSPITAL | Age: 68
DRG: 004 | End: 2023-05-13
Attending: STUDENT IN AN ORGANIZED HEALTH CARE EDUCATION/TRAINING PROGRAM
Payer: MEDICARE

## 2023-05-13 VITALS — WEIGHT: 203 LBS | HEIGHT: 69 IN | BODY MASS INDEX: 30.07 KG/M2

## 2023-05-13 PROBLEM — K21.9 GERD (GASTROESOPHAGEAL REFLUX DISEASE): Status: ACTIVE | Noted: 2023-05-13

## 2023-05-13 PROBLEM — M10.9 GOUT: Status: ACTIVE | Noted: 2023-05-13

## 2023-05-13 PROBLEM — I50.20 HFREF (HEART FAILURE WITH REDUCED EJECTION FRACTION): Status: ACTIVE | Noted: 2023-05-13

## 2023-05-13 PROBLEM — D73.5 SPLENIC INFARCT: Status: ACTIVE | Noted: 2021-11-19

## 2023-05-13 PROBLEM — E66.9 OBESITY: Status: ACTIVE | Noted: 2023-05-13

## 2023-05-13 PROBLEM — Z85.46 PERSONAL HISTORY OF PROSTATE CANCER: Status: ACTIVE | Noted: 2021-11-19

## 2023-05-13 PROBLEM — R76.0 LUPUS ANTICOAGULANT POSITIVE: Status: ACTIVE | Noted: 2022-02-08

## 2023-05-13 LAB
ALBUMIN SERPL BCP-MCNC: 2.9 G/DL (ref 3.5–5.2)
ALLENS TEST: ABNORMAL
ALP SERPL-CCNC: 41 U/L (ref 55–135)
ALT SERPL W/O P-5'-P-CCNC: 64 U/L (ref 10–44)
ANION GAP SERPL CALC-SCNC: 9 MMOL/L (ref 8–16)
AORTIC ROOT ANNULUS: 3.7 CM
AORTIC VALVE CUSP SEPERATION: 1.3 CM
AST SERPL-CCNC: 35 U/L (ref 10–40)
AV INDEX (PROSTH): 0.82
AV MEAN GRADIENT: 2 MMHG
AV PEAK GRADIENT: 4 MMHG
AV VALVE AREA: 2.84 CM2
AV VELOCITY RATIO: 0.8
BASOPHILS # BLD AUTO: 0.01 K/UL (ref 0–0.2)
BASOPHILS NFR BLD: 0.2 % (ref 0–1.9)
BILIRUB SERPL-MCNC: 0.8 MG/DL (ref 0.1–1)
BNP SERPL-MCNC: 93 PG/ML (ref 0–99)
BSA FOR ECHO PROCEDURE: 2.12 M2
BUN SERPL-MCNC: 22 MG/DL (ref 8–23)
CALCIUM SERPL-MCNC: 8.7 MG/DL (ref 8.7–10.5)
CHLORIDE SERPL-SCNC: 108 MMOL/L (ref 95–110)
CO2 SERPL-SCNC: 23 MMOL/L (ref 23–29)
CREAT SERPL-MCNC: 1 MG/DL (ref 0.5–1.4)
CV ECHO LV RWT: 0.28 CM
DELSYS: ABNORMAL
DIFFERENTIAL METHOD: ABNORMAL
DOP CALC AO PEAK VEL: 1 M/S
DOP CALC AO VTI: 19.4 CM
DOP CALC LVOT AREA: 3.5 CM2
DOP CALC LVOT DIAMETER: 2.1 CM
DOP CALC LVOT PEAK VEL: 0.8 M/S
DOP CALC LVOT STROKE VOLUME: 55.04 CM3
DOP CALC MV VTI: 18.4 CM
DOP CALCLVOT PEAK VEL VTI: 15.9 CM
E WAVE DECELERATION TIME: 246 MSEC
E/A RATIO: 1.93
E/E' RATIO: 8.31 M/S
ECHO LV POSTERIOR WALL: 0.83 CM (ref 0.6–1.1)
EJECTION FRACTION: 38 %
EOSINOPHIL # BLD AUTO: 0 K/UL (ref 0–0.5)
EOSINOPHIL NFR BLD: 0.2 % (ref 0–8)
ERYTHROCYTE [DISTWIDTH] IN BLOOD BY AUTOMATED COUNT: 13.9 % (ref 11.5–14.5)
ERYTHROCYTE [SEDIMENTATION RATE] IN BLOOD BY WESTERGREN METHOD: 12 MM/H
EST. GFR  (NO RACE VARIABLE): >60 ML/MIN/1.73 M^2
FIO2: 30
FRACTIONAL SHORTENING: 18 % (ref 28–44)
GLUCOSE SERPL-MCNC: 123 MG/DL (ref 70–110)
GLUCOSE SERPL-MCNC: 133 MG/DL (ref 70–110)
HCO3 UR-SCNC: 25.3 MMOL/L (ref 24–28)
HCT VFR BLD AUTO: 43.3 % (ref 40–54)
HCT VFR BLD CALC: 47 %PCV (ref 36–54)
HGB BLD-MCNC: 15.7 G/DL (ref 14–18)
IMM GRANULOCYTES # BLD AUTO: 0.05 K/UL (ref 0–0.04)
IMM GRANULOCYTES NFR BLD AUTO: 0.8 % (ref 0–0.5)
INTERVENTRICULAR SEPTUM: 1.15 CM (ref 0.6–1.1)
LEFT ATRIUM SIZE: 4.6 CM
LEFT ATRIUM VOLUME INDEX MOD: 26.1 ML/M2
LEFT ATRIUM VOLUME MOD: 54.2 CM3
LEFT INTERNAL DIMENSION IN SYSTOLE: 4.79 CM (ref 2.1–4)
LEFT VENTRICLE DIASTOLIC VOLUME INDEX: 81.25 ML/M2
LEFT VENTRICLE DIASTOLIC VOLUME: 169 ML
LEFT VENTRICLE MASS INDEX: 112 G/M2
LEFT VENTRICLE SYSTOLIC VOLUME INDEX: 51.4 ML/M2
LEFT VENTRICLE SYSTOLIC VOLUME: 107 ML
LEFT VENTRICULAR INTERNAL DIMENSION IN DIASTOLE: 5.84 CM (ref 3.5–6)
LEFT VENTRICULAR MASS: 232.76 G
LV LATERAL E/E' RATIO: 6.75 M/S
LV SEPTAL E/E' RATIO: 10.8 M/S
LVOT MG: 1 MMHG
LVOT MV: 0.51 CM/S
LYMPHOCYTES # BLD AUTO: 0.5 K/UL (ref 1–4.8)
LYMPHOCYTES NFR BLD: 8.5 % (ref 18–48)
MAGNESIUM SERPL-MCNC: 2.1 MG/DL (ref 1.6–2.6)
MCH RBC QN AUTO: 31.4 PG (ref 27–31)
MCHC RBC AUTO-ENTMCNC: 36.3 G/DL (ref 32–36)
MCV RBC AUTO: 87 FL (ref 82–98)
MIN VOL: 9.1
MODE: ABNORMAL
MONOCYTES # BLD AUTO: 0.5 K/UL (ref 0.3–1)
MONOCYTES NFR BLD: 8.3 % (ref 4–15)
MV MEAN GRADIENT: 1 MMHG
MV PEAK A VEL: 0.28 M/S
MV PEAK E VEL: 0.54 M/S
MV PEAK GRADIENT: 2 MMHG
MV STENOSIS PRESSURE HALF TIME: 90 MS
MV VALVE AREA BY CONTINUITY EQUATION: 2.99 CM2
MV VALVE AREA P 1/2 METHOD: 2.44 CM2
NEUTROPHILS # BLD AUTO: 5.2 K/UL (ref 1.8–7.7)
NEUTROPHILS NFR BLD: 82 % (ref 38–73)
NRBC BLD-RTO: 0 /100 WBC
PCO2 BLDA: 30.5 MMHG (ref 35–45)
PEEP: 5
PH SMN: 7.53 [PH] (ref 7.35–7.45)
PIP: 21
PISA TR MAX VEL: 2.41 M/S
PLATELET # BLD AUTO: 180 K/UL (ref 150–450)
PMV BLD AUTO: 10.1 FL (ref 9.2–12.9)
PO2 BLDA: 89 MMHG (ref 80–100)
POC BE: 3 MMOL/L
POC IONIZED CALCIUM: 1.19 MMOL/L (ref 1.06–1.42)
POC SATURATED O2: 98 % (ref 95–100)
POC TCO2: 26 MMOL/L (ref 23–27)
POTASSIUM BLD-SCNC: 3.1 MMOL/L (ref 3.5–5.1)
POTASSIUM SERPL-SCNC: 3.1 MMOL/L (ref 3.5–5.1)
POTASSIUM SERPL-SCNC: 4.3 MMOL/L (ref 3.5–5.1)
PROT SERPL-MCNC: 5.4 G/DL (ref 6–8.4)
PV MV: 0.48 M/S
PV PEAK VELOCITY: 0.71 CM/S
RA PRESSURE: 3 MMHG
RBC # BLD AUTO: 5 M/UL (ref 4.6–6.2)
RV TISSUE DOPPLER FREE WALL SYSTOLIC VELOCITY 1 (APICAL 4 CHAMBER VIEW): 0.01 CM/S
SAMPLE: ABNORMAL
SITE: ABNORMAL
SODIUM BLD-SCNC: 140 MMOL/L (ref 136–145)
SODIUM SERPL-SCNC: 140 MMOL/L (ref 136–145)
SP02: 100
TDI LATERAL: 0.08 M/S
TDI SEPTAL: 0.05 M/S
TDI: 0.07 M/S
TR MAX PG: 23 MMHG
TRICUSPID ANNULAR PLANE SYSTOLIC EXCURSION: 2.04 CM
TROPONIN I SERPL HS-MCNC: 7 PG/ML (ref 0–14.9)
TV REST PULMONARY ARTERY PRESSURE: 26 MMHG
VALPROATE SERPL-MCNC: 84.3 UG/ML (ref 50–100)
VT: 588
WBC # BLD AUTO: 6.37 K/UL (ref 3.9–12.7)

## 2023-05-13 PROCEDURE — 80053 COMPREHEN METABOLIC PANEL: CPT | Performed by: STUDENT IN AN ORGANIZED HEALTH CARE EDUCATION/TRAINING PROGRAM

## 2023-05-13 PROCEDURE — S5010 5% DEXTROSE AND 0.45% SALINE: HCPCS | Performed by: HOSPITALIST

## 2023-05-13 PROCEDURE — 25000003 PHARM REV CODE 250: Performed by: STUDENT IN AN ORGANIZED HEALTH CARE EDUCATION/TRAINING PROGRAM

## 2023-05-13 PROCEDURE — 63600175 PHARM REV CODE 636 W HCPCS

## 2023-05-13 PROCEDURE — 95711 VEEG 2-12 HR UNMONITORED: CPT

## 2023-05-13 PROCEDURE — 80164 ASSAY DIPROPYLACETIC ACD TOT: CPT | Performed by: INTERNAL MEDICINE

## 2023-05-13 PROCEDURE — 99900035 HC TECH TIME PER 15 MIN (STAT)

## 2023-05-13 PROCEDURE — 99900031 HC PATIENT EDUCATION (STAT)

## 2023-05-13 PROCEDURE — C9113 INJ PANTOPRAZOLE SODIUM, VIA: HCPCS | Performed by: STUDENT IN AN ORGANIZED HEALTH CARE EDUCATION/TRAINING PROGRAM

## 2023-05-13 PROCEDURE — 20000000 HC ICU ROOM

## 2023-05-13 PROCEDURE — 94761 N-INVAS EAR/PLS OXIMETRY MLT: CPT

## 2023-05-13 PROCEDURE — 84295 ASSAY OF SERUM SODIUM: CPT

## 2023-05-13 PROCEDURE — 99291 PR CRITICAL CARE, E/M 30-74 MINUTES: ICD-10-PCS | Mod: ,,, | Performed by: INTERNAL MEDICINE

## 2023-05-13 PROCEDURE — 95700 EEG CONT REC W/VID EEG TECH: CPT

## 2023-05-13 PROCEDURE — 63600175 PHARM REV CODE 636 W HCPCS: Performed by: STUDENT IN AN ORGANIZED HEALTH CARE EDUCATION/TRAINING PROGRAM

## 2023-05-13 PROCEDURE — 82330 ASSAY OF CALCIUM: CPT

## 2023-05-13 PROCEDURE — 85014 HEMATOCRIT: CPT

## 2023-05-13 PROCEDURE — 82803 BLOOD GASES ANY COMBINATION: CPT

## 2023-05-13 PROCEDURE — 25000003 PHARM REV CODE 250: Performed by: HOSPITALIST

## 2023-05-13 PROCEDURE — 84132 ASSAY OF SERUM POTASSIUM: CPT | Performed by: STUDENT IN AN ORGANIZED HEALTH CARE EDUCATION/TRAINING PROGRAM

## 2023-05-13 PROCEDURE — 95714 VEEG EA 12-26 HR UNMNTR: CPT

## 2023-05-13 PROCEDURE — 95813 EEG EXTND MNTR 61-119 MIN: CPT

## 2023-05-13 PROCEDURE — 37799 UNLISTED PX VASCULAR SURGERY: CPT

## 2023-05-13 PROCEDURE — 25000003 PHARM REV CODE 250

## 2023-05-13 PROCEDURE — 25000003 PHARM REV CODE 250: Performed by: INTERNAL MEDICINE

## 2023-05-13 PROCEDURE — 83880 ASSAY OF NATRIURETIC PEPTIDE: CPT | Performed by: STUDENT IN AN ORGANIZED HEALTH CARE EDUCATION/TRAINING PROGRAM

## 2023-05-13 PROCEDURE — 94799 UNLISTED PULMONARY SVC/PX: CPT

## 2023-05-13 PROCEDURE — 85025 COMPLETE CBC W/AUTO DIFF WBC: CPT | Performed by: STUDENT IN AN ORGANIZED HEALTH CARE EDUCATION/TRAINING PROGRAM

## 2023-05-13 PROCEDURE — 84484 ASSAY OF TROPONIN QUANT: CPT | Performed by: STUDENT IN AN ORGANIZED HEALTH CARE EDUCATION/TRAINING PROGRAM

## 2023-05-13 PROCEDURE — 93306 TTE W/DOPPLER COMPLETE: CPT | Mod: 26,,, | Performed by: INTERNAL MEDICINE

## 2023-05-13 PROCEDURE — 83735 ASSAY OF MAGNESIUM: CPT | Performed by: STUDENT IN AN ORGANIZED HEALTH CARE EDUCATION/TRAINING PROGRAM

## 2023-05-13 PROCEDURE — 36415 COLL VENOUS BLD VENIPUNCTURE: CPT | Performed by: STUDENT IN AN ORGANIZED HEALTH CARE EDUCATION/TRAINING PROGRAM

## 2023-05-13 PROCEDURE — 27000221 HC OXYGEN, UP TO 24 HOURS

## 2023-05-13 PROCEDURE — 93306 TTE W/DOPPLER COMPLETE: CPT

## 2023-05-13 PROCEDURE — 99900026 HC AIRWAY MAINTENANCE (STAT)

## 2023-05-13 PROCEDURE — 63600175 PHARM REV CODE 636 W HCPCS: Performed by: HOSPITALIST

## 2023-05-13 PROCEDURE — 95819 EEG AWAKE AND ASLEEP: CPT

## 2023-05-13 PROCEDURE — 84132 ASSAY OF SERUM POTASSIUM: CPT

## 2023-05-13 PROCEDURE — 93306 ECHO (CUPID ONLY): ICD-10-PCS | Mod: 26,,, | Performed by: INTERNAL MEDICINE

## 2023-05-13 PROCEDURE — 95812 EEG 41-60 MINUTES: CPT

## 2023-05-13 PROCEDURE — 99291 CRITICAL CARE FIRST HOUR: CPT | Mod: ,,, | Performed by: INTERNAL MEDICINE

## 2023-05-13 PROCEDURE — 94003 VENT MGMT INPAT SUBQ DAY: CPT

## 2023-05-13 RX ORDER — ACETAMINOPHEN 325 MG/1
650 TABLET ORAL EVERY 8 HOURS PRN
Status: DISCONTINUED | OUTPATIENT
Start: 2023-05-13 | End: 2023-05-25 | Stop reason: HOSPADM

## 2023-05-13 RX ORDER — POLYETHYLENE GLYCOL 3350 17 G/17G
17 POWDER, FOR SOLUTION ORAL 2 TIMES DAILY PRN
Status: DISCONTINUED | OUTPATIENT
Start: 2023-05-13 | End: 2023-05-25 | Stop reason: HOSPADM

## 2023-05-13 RX ORDER — ALLOPURINOL 100 MG/1
100 TABLET ORAL DAILY
Status: DISCONTINUED | OUTPATIENT
Start: 2023-05-13 | End: 2023-05-25 | Stop reason: HOSPADM

## 2023-05-13 RX ORDER — METOPROLOL TARTRATE 50 MG/1
100 TABLET ORAL 2 TIMES DAILY
Status: DISCONTINUED | OUTPATIENT
Start: 2023-05-13 | End: 2023-05-21

## 2023-05-13 RX ORDER — FUROSEMIDE 10 MG/ML
20 INJECTION INTRAMUSCULAR; INTRAVENOUS ONCE
Status: COMPLETED | OUTPATIENT
Start: 2023-05-13 | End: 2023-05-13

## 2023-05-13 RX ORDER — COLCHICINE 0.6 MG/1
0.6 TABLET, FILM COATED ORAL DAILY PRN
Status: DISCONTINUED | OUTPATIENT
Start: 2023-05-13 | End: 2023-05-25 | Stop reason: HOSPADM

## 2023-05-13 RX ORDER — DEXAMETHASONE 1 MG/1
1 TABLET ORAL EVERY 12 HOURS
Status: DISCONTINUED | OUTPATIENT
Start: 2023-05-13 | End: 2023-05-25 | Stop reason: HOSPADM

## 2023-05-13 RX ORDER — PANTOPRAZOLE SODIUM 40 MG/10ML
40 INJECTION, POWDER, LYOPHILIZED, FOR SOLUTION INTRAVENOUS DAILY
Status: DISCONTINUED | OUTPATIENT
Start: 2023-05-13 | End: 2023-05-25 | Stop reason: HOSPADM

## 2023-05-13 RX ORDER — FUROSEMIDE 10 MG/ML
INJECTION INTRAMUSCULAR; INTRAVENOUS
Status: COMPLETED
Start: 2023-05-13 | End: 2023-05-13

## 2023-05-13 RX ADMIN — PROPOFOL 30 MCG/KG/MIN: 10 INJECTION, EMULSION INTRAVENOUS at 02:05

## 2023-05-13 RX ADMIN — TIMOLOL MALEATE 1 DROP: 2.5 SOLUTION/ DROPS OPHTHALMIC at 09:05

## 2023-05-13 RX ADMIN — DEXTROSE 500 MG: 50 INJECTION, SOLUTION INTRAVENOUS at 05:05

## 2023-05-13 RX ADMIN — PANTOPRAZOLE SODIUM 40 MG: 40 INJECTION, POWDER, FOR SOLUTION INTRAVENOUS at 09:05

## 2023-05-13 RX ADMIN — FUROSEMIDE 20 MG: 10 INJECTION, SOLUTION INTRAMUSCULAR; INTRAVENOUS at 01:05

## 2023-05-13 RX ADMIN — DEXTROSE 500 MG: 50 INJECTION, SOLUTION INTRAVENOUS at 01:05

## 2023-05-13 RX ADMIN — METOPROLOL TARTRATE 100 MG: 50 TABLET, FILM COATED ORAL at 09:05

## 2023-05-13 RX ADMIN — DILTIAZEM HYDROCHLORIDE 60 MG: 30 TABLET, FILM COATED ORAL at 05:05

## 2023-05-13 RX ADMIN — PROPOFOL 60 MCG/KG/MIN: 10 INJECTION, EMULSION INTRAVENOUS at 09:05

## 2023-05-13 RX ADMIN — METOPROLOL TARTRATE 100 MG: 50 TABLET, FILM COATED ORAL at 08:05

## 2023-05-13 RX ADMIN — ALLOPURINOL 100 MG: 100 TABLET ORAL at 09:05

## 2023-05-13 RX ADMIN — LEVETIRACETAM INJECTION 1500 MG: 15 INJECTION INTRAVENOUS at 09:05

## 2023-05-13 RX ADMIN — DEXTROSE AND SODIUM CHLORIDE: 5; .45 INJECTION, SOLUTION INTRAVENOUS at 05:05

## 2023-05-13 RX ADMIN — DEXTROSE 500 MG: 50 INJECTION, SOLUTION INTRAVENOUS at 08:05

## 2023-05-13 RX ADMIN — PROPOFOL 60 MCG/KG/MIN: 10 INJECTION, EMULSION INTRAVENOUS at 12:05

## 2023-05-13 RX ADMIN — PROPOFOL 60 MCG/KG/MIN: 10 INJECTION, EMULSION INTRAVENOUS at 03:05

## 2023-05-13 RX ADMIN — PROPOFOL 60 MCG/KG/MIN: 10 INJECTION, EMULSION INTRAVENOUS at 07:05

## 2023-05-13 RX ADMIN — CHLORHEXIDINE GLUCONATE 15 ML: 1.2 RINSE ORAL at 09:05

## 2023-05-13 RX ADMIN — DEXAMETHASONE 1 MG: 1 TABLET ORAL at 09:05

## 2023-05-13 RX ADMIN — MUPIROCIN 1 G: 20 OINTMENT TOPICAL at 09:05

## 2023-05-13 RX ADMIN — PROPOFOL 30 MCG/KG/MIN: 10 INJECTION, EMULSION INTRAVENOUS at 08:05

## 2023-05-13 RX ADMIN — CHLORHEXIDINE GLUCONATE 15 ML: 1.2 RINSE ORAL at 08:05

## 2023-05-13 RX ADMIN — TIMOLOL MALEATE 1 DROP: 2.5 SOLUTION/ DROPS OPHTHALMIC at 08:05

## 2023-05-13 RX ADMIN — DEXAMETHASONE 1 MG: 1 TABLET ORAL at 08:05

## 2023-05-13 RX ADMIN — MUPIROCIN 1 G: 20 OINTMENT TOPICAL at 08:05

## 2023-05-13 RX ADMIN — FUROSEMIDE 20 MG: 10 INJECTION INTRAMUSCULAR; INTRAVENOUS at 01:05

## 2023-05-13 RX ADMIN — POTASSIUM BICARBONATE 35 MEQ: 391 TABLET, EFFERVESCENT ORAL at 05:05

## 2023-05-13 RX ADMIN — RIVAROXABAN 20 MG: 20 TABLET, FILM COATED ORAL at 04:05

## 2023-05-13 RX ADMIN — DEXTROSE 250 MG: 50 INJECTION, SOLUTION INTRAVENOUS at 03:05

## 2023-05-13 NOTE — SUBJECTIVE & OBJECTIVE
"Interval History: see "Hospital Course"    Review of Systems   Unable to perform ROS: Intubated   Objective:     Vital Signs (Most Recent):  Temp: 97.4 °F (36.3 °C) (05/13/23 1501)  Pulse: 64 (05/13/23 1600)  Resp: 17 (05/13/23 1600)  BP: (!) 149/89 (05/13/23 1501)  SpO2: 98 % (05/13/23 1600) Vital Signs (24h Range):  Temp:  [97.4 °F (36.3 °C)-97.6 °F (36.4 °C)] 97.4 °F (36.3 °C)  Pulse:  [59-84] 64  Resp:  [11-33] 17  SpO2:  [96 %-99 %] 98 %  BP: (113-152)/(65-89) 149/89  Arterial Line BP: (101-155)/(62-91) 147/86     Weight: 92.4 kg (203 lb 11.3 oz)  Body mass index is 30.08 kg/m².    Intake/Output Summary (Last 24 hours) at 5/13/2023 1645  Last data filed at 5/13/2023 1533  Gross per 24 hour   Intake 2617.8 ml   Output 3000 ml   Net -382.2 ml         Physical Exam  Vitals and nursing note reviewed.   Constitutional:       Appearance: He is ill-appearing.   HENT:      Head: Normocephalic and atraumatic.      Right Ear: External ear normal.      Left Ear: External ear normal.      Nose: Nose normal.      Comments: NG tube.     Mouth/Throat:      Mouth: Mucous membranes are moist.      Pharynx: Oropharynx is clear.      Comments: ETT.  Cardiovascular:      Rate and Rhythm: Normal rate. Rhythm irregular.      Pulses: Normal pulses.      Heart sounds: Normal heart sounds.   Pulmonary:      Effort: Pulmonary effort is normal.      Breath sounds: Normal breath sounds.   Abdominal:      General: Bowel sounds are normal.      Palpations: Abdomen is soft.   Genitourinary:     Comments: Iraheta.  Musculoskeletal:      Right lower leg: No edema.      Left lower leg: No edema.   Skin:     General: Skin is warm and dry.   Neurological:      Comments: Sedated.           Significant Labs: All pertinent labs within the past 24 hours have been reviewed.    Significant Imaging: I have reviewed all pertinent imaging results/findings within the past 24 hours.  "

## 2023-05-13 NOTE — PROGRESS NOTES
[facility]  Neurology Progress Note    Patient Name: Teto Mendez  MRN: [id number]  : 1955  TODAY'S DATE: 2023  ADMIT DATE: 2023  6:39 AM                                          CONSULTED PROVIDER: Meredith Dugan MD, Neurologist. On-call Phone: [phone]  CONSULT REQUESTED BY: Neo Clark MD     Chief Complaint   Patient presents with    FACIAL TWITCHING    SLURRED SPEECH       HPI per EMR:  Patient presents with history of glioblastoma concurrently getting chemo in previous radiation complaining of facial twitching, facial droop, slurred speech, left arm weakness.  Symptom onset at 3:30 a.m..  Patient states at 3:30 a.m. he was playing on a game and then began to have the twitching of the face with associated droop with slurred speech and arm weakness.  Patient states he has had similar episodes in the past secondary to the glioblastoma.  He has not had the left arm weakness.  Patient states he is on Keppra and did take an extra Keppra dose this morning additionally patient is on Xarelto.  At the worst symptoms are severe.    Neurology Consult:  Patient was seen and examined by me today. He is a 67-year-old man with history of glioblastoma multiforme s/p craniectomy, chemotherapy and radiation diagnosed in 2022, as well as seizure disorder, who presented to the ED with left facial twitching, left facial droop, slurred speech and left arm weakness that began the morning of his admission at around 3 am. He was recently decreased on his home Keppra in the setting of side effects as per daughter. Upon arrival to the ED, he was loaded with 1 gr of Keppra and given 2 mg of ativan IV, which seemed to break the seizure. As per ED physician, patient was awake, alert and able to answer questions. At around 11:50 am, I was contacted by ED provider in the setting of sudden worsening of mental status, unresponsiveness, weakness and aphasia, so he was taken to CT scan for  repeat CT brain to rule out LVO stroke or hemorrhage. I evaluated patient while on CT, and noticed O2 Sats were in the high 80s. Due to technical malfunction, CT could not be completed, at which point I decided to abort procedure and transport patient to the ICU immediately. When we arrived to the 3rd floor ICU, patient seemed to be on respiratory failure, with toe and fingertip cyanosis, unable to maintain O2 Sats, so started on manual assisted ventilation and called anesthesia for emergent intubation. Patient was successfully intubated and sedation with propofol was started, but facial twitching recurred, so ativan was given and propofol bolus was administered, after which seizure activity seemed to cease. He was taken for CT after stabilization which showed no evidence of acute stroke or intracranial hemorrhage, and CTA showed no LVO. He is now being monitored on 24H continuous EEG for management of status epilepticus.    5/12/23:  Patient was seen and examined by me today.  He remains on deep sedation for treatment of status epilepticus, being monitored on 24 hour video EEG.  A brief electroclinical seizure was seen at around 4:00 a.m. with left facial twitching, but no clinical events reported after this.  Plan is to continue deep sedation until at least tomorrow morning.  Patient to be hooked up to EEG again when propofol starts to being weaned down.    5/13/23: I, Dr James assumed care on 5/13.  Patient is intubated, sedated with propofol.  He started out continuous EEG this morning shows right hemisphere lateralized periodic discharges with epileptiform potential.  No electrographic seizures were recorded.    Scheduled Meds:   allopurinoL  100 mg Per NG tube Daily    chlorhexidine  15 mL Mouth/Throat BID    dexAMETHasone  1 mg Per NG tube Q12H    levetiracetam IV  1,500 mg Intravenous Q12H    metoprolol tartrate  100 mg Per NG tube BID    mupirocin   Nasal BID    pantoprazole  40 mg Intravenous Daily     rivaroxaban  20 mg Per NG tube Daily with dinner    timolol maleate 0.25%  1 drop Both Eyes BID    valproate sodium (DEPACON) IVPB  500 mg Intravenous Q8H     Continuous Infusions:   propofoL 60 mcg/kg/min (05/13/23 0933)     PRN Meds:.acetaminophen, colchicine, hydrALAZINE, lorazepam, metoprolol, ondansetron, polyethylene glycol, sodium chloride 0.9%      Physical Exam  Current Vitals:  Vitals:    05/13/23 0934   BP: 124/75   Pulse: 69   Resp:    Temp:      General appearance: intubated, unresponsive  Cardiovascular: Afib  Pulmonary: on mechanical ventilation  GI: soft  MSK: normal passive ROM  Skin: normal color, no lesions    NEUROLOGICAL EXAM:    Mental status: unresponsive, sedated, intubated          Intubated: yes          Sedated: yes  Response to noxious stimulation: no, patient on deep sedation  Breathes above ventilator: yes  Opens eyes: no  Pupils: symmetric          Left: reactive          Right: reactive  Eye movements: No pathologic movements such as nystagmus, ocular bobbing, dipping or roving. No facial twitching seen.  Corneal reflex: present  Oculocephalic reflex:  Absent  Cough:  Weak  Gag:  Weak  Motor exam: Normal muscle tone. Normal muscle bulk. No abnormal movements such as tremors, myoclonus or twitching.  No response to painful stimuli, but patient is on deep sedation  DTRs: 1+ throughout. Babinski response absent.      Laboratory Data & Studies    Recent Labs   Lab 05/11/23 0657 05/12/23 0357 05/13/23 0422   WBC 8.54 7.76 6.37   HGB 16.2 15.9 15.7    191 180   MCV 92 89 87       Recent Labs   Lab 05/11/23 0657 05/12/23 0357 05/13/23 0422    142 140   K 3.5 3.3* 3.1*    109 108   CO2 27 22* 23   BUN 23 19 22   * 97 123*   CALCIUM 9.0 8.8 8.7   MG  --   --  2.1       Recent Labs   Lab 05/11/23 0657 05/12/23 0357 05/13/23 0422   PROT 6.8 6.1 5.4*   ALBUMIN 4.1 3.5 2.9*   BILITOT 1.1* 1.2* 0.8   AST 39 69* 35   ALT 43 107* 64*   ALKPHOS 52* 47* 41*        Recent Labs   Lab 05/11/23  0657   INR 1.3*       Recent Labs   Lab 05/11/23  0657   CHOL 248*   TRIG 92   LDLCALC 165.6*   HDL 64   TSH 3.900         Microbiology:  Microbiology Results (last 7 days)       Procedure Component Value Units Date/Time    Culture, Respiratory with Gram Stain [[id number]]     Order Status: Canceled Specimen: Respiratory               Imaging:  CT Head Without Contrast    Result Date: 5/11/2023  CMS MANDATED QUALITY DATA - CT RADIATION  436 All CT scans at this facility utilize dose modulation, iterative reconstruction, and/or weight based dosing when appropriate to reduce radiation dose to as low as reasonably achievable. CT HEAD WITHOUT IV CONTRAST CLINICAL HISTORY: 67 years Male Mental status change, unknown cause COMPARISON: Noncontrast CT head performed earlier today 6:56 AM FINDINGS: Negative for acute intracranial hemorrhage, midline shift, or mass effect. Parenchymal calcifications within the right cerebral hemisphere are stable compared to prior. Patient is status post right pterional craniotomy. Ventricles and sulci are normal in size. Gray-white differentiation is maintained. Cerebellar hemispheres and brainstem are unremarkable. No calvarial lesion or fracture. Mastoid air cells are clear. IMPRESSION: Stable exam. No CT evidence of acute intracranial pathology. Electronically signed by:  Matt Cole MD  5/11/2023 1:41 PM CDT Workstation: [id number]    X-Ray Chest AP Portable    Result Date: 5/11/2023  CLINICAL HISTORY: 67 years (1955) Male Stroke TECHNIQUE: Portable AP radiograph the chest. COMPARISON: None available. FINDINGS: Nonspecific minimal faint interstitial opacity at the left costophrenic.  No pneumothorax is identified. The heart is mildly enlarged.  Osseous structures show degenerative changes in the spine. The visualized upper abdomen is unremarkable. IMPRESSION: Nonspecific minimal faint interstitial opacity at the left costophrenic sulcus possibly  representing atelectasis, scarring, trace pleural fluid, and less likely aspiration or pneumonia. . Electronically signed by:  Solomon Tobias MD  5/11/2023 8:32 AM CDT Workstation: [id number]    CT HEAD FOR STROKE    Result Date: 5/11/2023  CMS MANDATED QUALITY DATA - CT RADIATION - 436 All CT scans at this facility utilize dose modulation, iterative reconstruction, and/or weight based dosing when appropriate to reduce radiation dose to as low as reasonably achievable. EXAMINATION: CT HEAD FOR STROKE CLINICAL HISTORY: Neuro deficit, acute, stroke suspected;  history of glioblastoma TECHNIQUE: CT without IV contrast COMPARISON: None FINDINGS: There are postsurgical changes from prior right frontal temporal These craniotomy.  The ventricles and sulci are mildly prominent compatible with generalized cerebral atrophy.  There is no hemorrhage, mass or midline shift.  There are no extra-axial fluid collections.  The cerebellum and brainstem are normal.  The gray-white differentiation is maintained.  The orbits are unremarkable. The paranasal sinuses and mastoid air cells are clear.     Mild cerebral atrophy with no acute intracranial process Prior right frontal temporal craniotomy Findings were called to Dr. Zhou in the emergency department at 07:09 a.m. Electronically signed by: Yi Hernandez MD Date:    05/11/2023 Time:    07:10    CTA Head and Neck (xpd)    Result Date: 5/11/2023  CMS MANDATED QUALITY DATA - CT RADIATION - 436 All CT scans at this facility utilize dose modulation, iterative reconstruction, and/or weight based dosing when appropriate to reduce radiation dose to as low as reasonably achievable. CMS MANDATED QUALITY DATA - CAROTID - 195 All measurements and percent stenosis described below were determined using NASCET criteria or criteria similar to NASCET, as defined by the Society of Radiologists in Ultrasound Consensus Conference, Radiology, 2003 Reason: Stroke/TIA, determine embolic source  Technique: CT angiography of brain and neck with 100 mL Omnipaque 350.  Maximum intensity projection coronal reformations were obtained at a separate workstation and stored in the patient's permanent medical record. COMPARISON: 5/11/2023 CTA BRAIN: VASCULAR FINDINGS: Major intracranial arteries are widely patent with no stenosis, intraluminal filling, or dissection. Minimal atherosclerotic plaque affects the cavernous segments of bilateral internal carotid arteries. Negative for aneurysm or evidence of vasculitis. Opacified visualized dural venous sinuses are unremarkable. NONVASCULAR FINDINGS: No abnormal intra-axial or extra-axial enhancement. No midline shift. Postsurgical changes of right pterional craniotomy are noted. 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 Minimal atherosclerotic plaque affects left carotid bulb. Left carotid arteries are otherwise without plaque or stenosis. Left vertebral artery is patent. Minimal atherosclerotic plaque affects the right carotid bulb. Right carotid arteries are without additional plaque and remain widely patent. Right vertebral artery is patent. NONVASCULAR FINDINGS: Endotracheal tube has tip proximal to mio. Visualized lung apices show minor dependent atelectasis. Cervical soft tissues are unremarkable. IMPRESSION: 1. Trace atherosclerotic plaque in intracranial internal carotid arteries, without other abnormality of the major intracranial arteries. 2. Trace atherosclerotic plaque in bilateral carotid bulbs, with no stenosis or other abnormality of cervical ICAs or vertebral arteries. Electronically signed by:  Compa Lopez MD  5/11/2023 2:01 PM CDT Workstation: [id number]    CTA Head and Neck (xpd)    Result Date: 5/11/2023  CMS EXAMINATION: CTA HEAD AND NECK (XPD) CLINICAL INDICATION: Male, 67 years old. Stroke/TIA, determine embolic source history of glioblastoma TECHNIQUE:  Axial CT angiogram of the head and neck was performed with contrast. Multiplanar reformations as well as 3-D volume rendered imaging were reviewed at the workstation. Degree of stenosis was measured utilizing the NASCET method. CONTRAST: 100 mL mL of Omnipaque 350 IV. COMPARISON: None FINDINGS: CTA brain: The distal vertebral arteries, basilar artery and cavernous portions of the internal carotid arteries are widely patent. The anterior cerebral arteries, middle cerebral arteries and posterior cerebral arteries are widely patent without evidence of large vessel occlusion, significant stenosis, AVM or aneurysm. The dural venous sinuses are patent. There is no pathologic enhancement. Aortic Arch and Great Vessel Origins: 2 vessel aortic arch which is widely patent Subclavian Arteries: Widely patent. Right Common Carotid Artery: Widely patent. Right Internal Carotid Artery: Widely patent. Right External Carotid Artery: Widely patent. Left Common Carotid Artery: Widely patent. Left Internal Carotid Artery: Widely patent. Left External Carotid Artery: Widely patent. Right Vertebral Artery: Widely patent. Left Vertebral Artery: Widely patent. The paraspinous soft tissues are unremarkable. There are mild degenerative changes of the cervical spine. The lung apices are clear. IMPRESSION: Negative CTA of the head and neck. This exam was performed according to our departmental dose-optimization program which includes automated exposure control, adjustment of the mA and/or kV according to patient size and/or use of iterative reconstruction technique. Electronically signed by:  Yi Hernandez MD  5/11/2023 7:43 AM CDT Workstation: [id number]        Assessment and Plan:    Status Epilepticus  History of Glioblastoma Multiforme s/p craniectomy, chemo and radiotherapy  67-year-old man with history of glioblastoma multiforme s/p craniectomy, chemotherapy and radiation diagnosed in August 2022, as well as seizure disorder, who  presented to the ED with left facial twitching, left facial droop, slurred speech and left arm weakness. Upon arrival to the ED, he was loaded with 1 gr of Keppra and given 2 mg of ativan IV, which seemed to break the seizure. However, he experienced sudden worsening of mental status, with unresponsiveness, weakness and aphasia, so he was taken to CT scan for repeat CT brain to rule out LVO stroke or hemorrhage, but CT had a malfunction and patient was taken to the ICU, where he was intubated, sedated and placed on mechanical ventilation. Clinical picture more consistent with status epilepticus. Ordered brain imaging to better assess status of GBM and rule out stroke.    - Admitted to hospital medicine in the ICU with q2 hour neuro checks, on telemetry, continuous pulse oximetry  - Seizure precautions while inpatient  - Underwent 24-hour VEEG continuous monitoring with evidence of 4 electrographic seizures which resolved when dose of propofol was increased to 60 mcg/kg/min. By the end of the recording, a burst suppression pattern with decreased LPEDs was seen.   -currently on propofol at 60 mcg/kg per minute and being weaned while being on continuous EEG monitoring  - Received loading dose of Keppra 2.5 gr IV. Increased Keppra to 1500 mg BID IV  - Received load of Depacon 1500 mg IV. Continue Depacon 500 mg TID IV  - Ordered valproic acid levels in serum   - Ordered MRI brain with and without contrast to assess GBM and rule out stroke  - Continue with Xarelto 20 mg daily  - Avoid seizure threshold lowering medications such as:  Bupropion, diphenhydramine, tramadol, certain antibiotics  - patient will need follow-up as outpatient with Neurology for long-term EEG monitoring to better characterize epileptiform abnormalities  - Maintain Euthermia with Tylenol prn temp > 37.2 degrees C.  - Assessment for rehab with PT/OT/SLP evaluation and treatment.  - workup and treatment of metabolic and infectious abnormalities as  per primary team  - Will follow along      DVT prophylaxis with chemo/SCD prophylaxis      Patient to follow up with [facility] at [phone] within 3 days from discharge.        All questions were answered.                              Thank you kindly for including us in the care of this patient. Please do not hesitate to contact us with any questions.       Critical Care:  58 minutes of critical care time has been spent evaluating with the patient. Time includes chart review not limited to diagnostic imaging, labs, and vitals, patient assessment, discussion with family and nursing, current order evaluations, and new order entries.      Mukund James MD  Neurology/Vascular Neurology

## 2023-05-13 NOTE — ASSESSMENT & PLAN NOTE
Patient with Hypercapnic and Hypoxic Respiratory failure which is Acute.  he is not on home oxygen. Supplemental oxygen was provided and noted- Vent Mode: A/C  Oxygen Concentration (%):  [30-40] 30  Resp Rate Total:  [16 br/min-21 br/min] 17 br/min  Vt Set:  [500 mL] 500 mL  PEEP/CPAP:  [5 cmH20] 5 cmH20  Mean Airway Pressure:  [8.6 cmH20-10 cmH20] 9.2 cmH20    .   Signs/symptoms of respiratory failure include- lethargy. Contributing diagnoses includes - decreased level of consciousness. Labs and images were reviewed. Patient Has recent ABG, which has been reviewed. Will treat underlying causes and adjust management of respiratory failure as follows-  Continue vent support.

## 2023-05-13 NOTE — SUBJECTIVE & OBJECTIVE
Interval History:  continues with seizure activity.  Remains on ventilator.  Remains on IV diltiazem for atrial fibrillation.    Review of Systems   Unable to perform ROS: Intubated   Objective:     Vital Signs (Most Recent):  Temp: 97.7 °F (36.5 °C) (05/12/23 1515)  Pulse: 63 (05/12/23 2011)  Resp: 19 (05/12/23 2011)  BP: (!) 141/94 (05/12/23 1047)  SpO2: 98 % (05/12/23 2011) Vital Signs (24h Range):  Temp:  [97.5 °F (36.4 °C)-98.4 °F (36.9 °C)] 97.7 °F (36.5 °C)  Pulse:  [] 63  Resp:  [16-21] 19  SpO2:  [96 %-100 %] 98 %  BP: (141)/(94) 141/94  Arterial Line BP: (109-171)/() 124/73     Weight: 84 kg (185 lb 3 oz)  Body mass index is 27.35 kg/m².    Intake/Output Summary (Last 24 hours) at 5/12/2023 2048  Last data filed at 5/12/2023 1813  Gross per 24 hour   Intake 1095.43 ml   Output 1650 ml   Net -554.57 ml         Physical Exam  Constitutional:       Interventions: He is sedated and intubated.      Comments: Connected to EEG machine   Eyes:      Conjunctiva/sclera:      Right eye: Right conjunctiva is not injected. No exudate.     Left eye: Left conjunctiva is not injected. No exudate.  Neck:      Vascular: No JVD.   Cardiovascular:      Rate and Rhythm: Tachycardia present. Rhythm irregular.   Pulmonary:      Effort: He is intubated.      Breath sounds: Normal breath sounds.   Abdominal:      General: Abdomen is flat. There is no distension.      Palpations: Abdomen is soft.   Genitourinary:     Comments: Iraheta with yellow urine  Musculoskeletal:      Right lower leg: No edema.      Left lower leg: No edema.   Skin:     General: Skin is warm and dry.           Significant Labs: All pertinent labs within the past 24 hours have been reviewed.    Significant Imaging: I have reviewed all pertinent imaging results/findings within the past 24 hours.

## 2023-05-13 NOTE — CARE UPDATE
05/12/23 2011   Patient Assessment/Suction   Level of Consciousness (AVPU) unresponsive   Respiratory Effort Unlabored   Expansion/Accessory Muscles/Retractions no use of accessory muscles   All Lung Fields Breath Sounds clear   Rhythm/Pattern, Respiratory assisted mechanically   Cough Frequency with stimulation   Cough Type assisted   Suction Method oral;tracheal   Suction Pressure (mmHg) -120 mmHg   $ Suction Charges Inline Suction Procedure Stat Charge   PRE-TX-O2   Device (Oxygen Therapy) ventilator   Oxygen Concentration (%) 30   SpO2 98 %   Pulse Oximetry Type Continuous   $ Pulse Oximetry - Multiple Charge Pulse Oximetry - Multiple   Pulse 63   Resp 19      Airway Anesthesia 05/11/23   Placement Date/Time: 05/11/23 (c) 7788   Method of Intubation: Video Laryngoscopy  Airway Device: Endotracheal Tube  Mask Ventilation: Moderately difficult with oral airway  Intubated: Other (see comments)  Blade: Glidescope #3  Airway Device Size: 8....   Secured at 25 cm   Measured At Lips   Secured Location Left   Secured by Commercial tube barboza   Bite Block none   Site Condition Cool;Dry   Status Intact;Secured;Patent   Site Assessment Clean;Dry   Vent Select   Conventional Vent Y   Charged w/in last 24h YES   Preset Conventional Ventilator Settings   Vent ID [id number]   Vent Type    Ventilation Type VC   Vent Mode A/C   Humidity HME   Set Rate 12 BPM   Vt Set 500 mL   PEEP/CPAP 5 cmH20   Peak Flow 65 L/min   Peak End Inspiratory Pressure 20 cmH20   I-Trigger Type  V-TRIG   Trigger Sensitivity Flow/I-Trigger 3 L/min   Patient Ventilator Parameters   Resp Rate Total 17 br/min   Peak Airway Pressure 20 cmH20   Mean Airway Pressure 9.2 cmH20   Plateau Pressure 0 cmH20   Exhaled Vt 528 mL   Total Ve 9.2 L/m   I:E Ratio Measured 1:3.90   Auto PEEP 0 cmH20   Conventional Ventilator Alarms   Alarms On Y   Ve High Alarm 23 L/min   Ve Low Alarm 3 L/min   Vt High Alarm 1200 mL   Vt Low Alarm 200 mL   Resp Rate High Alarm 45 br/min    Press High Alarm 60 cmH2O   Apnea Rate 10   Apnea Volume (mL) 1 mL   Apnea Oxygen Concentration  100   Apnea Flow Rate (L/min) 44   T Apnea 20 sec(s)   Ready to Wean/Extubation Screen   FIO2<=50 (chart decimal) 0.3   MV<16L (chart vol.) 9.2   PEEP <=8 (chart #) 5   Ready to Wean Parameters   F/VT Ratio<105 (RSBI) (!) 35.98   Vital Capacity   Vital Capacity (mL) 0

## 2023-05-13 NOTE — ASSESSMENT & PLAN NOTE
Chronic, controlled.  Latest blood pressure and vitals reviewed-   Temp:  [97.5 °F (36.4 °C)-98.4 °F (36.9 °C)]   Pulse:  []   Resp:  [16-21]   BP: (141)/(94)   SpO2:  [96 %-100 %]   Arterial Line BP: (109-171)/() .   Home meds for hypertension were reviewed and noted below.   Hypertension Medications             amLODIPine (NORVASC) 10 MG tablet Take 10 mg by mouth once daily.    furosemide (LASIX) 20 MG tablet Take 20 mg by mouth once daily.    hydrALAZINE (APRESOLINE) 25 MG tablet Take 25 mg by mouth 3 (three) times daily.    metoprolol succinate (TOPROL-XL) 100 MG 24 hr tablet Take 100 mg by mouth once daily.          While in the hospital, will manage blood pressure as follows; Continue home antihypertensive regimen except for hydralazine.    Will utilize p.r.n. blood pressure medication only if patient's blood pressure greater than  180/110 and he develops symptoms such as worsening chest pain or shortness of breath.

## 2023-05-13 NOTE — PROCEDURES
VEEG 24-HOUR MONITORING REPORT    NAME: Teto Mendez  : 1955  MRN: [id number]    DATE of EE23 and 23  Start date and time: 23 at 1616  End date and time: 23 at 1613    CLINICAL INDICATION: This is a 67 y.o. male with history of glioblastoma multiforme s/p craniotomy, chemotherapy and radiotherapy, as well as seizure disorder being evaluated for status epilepticus.    MEDICATIONS:  Scheduled Meds:   allopurinoL  100 mg Oral Daily    amLODIPine  10 mg Oral Daily    chlorhexidine  15 mL Mouth/Throat BID    colchicine  0.6 mg Oral Daily    dexAMETHasone  1 mg Oral Q12H    diltiaZEM  60 mg Oral Q8H    furosemide  20 mg Per NG tube Daily    levetiracetam IV  1,500 mg Intravenous Q12H    meclizine  25 mg Oral TID    metoprolol succinate  100 mg Oral Daily    mupirocin   Nasal BID    pantoprazole  40 mg Oral Before breakfast    polyethylene glycol  17 g Oral Daily    rivaroxaban  20 mg Oral Daily with dinner    timolol maleate 0.25%  1 drop Both Eyes BID    valproate sodium (DEPACON) IVPB  500 mg Intravenous Q8H     Continuous Infusions:   dextrose 5 % and 0.45 % NaCl 100 mL/hr at 23 1800    dilTIAZem 5 mg/hr (23 0809)    propofoL 60 mcg/kg/min (23 1759)     PRN Meds:.acetaminophen, hydrALAZINE, HYDROcodone-acetaminophen, lorazepam, magnesium oxide, magnesium oxide, metoprolol, ondansetron, potassium bicarbonate, potassium bicarbonate, potassium bicarbonate, sodium chloride 0.9%    EEG DESCRIPTION:  A 16-channel EEG was performed with electrode placement in 10/20 International System. Longitudinal bipolar and referential montages were utilized in analysis. Total duration of this study was 23 hours and 56 minutes.    This is a technically adequate study with minor muscle and motion artifacts.    At the beginning of the recording, a PDR of 6-7 Hz is seen interspersed with slower theta and delta frequencies.  There are lateralized periodic epileptiform discharges (LPEDs)  originating from the right frontotemporal region.  About an hour into the recording, a burst suppression pattern emerges with persistence of lateralized periodic epileptiform discharges but no electrodes clinical seizures. This pattern continues for several hours, with decreased amplitude of LPEDs. At around 2216, a 3-second electrographic focal right frontotemporal seizure is seen, with no clinical correlate. At around 0002, a 4-second electrographic focal right temporal seizure is seen. Then at 0354, there was a 1-minute electroclinical seizure with twitching of the face noticed by nursing, and at this point, he received Ativan 2 mg IV STAT. 2 more electrographic events lasting less than 30 seconds are seen a few minutes later, after which there is decreased amplitude of LPEDs and re-emergence of burst suppression pattern. At 1100 there was a 15-second electrographic seizure with no clinical correlate. Propofol dose increased to 60 mcg/kg/min, after which no further seizures are seen until the end of the recording, when there are periods of almost complete suppression alternating with short bursts of activity. By the end of the recording as well, LPEDs become less frequent and have lower amplitude.     Impression:  This is an abnormal 24-hour VEEG with evidence of continuous lateralized periodic discharges (LPEDs) originating from right frontotemporal leads, improved when dose of propofol increased, and indicative of focal cortical dysfunction with epileptogenic potential. A total of 4 brief electrographic seizures were seen for the duration of the study. After further increasing dose of propofol, there was significant improvement of the LPEDs and no further electrographic seizures, also with improved burst-suppression pattern which was maintained until the end of the recording. Clinical correlation is advised.      Meredith Dugan MD  Neurology

## 2023-05-13 NOTE — ASSESSMENT & PLAN NOTE
In setting of glioblastoma.  -Continue Depacon and Keppra  -Continue Diprivan infusion  -Neurology following  -Continuous EEG  -Continue intubation with mechanical ventilation  -MRI brain

## 2023-05-13 NOTE — NURSING
MRI coordination attempted today. At 16:15 spoke to technician and will attempt to collaborate with RT, primary RN, and ensure that EEG leads are removed for AM. Support staff not available to complete the MRI and allow for support of pt's needs at this time. Discussed the importance of the test and that it has been pending since yesterday. Informed technician that pt will need sedation in order to obtain the most accurate scan.

## 2023-05-13 NOTE — RESPIRATORY THERAPY
05/13/23 0717   Patient Assessment/Suction   Level of Consciousness (AVPU) unresponsive   Respiratory Effort Normal;Unlabored   Expansion/Accessory Muscles/Retractions no retractions;no use of accessory muscles   All Lung Fields Breath Sounds Anterior:;Posterior:;Lateral:;clear;equal bilaterally   Rhythm/Pattern, Respiratory assisted mechanically   Cough Frequency no cough   Suction Method oral   Suction Pressure (mmHg) -120 mmHg   Skin Integrity   $ Wound Care Tech Time 15 min   Area Observed Corner lip;Lower lip;Cheek   Skin Appearance without discoloration   PRE-TX-O2   Device (Oxygen Therapy) ventilator   $ Is the patient on Low Flow Oxygen? Yes   Oxygen Concentration (%) 30   SpO2 98 %   Pulse Oximetry Type Continuous   $ Pulse Oximetry - Multiple Charge Pulse Oximetry - Multiple   Pulse 66   Resp 17      Airway Anesthesia 05/11/23   Placement Date/Time: 05/11/23 (c) 2346   Method of Intubation: Video Laryngoscopy  Airway Device: Endotracheal Tube  Mask Ventilation: Moderately difficult with oral airway  Intubated: Other (see comments)  Blade: Glidescope #3  Airway Device Size: 8....   Secured at 23 cm   Measured At Teeth   Secured by Commercial tube barboza   Bite Block none   Site Condition Cool;Dry   Status Intact;Secured;Patent   Site Assessment Clean;Dry   Airway Safety   Is Ambu Bag and Mask with Patient? Yes, Adult Ambu Bag and Mask   O2  at bedside? Yes   Vent Select   Conventional Vent Y   $ Ventilator Subsequent 1   Charged w/in last 24h YES   Preset Conventional Ventilator Settings   Vent ID [id number]   Vent Type    Ventilation Type VC   Vent Mode A/C   Set Rate 12 BPM   Vt Set 500 mL   PEEP/CPAP 5 cmH20   Peak Flow 65 L/min   Peak End Inspiratory Pressure 20 cmH20   I-Trigger Type  V-TRIG   Trigger Sensitivity Flow/I-Trigger 3 L/min   Patient Ventilator Parameters   Resp Rate Total 18 br/min   Peak Airway Pressure 21 cmH20   Mean Airway Pressure 9.3 cmH20   Plateau Pressure 19 cmH20   Exhaled  Vt 523 mL   Total Ve 9.23 L/m   I:E Ratio Measured 1:3.10   Auto PEEP 0 cmH20   Tubing ID (mm) 8 mm   Tube Type ET   Conventional Ventilator Alarms   Alarms On Y   Resp Rate High Alarm 45 br/min   Press High Alarm 60 cmH2O   Apnea Rate 12   Apnea Volume (mL) 1 mL   Apnea Oxygen Concentration  100   Apnea Flow Rate (L/min) 65   T Apnea 20 sec(s)   IHI Ventilator Associated Pneumonia Bundle (Required)   Daily Awakening Trials Performed No   Head of Bed Elevated  HOB 30   Oral Care Mouth suctioned   Vent Circut Breaks Minimized Yes   Ready to Wean/Extubation Screen   FIO2<=50 (chart decimal) 0.3   MV<16L (chart vol.) 9.23   PEEP <=8 (chart #) 5   Ready to Wean Parameters   F/VT Ratio<105 (RSBI) (!) 32.5   Vital Capacity   Vital Capacity (mL) 0

## 2023-05-13 NOTE — PROGRESS NOTES
[facility]  Neurology Progress Note    Patient Name: Teto Mendez  MRN: [id number]  : 1955  TODAY'S DATE: 2023  ADMIT DATE: 2023  6:39 AM                                          CONSULTED PROVIDER: Meredith Dugan MD, Neurologist. On-call Phone: [phone]  CONSULT REQUESTED BY: Trav Conde MD     Chief Complaint   Patient presents with    FACIAL TWITCHING    SLURRED SPEECH       HPI per EMR:  Patient presents with history of glioblastoma concurrently getting chemo in previous radiation complaining of facial twitching, facial droop, slurred speech, left arm weakness.  Symptom onset at 3:30 a.m..  Patient states at 3:30 a.m. he was playing on a game and then began to have the twitching of the face with associated droop with slurred speech and arm weakness.  Patient states he has had similar episodes in the past secondary to the glioblastoma.  He has not had the left arm weakness.  Patient states he is on Keppra and did take an extra Keppra dose this morning additionally patient is on Xarelto.  At the worst symptoms are severe.    Neurology Consult:  Patient was seen and examined by me today. He is a 67-year-old man with history of glioblastoma multiforme s/p craniectomy, chemotherapy and radiation diagnosed in 2022, as well as seizure disorder, who presented to the ED with left facial twitching, left facial droop, slurred speech and left arm weakness that began the morning of his admission at around 3 am. He was recently decreased on his home Keppra in the setting of side effects as per daughter. Upon arrival to the ED, he was loaded with 1 gr of Keppra and given 2 mg of ativan IV, which seemed to break the seizure. As per ED physician, patient was awake, alert and able to answer questions. At around 11:50 am, I was contacted by ED provider in the setting of sudden worsening of mental status, unresponsiveness, weakness and aphasia, so he was taken to CT scan for  repeat CT brain to rule out LVO stroke or hemorrhage. I evaluated patient while on CT, and noticed O2 Sats were in the high 80s. Due to technical malfunction, CT could not be completed, at which point I decided to abort procedure and transport patient to the ICU immediately. When we arrived to the 3rd floor ICU, patient seemed to be on respiratory failure, with toe and fingertip cyanosis, unable to maintain O2 Sats, so started on manual assisted ventilation and called anesthesia for emergent intubation. Patient was successfully intubated and sedation with propofol was started, but facial twitching recurred, so ativan was given and propofol bolus was administered, after which seizure activity seemed to cease. He was taken for CT after stabilization which showed no evidence of acute stroke or intracranial hemorrhage, and CTA showed no LVO. He is now being monitored on 24H continuous EEG for management of status epilepticus.    5/12/23:  Patient was seen and examined by me today.  He remains on deep sedation for treatment of status epilepticus, being monitored on 24 hour video EEG.  A brief electroclinical seizure was seen at around 4:00 a.m. with left facial twitching, but no clinical events reported after this.  Plan is to continue deep sedation until at least tomorrow morning.  Patient to be hooked up to EEG again when propofol starts to being weaned down.    Scheduled Meds:   allopurinoL  100 mg Oral Daily    amLODIPine  10 mg Oral Daily    chlorhexidine  15 mL Mouth/Throat BID    colchicine  0.6 mg Oral Daily    dexAMETHasone  1 mg Oral Q12H    diltiaZEM  60 mg Oral Q8H    furosemide  20 mg Per NG tube Daily    levetiracetam IV  1,500 mg Intravenous Q12H    meclizine  25 mg Oral TID    metoprolol succinate  100 mg Oral Daily    mupirocin   Nasal BID    pantoprazole  40 mg Oral Before breakfast    polyethylene glycol  17 g Oral Daily    rivaroxaban  20 mg Oral Daily with dinner    timolol maleate 0.25%  1 drop Both  Eyes BID    valproate sodium (DEPACON) IVPB  500 mg Intravenous Q8H     Continuous Infusions:   dextrose 5 % and 0.45 % NaCl 100 mL/hr at 05/12/23 1800    dilTIAZem 5 mg/hr (05/12/23 0809)    propofoL 60 mcg/kg/min (05/12/23 1759)     PRN Meds:.acetaminophen, hydrALAZINE, HYDROcodone-acetaminophen, lorazepam, magnesium oxide, magnesium oxide, metoprolol, ondansetron, potassium bicarbonate, potassium bicarbonate, potassium bicarbonate, sodium chloride 0.9%      Physical Exam  Current Vitals:  Vitals:    05/12/23 2011   BP:    Pulse: 63   Resp: 19   Temp:      General appearance: intubated, unresponsive  Cardiovascular: Afib with RVR  Pulmonary: on mechanical ventilation  GI: soft  MSK: normal passive ROM  Skin: normal color, no lesions    NEUROLOGICAL EXAM:    Mental status: unresponsive, sedated, intubated          Intubated: yes          Sedated: yes  Response to noxious stimulation: no, patient on deep sedation  Breathes above ventilator: yes  Opens eyes: no  Pupils: symmetric          Left: reactive          Right: reactive  Eye movements: No pathologic movements such as nystagmus, ocular bobbing, dipping or roving. No facial twitching seen.  Corneal reflex: present  Oculocephalic reflex: present  Cough: absent  Gag: absent  Motor exam: Normal muscle tone. Normal muscle bulk. No abnormal movements such as tremors, myoclonus or twitching.  No response to painful stimuli, but patient is on deep sedation  DTRs: 2+ throughout. Babinski response absent.      Laboratory Data & Studies    Recent Labs   Lab 05/11/23 0657 05/12/23 0357   WBC 8.54 7.76   HGB 16.2 15.9    191   MCV 92 89         Recent Labs   Lab 05/11/23 0657 05/12/23 0357    142   K 3.5 3.3*    109   CO2 27 22*   BUN 23 19   * 97   CALCIUM 9.0 8.8         Recent Labs   Lab 05/11/23 0657 05/12/23 0357   PROT 6.8 6.1   ALBUMIN 4.1 3.5   BILITOT 1.1* 1.2*   AST 39 69*   ALT 43 107*   ALKPHOS 52* 47*         Recent Labs   Lab  05/11/23  0657   INR 1.3*         Recent Labs   Lab 05/11/23  0657   CHOL 248*   TRIG 92   LDLCALC 165.6*   HDL 64   TSH 3.900           Microbiology:  Microbiology Results (last 7 days)       Procedure Component Value Units Date/Time    Culture, Respiratory with Gram Stain [[id number]]     Order Status: No result Specimen: Respiratory               Imaging:  CT Head Without Contrast    Result Date: 5/11/2023  CMS MANDATED QUALITY DATA - CT RADIATION  436 All CT scans at this facility utilize dose modulation, iterative reconstruction, and/or weight based dosing when appropriate to reduce radiation dose to as low as reasonably achievable. CT HEAD WITHOUT IV CONTRAST CLINICAL HISTORY: 67 years Male Mental status change, unknown cause COMPARISON: Noncontrast CT head performed earlier today 6:56 AM FINDINGS: Negative for acute intracranial hemorrhage, midline shift, or mass effect. Parenchymal calcifications within the right cerebral hemisphere are stable compared to prior. Patient is status post right pterional craniotomy. Ventricles and sulci are normal in size. Gray-white differentiation is maintained. Cerebellar hemispheres and brainstem are unremarkable. No calvarial lesion or fracture. Mastoid air cells are clear. IMPRESSION: Stable exam. No CT evidence of acute intracranial pathology. Electronically signed by:  Matt Cole MD  5/11/2023 1:41 PM CDT Workstation: [id number]    X-Ray Chest AP Portable    Result Date: 5/11/2023  CLINICAL HISTORY: 67 years (1955) Male Stroke TECHNIQUE: Portable AP radiograph the chest. COMPARISON: None available. FINDINGS: Nonspecific minimal faint interstitial opacity at the left costophrenic.  No pneumothorax is identified. The heart is mildly enlarged.  Osseous structures show degenerative changes in the spine. The visualized upper abdomen is unremarkable. IMPRESSION: Nonspecific minimal faint interstitial opacity at the left costophrenic sulcus possibly representing  atelectasis, scarring, trace pleural fluid, and less likely aspiration or pneumonia. . Electronically signed by:  Solomon Tobias MD  5/11/2023 8:32 AM CDT Workstation: [id number]    CT HEAD FOR STROKE    Result Date: 5/11/2023  CMS MANDATED QUALITY DATA - CT RADIATION - 436 All CT scans at this facility utilize dose modulation, iterative reconstruction, and/or weight based dosing when appropriate to reduce radiation dose to as low as reasonably achievable. EXAMINATION: CT HEAD FOR STROKE CLINICAL HISTORY: Neuro deficit, acute, stroke suspected;  history of glioblastoma TECHNIQUE: CT without IV contrast COMPARISON: None FINDINGS: There are postsurgical changes from prior right frontal temporal These craniotomy.  The ventricles and sulci are mildly prominent compatible with generalized cerebral atrophy.  There is no hemorrhage, mass or midline shift.  There are no extra-axial fluid collections.  The cerebellum and brainstem are normal.  The gray-white differentiation is maintained.  The orbits are unremarkable. The paranasal sinuses and mastoid air cells are clear.     Mild cerebral atrophy with no acute intracranial process Prior right frontal temporal craniotomy Findings were called to Dr. Zhou in the emergency department at 07:09 a.m. Electronically signed by: Yi Hernandez MD Date:    05/11/2023 Time:    07:10    CTA Head and Neck (xpd)    Result Date: 5/11/2023  CMS MANDATED QUALITY DATA - CT RADIATION - 436 All CT scans at this facility utilize dose modulation, iterative reconstruction, and/or weight based dosing when appropriate to reduce radiation dose to as low as reasonably achievable. CMS MANDATED QUALITY DATA - CAROTID - 195 All measurements and percent stenosis described below were determined using NASCET criteria or criteria similar to NASCET, as defined by the Society of Radiologists in Ultrasound Consensus Conference, Radiology, 2003 Reason: Stroke/TIA, determine embolic source Technique: CT  angiography of brain and neck with 100 mL Omnipaque 350.  Maximum intensity projection coronal reformations were obtained at a separate workstation and stored in the patient's permanent medical record. COMPARISON: 5/11/2023 CTA BRAIN: VASCULAR FINDINGS: Major intracranial arteries are widely patent with no stenosis, intraluminal filling, or dissection. Minimal atherosclerotic plaque affects the cavernous segments of bilateral internal carotid arteries. Negative for aneurysm or evidence of vasculitis. Opacified visualized dural venous sinuses are unremarkable. NONVASCULAR FINDINGS: No abnormal intra-axial or extra-axial enhancement. No midline shift. Postsurgical changes of right pterional craniotomy are noted. 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 Minimal atherosclerotic plaque affects left carotid bulb. Left carotid arteries are otherwise without plaque or stenosis. Left vertebral artery is patent. Minimal atherosclerotic plaque affects the right carotid bulb. Right carotid arteries are without additional plaque and remain widely patent. Right vertebral artery is patent. NONVASCULAR FINDINGS: Endotracheal tube has tip proximal to mio. Visualized lung apices show minor dependent atelectasis. Cervical soft tissues are unremarkable. IMPRESSION: 1. Trace atherosclerotic plaque in intracranial internal carotid arteries, without other abnormality of the major intracranial arteries. 2. Trace atherosclerotic plaque in bilateral carotid bulbs, with no stenosis or other abnormality of cervical ICAs or vertebral arteries. Electronically signed by:  Compa Lopez MD  5/11/2023 2:01 PM CDT Workstation: [id number]    CTA Head and Neck (xpd)    Result Date: 5/11/2023  CMS EXAMINATION: CTA HEAD AND NECK (XPD) CLINICAL INDICATION: Male, 67 years old. Stroke/TIA, determine embolic source history of glioblastoma TECHNIQUE: Axial CT  angiogram of the head and neck was performed with contrast. Multiplanar reformations as well as 3-D volume rendered imaging were reviewed at the workstation. Degree of stenosis was measured utilizing the NASCET method. CONTRAST: 100 mL mL of Omnipaque 350 IV. COMPARISON: None FINDINGS: CTA brain: The distal vertebral arteries, basilar artery and cavernous portions of the internal carotid arteries are widely patent. The anterior cerebral arteries, middle cerebral arteries and posterior cerebral arteries are widely patent without evidence of large vessel occlusion, significant stenosis, AVM or aneurysm. The dural venous sinuses are patent. There is no pathologic enhancement. Aortic Arch and Great Vessel Origins: 2 vessel aortic arch which is widely patent Subclavian Arteries: Widely patent. Right Common Carotid Artery: Widely patent. Right Internal Carotid Artery: Widely patent. Right External Carotid Artery: Widely patent. Left Common Carotid Artery: Widely patent. Left Internal Carotid Artery: Widely patent. Left External Carotid Artery: Widely patent. Right Vertebral Artery: Widely patent. Left Vertebral Artery: Widely patent. The paraspinous soft tissues are unremarkable. There are mild degenerative changes of the cervical spine. The lung apices are clear. IMPRESSION: Negative CTA of the head and neck. This exam was performed according to our departmental dose-optimization program which includes automated exposure control, adjustment of the mA and/or kV according to patient size and/or use of iterative reconstruction technique. Electronically signed by:  Yi Hernandez MD  5/11/2023 7:43 AM CDT Workstation: [id number]        Assessment and Plan:    Status Epilepticus  History of Glioblastoma Multiforme s/p craniectomy, chemo and radiotherapy  67-year-old man with history of glioblastoma multiforme s/p craniectomy, chemotherapy and radiation diagnosed in August 2022, as well as seizure disorder, who presented  to the ED with left facial twitching, left facial droop, slurred speech and left arm weakness. Upon arrival to the ED, he was loaded with 1 gr of Keppra and given 2 mg of ativan IV, which seemed to break the seizure. However, he experienced sudden worsening of mental status, with unresponsiveness, weakness and aphasia, so he was taken to CT scan for repeat CT brain to rule out LVO stroke or hemorrhage, but CT had a malfunction and patient was taken to the ICU, where he was intubated, sedated and placed on mechanical ventilation. Clinical picture more consistent with status epilepticus. Ordered brain imaging to better assess status of GBM and rule out stroke.  - Admitted to hospital medicine in the ICU with q2 hour neuro checks, on telemetry, continuous pulse oximetry  - Seizure precautions while inpatient  - Underwent 24-hour VEEG continuous monitoring with evidence of 4 electrographic seizures which resolved when dose of propofol was increased to 60 mcg/kg/min. By the end of the recording, a burst suppression pattern with decreased LPEDs was seen. He was taken down at the 24-hours, and order has been placed to connect him again tomorrow morning before sedation starts to be weaned down.  - Received loading dose of Keppra 2.5 gr IV. Increased Keppra to 1500 mg BID IV  - Received load of Depacon 1500 mg IV. Continue Depacon 500 mg TID IV  - Ordered valproic acid levels in serum for tomorrow  - Ordered MRI brain with and without contrast to assess GBM and rule out stroke  - Secondary stroke prevention with Xarelto 20 mg daily  - Avoid seizure threshold lowering medications such as:  Bupropion, diphenhydramine, tramadol, certain antibiotics  - patient will need follow-up as outpatient with Neurology for long-term EEG monitoring to better characterize epileptiform abnormalities  - Maintain Euthermia with Tylenol prn temp > 37.2 degrees C.  - Assessment for rehab with PT/OT/SLP evaluation and treatment.  - workup and  treatment of metabolic and infectious abnormalities as per primary team  - Will follow along      DVT prophylaxis with chemo/SCD prophylaxis      Patient to follow up with [facility] at [phone] within 3 days from discharge.        All questions were answered.                              Thank you kindly for including us in the care of this patient. Please do not hesitate to contact us with any questions.       Critical Care:  120 minutes of critical care time has been spent evaluating with the patient. Time includes chart review not limited to diagnostic imaging, labs, and vitals, patient assessment, discussion with family and nursing, current order evaluations, and new order entries.      Meredith Dugan MD  Neurology

## 2023-05-13 NOTE — HPI
Teto Mendez is a 67 y.o. White male with known history of  glioblastoma s/p resection, seizures p.afib on xarelto who presented to ER this morning with left arm weakness, facial twitching and change in his speech that he suspected was seizure activity.  He took an extra keppra and presented to ER around 6:30-7 am.  Head CT/CTA in ER without hemorrhage or LVO and he was not a candidate for tPA regardless.  He was given lorazepam and keppra with positive response, however, while still in ER he became less responsive and repeat stat CT was ordered.  While in CT, he became hypoxic with depressed respiratory status and required bag-mask ventilation and emergent intubation on return to ICU.  History was obtained from the ER physician Sign-out.

## 2023-05-13 NOTE — ASSESSMENT & PLAN NOTE
Patient with Paroxysmal (<7 days) atrial fibrillation which is uncontrolled currently with Calcium Channel Blocker. Patient is currently in atrial fibrillation.[id number] Score: 1. . Anticoagulation indicated. Anticoagulation done with Xarelto..

## 2023-05-13 NOTE — ASSESSMENT & PLAN NOTE
Continue Depacon and Keppra.  Continue Diprivan infusion.  Neurologist consulting.  Pt undergoing continuous EEG.

## 2023-05-13 NOTE — CONSULTS
[facility]  Department of Cardiology  Consult Note      PATIENT NAME: Teto Mendez  MRN: [id number]  TODAY'S DATE: 05/13/2023  ADMIT DATE: 5/11/2023                          CONSULT REQUESTED BY: Neo Clark MD    SUBJECTIVE     PRINCIPAL PROBLEM: Acute respiratory failure with hypoxia and hypercapnia      REASON FOR CONSULT:    AFRVR      HPI:    Patient is a 67-year-old male with past medical history of GERD, glioblastoma multiforme s/p craniotomy, chemotherapy and radiation diagnosed in August 2022, history of blood clots, hypertension, PAF on Xarelto, seizures who presented to the ED with left facial pushing, left facial droop, slurred speech, left arm weakness that began at 3:00 a.m. on 5/11.  Keppra has been recently decreased.  In ED he was given 1 g of Keppra, 2 mg of Ativan which broke his seizure and he was subsequently alert and responsive..  Later in CT, he had sudden worsening mental status, unresponsiveness, weakness, aphasia.  O2 saturations high 80s, and patient was intubated emergently and placed on mechanical ventilation for respiratory failure.  Seizure activity restarted and was erupted with Ativan and propofol bolus. No acute stroke/hemorrhage on CT.  Neurology is following for status epilepticus.    05/12/2023:  Patient was on EEG for monitoring of status epilepticus.  Seizure at 4:00 a.m..  Patient remains in AFib    05/13/2023: Patient was also found to be in AFib RVR this admission, requiring IV diltiazem.  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 Normotensive.  K 3.1, Cr 1.0, Mag 2.1; CXR this am coarse interstitial markings the LLL and trace L-sided pleural effusion; on propofol drip at 40 mics per kg per minute.  On  mechanical ventilation 5 of PEEP 30% FiO2.  family at bedside.  Patient is originally from [place] and has a cardiologist there.  Known history of atrial fibrillation.  History of splenic infarct.  Unknown if patient has history of reduced EF or heart failure.  He was on Lasix at home.  Patient is visiting from [place].    ECHO this am  The left ventricle is mildly enlarged with concentric remodeling and  The estimated ejection fraction is 38%.  There is left ventricular global hypokinesis.  Mild mitral regurgitation.  Normal central venous pressure (3 mmHg).  The estimated PA systolic pressure is 26 mmHg.  Atrial fibrillation observed.  Normal right ventricular size with normal right ventricular systolic function.  Moderate left atrial enlargement.  Moderate right atrial enlargement.        FROM H&P  Teto Mendez is a 67 y.o. White male with known history of  glioblastoma s/p resection, seizures p.afib on xarelto who presented to ER this morning with left arm weakness, facial twitching and change in his speech that he suspected was seizure activity.  He took an extra keppra and presented to ER around 6:30-7 am.  Head CT/CTA in ER without hemorrhage or LVO and he was not a candidate for tPA regardless.  He was given lorazepam and keppra with positive response, however, while still in ER he became less responsive and repeat stat CT was ordered.  While in CT, he became hypoxic with depressed respiratory status and required bag-mask ventilation and emergent intubation on return to ICU.  History was obtained from the ER physician Sign-out.            Review of patient's allergies indicates:  No Known Allergies    Past Medical History:   Diagnosis Date    GERD (gastroesophageal reflux disease)     Glioblastoma     H/O blood clots     Hypertension     Paroxysmal atrial fibrillation     Seizures      No past surgical history on file.        REVIEW OF SYSTEMS  Unable to obtain. Sedated on mechanical  ventilation    OBJECTIVE     VITAL SIGNS (Most Recent)  Temp: 97.6 °F (36.4 °C) (05/13/23 0301)  Pulse: 69 (05/13/23 0934)  Resp: 13 (05/13/23 0800)  BP: 124/75 (05/13/23 0934)  SpO2: 96 % (05/13/23 0800)    VENTILATION STATUS  Resp: 13 (05/13/23 0800)  SpO2: 96 % (05/13/23 0800)  Vent Mode: A/C  Oxygen Concentration (%):  [30] 30  Resp Rate Total:  [13 br/min-21 br/min] 14 br/min  Vt Set:  [500 mL] 500 mL  PEEP/CPAP:  [5 cmH20] 5 cmH20  Mean Airway Pressure:  [9.1 cmH20-10 cmH20] 9.3 cmH20        I & O (Last 24H):  Intake/Output Summary (Last 24 hours) at 5/13/2023 0945  Last data filed at 5/13/2023 0549  Gross per 24 hour   Intake 2617.8 ml   Output 1600 ml   Net 1017.8 ml       WEIGHTS  Wt Readings from Last 3 Encounters:   05/13/23 0001 92.4 kg (203 lb 11.3 oz)   05/11/23 1230 84 kg (185 lb 3 oz)   05/11/23 0641 83.9 kg (185 lb)       Physical examination:      Constitutional:  Critically ill appearing male sedated on mechanical ventilation, NAD; EEG wires in place  Neck: no carotid bruit, no JVD  Lungs: coarse breath sounds, mechanical ventilation- FiO2 30%, 5 peep  Chest Wall: no tenderness  CARDIAC:  Irregular rate and rhythm, soft systolic murmur  Abdomen: soft, bowel sounds normal; NG tube in left nare  Extremities: Extremities normal, atraumatic, no cyanosis, clubbing; 1+ pitting edema in bilateral lower extremities.  Skin: Skin color, texture, turgor normal. No rashes or lesions  Neuro: Sedated, on vent     HOME MEDICATIONS:  No current facility-administered medications on file prior to encounter.     Current Outpatient Medications on File Prior to Encounter   Medication Sig Dispense Refill    allopurinoL (ZYLOPRIM) 100 MG tablet Take 100 mg by mouth once daily.      amLODIPine (NORVASC) 10 MG tablet Take 10 mg by mouth once daily.      dexAMETHasone (DECADRON) 1 MG Tab Take 1 mg by mouth every 12 (twelve) hours.      hydrALAZINE (APRESOLINE) 25 MG tablet Take 25 mg by mouth 3 (three) times daily.       levETIRAcetam (KEPPRA) 500 MG Tab Take 500 mg by mouth 2 (two) times daily.      metoprolol succinate (TOPROL-XL) 100 MG 24 hr tablet Take 100 mg by mouth once daily.      pantoprazole (PROTONIX) 40 MG tablet Take 40 mg by mouth once daily.      XARELTO 20 mg Tab Take 20 mg by mouth once daily.      clotrimazole (MYCELEX) 10 mg kathleen Take 1 tablet by mouth 3 (three) times daily.      colchicine (COLCRYS) 0.6 mg tablet Take 0.6 mg by mouth once daily.      furosemide (LASIX) 20 MG tablet Take 20 mg by mouth once daily.      meclizine (ANTIVERT) 12.5 mg tablet Take 25 mg by mouth 3 (three) times daily.      rivaroxaban (XARELTO) 20 mg Tab Take 20 mg by mouth daily with dinner or evening meal.      timolol maleate 0.5% (TIMOPTIC) 0.5 % Drop Place 1 drop into both eyes 2 (two) times daily.         SCHEDULED MEDS:   allopurinoL  100 mg Per NG tube Daily    chlorhexidine  15 mL Mouth/Throat BID    dexAMETHasone  1 mg Per NG tube Q12H    levetiracetam IV  1,500 mg Intravenous Q12H    metoprolol tartrate  100 mg Per NG tube BID    mupirocin   Nasal BID    pantoprazole  40 mg Intravenous Daily    rivaroxaban  20 mg Per NG tube Daily with dinner    timolol maleate 0.25%  1 drop Both Eyes BID    valproate sodium (DEPACON) IVPB  500 mg Intravenous Q8H       CONTINUOUS INFUSIONS:   propofoL 60 mcg/kg/min (05/13/23 0933)       PRN MEDS:acetaminophen, colchicine, hydrALAZINE, lorazepam, metoprolol, ondansetron, polyethylene glycol, sodium chloride 0.9%    LABS AND DIAGNOSTICS     CBC LAST 3 DAYS  Recent Labs   Lab 05/11/23  0657 05/11/23  1306 05/12/23  0357 05/12/23  0410 05/12/23  0532 05/13/23  0422 05/13/23  0504   WBC 8.54  --  7.76  --   --  6.37  --    RBC 5.16  --  5.15  --   --  5.00  --    HGB 16.2  --  15.9  --   --  15.7  --    HCT 47.4   < > 46.0   < > 45 43.3 47   MCV 92  --  89  --   --  87  --    MCH 31.4*  --  30.9  --   --  31.4*  --    MCHC 34.2  --  34.6  --   --  36.3*  --    RDW 14.5  --  14.1  --   --  13.9   --      --  191  --   --  180  --    MPV 10.1  --  9.8  --   --  10.1  --    GRAN 78.4*  6.7  --  82.1*  6.4  --   --  82.0*  5.2  --    LYMPH 12.2*  1.0  --  10.4*  0.8*  --   --  8.5*  0.5*  --    MONO 8.0  0.7  --  6.7  0.5  --   --  8.3  0.5  --    BASO 0.03  --  0.00  --   --  0.01  --    NRBC 0  --  0  --   --  0  --     < > = values in this interval not displayed.       COAGULATION LAST 3 DAYS  Recent Labs   Lab 05/11/23  0657   INR 1.3*       CHEMISTRY LAST 3 DAYS  Recent Labs   Lab 05/11/23  0657 05/11/23  1306 05/12/23  0357 05/12/23  0410 05/12/23  0532 05/13/23  0422 05/13/23  0504     --  142  --   --  140  --    K 3.5  --  3.3*  --   --  3.1*  --      --  109  --   --  108  --    CO2 27  --  22*  --   --  23  --    ANIONGAP 6*  --  11  --   --  9  --    BUN 23  --  19  --   --  22  --    CREATININE 1.0  --  1.0  --   --  1.0  --    *  --  97  --   --  123*  --    CALCIUM 9.0  --  8.8  --   --  8.7  --    PH  --    < >  --  7.542* 7.497*  --  7.527*   MG  --   --   --   --   --  2.1  --    ALBUMIN 4.1  --  3.5  --   --  2.9*  --    PROT 6.8  --  6.1  --   --  5.4*  --    ALKPHOS 52*  --  47*  --   --  41*  --    ALT 43  --  107*  --   --  64*  --    AST 39  --  69*  --   --  35  --    BILITOT 1.1*  --  1.2*  --   --  0.8  --     < > = values in this interval not displayed.       CARDIAC PROFILE LAST 3 DAYS  No results for input(s): BNP, CPK, CPKMB, LDH, TROPONINI, TROPONINIHS in the last 168 hours.    ENDOCRINE LAST 3 DAYS  Recent Labs   Lab 05/11/23  0657   TSH 3.900       LAST ARTERIAL BLOOD GAS  ABG  Recent Labs   Lab 05/13/23  0504   PH 7.527*   PO2 89   PCO2 30.5*   HCO3 25.3   BE 3       LAST 7 DAYS MICROBIOLOGY   Microbiology Results (last 7 days)       Procedure Component Value Units Date/Time    Culture, Respiratory with Gram Stain [[id number]]     Order Status: Canceled Specimen: Respiratory             MOST RECENT IMAGING  X-Ray Chest 1 View  HISTORY  ventilator dependent patient.    COMPARISON:5/12/2023 1045 hours    FINDINGS:Tip of the ET tube lies 2.6 cm above the tracheal mio. NG tube passes in the stomach. Lungs are in a state of diminished inspiration reproducible minimal bibasilar atelectasis, there are arrogated coarse interstitial markings are noted projecting in the left lower and upper lobes, and trace left-sided pleural effusion. The upper abdominal cavity demonstrates evidence of gaseous distention of small bowel loops and postop changes of previous cholecystectomy.    IMPRESSION:  1. Optimal position of the ET tube as well as the NG tube. Moderate  2. Coarse interstitial markings the left lower lobe and trace left-sided pleural effusion    Electronically signed by:  Solomon Kohli MD  5/13/2023 9:42 AM CDT Workstation: [id number]      ECHOCARDIOGRAM RESULTS (last 5)  No results found for this or any previous visit.      CURRENT/PREVIOUS VISIT EKG  Results for orders placed or performed during the hospital encounter of 05/11/23   ECG 12 lead    Collection Time: 05/11/23  7:28 AM    Narrative    Test Reason : I63.9,    Vent. Rate : 120 BPM     Atrial Rate : 150 BPM     P-R Int : 000 ms          QRS Dur : 104 ms      QT Int : 370 ms       P-R-T Axes : 000 016 -12 degrees     QTc Int : 522 ms    Atrial fibrillation with rapid ventricular response  Nonspecific ST abnormality  Prolonged QT  Abnormal ECG  No previous ECGs available  Confirmed by Tadeo BARTLETT, Joe CEBALLOS ([id number]) on 5/11/2023 10:07:18 PM    Referred By: [email or web address]   SELF           Confirmed By:Joe Piedra MD           ASSESSMENT/PLAN:     Active Hospital Problems    Diagnosis    *Acute respiratory failure with hypoxia and hypercapnia    Status epilepticus    Essential hypertension    Paroxysmal atrial fibrillation    Hypokalemia    Glioblastoma       ASSESSMENT & PLAN:     HFrEF-EF 38%  Paroxysmal Atrial Fibrillation  Hypokalemia  Status Epilepticus  Acute respiratory failure with hypoxia  and hypercapnia  Glioblastoma        RECOMMENDATIONS:    Patient has history of pAF.  Rate controlled at this time. Continue Xarelto 20 mg daily. Continue metoprolol 100 mg BID.  Hypokalemic with K 3.1 this am.  Replace per protocol.  Continue to check and replace potassium and magnesium. Goal for potassium is 4.0, and goal for magnesium is 2.0.    ECHO this admission- EF 38% and LV global hypokinesis.  No previous ECHO to compare.  Unknown if reduced EF is new or chronic.  Strict I's and O's.  Patient given IV Lasix 20 mg 1 time dose today.  Recommend IV diuresis as needed for volume overload.  Trend renal function.  We will attempt to request records from cardiologist in [place].  Thank you for the consultation.  We will continue to follow.      Kiana Moore NP  Department of Cardiology  Date of Service: 05/13/2023      Had long discussions with the patient's family members at bedside.  And details have been outlined in the history above.  Been awaiting for competitive evaluation from previous echoes if made available will be of lot help in the meanwhile will optimize his therapy for presence of LV dysfunction and management of his atrial fibrillation.  I have personally interviewed and examined the patient, I have reviewed the Nurse Practitioner's history and physical, assessment, and plan. I agree with the findings and plan.      Zachariah Hughes M.D.  Department of Cardiology  Date of Service: 05/13/2023  9:45 AM

## 2023-05-13 NOTE — ASSESSMENT & PLAN NOTE
Body mass index is 30.08 kg/m². Morbid obesity complicates all aspects of disease management from diagnostic modalities to treatment.

## 2023-05-13 NOTE — HOSPITAL COURSE
Teto Mendez is a 67 year old male with a past medical history of glioblastoma s/p craniotomy complicated by seizures, obesity, HTN, AFib and GERD who presented with status epilepticus. Neurology has been consulted. The patient was intubated on propofol sedation with Keppra and valproic acid. Pulmonary has been consulted. Continuous EEG is ordered; the patient continued to have PLEDs throughout his course. MRI brain shows R MCA region infarcts consistent with acute CVA. His course has been complicated by Afib with RVR as well as a HFrEF seen on TTE which appears to be chronic. Cardiology has been consulted. He is on metoprolol and Xarelto with PRN diuresis. Entresto and high dose atorvastatin was also added. Diltiazem was also added by Cardiology 5/18.  Sedation with propofol and Precedex was stopped 5/15. Vimpat was also added 5/15 by Neurology. He has been monitored for improvement in his neurologic status for > 72 hours and is noted to have some right hand squeeze when directed as well as some eye opening as of 5/18. Neurology has consulted Palliative Care 5/17 for assistance with end of life care; they may consider changing code status to DNR; they are hopeful the patient may continue show signs of neurologic progress, but acknowledge the severity of his case.     HR elevated 120-130s with stimulation and fatigue off vent setting; HR much improved back on vent; low grade temps progressed to high grade temps, SBP 70s, started on levophed. All lines removed, tips cultured and replaced. Cooling blanket, tylenol, ibuprofen and icepacks. On Cefepime IV. Vanc dc'd  Resp culture (5/21) - Pseudomons -presumptive  Urine culture (5/21) - GNR     Family met with Palliative Care team- they would like to proceed with trach and peg and continue full code. Head CT (5/22) no ICH, +cerebral atrophy; persistent mastoiditis findings. Patient is more active and following commands.   Hold anticoagulation, general surgery  consulted. Underwent Trach and peg today. Plan for dc to LTAC tomorrow.     Patient will complete 3 more days cefepime IV. Anti-seizure medication: keppra and vimpat switched to peg tube. Patient is tolerating tube feeds. He is currently on T-piece trial and tolerating. He has transfer orders for LTAC to return on Vent setting of spontaneous/ Vt 450ml, rate 12, Peep 5, PS 5, FIO2 30%. Vitals signs stable. Ok to dc to LTAC. He will need to have PT/OT/ST/ Pulmonary consultation.

## 2023-05-13 NOTE — PROGRESS NOTES
Pulmonary/Critical Care Progress Note      PATIENT NAME: Teto Mendez  MRN: [id number]  TODAY'S DATE: 2023  12:50 PM  ADMIT DATE: 2023  AGE: 67 y.o. : 1955      HPI:  Called by nurse because they could not reach anesthesia to intubate a patient who was being bagged.  The patient had a change in mental status in the emergency room after having had a CTA earlier in the morning which was negative.  In CT, the patient was not breathing effectively and was cyanotic and the scanner broke so the patient was brought up to the ICU emergently.  The patient's past medical history is significant for glioblastoma multiforme for which he has been receiving radiation and chemotherapy.  He also has had a right frontal craniotomy.  At 3:30 a.m. in the morning the patient was playing a game and noted twitching of his face with slurred speech and weakness to his left arm.  He took an extra Keppra at that time.  He arrived at the ER at 6:45 a.m..     the patient remains on the ventilator and sedated with 60 of propofol.  He is also on 5 Cardizem.  Had further seizure activity during the night.    - pt remains sedated, on vent, on continuous EEG. There is EEG evidence of seizure activity so neuro has ordered VPA. Propofol has been weaned to 30    REVIEW OF SYSTEMS  Unobtainable    No change in the patient's Past Medical History, Past Surgical History, Social History or Family History since admission.      VITAL SIGNS (MOST RECENT)  Temp: 97.4 °F (36.3 °C) (23 1501)  Pulse: 64 (23 1600)  Resp: 17 (23 1600)  BP: (!) 149/89 (23 1501)  SpO2: 98 % (23 1600)    INTAKE AND OUTPUT (LAST 24 HOURS):  Intake/Output Summary (Last 24 hours) at 2023 1644  Last data filed at 2023 1533  Gross per 24 hour   Intake 2617.8 ml   Output 3000 ml   Net -382.2 ml         WEIGHT  Wt Readings from Last 1 Encounters:   23 92.4 kg (203 lb 11.3 oz)       PHYSICAL EXAM  GENERAL: Older patient,  now intubated, unresponsive  HEENT: Pupils equal and reactive. Nose intact. Pharynx intubated with ET tube and OG tube. EEG leads over scalp  NECK: Supple.   HEART: Regular rate and rhythm. No murmur or gallop auscultated.  LUNGS: Clear to auscultation and percussion. Lung excursion symmetrical. No change in fremitus. No adventitial noises.  ABDOMEN: Bowel sounds present. Non-tender, no masses palpated.  : Normal anatomy.  EXTREMITIES: Normal muscle tone and joint movement, no cyanosis or clubbing.   LYMPHATICS: No adenopathy palpated, no edema.  SKIN: Dry, intact, no lesions.   NEURO:  Sedated on propofol  PSYCH:  Unable to assess      CBC LAST (LAST 24 HOURS)  Recent Labs   Lab 05/13/23 0422 05/13/23  0504   WBC 6.37  --    RBC 5.00  --    HGB 15.7  --    HCT 43.3 47   MCV 87  --    MCH 31.4*  --    MCHC 36.3*  --    RDW 13.9  --      --    MPV 10.1  --    GRAN 82.0*  5.2  --    LYMPH 8.5*  0.5*  --    MONO 8.3  0.5  --    BASO 0.01  --    NRBC 0  --          CHEMISTRY LAST (LAST 24 HOURS)  Recent Labs   Lab 05/13/23 0422 05/13/23  0504     --    K 3.1*  --      --    CO2 23  --    ANIONGAP 9  --    BUN 22  --    CREATININE 1.0  --    *  --    CALCIUM 8.7  --    PH  --  7.527*   MG 2.1  --    ALBUMIN 2.9*  --    PROT 5.4*  --    ALKPHOS 41*  --    ALT 64*  --    AST 35  --    BILITOT 0.8  --              LAST 7 DAYS MICROBIOLOGY   Microbiology Results (last 7 days)       Procedure Component Value Units Date/Time    Culture, Respiratory with Gram Stain [[id number]]     Order Status: Canceled Specimen: Respiratory             MOST RECENT IMAGING  Echo  · The left ventricle is mildly enlarged with concentric remodeling and  · The estimated ejection fraction is 38%.  · There is left ventricular global hypokinesis.  · Mild mitral regurgitation.  · Normal central venous pressure (3 mmHg).  · The estimated PA systolic pressure is 26 mmHg.  · Atrial fibrillation observed.  · Normal right  ventricular size with normal right ventricular systolic   function.  · Moderate left atrial enlargement.  · Moderate right atrial enlargement.     X-Ray Chest 1 View  HISTORY ventilator dependent patient.    COMPARISON:5/12/2023 1045 hours    FINDINGS:Tip of the ET tube lies 2.6 cm above the tracheal mio. NG tube passes in the stomach. Lungs are in a state of diminished inspiration reproducible minimal bibasilar atelectasis, there are arrogated coarse interstitial markings are noted projecting in the left lower and upper lobes, and trace left-sided pleural effusion. The upper abdominal cavity demonstrates evidence of gaseous distention of small bowel loops and postop changes of previous cholecystectomy.    IMPRESSION:  1. Optimal position of the ET tube as well as the NG tube. Moderate  2. Coarse interstitial markings the left lower lobe and trace left-sided pleural effusion    Electronically signed by:  Solomon Kohli MD  5/13/2023 9:42 AM CDT Workstation: [id number]      CURRENT VISIT EKG  Results for orders placed or performed during the hospital encounter of 05/11/23   ECG 12 lead    Narrative    Test Reason : I63.9,    Vent. Rate : 120 BPM     Atrial Rate : 150 BPM     P-R Int : 000 ms          QRS Dur : 104 ms      QT Int : 370 ms       P-R-T Axes : 000 016 -12 degrees     QTc Int : 522 ms    Atrial fibrillation with rapid ventricular response  Nonspecific ST abnormality  Prolonged QT  Abnormal ECG  No previous ECGs available    Referred By: [email or web address]   SELF           Confirmed By:        ECHOCARDIOGRAM RESULTS  No results found for this or any previous visit.        VENTILATOR INFORMATION  Vent Mode: A/C  Oxygen Concentration (%):  [30] 30  Resp Rate Total:  [12 br/min-47 br/min] 17 br/min  Vt Set:  [500 mL] 500 mL  PEEP/CPAP:  [5 cmH20] 5 cmH20  Mean Airway Pressure:  [8.3 xtI74-01 cmH20] 9 cmH20           LAST ARTERIAL BLOOD GAS  ABG  Recent Labs   Lab 05/13/23  0504   PH 7.527*   PO2 89   PCO2 30.5*    HCO3 25.3   BE 3         IMPRESSION AND PLAN  Status epilepticus  -focus likely his glioblastoma site  -history of seizures prior to this admission  -MRI ordered, pending- RN calling to get this done today  History of glioblastoma multiforme under treatment in [place]  Mechanical ventilation  -to protect airway and maintain ventilatory drive with his status  -continues to be alkalotic, overbreathing the ventilator- decreased Vt to 450  - not appropriate for SBT due to neuro status- wean when appropriate  Atrial fibrillation  - continue Xarelto  - continue metoprolol  Hypertension  -metoprolol  Hypokalemia  -replace potassium  -Trend labs  Transaminitis  -Trend labs      Critical care time spent reviewing the chart, examining the patient, reviewing the labs, reviewing the radiological findings, discussing care with nursing, physicians, and respiratory and creating the note and  has been greater than 35 minutes    Manju Elliott MD  Date of Service: 05/13/2023  12:50 PM

## 2023-05-13 NOTE — ASSESSMENT & PLAN NOTE
Monitor level and supplement pRN.    Potassium   Date Value Ref Range Status   05/12/2023 3.3 (L) 3.5 - 5.1 mmol/L Final   05/11/2023 3.5 3.5 - 5.1 mmol/L Final

## 2023-05-13 NOTE — PROGRESS NOTES
"[facility] Medicine  Progress Note    Patient Name: Teto Mendez  MRN: [id number]  Patient Class: IP- Inpatient   Admission Date: 5/11/2023  Length of Stay: 2 days  Attending Physician: Neo Clark MD  Primary Care Provider: Primary Doctor No        Subjective:     Principal Problem:Status epilepticus        HPI:  Teto Mendez is a 67 y.o. White male with known history of  glioblastoma s/p resection, seizures p.afib on xarelto who presented to ER this morning with left arm weakness, facial twitching and change in his speech that he suspected was seizure activity.  He took an extra keppra and presented to ER around 6:30-7 am.  Head CT/CTA in ER without hemorrhage or LVO and he was not a candidate for tPA regardless.  He was given lorazepam and keppra with positive response, however, while still in ER he became less responsive and repeat stat CT was ordered.  While in CT, he became hypoxic with depressed respiratory status and required bag-mask ventilation and emergent intubation on return to ICU.  History was obtained from the ER physician Sign-out.      Overview/Hospital Course:  Teto Mendez is a 67 year old male with a past medical history of glioblastoma s/p craniotomy complicated by seizures, obesity, HTN, AFib and GERD who presented with status epilepticus. Neurology has been consulted. The patient was intubated on propofol sedation with Keppra and valproic acid. Pulmonary has been consulted. Continuous EEG is ordered. MRI brain is pending. His course has been complicated by Afib with RVR as well as a CHF seen on TTE which appears to be chronic. Cardiology has been consulted. He is on metoprolol and Xarelto.      Interval History: see "Hospital Course"    Review of Systems   Unable to perform ROS: Intubated   Objective:     Vital Signs (Most Recent):  Temp: 97.4 °F (36.3 °C) (05/13/23 1501)  Pulse: 64 (05/13/23 1600)  Resp: 17 (05/13/23 1600)  BP: (!) 149/89 (05/13/23 " 1501)  SpO2: 98 % (05/13/23 1600) Vital Signs (24h Range):  Temp:  [97.4 °F (36.3 °C)-97.6 °F (36.4 °C)] 97.4 °F (36.3 °C)  Pulse:  [59-84] 64  Resp:  [11-33] 17  SpO2:  [96 %-99 %] 98 %  BP: (113-152)/(65-89) 149/89  Arterial Line BP: (101-155)/(62-91) 147/86     Weight: 92.4 kg (203 lb 11.3 oz)  Body mass index is 30.08 kg/m².    Intake/Output Summary (Last 24 hours) at 5/13/2023 1645  Last data filed at 5/13/2023 1533  Gross per 24 hour   Intake 2617.8 ml   Output 3000 ml   Net -382.2 ml         Physical Exam  Vitals and nursing note reviewed.   Constitutional:       Appearance: He is ill-appearing.   HENT:      Head: Normocephalic and atraumatic.      Right Ear: External ear normal.      Left Ear: External ear normal.      Nose: Nose normal.      Comments: NG tube.     Mouth/Throat:      Mouth: Mucous membranes are moist.      Pharynx: Oropharynx is clear.      Comments: ETT.  Cardiovascular:      Rate and Rhythm: Normal rate. Rhythm irregular.      Pulses: Normal pulses.      Heart sounds: Normal heart sounds.   Pulmonary:      Effort: Pulmonary effort is normal.      Breath sounds: Normal breath sounds.   Abdominal:      General: Bowel sounds are normal.      Palpations: Abdomen is soft.   Genitourinary:     Comments: Iraheta.  Musculoskeletal:      Right lower leg: No edema.      Left lower leg: No edema.   Skin:     General: Skin is warm and dry.   Neurological:      Comments: Sedated.           Significant Labs: All pertinent labs within the past 24 hours have been reviewed.    Significant Imaging: I have reviewed all pertinent imaging results/findings within the past 24 hours.      Assessment/Plan:      * Status epilepticus  In setting of glioblastoma.  -Continue Depacon and Keppra  -Continue Diprivan infusion  -Neurology following  -Continuous EEG  -Continue intubation with mechanical ventilation  -MRI brain    Acute respiratory failure with hypoxia and hypercapnia  In setting of status  epilepticus.  -Continuous EEG  -Continue mechanical ventilation with sedation as long as patient remains in status epilepticus  -Pulmonary consulted  -ABG PRN      HFrEF (heart failure with reduced ejection fraction)  Unclear chronicity.  -Cardiology following  -Metoprolol  -Lasix PRN  -Telemetry      GERD (gastroesophageal reflux disease)  -PPI      Gout  -Continue home allopurinol      Obesity  Body mass index is 30.08 kg/m². Morbid obesity complicates all aspects of disease management from diagnostic modalities to treatment.         Glioblastoma  Chronic. Receives care in [place].  -Continue Decadron  -MRI brain      Hypokalemia  -Replete PRN  -Telemetry  -Trend K        Paroxysmal atrial fibrillation  -Xarelto  -Metoprolol  -Telemetry  -Cardiology consulted        Essential hypertension  -Hold home medications  -Continue to monitor      VTE Risk Mitigation (From admission, onward)         Ordered     rivaroxaban tablet 20 mg  With dinner         05/13/23 0902     Reason for No Pharmacological VTE Prophylaxis  Once        Question:  Reasons:  Answer:  Already adequately anticoagulated on oral Anticoagulants    05/11/23 1054     IP VTE HIGH RISK PATIENT  Once         05/11/23 1054     Place sequential compression device  Until discontinued         05/11/23 1054                Discharge Planning   BARBARA: 5/17/2023     Code Status: Full Code   Is the patient medically ready for discharge?:     Reason for patient still in hospital (select all that apply): Patient new problem, Patient trending condition, Laboratory test, Treatment, Imaging and Consult recommendations  Discharge Plan A: Home with family                  Neo Clark MD  Department of Hospital Medicine   [facility]

## 2023-05-13 NOTE — PROGRESS NOTES
[facility] Medicine  Progress Note    Patient Name: Teto Mendez  MRN: [id number]  Patient Class: IP- Inpatient   Admission Date: 5/11/2023  Length of Stay: 1 days  Attending Physician: Trav Conde MD  Primary Care Provider: Primary Doctor No        Subjective:     Principal Problem:Acute respiratory failure with hypoxia and hypercapnia        HPI:  Teto Mendez is a 67 y.o. White male with known history of  glioblastoma s/p resection, seizures p.afib on xarelto who presented to ER this morning with left arm weakness, facial twitching and change in his speech that he suspected was seizure activity.  He took an extra keppra and presented to ER around 6:30-7 am.  Head CT/CTA in ER without hemorrhage or LVO and he was not a candidate for tPA regardless.  He was given lorazepam and keppra with positive response, however, while still in ER he became less responsive and repeat stat CT was ordered.  While in CT, he became hypoxic with depressed respiratory status and required bag-mask ventilation and emergent intubation on return to ICU.  History was obtained from the ER physician Sign-out.      Overview/Hospital Course:  Neurology and pulmonologist consulted with us.   He continued to have seizure activity, so he was kept on AED's, as well as Diprivan drip.      Interval History:  continues with seizure activity.  Remains on ventilator.  Remains on IV diltiazem for atrial fibrillation.    Review of Systems   Unable to perform ROS: Intubated   Objective:     Vital Signs (Most Recent):  Temp: 97.7 °F (36.5 °C) (05/12/23 1515)  Pulse: 63 (05/12/23 2011)  Resp: 19 (05/12/23 2011)  BP: (!) 141/94 (05/12/23 1047)  SpO2: 98 % (05/12/23 2011) Vital Signs (24h Range):  Temp:  [97.5 °F (36.4 °C)-98.4 °F (36.9 °C)] 97.7 °F (36.5 °C)  Pulse:  [] 63  Resp:  [16-21] 19  SpO2:  [96 %-100 %] 98 %  BP: (141)/(94) 141/94  Arterial Line BP: (109-171)/() 124/73     Weight: 84 kg (185 lb 3  oz)  Body mass index is 27.35 kg/m².    Intake/Output Summary (Last 24 hours) at 5/12/2023 2048  Last data filed at 5/12/2023 1813  Gross per 24 hour   Intake 1095.43 ml   Output 1650 ml   Net -554.57 ml         Physical Exam  Constitutional:       Interventions: He is sedated and intubated.      Comments: Connected to EEG machine   Eyes:      Conjunctiva/sclera:      Right eye: Right conjunctiva is not injected. No exudate.     Left eye: Left conjunctiva is not injected. No exudate.  Neck:      Vascular: No JVD.   Cardiovascular:      Rate and Rhythm: Tachycardia present. Rhythm irregular.   Pulmonary:      Effort: He is intubated.      Breath sounds: Normal breath sounds.   Abdominal:      General: Abdomen is flat. There is no distension.      Palpations: Abdomen is soft.   Genitourinary:     Comments: Iraheta with yellow urine  Musculoskeletal:      Right lower leg: No edema.      Left lower leg: No edema.   Skin:     General: Skin is warm and dry.           Significant Labs: All pertinent labs within the past 24 hours have been reviewed.    Significant Imaging: I have reviewed all pertinent imaging results/findings within the past 24 hours.      Assessment/Plan:      * Acute respiratory failure with hypoxia and hypercapnia  Patient with Hypercapnic and Hypoxic Respiratory failure which is Acute.  he is not on home oxygen. Supplemental oxygen was provided and noted- Vent Mode: A/C  Oxygen Concentration (%):  [30-40] 30  Resp Rate Total:  [16 br/min-21 br/min] 17 br/min  Vt Set:  [500 mL] 500 mL  PEEP/CPAP:  [5 cmH20] 5 cmH20  Mean Airway Pressure:  [8.6 cmH20-10 cmH20] 9.2 cmH20    .   Signs/symptoms of respiratory failure include- lethargy. Contributing diagnoses includes - decreased level of consciousness. Labs and images were reviewed. Patient Has recent ABG, which has been reviewed. Will treat underlying causes and adjust management of respiratory failure as follows-  Continue vent support.    Glioblastoma  Has  been getting treatment for it.  Likely the cause for the seizures.      Hypokalemia  Monitor level and supplement pRN.    Potassium   Date Value Ref Range Status   05/12/2023 3.3 (L) 3.5 - 5.1 mmol/L Final   05/11/2023 3.5 3.5 - 5.1 mmol/L Final           Paroxysmal atrial fibrillation  Patient with Paroxysmal (<7 days) atrial fibrillation which is uncontrolled currently with Calcium Channel Blocker. Patient is currently in atrial fibrillation.[id number] Score: 1. . Anticoagulation indicated. Anticoagulation done with Xarelto..        Essential hypertension  Chronic, controlled.  Latest blood pressure and vitals reviewed-   Temp:  [97.5 °F (36.4 °C)-98.4 °F (36.9 °C)]   Pulse:  []   Resp:  [16-21]   BP: (141)/(94)   SpO2:  [96 %-100 %]   Arterial Line BP: (109-171)/() .   Home meds for hypertension were reviewed and noted below.   Hypertension Medications             amLODIPine (NORVASC) 10 MG tablet Take 10 mg by mouth once daily.    furosemide (LASIX) 20 MG tablet Take 20 mg by mouth once daily.    hydrALAZINE (APRESOLINE) 25 MG tablet Take 25 mg by mouth 3 (three) times daily.    metoprolol succinate (TOPROL-XL) 100 MG 24 hr tablet Take 100 mg by mouth once daily.          While in the hospital, will manage blood pressure as follows; Continue home antihypertensive regimen except for hydralazine.    Will utilize p.r.n. blood pressure medication only if patient's blood pressure greater than  180/110 and he develops symptoms such as worsening chest pain or shortness of breath.        Status epilepticus  Continue Depacon and Keppra.  Continue Diprivan infusion.  Neurologist consulting.  Pt undergoing continuous EEG.      VTE Risk Mitigation (From admission, onward)         Ordered     rivaroxaban tablet 20 mg  With dinner         05/11/23 1054     Reason for No Pharmacological VTE Prophylaxis  Once        Question:  Reasons:  Answer:  Already adequately anticoagulated on oral Anticoagulants    05/11/23  1054     IP VTE HIGH RISK PATIENT  Once         05/11/23 1054     Place sequential compression device  Until discontinued         05/11/23 1054                Discharge Planning   BARBARA: 5/17/2023     Code Status: Full Code   Is the patient medically ready for discharge?:     Reason for patient still in hospital (select all that apply): Patient unstable, Patient trending condition and Treatment  Discharge Plan A: Home with family                  Trav Conde MD  Department of Hospital Medicine   [facility]

## 2023-05-13 NOTE — ASSESSMENT & PLAN NOTE
In setting of status epilepticus.  -Continuous EEG  -Continue mechanical ventilation with sedation as long as patient remains in status epilepticus  -Pulmonary consulted  -ABG PRN

## 2023-05-13 NOTE — PLAN OF CARE
Plan of care and education reviewed with pt's family. Verbalization of understanding expressed.   Problem: Adult Inpatient Plan of Care  Goal: Plan of Care Review  Outcome: Ongoing, Progressing  Goal: Patient-Specific Goal (Individualized)  Outcome: Ongoing, Progressing  Goal: Absence of Hospital-Acquired Illness or Injury  Outcome: Ongoing, Progressing  Goal: Optimal Comfort and Wellbeing  Outcome: Ongoing, Progressing  Goal: Readiness for Transition of Care  Outcome: Ongoing, Progressing     Problem: Fall Injury Risk  Goal: Absence of Fall and Fall-Related Injury  Outcome: Ongoing, Progressing     Problem: Restraint, Nonbehavioral (Nonviolent)  Goal: Absence of Harm or Injury  Outcome: Ongoing, Progressing     Problem: Communication Impairment (Mechanical Ventilation, Invasive)  Goal: Effective Communication  Outcome: Ongoing, Progressing     Problem: Device-Related Complication Risk (Mechanical Ventilation, Invasive)  Goal: Optimal Device Function  Outcome: Ongoing, Progressing     Problem: Inability to Wean (Mechanical Ventilation, Invasive)  Goal: Mechanical Ventilation Liberation  Outcome: Ongoing, Progressing     Problem: Nutrition Impairment (Mechanical Ventilation, Invasive)  Goal: Optimal Nutrition Delivery  Outcome: Ongoing, Progressing     Problem: Skin and Tissue Injury (Mechanical Ventilation, Invasive)  Goal: Absence of Device-Related Skin and Tissue Injury  Outcome: Ongoing, Progressing     Problem: Ventilator-Induced Lung Injury (Mechanical Ventilation, Invasive)  Goal: Absence of Ventilator-Induced Lung Injury  Outcome: Ongoing, Progressing     Problem: Communication Impairment (Artificial Airway)  Goal: Effective Communication  Outcome: Ongoing, Progressing     Problem: Device-Related Complication Risk (Artificial Airway)  Goal: Optimal Device Function  Outcome: Ongoing, Progressing     Problem: Skin and Tissue Injury (Artificial Airway)  Goal: Absence of Device-Related Skin or Tissue  Injury  Outcome: Ongoing, Progressing     Problem: Noninvasive Ventilation Acute  Goal: Effective Unassisted Ventilation and Oxygenation  Outcome: Ongoing, Progressing     Problem: Infection  Goal: Absence of Infection Signs and Symptoms  Outcome: Ongoing, Progressing     Problem: Aspiration (Enteral Nutrition)  Goal: Absence of Aspiration Signs and Symptoms  Outcome: Ongoing, Progressing     Problem: Device-Related Complication Risk (Enteral Nutrition)  Goal: Safe, Effective Therapy Delivery  Outcome: Ongoing, Progressing     Problem: Feeding Intolerance (Enteral Nutrition)  Goal: Feeding Tolerance  Outcome: Ongoing, Progressing     Problem: Skin Injury Risk Increased  Goal: Skin Health and Integrity  Outcome: Ongoing, Progressing

## 2023-05-14 PROBLEM — J96.00 ACUTE RESPIRATORY FAILURE: Status: ACTIVE | Noted: 2023-05-11

## 2023-05-14 LAB
ALBUMIN SERPL BCP-MCNC: 2.9 G/DL (ref 3.5–5.2)
ALLENS TEST: ABNORMAL
ALP SERPL-CCNC: 45 U/L (ref 55–135)
ALT SERPL W/O P-5'-P-CCNC: 48 U/L (ref 10–44)
ANION GAP SERPL CALC-SCNC: 8 MMOL/L (ref 8–16)
AST SERPL-CCNC: 25 U/L (ref 10–40)
BASOPHILS # BLD AUTO: 0.01 K/UL (ref 0–0.2)
BASOPHILS NFR BLD: 0.1 % (ref 0–1.9)
BILIRUB SERPL-MCNC: 0.8 MG/DL (ref 0.1–1)
BUN SERPL-MCNC: 27 MG/DL (ref 8–23)
CALCIUM SERPL-MCNC: 8.4 MG/DL (ref 8.7–10.5)
CHLORIDE SERPL-SCNC: 107 MMOL/L (ref 95–110)
CO2 SERPL-SCNC: 25 MMOL/L (ref 23–29)
CREAT SERPL-MCNC: 1.1 MG/DL (ref 0.5–1.4)
DELSYS: ABNORMAL
DIFFERENTIAL METHOD: ABNORMAL
EOSINOPHIL # BLD AUTO: 0 K/UL (ref 0–0.5)
EOSINOPHIL NFR BLD: 0 % (ref 0–8)
ERYTHROCYTE [DISTWIDTH] IN BLOOD BY AUTOMATED COUNT: 14.4 % (ref 11.5–14.5)
ERYTHROCYTE [SEDIMENTATION RATE] IN BLOOD BY WESTERGREN METHOD: 12 MM/H
EST. GFR  (NO RACE VARIABLE): >60 ML/MIN/1.73 M^2
FIO2: 30
GLUCOSE SERPL-MCNC: 118 MG/DL (ref 70–110)
GLUCOSE SERPL-MCNC: 119 MG/DL (ref 70–110)
HCO3 UR-SCNC: 25.9 MMOL/L (ref 24–28)
HCT VFR BLD AUTO: 48.6 % (ref 40–54)
HCT VFR BLD CALC: 48 %PCV (ref 36–54)
HGB BLD-MCNC: 16.9 G/DL (ref 14–18)
IMM GRANULOCYTES # BLD AUTO: 0.09 K/UL (ref 0–0.04)
IMM GRANULOCYTES NFR BLD AUTO: 1.1 % (ref 0–0.5)
LYMPHOCYTES # BLD AUTO: 0.4 K/UL (ref 1–4.8)
LYMPHOCYTES NFR BLD: 4.3 % (ref 18–48)
MAGNESIUM SERPL-MCNC: 2.1 MG/DL (ref 1.6–2.6)
MCH RBC QN AUTO: 31.1 PG (ref 27–31)
MCHC RBC AUTO-ENTMCNC: 34.8 G/DL (ref 32–36)
MCV RBC AUTO: 90 FL (ref 82–98)
MIN VOL: 8.8
MODE: ABNORMAL
MONOCYTES # BLD AUTO: 0.5 K/UL (ref 0.3–1)
MONOCYTES NFR BLD: 6.5 % (ref 4–15)
NEUTROPHILS # BLD AUTO: 7.2 K/UL (ref 1.8–7.7)
NEUTROPHILS NFR BLD: 88 % (ref 38–73)
NRBC BLD-RTO: 0 /100 WBC
PCO2 BLDA: 37.5 MMHG (ref 35–45)
PEEP: 5
PH SMN: 7.45 [PH] (ref 7.35–7.45)
PHOSPHATE SERPL-MCNC: 5.5 MG/DL (ref 2.7–4.5)
PIP: 20
PLATELET # BLD AUTO: 181 K/UL (ref 150–450)
PMV BLD AUTO: 10.3 FL (ref 9.2–12.9)
PO2 BLDA: 84 MMHG (ref 80–100)
POC BE: 2 MMOL/L
POC IONIZED CALCIUM: 1.22 MMOL/L (ref 1.06–1.42)
POC SATURATED O2: 97 % (ref 95–100)
POC TCO2: 27 MMOL/L (ref 23–27)
POTASSIUM BLD-SCNC: 3.5 MMOL/L (ref 3.5–5.1)
POTASSIUM SERPL-SCNC: 3.3 MMOL/L (ref 3.5–5.1)
PROT SERPL-MCNC: 5.8 G/DL (ref 6–8.4)
RBC # BLD AUTO: 5.43 M/UL (ref 4.6–6.2)
SAMPLE: ABNORMAL
SITE: ABNORMAL
SODIUM BLD-SCNC: 142 MMOL/L (ref 136–145)
SODIUM SERPL-SCNC: 140 MMOL/L (ref 136–145)
SP02: 97
VT: 450
WBC # BLD AUTO: 8.13 K/UL (ref 3.9–12.7)

## 2023-05-14 PROCEDURE — 63600175 PHARM REV CODE 636 W HCPCS: Performed by: INTERNAL MEDICINE

## 2023-05-14 PROCEDURE — C9254 INJECTION, LACOSAMIDE: HCPCS | Performed by: INTERNAL MEDICINE

## 2023-05-14 PROCEDURE — 85025 COMPLETE CBC W/AUTO DIFF WBC: CPT | Performed by: STUDENT IN AN ORGANIZED HEALTH CARE EDUCATION/TRAINING PROGRAM

## 2023-05-14 PROCEDURE — 99900031 HC PATIENT EDUCATION (STAT)

## 2023-05-14 PROCEDURE — 84295 ASSAY OF SERUM SODIUM: CPT

## 2023-05-14 PROCEDURE — 25000003 PHARM REV CODE 250: Performed by: STUDENT IN AN ORGANIZED HEALTH CARE EDUCATION/TRAINING PROGRAM

## 2023-05-14 PROCEDURE — 25000003 PHARM REV CODE 250: Performed by: INTERNAL MEDICINE

## 2023-05-14 PROCEDURE — 82330 ASSAY OF CALCIUM: CPT

## 2023-05-14 PROCEDURE — 99900035 HC TECH TIME PER 15 MIN (STAT)

## 2023-05-14 PROCEDURE — 99232 PR SUBSEQUENT HOSPITAL CARE,LEVL II: ICD-10-PCS | Mod: ,,, | Performed by: INTERNAL MEDICINE

## 2023-05-14 PROCEDURE — 37799 UNLISTED PX VASCULAR SURGERY: CPT

## 2023-05-14 PROCEDURE — 80053 COMPREHEN METABOLIC PANEL: CPT | Performed by: STUDENT IN AN ORGANIZED HEALTH CARE EDUCATION/TRAINING PROGRAM

## 2023-05-14 PROCEDURE — 84132 ASSAY OF SERUM POTASSIUM: CPT

## 2023-05-14 PROCEDURE — C9113 INJ PANTOPRAZOLE SODIUM, VIA: HCPCS | Performed by: STUDENT IN AN ORGANIZED HEALTH CARE EDUCATION/TRAINING PROGRAM

## 2023-05-14 PROCEDURE — 20000000 HC ICU ROOM

## 2023-05-14 PROCEDURE — 63600175 PHARM REV CODE 636 W HCPCS

## 2023-05-14 PROCEDURE — 63600175 PHARM REV CODE 636 W HCPCS: Performed by: STUDENT IN AN ORGANIZED HEALTH CARE EDUCATION/TRAINING PROGRAM

## 2023-05-14 PROCEDURE — 84100 ASSAY OF PHOSPHORUS: CPT | Performed by: STUDENT IN AN ORGANIZED HEALTH CARE EDUCATION/TRAINING PROGRAM

## 2023-05-14 PROCEDURE — 82803 BLOOD GASES ANY COMBINATION: CPT

## 2023-05-14 PROCEDURE — 99900026 HC AIRWAY MAINTENANCE (STAT)

## 2023-05-14 PROCEDURE — 94003 VENT MGMT INPAT SUBQ DAY: CPT

## 2023-05-14 PROCEDURE — 25000003 PHARM REV CODE 250

## 2023-05-14 PROCEDURE — 94761 N-INVAS EAR/PLS OXIMETRY MLT: CPT

## 2023-05-14 PROCEDURE — 25500020 PHARM REV CODE 255: Performed by: STUDENT IN AN ORGANIZED HEALTH CARE EDUCATION/TRAINING PROGRAM

## 2023-05-14 PROCEDURE — 99291 PR CRITICAL CARE, E/M 30-74 MINUTES: ICD-10-PCS | Mod: ,,, | Performed by: INTERNAL MEDICINE

## 2023-05-14 PROCEDURE — 99232 SBSQ HOSP IP/OBS MODERATE 35: CPT | Mod: ,,, | Performed by: INTERNAL MEDICINE

## 2023-05-14 PROCEDURE — 99291 CRITICAL CARE FIRST HOUR: CPT | Mod: ,,, | Performed by: INTERNAL MEDICINE

## 2023-05-14 PROCEDURE — 85014 HEMATOCRIT: CPT

## 2023-05-14 PROCEDURE — A9585 GADOBUTROL INJECTION: HCPCS | Performed by: STUDENT IN AN ORGANIZED HEALTH CARE EDUCATION/TRAINING PROGRAM

## 2023-05-14 PROCEDURE — 83735 ASSAY OF MAGNESIUM: CPT | Performed by: STUDENT IN AN ORGANIZED HEALTH CARE EDUCATION/TRAINING PROGRAM

## 2023-05-14 PROCEDURE — 63600175 PHARM REV CODE 636 W HCPCS: Performed by: HOSPITALIST

## 2023-05-14 PROCEDURE — 94799 UNLISTED PULMONARY SVC/PX: CPT

## 2023-05-14 RX ORDER — GADOBUTROL 604.72 MG/ML
7 INJECTION INTRAVENOUS
Status: COMPLETED | OUTPATIENT
Start: 2023-05-14 | End: 2023-05-14

## 2023-05-14 RX ORDER — ATORVASTATIN CALCIUM 40 MG/1
80 TABLET, FILM COATED ORAL DAILY
Status: DISCONTINUED | OUTPATIENT
Start: 2023-05-15 | End: 2023-05-15

## 2023-05-14 RX ORDER — DEXMEDETOMIDINE HYDROCHLORIDE 4 UG/ML
0-1.4 INJECTION, SOLUTION INTRAVENOUS CONTINUOUS
Status: DISCONTINUED | OUTPATIENT
Start: 2023-05-14 | End: 2023-05-16

## 2023-05-14 RX ORDER — COLESEVELAM 180 1/1
1875 TABLET ORAL 2 TIMES DAILY WITH MEALS
Status: CANCELLED | OUTPATIENT
Start: 2023-05-14

## 2023-05-14 RX ORDER — LANOLIN ALCOHOL/MO/W.PET/CERES
800 CREAM (GRAM) TOPICAL
Status: DISCONTINUED | OUTPATIENT
Start: 2023-05-14 | End: 2023-05-14

## 2023-05-14 RX ORDER — FUROSEMIDE 10 MG/ML
20 INJECTION INTRAMUSCULAR; INTRAVENOUS ONCE
Status: COMPLETED | OUTPATIENT
Start: 2023-05-14 | End: 2023-05-14

## 2023-05-14 RX ADMIN — MUPIROCIN 1 G: 20 OINTMENT TOPICAL at 08:05

## 2023-05-14 RX ADMIN — SODIUM CHLORIDE 300 MG: 9 INJECTION, SOLUTION INTRAVENOUS at 01:05

## 2023-05-14 RX ADMIN — DEXTROSE 500 MG: 50 INJECTION, SOLUTION INTRAVENOUS at 01:05

## 2023-05-14 RX ADMIN — PROPOFOL 30 MCG/KG/MIN: 10 INJECTION, EMULSION INTRAVENOUS at 12:05

## 2023-05-14 RX ADMIN — RIVAROXABAN 20 MG: 20 TABLET, FILM COATED ORAL at 05:05

## 2023-05-14 RX ADMIN — POTASSIUM BICARBONATE 35 MEQ: 391 TABLET, EFFERVESCENT ORAL at 08:05

## 2023-05-14 RX ADMIN — DEXAMETHASONE 1 MG: 1 TABLET ORAL at 08:05

## 2023-05-14 RX ADMIN — LORAZEPAM 2 MG: 2 INJECTION INTRAMUSCULAR; INTRAVENOUS at 12:05

## 2023-05-14 RX ADMIN — DEXMEDETOMIDINE HYDROCHLORIDE 0.2 MCG/KG/HR: 4 INJECTION, SOLUTION INTRAVENOUS at 08:05

## 2023-05-14 RX ADMIN — PROPOFOL 30 MCG/KG/MIN: 10 INJECTION, EMULSION INTRAVENOUS at 03:05

## 2023-05-14 RX ADMIN — TIMOLOL MALEATE 1 DROP: 2.5 SOLUTION/ DROPS OPHTHALMIC at 08:05

## 2023-05-14 RX ADMIN — SACUBITRIL AND VALSARTAN 1 TABLET: 24; 26 TABLET, FILM COATED ORAL at 08:05

## 2023-05-14 RX ADMIN — SODIUM CHLORIDE 100 MG: 9 INJECTION, SOLUTION INTRAVENOUS at 04:05

## 2023-05-14 RX ADMIN — POTASSIUM BICARBONATE 35 MEQ: 391 TABLET, EFFERVESCENT ORAL at 06:05

## 2023-05-14 RX ADMIN — GADOBUTROL 7 ML: 604.72 INJECTION INTRAVENOUS at 11:05

## 2023-05-14 RX ADMIN — METOPROLOL TARTRATE 100 MG: 50 TABLET, FILM COATED ORAL at 08:05

## 2023-05-14 RX ADMIN — LEVETIRACETAM INJECTION 1500 MG: 15 INJECTION INTRAVENOUS at 08:05

## 2023-05-14 RX ADMIN — CHLORHEXIDINE GLUCONATE 15 ML: 1.2 RINSE ORAL at 08:05

## 2023-05-14 RX ADMIN — DEXTROSE 500 MG: 50 INJECTION, SOLUTION INTRAVENOUS at 08:05

## 2023-05-14 RX ADMIN — METOPROLOL TARTRATE 5 MG: 5 INJECTION, SOLUTION INTRAVENOUS at 12:05

## 2023-05-14 RX ADMIN — FUROSEMIDE 20 MG: 10 INJECTION, SOLUTION INTRAMUSCULAR; INTRAVENOUS at 01:05

## 2023-05-14 RX ADMIN — PROPOFOL 30 MCG/KG/MIN: 10 INJECTION, EMULSION INTRAVENOUS at 11:05

## 2023-05-14 RX ADMIN — PANTOPRAZOLE SODIUM 40 MG: 40 INJECTION, POWDER, FOR SOLUTION INTRAVENOUS at 08:05

## 2023-05-14 RX ADMIN — ALLOPURINOL 100 MG: 100 TABLET ORAL at 08:05

## 2023-05-14 RX ADMIN — LORAZEPAM 2 MG: 2 INJECTION INTRAMUSCULAR; INTRAVENOUS at 08:05

## 2023-05-14 RX ADMIN — LORAZEPAM 2 MG: 2 INJECTION INTRAMUSCULAR; INTRAVENOUS at 07:05

## 2023-05-14 RX ADMIN — DEXTROSE 500 MG: 50 INJECTION, SOLUTION INTRAVENOUS at 05:05

## 2023-05-14 NOTE — PROGRESS NOTES
"[facility] Medicine  Progress Note    Patient Name: Teto Mendez  MRN: [id number]  Patient Class: IP- Inpatient   Admission Date: 5/11/2023  Length of Stay: 3 days  Attending Physician: Neo Clark MD  Primary Care Provider: Primary Doctor No        Subjective:     Principal Problem:Status epilepticus        HPI:  Teto Mendez is a 67 y.o. White male with known history of  glioblastoma s/p resection, seizures p.afib on xarelto who presented to ER this morning with left arm weakness, facial twitching and change in his speech that he suspected was seizure activity.  He took an extra keppra and presented to ER around 6:30-7 am.  Head CT/CTA in ER without hemorrhage or LVO and he was not a candidate for tPA regardless.  He was given lorazepam and keppra with positive response, however, while still in ER he became less responsive and repeat stat CT was ordered.  While in CT, he became hypoxic with depressed respiratory status and required bag-mask ventilation and emergent intubation on return to ICU.  History was obtained from the ER physician Sign-out.      Overview/Hospital Course:  Teto Mendez is a 67 year old male with a past medical history of glioblastoma s/p craniotomy complicated by seizures, obesity, HTN, AFib and GERD who presented with status epilepticus. Neurology has been consulted. The patient was intubated on propofol sedation with Keppra and valproic acid. Pulmonary has been consulted. Continuous EEG is ordered and still running. MRI brain is pending. His course has been complicated by Afib with RVR as well as a HFrEF seen on TTE which appears to be chronic. Cardiology has been consulted. He is on metoprolol and Xarelto with PRN diuresis.      Interval History: see "Hospital Course"    Review of Systems   Unable to perform ROS: Intubated   Objective:     Vital Signs (Most Recent):  Temp: 97.7 °F (36.5 °C) (05/14/23 0301)  Pulse: 71 (05/14/23 0501)  Resp: (!) 22 (05/14/23 " 0501)  BP: 133/80 (05/14/23 0301)  SpO2: 97 % (05/14/23 0501) Vital Signs (24h Range):  Temp:  [97.3 °F (36.3 °C)-97.7 °F (36.5 °C)] 97.7 °F (36.5 °C)  Pulse:  [59-78] 71  Resp:  [11-33] 22  SpO2:  [96 %-99 %] 97 %  BP: (124-149)/(75-89) 133/80  Arterial Line BP: (105-155)/(67-91) 130/78     Weight: 92.4 kg (203 lb 11.3 oz)  Body mass index is 30.08 kg/m².    Intake/Output Summary (Last 24 hours) at 5/14/2023 0742  Last data filed at 5/14/2023 0601  Gross per 24 hour   Intake 1614.24 ml   Output 1950 ml   Net -335.76 ml         Physical Exam  Vitals and nursing note reviewed.   Constitutional:       Appearance: He is ill-appearing.   HENT:      Head: Normocephalic and atraumatic.      Right Ear: External ear normal.      Left Ear: External ear normal.      Nose: Nose normal.      Comments: NG tube.     Mouth/Throat:      Mouth: Mucous membranes are moist.      Pharynx: Oropharynx is clear.      Comments: ETT.  Cardiovascular:      Rate and Rhythm: Normal rate. Rhythm irregular.      Pulses: Normal pulses.      Heart sounds: Normal heart sounds.   Pulmonary:      Effort: Pulmonary effort is normal.      Breath sounds: Normal breath sounds.   Abdominal:      General: Bowel sounds are normal.      Palpations: Abdomen is soft.   Genitourinary:     Comments: Iraheta.  Musculoskeletal:      Right lower leg: No edema.      Left lower leg: No edema.   Skin:     General: Skin is warm and dry.   Neurological:      Comments: Sedated.           Significant Labs: All pertinent labs within the past 24 hours have been reviewed.    Significant Imaging: I have reviewed all pertinent imaging results/findings within the past 24 hours.      Assessment/Plan:      * Status epilepticus  In setting of glioblastoma.  -Continue Depacon and Keppra  -Continue Diprivan infusion  -Neurology following  -Continuous EEG  -Continue intubation with mechanical ventilation  -MRI brain    Acute respiratory failure  In setting of status  epilepticus.  -Continuous EEG  -Continue mechanical ventilation with sedation as long as patient remains in status epilepticus  -Pulmonary consulted  -ABG PRN      HFrEF (heart failure with reduced ejection fraction)  Unclear chronicity. BNP unremarkable.  -Cardiology following  -Metoprolol  -Lasix PRN  -Telemetry      GERD (gastroesophageal reflux disease)  -PPI      Gout  -Continue home allopurinol      Obesity  Body mass index is 30.08 kg/m². Morbid obesity complicates all aspects of disease management from diagnostic modalities to treatment.         Glioblastoma  Chronic. Receives care in [place].  -Continue Decadron  -MRI brain      Hypokalemia  -Replete PRN  -Telemetry  -Trend K        Paroxysmal atrial fibrillation  -Xarelto  -Metoprolol  -Telemetry  -Cardiology consulted        Essential hypertension  -Hold home medications  -Continue to monitor      VTE Risk Mitigation (From admission, onward)         Ordered     rivaroxaban tablet 20 mg  With dinner         05/13/23 0902     Reason for No Pharmacological VTE Prophylaxis  Once        Question:  Reasons:  Answer:  Already adequately anticoagulated on oral Anticoagulants    05/11/23 1054     IP VTE HIGH RISK PATIENT  Once         05/11/23 1054     Place sequential compression device  Until discontinued         05/11/23 1054                Discharge Planning   BARBARA: 5/17/2023     Code Status: Full Code   Is the patient medically ready for discharge?:     Reason for patient still in hospital (select all that apply): Patient trending condition, Laboratory test, Treatment, Imaging and Consult recommendations  Discharge Plan A: Home with family                  Neo Clark MD  Department of Hospital Medicine   [facility]

## 2023-05-14 NOTE — PROGRESS NOTES
[facility]  Department of Cardiology  Consult Note      PATIENT NAME: Teto Mendez  MRN: [id number]  TODAY'S DATE: 05/14/2023  ADMIT DATE: 5/11/2023                          CONSULT REQUESTED BY: Neo Clark MD    SUBJECTIVE     PRINCIPAL PROBLEM: Status epilepticus      REASON FOR CONSULT:    AFRVR    Interval history  05/14/2023    Patient remains on mechanical ventilation and sedation with propofol.  Plans for MRI of the brain today.  Potassium 3.3 this a.m., replaced was 70 mEq of KCl.  Urinary output 1950 mL yesterday.  No output recorded yet today.  Hemodynamically stable.     MRI today:  IMPRESSION:  1. Cortical foci restricted signal intensity on diffusion weighted images compatible with a benign nonhemorrhagic cortical infarct with additional small infarct involving the deep subcortical white matter of the right parietal lobe.  2. No evidence of intracranial hemorrhage.    HPI:    Patient is a 67-year-old male with past medical history of GERD, glioblastoma multiforme s/p craniotomy, chemotherapy and radiation diagnosed in August 2022, history of blood clots, hypertension, PAF on Xarelto, seizures who presented to the ED with left facial pushing, left facial droop, slurred speech, left arm weakness that began at 3:00 a.m. on 5/11.  Keppra has been recently decreased.  In ED he was given 1 g of Keppra, 2 mg of Ativan which broke his seizure and he was subsequently alert and responsive..  Later in CT, he had sudden worsening mental status, unresponsiveness, weakness, aphasia.  O2 saturations high 80s, and patient was intubated emergently and placed on mechanical ventilation for respiratory failure.  Seizure activity restarted and was erupted with Ativan and propofol bolus. No acute stroke/hemorrhage on CT.  Neurology is following for status epilepticus.    05/12/2023:  Patient was on EEG for monitoring of status epilepticus.  Seizure at 4:00 a.m..  Patient remains in AFib    05/13/2023:  Patient was also found to be in AFib RVR this admission, requiring IV diltiazem.  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 Normotensive.  K 3.1, Cr 1.0, Mag 2.1; CXR this am coarse interstitial markings the LLL and trace L-sided pleural effusion; on propofol drip at 40 mics per kg per minute.  On mechanical ventilation 5 of PEEP 30% FiO2.  family at bedside.  Patient is originally from [place] and has a cardiologist there.  Known history of atrial fibrillation.  History of splenic infarct.  Unknown if patient has history of reduced EF or heart failure.  He was on Lasix at home.  Patient is visiting from [place].    ECHO this am  The left ventricle is mildly enlarged with concentric remodeling and  The estimated ejection fraction is 38%.  There is left ventricular global hypokinesis.  Mild mitral regurgitation.  Normal central venous pressure (3 mmHg).  The estimated PA systolic pressure is 26 mmHg.  Atrial fibrillation observed.  Normal right ventricular size with normal right ventricular systolic function.  Moderate left atrial enlargement.  Moderate right atrial enlargement.        FROM H&P  Teto Mendez is a 67 y.o. White male with known history of  glioblastoma s/p resection, seizures p.afib on xarelto who presented to ER this morning with left arm weakness, facial twitching and change in his speech that he suspected was seizure activity.  He took an extra keppra and presented to ER around 6:30-7 am.  Head CT/CTA in ER without hemorrhage or LVO and he was not a candidate for tPA regardless.  He was given lorazepam and keppra with positive response, however, while still in ER he became less responsive and repeat stat CT was ordered.  While in CT, he became  hypoxic with depressed respiratory status and required bag-mask ventilation and emergent intubation on return to ICU.  History was obtained from the ER physician Sign-out.            Review of patient's allergies indicates:  No Known Allergies    Past Medical History:   Diagnosis Date    GERD (gastroesophageal reflux disease)     Glioblastoma     H/O blood clots     Hypertension     Paroxysmal atrial fibrillation     Seizures      No past surgical history on file.        REVIEW OF SYSTEMS  Unable to obtain. Sedated on mechanical ventilation    OBJECTIVE     VITAL SIGNS (Most Recent)  Temp: 97.7 °F (36.5 °C) (05/14/23 0301)  Pulse: 72 (05/14/23 1045)  Resp: (!) 21 (05/14/23 1045)  BP: 139/87 (05/14/23 1235)  SpO2: 98 % (05/14/23 1045)    VENTILATION STATUS  Resp: (!) 21 (05/14/23 1045)  SpO2: 98 % (05/14/23 1045)  Vent Mode: A/C  Oxygen Concentration (%):  [30] 30  Resp Rate Total:  [15 br/min-20 br/min] 18 br/min  Vt Set:  [450 mL-500 mL] 450 mL  PEEP/CPAP:  [5 cmH20] 5 cmH20  Mean Airway Pressure:  [8.4 cmH20-9.3 cmH20] 8.8 cmH20        I & O (Last 24H):  Intake/Output Summary (Last 24 hours) at 5/14/2023 1439  Last data filed at 5/14/2023 0601  Gross per 24 hour   Intake 1614.24 ml   Output 1950 ml   Net -335.76 ml       WEIGHTS  Wt Readings from Last 3 Encounters:   05/13/23 0001 92.4 kg (203 lb 11.3 oz)   05/11/23 1230 84 kg (185 lb 3 oz)   05/11/23 0641 83.9 kg (185 lb)   05/13/23 1042 92.1 kg (203 lb)       Physical examination:      Constitutional:  Critically ill appearing male sedated on mechanical ventilation, NAD  Neck: no carotid bruit, no JVD  Lungs: coarse breath sounds, mechanical ventilation- FiO2 30%, 5 peep  Chest Wall: no tenderness  CARDIAC:  Irregular rate and rhythm, soft systolic murmur  Abdomen: soft, bowel sounds normal; NG tube in left nare  Extremities: Extremities normal, atraumatic, no cyanosis, clubbing; 1+ pitting edema in bilateral lower extremities.  Skin: Skin color, texture, turgor  normal. No rashes or lesions  Neuro: Sedated, on vent     HOME MEDICATIONS:  No current facility-administered medications on file prior to encounter.     Current Outpatient Medications on File Prior to Encounter   Medication Sig Dispense Refill    allopurinoL (ZYLOPRIM) 100 MG tablet Take 100 mg by mouth once daily.      amLODIPine (NORVASC) 10 MG tablet Take 10 mg by mouth once daily.      dexAMETHasone (DECADRON) 1 MG Tab Take 1 mg by mouth every 12 (twelve) hours.      hydrALAZINE (APRESOLINE) 25 MG tablet Take 25 mg by mouth 3 (three) times daily.      levETIRAcetam (KEPPRA) 500 MG Tab Take 500 mg by mouth 2 (two) times daily.      metoprolol succinate (TOPROL-XL) 100 MG 24 hr tablet Take 100 mg by mouth once daily.      pantoprazole (PROTONIX) 40 MG tablet Take 40 mg by mouth once daily.      XARELTO 20 mg Tab Take 20 mg by mouth once daily.      clotrimazole (MYCELEX) 10 mg kathleen Take 1 tablet by mouth 3 (three) times daily.      colchicine (COLCRYS) 0.6 mg tablet Take 0.6 mg by mouth once daily.      furosemide (LASIX) 20 MG tablet Take 20 mg by mouth once daily.      meclizine (ANTIVERT) 12.5 mg tablet Take 25 mg by mouth 3 (three) times daily.      rivaroxaban (XARELTO) 20 mg Tab Take 20 mg by mouth daily with dinner or evening meal.      timolol maleate 0.5% (TIMOPTIC) 0.5 % Drop Place 1 drop into both eyes 2 (two) times daily.         SCHEDULED MEDS:   allopurinoL  100 mg Per NG tube Daily    [START ON 5/15/2023] atorvastatin  80 mg Oral Daily    chlorhexidine  15 mL Mouth/Throat BID    dexAMETHasone  1 mg Per NG tube Q12H    lacosamide (VIMPAT) IVPB  100 mg Intravenous Q12H    levetiracetam IV  1,500 mg Intravenous Q12H    metoprolol tartrate  100 mg Per NG tube BID    mupirocin   Nasal BID    pantoprazole  40 mg Intravenous Daily    rivaroxaban  20 mg Per NG tube Daily with dinner    timolol maleate 0.25%  1 drop Both Eyes BID    valproate sodium (DEPACON) IVPB  500 mg Intravenous Q8H       CONTINUOUS  INFUSIONS:   propofoL 20 mcg/kg/min (05/14/23 1330)       PRN MEDS:acetaminophen, colchicine, hydrALAZINE, lorazepam, metoprolol, ondansetron, polyethylene glycol, potassium bicarbonate, potassium bicarbonate, potassium bicarbonate, sodium chloride 0.9%    LABS AND DIAGNOSTICS     CBC LAST 3 DAYS  Recent Labs   Lab 05/12/23 0357 05/12/23 0410 05/13/23 0422 05/13/23 0504 05/14/23 0337 05/14/23 0351   WBC 7.76  --  6.37  --  8.13  --    RBC 5.15  --  5.00  --  5.43  --    HGB 15.9  --  15.7  --  16.9  --    HCT 46.0   < > 43.3 47 48.6 48   MCV 89  --  87  --  90  --    MCH 30.9  --  31.4*  --  31.1*  --    MCHC 34.6  --  36.3*  --  34.8  --    RDW 14.1  --  13.9  --  14.4  --      --  180  --  181  --    MPV 9.8  --  10.1  --  10.3  --    GRAN 82.1*  6.4  --  82.0*  5.2  --  88.0*  7.2  --    LYMPH 10.4*  0.8*  --  8.5*  0.5*  --  4.3*  0.4*  --    MONO 6.7  0.5  --  8.3  0.5  --  6.5  0.5  --    BASO 0.00  --  0.01  --  0.01  --    NRBC 0  --  0  --  0  --     < > = values in this interval not displayed.       COAGULATION LAST 3 DAYS  Recent Labs   Lab 05/11/23  0657   INR 1.3*       CHEMISTRY LAST 3 DAYS  Recent Labs   Lab 05/12/23 0357 05/12/23 0410 05/12/23  0532 05/13/23 0422 05/13/23 0504 05/13/23  1939 05/14/23 0337 05/14/23 0351     --   --  140  --   --  140  --    K 3.3*  --   --  3.1*  --  4.3 3.3*  --      --   --  108  --   --  107  --    CO2 22*  --   --  23  --   --  25  --    ANIONGAP 11  --   --  9  --   --  8  --    BUN 19  --   --  22  --   --  27*  --    CREATININE 1.0  --   --  1.0  --   --  1.1  --    GLU 97  --   --  123*  --   --  118*  --    CALCIUM 8.8  --   --  8.7  --   --  8.4*  --    PH  --    < > 7.497*  --  7.527*  --   --  7.448   MG  --   --   --  2.1  --   --  2.1  --    ALBUMIN 3.5  --   --  2.9*  --   --  2.9*  --    PROT 6.1  --   --  5.4*  --   --  5.8*  --    ALKPHOS 47*  --   --  41*  --   --  45*  --    *  --   --  64*  --   --   48*  --    AST 69*  --   --  35  --   --  25  --    BILITOT 1.2*  --   --  0.8  --   --  0.8  --     < > = values in this interval not displayed.       CARDIAC PROFILE LAST 3 DAYS  Recent Labs   Lab 05/13/23  0422 05/13/23  1544   BNP 93  --    TROPONINIHS  --  7.0       ENDOCRINE LAST 3 DAYS  Recent Labs   Lab 05/11/23  0657   TSH 3.900       LAST ARTERIAL BLOOD GAS  ABG  Recent Labs   Lab 05/14/23  0351   PH 7.448   PO2 84   PCO2 37.5   HCO3 25.9   BE 2       LAST 7 DAYS MICROBIOLOGY   Microbiology Results (last 7 days)       Procedure Component Value Units Date/Time    Culture, Respiratory with Gram Stain [[id number]]     Order Status: Canceled Specimen: Respiratory             MOST RECENT IMAGING  MRI Brain W WO Contrast   ADDENDUM #1     Above findings were relayed to on-call neurologist Dr. Sepulveda on 5/14/2023 at 12:36 PM with feedback verification.    Electronically signed by:  Solomon Kohli MD  5/14/2023 12:36 PM CDT Workstation: [id number]     ORIGINAL REPORT     Examination: MRI of the brain with and without contrast.    Clinical history: Neurological deficit. Acute stroke suspected.    Technical factors: Routine MR evaluation brain was established utilizing a combination of T1, T2, and gradient echo sequences before and after the routine administration of intravenous contrast ministration. Postcontrast images were obtained following the administration of 7 mL of intravenous contrast.    FINDINGS:    Diffusion weighted images reveal evidence of focal areas are restricted signal intensity along the cortical interface in the posterior temporal lobe extending upward to involve the central sulcus along the posterior edge additional foci of restricted signal within the right posterior parietal lobe the maintain relative bright signal intensity. No accompanying. Intensity changes are established on the ADC mapping. Lack of any significant signal alteration based on the accompanying subsequent images argues  against the presence of active or microvascular hemorrhages. There is no evidence of foci of hemosiderin staining. The ventricles maintain normal size and overall contour. There is no evidence of masses, mass effect, or midline shift. Major intracranial flow is the base the brain are well developed. Postcontrast study disclosed no evidence of enhancement, vascular anomaly, significant leptomeningeal enhancement.    IMPRESSION:  1. Cortical foci restricted signal intensity on diffusion weighted images compatible with a benign nonhemorrhagic cortical infarct with additional small infarct involving the deep subcortical white matter of the right parietal lobe.  2. No evidence of intracranial hemorrhage.    Electronically signed by:  Solomon Kohli MD  5/14/2023 12:14 PM CDT Workstation: [id number]  X-Ray Chest AP Portable  HISTORY: vent    COMPARISON:5/13/2023 0545 hours.    FINDINGS:Tip of the ET tube lies roughly 1.6 cm the tracheal mio. The NG tube passes in the stomach. Lungs are in a state of diminished inspiration allowing bibasilar crowding of vessels and widening the cardiomediastinal silhouette. No evidence of pulmonic limited or pleural effusion. Post cholecystectomy changes.    IMPRESSION:  1. Stable appearance the chest when compared to previous.  2. Appropriate position of all support devices.    Electronically signed by:  Solomon Kohli MD  5/14/2023 8:11 AM CDT Workstation: [id number]      ECHOCARDIOGRAM RESULTS (last 5)  No results found for this or any previous visit.      CURRENT/PREVIOUS VISIT EKG  Results for orders placed or performed during the hospital encounter of 05/11/23   ECG 12 lead    Collection Time: 05/11/23  7:28 AM    Narrative    Test Reason : I63.9,    Vent. Rate : 120 BPM     Atrial Rate : 150 BPM     P-R Int : 000 ms          QRS Dur : 104 ms      QT Int : 370 ms       P-R-T Axes : 000 016 -12 degrees     QTc Int : 522 ms    Atrial fibrillation with rapid ventricular  response  Nonspecific ST abnormality  Prolonged QT  Abnormal ECG  No previous ECGs available  Confirmed by Tadeo BARTLETT, Joe CEBALLOS ([id number]) on 5/11/2023 10:07:18 PM    Referred By: [email or web address]   SELF           Confirmed By:Joe Piedra MD           ASSESSMENT/PLAN:     Active Hospital Problems    Diagnosis    *Status epilepticus    Obesity    Gout    GERD (gastroesophageal reflux disease)    HFrEF (heart failure with reduced ejection fraction)    Essential hypertension    Paroxysmal atrial fibrillation    Hypokalemia    Glioblastoma    Acute respiratory failure       ASSESSMENT & PLAN:     HFrEF-EF 38%  Paroxysmal Atrial Fibrillation  Hypokalemia  Status Epilepticus  Acute respiratory failure with hypoxia and hypercapnia  Glioblastoma        RECOMMENDATIONS:    Remains sedated on mechical ventilation. Hemodynamically stable.  Patient has history of AF.  Rate controlled at this time. Continue Xarelto 20 mg daily. Continue metoprolol 100 mg BID.  MRI brain w/wo contrast today with findings of benign nonhemorrhagic cortical infarct with additional small infarct involving the deep subcortical white matter of the R parietal lobe.  Neurology following.  Hypokalemic with K 3.1 this am.  Replaced per protocol.  Continue to check and replace potassium and magnesium. Goal for potassium is 4.0, and goal for magnesium is 2.0.    ECHO this admission- EF 38% and LV global hypokinesis.  No previous ECHO to compare.  Unknown if reduced EF is new or chronic.  Strict I's and O's.  We will start on Entresto 24-26 mg BID for heart failure. Trend renal function. Cr 1.1 today.  Records requesting from cardiologist in [place].  Thank you for the consultation.  We will continue to follow.      Kiana Moore NP  Department of Cardiology  Date of Service: 05/14/2023    Patient has history of chronic atrial fibrillation with history of LV dysfunction has been chronic oral anticoagulation therapy with Xarelto at 20 mg daily.  His MRI shows  evidence of possible new lacunar infarct.  Will continue to maintain on oral anticoagulation therapy as there is no evidence of garcia-infarct bleed.  Await Neurology input  As discussed above continue on other medications recommendations.  Records from [place] still awaited.    I have personally interviewed and examined the patient, I have reviewed the Nurse Practitioner's history and physical, assessment, and plan. I agree with the findings and plan.    Zachariah Hughes M.D.  Department of Cardiology  Date of Service: 05/14/2023  9:45 AM

## 2023-05-14 NOTE — PLAN OF CARE
Plan of care and education reviewed with family at bedside. Pt's MRI complete. Sedation paused. Verbalization of understanding expressed.   Problem: Adult Inpatient Plan of Care  Goal: Plan of Care Review  Outcome: Ongoing, Progressing  Goal: Patient-Specific Goal (Individualized)  Outcome: Ongoing, Progressing  Goal: Absence of Hospital-Acquired Illness or Injury  Outcome: Ongoing, Progressing  Goal: Optimal Comfort and Wellbeing  Outcome: Ongoing, Progressing  Goal: Readiness for Transition of Care  Outcome: Ongoing, Progressing     Problem: Fall Injury Risk  Goal: Absence of Fall and Fall-Related Injury  Outcome: Ongoing, Progressing     Problem: Restraint, Nonbehavioral (Nonviolent)  Goal: Absence of Harm or Injury  Outcome: Ongoing, Progressing     Problem: Communication Impairment (Mechanical Ventilation, Invasive)  Goal: Effective Communication  Outcome: Ongoing, Progressing     Problem: Device-Related Complication Risk (Mechanical Ventilation, Invasive)  Goal: Optimal Device Function  Outcome: Ongoing, Progressing     Problem: Inability to Wean (Mechanical Ventilation, Invasive)  Goal: Mechanical Ventilation Liberation  Outcome: Ongoing, Progressing     Problem: Nutrition Impairment (Mechanical Ventilation, Invasive)  Goal: Optimal Nutrition Delivery  Outcome: Ongoing, Progressing     Problem: Skin and Tissue Injury (Mechanical Ventilation, Invasive)  Goal: Absence of Device-Related Skin and Tissue Injury  Outcome: Ongoing, Progressing     Problem: Ventilator-Induced Lung Injury (Mechanical Ventilation, Invasive)  Goal: Absence of Ventilator-Induced Lung Injury  Outcome: Ongoing, Progressing     Problem: Skin and Tissue Injury (Artificial Airway)  Goal: Absence of Device-Related Skin or Tissue Injury  Outcome: Ongoing, Progressing     Problem: Noninvasive Ventilation Acute  Goal: Effective Unassisted Ventilation and Oxygenation  Outcome: Ongoing, Progressing     Problem: Infection  Goal: Absence of Infection  Signs and Symptoms  Outcome: Ongoing, Progressing     Problem: Aspiration (Enteral Nutrition)  Goal: Absence of Aspiration Signs and Symptoms  Outcome: Ongoing, Progressing     Problem: Device-Related Complication Risk (Enteral Nutrition)  Goal: Safe, Effective Therapy Delivery  Outcome: Ongoing, Progressing     Problem: Feeding Intolerance (Enteral Nutrition)  Goal: Feeding Tolerance  Outcome: Ongoing, Progressing     Problem: Skin Injury Risk Increased  Goal: Skin Health and Integrity  Outcome: Ongoing, Progressing

## 2023-05-14 NOTE — PROGRESS NOTES
Pulmonary/Critical Care Progress Note      PATIENT NAME: Teto Mendez  MRN: [id number]  TODAY'S DATE: 2023  12:50 PM  ADMIT DATE: 2023  AGE: 67 y.o. : 1955      HPI:  Called by nurse because they could not reach anesthesia to intubate a patient who was being bagged.  The patient had a change in mental status in the emergency room after having had a CTA earlier in the morning which was negative.  In CT, the patient was not breathing effectively and was cyanotic and the scanner broke so the patient was brought up to the ICU emergently.  The patient's past medical history is significant for glioblastoma multiforme for which he has been receiving radiation and chemotherapy.  He also has had a right frontal craniotomy.  At 3:30 a.m. in the morning the patient was playing a game and noted twitching of his face with slurred speech and weakness to his left arm.  He took an extra Keppra at that time.  He arrived at the ER at 6:45 a.m..     the patient remains on the ventilator and sedated with 60 of propofol.  He is also on 5 Cardizem.  Had further seizure activity during the night.    - pt remains sedated, on vent, on continuous EEG. There is EEG evidence of seizure activity so neuro has ordered VPA. Propofol has been weaned to 30    - continues sedated, on vent. Mri was done today and shows infarct in R parietal and R temporal lobes. Continues on propofol 20mg. He did have seizure activity twice today    REVIEW OF SYSTEMS  Unobtainable    No change in the patient's Past Medical History, Past Surgical History, Social History or Family History since admission.      VITAL SIGNS (MOST RECENT)  Temp: 97.7 °F (36.5 °C) (23 0301)  Pulse: 72 (23 1045)  Resp: (!) 21 (23 1045)  BP: 139/87 (23 1235)  SpO2: 98 % (23 1045)    INTAKE AND OUTPUT (LAST 24 HOURS):  Intake/Output Summary (Last 24 hours) at 2023 1432  Last data filed at 2023 0601  Gross per 24 hour    Intake 1614.24 ml   Output 1950 ml   Net -335.76 ml         WEIGHT  Wt Readings from Last 1 Encounters:   05/13/23 92.4 kg (203 lb 11.3 oz)       PHYSICAL EXAM  GENERAL: Older patient, intubated, unresponsive  HEENT: Pupils equal and reactive. Nose intact. Pharynx intubated with ET tube and OG tube. EEG leads over scalp. + cough with suctioning  NECK: Supple.   HEART: Regular rate and rhythm. No murmur or gallop auscultated.  LUNGS: Clear to auscultation and percussion. Lung excursion symmetrical. No change in fremitus. No adventitial noises.  ABDOMEN: Bowel sounds present. Non-tender, no masses palpated.  : Normal anatomy.  EXTREMITIES: Normal muscle tone and joint movement, no cyanosis or clubbing.   LYMPHATICS: No adenopathy palpated, no edema.  SKIN: Dry, intact, no lesions.   NEURO:  Sedated on propofol  PSYCH:  Unable to assess      CBC LAST (LAST 24 HOURS)  Recent Labs   Lab 05/14/23 0337 05/14/23  0351   WBC 8.13  --    RBC 5.43  --    HGB 16.9  --    HCT 48.6 48   MCV 90  --    MCH 31.1*  --    MCHC 34.8  --    RDW 14.4  --      --    MPV 10.3  --    GRAN 88.0*  7.2  --    LYMPH 4.3*  0.4*  --    MONO 6.5  0.5  --    BASO 0.01  --    NRBC 0  --          CHEMISTRY LAST (LAST 24 HOURS)  Recent Labs   Lab 05/14/23 0337 05/14/23  0351     --    K 3.3*  --      --    CO2 25  --    ANIONGAP 8  --    BUN 27*  --    CREATININE 1.1  --    *  --    CALCIUM 8.4*  --    PH  --  7.448   MG 2.1  --    ALBUMIN 2.9*  --    PROT 5.8*  --    ALKPHOS 45*  --    ALT 48*  --    AST 25  --    BILITOT 0.8  --              LAST 7 DAYS MICROBIOLOGY   Microbiology Results (last 7 days)       Procedure Component Value Units Date/Time    Culture, Respiratory with Gram Stain [[id number]]     Order Status: Canceled Specimen: Respiratory             MOST RECENT IMAGING  MRI Brain W WO Contrast   ADDENDUM #1     Above findings were relayed to on-call neurologist Dr. Sepulveda on 5/14/2023 at 12:36 PM with  feedback verification.    Electronically signed by:  Solomon Kohli MD  5/14/2023 12:36 PM CDT Workstation: [id number]     ORIGINAL REPORT     Examination: MRI of the brain with and without contrast.    Clinical history: Neurological deficit. Acute stroke suspected.    Technical factors: Routine MR evaluation brain was established utilizing a combination of T1, T2, and gradient echo sequences before and after the routine administration of intravenous contrast ministration. Postcontrast images were obtained following the administration of 7 mL of intravenous contrast.    FINDINGS:    Diffusion weighted images reveal evidence of focal areas are restricted signal intensity along the cortical interface in the posterior temporal lobe extending upward to involve the central sulcus along the posterior edge additional foci of restricted signal within the right posterior parietal lobe the maintain relative bright signal intensity. No accompanying. Intensity changes are established on the ADC mapping. Lack of any significant signal alteration based on the accompanying subsequent images argues against the presence of active or microvascular hemorrhages. There is no evidence of foci of hemosiderin staining. The ventricles maintain normal size and overall contour. There is no evidence of masses, mass effect, or midline shift. Major intracranial flow is the base the brain are well developed. Postcontrast study disclosed no evidence of enhancement, vascular anomaly, significant leptomeningeal enhancement.    IMPRESSION:  1. Cortical foci restricted signal intensity on diffusion weighted images compatible with a benign nonhemorrhagic cortical infarct with additional small infarct involving the deep subcortical white matter of the right parietal lobe.  2. No evidence of intracranial hemorrhage.    Electronically signed by:  Solomon Kohli MD  5/14/2023 12:14 PM CDT Workstation: [id number]  X-Ray Chest AP Portable  HISTORY:  vent    COMPARISON:5/13/2023 0545 hours.    FINDINGS:Tip of the ET tube lies roughly 1.6 cm the tracheal mio. The NG tube passes in the stomach. Lungs are in a state of diminished inspiration allowing bibasilar crowding of vessels and widening the cardiomediastinal silhouette. No evidence of pulmonic limited or pleural effusion. Post cholecystectomy changes.    IMPRESSION:  1. Stable appearance the chest when compared to previous.  2. Appropriate position of all support devices.    Electronically signed by:  Solomon Kohli MD  5/14/2023 8:11 AM CDT Workstation: [id number]      CURRENT VISIT EKG  Results for orders placed or performed during the hospital encounter of 05/11/23   ECG 12 lead    Narrative    Test Reason : I63.9,    Vent. Rate : 120 BPM     Atrial Rate : 150 BPM     P-R Int : 000 ms          QRS Dur : 104 ms      QT Int : 370 ms       P-R-T Axes : 000 016 -12 degrees     QTc Int : 522 ms    Atrial fibrillation with rapid ventricular response  Nonspecific ST abnormality  Prolonged QT  Abnormal ECG  No previous ECGs available    Referred By: [email or web address]   SELF           Confirmed By:        ECHOCARDIOGRAM RESULTS  No results found for this or any previous visit.        VENTILATOR INFORMATION  Vent Mode: A/C  Oxygen Concentration (%):  [30] 30  Resp Rate Total:  [15 br/min-20 br/min] 18 br/min  Vt Set:  [450 mL-500 mL] 450 mL  PEEP/CPAP:  [5 cmH20] 5 cmH20  Mean Airway Pressure:  [8.4 cmH20-9.3 cmH20] 8.8 cmH20           LAST ARTERIAL BLOOD GAS  ABG  Recent Labs   Lab 05/14/23  0351   PH 7.448   PO2 84   PCO2 37.5   HCO3 25.9   BE 2         IMPRESSION AND PLAN  Status epilepticus  -focus likely his glioblastoma site  -history of seizures prior to this admission  History of glioblastoma multiforme under treatment in [place]  Mechanical ventilation  -to protect airway and maintain ventilatory drive with his status  - ABG daily  - SAT/SBT and extubate when appropriate per neuro  Atrial fibrillation  -  continue Xarelto  - continue metoprolol  Hypertension  -metoprolol  Hypokalemia  - replace potassium  -Trend labs  Transaminitis  -Trend labs      Critical care time spent reviewing the chart, examining the patient, reviewing the labs, reviewing the radiological findings, discussing care with nursing, physicians, and respiratory and creating the note and  has been greater than 35 minutes    Manju Elliott MD  Date of Service: 05/14/2023  12:50 PM

## 2023-05-14 NOTE — SUBJECTIVE & OBJECTIVE
"Interval History: see "Hospital Course"    Review of Systems   Unable to perform ROS: Intubated   Objective:     Vital Signs (Most Recent):  Temp: 97.7 °F (36.5 °C) (05/14/23 0301)  Pulse: 71 (05/14/23 0501)  Resp: (!) 22 (05/14/23 0501)  BP: 133/80 (05/14/23 0301)  SpO2: 97 % (05/14/23 0501) Vital Signs (24h Range):  Temp:  [97.3 °F (36.3 °C)-97.7 °F (36.5 °C)] 97.7 °F (36.5 °C)  Pulse:  [59-78] 71  Resp:  [11-33] 22  SpO2:  [96 %-99 %] 97 %  BP: (124-149)/(75-89) 133/80  Arterial Line BP: (105-155)/(67-91) 130/78     Weight: 92.4 kg (203 lb 11.3 oz)  Body mass index is 30.08 kg/m².    Intake/Output Summary (Last 24 hours) at 5/14/2023 0742  Last data filed at 5/14/2023 0601  Gross per 24 hour   Intake 1614.24 ml   Output 1950 ml   Net -335.76 ml         Physical Exam  Vitals and nursing note reviewed.   Constitutional:       Appearance: He is ill-appearing.   HENT:      Head: Normocephalic and atraumatic.      Right Ear: External ear normal.      Left Ear: External ear normal.      Nose: Nose normal.      Comments: NG tube.     Mouth/Throat:      Mouth: Mucous membranes are moist.      Pharynx: Oropharynx is clear.      Comments: ETT.  Cardiovascular:      Rate and Rhythm: Normal rate. Rhythm irregular.      Pulses: Normal pulses.      Heart sounds: Normal heart sounds.   Pulmonary:      Effort: Pulmonary effort is normal.      Breath sounds: Normal breath sounds.   Abdominal:      General: Bowel sounds are normal.      Palpations: Abdomen is soft.   Genitourinary:     Comments: Iraheta.  Musculoskeletal:      Right lower leg: No edema.      Left lower leg: No edema.   Skin:     General: Skin is warm and dry.   Neurological:      Comments: Sedated.           Significant Labs: All pertinent labs within the past 24 hours have been reviewed.    Significant Imaging: I have reviewed all pertinent imaging results/findings within the past 24 hours.  "

## 2023-05-14 NOTE — ASSESSMENT & PLAN NOTE
Unclear chronicity. BNP unremarkable.  -Cardiology following  -Metoprolol  -Lasix PRN  -Telemetry

## 2023-05-14 NOTE — PROCEDURES
CONTINUOUS VIDEO EEG REPORT    NAME: Teto Mendez  : 1955  MRN: [id number]    DATE of EE2023 to 2023  DURATION OF EE hr 26 mins    CLINICAL INDICATION: This is a 67 y.o. male with history of glioblastoma multiforme s/p craniotomy, chemotherapy and radiotherapy, as well as seizure disorder being evaluated for status epilepticus.    MEDICATIONS:     allopurinoL  100 mg Per NG tube Daily    [START ON 5/15/2023] atorvastatin  80 mg Oral Daily    chlorhexidine  15 mL Mouth/Throat BID    dexAMETHasone  1 mg Per NG tube Q12H    lacosamide (VIMPAT) IVPB  100 mg Intravenous Q12H    levetiracetam IV  1,500 mg Intravenous Q12H    metoprolol tartrate  100 mg Per NG tube BID    mupirocin   Nasal BID    pantoprazole  40 mg Intravenous Daily    rivaroxaban  20 mg Per NG tube Daily with dinner    sacubitriL-valsartan  1 tablet Oral BID    timolol maleate 0.25%  1 drop Both Eyes BID    valproate sodium (DEPACON) IVPB  500 mg Intravenous Q8H      propofoL 30 mcg/kg/min (23 1330)        EEG DESCRIPTION:  This is a 16-channel EEG was performed with electrode placement in 10/[employer] System. Longitudinal bipolar and referential montages were utilized in analysis.    This is a technically adequate study.  The initial portions of the tracing(propofol infusion at 60 mcg/kg/ minute) showed a burst suppression pattern with bursts of delta activity for 1-2 seconds followed by 2-4 seconds of background suppression.  Also noted right hemispheric periodic lateralized epileptiform discharges about 1 Hz frequency.  Patient's sedation was being weaned while monitoring on the EEG and when propofol was lowered to 30 mcg/kg/min, background activity showed an admixture of delta and theta frequency waveforms.  Also the frequency of periodic lateralized epileptiform discharges has increased to about 1.5Hz.    Electroclinical seizure was noted at 15:42 from the right  hemisphere with 2 hz spike and wave discharges lasting for 5 seconds with a clinical correlate of left facial twitching.    IMPRESSION: This is an abnormal long-term video EEG with evidence of significant background slowing, periodic lateralized epileptiform discharges(PLEDs) from the right hemisphere and an episode of electroclinical seizure noted from the right hemisphere as described above. The EEG indicates right hemispheric cortical dysfunction with epileptiform potential.        Mukund James MD  Neurology

## 2023-05-14 NOTE — CARE UPDATE
05/13/23 2000   Patient Assessment/Suction   Level of Consciousness (AVPU) unresponsive   Respiratory Effort Normal;Unlabored   Expansion/Accessory Muscles/Retractions no use of accessory muscles   All Lung Fields Breath Sounds equal bilaterally;clear   Rhythm/Pattern, Respiratory assisted mechanically   Cough Frequency with stimulation   Cough Type assisted   Suction Method tracheal   $ Suction Charges Inline Suction Procedure Stat Charge   Secretions Amount small   Secretions Color tan   Secretions Characteristics thick   Skin Integrity   $ Wound Care Tech Time 15 min   Area Observed Left;Right;Cheek;Upper lip;Lower lip;Corner lip   Skin Appearance without discoloration   PRE-TX-O2   Device (Oxygen Therapy) ventilator   Oxygen Concentration (%) 30   SpO2 97 %   Pulse Oximetry Type Continuous   $ Pulse Oximetry - Multiple Charge Pulse Oximetry - Multiple   Pulse 69   Resp 17      Airway Anesthesia 05/11/23   Placement Date/Time: 05/11/23 (c) 0831   Method of Intubation: Video Laryngoscopy  Airway Device: Endotracheal Tube  Mask Ventilation: Moderately difficult with oral airway  Intubated: Other (see comments)  Blade: Glidescope #3  Airway Device Size: 8....   Secured at 23 cm   Measured At Lips   Secured Location Center   Secured by Commercial tube barboza   Bite Block none   Site Condition Cool;Dry   Status Intact;Secured;Patent   Site Assessment Clean;Dry   Cuff Volume   (MLT)   Airway Safety   Is Ambu Bag and Mask with Patient? Yes, Adult Ambu Bag and Mask   Respiratory Interventions   Airway/Ventilation Management airway patency maintained   Vent Select   Conventional Vent Y   Charged w/in last 24h YES   Preset Conventional Ventilator Settings   Vent ID [id number]   Vent Type    Ventilation Type VC   Vent Mode A/C   Humidity HME   Set Rate 12 BPM   Vt Set 450 mL   PEEP/CPAP 5 cmH20   Peak Flow 65 L/min   Peak End Inspiratory Pressure 19 cmH20   I-Trigger Type  V-TRIG   Trigger Sensitivity Flow/I-Trigger 3  L/min   Patient Ventilator Parameters   Resp Rate Total 18 br/min   Peak Airway Pressure 20 cmH20   Mean Airway Pressure 8.7 cmH20   Plateau Pressure 19 cmH20   Exhaled Vt 473 mL   Total Ve 8.38 L/m   I:E Ratio Measured 1:2.70   Auto PEEP 0 cmH20   Tubing ID (mm) 8 mm   Tube Type ET   Conventional Ventilator Alarms   Alarms On Y   Resp Rate High Alarm 45 br/min   Press High Alarm 60 cmH2O   Apnea Rate 12   Apnea Volume (mL) 1 mL   Apnea Oxygen Concentration  100   Apnea Flow Rate (L/min) 65   T Apnea 20 sec(s)   Ready to Wean/Extubation Screen   FIO2<=50 (chart decimal) 0.3   MV<16L (chart vol.) 8.38   PEEP <=8 (chart #) 5   Ready to Wean Parameters   F/VT Ratio<105 (RSBI) (!) 35.94   Vital Capacity   Vital Capacity (mL) 0   Education   $ Education Bronchodilator;15 min   Respiratory Evaluation   $ Care Plan Tech Time 15 min   $ Eval/Re-eval Charges Re-evaluation   Evaluation For New Orders

## 2023-05-14 NOTE — CARE UPDATE
05/14/23 0812   Patient Assessment/Suction   Level of Consciousness (AVPU) responds to pain   Respiratory Effort Normal;Unlabored   Expansion/Accessory Muscles/Retractions no use of accessory muscles   Cough Type assisted   Suction Method oral;tracheal   Suction Pressure (mmHg) -120 mmHg   $ Suction Charges Inline Suction Procedure Stat Charge   Secretions Amount moderate   Secretions Color tan;white   Secretions Characteristics thick   Skin Integrity   $ Wound Care Tech Time 15 min   Area Observed Left;Right;Cheek;Upper lip;Lower lip;Corner lip   Skin Appearance without discoloration   PRE-TX-O2   Device (Oxygen Therapy) ventilator   Oxygen Concentration (%) 30   SpO2 97 %   Pulse Oximetry Type Continuous   $ Pulse Oximetry - Multiple Charge Pulse Oximetry - Multiple   Pulse 69   Resp (!) 24      Airway Anesthesia 05/11/23   Placement Date/Time: 05/11/23 (c) 2768   Method of Intubation: Video Laryngoscopy  Airway Device: Endotracheal Tube  Mask Ventilation: Moderately difficult with oral airway  Intubated: Other (see comments)  Blade: Glidescope #3  Airway Device Size: 8....   Secured at 23 cm   Measured At Teeth   Secured Location Center   Secured by Commercial tube barboza   Bite Block none   Site Condition Cool;Dry   Status Intact;Secured   Site Assessment Clean;Dry   Cuff Volume   (mlt)   Airway Safety   Is Ambu Bag and Mask with Patient? Yes, Adult Ambu Bag and Mask   Vent Select   Conventional Vent Y   $ Ventilator Subsequent 1   Charged w/in last 24h YES   Preset Conventional Ventilator Settings   Vent ID [id number]   Vent Type    Ventilation Type VC   Vent Mode A/C   Set Rate 12 BPM   Vt Set 450 mL   PEEP/CPAP 5 cmH20   Peak Flow 60 L/min   Peak End Inspiratory Pressure 19 cmH20   I-Trigger Type  V-TRIG   Trigger Sensitivity Flow/I-Trigger 3 L/min   Patient Ventilator Parameters   Resp Rate Total 17 br/min   Peak Airway Pressure 20 cmH20   Mean Airway Pressure 8.9 cmH20   Plateau Pressure 19 cmH20   Exhaled  Vt 472 mL   Total Ve 8.03 L/m   I:E Ratio Measured 1:3.90   Auto PEEP 0 cmH20   Conventional Ventilator Alarms   Alarms On Y   Ve High Alarm 23 L/min   Ve Low Alarm 3 L/min   Vt High Alarm 1200 mL   Vt Low Alarm 200 mL   Resp Rate High Alarm 45 br/min   Press High Alarm 50 cmH2O   Apnea Rate 12   Apnea Volume (mL) 1 mL   Apnea Oxygen Concentration  100   Apnea Flow Rate (L/min) 65   T Apnea 20 sec(s)   Ready to Wean/Extubation Screen   FIO2<=50 (chart decimal) 0.3   MV<16L (chart vol.) 8.03   PEEP <=8 (chart #) 5   Ready to Wean Parameters   F/VT Ratio<105 (RSBI) (!) 50.85   Vital Capacity   Vital Capacity (mL) 0   Respiratory Evaluation   $ Care Plan Tech Time 15 min

## 2023-05-14 NOTE — PROGRESS NOTES
[facility]  Neurology Progress Note    Patient Name: Teto Mendez  MRN: [id number]  : 1955  TODAY'S DATE: 2023  ADMIT DATE: 2023  6:39 AM                                          CONSULTED PROVIDER: Meredith Dugan MD, Neurologist. On-call Phone: [phone]  CONSULT REQUESTED BY: Neo Clark MD     Chief Complaint   Patient presents with    FACIAL TWITCHING    SLURRED SPEECH       HPI per EMR:  Patient presents with history of glioblastoma concurrently getting chemo in previous radiation complaining of facial twitching, facial droop, slurred speech, left arm weakness.  Symptom onset at 3:30 a.m..  Patient states at 3:30 a.m. he was playing on a game and then began to have the twitching of the face with associated droop with slurred speech and arm weakness.  Patient states he has had similar episodes in the past secondary to the glioblastoma.  He has not had the left arm weakness.  Patient states he is on Keppra and did take an extra Keppra dose this morning additionally patient is on Xarelto.  At the worst symptoms are severe.    Neurology Consult:  Patient was seen and examined by me today. He is a 67-year-old man with history of glioblastoma multiforme s/p craniectomy, chemotherapy and radiation diagnosed in 2022, as well as seizure disorder, who presented to the ED with left facial twitching, left facial droop, slurred speech and left arm weakness that began the morning of his admission at around 3 am. He was recently decreased on his home Keppra in the setting of side effects as per daughter. Upon arrival to the ED, he was loaded with 1 gr of Keppra and given 2 mg of ativan IV, which seemed to break the seizure. As per ED physician, patient was awake, alert and able to answer questions. At around 11:50 am, I was contacted by ED provider in the setting of sudden worsening of mental status, unresponsiveness, weakness and aphasia, so he was taken to CT scan for  repeat CT brain to rule out LVO stroke or hemorrhage. I evaluated patient while on CT, and noticed O2 Sats were in the high 80s. Due to technical malfunction, CT could not be completed, at which point I decided to abort procedure and transport patient to the ICU immediately. When we arrived to the 3rd floor ICU, patient seemed to be on respiratory failure, with toe and fingertip cyanosis, unable to maintain O2 Sats, so started on manual assisted ventilation and called anesthesia for emergent intubation. Patient was successfully intubated and sedation with propofol was started, but facial twitching recurred, so ativan was given and propofol bolus was administered, after which seizure activity seemed to cease. He was taken for CT after stabilization which showed no evidence of acute stroke or intracranial hemorrhage, and CTA showed no LVO. He is now being monitored on 24H continuous EEG for management of status epilepticus.    5/12/23:  Patient was seen and examined by me today.  He remains on deep sedation for treatment of status epilepticus, being monitored on 24 hour video EEG.  A brief electroclinical seizure was seen at around 4:00 a.m. with left facial twitching, but no clinical events reported after this.  Plan is to continue deep sedation until at least tomorrow morning.  Patient to be hooked up to EEG again when propofol starts to being weaned down.    5/13/23: I, Dr James assumed care on 5/13.  Patient is intubated, sedated with propofol.  He started out continuous EEG this morning shows right hemisphere lateralized periodic discharges with epileptiform potential.  No electrographic seizures were recorded.    5/14/2023:  Patient was seen examined by me.  Remains to be intubated and sedated.  MRI brain done this morning showed right MCA cortical infarcts.  Patient has been on continuous EEG overnight and EEG showed right hemispheric PLEDs with increase in frequency of PLEDs with lowering sedation.  Patient  also had a clinical focal seizure involving twitching of the left side of the face this morning when he was off sedation.    Scheduled Meds:   allopurinoL  100 mg Per NG tube Daily    chlorhexidine  15 mL Mouth/Throat BID    dexAMETHasone  1 mg Per NG tube Q12H    levetiracetam IV  1,500 mg Intravenous Q12H    metoprolol tartrate  100 mg Per NG tube BID    mupirocin   Nasal BID    pantoprazole  40 mg Intravenous Daily    rivaroxaban  20 mg Per NG tube Daily with dinner    timolol maleate 0.25%  1 drop Both Eyes BID    valproate sodium (DEPACON) IVPB  500 mg Intravenous Q8H     Continuous Infusions:   propofoL 30 mcg/kg/min (05/14/23 0300)     PRN Meds:.acetaminophen, colchicine, hydrALAZINE, lorazepam, metoprolol, ondansetron, polyethylene glycol, potassium bicarbonate, potassium bicarbonate, potassium bicarbonate, sodium chloride 0.9%      Physical Exam  Current Vitals:  Vitals:    05/14/23 0935   BP:    Pulse: 87   Resp: (!) 23   Temp:      General appearance: intubated, unresponsive  Cardiovascular: Afib  Pulmonary: on mechanical ventilation  GI: soft  MSK: normal passive ROM  Skin: normal color, no lesions    NEUROLOGICAL EXAM:    Mental status: unresponsive, sedated, intubated          Intubated: yes          Sedated: yes  Response to noxious stimulation: no, patient on deep sedation  Breathes above ventilator: yes  Opens eyes: no  Pupils: symmetric          Left: reactive          Right: reactive  Eye movements: No pathologic movements such as nystagmus, ocular bobbing, dipping or roving. No facial twitching seen.  Corneal reflex: present  Oculocephalic reflex:  Absent  Cough:  Weak  Gag:  Weak  Motor exam: Normal muscle tone. Normal muscle bulk. No abnormal movements such as tremors, myoclonus or twitching.  No response to painful stimuli, but patient is on deep sedation  DTRs: 1+ throughout. Babinski response absent.      Laboratory Data & Studies    Recent Labs   Lab 05/12/23  0359 05/13/23  6757  05/14/23 0337   WBC 7.76 6.37 8.13   HGB 15.9 15.7 16.9    180 181   MCV 89 87 90       Recent Labs   Lab 05/12/23 0357 05/13/23 0422 05/13/23  1939 05/14/23 0337    140  --  140   K 3.3* 3.1* 4.3 3.3*    108  --  107   CO2 22* 23  --  25   BUN 19 22  --  27*   GLU 97 123*  --  118*   CALCIUM 8.8 8.7  --  8.4*   MG  --  2.1  --  2.1   PHOS  --   --   --  5.5*       Recent Labs   Lab 05/12/23 0357 05/13/23 0422 05/14/23 0337   PROT 6.1 5.4* 5.8*   ALBUMIN 3.5 2.9* 2.9*   BILITOT 1.2* 0.8 0.8   AST 69* 35 25   * 64* 48*   ALKPHOS 47* 41* 45*       Recent Labs   Lab 05/11/23  0657   INR 1.3*       Recent Labs   Lab 05/11/23  0657   CHOL 248*   TRIG 92   LDLCALC 165.6*   HDL 64   TSH 3.900         Microbiology:  Microbiology Results (last 7 days)       Procedure Component Value Units Date/Time    Culture, Respiratory with Gram Stain [[id number]]     Order Status: Canceled Specimen: Respiratory               Imaging:  CT Head Without Contrast    Result Date: 5/11/2023  CMS MANDATED QUALITY DATA - CT RADIATION  436 All CT scans at this facility utilize dose modulation, iterative reconstruction, and/or weight based dosing when appropriate to reduce radiation dose to as low as reasonably achievable. CT HEAD WITHOUT IV CONTRAST CLINICAL HISTORY: 67 years Male Mental status change, unknown cause COMPARISON: Noncontrast CT head performed earlier today 6:56 AM FINDINGS: Negative for acute intracranial hemorrhage, midline shift, or mass effect. Parenchymal calcifications within the right cerebral hemisphere are stable compared to prior. Patient is status post right pterional craniotomy. Ventricles and sulci are normal in size. Gray-white differentiation is maintained. Cerebellar hemispheres and brainstem are unremarkable. No calvarial lesion or fracture. Mastoid air cells are clear. IMPRESSION: Stable exam. No CT evidence of acute intracranial pathology. Electronically signed by:  Matt Cole MD   5/11/2023 1:41 PM CDT Workstation: [id number]    X-Ray Chest AP Portable    Result Date: 5/11/2023  CLINICAL HISTORY: 67 years (1955) Male Stroke TECHNIQUE: Portable AP radiograph the chest. COMPARISON: None available. FINDINGS: Nonspecific minimal faint interstitial opacity at the left costophrenic.  No pneumothorax is identified. The heart is mildly enlarged.  Osseous structures show degenerative changes in the spine. The visualized upper abdomen is unremarkable. IMPRESSION: Nonspecific minimal faint interstitial opacity at the left costophrenic sulcus possibly representing atelectasis, scarring, trace pleural fluid, and less likely aspiration or pneumonia. . Electronically signed by:  Solomon Tobias MD  5/11/2023 8:32 AM CDT Workstation: [id number]    CT HEAD FOR STROKE    Result Date: 5/11/2023  CMS MANDATED QUALITY DATA - CT RADIATION - 436 All CT scans at this facility utilize dose modulation, iterative reconstruction, and/or weight based dosing when appropriate to reduce radiation dose to as low as reasonably achievable. EXAMINATION: CT HEAD FOR STROKE CLINICAL HISTORY: Neuro deficit, acute, stroke suspected;  history of glioblastoma TECHNIQUE: CT without IV contrast COMPARISON: None FINDINGS: There are postsurgical changes from prior right frontal temporal These craniotomy.  The ventricles and sulci are mildly prominent compatible with generalized cerebral atrophy.  There is no hemorrhage, mass or midline shift.  There are no extra-axial fluid collections.  The cerebellum and brainstem are normal.  The gray-white differentiation is maintained.  The orbits are unremarkable. The paranasal sinuses and mastoid air cells are clear.     Mild cerebral atrophy with no acute intracranial process Prior right frontal temporal craniotomy Findings were called to Dr. Zhou in the emergency department at 07:09 a.m. Electronically signed by: Yi Hernandez MD Date:    05/11/2023 Time:    07:10    CTA Head and Neck  (xpd)    Result Date: 5/11/2023  CMS MANDATED QUALITY DATA - CT RADIATION - 436 All CT scans at this facility utilize dose modulation, iterative reconstruction, and/or weight based dosing when appropriate to reduce radiation dose to as low as reasonably achievable. CMS MANDATED QUALITY DATA - CAROTID - 195 All measurements and percent stenosis described below were determined using NASCET criteria or criteria similar to NASCET, as defined by the Society of Radiologists in Ultrasound Consensus Conference, Radiology, 2003 Reason: Stroke/TIA, determine embolic source Technique: CT angiography of brain and neck with 100 mL Omnipaque 350.  Maximum intensity projection coronal reformations were obtained at a separate workstation and stored in the patient's permanent medical record. COMPARISON: 5/11/2023 CTA BRAIN: VASCULAR FINDINGS: Major intracranial arteries are widely patent with no stenosis, intraluminal filling, or dissection. Minimal atherosclerotic plaque affects the cavernous segments of bilateral internal carotid arteries. Negative for aneurysm or evidence of vasculitis. Opacified visualized dural venous sinuses are unremarkable. NONVASCULAR FINDINGS: No abnormal intra-axial or extra-axial enhancement. No midline shift. Postsurgical changes of right pterional craniotomy are noted. 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 Minimal atherosclerotic plaque affects left carotid bulb. Left carotid arteries are otherwise without plaque or stenosis. Left vertebral artery is patent. Minimal atherosclerotic plaque affects the right carotid bulb. Right carotid arteries are without additional plaque and remain widely patent. Right vertebral artery is patent. NONVASCULAR FINDINGS: Endotracheal tube has tip proximal to mio. Visualized lung apices show minor dependent atelectasis. Cervical soft tissues are unremarkable. IMPRESSION: 1.  Trace atherosclerotic plaque in intracranial internal carotid arteries, without other abnormality of the major intracranial arteries. 2. Trace atherosclerotic plaque in bilateral carotid bulbs, with no stenosis or other abnormality of cervical ICAs or vertebral arteries. Electronically signed by:  Compa Lopez MD  5/11/2023 2:01 PM CDT Workstation: [id number]    CTA Head and Neck (xpd)    Result Date: 5/11/2023  CMS EXAMINATION: CTA HEAD AND NECK (XPD) CLINICAL INDICATION: Male, 67 years old. Stroke/TIA, determine embolic source history of glioblastoma TECHNIQUE: Axial CT angiogram of the head and neck was performed with contrast. Multiplanar reformations as well as 3-D volume rendered imaging were reviewed at the workstation. Degree of stenosis was measured utilizing the NASCET method. CONTRAST: 100 mL mL of Omnipaque 350 IV. COMPARISON: None FINDINGS: CTA brain: The distal vertebral arteries, basilar artery and cavernous portions of the internal carotid arteries are widely patent. The anterior cerebral arteries, middle cerebral arteries and posterior cerebral arteries are widely patent without evidence of large vessel occlusion, significant stenosis, AVM or aneurysm. The dural venous sinuses are patent. There is no pathologic enhancement. Aortic Arch and Great Vessel Origins: 2 vessel aortic arch which is widely patent Subclavian Arteries: Widely patent. Right Common Carotid Artery: Widely patent. Right Internal Carotid Artery: Widely patent. Right External Carotid Artery: Widely patent. Left Common Carotid Artery: Widely patent. Left Internal Carotid Artery: Widely patent. Left External Carotid Artery: Widely patent. Right Vertebral Artery: Widely patent. Left Vertebral Artery: Widely patent. The paraspinous soft tissues are unremarkable. There are mild degenerative changes of the cervical spine. The lung apices are clear. IMPRESSION: Negative CTA of the head and neck. This exam was performed according to our  departmental dose-optimization program which includes automated exposure control, adjustment of the mA and/or kV according to patient size and/or use of iterative reconstruction technique. Electronically signed by:  Yi Hernandez MD  5/11/2023 7:43 AM CDT Workstation: [id number]        Assessment and Plan:    Status Epilepticus  History of Glioblastoma Multiforme s/p craniectomy, chemo and radiotherapy  Acute ischemic infarct   Atrial fibrillation       67-year-old man with history of glioblastoma multiforme s/p craniectomy, chemotherapy and radiation diagnosed in August 2022, as well as seizure disorder, who presented to the ED with left facial twitching, left facial droop, slurred speech and left arm weakness. Upon arrival to the ED, he was loaded with 1 gr of Keppra and given 2 mg of ativan IV, which seemed to break the seizure. However, he experienced sudden worsening of mental status, with unresponsiveness, weakness and aphasia, so he was taken to CT scan for repeat CT brain to rule out LVO stroke or hemorrhage. He was then intubated, sedated and placed on mechanical ventilation. Clinical picture more consistent with status epilepticus.      - Admitted to hospital medicine in the ICU with q2 hour neuro checks, on telemetry, continuous pulse oximetry  - Seizure precautions while inpatient  - Underwent 24-hour VEEG continuous monitoring with evidence of 4 electrographic seizures which resolved when dose of propofol was increased to 60 mcg/kg/min. By the end of the recording, a burst suppression pattern with decreased LPEDs was seen.   -on 5/13 patient was again put on continues EEG monitoring while weaning sedation.  Initially EEG showed burst suppression pattern with bursts of 1-2 seconds of delta activity followed by 2-4 seconds of background suppression and also showed right hemispheric lateralized periodic epileptiform discharges.  Background changed to a mixture of delta and theta activity with PLEDs  becoming more frequent with lowering sedation to 30mcg/kg/min.  He was kept at this dose of propofol overnight.  -on 05/14, loaded patient with Vimpat 300 mg and started him on 100 mg b.i.d. given persistence of periodic epileptiform discharge on the right hemisphere and also focal clinical seizure with left facial twitching noticed while being off sedation.  - continue Keppra 1500 mg BID IV  - Continue Depacon 500 mg TID IV. Valproic acid levels therapeutic.  - MRI brain showed right hemispheric infarcts.  Etiology could likely be cardioembolism in the setting of atrial fibrillation. Continue anticoagulation with Xarelto 20 mg daily  - Avoid seizure threshold lowering medications such as:  Bupropion, diphenhydramine, tramadol, certain antibiotics  - Maintain Euthermia with Tylenol prn temp > 37.2 degrees C.  - Assessment for rehab with PT/OT/SLP evaluation and treatment.  - workup and treatment of metabolic and infectious abnormalities as per primary team  - Will follow along      DVT prophylaxis with chemo/SCD prophylaxis      [phone] within 3 days from discharge.        All questions were answered.                              Thank you kindly for including us in the care of this patient. Please do not hesitate to contact us with any questions.       Critical Care:  66 minutes of critical care time has been spent evaluating with the patient. Time includes chart review not limited to diagnostic imaging, labs, and vitals, patient assessment, discussion with family and nursing, current order evaluations, and new order entries.      Mukund James MD  Neurology/Vascular Neurology

## 2023-05-14 NOTE — RESPIRATORY THERAPY
Transport to MRI, BBS equal, ETT patent.  Suction prior to transport, vent settings 450, 50%, rate 12, peep valve in place at 5cmh2o.    Pt put in the MRI scanner at 1118, RN at beside.   05/14/23 1045   PRE-TX-O2   Oxygen Concentration (%) 30   SpO2 98 %   Pulse 72   Resp (!) 21   Vent Select   Charged w/in last 24h YES   Preset Conventional Ventilator Settings   Ventilation Type VC   Vent Mode A/C   Set Rate 12 BPM   Vt Set 450 mL   PEEP/CPAP 5 cmH20   Peak Flow 60 L/min   Peak End Inspiratory Pressure 19 cmH20   I-Trigger Type  V-TRIG   Trigger Sensitivity Flow/I-Trigger 3 L/min   Patient Ventilator Parameters   Resp Rate Total 16 br/min   Peak Airway Pressure 20 cmH20   Mean Airway Pressure 8.8 cmH20   Plateau Pressure 19 cmH20   Exhaled Vt 481 mL   Total Ve 7.75 L/m   I:E Ratio Measured 1:3.20   Auto PEEP 0 cmH20   Conventional Ventilator Alarms   Resp Rate High Alarm 45 br/min   Press High Alarm 50 cmH2O   Apnea Rate 12   Apnea Volume (mL) 1 mL   Apnea Oxygen Concentration  100   Apnea Flow Rate (L/min) 65   T Apnea 20 sec(s)   Ready to Wean/Extubation Screen   FIO2<=50 (chart decimal) 0.3   MV<16L (chart vol.) 7.75   PEEP <=8 (chart #) 5   Ready to Wean Parameters   F/VT Ratio<105 (RSBI) (!) 43.66   Vital Capacity   Vital Capacity (mL) 0   Transport Patient   $ Transport Tech Time Charge 30 min   Time to transport 1045   Oxygen Method ETT;Transport Vent  (Parapac)   Transport to MRI   Toleration Good   ETT Secure Yes   Ambu and mask at bedside Yes

## 2023-05-14 NOTE — PROGRESS NOTES
MRI of the brain was done with and without contrast. Patient was vented and unresponsive.Patient received 7.0 ml of gadavist in r-arm iv. Patient came in with facial twitching and slurred speech. Symptoms began about 1week ago.

## 2023-05-15 PROBLEM — I63.9 ACUTE CVA (CEREBROVASCULAR ACCIDENT): Status: ACTIVE | Noted: 2023-05-15

## 2023-05-15 LAB
ALBUMIN SERPL BCP-MCNC: 2.5 G/DL (ref 3.5–5.2)
ALLENS TEST: ABNORMAL
ALP SERPL-CCNC: 41 U/L (ref 55–135)
ALT SERPL W/O P-5'-P-CCNC: 32 U/L (ref 10–44)
ANION GAP SERPL CALC-SCNC: 6 MMOL/L (ref 8–16)
AST SERPL-CCNC: 23 U/L (ref 10–40)
BASOPHILS # BLD AUTO: 0.04 K/UL (ref 0–0.2)
BASOPHILS NFR BLD: 0.6 % (ref 0–1.9)
BILIRUB SERPL-MCNC: 1.2 MG/DL (ref 0.1–1)
BUN SERPL-MCNC: 29 MG/DL (ref 8–23)
CALCIUM SERPL-MCNC: 8 MG/DL (ref 8.7–10.5)
CHLORIDE SERPL-SCNC: 107 MMOL/L (ref 95–110)
CHOLEST SERPL-MCNC: 250 MG/DL (ref 120–199)
CHOLEST/HDLC SERPL: 8.3 {RATIO} (ref 2–5)
CO2 SERPL-SCNC: 29 MMOL/L (ref 23–29)
CREAT SERPL-MCNC: 1.1 MG/DL (ref 0.5–1.4)
DELSYS: ABNORMAL
DIFFERENTIAL METHOD: ABNORMAL
EOSINOPHIL # BLD AUTO: 0 K/UL (ref 0–0.5)
EOSINOPHIL NFR BLD: 0.2 % (ref 0–8)
ERYTHROCYTE [DISTWIDTH] IN BLOOD BY AUTOMATED COUNT: 14.3 % (ref 11.5–14.5)
ERYTHROCYTE [SEDIMENTATION RATE] IN BLOOD BY WESTERGREN METHOD: 12 MM/H
EST. GFR  (NO RACE VARIABLE): >60 ML/MIN/1.73 M^2
ESTIMATED AVG GLUCOSE: 117 MG/DL (ref 68–131)
FIO2: 30
GLUCOSE SERPL-MCNC: 115 MG/DL (ref 70–110)
GLUCOSE SERPL-MCNC: 116 MG/DL (ref 70–110)
HBA1C MFR BLD: 5.7 % (ref 4.5–6.2)
HCO3 UR-SCNC: 29.6 MMOL/L (ref 24–28)
HCT VFR BLD AUTO: 48 % (ref 40–54)
HCT VFR BLD CALC: 45 %PCV (ref 36–54)
HDLC SERPL-MCNC: 30 MG/DL (ref 40–75)
HDLC SERPL: 12 % (ref 20–50)
HGB BLD-MCNC: 16.1 G/DL (ref 14–18)
IMM GRANULOCYTES # BLD AUTO: 0.13 K/UL (ref 0–0.04)
IMM GRANULOCYTES NFR BLD AUTO: 2 % (ref 0–0.5)
LDLC SERPL CALC-MCNC: 191.4 MG/DL (ref 63–159)
LYMPHOCYTES # BLD AUTO: 0.5 K/UL (ref 1–4.8)
LYMPHOCYTES NFR BLD: 7.5 % (ref 18–48)
MAGNESIUM SERPL-MCNC: 2.1 MG/DL (ref 1.6–2.6)
MCH RBC QN AUTO: 30.8 PG (ref 27–31)
MCHC RBC AUTO-ENTMCNC: 33.5 G/DL (ref 32–36)
MCV RBC AUTO: 92 FL (ref 82–98)
MIN VOL: 9.2
MODE: ABNORMAL
MONOCYTES # BLD AUTO: 0.7 K/UL (ref 0.3–1)
MONOCYTES NFR BLD: 10.5 % (ref 4–15)
NEUTROPHILS # BLD AUTO: 5.3 K/UL (ref 1.8–7.7)
NEUTROPHILS NFR BLD: 79.2 % (ref 38–73)
NONHDLC SERPL-MCNC: 220 MG/DL
NRBC BLD-RTO: 1 /100 WBC
PCO2 BLDA: 37.2 MMHG (ref 35–45)
PEEP: 5
PH SMN: 7.51 [PH] (ref 7.35–7.45)
PHOSPHATE SERPL-MCNC: 3.4 MG/DL (ref 2.7–4.5)
PIP: 18
PLATELET # BLD AUTO: 142 K/UL (ref 150–450)
PMV BLD AUTO: 10.2 FL (ref 9.2–12.9)
PO2 BLDA: 67 MMHG (ref 80–100)
POC BE: 7 MMOL/L
POC IONIZED CALCIUM: 1.16 MMOL/L (ref 1.06–1.42)
POC SATURATED O2: 95 % (ref 95–100)
POC TCO2: 31 MMOL/L (ref 23–27)
POTASSIUM BLD-SCNC: 3.6 MMOL/L (ref 3.5–5.1)
POTASSIUM SERPL-SCNC: 3.4 MMOL/L (ref 3.5–5.1)
PROT SERPL-MCNC: 5.6 G/DL (ref 6–8.4)
RBC # BLD AUTO: 5.23 M/UL (ref 4.6–6.2)
SAMPLE: ABNORMAL
SITE: ABNORMAL
SODIUM BLD-SCNC: 143 MMOL/L (ref 136–145)
SODIUM SERPL-SCNC: 142 MMOL/L (ref 136–145)
SP02: 96
TRIGL SERPL-MCNC: 143 MG/DL (ref 30–150)
VT: 450
WBC # BLD AUTO: 6.65 K/UL (ref 3.9–12.7)

## 2023-05-15 PROCEDURE — 25000003 PHARM REV CODE 250: Performed by: INTERNAL MEDICINE

## 2023-05-15 PROCEDURE — 85025 COMPLETE CBC W/AUTO DIFF WBC: CPT | Performed by: STUDENT IN AN ORGANIZED HEALTH CARE EDUCATION/TRAINING PROGRAM

## 2023-05-15 PROCEDURE — 63600175 PHARM REV CODE 636 W HCPCS: Performed by: INTERNAL MEDICINE

## 2023-05-15 PROCEDURE — 25000003 PHARM REV CODE 250: Performed by: STUDENT IN AN ORGANIZED HEALTH CARE EDUCATION/TRAINING PROGRAM

## 2023-05-15 PROCEDURE — 27000221 HC OXYGEN, UP TO 24 HOURS

## 2023-05-15 PROCEDURE — 87205 SMEAR GRAM STAIN: CPT | Performed by: INTERNAL MEDICINE

## 2023-05-15 PROCEDURE — 84295 ASSAY OF SERUM SODIUM: CPT

## 2023-05-15 PROCEDURE — 87070 CULTURE OTHR SPECIMN AEROBIC: CPT | Performed by: INTERNAL MEDICINE

## 2023-05-15 PROCEDURE — 36569 INSJ PICC 5 YR+ W/O IMAGING: CPT

## 2023-05-15 PROCEDURE — 80061 LIPID PANEL: CPT | Performed by: STUDENT IN AN ORGANIZED HEALTH CARE EDUCATION/TRAINING PROGRAM

## 2023-05-15 PROCEDURE — C9113 INJ PANTOPRAZOLE SODIUM, VIA: HCPCS | Performed by: STUDENT IN AN ORGANIZED HEALTH CARE EDUCATION/TRAINING PROGRAM

## 2023-05-15 PROCEDURE — 63600175 PHARM REV CODE 636 W HCPCS

## 2023-05-15 PROCEDURE — 37799 UNLISTED PX VASCULAR SURGERY: CPT

## 2023-05-15 PROCEDURE — 82330 ASSAY OF CALCIUM: CPT

## 2023-05-15 PROCEDURE — 83735 ASSAY OF MAGNESIUM: CPT | Performed by: STUDENT IN AN ORGANIZED HEALTH CARE EDUCATION/TRAINING PROGRAM

## 2023-05-15 PROCEDURE — 25000003 PHARM REV CODE 250

## 2023-05-15 PROCEDURE — 94761 N-INVAS EAR/PLS OXIMETRY MLT: CPT

## 2023-05-15 PROCEDURE — 63600175 PHARM REV CODE 636 W HCPCS: Performed by: STUDENT IN AN ORGANIZED HEALTH CARE EDUCATION/TRAINING PROGRAM

## 2023-05-15 PROCEDURE — 99232 PR SUBSEQUENT HOSPITAL CARE,LEVL II: ICD-10-PCS | Mod: ,,, | Performed by: INTERNAL MEDICINE

## 2023-05-15 PROCEDURE — 80053 COMPREHEN METABOLIC PANEL: CPT | Performed by: STUDENT IN AN ORGANIZED HEALTH CARE EDUCATION/TRAINING PROGRAM

## 2023-05-15 PROCEDURE — 85014 HEMATOCRIT: CPT

## 2023-05-15 PROCEDURE — 99900035 HC TECH TIME PER 15 MIN (STAT)

## 2023-05-15 PROCEDURE — 99900026 HC AIRWAY MAINTENANCE (STAT)

## 2023-05-15 PROCEDURE — 94003 VENT MGMT INPAT SUBQ DAY: CPT

## 2023-05-15 PROCEDURE — C9254 INJECTION, LACOSAMIDE: HCPCS | Performed by: INTERNAL MEDICINE

## 2023-05-15 PROCEDURE — 99291 CRITICAL CARE FIRST HOUR: CPT | Mod: ,,, | Performed by: INTERNAL MEDICINE

## 2023-05-15 PROCEDURE — 20000000 HC ICU ROOM

## 2023-05-15 PROCEDURE — 84132 ASSAY OF SERUM POTASSIUM: CPT

## 2023-05-15 PROCEDURE — 82803 BLOOD GASES ANY COMBINATION: CPT

## 2023-05-15 PROCEDURE — 99900031 HC PATIENT EDUCATION (STAT)

## 2023-05-15 PROCEDURE — 83036 HEMOGLOBIN GLYCOSYLATED A1C: CPT | Performed by: STUDENT IN AN ORGANIZED HEALTH CARE EDUCATION/TRAINING PROGRAM

## 2023-05-15 PROCEDURE — 63600175 PHARM REV CODE 636 W HCPCS: Performed by: HOSPITALIST

## 2023-05-15 PROCEDURE — 84100 ASSAY OF PHOSPHORUS: CPT | Performed by: STUDENT IN AN ORGANIZED HEALTH CARE EDUCATION/TRAINING PROGRAM

## 2023-05-15 PROCEDURE — 99291 PR CRITICAL CARE, E/M 30-74 MINUTES: ICD-10-PCS | Mod: ,,, | Performed by: INTERNAL MEDICINE

## 2023-05-15 PROCEDURE — 99232 SBSQ HOSP IP/OBS MODERATE 35: CPT | Mod: ,,, | Performed by: INTERNAL MEDICINE

## 2023-05-15 RX ORDER — LORAZEPAM 2 MG/ML
2 INJECTION INTRAMUSCULAR ONCE
Status: COMPLETED | OUTPATIENT
Start: 2023-05-15 | End: 2023-05-15

## 2023-05-15 RX ORDER — ATORVASTATIN CALCIUM 40 MG/1
80 TABLET, FILM COATED ORAL DAILY
Status: DISCONTINUED | OUTPATIENT
Start: 2023-05-15 | End: 2023-05-21

## 2023-05-15 RX ORDER — LORAZEPAM 2 MG/ML
INJECTION INTRAMUSCULAR
Status: COMPLETED
Start: 2023-05-15 | End: 2023-05-15

## 2023-05-15 RX ORDER — CLONAZEPAM 0.5 MG/1
0.5 TABLET ORAL 3 TIMES DAILY
Status: DISCONTINUED | OUTPATIENT
Start: 2023-05-15 | End: 2023-05-15

## 2023-05-15 RX ORDER — CLONAZEPAM 0.5 MG/1
0.5 TABLET ORAL 3 TIMES DAILY
Status: DISCONTINUED | OUTPATIENT
Start: 2023-05-15 | End: 2023-05-16

## 2023-05-15 RX ADMIN — DEXTROSE 500 MG: 50 INJECTION, SOLUTION INTRAVENOUS at 09:05

## 2023-05-15 RX ADMIN — POTASSIUM BICARBONATE 35 MEQ: 391 TABLET, EFFERVESCENT ORAL at 05:05

## 2023-05-15 RX ADMIN — TIMOLOL MALEATE 1 DROP: 2.5 SOLUTION/ DROPS OPHTHALMIC at 09:05

## 2023-05-15 RX ADMIN — DEXAMETHASONE 1 MG: 1 TABLET ORAL at 09:05

## 2023-05-15 RX ADMIN — LEVETIRACETAM INJECTION 1500 MG: 15 INJECTION INTRAVENOUS at 08:05

## 2023-05-15 RX ADMIN — RIVAROXABAN 20 MG: 20 TABLET, FILM COATED ORAL at 05:05

## 2023-05-15 RX ADMIN — MUPIROCIN 1 G: 20 OINTMENT TOPICAL at 08:05

## 2023-05-15 RX ADMIN — SODIUM CHLORIDE 100 MG: 9 INJECTION, SOLUTION INTRAVENOUS at 10:05

## 2023-05-15 RX ADMIN — CLONAZEPAM 0.5 MG: 0.5 TABLET ORAL at 03:05

## 2023-05-15 RX ADMIN — PANTOPRAZOLE SODIUM 40 MG: 40 INJECTION, POWDER, FOR SOLUTION INTRAVENOUS at 08:05

## 2023-05-15 RX ADMIN — POTASSIUM BICARBONATE 35 MEQ: 391 TABLET, EFFERVESCENT ORAL at 08:05

## 2023-05-15 RX ADMIN — LORAZEPAM 2 MG: 2 INJECTION INTRAMUSCULAR at 01:05

## 2023-05-15 RX ADMIN — SODIUM CHLORIDE 200 MG: 9 INJECTION, SOLUTION INTRAVENOUS at 05:05

## 2023-05-15 RX ADMIN — SACUBITRIL AND VALSARTAN 1 TABLET: 24; 26 TABLET, FILM COATED ORAL at 08:05

## 2023-05-15 RX ADMIN — DEXMEDETOMIDINE HYDROCHLORIDE 0.4 MCG/KG/HR: 4 INJECTION, SOLUTION INTRAVENOUS at 03:05

## 2023-05-15 RX ADMIN — ACETAZOLAMIDE SODIUM 250 MG: 500 INJECTION, POWDER, LYOPHILIZED, FOR SOLUTION INTRAVENOUS at 10:05

## 2023-05-15 RX ADMIN — LORAZEPAM 2 MG: 2 INJECTION INTRAMUSCULAR; INTRAVENOUS at 01:05

## 2023-05-15 RX ADMIN — MUPIROCIN 1 G: 20 OINTMENT TOPICAL at 09:05

## 2023-05-15 RX ADMIN — CHLORHEXIDINE GLUCONATE 15 ML: 1.2 RINSE ORAL at 09:05

## 2023-05-15 RX ADMIN — SACUBITRIL AND VALSARTAN 1 TABLET: 24; 26 TABLET, FILM COATED ORAL at 09:05

## 2023-05-15 RX ADMIN — CLONAZEPAM 0.5 MG: 0.5 TABLET ORAL at 10:05

## 2023-05-15 RX ADMIN — DEXTROSE 500 MG: 50 INJECTION, SOLUTION INTRAVENOUS at 01:05

## 2023-05-15 RX ADMIN — METOPROLOL TARTRATE 100 MG: 50 TABLET, FILM COATED ORAL at 08:05

## 2023-05-15 RX ADMIN — DEXAMETHASONE 1 MG: 1 TABLET ORAL at 08:05

## 2023-05-15 RX ADMIN — LEVETIRACETAM INJECTION 1500 MG: 15 INJECTION INTRAVENOUS at 09:05

## 2023-05-15 RX ADMIN — ATORVASTATIN CALCIUM 80 MG: 40 TABLET, FILM COATED ORAL at 08:05

## 2023-05-15 RX ADMIN — METOPROLOL TARTRATE 100 MG: 50 TABLET, FILM COATED ORAL at 09:05

## 2023-05-15 RX ADMIN — SODIUM CHLORIDE 100 MG: 9 INJECTION, SOLUTION INTRAVENOUS at 06:05

## 2023-05-15 RX ADMIN — CHLORHEXIDINE GLUCONATE 15 ML: 1.2 RINSE ORAL at 08:05

## 2023-05-15 RX ADMIN — TIMOLOL MALEATE 1 DROP: 2.5 SOLUTION/ DROPS OPHTHALMIC at 08:05

## 2023-05-15 RX ADMIN — ALLOPURINOL 100 MG: 100 TABLET ORAL at 08:05

## 2023-05-15 RX ADMIN — CLONAZEPAM 0.5 MG: 0.5 TABLET ORAL at 09:05

## 2023-05-15 RX ADMIN — DEXTROSE 500 MG: 50 INJECTION, SOLUTION INTRAVENOUS at 05:05

## 2023-05-15 NOTE — ASSESSMENT & PLAN NOTE
Antithrombotics for secondary stroke prevention: Anticoagulants: Rivaroxaban 20 mg daily    Statins for secondary stroke prevention and hyperlipidemia, if present:   Statins: Atorvastatin- 80 mg daily    Aggressive risk factor modification: HTN, HLD, Obesity, A-Fib     Rehab efforts: The patient has been evaluated by a stroke team provider and the therapy needs have been fully considered based off the presenting complaints and exam findings. The following therapy evaluations are needed: None    Diagnostics ordered/pending: CTA Neck/Arch to assess vasculature, Lipid Profile to assess cholesterol levels, MRI head without contrast to assess brain parenchyma, TTE to assess cardiac function/status     VTE prophylaxis: None: Reason for No Pharmacological VTE Prophylaxis: Currently on anticoagulation    BP parameters: Infarct: No intervention, SBP <220

## 2023-05-15 NOTE — SUBJECTIVE & OBJECTIVE
"Interval History: see "Hospital Course"    Review of Systems   Unable to perform ROS: Intubated   Objective:     Vital Signs (Most Recent):  Temp: 98.4 °F (36.9 °C) (05/15/23 0301)  Pulse: 72 (05/15/23 0601)  Resp: 16 (05/15/23 0601)  BP: (!) 141/86 (05/15/23 0301)  SpO2: 98 % (05/15/23 0601) Vital Signs (24h Range):  Temp:  [98.4 °F (36.9 °C)-98.6 °F (37 °C)] 98.4 °F (36.9 °C)  Pulse:  [63-97] 72  Resp:  [16-25] 16  SpO2:  [94 %-98 %] 98 %  BP: (104-160)/(66-95) 141/86  Arterial Line BP: (104-166)/(66-99) 143/84     Weight: 92.4 kg (203 lb 11.3 oz)  Body mass index is 30.08 kg/m².    Intake/Output Summary (Last 24 hours) at 5/15/2023 0718  Last data filed at 5/15/2023 0530  Gross per 24 hour   Intake 875.01 ml   Output 1700 ml   Net -824.99 ml         Physical Exam  Vitals and nursing note reviewed.   Constitutional:       Appearance: He is ill-appearing.   HENT:      Head: Normocephalic and atraumatic.      Right Ear: External ear normal.      Left Ear: External ear normal.      Nose: Nose normal.      Comments: NG tube.     Mouth/Throat:      Mouth: Mucous membranes are moist.      Pharynx: Oropharynx is clear.      Comments: ETT.  Cardiovascular:      Rate and Rhythm: Normal rate. Rhythm irregular.      Pulses: Normal pulses.      Heart sounds: Normal heart sounds.   Pulmonary:      Effort: Pulmonary effort is normal.      Breath sounds: Normal breath sounds.   Abdominal:      General: Bowel sounds are normal.      Palpations: Abdomen is soft.   Genitourinary:     Comments: Iraheta.  Musculoskeletal:      Right lower leg: Edema present.      Left lower leg: Edema present.   Skin:     General: Skin is warm and dry.   Neurological:      Comments: Sedated.           Significant Labs: All pertinent labs within the past 24 hours have been reviewed.    Significant Imaging: I have reviewed all pertinent imaging results/findings within the past 24 hours.  "

## 2023-05-15 NOTE — CARE UPDATE
05/14/23 2000   Patient Assessment/Suction   Level of Consciousness (AVPU) responds to pain   Respiratory Effort Normal;Unlabored   Expansion/Accessory Muscles/Retractions no use of accessory muscles   All Lung Fields Breath Sounds equal bilaterally;coarse   Rhythm/Pattern, Respiratory assisted mechanically   Cough Frequency with stimulation   Cough Type assisted   Suction Method tracheal   $ Suction Charges Inline Suction Procedure Stat Charge   Secretions Amount moderate   Secretions Color pale;yellow   Secretions Characteristics thick   Skin Integrity   $ Wound Care Tech Time 15 min   Area Observed Left;Right;Cheek;Upper lip;Lower lip;Corner lip   Skin Appearance without discoloration   PRE-TX-O2   Device (Oxygen Therapy) ventilator   Oxygen Concentration (%) 30   SpO2 95 %   Pulse Oximetry Type Continuous   $ Pulse Oximetry - Multiple Charge Pulse Oximetry - Multiple   Pulse 81   Resp 17      Airway Anesthesia 05/11/23   Placement Date/Time: 05/11/23 (c) 9068   Method of Intubation: Video Laryngoscopy  Airway Device: Endotracheal Tube  Mask Ventilation: Moderately difficult with oral airway  Intubated: Other (see comments)  Blade: Glidescope #3  Airway Device Size: 8....   Secured at 23 cm   Measured At Lips   Secured Location Center   Secured by Commercial tube barboza   Bite Block none   Site Condition Cool;Dry   Status Intact;Secured;Patent   Site Assessment Clean;Dry   Cuff Volume   (MLT)   Airway Safety   Is Ambu Bag and Mask with Patient? Yes, Adult Ambu Bag and Mask   Respiratory Interventions   Airway/Ventilation Management airway patency maintained   Vent Select   Conventional Vent Y   Charged w/in last 24h YES   Preset Conventional Ventilator Settings   Vent ID [id number]   Ventilation Type VC   Vent Mode A/C   Set Rate 12 BPM   Vt Set 450 mL   PEEP/CPAP 5 cmH20   Peak Flow 60 L/min   Peak End Inspiratory Pressure 18 cmH20   I-Trigger Type  V-TRIG   Trigger Sensitivity Flow/I-Trigger 3 L/min   Patient  Ventilator Parameters   Resp Rate Total 18 br/min   Peak Airway Pressure 22 cmH20   Mean Airway Pressure 9.2 cmH20   Plateau Pressure 19 cmH20   Exhaled Vt 476 mL   Total Ve 8.67 L/m   I:E Ratio Measured 1:5.10   Auto PEEP 0 cmH20   Tubing ID (mm) 8 mm   Tube Type ET   Conventional Ventilator Alarms   Alarms On Y   Resp Rate High Alarm 45 br/min   Press High Alarm 50 cmH2O   Apnea Rate 12   Apnea Volume (mL) 1 mL   Apnea Oxygen Concentration  100   Apnea Flow Rate (L/min) 65   T Apnea 20 sec(s)   IHI Ventilator Associated Pneumonia Bundle (Required)   Oral Care Mouth suctioned   Ready to Wean/Extubation Screen   FIO2<=50 (chart decimal) 0.3   MV<16L (chart vol.) 8.67   PEEP <=8 (chart #) 5   Ready to Wean Parameters   F/VT Ratio<105 (RSBI) (!) 35.71   Vital Capacity   Vital Capacity (mL) 0   Education   $ Education Suction;Ventilator Oxygen;15 min   Respiratory Evaluation   $ Care Plan Tech Time 15 min   $ Eval/Re-eval Charges Re-evaluation   Evaluation For Re-Eval 3 day

## 2023-05-15 NOTE — PROGRESS NOTES
[facility] Medicine  Progress Note    Patient Name: Teto Mendez  MRN: [id number]  Patient Class: IP- Inpatient   Admission Date: 5/11/2023  Length of Stay: 4 days  Attending Physician: Neo Clark MD  Primary Care Provider: Primary Doctor No        Subjective:     Principal Problem:Status epilepticus        HPI:  Teto Mendez is a 67 y.o. White male with known history of  glioblastoma s/p resection, seizures p.afib on xarelto who presented to ER this morning with left arm weakness, facial twitching and change in his speech that he suspected was seizure activity.  He took an extra keppra and presented to ER around 6:30-7 am.  Head CT/CTA in ER without hemorrhage or LVO and he was not a candidate for tPA regardless.  He was given lorazepam and keppra with positive response, however, while still in ER he became less responsive and repeat stat CT was ordered.  While in CT, he became hypoxic with depressed respiratory status and required bag-mask ventilation and emergent intubation on return to ICU.  History was obtained from the ER physician Sign-out.      Overview/Hospital Course:  Teto Mendez is a 67 year old male with a past medical history of glioblastoma s/p craniotomy complicated by seizures, obesity, HTN, AFib and GERD who presented with status epilepticus. Neurology has been consulted. The patient was intubated on propofol sedation with Keppra and valproic acid. Pulmonary has been consulted. Continuous EEG is ordered and still running. MRI brain shows R MCA region infarcts consistent with acute CVA. His course has been complicated by Afib with RVR as well as a HFrEF seen on TTE which appears to be chronic. Cardiology has been consulted. He is on metoprolol and Xarelto with PRN diuresis. Entresto and high dose atorvastatin was also added. EEG continues to shows periodic lateralized epileptiform discharges as of 5/14. He remains intubated and sedated with propofol and Precedex.  "Vimpat was also added by Neurology.      Interval History: see "Hospital Course"    Review of Systems   Unable to perform ROS: Intubated   Objective:     Vital Signs (Most Recent):  Temp: 98.4 °F (36.9 °C) (05/15/23 0301)  Pulse: 72 (05/15/23 0601)  Resp: 16 (05/15/23 0601)  BP: (!) 141/86 (05/15/23 0301)  SpO2: 98 % (05/15/23 0601) Vital Signs (24h Range):  Temp:  [98.4 °F (36.9 °C)-98.6 °F (37 °C)] 98.4 °F (36.9 °C)  Pulse:  [63-97] 72  Resp:  [16-25] 16  SpO2:  [94 %-98 %] 98 %  BP: (104-160)/(66-95) 141/86  Arterial Line BP: (104-166)/(66-99) 143/84     Weight: 92.4 kg (203 lb 11.3 oz)  Body mass index is 30.08 kg/m².    Intake/Output Summary (Last 24 hours) at 5/15/2023 0718  Last data filed at 5/15/2023 0530  Gross per 24 hour   Intake 875.01 ml   Output 1700 ml   Net -824.99 ml         Physical Exam  Vitals and nursing note reviewed.   Constitutional:       Appearance: He is ill-appearing.   HENT:      Head: Normocephalic and atraumatic.      Right Ear: External ear normal.      Left Ear: External ear normal.      Nose: Nose normal.      Comments: NG tube.     Mouth/Throat:      Mouth: Mucous membranes are moist.      Pharynx: Oropharynx is clear.      Comments: ETT.  Cardiovascular:      Rate and Rhythm: Normal rate. Rhythm irregular.      Pulses: Normal pulses.      Heart sounds: Normal heart sounds.   Pulmonary:      Effort: Pulmonary effort is normal.      Breath sounds: Normal breath sounds.   Abdominal:      General: Bowel sounds are normal.      Palpations: Abdomen is soft.   Genitourinary:     Comments: Iraheta.  Musculoskeletal:      Right lower leg: Edema present.      Left lower leg: Edema present.   Skin:     General: Skin is warm and dry.   Neurological:      Comments: Sedated.           Significant Labs: All pertinent labs within the past 24 hours have been reviewed.    Significant Imaging: I have reviewed all pertinent imaging results/findings within the past 24 hours.      Assessment/Plan:    "   * Status epilepticus  In setting of glioblastoma. MRI brain also shows R MCA region infarcts.  -Continue Depacon and Keppra  -Vimpat added 5/14  -Continue Diprivan infusion  -Precedx infusion added 5/14  -Neurology following  -Continuous EEG  -Continue intubation with mechanical ventilation    Acute CVA (cerebrovascular accident)    Antithrombotics for secondary stroke prevention: Anticoagulants: Rivaroxaban 20 mg daily    Statins for secondary stroke prevention and hyperlipidemia, if present:   Statins: Atorvastatin- 80 mg daily    Aggressive risk factor modification: HTN, HLD, Obesity, A-Fib     Rehab efforts: The patient has been evaluated by a stroke team provider and the therapy needs have been fully considered based off the presenting complaints and exam findings. The following therapy evaluations are needed: None    Diagnostics ordered/pending: CTA Neck/Arch to assess vasculature, Lipid Profile to assess cholesterol levels, MRI head without contrast to assess brain parenchyma, TTE to assess cardiac function/status     VTE prophylaxis: None: Reason for No Pharmacological VTE Prophylaxis: Currently on anticoagulation    BP parameters: Infarct: No intervention, SBP <220        Acute respiratory failure  In setting of status epilepticus.  -Continuous EEG  -Continue mechanical ventilation with sedation as long as patient remains in status epilepticus  -Pulmonary consulted  -ABG PRN      HFrEF (heart failure with reduced ejection fraction)  Unclear chronicity. BNP unremarkable.  -Cardiology following  -Metoprolol  -Lasix PRN  -Telemetry  -Entresto      GERD (gastroesophageal reflux disease)  -PPI      Gout  -Continue home allopurinol      Obesity  Body mass index is 30.08 kg/m². Morbid obesity complicates all aspects of disease management from diagnostic modalities to treatment.         Glioblastoma  Chronic. Receives care in [place].  -Continue Decadron      Hypokalemia  -Replete PRN  -Telemetry  -Trend  K        Paroxysmal atrial fibrillation  -Xarelto  -Metoprolol  -Telemetry  -Cardiology consulted        Essential hypertension  -Metoprolol  -Entresto  -Continue to monitor      VTE Risk Mitigation (From admission, onward)         Ordered     rivaroxaban tablet 20 mg  With dinner         05/13/23 0902     Reason for No Pharmacological VTE Prophylaxis  Once        Question:  Reasons:  Answer:  Already adequately anticoagulated on oral Anticoagulants    05/11/23 1054     IP VTE HIGH RISK PATIENT  Once         05/11/23 1054     Place sequential compression device  Until discontinued         05/11/23 1054                Discharge Planning   BARBARA: 5/17/2023     Code Status: Full Code   Is the patient medically ready for discharge?:     Reason for patient still in hospital (select all that apply): Patient trending condition, Laboratory test, Treatment, Imaging and Consult recommendations  Discharge Plan A: Home with family                  Neo Clark MD  Department of Hospital Medicine   Critical access hospital

## 2023-05-15 NOTE — PROGRESS NOTES
[facility]  Neurology Progress Note    Patient Name: Teto Mendez  MRN: [id number]  : 1955  TODAY'S DATE: 05/15/2023  ADMIT DATE: 2023  6:39 AM                                          CONSULT REQUESTED BY: Neo Clark MD     Chief Complaint   Patient presents with    FACIAL TWITCHING    SLURRED SPEECH       HPI per EMR:  Patient presents with history of glioblastoma concurrently getting chemo in previous radiation complaining of facial twitching, facial droop, slurred speech, left arm weakness.  Symptom onset at 3:30 a.m..  Patient states at 3:30 a.m. he was playing on a game and then began to have the twitching of the face with associated droop with slurred speech and arm weakness.  Patient states he has had similar episodes in the past secondary to the glioblastoma.  He has not had the left arm weakness.  Patient states he is on Keppra and did take an extra Keppra dose this morning additionally patient is on Xarelto.  At the worst symptoms are severe.    Neurology Consult:  Patient was seen and examined by me today. He is a 67-year-old man with history of glioblastoma multiforme s/p craniectomy, chemotherapy and radiation diagnosed in 2022, as well as seizure disorder, who presented to the ED with left facial twitching, left facial droop, slurred speech and left arm weakness that began the morning of his admission at around 3 am. He was recently decreased on his home Keppra in the setting of side effects as per daughter. Upon arrival to the ED, he was loaded with 1 gr of Keppra and given 2 mg of ativan IV, which seemed to break the seizure. As per ED physician, patient was awake, alert and able to answer questions. At around 11:50 am, I was contacted by ED provider in the setting of sudden worsening of mental status, unresponsiveness, weakness and aphasia, so he was taken to CT scan for repeat CT brain to rule out LVO stroke or hemorrhage. I evaluated patient while on CT,  and noticed O2 Sats were in the high 80s. Due to technical malfunction, CT could not be completed, at which point I decided to abort procedure and transport patient to the ICU immediately. When we arrived to the 3rd floor ICU, patient seemed to be on respiratory failure, with toe and fingertip cyanosis, unable to maintain O2 Sats, so started on manual assisted ventilation and called anesthesia for emergent intubation. Patient was successfully intubated and sedation with propofol was started, but facial twitching recurred, so ativan was given and propofol bolus was administered, after which seizure activity seemed to cease. He was taken for CT after stabilization which showed no evidence of acute stroke or intracranial hemorrhage, and CTA showed no LVO. He is now being monitored on 24H continuous EEG for management of status epilepticus.    5/12/23:  Patient was seen and examined by me today.  He remains on deep sedation for treatment of status epilepticus, being monitored on 24 hour video EEG.  A brief electroclinical seizure was seen at around 4:00 a.m. with left facial twitching, but no clinical events reported after this.  Plan is to continue deep sedation until at least tomorrow morning.  Patient to be hooked up to EEG again when propofol starts to being weaned down.    5/13/23: I, Dr James assumed care on 5/13.  Patient is intubated, sedated with propofol.  He started out continuous EEG this morning shows right hemisphere lateralized periodic discharges with epileptiform potential.  No electrographic seizures were recorded.    5/14/2023:  Patient was seen examined by me.  Remains to be intubated and sedated.  MRI brain done this morning showed right MCA cortical infarcts.  Patient has been on continuous EEG overnight and EEG showed right hemispheric PLEDs with increase in frequency of PLEDs with lowering sedation.  Patient also had a clinical focal seizure involving twitching of the left side of the face this  morning when he was off sedation.    5/15/2023:  Patient was seen examined by me.  He is intubated and Precedex.  Propofol has been turned off yesterday.  Remains to be on continues EEG which shows right hemispheric (predominantly right frontotemporal) periodic lateralized epileptiform discharges.  Episodes of left facial twitching did not have EEG correlate.    Scheduled Meds:   acetaZOLAMIDE (DIAMOX) IVPB  250 mg Intravenous Once    allopurinoL  100 mg Per NG tube Daily    atorvastatin  80 mg Per NG tube Daily    chlorhexidine  15 mL Mouth/Throat BID    clonazePAM  0.5 mg Per NG tube TID    dexAMETHasone  1 mg Per NG tube Q12H    lacosamide (VIMPAT) IVPB  100 mg Intravenous Q12H    lacosamide (VIMPAT) IVPB  150 mg Intravenous Q12H    levetiracetam IV  1,500 mg Intravenous Q12H    metoprolol tartrate  100 mg Per NG tube BID    mupirocin   Nasal BID    pantoprazole  40 mg Intravenous Daily    rivaroxaban  20 mg Per NG tube Daily with dinner    sacubitriL-valsartan  1 tablet Per G Tube BID    timolol maleate 0.25%  1 drop Both Eyes BID    valproate sodium (DEPACON) IVPB  500 mg Intravenous Q8H     Continuous Infusions:   dexmedeTOMIDine (Precedex) infusion (titrating) 0.4 mcg/kg/hr (05/15/23 0327)    propofoL Stopped (05/14/23 1620)     PRN Meds:.acetaminophen, colchicine, hydrALAZINE, metoprolol, ondansetron, polyethylene glycol, potassium bicarbonate, potassium bicarbonate, potassium bicarbonate, sodium chloride 0.9%      Physical Exam  Current Vitals:  Vitals:    05/15/23 0920   BP:    Pulse: 70   Resp: 19   Temp:      General appearance: intubated, unresponsive  Cardiovascular: Afib  Pulmonary: on mechanical ventilation  GI: soft  MSK: normal passive ROM  Skin: normal color, no lesions    NEUROLOGICAL EXAM:    Mental status: unresponsive, sedated, intubated          Intubated: yes          Sedated: no  Response to noxious stimulation: no, patient on deep sedation  Breathes above ventilator: yes  Opens eyes:  no  Pupils: symmetric          Left: reactive          Right: reactive  Eye movements: No pathologic movements such as nystagmus, ocular bobbing, dipping or roving. No facial twitching seen.  Corneal reflex: present  Oculocephalic reflex:  Absent  Cough:  Weak  Gag:  Weak  Motor exam: Normal muscle tone. Normal muscle bulk. No abnormal movements such as tremors, myoclonus or twitching.  No response to painful stimuli, but patient is on deep sedation  DTRs: 1+ throughout. Babinski response absent.      Laboratory Data & Studies    Recent Labs   Lab 05/13/23  0422 05/14/23  0337 05/15/23  0308   WBC 6.37 8.13 6.65   HGB 15.7 16.9 16.1    181 142*   MCV 87 90 92       Recent Labs   Lab 05/13/23  0422 05/13/23  1939 05/14/23  0337 05/15/23  0308     --  140 142   K 3.1* 4.3 3.3* 3.4*     --  107 107   CO2 23  --  25 29   BUN 22  --  27* 29*   *  --  118* 115*   CALCIUM 8.7  --  8.4* 8.0*   MG 2.1  --  2.1 2.1   PHOS  --   --  5.5* 3.4       Recent Labs   Lab 05/13/23  0422 05/14/23  0337 05/15/23  0308   PROT 5.4* 5.8* 5.6*   ALBUMIN 2.9* 2.9* 2.5*   BILITOT 0.8 0.8 1.2*   AST 35 25 23   ALT 64* 48* 32   ALKPHOS 41* 45* 41*       Recent Labs   Lab 05/11/23 0657   INR 1.3*       Recent Labs   Lab 05/11/23  0657 05/15/23  0308   HGBA1C  --  5.7   CHOL 248* 250*   TRIG 92 143   LDLCALC 165.6* 191.4*   HDL 64 30*   TSH 3.900  --          Microbiology:  Microbiology Results (last 7 days)       Procedure Component Value Units Date/Time    Culture, Respiratory with Gram Stain [[id number]] Collected: 05/15/23 0059    Order Status: Completed Specimen: Respiratory from Sputum Updated: 05/15/23 0608     Gram Stain (Respiratory) <10 epithelial cells per low power field.     Gram Stain (Respiratory) Few WBC's     Gram Stain (Respiratory) Few Gram positive cocci    Culture, Respiratory with Gram Stain [[id number]]     Order Status: Canceled Specimen: Respiratory               Imaging:  CT Head Without  Contrast    Result Date: 5/11/2023  CMS MANDATED QUALITY DATA - CT RADIATION  436 All CT scans at this facility utilize dose modulation, iterative reconstruction, and/or weight based dosing when appropriate to reduce radiation dose to as low as reasonably achievable. CT HEAD WITHOUT IV CONTRAST CLINICAL HISTORY: 67 years Male Mental status change, unknown cause COMPARISON: Noncontrast CT head performed earlier today 6:56 AM FINDINGS: Negative for acute intracranial hemorrhage, midline shift, or mass effect. Parenchymal calcifications within the right cerebral hemisphere are stable compared to prior. Patient is status post right pterional craniotomy. Ventricles and sulci are normal in size. Gray-white differentiation is maintained. Cerebellar hemispheres and brainstem are unremarkable. No calvarial lesion or fracture. Mastoid air cells are clear. IMPRESSION: Stable exam. No CT evidence of acute intracranial pathology. Electronically signed by:  Matt Cole MD  5/11/2023 1:41 PM CDT Workstation: [id number]    X-Ray Chest AP Portable    Result Date: 5/11/2023  CLINICAL HISTORY: 67 years (1955) Male Stroke TECHNIQUE: Portable AP radiograph the chest. COMPARISON: None available. FINDINGS: Nonspecific minimal faint interstitial opacity at the left costophrenic.  No pneumothorax is identified. The heart is mildly enlarged.  Osseous structures show degenerative changes in the spine. The visualized upper abdomen is unremarkable. IMPRESSION: Nonspecific minimal faint interstitial opacity at the left costophrenic sulcus possibly representing atelectasis, scarring, trace pleural fluid, and less likely aspiration or pneumonia. . Electronically signed by:  Solomon Tobias MD  5/11/2023 8:32 AM CDT Workstation: [id number]    CT HEAD FOR STROKE    Result Date: 5/11/2023  CMS MANDATED QUALITY DATA - CT RADIATION - 436 All CT scans at this facility utilize dose modulation, iterative reconstruction, and/or weight based dosing  when appropriate to reduce radiation dose to as low as reasonably achievable. EXAMINATION: CT HEAD FOR STROKE CLINICAL HISTORY: Neuro deficit, acute, stroke suspected;  history of glioblastoma TECHNIQUE: CT without IV contrast COMPARISON: None FINDINGS: There are postsurgical changes from prior right frontal temporal These craniotomy.  The ventricles and sulci are mildly prominent compatible with generalized cerebral atrophy.  There is no hemorrhage, mass or midline shift.  There are no extra-axial fluid collections.  The cerebellum and brainstem are normal.  The gray-white differentiation is maintained.  The orbits are unremarkable. The paranasal sinuses and mastoid air cells are clear.     Mild cerebral atrophy with no acute intracranial process Prior right frontal temporal craniotomy Findings were called to Dr. Zhou in the emergency department at 07:09 a.m. Electronically signed by: Yi Hernandez MD Date:    05/11/2023 Time:    07:10    CTA Head and Neck (xpd)    Result Date: 5/11/2023  CMS MANDATED QUALITY DATA - CT RADIATION - 436 All CT scans at this facility utilize dose modulation, iterative reconstruction, and/or weight based dosing when appropriate to reduce radiation dose to as low as reasonably achievable. CMS MANDATED QUALITY DATA - CAROTID - 195 All measurements and percent stenosis described below were determined using NASCET criteria or criteria similar to NASCET, as defined by the Society of Radiologists in Ultrasound Consensus Conference, Radiology, 2003 Reason: Stroke/TIA, determine embolic source Technique: CT angiography of brain and neck with 100 mL Omnipaque 350.  Maximum intensity projection coronal reformations were obtained at a separate workstation and stored in the patient's permanent medical record. COMPARISON: 5/11/2023 CTA BRAIN: VASCULAR FINDINGS: Major intracranial arteries are widely patent with no stenosis, intraluminal filling, or dissection. Minimal atherosclerotic plaque  affects the cavernous segments of bilateral internal carotid arteries. Negative for aneurysm or evidence of vasculitis. Opacified visualized dural venous sinuses are unremarkable. NONVASCULAR FINDINGS: No abnormal intra-axial or extra-axial enhancement. No midline shift. Postsurgical changes of right pterional craniotomy are noted. 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 Minimal atherosclerotic plaque affects left carotid bulb. Left carotid arteries are otherwise without plaque or stenosis. Left vertebral artery is patent. Minimal atherosclerotic plaque affects the right carotid bulb. Right carotid arteries are without additional plaque and remain widely patent. Right vertebral artery is patent. NONVASCULAR FINDINGS: Endotracheal tube has tip proximal to mio. Visualized lung apices show minor dependent atelectasis. Cervical soft tissues are unremarkable. IMPRESSION: 1. Trace atherosclerotic plaque in intracranial internal carotid arteries, without other abnormality of the major intracranial arteries. 2. Trace atherosclerotic plaque in bilateral carotid bulbs, with no stenosis or other abnormality of cervical ICAs or vertebral arteries. Electronically signed by:  Compa Lopez MD  5/11/2023 2:01 PM CDT Workstation: [id number]    CTA Head and Neck (xpd)    Result Date: 5/11/2023  CMS EXAMINATION: CTA HEAD AND NECK (XPD) CLINICAL INDICATION: Male, 67 years old. Stroke/TIA, determine embolic source history of glioblastoma TECHNIQUE: Axial CT angiogram of the head and neck was performed with contrast. Multiplanar reformations as well as 3-D volume rendered imaging were reviewed at the workstation. Degree of stenosis was measured utilizing the NASCET method. CONTRAST: 100 mL mL of Omnipaque 350 IV. COMPARISON: None FINDINGS: CTA brain: The distal vertebral arteries, basilar artery and cavernous portions of the internal carotid  arteries are widely patent. The anterior cerebral arteries, middle cerebral arteries and posterior cerebral arteries are widely patent without evidence of large vessel occlusion, significant stenosis, AVM or aneurysm. The dural venous sinuses are patent. There is no pathologic enhancement. Aortic Arch and Great Vessel Origins: 2 vessel aortic arch which is widely patent Subclavian Arteries: Widely patent. Right Common Carotid Artery: Widely patent. Right Internal Carotid Artery: Widely patent. Right External Carotid Artery: Widely patent. Left Common Carotid Artery: Widely patent. Left Internal Carotid Artery: Widely patent. Left External Carotid Artery: Widely patent. Right Vertebral Artery: Widely patent. Left Vertebral Artery: Widely patent. The paraspinous soft tissues are unremarkable. There are mild degenerative changes of the cervical spine. The lung apices are clear. IMPRESSION: Negative CTA of the head and neck. This exam was performed according to our departmental dose-optimization program which includes automated exposure control, adjustment of the mA and/or kV according to patient size and/or use of iterative reconstruction technique. Electronically signed by:  Yi Hernandez MD  5/11/2023 7:43 AM CDT Workstation: [id number]        Assessment and Plan:    Status Epilepticus  History of Glioblastoma Multiforme s/p craniectomy, chemo and radiotherapy  Acute ischemic infarct   Atrial fibrillation       67-year-old man with history of glioblastoma multiforme s/p craniectomy, chemotherapy and radiation diagnosed in August 2022, as well as seizure disorder, who presented to the ED with left facial twitching, left facial droop, slurred speech and left arm weakness. Upon arrival to the ED, he was loaded with 1 gr of Keppra and given 2 mg of ativan IV, which seemed to break the seizure. However, he experienced sudden worsening of mental status, with unresponsiveness, weakness and aphasia, so he was taken to  CT scan for repeat CT brain to rule out LVO stroke or hemorrhage. He was then intubated, sedated and placed on mechanical ventilation. Clinical picture more consistent with status epilepticus.      - Admitted to hospital medicine in the ICU with q2 hour neuro checks, on telemetry, continuous pulse oximetry  - Seizure precautions while inpatient  - Underwent 24-hour VEEG continuous monitoring with evidence of 4 electrographic seizures which resolved when dose of propofol was increased to 60 mcg/kg/min. By the end of the recording, a burst suppression pattern with decreased LPEDs was seen.   -on 5/13 patient was again put on continues EEG monitoring while weaning sedation.  Initially EEG showed burst suppression pattern with bursts of 1-2 seconds of delta activity followed by 2-4 seconds of background suppression and also showed right hemispheric lateralized periodic epileptiform discharges.  Background changed to a mixture of delta and theta activity with PLEDs becoming more frequent with lowering sedation to 30mcg/kg/min.  He was kept at this dose of propofol overnight.  -on 05/14, loaded patient with Vimpat 300 mg and started him on 100 mg b.i.d. given persistence of periodic epileptiform discharge on the right hemisphere and also focal clinical seizure with left facial twitching noticed while being off sedation.  - 5/15:  Continuous EEG shows persistent PLEDs on the right hemisphere.  Multiple episodes of left facial twitching without any correlate/change the EEG.  Increase Vimpat to 200 mg b.i.d. due to persistence of PLEDs.   - Klonopin 0.5 mg t.i.d. for facial twitching.  Facial twitching could possibly be focal seizures not being captured the surface EEG versus twitching of unknown etiology  - continue Keppra 1500 mg BID IV  - Continue Depacon 500 mg TID IV. Valproic acid levels therapeutic.  - MRI brain showed right hemispheric infarcts.  Etiology could likely be cardioembolism in the setting of atrial  fibrillation. Continue anticoagulation with Xarelto 20 mg daily  - Avoid seizure threshold lowering medications such as:  Bupropion, diphenhydramine, tramadol, certain antibiotics  - Maintain Euthermia with Tylenol prn temp > 37.2 degrees C.  - Assessment for rehab with PT/OT/SLP evaluation and treatment.  - workup and treatment of metabolic and infectious abnormalities as per primary team  - Will follow along      DVT prophylaxis with chemo/SCD prophylaxis      [phone] 3 days from discharge.        All questions were answered.                              Thank you kindly for including us in the care of this patient. Please do not hesitate to contact us with any questions.       Critical Care:  72 minutes of critical care time has been spent evaluating with the patient. Time includes chart review not limited to diagnostic imaging, labs, and vitals, patient assessment, discussion with family and nursing, current order evaluations, and new order entries.      Mukund James MD  Neurology/Vascular Neurology

## 2023-05-15 NOTE — PROGRESS NOTES
[facility]  Department of Cardiology  Consult Note      PATIENT NAME: Teto Mendez  MRN: [id number]  TODAY'S DATE: 05/15/2023  ADMIT DATE: 5/11/2023                          CONSULT REQUESTED BY: Neo Clark MD    SUBJECTIVE     PRINCIPAL PROBLEM: Status epilepticus      REASON FOR CONSULT:    AFRVR    Interval history:    5/15/23    Patient remains on mechanical ventilation.  Remains on EEG.  Propofol has been weaned off and Precedex has been started an exchange.  During examination, patient had left facial twitching and coughing with mild stimulation.  Seizure activity noted on EEG overnight per nursing.  Potassium is 3.4 this a.m., replace per protocol.  Patient was alkalotic pH 7.5, PO2 67, HC03 29.6.  Patient was given Diamox 250 mg IV.  Rate controlled AFib on the monitor.  Entresto started yesterday.  Creatinine 1.1 today.      05/14/2023    Patient remains on mechanical ventilation and sedation with propofol.  Plans for MRI of the brain today.  Potassium 3.3 this a.m., replaced was 70 mEq of KCl.  Urinary output 1950 mL yesterday.  No output recorded yet today.  Hemodynamically stable.     MRI today:  IMPRESSION:  1. Cortical foci restricted signal intensity on diffusion weighted images compatible with a benign nonhemorrhagic cortical infarct with additional small infarct involving the deep subcortical white matter of the right parietal lobe.  2. No evidence of intracranial hemorrhage.    HPI:    Patient is a 67-year-old male with past medical history of GERD, glioblastoma multiforme s/p craniotomy, chemotherapy and radiation diagnosed in August 2022, history of blood clots, hypertension, PAF on Xarelto, seizures who presented to the ED with left facial pushing, left facial droop, slurred speech, left arm weakness that began at 3:00 a.m. on 5/11.  Keppra has been recently decreased.  In ED he was given 1 g of Keppra, 2 mg of Ativan which broke his seizure and he was subsequently alert and  responsive..  Later in CT, he had sudden worsening mental status, unresponsiveness, weakness, aphasia.  O2 saturations high 80s, and patient was intubated emergently and placed on mechanical ventilation for respiratory failure.  Seizure activity restarted and was erupted with Ativan and propofol bolus. No acute stroke/hemorrhage on CT.  Neurology is following for status epilepticus.    05/12/2023:  Patient was on EEG for monitoring of status epilepticus.  Seizure at 4:00 a.m..  Patient remains in AFib    05/13/2023: Patient was also found to be in AFib RVR this admission, requiring IV diltiazem.  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 Normotensive.  K 3.1, Cr 1.0, Mag 2.1; CXR this am coarse interstitial markings the LLL and trace L-sided pleural effusion; on propofol drip at 40 mics per kg per minute.  On mechanical ventilation 5 of PEEP 30% FiO2.  family at bedside.  Patient is originally from [place] and has a cardiologist there.  Known history of atrial fibrillation.  History of splenic infarct.  Unknown if patient has history of reduced EF or heart failure.  He was on Lasix at home.  Patient is visiting from [place].    ECHO this am  The left ventricle is mildly enlarged with concentric remodeling and  The estimated ejection fraction is 38%.  There is left ventricular global hypokinesis.  Mild mitral regurgitation.  Normal central venous pressure (3 mmHg).  The estimated PA systolic pressure is 26 mmHg.  Atrial fibrillation observed.  Normal right ventricular size with normal right ventricular systolic function.  Moderate left atrial enlargement.  Moderate right atrial enlargement.        FROM H&P  Teto Mendez is a 67 y.o. White male with known history of   glioblastoma s/p resection, seizures p.afib on xarelto who presented to ER this morning with left arm weakness, facial twitching and change in his speech that he suspected was seizure activity.  He took an extra keppra and presented to ER around 6:30-7 am.  Head CT/CTA in ER without hemorrhage or LVO and he was not a candidate for tPA regardless.  He was given lorazepam and keppra with positive response, however, while still in ER he became less responsive and repeat stat CT was ordered.  While in CT, he became hypoxic with depressed respiratory status and required bag-mask ventilation and emergent intubation on return to ICU.  History was obtained from the ER physician Sign-out.            Review of patient's allergies indicates:  No Known Allergies    Past Medical History:   Diagnosis Date    GERD (gastroesophageal reflux disease)     Glioblastoma     H/O blood clots     Hypertension     Paroxysmal atrial fibrillation     Seizures      No past surgical history on file.        REVIEW OF SYSTEMS  Unable to obtain. Sedated on mechanical ventilation    OBJECTIVE     VITAL SIGNS (Most Recent)  Temp: 98 °F (36.7 °C) (05/15/23 1101)  Pulse: 77 (05/15/23 1320)  Resp: 17 (05/15/23 1320)  BP: 109/69 (05/15/23 1101)  SpO2: (!) 94 % (05/15/23 1320)    VENTILATION STATUS  Resp: 17 (05/15/23 1320)  SpO2: (!) 94 % (05/15/23 1320)  Vent Mode: A/C  Oxygen Concentration (%):  [30-35] 35  Resp Rate Total:  [14 br/min-111 br/min] 24 br/min  Vt Set:  [450 mL] 450 mL  PEEP/CPAP:  [5 cmH20] 5 cmH20  Mean Airway Pressure:  [8.2 dzK97-95 cmH20] 19 cmH20        I & O (Last 24H):  Intake/Output Summary (Last 24 hours) at 5/15/2023 1336  Last data filed at 5/15/2023 0530  Gross per 24 hour   Intake 875.01 ml   Output 1700 ml   Net -824.99 ml       WEIGHTS  Wt Readings from Last 3 Encounters:   05/13/23 0001 92.4 kg (203 lb 11.3 oz)   05/11/23 1230 84 kg (185 lb 3 oz)   05/11/23 0641 83.9 kg (185 lb)   05/13/23 1042 92.1 kg (203 lb)        Physical examination:      Constitutional:  Critically ill appearing male sedated on mechanical ventilation, NAD  Neck: no carotid bruit, no JVD  Lungs: coarse breath sounds, mechanical ventilation- FiO2 35%, 5 peep  Chest Wall: no tenderness  CARDIAC:  Irregular rate and rhythm, soft systolic murmur  Abdomen: soft, bowel sounds normal; NG tube in left nare  Extremities: Extremities normal, atraumatic, no cyanosis, clubbing; 1+ pitting edema in bilateral lower extremities.  Skin: Skin color, texture, turgor normal. No rashes or lesions  Neuro: Sedated, on vent     HOME MEDICATIONS:  No current facility-administered medications on file prior to encounter.     Current Outpatient Medications on File Prior to Encounter   Medication Sig Dispense Refill    allopurinoL (ZYLOPRIM) 100 MG tablet Take 100 mg by mouth once daily.      amLODIPine (NORVASC) 10 MG tablet Take 10 mg by mouth once daily.      dexAMETHasone (DECADRON) 1 MG Tab Take 1 mg by mouth every 12 (twelve) hours.      hydrALAZINE (APRESOLINE) 25 MG tablet Take 25 mg by mouth 3 (three) times daily.      levETIRAcetam (KEPPRA) 500 MG Tab Take 500 mg by mouth 2 (two) times daily.      metoprolol succinate (TOPROL-XL) 100 MG 24 hr tablet Take 100 mg by mouth once daily.      pantoprazole (PROTONIX) 40 MG tablet Take 40 mg by mouth once daily.      XARELTO 20 mg Tab Take 20 mg by mouth once daily.      clotrimazole (MYCELEX) 10 mg kathleen Take 1 tablet by mouth 3 (three) times daily.      colchicine (COLCRYS) 0.6 mg tablet Take 0.6 mg by mouth once daily.      furosemide (LASIX) 20 MG tablet Take 20 mg by mouth once daily.      meclizine (ANTIVERT) 12.5 mg tablet Take 25 mg by mouth 3 (three) times daily.      rivaroxaban (XARELTO) 20 mg Tab Take 20 mg by mouth daily with dinner or evening meal.      timolol maleate 0.5% (TIMOPTIC) 0.5 % Drop Place 1 drop into both eyes 2 (two) times daily.         SCHEDULED MEDS:   allopurinoL  100 mg Per NG tube Daily     atorvastatin  80 mg Per NG tube Daily    chlorhexidine  15 mL Mouth/Throat BID    clonazePAM  0.5 mg Per NG tube TID    dexAMETHasone  1 mg Per NG tube Q12H    lacosamide (VIMPAT) IVPB  150 mg Intravenous Q12H    levetiracetam IV  1,500 mg Intravenous Q12H    metoprolol tartrate  100 mg Per NG tube BID    mupirocin   Nasal BID    pantoprazole  40 mg Intravenous Daily    rivaroxaban  20 mg Per NG tube Daily with dinner    sacubitriL-valsartan  1 tablet Per G Tube BID    timolol maleate 0.25%  1 drop Both Eyes BID    valproate sodium (DEPACON) IVPB  500 mg Intravenous Q8H       CONTINUOUS INFUSIONS:   dexmedeTOMIDine (Precedex) infusion (titrating) Stopped (05/15/23 0950)    propofoL Stopped (05/14/23 1620)       PRN MEDS:acetaminophen, colchicine, hydrALAZINE, metoprolol, ondansetron, polyethylene glycol, potassium bicarbonate, potassium bicarbonate, potassium bicarbonate, sodium chloride 0.9%    LABS AND DIAGNOSTICS     CBC LAST 3 DAYS  Recent Labs   Lab 05/13/23  0422 05/13/23  0504 05/14/23  0337 05/14/23  0351 05/15/23  0308 05/15/23  0311   WBC 6.37  --  8.13  --  6.65  --    RBC 5.00  --  5.43  --  5.23  --    HGB 15.7  --  16.9  --  16.1  --    HCT 43.3   < > 48.6 48 48.0 45   MCV 87  --  90  --  92  --    MCH 31.4*  --  31.1*  --  30.8  --    MCHC 36.3*  --  34.8  --  33.5  --    RDW 13.9  --  14.4  --  14.3  --      --  181  --  142*  --    MPV 10.1  --  10.3  --  10.2  --    GRAN 82.0*  5.2  --  88.0*  7.2  --  79.2*  5.3  --    LYMPH 8.5*  0.5*  --  4.3*  0.4*  --  7.5*  0.5*  --    MONO 8.3  0.5  --  6.5  0.5  --  10.5  0.7  --    BASO 0.01  --  0.01  --  0.04  --    NRBC 0  --  0  --  1*  --     < > = values in this interval not displayed.       COAGULATION LAST 3 DAYS  Recent Labs   Lab 05/11/23  0657   INR 1.3*       CHEMISTRY LAST 3 DAYS  Recent Labs   Lab 05/13/23  0422 05/13/23  0504 05/13/23  1939 05/14/23  0337 05/14/23  0351 05/15/23  0308 05/15/23  0311     --   --  140  --   142  --    K 3.1*  --  4.3 3.3*  --  3.4*  --      --   --  107  --  107  --    CO2 23  --   --  25  --  29  --    ANIONGAP 9  --   --  8  --  6*  --    BUN 22  --   --  27*  --  29*  --    CREATININE 1.0  --   --  1.1  --  1.1  --    *  --   --  118*  --  115*  --    CALCIUM 8.7  --   --  8.4*  --  8.0*  --    PH  --  7.527*  --   --  7.448  --  7.509*   MG 2.1  --   --  2.1  --  2.1  --    ALBUMIN 2.9*  --   --  2.9*  --  2.5*  --    PROT 5.4*  --   --  5.8*  --  5.6*  --    ALKPHOS 41*  --   --  45*  --  41*  --    ALT 64*  --   --  48*  --  32  --    AST 35  --   --  25  --  23  --    BILITOT 0.8  --   --  0.8  --  1.2*  --        CARDIAC PROFILE LAST 3 DAYS  Recent Labs   Lab 05/13/23  0422 05/13/23  1544   BNP 93  --    TROPONINIHS  --  7.0       ENDOCRINE LAST 3 DAYS  Recent Labs   Lab 05/11/23  0657   TSH 3.900       LAST ARTERIAL BLOOD GAS  ABG  Recent Labs   Lab 05/15/23  0311   PH 7.509*   PO2 67*   PCO2 37.2   HCO3 29.6*   BE 7       LAST 7 DAYS MICROBIOLOGY   Microbiology Results (last 7 days)       Procedure Component Value Units Date/Time    Culture, Respiratory with Gram Stain [[id number]] Collected: 05/15/23 0059    Order Status: Completed Specimen: Respiratory from Sputum Updated: 05/15/23 0608     Gram Stain (Respiratory) <10 epithelial cells per low power field.     Gram Stain (Respiratory) Few WBC's     Gram Stain (Respiratory) Few Gram positive cocci    Culture, Respiratory with Gram Stain [[id number]]     Order Status: Canceled Specimen: Respiratory             MOST RECENT IMAGING  MRI Brain W WO Contrast   ADDENDUM #1     Above findings were relayed to on-call neurologist Dr. Sepulveda on 5/14/2023 at 12:36 PM with feedback verification.    Electronically signed by:  Solomon Kohli MD  5/14/2023 12:36 PM CDT Workstation: [id number]     ORIGINAL REPORT     Examination: MRI of the brain with and without contrast.    Clinical history: Neurological deficit. Acute stroke  suspected.    Technical factors: Routine MR evaluation brain was established utilizing a combination of T1, T2, and gradient echo sequences before and after the routine administration of intravenous contrast ministration. Postcontrast images were obtained following the administration of 7 mL of intravenous contrast.    FINDINGS:    Diffusion weighted images reveal evidence of focal areas are restricted signal intensity along the cortical interface in the posterior temporal lobe extending upward to involve the central sulcus along the posterior edge additional foci of restricted signal within the right posterior parietal lobe the maintain relative bright signal intensity. No accompanying. Intensity changes are established on the ADC mapping. Lack of any significant signal alteration based on the accompanying subsequent images argues against the presence of active or microvascular hemorrhages. There is no evidence of foci of hemosiderin staining. The ventricles maintain normal size and overall contour. There is no evidence of masses, mass effect, or midline shift. Major intracranial flow is the base the brain are well developed. Postcontrast study disclosed no evidence of enhancement, vascular anomaly, significant leptomeningeal enhancement.    IMPRESSION:  1. Cortical foci restricted signal intensity on diffusion weighted images compatible with a benign nonhemorrhagic cortical infarct with additional small infarct involving the deep subcortical white matter of the right parietal lobe.  2. No evidence of intracranial hemorrhage.    Electronically signed by:  Solomon Kohli MD  5/14/2023 12:14 PM CDT Workstation: [id number]  X-Ray Chest AP Portable  HISTORY: vent    COMPARISON:5/13/2023 0545 hours.    FINDINGS:Tip of the ET tube lies roughly 1.6 cm the tracheal mio. The NG tube passes in the stomach. Lungs are in a state of diminished inspiration allowing bibasilar crowding of vessels and widening the  cardiomediastinal silhouette. No evidence of pulmonic limited or pleural effusion. Post cholecystectomy changes.    IMPRESSION:  1. Stable appearance the chest when compared to previous.  2. Appropriate position of all support devices.    Electronically signed by:  Solomon Kohli MD  5/14/2023 8:11 AM CDT Workstation: [id number]      ECHOCARDIOGRAM RESULTS (last 5)  No results found for this or any previous visit.      CURRENT/PREVIOUS VISIT EKG  Results for orders placed or performed during the hospital encounter of 05/11/23   ECG 12 lead    Collection Time: 05/11/23  7:28 AM    Narrative    Test Reason : I63.9,    Vent. Rate : 120 BPM     Atrial Rate : 150 BPM     P-R Int : 000 ms          QRS Dur : 104 ms      QT Int : 370 ms       P-R-T Axes : 000 016 -12 degrees     QTc Int : 522 ms    Atrial fibrillation with rapid ventricular response  Nonspecific ST abnormality  Prolonged QT  Abnormal ECG  No previous ECGs available  Confirmed by Tadeo BARTLETT, Joe CEBALLOS ([id number]) on 5/11/2023 10:07:18 PM    Referred By: [email or web address]   SELF           Confirmed By:Joe Piedra MD           ASSESSMENT/PLAN:     Active Hospital Problems    Diagnosis    *Status epilepticus    Acute CVA (cerebrovascular accident)    Obesity    Gout    GERD (gastroesophageal reflux disease)    HFrEF (heart failure with reduced ejection fraction)    Essential hypertension    Paroxysmal atrial fibrillation    Hypokalemia    Glioblastoma    Acute hypoxemic respiratory failure       ASSESSMENT & PLAN:     HFrEF-EF 38%  Paroxysmal Atrial Fibrillation  Hypokalemia  Status Epilepticus  Acute respiratory failure with hypoxia and hypercapnia  Glioblastoma        RECOMMENDATIONS:    Remains sedated on mechical ventilation. Hemodynamically stable.  Some facial twitching is still observed while propofol is being turned off.  Patient has history of AF.  Rate controlled at this time. Continue Xarelto 20 mg daily. Continue metoprolol 100 mg BID.  Metabolic alkalosis  this am.  Patient was given 1 dose of Diamox this am.   MRI brain w/wo contrast - His MRI shows evidence of new infarct. Neurology following.  Continue to check and replace potassium and magnesium. Goal for potassium is 4.0, and goal for magnesium is 2.0.    ECHO this admission- EF 38% and LV global hypokinesis.  No previous ECHO to compare.  Unknown if reduced EF is new or chronic.  Strict I's and O's.  Continue Entresto 24-26 mg BID for heart failure. Trend renal function. Cr 1.1 today.  Records requested from cardiologist in [place].   Thank you for the consultation.  We will continue to follow.      Kiana Moore NP  Department of Cardiology  Date of Service: 05/15/2023            I have personally interviewed and examined the patient, I have reviewed the Nurse Practitioner's history and physical, assessment, and plan. I agree with the findings and plan.    Zachariah Hughes M.D.  Department of Cardiology  Date of Service: 05/15/2023  9:45 AM

## 2023-05-15 NOTE — PLAN OF CARE
Left VM for patient's daughter, Gisell, requesting a call back to discuss DC planning/possible transfer       05/15/23 4006   Discharge Reassessment   Assessment Type Discharge Planning Reassessment

## 2023-05-15 NOTE — PLAN OF CARE
Problem: Feeding Intolerance (Enteral Nutrition)  Goal: Feeding Tolerance  Outcome: Ongoing, Progressing  Intervention: Prevent and Manage Feeding Intolerance  Flowsheets (Taken 5/15/2023 1109)  Nutrition Support Management: tube feeding rate increased

## 2023-05-15 NOTE — PROGRESS NOTES
"[facility]  Adult Nutrition   Progress Note (Nutrition Support Management)    SUMMARY     Recommendations  1) Increase current TF of Vital HP at 10 mLs/h by 20 mLs every 4 hours as tolerated to the new goal rate of 75 mLs/h to provide 1800 kcals, 158 g protein and 1505 mLs fluid   2) Continue FWF's of 30 mLs every 4 hours to clear tube and provide 180 mLs water.   3) RD to monitor rate/tolerance, weight, labs, etc. and make recommendations prn.    Goals:   1) Pt to tolerate TF at the goal rate.   2) Nutrition provision to meet 75 to 100% of pts energy needs and 100% or more of his protein needs. 3) Electrolytes to trend towards target range of WNL's.    Nutrition Goal Status: progressing towards goal    Communication of RD Recs: reviewed with RN    Dietitian Rounds Brief  F/U Nutrition Note: Observed pt today during rounds and he was still intubated but not on propofol. We discussed how pt is going to need to be transferred to [place] and whether it was better for him to be extubated before or after among other things. Pts nurse tells me that pt is no longer on propofol and would like TF increased to better meet pts needs which has been done. Pts has not has a BM since 5/10/243...will see if this changes with his increased TF. Will follow prn.    Diet order:   Current Diet Order: NPO      Evaluation of Received Nutrient/Fluid Intake  Energy Calories Required: not meeting needs  Protein Required: not meeting needs  Fluid Required: meeting needs  Tolerance: tolerating     % Intake of Estimated Energy Needs: 0 - 25 %  % Meal Intake: NPO      Intake/Output Summary (Last 24 hours) at 5/15/2023 1102  Last data filed at 5/15/2023 0530  Gross per 24 hour   Intake 875.01 ml   Output 1700 ml   Net -824.99 ml        Anthropometrics  Temp: 98.4 °F (36.9 °C)  Height Method: Estimated  Height: 5' 9" (175.3 cm)  Height (inches): 69 in  Weight Method: Bed Scale  Weight: 92.4 kg (203 lb 11.3 oz)  Weight (lb): 203.71 " lb  Ideal Body Weight (IBW), Male: 160 lb  % Ideal Body Weight, Male (lb): 127.32 %  BMI (Calculated): 30.1  BMI Grade: 25 - 29.9 - overweight       Estimated/Assessed Needs  Weight Used For Calorie Calculations: 92 kg (202 lb 13.2 oz)  Energy Calorie Requirements (kcal): 6524-8975 kcals/day (11-14 kcals/kg ABW)  Energy Need Method: Kcal/kg  Protein Requirements: 146-183 g/day (2-2.5 g/kg IBW)  Weight Used For Protein Calculations: 73 kg (160 lb 15 oz)  Fluid Requirements (mL): 1860 (30 ml/kg IBW)  Estimated Fluid Requirement Method: RDA Method  RDA Method (mL): 1012       Reason for Assessment  Reason For Assessment: RD follow-up  Diagnosis: seizures  Relevant Medical History: GERD; gioblastoma; h/o blood clots; HTN; paroxysmal A.fib.; seizures  Interdisciplinary Rounds: attended    Nutrition/Diet History  Food Allergies: NKFA  Factors Affecting Nutritional Intake: NPO, on mechanical ventilation    Nutrition Risk Screen  Nutrition Risk Screen: tube feeding or parenteral nutrition     MST Score: 0  Have you recently lost weight without trying?: No  Weight loss score: 0  Have you been eating poorly because of a decreased appetite?: No  Appetite score: 0       Weight History:  Wt Readings from Last 5 Encounters:   05/13/23 92.4 kg (203 lb 11.3 oz)   05/13/23 92.1 kg (203 lb)        Lab/Procedures/Meds: Pertinent Labs/Meds Reviewed    Medications:Pertinent Medications Reviewed  Scheduled Meds:   acetaZOLAMIDE (DIAMOX) IVPB  250 mg Intravenous Once    allopurinoL  100 mg Per NG tube Daily    atorvastatin  80 mg Per NG tube Daily    chlorhexidine  15 mL Mouth/Throat BID    clonazePAM  0.5 mg Per NG tube TID    dexAMETHasone  1 mg Per NG tube Q12H    lacosamide (VIMPAT) IVPB  100 mg Intravenous Q12H    lacosamide (VIMPAT) IVPB  150 mg Intravenous Q12H    levetiracetam IV  1,500 mg Intravenous Q12H    metoprolol tartrate  100 mg Per NG tube BID    mupirocin   Nasal BID    pantoprazole  40 mg Intravenous Daily     rivaroxaban  20 mg Per NG tube Daily with dinner    sacubitriL-valsartan  1 tablet Per G Tube BID    timolol maleate 0.25%  1 drop Both Eyes BID    valproate sodium (DEPACON) IVPB  500 mg Intravenous Q8H     Continuous Infusions:   dexmedeTOMIDine (Precedex) infusion (titrating) Stopped (05/15/23 0950)    propofoL Stopped (05/14/23 1620)     PRN Meds:.acetaminophen, colchicine, hydrALAZINE, metoprolol, ondansetron, polyethylene glycol, potassium bicarbonate, potassium bicarbonate, potassium bicarbonate, sodium chloride 0.9%    Labs: Pertinent Labs Reviewed  Clinical Chemistry:  Recent Labs   Lab 05/14/23  0337 05/15/23  0308    142   K 3.3* 3.4*    107   CO2 25 29   * 115*   BUN 27* 29*   CREATININE 1.1 1.1   CALCIUM 8.4* 8.0*   PROT 5.8* 5.6*   ALBUMIN 2.9* 2.5*   BILITOT 0.8 1.2*   ALKPHOS 45* 41*   AST 25 23   ALT 48* 32   ANIONGAP 8 6*   MG 2.1 2.1   PHOS 5.5* 3.4     CBC:   Recent Labs   Lab 05/15/23  0308 05/15/23  0311   WBC 6.65  --    RBC 5.23  --    HGB 16.1  --    HCT 48.0 45   *  --    MCV 92  --    MCH 30.8  --    MCHC 33.5  --      Lipid Panel:  Recent Labs   Lab 05/15/23  0308   CHOL 250*   HDL 30*   LDLCALC 191.4*   TRIG 143   CHOLHDL 12.0*     Cardiac Profile:  Recent Labs   Lab 05/13/23  0422   BNP 93     Diabetes:  Recent Labs   Lab 05/15/23  0308   HGBA1C 5.7     Thyroid & Parathyroid:  Recent Labs   Lab 05/11/23  0657   TSH 3.900       Monitor and Evaluation  Food and Nutrient Intake: enteral nutrition intake  Food and Nutrient Adminstration: enteral and parenteral nutrition administration  Knowledge/Beliefs/Attitudes: beliefs and attitudes, food and nutrition knowledge/skill  Physical Activity and Function: nutrition-related ADLs and IADLs, factors affecting access to physical activity  Anthropometric Measurements: weight change, weight, body mass index  Biochemical Data, Medical Tests and Procedures: lipid profile, inflammatory profile, glucose/endocrine profile,  gastrointestinal profile, electrolyte and renal panel  Nutrition-Focused Physical Findings: overall appearance     Nutrition Risk  Level of Risk/Frequency of Follow-up: moderate     Nutrition Follow-Up  RD Follow-up?: Yes      Carolina Bush RD 05/15/2023 11:02 AM

## 2023-05-15 NOTE — ASSESSMENT & PLAN NOTE
Unclear chronicity. BNP unremarkable.  -Cardiology following  -Metoprolol  -Lasix PRN  -Telemetry  -Entresto

## 2023-05-15 NOTE — PLAN OF CARE
05/15/23 0659   Patient Assessment/Suction   Level of Consciousness (AVPU) responds to pain   Respiratory Effort Normal   Expansion/Accessory Muscles/Retractions no retractions;no use of accessory muscles   All Lung Fields Breath Sounds clear   Rhythm/Pattern, Respiratory assisted mechanically;unlabored;pattern regular;depth regular   Cough Frequency with stimulation   Cough Type assisted   Suction Method tracheal   $ Suction Charges Inline Suction Procedure Stat Charge   Secretions Amount small   Secretions Color yellow   Secretions Characteristics thick      Airway Anesthesia 05/11/23   Placement Date/Time: 05/11/23 (c) 4139   Method of Intubation: Video Laryngoscopy  Airway Device: Endotracheal Tube  Mask Ventilation: Moderately difficult with oral airway  Intubated: Other (see comments)  Blade: Glidescope #3  Airway Device Size: 8....   Secured at 24 cm   Measured At Lips   Secured Location Right   Secured by Commercial tube barboza   Bite Block none   Site Condition Dry   Status Intact;Secured;Patent   Airway Safety   Is Ambu Bag and Mask with Patient? Yes, Adult Ambu Bag and Mask   Vent Select   Conventional Vent Y   $ Ventilator Subsequent 1   Charged w/in last 24h YES   Preset Conventional Ventilator Settings   Vent ID [id number]   Vent Type    Ventilation Type VC   Vent Mode A/C   Humidity HME   Set Rate 12 BPM   Vt Set 450 mL   PEEP/CPAP 5 cmH20   Peak Flow 60 L/min   Peak End Inspiratory Pressure 18 cmH20   I-Trigger Type  V-TRIG   Trigger Sensitivity Flow/I-Trigger 3 L/min   Patient Ventilator Parameters   Resp Rate Total 20 br/min   Peak Airway Pressure 18 cmH20   Mean Airway Pressure 9.1 cmH20   Plateau Pressure 19 cmH20   Exhaled Vt 457 mL   Total Ve 9.35 L/m   I:E Ratio Measured 1:2.10   Auto PEEP 0 cmH20   Tubing ID (mm) 8 mm   Tube Type ET   Conventional Ventilator Alarms   Alarms On Y   Resp Rate High Alarm 45 br/min   Press High Alarm 50 cmH2O   Apnea Rate 12   Apnea Volume (mL) 1 mL   Apnea  Oxygen Concentration  100   Apnea Flow Rate (L/min) 65   T Apnea 20 sec(s)   Ready to Wean/Extubation Screen   MV<16L (chart vol.) 9.35   PEEP <=8 (chart #) 5   Education   $ Education Ventilator Oxygen;15 min

## 2023-05-15 NOTE — PROCEDURES
CONTINUOUS VIDEO EEG REPORT    NAME: Teto Mendez  : 1955  MRN: [id number]    DATE of EE2023 12:28 PM to 5/15/2023 9:49 AM  DURATION OF EE hr     CLINICAL INDICATION: This is a 67 y.o. male with history of glioblastoma multiforme s/p craniotomy, chemotherapy and radiotherapy, as well as seizure disorder being evaluated for status epilepticus.    MEDICATIONS:     allopurinoL  100 mg Per NG tube Daily    atorvastatin  80 mg Per NG tube Daily    chlorhexidine  15 mL Mouth/Throat BID    clonazePAM  0.5 mg Per NG tube TID    dexAMETHasone  1 mg Per NG tube Q12H    lacosamide (VIMPAT) IVPB  150 mg Intravenous Q12H    levetiracetam IV  1,500 mg Intravenous Q12H    metoprolol tartrate  100 mg Per NG tube BID    mupirocin   Nasal BID    pantoprazole  40 mg Intravenous Daily    rivaroxaban  20 mg Per NG tube Daily with dinner    sacubitriL-valsartan  1 tablet Per G Tube BID    timolol maleate 0.25%  1 drop Both Eyes BID    valproate sodium (DEPACON) IVPB  500 mg Intravenous Q8H         EEG DESCRIPTION:  This is a 16-channel EEG was performed with electrode placement in 10/20 International System. Longitudinal bipolar and referential montages were utilized in analysis.    This is a technically adequate study.  Study consisted delta frequency waveforms.  Throughout the study, right hemispheric predominantly on the right frontotemporal leads periodic lateralized epileptiform discharges were seen which were about 1 Hz frequency.  Patient had multiple episodes of left-sided facial twitching which did not have EEG correlate on the right hemispheric electrodes.  The left hemispheric electrodes(predominantly in the left frontotemporal) showed periodic muscle artifact with left face twitching.    IMPRESSION: This is an abnormal long-term video EEG with evidence of significant background slowing, periodic lateralized epileptiform discharges(PLEDs) from the right  hemisphere.  No electrographic seizures were recorded.      Mukund James MD  Neurology

## 2023-05-15 NOTE — ASSESSMENT & PLAN NOTE
In setting of glioblastoma. MRI brain also shows R MCA region infarcts.  -Continue Depacon and Keppra  -Vimpat added 5/14  -Continue Diprivan infusion  -Precedx infusion added 5/14  -Neurology following  -Continuous EEG  -Continue intubation with mechanical ventilation

## 2023-05-15 NOTE — PROCEDURES
Picc line placed in left upper arm. Picc line 43 cm in length. Initial arm circumference 36 cm. Picc line confirmed by portable chest x-ray. Occlussive   CHG drsg applied.

## 2023-05-15 NOTE — PROGRESS NOTES
Pulmonary/Critical Care Progress Note      PATIENT NAME: Teto Mendez  MRN: [id number]  TODAY'S DATE: 05/15/2023  12:50 PM  ADMIT DATE: 2023  AGE: 67 y.o. : 1955      HPI:  Called by nurse because they could not reach anesthesia to intubate a patient who was being bagged.  The patient had a change in mental status in the emergency room after having had a CTA earlier in the morning which was negative.  In CT, the patient was not breathing effectively and was cyanotic and the scanner broke so the patient was brought up to the ICU emergently.  The patient's past medical history is significant for glioblastoma multiforme for which he has been receiving radiation and chemotherapy.  He also has had a right frontal craniotomy.  At 3:30 a.m. in the morning the patient was playing a game and noted twitching of his face with slurred speech and weakness to his left arm.  He took an extra Keppra at that time.  He arrived at the ER at 6:45 a.m..     the patient remains on the ventilator and sedated with 60 of propofol.  He is also on 5 Cardizem.  Had further seizure activity during the night.    - pt remains sedated, on vent, on continuous EEG. There is EEG evidence of seizure activity so neuro has ordered VPA. Propofol has been weaned to 30    - continues sedated, on vent. Mri was done today and shows infarct in R parietal and R temporal lobes. Continues on propofol 20mg. He did have seizure activity twice today    5/15 The patient is still on the vent.  He is on Precedex, the propofol is off.  He was still having seizure activity yesterday.  MRI shows parietal and temporal infarcts on the right.    REVIEW OF SYSTEMS  Unobtainable    No change in the patient's Past Medical History, Past Surgical History, Social History or Family History since admission.      VITAL SIGNS (MOST RECENT)  Temp: 98.4 °F (36.9 °C) (05/15/23 0301)  Pulse: 72 (05/15/23 0601)  Resp: 16 (05/15/23 0601)  BP: (!) 141/86 (05/15/23  0301)  SpO2: 98 % (05/15/23 0601)    INTAKE AND OUTPUT (LAST 24 HOURS):  Intake/Output Summary (Last 24 hours) at 5/15/2023 0846  Last data filed at 5/15/2023 0530  Gross per 24 hour   Intake 875.01 ml   Output 1700 ml   Net -824.99 ml       WEIGHT  Wt Readings from Last 1 Encounters:   05/13/23 92.4 kg (203 lb 11.3 oz)       PHYSICAL EXAM  GENERAL: Older patient, intubated, unresponsive  HEENT: Pupils equal and reactive. Nose intact. Pharynx intubated with ET tube and OG tube. EEG leads over scalp. + cough with suctioning  NECK: Supple.   HEART: Regular rate and rhythm. No murmur or gallop auscultated.  LUNGS: Clear to auscultation and percussion. Lung excursion symmetrical. No change in fremitus. No adventitial noises.  ABDOMEN: Bowel sounds present. Non-tender, no masses palpated.  : Normal anatomy.  Iraheta with yellow urine.  EXTREMITIES: Normal muscle tone and joint movement, no cyanosis or clubbing.  Antecubital IVs bilaterally  LYMPHATICS: No adenopathy palpated, no edema.  SKIN: Dry, intact, no lesions.   NEURO:  Sedated on precedex  PSYCH:  Unable to assess      CBC LAST (LAST 24 HOURS)  Recent Labs   Lab 05/15/23  0308 05/15/23  0311   WBC 6.65  --    RBC 5.23  --    HGB 16.1  --    HCT 48.0 45   MCV 92  --    MCH 30.8  --    MCHC 33.5  --    RDW 14.3  --    *  --    MPV 10.2  --    GRAN 79.2*  5.3  --    LYMPH 7.5*  0.5*  --    MONO 10.5  0.7  --    BASO 0.04  --    NRBC 1*  --        CHEMISTRY LAST (LAST 24 HOURS)  Recent Labs   Lab 05/15/23  0308 05/15/23  0311     --    K 3.4*  --      --    CO2 29  --    ANIONGAP 6*  --    BUN 29*  --    CREATININE 1.1  --    *  --    CALCIUM 8.0*  --    PH  --  7.509*   MG 2.1  --    ALBUMIN 2.5*  --    PROT 5.6*  --    ALKPHOS 41*  --    ALT 32  --    AST 23  --    BILITOT 1.2*  --            LAST 7 DAYS MICROBIOLOGY   Microbiology Results (last 7 days)       Procedure Component Value Units Date/Time    Culture, Respiratory with Gram  Stain [[id number]] Collected: 05/15/23 0059    Order Status: Completed Specimen: Respiratory from Sputum Updated: 05/15/23 0608     Gram Stain (Respiratory) <10 epithelial cells per low power field.     Gram Stain (Respiratory) Few WBC's     Gram Stain (Respiratory) Few Gram positive cocci    Culture, Respiratory with Gram Stain [[id number]]     Order Status: Canceled Specimen: Respiratory             MOST RECENT IMAGING  MRI Brain W WO Contrast   ADDENDUM #1     Above findings were relayed to on-call neurologist Dr. Sepulveda on 5/14/2023 at 12:36 PM with feedback verification.    Electronically signed by:  Solomon Kohli MD  5/14/2023 12:36 PM CDT Workstation: [id number]     ORIGINAL REPORT     Examination: MRI of the brain with and without contrast.    Clinical history: Neurological deficit. Acute stroke suspected.    Technical factors: Routine MR evaluation brain was established utilizing a combination of T1, T2, and gradient echo sequences before and after the routine administration of intravenous contrast ministration. Postcontrast images were obtained following the administration of 7 mL of intravenous contrast.    FINDINGS:    Diffusion weighted images reveal evidence of focal areas are restricted signal intensity along the cortical interface in the posterior temporal lobe extending upward to involve the central sulcus along the posterior edge additional foci of restricted signal within the right posterior parietal lobe the maintain relative bright signal intensity. No accompanying. Intensity changes are established on the ADC mapping. Lack of any significant signal alteration based on the accompanying subsequent images argues against the presence of active or microvascular hemorrhages. There is no evidence of foci of hemosiderin staining. The ventricles maintain normal size and overall contour. There is no evidence of masses, mass effect, or midline shift. Major intracranial flow is the base the brain are well  developed. Postcontrast study disclosed no evidence of enhancement, vascular anomaly, significant leptomeningeal enhancement.    IMPRESSION:  1. Cortical foci restricted signal intensity on diffusion weighted images compatible with a benign nonhemorrhagic cortical infarct with additional small infarct involving the deep subcortical white matter of the right parietal lobe.  2. No evidence of intracranial hemorrhage.    Electronically signed by:  Solomon Kohli MD  5/14/2023 12:14 PM CDT Workstation: [id number]  X-Ray Chest AP Portable  HISTORY: vent    COMPARISON:5/13/2023 0545 hours.    FINDINGS:Tip of the ET tube lies roughly 1.6 cm the tracheal mio. The NG tube passes in the stomach. Lungs are in a state of diminished inspiration allowing bibasilar crowding of vessels and widening the cardiomediastinal silhouette. No evidence of pulmonic limited or pleural effusion. Post cholecystectomy changes.    IMPRESSION:  1. Stable appearance the chest when compared to previous.  2. Appropriate position of all support devices.    Electronically signed by:  Solomon Kohli MD  5/14/2023 8:11 AM CDT Workstation: [id number]      CURRENT VISIT EKG  Results for orders placed or performed during the hospital encounter of 05/11/23   ECG 12 lead    Narrative    Test Reason : I63.9,    Vent. Rate : 120 BPM     Atrial Rate : 150 BPM     P-R Int : 000 ms          QRS Dur : 104 ms      QT Int : 370 ms       P-R-T Axes : 000 016 -12 degrees     QTc Int : 522 ms    Atrial fibrillation with rapid ventricular response  Nonspecific ST abnormality  Prolonged QT  Abnormal ECG  No previous ECGs available    Referred By: [email or web address]   SELF           Confirmed By:        ECHOCARDIOGRAM RESULTS  No results found for this or any previous visit.        VENTILATOR INFORMATION  Vent Mode: A/C  Oxygen Concentration (%):  [30-35] 35  Resp Rate Total:  [14 br/min-24 br/min] 20 br/min  Vt Set:  [450 mL] 450 mL  PEEP/CPAP:  [5 cmH20] 5 cmH20  Mean Airway  Pressure:  [8.5 cmH20-9.6 cmH20] 9.1 cmH20           LAST ARTERIAL BLOOD GAS  ABG  Recent Labs   Lab 05/15/23  0311   PH 7.509*   PO2 67*   PCO2 37.2   HCO3 29.6*   BE 7       IMPRESSION AND PLAN  Status epilepticus  -MRI shows temporal and parietal infarcts on the right  -history of seizures prior to this admission  History of glioblastoma multiforme under treatment in [place]  Mechanical ventilation  -metabolic alkalosis, will give a dose of Diamox  -to protect airway and maintain ventilatory drive with his status  - ABG daily  - SAT/SBT and extubate when appropriate per neuro  Atrial fibrillation  - continue Xarelto  - continue metoprolol  Hypertension  -metoprolol  Hypokalemia  - replace potassium  -Trend labs  Transaminitis  -resolved  Moderate hypoalbuminemia  -enteral feedings to resume  Hyperlipidemia    The nurse reports the family is trying to transport the patient back to [place] which is where they are from.  I am trying to reach Dr. James to see what his plans are insofar as the ongoing seizure activity with regards to mechanical ventilation      Critical care time spent reviewing the chart, examining the patient, reviewing the labs, reviewing the radiological findings, discussing care with nursing, physicians, and respiratory and creating the note and  has been greater than 35 minutes    Mery Milner MD  Date of Service: 05/15/2023  12:50 PM

## 2023-05-16 LAB
ALBUMIN SERPL BCP-MCNC: 2.5 G/DL (ref 3.5–5.2)
ALLENS TEST: ABNORMAL
ALLENS TEST: ABNORMAL
ALP SERPL-CCNC: 40 U/L (ref 55–135)
ALT SERPL W/O P-5'-P-CCNC: 44 U/L (ref 10–44)
ANION GAP SERPL CALC-SCNC: 8 MMOL/L (ref 8–16)
AST SERPL-CCNC: 49 U/L (ref 10–40)
BASOPHILS # BLD AUTO: 0.03 K/UL (ref 0–0.2)
BASOPHILS NFR BLD: 0.4 % (ref 0–1.9)
BILIRUB SERPL-MCNC: 1.2 MG/DL (ref 0.1–1)
BUN SERPL-MCNC: 40 MG/DL (ref 8–23)
CALCIUM SERPL-MCNC: 8.2 MG/DL (ref 8.7–10.5)
CHLORIDE SERPL-SCNC: 112 MMOL/L (ref 95–110)
CO2 SERPL-SCNC: 24 MMOL/L (ref 23–29)
CREAT SERPL-MCNC: 1.1 MG/DL (ref 0.5–1.4)
DELSYS: ABNORMAL
DELSYS: ABNORMAL
DIFFERENTIAL METHOD: ABNORMAL
EOSINOPHIL # BLD AUTO: 0 K/UL (ref 0–0.5)
EOSINOPHIL NFR BLD: 0.1 % (ref 0–8)
ERYTHROCYTE [DISTWIDTH] IN BLOOD BY AUTOMATED COUNT: 14.1 % (ref 11.5–14.5)
ERYTHROCYTE [SEDIMENTATION RATE] IN BLOOD BY WESTERGREN METHOD: 12 MM/H
EST. GFR  (NO RACE VARIABLE): >60 ML/MIN/1.73 M^2
FIO2: 35
FIO2: 35
GLUCOSE SERPL-MCNC: 137 MG/DL (ref 70–110)
GLUCOSE SERPL-MCNC: 145 MG/DL (ref 70–110)
HCO3 UR-SCNC: 23.7 MMOL/L (ref 24–28)
HCO3 UR-SCNC: 25.3 MMOL/L (ref 24–28)
HCT VFR BLD AUTO: 49.7 % (ref 40–54)
HCT VFR BLD CALC: 47 %PCV (ref 36–54)
HGB BLD-MCNC: 16.9 G/DL (ref 14–18)
IMM GRANULOCYTES # BLD AUTO: 0.16 K/UL (ref 0–0.04)
IMM GRANULOCYTES NFR BLD AUTO: 2.1 % (ref 0–0.5)
LYMPHOCYTES # BLD AUTO: 0.5 K/UL (ref 1–4.8)
LYMPHOCYTES NFR BLD: 7.1 % (ref 18–48)
MAGNESIUM SERPL-MCNC: 2.3 MG/DL (ref 1.6–2.6)
MCH RBC QN AUTO: 31 PG (ref 27–31)
MCHC RBC AUTO-ENTMCNC: 34 G/DL (ref 32–36)
MCV RBC AUTO: 91 FL (ref 82–98)
MODE: ABNORMAL
MODE: ABNORMAL
MONOCYTES # BLD AUTO: 0.8 K/UL (ref 0.3–1)
MONOCYTES NFR BLD: 10.5 % (ref 4–15)
NEUTROPHILS # BLD AUTO: 6 K/UL (ref 1.8–7.7)
NEUTROPHILS NFR BLD: 79.8 % (ref 38–73)
NRBC BLD-RTO: 0 /100 WBC
PCO2 BLDA: 33.6 MMHG (ref 35–45)
PCO2 BLDA: 34.5 MMHG (ref 35–45)
PEEP: 5
PEEP: 5
PH SMN: 7.46 [PH] (ref 7.35–7.45)
PH SMN: 7.47 [PH] (ref 7.35–7.45)
PHOSPHATE SERPL-MCNC: 2.8 MG/DL (ref 2.7–4.5)
PLATELET # BLD AUTO: 139 K/UL (ref 150–450)
PMV BLD AUTO: 9.8 FL (ref 9.2–12.9)
PO2 BLDA: 77 MMHG (ref 80–100)
PO2 BLDA: 95 MMHG (ref 80–100)
POC BE: 0 MMOL/L
POC BE: 2 MMOL/L
POC IONIZED CALCIUM: 1.15 MMOL/L (ref 1.06–1.42)
POC SATURATED O2: 96 % (ref 95–100)
POC SATURATED O2: 98 % (ref 95–100)
POC TCO2: 25 MMOL/L (ref 23–27)
POC TCO2: 26 MMOL/L (ref 23–27)
POTASSIUM BLD-SCNC: 3.3 MMOL/L (ref 3.5–5.1)
POTASSIUM SERPL-SCNC: 3.3 MMOL/L (ref 3.5–5.1)
PROT SERPL-MCNC: 5.7 G/DL (ref 6–8.4)
PS: 10
RBC # BLD AUTO: 5.46 M/UL (ref 4.6–6.2)
SAMPLE: ABNORMAL
SAMPLE: ABNORMAL
SITE: ABNORMAL
SITE: ABNORMAL
SODIUM BLD-SCNC: 145 MMOL/L (ref 136–145)
SODIUM SERPL-SCNC: 144 MMOL/L (ref 136–145)
SP02: 97
SP02: 99
SPONT RATE: 29
WBC # BLD AUTO: 7.46 K/UL (ref 3.9–12.7)

## 2023-05-16 PROCEDURE — 85025 COMPLETE CBC W/AUTO DIFF WBC: CPT | Performed by: STUDENT IN AN ORGANIZED HEALTH CARE EDUCATION/TRAINING PROGRAM

## 2023-05-16 PROCEDURE — 63600175 PHARM REV CODE 636 W HCPCS: Performed by: STUDENT IN AN ORGANIZED HEALTH CARE EDUCATION/TRAINING PROGRAM

## 2023-05-16 PROCEDURE — 94799 UNLISTED PULMONARY SVC/PX: CPT

## 2023-05-16 PROCEDURE — 25000003 PHARM REV CODE 250: Performed by: STUDENT IN AN ORGANIZED HEALTH CARE EDUCATION/TRAINING PROGRAM

## 2023-05-16 PROCEDURE — 82330 ASSAY OF CALCIUM: CPT

## 2023-05-16 PROCEDURE — 99900035 HC TECH TIME PER 15 MIN (STAT)

## 2023-05-16 PROCEDURE — 99291 CRITICAL CARE FIRST HOUR: CPT | Mod: ,,, | Performed by: INTERNAL MEDICINE

## 2023-05-16 PROCEDURE — 99232 SBSQ HOSP IP/OBS MODERATE 35: CPT | Mod: ,,, | Performed by: INTERNAL MEDICINE

## 2023-05-16 PROCEDURE — 25000003 PHARM REV CODE 250: Performed by: INTERNAL MEDICINE

## 2023-05-16 PROCEDURE — 63600175 PHARM REV CODE 636 W HCPCS

## 2023-05-16 PROCEDURE — 25000003 PHARM REV CODE 250

## 2023-05-16 PROCEDURE — C9113 INJ PANTOPRAZOLE SODIUM, VIA: HCPCS | Performed by: STUDENT IN AN ORGANIZED HEALTH CARE EDUCATION/TRAINING PROGRAM

## 2023-05-16 PROCEDURE — 99291 PR CRITICAL CARE, E/M 30-74 MINUTES: ICD-10-PCS | Mod: ,,, | Performed by: INTERNAL MEDICINE

## 2023-05-16 PROCEDURE — 84132 ASSAY OF SERUM POTASSIUM: CPT

## 2023-05-16 PROCEDURE — 82803 BLOOD GASES ANY COMBINATION: CPT

## 2023-05-16 PROCEDURE — 84100 ASSAY OF PHOSPHORUS: CPT | Performed by: STUDENT IN AN ORGANIZED HEALTH CARE EDUCATION/TRAINING PROGRAM

## 2023-05-16 PROCEDURE — 27000221 HC OXYGEN, UP TO 24 HOURS

## 2023-05-16 PROCEDURE — 63600175 PHARM REV CODE 636 W HCPCS: Performed by: INTERNAL MEDICINE

## 2023-05-16 PROCEDURE — 94760 N-INVAS EAR/PLS OXIMETRY 1: CPT

## 2023-05-16 PROCEDURE — 85014 HEMATOCRIT: CPT

## 2023-05-16 PROCEDURE — 63600175 PHARM REV CODE 636 W HCPCS: Performed by: HOSPITALIST

## 2023-05-16 PROCEDURE — C9254 INJECTION, LACOSAMIDE: HCPCS

## 2023-05-16 PROCEDURE — 80053 COMPREHEN METABOLIC PANEL: CPT | Performed by: STUDENT IN AN ORGANIZED HEALTH CARE EDUCATION/TRAINING PROGRAM

## 2023-05-16 PROCEDURE — 99232 PR SUBSEQUENT HOSPITAL CARE,LEVL II: ICD-10-PCS | Mod: ,,, | Performed by: INTERNAL MEDICINE

## 2023-05-16 PROCEDURE — 83735 ASSAY OF MAGNESIUM: CPT | Performed by: STUDENT IN AN ORGANIZED HEALTH CARE EDUCATION/TRAINING PROGRAM

## 2023-05-16 PROCEDURE — C9254 INJECTION, LACOSAMIDE: HCPCS | Performed by: INTERNAL MEDICINE

## 2023-05-16 PROCEDURE — 99900026 HC AIRWAY MAINTENANCE (STAT)

## 2023-05-16 PROCEDURE — 20000000 HC ICU ROOM

## 2023-05-16 PROCEDURE — 94003 VENT MGMT INPAT SUBQ DAY: CPT

## 2023-05-16 PROCEDURE — 94761 N-INVAS EAR/PLS OXIMETRY MLT: CPT

## 2023-05-16 PROCEDURE — 99900031 HC PATIENT EDUCATION (STAT)

## 2023-05-16 PROCEDURE — 37799 UNLISTED PX VASCULAR SURGERY: CPT

## 2023-05-16 PROCEDURE — 84295 ASSAY OF SERUM SODIUM: CPT

## 2023-05-16 RX ADMIN — CHLORHEXIDINE GLUCONATE 15 ML: 1.2 RINSE ORAL at 08:05

## 2023-05-16 RX ADMIN — AMANTADINE 100 MG: 100 CAPSULE ORAL at 08:05

## 2023-05-16 RX ADMIN — HYDRALAZINE HYDROCHLORIDE 10 MG: 20 INJECTION INTRAMUSCULAR; INTRAVENOUS at 08:05

## 2023-05-16 RX ADMIN — METOPROLOL TARTRATE 100 MG: 50 TABLET, FILM COATED ORAL at 08:05

## 2023-05-16 RX ADMIN — TIMOLOL MALEATE 1 DROP: 2.5 SOLUTION/ DROPS OPHTHALMIC at 08:05

## 2023-05-16 RX ADMIN — HYDRALAZINE HYDROCHLORIDE 10 MG: 20 INJECTION INTRAMUSCULAR; INTRAVENOUS at 04:05

## 2023-05-16 RX ADMIN — AMANTADINE 100 MG: 100 CAPSULE ORAL at 01:05

## 2023-05-16 RX ADMIN — LEVETIRACETAM INJECTION 1500 MG: 15 INJECTION INTRAVENOUS at 08:05

## 2023-05-16 RX ADMIN — DEXAMETHASONE 1 MG: 1 TABLET ORAL at 08:05

## 2023-05-16 RX ADMIN — DEXTROSE 500 MG: 50 INJECTION, SOLUTION INTRAVENOUS at 01:05

## 2023-05-16 RX ADMIN — PANTOPRAZOLE SODIUM 40 MG: 40 INJECTION, POWDER, FOR SOLUTION INTRAVENOUS at 08:05

## 2023-05-16 RX ADMIN — RIVAROXABAN 20 MG: 20 TABLET, FILM COATED ORAL at 05:05

## 2023-05-16 RX ADMIN — SACUBITRIL AND VALSARTAN 1 TABLET: 24; 26 TABLET, FILM COATED ORAL at 08:05

## 2023-05-16 RX ADMIN — POTASSIUM BICARBONATE 35 MEQ: 391 TABLET, EFFERVESCENT ORAL at 05:05

## 2023-05-16 RX ADMIN — METOPROLOL TARTRATE 5 MG: 5 INJECTION, SOLUTION INTRAVENOUS at 04:05

## 2023-05-16 RX ADMIN — DEXTROSE 500 MG: 50 INJECTION, SOLUTION INTRAVENOUS at 08:05

## 2023-05-16 RX ADMIN — ALLOPURINOL 100 MG: 100 TABLET ORAL at 08:05

## 2023-05-16 RX ADMIN — MUPIROCIN 1 G: 20 OINTMENT TOPICAL at 08:05

## 2023-05-16 RX ADMIN — DEXTROSE 500 MG: 50 INJECTION, SOLUTION INTRAVENOUS at 04:05

## 2023-05-16 RX ADMIN — SODIUM CHLORIDE 200 MG: 9 INJECTION, SOLUTION INTRAVENOUS at 04:05

## 2023-05-16 RX ADMIN — SODIUM CHLORIDE 200 MG: 9 INJECTION, SOLUTION INTRAVENOUS at 06:05

## 2023-05-16 RX ADMIN — POTASSIUM BICARBONATE 35 MEQ: 391 TABLET, EFFERVESCENT ORAL at 08:05

## 2023-05-16 RX ADMIN — CLONAZEPAM 0.5 MG: 0.5 TABLET ORAL at 08:05

## 2023-05-16 RX ADMIN — ATORVASTATIN CALCIUM 80 MG: 40 TABLET, FILM COATED ORAL at 08:05

## 2023-05-16 NOTE — ASSESSMENT & PLAN NOTE
In setting of glioblastoma. MRI brain also shows R MCA region infarcts. GCS 3 off sedation.  -Continue Depacon and Keppra  -Vimpat added 5/14  -Neurology following  -Continuous EEG  -Continue intubation with mechanical ventilation

## 2023-05-16 NOTE — PROGRESS NOTES
Pulmonary/Critical Care Progress Note      PATIENT NAME: Teto Mendez  MRN: [id number]  TODAY'S DATE: 2023  12:50 PM  ADMIT DATE: 2023  AGE: 67 y.o. : 1955      HPI:  Called by nurse because they could not reach anesthesia to intubate a patient who was being bagged.  The patient had a change in mental status in the emergency room after having had a CTA earlier in the morning which was negative.  In CT, the patient was not breathing effectively and was cyanotic and the scanner broke so the patient was brought up to the ICU emergently.  The patient's past medical history is significant for glioblastoma multiforme for which he has been receiving radiation and chemotherapy.  He also has had a right frontal craniotomy.  At 3:30 a.m. in the morning the patient was playing a game and noted twitching of his face with slurred speech and weakness to his left arm.  He took an extra Keppra at that time.  He arrived at the ER at 6:45 a.m..     the patient remains on the ventilator and sedated with 60 of propofol.  He is also on 5 Cardizem.  Had further seizure activity during the night.    - pt remains sedated, on vent, on continuous EEG. There is EEG evidence of seizure activity so neuro has ordered VPA. Propofol has been weaned to 30    - continues sedated, on vent. Mri was done today and shows infarct in R parietal and R temporal lobes. Continues on propofol 20mg. He did have seizure activity twice today    5/15 The patient is still on the vent.  He is on Precedex, the propofol is off.  He was still having seizure activity yesterday.  MRI shows parietal and temporal infarcts on the right.     the patient is been off all sedation for 24 hours and has a GCS of 3.  Has pupils and corneals and gag and breathes spontaneously but no response to pain.  He does not respond to his voice.  He follows no commands.    REVIEW OF SYSTEMS  Unobtainable    No change in the patient's Past Medical History,  Past Surgical History, Social History or Family History since admission.      VITAL SIGNS (MOST RECENT)  Temp: 99 °F (37.2 °C) (05/16/23 0330)  Pulse: 84 (05/16/23 0600)  Resp: (!) 27 (05/16/23 0600)  BP: (!) 179/103 (05/16/23 0428)  SpO2: 98 % (05/16/23 0600)    INTAKE AND OUTPUT (LAST 24 HOURS):  Intake/Output Summary (Last 24 hours) at 5/16/2023 0735  Last data filed at 5/16/2023 0552  Gross per 24 hour   Intake 2108.33 ml   Output 1375 ml   Net 733.33 ml       WEIGHT  Wt Readings from Last 1 Encounters:   05/13/23 92.4 kg (203 lb 11.3 oz)       PHYSICAL EXAM  GENERAL: Older patient, intubated, unresponsive  HEENT: Pupils equal and reactive.  Corneals are intact.  Nose intact. Pharynx intubated with ET tube and OG tube. EEG leads over scalp.  Gag reflex is intact.  NECK: Supple.   HEART: Regular rate and rhythm. No murmur or gallop auscultated.  LUNGS: Clear to auscultation and percussion. Lung excursion symmetrical. No change in fremitus. No adventitial noises.  ABDOMEN: Bowel sounds present. Non-tender, no masses palpated.  Tolerating enteral nutrition  : Normal anatomy.  Iraheta with yellow urine.  EXTREMITIES: Normal muscle tone and joint movement, no cyanosis or clubbing.  PICC line and arterial line on the left  LYMPHATICS: No adenopathy palpated, no edema.  SKIN: Dry, intact, no lesions.   NEURO:  Pupils and extraocular movements are intact.  Corneals are intact.  Gag is intact.  There is no response to pain.  There is no response to command.  GCS is 3  PSYCH:  Unable to assess      CBC LAST (LAST 24 HOURS)  Recent Labs   Lab 05/16/23 0416   WBC 7.46   RBC 5.46   HGB 16.9   HCT 49.7   MCV 91   MCH 31.0   MCHC 34.0   RDW 14.1   *   MPV 9.8   GRAN 79.8*  6.0   LYMPH 7.1*  0.5*   MONO 10.5  0.8   BASO 0.03   NRBC 0       CHEMISTRY LAST (LAST 24 HOURS)  Recent Labs   Lab 05/16/23 0416      K 3.3*   *   CO2 24   ANIONGAP 8   BUN 40*   CREATININE 1.1   *   CALCIUM 8.2*   MG 2.3    ALBUMIN 2.5*   PROT 5.7*   ALKPHOS 40*   ALT 44   AST 49*   BILITOT 1.2*           LAST 7 DAYS MICROBIOLOGY   Microbiology Results (last 7 days)       Procedure Component Value Units Date/Time    Culture, Respiratory with Gram Stain [[id number]] Collected: 05/15/23 0059    Order Status: Completed Specimen: Respiratory from Sputum Updated: 05/16/23 0713     Respiratory Culture Normal respiratory fredi     Gram Stain (Respiratory) <10 epithelial cells per low power field.     Gram Stain (Respiratory) Few WBC's     Gram Stain (Respiratory) Few Gram positive cocci    Culture, Respiratory with Gram Stain [[id number]]     Order Status: Canceled Specimen: Respiratory             MOST RECENT IMAGING  X-Ray Chest AP Portable  50 portable chest x-ray at 3:50 PM is compared to prior study at 5:32 AM    Clinical history is PICC line placement    There is a left PICC line with the tip overlying the superior vena cava. There is no pneumothorax.  The endotracheal tube and NG tube are in stable position. The heart is enlarged. There is atelectasis in the left lung base. There are no confluent infiltrates or pleural effusions.    IMPRESSION: No pneumothorax status post left PICC line placement    Cardiomegaly with atelectasis left lung base    Electronically signed by:  Yi Hernandez MD  5/15/2023 4:01 PM CDT Workstation: [id number]      CURRENT VISIT EKG  Results for orders placed or performed during the hospital encounter of 05/11/23   ECG 12 lead    Narrative    Test Reason : I63.9,    Vent. Rate : 120 BPM     Atrial Rate : 150 BPM     P-R Int : 000 ms          QRS Dur : 104 ms      QT Int : 370 ms       P-R-T Axes : 000 016 -12 degrees     QTc Int : 522 ms    Atrial fibrillation with rapid ventricular response  Nonspecific ST abnormality  Prolonged QT  Abnormal ECG  No previous ECGs available    Referred By: [email or web address]   SELF           Confirmed By:        ECHOCARDIOGRAM RESULTS  No results found for this or any previous  visit.        VENTILATOR INFORMATION  Vent Mode: A/C  Oxygen Concentration (%):  [35] 35  Resp Rate Total:  [13 br/min-111 br/min] 26 br/min  Vt Set:  [450 mL] 450 mL  PEEP/CPAP:  [5 cmH20] 5 cmH20  Mean Airway Pressure:  [8.2 frM67-60 cmH20] 10 cmH20           LAST ARTERIAL BLOOD GAS  ABG  Recent Labs   Lab 05/15/23  0311   PH 7.509*   PO2 67*   PCO2 37.2   HCO3 29.6*   BE 7       IMPRESSION AND PLAN  Status epilepticus  -MRI shows temporal and parietal infarcts on the right  -history of seizures prior to this admission  History of glioblastoma multiforme under treatment in [place]  Naomi coma scale of 3  -patient has been off of sedation for over 24 hours  -discussed with Dr. James who wants to give the patient more time  Mechanical ventilation  - ABG daily  - CPAP trial with hopes to get to a T-piece but the patient's neurologic function is too poor to extubate  Atrial fibrillation  - continue Xarelto  - continue metoprolol  Hypertension  -metoprolol  Hypokalemia  - replace potassium  -Trend labs  Moderate hypoalbuminemia  -enteral feedings tolerated  Hyperlipidemia      Plans to transport the patient back to [place]?    Critical care time spent reviewing the chart, examining the patient, reviewing the labs, reviewing the radiological findings, discussing care with nursing, physicians, and respiratory and creating the note and  has been greater than 35 minutes    Mery Milner MD  Date of Service: 05/16/2023  12:50 PM

## 2023-05-16 NOTE — NURSING
[facility] ([facility])  [facility] Contact # [phone]        Thank you for the referral of this patient to determine suitability for organ, tissue, and eye donation.  A chart review has been conducted (date):05/16/2023 at (time) 0537.  Lists of hospitals in the [place] findings are as follows:    ? Potential candidate for organ donation - [facility] following patient. Any changes in patients condition, discussion of withdrawing the vent or brain death exams, or family mention of donation immediately call [phone].    [facility] Representative: Erin Haines    [facility] Referral Number: [id number]    Organ  to call back.

## 2023-05-16 NOTE — PLAN OF CARE
Spoke with patient's daughter about transfer process. States she has spoken with family and all in agreement with keeping patient here.. states they are very happy with current care patient is receiving.

## 2023-05-16 NOTE — CARE UPDATE
05/16/23 1300   Patient Assessment/Suction   Level of Consciousness (AVPU) responds to pain   Respiratory Effort Normal;Unlabored   Expansion/Accessory Muscles/Retractions no use of accessory muscles;expansion symmetric   Rhythm/Pattern, Respiratory assisted mechanically   Cough Frequency with stimulation   Cough Type assisted   Suction Method oral;tracheal   Suction Pressure (mmHg) -120 mmHg   $ Suction Charges Inline Suction Procedure Stat Charge   Secretions Amount small   Secretions Color yellow;pale   Secretions Characteristics thick   PRE-TX-O2   Device (Oxygen Therapy) ventilator   $ Is the patient on Low Flow Oxygen? Yes   Oxygen Concentration (%) 35   SpO2 98 %   Pulse Oximetry Type Continuous   $ Pulse Oximetry - Single Charge Pulse Oximetry - Single   Pulse 86   Resp (!) 31      Airway Anesthesia 05/11/23   Placement Date/Time: 05/11/23 (c) 3173   Method of Intubation: Video Laryngoscopy  Airway Device: Endotracheal Tube  Mask Ventilation: Moderately difficult with oral airway  Intubated: Other (see comments)  Blade: Glidescope #3  Airway Device Size: 8....   Secured at 24 cm   Measured At Lips   Secured Location Center   Secured by Commercial tube barboza   Bite Block none   Site Condition Dry   Status Intact;Secured   Site Assessment Clean;Dry   Cuff Volume   (mlt)   Airway Safety   Is Ambu Bag and Mask with Patient? Yes, Adult Ambu Bag and Mask   Trach Supplies at Bedside Suction Catheter   Suction set is at the bedside? Yes   Vent Select   Conventional Vent Y   Charged w/in last 24h YES   Preset Conventional Ventilator Settings   Vent ID [id number]   Vent Type    Ventilation Type VC   Vent Mode Spont   Humidity HME   PEEP/CPAP 5 cmH20   Pressure Support 10 cmH20   Peak End Inspiratory Pressure 16 cmH20   Insp Rise Time  70 %   I-Trigger Type  V-TRIG   Trigger Sensitivity Flow/I-Trigger 3 L/min   Patient Ventilator Parameters   Resp Rate Total 30 br/min   Peak Airway Pressure 16 cmH20   Mean Airway  Pressure 8.6 cmH20   Plateau Pressure 0 cmH20   Exhaled Vt 418 mL   Total Ve 12.7 L/m   Spont Ve 12.7 L   I:E Ratio Measured 1:1.80   Auto PEEP 0 cmH20   Inspired Tidal Volume (VTI) 0 mL   Conventional Ventilator Alarms   Alarms On Y   Resp Rate High Alarm 45 br/min   Press High Alarm 50 cmH2O   Apnea Rate 12   Apnea Volume (mL) 1 mL   Apnea Oxygen Concentration  100   Apnea Flow Rate (L/min) 65   T Apnea 20 sec(s)   IHI Ventilator Associated Pneumonia Bundle (Required)   Daily Awakening Trials Performed Yes   Daily Assessment of Readiness to Extubate Yes   Head of Bed Elevated  HOB 45   Oral Care Mouth suctioned;Mouth swabbed;Teeth brushed   Peptic Ulcer ProphylaxisProvided Peptic ulcer prophylaxis maintained   Vent Circut Breaks Minimized Yes   VTE Prophylaxis Provided VTE prophylaxis maintained   Ready to Wean/Extubation Screen   FIO2<=50 (chart decimal) 0.35   MV<16L (chart vol.) 12.7   PEEP <=8 (chart #) 5   Ready to Wean Parameters   F/VT Ratio<105 (RSBI) (!) 74.16   Vital Capacity   Vital Capacity (mL) 0   Education   $ Education 15 min;Ventilator Oxygen;Suction

## 2023-05-16 NOTE — ASSESSMENT & PLAN NOTE
In setting of status epilepticus.  -Continuous EEG  -Continue mechanical ventilation  as long as patient remains in status epilepticus  -Pulmonary consulted  -ABG PRN

## 2023-05-16 NOTE — CARE UPDATE
05/16/23 0800   Patient Assessment/Suction   Level of Consciousness (AVPU) responds to pain   Respiratory Effort Normal;Unlabored   Expansion/Accessory Muscles/Retractions expansion symmetric;no retractions   Rhythm/Pattern, Respiratory assisted mechanically   Cough Frequency with stimulation   Cough Type assisted   Suction Method oral;tracheal   Suction Pressure (mmHg) -120 mmHg   $ Suction Charges Inline Suction Procedure Stat Charge   Secretions Amount small   Secretions Color yellow;pale   Secretions Characteristics thick   PRE-TX-O2   Device (Oxygen Therapy) ventilator   $ Is the patient on Low Flow Oxygen? Yes   Oxygen Concentration (%) 35   SpO2 98 %   Pulse Oximetry Type Continuous   $ Pulse Oximetry - Multiple Charge Pulse Oximetry - Multiple   Pulse 89   Resp (!) 29      Airway Anesthesia 05/11/23   Placement Date/Time: 05/11/23 (c) 4043   Method of Intubation: Video Laryngoscopy  Airway Device: Endotracheal Tube  Mask Ventilation: Moderately difficult with oral airway  Intubated: Other (see comments)  Blade: Glidescope #3  Airway Device Size: 8....   Secured at 24 cm   Measured At Lips   Secured Location Center   Secured by Commercial tube barboza   Bite Block none   Site Condition Dry   Status Intact;Secured   Site Assessment Clean;Dry   Cuff Volume   (mlt)   Airway Safety   Is Ambu Bag and Mask with Patient? Yes, Adult Ambu Bag and Mask   Trach Supplies at Bedside Suction Catheter   Suction set is at the bedside? Yes   Vent Select   Conventional Vent Y   $ Ventilator Subsequent 1   Charged w/in last 24h YES   Preset Conventional Ventilator Settings   Vent ID [id number]   Vent Type    Ventilation Type VC   Vent Mode Spont   Humidity HME   PEEP/CPAP 5 cmH20   Pressure Support 10 cmH20   Peak End Inspiratory Pressure 16 cmH20   Insp Rise Time  70 %   I-Trigger Type  V-TRIG   Trigger Sensitivity Flow/I-Trigger 3 L/min   Patient Ventilator Parameters   Resp Rate Total 29 br/min   Peak Airway Pressure 16  cmH20   Mean Airway Pressure 8.7 cmH20   Plateau Pressure 19 cmH20   Exhaled Vt 435 mL   Total Ve 12.7 L/m   Spont Ve 12.7 L   I:E Ratio Measured 1:1.80   Auto PEEP 0 cmH20   Inspired Tidal Volume (VTI) 0 mL   Conventional Ventilator Alarms   Alarms On Y   Resp Rate High Alarm 45 br/min   Press High Alarm 50 cmH2O   Apnea Rate 12   Apnea Volume (mL) 1 mL   Apnea Oxygen Concentration  100   Apnea Flow Rate (L/min) 65   T Apnea 20 sec(s)   IHI Ventilator Associated Pneumonia Bundle (Required)   Daily Awakening Trials Performed Not applicable   Daily Assessment of Readiness to Extubate Yes   Head of Bed Elevated  HOB 45   Oral Care Mouth suctioned   Peptic Ulcer ProphylaxisProvided Peptic ulcer prophylaxis maintained   Vent Circut Breaks Minimized Yes   VTE Prophylaxis Provided VTE prophylaxis maintained   Ready to Wean/Extubation Screen   FIO2<=50 (chart decimal) 0.35   MV<16L (chart vol.) 12.7   PEEP <=8 (chart #) 5   Ready to Wean Parameters   F/VT Ratio<105 (RSBI) (!) 66.67   Vital Capacity   Vital Capacity (mL) 0   Education   $ Education 15 min;Ventilator Oxygen;Suction     Dr Milner flipped pt to PS/CPAP

## 2023-05-16 NOTE — CARE UPDATE
05/16/23 1040   Patient Assessment/Suction   Level of Consciousness (AVPU) responds to pain   Respiratory Effort Slight   Expansion/Accessory Muscles/Retractions expansion symmetric;no retractions   Rhythm/Pattern, Respiratory assisted mechanically   Cough Frequency with stimulation   Cough Type assisted   Suction Method oral;tracheal   Suction Pressure (mmHg) -120 mmHg   $ Suction Charges Inline Suction Procedure Stat Charge   Secretions Amount small   Secretions Color yellow;pale   Secretions Characteristics thick   PRE-TX-O2   Device (Oxygen Therapy) ventilator   $ Is the patient on Low Flow Oxygen? Yes   Oxygen Concentration (%) 35   SpO2 96 %   Pulse Oximetry Type Continuous   Pulse 94   Resp (!) 34      Airway Anesthesia 05/11/23   Placement Date/Time: 05/11/23 (c) 6414   Method of Intubation: Video Laryngoscopy  Airway Device: Endotracheal Tube  Mask Ventilation: Moderately difficult with oral airway  Intubated: Other (see comments)  Blade: Glidescope #3  Airway Device Size: 8....   Secured at 24 cm   Measured At Lips   Secured Location Right   Secured by Commercial tube barboza   Bite Block none   Site Condition Dry   Status Intact;Secured   Site Assessment Clean;Dry   Cuff Volume   (MLT)   Airway Safety   Is Ambu Bag and Mask with Patient? Yes, Adult Ambu Bag and Mask   Trach Supplies at Bedside Suction Catheter   Vent Select   Conventional Vent Y   Charged w/in last 24h YES   Preset Conventional Ventilator Settings   Vent ID [id number]   Vent Type    Ventilation Type VC   Vent Mode Spont   Humidity HME   PEEP/CPAP 5 cmH20   Pressure Support 10 cmH20   Peak End Inspiratory Pressure 16 cmH20   Insp Rise Time  70 %   I-Trigger Type  V-TRIG   Trigger Sensitivity Flow/I-Trigger 3 L/min   Patient Ventilator Parameters   Resp Rate Total 34 br/min   Peak Airway Pressure 16 cmH20   Mean Airway Pressure 8.8 cmH20   Plateau Pressure 0 cmH20   Exhaled Vt 388 mL   Total Ve 13.3 L/m   Spont Ve 13.3 L   I:E Ratio  Measured 1:1.90   Auto PEEP 0 cmH20   Inspired Tidal Volume (VTI) 0 mL   Conventional Ventilator Alarms   Resp Rate High Alarm 45 br/min   Press High Alarm 50 cmH2O   Apnea Rate 12   Apnea Volume (mL) 1 mL   Apnea Oxygen Concentration  100   Apnea Flow Rate (L/min) 65   T Apnea 20 sec(s)   IHI Ventilator Associated Pneumonia Bundle (Required)   Daily Awakening Trials Performed Yes   Daily Assessment of Readiness to Extubate Yes   Head of Bed Elevated  HOB 45   Oral Care Mouth suctioned   Vent Circut Breaks Minimized Yes   Ready to Wean/Extubation Screen   FIO2<=50 (chart decimal) 0.35   MV<16L (chart vol.) 13.3   PEEP <=8 (chart #) 5   Ready to Wean Parameters   F/VT Ratio<105 (RSBI) (!) 87.63   Vital Capacity   Vital Capacity (mL) 0   Education   $ Education 15 min;Ventilator Oxygen;Suction     PT APPEARED UNCOMFORTABLE SO HE WAS PLACED BACK ON PS/CPAP 10/5 PER DR DELEON VERBAL ORDER.

## 2023-05-16 NOTE — PROGRESS NOTES
[facility]  Adult Nutrition   Progress Note (Nutrition Support Management)    SUMMARY      Recommendations:   1. Continue current TF of Vital HP at 75 mls/h providing 1800 kcals, 158 g protein and 1505 mls fluid  2. Continue FWF's of 30 mls every 4 hours to clear tube.  3. Rd will continue to monitor all aspects of enteral feeding and make recommendations prn.     Goals:   1) Pt to tolerate TF at the goal rate.   2) Nutrition provision to meet 75 to 100% of pts energy needs and 100% or more of his protein needs. 3) Electrolytes to trend towards target range of WNL's.    Dietitian Rounds Brief  F/U Nutrition Note: Pt is tolerating his current TF of Vital HP at the goal rate of 75 mls/h and finally had a BM today. Pertinent labs today show pt has hypokalemia K+ 3.3, hyperglycemia with Glucose 145, BUN 40, hypoalbuminemia with an alb of 2.5. Hopefully labs will normalize now that pts TF is at the goal rate of 75 mls/h. Will continue to follow prn.    Overview/Hospital Course:  Teto Mendez is a 67 year old male with a past medical history of glioblastoma s/p craniotomy complicated by seizures, obesity, HTN, AFib and GERD who presented with status epilepticus. Neurology has been consulted. The patient was intubated on propofol sedation with Keppra and valproic acid. Pulmonary has been consulted. Continuous EEG is ordered and still running. MRI brain shows R MCA region infarcts consistent with acute CVA. His course has been complicated by Afib with RVR as well as a HFrEF seen on TTE which appears to be chronic. Cardiology has been consulted. He is on metoprolol and Xarelto with PRN diuresis. Entresto and high dose atorvastatin was also added. EEG continues to shows periodic lateralized epileptiform discharges as of 5/14. Sedation with propofol and Precedex was stopped 5/15. Vimpat was also added 5/15 by Neurology. Unfortunately, off sedation the patient's GCS is three. He will continue to be monitored  for improvement in his neurologic status.    Diet order: NPO    TF: Vital HP  Rate: 75 mls/h  Calories: 1800   Protein: 158 g  Fluid: 1505 mls  Water flushes: 30 mls every 4 hours    % Intake of Estimated Energy Needs: 75 - 100 %  % Meal Intake: NPO    Estimated/Assessed Needs  Weight Used For Calorie Calculations: 92 kg (202 lb 13.2 oz)  Energy Calorie Requirements (kcal): 8062-0428 kcals/day (20-25 kcals/kg ABW)  Energy Need Method: Kcal/kg  Protein Requirements: 146-183 g/day (2-2.5 g/kg IBW)  Weight Used For Protein Calculations: 73 kg (160 lb 15 oz)  Fluid Requirements (mL): 1860 (30 ml/kg IBW)  Estimated Fluid Requirement Method: RDA Method  RDA Method (mL): 1012       Weight History:  Wt Readings from Last 5 Encounters:   05/13/23 92.4 kg (203 lb 11.3 oz)   05/13/23 92.1 kg (203 lb)        Reason for Assessment  Reason For Assessment: RD follow-up  Diagnosis: seizures  Relevant Medical History: GERD; gioblastoma; h/o blood clots; HTN; paroxysmal A.fib.; seizures  Interdisciplinary Rounds: attended    Medications:Pertinent Medications Reviewed  Scheduled Meds:   allopurinoL  100 mg Per NG tube Daily    amantadine HCL  100 mg Oral BID    atorvastatin  80 mg Per NG tube Daily    chlorhexidine  15 mL Mouth/Throat BID    clonazePAM  0.5 mg Per NG tube TID    dexAMETHasone  1 mg Per NG tube Q12H    lacosamide (VIMPAT) IVPB  200 mg Intravenous Q12H    levetiracetam IV  1,500 mg Intravenous Q12H    metoprolol tartrate  100 mg Per NG tube BID    mupirocin   Nasal BID    pantoprazole  40 mg Intravenous Daily    rivaroxaban  20 mg Per NG tube Daily with dinner    sacubitriL-valsartan  1 tablet Per G Tube BID    timolol maleate 0.25%  1 drop Both Eyes BID    valproate sodium (DEPACON) IVPB  500 mg Intravenous Q8H     Continuous Infusions:  PRN Meds:.acetaminophen, colchicine, hydrALAZINE, metoprolol, ondansetron, polyethylene glycol, potassium bicarbonate, potassium bicarbonate, potassium bicarbonate, sodium chloride  0.9%    Labs: Pertinent Labs Reviewed  Clinical Chemistry:  Recent Labs   Lab 05/14/23  0337 05/15/23  0308 05/16/23  0416    142 144   K 3.3* 3.4* 3.3*    107 112*   CO2 25 29 24   * 115* 145*   BUN 27* 29* 40*   CREATININE 1.1 1.1 1.1   CALCIUM 8.4* 8.0* 8.2*   PROT 5.8* 5.6* 5.7*   ALBUMIN 2.9* 2.5* 2.5*   BILITOT 0.8 1.2* 1.2*   ALKPHOS 45* 41* 40*   AST 25 23 49*   ALT 48* 32 44   ANIONGAP 8 6* 8   MG 2.1 2.1 2.3   PHOS 5.5* 3.4 2.8     CBC:   Recent Labs   Lab 05/16/23  0416 05/16/23  0416   WBC 7.46  --    RBC 5.46  --    HGB 16.9  --    HCT 49.7 47   *  --    MCV 91  --    MCH 31.0  --    MCHC 34.0  --      Lipid Panel:  Recent Labs   Lab 05/15/23  0308   CHOL 250*   HDL 30*   LDLCALC 191.4*   TRIG 143   CHOLHDL 12.0*     Cardiac Profile:  Recent Labs   Lab 05/13/23  0422   BNP 93     Diabetes:  Recent Labs   Lab 05/15/23  0308   HGBA1C 5.7     Thyroid & Parathyroid:  Recent Labs   Lab 05/11/23  0657   TSH 3.900       Monitor and Evaluation  Food and Nutrient Intake: enteral nutrition intake  Food and Nutrient Adminstration: enteral and parenteral nutrition administration  Knowledge/Beliefs/Attitudes: beliefs and attitudes, food and nutrition knowledge/skill  Physical Activity and Function: nutrition-related ADLs and IADLs, factors affecting access to physical activity  Anthropometric Measurements: weight change, weight, body mass index  Biochemical Data, Medical Tests and Procedures: lipid profile, inflammatory profile, glucose/endocrine profile, gastrointestinal profile, electrolyte and renal panel  Nutrition-Focused Physical Findings: overall appearance     Nutrition Risk  Level of Risk/Frequency of Follow-up: moderate     Nutrition Follow-Up  RD Follow-up?: Yes    Carolina Bush, CARLYLE 05/16/2023 2:15 PM

## 2023-05-16 NOTE — PROCEDURES
CONTINUOUS VIDEO EEG REPORT    NAME: Teto Mendez  : 1955  MRN: [id number]    DATE of EE/15/2023 9:50 AM to 2023 10:08 AM  DURATION OF EE hr 18 min     CLINICAL INDICATION: This is a 67 y.o. male with history of glioblastoma multiforme s/p craniotomy, chemotherapy and radiotherapy, as well as seizure disorder being evaluated for status epilepticus.    MEDICATIONS:     allopurinoL  100 mg Per NG tube Daily    amantadine HCL  100 mg Oral BID    atorvastatin  80 mg Per NG tube Daily    chlorhexidine  15 mL Mouth/Throat BID    clonazePAM  0.5 mg Per NG tube TID    dexAMETHasone  1 mg Per NG tube Q12H    lacosamide (VIMPAT) IVPB  200 mg Intravenous Q12H    levetiracetam IV  1,500 mg Intravenous Q12H    metoprolol tartrate  100 mg Per NG tube BID    mupirocin   Nasal BID    pantoprazole  40 mg Intravenous Daily    rivaroxaban  20 mg Per NG tube Daily with dinner    sacubitriL-valsartan  1 tablet Per G Tube BID    timolol maleate 0.25%  1 drop Both Eyes BID    valproate sodium (DEPACON) IVPB  500 mg Intravenous Q8H         EEG DESCRIPTION:  This is a 16-channel EEG was performed with electrode placement in 10/20 [employer] System. Longitudinal bipolar and referential montages were utilized in analysis.    This is a technically adequate study.  Study consisted of a background of delta frequency waveforms.  Initial portions of the study showed right hemispheric predominantly on the right frontotemporal leads periodic lateralized epileptiform discharges(PLEDs) were seen which were about 1 Hz frequency.  The PLEDs were less frequent in the later portions of the study.  Patient had an episode of seizure at 12:19 PM which showed rhythmic delta activity on the right hemispheric leads with correlating left facial twitching.  Patient had multiple further episodes of left-sided facial twitching which did not have EEG correlate on the right hemispheric  electrodes.     IMPRESSION: This is an abnormal long-term video EEG with evidence of significant background slowing, periodic lateralized epileptiform discharges(PLEDs) from the right hemisphere.  One episode of electroclinical seizure was recorded.  Further episodes of facial twitching did not have any clinical correlate on the EEG.      Mukund James MD  Neurology

## 2023-05-16 NOTE — CARE UPDATE
05/16/23 0900   Patient Assessment/Suction   Level of Consciousness (AVPU) responds to pain   Respiratory Effort Normal;Unlabored   Expansion/Accessory Muscles/Retractions expansion symmetric;no retractions   Rhythm/Pattern, Respiratory assisted mechanically   Cough Frequency with stimulation   Cough Type assisted   Suction Method oral;tracheal   Suction Pressure (mmHg) -120 mmHg   $ Suction Charges Inline Suction Procedure Stat Charge   Secretions Amount small   Secretions Color yellow;pale   Secretions Characteristics thick   PRE-TX-O2   Device (Oxygen Therapy) ventilator   $ Is the patient on Low Flow Oxygen? Yes   Oxygen Concentration (%) 35   SpO2 98 %   Pulse Oximetry Type Continuous   Pulse 87   Resp (!) 30      Airway Anesthesia 05/11/23   Placement Date/Time: 05/11/23 (c) 5942   Method of Intubation: Video Laryngoscopy  Airway Device: Endotracheal Tube  Mask Ventilation: Moderately difficult with oral airway  Intubated: Other (see comments)  Blade: Glidescope #3  Airway Device Size: 8....   Secured at 24 cm   Measured At Lips   Secured Location Left   Secured by Commercial tube barboza   Bite Block none   Site Condition Dry   Status Intact;Secured   Site Assessment Clean;Dry   Cuff Volume   (mlt)   Vent Select   Conventional Vent Y   Charged w/in last 24h YES   Preset Conventional Ventilator Settings   Vent ID [id number]   Vent Type    Ventilation Type VC   Vent Mode Spont   Humidity HME   PEEP/CPAP 5 cmH20   Pressure Support 10 cmH20   Peak End Inspiratory Pressure 16 cmH20   Insp Rise Time  70 %   I-Trigger Type  V-TRIG   Trigger Sensitivity Flow/I-Trigger 3 L/min   Patient Ventilator Parameters   Resp Rate Total 29 br/min   Peak Airway Pressure 16 cmH20   Mean Airway Pressure 8.9 cmH20   Plateau Pressure 19 cmH20   Exhaled Vt 422 mL   Total Ve 12.4 L/m   Spont Ve 12.4 L   I:E Ratio Measured 1:1.70   Auto PEEP 0 cmH20   Inspired Tidal Volume (VTI) 0 mL   Conventional Ventilator Alarms   Alarms On Y   Resp  Rate High Alarm 45 br/min   Press High Alarm 50 cmH2O   Apnea Rate 12   Apnea Volume (mL) 1 mL   Apnea Oxygen Concentration  100   Apnea Flow Rate (L/min) 65   T Apnea 20 sec(s)   IHI Ventilator Associated Pneumonia Bundle (Required)   Daily Awakening Trials Performed Yes   Daily Assessment of Readiness to Extubate Yes   Head of Bed Elevated  HOB 45   Oral Care Mouth suctioned   Vent Circut Breaks Minimized No (comment)  (removed pt from vent to be placed on T-piece)   VTE Prophylaxis Provided VTE prophylaxis maintained   Ready to Wean/Extubation Screen   FIO2<=50 (chart decimal) 0.35   MV<16L (chart vol.) 12.4   PEEP <=8 (chart #) 5   Ready to Wean Parameters   F/VT Ratio<105 (RSBI) (!) 71.09   Vital Capacity   Vital Capacity (mL) 0   Education   $ Education 15 min;Ventilator Oxygen   Labs   $ Was an ABG obtained? Arterial Blood Draw from Existing Line;ISTAT - Blood gas;ISTAT - PH, Blood   $ Labs Tech Time 15 min   Provider Notification   Reason for Communication Evaluate   Provider Name Dr KENDY Milner   Provider Role Consulting physician   Method of Communication Other (Comment)  (EPIC CHAT)   Response See orders   Notification Time 0910       PT PUT ON T-PIECE AT 0940 PER DR MILNER VERBAL ORDER.

## 2023-05-16 NOTE — PROGRESS NOTES
[facility]  Department of Cardiology  Consult Note      PATIENT NAME: Teto Mendez  MRN: [id number]  TODAY'S DATE: 05/16/2023  ADMIT DATE: 5/11/2023                          CONSULT REQUESTED BY: Neo Clark MD    SUBJECTIVE     PRINCIPAL PROBLEM: Status epilepticus      REASON FOR CONSULT:    AFRVR    Interval history:      5/16/23    Patient is off sedation. Remains on mechanical ventilation. Neurology following. Rate controlled AF.  K 3.3 this am. No response to verbal or physical stimulation.  L facial twitching. Dr. James at bedside as well. No seizure activity noted on EEG with facial twitching.  -110.  /80.  96% O2.  35% Fio2 on vent. Peep 5.  SBT.      5/15/23    Patient remains on mechanical ventilation.  Remains on EEG.  Propofol has been weaned off and Precedex has been started an exchange.  During examination, patient had left facial twitching and coughing with mild stimulation.  Seizure activity noted on EEG overnight per nursing.  Potassium is 3.4 this a.m., replace per protocol.  Patient was alkalotic pH 7.5, PO2 67, HC03 29.6.  Patient was given Diamox 250 mg IV.  Rate controlled AFib on the monitor.  Entresto started yesterday.  Creatinine 1.1 today.      05/14/2023    Patient remains on mechanical ventilation and sedation with propofol.  Plans for MRI of the brain today.  Potassium 3.3 this a.m., replaced was 70 mEq of KCl.  Urinary output 1950 mL yesterday.  No output recorded yet today.  Hemodynamically stable.     MRI today:  IMPRESSION:  1. Cortical foci restricted signal intensity on diffusion weighted images compatible with a benign nonhemorrhagic cortical infarct with additional small infarct involving the deep subcortical white matter of the right parietal lobe.  2. No evidence of intracranial hemorrhage.    HPI:    Patient is a 67-year-old male with past medical history of GERD, glioblastoma multiforme s/p craniotomy, chemotherapy and radiation diagnosed in  August 2022, history of blood clots, hypertension, PAF on Xarelto, seizures who presented to the ED with left facial pushing, left facial droop, slurred speech, left arm weakness that began at 3:00 a.m. on 5/11.  Keppra has been recently decreased.  In ED he was given 1 g of Keppra, 2 mg of Ativan which broke his seizure and he was subsequently alert and responsive..  Later in CT, he had sudden worsening mental status, unresponsiveness, weakness, aphasia.  O2 saturations high 80s, and patient was intubated emergently and placed on mechanical ventilation for respiratory failure.  Seizure activity restarted and was erupted with Ativan and propofol bolus. No acute stroke/hemorrhage on CT.  Neurology is following for status epilepticus.    05/12/2023:  Patient was on EEG for monitoring of status epilepticus.  Seizure at 4:00 a.m..  Patient remains in AFib    05/13/2023: Patient was also found to be in AFib RVR this admission, requiring IV diltiazem.  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 Normotensive.  K 3.1, Cr 1.0, Mag 2.1; CXR this am coarse interstitial markings the LLL and trace L-sided pleural effusion; on propofol drip at 40 mics per kg per minute.  On mechanical ventilation 5 of PEEP 30% FiO2.  family at bedside.  Patient is originally from [place] and has a cardiologist there.  Known history of atrial fibrillation.  History of splenic infarct.  Unknown if patient has history of reduced EF or heart failure.  He was on Lasix at home.  Patient is visiting from [place].    ECHO this am  The left ventricle is mildly enlarged with concentric remodeling and  The estimated ejection fraction is 38%.  There is left ventricular global hypokinesis.  Mild mitral  regurgitation.  Normal central venous pressure (3 mmHg).  The estimated PA systolic pressure is 26 mmHg.  Atrial fibrillation observed.  Normal right ventricular size with normal right ventricular systolic function.  Moderate left atrial enlargement.  Moderate right atrial enlargement.        FROM H&P  Teto Mendez is a 67 y.o. White male with known history of  glioblastoma s/p resection, seizures p.afib on xarelto who presented to ER this morning with left arm weakness, facial twitching and change in his speech that he suspected was seizure activity.  He took an extra keppra and presented to ER around 6:30-7 am.  Head CT/CTA in ER without hemorrhage or LVO and he was not a candidate for tPA regardless.  He was given lorazepam and keppra with positive response, however, while still in ER he became less responsive and repeat stat CT was ordered.  While in CT, he became hypoxic with depressed respiratory status and required bag-mask ventilation and emergent intubation on return to ICU.  History was obtained from the ER physician Sign-out.            Review of patient's allergies indicates:  No Known Allergies    Past Medical History:   Diagnosis Date    GERD (gastroesophageal reflux disease)     Glioblastoma     H/O blood clots     Hypertension     Paroxysmal atrial fibrillation     Seizures      No past surgical history on file.        REVIEW OF SYSTEMS  Unable to obtain. Remains unresponsive on mechanical ventilation    OBJECTIVE     VITAL SIGNS (Most Recent)  Temp: 99 °F (37.2 °C) (05/16/23 0330)  Pulse: 94 (05/16/23 1040)  Resp: (!) 34 (05/16/23 1040)  BP: (!) 155/95 (05/16/23 0836)  SpO2: 96 % (05/16/23 1040)    VENTILATION STATUS  Resp: (!) 34 (05/16/23 1040)  SpO2: 96 % (05/16/23 1040)  Vent Mode: Spont  Oxygen Concentration (%):  [35] 35  Resp Rate Total:  [0 br/min-111 br/min] 34 br/min  Vt Set:  [450 mL] 450 mL  PEEP/CPAP:  [5 cmH20] 5 cmH20  Pressure Support:  [10 cmH20] 10 cmH20  Mean Airway Pressure:   [8.7 lfK15-44 cmH20] 8.8 cmH20        I & O (Last 24H):  Intake/Output Summary (Last 24 hours) at 5/16/2023 1101  Last data filed at 5/16/2023 0552  Gross per 24 hour   Intake 2108.33 ml   Output 1375 ml   Net 733.33 ml       WEIGHTS  Wt Readings from Last 3 Encounters:   05/13/23 0001 92.4 kg (203 lb 11.3 oz)   05/11/23 1230 84 kg (185 lb 3 oz)   05/11/23 0641 83.9 kg (185 lb)   05/13/23 1042 92.1 kg (203 lb)       Physical examination:      Constitutional:  Critically ill appearing male unresponsive on mechanical ventilation  Neck: no carotid bruit, no JVD  Lungs: coarse breath sounds, mechanical ventilation- FiO2 35%, 5 peep  Chest Wall: no tenderness  CARDIAC:  Irregular rate and rhythm, soft systolic murmur  Abdomen: soft, bowel sounds normal; NG tube in left nare  : approximately 300 ml in chamber. Dark yellow urine  Extremities: Extremities normal, atraumatic, no cyanosis, clubbing or edema  Skin: Skin color, texture, turgor normal. No rashes or lesions  Neuro: unresponsive to verbal or physical stimuli, on vent     HOME MEDICATIONS:  No current facility-administered medications on file prior to encounter.     Current Outpatient Medications on File Prior to Encounter   Medication Sig Dispense Refill    allopurinoL (ZYLOPRIM) 100 MG tablet Take 100 mg by mouth once daily.      amLODIPine (NORVASC) 10 MG tablet Take 10 mg by mouth once daily.      dexAMETHasone (DECADRON) 1 MG Tab Take 1 mg by mouth every 12 (twelve) hours.      hydrALAZINE (APRESOLINE) 25 MG tablet Take 25 mg by mouth 3 (three) times daily.      levETIRAcetam (KEPPRA) 500 MG Tab Take 500 mg by mouth 2 (two) times daily.      metoprolol succinate (TOPROL-XL) 100 MG 24 hr tablet Take 100 mg by mouth once daily.      pantoprazole (PROTONIX) 40 MG tablet Take 40 mg by mouth once daily.      XARELTO 20 mg Tab Take 20 mg by mouth once daily.      clotrimazole (MYCELEX) 10 mg kathleen Take 1 tablet by mouth 3 (three) times daily.      colchicine  (COLCRYS) 0.6 mg tablet Take 0.6 mg by mouth once daily.      furosemide (LASIX) 20 MG tablet Take 20 mg by mouth once daily.      meclizine (ANTIVERT) 12.5 mg tablet Take 25 mg by mouth 3 (three) times daily.      rivaroxaban (XARELTO) 20 mg Tab Take 20 mg by mouth daily with dinner or evening meal.      timolol maleate 0.5% (TIMOPTIC) 0.5 % Drop Place 1 drop into both eyes 2 (two) times daily.         SCHEDULED MEDS:   allopurinoL  100 mg Per NG tube Daily    atorvastatin  80 mg Per NG tube Daily    chlorhexidine  15 mL Mouth/Throat BID    clonazePAM  0.5 mg Per NG tube TID    dexAMETHasone  1 mg Per NG tube Q12H    lacosamide (VIMPAT) IVPB  200 mg Intravenous Q12H    levetiracetam IV  1,500 mg Intravenous Q12H    metoprolol tartrate  100 mg Per NG tube BID    mupirocin   Nasal BID    pantoprazole  40 mg Intravenous Daily    rivaroxaban  20 mg Per NG tube Daily with dinner    sacubitriL-valsartan  1 tablet Per G Tube BID    timolol maleate 0.25%  1 drop Both Eyes BID    valproate sodium (DEPACON) IVPB  500 mg Intravenous Q8H       CONTINUOUS INFUSIONS:        PRN MEDS:acetaminophen, colchicine, hydrALAZINE, metoprolol, ondansetron, polyethylene glycol, potassium bicarbonate, potassium bicarbonate, potassium bicarbonate, sodium chloride 0.9%    LABS AND DIAGNOSTICS     CBC LAST 3 DAYS  Recent Labs   Lab 05/14/23  0337 05/14/23  0351 05/15/23  0308 05/15/23  0311 05/16/23  0416 05/16/23  0416   WBC 8.13  --  6.65  --  7.46  --    RBC 5.43  --  5.23  --  5.46  --    HGB 16.9  --  16.1  --  16.9  --    HCT 48.6   < > 48.0 45 49.7 47   MCV 90  --  92  --  91  --    MCH 31.1*  --  30.8  --  31.0  --    MCHC 34.8  --  33.5  --  34.0  --    RDW 14.4  --  14.3  --  14.1  --      --  142*  --  139*  --    MPV 10.3  --  10.2  --  9.8  --    GRAN 88.0*  7.2  --  79.2*  5.3  --  79.8*  6.0  --    LYMPH 4.3*  0.4*  --  7.5*  0.5*  --  7.1*  0.5*  --    MONO 6.5  0.5  --  10.5  0.7  --  10.5  0.8  --    BASO 0.01   --  0.04  --  0.03  --    NRBC 0  --  1*  --  0  --     < > = values in this interval not displayed.       COAGULATION LAST 3 DAYS  Recent Labs   Lab 05/11/23  0657   INR 1.3*       CHEMISTRY LAST 3 DAYS  Recent Labs   Lab 05/14/23  0337 05/14/23  0351 05/15/23  0308 05/15/23  0311 05/16/23  0416 05/16/23 0416 05/16/23  0906     --  142  --  144  --   --    K 3.3*  --  3.4*  --  3.3*  --   --      --  107  --  112*  --   --    CO2 25  --  29  --  24  --   --    ANIONGAP 8  --  6*  --  8  --   --    BUN 27*  --  29*  --  40*  --   --    CREATININE 1.1  --  1.1  --  1.1  --   --    *  --  115*  --  145*  --   --    CALCIUM 8.4*  --  8.0*  --  8.2*  --   --    PH  --    < >  --  7.509*  --  7.457* 7.473*   MG 2.1  --  2.1  --  2.3  --   --    ALBUMIN 2.9*  --  2.5*  --  2.5*  --   --    PROT 5.8*  --  5.6*  --  5.7*  --   --    ALKPHOS 45*  --  41*  --  40*  --   --    ALT 48*  --  32  --  44  --   --    AST 25  --  23  --  49*  --   --    BILITOT 0.8  --  1.2*  --  1.2*  --   --     < > = values in this interval not displayed.       CARDIAC PROFILE LAST 3 DAYS  Recent Labs   Lab 05/13/23  0422 05/13/23  1544   BNP 93  --    TROPONINIHS  --  7.0       ENDOCRINE LAST 3 DAYS  Recent Labs   Lab 05/11/23  0657   TSH 3.900       LAST ARTERIAL BLOOD GAS  ABG  Recent Labs   Lab 05/16/23  0906   PH 7.473*   PO2 95   PCO2 34.5*   HCO3 25.3   BE 2       LAST 7 DAYS MICROBIOLOGY   Microbiology Results (last 7 days)       Procedure Component Value Units Date/Time    Culture, Respiratory with Gram Stain [[id number]] Collected: 05/15/23 0059    Order Status: Completed Specimen: Respiratory from Sputum Updated: 05/16/23 0713     Respiratory Culture Normal respiratory fredi     Gram Stain (Respiratory) <10 epithelial cells per low power field.     Gram Stain (Respiratory) Few WBC's     Gram Stain (Respiratory) Few Gram positive cocci    Culture, Respiratory with Gram Stain [[id number]]     Order Status: Canceled  Specimen: Respiratory             MOST RECENT IMAGING  X-Ray Chest AP Portable  50 portable chest x-ray at 3:50 PM is compared to prior study at 5:32 AM    Clinical history is PICC line placement    There is a left PICC line with the tip overlying the superior vena cava. There is no pneumothorax.  The endotracheal tube and NG tube are in stable position. The heart is enlarged. There is atelectasis in the left lung base. There are no confluent infiltrates or pleural effusions.    IMPRESSION: No pneumothorax status post left PICC line placement    Cardiomegaly with atelectasis left lung base    Electronically signed by:  Yi Hernandez MD  5/15/2023 4:01 PM CDT Workstation: [id number]      ECHOCARDIOGRAM RESULTS (last 5)  No results found for this or any previous visit.      CURRENT/PREVIOUS VISIT EKG  Results for orders placed or performed during the hospital encounter of 05/11/23   ECG 12 lead    Collection Time: 05/11/23  7:28 AM    Narrative    Test Reason : I63.9,    Vent. Rate : 120 BPM     Atrial Rate : 150 BPM     P-R Int : 000 ms          QRS Dur : 104 ms      QT Int : 370 ms       P-R-T Axes : 000 016 -12 degrees     QTc Int : 522 ms    Atrial fibrillation with rapid ventricular response  Nonspecific ST abnormality  Prolonged QT  Abnormal ECG  No previous ECGs available  Confirmed by Tadeo BARTLETT, Joe CEBALLOS ([id number]) on 5/11/2023 10:07:18 PM    Referred By: [email or web address]   SELF           Confirmed By:Joe Piedra MD           ASSESSMENT/PLAN:     Active Hospital Problems    Diagnosis    *Status epilepticus    Acute CVA (cerebrovascular accident)    Obesity    Gout    GERD (gastroesophageal reflux disease)    HFrEF (heart failure with reduced ejection fraction)    Essential hypertension    Paroxysmal atrial fibrillation    Hypokalemia    Glioblastoma    Acute hypoxemic respiratory failure       ASSESSMENT & PLAN:     HFrEF-EF 38%  Paroxysmal Atrial Fibrillation  Hypokalemia  Status Epilepticus  Acute  respiratory failure with hypoxia and hypercapnia  Glioblastoma        RECOMMENDATIONS:    Remains unresponsive on mechical ventilation. Off sedation.  Hemodynamically stable.  Patient has history of AF.  Continue Xarelto 20 mg daily. Continue metoprolol 100 mg BID.  New Infarct on MRI this admission. Neurology following.    K 3.3. Replace per protocol. Continue to check and replace potassium and magnesium. Goal for potassium is 4.0, and goal for magnesium is 2.0.    ECHO this admission- EF 38% and LV global hypokinesis. Strict I's and O's.  Continue Entresto 24-26 mg BID for heart failure.  Records requested from cardiologist in [place].   Thank you for the consultation.  We will continue to follow.      Kiana Moore NP  Department of Cardiology  Date of Service: 05/16/2023       No significant neurologic improvement is noted.  Discussed with Dr. Erika Mcdonald om tamponade the patient has remained stable.  I have personally interviewed and examined the patient, I have reviewed the Nurse Practitioner's history and physical, assessment, and plan. I agree with the findings and plan.    Zachariah Hughes M.D.  Department of Cardiology  Date of Service: 05/16/2023  9:45 AM

## 2023-05-16 NOTE — CARE UPDATE
05/16/23 1700   Patient Assessment/Suction   Level of Consciousness (AVPU) responds to pain   Respiratory Effort Normal;Unlabored   Expansion/Accessory Muscles/Retractions expansion symmetric   Rhythm/Pattern, Respiratory assisted mechanically   Cough Frequency with stimulation   Cough Type assisted   Suction Method oral;tracheal   PRE-TX-O2   Device (Oxygen Therapy) ventilator   $ Is the patient on Low Flow Oxygen? Yes   Oxygen Concentration (%) 35   SpO2 99 %   Pulse Oximetry Type Continuous   Pulse (!) 113   Resp (!) 32      Airway Anesthesia 05/11/23   Placement Date/Time: 05/11/23 (c) 9947   Method of Intubation: Video Laryngoscopy  Airway Device: Endotracheal Tube  Mask Ventilation: Moderately difficult with oral airway  Intubated: Other (see comments)  Blade: Glidescope #3  Airway Device Size: 8....   Secured at 24 cm   Measured At Lips   Secured Location Center   Secured by Commercial tube barboza   Bite Block none   Site Condition Dry   Status Intact;Secured   Site Assessment Clean;Dry   Cuff Volume   (mlt)   Vent Select   Conventional Vent Y   Charged w/in last 24h YES   Preset Conventional Ventilator Settings   Vent ID [id number]   Vent Type    Ventilation Type VC   Vent Mode Spont   Humidity HME   PEEP/CPAP 5 cmH20   Pressure Support 10 cmH20   Peak End Inspiratory Pressure 16 cmH20   Insp Rise Time  70 %   I-Trigger Type  V-TRIG   Trigger Sensitivity Flow/I-Trigger 3 L/min   Patient Ventilator Parameters   Resp Rate Total 25 br/min   Peak Airway Pressure 16 cmH20   Mean Airway Pressure 8.9 cmH20   Plateau Pressure 0 cmH20   Exhaled Vt 361 mL   Total Ve 10.3 L/m   Spont Ve 10.3 L   I:E Ratio Measured 1:1.20   Auto PEEP 0 cmH20   Inspired Tidal Volume (VTI) 0 mL   Conventional Ventilator Alarms   Resp Rate High Alarm 45 br/min   Press High Alarm 50 cmH2O   Apnea Rate 12   Apnea Volume (mL) 1 mL   Apnea Oxygen Concentration  100   Apnea Flow Rate (L/min) 65   T Apnea 20 sec(s)   Ready to Wean/Extubation  Screen   FIO2<=50 (chart decimal) 0.35   MV<16L (chart vol.) 10.3   PEEP <=8 (chart #) 5   Ready to Wean Parameters   F/VT Ratio<105 (RSBI) (!) 88.64   Vital Capacity   Vital Capacity (mL) 0   Education   $ Education 15 min;Ventilator Oxygen

## 2023-05-16 NOTE — CARE UPDATE
05/15/23 1928   Patient Assessment/Suction   Level of Consciousness (AVPU) responds to pain   Respiratory Effort Normal;Unlabored   Expansion/Accessory Muscles/Retractions no use of accessory muscles   RICHMOND Breath Sounds clear   LLL Breath Sounds diminished   RUL Breath Sounds clear   RML Breath Sounds clear   RLL Breath Sounds diminished   Rhythm/Pattern, Respiratory assisted mechanically   Cough Frequency no cough   Cough Type assisted   PRE-TX-O2   Device (Oxygen Therapy) ventilator   $ Is the patient on Low Flow Oxygen? Yes   Pulse Oximetry Type Continuous      Airway Anesthesia 05/11/23   Placement Date/Time: 05/11/23 (c) 7855   Method of Intubation: Video Laryngoscopy  Airway Device: Endotracheal Tube  Mask Ventilation: Moderately difficult with oral airway  Intubated: Other (see comments)  Blade: Glidescope #3  Airway Device Size: 8....   Secured at 24 cm   Measured At Lips   Secured Location Center   Secured by Commercial tube barboza   Bite Block none   Site Condition Dry   Status Intact;Secured   Site Assessment Clean;Dry   Airway Safety   Is Ambu Bag and Mask with Patient? Yes, Adult Ambu Bag and Mask   Equipment Change   $ RT Equipment HME   Vent Select   Conventional Vent Y   Charged w/in last 24h YES   Preset Conventional Ventilator Settings   Vent ID [id number]   Vent Type    Humidity HME   Patient Ventilator Parameters   Tubing ID (mm) 8 mm   Tube Type ET   Conventional Ventilator Alarms   Alarms On Y

## 2023-05-16 NOTE — PROGRESS NOTES
[facility] Medicine  Progress Note    Patient Name: Teto Mendez  MRN: [id number]  Patient Class: IP- Inpatient   Admission Date: 5/11/2023  Length of Stay: 5 days  Attending Physician: Neo Clark MD  Primary Care Provider: Primary Doctor No        Subjective:     Principal Problem:Status epilepticus        HPI:  Teto Mendez is a 67 y.o. White male with known history of  glioblastoma s/p resection, seizures p.afib on xarelto who presented to ER this morning with left arm weakness, facial twitching and change in his speech that he suspected was seizure activity.  He took an extra keppra and presented to ER around 6:30-7 am.  Head CT/CTA in ER without hemorrhage or LVO and he was not a candidate for tPA regardless.  He was given lorazepam and keppra with positive response, however, while still in ER he became less responsive and repeat stat CT was ordered.  While in CT, he became hypoxic with depressed respiratory status and required bag-mask ventilation and emergent intubation on return to ICU.  History was obtained from the ER physician Sign-out.      Overview/Hospital Course:  Teto Mendez is a 67 year old male with a past medical history of glioblastoma s/p craniotomy complicated by seizures, obesity, HTN, AFib and GERD who presented with status epilepticus. Neurology has been consulted. The patient was intubated on propofol sedation with Keppra and valproic acid. Pulmonary has been consulted. Continuous EEG is ordered and still running. MRI brain shows R MCA region infarcts consistent with acute CVA. His course has been complicated by Afib with RVR as well as a HFrEF seen on TTE which appears to be chronic. Cardiology has been consulted. He is on metoprolol and Xarelto with PRN diuresis. Entresto and high dose atorvastatin was also added. EEG continues to shows periodic lateralized epileptiform discharges as of 5/14. Sedation with propofol and Precedex was stopped 5/15. Vimpat  "was also added 5/15 by Neurology. Unfortunately, off sedation the patient's GCS is three. He will continue to be monitored for improvement in his neurologic status.      Interval History: see "Hospital Course"    Review of Systems   Unable to perform ROS: Patient unresponsive   Objective:     Vital Signs (Most Recent):  Temp: 99 °F (37.2 °C) (05/16/23 1145)  Pulse: 86 (05/16/23 1300)  Resp: (!) 31 (05/16/23 1300)  BP: (!) 155/95 (05/16/23 0836)  SpO2: 98 % (05/16/23 1300) Vital Signs (24h Range):  Temp:  [98.5 °F (36.9 °C)-99 °F (37.2 °C)] 99 °F (37.2 °C)  Pulse:  [] 86  Resp:  [17-42] 31  SpO2:  [94 %-98 %] 98 %  BP: (119-179)/() 155/95  Arterial Line BP: (115-158)/(67-95) 157/90     Weight: 92.4 kg (203 lb 11.3 oz)  Body mass index is 30.08 kg/m².    Intake/Output Summary (Last 24 hours) at 5/16/2023 1341  Last data filed at 5/16/2023 0552  Gross per 24 hour   Intake 2108.33 ml   Output 1375 ml   Net 733.33 ml         Physical Exam  Vitals and nursing note reviewed.   Constitutional:       Appearance: He is ill-appearing.   HENT:      Head: Normocephalic and atraumatic.      Right Ear: External ear normal.      Left Ear: External ear normal.      Nose: Nose normal.      Comments: NG tube.     Mouth/Throat:      Mouth: Mucous membranes are moist.      Pharynx: Oropharynx is clear.      Comments: ETT.  Cardiovascular:      Rate and Rhythm: Tachycardia present. Rhythm irregular.      Pulses: Normal pulses.      Heart sounds: Normal heart sounds.   Pulmonary:      Effort: Pulmonary effort is normal.      Breath sounds: Normal breath sounds.   Abdominal:      General: Bowel sounds are normal.      Palpations: Abdomen is soft.   Genitourinary:     Comments: Iraheta.  Musculoskeletal:      Right lower leg: Edema present.      Left lower leg: Edema present.   Skin:     General: Skin is warm and dry.   Neurological:      Comments: GCS 3.           Significant Labs: All pertinent labs within the past 24 hours have " been reviewed.    Significant Imaging: I have reviewed all pertinent imaging results/findings within the past 24 hours.      Assessment/Plan:      * Status epilepticus  In setting of glioblastoma. MRI brain also shows R MCA region infarcts. GCS 3 off sedation.  -Continue Depacon and Keppra  -Vimpat added 5/14  -Neurology following  -Continuous EEG  -Continue intubation with mechanical ventilation    Acute CVA (cerebrovascular accident)    Antithrombotics for secondary stroke prevention: Anticoagulants: Rivaroxaban 20 mg daily    Statins for secondary stroke prevention and hyperlipidemia, if present:   Statins: Atorvastatin- 80 mg daily    Aggressive risk factor modification: HTN, HLD, Obesity, A-Fib     Rehab efforts: The patient has been evaluated by a stroke team provider and the therapy needs have been fully considered based off the presenting complaints and exam findings. The following therapy evaluations are needed: None    Diagnostics ordered/pending: CTA Neck/Arch to assess vasculature, Lipid Profile to assess cholesterol levels, MRI head without contrast to assess brain parenchyma, TTE to assess cardiac function/status     VTE prophylaxis: None: Reason for No Pharmacological VTE Prophylaxis: Currently on anticoagulation    BP parameters: Infarct: No intervention, SBP <220        Acute hypoxemic respiratory failure  In setting of status epilepticus.  -Continuous EEG  -Continue mechanical ventilation  as long as patient remains in status epilepticus  -Pulmonary consulted  -ABG PRN      HFrEF (heart failure with reduced ejection fraction)  Unclear chronicity. BNP unremarkable.  -Cardiology following  -Metoprolol  -Lasix PRN  -Telemetry  -Entresto      GERD (gastroesophageal reflux disease)  -PPI      Gout  -Continue home allopurinol      Obesity  Body mass index is 30.08 kg/m². Morbid obesity complicates all aspects of disease management from diagnostic modalities to treatment.         Glioblastoma  Chronic.  Receives care in [place].  -Continue Decadron      Hypokalemia  -Replete PRN  -Telemetry  -Trend K        Paroxysmal atrial fibrillation  -Xarelto  -Metoprolol  -Telemetry  -Cardiology consulted        Essential hypertension  -Metoprolol  -Entresto  -Continue to monitor      VTE Risk Mitigation (From admission, onward)         Ordered     rivaroxaban tablet 20 mg  With dinner         05/13/23 0902     Reason for No Pharmacological VTE Prophylaxis  Once        Question:  Reasons:  Answer:  Already adequately anticoagulated on oral Anticoagulants    05/11/23 1054     IP VTE HIGH RISK PATIENT  Once         05/11/23 1054     Place sequential compression device  Until discontinued         05/11/23 1054                Discharge Planning   BARBARA: 5/22/2023     Code Status: Full Code   Is the patient medically ready for discharge?:     Reason for patient still in hospital (select all that apply): Patient trending condition, Treatment, Consult recommendations and Pending disposition  Discharge Plan A: Long-term acute care facility (LTAC), Home with family                  Neo Clark MD  Department of Hospital Medicine   [facility]

## 2023-05-16 NOTE — PROGRESS NOTES
[facility]  Neurology Progress Note    Patient Name: Teto Mendez  MRN: [id number]  : 1955  TODAY'S DATE: 2023  ADMIT DATE: 2023  6:39 AM                                          CONSULT REQUESTED BY: Neo Clark MD     Chief Complaint   Patient presents with    FACIAL TWITCHING    SLURRED SPEECH       HPI per EMR:  Patient presents with history of glioblastoma concurrently getting chemo in previous radiation complaining of facial twitching, facial droop, slurred speech, left arm weakness.  Symptom onset at 3:30 a.m..  Patient states at 3:30 a.m. he was playing on a game and then began to have the twitching of the face with associated droop with slurred speech and arm weakness.  Patient states he has had similar episodes in the past secondary to the glioblastoma.  He has not had the left arm weakness.  Patient states he is on Keppra and did take an extra Keppra dose this morning additionally patient is on Xarelto.  At the worst symptoms are severe.    Neurology Consult:  Patient was seen and examined by me today. He is a 67-year-old man with history of glioblastoma multiforme s/p craniectomy, chemotherapy and radiation diagnosed in 2022, as well as seizure disorder, who presented to the ED with left facial twitching, left facial droop, slurred speech and left arm weakness that began the morning of his admission at around 3 am. He was recently decreased on his home Keppra in the setting of side effects as per daughter. Upon arrival to the ED, he was loaded with 1 gr of Keppra and given 2 mg of ativan IV, which seemed to break the seizure. As per ED physician, patient was awake, alert and able to answer questions. At around 11:50 am, I was contacted by ED provider in the setting of sudden worsening of mental status, unresponsiveness, weakness and aphasia, so he was taken to CT scan for repeat CT brain to rule out LVO stroke or hemorrhage. I evaluated patient while on CT,  and noticed O2 Sats were in the high 80s. Due to technical malfunction, CT could not be completed, at which point I decided to abort procedure and transport patient to the ICU immediately. When we arrived to the 3rd floor ICU, patient seemed to be on respiratory failure, with toe and fingertip cyanosis, unable to maintain O2 Sats, so started on manual assisted ventilation and called anesthesia for emergent intubation. Patient was successfully intubated and sedation with propofol was started, but facial twitching recurred, so ativan was given and propofol bolus was administered, after which seizure activity seemed to cease. He was taken for CT after stabilization which showed no evidence of acute stroke or intracranial hemorrhage, and CTA showed no LVO. He is now being monitored on 24H continuous EEG for management of status epilepticus.    5/12/23:  Patient was seen and examined by me today.  He remains on deep sedation for treatment of status epilepticus, being monitored on 24 hour video EEG.  A brief electroclinical seizure was seen at around 4:00 a.m. with left facial twitching, but no clinical events reported after this.  Plan is to continue deep sedation until at least tomorrow morning.  Patient to be hooked up to EEG again when propofol starts to being weaned down.    5/13/23: I, Dr James assumed care on 5/13.  Patient is intubated, sedated with propofol.  He started out continuous EEG this morning shows right hemisphere lateralized periodic discharges with epileptiform potential.  No electrographic seizures were recorded.    5/14/2023:  Patient was seen examined by me.  Remains to be intubated and sedated.  MRI brain done this morning showed right MCA cortical infarcts.  Patient has been on continuous EEG overnight and EEG showed right hemispheric PLEDs with increase in frequency of PLEDs with lowering sedation.  Patient also had a clinical focal seizure involving twitching of the left side of the face this  morning when he was off sedation.    5/15/2023:  Patient was seen examined by me.  He is intubated and Precedex.  Propofol has been turned off yesterday.  Remains to be on continues EEG which shows right hemispheric (predominantly right frontotemporal) periodic lateralized epileptiform discharges.  Episodes of left facial twitching did not have EEG correlate.    5/16: Patient was seen and examined by me. He remains to be intubated and not on sedation. No response any stimulus. On CEEG. Family was at bedside and I discussed plan of care with them. Had one episode of electro clinical seizure yesterday at 12:20 PM lasting for a minute    Scheduled Meds:   allopurinoL  100 mg Per NG tube Daily    atorvastatin  80 mg Per NG tube Daily    chlorhexidine  15 mL Mouth/Throat BID    clonazePAM  0.5 mg Per NG tube TID    dexAMETHasone  1 mg Per NG tube Q12H    lacosamide (VIMPAT) IVPB  200 mg Intravenous Q12H    levetiracetam IV  1,500 mg Intravenous Q12H    metoprolol tartrate  100 mg Per NG tube BID    mupirocin   Nasal BID    pantoprazole  40 mg Intravenous Daily    rivaroxaban  20 mg Per NG tube Daily with dinner    sacubitriL-valsartan  1 tablet Per G Tube BID    timolol maleate 0.25%  1 drop Both Eyes BID    valproate sodium (DEPACON) IVPB  500 mg Intravenous Q8H     Continuous Infusions:      PRN Meds:.acetaminophen, colchicine, hydrALAZINE, metoprolol, ondansetron, polyethylene glycol, potassium bicarbonate, potassium bicarbonate, potassium bicarbonate, sodium chloride 0.9%      Physical Exam  Current Vitals:  Vitals:    05/16/23 1040   BP:    Pulse: 94   Resp: (!) 34   Temp:      General appearance: intubated, unresponsive  Cardiovascular: Afib  Pulmonary: on mechanical ventilation  GI: soft  MSK: normal passive ROM  Skin: normal color, no lesions    NEUROLOGICAL EXAM:    Mental status: unresponsive, sedated, intubated          Intubated: yes          Sedated: no  Response to noxious stimulation: no, patient on deep  sedation  Breathes above ventilator: yes  Opens eyes: no  Pupils: symmetric          Left: reactive          Right: reactive  Eye movements: No pathologic movements such as nystagmus, ocular bobbing, dipping or roving. No facial twitching seen.  Corneal reflex: present  Oculocephalic reflex:  Absent  Cough:  Weak  Gag:  Weak  Motor exam: Normal muscle tone. Normal muscle bulk. No abnormal movements such as tremors, myoclonus or twitching.  No response to painful stimuli, but patient is on deep sedation  DTRs: 1+ throughout. Babinski response absent.      Laboratory Data & Studies    Recent Labs   Lab 05/14/23  0337 05/15/23  0308 05/16/23  0416   WBC 8.13 6.65 7.46   HGB 16.9 16.1 16.9    142* 139*   MCV 90 92 91       Recent Labs   Lab 05/14/23  0337 05/15/23  0308 05/16/23  0416    142 144   K 3.3* 3.4* 3.3*    107 112*   CO2 25 29 24   BUN 27* 29* 40*   * 115* 145*   CALCIUM 8.4* 8.0* 8.2*   MG 2.1 2.1 2.3   PHOS 5.5* 3.4 2.8       Recent Labs   Lab 05/14/23  0337 05/15/23  0308 05/16/23  0416   PROT 5.8* 5.6* 5.7*   ALBUMIN 2.9* 2.5* 2.5*   BILITOT 0.8 1.2* 1.2*   AST 25 23 49*   ALT 48* 32 44   ALKPHOS 45* 41* 40*       Recent Labs   Lab 05/11/23 0657   INR 1.3*       Recent Labs   Lab 05/11/23 0657 05/15/23  0308   HGBA1C  --  5.7   CHOL 248* 250*   TRIG 92 143   LDLCALC 165.6* 191.4*   HDL 64 30*   TSH 3.900  --          Microbiology:  Microbiology Results (last 7 days)       Procedure Component Value Units Date/Time    Culture, Respiratory with Gram Stain [[id number]] Collected: 05/15/23 0059    Order Status: Completed Specimen: Respiratory from Sputum Updated: 05/16/23 0713     Respiratory Culture Normal respiratory fredi     Gram Stain (Respiratory) <10 epithelial cells per low power field.     Gram Stain (Respiratory) Few WBC's     Gram Stain (Respiratory) Few Gram positive cocci    Culture, Respiratory with Gram Stain [[id number]]     Order Status: Canceled Specimen: Respiratory                Imaging:  CT Head Without Contrast    Result Date: 5/11/2023  CMS MANDATED QUALITY DATA - CT RADIATION  436 All CT scans at this facility utilize dose modulation, iterative reconstruction, and/or weight based dosing when appropriate to reduce radiation dose to as low as reasonably achievable. CT HEAD WITHOUT IV CONTRAST CLINICAL HISTORY: 67 years Male Mental status change, unknown cause COMPARISON: Noncontrast CT head performed earlier today 6:56 AM FINDINGS: Negative for acute intracranial hemorrhage, midline shift, or mass effect. Parenchymal calcifications within the right cerebral hemisphere are stable compared to prior. Patient is status post right pterional craniotomy. Ventricles and sulci are normal in size. Gray-white differentiation is maintained. Cerebellar hemispheres and brainstem are unremarkable. No calvarial lesion or fracture. Mastoid air cells are clear. IMPRESSION: Stable exam. No CT evidence of acute intracranial pathology. Electronically signed by:  Matt Cole MD  5/11/2023 1:41 PM CDT Workstation: [id number]    X-Ray Chest AP Portable    Result Date: 5/11/2023  CLINICAL HISTORY: 67 years (1955) Male Stroke TECHNIQUE: Portable AP radiograph the chest. COMPARISON: None available. FINDINGS: Nonspecific minimal faint interstitial opacity at the left costophrenic.  No pneumothorax is identified. The heart is mildly enlarged.  Osseous structures show degenerative changes in the spine. The visualized upper abdomen is unremarkable. IMPRESSION: Nonspecific minimal faint interstitial opacity at the left costophrenic sulcus possibly representing atelectasis, scarring, trace pleural fluid, and less likely aspiration or pneumonia. . Electronically signed by:  Solomon Tobias MD  5/11/2023 8:32 AM CDT Workstation: [id number]    CT HEAD FOR STROKE    Result Date: 5/11/2023  CMS MANDATED QUALITY DATA - CT RADIATION - 436 All CT scans at this facility utilize dose modulation, iterative  reconstruction, and/or weight based dosing when appropriate to reduce radiation dose to as low as reasonably achievable. EXAMINATION: CT HEAD FOR STROKE CLINICAL HISTORY: Neuro deficit, acute, stroke suspected;  history of glioblastoma TECHNIQUE: CT without IV contrast COMPARISON: None FINDINGS: There are postsurgical changes from prior right frontal temporal These craniotomy.  The ventricles and sulci are mildly prominent compatible with generalized cerebral atrophy.  There is no hemorrhage, mass or midline shift.  There are no extra-axial fluid collections.  The cerebellum and brainstem are normal.  The gray-white differentiation is maintained.  The orbits are unremarkable. The paranasal sinuses and mastoid air cells are clear.     Mild cerebral atrophy with no acute intracranial process Prior right frontal temporal craniotomy Findings were called to Dr. Zhou in the emergency department at 07:09 a.m. Electronically signed by: Yi Hernandez MD Date:    05/11/2023 Time:    07:10    CTA Head and Neck (xpd)    Result Date: 5/11/2023  CMS MANDATED QUALITY DATA - CT RADIATION - 436 All CT scans at this facility utilize dose modulation, iterative reconstruction, and/or weight based dosing when appropriate to reduce radiation dose to as low as reasonably achievable. CMS MANDATED QUALITY DATA - CAROTID - 195 All measurements and percent stenosis described below were determined using NASCET criteria or criteria similar to NASCET, as defined by the Society of Radiologists in Ultrasound Consensus Conference, Radiology, 2003 Reason: Stroke/TIA, determine embolic source Technique: CT angiography of brain and neck with 100 mL Omnipaque 350.  Maximum intensity projection coronal reformations were obtained at a separate workstation and stored in the patient's permanent medical record. COMPARISON: 5/11/2023 CTA BRAIN: VASCULAR FINDINGS: Major intracranial arteries are widely patent with no stenosis, intraluminal filling, or  dissection. Minimal atherosclerotic plaque affects the cavernous segments of bilateral internal carotid arteries. Negative for aneurysm or evidence of vasculitis. Opacified visualized dural venous sinuses are unremarkable. NONVASCULAR FINDINGS: No abnormal intra-axial or extra-axial enhancement. No midline shift. Postsurgical changes of right pterional craniotomy are noted. 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 Minimal atherosclerotic plaque affects left carotid bulb. Left carotid arteries are otherwise without plaque or stenosis. Left vertebral artery is patent. Minimal atherosclerotic plaque affects the right carotid bulb. Right carotid arteries are without additional plaque and remain widely patent. Right vertebral artery is patent. NONVASCULAR FINDINGS: Endotracheal tube has tip proximal to mio. Visualized lung apices show minor dependent atelectasis. Cervical soft tissues are unremarkable. IMPRESSION: 1. Trace atherosclerotic plaque in intracranial internal carotid arteries, without other abnormality of the major intracranial arteries. 2. Trace atherosclerotic plaque in bilateral carotid bulbs, with no stenosis or other abnormality of cervical ICAs or vertebral arteries. Electronically signed by:  Compa Lopez MD  5/11/2023 2:01 PM CDT Workstation: [id number]    CTA Head and Neck (xpd)    Result Date: 5/11/2023  CMS EXAMINATION: CTA HEAD AND NECK (XPD) CLINICAL INDICATION: Male, 67 years old. Stroke/TIA, determine embolic source history of glioblastoma TECHNIQUE: Axial CT angiogram of the head and neck was performed with contrast. Multiplanar reformations as well as 3-D volume rendered imaging were reviewed at the workstation. Degree of stenosis was measured utilizing the NASCET method. CONTRAST: 100 mL mL of Omnipaque 350 IV. COMPARISON: None FINDINGS: CTA brain: The distal vertebral arteries, basilar artery and  cavernous portions of the internal carotid arteries are widely patent. The anterior cerebral arteries, middle cerebral arteries and posterior cerebral arteries are widely patent without evidence of large vessel occlusion, significant stenosis, AVM or aneurysm. The dural venous sinuses are patent. There is no pathologic enhancement. Aortic Arch and Great Vessel Origins: 2 vessel aortic arch which is widely patent Subclavian Arteries: Widely patent. Right Common Carotid Artery: Widely patent. Right Internal Carotid Artery: Widely patent. Right External Carotid Artery: Widely patent. Left Common Carotid Artery: Widely patent. Left Internal Carotid Artery: Widely patent. Left External Carotid Artery: Widely patent. Right Vertebral Artery: Widely patent. Left Vertebral Artery: Widely patent. The paraspinous soft tissues are unremarkable. There are mild degenerative changes of the cervical spine. The lung apices are clear. IMPRESSION: Negative CTA of the head and neck. This exam was performed according to our departmental dose-optimization program which includes automated exposure control, adjustment of the mA and/or kV according to patient size and/or use of iterative reconstruction technique. Electronically signed by:  Yi Hernandez MD  5/11/2023 7:43 AM CDT Workstation: [id number]        Assessment and Plan:    Status Epilepticus  History of Glioblastoma Multiforme s/p craniectomy, chemo and radiotherapy  Acute ischemic infarct   Atrial fibrillation       67-year-old man with history of glioblastoma multiforme s/p craniectomy, chemotherapy and radiation diagnosed in August 2022, as well as seizure disorder, who presented to the ED with left facial twitching, left facial droop, slurred speech and left arm weakness. Upon arrival to the ED, he was loaded with 1 gr of Keppra and given 2 mg of ativan IV, which seemed to break the seizure. However, he experienced sudden worsening of mental status, with unresponsiveness,  weakness and aphasia, so he was taken to CT scan for repeat CT brain to rule out LVO stroke or hemorrhage. He was then intubated, sedated and placed on mechanical ventilation. Clinical picture more consistent with status epilepticus.      - Admitted to hospital medicine in the ICU with q2 hour neuro checks, on telemetry, continuous pulse oximetry  - Seizure precautions while inpatient  - initially underwent 24-hour VEEG continuous monitoring with evidence of 4 electrographic seizures which resolved when dose of propofol was increased to 60 mcg/kg/min. By the end of the recording, a burst suppression pattern with decreased LPEDs was seen.   -on 5/13 patient was again put on continues EEG monitoring while weaning sedation.  Initially EEG showed burst suppression pattern with bursts of 1-2 seconds of delta activity followed by 2-4 seconds of background suppression and also showed right hemispheric lateralized periodic epileptiform discharges.  Background changed to a mixture of delta and theta activity with PLEDs becoming more frequent with lowering sedation to 30mcg/kg/min.  He was kept at this dose of propofol overnight.  -on 05/14, loaded patient loaded with Vimpat 300 mg and started him on 100 mg b.i.d. given persistence of periodic epileptiform discharge on the right hemisphere and also focal clinical seizure with left facial twitching noticed while being off sedation.  - 5/15:  Continuous EEG shows persistent PLEDs on the right hemisphere.  Multiple episodes of left facial twitching without any correlate/change the EEG.  Increase Vimpat to 200 mg b.i.d. due to persistence of PLEDs.   - Was started on Klonopin 0.5 mg t.i.d. for facial twitching.  Facial twitching could possibly be focal seizures not being captured the surface EEG versus twitching of unknown etiology.   -5/16- EEG over the past 24 hours showed 1 episode of electroclinical seizures with left facial twitching.  Multiple further episodes of facial  twitching without EEG correlate.  -Given patient has not been waking up, will stop Klonopin and start him on amantadine 100 mg b.i.d. Will continue video EEG to monitor for seizures and monitor mental status.  - continue Keppra 1500 mg BID IV  - Continue Depacon 500 mg TID IV. Valproic acid levels therapeutic.  - MRI brain showed right hemispheric infarcts.  Etiology could likely be cardioembolism in the setting of atrial fibrillation. Continue anticoagulation with Xarelto 20 mg daily  - Avoid seizure threshold lowering medications such as:  Bupropion, diphenhydramine, tramadol, certain antibiotics  - Maintain Euthermia with Tylenol prn temp > 37.2 degrees C.  - Assessment for rehab with PT/OT/SLP evaluation and treatment.  - workup and treatment of metabolic and infectious abnormalities as per primary team  - Will follow along      DVT prophylaxis with chemo/SCD prophylaxis      [phone] within 3 days from discharge.        All questions were answered.                              Thank you kindly for including us in the care of this patient. Please do not hesitate to contact us with any questions.       Critical Care:  76 minutes of critical care time has been spent evaluating with the patient. Time includes chart review not limited to diagnostic imaging, labs, and vitals, patient assessment, discussion with family and nursing, current order evaluations, and new order entries.      Mukund James MD  Neurology/Vascular Neurology

## 2023-05-16 NOTE — SUBJECTIVE & OBJECTIVE
"Interval History: see "Hospital Course"    Review of Systems   Unable to perform ROS: Patient unresponsive   Objective:     Vital Signs (Most Recent):  Temp: 99 °F (37.2 °C) (05/16/23 1145)  Pulse: 86 (05/16/23 1300)  Resp: (!) 31 (05/16/23 1300)  BP: (!) 155/95 (05/16/23 0836)  SpO2: 98 % (05/16/23 1300) Vital Signs (24h Range):  Temp:  [98.5 °F (36.9 °C)-99 °F (37.2 °C)] 99 °F (37.2 °C)  Pulse:  [] 86  Resp:  [17-42] 31  SpO2:  [94 %-98 %] 98 %  BP: (119-179)/() 155/95  Arterial Line BP: (115-158)/(67-95) 157/90     Weight: 92.4 kg (203 lb 11.3 oz)  Body mass index is 30.08 kg/m².    Intake/Output Summary (Last 24 hours) at 5/16/2023 1341  Last data filed at 5/16/2023 0552  Gross per 24 hour   Intake 2108.33 ml   Output 1375 ml   Net 733.33 ml         Physical Exam  Vitals and nursing note reviewed.   Constitutional:       Appearance: He is ill-appearing.   HENT:      Head: Normocephalic and atraumatic.      Right Ear: External ear normal.      Left Ear: External ear normal.      Nose: Nose normal.      Comments: NG tube.     Mouth/Throat:      Mouth: Mucous membranes are moist.      Pharynx: Oropharynx is clear.      Comments: ETT.  Cardiovascular:      Rate and Rhythm: Tachycardia present. Rhythm irregular.      Pulses: Normal pulses.      Heart sounds: Normal heart sounds.   Pulmonary:      Effort: Pulmonary effort is normal.      Breath sounds: Normal breath sounds.   Abdominal:      General: Bowel sounds are normal.      Palpations: Abdomen is soft.   Genitourinary:     Comments: Iraheta.  Musculoskeletal:      Right lower leg: Edema present.      Left lower leg: Edema present.   Skin:     General: Skin is warm and dry.   Neurological:      Comments: GCS 3.           Significant Labs: All pertinent labs within the past 24 hours have been reviewed.    Significant Imaging: I have reviewed all pertinent imaging results/findings within the past 24 hours.  "

## 2023-05-17 LAB
ALBUMIN SERPL BCP-MCNC: 2.3 G/DL (ref 3.5–5.2)
ALLENS TEST: ABNORMAL
ALP SERPL-CCNC: 41 U/L (ref 55–135)
ALT SERPL W/O P-5'-P-CCNC: 45 U/L (ref 10–44)
AMMONIA PLAS-SCNC: 41 UMOL/L (ref 10–50)
ANION GAP SERPL CALC-SCNC: 7 MMOL/L (ref 8–16)
AST SERPL-CCNC: 48 U/L (ref 10–40)
BACTERIA SPEC AEROBE CULT: NORMAL
BASOPHILS # BLD AUTO: 0.02 K/UL (ref 0–0.2)
BASOPHILS NFR BLD: 0.3 % (ref 0–1.9)
BILIRUB SERPL-MCNC: 1 MG/DL (ref 0.1–1)
BUN SERPL-MCNC: 40 MG/DL (ref 8–23)
CALCIUM SERPL-MCNC: 7.9 MG/DL (ref 8.7–10.5)
CHLORIDE SERPL-SCNC: 110 MMOL/L (ref 95–110)
CO2 SERPL-SCNC: 26 MMOL/L (ref 23–29)
CREAT SERPL-MCNC: 0.9 MG/DL (ref 0.5–1.4)
DELSYS: ABNORMAL
DIFFERENTIAL METHOD: ABNORMAL
EOSINOPHIL # BLD AUTO: 0 K/UL (ref 0–0.5)
EOSINOPHIL NFR BLD: 0.1 % (ref 0–8)
ERYTHROCYTE [DISTWIDTH] IN BLOOD BY AUTOMATED COUNT: 14.4 % (ref 11.5–14.5)
ERYTHROCYTE [SEDIMENTATION RATE] IN BLOOD BY WESTERGREN METHOD: 33 MM/H
EST. GFR  (NO RACE VARIABLE): >60 ML/MIN/1.73 M^2
FIO2: 35
GLUCOSE SERPL-MCNC: 137 MG/DL (ref 70–110)
GLUCOSE SERPL-MCNC: 138 MG/DL (ref 70–110)
GRAM STN SPEC: NORMAL
HCO3 UR-SCNC: 26.5 MMOL/L (ref 24–28)
HCT VFR BLD AUTO: 48.7 % (ref 40–54)
HCT VFR BLD CALC: 47 %PCV (ref 36–54)
HGB BLD-MCNC: 16.4 G/DL (ref 14–18)
IMM GRANULOCYTES # BLD AUTO: 0.1 K/UL (ref 0–0.04)
IMM GRANULOCYTES NFR BLD AUTO: 1.3 % (ref 0–0.5)
IP: 10
LYMPHOCYTES # BLD AUTO: 0.8 K/UL (ref 1–4.8)
LYMPHOCYTES NFR BLD: 10.6 % (ref 18–48)
MAGNESIUM SERPL-MCNC: 2.2 MG/DL (ref 1.6–2.6)
MAP: 8.7
MCH RBC QN AUTO: 30.9 PG (ref 27–31)
MCHC RBC AUTO-ENTMCNC: 33.7 G/DL (ref 32–36)
MCV RBC AUTO: 92 FL (ref 82–98)
MODE: ABNORMAL
MONOCYTES # BLD AUTO: 1.5 K/UL (ref 0.3–1)
MONOCYTES NFR BLD: 20.4 % (ref 4–15)
NEUTROPHILS # BLD AUTO: 5.1 K/UL (ref 1.8–7.7)
NEUTROPHILS NFR BLD: 67.3 % (ref 38–73)
NRBC BLD-RTO: 0 /100 WBC
PCO2 BLDA: 34.1 MMHG (ref 35–45)
PEEP: 5
PH SMN: 7.5 [PH] (ref 7.35–7.45)
PHOSPHATE SERPL-MCNC: 2 MG/DL (ref 2.7–4.5)
PIP: 16
PLATELET # BLD AUTO: 141 K/UL (ref 150–450)
PMV BLD AUTO: 10.8 FL (ref 9.2–12.9)
PO2 BLDA: 82 MMHG (ref 80–100)
POC BE: 3 MMOL/L
POC IONIZED CALCIUM: 1.15 MMOL/L (ref 1.06–1.42)
POC SATURATED O2: 97 % (ref 95–100)
POC TCO2: 27 MMOL/L (ref 23–27)
POTASSIUM BLD-SCNC: 3.7 MMOL/L (ref 3.5–5.1)
POTASSIUM SERPL-SCNC: 3.4 MMOL/L (ref 3.5–5.1)
PROT SERPL-MCNC: 5.6 G/DL (ref 6–8.4)
RBC # BLD AUTO: 5.31 M/UL (ref 4.6–6.2)
SAMPLE: ABNORMAL
SITE: ABNORMAL
SODIUM BLD-SCNC: 146 MMOL/L (ref 136–145)
SODIUM SERPL-SCNC: 143 MMOL/L (ref 136–145)
SP02: 98
VT: 446
WBC # BLD AUTO: 7.54 K/UL (ref 3.9–12.7)

## 2023-05-17 PROCEDURE — 63600175 PHARM REV CODE 636 W HCPCS: Performed by: STUDENT IN AN ORGANIZED HEALTH CARE EDUCATION/TRAINING PROGRAM

## 2023-05-17 PROCEDURE — 82140 ASSAY OF AMMONIA: CPT | Performed by: INTERNAL MEDICINE

## 2023-05-17 PROCEDURE — 25000003 PHARM REV CODE 250: Performed by: INTERNAL MEDICINE

## 2023-05-17 PROCEDURE — 37799 UNLISTED PX VASCULAR SURGERY: CPT

## 2023-05-17 PROCEDURE — 82330 ASSAY OF CALCIUM: CPT

## 2023-05-17 PROCEDURE — 25000003 PHARM REV CODE 250: Performed by: STUDENT IN AN ORGANIZED HEALTH CARE EDUCATION/TRAINING PROGRAM

## 2023-05-17 PROCEDURE — 94003 VENT MGMT INPAT SUBQ DAY: CPT

## 2023-05-17 PROCEDURE — 99291 PR CRITICAL CARE, E/M 30-74 MINUTES: ICD-10-PCS | Mod: ,,, | Performed by: INTERNAL MEDICINE

## 2023-05-17 PROCEDURE — 84132 ASSAY OF SERUM POTASSIUM: CPT

## 2023-05-17 PROCEDURE — 99900031 HC PATIENT EDUCATION (STAT)

## 2023-05-17 PROCEDURE — C9254 INJECTION, LACOSAMIDE: HCPCS | Performed by: INTERNAL MEDICINE

## 2023-05-17 PROCEDURE — 84100 ASSAY OF PHOSPHORUS: CPT | Performed by: STUDENT IN AN ORGANIZED HEALTH CARE EDUCATION/TRAINING PROGRAM

## 2023-05-17 PROCEDURE — 27000221 HC OXYGEN, UP TO 24 HOURS

## 2023-05-17 PROCEDURE — 94799 UNLISTED PULMONARY SVC/PX: CPT

## 2023-05-17 PROCEDURE — 94761 N-INVAS EAR/PLS OXIMETRY MLT: CPT

## 2023-05-17 PROCEDURE — 85014 HEMATOCRIT: CPT

## 2023-05-17 PROCEDURE — 82803 BLOOD GASES ANY COMBINATION: CPT

## 2023-05-17 PROCEDURE — 99231 SBSQ HOSP IP/OBS SF/LOW 25: CPT | Mod: ,,, | Performed by: INTERNAL MEDICINE

## 2023-05-17 PROCEDURE — 85025 COMPLETE CBC W/AUTO DIFF WBC: CPT | Performed by: STUDENT IN AN ORGANIZED HEALTH CARE EDUCATION/TRAINING PROGRAM

## 2023-05-17 PROCEDURE — 99900035 HC TECH TIME PER 15 MIN (STAT)

## 2023-05-17 PROCEDURE — C9113 INJ PANTOPRAZOLE SODIUM, VIA: HCPCS | Performed by: STUDENT IN AN ORGANIZED HEALTH CARE EDUCATION/TRAINING PROGRAM

## 2023-05-17 PROCEDURE — 99291 CRITICAL CARE FIRST HOUR: CPT | Mod: ,,, | Performed by: INTERNAL MEDICINE

## 2023-05-17 PROCEDURE — 80053 COMPREHEN METABOLIC PANEL: CPT | Performed by: STUDENT IN AN ORGANIZED HEALTH CARE EDUCATION/TRAINING PROGRAM

## 2023-05-17 PROCEDURE — 20000000 HC ICU ROOM

## 2023-05-17 PROCEDURE — 83735 ASSAY OF MAGNESIUM: CPT | Performed by: STUDENT IN AN ORGANIZED HEALTH CARE EDUCATION/TRAINING PROGRAM

## 2023-05-17 PROCEDURE — 63600175 PHARM REV CODE 636 W HCPCS: Performed by: INTERNAL MEDICINE

## 2023-05-17 PROCEDURE — 99900026 HC AIRWAY MAINTENANCE (STAT)

## 2023-05-17 PROCEDURE — 84295 ASSAY OF SERUM SODIUM: CPT

## 2023-05-17 PROCEDURE — 63600175 PHARM REV CODE 636 W HCPCS: Performed by: HOSPITALIST

## 2023-05-17 PROCEDURE — 99231 PR SUBSEQUENT HOSPITAL CARE,LEVL I: ICD-10-PCS | Mod: ,,, | Performed by: INTERNAL MEDICINE

## 2023-05-17 RX ORDER — MAGNESIUM SULFATE HEPTAHYDRATE 40 MG/ML
4 INJECTION, SOLUTION INTRAVENOUS
Status: DISCONTINUED | OUTPATIENT
Start: 2023-05-17 | End: 2023-05-25 | Stop reason: HOSPADM

## 2023-05-17 RX ORDER — MAGNESIUM SULFATE HEPTAHYDRATE 40 MG/ML
2 INJECTION, SOLUTION INTRAVENOUS
Status: DISCONTINUED | OUTPATIENT
Start: 2023-05-17 | End: 2023-05-25 | Stop reason: HOSPADM

## 2023-05-17 RX ADMIN — METOPROLOL TARTRATE 5 MG: 5 INJECTION, SOLUTION INTRAVENOUS at 05:05

## 2023-05-17 RX ADMIN — ALLOPURINOL 100 MG: 100 TABLET ORAL at 08:05

## 2023-05-17 RX ADMIN — AMANTADINE 100 MG: 100 CAPSULE ORAL at 08:05

## 2023-05-17 RX ADMIN — LEVETIRACETAM INJECTION 1500 MG: 15 INJECTION INTRAVENOUS at 08:05

## 2023-05-17 RX ADMIN — SACUBITRIL AND VALSARTAN 1 TABLET: 24; 26 TABLET, FILM COATED ORAL at 08:05

## 2023-05-17 RX ADMIN — METOPROLOL TARTRATE 5 MG: 5 INJECTION, SOLUTION INTRAVENOUS at 08:05

## 2023-05-17 RX ADMIN — SODIUM PHOSPHATE, MONOBASIC, MONOHYDRATE 20.01 MMOL: 276; 142 INJECTION, SOLUTION INTRAVENOUS at 05:05

## 2023-05-17 RX ADMIN — HYDRALAZINE HYDROCHLORIDE 10 MG: 20 INJECTION INTRAMUSCULAR; INTRAVENOUS at 09:05

## 2023-05-17 RX ADMIN — POTASSIUM BICARBONATE 35 MEQ: 391 TABLET, EFFERVESCENT ORAL at 08:05

## 2023-05-17 RX ADMIN — DEXAMETHASONE 1 MG: 1 TABLET ORAL at 08:05

## 2023-05-17 RX ADMIN — SODIUM CHLORIDE 200 MG: 9 INJECTION, SOLUTION INTRAVENOUS at 05:05

## 2023-05-17 RX ADMIN — METOPROLOL TARTRATE 5 MG: 5 INJECTION, SOLUTION INTRAVENOUS at 06:05

## 2023-05-17 RX ADMIN — ATORVASTATIN CALCIUM 80 MG: 40 TABLET, FILM COATED ORAL at 08:05

## 2023-05-17 RX ADMIN — POTASSIUM BICARBONATE 35 MEQ: 391 TABLET, EFFERVESCENT ORAL at 05:05

## 2023-05-17 RX ADMIN — DEXTROSE 500 MG: 50 INJECTION, SOLUTION INTRAVENOUS at 04:05

## 2023-05-17 RX ADMIN — METOPROLOL TARTRATE 100 MG: 50 TABLET, FILM COATED ORAL at 08:05

## 2023-05-17 RX ADMIN — DEXTROSE 500 MG: 50 INJECTION, SOLUTION INTRAVENOUS at 01:05

## 2023-05-17 RX ADMIN — PANTOPRAZOLE SODIUM 40 MG: 40 INJECTION, POWDER, FOR SOLUTION INTRAVENOUS at 08:05

## 2023-05-17 RX ADMIN — TIMOLOL MALEATE 1 DROP: 2.5 SOLUTION/ DROPS OPHTHALMIC at 08:05

## 2023-05-17 RX ADMIN — DEXTROSE 500 MG: 50 INJECTION, SOLUTION INTRAVENOUS at 08:05

## 2023-05-17 RX ADMIN — SODIUM CHLORIDE 200 MG: 9 INJECTION, SOLUTION INTRAVENOUS at 06:05

## 2023-05-17 RX ADMIN — RIVAROXABAN 20 MG: 20 TABLET, FILM COATED ORAL at 06:05

## 2023-05-17 NOTE — ASSESSMENT & PLAN NOTE
In setting of glioblastoma. MRI brain also shows R MCA region infarcts. GCS 3 off sedation.  -Continue Depacon and Keppra  -Vimpat added 5/14  -Amantadine  -Neurology following  -Continuous EEG  -Continue intubation with mechanical ventilation

## 2023-05-17 NOTE — RESPIRATORY THERAPY
05/16/23 1905   Patient Assessment/Suction   Level of Consciousness (AVPU) responds to pain   Respiratory Effort Unlabored   Expansion/Accessory Muscles/Retractions no use of accessory muscles;no retractions;expansion symmetric   All Lung Fields Breath Sounds clear;diminished   Rhythm/Pattern, Respiratory assisted mechanically   Cough Frequency with stimulation   Cough Type assisted   Suction Method oral;tracheal   Suction Pressure (mmHg) -120 mmHg   $ Suction Charges Inline Suction Procedure Stat Charge   Secretions Amount small   Secretions Color yellow   Secretions Characteristics thick   Skin Integrity   $ Wound Care Tech Time 15 min   Area Observed Left;Right;Cheek;Upper lip;Lower lip;Corner lip   Skin Appearance without discoloration   PRE-TX-O2   Device (Oxygen Therapy) ventilator   $ Is the patient on Low Flow Oxygen? Yes   Oxygen Concentration (%) 35   SpO2 99 %   Pulse Oximetry Type Continuous   $ Pulse Oximetry - Multiple Charge Pulse Oximetry - Multiple   Pulse 85   Resp (!) 30      Airway Anesthesia 05/11/23   Placement Date/Time: 05/11/23 (c) 0082   Method of Intubation: Video Laryngoscopy  Airway Device: Endotracheal Tube  Mask Ventilation: Moderately difficult with oral airway  Intubated: Other (see comments)  Blade: Glidescope #3  Airway Device Size: 8....   Secured at 24 cm   Measured At Lips   Secured Location Right   Secured by Commercial tube barboza   Bite Block none   Status Intact;Secured;Patent   Site Assessment Clean;Dry   Airway Safety   Is Ambu Bag and Mask with Patient? Yes, Adult Ambu Bag and Mask   Suction set is at the bedside? Yes   Respiratory Interventions   Airway/Ventilation Management airway patency maintained   Vent Select   Conventional Vent Y   Charged w/in last 24h YES   Preset Conventional Ventilator Settings   Vent ID [id number]   Vent Type    Ventilation Type VC   Vent Mode Spont   Humidity HME   PEEP/CPAP 5 cmH20   Pressure Support 10 cmH20   Peak End Inspiratory Pressure  15 cmH20   Insp Rise Time  70 %   I-Trigger Type  V-TRIG   Trigger Sensitivity Flow/I-Trigger 3 L/min   Patient Ventilator Parameters   Resp Rate Total 26 br/min   Peak Airway Pressure 16 cmH20   Mean Airway Pressure 8.8 cmH20   Plateau Pressure 0 cmH20   Exhaled Vt 232 mL   Total Ve 9.08 L/m   Spont Ve 9.08 L   I:E Ratio Measured 1.00:1   Auto PEEP 0 cmH20   Tubing ID (mm) 8 mm   Tube Type ET   Inspired Tidal Volume (VTI) 0 mL   Conventional Ventilator Alarms   Alarms On Y   Resp Rate High Alarm 45 br/min   Press High Alarm 50 cmH2O   Apnea Rate 12   Apnea Volume (mL) 1 mL   Apnea Oxygen Concentration  100   Apnea Flow Rate (L/min) 65   T Apnea 20 sec(s)   IHI Ventilator Associated Pneumonia Bundle (Required)   Oral Care Teeth brushed;Mouth swabbed;Mouth moisturizer;Mouth suctioned;with mouthwash   Vent Circut Breaks Minimized Yes   Ready to Wean/Extubation Screen   FIO2<=50 (chart decimal) 0.35   MV<16L (chart vol.) 9.08   PEEP <=8 (chart #) 5   Ready to Wean Parameters   F/VT Ratio<105 (RSBI) 129.31   Vital Capacity   Vital Capacity (mL) 0   Education   $ Education Suction;Ventilator Oxygen;15 min   Respiratory Evaluation   $ Care Plan Tech Time 15 min   $ Eval/Re-eval Charges Re-evaluation

## 2023-05-17 NOTE — PROGRESS NOTES
[facility]  Department of Cardiology  Consult Note      PATIENT NAME: Teto Mendez  MRN: [id number]  TODAY'S DATE: 05/17/2023  ADMIT DATE: 5/11/2023                          CONSULT REQUESTED BY: Neo Clark MD    SUBJECTIVE     PRINCIPAL PROBLEM: Status epilepticus      REASON FOR CONSULT:    AFRVR    Interval history:    5/17/23    Patient remains on mechanical ventilation. Patient has been off sedation for 48 hours. AFRVR on tele.   Unresponsive to verbal or physical stimuli.  No family at bedside.   No edema BP stable. K 3.4.  Cr. 0.9.     5/16/23    Patient is off sedation. Remains on mechanical ventilation. Neurology following. Rate controlled AF.  K 3.3 this am. No response to verbal or physical stimulation.  L facial twitching. Dr. James at bedside as well. No seizure activity noted on EEG with facial twitching.  -110.  /80.  96% O2.  35% Fio2 on vent. Peep 5.  SBT.      5/15/23    Patient remains on mechanical ventilation.  Remains on EEG.  Propofol has been weaned off and Precedex has been started an exchange.  During examination, patient had left facial twitching and coughing with mild stimulation.  Seizure activity noted on EEG overnight per nursing.  Potassium is 3.4 this a.m., replace per protocol.  Patient was alkalotic pH 7.5, PO2 67, HC03 29.6.  Patient was given Diamox 250 mg IV.  Rate controlled AFib on the monitor.  Entresto started yesterday.  Creatinine 1.1 today.      05/14/2023    Patient remains on mechanical ventilation and sedation with propofol.  Plans for MRI of the brain today.  Potassium 3.3 this a.m., replaced was 70 mEq of KCl.  Urinary output 1950 mL yesterday.  No output recorded yet today.  Hemodynamically stable.     MRI today:  IMPRESSION:  1. Cortical foci restricted signal intensity on diffusion weighted images compatible with a benign nonhemorrhagic cortical infarct with additional small infarct involving the deep subcortical white matter of the  right parietal lobe.  2. No evidence of intracranial hemorrhage.    HPI:    Patient is a 67-year-old male with past medical history of GERD, glioblastoma multiforme s/p craniotomy, chemotherapy and radiation diagnosed in August 2022, history of blood clots, hypertension, PAF on Xarelto, seizures who presented to the ED with left facial pushing, left facial droop, slurred speech, left arm weakness that began at 3:00 a.m. on 5/11.  Keppra has been recently decreased.  In ED he was given 1 g of Keppra, 2 mg of Ativan which broke his seizure and he was subsequently alert and responsive..  Later in CT, he had sudden worsening mental status, unresponsiveness, weakness, aphasia.  O2 saturations high 80s, and patient was intubated emergently and placed on mechanical ventilation for respiratory failure.  Seizure activity restarted and was erupted with Ativan and propofol bolus. No acute stroke/hemorrhage on CT.  Neurology is following for status epilepticus.    05/12/2023:  Patient was on EEG for monitoring of status epilepticus.  Seizure at 4:00 a.m..  Patient remains in AFib    05/13/2023: Patient was also found to be in AFib RVR this admission, requiring IV diltiazem.  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 Normotensive.  K 3.1, Cr 1.0, Mag 2.1; CXR this am coarse interstitial markings the LLL and trace L-sided pleural effusion; on propofol drip at 40 mics per kg per minute.  On mechanical ventilation 5 of PEEP 30% FiO2.  family at bedside.  Patient is originally from [place] and has a cardiologist there.  Known history of atrial fibrillation.  History of splenic infarct.  Unknown if patient has history of reduced EF or heart failure.  He was on Lasix at home.  Patient  is visiting from [place].    ECHO this am  The left ventricle is mildly enlarged with concentric remodeling and  The estimated ejection fraction is 38%.  There is left ventricular global hypokinesis.  Mild mitral regurgitation.  Normal central venous pressure (3 mmHg).  The estimated PA systolic pressure is 26 mmHg.  Atrial fibrillation observed.  Normal right ventricular size with normal right ventricular systolic function.  Moderate left atrial enlargement.  Moderate right atrial enlargement.        FROM H&P  Teto Mendez is a 67 y.o. White male with known history of  glioblastoma s/p resection, seizures p.afib on xarelto who presented to ER this morning with left arm weakness, facial twitching and change in his speech that he suspected was seizure activity.  He took an extra keppra and presented to ER around 6:30-7 am.  Head CT/CTA in ER without hemorrhage or LVO and he was not a candidate for tPA regardless.  He was given lorazepam and keppra with positive response, however, while still in ER he became less responsive and repeat stat CT was ordered.  While in CT, he became hypoxic with depressed respiratory status and required bag-mask ventilation and emergent intubation on return to ICU.  History was obtained from the ER physician Sign-out.            Review of patient's allergies indicates:  No Known Allergies    Past Medical History:   Diagnosis Date    GERD (gastroesophageal reflux disease)     Glioblastoma     H/O blood clots     Hypertension     Paroxysmal atrial fibrillation     Seizures      No past surgical history on file.        REVIEW OF SYSTEMS  Unable to obtain. Remains unresponsive on mechanical ventilation    OBJECTIVE     VITAL SIGNS (Most Recent)  Temp: 99.2 °F (37.3 °C) (05/17/23 1130)  Pulse: 89 (05/17/23 1200)  Resp: (!) 26 (05/17/23 1200)  BP: (!) 140/84 (05/17/23 1115)  SpO2: 98 % (05/17/23 1200)    VENTILATION STATUS  Resp: (!) 26 (05/17/23 1200)  SpO2: 98 % (05/17/23 1200)  Vent Mode:  Spont  Oxygen Concentration (%):  [30-35] 30  Resp Rate Total:  [15 br/min-35 br/min] 25 br/min  PEEP/CPAP:  [5 cmH20] 5 cmH20  Pressure Support:  [10 cmH20] 10 cmH20  Mean Airway Pressure:  [8 cmH20-9.3 cmH20] 8 cmH20        I & O (Last 24H):  Intake/Output Summary (Last 24 hours) at 5/17/2023 1301  Last data filed at 5/17/2023 0926  Gross per 24 hour   Intake 2835 ml   Output 1400 ml   Net 1435 ml       WEIGHTS  Wt Readings from Last 3 Encounters:   05/13/23 0001 92.4 kg (203 lb 11.3 oz)   05/11/23 1230 84 kg (185 lb 3 oz)   05/11/23 0641 83.9 kg (185 lb)   05/13/23 1042 92.1 kg (203 lb)       Physical examination:      Constitutional:  Critically ill appearing male unresponsive on mechanical ventilation  Neck: no carotid bruit, no JVD  Lungs: coarse breath sounds, mechanical ventilation- FiO2 35%, 5 peep  Chest Wall: no tenderness  CARDIAC:  Irregular rate and rhythm, soft systolic murmur  Abdomen: soft, bowel sounds normal; NG tube in left nare  : minaya catheter  Extremities: Extremities normal, atraumatic, no cyanosis, clubbing or edema  Skin: Skin color, texture, turgor normal. No rashes or lesions  Neuro: unresponsive to verbal or physical stimuli, on vent     HOME MEDICATIONS:  No current facility-administered medications on file prior to encounter.     Current Outpatient Medications on File Prior to Encounter   Medication Sig Dispense Refill    allopurinoL (ZYLOPRIM) 100 MG tablet Take 100 mg by mouth once daily.      amLODIPine (NORVASC) 10 MG tablet Take 10 mg by mouth once daily.      dexAMETHasone (DECADRON) 1 MG Tab Take 1 mg by mouth every 12 (twelve) hours.      hydrALAZINE (APRESOLINE) 25 MG tablet Take 25 mg by mouth 3 (three) times daily.      levETIRAcetam (KEPPRA) 500 MG Tab Take 500 mg by mouth 2 (two) times daily.      metoprolol succinate (TOPROL-XL) 100 MG 24 hr tablet Take 100 mg by mouth once daily.      pantoprazole (PROTONIX) 40 MG tablet Take 40 mg by mouth once daily.      XARELTO 20  mg Tab Take 20 mg by mouth once daily.      clotrimazole (MYCELEX) 10 mg kathleen Take 1 tablet by mouth 3 (three) times daily.      colchicine (COLCRYS) 0.6 mg tablet Take 0.6 mg by mouth once daily.      furosemide (LASIX) 20 MG tablet Take 20 mg by mouth once daily.      meclizine (ANTIVERT) 12.5 mg tablet Take 25 mg by mouth 3 (three) times daily.      rivaroxaban (XARELTO) 20 mg Tab Take 20 mg by mouth daily with dinner or evening meal.      timolol maleate 0.5% (TIMOPTIC) 0.5 % Drop Place 1 drop into both eyes 2 (two) times daily.         SCHEDULED MEDS:   allopurinoL  100 mg Per NG tube Daily    amantadine HCL  100 mg Per NG tube BID    atorvastatin  80 mg Per NG tube Daily    dexAMETHasone  1 mg Per NG tube Q12H    lacosamide (VIMPAT) IVPB  200 mg Intravenous Q12H    levetiracetam IV  1,500 mg Intravenous Q12H    metoprolol tartrate  100 mg Per NG tube BID    pantoprazole  40 mg Intravenous Daily    rivaroxaban  20 mg Per NG tube Daily with dinner    sacubitriL-valsartan  1 tablet Per G Tube BID    timolol maleate 0.25%  1 drop Both Eyes BID    valproate sodium (DEPACON) IVPB  500 mg Intravenous Q8H       CONTINUOUS INFUSIONS:        PRN MEDS:acetaminophen, colchicine, hydrALAZINE, magnesium sulfate IVPB, magnesium sulfate IVPB, metoprolol, ondansetron, polyethylene glycol, potassium bicarbonate, potassium bicarbonate, potassium bicarbonate, sodium chloride 0.9%, sodium phosphate IVPB, sodium phosphate IVPB, sodium phosphate IVPB    LABS AND DIAGNOSTICS     CBC LAST 3 DAYS  Recent Labs   Lab 05/15/23  0308 05/15/23  0311 05/16/23  0416 05/16/23  0416 05/17/23  0403 05/17/23  0433   WBC 6.65  --  7.46  --  7.54  --    RBC 5.23  --  5.46  --  5.31  --    HGB 16.1  --  16.9  --  16.4  --    HCT 48.0   < > 49.7 47 48.7 47   MCV 92  --  91  --  92  --    MCH 30.8  --  31.0  --  30.9  --    MCHC 33.5  --  34.0  --  33.7  --    RDW 14.3  --  14.1  --  14.4  --    *  --  139*  --  141*  --    MPV 10.2  --  9.8   --  10.8  --    GRAN 79.2*  5.3  --  79.8*  6.0  --  67.3  5.1  --    LYMPH 7.5*  0.5*  --  7.1*  0.5*  --  10.6*  0.8*  --    MONO 10.5  0.7  --  10.5  0.8  --  20.4*  1.5*  --    BASO 0.04  --  0.03  --  0.02  --    NRBC 1*  --  0  --  0  --     < > = values in this interval not displayed.       COAGULATION LAST 3 DAYS  Recent Labs   Lab 05/11/23  0657   INR 1.3*       CHEMISTRY LAST 3 DAYS  Recent Labs   Lab 05/15/23  0308 05/15/23  0311 05/16/23  0416 05/16/23 0416 05/16/23  0906 05/17/23  0403 05/17/23  0433     --  144  --   --  143  --    K 3.4*  --  3.3*  --   --  3.4*  --      --  112*  --   --  110  --    CO2 29  --  24  --   --  26  --    ANIONGAP 6*  --  8  --   --  7*  --    BUN 29*  --  40*  --   --  40*  --    CREATININE 1.1  --  1.1  --   --  0.9  --    *  --  145*  --   --  137*  --    CALCIUM 8.0*  --  8.2*  --   --  7.9*  --    PH  --    < >  --  7.457* 7.473*  --  7.498*   MG 2.1  --  2.3  --   --  2.2  --    ALBUMIN 2.5*  --  2.5*  --   --  2.3*  --    PROT 5.6*  --  5.7*  --   --  5.6*  --    ALKPHOS 41*  --  40*  --   --  41*  --    ALT 32  --  44  --   --  45*  --    AST 23  --  49*  --   --  48*  --    BILITOT 1.2*  --  1.2*  --   --  1.0  --     < > = values in this interval not displayed.       CARDIAC PROFILE LAST 3 DAYS  Recent Labs   Lab 05/13/23  0422 05/13/23  1544   BNP 93  --    TROPONINIHS  --  7.0       ENDOCRINE LAST 3 DAYS  Recent Labs   Lab 05/11/23  0657   TSH 3.900       LAST ARTERIAL BLOOD GAS  ABG  Recent Labs   Lab 05/17/23  0433   PH 7.498*   PO2 82   PCO2 34.1*   HCO3 26.5   BE 3       LAST 7 DAYS MICROBIOLOGY   Microbiology Results (last 7 days)       Procedure Component Value Units Date/Time    Culture, Respiratory with Gram Stain [[id number]] Collected: 05/15/23 0059    Order Status: Completed Specimen: Respiratory from Sputum Updated: 05/17/23 0738     Respiratory Culture Normal respiratory fredi     Gram Stain (Respiratory) <10 epithelial  cells per low power field.     Gram Stain (Respiratory) Few WBC's     Gram Stain (Respiratory) Few Gram positive cocci    Culture, Respiratory with Gram Stain [[id number]]     Order Status: Canceled Specimen: Respiratory             MOST RECENT IMAGING  X-Ray Chest AP Portable  50 portable chest x-ray at 3:50 PM is compared to prior study at 5:32 AM    Clinical history is PICC line placement    There is a left PICC line with the tip overlying the superior vena cava. There is no pneumothorax.  The endotracheal tube and NG tube are in stable position. The heart is enlarged. There is atelectasis in the left lung base. There are no confluent infiltrates or pleural effusions.    IMPRESSION: No pneumothorax status post left PICC line placement    Cardiomegaly with atelectasis left lung base    Electronically signed by:  Yi Hernandez MD  5/15/2023 4:01 PM CDT Workstation: [id number]      ECHOCARDIOGRAM RESULTS (last 5)  No results found for this or any previous visit.      CURRENT/PREVIOUS VISIT EKG  Results for orders placed or performed during the hospital encounter of 05/11/23   ECG 12 lead    Collection Time: 05/11/23  7:28 AM    Narrative    Test Reason : I63.9,    Vent. Rate : 120 BPM     Atrial Rate : 150 BPM     P-R Int : 000 ms          QRS Dur : 104 ms      QT Int : 370 ms       P-R-T Axes : 000 016 -12 degrees     QTc Int : 522 ms    Atrial fibrillation with rapid ventricular response  Nonspecific ST abnormality  Prolonged QT  Abnormal ECG  No previous ECGs available  Confirmed by Tadeo BARTLETT, Joe CEBALLOS ([id number]) on 5/11/2023 10:07:18 PM    Referred By: [email or web address]   SELF           Confirmed By:Joe Piedra MD           ASSESSMENT/PLAN:     Active Hospital Problems    Diagnosis    *Status epilepticus    Acute CVA (cerebrovascular accident)    Obesity    Gout    GERD (gastroesophageal reflux disease)    HFrEF (heart failure with reduced ejection fraction)    Essential hypertension    Paroxysmal atrial  fibrillation    Hypokalemia    Glioblastoma    Acute hypoxemic respiratory failure       ASSESSMENT & PLAN:     HFrEF-EF 38%  Paroxysmal Atrial Fibrillation  Hypokalemia  Status Epilepticus  Acute respiratory failure with hypoxia and hypercapnia  Glioblastoma        RECOMMENDATIONS:    Remains unresponsive on mechical ventilation. Off sedation 48 hr.   AFRVR. Patient has history of AF.  Continue Xarelto 20 mg daily. Continue metoprolol 100 mg BID.  New Infarct on MRI this admission. Neurology following.    K 3.4. Replace per protocol. Continue to check and replace potassium and magnesium. Goal for potassium is 4.0, and goal for magnesium is 2.0.    ECHO this admission- EF 38% and LV global hypokinesis. Strict I's and O's.  Continue Entresto 24-26 mg BID for heart failure.  Records requested from cardiologist in [place].   Thank you for the consultation.  We will continue to follow.      Kiana Moore NP  Department of Cardiology  Date of Service: 05/17/2023    No significant improvement in neurologic status noted.  Discussed with hospitalist.  AFib has variable ventricular rates continue present supportive measures.  No family members noted at bedside.  I have personally  examined the patient, I have reviewed the Nurse Practitioner's history and physical, assessment, and plan. I agree with the findings and plan.    Zachariah Hughes M.D.  Department of Cardiology  Date of Service: 05/17/2023  9:45 AM

## 2023-05-17 NOTE — SUBJECTIVE & OBJECTIVE
"Interval History: see "Hospital Course"    Review of Systems   Unable to perform ROS: Patient unresponsive   Objective:     Vital Signs (Most Recent):  Temp: 99 °F (37.2 °C) (05/17/23 0305)  Pulse: 104 (05/17/23 0727)  Resp: (!) 31 (05/17/23 0727)  BP: (!) 159/83 (05/17/23 0612)  SpO2: 100 % (05/17/23 0727) Vital Signs (24h Range):  Temp:  [98.9 °F (37.2 °C)-99.3 °F (37.4 °C)] 99 °F (37.2 °C)  Pulse:  [] 104  Resp:  [25-42] 31  SpO2:  [96 %-100 %] 100 %  BP: (145-172)/(77-95) 159/83  Arterial Line BP: (137-181)/() 170/94     Weight: 92.4 kg (203 lb 11.3 oz)  Body mass index is 30.08 kg/m².    Intake/Output Summary (Last 24 hours) at 5/17/2023 0807  Last data filed at 5/17/2023 0600  Gross per 24 hour   Intake 2520.42 ml   Output 1450 ml   Net 1070.42 ml         Physical Exam  Vitals and nursing note reviewed.   Constitutional:       Appearance: He is ill-appearing.   HENT:      Head: Normocephalic and atraumatic.      Right Ear: External ear normal.      Left Ear: External ear normal.      Nose: Nose normal.      Comments: NG tube.     Mouth/Throat:      Mouth: Mucous membranes are moist.      Pharynx: Oropharynx is clear.      Comments: ETT.  Cardiovascular:      Rate and Rhythm: Tachycardia present. Rhythm irregular.      Pulses: Normal pulses.      Heart sounds: Normal heart sounds.   Pulmonary:      Effort: Pulmonary effort is normal.      Breath sounds: Normal breath sounds.   Abdominal:      General: Bowel sounds are normal.      Palpations: Abdomen is soft.   Genitourinary:     Comments: Iraheta.  Musculoskeletal:      Right lower leg: Edema present.      Left lower leg: Edema present.   Skin:     General: Skin is warm and dry.   Neurological:      Comments: GCS 3.           Significant Labs: All pertinent labs within the past 24 hours have been reviewed.    Significant Imaging: I have reviewed all pertinent imaging results/findings within the past 24 hours.  "

## 2023-05-17 NOTE — PLAN OF CARE
Problem: Adult Inpatient Plan of Care  Goal: Plan of Care Review  Outcome: Ongoing, Not Progressing  Goal: Patient-Specific Goal (Individualized)  Outcome: Ongoing, Not Progressing  Goal: Absence of Hospital-Acquired Illness or Injury  Outcome: Ongoing, Not Progressing  Goal: Optimal Comfort and Wellbeing  Outcome: Ongoing, Not Progressing  Goal: Readiness for Transition of Care  Outcome: Ongoing, Not Progressing     Problem: Fall Injury Risk  Goal: Absence of Fall and Fall-Related Injury  Outcome: Ongoing, Not Progressing     Problem: Restraint, Nonbehavioral (Nonviolent)  Goal: Absence of Harm or Injury  Outcome: Ongoing, Not Progressing     Problem: Communication Impairment (Mechanical Ventilation, Invasive)  Goal: Effective Communication  Outcome: Ongoing, Not Progressing     Problem: Device-Related Complication Risk (Mechanical Ventilation, Invasive)  Goal: Optimal Device Function  Outcome: Ongoing, Not Progressing     Problem: Inability to Wean (Mechanical Ventilation, Invasive)  Goal: Mechanical Ventilation Liberation  Outcome: Ongoing, Not Progressing     Problem: Nutrition Impairment (Mechanical Ventilation, Invasive)  Goal: Optimal Nutrition Delivery  Outcome: Ongoing, Not Progressing     Problem: Skin and Tissue Injury (Mechanical Ventilation, Invasive)  Goal: Absence of Device-Related Skin and Tissue Injury  Outcome: Ongoing, Not Progressing     Problem: Ventilator-Induced Lung Injury (Mechanical Ventilation, Invasive)  Goal: Absence of Ventilator-Induced Lung Injury  Outcome: Ongoing, Not Progressing     Problem: Communication Impairment (Artificial Airway)  Goal: Effective Communication  Outcome: Ongoing, Not Progressing     Problem: Device-Related Complication Risk (Artificial Airway)  Goal: Optimal Device Function  Outcome: Ongoing, Not Progressing     Problem: Skin and Tissue Injury (Artificial Airway)  Goal: Absence of Device-Related Skin or Tissue Injury  Outcome: Ongoing, Not Progressing      Problem: Noninvasive Ventilation Acute  Goal: Effective Unassisted Ventilation and Oxygenation  Outcome: Ongoing, Not Progressing     Problem: Infection  Goal: Absence of Infection Signs and Symptoms  Outcome: Ongoing, Not Progressing     Problem: Aspiration (Enteral Nutrition)  Goal: Absence of Aspiration Signs and Symptoms  Outcome: Ongoing, Not Progressing     Problem: Device-Related Complication Risk (Enteral Nutrition)  Goal: Safe, Effective Therapy Delivery  Outcome: Ongoing, Not Progressing     Problem: Feeding Intolerance (Enteral Nutrition)  Goal: Feeding Tolerance  Outcome: Ongoing, Not Progressing     Problem: Skin Injury Risk Increased  Goal: Skin Health and Integrity  Outcome: Ongoing, Not Progressing

## 2023-05-17 NOTE — PROGRESS NOTES
Formerly [facility], [facility] Medicine  Progress Note    Patient Name: Teto Mendez  MRN: [id number]  Patient Class: IP- Inpatient   Admission Date: 5/11/2023  Length of Stay: 6 days  Attending Physician: Neo Clark MD  Primary Care Provider: Primary Doctor No        Subjective:     Principal Problem:Status epilepticus        HPI:  Teto Mendez is a 67 y.o. White male with known history of  glioblastoma s/p resection, seizures p.afib on xarelto who presented to ER this morning with left arm weakness, facial twitching and change in his speech that he suspected was seizure activity.  He took an extra keppra and presented to ER around 6:30-7 am.  Head CT/CTA in ER without hemorrhage or LVO and he was not a candidate for tPA regardless.  He was given lorazepam and keppra with positive response, however, while still in ER he became less responsive and repeat stat CT was ordered.  While in CT, he became hypoxic with depressed respiratory status and required bag-mask ventilation and emergent intubation on return to ICU.  History was obtained from the ER physician Sign-out.      Overview/Hospital Course:  Teto Mendez is a 67 year old male with a past medical history of glioblastoma s/p craniotomy complicated by seizures, obesity, HTN, AFib and GERD who presented with status epilepticus. Neurology has been consulted. The patient was intubated on propofol sedation with Keppra and valproic acid. Pulmonary has been consulted. Continuous EEG is ordered and still running as the patient continues to have PLEDs. MRI brain shows R MCA region infarcts consistent with acute CVA. His course has been complicated by Afib with RVR as well as a HFrEF seen on TTE which appears to be chronic. Cardiology has been consulted. He is on metoprolol and Xarelto with PRN diuresis. Entresto and high dose atorvastatin was also added. EEG continues to shows periodic lateralized epileptiform discharges (PLEDs) as of 5/14. Sedation with  "propofol and Precedex was stopped 5/15. Vimpat was also added 5/15 by Neurology. Unfortunately, off sedation the patient's GCS is three. He will continue to be monitored for improvement in his neurologic status.      Interval History: see "Hospital Course"    Review of Systems   Unable to perform ROS: Patient unresponsive   Objective:     Vital Signs (Most Recent):  Temp: 99 °F (37.2 °C) (05/17/23 0305)  Pulse: 104 (05/17/23 0727)  Resp: (!) 31 (05/17/23 0727)  BP: (!) 159/83 (05/17/23 0612)  SpO2: 100 % (05/17/23 0727) Vital Signs (24h Range):  Temp:  [98.9 °F (37.2 °C)-99.3 °F (37.4 °C)] 99 °F (37.2 °C)  Pulse:  [] 104  Resp:  [25-42] 31  SpO2:  [96 %-100 %] 100 %  BP: (145-172)/(77-95) 159/83  Arterial Line BP: (137-181)/() 170/94     Weight: 92.4 kg (203 lb 11.3 oz)  Body mass index is 30.08 kg/m².    Intake/Output Summary (Last 24 hours) at 5/17/2023 0807  Last data filed at 5/17/2023 0600  Gross per 24 hour   Intake 2520.42 ml   Output 1450 ml   Net 1070.42 ml         Physical Exam  Vitals and nursing note reviewed.   Constitutional:       Appearance: He is ill-appearing.   HENT:      Head: Normocephalic and atraumatic.      Right Ear: External ear normal.      Left Ear: External ear normal.      Nose: Nose normal.      Comments: NG tube.     Mouth/Throat:      Mouth: Mucous membranes are moist.      Pharynx: Oropharynx is clear.      Comments: ETT.  Cardiovascular:      Rate and Rhythm: Tachycardia present. Rhythm irregular.      Pulses: Normal pulses.      Heart sounds: Normal heart sounds.   Pulmonary:      Effort: Pulmonary effort is normal.      Breath sounds: Normal breath sounds.   Abdominal:      General: Bowel sounds are normal.      Palpations: Abdomen is soft.   Genitourinary:     Comments: Iraheta.  Musculoskeletal:      Right lower leg: Edema present.      Left lower leg: Edema present.   Skin:     General: Skin is warm and dry.   Neurological:      Comments: GCS 3.           Significant " Labs: All pertinent labs within the past 24 hours have been reviewed.    Significant Imaging: I have reviewed all pertinent imaging results/findings within the past 24 hours.      Assessment/Plan:      * Status epilepticus  In setting of glioblastoma. MRI brain also shows R MCA region infarcts. GCS 3 off sedation.  -Continue Depacon and Keppra  -Vimpat added 5/14  -Amantadine  -Neurology following  -Continuous EEG  -Continue intubation with mechanical ventilation    Acute CVA (cerebrovascular accident)    Antithrombotics for secondary stroke prevention: Anticoagulants: Rivaroxaban 20 mg daily    Statins for secondary stroke prevention and hyperlipidemia, if present:   Statins: Atorvastatin- 80 mg daily    Aggressive risk factor modification: HTN, HLD, Obesity, A-Fib     Rehab efforts: The patient has been evaluated by a stroke team provider and the therapy needs have been fully considered based off the presenting complaints and exam findings. The following therapy evaluations are needed: None    Diagnostics ordered/pending: CTA Neck/Arch to assess vasculature, Lipid Profile to assess cholesterol levels, MRI head without contrast to assess brain parenchyma, TTE to assess cardiac function/status     VTE prophylaxis: None: Reason for No Pharmacological VTE Prophylaxis: Currently on anticoagulation    BP parameters: Infarct: No intervention, SBP <220        Acute hypoxemic respiratory failure  In setting of status epilepticus.  -Continuous EEG  -Continue mechanical ventilation  as long as patient remains in status epilepticus  -Pulmonary consulted  -ABG PRN      HFrEF (heart failure with reduced ejection fraction)  Unclear chronicity. BNP unremarkable.  -Cardiology following  -Metoprolol  -Lasix PRN  -Telemetry  -Entresto      GERD (gastroesophageal reflux disease)  -PPI      Gout  -Continue home allopurinol      Obesity  Body mass index is 30.08 kg/m². Morbid obesity complicates all aspects of disease management from  diagnostic modalities to treatment.         Glioblastoma  Chronic. Receives care in [place].  -Continue Decadron      Hypokalemia  -Replete PRN  -Telemetry  -Trend K        Paroxysmal atrial fibrillation  -Xarelto  -Metoprolol  -Telemetry  -Cardiology consulted        Essential hypertension  -Metoprolol  -Entresto  -Continue to monitor      VTE Risk Mitigation (From admission, onward)         Ordered     rivaroxaban tablet 20 mg  With dinner         05/13/23 0902     Reason for No Pharmacological VTE Prophylaxis  Once        Question:  Reasons:  Answer:  Already adequately anticoagulated on oral Anticoagulants    05/11/23 1054     IP VTE HIGH RISK PATIENT  Once         05/11/23 1054     Place sequential compression device  Until discontinued         05/11/23 1054                Discharge Planning   BARBARA: 5/22/2023     Code Status: Full Code   Is the patient medically ready for discharge?:     Reason for patient still in hospital (select all that apply): Patient trending condition, Treatment, Consult recommendations and Pending disposition  Discharge Plan A: Long-term acute care facility (LTAC), Home with family                  Neo Clark MD  Department of Hospital Medicine   [facility]

## 2023-05-17 NOTE — CARE UPDATE
05/17/23 0727   Patient Assessment/Suction   Respiratory Effort Unlabored   Expansion/Accessory Muscles/Retractions expansion symmetric   All Lung Fields Breath Sounds crackles, coarse   Rhythm/Pattern, Respiratory assisted mechanically   Cough Frequency with stimulation   Cough Type assisted;productive   Suction Method oral;tracheal   $ Suction Charges Inline Suction Procedure Stat Charge;Close Suction System Equipment   Secretions Amount small   Secretions Color tan   Skin Integrity   $ Wound Care Tech Time 15 min   Area Observed Left;Right;Cheek;Upper lip;Lower lip;Corner lip   Skin Appearance without discoloration   PRE-TX-O2   Device (Oxygen Therapy) ventilator   $ Is the patient on Low Flow Oxygen? Yes   Oxygen Concentration (%) 35   SpO2 100 %   Pulse Oximetry Type Continuous   $ Pulse Oximetry - Multiple Charge Pulse Oximetry - Multiple   Pulse 104   Resp (!) 31      Airway Anesthesia 05/11/23   Placement Date/Time: 05/11/23 (c) 4568   Method of Intubation: Video Laryngoscopy  Airway Device: Endotracheal Tube  Mask Ventilation: Moderately difficult with oral airway  Intubated: Other (see comments)  Blade: Glidescope #3  Airway Device Size: 8....   Secured at 24 cm   Measured At Lips   Secured Location Right   Secured by Commercial tube barboza   Bite Block none   Site Condition Dry   Status Intact;Secured;Patent   Site Assessment Clean;Dry   General Safety Checklist   Safety Promotion/Fall Prevention side rails raised   Airway Safety   Is Ambu Bag and Mask with Patient? Yes, Adult Ambu Bag and Mask   Suction set is at the bedside? Yes   Equipment Change   $ RT Equipment HME   Vent Select   Conventional Vent Y   $ Ventilator Subsequent 1   Charged w/in last 24h YES   Preset Conventional Ventilator Settings   Vent ID [id number]   Vent Type    Ventilation Type VC   Vent Mode Spont   Humidity HME   PEEP/CPAP 5 cmH20   Pressure Support 10 cmH20   Peak End Inspiratory Pressure 16 cmH20   Insp Rise Time  70 %    I-Trigger Type  V-TRIG   Trigger Sensitivity Flow/I-Trigger 3 L/min   Patient Ventilator Parameters   Resp Rate Total 28 br/min   Peak Airway Pressure 16 cmH20   Mean Airway Pressure 8.8 cmH20   Plateau Pressure 0 cmH20   Exhaled Vt 432 mL   Total Ve 11.2 L/m   Spont Ve 11.2 L   I:E Ratio Measured 1:1.70   Auto PEEP 0 cmH20   Inspired Tidal Volume (VTI) 0 mL   Conventional Ventilator Alarms   Alarms On Y   Ve High Alarm 20 L/min   Ve Low Alarm 5 L/min   Vt High Alarm 1200 mL   Vt Low Alarm 200 mL   Resp Rate High Alarm 40 br/min   Press High Alarm 45 cmH2O   Apnea Rate 12   Apnea Volume (mL) 1 mL   Apnea Oxygen Concentration  100   Apnea Flow Rate (L/min) 65   T Apnea 20 sec(s)   IHI Ventilator Associated Pneumonia Bundle (Required)   Head of Bed Elevated  HOB 45   Oral Care Mouth swabbed;Mouth suctioned   Ready to Wean/Extubation Screen   FIO2<=50 (chart decimal) 0.35   MV<16L (chart vol.) 11.2   PEEP <=8 (chart #) 5   Ready to Wean Parameters   F/VT Ratio<105 (RSBI) (!) 71.76   Vital Capacity   Vital Capacity (mL) 0   Education   $ Education Ventilator Oxygen;Suction;15 min   Respiratory Evaluation   $ Care Plan Tech Time 15 min   $ Eval/Re-eval Charges Re-evaluation

## 2023-05-17 NOTE — PROGRESS NOTES
Pulmonary/Critical Care Progress Note      PATIENT NAME: Teto Mendez  MRN: [id number]  TODAY'S DATE: 2023  12:50 PM  ADMIT DATE: 2023  AGE: 67 y.o. : 1955      HPI:  Called by nurse because they could not reach anesthesia to intubate a patient who was being bagged.  The patient had a change in mental status in the emergency room after having had a CTA earlier in the morning which was negative.  In CT, the patient was not breathing effectively and was cyanotic and the scanner broke so the patient was brought up to the ICU emergently.  The patient's past medical history is significant for glioblastoma multiforme for which he has been receiving radiation and chemotherapy.  He also has had a right frontal craniotomy.  At 3:30 a.m. in the morning the patient was playing a game and noted twitching of his face with slurred speech and weakness to his left arm.  He took an extra Keppra at that time.  He arrived at the ER at 6:45 a.m..     the patient remains on the ventilator and sedated with 60 of propofol.  He is also on 5 Cardizem.  Had further seizure activity during the night.    - pt remains sedated, on vent, on continuous EEG. There is EEG evidence of seizure activity so neuro has ordered VPA. Propofol has been weaned to 30    - continues sedated, on vent. Mri was done today and shows infarct in R parietal and R temporal lobes. Continues on propofol 20mg. He did have seizure activity twice today    5/15 The patient is still on the vent.  He is on Precedex, the propofol is off.  He was still having seizure activity yesterday.  MRI shows parietal and temporal infarcts on the right.     the patient is been off all sedation for 24 hours and has a GCS of 3.  Has pupils and corneals and gag and breathes spontaneously but no response to pain.  He does not respond to his voice.  He follows no commands.     the patient remains unresponsive.  He partially opens his eyes to noxious  stimuli.  He follows no commands.  Dr. Whitfield wants to give him 72 hours from stopping the sedation to reassess his neurologic recovery.  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    REVIEW OF SYSTEMS  Unobtainable    No change in the patient's Past Medical History, Past Surgical History, Social History or Family History since admission.      VITAL SIGNS (MOST RECENT)  Temp: 98.7 °F (37.1 °C) (05/17/23 0745)  Pulse: 104 (05/17/23 1000)  Resp: (!) 26 (05/17/23 1000)  BP: (!) 145/88 (05/17/23 0843)  SpO2: 98 % (05/17/23 1000)    INTAKE AND OUTPUT (LAST 24 HOURS):  Intake/Output Summary (Last 24 hours) at 5/17/2023 1023  Last data filed at 5/17/2023 0600  Gross per 24 hour   Intake 2520.42 ml   Output 1350 ml   Net 1170.42 ml       WEIGHT  Wt Readings from Last 1 Encounters:   05/13/23 92.4 kg (203 lb 11.3 oz)       PHYSICAL EXAM  GENERAL: Older patient, intubated, unresponsive  HEENT: Pupils equal and reactive.  Corneals are intact.  Nose intact. Pharynx intubated with ET tube and OG tube. EEG leads over scalp.  Gag reflex is intact.  NECK: Supple.   HEART: Regular rate and rhythm. No murmur or gallop auscultated.  LUNGS: Clear to auscultation and percussion. Lung excursion symmetrical. No change in fremitus. No adventitial noises.  ABDOMEN: Bowel sounds present. Non-tender, no masses palpated.  Tolerating enteral nutrition  : Normal anatomy.  Iraheta with yellow urine.  EXTREMITIES: Normal muscle tone and joint movement, no cyanosis or clubbing.  PICC line and arterial line on the left  LYMPHATICS: No adenopathy palpated, no edema.  SKIN: Dry, intact, no lesions.   NEURO:  Pupils and extraocular movements are intact.  The patient has gaze does not appear to be conjugate any longer. Corneals are intact.  Gag is intact.  There is a partial lid raise to pain.  There is no response to command.  GCS is 3  PSYCH:  Unable to assess      CBC LAST (LAST 24  HOURS)  Recent Labs   Lab 05/17/23 0403 05/17/23  0433   WBC 7.54  --    RBC 5.31  --    HGB 16.4  --    HCT 48.7 47   MCV 92  --    MCH 30.9  --    MCHC 33.7  --    RDW 14.4  --    *  --    MPV 10.8  --    GRAN 67.3  5.1  --    LYMPH 10.6*  0.8*  --    MONO 20.4*  1.5*  --    BASO 0.02  --    NRBC 0  --        CHEMISTRY LAST (LAST 24 HOURS)  Recent Labs   Lab 05/17/23 0403 05/17/23  0433     --    K 3.4*  --      --    CO2 26  --    ANIONGAP 7*  --    BUN 40*  --    CREATININE 0.9  --    *  --    CALCIUM 7.9*  --    PH  --  7.498*   MG 2.2  --    ALBUMIN 2.3*  --    PROT 5.6*  --    ALKPHOS 41*  --    ALT 45*  --    AST 48*  --    BILITOT 1.0  --            LAST 7 DAYS MICROBIOLOGY   Microbiology Results (last 7 days)       Procedure Component Value Units Date/Time    Culture, Respiratory with Gram Stain [[id number]] Collected: 05/15/23 0059    Order Status: Completed Specimen: Respiratory from Sputum Updated: 05/17/23 0738     Respiratory Culture Normal respiratory fredi     Gram Stain (Respiratory) <10 epithelial cells per low power field.     Gram Stain (Respiratory) Few WBC's     Gram Stain (Respiratory) Few Gram positive cocci    Culture, Respiratory with Gram Stain [[id number]]     Order Status: Canceled Specimen: Respiratory             MOST RECENT IMAGING  X-Ray Chest AP Portable  50 portable chest x-ray at 3:50 PM is compared to prior study at 5:32 AM    Clinical history is PICC line placement    There is a left PICC line with the tip overlying the superior vena cava. There is no pneumothorax.  The endotracheal tube and NG tube are in stable position. The heart is enlarged. There is atelectasis in the left lung base. There are no confluent infiltrates or pleural effusions.    IMPRESSION: No pneumothorax status post left PICC line placement    Cardiomegaly with atelectasis left lung base    Electronically signed by:  Yi Hernandez MD  5/15/2023 4:01 PM CDT Workstation:  [id number]      CURRENT VISIT EKG  Results for orders placed or performed during the hospital encounter of 05/11/23   ECG 12 lead    Narrative    Test Reason : I63.9,    Vent. Rate : 120 BPM     Atrial Rate : 150 BPM     P-R Int : 000 ms          QRS Dur : 104 ms      QT Int : 370 ms       P-R-T Axes : 000 016 -12 degrees     QTc Int : 522 ms    Atrial fibrillation with rapid ventricular response  Nonspecific ST abnormality  Prolonged QT  Abnormal ECG  No previous ECGs available    Referred By: [email or web address]   SELF           Confirmed By:        ECHOCARDIOGRAM RESULTS  No results found for this or any previous visit.        VENTILATOR INFORMATION  Vent Mode: Spont  Oxygen Concentration (%):  [30-35] 30  Resp Rate Total:  [22 br/min-39 br/min] 25 br/min  PEEP/CPAP:  [5 cmH20] 5 cmH20  Pressure Support:  [10 cmH20] 10 cmH20  Mean Airway Pressure:  [8.4 cmH20-9.3 cmH20] 8.4 cmH20           LAST ARTERIAL BLOOD GAS  ABG  Recent Labs   Lab 05/17/23  0433   PH 7.498*   PO2 82   PCO2 34.1*   HCO3 26.5   BE 3       IMPRESSION AND PLAN  Status epilepticus  -MRI shows temporal and parietal infarcts on the right  -history of seizures prior to this admission  - not seizing any longer  History of glioblastoma multiforme under treatment in [place]  Naomi coma scale of 3  -patient has been off of sedation for over 24 hours  -discussed with Dr. James who wants to give the patient more time (72 hours)  Mechanical ventilation  - ABG daily  - T piece attempts yesterday were unsuccessful, the patient fatigued quickly  Atrial fibrillation  - continue Xarelto  - continue metoprolol  Hypertension  -metoprolol  Hypokalemia  - replace potassium  -Trend labs  Moderate hypoalbuminemia  -enteral feedings tolerated  Hyperlipidemia      Critical care time spent reviewing the chart, examining the patient, reviewing the labs, reviewing the radiological findings, discussing care with nursing, physicians, and respiratory and creating the note and   has been greater than 35 minutes    Mery Milner MD  Date of Service: 05/17/2023  12:50 PM

## 2023-05-17 NOTE — PROCEDURES
CONTINUOUS VIDEO EEG REPORT    NAME: Teto Mendez  : 1955  MRN: [id number]    DATE of EE2023 10:10 AM to 2023 10:48 AM  DURATION OF EE hr 38 min     CLINICAL INDICATION: This is a 67 y.o. male with history of glioblastoma multiforme s/p craniotomy, chemotherapy and radiotherapy, as well as seizure disorder being evaluated for status epilepticus.    MEDICATIONS:     allopurinoL  100 mg Per NG tube Daily    amantadine HCL  100 mg Per NG tube BID    atorvastatin  80 mg Per NG tube Daily    dexAMETHasone  1 mg Per NG tube Q12H    lacosamide (VIMPAT) IVPB  200 mg Intravenous Q12H    levetiracetam IV  1,500 mg Intravenous Q12H    metoprolol tartrate  100 mg Per NG tube BID    pantoprazole  40 mg Intravenous Daily    rivaroxaban  20 mg Per NG tube Daily with dinner    sacubitriL-valsartan  1 tablet Per G Tube BID    timolol maleate 0.25%  1 drop Both Eyes BID    valproate sodium (DEPACON) IVPB  500 mg Intravenous Q8H         EEG DESCRIPTION:  This is a 16-channel EEG was performed with electrode placement in 10/[employer] System. Longitudinal bipolar and referential montages were utilized in analysis.    This is a technically adequate study.  Study consisted of a background of delta frequency waveforms.  Intermittently throughout the record there were lateralized periodic discharges(LPDs) in the right frontotemporal leads.  These periodic discharges had epileptiform potential indicating interictal activity.  No electrographic or electroclinical seizures were recorded.    IMPRESSION: This is an abnormal long-term video EEG with evidence of significant background slowing indicating severe encephalopathy. Lateralized Periodic discharges(LPDs) from the right hemisphere were noted intermittently throughout the record.  No seizures were recorded.    Mukund James MD  Neurology

## 2023-05-17 NOTE — PROGRESS NOTES
[facility]  Neurology Progress Note    Patient Name: Teto Mendez  MRN: [id number]  : 1955  TODAY'S DATE: 2023  ADMIT DATE: 2023  6:39 AM                                          CONSULT REQUESTED BY: Neo Clark MD     Chief Complaint   Patient presents with    FACIAL TWITCHING    SLURRED SPEECH       HPI per EMR:  Patient presents with history of glioblastoma concurrently getting chemo in previous radiation complaining of facial twitching, facial droop, slurred speech, left arm weakness.  Symptom onset at 3:30 a.m..  Patient states at 3:30 a.m. he was playing on a game and then began to have the twitching of the face with associated droop with slurred speech and arm weakness.  Patient states he has had similar episodes in the past secondary to the glioblastoma.  He has not had the left arm weakness.  Patient states he is on Keppra and did take an extra Keppra dose this morning additionally patient is on Xarelto.  At the worst symptoms are severe.    Neurology Consult:  Patient was seen and examined by me today. He is a 67-year-old man with history of glioblastoma multiforme s/p craniectomy, chemotherapy and radiation diagnosed in 2022, as well as seizure disorder, who presented to the ED with left facial twitching, left facial droop, slurred speech and left arm weakness that began the morning of his admission at around 3 am. He was recently decreased on his home Keppra in the setting of side effects as per daughter. Upon arrival to the ED, he was loaded with 1 gr of Keppra and given 2 mg of ativan IV, which seemed to break the seizure. As per ED physician, patient was awake, alert and able to answer questions. At around 11:50 am, I was contacted by ED provider in the setting of sudden worsening of mental status, unresponsiveness, weakness and aphasia, so he was taken to CT scan for repeat CT brain to rule out LVO stroke or hemorrhage. I evaluated patient while on CT,  and noticed O2 Sats were in the high 80s. Due to technical malfunction, CT could not be completed, at which point I decided to abort procedure and transport patient to the ICU immediately. When we arrived to the 3rd floor ICU, patient seemed to be on respiratory failure, with toe and fingertip cyanosis, unable to maintain O2 Sats, so started on manual assisted ventilation and called anesthesia for emergent intubation. Patient was successfully intubated and sedation with propofol was started, but facial twitching recurred, so ativan was given and propofol bolus was administered, after which seizure activity seemed to cease. He was taken for CT after stabilization which showed no evidence of acute stroke or intracranial hemorrhage, and CTA showed no LVO. He is now being monitored on 24H continuous EEG for management of status epilepticus.    5/12/23:  Patient was seen and examined by me today.  He remains on deep sedation for treatment of status epilepticus, being monitored on 24 hour video EEG.  A brief electroclinical seizure was seen at around 4:00 a.m. with left facial twitching, but no clinical events reported after this.  Plan is to continue deep sedation until at least tomorrow morning.  Patient to be hooked up to EEG again when propofol starts to being weaned down.    5/13/23: I, Dr James assumed care on 5/13.  Patient is intubated, sedated with propofol.  He started out continuous EEG this morning shows right hemisphere lateralized periodic discharges with epileptiform potential.  No electrographic seizures were recorded.    5/14/2023:  Patient was seen examined by me.  Remains to be intubated and sedated.  MRI brain done this morning showed right MCA cortical infarcts.  Patient has been on continuous EEG overnight and EEG showed right hemispheric PLEDs with increase in frequency of PLEDs with lowering sedation.  Patient also had a clinical focal seizure involving twitching of the left side of the face this  morning when he was off sedation.    5/15/2023:  Patient was seen examined by me.  He is intubated and Precedex.  Propofol has been turned off yesterday.  Remains to be on continues EEG which shows right hemispheric (predominantly right frontotemporal) periodic lateralized epileptiform discharges.  Episodes of left facial twitching did not have EEG correlate.    5/16: Patient was seen and examined by me. He remains to be intubated and not on sedation. No response any stimulus. On CEEG. Family was at bedside and I discussed plan of care with them. Had one episode of electro clinical seizure yesterday at 12:20 PM lasting for a minute    5/17:  Patient was seen examined by me.  Remains to be intubated and off sedation.  Continuous EEG discontinued this morning. No electrographic seizure  recorded.    Scheduled Meds:   allopurinoL  100 mg Per NG tube Daily    amantadine HCL  100 mg Per NG tube BID    atorvastatin  80 mg Per NG tube Daily    dexAMETHasone  1 mg Per NG tube Q12H    lacosamide (VIMPAT) IVPB  200 mg Intravenous Q12H    levetiracetam IV  1,500 mg Intravenous Q12H    metoprolol tartrate  100 mg Per NG tube BID    pantoprazole  40 mg Intravenous Daily    rivaroxaban  20 mg Per NG tube Daily with dinner    sacubitriL-valsartan  1 tablet Per G Tube BID    timolol maleate 0.25%  1 drop Both Eyes BID    valproate sodium (DEPACON) IVPB  500 mg Intravenous Q8H     Continuous Infusions:      PRN Meds:.acetaminophen, colchicine, hydrALAZINE, magnesium sulfate IVPB, magnesium sulfate IVPB, metoprolol, ondansetron, polyethylene glycol, potassium bicarbonate, potassium bicarbonate, potassium bicarbonate, sodium chloride 0.9%, sodium phosphate IVPB, sodium phosphate IVPB, sodium phosphate IVPB      Physical Exam  Current Vitals:  Vitals:    05/17/23 0919   BP:    Pulse: 97   Resp: (!) 29   Temp:      General appearance: intubated, unresponsive  Cardiovascular: Afib  Pulmonary: on mechanical ventilation  GI: soft  MSK:  normal passive ROM  Skin: normal color, no lesions    NEUROLOGICAL EXAM:    Mental status: unresponsive, sedated, intubated          Intubated: yes          Sedated: no  Response to noxious stimulation: no, patient on deep sedation  Breathes above ventilator: yes  Opens eyes: no  Pupils: symmetric          Left: reactive          Right: reactive  Eye movements: No pathologic movements such as nystagmus, ocular bobbing, dipping or roving. No facial twitching seen.  Corneal reflex: present  Oculocephalic reflex:  Absent  Cough:  Weak  Gag:  Weak  Motor exam: Normal muscle tone. Normal muscle bulk. No abnormal movements such as tremors, myoclonus or twitching.  No response to painful stimuli, but patient is on deep sedation  DTRs: 1+ throughout. Babinski response absent.      Laboratory Data & Studies    Recent Labs   Lab 05/15/23  0308 05/16/23  0416 05/17/23  0403   WBC 6.65 7.46 7.54   HGB 16.1 16.9 16.4   * 139* 141*   MCV 92 91 92       Recent Labs   Lab 05/15/23  0308 05/16/23  0416 05/17/23  0403    144 143   K 3.4* 3.3* 3.4*    112* 110   CO2 29 24 26   BUN 29* 40* 40*   * 145* 137*   CALCIUM 8.0* 8.2* 7.9*   MG 2.1 2.3 2.2   PHOS 3.4 2.8 2.0*       Recent Labs   Lab 05/15/23  0308 05/16/23  0416 05/17/23  0403   PROT 5.6* 5.7* 5.6*   ALBUMIN 2.5* 2.5* 2.3*   BILITOT 1.2* 1.2* 1.0   AST 23 49* 48*   ALT 32 44 45*   ALKPHOS 41* 40* 41*       Recent Labs   Lab 05/11/23 0657   INR 1.3*       Recent Labs   Lab 05/11/23  0657 05/15/23  0308   HGBA1C  --  5.7   CHOL 248* 250*   TRIG 92 143   LDLCALC 165.6* 191.4*   HDL 64 30*   TSH 3.900  --          Microbiology:  Microbiology Results (last 7 days)       Procedure Component Value Units Date/Time    Culture, Respiratory with Gram Stain [[id number]] Collected: 05/15/23 0059    Order Status: Completed Specimen: Respiratory from Sputum Updated: 05/17/23 0738     Respiratory Culture Normal respiratory fredi     Gram Stain (Respiratory) <10  epithelial cells per low power field.     Gram Stain (Respiratory) Few WBC's     Gram Stain (Respiratory) Few Gram positive cocci    Culture, Respiratory with Gram Stain [[id number]]     Order Status: Canceled Specimen: Respiratory               Imaging:  CT Head Without Contrast    Result Date: 5/11/2023  CMS MANDATED QUALITY DATA - CT RADIATION  436 All CT scans at this facility utilize dose modulation, iterative reconstruction, and/or weight based dosing when appropriate to reduce radiation dose to as low as reasonably achievable. CT HEAD WITHOUT IV CONTRAST CLINICAL HISTORY: 67 years Male Mental status change, unknown cause COMPARISON: Noncontrast CT head performed earlier today 6:56 AM FINDINGS: Negative for acute intracranial hemorrhage, midline shift, or mass effect. Parenchymal calcifications within the right cerebral hemisphere are stable compared to prior. Patient is status post right pterional craniotomy. Ventricles and sulci are normal in size. Gray-white differentiation is maintained. Cerebellar hemispheres and brainstem are unremarkable. No calvarial lesion or fracture. Mastoid air cells are clear. IMPRESSION: Stable exam. No CT evidence of acute intracranial pathology. Electronically signed by:  Matt Cole MD  5/11/2023 1:41 PM CDT Workstation: [id number]    X-Ray Chest AP Portable    Result Date: 5/11/2023  CLINICAL HISTORY: 67 years (1955) Male Stroke TECHNIQUE: Portable AP radiograph the chest. COMPARISON: None available. FINDINGS: Nonspecific minimal faint interstitial opacity at the left costophrenic.  No pneumothorax is identified. The heart is mildly enlarged.  Osseous structures show degenerative changes in the spine. The visualized upper abdomen is unremarkable. IMPRESSION: Nonspecific minimal faint interstitial opacity at the left costophrenic sulcus possibly representing atelectasis, scarring, trace pleural fluid, and less likely aspiration or pneumonia. . Electronically signed by:   Solomon Tobias MD  5/11/2023 8:32 AM CDT Workstation: [id number]    CT HEAD FOR STROKE    Result Date: 5/11/2023  CMS MANDATED QUALITY DATA - CT RADIATION - 436 All CT scans at this facility utilize dose modulation, iterative reconstruction, and/or weight based dosing when appropriate to reduce radiation dose to as low as reasonably achievable. EXAMINATION: CT HEAD FOR STROKE CLINICAL HISTORY: Neuro deficit, acute, stroke suspected;  history of glioblastoma TECHNIQUE: CT without IV contrast COMPARISON: None FINDINGS: There are postsurgical changes from prior right frontal temporal These craniotomy.  The ventricles and sulci are mildly prominent compatible with generalized cerebral atrophy.  There is no hemorrhage, mass or midline shift.  There are no extra-axial fluid collections.  The cerebellum and brainstem are normal.  The gray-white differentiation is maintained.  The orbits are unremarkable. The paranasal sinuses and mastoid air cells are clear.     Mild cerebral atrophy with no acute intracranial process Prior right frontal temporal craniotomy Findings were called to Dr. Zhou in the emergency department at 07:09 a.m. Electronically signed by: Yi Hernandez MD Date:    05/11/2023 Time:    07:10    CTA Head and Neck (xpd)    Result Date: 5/11/2023  CMS MANDATED QUALITY DATA - CT RADIATION - 436 All CT scans at this facility utilize dose modulation, iterative reconstruction, and/or weight based dosing when appropriate to reduce radiation dose to as low as reasonably achievable. CMS MANDATED QUALITY DATA - CAROTID - 195 All measurements and percent stenosis described below were determined using NASCET criteria or criteria similar to NASCET, as defined by the Society of Radiologists in Ultrasound Consensus Conference, Radiology, 2003 Reason: Stroke/TIA, determine embolic source Technique: CT angiography of brain and neck with 100 mL Omnipaque 350.  Maximum intensity projection coronal reformations were  obtained at a separate workstation and stored in the patient's permanent medical record. COMPARISON: 5/11/2023 CTA BRAIN: VASCULAR FINDINGS: Major intracranial arteries are widely patent with no stenosis, intraluminal filling, or dissection. Minimal atherosclerotic plaque affects the cavernous segments of bilateral internal carotid arteries. Negative for aneurysm or evidence of vasculitis. Opacified visualized dural venous sinuses are unremarkable. NONVASCULAR FINDINGS: No abnormal intra-axial or extra-axial enhancement. No midline shift. Postsurgical changes of right pterional craniotomy are noted. 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 Minimal atherosclerotic plaque affects left carotid bulb. Left carotid arteries are otherwise without plaque or stenosis. Left vertebral artery is patent. Minimal atherosclerotic plaque affects the right carotid bulb. Right carotid arteries are without additional plaque and remain widely patent. Right vertebral artery is patent. NONVASCULAR FINDINGS: Endotracheal tube has tip proximal to mio. Visualized lung apices show minor dependent atelectasis. Cervical soft tissues are unremarkable. IMPRESSION: 1. Trace atherosclerotic plaque in intracranial internal carotid arteries, without other abnormality of the major intracranial arteries. 2. Trace atherosclerotic plaque in bilateral carotid bulbs, with no stenosis or other abnormality of cervical ICAs or vertebral arteries. Electronically signed by:  Compa Lopez MD  5/11/2023 2:01 PM CDT Workstation: [id number]    CTA Head and Neck (xpd)    Result Date: 5/11/2023  CMS EXAMINATION: CTA HEAD AND NECK (XPD) CLINICAL INDICATION: Male, 67 years old. Stroke/TIA, determine embolic source history of glioblastoma TECHNIQUE: Axial CT angiogram of the head and neck was performed with contrast. Multiplanar reformations as well as 3-D volume rendered  imaging were reviewed at the workstation. Degree of stenosis was measured utilizing the NASCET method. CONTRAST: 100 mL mL of Omnipaque 350 IV. COMPARISON: None FINDINGS: CTA brain: The distal vertebral arteries, basilar artery and cavernous portions of the internal carotid arteries are widely patent. The anterior cerebral arteries, middle cerebral arteries and posterior cerebral arteries are widely patent without evidence of large vessel occlusion, significant stenosis, AVM or aneurysm. The dural venous sinuses are patent. There is no pathologic enhancement. Aortic Arch and Great Vessel Origins: 2 vessel aortic arch which is widely patent Subclavian Arteries: Widely patent. Right Common Carotid Artery: Widely patent. Right Internal Carotid Artery: Widely patent. Right External Carotid Artery: Widely patent. Left Common Carotid Artery: Widely patent. Left Internal Carotid Artery: Widely patent. Left External Carotid Artery: Widely patent. Right Vertebral Artery: Widely patent. Left Vertebral Artery: Widely patent. The paraspinous soft tissues are unremarkable. There are mild degenerative changes of the cervical spine. The lung apices are clear. IMPRESSION: Negative CTA of the head and neck. This exam was performed according to our departmental dose-optimization program which includes automated exposure control, adjustment of the mA and/or kV according to patient size and/or use of iterative reconstruction technique. Electronically signed by:  Yi Hernandez MD  5/11/2023 7:43 AM CDT Workstation: [id number]        Assessment and Plan:    Status Epilepticus  History of Glioblastoma Multiforme s/p craniectomy, chemo and radiotherapy  Acute ischemic infarct   Atrial fibrillation       67-year-old man with history of glioblastoma multiforme s/p craniectomy, chemotherapy and radiation diagnosed in August 2022, as well as seizure disorder, who presented to the ED with left facial twitching, left facial droop, slurred  speech and left arm weakness. Upon arrival to the ED, he was loaded with 1 gr of Keppra and given 2 mg of ativan IV, which seemed to break the seizure. However, he experienced sudden worsening of mental status, with unresponsiveness, weakness and aphasia, so he was taken to CT scan for repeat CT brain to rule out LVO stroke or hemorrhage. He was then intubated, sedated and placed on mechanical ventilation. Clinical picture more consistent with status epilepticus.      - Admitted to hospital medicine in the ICU with q2 hour neuro checks, on telemetry, continuous pulse oximetry  - Seizure precautions while inpatient  - initially underwent 24-hour VEEG continuous monitoring with evidence of 4 electrographic seizures which resolved when dose of propofol was increased to 60 mcg/kg/min. By the end of the recording, a burst suppression pattern with decreased LPEDs was seen.   -on 5/13 patient was again put on continues EEG monitoring while weaning sedation.  Initially EEG showed burst suppression pattern with bursts of 1-2 seconds of delta activity followed by 2-4 seconds of background suppression and also showed right hemispheric lateralized periodic epileptiform discharges.  Background changed to a mixture of delta and theta activity with PLEDs becoming more frequent with lowering sedation to 30mcg/kg/min.  He was kept at this dose of propofol overnight.  -on 05/14, loaded patient loaded with Vimpat 300 mg and started him on 100 mg b.i.d. given persistence of periodic epileptiform discharge on the right hemisphere and also focal clinical seizure with left facial twitching noticed while being off sedation.  - 5/15:  Continuous EEG shows persistent PLEDs on the right hemisphere.  Multiple episodes of left facial twitching without any correlate/change the EEG.  Increase Vimpat to 200 mg b.i.d. due to persistence of PLEDs.   - Was started on Klonopin 0.5 mg t.i.d. for facial twitching.  Facial twitching could possibly be  focal seizures not being captured the surface EEG versus twitching of unknown etiology.   -5/16- EEG over the past 24 hours showed 1 episode of electroclinical seizures with left facial twitching.  Multiple further episodes of facial twitching without EEG correlate.  -Given patient has not been waking up, will stop Klonopin and start him on amantadine 100 mg b.i.d. Will continue video EEG to monitor for seizures and monitor mental status.  -5/17- no seizures on EEG overnight.  EEG discontinued this morning.  No change in exam.  -no significant improvement and neuro exam despite being off sedation for more than 48 hours.  I discussed prognosis with the family.  Will get palliative team involved.  - continue Keppra 1500 mg BID IV  - Continue Depacon 500 mg TID IV. Valproic acid levels therapeutic.  - MRI brain showed right hemispheric infarcts.  Etiology could likely be secondary to recent radiation versus cardioembolism in the setting of atrial fibrillation. Continue anticoagulation with Xarelto 20 mg daily  - Avoid seizure threshold lowering medications such as:  Bupropion, diphenhydramine, tramadol, certain antibiotics  - Maintain Euthermia with Tylenol prn temp > 37.2 degrees C.  - Assessment for rehab with PT/OT/SLP evaluation and treatment.  - workup and treatment of metabolic and infectious abnormalities as per primary team  - Will follow along      DVT prophylaxis with chemo/SCD prophylaxis      Patient to follow up with [facility] at [phone] within 3 days from discharge.        All questions were answered.                              Thank you kindly for including us in the care of this patient. Please do not hesitate to contact us with any questions.       Critical Care:  82 minutes of critical care time has been spent evaluating with the patient. Time includes chart review not limited to diagnostic imaging, labs, and vitals, patient assessment, discussion with family and nursing, current  order evaluations, and new order entries.      Mukund James MD  Neurology/Vascular Neurology

## 2023-05-18 LAB
ALBUMIN SERPL BCP-MCNC: 2.1 G/DL (ref 3.5–5.2)
ALLENS TEST: ABNORMAL
ALP SERPL-CCNC: 40 U/L (ref 55–135)
ALT SERPL W/O P-5'-P-CCNC: 50 U/L (ref 10–44)
ANION GAP SERPL CALC-SCNC: 7 MMOL/L (ref 8–16)
AST SERPL-CCNC: 61 U/L (ref 10–40)
BASOPHILS # BLD AUTO: 0.02 K/UL (ref 0–0.2)
BASOPHILS NFR BLD: 0.3 % (ref 0–1.9)
BILIRUB SERPL-MCNC: 1 MG/DL (ref 0.1–1)
BUN SERPL-MCNC: 46 MG/DL (ref 8–23)
CALCIUM SERPL-MCNC: 7.6 MG/DL (ref 8.7–10.5)
CHLORIDE SERPL-SCNC: 110 MMOL/L (ref 95–110)
CO2 SERPL-SCNC: 27 MMOL/L (ref 23–29)
CREAT SERPL-MCNC: 1 MG/DL (ref 0.5–1.4)
DELSYS: ABNORMAL
DIFFERENTIAL METHOD: ABNORMAL
EOSINOPHIL # BLD AUTO: 0 K/UL (ref 0–0.5)
EOSINOPHIL NFR BLD: 0.2 % (ref 0–8)
ERYTHROCYTE [DISTWIDTH] IN BLOOD BY AUTOMATED COUNT: 14.7 % (ref 11.5–14.5)
EST. GFR  (NO RACE VARIABLE): >60 ML/MIN/1.73 M^2
FIO2: 28
GLUCOSE SERPL-MCNC: 123 MG/DL (ref 70–110)
HCO3 UR-SCNC: 26.2 MMOL/L (ref 24–28)
HCT VFR BLD AUTO: 46.5 % (ref 40–54)
HGB BLD-MCNC: 15.1 G/DL (ref 14–18)
IMM GRANULOCYTES # BLD AUTO: 0.06 K/UL (ref 0–0.04)
IMM GRANULOCYTES NFR BLD AUTO: 1 % (ref 0–0.5)
LYMPHOCYTES # BLD AUTO: 0.7 K/UL (ref 1–4.8)
LYMPHOCYTES NFR BLD: 11.1 % (ref 18–48)
MAGNESIUM SERPL-MCNC: 2.1 MG/DL (ref 1.6–2.6)
MCH RBC QN AUTO: 30.8 PG (ref 27–31)
MCHC RBC AUTO-ENTMCNC: 32.5 G/DL (ref 32–36)
MCV RBC AUTO: 95 FL (ref 82–98)
MODE: ABNORMAL
MONOCYTES # BLD AUTO: 1 K/UL (ref 0.3–1)
MONOCYTES NFR BLD: 15.4 % (ref 4–15)
NEUTROPHILS # BLD AUTO: 4.5 K/UL (ref 1.8–7.7)
NEUTROPHILS NFR BLD: 72 % (ref 38–73)
NRBC BLD-RTO: 0 /100 WBC
PCO2 BLDA: 33.7 MMHG (ref 35–45)
PEEP: 5
PH SMN: 7.5 [PH] (ref 7.35–7.45)
PHOSPHATE SERPL-MCNC: 3 MG/DL (ref 2.7–4.5)
PLATELET # BLD AUTO: 123 K/UL (ref 150–450)
PMV BLD AUTO: 11 FL (ref 9.2–12.9)
PO2 BLDA: 85 MMHG (ref 80–100)
POC BE: 3 MMOL/L
POC SATURATED O2: 97 % (ref 95–100)
POC TCO2: 27 MMOL/L (ref 23–27)
POTASSIUM SERPL-SCNC: 3.6 MMOL/L (ref 3.5–5.1)
PROT SERPL-MCNC: 5.1 G/DL (ref 6–8.4)
PS: 10
RBC # BLD AUTO: 4.91 M/UL (ref 4.6–6.2)
SAMPLE: ABNORMAL
SITE: ABNORMAL
SODIUM SERPL-SCNC: 144 MMOL/L (ref 136–145)
WBC # BLD AUTO: 6.23 K/UL (ref 3.9–12.7)

## 2023-05-18 PROCEDURE — 63600175 PHARM REV CODE 636 W HCPCS: Performed by: STUDENT IN AN ORGANIZED HEALTH CARE EDUCATION/TRAINING PROGRAM

## 2023-05-18 PROCEDURE — 99223 1ST HOSP IP/OBS HIGH 75: CPT | Mod: ,,, | Performed by: INTERNAL MEDICINE

## 2023-05-18 PROCEDURE — 86022 PLATELET ANTIBODIES: CPT | Performed by: INTERNAL MEDICINE

## 2023-05-18 PROCEDURE — 80053 COMPREHEN METABOLIC PANEL: CPT | Performed by: STUDENT IN AN ORGANIZED HEALTH CARE EDUCATION/TRAINING PROGRAM

## 2023-05-18 PROCEDURE — 20000000 HC ICU ROOM

## 2023-05-18 PROCEDURE — 94003 VENT MGMT INPAT SUBQ DAY: CPT

## 2023-05-18 PROCEDURE — 84100 ASSAY OF PHOSPHORUS: CPT | Performed by: STUDENT IN AN ORGANIZED HEALTH CARE EDUCATION/TRAINING PROGRAM

## 2023-05-18 PROCEDURE — 82803 BLOOD GASES ANY COMBINATION: CPT

## 2023-05-18 PROCEDURE — 25000003 PHARM REV CODE 250: Performed by: STUDENT IN AN ORGANIZED HEALTH CARE EDUCATION/TRAINING PROGRAM

## 2023-05-18 PROCEDURE — 94799 UNLISTED PULMONARY SVC/PX: CPT

## 2023-05-18 PROCEDURE — 25000003 PHARM REV CODE 250: Performed by: INTERNAL MEDICINE

## 2023-05-18 PROCEDURE — 99900035 HC TECH TIME PER 15 MIN (STAT)

## 2023-05-18 PROCEDURE — 85025 COMPLETE CBC W/AUTO DIFF WBC: CPT | Performed by: STUDENT IN AN ORGANIZED HEALTH CARE EDUCATION/TRAINING PROGRAM

## 2023-05-18 PROCEDURE — C9113 INJ PANTOPRAZOLE SODIUM, VIA: HCPCS | Performed by: STUDENT IN AN ORGANIZED HEALTH CARE EDUCATION/TRAINING PROGRAM

## 2023-05-18 PROCEDURE — 99223 PR INITIAL HOSPITAL CARE,LEVL III: ICD-10-PCS | Mod: ,,, | Performed by: INTERNAL MEDICINE

## 2023-05-18 PROCEDURE — 83735 ASSAY OF MAGNESIUM: CPT | Performed by: STUDENT IN AN ORGANIZED HEALTH CARE EDUCATION/TRAINING PROGRAM

## 2023-05-18 PROCEDURE — 99291 CRITICAL CARE FIRST HOUR: CPT | Mod: ,,, | Performed by: INTERNAL MEDICINE

## 2023-05-18 PROCEDURE — 99232 SBSQ HOSP IP/OBS MODERATE 35: CPT | Mod: ,,, | Performed by: INTERNAL MEDICINE

## 2023-05-18 PROCEDURE — 99232 PR SUBSEQUENT HOSPITAL CARE,LEVL II: ICD-10-PCS | Mod: ,,, | Performed by: INTERNAL MEDICINE

## 2023-05-18 PROCEDURE — 94761 N-INVAS EAR/PLS OXIMETRY MLT: CPT

## 2023-05-18 PROCEDURE — 99900026 HC AIRWAY MAINTENANCE (STAT)

## 2023-05-18 PROCEDURE — 37799 UNLISTED PX VASCULAR SURGERY: CPT

## 2023-05-18 PROCEDURE — 99900031 HC PATIENT EDUCATION (STAT)

## 2023-05-18 PROCEDURE — 99291 PR CRITICAL CARE, E/M 30-74 MINUTES: ICD-10-PCS | Mod: ,,, | Performed by: INTERNAL MEDICINE

## 2023-05-18 PROCEDURE — C9254 INJECTION, LACOSAMIDE: HCPCS | Performed by: STUDENT IN AN ORGANIZED HEALTH CARE EDUCATION/TRAINING PROGRAM

## 2023-05-18 PROCEDURE — 63600175 PHARM REV CODE 636 W HCPCS: Performed by: HOSPITALIST

## 2023-05-18 PROCEDURE — 27000221 HC OXYGEN, UP TO 24 HOURS

## 2023-05-18 RX ORDER — DILTIAZEM HYDROCHLORIDE 30 MG/1
30 TABLET, FILM COATED ORAL EVERY 6 HOURS
Status: DISCONTINUED | OUTPATIENT
Start: 2023-05-18 | End: 2023-05-19

## 2023-05-18 RX ADMIN — SODIUM CHLORIDE 200 MG: 9 INJECTION, SOLUTION INTRAVENOUS at 07:05

## 2023-05-18 RX ADMIN — DEXAMETHASONE 1 MG: 1 TABLET ORAL at 09:05

## 2023-05-18 RX ADMIN — AMANTADINE 100 MG: 100 CAPSULE ORAL at 08:05

## 2023-05-18 RX ADMIN — HYDRALAZINE HYDROCHLORIDE 10 MG: 20 INJECTION INTRAMUSCULAR; INTRAVENOUS at 05:05

## 2023-05-18 RX ADMIN — PANTOPRAZOLE SODIUM 40 MG: 40 INJECTION, POWDER, FOR SOLUTION INTRAVENOUS at 09:05

## 2023-05-18 RX ADMIN — SODIUM CHLORIDE 200 MG: 9 INJECTION, SOLUTION INTRAVENOUS at 06:05

## 2023-05-18 RX ADMIN — AMANTADINE 100 MG: 100 CAPSULE ORAL at 09:05

## 2023-05-18 RX ADMIN — METOPROLOL TARTRATE 100 MG: 50 TABLET, FILM COATED ORAL at 08:05

## 2023-05-18 RX ADMIN — SACUBITRIL AND VALSARTAN 1 TABLET: 24; 26 TABLET, FILM COATED ORAL at 09:05

## 2023-05-18 RX ADMIN — DEXTROSE 500 MG: 50 INJECTION, SOLUTION INTRAVENOUS at 05:05

## 2023-05-18 RX ADMIN — LEVETIRACETAM INJECTION 1500 MG: 15 INJECTION INTRAVENOUS at 08:05

## 2023-05-18 RX ADMIN — TIMOLOL MALEATE 1 DROP: 2.5 SOLUTION/ DROPS OPHTHALMIC at 09:05

## 2023-05-18 RX ADMIN — TIMOLOL MALEATE 1 DROP: 2.5 SOLUTION/ DROPS OPHTHALMIC at 08:05

## 2023-05-18 RX ADMIN — RIVAROXABAN 20 MG: 20 TABLET, FILM COATED ORAL at 04:05

## 2023-05-18 RX ADMIN — LEVETIRACETAM INJECTION 1500 MG: 15 INJECTION INTRAVENOUS at 09:05

## 2023-05-18 RX ADMIN — DILTIAZEM HYDROCHLORIDE 30 MG: 30 TABLET, FILM COATED ORAL at 05:05

## 2023-05-18 RX ADMIN — DEXTROSE 500 MG: 50 INJECTION, SOLUTION INTRAVENOUS at 09:05

## 2023-05-18 RX ADMIN — METOPROLOL TARTRATE 100 MG: 50 TABLET, FILM COATED ORAL at 09:05

## 2023-05-18 RX ADMIN — ALLOPURINOL 100 MG: 100 TABLET ORAL at 08:05

## 2023-05-18 RX ADMIN — DEXTROSE 500 MG: 50 INJECTION, SOLUTION INTRAVENOUS at 04:05

## 2023-05-18 RX ADMIN — POTASSIUM BICARBONATE 35 MEQ: 391 TABLET, EFFERVESCENT ORAL at 05:05

## 2023-05-18 RX ADMIN — ATORVASTATIN CALCIUM 80 MG: 40 TABLET, FILM COATED ORAL at 08:05

## 2023-05-18 RX ADMIN — DEXAMETHASONE 1 MG: 1 TABLET ORAL at 08:05

## 2023-05-18 RX ADMIN — POTASSIUM BICARBONATE 35 MEQ: 391 TABLET, EFFERVESCENT ORAL at 08:05

## 2023-05-18 NOTE — HPI
67-year-old male with multiple medical problems significant for glioblastoma status post resection, seizure disorder, and atrial fibrillation on anticoagulation.  He initially presented with facial twitching, left arm weakness, and change in speech.  He is currently admitted being treated for status epilepticus.  Course has been complicated by respiratory failure requiring intubation.  MRI brain history right MCA infarcts.  Additionally, TTE has revealed heart failure with reduced ejection fraction.  He has been evaluated by Neurology, pulmonology, and Cardiology.  At this point there was concern that his current condition is his new baseline, which is a completely dependent state.  At this point, his prognosis appears to be very grim.  I have been asked to assist with goals of care.

## 2023-05-18 NOTE — PLAN OF CARE
Problem: Adult Inpatient Plan of Care  Goal: Plan of Care Review  Outcome: Ongoing, Progressing  Goal: Patient-Specific Goal (Individualized)  Outcome: Ongoing, Progressing  Goal: Absence of Hospital-Acquired Illness or Injury  Outcome: Ongoing, Progressing  Goal: Optimal Comfort and Wellbeing  Outcome: Ongoing, Progressing  Goal: Readiness for Transition of Care  Outcome: Ongoing, Progressing     Problem: Fall Injury Risk  Goal: Absence of Fall and Fall-Related Injury  Outcome: Ongoing, Progressing     Problem: Restraint, Nonbehavioral (Nonviolent)  Goal: Absence of Harm or Injury  Outcome: Ongoing, Progressing     Problem: Communication Impairment (Mechanical Ventilation, Invasive)  Goal: Effective Communication  Outcome: Ongoing, Progressing     Problem: Device-Related Complication Risk (Mechanical Ventilation, Invasive)  Goal: Optimal Device Function  Outcome: Ongoing, Progressing     Problem: Inability to Wean (Mechanical Ventilation, Invasive)  Goal: Mechanical Ventilation Liberation  Outcome: Ongoing, Progressing     Problem: Nutrition Impairment (Mechanical Ventilation, Invasive)  Goal: Optimal Nutrition Delivery  Outcome: Ongoing, Progressing     Problem: Skin and Tissue Injury (Mechanical Ventilation, Invasive)  Goal: Absence of Device-Related Skin and Tissue Injury  Outcome: Ongoing, Progressing     Problem: Ventilator-Induced Lung Injury (Mechanical Ventilation, Invasive)  Goal: Absence of Ventilator-Induced Lung Injury  Outcome: Ongoing, Progressing     Problem: Communication Impairment (Artificial Airway)  Goal: Effective Communication  Outcome: Ongoing, Progressing     Problem: Device-Related Complication Risk (Artificial Airway)  Goal: Optimal Device Function  Outcome: Ongoing, Progressing     Problem: Skin and Tissue Injury (Artificial Airway)  Goal: Absence of Device-Related Skin or Tissue Injury  Outcome: Ongoing, Progressing     Problem: Noninvasive Ventilation Acute  Goal: Effective Unassisted  Ventilation and Oxygenation  Outcome: Ongoing, Progressing     Problem: Infection  Goal: Absence of Infection Signs and Symptoms  Outcome: Ongoing, Progressing     Problem: Aspiration (Enteral Nutrition)  Goal: Absence of Aspiration Signs and Symptoms  Outcome: Ongoing, Progressing     Problem: Device-Related Complication Risk (Enteral Nutrition)  Goal: Safe, Effective Therapy Delivery  Outcome: Ongoing, Progressing     Problem: Feeding Intolerance (Enteral Nutrition)  Goal: Feeding Tolerance  Outcome: Ongoing, Progressing     Problem: Skin Injury Risk Increased  Goal: Skin Health and Integrity  Outcome: Ongoing, Progressing     Problem: Coping Ineffective  Goal: Effective Coping  Outcome: Ongoing, Progressing

## 2023-05-18 NOTE — PROGRESS NOTES
[facility]  Progress Note    Patient Name: Teto Mendez  MRN: [id number]  Patient Class: IP- Inpatient   Admission Date: 5/11/2023  Length of Stay: 7 days  Attending Physician: Neo Clark MD  Primary Care Provider: Primary Doctor No        Subjective:     Principal Problem:Status epilepticus        HPI:  Teto Mendez is a 67 y.o. White male with known history of  glioblastoma s/p resection, seizures p.afib on xarelto who presented to ER this morning with left arm weakness, facial twitching and change in his speech that he suspected was seizure activity.  He took an extra keppra and presented to ER around 6:30-7 am.  Head CT/CTA in ER without hemorrhage or LVO and he was not a candidate for tPA regardless.  He was given lorazepam and keppra with positive response, however, while still in ER he became less responsive and repeat stat CT was ordered.  While in CT, he became hypoxic with depressed respiratory status and required bag-mask ventilation and emergent intubation on return to ICU.  History was obtained from the ER physician Sign-out.      Overview/Hospital Course:  Teto Mendez is a 67 year old male with a past medical history of glioblastoma s/p craniotomy complicated by seizures, obesity, HTN, AFib and GERD who presented with status epilepticus. Neurology has been consulted. The patient was intubated on propofol sedation with Keppra and valproic acid. Pulmonary has been consulted. Continuous EEG is ordered and still running as the patient continues to have PLEDs. MRI brain shows R MCA region infarcts consistent with acute CVA. His course has been complicated by Afib with RVR as well as a HFrEF seen on TTE which appears to be chronic. Cardiology has been consulted. He is on metoprolol and Xarelto with PRN diuresis. Entresto and high dose atorvastatin was also added. EEG continues to shows periodic lateralized epileptiform discharges (PLEDs) as of 5/14. Sedation with  "propofol and Precedex was stopped 5/15. Vimpat was also added 5/15 by Neurology. Unfortunately, off sedation the patient's GCS is three. He has been monitored for improvement in his neurologic status for > 72 hours with little, if any progress observed. Neurology has consulted Palliative Care 5/17 for assistance with end of life care.      Interval History: see "Hospital Course"    Review of Systems   Unable to perform ROS: Patient unresponsive   Objective:     Vital Signs (Most Recent):  Temp: 99.5 °F (37.5 °C) (05/18/23 0319)  Pulse: 98 (05/18/23 0721)  Resp: (!) 28 (05/18/23 0721)  BP: 113/67 (05/18/23 0600)  SpO2: 98 % (05/18/23 0721) Vital Signs (24h Range):  Temp:  [97.9 °F (36.6 °C)-99.5 °F (37.5 °C)] 99.5 °F (37.5 °C)  Pulse:  [] 98  Resp:  [16-34] 28  SpO2:  [95 %-100 %] 98 %  BP: ()/(58-90) 113/67  Arterial Line BP: (107-165)/() 135/76     Weight: 92.4 kg (203 lb 11.3 oz)  Body mass index is 30.08 kg/m².    Intake/Output Summary (Last 24 hours) at 5/18/2023 0730  Last data filed at 5/18/2023 0650  Gross per 24 hour   Intake 2889.58 ml   Output 1400 ml   Net 1489.58 ml         Physical Exam  Vitals and nursing note reviewed.   Constitutional:       Appearance: He is ill-appearing.   HENT:      Head: Normocephalic and atraumatic.      Right Ear: External ear normal.      Left Ear: External ear normal.      Nose: Nose normal.      Comments: NG tube.     Mouth/Throat:      Mouth: Mucous membranes are moist.      Pharynx: Oropharynx is clear.      Comments: ETT.  Cardiovascular:      Rate and Rhythm: Normal rate. Rhythm irregular.      Pulses: Normal pulses.      Heart sounds: Normal heart sounds.   Pulmonary:      Effort: Pulmonary effort is normal.      Breath sounds: Normal breath sounds.   Abdominal:      General: Bowel sounds are normal.      Palpations: Abdomen is soft.   Genitourinary:     Comments: Iraheta.  Musculoskeletal:      Right lower leg: Edema present.      Left lower leg: Edema " present.   Skin:     General: Skin is warm and dry.   Neurological:      Comments: GCS 3.           Significant Labs: All pertinent labs within the past 24 hours have been reviewed.    Significant Imaging: I have reviewed all pertinent imaging results/findings within the past 24 hours.      Assessment/Plan:      * Status epilepticus  In setting of glioblastoma. MRI brain also shows R MCA region infarcts. GCS 3 off sedation.  -Continue Depacon and Keppra  -Vimpat added 5/14  -Amantadine  -Neurology following  -Continuous EEG  -Continue intubation with mechanical ventilation  -Palliative Care consulted    Acute CVA (cerebrovascular accident)    Antithrombotics for secondary stroke prevention: Anticoagulants: Rivaroxaban 20 mg daily    Statins for secondary stroke prevention and hyperlipidemia, if present:   Statins: Atorvastatin- 80 mg daily    Aggressive risk factor modification: HTN, HLD, Obesity, A-Fib     Rehab efforts: The patient has been evaluated by a stroke team provider and the therapy needs have been fully considered based off the presenting complaints and exam findings. The following therapy evaluations are needed: None    Diagnostics ordered/pending: CTA Neck/Arch to assess vasculature, Lipid Profile to assess cholesterol levels, MRI head without contrast to assess brain parenchyma, TTE to assess cardiac function/status     VTE prophylaxis: None: Reason for No Pharmacological VTE Prophylaxis: Currently on anticoagulation    BP parameters: Infarct: No intervention, SBP <220        Glioblastoma  Chronic. Receives care in [place].  -Continue Decadron  -Palliative Care consulted    Acute hypoxemic respiratory failure  In setting of status epilepticus.  -Continuous EEG  -Continue mechanical ventilation  -Pulmonary consulted  -ABG PRN  -Palliative Care consulted      HFrEF (heart failure with reduced ejection fraction)  Unclear chronicity. BNP unremarkable.  -Cardiology following  -Metoprolol  -Lasix  PRN  -Telemetry  -Entresto      GERD (gastroesophageal reflux disease)  -PPI      Gout  -Continue home allopurinol      Obesity  Body mass index is 30.08 kg/m². Morbid obesity complicates all aspects of disease management from diagnostic modalities to treatment.         Hypokalemia  -Replete PRN  -Telemetry  -Trend K        Paroxysmal atrial fibrillation  -Xarelto  -Metoprolol  -Telemetry  -Cardiology consulted        Essential hypertension  -Metoprolol  -Entresto  -Continue to monitor      VTE Risk Mitigation (From admission, onward)         Ordered     rivaroxaban tablet 20 mg  With dinner         05/13/23 0902     Reason for No Pharmacological VTE Prophylaxis  Once        Question:  Reasons:  Answer:  Already adequately anticoagulated on oral Anticoagulants    05/11/23 1054     IP VTE HIGH RISK PATIENT  Once         05/11/23 1054     Place sequential compression device  Until discontinued         05/11/23 1054                Discharge Planning   BARBARA: 5/22/2023     Code Status: Full Code   Is the patient medically ready for discharge?:     Reason for patient still in hospital (select all that apply): Patient trending condition, Consult recommendations and Pending disposition  Discharge Plan A:  (TBD)                  Neo Clark MD  Department of Hospital Medicine   Formerly [facility]

## 2023-05-18 NOTE — PLAN OF CARE
05/17/23 1958   Patient Assessment/Suction   Level of Consciousness (AVPU) unresponsive   Respiratory Effort Unlabored   All Lung Fields Breath Sounds coarse   Suction Method oral;tracheal   $ Suction Charges Inline Suction Procedure Stat Charge   Secretions Amount small   Secretions Color yellow   Secretions Characteristics thick   PRE-TX-O2   Device (Oxygen Therapy) ventilator   Oxygen Concentration (%) 30   SpO2 97 %   Pulse Oximetry Type Continuous   $ Pulse Oximetry - Multiple Charge Pulse Oximetry - Multiple   Pulse 82   Resp (!) 23   Positioning   Head of Bed (HOB) Positioning HOB at 30-45 degrees      Airway Anesthesia 05/11/23   Placement Date/Time: 05/11/23 (c) 6319   Method of Intubation: Video Laryngoscopy  Airway Device: Endotracheal Tube  Mask Ventilation: Moderately difficult with oral airway  Intubated: Other (see comments)  Blade: Glidescope #3  Airway Device Size: 8....   Secured at 24 cm   Measured At Lips   Secured Location Center   Secured by Commercial tube barboza   Bite Block none   Site Condition Cool;Dry   Status Intact;Secured;Patent   Cuff Pressure   (MLT)   Vent Select   Conventional Vent Y   Charged w/in last 24h YES   Preset Conventional Ventilator Settings   Vent Type    Ventilation Type VC   Vent Mode Spont   Humidity HME   PEEP/CPAP 5 cmH20   Pressure Support 10 cmH20   Peak End Inspiratory Pressure 15 cmH20   Insp Rise Time  70 %   I-Trigger Type  V-TRIG   Trigger Sensitivity Flow/I-Trigger 3 L/min   Patient Ventilator Parameters   Resp Rate Total 23 br/min   Peak Airway Pressure 15 cmH20   Mean Airway Pressure 8.5 cmH20   Plateau Pressure 0 cmH20   Exhaled Vt 391 mL   Total Ve 8.86 L/m   Spont Ve 8.86 L   I:E Ratio Measured 1:2.20   Auto PEEP 0 cmH20   Inspired Tidal Volume (VTI) 0 mL   Conventional Ventilator Alarms   Alarms On Y   Resp Rate High Alarm 40 br/min   Press High Alarm 45 cmH2O   Apnea Rate 12   Apnea Volume (mL) 1 mL   Apnea Oxygen Concentration  100   Apnea  Flow Rate (L/min) 65   T Apnea 20 sec(s)   Ready to Wean/Extubation Screen   FIO2<=50 (chart decimal) 0.3   MV<16L (chart vol.) 8.86   PEEP <=8 (chart #) 5   Ready to Wean Parameters   F/VT Ratio<105 (RSBI) (!) 58.82   Vital Capacity   Vital Capacity (mL) 0   Respiratory Evaluation   $ Care Plan Tech Time 15 min

## 2023-05-18 NOTE — PROGRESS NOTES
Pulmonary/Critical Care Progress Note      PATIENT NAME: Teto Mendez  MRN: [id number]  TODAY'S DATE: 2023  12:50 PM  ADMIT DATE: 2023  AGE: 67 y.o. : 1955      HPI:  Called by nurse because they could not reach anesthesia to intubate a patient who was being bagged.  The patient had a change in mental status in the emergency room after having had a CTA earlier in the morning which was negative.  In CT, the patient was not breathing effectively and was cyanotic and the scanner broke so the patient was brought up to the ICU emergently.  The patient's past medical history is significant for glioblastoma multiforme for which he has been receiving radiation and chemotherapy.  He also has had a right frontal craniotomy.  At 3:30 a.m. in the morning the patient was playing a game and noted twitching of his face with slurred speech and weakness to his left arm.  He took an extra Keppra at that time.  He arrived at the ER at 6:45 a.m..     the patient remains on the ventilator and sedated with 60 of propofol.  He is also on 5 Cardizem.  Had further seizure activity during the night.    - pt remains sedated, on vent, on continuous EEG. There is EEG evidence of seizure activity so neuro has ordered VPA. Propofol has been weaned to 30    - continues sedated, on vent. Mri was done today and shows infarct in R parietal and R temporal lobes. Continues on propofol 20mg. He did have seizure activity twice today    5/15 The patient is still on the vent.  He is on Precedex, the propofol is off.  He was still having seizure activity yesterday.  MRI shows parietal and temporal infarcts on the right.     the patient is been off all sedation for 24 hours and has a GCS of 3.  Has pupils and corneals and gag and breathes spontaneously but no response to pain.  He does not respond to his voice.  He follows no commands.     the patient remains unresponsive.  He partially opens his eyes to noxious  stimuli.  He follows no commands.  Dr. Whitfield wants to give him 72 hours from stopping the sedation to reassess his neurologic recovery.  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    5/18 the patient remains densely encephalopathic.  To vigorous tactile stimulation he will partially open his eyes.  The nurse swears that he squeezed with his right hand.    REVIEW OF SYSTEMS  Unobtainable    No change in the patient's Past Medical History, Past Surgical History, Social History or Family History since admission.      VITAL SIGNS (MOST RECENT)  Temp: 99.5 °F (37.5 °C) (05/18/23 0319)  Pulse: 109 (05/18/23 0959)  Resp: (!) 28 (05/18/23 0959)  BP: 119/72 (05/18/23 0857)  SpO2: 96 % (05/18/23 0959)    INTAKE AND OUTPUT (LAST 24 HOURS):  Intake/Output Summary (Last 24 hours) at 5/18/2023 1028  Last data filed at 5/18/2023 0650  Gross per 24 hour   Intake 2575 ml   Output 1250 ml   Net 1325 ml       WEIGHT  Wt Readings from Last 1 Encounters:   05/13/23 92.4 kg (203 lb 11.3 oz)       PHYSICAL EXAM  GENERAL: Older patient, intubated, unresponsive  HEENT: Pupils equal and reactive.  Corneals are intact.  Nose intact. Pharynx intubated with ET tube and OG tube. EEG leads over scalp.  Gag reflex is intact.  NECK: Supple.   HEART: Regular rate and rhythm. No murmur or gallop auscultated.  LUNGS: Clear to auscultation and percussion. Lung excursion symmetrical. No change in fremitus. No adventitial noises.  ABDOMEN: Bowel sounds present. Non-tender, no masses palpated.  Tolerating enteral nutrition  : Normal anatomy.  Iraheta with yellow urine.  EXTREMITIES: Normal muscle tone and joint movement, no cyanosis or clubbing.  PICC line and arterial line on the left  LYMPHATICS: No adenopathy palpated, no edema.  SKIN: Dry, intact, no lesions.   NEURO:  Pupils and extraocular movements are intact.  The patient has gaze does not appear to be conjugate any longer. Corneals are  intact.  Gag is intact.  There is a partial lid raise to pain.  There is no response for me to command.  GCS is 3  PSYCH:  Unable to assess      CBC LAST (LAST 24 HOURS)  Recent Labs   Lab 05/18/23 0437   WBC 6.23   RBC 4.91   HGB 15.1   HCT 46.5   MCV 95   MCH 30.8   MCHC 32.5   RDW 14.7*   *   MPV 11.0   GRAN 72.0  4.5   LYMPH 11.1*  0.7*   MONO 15.4*  1.0   BASO 0.02   NRBC 0       CHEMISTRY LAST (LAST 24 HOURS)  Recent Labs   Lab 05/18/23 0437 05/18/23 0439     --    K 3.6  --      --    CO2 27  --    ANIONGAP 7*  --    BUN 46*  --    CREATININE 1.0  --    *  --    CALCIUM 7.6*  --    PH  --  7.499*   MG 2.1  --    ALBUMIN 2.1*  --    PROT 5.1*  --    ALKPHOS 40*  --    ALT 50*  --    AST 61*  --    BILITOT 1.0  --            LAST 7 DAYS MICROBIOLOGY   Microbiology Results (last 7 days)       Procedure Component Value Units Date/Time    Culture, Respiratory with Gram Stain [[id number]] Collected: 05/15/23 0059    Order Status: Completed Specimen: Respiratory from Sputum Updated: 05/17/23 0738     Respiratory Culture Normal respiratory fredi     Gram Stain (Respiratory) <10 epithelial cells per low power field.     Gram Stain (Respiratory) Few WBC's     Gram Stain (Respiratory) Few Gram positive cocci    Culture, Respiratory with Gram Stain [[id number]]     Order Status: Canceled Specimen: Respiratory             MOST RECENT IMAGING  X-Ray Chest AP Portable  50 portable chest x-ray at 3:50 PM is compared to prior study at 5:32 AM    Clinical history is PICC line placement    There is a left PICC line with the tip overlying the superior vena cava. There is no pneumothorax.  The endotracheal tube and NG tube are in stable position. The heart is enlarged. There is atelectasis in the left lung base. There are no confluent infiltrates or pleural effusions.    IMPRESSION: No pneumothorax status post left PICC line placement    Cardiomegaly with atelectasis left lung base    Electronically  signed by:  Yi Hernandez MD  5/15/2023 4:01 PM CDT Workstation: [id number]      CURRENT VISIT EKG  Results for orders placed or performed during the hospital encounter of 05/11/23   ECG 12 lead    Narrative    Test Reason : I63.9,    Vent. Rate : 120 BPM     Atrial Rate : 150 BPM     P-R Int : 000 ms          QRS Dur : 104 ms      QT Int : 370 ms       P-R-T Axes : 000 016 -12 degrees     QTc Int : 522 ms    Atrial fibrillation with rapid ventricular response  Nonspecific ST abnormality  Prolonged QT  Abnormal ECG  No previous ECGs available    Referred By: [email or web address]   SELF           Confirmed By:        ECHOCARDIOGRAM RESULTS  No results found for this or any previous visit.        VENTILATOR INFORMATION  Vent Mode: Spont  Oxygen Concentration (%):  [21-30] 21  Resp Rate Total:  [14 br/min-35 br/min] 28 br/min  PEEP/CPAP:  [5 cmH20] 5 cmH20  Pressure Support:  [10 cmH20] 10 cmH20  Mean Airway Pressure:  [8 cmH20-9.1 cmH20] 8.6 cmH20           LAST ARTERIAL BLOOD GAS  ABG  Recent Labs   Lab 05/18/23  0439   PH 7.499*   PO2 85   PCO2 33.7*   HCO3 26.2   BE 3       IMPRESSION AND PLAN  Status epilepticus  -MRI shows temporal and parietal infarcts on the right  -history of seizures prior to this admission  - not seizing any longer  History of glioblastoma multiforme under treatment in [place]  Naomi coma scale of 3  -patient has been off of sedation for over 72 hours  -minimally more responsive with a partial eye opening to vigorous stimulation  Mechanical ventilation  - ABG daily  - still too encephalopathic to wean  Atrial fibrillation  - continue Xarelto  - continue metoprolol  Hypertension  -metoprolol  Hypokalemia  - replace potassium  -Trend labs  Moderate hypoalbuminemia  -enteral feedings tolerated  Hyperlipidemia  Thrombocytopenia  - progressive  - platelet count has dropped greater than 40%, will check  antiplatelet antibodies, the patient has been receiving rivaroxaban not enoxaparin    Discussed  with nursing and respiratory and updated the patient's daughter who remains quite hopeful.    Critical care time spent reviewing the chart, examining the patient, reviewing the labs, reviewing the radiological findings, discussing care with nursing, physicians, and respiratory and creating the note and  has been greater than 35 minutes    Mery Milner MD  Date of Service: 05/18/2023  12:50 PM

## 2023-05-18 NOTE — PROGRESS NOTES
[facility]  Department of Cardiology  Consult Note      PATIENT NAME: Teto Mendez  MRN: [id number]  TODAY'S DATE: 05/18/2023  ADMIT DATE: 5/11/2023                          CONSULT REQUESTED BY: Neo Clark MD    SUBJECTIVE     PRINCIPAL PROBLEM: Status epilepticus      REASON FOR CONSULT:    AFRVR    Interval history:  05/18/2023  Minimal improvement in neurological status.  Patient is moving eyebrows and attempting to open eyes to name and verbal stimuli.  Nursing staff states that he squeezed their hand to command earlier this morning.  Remains in AFib on tele.  Rates 113.  BP stable.  Patient remains and spontaneous mode on mechanical ventilation 21% FiO2, 5 of PEEP.  Potassium 3.6.    5/17/23    Patient remains on mechanical ventilation. Patient has been off sedation for 48 hours. AFRVR on tele.   Unresponsive to verbal or physical stimuli.  No family at bedside.   No edema BP stable. K 3.4.  Cr. 0.9.     5/16/23    Patient is off sedation. Remains on mechanical ventilation. Neurology following. Rate controlled AF.  K 3.3 this am. No response to verbal or physical stimulation.  L facial twitching. Dr. James at bedside as well. No seizure activity noted on EEG with facial twitching.  -110.  /80.  96% O2.  35% Fio2 on vent. Peep 5.  SBT.      5/15/23    Patient remains on mechanical ventilation.  Remains on EEG.  Propofol has been weaned off and Precedex has been started an exchange.  During examination, patient had left facial twitching and coughing with mild stimulation.  Seizure activity noted on EEG overnight per nursing.  Potassium is 3.4 this a.m., replace per protocol.  Patient was alkalotic pH 7.5, PO2 67, HC03 29.6.  Patient was given Diamox 250 mg IV.  Rate controlled AFib on the monitor.  Entresto started yesterday.  Creatinine 1.1 today.      05/14/2023    Patient remains on mechanical ventilation and sedation with propofol.  Plans for MRI of the brain today.  Potassium  3.3 this a.m., replaced was 70 mEq of KCl.  Urinary output 1950 mL yesterday.  No output recorded yet today.  Hemodynamically stable.     MRI today:  IMPRESSION:  1. Cortical foci restricted signal intensity on diffusion weighted images compatible with a benign nonhemorrhagic cortical infarct with additional small infarct involving the deep subcortical white matter of the right parietal lobe.  2. No evidence of intracranial hemorrhage.    HPI:    Patient is a 67-year-old male with past medical history of GERD, glioblastoma multiforme s/p craniotomy, chemotherapy and radiation diagnosed in August 2022, history of blood clots, hypertension, PAF on Xarelto, seizures who presented to the ED with left facial pushing, left facial droop, slurred speech, left arm weakness that began at 3:00 a.m. on 5/11.  Keppra has been recently decreased.  In ED he was given 1 g of Keppra, 2 mg of Ativan which broke his seizure and he was subsequently alert and responsive..  Later in CT, he had sudden worsening mental status, unresponsiveness, weakness, aphasia.  O2 saturations high 80s, and patient was intubated emergently and placed on mechanical ventilation for respiratory failure.  Seizure activity restarted and was erupted with Ativan and propofol bolus. No acute stroke/hemorrhage on CT.  Neurology is following for status epilepticus.    05/12/2023:  Patient was on EEG for monitoring of status epilepticus.  Seizure at 4:00 a.m..  Patient remains in AFib    05/13/2023: Patient was also found to be in AFib RVR this admission, requiring IV diltiazem.  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 Normotensive.  K 3.1, Cr 1.0, Mag 2.1; CXR this am coarse interstitial markings the LLL and trace  L-sided pleural effusion; on propofol drip at 40 mics per kg per minute.  On mechanical ventilation 5 of PEEP 30% FiO2.  family at bedside.  Patient is originally from [place] and has a cardiologist there.  Known history of atrial fibrillation.  History of splenic infarct.  Unknown if patient has history of reduced EF or heart failure.  He was on Lasix at home.  Patient is visiting from [place].    ECHO this am  The left ventricle is mildly enlarged with concentric remodeling and  The estimated ejection fraction is 38%.  There is left ventricular global hypokinesis.  Mild mitral regurgitation.  Normal central venous pressure (3 mmHg).  The estimated PA systolic pressure is 26 mmHg.  Atrial fibrillation observed.  Normal right ventricular size with normal right ventricular systolic function.  Moderate left atrial enlargement.  Moderate right atrial enlargement.        FROM H&P  Teto Mendez is a 67 y.o. White male with known history of  glioblastoma s/p resection, seizures p.afib on xarelto who presented to ER this morning with left arm weakness, facial twitching and change in his speech that he suspected was seizure activity.  He took an extra keppra and presented to ER around 6:30-7 am.  Head CT/CTA in ER without hemorrhage or LVO and he was not a candidate for tPA regardless.  He was given lorazepam and keppra with positive response, however, while still in ER he became less responsive and repeat stat CT was ordered.  While in CT, he became hypoxic with depressed respiratory status and required bag-mask ventilation and emergent intubation on return to ICU.  History was obtained from the ER physician Sign-out.            Review of patient's allergies indicates:  No Known Allergies    Past Medical History:   Diagnosis Date    GERD (gastroesophageal reflux disease)     Glioblastoma     H/O blood clots     Hypertension     Paroxysmal atrial fibrillation     Seizures      No past surgical history on file.         REVIEW OF SYSTEMS  Unable to obtain. Remains minimally responsive on mechanical ventilation    OBJECTIVE     VITAL SIGNS (Most Recent)  Temp: 98 °F (36.7 °C) (05/18/23 1101)  Pulse: (!) 113 (05/18/23 1400)  Resp: (!) 26 (05/18/23 1400)  BP: 123/74 (05/18/23 1101)  SpO2: 95 % (05/18/23 1400)    VENTILATION STATUS  Resp: (!) 26 (05/18/23 1400)  SpO2: 95 % (05/18/23 1400)  Vent Mode: Spont  Oxygen Concentration (%):  [21-30] 21  Resp Rate Total:  [14 br/min-35 br/min] 24 br/min  PEEP/CPAP:  [5 cmH20] 5 cmH20  Pressure Support:  [10 cmH20] 10 cmH20  Mean Airway Pressure:  [8.3 cmH20-9.1 cmH20] 8.3 cmH20        I & O (Last 24H):  Intake/Output Summary (Last 24 hours) at 5/18/2023 1457  Last data filed at 5/18/2023 0650  Gross per 24 hour   Intake 2575 ml   Output 1250 ml   Net 1325 ml       WEIGHTS  Wt Readings from Last 3 Encounters:   05/13/23 0001 92.4 kg (203 lb 11.3 oz)   05/11/23 1230 84 kg (185 lb 3 oz)   05/11/23 0641 83.9 kg (185 lb)   05/13/23 1042 92.1 kg (203 lb)       Physical examination:      Constitutional:  Critically ill appearing male on mechanical ventilation, no acute distress   Neck: no carotid bruit, no JVD  Lungs: coarse breath sounds, mechanical ventilation- FiO2 35%, 5 peep  Chest Wall: no tenderness  CARDIAC:  Irregular rate and rhythm, soft systolic murmur  Abdomen: soft, bowel sounds normal; NG tube in left nare  : minaya catheter  Extremities: Extremities normal, atraumatic, no cyanosis, clubbing or edema  Skin: Skin color, texture, turgor normal. No rashes or lesions  Neuro:  attempting to opening his eyes to verbal stimuli    HOME MEDICATIONS:  No current facility-administered medications on file prior to encounter.     Current Outpatient Medications on File Prior to Encounter   Medication Sig Dispense Refill    allopurinoL (ZYLOPRIM) 100 MG tablet Take 100 mg by mouth once daily.      amLODIPine (NORVASC) 10 MG tablet Take 10 mg by mouth once daily.      dexAMETHasone (DECADRON) 1 MG  Tab Take 1 mg by mouth every 12 (twelve) hours.      hydrALAZINE (APRESOLINE) 25 MG tablet Take 25 mg by mouth 3 (three) times daily.      levETIRAcetam (KEPPRA) 500 MG Tab Take 500 mg by mouth 2 (two) times daily.      metoprolol succinate (TOPROL-XL) 100 MG 24 hr tablet Take 100 mg by mouth once daily.      pantoprazole (PROTONIX) 40 MG tablet Take 40 mg by mouth once daily.      XARELTO 20 mg Tab Take 20 mg by mouth once daily.      clotrimazole (MYCELEX) 10 mg kathleen Take 1 tablet by mouth 3 (three) times daily.      colchicine (COLCRYS) 0.6 mg tablet Take 0.6 mg by mouth once daily.      furosemide (LASIX) 20 MG tablet Take 20 mg by mouth once daily.      meclizine (ANTIVERT) 12.5 mg tablet Take 25 mg by mouth 3 (three) times daily.      rivaroxaban (XARELTO) 20 mg Tab Take 20 mg by mouth daily with dinner or evening meal.      timolol maleate 0.5% (TIMOPTIC) 0.5 % Drop Place 1 drop into both eyes 2 (two) times daily.         SCHEDULED MEDS:   allopurinoL  100 mg Per NG tube Daily    amantadine HCL  100 mg Per NG tube BID    atorvastatin  80 mg Per NG tube Daily    dexAMETHasone  1 mg Per NG tube Q12H    diltiaZEM  30 mg Oral Q6H    lacosamide (VIMPAT) IVPB  200 mg Intravenous Q12H    levetiracetam IV  1,500 mg Intravenous Q12H    metoprolol tartrate  100 mg Per NG tube BID    pantoprazole  40 mg Intravenous Daily    rivaroxaban  20 mg Per NG tube Daily with dinner    sacubitriL-valsartan  1 tablet Per G Tube BID    timolol maleate 0.25%  1 drop Both Eyes BID    valproate sodium (DEPACON) IVPB  500 mg Intravenous Q8H       CONTINUOUS INFUSIONS:        PRN MEDS:acetaminophen, colchicine, hydrALAZINE, magnesium sulfate IVPB, magnesium sulfate IVPB, metoprolol, ondansetron, polyethylene glycol, potassium bicarbonate, potassium bicarbonate, potassium bicarbonate, sodium chloride 0.9%, sodium phosphate IVPB, sodium phosphate IVPB, sodium phosphate IVPB    LABS AND DIAGNOSTICS     CBC LAST 3 DAYS  Recent Labs   Lab  05/16/23 0416 05/16/23 0416 05/17/23 0403 05/17/23 0433 05/18/23 0437   WBC 7.46  --  7.54  --  6.23   RBC 5.46  --  5.31  --  4.91   HGB 16.9  --  16.4  --  15.1   HCT 49.7   < > 48.7 47 46.5   MCV 91  --  92  --  95   MCH 31.0  --  30.9  --  30.8   MCHC 34.0  --  33.7  --  32.5   RDW 14.1  --  14.4  --  14.7*   *  --  141*  --  123*   MPV 9.8  --  10.8  --  11.0   GRAN 79.8*  6.0  --  67.3  5.1  --  72.0  4.5   LYMPH 7.1*  0.5*  --  10.6*  0.8*  --  11.1*  0.7*   MONO 10.5  0.8  --  20.4*  1.5*  --  15.4*  1.0   BASO 0.03  --  0.02  --  0.02   NRBC 0  --  0  --  0    < > = values in this interval not displayed.       COAGULATION LAST 3 DAYS  No results for input(s): LABPT, INR, APTT in the last 168 hours.      CHEMISTRY LAST 3 DAYS  Recent Labs   Lab 05/16/23 0416 05/16/23 0416 05/16/23  0906 05/17/23 0403 05/17/23 0433 05/18/23 0437 05/18/23  0439     --   --  143  --  144  --    K 3.3*  --   --  3.4*  --  3.6  --    *  --   --  110  --  110  --    CO2 24  --   --  26  --  27  --    ANIONGAP 8  --   --  7*  --  7*  --    BUN 40*  --   --  40*  --  46*  --    CREATININE 1.1  --   --  0.9  --  1.0  --    *  --   --  137*  --  123*  --    CALCIUM 8.2*  --   --  7.9*  --  7.6*  --    PH  --    < > 7.473*  --  7.498*  --  7.499*   MG 2.3  --   --  2.2  --  2.1  --    ALBUMIN 2.5*  --   --  2.3*  --  2.1*  --    PROT 5.7*  --   --  5.6*  --  5.1*  --    ALKPHOS 40*  --   --  41*  --  40*  --    ALT 44  --   --  45*  --  50*  --    AST 49*  --   --  48*  --  61*  --    BILITOT 1.2*  --   --  1.0  --  1.0  --     < > = values in this interval not displayed.       CARDIAC PROFILE LAST 3 DAYS  Recent Labs   Lab 05/13/23  0422 05/13/23  1544   BNP 93  --    TROPONINIHS  --  7.0       ENDOCRINE LAST 3 DAYS  No results for input(s): TSH, PROCAL in the last 168 hours.      LAST ARTERIAL BLOOD GAS  ABG  Recent Labs   Lab 05/18/23  6989   PH 7.499*   PO2 85   PCO2 33.7*   HCO3 26.2    BE 3       LAST 7 DAYS MICROBIOLOGY   Microbiology Results (last 7 days)       Procedure Component Value Units Date/Time    Culture, Respiratory with Gram Stain [[id number]] Collected: 05/15/23 0059    Order Status: Completed Specimen: Respiratory from Sputum Updated: 05/17/23 0738     Respiratory Culture Normal respiratory fredi     Gram Stain (Respiratory) <10 epithelial cells per low power field.     Gram Stain (Respiratory) Few WBC's     Gram Stain (Respiratory) Few Gram positive cocci            MOST RECENT IMAGING  X-Ray Chest AP Portable  50 portable chest x-ray at 3:50 PM is compared to prior study at 5:32 AM    Clinical history is PICC line placement    There is a left PICC line with the tip overlying the superior vena cava. There is no pneumothorax.  The endotracheal tube and NG tube are in stable position. The heart is enlarged. There is atelectasis in the left lung base. There are no confluent infiltrates or pleural effusions.    IMPRESSION: No pneumothorax status post left PICC line placement    Cardiomegaly with atelectasis left lung base    Electronically signed by:  Yi Hernandez MD  5/15/2023 4:01 PM CDT Workstation: [id number]      ECHOCARDIOGRAM RESULTS (last 5)  No results found for this or any previous visit.      CURRENT/PREVIOUS VISIT EKG  Results for orders placed or performed during the hospital encounter of 05/11/23   ECG 12 lead    Collection Time: 05/11/23  7:28 AM    Narrative    Test Reason : I63.9,    Vent. Rate : 120 BPM     Atrial Rate : 150 BPM     P-R Int : 000 ms          QRS Dur : 104 ms      QT Int : 370 ms       P-R-T Axes : 000 016 -12 degrees     QTc Int : 522 ms    Atrial fibrillation with rapid ventricular response  Nonspecific ST abnormality  Prolonged QT  Abnormal ECG  No previous ECGs available  Confirmed by Joe Piedra MD ([id number]) on 5/11/2023 10:07:18 PM    Referred By: [email or web address]   SELF           Confirmed By:Joe Piedra MD           ASSESSMENT/PLAN:      Active Hospital Problems    Diagnosis    *Status epilepticus    Acute CVA (cerebrovascular accident)    Obesity    Gout    GERD (gastroesophageal reflux disease)    HFrEF (heart failure with reduced ejection fraction)    Essential hypertension    Paroxysmal atrial fibrillation    Hypokalemia    Glioblastoma    Acute hypoxemic respiratory failure       ASSESSMENT & PLAN:     HFrEF-EF 38%  Paroxysmal Atrial Fibrillation  Hypokalemia  Status Epilepticus  Acute respiratory failure with hypoxia and hypercapnia  Glioblastoma        RECOMMENDATIONS:    Patient attempting to open eyes to verbal stimuli.  Mild improvement.    AFRVR. Patient has history of AF.  Continue Xarelto 20 mg daily. Continue metoprolol 100 mg BID.  Will start patient on diltiazem 30 mg q.6 hours.  New Infarct on MRI this admission. Neurology following.    Continue to check and replace potassium and magnesium. Goal for potassium is 4.0, and goal for magnesium is 2.0.    EF 38% and LV global hypokinesis.  Euvolemic. Strict I's and O's.  Continue Entresto 24-26 mg BID for heart failure.  Thank you for the consultation.  We will continue to follow.      Kiana Moore NP  Department of Cardiology  Date of Service: 05/18/2023      Patient has increased heart rates .  Patient has minimal responds neurologically in addition to beta-blockers recommend to add low dose of diltiazem for rate control continue supportive measures.  I have personally  examined the patient, I have reviewed the Nurse Practitioner's history and physical, assessment, and plan. I agree with the findings and plan.    Zachariah Hughes M.D.  Department of Cardiology  Date of Service: 05/18/2023  9:45 AM

## 2023-05-18 NOTE — ASSESSMENT & PLAN NOTE
In setting of status epilepticus.  -Continuous EEG  -Continue mechanical ventilation  -Pulmonary consulted  -ABG PRN  -Palliative Care consulted

## 2023-05-18 NOTE — PLAN OF CARE
Patient has confirmed HD chair for [facility] O[facility] 12:30 starting 5/22/2023 05/18/23 0853   Post-Acute Status   Post-Acute Authorization Dialysis   Diaylsis Status Set-up Complete/Auth obtained

## 2023-05-18 NOTE — SUBJECTIVE & OBJECTIVE
"Interval History: see "Hospital Course"    Review of Systems   Unable to perform ROS: Patient unresponsive   Objective:     Vital Signs (Most Recent):  Temp: 99.5 °F (37.5 °C) (05/18/23 0319)  Pulse: 98 (05/18/23 0721)  Resp: (!) 28 (05/18/23 0721)  BP: 113/67 (05/18/23 0600)  SpO2: 98 % (05/18/23 0721) Vital Signs (24h Range):  Temp:  [97.9 °F (36.6 °C)-99.5 °F (37.5 °C)] 99.5 °F (37.5 °C)  Pulse:  [] 98  Resp:  [16-34] 28  SpO2:  [95 %-100 %] 98 %  BP: ()/(58-90) 113/67  Arterial Line BP: (107-165)/() 135/76     Weight: 92.4 kg (203 lb 11.3 oz)  Body mass index is 30.08 kg/m².    Intake/Output Summary (Last 24 hours) at 5/18/2023 0730  Last data filed at 5/18/2023 0650  Gross per 24 hour   Intake 2889.58 ml   Output 1400 ml   Net 1489.58 ml         Physical Exam  Vitals and nursing note reviewed.   Constitutional:       Appearance: He is ill-appearing.   HENT:      Head: Normocephalic and atraumatic.      Right Ear: External ear normal.      Left Ear: External ear normal.      Nose: Nose normal.      Comments: NG tube.     Mouth/Throat:      Mouth: Mucous membranes are moist.      Pharynx: Oropharynx is clear.      Comments: ETT.  Cardiovascular:      Rate and Rhythm: Normal rate. Rhythm irregular.      Pulses: Normal pulses.      Heart sounds: Normal heart sounds.   Pulmonary:      Effort: Pulmonary effort is normal.      Breath sounds: Normal breath sounds.   Abdominal:      General: Bowel sounds are normal.      Palpations: Abdomen is soft.   Genitourinary:     Comments: Iraheta.  Musculoskeletal:      Right lower leg: Edema present.      Left lower leg: Edema present.   Skin:     General: Skin is warm and dry.   Neurological:      Comments: GCS 3.           Significant Labs: All pertinent labs within the past 24 hours have been reviewed.    Significant Imaging: I have reviewed all pertinent imaging results/findings within the past 24 hours.  "

## 2023-05-18 NOTE — CONSULTS
Consult noted; chart reviewed. Pt is a FULL CODE; no ACP docs noted. Pt's past medical history is significant for glioblastoma multiforme s/p craniotomy for which he has been receiving radiation/chemo. Pt on vent; unable to participate. Off sedation and remains unresponsive.     Called Daughter CHERRIE CARPIO [phone]. Per family pt is from [place] and came down here to visit family. Daughter is still in town. Introduced Palliative Care as an extra layer of support for our patients and families dealing with chronic illnesses. Daughter is agreeable to meet Dr. Penny at bedside today for 3:00pm. PC Team will continue to follow. CM updated.

## 2023-05-18 NOTE — CARE UPDATE
05/18/23 0721   Patient Assessment/Suction   Respiratory Effort Unlabored   Expansion/Accessory Muscles/Retractions expansion symmetric   All Lung Fields Breath Sounds Anterior:;Lateral:;clear   Rhythm/Pattern, Respiratory assisted mechanically   Cough Frequency infrequent;with stimulation   Cough Type assisted;productive   Suction Method oral;tracheal   $ Suction Charges Inline Suction Procedure Stat Charge;Close Suction System Equipment   Secretions Amount small   Secretions Color white   Skin Integrity   $ Wound Care Tech Time 15 min   Area Observed Left;Right;Cheek;Upper lip;Lower lip;Corner lip   Skin Appearance without discoloration   PRE-TX-O2   Device (Oxygen Therapy) ventilator   $ Is the patient on Low Flow Oxygen? Yes   Oxygen Concentration (%) 21   SpO2 98 %   Pulse Oximetry Type Continuous   $ Pulse Oximetry - Multiple Charge Pulse Oximetry - Multiple   Pulse 98   Resp (!) 28      Airway Anesthesia 05/11/23   Placement Date/Time: 05/11/23 (c) 6087   Method of Intubation: Video Laryngoscopy  Airway Device: Endotracheal Tube  Mask Ventilation: Moderately difficult with oral airway  Intubated: Other (see comments)  Blade: Glidescope #3  Airway Device Size: 8....   Secured at 24 cm   Measured At Lips   Secured Location Left   Secured by Commercial tube barboza   Bite Block none   Site Condition Cool;Dry   Status Intact;Secured;Patent   Site Assessment Clean;Dry   General Safety Checklist   Safety Promotion/Fall Prevention side rails raised   Airway Safety   Is Ambu Bag and Mask with Patient? Yes, Adult Ambu Bag and Mask   Suction set is at the bedside? Yes   Vent Select   Conventional Vent Y   $ Ventilator Subsequent 1   Charged w/in last 24h YES   Preset Conventional Ventilator Settings   Vent ID [id number]   Vent Type    Ventilation Type VC   Vent Mode Spont   Humidity HME   PEEP/CPAP 5 cmH20   Pressure Support 10 cmH20   Peak End Inspiratory Pressure 15 cmH20   Insp Rise Time  70 %   I-Trigger Type   V-TRIG   Trigger Sensitivity Flow/I-Trigger 3 L/min   Patient Ventilator Parameters   Resp Rate Total 27 br/min   Peak Airway Pressure 16 cmH20   Mean Airway Pressure 8.5 cmH20   Plateau Pressure 0 cmH20   Exhaled Vt 417 mL   Total Ve 10.7 L/m   Spont Ve 10.7 L   I:E Ratio Measured 1:1.90   Auto PEEP 0 cmH20   Inspired Tidal Volume (VTI) 0 mL   Conventional Ventilator Alarms   Alarms On Y   Ve High Alarm 20 L/min   Ve Low Alarm 5 L/min   Vt High Alarm 1200 mL   Vt Low Alarm 200 mL   Resp Rate High Alarm 40 br/min   Press High Alarm 45 cmH2O   Apnea Rate 12   Apnea Volume (mL) 1 mL   Apnea Oxygen Concentration  100   Apnea Flow Rate (L/min) 65   T Apnea 20 sec(s)   IHI Ventilator Associated Pneumonia Bundle (Required)   Head of Bed Elevated  HOB 30   Oral Care Mouth suctioned   Ready to Wean/Extubation Screen   FIO2<=50 (chart decimal) 0.21   MV<16L (chart vol.) 10.7   PEEP <=8 (chart #) 5   Ready to Wean Parameters   F/VT Ratio<105 (RSBI) (!) 67.15   Vital Capacity   Vital Capacity (mL) 0   Education   $ Education Ventilator Oxygen;15 min   Respiratory Evaluation   $ Care Plan Tech Time 15 min   $ Eval/Re-eval Charges Re-evaluation

## 2023-05-18 NOTE — PROGRESS NOTES
[facility]  Neurology Progress Note    Patient Name: Teto Mendez  MRN: [id number]  : 1955  TODAY'S DATE: 2023  ADMIT DATE: 2023  6:39 AM                                          CONSULT REQUESTED BY: Neo Clark MD     Chief Complaint   Patient presents with    FACIAL TWITCHING    SLURRED SPEECH       HPI per EMR:  Patient presents with history of glioblastoma concurrently getting chemo in previous radiation complaining of facial twitching, facial droop, slurred speech, left arm weakness.  Symptom onset at 3:30 a.m..  Patient states at 3:30 a.m. he was playing on a game and then began to have the twitching of the face with associated droop with slurred speech and arm weakness.  Patient states he has had similar episodes in the past secondary to the glioblastoma.  He has not had the left arm weakness.  Patient states he is on Keppra and did take an extra Keppra dose this morning additionally patient is on Xarelto.  At the worst symptoms are severe.    Neurology Consult:  Patient was seen and examined by me today. He is a 67-year-old man with history of glioblastoma multiforme s/p craniectomy, chemotherapy and radiation diagnosed in 2022, as well as seizure disorder, who presented to the ED with left facial twitching, left facial droop, slurred speech and left arm weakness that began the morning of his admission at around 3 am. He was recently decreased on his home Keppra in the setting of side effects as per daughter. Upon arrival to the ED, he was loaded with 1 gr of Keppra and given 2 mg of ativan IV, which seemed to break the seizure. As per ED physician, patient was awake, alert and able to answer questions. At around 11:50 am, I was contacted by ED provider in the setting of sudden worsening of mental status, unresponsiveness, weakness and aphasia, so he was taken to CT scan for repeat CT brain to rule out LVO stroke or hemorrhage. I evaluated patient while on CT,  and noticed O2 Sats were in the high 80s. Due to technical malfunction, CT could not be completed, at which point I decided to abort procedure and transport patient to the ICU immediately. When we arrived to the 3rd floor ICU, patient seemed to be on respiratory failure, with toe and fingertip cyanosis, unable to maintain O2 Sats, so started on manual assisted ventilation and called anesthesia for emergent intubation. Patient was successfully intubated and sedation with propofol was started, but facial twitching recurred, so ativan was given and propofol bolus was administered, after which seizure activity seemed to cease. He was taken for CT after stabilization which showed no evidence of acute stroke or intracranial hemorrhage, and CTA showed no LVO. He is now being monitored on 24H continuous EEG for management of status epilepticus.    5/12/23:  Patient was seen and examined by me today.  He remains on deep sedation for treatment of status epilepticus, being monitored on 24 hour video EEG.  A brief electroclinical seizure was seen at around 4:00 a.m. with left facial twitching, but no clinical events reported after this.  Plan is to continue deep sedation until at least tomorrow morning.  Patient to be hooked up to EEG again when propofol starts to being weaned down.    5/13/23: I, Dr James assumed care on 5/13.  Patient is intubated, sedated with propofol.  He started out continuous EEG this morning shows right hemisphere lateralized periodic discharges with epileptiform potential.  No electrographic seizures were recorded.    5/14/2023:  Patient was seen examined by me.  Remains to be intubated and sedated.  MRI brain done this morning showed right MCA cortical infarcts.  Patient has been on continuous EEG overnight and EEG showed right hemispheric PLEDs with increase in frequency of PLEDs with lowering sedation.  Patient also had a clinical focal seizure involving twitching of the left side of the face this  morning when he was off sedation.    5/15/2023:  Patient was seen examined by me.  He is intubated and Precedex.  Propofol has been turned off yesterday.  Remains to be on continues EEG which shows right hemispheric (predominantly right frontotemporal) periodic lateralized epileptiform discharges.  Episodes of left facial twitching did not have EEG correlate.    5/16: Patient was seen and examined by me. He remains to be intubated and not on sedation. No response any stimulus. On CEEG. Family was at bedside and I discussed plan of care with them. Had one episode of electro clinical seizure yesterday at 12:20 PM lasting for a minute    5/17:  Patient was seen examined by me.  Remains to be intubated and off sedation.  Continuous EEG discontinued this morning. No electrographic seizure  recorded.    5/18:  Patient was seen examined by me.  Remains to be intubated.  Patient tries to open eyes to verbal commands and minimally follows commands with right upper extremity squeezing my fingers.  No further seizure activity was noted.  EEG has been off.    Scheduled Meds:   allopurinoL  100 mg Per NG tube Daily    amantadine HCL  100 mg Per NG tube BID    atorvastatin  80 mg Per NG tube Daily    dexAMETHasone  1 mg Per NG tube Q12H    diltiaZEM  30 mg Oral Q6H    lacosamide (VIMPAT) IVPB  200 mg Intravenous Q12H    levetiracetam IV  1,500 mg Intravenous Q12H    metoprolol tartrate  100 mg Per NG tube BID    pantoprazole  40 mg Intravenous Daily    rivaroxaban  20 mg Per NG tube Daily with dinner    sacubitriL-valsartan  1 tablet Per G Tube BID    timolol maleate 0.25%  1 drop Both Eyes BID    valproate sodium (DEPACON) IVPB  500 mg Intravenous Q8H     Continuous Infusions:      PRN Meds:.acetaminophen, colchicine, hydrALAZINE, magnesium sulfate IVPB, magnesium sulfate IVPB, metoprolol, ondansetron, polyethylene glycol, potassium bicarbonate, potassium bicarbonate, potassium bicarbonate, sodium chloride 0.9%, sodium phosphate  IVPB, sodium phosphate IVPB, sodium phosphate IVPB      Physical Exam  Current Vitals:  Vitals:    05/18/23 1400   BP:    Pulse: (!) 113   Resp: (!) 26   Temp:      General appearance: intubated, minimal response to verbal commands.  Cardiovascular: Afib  Pulmonary: on mechanical ventilation  GI: soft  MSK: normal passive ROM  Skin: normal color, no lesions    NEUROLOGICAL EXAM:    Mental status: intubated.  Not on sedation.  Minimal response to verbal commands          Intubated: yes          Sedated: no  Response to noxious stimulation:  Yes  Breathes above ventilator: yes  Opens eyes:  Tries to open eyes to verbal commands  Pupils: symmetric          Left: reactive          Right: reactive  Eye movements: No pathologic movements such as nystagmus, ocular bobbing, dipping or roving.    Corneal reflex: present  Oculocephalic reflex:  Absent  Cough:  Present  Gag:  Weak  Motor exam: Normal muscle tone. Normal muscle bulk. No abnormal movements such as tremors, myoclonus or twitching.  DTRs: 1+ throughout. Babinski response absent.      Laboratory Data & Studies    Recent Labs   Lab 05/16/23 0416 05/17/23 0403 05/18/23 0437   WBC 7.46 7.54 6.23   HGB 16.9 16.4 15.1   * 141* 123*   MCV 91 92 95       Recent Labs   Lab 05/16/23 0416 05/17/23 0403 05/18/23  0437    143 144   K 3.3* 3.4* 3.6   * 110 110   CO2 24 26 27   BUN 40* 40* 46*   * 137* 123*   CALCIUM 8.2* 7.9* 7.6*   MG 2.3 2.2 2.1   PHOS 2.8 2.0* 3.0       Recent Labs   Lab 05/16/23 0416 05/17/23 0403 05/18/23  0437   PROT 5.7* 5.6* 5.1*   ALBUMIN 2.5* 2.3* 2.1*   BILITOT 1.2* 1.0 1.0   AST 49* 48* 61*   ALT 44 45* 50*   ALKPHOS 40* 41* 40*       No results for input(s): LABPT, INR, APTT in the last 168 hours.      Recent Labs   Lab 05/15/23  0308   HGBA1C 5.7   CHOL 250*   TRIG 143   LDLCALC 191.4*   HDL 30*         Microbiology:  Microbiology Results (last 7 days)       Procedure Component Value Units Date/Time    Culture,  Respiratory with Gram Stain [[id number]] Collected: 05/15/23 0059    Order Status: Completed Specimen: Respiratory from Sputum Updated: 05/17/23 0738     Respiratory Culture Normal respiratory fredi     Gram Stain (Respiratory) <10 epithelial cells per low power field.     Gram Stain (Respiratory) Few WBC's     Gram Stain (Respiratory) Few Gram positive cocci              Imaging:  CT Head Without Contrast    Result Date: 5/11/2023  CMS MANDATED QUALITY DATA - CT RADIATION  436 All CT scans at this facility utilize dose modulation, iterative reconstruction, and/or weight based dosing when appropriate to reduce radiation dose to as low as reasonably achievable. CT HEAD WITHOUT IV CONTRAST CLINICAL HISTORY: 67 years Male Mental status change, unknown cause COMPARISON: Noncontrast CT head performed earlier today 6:56 AM FINDINGS: Negative for acute intracranial hemorrhage, midline shift, or mass effect. Parenchymal calcifications within the right cerebral hemisphere are stable compared to prior. Patient is status post right pterional craniotomy. Ventricles and sulci are normal in size. Gray-white differentiation is maintained. Cerebellar hemispheres and brainstem are unremarkable. No calvarial lesion or fracture. Mastoid air cells are clear. IMPRESSION: Stable exam. No CT evidence of acute intracranial pathology. Electronically signed by:  Matt Cole MD  5/11/2023 1:41 PM CDT Workstation: [id number]    X-Ray Chest AP Portable    Result Date: 5/11/2023  CLINICAL HISTORY: 67 years (1955) Male Stroke TECHNIQUE: Portable AP radiograph the chest. COMPARISON: None available. FINDINGS: Nonspecific minimal faint interstitial opacity at the left costophrenic.  No pneumothorax is identified. The heart is mildly enlarged.  Osseous structures show degenerative changes in the spine. The visualized upper abdomen is unremarkable. IMPRESSION: Nonspecific minimal faint interstitial opacity at the left costophrenic sulcus  possibly representing atelectasis, scarring, trace pleural fluid, and less likely aspiration or pneumonia. . Electronically signed by:  Solomon Tobias MD  5/11/2023 8:32 AM CDT Workstation: [id number]    CT HEAD FOR STROKE    Result Date: 5/11/2023  CMS MANDATED QUALITY DATA - CT RADIATION - 436 All CT scans at this facility utilize dose modulation, iterative reconstruction, and/or weight based dosing when appropriate to reduce radiation dose to as low as reasonably achievable. EXAMINATION: CT HEAD FOR STROKE CLINICAL HISTORY: Neuro deficit, acute, stroke suspected;  history of glioblastoma TECHNIQUE: CT without IV contrast COMPARISON: None FINDINGS: There are postsurgical changes from prior right frontal temporal These craniotomy.  The ventricles and sulci are mildly prominent compatible with generalized cerebral atrophy.  There is no hemorrhage, mass or midline shift.  There are no extra-axial fluid collections.  The cerebellum and brainstem are normal.  The gray-white differentiation is maintained.  The orbits are unremarkable. The paranasal sinuses and mastoid air cells are clear.     Mild cerebral atrophy with no acute intracranial process Prior right frontal temporal craniotomy Findings were called to Dr. Zhou in the emergency department at 07:09 a.m. Electronically signed by: Yi Hernandez MD Date:    05/11/2023 Time:    07:10    CTA Head and Neck (xpd)    Result Date: 5/11/2023  CMS MANDATED QUALITY DATA - CT RADIATION - 436 All CT scans at this facility utilize dose modulation, iterative reconstruction, and/or weight based dosing when appropriate to reduce radiation dose to as low as reasonably achievable. CMS MANDATED QUALITY DATA - CAROTID - 195 All measurements and percent stenosis described below were determined using NASCET criteria or criteria similar to NASCET, as defined by the Society of Radiologists in Ultrasound Consensus Conference, Radiology, 2003 Reason: Stroke/TIA, determine embolic  source Technique: CT angiography of brain and neck with 100 mL Omnipaque 350.  Maximum intensity projection coronal reformations were obtained at a separate workstation and stored in the patient's permanent medical record. COMPARISON: 5/11/2023 CTA BRAIN: VASCULAR FINDINGS: Major intracranial arteries are widely patent with no stenosis, intraluminal filling, or dissection. Minimal atherosclerotic plaque affects the cavernous segments of bilateral internal carotid arteries. Negative for aneurysm or evidence of vasculitis. Opacified visualized dural venous sinuses are unremarkable. NONVASCULAR FINDINGS: No abnormal intra-axial or extra-axial enhancement. No midline shift. Postsurgical changes of right pterional craniotomy are noted. 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 Minimal atherosclerotic plaque affects left carotid bulb. Left carotid arteries are otherwise without plaque or stenosis. Left vertebral artery is patent. Minimal atherosclerotic plaque affects the right carotid bulb. Right carotid arteries are without additional plaque and remain widely patent. Right vertebral artery is patent. NONVASCULAR FINDINGS: Endotracheal tube has tip proximal to mio. Visualized lung apices show minor dependent atelectasis. Cervical soft tissues are unremarkable. IMPRESSION: 1. Trace atherosclerotic plaque in intracranial internal carotid arteries, without other abnormality of the major intracranial arteries. 2. Trace atherosclerotic plaque in bilateral carotid bulbs, with no stenosis or other abnormality of cervical ICAs or vertebral arteries. Electronically signed by:  Compa Lopez MD  5/11/2023 2:01 PM CDT Workstation: [id number]    CTA Head and Neck (xpd)    Result Date: 5/11/2023  CMS EXAMINATION: CTA HEAD AND NECK (XPD) CLINICAL INDICATION: Male, 67 years old. Stroke/TIA, determine embolic source history of glioblastoma  TECHNIQUE: Axial CT angiogram of the head and neck was performed with contrast. Multiplanar reformations as well as 3-D volume rendered imaging were reviewed at the workstation. Degree of stenosis was measured utilizing the NASCET method. CONTRAST: 100 mL mL of Omnipaque 350 IV. COMPARISON: None FINDINGS: CTA brain: The distal vertebral arteries, basilar artery and cavernous portions of the internal carotid arteries are widely patent. The anterior cerebral arteries, middle cerebral arteries and posterior cerebral arteries are widely patent without evidence of large vessel occlusion, significant stenosis, AVM or aneurysm. The dural venous sinuses are patent. There is no pathologic enhancement. Aortic Arch and Great Vessel Origins: 2 vessel aortic arch which is widely patent Subclavian Arteries: Widely patent. Right Common Carotid Artery: Widely patent. Right Internal Carotid Artery: Widely patent. Right External Carotid Artery: Widely patent. Left Common Carotid Artery: Widely patent. Left Internal Carotid Artery: Widely patent. Left External Carotid Artery: Widely patent. Right Vertebral Artery: Widely patent. Left Vertebral Artery: Widely patent. The paraspinous soft tissues are unremarkable. There are mild degenerative changes of the cervical spine. The lung apices are clear. IMPRESSION: Negative CTA of the head and neck. This exam was performed according to our departmental dose-optimization program which includes automated exposure control, adjustment of the mA and/or kV according to patient size and/or use of iterative reconstruction technique. Electronically signed by:  Yi Hernandez MD  5/11/2023 7:43 AM CDT Workstation: [id number]        Assessment and Plan:    Status Epilepticus  History of Glioblastoma Multiforme s/p craniectomy, chemo and radiotherapy  Acute ischemic infarct   Atrial fibrillation       67-year-old man with history of glioblastoma multiforme s/p craniectomy, chemotherapy and radiation  diagnosed in August 2022, as well as seizure disorder, who presented to the ED with left facial twitching, left facial droop, slurred speech and left arm weakness. Upon arrival to the ED, he was loaded with 1 gr of Keppra and given 2 mg of ativan IV, which seemed to break the seizure. However, he experienced sudden worsening of mental status, with unresponsiveness, weakness and aphasia, so he was taken to CT scan for repeat CT brain to rule out LVO stroke or hemorrhage. He was then intubated, sedated and placed on mechanical ventilation. Clinical picture more consistent with status epilepticus.      - Admitted to hospital medicine in the ICU with q2 hour neuro checks, on telemetry, continuous pulse oximetry  - Seizure precautions while inpatient  - initially underwent 24-hour VEEG continuous monitoring with evidence of 4 electrographic seizures which resolved when dose of propofol was increased to 60 mcg/kg/min. By the end of the recording, a burst suppression pattern with decreased LPEDs was seen.   -on 5/13 patient was again put on continues EEG monitoring while weaning sedation.  Initially EEG showed burst suppression pattern with bursts of 1-2 seconds of delta activity followed by 2-4 seconds of background suppression and also showed right hemispheric lateralized periodic epileptiform discharges.  Background changed to a mixture of delta and theta activity with PLEDs becoming more frequent with lowering sedation to 30mcg/kg/min.  He was kept at this dose of propofol overnight.  -patient has been off sedation for more than 72 hours.  Somewhat improvement in mental status and tries to open eyes with verbal stimulus and able to squeeze my fingers with his right hand to command.  - continue Vimpat 200 mg b.i.d..  continue Keppra 1500 mg BID IV  - Continue Depacon 500 mg TID IV. Valproic acid levels therapeutic.  - MRI brain showed right hemispheric infarcts.  Etiology could likely be secondary to recent radiation  versus cardioembolism in the setting of atrial fibrillation. Continue anticoagulation with Xarelto 20 mg daily  - Avoid seizure threshold lowering medications such as:  Bupropion, diphenhydramine, tramadol, certain antibiotics  - Maintain Euthermia with Tylenol prn temp > 37.2 degrees C.  - Assessment for rehab with PT/OT/SLP evaluation and treatment.  - workup and treatment of metabolic and infectious abnormalities as per primary team  - Will follow along      DVT prophylaxis with chemo/SCD prophylaxis      [phone] within 3 days from discharge.        All questions were answered.                              Thank you kindly for including us in the care of this patient. Please do not hesitate to contact us with any questions.       Critical Care:  56 minutes of critical care time has been spent evaluating with the patient. Time includes chart review not limited to diagnostic imaging, labs, and vitals, patient assessment, discussion with family and nursing, current order evaluations, and new order entries.      Mukund James MD  Neurology/Vascular Neurology

## 2023-05-19 PROBLEM — Z71.89 GOALS OF CARE, COUNSELING/DISCUSSION: Status: ACTIVE | Noted: 2023-05-19

## 2023-05-19 PROBLEM — R50.9 FEVER: Status: ACTIVE | Noted: 2023-05-19

## 2023-05-19 PROBLEM — E87.0 HYPERNATREMIA: Status: ACTIVE | Noted: 2023-05-19

## 2023-05-19 PROBLEM — E87.6 HYPOKALEMIA: Status: RESOLVED | Noted: 2023-05-12 | Resolved: 2023-05-19

## 2023-05-19 LAB
ALBUMIN SERPL BCP-MCNC: 2.4 G/DL (ref 3.5–5.2)
ALLENS TEST: ABNORMAL
ALP SERPL-CCNC: 43 U/L (ref 55–135)
ALT SERPL W/O P-5'-P-CCNC: 77 U/L (ref 10–44)
ANION GAP SERPL CALC-SCNC: 8 MMOL/L (ref 8–16)
AST SERPL-CCNC: 104 U/L (ref 10–40)
BACTERIA #/AREA URNS HPF: ABNORMAL /HPF
BASOPHILS # BLD AUTO: 0.02 K/UL (ref 0–0.2)
BASOPHILS NFR BLD: 0.3 % (ref 0–1.9)
BILIRUB SERPL-MCNC: 1 MG/DL (ref 0.1–1)
BILIRUB UR QL STRIP: NEGATIVE
BUN SERPL-MCNC: 42 MG/DL (ref 8–23)
CALCIUM SERPL-MCNC: 8.4 MG/DL (ref 8.7–10.5)
CHLORIDE SERPL-SCNC: 113 MMOL/L (ref 95–110)
CLARITY UR: CLEAR
CO2 SERPL-SCNC: 26 MMOL/L (ref 23–29)
COLOR UR: YELLOW
CREAT SERPL-MCNC: 0.9 MG/DL (ref 0.5–1.4)
DELSYS: ABNORMAL
DIFFERENTIAL METHOD: ABNORMAL
EOSINOPHIL # BLD AUTO: 0 K/UL (ref 0–0.5)
EOSINOPHIL NFR BLD: 0.1 % (ref 0–8)
ERYTHROCYTE [DISTWIDTH] IN BLOOD BY AUTOMATED COUNT: 14.6 % (ref 11.5–14.5)
ERYTHROCYTE [SEDIMENTATION RATE] IN BLOOD BY WESTERGREN METHOD: 10 MM/H
EST. GFR  (NO RACE VARIABLE): >60 ML/MIN/1.73 M^2
FIO2: 40
GLUCOSE SERPL-MCNC: 138 MG/DL (ref 70–110)
GLUCOSE UR QL STRIP: NEGATIVE
HCO3 UR-SCNC: 29.9 MMOL/L (ref 24–28)
HCT VFR BLD AUTO: 47 % (ref 40–54)
HGB BLD-MCNC: 15.5 G/DL (ref 14–18)
HGB UR QL STRIP: ABNORMAL
HYALINE CASTS #/AREA URNS LPF: 2 /LPF
IMM GRANULOCYTES # BLD AUTO: 0.06 K/UL (ref 0–0.04)
IMM GRANULOCYTES NFR BLD AUTO: 0.8 % (ref 0–0.5)
KETONES UR QL STRIP: NEGATIVE
LEUKOCYTE ESTERASE UR QL STRIP: NEGATIVE
LYMPHOCYTES # BLD AUTO: 0.8 K/UL (ref 1–4.8)
LYMPHOCYTES NFR BLD: 10.6 % (ref 18–48)
MAGNESIUM SERPL-MCNC: 2.1 MG/DL (ref 1.6–2.6)
MCH RBC QN AUTO: 31.1 PG (ref 27–31)
MCHC RBC AUTO-ENTMCNC: 33 G/DL (ref 32–36)
MCV RBC AUTO: 94 FL (ref 82–98)
MICROSCOPIC COMMENT: ABNORMAL
MIN VOL: 15.5
MODE: ABNORMAL
MONOCYTES # BLD AUTO: 1 K/UL (ref 0.3–1)
MONOCYTES NFR BLD: 13 % (ref 4–15)
NEUTROPHILS # BLD AUTO: 5.6 K/UL (ref 1.8–7.7)
NEUTROPHILS NFR BLD: 75.2 % (ref 38–73)
NITRITE UR QL STRIP: NEGATIVE
NRBC BLD-RTO: 0 /100 WBC
PCO2 BLDA: 33.3 MMHG (ref 35–45)
PEEP: 5
PH SMN: 7.56 [PH] (ref 7.35–7.45)
PH UR STRIP: 5 [PH] (ref 5–8)
PHOSPHATE SERPL-MCNC: 2.8 MG/DL (ref 2.7–4.5)
PIP: 20
PLATELET # BLD AUTO: 145 K/UL (ref 150–450)
PMV BLD AUTO: 10.7 FL (ref 9.2–12.9)
PO2 BLDA: 114 MMHG (ref 80–100)
POC BE: 8 MMOL/L
POC SATURATED O2: 99 % (ref 95–100)
POC TCO2: 31 MMOL/L (ref 23–27)
POTASSIUM SERPL-SCNC: 3.9 MMOL/L (ref 3.5–5.1)
PROCALCITONIN SERPL IA-MCNC: 0.26 NG/ML
PROT SERPL-MCNC: 5.9 G/DL (ref 6–8.4)
PROT UR QL STRIP: ABNORMAL
RBC # BLD AUTO: 4.98 M/UL (ref 4.6–6.2)
RBC #/AREA URNS HPF: 10 /HPF (ref 0–4)
SAMPLE: ABNORMAL
SITE: ABNORMAL
SODIUM SERPL-SCNC: 147 MMOL/L (ref 136–145)
SP GR UR STRIP: 1.02 (ref 1–1.03)
SP02: 98
SQUAMOUS #/AREA URNS HPF: 1 /HPF
URN SPEC COLLECT METH UR: ABNORMAL
UROBILINOGEN UR STRIP-ACNC: ABNORMAL EU/DL
VT: 450
WBC # BLD AUTO: 7.39 K/UL (ref 3.9–12.7)
WBC #/AREA URNS HPF: 2 /HPF (ref 0–5)

## 2023-05-19 PROCEDURE — 27000221 HC OXYGEN, UP TO 24 HOURS

## 2023-05-19 PROCEDURE — 99900035 HC TECH TIME PER 15 MIN (STAT)

## 2023-05-19 PROCEDURE — 63600175 PHARM REV CODE 636 W HCPCS: Performed by: INTERNAL MEDICINE

## 2023-05-19 PROCEDURE — 80053 COMPREHEN METABOLIC PANEL: CPT | Performed by: STUDENT IN AN ORGANIZED HEALTH CARE EDUCATION/TRAINING PROGRAM

## 2023-05-19 PROCEDURE — 84145 PROCALCITONIN (PCT): CPT | Performed by: STUDENT IN AN ORGANIZED HEALTH CARE EDUCATION/TRAINING PROGRAM

## 2023-05-19 PROCEDURE — 87040 BLOOD CULTURE FOR BACTERIA: CPT | Mod: 59 | Performed by: STUDENT IN AN ORGANIZED HEALTH CARE EDUCATION/TRAINING PROGRAM

## 2023-05-19 PROCEDURE — 99232 SBSQ HOSP IP/OBS MODERATE 35: CPT | Mod: ,,, | Performed by: INTERNAL MEDICINE

## 2023-05-19 PROCEDURE — C9254 INJECTION, LACOSAMIDE: HCPCS | Performed by: STUDENT IN AN ORGANIZED HEALTH CARE EDUCATION/TRAINING PROGRAM

## 2023-05-19 PROCEDURE — 63600175 PHARM REV CODE 636 W HCPCS: Performed by: STUDENT IN AN ORGANIZED HEALTH CARE EDUCATION/TRAINING PROGRAM

## 2023-05-19 PROCEDURE — 37799 UNLISTED PX VASCULAR SURGERY: CPT

## 2023-05-19 PROCEDURE — C9113 INJ PANTOPRAZOLE SODIUM, VIA: HCPCS | Performed by: STUDENT IN AN ORGANIZED HEALTH CARE EDUCATION/TRAINING PROGRAM

## 2023-05-19 PROCEDURE — 25000003 PHARM REV CODE 250: Performed by: STUDENT IN AN ORGANIZED HEALTH CARE EDUCATION/TRAINING PROGRAM

## 2023-05-19 PROCEDURE — 84100 ASSAY OF PHOSPHORUS: CPT | Performed by: STUDENT IN AN ORGANIZED HEALTH CARE EDUCATION/TRAINING PROGRAM

## 2023-05-19 PROCEDURE — 20000000 HC ICU ROOM

## 2023-05-19 PROCEDURE — 25000003 PHARM REV CODE 250: Performed by: INTERNAL MEDICINE

## 2023-05-19 PROCEDURE — 94799 UNLISTED PULMONARY SVC/PX: CPT

## 2023-05-19 PROCEDURE — 99900026 HC AIRWAY MAINTENANCE (STAT)

## 2023-05-19 PROCEDURE — 82803 BLOOD GASES ANY COMBINATION: CPT

## 2023-05-19 PROCEDURE — 99232 PR SUBSEQUENT HOSPITAL CARE,LEVL II: ICD-10-PCS | Mod: ,,, | Performed by: INTERNAL MEDICINE

## 2023-05-19 PROCEDURE — 63600175 PHARM REV CODE 636 W HCPCS: Performed by: HOSPITALIST

## 2023-05-19 PROCEDURE — 83735 ASSAY OF MAGNESIUM: CPT | Performed by: STUDENT IN AN ORGANIZED HEALTH CARE EDUCATION/TRAINING PROGRAM

## 2023-05-19 PROCEDURE — 94003 VENT MGMT INPAT SUBQ DAY: CPT

## 2023-05-19 PROCEDURE — 99900031 HC PATIENT EDUCATION (STAT)

## 2023-05-19 PROCEDURE — 94761 N-INVAS EAR/PLS OXIMETRY MLT: CPT

## 2023-05-19 PROCEDURE — 85025 COMPLETE CBC W/AUTO DIFF WBC: CPT | Performed by: STUDENT IN AN ORGANIZED HEALTH CARE EDUCATION/TRAINING PROGRAM

## 2023-05-19 PROCEDURE — 81001 URINALYSIS AUTO W/SCOPE: CPT | Performed by: STUDENT IN AN ORGANIZED HEALTH CARE EDUCATION/TRAINING PROGRAM

## 2023-05-19 RX ORDER — FUROSEMIDE 10 MG/ML
40 INJECTION INTRAMUSCULAR; INTRAVENOUS ONCE
Status: COMPLETED | OUTPATIENT
Start: 2023-05-19 | End: 2023-05-19

## 2023-05-19 RX ORDER — DILTIAZEM HYDROCHLORIDE 30 MG/1
30 TABLET, FILM COATED ORAL EVERY 6 HOURS
Status: DISCONTINUED | OUTPATIENT
Start: 2023-05-19 | End: 2023-05-21

## 2023-05-19 RX ADMIN — DEXTROSE 500 MG: 50 INJECTION, SOLUTION INTRAVENOUS at 05:05

## 2023-05-19 RX ADMIN — RIVAROXABAN 20 MG: 20 TABLET, FILM COATED ORAL at 05:05

## 2023-05-19 RX ADMIN — SODIUM CHLORIDE 200 MG: 9 INJECTION, SOLUTION INTRAVENOUS at 06:05

## 2023-05-19 RX ADMIN — DEXAMETHASONE 1 MG: 1 TABLET ORAL at 09:05

## 2023-05-19 RX ADMIN — ATORVASTATIN CALCIUM 80 MG: 40 TABLET, FILM COATED ORAL at 11:05

## 2023-05-19 RX ADMIN — DEXAMETHASONE 1 MG: 1 TABLET ORAL at 11:05

## 2023-05-19 RX ADMIN — DILTIAZEM HYDROCHLORIDE 30 MG: 30 TABLET, FILM COATED ORAL at 03:05

## 2023-05-19 RX ADMIN — AMANTADINE 100 MG: 100 CAPSULE ORAL at 09:05

## 2023-05-19 RX ADMIN — DILTIAZEM HYDROCHLORIDE 30 MG: 30 TABLET, FILM COATED ORAL at 09:05

## 2023-05-19 RX ADMIN — TIMOLOL MALEATE 1 DROP: 2.5 SOLUTION/ DROPS OPHTHALMIC at 09:05

## 2023-05-19 RX ADMIN — SACUBITRIL AND VALSARTAN 1 TABLET: 24; 26 TABLET, FILM COATED ORAL at 09:05

## 2023-05-19 RX ADMIN — LEVETIRACETAM INJECTION 1500 MG: 15 INJECTION INTRAVENOUS at 09:05

## 2023-05-19 RX ADMIN — ACETAMINOPHEN 650 MG: 325 TABLET ORAL at 11:05

## 2023-05-19 RX ADMIN — ALLOPURINOL 100 MG: 100 TABLET ORAL at 11:05

## 2023-05-19 RX ADMIN — METOPROLOL TARTRATE 5 MG: 5 INJECTION, SOLUTION INTRAVENOUS at 10:05

## 2023-05-19 RX ADMIN — AMANTADINE 100 MG: 100 CAPSULE ORAL at 11:05

## 2023-05-19 RX ADMIN — DILTIAZEM HYDROCHLORIDE 30 MG: 30 TABLET, FILM COATED ORAL at 12:05

## 2023-05-19 RX ADMIN — DEXTROSE 500 MG: 50 INJECTION, SOLUTION INTRAVENOUS at 09:05

## 2023-05-19 RX ADMIN — METOPROLOL TARTRATE 5 MG: 5 INJECTION, SOLUTION INTRAVENOUS at 09:05

## 2023-05-19 RX ADMIN — METOPROLOL TARTRATE 100 MG: 50 TABLET, FILM COATED ORAL at 09:05

## 2023-05-19 RX ADMIN — FUROSEMIDE 40 MG: 10 INJECTION, SOLUTION INTRAMUSCULAR; INTRAVENOUS at 10:05

## 2023-05-19 RX ADMIN — PANTOPRAZOLE SODIUM 40 MG: 40 INJECTION, POWDER, FOR SOLUTION INTRAVENOUS at 11:05

## 2023-05-19 RX ADMIN — DEXTROSE 500 MG: 50 INJECTION, SOLUTION INTRAVENOUS at 03:05

## 2023-05-19 RX ADMIN — METOPROLOL TARTRATE 100 MG: 50 TABLET, FILM COATED ORAL at 11:05

## 2023-05-19 RX ADMIN — SACUBITRIL AND VALSARTAN 1 TABLET: 24; 26 TABLET, FILM COATED ORAL at 11:05

## 2023-05-19 RX ADMIN — DILTIAZEM HYDROCHLORIDE 30 MG: 30 TABLET, FILM COATED ORAL at 05:05

## 2023-05-19 RX ADMIN — METOPROLOL TARTRATE 5 MG: 5 INJECTION, SOLUTION INTRAVENOUS at 11:05

## 2023-05-19 NOTE — PROGRESS NOTES
Pending sale to [facility]  Progress Note    Patient Name: Teto Mendez  MRN: [id number]  Patient Class: IP- Inpatient   Admission Date: 5/11/2023  Length of Stay: 8 days  Attending Physician: Neo Clark MD  Primary Care Provider: Primary Doctor No        Subjective:     Principal Problem:Status epilepticus        HPI:  Teto Mendez is a 67 y.o. White male with known history of  glioblastoma s/p resection, seizures p.afib on xarelto who presented to ER this morning with left arm weakness, facial twitching and change in his speech that he suspected was seizure activity.  He took an extra keppra and presented to ER around 6:30-7 am.  Head CT/CTA in ER without hemorrhage or LVO and he was not a candidate for tPA regardless.  He was given lorazepam and keppra with positive response, however, while still in ER he became less responsive and repeat stat CT was ordered.  While in CT, he became hypoxic with depressed respiratory status and required bag-mask ventilation and emergent intubation on return to ICU.  History was obtained from the ER physician Sign-out.      Overview/Hospital Course:  Teto Mendez is a 67 year old male with a past medical history of glioblastoma s/p craniotomy complicated by seizures, obesity, HTN, AFib and GERD who presented with status epilepticus. Neurology has been consulted. The patient was intubated on propofol sedation with Keppra and valproic acid. Pulmonary has been consulted. Continuous EEG is ordered; the patient continued to have PLEDs throughout his course. MRI brain shows R MCA region infarcts consistent with acute CVA. His course has been complicated by Afib with RVR as well as a HFrEF seen on TTE which appears to be chronic. Cardiology has been consulted. He is on metoprolol and Xarelto with PRN diuresis. Entresto and high dose atorvastatin was also added. Diltiazem was also added by Cardiology 5/18.  Sedation with propofol and Precedex was stopped 5/15.  "Vimpat was also added 5/15 by Neurology. He has been monitored for improvement in his neurologic status for > 72 hours and is noted to have some right hand squeeze when directed as well as some eye opening as of 5/18. Neurology has consulted Palliative Care 5/17 for assistance with end of life care; they may consider changing code status to DNR; they are hopeful the patient may continue show signs of neurologic progress, but acknowledge the severity of his case.      Interval History: see "Hospital Course"    Review of Systems   Unable to perform ROS: Patient nonverbal   Objective:     Vital Signs (Most Recent):  Temp: (!) 100.7 °F (38.2 °C) (05/19/23 0715)  Pulse: (!) 114 (05/19/23 0715)  Resp: (!) 25 (05/19/23 0715)  BP: (!) 143/75 (05/19/23 0937)  SpO2: 99 % (05/19/23 0715) Vital Signs (24h Range):  Temp:  [98 °F (36.7 °C)-100.7 °F (38.2 °C)] 100.7 °F (38.2 °C)  Pulse:  [] 114  Resp:  [22-36] 25  SpO2:  [90 %-100 %] 99 %  BP: (114-171)/(69-97) 143/75  Arterial Line BP: (139-159)/(74-98) 159/87     Weight: 92.4 kg (203 lb 11.3 oz)  Body mass index is 30.08 kg/m².    Intake/Output Summary (Last 24 hours) at 5/19/2023 1047  Last data filed at 5/19/2023 0618  Gross per 24 hour   Intake 2069.17 ml   Output 1650 ml   Net 419.17 ml         Physical Exam  Vitals and nursing note reviewed.   Constitutional:       Appearance: He is ill-appearing.   HENT:      Head: Normocephalic and atraumatic.      Right Ear: External ear normal.      Left Ear: External ear normal.      Nose: Nose normal.      Comments: NG tube.     Mouth/Throat:      Mouth: Mucous membranes are moist.      Pharynx: Oropharynx is clear.      Comments: ETT.  Cardiovascular:      Rate and Rhythm: Tachycardia present. Rhythm irregular.      Pulses: Normal pulses.      Heart sounds: Normal heart sounds.   Pulmonary:      Effort: Pulmonary effort is normal.      Breath sounds: Normal breath sounds.   Abdominal:      General: Bowel sounds are normal.      " Palpations: Abdomen is soft.   Genitourinary:     Comments: Iraheta.  Musculoskeletal:      Right lower leg: Edema present.      Left lower leg: Edema present.   Skin:     General: Skin is warm and dry.   Neurological:      GCS: GCS eye subscore is 3. GCS verbal subscore is 1. GCS motor subscore is 6.           Significant Labs: All pertinent labs within the past 24 hours have been reviewed.    Significant Imaging: I have reviewed all pertinent imaging results/findings within the past 24 hours.      Assessment/Plan:      * Status epilepticus  In setting of glioblastoma. MRI brain also shows R MCA region infarcts. GCS 10 off sedation.  -Continue Depacon and Keppra  -Vimpat added 5/14  -Amantadine  -Neurology following  -Continuous EEG  -Continue intubation with mechanical ventilation  -Palliative Care consulted    Fever  Unclear if infectious of related to seziures.  -U/A, blood cultures, CXR, and procalcitonin  -Consider antibiotics should fever persist      Acute CVA (cerebrovascular accident)    Antithrombotics for secondary stroke prevention: Anticoagulants: Rivaroxaban 20 mg daily    Statins for secondary stroke prevention and hyperlipidemia, if present:   Statins: Atorvastatin- 80 mg daily    Aggressive risk factor modification: HTN, HLD, Obesity, A-Fib     Rehab efforts: The patient has been evaluated by a stroke team provider and the therapy needs have been fully considered based off the presenting complaints and exam findings. The following therapy evaluations are needed: None    Diagnostics ordered/pending: CTA Neck/Arch to assess vasculature, Lipid Profile to assess cholesterol levels, MRI head without contrast to assess brain parenchyma, TTE to assess cardiac function/status     VTE prophylaxis: None: Reason for No Pharmacological VTE Prophylaxis: Currently on anticoagulation    BP parameters: Infarct: No intervention, SBP <220        Glioblastoma  Chronic. Receives care in [place].  -Continue  Decadron  -Palliative Care consulted    Acute hypoxemic respiratory failure  In setting of status epilepticus.  -Continuous EEG  -Continue mechanical ventilation given neurologic status  -Pulmonary consulted  -ABG PRN  -Palliative Care consulted      Hypernatremia  -Increase free water flushes on tube feeds  -Trend Na      Goals of care, counseling/discussion  -Palliative Care consulted  -Full code at this time with possible change to DNR    HFrEF (heart failure with reduced ejection fraction)  Unclear chronicity. BNP unremarkable.  -Cardiology following  -Metoprolol  -Lasix PRN  -Telemetry  -Entresto      GERD (gastroesophageal reflux disease)  -PPI      Gout  -Continue home allopurinol      Obesity  Body mass index is 30.08 kg/m². Morbid obesity complicates all aspects of disease management from diagnostic modalities to treatment.         Paroxysmal atrial fibrillation  -Xarelto  -Metoprolol  -Diltiazem added by Cardiology 5/19  -Telemetry  -Cardiology consulted        Essential hypertension  -Metoprolol  -Entresto  -Continue to monitor      VTE Risk Mitigation (From admission, onward)         Ordered     rivaroxaban tablet 20 mg  With dinner         05/13/23 0902     Reason for No Pharmacological VTE Prophylaxis  Once        Question:  Reasons:  Answer:  Already adequately anticoagulated on oral Anticoagulants    05/11/23 1054     IP VTE HIGH RISK PATIENT  Once         05/11/23 1054     Place sequential compression device  Until discontinued         05/11/23 1054                Discharge Planning   BARBARA: 5/22/2023     Code Status: Full Code   Is the patient medically ready for discharge?:     Reason for patient still in hospital (select all that apply): Patient trending condition, Treatment, Consult recommendations and Pending disposition  Discharge Plan A:  (TBD)                  Neo Clark MD  Department of Hospital Medicine   [facility]

## 2023-05-19 NOTE — ASSESSMENT & PLAN NOTE
In setting of status epilepticus.  -Continuous EEG  -Continue mechanical ventilation given neurologic status  -Pulmonary consulted  -ABG PRN  -Palliative Care consulted

## 2023-05-19 NOTE — PROGRESS NOTES
PT RR>50, Sp02 88%, , and coughing  incessant producing copious amounts of secretions.  RT switched pt to AC vent settings per order and increased Fi02 to 40%. . ABG drawn on AC settings

## 2023-05-19 NOTE — PROGRESS NOTES
[facility]  Department of Cardiology  Consult Note      PATIENT NAME: Teto Mendez  MRN: [id number]  TODAY'S DATE: 05/19/2023  ADMIT DATE: 5/11/2023                          CONSULT REQUESTED BY: Neo Clark MD    SUBJECTIVE     PRINCIPAL PROBLEM: Status epilepticus      REASON FOR CONSULT:    AFRVR    Interval history:    5/19/23    Patient AFib RVR during examination rates 140s.  This morning NG tube was lost and had to be reinserted.  Patient just received morning medications when we rounded.  Blood pressure 140/79.  Patient remains on mechanical ventilation and off sedation.  No changes in neurological status.  Patient continues to attempt to open his eyes to verbal stimuli.  Intermittently squeezes hands to command.  Chest x-ray negative.    05/18/2023  Minimal improvement in neurological status.  Patient is moving eyebrows and attempting to open eyes to name and verbal stimuli.  Nursing staff states that he squeezed their hand to command earlier this morning.  Remains in AFib on tele.  Rates 113.  BP stable.  Patient remains and spontaneous mode on mechanical ventilation 21% FiO2, 5 of PEEP.  Potassium 3.6.    5/17/23    Patient remains on mechanical ventilation. Patient has been off sedation for 48 hours. AFRVR on tele.   Unresponsive to verbal or physical stimuli.  No family at bedside.   No edema BP stable. K 3.4.  Cr. 0.9.     5/16/23    Patient is off sedation. Remains on mechanical ventilation. Neurology following. Rate controlled AF.  K 3.3 this am. No response to verbal or physical stimulation.  L facial twitching. Dr. James at bedside as well. No seizure activity noted on EEG with facial twitching.  -110.  /80.  96% O2.  35% Fio2 on vent. Peep 5.  SBT.      5/15/23    Patient remains on mechanical ventilation.  Remains on EEG.  Propofol has been weaned off and Precedex has been started an exchange.  During examination, patient had left facial twitching and coughing with  mild stimulation.  Seizure activity noted on EEG overnight per nursing.  Potassium is 3.4 this a.m., replace per protocol.  Patient was alkalotic pH 7.5, PO2 67, HC03 29.6.  Patient was given Diamox 250 mg IV.  Rate controlled AFib on the monitor.  Entresto started yesterday.  Creatinine 1.1 today.      05/14/2023    Patient remains on mechanical ventilation and sedation with propofol.  Plans for MRI of the brain today.  Potassium 3.3 this a.m., replaced was 70 mEq of KCl.  Urinary output 1950 mL yesterday.  No output recorded yet today.  Hemodynamically stable.     MRI today:  IMPRESSION:  1. Cortical foci restricted signal intensity on diffusion weighted images compatible with a benign nonhemorrhagic cortical infarct with additional small infarct involving the deep subcortical white matter of the right parietal lobe.  2. No evidence of intracranial hemorrhage.    HPI:    Patient is a 67-year-old male with past medical history of GERD, glioblastoma multiforme s/p craniotomy, chemotherapy and radiation diagnosed in August 2022, history of blood clots, hypertension, PAF on Xarelto, seizures who presented to the ED with left facial pushing, left facial droop, slurred speech, left arm weakness that began at 3:00 a.m. on 5/11.  Keppra has been recently decreased.  In ED he was given 1 g of Keppra, 2 mg of Ativan which broke his seizure and he was subsequently alert and responsive..  Later in CT, he had sudden worsening mental status, unresponsiveness, weakness, aphasia.  O2 saturations high 80s, and patient was intubated emergently and placed on mechanical ventilation for respiratory failure.  Seizure activity restarted and was erupted with Ativan and propofol bolus. No acute stroke/hemorrhage on CT.  Neurology is following for status epilepticus.    05/12/2023:  Patient was on EEG for monitoring of status epilepticus.  Seizure at 4:00 a.m..  Patient remains in AFib    05/13/2023: Patient was also found to be in AFib  RVR this admission, requiring IV diltiazem.  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 Normotensive.  K 3.1, Cr 1.0, Mag 2.1; CXR this am coarse interstitial markings the LLL and trace L-sided pleural effusion; on propofol drip at 40 mics per kg per minute.  On mechanical ventilation 5 of PEEP 30% FiO2.  family at bedside.  Patient is originally from [place] and has a cardiologist there.  Known history of atrial fibrillation.  History of splenic infarct.  Unknown if patient has history of reduced EF or heart failure.  He was on Lasix at home.  Patient is visiting from [place].    ECHO this am  The left ventricle is mildly enlarged with concentric remodeling and  The estimated ejection fraction is 38%.  There is left ventricular global hypokinesis.  Mild mitral regurgitation.  Normal central venous pressure (3 mmHg).  The estimated PA systolic pressure is 26 mmHg.  Atrial fibrillation observed.  Normal right ventricular size with normal right ventricular systolic function.  Moderate left atrial enlargement.  Moderate right atrial enlargement.        FROM H&P  Teto Mendez is a 67 y.o. White male with known history of  glioblastoma s/p resection, seizures p.afib on xarelto who presented to ER this morning with left arm weakness, facial twitching and change in his speech that he suspected was seizure activity.  He took an extra keppra and presented to ER around 6:30-7 am.  Head CT/CTA in ER without hemorrhage or LVO and he was not a candidate for tPA regardless.  He was given lorazepam and keppra with positive response, however, while still in ER he became less responsive and repeat stat CT was ordered.  While in CT, he became hypoxic with depressed respiratory status  and required bag-mask ventilation and emergent intubation on return to ICU.  History was obtained from the ER physician Sign-out.            Review of patient's allergies indicates:  No Known Allergies    Past Medical History:   Diagnosis Date    GERD (gastroesophageal reflux disease)     Glioblastoma     H/O blood clots     Hypertension     Paroxysmal atrial fibrillation     Seizures      No past surgical history on file.        REVIEW OF SYSTEMS  Unable to obtain. Remains minimally responsive on mechanical ventilation    OBJECTIVE     VITAL SIGNS (Most Recent)  Temp: 100.3 °F (37.9 °C) (05/19/23 1530)  Pulse: (!) 117 (05/19/23 1530)  Resp: (!) 24 (05/19/23 1530)  BP: (!) 154/87 (05/19/23 1515)  SpO2: 100 % (05/19/23 1530)    VENTILATION STATUS  Resp: (!) 24 (05/19/23 1530)  SpO2: 100 % (05/19/23 1530)  Vent Mode: A/C  Oxygen Concentration (%):  [21-40] 40  Resp Rate Total:  [18 br/min-39 br/min] 23 br/min  Vt Set:  [450 mL] 450 mL  PEEP/CPAP:  [5 cmH20] 5 cmH20  Pressure Support:  [10 cmH20-15 cmH20] 15 cmH20  Mean Airway Pressure:  [8.3 zyW09-17 cmH20] 12 cmH20        I & O (Last 24H):  Intake/Output Summary (Last 24 hours) at 5/19/2023 1643  Last data filed at 5/19/2023 1344  Gross per 24 hour   Intake 2069.17 ml   Output 2450 ml   Net -380.83 ml       WEIGHTS  Wt Readings from Last 3 Encounters:   05/13/23 0001 92.4 kg (203 lb 11.3 oz)   05/11/23 1230 84 kg (185 lb 3 oz)   05/11/23 0641 83.9 kg (185 lb)   05/13/23 1042 92.1 kg (203 lb)       Physical examination:      Constitutional:  Critically ill appearing male on mechanical ventilation, no acute distress   Neck: no carotid bruit, no JVD  Lungs: coarse breath sounds, mechanical ventilation- FiO2 35%, 5 peep  Chest Wall: no tenderness  CARDIAC:  Irregular rate and rhythm, AFib RVR, soft systolic murmur  Abdomen: soft, bowel sounds normal; NG tube in left nare  : minaya catheter  Extremities: Extremities normal, atraumatic, no cyanosis, clubbing or  edema  Skin: Skin color, texture, turgor normal. No rashes or lesions  Neuro:  attempting to opening his eyes to verbal stimuli    HOME MEDICATIONS:  No current facility-administered medications on file prior to encounter.     Current Outpatient Medications on File Prior to Encounter   Medication Sig Dispense Refill    allopurinoL (ZYLOPRIM) 100 MG tablet Take 100 mg by mouth once daily.      amLODIPine (NORVASC) 10 MG tablet Take 10 mg by mouth once daily.      dexAMETHasone (DECADRON) 1 MG Tab Take 1 mg by mouth every 12 (twelve) hours.      hydrALAZINE (APRESOLINE) 25 MG tablet Take 25 mg by mouth 3 (three) times daily.      levETIRAcetam (KEPPRA) 500 MG Tab Take 500 mg by mouth 2 (two) times daily.      metoprolol succinate (TOPROL-XL) 100 MG 24 hr tablet Take 100 mg by mouth once daily.      pantoprazole (PROTONIX) 40 MG tablet Take 40 mg by mouth once daily.      XARELTO 20 mg Tab Take 20 mg by mouth once daily.      clotrimazole (MYCELEX) 10 mg kathleen Take 1 tablet by mouth 3 (three) times daily.      colchicine (COLCRYS) 0.6 mg tablet Take 0.6 mg by mouth once daily.      furosemide (LASIX) 20 MG tablet Take 20 mg by mouth once daily.      meclizine (ANTIVERT) 12.5 mg tablet Take 25 mg by mouth 3 (three) times daily.      rivaroxaban (XARELTO) 20 mg Tab Take 20 mg by mouth daily with dinner or evening meal.      timolol maleate 0.5% (TIMOPTIC) 0.5 % Drop Place 1 drop into both eyes 2 (two) times daily.         SCHEDULED MEDS:   allopurinoL  100 mg Per NG tube Daily    amantadine HCL  100 mg Per NG tube BID    atorvastatin  80 mg Per NG tube Daily    dexAMETHasone  1 mg Per NG tube Q12H    diltiaZEM  30 mg Oral Q6H    lacosamide (VIMPAT) IVPB  200 mg Intravenous Q12H    levetiracetam IV  1,500 mg Intravenous Q12H    metoprolol tartrate  100 mg Per NG tube BID    pantoprazole  40 mg Intravenous Daily    rivaroxaban  20 mg Per NG tube Daily with dinner    sacubitriL-valsartan  1 tablet Per G Tube BID    timolol  maleate 0.25%  1 drop Both Eyes BID    valproate sodium (DEPACON) IVPB  500 mg Intravenous Q8H       CONTINUOUS INFUSIONS:        PRN MEDS:acetaminophen, colchicine, hydrALAZINE, magnesium sulfate IVPB, magnesium sulfate IVPB, metoprolol, ondansetron, polyethylene glycol, potassium bicarbonate, potassium bicarbonate, potassium bicarbonate, sodium chloride 0.9%, sodium phosphate IVPB, sodium phosphate IVPB, sodium phosphate IVPB    LABS AND DIAGNOSTICS     CBC LAST 3 DAYS  Recent Labs   Lab 05/17/23 0403 05/17/23 0433 05/18/23 0437 05/19/23 0409   WBC 7.54  --  6.23 7.39   RBC 5.31  --  4.91 4.98   HGB 16.4  --  15.1 15.5   HCT 48.7 47 46.5 47.0   MCV 92  --  95 94   MCH 30.9  --  30.8 31.1*   MCHC 33.7  --  32.5 33.0   RDW 14.4  --  14.7* 14.6*   *  --  123* 145*   MPV 10.8  --  11.0 10.7   GRAN 67.3  5.1  --  72.0  4.5 75.2*  5.6   LYMPH 10.6*  0.8*  --  11.1*  0.7* 10.6*  0.8*   MONO 20.4*  1.5*  --  15.4*  1.0 13.0  1.0   BASO 0.02  --  0.02 0.02   NRBC 0  --  0 0       COAGULATION LAST 3 DAYS  No results for input(s): LABPT, INR, APTT in the last 168 hours.      CHEMISTRY LAST 3 DAYS  Recent Labs   Lab 05/17/23 0403 05/17/23 0433 05/18/23 0437 05/18/23  0439 05/19/23  0409 05/19/23  0510     --  144  --  147*  --    K 3.4*  --  3.6  --  3.9  --      --  110  --  113*  --    CO2 26  --  27  --  26  --    ANIONGAP 7*  --  7*  --  8  --    BUN 40*  --  46*  --  42*  --    CREATININE 0.9  --  1.0  --  0.9  --    *  --  123*  --  138*  --    CALCIUM 7.9*  --  7.6*  --  8.4*  --    PH  --  7.498*  --  7.499*  --  7.561*   MG 2.2  --  2.1  --  2.1  --    ALBUMIN 2.3*  --  2.1*  --  2.4*  --    PROT 5.6*  --  5.1*  --  5.9*  --    ALKPHOS 41*  --  40*  --  43*  --    ALT 45*  --  50*  --  77*  --    AST 48*  --  61*  --  104*  --    BILITOT 1.0  --  1.0  --  1.0  --        CARDIAC PROFILE LAST 3 DAYS  Recent Labs   Lab 05/13/23  0422 05/13/23  1544   BNP 93  --    TROPONINIHS   --  7.0       ENDOCRINE LAST 3 DAYS  No results for input(s): TSH, PROCAL in the last 168 hours.      LAST ARTERIAL BLOOD GAS  ABG  Recent Labs   Lab 05/19/23  0510   PH 7.561*   PO2 114*   PCO2 33.3*   HCO3 29.9*   BE 8       LAST 7 DAYS MICROBIOLOGY   Microbiology Results (last 7 days)       Procedure Component Value Units Date/Time    Blood culture [[id number]] Collected: 05/19/23 1504    Order Status: Sent Specimen: Blood Updated: 05/19/23 1545    Narrative:      Collection has been rescheduled by [email or web address] at 05/19/2023 11:30 Reason:   RAMYA Aguilar is messaging the dr will call when ready   Collection has been rescheduled by [email or web address] at 05/19/2023 11:30 Reason:   RAMYA Aguilar is messaging the dr will call when ready     Blood culture [[id number]] Collected: 05/19/23 1504    Order Status: Sent Specimen: Blood Updated: 05/19/23 1544    Narrative:      Collection has been rescheduled by [email or web address] at 05/19/2023 11:30 Reason:   RAMYA Aguilar is messaging the dr will call when ready   Collection has been rescheduled by [email or web address] at 05/19/2023 11:30 Reason:   RAMYA Aguilar is messaging the dr will call when ready     Culture, Respiratory with Gram Stain [[id number]] Collected: 05/15/23 0059    Order Status: Completed Specimen: Respiratory from Sputum Updated: 05/17/23 0738     Respiratory Culture Normal respiratory fredi     Gram Stain (Respiratory) <10 epithelial cells per low power field.     Gram Stain (Respiratory) Few WBC's     Gram Stain (Respiratory) Few Gram positive cocci            MOST RECENT IMAGING  X-Ray Chest AP Portable  Reason: Re-placed NG tube    FINDINGS:    Portable chest with comparison chest x-ray May 15, 2023 show normal cardiomediastinal silhouette. Endotracheal tube tip is 4.3 cm superior to the mio. Nasogastric tube tip and side-port are below the gastroesophageal junction. Left PICC line again noted. No pneumothorax.  Lungs are clear. Pulmonary vasculature is normal. No acute osseous abnormality.    IMPRESSION:    Endotracheal  tube and nasogastric tube in place.    Electronically signed by:  Micheal Dumas DO  5/19/2023 11:04 AM CDT Workstation: [id number]      ECHOCARDIOGRAM RESULTS (last 5)  No results found for this or any previous visit.      CURRENT/PREVIOUS VISIT EKG  Results for orders placed or performed during the hospital encounter of 05/11/23   ECG 12 lead    Collection Time: 05/11/23  7:28 AM    Narrative    Test Reason : I63.9,    Vent. Rate : 120 BPM     Atrial Rate : 150 BPM     P-R Int : 000 ms          QRS Dur : 104 ms      QT Int : 370 ms       P-R-T Axes : 000 016 -12 degrees     QTc Int : 522 ms    Atrial fibrillation with rapid ventricular response  Nonspecific ST abnormality  Prolonged QT  Abnormal ECG  No previous ECGs available  Confirmed by Tadeo BARTLETT, Joe CEBALLOS ([id number]) on 5/11/2023 10:07:18 PM    Referred By: PADMINI   SELF           Confirmed By:Joe Piedra MD           ASSESSMENT/PLAN:     Active Hospital Problems    Diagnosis    *Status epilepticus    Goals of care, counseling/discussion    Fever    Hypernatremia    Acute CVA (cerebrovascular accident)    Obesity    Gout    GERD (gastroesophageal reflux disease)    HFrEF (heart failure with reduced ejection fraction)    Essential hypertension    Paroxysmal atrial fibrillation    Glioblastoma    Acute hypoxemic respiratory failure       ASSESSMENT & PLAN:     HFrEF-EF 38%  Paroxysmal Atrial Fibrillation  Hypokalemia  Status Epilepticus  Acute respiratory failure with hypoxia and hypercapnia  Glioblastoma        RECOMMENDATIONS:    AFib RVR this a.m..  One time dose IV Lopressor 5 mg given.  NG tube had to be replaced and patient had not yet received medications.  Continue Xarelto 20 mg daily. Continue metoprolol 100 mg BID.  Will start patient on diltiazem 30 mg q.6 hours. Titrate as necessary for rate control.    New Infarct on MRI this admission. Patient mildly responsive to verbal stimuli. Neurology following.    Continue to check and replace potassium  and magnesium. Goal for potassium is 4.0, and goal for magnesium is 2.0.    EF 38% and LV global hypokinesis.  Euvolemic. Strict I's and O's.  Continue Entresto 24-26 mg BID for heart failure.  Thank you for the consultation.  We will continue to follow PRN over the weekend.       Kiana Moore NP  Department of Cardiology  Date of Service: 05/19/2023    Patient has periods of tachycardia beta-blockers at been on hold currently recommend intravenous Lopressor 5 mg in the maintain on b.i.d. regimen as he was on before.  And also has responded well to diltiazem q.6 hours continue combination of the same as long as renal function is stable continue on Entresto 24/26 mg p.o. b.i.d..  I have personally  examined the patient, I have reviewed the Nurse Practitioner's history and physical, assessment, and plan. I agree with the findings and plan.  Will sign off for now please please re-consult as needed thank you     Zachariah Hughes M.D.  Department of Cardiology  Date of Service: 05/19/2023  9:45 AM

## 2023-05-19 NOTE — PROGRESS NOTES
[facility]  Adult Nutrition   Progress Note (Nutrition Support Management)    SUMMARY      Recommendations:   1. Continue current TF of Vital HP at 75 mls/h providing 1800 kcals, 158 g protein and 1505 mls fluid  2. Continue FWF's of 30 mls every 4 hours to clear tube.  3. Rd will continue to monitor all aspects of enteral feeding and make recommendations prn.          Goals:   1) Pt to tolerate TF at the goal rate.   2) Nutrition provision to meet 75 to 100% of pts energy needs and 100% or more of his protein needs. 3) Electrolytes to trend towards target range of WNL's.    Dietitian Rounds Brief  F/U Nutrition Note: Pts OG tube got pulled while bathing but his nurse today was able to get an NG tube down between 10 and 11 am and started his TF back and it is at goal of 75. His LBM was today and will continue to follow prn.    MD Milner PN's:  5/15 The patient is still on the vent.  He is on Precedex, the propofol is off.  He was still having seizure activity yesterday.  MRI shows parietal and temporal infarcts on the right.     5/16 the patient is been off all sedation for 24 hours and has a GCS of 3.  Has pupils and corneals and gag and breathes spontaneously but no response to pain.  He does not respond to his voice.  He follows no commands.     5/17 the patient remains unresponsive.  He partially opens his eyes to noxious stimuli.  He follows no commands.  Dr. Whitfield wants to give him 72 hours from stopping the sedation to reassess his neurologic recovery.  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     5/18 the patient remains densely encephalopathic.  To vigorous tactile stimulation he will partially open his eyes.  The nurse swears that he squeezed with his right hand.     5/19 the patient remains densely encephalopathic.  Actually is lightly squeezing with his right hand today.  Opens his eyes nursing home when stimulated.  His heart rate is  157 because he has not gotten his metoprolol because his OG tube was pulled out during his bath.       Diet order: NPO    TF: Vital HP  Rate: 75 ml/h  Calories: 1800  Protein: 158 g  Fluid: 1505  Water flushes: 30 mls every 4 hours    % Intake of Estimated Energy Needs: 75 - 100 %  % Meal Intake: NPO    Estimated/Assessed Needs  Weight Used For Calorie Calculations: 92 kg (202 lb 13.2 oz)  Energy Calorie Requirements (kcal): 8918-0163 kcals/day (11-14 kcals/kg ABW), 1977 (modified PSE)  Energy Need Method: Kcal/kg  Protein Requirements: 146-183 g/day (2-2.5 g/kg IBW)  Weight Used For Protein Calculations: 73 kg (160 lb 15 oz)  Fluid Requirements (mL): 1860 (30 ml/kg IBW)  Estimated Fluid Requirement Method: RDA Method  RDA Method (mL): 1012       Weight History:  Wt Readings from Last 5 Encounters:   05/13/23 92.4 kg (203 lb 11.3 oz)   05/13/23 92.1 kg (203 lb)        Reason for Assessment  Reason For Assessment: RD follow-up  Diagnosis: seizures  Relevant Medical History: GERD; gioblastoma; h/o blood clots; HTN; paroxysmal A.fib.; seizures  Interdisciplinary Rounds: attended    Medications:Pertinent Medications Reviewed  Scheduled Meds:   allopurinoL  100 mg Per NG tube Daily    amantadine HCL  100 mg Per NG tube BID    atorvastatin  80 mg Per NG tube Daily    dexAMETHasone  1 mg Per NG tube Q12H    diltiaZEM  30 mg Oral Q6H    lacosamide (VIMPAT) IVPB  200 mg Intravenous Q12H    levetiracetam IV  1,500 mg Intravenous Q12H    metoprolol tartrate  100 mg Per NG tube BID    pantoprazole  40 mg Intravenous Daily    rivaroxaban  20 mg Per NG tube Daily with dinner    sacubitriL-valsartan  1 tablet Per G Tube BID    timolol maleate 0.25%  1 drop Both Eyes BID    valproate sodium (DEPACON) IVPB  500 mg Intravenous Q8H     Continuous Infusions:  PRN Meds:.acetaminophen, colchicine, hydrALAZINE, magnesium sulfate IVPB, magnesium sulfate IVPB, metoprolol, ondansetron, polyethylene glycol, potassium bicarbonate, potassium  bicarbonate, potassium bicarbonate, sodium chloride 0.9%, sodium phosphate IVPB, sodium phosphate IVPB, sodium phosphate IVPB    Labs: Pertinent Labs Reviewed  Clinical Chemistry:  Recent Labs   Lab 05/17/23  0403 05/18/23  0437 05/19/23  0409    144 147*   K 3.4* 3.6 3.9    110 113*   CO2 26 27 26   * 123* 138*   BUN 40* 46* 42*   CREATININE 0.9 1.0 0.9   CALCIUM 7.9* 7.6* 8.4*   PROT 5.6* 5.1* 5.9*   ALBUMIN 2.3* 2.1* 2.4*   BILITOT 1.0 1.0 1.0   ALKPHOS 41* 40* 43*   AST 48* 61* 104*   ALT 45* 50* 77*   ANIONGAP 7* 7* 8   MG 2.2 2.1 2.1   PHOS 2.0* 3.0 2.8     CBC:   Recent Labs   Lab 05/19/23  0409   WBC 7.39   RBC 4.98   HGB 15.5   HCT 47.0   *   MCV 94   MCH 31.1*   MCHC 33.0     Lipid Panel:  Recent Labs   Lab 05/15/23  0308   CHOL 250*   HDL 30*   LDLCALC 191.4*   TRIG 143   CHOLHDL 12.0*     Cardiac Profile:  Recent Labs   Lab 05/13/23  0422   BNP 93     Diabetes:  Recent Labs   Lab 05/15/23  0308   HGBA1C 5.7     Monitor and Evaluation  Food and Nutrient Intake: enteral nutrition intake  Food and Nutrient Adminstration: enteral and parenteral nutrition administration  Knowledge/Beliefs/Attitudes: beliefs and attitudes, food and nutrition knowledge/skill  Physical Activity and Function: nutrition-related ADLs and IADLs, factors affecting access to physical activity  Anthropometric Measurements: weight change, weight, body mass index  Biochemical Data, Medical Tests and Procedures: lipid profile, inflammatory profile, glucose/endocrine profile, gastrointestinal profile, electrolyte and renal panel  Nutrition-Focused Physical Findings: overall appearance     Nutrition Risk  Level of Risk/Frequency of Follow-up: moderate     Nutrition Follow-Up  RD Follow-up?: Yes    Carolina Bush, CARLYLE 05/19/2023 2:03 PM

## 2023-05-19 NOTE — ASSESSMENT & PLAN NOTE
I reviewed his chart and discussed his case with his team.    I examined Mr. Mendez at bedside. He lacks capacity. I spoke with his daughter and various other family members at bedside and by phone.     I introduced myself and my role as a palliative care physician. They were agreeable to speaking.     We discussed his medical illness, prognosis, and values in detail. They have an excellent understanding of his current situation.    Briefly, they understand he has multiple medical underlying medical issues and is admitted and being treated for new stroke and status epilepticus. He is currently obtunded and has minimal neurologic recovery since admission about 1 week ago.    We discussed prognosis. I explained my worry that his current state likely close to a new baseline.     We discussed his values. He is a fighter and is willing to go through a lot if there is any hope for recovery. He would not wish to suffer in his current state indefinitely.    For now they wish to continue with current measures being hopeful for the best and prepared for the worst.     If he reaches the trach/peg crossroads and he is not making meaningful recovery, at that point family would desire to transport him home to [place] where he can then be transitioned to comfort measures. They have already looked into the out of pocket cost for transport and are wiling to pay what it takes to get him back to [place] if the worst case scenario occurs.     I did discuss code status and the clinical realities of a code in his situation. Based on his values I made a recommendation of a DNR code status. His daughter is going to review his living will prior to making a change, but does agree with my recommendation.    They are asking good questions and I feel very confident they will make patient centered decisions going forward.     I appreciate being involved. I hope I have been helpful.    I will continue to check in periodically while he remains  admitted.

## 2023-05-19 NOTE — ASSESSMENT & PLAN NOTE
Unclear if infectious of related to seziures.  -U/A, blood cultures, CXR, and procalcitonin  -Consider antibiotics should fever persist

## 2023-05-19 NOTE — ASSESSMENT & PLAN NOTE
In setting of glioblastoma. MRI brain also shows R MCA region infarcts. GCS 10 off sedation.  -Continue Depacon and Keppra  -Vimpat added 5/14  -Amantadine  -Neurology following  -Continuous EEG  -Continue intubation with mechanical ventilation  -Palliative Care consulted

## 2023-05-19 NOTE — SUBJECTIVE & OBJECTIVE
"Interval History: see "Hospital Course"    Review of Systems   Unable to perform ROS: Patient nonverbal   Objective:     Vital Signs (Most Recent):  Temp: (!) 100.7 °F (38.2 °C) (05/19/23 0715)  Pulse: (!) 114 (05/19/23 0715)  Resp: (!) 25 (05/19/23 0715)  BP: (!) 143/75 (05/19/23 0937)  SpO2: 99 % (05/19/23 0715) Vital Signs (24h Range):  Temp:  [98 °F (36.7 °C)-100.7 °F (38.2 °C)] 100.7 °F (38.2 °C)  Pulse:  [] 114  Resp:  [22-36] 25  SpO2:  [90 %-100 %] 99 %  BP: (114-171)/(69-97) 143/75  Arterial Line BP: (139-159)/(74-98) 159/87     Weight: 92.4 kg (203 lb 11.3 oz)  Body mass index is 30.08 kg/m².    Intake/Output Summary (Last 24 hours) at 5/19/2023 1047  Last data filed at 5/19/2023 0618  Gross per 24 hour   Intake 2069.17 ml   Output 1650 ml   Net 419.17 ml         Physical Exam  Vitals and nursing note reviewed.   Constitutional:       Appearance: He is ill-appearing.   HENT:      Head: Normocephalic and atraumatic.      Right Ear: External ear normal.      Left Ear: External ear normal.      Nose: Nose normal.      Comments: NG tube.     Mouth/Throat:      Mouth: Mucous membranes are moist.      Pharynx: Oropharynx is clear.      Comments: ETT.  Cardiovascular:      Rate and Rhythm: Tachycardia present. Rhythm irregular.      Pulses: Normal pulses.      Heart sounds: Normal heart sounds.   Pulmonary:      Effort: Pulmonary effort is normal.      Breath sounds: Normal breath sounds.   Abdominal:      General: Bowel sounds are normal.      Palpations: Abdomen is soft.   Genitourinary:     Comments: Iraheta.  Musculoskeletal:      Right lower leg: Edema present.      Left lower leg: Edema present.   Skin:     General: Skin is warm and dry.   Neurological:      GCS: GCS eye subscore is 3. GCS verbal subscore is 1. GCS motor subscore is 6.           Significant Labs: All pertinent labs within the past 24 hours have been reviewed.    Significant Imaging: I have reviewed all pertinent imaging results/findings " within the past 24 hours.

## 2023-05-19 NOTE — PROGRESS NOTES
[facility]  Neurology Progress Note    Patient Name: Teto Mendez  MRN: [id number]  : 1955  TODAY'S DATE: 2023  ADMIT DATE: 2023  6:39 AM                                          CONSULT REQUESTED BY: Neo Clark MD     Chief Complaint   Patient presents with    FACIAL TWITCHING    SLURRED SPEECH       HPI per EMR:  Patient presents with history of glioblastoma concurrently getting chemo in previous radiation complaining of facial twitching, facial droop, slurred speech, left arm weakness.  Symptom onset at 3:30 a.m..  Patient states at 3:30 a.m. he was playing on a game and then began to have the twitching of the face with associated droop with slurred speech and arm weakness.  Patient states he has had similar episodes in the past secondary to the glioblastoma.  He has not had the left arm weakness.  Patient states he is on Keppra and did take an extra Keppra dose this morning additionally patient is on Xarelto.  At the worst symptoms are severe.    Neurology Consult:  Patient was seen and examined by me today. He is a 67-year-old man with history of glioblastoma multiforme s/p craniectomy, chemotherapy and radiation diagnosed in 2022, as well as seizure disorder, who presented to the ED with left facial twitching, left facial droop, slurred speech and left arm weakness that began the morning of his admission at around 3 am. He was recently decreased on his home Keppra in the setting of side effects as per daughter. Upon arrival to the ED, he was loaded with 1 gr of Keppra and given 2 mg of ativan IV, which seemed to break the seizure. As per ED physician, patient was awake, alert and able to answer questions. At around 11:50 am, I was contacted by ED provider in the setting of sudden worsening of mental status, unresponsiveness, weakness and aphasia, so he was taken to CT scan for repeat CT brain to rule out LVO stroke or hemorrhage. I evaluated patient while on CT,  and noticed O2 Sats were in the high 80s. Due to technical malfunction, CT could not be completed, at which point I decided to abort procedure and transport patient to the ICU immediately. When we arrived to the 3rd floor ICU, patient seemed to be on respiratory failure, with toe and fingertip cyanosis, unable to maintain O2 Sats, so started on manual assisted ventilation and called anesthesia for emergent intubation. Patient was successfully intubated and sedation with propofol was started, but facial twitching recurred, so ativan was given and propofol bolus was administered, after which seizure activity seemed to cease. He was taken for CT after stabilization which showed no evidence of acute stroke or intracranial hemorrhage, and CTA showed no LVO. He is now being monitored on 24H continuous EEG for management of status epilepticus.    5/12/23:  Patient was seen and examined by me today.  He remains on deep sedation for treatment of status epilepticus, being monitored on 24 hour video EEG.  A brief electroclinical seizure was seen at around 4:00 a.m. with left facial twitching, but no clinical events reported after this.  Plan is to continue deep sedation until at least tomorrow morning.  Patient to be hooked up to EEG again when propofol starts to being weaned down.    5/13/23: I, Dr James assumed care on 5/13.  Patient is intubated, sedated with propofol.  He started out continuous EEG this morning shows right hemisphere lateralized periodic discharges with epileptiform potential.  No electrographic seizures were recorded.    5/14/2023:  Patient was seen examined by me.  Remains to be intubated and sedated.  MRI brain done this morning showed right MCA cortical infarcts.  Patient has been on continuous EEG overnight and EEG showed right hemispheric PLEDs with increase in frequency of PLEDs with lowering sedation.  Patient also had a clinical focal seizure involving twitching of the left side of the face this  morning when he was off sedation.    5/15/2023:  Patient was seen examined by me.  He is intubated and Precedex.  Propofol has been turned off yesterday.  Remains to be on continues EEG which shows right hemispheric (predominantly right frontotemporal) periodic lateralized epileptiform discharges.  Episodes of left facial twitching did not have EEG correlate.    5/16: Patient was seen and examined by me. He remains to be intubated and not on sedation. No response any stimulus. On CEEG. Family was at bedside and I discussed plan of care with them. Had one episode of electro clinical seizure yesterday at 12:20 PM lasting for a minute    5/17:  Patient was seen examined by me.  Remains to be intubated and off sedation.  Continuous EEG discontinued this morning. No electrographic seizure  recorded.    5/18:  Patient was seen examined by me.  Remains to be intubated.  Patient tries to open eyes to verbal commands and minimally follows commands with right upper extremity squeezing my fingers.  No further seizure activity was noted.  EEG has been off.    5/19:  Patient was seen and examined by me.  Remains to be intubated.  Minimally follows commands with his right arm and tries to open eyes to verbal stimulus.    Scheduled Meds:   allopurinoL  100 mg Per NG tube Daily    amantadine HCL  100 mg Per NG tube BID    atorvastatin  80 mg Per NG tube Daily    dexAMETHasone  1 mg Per NG tube Q12H    diltiaZEM  30 mg Oral Q6H    furosemide (LASIX) injection  40 mg Intravenous Once    lacosamide (VIMPAT) IVPB  200 mg Intravenous Q12H    levetiracetam IV  1,500 mg Intravenous Q12H    metoprolol tartrate  100 mg Per NG tube BID    pantoprazole  40 mg Intravenous Daily    rivaroxaban  20 mg Per NG tube Daily with dinner    sacubitriL-valsartan  1 tablet Per G Tube BID    timolol maleate 0.25%  1 drop Both Eyes BID    valproate sodium (DEPACON) IVPB  500 mg Intravenous Q8H     Continuous Infusions:      PRN Meds:.acetaminophen,  colchicine, hydrALAZINE, magnesium sulfate IVPB, magnesium sulfate IVPB, metoprolol, ondansetron, polyethylene glycol, potassium bicarbonate, potassium bicarbonate, potassium bicarbonate, sodium chloride 0.9%, sodium phosphate IVPB, sodium phosphate IVPB, sodium phosphate IVPB      Physical Exam  Current Vitals:  Vitals:    05/19/23 0937   BP: (!) 143/75   Pulse:    Resp:    Temp:      General appearance: intubated, minimal response to verbal commands.  Cardiovascular: Afib  Pulmonary: on mechanical ventilation  GI: soft  MSK: normal passive ROM  Skin: normal color, no lesions    NEUROLOGICAL EXAM:    Mental status: intubated.  Not on sedation.  Minimal response to verbal commands          Intubated: yes          Sedated: no  Response to noxious stimulation:  Yes  Breathes above ventilator: yes  Opens eyes:  Tries to open eyes to verbal commands  Pupils: symmetric          Left: reactive          Right: reactive  Eye movements: No pathologic movements such as nystagmus, ocular bobbing, dipping or roving.    Corneal reflex: present  Oculocephalic reflex:  Absent  Cough:  Present  Gag:  Weak  Motor exam: Normal muscle tone. Normal muscle bulk. No abnormal movements such as tremors, myoclonus or twitching.  DTRs: 1+ throughout. Babinski response absent.      Laboratory Data & Studies    Recent Labs   Lab 05/17/23 0403 05/18/23 0437 05/19/23 0409   WBC 7.54 6.23 7.39   HGB 16.4 15.1 15.5   * 123* 145*   MCV 92 95 94       Recent Labs   Lab 05/17/23 0403 05/18/23 0437 05/19/23  0409    144 147*   K 3.4* 3.6 3.9    110 113*   CO2 26 27 26   BUN 40* 46* 42*   * 123* 138*   CALCIUM 7.9* 7.6* 8.4*   MG 2.2 2.1 2.1   PHOS 2.0* 3.0 2.8       Recent Labs   Lab 05/17/23 0403 05/18/23 0437 05/19/23 0409   PROT 5.6* 5.1* 5.9*   ALBUMIN 2.3* 2.1* 2.4*   BILITOT 1.0 1.0 1.0   AST 48* 61* 104*   ALT 45* 50* 77*   ALKPHOS 41* 40* 43*       No results for input(s): LABPT, INR, APTT in the last 168  hours.      Recent Labs   Lab 05/15/23  0308   HGBA1C 5.7   CHOL 250*   TRIG 143   LDLCALC 191.4*   HDL 30*         Microbiology:  Microbiology Results (last 7 days)       Procedure Component Value Units Date/Time    Culture, Respiratory with Gram Stain [[id number]] Collected: 05/15/23 0059    Order Status: Completed Specimen: Respiratory from Sputum Updated: 05/17/23 0738     Respiratory Culture Normal respiratory fredi     Gram Stain (Respiratory) <10 epithelial cells per low power field.     Gram Stain (Respiratory) Few WBC's     Gram Stain (Respiratory) Few Gram positive cocci              Imaging:  CT Head Without Contrast    Result Date: 5/11/2023  CMS MANDATED QUALITY DATA - CT RADIATION  436 All CT scans at this facility utilize dose modulation, iterative reconstruction, and/or weight based dosing when appropriate to reduce radiation dose to as low as reasonably achievable. CT HEAD WITHOUT IV CONTRAST CLINICAL HISTORY: 67 years Male Mental status change, unknown cause COMPARISON: Noncontrast CT head performed earlier today 6:56 AM FINDINGS: Negative for acute intracranial hemorrhage, midline shift, or mass effect. Parenchymal calcifications within the right cerebral hemisphere are stable compared to prior. Patient is status post right pterional craniotomy. Ventricles and sulci are normal in size. Gray-white differentiation is maintained. Cerebellar hemispheres and brainstem are unremarkable. No calvarial lesion or fracture. Mastoid air cells are clear. IMPRESSION: Stable exam. No CT evidence of acute intracranial pathology. Electronically signed by:  Matt Cole MD  5/11/2023 1:41 PM CDT Workstation: [id number]    X-Ray Chest AP Portable    Result Date: 5/11/2023  CLINICAL HISTORY: 67 years (1955) Male Stroke TECHNIQUE: Portable AP radiograph the chest. COMPARISON: None available. FINDINGS: Nonspecific minimal faint interstitial opacity at the left costophrenic.  No pneumothorax is identified. The  heart is mildly enlarged.  Osseous structures show degenerative changes in the spine. The visualized upper abdomen is unremarkable. IMPRESSION: Nonspecific minimal faint interstitial opacity at the left costophrenic sulcus possibly representing atelectasis, scarring, trace pleural fluid, and less likely aspiration or pneumonia. . Electronically signed by:  Solomon Tobias MD  5/11/2023 8:32 AM CDT Workstation: [id number]    CT HEAD FOR STROKE    Result Date: 5/11/2023  CMS MANDATED QUALITY DATA - CT RADIATION - 436 All CT scans at this facility utilize dose modulation, iterative reconstruction, and/or weight based dosing when appropriate to reduce radiation dose to as low as reasonably achievable. EXAMINATION: CT HEAD FOR STROKE CLINICAL HISTORY: Neuro deficit, acute, stroke suspected;  history of glioblastoma TECHNIQUE: CT without IV contrast COMPARISON: None FINDINGS: There are postsurgical changes from prior right frontal temporal These craniotomy.  The ventricles and sulci are mildly prominent compatible with generalized cerebral atrophy.  There is no hemorrhage, mass or midline shift.  There are no extra-axial fluid collections.  The cerebellum and brainstem are normal.  The gray-white differentiation is maintained.  The orbits are unremarkable. The paranasal sinuses and mastoid air cells are clear.     Mild cerebral atrophy with no acute intracranial process Prior right frontal temporal craniotomy Findings were called to Dr. Zhou in the emergency department at 07:09 a.m. Electronically signed by: Yi Hernandez MD Date:    05/11/2023 Time:    07:10    CTA Head and Neck (xpd)    Result Date: 5/11/2023  CMS MANDATED QUALITY DATA - CT RADIATION - 436 All CT scans at this facility utilize dose modulation, iterative reconstruction, and/or weight based dosing when appropriate to reduce radiation dose to as low as reasonably achievable. CMS MANDATED QUALITY DATA - CAROTID - 195 All measurements and percent  stenosis described below were determined using NASCET criteria or criteria similar to NASCET, as defined by the Society of Radiologists in Ultrasound Consensus Conference, Radiology, 2003 Reason: Stroke/TIA, determine embolic source Technique: CT angiography of brain and neck with 100 mL Omnipaque 350.  Maximum intensity projection coronal reformations were obtained at a separate workstation and stored in the patient's permanent medical record. COMPARISON: 5/11/2023 CTA BRAIN: VASCULAR FINDINGS: Major intracranial arteries are widely patent with no stenosis, intraluminal filling, or dissection. Minimal atherosclerotic plaque affects the cavernous segments of bilateral internal carotid arteries. Negative for aneurysm or evidence of vasculitis. Opacified visualized dural venous sinuses are unremarkable. NONVASCULAR FINDINGS: No abnormal intra-axial or extra-axial enhancement. No midline shift. Postsurgical changes of right pterional craniotomy are noted. 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 Minimal atherosclerotic plaque affects left carotid bulb. Left carotid arteries are otherwise without plaque or stenosis. Left vertebral artery is patent. Minimal atherosclerotic plaque affects the right carotid bulb. Right carotid arteries are without additional plaque and remain widely patent. Right vertebral artery is patent. NONVASCULAR FINDINGS: Endotracheal tube has tip proximal to mio. Visualized lung apices show minor dependent atelectasis. Cervical soft tissues are unremarkable. IMPRESSION: 1. Trace atherosclerotic plaque in intracranial internal carotid arteries, without other abnormality of the major intracranial arteries. 2. Trace atherosclerotic plaque in bilateral carotid bulbs, with no stenosis or other abnormality of cervical ICAs or vertebral arteries. Electronically signed by:  Compa Lopez MD  5/11/2023 2:01 PM CDT  Workstation: [id number]    CTA Head and Neck (xpd)    Result Date: 5/11/2023  CMS EXAMINATION: CTA HEAD AND NECK (XPD) CLINICAL INDICATION: Male, 67 years old. Stroke/TIA, determine embolic source history of glioblastoma TECHNIQUE: Axial CT angiogram of the head and neck was performed with contrast. Multiplanar reformations as well as 3-D volume rendered imaging were reviewed at the workstation. Degree of stenosis was measured utilizing the NASCET method. CONTRAST: 100 mL mL of Omnipaque 350 IV. COMPARISON: None FINDINGS: CTA brain: The distal vertebral arteries, basilar artery and cavernous portions of the internal carotid arteries are widely patent. The anterior cerebral arteries, middle cerebral arteries and posterior cerebral arteries are widely patent without evidence of large vessel occlusion, significant stenosis, AVM or aneurysm. The dural venous sinuses are patent. There is no pathologic enhancement. Aortic Arch and Great Vessel Origins: 2 vessel aortic arch which is widely patent Subclavian Arteries: Widely patent. Right Common Carotid Artery: Widely patent. Right Internal Carotid Artery: Widely patent. Right External Carotid Artery: Widely patent. Left Common Carotid Artery: Widely patent. Left Internal Carotid Artery: Widely patent. Left External Carotid Artery: Widely patent. Right Vertebral Artery: Widely patent. Left Vertebral Artery: Widely patent. The paraspinous soft tissues are unremarkable. There are mild degenerative changes of the cervical spine. The lung apices are clear. IMPRESSION: Negative CTA of the head and neck. This exam was performed according to our departmental dose-optimization program which includes automated exposure control, adjustment of the mA and/or kV according to patient size and/or use of iterative reconstruction technique. Electronically signed by:  Yi Hernandez MD  5/11/2023 7:43 AM CDT Workstation: [id number]        Assessment and Plan:    Status  Epilepticus  History of Glioblastoma Multiforme s/p craniectomy, chemo and radiotherapy  Acute ischemic infarct   Atrial fibrillation       67-year-old man with history of glioblastoma multiforme s/p craniectomy, chemotherapy and radiation diagnosed in August 2022, as well as seizure disorder, who presented to the ED with left facial twitching, left facial droop, slurred speech and left arm weakness. Upon arrival to the ED, he was loaded with 1 gr of Keppra and given 2 mg of ativan IV, which seemed to break the seizure. However, he experienced sudden worsening of mental status, with unresponsiveness, weakness and aphasia, so he was taken to CT scan for repeat CT brain to rule out LVO stroke or hemorrhage. He was then intubated, sedated and placed on mechanical ventilation. Clinical picture more consistent with status epilepticus.      - Admitted to hospital medicine in the ICU with q2 hour neuro checks, on telemetry, continuous pulse oximetry  - Seizure precautions while inpatient  - initially underwent 24-hour VEEG continuous monitoring with evidence of 4 electrographic seizures which resolved when dose of propofol was increased to 60 mcg/kg/min. By the end of the recording, a burst suppression pattern with decreased LPEDs was seen.   -on 5/13 patient was again put on continues EEG monitoring while weaning sedation.  Initially EEG showed burst suppression pattern with bursts of 1-2 seconds of delta activity followed by 2-4 seconds of background suppression and also showed right hemispheric lateralized periodic epileptiform discharges.  Background changed to a mixture of delta and theta activity with PLEDs becoming more frequent with lowering sedation to 30mcg/kg/min.    - cEEG stopped on 5/17. Showed intermittent lateralized periodic discharges on the right frontotemporal leads and significant slowing of the background bilaterally.   -patient has been off sedation for more than 72 hours.  Somewhat improvement in  mental status and tries to open eyes with verbal stimulus and able to squeeze my fingers with his right hand to command since 5/18. No further improvement since yesterday.  Palliative team has met with family and discussed goals of care.  - continue Vimpat 200 mg b.i.d..  continue Keppra 1500 mg BID IV  - Continue Depacon 500 mg TID IV. Valproic acid levels therapeutic.  - MRI brain showed right hemispheric infarcts.  Etiology could likely be secondary to recent radiation versus cardioembolism in the setting of atrial fibrillation. Continue anticoagulation with Xarelto 20 mg daily  - Avoid seizure threshold lowering medications such as:  Bupropion, diphenhydramine, tramadol, certain antibiotics  - Maintain Euthermia with Tylenol prn temp > 37.2 degrees C.  - Assessment for rehab with PT/OT/SLP evaluation and treatment.  - workup and treatment of metabolic and infectious abnormalities as per primary team  - Will follow along      DVT prophylaxis with chemo/SCD prophylaxis      [phone] 3 days from discharge.        All questions were answered.                              Thank you kindly for including us in the care of this patient. Please do not hesitate to contact us with any questions.       Critical Care:  52 minutes of critical care time has been spent evaluating with the patient. Time includes chart review not limited to diagnostic imaging, labs, and vitals, patient assessment, discussion with family and nursing, current order evaluations, and new order entries.      Mukund James MD  Neurology/Vascular Neurology

## 2023-05-19 NOTE — PROGRESS NOTES
Pulmonary/Critical Care Progress Note      PATIENT NAME: Teto Mendez  MRN: [id number]  TODAY'S DATE: 2023  12:50 PM  ADMIT DATE: 2023  AGE: 67 y.o. : 1955      HPI:  Called by nurse because they could not reach anesthesia to intubate a patient who was being bagged.  The patient had a change in mental status in the emergency room after having had a CTA earlier in the morning which was negative.  In CT, the patient was not breathing effectively and was cyanotic and the scanner broke so the patient was brought up to the ICU emergently.  The patient's past medical history is significant for glioblastoma multiforme for which he has been receiving radiation and chemotherapy.  He also has had a right frontal craniotomy.  At 3:30 a.m. in the morning the patient was playing a game and noted twitching of his face with slurred speech and weakness to his left arm.  He took an extra Keppra at that time.  He arrived at the ER at 6:45 a.m..     the patient remains on the ventilator and sedated with 60 of propofol.  He is also on 5 Cardizem.  Had further seizure activity during the night.    - pt remains sedated, on vent, on continuous EEG. There is EEG evidence of seizure activity so neuro has ordered VPA. Propofol has been weaned to 30    - continues sedated, on vent. Mri was done today and shows infarct in R parietal and R temporal lobes. Continues on propofol 20mg. He did have seizure activity twice today    5/15 The patient is still on the vent.  He is on Precedex, the propofol is off.  He was still having seizure activity yesterday.  MRI shows parietal and temporal infarcts on the right.     the patient is been off all sedation for 24 hours and has a GCS of 3.  Has pupils and corneals and gag and breathes spontaneously but no response to pain.  He does not respond to his voice.  He follows no commands.     the patient remains unresponsive.  He partially opens his eyes to noxious  stimuli.  He follows no commands.  Dr. Whitfield wants to give him 72 hours from stopping the sedation to reassess his neurologic recovery.  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    5/18 the patient remains densely encephalopathic.  To vigorous tactile stimulation he will partially open his eyes.  The nurse swears that he squeezed with his right hand.    5/19 the patient remains densely encephalopathic.  Actually is lightly squeezing with his right hand today.  Opens his eyes retirement when stimulated.  His heart rate is 157 because he has not gotten his metoprolol because his OG tube was pulled out during his bath.    REVIEW OF SYSTEMS  Unobtainable    No change in the patient's Past Medical History, Past Surgical History, Social History or Family History since admission.      VITAL SIGNS (MOST RECENT)  Temp: (!) 100.7 °F (38.2 °C) (05/19/23 0715)  Pulse: (!) 114 (05/19/23 0715)  Resp: (!) 25 (05/19/23 0715)  BP: (!) 143/79 (05/19/23 0730)  SpO2: 99 % (05/19/23 0715)    INTAKE AND OUTPUT (LAST 24 HOURS):  Intake/Output Summary (Last 24 hours) at 5/19/2023 0929  Last data filed at 5/19/2023 0618  Gross per 24 hour   Intake 2069.17 ml   Output 1650 ml   Net 419.17 ml       WEIGHT  Wt Readings from Last 1 Encounters:   05/13/23 92.4 kg (203 lb 11.3 oz)       PHYSICAL EXAM  GENERAL: Older patient, intubated, unresponsive  HEENT: Pupils equal and reactive.  Corneals are intact.  He has conjunctivitis.  Nose intact. Pharynx intubated with ET tube and OG tube. EEG leads over scalp.  Gag reflex is intact.  NECK: Supple.   HEART: Regular rate and rhythm. No murmur or gallop auscultated.  LUNGS: Clear to auscultation and percussion. Lung excursion symmetrical. No change in fremitus. No adventitial noises.  ABDOMEN: Bowel sounds present. Non-tender, no masses palpated.  Tolerating enteral nutrition  : Normal anatomy.  Iraheta with yellow urine.  EXTREMITIES: Normal  muscle tone and joint movement, no cyanosis or clubbing.  PICC line and arterial line on the left  LYMPHATICS: No adenopathy palpated, his left hand is puffy.  SKIN: Dry, intact, no lesions.   NEURO:  Pupils and extraocular movements are intact.  The patient has gaze does not appear to be conjugate any longer. Corneals are intact.  Gag is intact.  There is a partial lid raise to pain.  He will lightly squeeze with his right hand to command. GCS is 10  PSYCH:  Unable to assess      CBC LAST (LAST 24 HOURS)  Recent Labs   Lab 05/19/23  0409   WBC 7.39   RBC 4.98   HGB 15.5   HCT 47.0   MCV 94   MCH 31.1*   MCHC 33.0   RDW 14.6*   *   MPV 10.7   GRAN 75.2*  5.6   LYMPH 10.6*  0.8*   MONO 13.0  1.0   BASO 0.02   NRBC 0       CHEMISTRY LAST (LAST 24 HOURS)  Recent Labs   Lab 05/19/23  0409 05/19/23  0510   *  --    K 3.9  --    *  --    CO2 26  --    ANIONGAP 8  --    BUN 42*  --    CREATININE 0.9  --    *  --    CALCIUM 8.4*  --    PH  --  7.561*   MG 2.1  --    ALBUMIN 2.4*  --    PROT 5.9*  --    ALKPHOS 43*  --    ALT 77*  --    *  --    BILITOT 1.0  --            LAST 7 DAYS MICROBIOLOGY   Microbiology Results (last 7 days)       Procedure Component Value Units Date/Time    Culture, Respiratory with Gram Stain [[id number]] Collected: 05/15/23 0059    Order Status: Completed Specimen: Respiratory from Sputum Updated: 05/17/23 0738     Respiratory Culture Normal respiratory fredi     Gram Stain (Respiratory) <10 epithelial cells per low power field.     Gram Stain (Respiratory) Few WBC's     Gram Stain (Respiratory) Few Gram positive cocci            MOST RECENT IMAGING  X-Ray Chest AP Portable  50 portable chest x-ray at 3:50 PM is compared to prior study at 5:32 AM    Clinical history is PICC line placement    There is a left PICC line with the tip overlying the superior vena cava. There is no pneumothorax.  The endotracheal tube and NG tube are in stable position. The heart is  enlarged. There is atelectasis in the left lung base. There are no confluent infiltrates or pleural effusions.    IMPRESSION: No pneumothorax status post left PICC line placement    Cardiomegaly with atelectasis left lung base    Electronically signed by:  Yi Hernandez MD  5/15/2023 4:01 PM CDT Workstation: [id number]      CURRENT VISIT EKG  Results for orders placed or performed during the hospital encounter of 05/11/23   ECG 12 lead    Narrative    Test Reason : I63.9,    Vent. Rate : 120 BPM     Atrial Rate : 150 BPM     P-R Int : 000 ms          QRS Dur : 104 ms      QT Int : 370 ms       P-R-T Axes : 000 016 -12 degrees     QTc Int : 522 ms    Atrial fibrillation with rapid ventricular response  Nonspecific ST abnormality  Prolonged QT  Abnormal ECG  No previous ECGs available    Referred By: [email or web address]   SELF           Confirmed By:        ECHOCARDIOGRAM RESULTS  No results found for this or any previous visit.        VENTILATOR INFORMATION  Vent Mode: A/C  Oxygen Concentration (%):  [21-40] 40  Resp Rate Total:  [18 br/min-39 br/min] 26 br/min  Vt Set:  [450 mL] 450 mL  PEEP/CPAP:  [5 cmH20] 5 cmH20  Pressure Support:  [10 cmH20-15 cmH20] 15 cmH20  Mean Airway Pressure:  [8.3 fqO96-67 cmH20] 12 cmH20           LAST ARTERIAL BLOOD GAS  ABG  Recent Labs   Lab 05/19/23  0510   PH 7.561*   PO2 114*   PCO2 33.3*   HCO3 29.9*   BE 8       IMPRESSION AND PLAN  Status epilepticus  -MRI shows temporal and parietal infarcts on the right  -history of seizures prior to this admission  - not seizing any longer  History of glioblastoma multiforme under treatment in [place]  Sulphur coma scale of 10  -patient has been off of sedation for over 72 hours  -minimally more responsive with a partial eye opening to voice and light squeeze of hand on R  Mechanical ventilation  - ABG daily  - still too encephalopathic to wean  Atrial fibrillation  - continue Xarelto  - continue metoprolol  - 5mg Lopressor now while replacing  OG tube  Hypertension  -metoprolol  Hypokalemia  - replace potassium  -Trend labs  Moderate hypoalbuminemia  -enteral feedings tolerated  Hyperlipidemia  Thrombocytopenia  - better today  - antiplatelet antibodies pending    Receiving enteral nutrition, on Protonix and Xarelto  In's greater than out for several days and he is getting puffy  -will give a dose of Lasix    Critical care time spent reviewing the chart, examining the patient, reviewing the labs, reviewing the radiological findings, discussing care with nursing, physicians, and respiratory and creating the note and  has been greater than 35 minutes    Mery Milner MD  Date of Service: 05/19/2023  12:50 PM

## 2023-05-19 NOTE — SUBJECTIVE & OBJECTIVE
Interval History: See HPI    Past Medical History:   Diagnosis Date    GERD (gastroesophageal reflux disease)     Glioblastoma     H/O blood clots     Hypertension     Paroxysmal atrial fibrillation     Seizures        No past surgical history on file.    Review of patient's allergies indicates:  No Known Allergies    Medications:  Continuous Infusions:  Scheduled Meds:   allopurinoL  100 mg Per NG tube Daily    amantadine HCL  100 mg Per NG tube BID    atorvastatin  80 mg Per NG tube Daily    dexAMETHasone  1 mg Per NG tube Q12H    diltiaZEM  30 mg Oral Q6H    lacosamide (VIMPAT) IVPB  200 mg Intravenous Q12H    levetiracetam IV  1,500 mg Intravenous Q12H    metoprolol tartrate  100 mg Per NG tube BID    pantoprazole  40 mg Intravenous Daily    rivaroxaban  20 mg Per NG tube Daily with dinner    sacubitriL-valsartan  1 tablet Per G Tube BID    timolol maleate 0.25%  1 drop Both Eyes BID    valproate sodium (DEPACON) IVPB  500 mg Intravenous Q8H     PRN Meds:acetaminophen, colchicine, hydrALAZINE, magnesium sulfate IVPB, magnesium sulfate IVPB, metoprolol, ondansetron, polyethylene glycol, potassium bicarbonate, potassium bicarbonate, potassium bicarbonate, sodium chloride 0.9%, sodium phosphate IVPB, sodium phosphate IVPB, sodium phosphate IVPB    Family History    None       Tobacco Use    Smoking status: Not on file    Smokeless tobacco: Not on file   Substance and Sexual Activity    Alcohol use: Not on file    Drug use: Not on file    Sexual activity: Not on file       Review of Systems   Unable to perform ROS: Intubated   Objective:     Vital Signs (Most Recent):  Temp: 99.3 °F (37.4 °C) (05/18/23 2311)  Pulse: 109 (05/18/23 2311)  Resp: (!) 25 (05/18/23 2311)  BP: (!) 133/97 (05/18/23 2311)  SpO2: 99 % (05/18/23 2311) Vital Signs (24h Range):  Temp:  [98 °F (36.7 °C)-99.9 °F (37.7 °C)] 99.3 °F (37.4 °C)  Pulse:  [] 109  Resp:  [21-35] 25  SpO2:  [94 %-99 %] 99 %  BP: ()/(58-97) 133/97  Arterial  Line BP: (107-156)/(66-98) 156/98     Weight: 92.4 kg (203 lb 11.3 oz)  Body mass index is 30.08 kg/m².       Physical Exam  Vitals reviewed.   Constitutional:       General: He is not in acute distress.     Appearance: He is ill-appearing.   HENT:      Head: Normocephalic and atraumatic.      Right Ear: External ear normal.      Left Ear: External ear normal.      Nose: Nose normal. No congestion.      Mouth/Throat:      Mouth: Mucous membranes are moist.      Pharynx: No oropharyngeal exudate.   Eyes:      General:         Right eye: No discharge.         Left eye: No discharge.   Cardiovascular:      Rate and Rhythm: Tachycardia present.      Pulses: Normal pulses.   Pulmonary:      Effort: Pulmonary effort is normal.   Abdominal:      General: There is no distension.      Palpations: Abdomen is soft.   Skin:     General: Skin is warm and dry.   Neurological:      Comments: Obtunded. Minimally responsive to noxious stimulus          Review of Symptoms      Symptom Assessment (ESAS 0-10 Scale)  Unable to complete assessment due to Intubated         Pain Assessment in Advanced Demential Scale (PAINAD)   Breathing - Independent of vocalization:  0  Negative vocalization:  0  Facial expression:  0  Body language:  0  Consolability:  0  Total:  0    Performance Status:  10    Psychosocial/Cultural:   See Palliative Psychosocial Note: No  **Primary  to Follow**  Palliative Care  Consult: No      Advance Care Planning   Advance Directives:     Decision Making:  Family answered questions and Patient unable to communicate due to disease severity/cognitive impairment  Goals of Care: The family endorses that what is most important right now is to focus on curative/life-prolongation (regardless of treatment burdens)    Accordingly, we have decided that the best plan to meet the patient's goals includes continuing with treatment       Significant Labs: BMP:   Recent Labs   Lab 05/18/23  0437   GLU  123*      K 3.6      CO2 27   BUN 46*   CREATININE 1.0   CALCIUM 7.6*   MG 2.1     CBC:   Recent Labs   Lab 05/17/23 0403 05/17/23 0433 05/18/23 0437   WBC 7.54  --  6.23   HGB 16.4  --  15.1   HCT 48.7 47 46.5   *  --  123*     CBC:   Recent Labs   Lab 05/18/23 0437   WBC 6.23   HGB 15.1   HCT 46.5   MCV 95   *     BMP:  Recent Labs   Lab 05/18/23 0437   *      K 3.6      CO2 27   BUN 46*   CREATININE 1.0   CALCIUM 7.6*   MG 2.1     LFT:  Lab Results   Component Value Date    AST 61 (H) 05/18/2023    ALKPHOS 40 (L) 05/18/2023    BILITOT 1.0 05/18/2023     Albumin:   Albumin   Date Value Ref Range Status   05/18/2023 2.1 (L) 3.5 - 5.2 g/dL Final     Protein:   Total Protein   Date Value Ref Range Status   05/18/2023 5.1 (L) 6.0 - 8.4 g/dL Final     Lactic acid:   No results found for: LACTATE    Significant Imaging: I have reviewed all pertinent imaging results/findings within the past 24 hours.

## 2023-05-19 NOTE — CARE UPDATE
05/19/23 0827   Patient Assessment/Suction   Level of Consciousness (AVPU) responds to voice   Respiratory Effort Unlabored   Rhythm/Pattern, Respiratory assisted mechanically   Cough Frequency infrequent;with stimulation   Cough Type assisted;nonproductive   PRE-TX-O2   Device (Oxygen Therapy) ventilator   $ Is the patient on Low Flow Oxygen? Yes   Pulse Oximetry Type Continuous      Airway Anesthesia 05/11/23   Placement Date/Time: 05/11/23 (c) 4907   Method of Intubation: Video Laryngoscopy  Airway Device: Endotracheal Tube  Mask Ventilation: Moderately difficult with oral airway  Intubated: Other (see comments)  Blade: Glidescope #3  Airway Device Size: 8....   Secured at 24 cm   Measured At Lips   Secured Location Left   Secured by Commercial tube barboza   Bite Block none   Site Condition Dry;Cool   Status Intact;Secured   Site Assessment Clean;Dry   Cuff Volume   (mlt)   Airway Safety   Is Ambu Bag and Mask with Patient? Yes, Adult Ambu Bag and Mask   Conventional Ventilator Alarms   Alarms On Y   Ve High Alarm 20 L/min   Ve Low Alarm 5 L/min   Vt High Alarm 1200 mL   Vt Low Alarm 200 mL

## 2023-05-19 NOTE — CONSULTS
[facility]  Palliative Medicine  Consult Note    Patient Name: Teto Mendez  MRN: [id number]  Admission Date: 5/11/2023  Hospital Length of Stay: 8 days  Code Status: Full Code   Attending Provider: Neo Clark MD  Consulting Provider: Onofre Penny MD  Primary Care Physician: Primary Doctor No  Principal Problem:Status epilepticus    Patient information was obtained from patient, relative(s), past medical records and primary team.      Consults  Assessment/Plan:     Palliative Care  Goals of care, counseling/discussion  I reviewed his chart and discussed his case with his team.    I examined Mr. Mendez at bedside. He lacks capacity. I spoke with his daughter and various other family members at bedside and by phone.     I introduced myself and my role as a palliative care physician. They were agreeable to speaking.     We discussed his medical illness, prognosis, and values in detail. They have an excellent understanding of his current situation.    Briefly, they understand he has multiple medical underlying medical issues and is admitted and being treated for new stroke and status epilepticus. He is currently obtunded and has minimal neurologic recovery since admission about 1 week ago.    We discussed prognosis. I explained my worry that his current state likely close to a new baseline.     We discussed his values. He is a fighter and is willing to go through a lot if there is any hope for recovery. He would not wish to suffer in his current state indefinitely.    For now they wish to continue with current measures being hopeful for the best and prepared for the worst.     If he reaches the trach/peg crossroads and he is not making meaningful recovery, at that point family would desire to transport him home to [place] where he can then be transitioned to comfort measures. They have already looked into the out of pocket cost for transport and are wiling to pay what it takes to get him back  to [place] if the worst case scenario occurs.     I did discuss code status and the clinical realities of a code in his situation. Based on his values I made a recommendation of a DNR code status. His daughter is going to review his living will prior to making a change, but does agree with my recommendation.    They are asking good questions and I feel very confident they will make patient centered decisions going forward.     I appreciate being involved. I hope I have been helpful.    I will continue to check in periodically while he remains admitted.             Thank you for your consult. I will follow-up with patient. Please contact us if you have any additional questions.    Subjective:     HPI:   67-year-old male with multiple medical problems significant for glioblastoma status post resection, seizure disorder, and atrial fibrillation on anticoagulation.  He initially presented with facial twitching, left arm weakness, and change in speech.  He is currently admitted being treated for status epilepticus.  Course has been complicated by respiratory failure requiring intubation.  MRI brain history right MCA infarcts.  Additionally, TTE has revealed heart failure with reduced ejection fraction.  He has been evaluated by Neurology, pulmonology, and Cardiology.  At this point there was concern that his current condition is his new baseline, which is a completely dependent state.  At this point, his prognosis appears to be very grim.  I have been asked to assist with goals of care.      Hospital Course:  No notes on file    Interval History: See HPI    Past Medical History:   Diagnosis Date    GERD (gastroesophageal reflux disease)     Glioblastoma     H/O blood clots     Hypertension     Paroxysmal atrial fibrillation     Seizures        No past surgical history on file.    Review of patient's allergies indicates:  No Known Allergies    Medications:  Continuous Infusions:  Scheduled Meds:   allopurinoL  100  mg Per NG tube Daily    amantadine HCL  100 mg Per NG tube BID    atorvastatin  80 mg Per NG tube Daily    dexAMETHasone  1 mg Per NG tube Q12H    diltiaZEM  30 mg Oral Q6H    lacosamide (VIMPAT) IVPB  200 mg Intravenous Q12H    levetiracetam IV  1,500 mg Intravenous Q12H    metoprolol tartrate  100 mg Per NG tube BID    pantoprazole  40 mg Intravenous Daily    rivaroxaban  20 mg Per NG tube Daily with dinner    sacubitriL-valsartan  1 tablet Per G Tube BID    timolol maleate 0.25%  1 drop Both Eyes BID    valproate sodium (DEPACON) IVPB  500 mg Intravenous Q8H     PRN Meds:acetaminophen, colchicine, hydrALAZINE, magnesium sulfate IVPB, magnesium sulfate IVPB, metoprolol, ondansetron, polyethylene glycol, potassium bicarbonate, potassium bicarbonate, potassium bicarbonate, sodium chloride 0.9%, sodium phosphate IVPB, sodium phosphate IVPB, sodium phosphate IVPB    Family History    None       Tobacco Use    Smoking status: Not on file    Smokeless tobacco: Not on file   Substance and Sexual Activity    Alcohol use: Not on file    Drug use: Not on file    Sexual activity: Not on file       Review of Systems   Unable to perform ROS: Intubated   Objective:     Vital Signs (Most Recent):  Temp: 99.3 °F (37.4 °C) (05/18/23 2311)  Pulse: 109 (05/18/23 2311)  Resp: (!) 25 (05/18/23 2311)  BP: (!) 133/97 (05/18/23 2311)  SpO2: 99 % (05/18/23 2311) Vital Signs (24h Range):  Temp:  [98 °F (36.7 °C)-99.9 °F (37.7 °C)] 99.3 °F (37.4 °C)  Pulse:  [] 109  Resp:  [21-35] 25  SpO2:  [94 %-99 %] 99 %  BP: ()/(58-97) 133/97  Arterial Line BP: (107-156)/(66-98) 156/98     Weight: 92.4 kg (203 lb 11.3 oz)  Body mass index is 30.08 kg/m².       Physical Exam  Vitals reviewed.   Constitutional:       General: He is not in acute distress.     Appearance: He is ill-appearing.   HENT:      Head: Normocephalic and atraumatic.      Right Ear: External ear normal.      Left Ear: External ear normal.      Nose: Nose  normal. No congestion.      Mouth/Throat:      Mouth: Mucous membranes are moist.      Pharynx: No oropharyngeal exudate.   Eyes:      General:         Right eye: No discharge.         Left eye: No discharge.   Cardiovascular:      Rate and Rhythm: Tachycardia present.      Pulses: Normal pulses.   Pulmonary:      Effort: Pulmonary effort is normal.   Abdominal:      General: There is no distension.      Palpations: Abdomen is soft.   Skin:     General: Skin is warm and dry.   Neurological:      Comments: Obtunded. Minimally responsive to noxious stimulus          Review of Symptoms      Symptom Assessment (ESAS 0-10 Scale)  Unable to complete assessment due to Intubated         Pain Assessment in Advanced Demential Scale (PAINAD)   Breathing - Independent of vocalization:  0  Negative vocalization:  0  Facial expression:  0  Body language:  0  Consolability:  0  Total:  0    Performance Status:  10    Psychosocial/Cultural:   See Palliative Psychosocial Note: No  **Primary  to Follow**  Palliative Care  Consult: No      Advance Care Planning   Advance Directives:     Decision Making:  Family answered questions and Patient unable to communicate due to disease severity/cognitive impairment  Goals of Care: The family endorses that what is most important right now is to focus on curative/life-prolongation (regardless of treatment burdens)    Accordingly, we have decided that the best plan to meet the patient's goals includes continuing with treatment       Significant Labs: BMP:   Recent Labs   Lab 05/18/23 0437   *      K 3.6      CO2 27   BUN 46*   CREATININE 1.0   CALCIUM 7.6*   MG 2.1     CBC:   Recent Labs   Lab 05/17/23  0403 05/17/23  0433 05/18/23  0437   WBC 7.54  --  6.23   HGB 16.4  --  15.1   HCT 48.7 47 46.5   *  --  123*     CBC:   Recent Labs   Lab 05/18/23 0437   WBC 6.23   HGB 15.1   HCT 46.5   MCV 95   *     BMP:  Recent Labs   Lab  05/18/23  0437   *      K 3.6      CO2 27   BUN 46*   CREATININE 1.0   CALCIUM 7.6*   MG 2.1     LFT:  Lab Results   Component Value Date    AST 61 (H) 05/18/2023    ALKPHOS 40 (L) 05/18/2023    BILITOT 1.0 05/18/2023     Albumin:   Albumin   Date Value Ref Range Status   05/18/2023 2.1 (L) 3.5 - 5.2 g/dL Final     Protein:   Total Protein   Date Value Ref Range Status   05/18/2023 5.1 (L) 6.0 - 8.4 g/dL Final     Lactic acid:   No results found for: LACTATE    Significant Imaging: I have reviewed all pertinent imaging results/findings within the past 24 hours.        > 50% of 95 min visit spent in chart review, face to face discussion of goals of care,  symptom assessment, coordination of care and emotional support.    Onofre Penny MD  Palliative Medicine  [facility]

## 2023-05-19 NOTE — CARE UPDATE
05/18/23 1942   Patient Assessment/Suction   Level of Consciousness (AVPU) responds to voice   Respiratory Effort Unlabored   Expansion/Accessory Muscles/Retractions no use of accessory muscles;no retractions;expansion symmetric   All Lung Fields Breath Sounds coarse   $ Suction Charges Inline Suction Procedure Stat Charge   Secretions Amount small   Secretions Color tan   Secretions Characteristics thick   Skin Integrity   $ Wound Care Tech Time 15 min   Area Observed Cheek;Left;Right   Skin Appearance without discoloration   PRE-TX-O2   Device (Oxygen Therapy) ventilator   Oxygen Concentration (%) 21   SpO2 (!) 94 %   Pulse Oximetry Type Continuous   $ Pulse Oximetry - Multiple Charge Pulse Oximetry - Multiple   Pulse 87   Resp (!) 23      Airway Anesthesia 05/11/23   Placement Date/Time: 05/11/23 (c) 3159   Method of Intubation: Video Laryngoscopy  Airway Device: Endotracheal Tube  Mask Ventilation: Moderately difficult with oral airway  Intubated: Other (see comments)  Blade: Glidescope #3  Airway Device Size: 8....   Secured at 24 cm   Measured At Lips   Secured Location Left   Secured by Commercial tube barboza   Bite Block none   Site Condition Cool;Dry   Status Intact;Secured;Patent   Site Assessment Clean;Dry   Vent Select   Conventional Vent Y   Charged w/in last 24h YES   Preset Conventional Ventilator Settings   Vent ID [id number]   Vent Type    Ventilation Type VC   Vent Mode Spont   Humidity HME   PEEP/CPAP 5 cmH20   Pressure Support 10 cmH20   Peak End Inspiratory Pressure 16 cmH20   Insp Rise Time  70 %   I-Trigger Type  V-TRIG   Trigger Sensitivity Flow/I-Trigger 3 L/min   Patient Ventilator Parameters   Resp Rate Total 27 br/min   Peak Airway Pressure 16 cmH20   Mean Airway Pressure 9.3 cmH20   Plateau Pressure 0 cmH20   Exhaled Vt 604 mL   Total Ve 16.3 L/m   Spont Ve 16.3 L   I:E Ratio Measured 1:1.60   Auto PEEP 0 cmH20   Inspired Tidal Volume (VTI) 1 mL   Conventional Ventilator Alarms   Alarms  On Y   Resp Rate High Alarm 40 br/min   Press High Alarm 45 cmH2O   Apnea Rate 12   Apnea Volume (mL) 1 mL   Apnea Oxygen Concentration  100   Apnea Flow Rate (L/min) 65   T Apnea 20 sec(s)   Ready to Wean/Extubation Screen   FIO2<=50 (chart decimal) 0.21   MV<16L (chart vol.) (!) 16.3   PEEP <=8 (chart #) 5   Ready to Wean Parameters   F/VT Ratio<105 (RSBI) (!) 38.08   Vital Capacity   Vital Capacity (mL) 0

## 2023-05-20 LAB
ALBUMIN SERPL BCP-MCNC: 2 G/DL (ref 3.5–5.2)
ALLENS TEST: ABNORMAL
ALLENS TEST: ABNORMAL
ALP SERPL-CCNC: 40 U/L (ref 55–135)
ALT SERPL W/O P-5'-P-CCNC: 68 U/L (ref 10–44)
ANION GAP SERPL CALC-SCNC: 8 MMOL/L (ref 8–16)
AST SERPL-CCNC: 78 U/L (ref 10–40)
BASOPHILS # BLD AUTO: 0.02 K/UL (ref 0–0.2)
BASOPHILS NFR BLD: 0.3 % (ref 0–1.9)
BILIRUB SERPL-MCNC: 1 MG/DL (ref 0.1–1)
BUN SERPL-MCNC: 47 MG/DL (ref 8–23)
CALCIUM SERPL-MCNC: 7.6 MG/DL (ref 8.7–10.5)
CHLORIDE SERPL-SCNC: 112 MMOL/L (ref 95–110)
CO2 SERPL-SCNC: 27 MMOL/L (ref 23–29)
CREAT SERPL-MCNC: 0.9 MG/DL (ref 0.5–1.4)
DELSYS: ABNORMAL
DELSYS: ABNORMAL
DIFFERENTIAL METHOD: ABNORMAL
EOSINOPHIL # BLD AUTO: 0 K/UL (ref 0–0.5)
EOSINOPHIL NFR BLD: 0.2 % (ref 0–8)
ERYTHROCYTE [DISTWIDTH] IN BLOOD BY AUTOMATED COUNT: 14.3 % (ref 11.5–14.5)
ERYTHROCYTE [SEDIMENTATION RATE] IN BLOOD BY WESTERGREN METHOD: 10 MM/H
EST. GFR  (NO RACE VARIABLE): >60 ML/MIN/1.73 M^2
FIO2: 40
GLUCOSE SERPL-MCNC: 132 MG/DL (ref 70–110)
GLUCOSE SERPL-MCNC: 133 MG/DL (ref 70–110)
GLUCOSE SERPL-MCNC: 143 MG/DL (ref 70–110)
HCO3 UR-SCNC: 28 MMOL/L (ref 24–28)
HCO3 UR-SCNC: 29.8 MMOL/L (ref 24–28)
HCT VFR BLD AUTO: 43.9 % (ref 40–54)
HCT VFR BLD CALC: 41 %PCV (ref 36–54)
HCT VFR BLD CALC: 42 %PCV (ref 36–54)
HGB BLD-MCNC: 14.3 G/DL (ref 14–18)
IMM GRANULOCYTES # BLD AUTO: 0.05 K/UL (ref 0–0.04)
IMM GRANULOCYTES NFR BLD AUTO: 0.8 % (ref 0–0.5)
LYMPHOCYTES # BLD AUTO: 0.7 K/UL (ref 1–4.8)
LYMPHOCYTES NFR BLD: 10.7 % (ref 18–48)
MAGNESIUM SERPL-MCNC: 2 MG/DL (ref 1.6–2.6)
MCH RBC QN AUTO: 31 PG (ref 27–31)
MCHC RBC AUTO-ENTMCNC: 32.6 G/DL (ref 32–36)
MCV RBC AUTO: 95 FL (ref 82–98)
MIN VOL: 6.7
MODE: ABNORMAL
MODE: ABNORMAL
MONOCYTES # BLD AUTO: 0.8 K/UL (ref 0.3–1)
MONOCYTES NFR BLD: 13.1 % (ref 4–15)
NEUTROPHILS # BLD AUTO: 4.8 K/UL (ref 1.8–7.7)
NEUTROPHILS NFR BLD: 74.9 % (ref 38–73)
NRBC BLD-RTO: 0 /100 WBC
PCO2 BLDA: 35.7 MMHG (ref 35–45)
PCO2 BLDA: 39.7 MMHG (ref 35–45)
PEEP: 5
PH SMN: 7.48 [PH] (ref 7.35–7.45)
PH SMN: 7.5 [PH] (ref 7.35–7.45)
PHOSPHATE SERPL-MCNC: 3.3 MG/DL (ref 2.7–4.5)
PIP: 17
PLATELET # BLD AUTO: 128 K/UL (ref 150–450)
PMV BLD AUTO: 11.2 FL (ref 9.2–12.9)
PO2 BLDA: 111 MMHG (ref 80–100)
PO2 BLDA: 92 MMHG (ref 80–100)
POC BE: 5 MMOL/L
POC BE: 6 MMOL/L
POC IONIZED CALCIUM: 1.13 MMOL/L (ref 1.06–1.42)
POC IONIZED CALCIUM: 1.15 MMOL/L (ref 1.06–1.42)
POC SATURATED O2: 98 % (ref 95–100)
POC SATURATED O2: 99 % (ref 95–100)
POC TCO2: 29 MMOL/L (ref 23–27)
POC TCO2: 31 MMOL/L (ref 23–27)
POTASSIUM BLD-SCNC: 3.5 MMOL/L (ref 3.5–5.1)
POTASSIUM BLD-SCNC: 3.6 MMOL/L (ref 3.5–5.1)
POTASSIUM SERPL-SCNC: 3.5 MMOL/L (ref 3.5–5.1)
PROT SERPL-MCNC: 5.3 G/DL (ref 6–8.4)
RBC # BLD AUTO: 4.61 M/UL (ref 4.6–6.2)
SAMPLE: ABNORMAL
SAMPLE: ABNORMAL
SITE: ABNORMAL
SITE: ABNORMAL
SODIUM BLD-SCNC: 149 MMOL/L (ref 136–145)
SODIUM BLD-SCNC: 150 MMOL/L (ref 136–145)
SODIUM SERPL-SCNC: 147 MMOL/L (ref 136–145)
SP02: 99
VT: 450
WBC # BLD AUTO: 6.42 K/UL (ref 3.9–12.7)

## 2023-05-20 PROCEDURE — 82330 ASSAY OF CALCIUM: CPT

## 2023-05-20 PROCEDURE — 99291 CRITICAL CARE FIRST HOUR: CPT | Mod: ,,, | Performed by: INTERNAL MEDICINE

## 2023-05-20 PROCEDURE — 82803 BLOOD GASES ANY COMBINATION: CPT

## 2023-05-20 PROCEDURE — 95819 EEG AWAKE AND ASLEEP: CPT

## 2023-05-20 PROCEDURE — 84132 ASSAY OF SERUM POTASSIUM: CPT

## 2023-05-20 PROCEDURE — 99291 PR CRITICAL CARE, E/M 30-74 MINUTES: ICD-10-PCS | Mod: ,,, | Performed by: INTERNAL MEDICINE

## 2023-05-20 PROCEDURE — 37799 UNLISTED PX VASCULAR SURGERY: CPT

## 2023-05-20 PROCEDURE — 94003 VENT MGMT INPAT SUBQ DAY: CPT

## 2023-05-20 PROCEDURE — 25000003 PHARM REV CODE 250: Performed by: STUDENT IN AN ORGANIZED HEALTH CARE EDUCATION/TRAINING PROGRAM

## 2023-05-20 PROCEDURE — 85014 HEMATOCRIT: CPT

## 2023-05-20 PROCEDURE — C9254 INJECTION, LACOSAMIDE: HCPCS | Performed by: STUDENT IN AN ORGANIZED HEALTH CARE EDUCATION/TRAINING PROGRAM

## 2023-05-20 PROCEDURE — 84295 ASSAY OF SERUM SODIUM: CPT

## 2023-05-20 PROCEDURE — 99900035 HC TECH TIME PER 15 MIN (STAT)

## 2023-05-20 PROCEDURE — 20000000 HC ICU ROOM

## 2023-05-20 PROCEDURE — 94761 N-INVAS EAR/PLS OXIMETRY MLT: CPT

## 2023-05-20 PROCEDURE — C9113 INJ PANTOPRAZOLE SODIUM, VIA: HCPCS | Performed by: STUDENT IN AN ORGANIZED HEALTH CARE EDUCATION/TRAINING PROGRAM

## 2023-05-20 PROCEDURE — 99900026 HC AIRWAY MAINTENANCE (STAT)

## 2023-05-20 PROCEDURE — 80053 COMPREHEN METABOLIC PANEL: CPT | Performed by: STUDENT IN AN ORGANIZED HEALTH CARE EDUCATION/TRAINING PROGRAM

## 2023-05-20 PROCEDURE — 95813 EEG EXTND MNTR 61-119 MIN: CPT

## 2023-05-20 PROCEDURE — 95714 VEEG EA 12-26 HR UNMNTR: CPT

## 2023-05-20 PROCEDURE — 84100 ASSAY OF PHOSPHORUS: CPT | Performed by: STUDENT IN AN ORGANIZED HEALTH CARE EDUCATION/TRAINING PROGRAM

## 2023-05-20 PROCEDURE — 95711 VEEG 2-12 HR UNMONITORED: CPT

## 2023-05-20 PROCEDURE — 95700 EEG CONT REC W/VID EEG TECH: CPT

## 2023-05-20 PROCEDURE — 63600175 PHARM REV CODE 636 W HCPCS: Performed by: HOSPITALIST

## 2023-05-20 PROCEDURE — 83735 ASSAY OF MAGNESIUM: CPT | Performed by: STUDENT IN AN ORGANIZED HEALTH CARE EDUCATION/TRAINING PROGRAM

## 2023-05-20 PROCEDURE — 63600175 PHARM REV CODE 636 W HCPCS: Performed by: STUDENT IN AN ORGANIZED HEALTH CARE EDUCATION/TRAINING PROGRAM

## 2023-05-20 PROCEDURE — 85025 COMPLETE CBC W/AUTO DIFF WBC: CPT | Performed by: STUDENT IN AN ORGANIZED HEALTH CARE EDUCATION/TRAINING PROGRAM

## 2023-05-20 PROCEDURE — 95812 EEG 41-60 MINUTES: CPT

## 2023-05-20 PROCEDURE — 27000221 HC OXYGEN, UP TO 24 HOURS

## 2023-05-20 RX ADMIN — METOPROLOL TARTRATE 5 MG: 5 INJECTION, SOLUTION INTRAVENOUS at 11:05

## 2023-05-20 RX ADMIN — AMANTADINE 100 MG: 100 CAPSULE ORAL at 08:05

## 2023-05-20 RX ADMIN — ATORVASTATIN CALCIUM 80 MG: 40 TABLET, FILM COATED ORAL at 08:05

## 2023-05-20 RX ADMIN — METOPROLOL TARTRATE 100 MG: 50 TABLET, FILM COATED ORAL at 08:05

## 2023-05-20 RX ADMIN — TIMOLOL MALEATE 1 DROP: 2.5 SOLUTION/ DROPS OPHTHALMIC at 12:05

## 2023-05-20 RX ADMIN — RIVAROXABAN 20 MG: 20 TABLET, FILM COATED ORAL at 05:05

## 2023-05-20 RX ADMIN — SODIUM CHLORIDE 200 MG: 9 INJECTION, SOLUTION INTRAVENOUS at 05:05

## 2023-05-20 RX ADMIN — ALLOPURINOL 100 MG: 100 TABLET ORAL at 08:05

## 2023-05-20 RX ADMIN — ACETAMINOPHEN 650 MG: 325 TABLET ORAL at 05:05

## 2023-05-20 RX ADMIN — LEVETIRACETAM INJECTION 1500 MG: 15 INJECTION INTRAVENOUS at 08:05

## 2023-05-20 RX ADMIN — DEXTROSE 500 MG: 50 INJECTION, SOLUTION INTRAVENOUS at 04:05

## 2023-05-20 RX ADMIN — DILTIAZEM HYDROCHLORIDE 30 MG: 30 TABLET, FILM COATED ORAL at 06:05

## 2023-05-20 RX ADMIN — DILTIAZEM HYDROCHLORIDE 30 MG: 30 TABLET, FILM COATED ORAL at 11:05

## 2023-05-20 RX ADMIN — DEXAMETHASONE 1 MG: 1 TABLET ORAL at 08:05

## 2023-05-20 RX ADMIN — SODIUM CHLORIDE 200 MG: 9 INJECTION, SOLUTION INTRAVENOUS at 06:05

## 2023-05-20 RX ADMIN — METOPROLOL TARTRATE 5 MG: 5 INJECTION, SOLUTION INTRAVENOUS at 01:05

## 2023-05-20 RX ADMIN — DEXTROSE 500 MG: 50 INJECTION, SOLUTION INTRAVENOUS at 01:05

## 2023-05-20 RX ADMIN — TIMOLOL MALEATE 1 DROP: 2.5 SOLUTION/ DROPS OPHTHALMIC at 08:05

## 2023-05-20 RX ADMIN — SACUBITRIL AND VALSARTAN 1 TABLET: 24; 26 TABLET, FILM COATED ORAL at 08:05

## 2023-05-20 RX ADMIN — DILTIAZEM HYDROCHLORIDE 30 MG: 30 TABLET, FILM COATED ORAL at 05:05

## 2023-05-20 RX ADMIN — PANTOPRAZOLE SODIUM 40 MG: 40 INJECTION, POWDER, FOR SOLUTION INTRAVENOUS at 08:05

## 2023-05-20 NOTE — PROGRESS NOTES
Pulmonary/Critical Care Progress Note      PATIENT NAME: Teto Mendez  MRN: [id number]  TODAY'S DATE: 2023  12:50 PM  ADMIT DATE: 2023  AGE: 67 y.o. : 1955      HPI:  Called by nurse because they could not reach anesthesia to intubate a patient who was being bagged.  The patient had a change in mental status in the emergency room after having had a CTA earlier in the morning which was negative.  In CT, the patient was not breathing effectively and was cyanotic and the scanner broke so the patient was brought up to the ICU emergently.  The patient's past medical history is significant for glioblastoma multiforme for which he has been receiving radiation and chemotherapy.  He also has had a right frontal craniotomy.  At 3:30 a.m. in the morning the patient was playing a game and noted twitching of his face with slurred speech and weakness to his left arm.  He took an extra Keppra at that time.  He arrived at the ER at 6:45 a.m..     the patient remains on the ventilator and sedated with 60 of propofol.  He is also on 5 Cardizem.  Had further seizure activity during the night.    - pt remains sedated, on vent, on continuous EEG. There is EEG evidence of seizure activity so neuro has ordered VPA. Propofol has been weaned to 30    - continues sedated, on vent. Mri was done today and shows infarct in R parietal and R temporal lobes. Continues on propofol 20mg. He did have seizure activity twice today    5/15 The patient is still on the vent.  He is on Precedex, the propofol is off.  He was still having seizure activity yesterday.  MRI shows parietal and temporal infarcts on the right.     the patient is been off all sedation for 24 hours and has a GCS of 3.  Has pupils and corneals and gag and breathes spontaneously but no response to pain.  He does not respond to his voice.  He follows no commands.     the patient remains unresponsive.  He partially opens his eyes to noxious  stimuli.  He follows no commands.  Dr. Whitfield wants to give him 72 hours from stopping the sedation to reassess his neurologic recovery.  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    5/18 the patient remains densely encephalopathic.  To vigorous tactile stimulation he will partially open his eyes.  The nurse swears that he squeezed with his right hand.    5/19 the patient remains densely encephalopathic.  Actually is lightly squeezing with his right hand today.  Opens his eyes long term when stimulated.  His heart rate is 157 because he has not gotten his metoprolol because his OG tube was pulled out during his bath.    5/20 the patient opens his eyes long term to verbal stimulation.  He weakly squeezes with his right hand.  Wiggles his right toes to command.  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    REVIEW OF SYSTEMS  Unobtainable    No change in the patient's Past Medical History, Past Surgical History, Social History or Family History since admission.      VITAL SIGNS (MOST RECENT)  Temp: (!) 100.6 °F (38.1 °C) (05/20/23 0400)  Pulse: 87 (05/20/23 0741)  Resp: (!) 23 (05/20/23 0741)  BP: 135/88 (05/20/23 0817)  SpO2: 99 % (05/20/23 0741)    INTAKE AND OUTPUT (LAST 24 HOURS):  Intake/Output Summary (Last 24 hours) at 5/20/2023 0824  Last data filed at 5/20/2023 0704  Gross per 24 hour   Intake 945.83 ml   Output 1500 ml   Net -554.17 ml       WEIGHT  Wt Readings from Last 1 Encounters:   05/20/23 98.7 kg (217 lb 9.5 oz)       PHYSICAL EXAM  GENERAL: Older patient, intubated, minimally more alert each day  HEENT: Pupils equal and reactive.  Corneals are intact.  He has conjunctivitis.  Nose intact. Pharynx intubated with ET tube and OG tube. EEG leads over scalp.  Gag reflex is intact.  NECK: Supple.   HEART: Regular rate and rhythm. No murmur or gallop auscultated.  LUNGS: Clear to auscultation and percussion. Lung excursion symmetrical. No change in fremitus. No  adventitial noises.  ABDOMEN: Bowel sounds present. Non-tender, no masses palpated.  Tolerating enteral nutrition  : Normal anatomy.  Iraheta with yellow urine.  EXTREMITIES: Normal muscle tone and joint movement, no cyanosis or clubbing.  PICC line and arterial line on the left  LYMPHATICS: No adenopathy palpated, both hands puffy.  SKIN: Dry, intact, no lesions.   NEURO:  Pupils and extraocular movements are intact.  Corneals are intact.  Gag is intact.  There is a partial lid raise to light stimulation.  He will lightly squeeze with his right hand to command.  He wiggles his right toes to command.  He appears hemiplegic on the left.  GCS is 10  PSYCH:  Unable to assess      CBC LAST (LAST 24 HOURS)  Recent Labs   Lab 05/20/23 0352 05/20/23 0407   WBC 6.42  --    RBC 4.61  --    HGB 14.3  --    HCT 43.9 41   MCV 95  --    MCH 31.0  --    MCHC 32.6  --    RDW 14.3  --    *  --    MPV 11.2  --    GRAN 74.9*  4.8  --    LYMPH 10.7*  0.7*  --    MONO 13.1  0.8  --    BASO 0.02  --    NRBC 0  --        CHEMISTRY LAST (LAST 24 HOURS)  Recent Labs   Lab 05/20/23 0352 05/20/23 0407   *  --    K 3.5  --    *  --    CO2 27  --    ANIONGAP 8  --    BUN 47*  --    CREATININE 0.9  --    *  --    CALCIUM 7.6*  --    PH  --  7.484*   MG 2.0  --    ALBUMIN 2.0*  --    PROT 5.3*  --    ALKPHOS 40*  --    ALT 68*  --    AST 78*  --    BILITOT 1.0  --            LAST 7 DAYS MICROBIOLOGY   Microbiology Results (last 7 days)       Procedure Component Value Units Date/Time    Blood culture [[id number]] Collected: 05/19/23 1504    Order Status: Completed Specimen: Blood Updated: 05/19/23 2158     Blood Culture, Routine No Growth to date    Narrative:      Collection has been rescheduled by [email or web address] at 05/19/2023 11:30 Reason:   RAMYA Aguilar is messaging the dr will call when ready   Collection has been rescheduled by [email or web address] at 05/19/2023 11:30 Reason:   RAMYA Aguilar is messaging the dr will call when ready     Blood  culture [[id number]] Collected: 05/19/23 1504    Order Status: Completed Specimen: Blood Updated: 05/19/23 2158     Blood Culture, Routine No Growth to date    Narrative:      Collection has been rescheduled by [email or web address] at 05/19/2023 11:30 Reason:   RAMYA Aguilar is messaging the dr will call when ready   Collection has been rescheduled by [email or web address] at 05/19/2023 11:30 Reason:   RAMYA Aguilar is messaging the dr will call when ready     Culture, Respiratory with Gram Stain [[id number]] Collected: 05/15/23 0059    Order Status: Completed Specimen: Respiratory from Sputum Updated: 05/17/23 0738     Respiratory Culture Normal respiratory fredi     Gram Stain (Respiratory) <10 epithelial cells per low power field.     Gram Stain (Respiratory) Few WBC's     Gram Stain (Respiratory) Few Gram positive cocci            MOST RECENT IMAGING  X-Ray Chest AP Portable  Reason: Re-placed NG tube    FINDINGS:    Portable chest with comparison chest x-ray May 15, 2023 show normal cardiomediastinal silhouette. Endotracheal tube tip is 4.3 cm superior to the mio. Nasogastric tube tip and side-port are below the gastroesophageal junction. Left PICC line again noted. No pneumothorax.  Lungs are clear. Pulmonary vasculature is normal. No acute osseous abnormality.    IMPRESSION:    Endotracheal tube and nasogastric tube in place.    Electronically signed by:  Micheal Dumas DO  5/19/2023 11:04 AM CDT Workstation: [id number]      CURRENT VISIT EKG  Results for orders placed or performed during the hospital encounter of 05/11/23   ECG 12 lead    Narrative    Test Reason : I63.9,    Vent. Rate : 120 BPM     Atrial Rate : 150 BPM     P-R Int : 000 ms          QRS Dur : 104 ms      QT Int : 370 ms       P-R-T Axes : 000 016 -12 degrees     QTc Int : 522 ms    Atrial fibrillation with rapid ventricular response  Nonspecific ST abnormality  Prolonged QT  Abnormal ECG  No previous ECGs available    Referred By: [email or web address]   SELF           Confirmed By:         ECHOCARDIOGRAM RESULTS  No results found for this or any previous visit.        VENTILATOR INFORMATION  Vent Mode: Spont  Oxygen Concentration (%):  [30-40] 30  Resp Rate Total:  [14 br/min-30 br/min] 24 br/min  Vt Set:  [450 mL] 450 mL  PEEP/CPAP:  [5 cmH20] 5 cmH20  Pressure Support:  [10 cmH20] 10 cmH20  Mean Airway Pressure:  [8.1 cmH20-9.9 cmH20] 8.8 cmH20           LAST ARTERIAL BLOOD GAS  ABG  Recent Labs   Lab 05/20/23  0407   PH 7.484*   PO2 111*   PCO2 39.7   HCO3 29.8*   BE 6       IMPRESSION AND PLAN    Status epilepticus  -MRI shows temporal and parietal infarcts on the right, the patient appears to be hemiplegic on the left  -history of seizures prior to this admission  - not seizing any longer  History of glioblastoma multiforme under treatment in [place]  Naomi coma scale of 10  -patient has been off of sedation for multiple days  -minimally more responsive with a partial eye opening to voice and light squeeze of hand on R and now right toes move to command  Mechanical ventilation  - alkalotic from over breathing the ventilator  - ABG daily  - will try to get back to a T-piece again today  Atrial fibrillation  - continue Xarelto  - continue metoprolol  Hypertension  -metoprolol  Hypernatremia  - increase water flushes  Hypokalemia  - replace potassium  -Trend labs  Moderate hypoalbuminemia  -enteral feedings tolerated  Hyperlipidemia  Transaminitis  - worsening  - valproic acid?  Thrombocytopenia  - worse again today  - antiplatelet antibodies pending    Receiving enteral nutrition, on Protonix and Xarelto  In's greater than out for several days and he is getting puffy but he is hypernatremic and prerenal azotemic    Critical care time spent reviewing the chart, examining the patient, reviewing the labs, reviewing the radiological findings, discussing care with nursing, physicians, and respiratory and creating the note and  has been greater than 35 minutes    Mery Milner,  MD  Date of Service: 05/20/2023  12:50 PM

## 2023-05-20 NOTE — PROGRESS NOTES
[facility]  Neurology Progress Note    Patient Name: Teto Mendez  MRN: [id number]  : 1955  TODAY'S DATE: 2023  ADMIT DATE: 2023  6:39 AM                                          CONSULT REQUESTED BY: Nicky Espinal MD     Chief Complaint   Patient presents with    FACIAL TWITCHING    SLURRED SPEECH       HPI per EMR:  Patient presents with history of glioblastoma concurrently getting chemo in previous radiation complaining of facial twitching, facial droop, slurred speech, left arm weakness.  Symptom onset at 3:30 a.m..  Patient states at 3:30 a.m. he was playing on a game and then began to have the twitching of the face with associated droop with slurred speech and arm weakness.  Patient states he has had similar episodes in the past secondary to the glioblastoma.  He has not had the left arm weakness.  Patient states he is on Keppra and did take an extra Keppra dose this morning additionally patient is on Xarelto.  At the worst symptoms are severe.    Neurology Consult:  Patient was seen and examined by me today. He is a 67-year-old man with history of glioblastoma multiforme s/p craniectomy, chemotherapy and radiation diagnosed in 2022, as well as seizure disorder, who presented to the ED with left facial twitching, left facial droop, slurred speech and left arm weakness that began the morning of his admission at around 3 am. He was recently decreased on his home Keppra in the setting of side effects as per daughter. Upon arrival to the ED, he was loaded with 1 gr of Keppra and given 2 mg of ativan IV, which seemed to break the seizure. As per ED physician, patient was awake, alert and able to answer questions. At around 11:50 am, I was contacted by ED provider in the setting of sudden worsening of mental status, unresponsiveness, weakness and aphasia, so he was taken to CT scan for repeat CT brain to rule out LVO stroke or hemorrhage. I evaluated patient while on CT,  and noticed O2 Sats were in the high 80s. Due to technical malfunction, CT could not be completed, at which point I decided to abort procedure and transport patient to the ICU immediately. When we arrived to the 3rd floor ICU, patient seemed to be on respiratory failure, with toe and fingertip cyanosis, unable to maintain O2 Sats, so started on manual assisted ventilation and called anesthesia for emergent intubation. Patient was successfully intubated and sedation with propofol was started, but facial twitching recurred, so ativan was given and propofol bolus was administered, after which seizure activity seemed to cease. He was taken for CT after stabilization which showed no evidence of acute stroke or intracranial hemorrhage, and CTA showed no LVO. He is now being monitored on 24H continuous EEG for management of status epilepticus.    5/12/23:  Patient was seen and examined by me today.  He remains on deep sedation for treatment of status epilepticus, being monitored on 24 hour video EEG.  A brief electroclinical seizure was seen at around 4:00 a.m. with left facial twitching, but no clinical events reported after this.  Plan is to continue deep sedation until at least tomorrow morning.  Patient to be hooked up to EEG again when propofol starts to being weaned down.    5/13/23: I, Dr James assumed care on 5/13.  Patient is intubated, sedated with propofol.  He started out continuous EEG this morning shows right hemisphere lateralized periodic discharges with epileptiform potential.  No electrographic seizures were recorded.    5/14/2023:  Patient was seen examined by me.  Remains to be intubated and sedated.  MRI brain done this morning showed right MCA cortical infarcts.  Patient has been on continuous EEG overnight and EEG showed right hemispheric PLEDs with increase in frequency of PLEDs with lowering sedation.  Patient also had a clinical focal seizure involving twitching of the left side of the face this  morning when he was off sedation.    5/15/2023:  Patient was seen examined by me.  He is intubated and Precedex.  Propofol has been turned off yesterday.  Remains to be on continues EEG which shows right hemispheric (predominantly right frontotemporal) periodic lateralized epileptiform discharges.  Episodes of left facial twitching did not have EEG correlate.    5/16: Patient was seen and examined by me. He remains to be intubated and not on sedation. No response any stimulus. On CEEG. Family was at bedside and I discussed plan of care with them. Had one episode of electro clinical seizure yesterday at 12:20 PM lasting for a minute    5/17:  Patient was seen examined by me.  Remains to be intubated and off sedation.  Continuous EEG discontinued this morning. No electrographic seizure  recorded.    5/18:  Patient was seen examined by me.  Remains to be intubated.  Patient tries to open eyes to verbal commands and minimally follows commands with right upper extremity squeezing my fingers.  No further seizure activity was noted.  EEG has been off.    5/19:  Patient was seen and examined by me.  Remains to be intubated.  Minimally follows commands with his right arm and tries to open eyes to verbal stimulus.    5/20: Patient was seen and examined by me this morning.  No acute overnight events, no new neurological complaints.  Vitals have been stable.  Remains to be intubated however not on ventilator.  He is on T-piece.  Tries to open eyes to verbal stimulus somewhat follows commands with right upper extremity.  Patient had transaminitis and slow drop in platelet count which could likely be from Depakote.    Scheduled Meds:   allopurinoL  100 mg Per NG tube Daily    amantadine HCL  100 mg Per NG tube BID    atorvastatin  80 mg Per NG tube Daily    dexAMETHasone  1 mg Per NG tube Q12H    diltiaZEM  30 mg Oral Q6H    lacosamide (VIMPAT) IVPB  200 mg Intravenous Q12H    levetiracetam IV  1,500 mg Intravenous Q12H     metoprolol tartrate  100 mg Per NG tube BID    pantoprazole  40 mg Intravenous Daily    rivaroxaban  20 mg Per NG tube Daily with dinner    sacubitriL-valsartan  1 tablet Per G Tube BID    timolol maleate 0.25%  1 drop Both Eyes BID    valproate sodium (DEPACON) IVPB  500 mg Intravenous Q8H     Continuous Infusions:      PRN Meds:.acetaminophen, colchicine, hydrALAZINE, magnesium sulfate IVPB, magnesium sulfate IVPB, metoprolol, ondansetron, polyethylene glycol, potassium bicarbonate, potassium bicarbonate, potassium bicarbonate, sodium chloride 0.9%, sodium phosphate IVPB, sodium phosphate IVPB, sodium phosphate IVPB      Physical Exam  Current Vitals:  Vitals:    05/20/23 1030   BP:    Pulse: 100   Resp: (!) 37   Temp:      General appearance: intubated, minimal response to verbal commands.  Cardiovascular: Afib  Pulmonary:  Intubated and ventilation with T-piece  GI: soft  MSK: normal passive ROM  Skin: normal color, no lesions    NEUROLOGICAL EXAM:    Mental status: intubated.  Not on sedation.  Minimal response to verbal commands          Intubated: yes          Sedated: no  Response to noxious stimulation:  Yes  Breathes above ventilator:  On T-piece  Opens eyes:  Tries to open eyes to verbal commands  Pupils: symmetric          Left: reactive          Right: reactive  Eye movements: No pathologic movements such as nystagmus, ocular bobbing, dipping or roving.    Corneal reflex: present  Oculocephalic reflex:  Absent  Cough:  Present  Gag:  Present  Motor exam: Normal muscle tone. Normal muscle bulk. No abnormal movements such as tremors, myoclonus or twitching.  DTRs: 1+ throughout. Babinski response absent.      Laboratory Data & Studies    Recent Labs   Lab 05/18/23 0437 05/19/23 0409 05/20/23  0352   WBC 6.23 7.39 6.42   HGB 15.1 15.5 14.3   * 145* 128*   MCV 95 94 95       Recent Labs   Lab 05/18/23 0437 05/19/23 0409 05/20/23  0352    147* 147*   K 3.6 3.9 3.5    113* 112*   CO2 27  26 27   BUN 46* 42* 47*   * 138* 132*   CALCIUM 7.6* 8.4* 7.6*   MG 2.1 2.1 2.0   PHOS 3.0 2.8 3.3       Recent Labs   Lab 05/18/23  0437 05/19/23  0409 05/20/23  0352   PROT 5.1* 5.9* 5.3*   ALBUMIN 2.1* 2.4* 2.0*   BILITOT 1.0 1.0 1.0   AST 61* 104* 78*   ALT 50* 77* 68*   ALKPHOS 40* 43* 40*       No results for input(s): LABPT, INR, APTT in the last 168 hours.      Recent Labs   Lab 05/15/23  0308   HGBA1C 5.7   CHOL 250*   TRIG 143   LDLCALC 191.4*   HDL 30*         Microbiology:  Microbiology Results (last 7 days)       Procedure Component Value Units Date/Time    Blood culture [[id number]] Collected: 05/19/23 1504    Order Status: Completed Specimen: Blood Updated: 05/19/23 2158     Blood Culture, Routine No Growth to date    Narrative:      Collection has been rescheduled by [email or web address] at 05/19/2023 11:30 Reason:   RAMYA Aguilar is messaging the dr will call when ready   Collection has been rescheduled by [email or web address] at 05/19/2023 11:30 Reason:   RAMYA Aguilar is messaging the dr will call when ready     Blood culture [[id number]] Collected: 05/19/23 1504    Order Status: Completed Specimen: Blood Updated: 05/19/23 2158     Blood Culture, Routine No Growth to date    Narrative:      Collection has been rescheduled by [email or web address] at 05/19/2023 11:30 Reason:   RAMYA Aguilar is messaging the dr will call when ready   Collection has been rescheduled by [email or web address] at 05/19/2023 11:30 Reason:   RAMYA Aguilar is messaging the dr will call when ready     Culture, Respiratory with Gram Stain [[id number]] Collected: 05/15/23 0059    Order Status: Completed Specimen: Respiratory from Sputum Updated: 05/17/23 0738     Respiratory Culture Normal respiratory fredi     Gram Stain (Respiratory) <10 epithelial cells per low power field.     Gram Stain (Respiratory) Few WBC's     Gram Stain (Respiratory) Few Gram positive cocci              Imaging:  CT Head Without Contrast    Result Date: 5/11/2023  CMS MANDATED QUALITY DATA - CT RADIATION  436 All CT scans at this  facility utilize dose modulation, iterative reconstruction, and/or weight based dosing when appropriate to reduce radiation dose to as low as reasonably achievable. CT HEAD WITHOUT IV CONTRAST CLINICAL HISTORY: 67 years Male Mental status change, unknown cause COMPARISON: Noncontrast CT head performed earlier today 6:56 AM FINDINGS: Negative for acute intracranial hemorrhage, midline shift, or mass effect. Parenchymal calcifications within the right cerebral hemisphere are stable compared to prior. Patient is status post right pterional craniotomy. Ventricles and sulci are normal in size. Gray-white differentiation is maintained. Cerebellar hemispheres and brainstem are unremarkable. No calvarial lesion or fracture. Mastoid air cells are clear. IMPRESSION: Stable exam. No CT evidence of acute intracranial pathology. Electronically signed by:  Matt Cole MD  5/11/2023 1:41 PM CDT Workstation: [id number]    X-Ray Chest AP Portable    Result Date: 5/11/2023  CLINICAL HISTORY: 67 years (1955) Male Stroke TECHNIQUE: Portable AP radiograph the chest. COMPARISON: None available. FINDINGS: Nonspecific minimal faint interstitial opacity at the left costophrenic.  No pneumothorax is identified. The heart is mildly enlarged.  Osseous structures show degenerative changes in the spine. The visualized upper abdomen is unremarkable. IMPRESSION: Nonspecific minimal faint interstitial opacity at the left costophrenic sulcus possibly representing atelectasis, scarring, trace pleural fluid, and less likely aspiration or pneumonia. . Electronically signed by:  Solomon Tobias MD  5/11/2023 8:32 AM CDT Workstation: [id number]    CT HEAD FOR STROKE    Result Date: 5/11/2023  CMS MANDATED QUALITY DATA - CT RADIATION - 436 All CT scans at this facility utilize dose modulation, iterative reconstruction, and/or weight based dosing when appropriate to reduce radiation dose to as low as reasonably achievable. EXAMINATION: CT HEAD FOR  STROKE CLINICAL HISTORY: Neuro deficit, acute, stroke suspected;  history of glioblastoma TECHNIQUE: CT without IV contrast COMPARISON: None FINDINGS: There are postsurgical changes from prior right frontal temporal These craniotomy.  The ventricles and sulci are mildly prominent compatible with generalized cerebral atrophy.  There is no hemorrhage, mass or midline shift.  There are no extra-axial fluid collections.  The cerebellum and brainstem are normal.  The gray-white differentiation is maintained.  The orbits are unremarkable. The paranasal sinuses and mastoid air cells are clear.     Mild cerebral atrophy with no acute intracranial process Prior right frontal temporal craniotomy Findings were called to Dr. Zhou in the emergency department at 07:09 a.m. Electronically signed by: Yi Hernandez MD Date:    05/11/2023 Time:    07:10    CTA Head and Neck (xpd)    Result Date: 5/11/2023  CMS MANDATED QUALITY DATA - CT RADIATION - 436 All CT scans at this facility utilize dose modulation, iterative reconstruction, and/or weight based dosing when appropriate to reduce radiation dose to as low as reasonably achievable. CMS MANDATED QUALITY DATA - CAROTID - 195 All measurements and percent stenosis described below were determined using NASCET criteria or criteria similar to NASCET, as defined by the Society of Radiologists in Ultrasound Consensus Conference, Radiology, 2003 Reason: Stroke/TIA, determine embolic source Technique: CT angiography of brain and neck with 100 mL Omnipaque 350.  Maximum intensity projection coronal reformations were obtained at a separate workstation and stored in the patient's permanent medical record. COMPARISON: 5/11/2023 CTA BRAIN: VASCULAR FINDINGS: Major intracranial arteries are widely patent with no stenosis, intraluminal filling, or dissection. Minimal atherosclerotic plaque affects the cavernous segments of bilateral internal carotid arteries. Negative for aneurysm or evidence of  vasculitis. Opacified visualized dural venous sinuses are unremarkable. NONVASCULAR FINDINGS: No abnormal intra-axial or extra-axial enhancement. No midline shift. Postsurgical changes of right pterional craniotomy are noted. 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 Minimal atherosclerotic plaque affects left carotid bulb. Left carotid arteries are otherwise without plaque or stenosis. Left vertebral artery is patent. Minimal atherosclerotic plaque affects the right carotid bulb. Right carotid arteries are without additional plaque and remain widely patent. Right vertebral artery is patent. NONVASCULAR FINDINGS: Endotracheal tube has tip proximal to mio. Visualized lung apices show minor dependent atelectasis. Cervical soft tissues are unremarkable. IMPRESSION: 1. Trace atherosclerotic plaque in intracranial internal carotid arteries, without other abnormality of the major intracranial arteries. 2. Trace atherosclerotic plaque in bilateral carotid bulbs, with no stenosis or other abnormality of cervical ICAs or vertebral arteries. Electronically signed by:  Compa Lopez MD  5/11/2023 2:01 PM CDT Workstation: [id number]    CTA Head and Neck (xpd)    Result Date: 5/11/2023  CMS EXAMINATION: CTA HEAD AND NECK (XPD) CLINICAL INDICATION: Male, 67 years old. Stroke/TIA, determine embolic source history of glioblastoma TECHNIQUE: Axial CT angiogram of the head and neck was performed with contrast. Multiplanar reformations as well as 3-D volume rendered imaging were reviewed at the workstation. Degree of stenosis was measured utilizing the NASCET method. CONTRAST: 100 mL mL of Omnipaque 350 IV. COMPARISON: None FINDINGS: CTA brain: The distal vertebral arteries, basilar artery and cavernous portions of the internal carotid arteries are widely patent. The anterior cerebral arteries, middle cerebral arteries and posterior cerebral  arteries are widely patent without evidence of large vessel occlusion, significant stenosis, AVM or aneurysm. The dural venous sinuses are patent. There is no pathologic enhancement. Aortic Arch and Great Vessel Origins: 2 vessel aortic arch which is widely patent Subclavian Arteries: Widely patent. Right Common Carotid Artery: Widely patent. Right Internal Carotid Artery: Widely patent. Right External Carotid Artery: Widely patent. Left Common Carotid Artery: Widely patent. Left Internal Carotid Artery: Widely patent. Left External Carotid Artery: Widely patent. Right Vertebral Artery: Widely patent. Left Vertebral Artery: Widely patent. The paraspinous soft tissues are unremarkable. There are mild degenerative changes of the cervical spine. The lung apices are clear. IMPRESSION: Negative CTA of the head and neck. This exam was performed according to our departmental dose-optimization program which includes automated exposure control, adjustment of the mA and/or kV according to patient size and/or use of iterative reconstruction technique. Electronically signed by:  Yi Hernandez MD  5/11/2023 7:43 AM CDT Workstation: [id number]        Assessment and Plan:    Status Epilepticus  History of Glioblastoma Multiforme s/p craniectomy, chemo and radiotherapy  Acute ischemic infarct   Atrial fibrillation       67-year-old man with history of glioblastoma multiforme s/p craniectomy, chemotherapy and radiation diagnosed in August 2022, as well as seizure disorder, who presented to the ED with left facial twitching, left facial droop, slurred speech and left arm weakness. Upon arrival to the ED, he was loaded with 1 gr of Keppra and given 2 mg of ativan IV, which seemed to break the seizure. However, he experienced sudden worsening of mental status, with unresponsiveness, weakness and aphasia, so he was taken to CT scan for repeat CT brain to rule out LVO stroke or hemorrhage. He was then intubated, sedated and placed  on mechanical ventilation. Clinical picture more consistent with status epilepticus.      - Admitted to hospital medicine in the ICU with q2 hour neuro checks, on telemetry, continuous pulse oximetry  - Seizure precautions while inpatient  - initially underwent 24-hour VEEG continuous monitoring with evidence of 4 electrographic seizures which resolved when dose of propofol was increased to 60 mcg/kg/min. By the end of the recording, a burst suppression pattern with decreased LPEDs was seen.   -on 5/13 patient was again put on continues EEG monitoring while weaning sedation.  Initially EEG showed burst suppression pattern with bursts of 1-2 seconds of delta activity followed by 2-4 seconds of background suppression and also showed right hemispheric lateralized periodic epileptiform discharges.  Background changed to a mixture of delta and theta activity with PLEDs becoming more frequent with lowering sedation to 30mcg/kg/min.    - cEEG stopped on 5/17. Showed intermittent lateralized periodic discharges on the right frontotemporal leads and significant slowing of the background bilaterally.   -patient has been off sedation for more than 72 hours.  Somewhat improvement in mental status and tries to open eyes with verbal stimulus and able to squeeze my fingers with his right hand to command since 5/18. No further improvement since yesterday.  Palliative team has met with family and discussed goals of care.  - Patient had transaminitis and slow drop in platelet count which could likely be from Depakote.  Will slowly wean Depakote and repeat EEG in 24-48 hours.  Monitor for clinical seizures.  If has any clinical seizures or electrographic seizures, will add another AED.  - continue Vimpat 200 mg b.i.d..  continue Keppra 1500 mg BID IV  - MRI brain showed right hemispheric infarcts.  Etiology could likely be secondary to recent radiation versus cardioembolism in the setting of atrial fibrillation. Continue  anticoagulation with Xarelto 20 mg daily  - Avoid seizure threshold lowering medications such as:  Bupropion, diphenhydramine, tramadol, certain antibiotics  - Maintain Euthermia with Tylenol prn temp > 37.2 degrees C.  - Assessment for rehab with PT/OT/SLP evaluation and treatment.  - workup and treatment of metabolic and infectious abnormalities as per primary team  - Will follow along      DVT prophylaxis with chemo/SCD prophylaxis      [phone] within 3 days from discharge.        All questions were answered.                              Thank you kindly for including us in the care of this patient. Please do not hesitate to contact us with any questions.       Critical Care:  48 minutes of critical care time has been spent evaluating with the patient. Time includes chart review not limited to diagnostic imaging, labs, and vitals, patient assessment, discussion with family and nursing, current order evaluations, and new order entries.      Mukund James MD  Neurology/Vascular Neurology

## 2023-05-20 NOTE — CARE UPDATE
05/19/23 2000   Patient Assessment/Suction   Level of Consciousness (AVPU) responds to pain   Respiratory Effort Normal;Unlabored   Expansion/Accessory Muscles/Retractions no use of accessory muscles   All Lung Fields Breath Sounds equal bilaterally;clear   Rhythm/Pattern, Respiratory assisted mechanically   Cough Frequency with stimulation   Cough Type assisted   Suction Method tracheal   $ Suction Charges Inline Suction Procedure Stat Charge   Secretions Amount small   Secretions Color pale;yellow   Secretions Characteristics thick   PRE-TX-O2   Device (Oxygen Therapy) ventilator   Pulse Oximetry Type Continuous   $ Pulse Oximetry - Multiple Charge Pulse Oximetry - Multiple      Airway Anesthesia 05/11/23   Placement Date/Time: 05/11/23 c) 4766   Method of Intubation: Video Laryngoscopy  Airway Device: Endotracheal Tube  Mask Ventilation: Moderately difficult with oral airway  Intubated: Other (see comments)  Blade: Glidescope #3  Airway Device Size: 8....   Secured at 24 cm   Measured At Lips   Secured Location Center   Secured by Commercial tube barboza   Bite Block none   Site Condition Cool;Dry   Status Intact;Secured;Patent   Site Assessment Clean;Dry   Cuff Volume   (MLT)   Airway Safety   Is Ambu Bag and Mask with Patient? Yes, Adult Ambu Bag and Mask   Vent Select   Conventional Vent Y   Charged w/in last 24h YES   Preset Conventional Ventilator Settings   Vent ID [id number]   Vent Type    Humidity HME   Conventional Ventilator Alarms   Alarms On Y   Education   $ Education Suction;Ventilator Oxygen;15 min   Respiratory Evaluation   $ Care Plan Tech Time 15 min   $ Eval/Re-eval Charges Re-evaluation   Evaluation For Re-Eval 5+ day

## 2023-05-20 NOTE — CARE UPDATE
05/20/23 0710   Patient Assessment/Suction   Respiratory Effort Unlabored   Expansion/Accessory Muscles/Retractions no use of accessory muscles   All Lung Fields Breath Sounds diminished;equal bilaterally   Rhythm/Pattern, Respiratory assisted mechanically   Cough Frequency with stimulation   Cough Type assisted   Suction Method oral;tracheal   Suction Pressure (mmHg) -120 mmHg   $ Suction Charges Inline Suction Procedure Stat Charge   Secretions Amount moderate   Secretions Color pale;yellow   Secretions Characteristics thick   Skin Integrity   $ Wound Care Tech Time 15 min   Area Observed Left;Right;Cheek;Upper lip;Lower lip;Corner lip   Skin Appearance without discoloration   PRE-TX-O2   Device (Oxygen Therapy) ventilator   $ Is the patient on Low Flow Oxygen? Yes   Oxygen Concentration (%) 35   SpO2 100 %   Pulse Oximetry Type Continuous   $ Pulse Oximetry - Multiple Charge Pulse Oximetry - Multiple   Pulse 87   Resp 18      Airway Anesthesia 05/11/23   Placement Date/Time: 05/11/23 (c) 9386   Method of Intubation: Video Laryngoscopy  Airway Device: Endotracheal Tube  Mask Ventilation: Moderately difficult with oral airway  Intubated: Other (see comments)  Blade: Glidescope #3  Airway Device Size: 8....   Secured at 25 cm   Measured At Lips   Secured Location Right   Secured by Commercial tube barboza   Bite Block none   Site Condition Cool;Dry   Status Intact;Secured;Patent   Site Assessment Clean;Dry;No drainage;No bleeding   Airway Safety   Is Ambu Bag and Mask with Patient? Yes, Adult Ambu Bag and Mask   Suction set is at the bedside? Yes   Vent Select   Conventional Vent Y   $ Ventilator Subsequent 1   Charged w/in last 24h YES   Preset Conventional Ventilator Settings   Vent ID [id number]   Vent Type    Ventilation Type VC+   Vent Mode A/C   Humidity HME   Set Rate 10 BPM   Vt Set 450 mL   PEEP/CPAP 5 cmH20   Peak End Inspiratory Pressure 16 cmH20   Insp Time 0.85 Sec(s)   Insp Rise Time  70 %   I-Trigger  Type  V-TRIG   Trigger Sensitivity Flow/I-Trigger 3 L/min   Patient Ventilator Parameters   Resp Rate Total 26 br/min   Peak Airway Pressure 16 cmH20   Mean Airway Pressure 9.1 cmH20   Plateau Pressure 0 cmH20   Exhaled Vt 488 mL   Total Ve 12.4 L/m   I:E Ratio Measured 1:1.60   Auto PEEP 0 cmH20   Tubing ID (mm) 8 mm   Tube Type ET   Inspired Tidal Volume (VTI) 0 mL   Conventional Ventilator Alarms   Alarms On Y   Ve High Alarm 28 L/min   Ve Low Alarm 5 L/min   Vt High Alarm 1200 mL   Vt Low Alarm 200 mL   Resp Rate High Alarm 40 br/min   Press High Alarm 60 cmH2O   Apnea Rate 12   Apnea Volume (mL) 450 mL   Apnea Oxygen Concentration  100   Apnea Flow Rate (L/min) 65   T Apnea 20 sec(s)   IHI Ventilator Associated Pneumonia Bundle (Required)   Head of Bed Elevated  HOB 30   Oral Care Lip moisturizer applied;Mouth swabbed;Mouth moisturizer;Mouth suctioned;Teeth brushed;Oral suctioning prior to turn  (Oral care X 2)   Vent Circut Breaks Minimized Yes   Ready to Wean/Extubation Screen   FIO2<=50 (chart decimal) 0.35   MV<16L (chart vol.) 12.4   PEEP <=8 (chart #) 5   Ready to Wean Parameters   F/VT Ratio<105 (RSBI) (!) 36.89   Vital Capacity   Vital Capacity (mL) 0   Respiratory Evaluation   $ Care Plan Tech Time 15 min

## 2023-05-21 ENCOUNTER — ANESTHESIA (OUTPATIENT)
Dept: INTENSIVE CARE | Facility: HOSPITAL | Age: 68
End: 2023-05-21

## 2023-05-21 ENCOUNTER — ANESTHESIA EVENT (OUTPATIENT)
Dept: INTENSIVE CARE | Facility: HOSPITAL | Age: 68
End: 2023-05-21

## 2023-05-21 LAB
ALBUMIN SERPL BCP-MCNC: 2.1 G/DL (ref 3.5–5.2)
ALLENS TEST: ABNORMAL
ALP SERPL-CCNC: 40 U/L (ref 55–135)
ALT SERPL W/O P-5'-P-CCNC: 74 U/L (ref 10–44)
ANION GAP SERPL CALC-SCNC: 9 MMOL/L (ref 8–16)
AST SERPL-CCNC: 87 U/L (ref 10–40)
BACTERIA #/AREA URNS HPF: ABNORMAL /HPF
BASOPHILS # BLD AUTO: 0.04 K/UL (ref 0–0.2)
BASOPHILS NFR BLD: 0.4 % (ref 0–1.9)
BILIRUB SERPL-MCNC: 1 MG/DL (ref 0.1–1)
BILIRUB UR QL STRIP: NEGATIVE
BUN SERPL-MCNC: 46 MG/DL (ref 8–23)
CALCIUM SERPL-MCNC: 7.9 MG/DL (ref 8.7–10.5)
CHLORIDE SERPL-SCNC: 113 MMOL/L (ref 95–110)
CLARITY UR: ABNORMAL
CO2 SERPL-SCNC: 26 MMOL/L (ref 23–29)
COLOR UR: YELLOW
CREAT SERPL-MCNC: 0.8 MG/DL (ref 0.5–1.4)
CRP SERPL-MCNC: 3.92 MG/DL
DELSYS: ABNORMAL
DIFFERENTIAL METHOD: ABNORMAL
EOSINOPHIL # BLD AUTO: 0 K/UL (ref 0–0.5)
EOSINOPHIL NFR BLD: 0.2 % (ref 0–8)
ERYTHROCYTE [DISTWIDTH] IN BLOOD BY AUTOMATED COUNT: 13.8 % (ref 11.5–14.5)
EST. GFR  (NO RACE VARIABLE): >60 ML/MIN/1.73 M^2
GLUCOSE SERPL-MCNC: 142 MG/DL (ref 70–110)
GLUCOSE SERPL-MCNC: 151 MG/DL (ref 70–110)
GLUCOSE UR QL STRIP: NEGATIVE
HCO3 UR-SCNC: 29.5 MMOL/L (ref 24–28)
HCT VFR BLD AUTO: 45.1 % (ref 40–54)
HCT VFR BLD CALC: 46 %PCV (ref 36–54)
HGB BLD-MCNC: 14.9 G/DL (ref 14–18)
HGB UR QL STRIP: ABNORMAL
HYALINE CASTS #/AREA URNS LPF: 37 /LPF
IMM GRANULOCYTES # BLD AUTO: 0.12 K/UL (ref 0–0.04)
IMM GRANULOCYTES NFR BLD AUTO: 1.3 % (ref 0–0.5)
KETONES UR QL STRIP: NEGATIVE
LACTATE SERPL-SCNC: 1.5 MMOL/L (ref 0.5–1.9)
LEUKOCYTE ESTERASE UR QL STRIP: ABNORMAL
LYMPHOCYTES # BLD AUTO: 1.2 K/UL (ref 1–4.8)
LYMPHOCYTES NFR BLD: 12.8 % (ref 18–48)
MAGNESIUM SERPL-MCNC: 1.9 MG/DL (ref 1.6–2.6)
MCH RBC QN AUTO: 31.4 PG (ref 27–31)
MCHC RBC AUTO-ENTMCNC: 33 G/DL (ref 32–36)
MCV RBC AUTO: 95 FL (ref 82–98)
MICROSCOPIC COMMENT: ABNORMAL
MODE: ABNORMAL
MONOCYTES # BLD AUTO: 1 K/UL (ref 0.3–1)
MONOCYTES NFR BLD: 10.7 % (ref 4–15)
NEUTROPHILS # BLD AUTO: 6.8 K/UL (ref 1.8–7.7)
NEUTROPHILS NFR BLD: 74.6 % (ref 38–73)
NITRITE UR QL STRIP: NEGATIVE
NRBC BLD-RTO: 0 /100 WBC
PCO2 BLDA: 32.4 MMHG (ref 35–45)
PH SMN: 7.57 [PH] (ref 7.35–7.45)
PH UR STRIP: 7 [PH] (ref 5–8)
PHOSPHATE SERPL-MCNC: 2.1 MG/DL (ref 2.7–4.5)
PHOSPHATE SERPL-MCNC: 2.4 MG/DL (ref 2.7–4.5)
PLATELET # BLD AUTO: 159 K/UL (ref 150–450)
PLATELET BLD QL SMEAR: ABNORMAL
PLT AB: GP IA/IIA: NEGATIVE
PLT AB: GP IB/IX: NEGATIVE
PLT AB: GP IIB/IIIA: NEGATIVE
PLT AB: GP IV: NEGATIVE
PLT AB: HLA CLASS 1: NEGATIVE
PMV BLD AUTO: 10.8 FL (ref 9.2–12.9)
PO2 BLDA: 118 MMHG (ref 80–100)
POC BE: 7 MMOL/L
POC IONIZED CALCIUM: 1.12 MMOL/L (ref 1.06–1.42)
POC SATURATED O2: 99 % (ref 95–100)
POC TCO2: 30 MMOL/L (ref 23–27)
POTASSIUM BLD-SCNC: 3.8 MMOL/L (ref 3.5–5.1)
POTASSIUM SERPL-SCNC: 3.3 MMOL/L (ref 3.5–5.1)
POTASSIUM SERPL-SCNC: 3.7 MMOL/L (ref 3.5–5.1)
PROCALCITONIN SERPL IA-MCNC: 0.12 NG/ML
PROT SERPL-MCNC: 5.4 G/DL (ref 6–8.4)
PROT UR QL STRIP: ABNORMAL
RBC # BLD AUTO: 4.75 M/UL (ref 4.6–6.2)
RBC #/AREA URNS HPF: 93 /HPF (ref 0–4)
SAMPLE: ABNORMAL
SITE: ABNORMAL
SODIUM BLD-SCNC: 149 MMOL/L (ref 136–145)
SODIUM SERPL-SCNC: 148 MMOL/L (ref 136–145)
SP GR UR STRIP: 1.03 (ref 1–1.03)
SQUAMOUS #/AREA URNS HPF: 0 /HPF
URN SPEC COLLECT METH UR: ABNORMAL
UROBILINOGEN UR STRIP-ACNC: >=8 EU/DL
VALPROATE SERPL-MCNC: 24.3 UG/ML (ref 50–100)
WBC # BLD AUTO: 9.07 K/UL (ref 3.9–12.7)
WBC #/AREA URNS HPF: >100 /HPF (ref 0–5)

## 2023-05-21 PROCEDURE — 87077 CULTURE AEROBIC IDENTIFY: CPT | Mod: 59 | Performed by: HOSPITALIST

## 2023-05-21 PROCEDURE — 84100 ASSAY OF PHOSPHORUS: CPT | Mod: 91 | Performed by: INTERNAL MEDICINE

## 2023-05-21 PROCEDURE — 25000003 PHARM REV CODE 250: Performed by: HOSPITALIST

## 2023-05-21 PROCEDURE — 87186 SC STD MICRODIL/AGAR DIL: CPT | Performed by: INTERNAL MEDICINE

## 2023-05-21 PROCEDURE — 80053 COMPREHEN METABOLIC PANEL: CPT | Performed by: STUDENT IN AN ORGANIZED HEALTH CARE EDUCATION/TRAINING PROGRAM

## 2023-05-21 PROCEDURE — 99900035 HC TECH TIME PER 15 MIN (STAT)

## 2023-05-21 PROCEDURE — 63600175 PHARM REV CODE 636 W HCPCS: Performed by: STUDENT IN AN ORGANIZED HEALTH CARE EDUCATION/TRAINING PROGRAM

## 2023-05-21 PROCEDURE — 87186 SC STD MICRODIL/AGAR DIL: CPT | Mod: 59 | Performed by: HOSPITALIST

## 2023-05-21 PROCEDURE — 85014 HEMATOCRIT: CPT

## 2023-05-21 PROCEDURE — 94761 N-INVAS EAR/PLS OXIMETRY MLT: CPT

## 2023-05-21 PROCEDURE — 63600175 PHARM REV CODE 636 W HCPCS: Performed by: INTERNAL MEDICINE

## 2023-05-21 PROCEDURE — 99900026 HC AIRWAY MAINTENANCE (STAT)

## 2023-05-21 PROCEDURE — 95819 EEG AWAKE AND ASLEEP: CPT

## 2023-05-21 PROCEDURE — 87040 BLOOD CULTURE FOR BACTERIA: CPT | Mod: 59 | Performed by: INTERNAL MEDICINE

## 2023-05-21 PROCEDURE — 94003 VENT MGMT INPAT SUBQ DAY: CPT

## 2023-05-21 PROCEDURE — 84145 PROCALCITONIN (PCT): CPT | Performed by: HOSPITALIST

## 2023-05-21 PROCEDURE — 99291 CRITICAL CARE FIRST HOUR: CPT | Mod: ,,, | Performed by: INTERNAL MEDICINE

## 2023-05-21 PROCEDURE — 84100 ASSAY OF PHOSPHORUS: CPT | Performed by: STUDENT IN AN ORGANIZED HEALTH CARE EDUCATION/TRAINING PROGRAM

## 2023-05-21 PROCEDURE — 85025 COMPLETE CBC W/AUTO DIFF WBC: CPT | Performed by: STUDENT IN AN ORGANIZED HEALTH CARE EDUCATION/TRAINING PROGRAM

## 2023-05-21 PROCEDURE — 25000003 PHARM REV CODE 250

## 2023-05-21 PROCEDURE — C9113 INJ PANTOPRAZOLE SODIUM, VIA: HCPCS | Performed by: STUDENT IN AN ORGANIZED HEALTH CARE EDUCATION/TRAINING PROGRAM

## 2023-05-21 PROCEDURE — C9254 INJECTION, LACOSAMIDE: HCPCS | Performed by: STUDENT IN AN ORGANIZED HEALTH CARE EDUCATION/TRAINING PROGRAM

## 2023-05-21 PROCEDURE — 87070 CULTURE OTHR SPECIMN AEROBIC: CPT | Performed by: HOSPITALIST

## 2023-05-21 PROCEDURE — 87077 CULTURE AEROBIC IDENTIFY: CPT | Performed by: INTERNAL MEDICINE

## 2023-05-21 PROCEDURE — A4216 STERILE WATER/SALINE, 10 ML: HCPCS | Performed by: STUDENT IN AN ORGANIZED HEALTH CARE EDUCATION/TRAINING PROGRAM

## 2023-05-21 PROCEDURE — 27000221 HC OXYGEN, UP TO 24 HOURS

## 2023-05-21 PROCEDURE — 25000003 PHARM REV CODE 250: Performed by: STUDENT IN AN ORGANIZED HEALTH CARE EDUCATION/TRAINING PROGRAM

## 2023-05-21 PROCEDURE — 84295 ASSAY OF SERUM SODIUM: CPT

## 2023-05-21 PROCEDURE — 84132 ASSAY OF SERUM POTASSIUM: CPT | Performed by: INTERNAL MEDICINE

## 2023-05-21 PROCEDURE — 83735 ASSAY OF MAGNESIUM: CPT | Performed by: STUDENT IN AN ORGANIZED HEALTH CARE EDUCATION/TRAINING PROGRAM

## 2023-05-21 PROCEDURE — 63600175 PHARM REV CODE 636 W HCPCS: Performed by: HOSPITALIST

## 2023-05-21 PROCEDURE — 87205 SMEAR GRAM STAIN: CPT | Performed by: HOSPITALIST

## 2023-05-21 PROCEDURE — 36415 COLL VENOUS BLD VENIPUNCTURE: CPT | Performed by: INTERNAL MEDICINE

## 2023-05-21 PROCEDURE — 82803 BLOOD GASES ANY COMBINATION: CPT

## 2023-05-21 PROCEDURE — 99291 PR CRITICAL CARE, E/M 30-74 MINUTES: ICD-10-PCS | Mod: ,,, | Performed by: INTERNAL MEDICINE

## 2023-05-21 PROCEDURE — 25000003 PHARM REV CODE 250: Performed by: INTERNAL MEDICINE

## 2023-05-21 PROCEDURE — 82330 ASSAY OF CALCIUM: CPT

## 2023-05-21 PROCEDURE — 83605 ASSAY OF LACTIC ACID: CPT | Performed by: HOSPITALIST

## 2023-05-21 PROCEDURE — 94799 UNLISTED PULMONARY SVC/PX: CPT

## 2023-05-21 PROCEDURE — 86140 C-REACTIVE PROTEIN: CPT | Performed by: HOSPITALIST

## 2023-05-21 PROCEDURE — 20000000 HC ICU ROOM

## 2023-05-21 PROCEDURE — 31720 CLEARANCE OF AIRWAYS: CPT

## 2023-05-21 PROCEDURE — 36555 INSERT NON-TUNNEL CV CATH: CPT

## 2023-05-21 PROCEDURE — 87070 CULTURE OTHR SPECIMN AEROBIC: CPT | Mod: 59 | Performed by: HOSPITALIST

## 2023-05-21 PROCEDURE — 81001 URINALYSIS AUTO W/SCOPE: CPT | Performed by: INTERNAL MEDICINE

## 2023-05-21 PROCEDURE — 87086 URINE CULTURE/COLONY COUNT: CPT | Performed by: INTERNAL MEDICINE

## 2023-05-21 PROCEDURE — 84132 ASSAY OF SERUM POTASSIUM: CPT

## 2023-05-21 PROCEDURE — 87147 CULTURE TYPE IMMUNOLOGIC: CPT | Performed by: HOSPITALIST

## 2023-05-21 PROCEDURE — 37799 UNLISTED PX VASCULAR SURGERY: CPT

## 2023-05-21 PROCEDURE — 99900031 HC PATIENT EDUCATION (STAT)

## 2023-05-21 PROCEDURE — 36620 INSERTION CATHETER ARTERY: CPT

## 2023-05-21 PROCEDURE — 80164 ASSAY DIPROPYLACETIC ACD TOT: CPT | Performed by: INTERNAL MEDICINE

## 2023-05-21 RX ORDER — NOREPINEPHRINE BITARTRATE/D5W 4MG/250ML
0-3 PLASTIC BAG, INJECTION (ML) INTRAVENOUS CONTINUOUS
Status: DISCONTINUED | OUTPATIENT
Start: 2023-05-21 | End: 2023-05-23

## 2023-05-21 RX ORDER — ATORVASTATIN CALCIUM 20 MG/1
20 TABLET, FILM COATED ORAL DAILY
Status: DISCONTINUED | OUTPATIENT
Start: 2023-05-22 | End: 2023-05-25 | Stop reason: HOSPADM

## 2023-05-21 RX ORDER — NOREPINEPHRINE BITARTRATE/D5W 4MG/250ML
0-3 PLASTIC BAG, INJECTION (ML) INTRAVENOUS CONTINUOUS
Status: CANCELLED | OUTPATIENT
Start: 2023-05-21

## 2023-05-21 RX ORDER — LORAZEPAM 2 MG/ML
4 INJECTION INTRAMUSCULAR
Status: DISCONTINUED | OUTPATIENT
Start: 2023-05-21 | End: 2023-05-25 | Stop reason: HOSPADM

## 2023-05-21 RX ORDER — TRIPROLIDINE/PSEUDOEPHEDRINE 2.5MG-60MG
200 TABLET ORAL EVERY 6 HOURS PRN
Status: DISCONTINUED | OUTPATIENT
Start: 2023-05-21 | End: 2023-05-25 | Stop reason: HOSPADM

## 2023-05-21 RX ORDER — DEXTROSE MONOHYDRATE 50 MG/ML
INJECTION, SOLUTION INTRAVENOUS CONTINUOUS
Status: DISCONTINUED | OUTPATIENT
Start: 2023-05-21 | End: 2023-05-22

## 2023-05-21 RX ORDER — NOREPINEPHRINE BITARTRATE/D5W 4MG/250ML
PLASTIC BAG, INJECTION (ML) INTRAVENOUS
Status: COMPLETED
Start: 2023-05-21 | End: 2023-05-21

## 2023-05-21 RX ADMIN — DEXAMETHASONE 1 MG: 1 TABLET ORAL at 08:05

## 2023-05-21 RX ADMIN — TIMOLOL MALEATE 1 DROP: 2.5 SOLUTION/ DROPS OPHTHALMIC at 09:05

## 2023-05-21 RX ADMIN — METOPROLOL TARTRATE 5 MG: 5 INJECTION, SOLUTION INTRAVENOUS at 01:05

## 2023-05-21 RX ADMIN — ACETAMINOPHEN 650 MG: 325 TABLET ORAL at 12:05

## 2023-05-21 RX ADMIN — IBUPROFEN 200 MG: 100 SUSPENSION ORAL at 05:05

## 2023-05-21 RX ADMIN — CEFEPIME 2 G: 2 INJECTION, POWDER, FOR SOLUTION INTRAVENOUS at 12:05

## 2023-05-21 RX ADMIN — SODIUM CHLORIDE, PRESERVATIVE FREE 100 ML: 5 INJECTION INTRAVENOUS at 01:05

## 2023-05-21 RX ADMIN — SODIUM PHOSPHATE, MONOBASIC, MONOHYDRATE 20.01 MMOL: 276; 142 INJECTION, SOLUTION INTRAVENOUS at 04:05

## 2023-05-21 RX ADMIN — DILTIAZEM HYDROCHLORIDE 30 MG: 30 TABLET, FILM COATED ORAL at 12:05

## 2023-05-21 RX ADMIN — METOPROLOL TARTRATE 5 MG: 5 INJECTION, SOLUTION INTRAVENOUS at 12:05

## 2023-05-21 RX ADMIN — SODIUM CHLORIDE 200 MG: 9 INJECTION, SOLUTION INTRAVENOUS at 07:05

## 2023-05-21 RX ADMIN — NOREPINEPHRINE BITARTRATE 0.05 MCG/KG/MIN: 4 INJECTION, SOLUTION INTRAVENOUS at 02:05

## 2023-05-21 RX ADMIN — SODIUM PHOSPHATE, MONOBASIC, MONOHYDRATE AND SODIUM PHOSPHATE, DIBASIC, ANHYDROUS 15 MMOL: 142; 276 INJECTION, SOLUTION INTRAVENOUS at 06:05

## 2023-05-21 RX ADMIN — METOPROLOL TARTRATE 5 MG: 5 INJECTION, SOLUTION INTRAVENOUS at 10:05

## 2023-05-21 RX ADMIN — METOPROLOL TARTRATE 5 MG: 5 INJECTION, SOLUTION INTRAVENOUS at 05:05

## 2023-05-21 RX ADMIN — ACETAMINOPHEN 650 MG: 325 TABLET ORAL at 04:05

## 2023-05-21 RX ADMIN — DEXTROSE: 5 SOLUTION INTRAVENOUS at 09:05

## 2023-05-21 RX ADMIN — CEFEPIME 2 G: 2 INJECTION, POWDER, FOR SOLUTION INTRAVENOUS at 07:05

## 2023-05-21 RX ADMIN — ALLOPURINOL 100 MG: 100 TABLET ORAL at 09:05

## 2023-05-21 RX ADMIN — HYDRALAZINE HYDROCHLORIDE 10 MG: 20 INJECTION INTRAMUSCULAR; INTRAVENOUS at 11:05

## 2023-05-21 RX ADMIN — DEXAMETHASONE 1 MG: 1 TABLET ORAL at 09:05

## 2023-05-21 RX ADMIN — METOPROLOL TARTRATE 5 MG: 5 INJECTION, SOLUTION INTRAVENOUS at 03:05

## 2023-05-21 RX ADMIN — HYDRALAZINE HYDROCHLORIDE 10 MG: 20 INJECTION INTRAMUSCULAR; INTRAVENOUS at 12:05

## 2023-05-21 RX ADMIN — VANCOMYCIN HYDROCHLORIDE 1500 MG: 1.5 INJECTION, POWDER, LYOPHILIZED, FOR SOLUTION INTRAVENOUS at 12:05

## 2023-05-21 RX ADMIN — DILTIAZEM HYDROCHLORIDE 30 MG: 30 TABLET, FILM COATED ORAL at 06:05

## 2023-05-21 RX ADMIN — AMANTADINE 100 MG: 100 CAPSULE ORAL at 09:05

## 2023-05-21 RX ADMIN — POLYETHYLENE GLYCOL 3350 17 G: 17 POWDER, FOR SOLUTION ORAL at 04:05

## 2023-05-21 RX ADMIN — DEXTROSE 500 MG: 50 INJECTION, SOLUTION INTRAVENOUS at 02:05

## 2023-05-21 RX ADMIN — SACUBITRIL AND VALSARTAN 1 TABLET: 24; 26 TABLET, FILM COATED ORAL at 09:05

## 2023-05-21 RX ADMIN — VANCOMYCIN HYDROCHLORIDE 1500 MG: 1.5 INJECTION, POWDER, LYOPHILIZED, FOR SOLUTION INTRAVENOUS at 11:05

## 2023-05-21 RX ADMIN — ATORVASTATIN CALCIUM 80 MG: 40 TABLET, FILM COATED ORAL at 09:05

## 2023-05-21 RX ADMIN — LEVETIRACETAM INJECTION 1500 MG: 15 INJECTION INTRAVENOUS at 09:05

## 2023-05-21 RX ADMIN — TIMOLOL MALEATE 1 DROP: 2.5 SOLUTION/ DROPS OPHTHALMIC at 08:05

## 2023-05-21 RX ADMIN — LORAZEPAM 4 MG: 2 INJECTION INTRAMUSCULAR; INTRAVENOUS at 01:05

## 2023-05-21 RX ADMIN — POTASSIUM BICARBONATE 35 MEQ: 391 TABLET, EFFERVESCENT ORAL at 05:05

## 2023-05-21 RX ADMIN — LEVETIRACETAM INJECTION 1500 MG: 15 INJECTION INTRAVENOUS at 08:05

## 2023-05-21 RX ADMIN — POTASSIUM BICARBONATE 50 MEQ: 977.5 TABLET, EFFERVESCENT ORAL at 04:05

## 2023-05-21 RX ADMIN — PANTOPRAZOLE SODIUM 40 MG: 40 INJECTION, POWDER, FOR SOLUTION INTRAVENOUS at 09:05

## 2023-05-21 RX ADMIN — METOPROLOL TARTRATE 100 MG: 50 TABLET, FILM COATED ORAL at 09:05

## 2023-05-21 RX ADMIN — Medication 0.05 MCG/KG/MIN: at 02:05

## 2023-05-21 RX ADMIN — ACETAMINOPHEN 650 MG: 325 TABLET ORAL at 03:05

## 2023-05-21 NOTE — PROGRESS NOTES
Pulmonary/Critical Care Progress Note      PATIENT NAME: Teto Mendez  MRN: [id number]  TODAY'S DATE: 2023  12:50 PM  ADMIT DATE: 2023  AGE: 67 y.o. : 1955      HPI:  Called by nurse because they could not reach anesthesia to intubate a patient who was being bagged.  The patient had a change in mental status in the emergency room after having had a CTA earlier in the morning which was negative.  In CT, the patient was not breathing effectively and was cyanotic and the scanner broke so the patient was brought up to the ICU emergently.  The patient's past medical history is significant for glioblastoma multiforme for which he has been receiving radiation and chemotherapy.  He also has had a right frontal craniotomy.  At 3:30 a.m. in the morning the patient was playing a game and noted twitching of his face with slurred speech and weakness to his left arm.  He took an extra Keppra at that time.  He arrived at the ER at 6:45 a.m..     the patient remains on the ventilator and sedated with 60 of propofol.  He is also on 5 Cardizem.  Had further seizure activity during the night.    - pt remains sedated, on vent, on continuous EEG. There is EEG evidence of seizure activity so neuro has ordered VPA. Propofol has been weaned to 30    - continues sedated, on vent. Mri was done today and shows infarct in R parietal and R temporal lobes. Continues on propofol 20mg. He did have seizure activity twice today    5/15 The patient is still on the vent.  He is on Precedex, the propofol is off.  He was still having seizure activity yesterday.  MRI shows parietal and temporal infarcts on the right.     the patient is been off all sedation for 24 hours and has a GCS of 3.  Has pupils and corneals and gag and breathes spontaneously but no response to pain.  He does not respond to his voice.  He follows no commands.     the patient remains unresponsive.  He partially opens his eyes to noxious  stimuli.  He follows no commands.  Dr. Whitfield wants to give him 72 hours from stopping the sedation to reassess his neurologic recovery.  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    5/18 the patient remains densely encephalopathic.  To vigorous tactile stimulation he will partially open his eyes.  The nurse swears that he squeezed with his right hand.    5/19 the patient remains densely encephalopathic.  Actually is lightly squeezing with his right hand today.  Opens his eyes alf when stimulated.  His heart rate is 157 because he has not gotten his metoprolol because his OG tube was pulled out during his bath.    5/20 the patient opens his eyes alf to verbal stimulation.  He weakly squeezes with his right hand.  Wiggles his right toes to command.  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    5/21 the patient had a 5 minute seizure earlier this morning.  He still has a left lateral nystagmus. His exam is about the same except he will not move his toes today.  He is on the ventilator.  He was febrile this morning to 102.    REVIEW OF SYSTEMS  Unobtainable    No change in the patient's Past Medical History, Past Surgical History, Social History or Family History since admission.      VITAL SIGNS (MOST RECENT)  Temp: 99.6 °F (37.6 °C) (05/21/23 0930)  Pulse: 109 (05/21/23 0942)  Resp: (!) 22 (05/21/23 0930)  BP: 111/68 (05/21/23 0900)  SpO2: 96 % (05/21/23 0930)  T-max 102.0°    INTAKE AND OUTPUT (LAST 24 HOURS):  Intake/Output Summary (Last 24 hours) at 5/21/2023 0943  Last data filed at 5/21/2023 0649  Gross per 24 hour   Intake 3086.46 ml   Output 1500 ml   Net 1586.46 ml       WEIGHT  Wt Readings from Last 1 Encounters:   05/20/23 98.7 kg (217 lb 9.5 oz)       PHYSICAL EXAM  GENERAL: Older patient, intubated.  HEENT: Pupils equal and reactive.  Corneals are intact.  He has lateral nystagmus.  Nose intact. Pharynx intubated with ET tube and OG tube.  Gag reflex is  intact.  NECK: Supple.   HEART: Regular rate and rhythm. No murmur or gallop auscultated.  LUNGS: Clear to auscultation and percussion. Lung excursion symmetrical. No change in fremitus. No adventitial noises.  ABDOMEN: Bowel sounds present. Non-tender, no masses palpated.  Tolerating enteral nutrition; rectal tube in place  : Normal anatomy.  Iraheta with yellow urine.  EXTREMITIES: Normal muscle tone and joint movement, no cyanosis or clubbing.  PICC line and arterial line on the left  LYMPHATICS: No adenopathy palpated, both hands puffy.  SKIN: Dry, intact, no lesions.   NEURO:  Patient now has lateral nystagmus with the slow component to the right. Pupils and extraocular movements are intact.  Corneals are intact.  Gag is intact.  There is a partial lid raise to light stimulation.  He will lightly squeeze with his right hand to command.  He is not moving his toes today.  He appears hemiplegic on the left.  GCS is 10  PSYCH:  Unable to assess      CBC LAST (LAST 24 HOURS)  Recent Labs   Lab 05/21/23  0307 05/21/23 0411   WBC 9.07  --    RBC 4.75  --    HGB 14.9  --    HCT 45.1 46   MCV 95  --    MCH 31.4*  --    MCHC 33.0  --    RDW 13.8  --      --    MPV 10.8  --    GRAN 74.6*  6.8  --    LYMPH 12.8*  1.2  --    MONO 10.7  1.0  --    BASO 0.04  --    NRBC 0  --        CHEMISTRY LAST (LAST 24 HOURS)  Recent Labs   Lab 05/21/23  0307 05/21/23 0411   *  --    K 3.7  --    *  --    CO2 26  --    ANIONGAP 9  --    BUN 46*  --    CREATININE 0.8  --    *  --    CALCIUM 7.9*  --    PH  --  7.568*   MG 1.9  --    ALBUMIN 2.1*  --    PROT 5.4*  --    ALKPHOS 40*  --    ALT 74*  --    AST 87*  --    BILITOT 1.0  --            LAST 7 DAYS MICROBIOLOGY   Microbiology Results (last 7 days)       Procedure Component Value Units Date/Time    Blood culture [[id number]] Collected: 05/21/23 0743    Order Status: Sent Specimen: Blood Updated: 05/21/23 0817    Blood culture [[id number]] Collected:  05/19/23 1504    Order Status: Completed Specimen: Blood Updated: 05/20/23 1632     Blood Culture, Routine No Growth to date      No Growth to date    Narrative:      Collection has been rescheduled by [email or web address] at 05/19/2023 11:30 Reason:   RAMYA Aguilar is messaging the dr will call when ready   Collection has been rescheduled by [email or web address] at 05/19/2023 11:30 Reason:   RAMYA Aguilar is messaging the dr will call when ready     Blood culture [[id number]] Collected: 05/19/23 1504    Order Status: Completed Specimen: Blood Updated: 05/20/23 1632     Blood Culture, Routine No Growth to date      No Growth to date    Narrative:      Collection has been rescheduled by [email or web address] at 05/19/2023 11:30 Reason:   RAMYA Aguilar is messaging the dr will call when ready   Collection has been rescheduled by [email or web address] at 05/19/2023 11:30 Reason:   RAMYA Aguilar is messaging the dr will call when ready     Culture, Respiratory with Gram Stain [[id number]] Collected: 05/15/23 0059    Order Status: Completed Specimen: Respiratory from Sputum Updated: 05/17/23 0738     Respiratory Culture Normal respiratory fredi     Gram Stain (Respiratory) <10 epithelial cells per low power field.     Gram Stain (Respiratory) Few WBC's     Gram Stain (Respiratory) Few Gram positive cocci            MOST RECENT IMAGING  X-Ray Chest AP Portable  Reason: Intubated    FINDINGS:    Portable chest with comparison chest x-ray May 19, 2023 show enlarged cardiomediastinal silhouette. Endotracheal tube, nasogastric tube and left PICC line are unchanged.  Low lung volumes limit evaluation. There is prominence of the interstitium of the lungs, possibly reflecting low lung volumes. Linear opacity of the left base felt to reflect subsegmental atelectasis. Pulmonary vasculature is normal. No acute osseous abnormality.    IMPRESSION:    1.  Unchanged enlarged cardiomediastinal silhouette.  2.  Low lung volumes limit evaluation. Prominence of interstitium the lungs likely secondary to low lung volumes.  3.   Left basilar subsegmental atelectasis.    Electronically signed by:  Micheal Dumas DO  5/20/2023 8:40 AM CDT Workstation: [id number]      CURRENT VISIT EKG  Results for orders placed or performed during the hospital encounter of 05/11/23   ECG 12 lead    Narrative    Test Reason : I63.9,    Vent. Rate : 120 BPM     Atrial Rate : 150 BPM     P-R Int : 000 ms          QRS Dur : 104 ms      QT Int : 370 ms       P-R-T Axes : 000 016 -12 degrees     QTc Int : 522 ms    Atrial fibrillation with rapid ventricular response  Nonspecific ST abnormality  Prolonged QT  Abnormal ECG  No previous ECGs available    Referred By: [email or web address]   SELF           Confirmed By:        ECHOCARDIOGRAM RESULTS  No results found for this or any previous visit.        VENTILATOR INFORMATION  Vent Mode: A/C  Oxygen Concentration (%):  [30-40] 30  Resp Rate Total:  [15 br/min-31 br/min] 26 br/min  Vt Set:  [450 mL] 450 mL  PEEP/CPAP:  [5 cmH20] 5 cmH20  Mean Airway Pressure:  [8.6 bqZ90-03 cmH20] 9 cmH20           LAST ARTERIAL BLOOD GAS  ABG  Recent Labs   Lab 05/21/23  0411   PH 7.568*   PO2 118*   PCO2 32.4*   HCO3 29.5*   BE 7       IMPRESSION AND PLAN    Status epilepticus  -MRI shows temporal and parietal infarcts on the right, the patient appears to be hemiplegic on the left  -history of seizures prior to this admission  - 5 minute seizure this morning, now with nystagmus  Lateral nystagmus  -left  History of glioblastoma multiforme under treatment in [place]  Naomi coma scale of 10  -patient has been off of sedation for multiple days  -minimally more responsive with a partial eye opening to voice and light squeeze of hand on R, not wiggling his toes today  Fever  - prior to the seizures and treated with Tylenol.  - one set of blood cultures drawn  - will start cefepime and vanc  - will obtain a culture from the arterial line and the PICC and send a urinalysis  Mechanical ventilation  - alkalotic from over breathing the ventilator  -  will give a large dose of Diamox today  - ABG daily  - will hold on T-piece trial given seizures  Atrial fibrillation  - continue Xarelto  - continue metoprolol  Hypertension  -metoprolol  Hypernatremia  - worse again today but can not tell I's and O's as they were not done  - water flushes at 100 cc q.4 hours  - will add D5W  Hypokalemia  - replace potassium  -Trend labs  Moderate hypoalbuminemia  -enteral feedings tolerated  Hyperlipidemia  Transaminitis  - worsening  - valproic acid?  Thrombocytopenia  - worse again today  - antiplatelet antibodies pending    Receiving enteral nutrition, on Protonix and Xarelto  More in than out, now 4.5 L positive and he is getting puffy but he is more hypernatremic and still has a prerenal azotemia.  He is also significantly alkalotic    Critical care time spent reviewing the chart, examining the patient, reviewing the labs, reviewing the radiological findings, discussing care with nursing, physicians, and respiratory and creating the note and  has been greater than 35 minutes    Mery Milner MD  Date of Service: 05/21/2023  12:50 PM

## 2023-05-21 NOTE — CARE UPDATE
05/20/23 1950   Patient Assessment/Suction   Level of Consciousness (AVPU) responds to pain   Respiratory Effort Normal;Unlabored   Expansion/Accessory Muscles/Retractions no use of accessory muscles   All Lung Fields Breath Sounds equal bilaterally;diminished   Rhythm/Pattern, Respiratory assisted mechanically   Cough Frequency with stimulation   Cough Type assisted   Suction Method tracheal   $ Suction Charges Inline Suction Procedure Stat Charge   Secretions Amount small   Secretions Color cloudy;white   Secretions Characteristics thick   Skin Integrity   $ Wound Care Tech Time 15 min   Area Observed Left;Right;Cheek;Upper lip;Lower lip;Corner lip   Skin Appearance without discoloration   PRE-TX-O2   Device (Oxygen Therapy) ventilator   Oxygen Concentration (%) 35   SpO2 99 %   Pulse Oximetry Type Continuous   $ Pulse Oximetry - Multiple Charge Pulse Oximetry - Multiple   Pulse 78   Resp 19      Airway Anesthesia 05/11/23   Placement Date/Time: 05/11/23 (c) 2954   Method of Intubation: Video Laryngoscopy  Airway Device: Endotracheal Tube  Mask Ventilation: Moderately difficult with oral airway  Intubated: Other (see comments)  Blade: Glidescope #3  Airway Device Size: 8....   Secured at 25 cm   Measured At Lips   Secured Location Right   Secured by Commercial tube barboza   Bite Block none   Site Condition Cool;Dry   Status Intact;Secured;Patent   Site Assessment Clean;Dry   Cuff Volume   (MLT)   Airway Safety   Is Ambu Bag and Mask with Patient? Yes, Adult Ambu Bag and Mask   Respiratory Interventions   Airway/Ventilation Management airway patency maintained   Vent Select   Conventional Vent Y   Charged w/in last 24h YES   Preset Conventional Ventilator Settings   Vent ID [id number]   Vent Type    Ventilation Type VC   Vent Mode A/C   Humidity HME   Set Rate 10 BPM   Vt Set 450 mL   PEEP/CPAP 5 cmH20   Peak Flow 60 L/min   Peak End Inspiratory Pressure 17 cmH20   I-Trigger Type  V-TRIG   Trigger Sensitivity  Flow/I-Trigger 3 L/min   Patient Ventilator Parameters   Resp Rate Total 18 br/min   Peak Airway Pressure 17 cmH20   Mean Airway Pressure 8.7 cmH20   Plateau Pressure 0 cmH20   Exhaled Vt 473 mL   Total Ve 8.4 L/m   I:E Ratio Measured 1:2.00   Auto PEEP 0 cmH20   Conventional Ventilator Alarms   Alarms On Y   Resp Rate High Alarm 40 br/min   Press High Alarm 60 cmH2O   Apnea Rate 12   Apnea Volume (mL) 0 mL   Apnea Oxygen Concentration  100   Apnea Flow Rate (L/min) 65   T Apnea 20 sec(s)   Ready to Wean/Extubation Screen   FIO2<=50 (chart decimal) 0.35   MV<16L (chart vol.) 8.4   PEEP <=8 (chart #) 5   Ready to Wean Parameters   F/VT Ratio<105 (RSBI) (!) 40.17   Vital Capacity   Vital Capacity (mL) 0

## 2023-05-21 NOTE — PLAN OF CARE
Problem: Adult Inpatient Plan of Care  Goal: Plan of Care Review  Outcome: Ongoing, Progressing  Goal: Patient-Specific Goal (Individualized)  Outcome: Ongoing, Progressing  Goal: Absence of Hospital-Acquired Illness or Injury  Outcome: Ongoing, Progressing  Goal: Optimal Comfort and Wellbeing  Outcome: Ongoing, Progressing     Problem: Adult Inpatient Plan of Care  Goal: Readiness for Transition of Care  Outcome: Ongoing, Not Progressing     Problem: Fall Injury Risk  Goal: Absence of Fall and Fall-Related Injury  Outcome: Ongoing, Not Progressing

## 2023-05-21 NOTE — PROGRESS NOTES
[facility] Medicine  Progress Note    Patient Name: Teto Mendez  MRN: [id number]  Patient Class: IP- Inpatient   Admission Date: 5/11/2023  Length of Stay: 9 days  Attending Physician: Nicky Espinal MD  Primary Care Provider: Primary Doctor No        Subjective:     Principal Problem:Status epilepticus        HPI:  Teto Mendez is a 67 y.o. White male with known history of  glioblastoma s/p resection, seizures p.afib on xarelto who presented to ER this morning with left arm weakness, facial twitching and change in his speech that he suspected was seizure activity.  He took an extra keppra and presented to ER around 6:30-7 am.  Head CT/CTA in ER without hemorrhage or LVO and he was not a candidate for tPA regardless.  He was given lorazepam and keppra with positive response, however, while still in ER he became less responsive and repeat stat CT was ordered.  While in CT, he became hypoxic with depressed respiratory status and required bag-mask ventilation and emergent intubation on return to ICU.  History was obtained from the ER physician Sign-out.      Overview/Hospital Course:  Teto Mendez is a 67 year old male with a past medical history of glioblastoma s/p craniotomy complicated by seizures, obesity, HTN, AFib and GERD who presented with status epilepticus. Neurology has been consulted. The patient was intubated on propofol sedation with Keppra and valproic acid. Pulmonary has been consulted. Continuous EEG is ordered; the patient continued to have PLEDs throughout his course. MRI brain shows R MCA region infarcts consistent with acute CVA. His course has been complicated by Afib with RVR as well as a HFrEF seen on TTE which appears to be chronic. Cardiology has been consulted. He is on metoprolol and Xarelto with PRN diuresis. Entresto and high dose atorvastatin was also added. Diltiazem was also added by Cardiology 5/18.  Sedation with propofol and Precedex was stopped 5/15.  "Vimpat was also added 5/15 by Neurology. He has been monitored for improvement in his neurologic status for > 72 hours and is noted to have some right hand squeeze when directed as well as some eye opening as of 5/18. Neurology has consulted Palliative Care 5/17 for assistance with end of life care; they may consider changing code status to DNR; they are hopeful the patient may continue show signs of neurologic progress, but acknowledge the severity of his case.     HR elevated 120-130s with stimulation and fatigue off vent setting; HR much improved back on vent; low grade temps today - blood culture negative, CXR reviewed; possible central due CVA?      Interval History: see "Hospital Course"    Review of Systems   Unable to perform ROS: Patient nonverbal   Objective:     Vital Signs (Most Recent):  Temp: 100 °F (37.8 °C) (05/20/23 2100)  Pulse: 91 (05/20/23 2100)  Resp: 20 (05/20/23 2100)  BP: 117/80 (05/20/23 2100)  SpO2: 98 % (05/20/23 2100) Vital Signs (24h Range):  Temp:  [98.4 °F (36.9 °C)-100.9 °F (38.3 °C)] 100 °F (37.8 °C)  Pulse:  [] 91  Resp:  [18-40] 20  SpO2:  [97 %-100 %] 98 %  BP: (117-160)/() 117/80  Arterial Line BP: (139-162)/(73-99) 142/78     Weight: 98.7 kg (217 lb 9.5 oz)  Body mass index is 32.13 kg/m².    Intake/Output Summary (Last 24 hours) at 5/20/2023 2304  Last data filed at 5/20/2023 1827  Gross per 24 hour   Intake 2555.83 ml   Output 1500 ml   Net 1055.83 ml           Physical Exam  Vitals and nursing note reviewed.   Constitutional:       Appearance: He is ill-appearing.   HENT:      Head: Normocephalic and atraumatic.      Right Ear: External ear normal.      Left Ear: External ear normal.      Nose: Nose normal.      Comments: NG tube.     Mouth/Throat:      Mouth: Mucous membranes are moist.      Pharynx: Oropharynx is clear.      Comments: ETT.  Cardiovascular:      Rate and Rhythm: Tachycardia present. Rhythm irregular.      Pulses: Normal pulses.      Heart sounds: " Normal heart sounds.   Pulmonary:      Effort: Pulmonary effort is normal.      Breath sounds: Normal breath sounds.   Abdominal:      General: Bowel sounds are normal.      Palpations: Abdomen is soft.   Genitourinary:     Comments: Iraheta.  Musculoskeletal:      Right lower leg: Edema present.      Left lower leg: Edema present.   Skin:     General: Skin is warm and dry.   Neurological:      GCS: GCS eye subscore is 3. GCS verbal subscore is 1. GCS motor subscore is 6.           Significant Labs: All pertinent labs within the past 24 hours have been reviewed.    Significant Imaging: I have reviewed all pertinent imaging results/findings within the past 24 hours.  IMPRESSION: CXR 5/20/23     1.  Unchanged enlarged cardiomediastinal silhouette.  2.  Low lung volumes limit evaluation. Prominence of interstitium the lungs likely secondary to low lung volumes.  3.  Left basilar subsegmental atelectasis.        Assessment/Plan:      * Status epilepticus  In setting of glioblastoma. MRI brain also shows R MCA region infarcts. GCS 10 off sedation.  -Continue Depacon and Keppra  -Vimpat added 5/14  -Amantadine  -Neurology following  -Continuous EEG  -Continue intubation with mechanical ventilation  -Palliative Care consulted    Hypernatremia  -Increase free water flushes on tube feeds  -Trend Na      Fever  Unclear if infectious of related to seziures.  -U/A, blood cultures, CXR, and procalcitonin  -Consider antibiotics should fever persist      Goals of care, counseling/discussion  -Palliative Care consulted  -Full code at this time with possible change to DNR    Acute CVA (cerebrovascular accident)    Antithrombotics for secondary stroke prevention: Anticoagulants: Rivaroxaban 20 mg daily    Statins for secondary stroke prevention and hyperlipidemia, if present:   Statins: Atorvastatin- 80 mg daily    Aggressive risk factor modification: HTN, HLD, Obesity, A-Fib     Rehab efforts: The patient has been evaluated by a  stroke team provider and the therapy needs have been fully considered based off the presenting complaints and exam findings. The following therapy evaluations are needed: None    Diagnostics ordered/pending: CTA Neck/Arch to assess vasculature, Lipid Profile to assess cholesterol levels, MRI head without contrast to assess brain parenchyma, TTE to assess cardiac function/status     VTE prophylaxis: None: Reason for No Pharmacological VTE Prophylaxis: Currently on anticoagulation    BP parameters: Infarct: No intervention, SBP <220        HFrEF (heart failure with reduced ejection fraction)  Unclear chronicity. BNP unremarkable.  -Cardiology following  -Metoprolol  -Lasix PRN  -Telemetry  -Entresto      GERD (gastroesophageal reflux disease)  -PPI      Gout  -Continue home allopurinol      Obesity  Body mass index is 30.08 kg/m². Morbid obesity complicates all aspects of disease management from diagnostic modalities to treatment.         Glioblastoma  Chronic. Receives care in [place].  -Continue Decadron  -Palliative Care consulted    Paroxysmal atrial fibrillation  -Xarelto  -Metoprolol  -Diltiazem added by Cardiology 5/19  -Telemetry  -Cardiology consulted        Essential hypertension  -Metoprolol  -Entresto  -Continue to monitor    Acute hypoxemic respiratory failure  In setting of status epilepticus.  -Continuous EEG  -Continue mechanical ventilation given neurologic status  -Pulmonary consulted  -ABG PRN  -Palliative Care consulted        VTE Risk Mitigation (From admission, onward)         Ordered     rivaroxaban tablet 20 mg  With dinner         05/13/23 0902     Reason for No Pharmacological VTE Prophylaxis  Once        Question:  Reasons:  Answer:  Already adequately anticoagulated on oral Anticoagulants    05/11/23 1054     IP VTE HIGH RISK PATIENT  Once         05/11/23 1054     Place sequential compression device  Until discontinued         05/11/23 1054                Discharge Planning   BARBARA:  5/22/2023     Code Status: Full Code   Is the patient medically ready for discharge?:     Reason for patient still in hospital (select all that apply): Patient trending condition  Discharge Plan A:  (TBD)                  Nicky Espinal MD  Department of Hospital Medicine   [facility]

## 2023-05-21 NOTE — PLAN OF CARE
Problem: Adult Inpatient Plan of Care  Goal: Plan of Care Review  Outcome: Ongoing, Progressing     Problem: Adult Inpatient Plan of Care  Goal: Patient-Specific Goal (Individualized)  Outcome: Ongoing, Progressing     Problem: Adult Inpatient Plan of Care  Goal: Absence of Hospital-Acquired Illness or Injury  Outcome: Ongoing, Progressing     Problem: Adult Inpatient Plan of Care  Goal: Optimal Comfort and Wellbeing  Outcome: Ongoing, Progressing     Problem: Adult Inpatient Plan of Care  Goal: Readiness for Transition of Care  Outcome: Ongoing, Progressing     Problem: Aspiration (Enteral Nutrition)  Goal: Absence of Aspiration Signs and Symptoms  Outcome: Ongoing, Progressing     Problem: Infection  Goal: Absence of Infection Signs and Symptoms  Outcome: Ongoing, Progressing     Problem: Noninvasive Ventilation Acute  Goal: Effective Unassisted Ventilation and Oxygenation  Outcome: Ongoing, Progressing

## 2023-05-21 NOTE — RESPIRATORY THERAPY
New arterial line placed per anesthesia  in rt wrist , one attempt , no complications. Zero'd and appropriate wave form.

## 2023-05-21 NOTE — PROGRESS NOTES
Pharmacokinetic Initial Assessment: IV Vancomycin    Assessment/Plan:    Initiate intravenous vancomycin with loading dose of 1500 mg once followed by a maintenance dose of vancomycin 1500mg IV every 12 hours  Desired empiric serum trough concentration is 10 to 15 mcg/mL  Draw vancomycin trough level 60 min prior to fourth dose on 5/22 at approximately 23:00  Pharmacy will continue to follow and monitor vancomycin.      Please contact pharmacy at extension [phone] with any questions regarding this assessment.     Thank you for the consult,   Michelle Ta       Patient brief summary:  Teto Mendez is a 67 y.o. male initiated on antimicrobial therapy with IV Vancomycin for treatment of suspected sepsis    Drug Allergies:   Review of patient's allergies indicates:  No Known Allergies    Actual Body Weight:   98.7 kg    Renal Function:   Estimated Creatinine Clearance: 103.8 mL/min (based on SCr of 0.8 mg/dL).,     Dialysis Method (if applicable):  N/A    CBC (last 72 hours):  Recent Labs   Lab Result Units 05/19/23  0409 05/20/23  0352 05/21/23  0307   WBC K/uL 7.39 6.42 9.07   Hemoglobin g/dL 15.5 14.3 14.9   Hematocrit % 47.0 43.9 45.1   Platelets K/uL 145* 128* 159   Gran % % 75.2* 74.9* 74.6*   Lymph % % 10.6* 10.7* 12.8*   Mono % % 13.0 13.1 10.7   Eosinophil % % 0.1 0.2 0.2   Basophil % % 0.3 0.3 0.4   Differential Method  Automated Automated Automated       Metabolic Panel (last 72 hours):  Recent Labs   Lab Result Units 05/19/23  0409 05/19/23  1305 05/20/23  0352 05/21/23  0307   Sodium mmol/L 147*  --  147* 148*   Potassium mmol/L 3.9  --  3.5 3.7   Chloride mmol/L 113*  --  112* 113*   CO2 mmol/L 26  --  27 26   Glucose mg/dL 138*  --  132* 151*   Glucose, UA   --  Negative  --   --    BUN mg/dL 42*  --  47* 46*   Creatinine mg/dL 0.9  --  0.9 0.8   Albumin g/dL 2.4*  --  2.0* 2.1*   Total Bilirubin mg/dL 1.0  --  1.0 1.0   Alkaline Phosphatase U/L 43*  --  40* 40*   AST U/L 104*  --  78* 87*   ALT U/L 77*   --  68* 74*   Magnesium mg/dL 2.1  --  2.0 1.9   Phosphorus mg/dL 2.8  --  3.3 2.1*       Drug levels (last 3 results):  No results for input(s): VANCOMYCINRA, VANCORANDOM, VANCOMYCINPE, VANCOPEAK, VANCOMYCINTR, VANCOTROUGH in the last 72 hours.    Microbiologic Results:  Microbiology Results (last 7 days)       Procedure Component Value Units Date/Time    Blood culture [[id number]]     Order Status: Sent Specimen: Blood     Blood culture [[id number]]     Order Status: Sent Specimen: Blood     Blood culture [[id number]] Collected: 05/21/23 0743    Order Status: Sent Specimen: Blood Updated: 05/21/23 0817    Blood culture [[id number]] Collected: 05/19/23 1504    Order Status: Completed Specimen: Blood Updated: 05/20/23 1632     Blood Culture, Routine No Growth to date      No Growth to date    Narrative:      Collection has been rescheduled by [email or web address] at 05/19/2023 11:30 Reason:   RAMYA Aguilar is messaging the dr will call when ready   Collection has been rescheduled by [email or web address] at 05/19/2023 11:30 Reason:   RAMYA Aguilar is messaging the dr will call when ready     Blood culture [[id number]] Collected: 05/19/23 1504    Order Status: Completed Specimen: Blood Updated: 05/20/23 1632     Blood Culture, Routine No Growth to date      No Growth to date    Narrative:      Collection has been rescheduled by [email or web address] at 05/19/2023 11:30 Reason:   RAMYA Aguilar is messaging the dr will call when ready   Collection has been rescheduled by [email or web address] at 05/19/2023 11:30 Reason:   RAMYA Aguilar is messaging the dr will call when ready     Culture, Respiratory with Gram Stain [[id number]] Collected: 05/15/23 0059    Order Status: Completed Specimen: Respiratory from Sputum Updated: 05/17/23 0738     Respiratory Culture Normal respiratory fredi     Gram Stain (Respiratory) <10 epithelial cells per low power field.     Gram Stain (Respiratory) Few WBC's     Gram Stain (Respiratory) Few Gram positive cocci

## 2023-05-21 NOTE — SUBJECTIVE & OBJECTIVE
"Interval History: see "Hospital Course"    Review of Systems   Unable to perform ROS: Patient nonverbal   Objective:     Vital Signs (Most Recent):  Temp: 100 °F (37.8 °C) (05/20/23 2100)  Pulse: 91 (05/20/23 2100)  Resp: 20 (05/20/23 2100)  BP: 117/80 (05/20/23 2100)  SpO2: 98 % (05/20/23 2100) Vital Signs (24h Range):  Temp:  [98.4 °F (36.9 °C)-100.9 °F (38.3 °C)] 100 °F (37.8 °C)  Pulse:  [] 91  Resp:  [18-40] 20  SpO2:  [97 %-100 %] 98 %  BP: (117-160)/() 117/80  Arterial Line BP: (139-162)/(73-99) 142/78     Weight: 98.7 kg (217 lb 9.5 oz)  Body mass index is 32.13 kg/m².    Intake/Output Summary (Last 24 hours) at 5/20/2023 2304  Last data filed at 5/20/2023 1827  Gross per 24 hour   Intake 2555.83 ml   Output 1500 ml   Net 1055.83 ml           Physical Exam  Vitals and nursing note reviewed.   Constitutional:       Appearance: He is ill-appearing.   HENT:      Head: Normocephalic and atraumatic.      Right Ear: External ear normal.      Left Ear: External ear normal.      Nose: Nose normal.      Comments: NG tube.     Mouth/Throat:      Mouth: Mucous membranes are moist.      Pharynx: Oropharynx is clear.      Comments: ETT.  Cardiovascular:      Rate and Rhythm: Tachycardia present. Rhythm irregular.      Pulses: Normal pulses.      Heart sounds: Normal heart sounds.   Pulmonary:      Effort: Pulmonary effort is normal.      Breath sounds: Normal breath sounds.   Abdominal:      General: Bowel sounds are normal.      Palpations: Abdomen is soft.   Genitourinary:     Comments: Iraheta.  Musculoskeletal:      Right lower leg: Edema present.      Left lower leg: Edema present.   Skin:     General: Skin is warm and dry.   Neurological:      GCS: GCS eye subscore is 3. GCS verbal subscore is 1. GCS motor subscore is 6.           Significant Labs: All pertinent labs within the past 24 hours have been reviewed.    Significant Imaging: I have reviewed all pertinent imaging results/findings within the past " 24 hours.  IMPRESSION: CXR 5/20/23     1.  Unchanged enlarged cardiomediastinal silhouette.  2.  Low lung volumes limit evaluation. Prominence of interstitium the lungs likely secondary to low lung volumes.  3.  Left basilar subsegmental atelectasis.

## 2023-05-21 NOTE — PROGRESS NOTES
[facility]  Neurology Progress Note    Patient Name: Teto Mendez  MRN: [id number]  : 1955  TODAY'S DATE: 2023  ADMIT DATE: 2023  6:39 AM                                          CONSULT REQUESTED BY: Nicky Espinal MD     Chief Complaint   Patient presents with    FACIAL TWITCHING    SLURRED SPEECH       HPI per EMR:  Patient presents with history of glioblastoma concurrently getting chemo in previous radiation complaining of facial twitching, facial droop, slurred speech, left arm weakness.  Symptom onset at 3:30 a.m..  Patient states at 3:30 a.m. he was playing on a game and then began to have the twitching of the face with associated droop with slurred speech and arm weakness.  Patient states he has had similar episodes in the past secondary to the glioblastoma.  He has not had the left arm weakness.  Patient states he is on Keppra and did take an extra Keppra dose this morning additionally patient is on Xarelto.  At the worst symptoms are severe.    Neurology Consult:  Patient was seen and examined by me today. He is a 67-year-old man with history of glioblastoma multiforme s/p craniectomy, chemotherapy and radiation diagnosed in 2022, as well as seizure disorder, who presented to the ED with left facial twitching, left facial droop, slurred speech and left arm weakness that began the morning of his admission at around 3 am. He was recently decreased on his home Keppra in the setting of side effects as per daughter. Upon arrival to the ED, he was loaded with 1 gr of Keppra and given 2 mg of ativan IV, which seemed to break the seizure. As per ED physician, patient was awake, alert and able to answer questions. At around 11:50 am, I was contacted by ED provider in the setting of sudden worsening of mental status, unresponsiveness, weakness and aphasia, so he was taken to CT scan for repeat CT brain to rule out LVO stroke or hemorrhage. I evaluated patient while on CT,  and noticed O2 Sats were in the high 80s. Due to technical malfunction, CT could not be completed, at which point I decided to abort procedure and transport patient to the ICU immediately. When we arrived to the 3rd floor ICU, patient seemed to be on respiratory failure, with toe and fingertip cyanosis, unable to maintain O2 Sats, so started on manual assisted ventilation and called anesthesia for emergent intubation. Patient was successfully intubated and sedation with propofol was started, but facial twitching recurred, so ativan was given and propofol bolus was administered, after which seizure activity seemed to cease. He was taken for CT after stabilization which showed no evidence of acute stroke or intracranial hemorrhage, and CTA showed no LVO. He is now being monitored on 24H continuous EEG for management of status epilepticus.    5/12/23:  Patient was seen and examined by me today.  He remains on deep sedation for treatment of status epilepticus, being monitored on 24 hour video EEG.  A brief electroclinical seizure was seen at around 4:00 a.m. with left facial twitching, but no clinical events reported after this.  Plan is to continue deep sedation until at least tomorrow morning.  Patient to be hooked up to EEG again when propofol starts to being weaned down.    5/13/23: I, Dr James assumed care on 5/13.  Patient is intubated, sedated with propofol.  He started out continuous EEG this morning shows right hemisphere lateralized periodic discharges with epileptiform potential.  No electrographic seizures were recorded.    5/14/2023:  Patient was seen examined by me.  Remains to be intubated and sedated.  MRI brain done this morning showed right MCA cortical infarcts.  Patient has been on continuous EEG overnight and EEG showed right hemispheric PLEDs with increase in frequency of PLEDs with lowering sedation.  Patient also had a clinical focal seizure involving twitching of the left side of the face this  morning when he was off sedation.    5/15/2023:  Patient was seen examined by me.  He is intubated and Precedex.  Propofol has been turned off yesterday.  Remains to be on continues EEG which shows right hemispheric (predominantly right frontotemporal) periodic lateralized epileptiform discharges.  Episodes of left facial twitching did not have EEG correlate.    5/16: Patient was seen and examined by me. He remains to be intubated and not on sedation. No response any stimulus. On CEEG. Family was at bedside and I discussed plan of care with them. Had one episode of electro clinical seizure yesterday at 12:20 PM lasting for a minute    5/17:  Patient was seen examined by me.  Remains to be intubated and off sedation.  Continuous EEG discontinued this morning. No electrographic seizure  recorded.    5/18:  Patient was seen examined by me.  Remains to be intubated.  Patient tries to open eyes to verbal commands and minimally follows commands with right upper extremity squeezing my fingers.  No further seizure activity was noted.  EEG has been off.    5/19:  Patient was seen and examined by me.  Remains to be intubated.  Minimally follows commands with his right arm and tries to open eyes to verbal stimulus.    5/20: Patient was seen and examined by me this morning.  No acute overnight events, no new neurological complaints.  Vitals have been stable.  Remains to be intubated however not on ventilator.  He is on T-piece.  Tries to open eyes to verbal stimulus somewhat follows commands with right upper extremity.  Patient had transaminitis and slow drop in platelet count which could likely be from Depakote.    5/21:  Patient was seen examined me this morning.  He is back on the ventilator.  He had a fever of 102 this morning.  Nurse noticed left facial twitching lasting for few minutes this morning and resolved on its own.  Patient tries to open eyes to verbal stimulus squeezes fingers with right hand however not  following as brisk as yesterday    Scheduled Meds:   allopurinoL  100 mg Per NG tube Daily    amantadine HCL  100 mg Per NG tube BID    atorvastatin  80 mg Per NG tube Daily    ceFEPime (MAXIPIME) IVPB  2 g Intravenous Q8H    dexAMETHasone  1 mg Per NG tube Q12H    diltiaZEM  30 mg Oral Q6H    lacosamide (VIMPAT) IVPB  200 mg Intravenous Q12H    levetiracetam IV  1,500 mg Intravenous Q12H    metoprolol tartrate  100 mg Per NG tube BID    pantoprazole  40 mg Intravenous Daily    rivaroxaban  20 mg Per NG tube Daily with dinner    sacubitriL-valsartan  1 tablet Per G Tube BID    timolol maleate 0.25%  1 drop Both Eyes BID    valproate sodium (DEPACON) IVPB  500 mg Intravenous Q12H    vancomycin (VANCOCIN) IVPB  1,500 mg Intravenous Q12H     Continuous Infusions:   dextrose 5 % (D5W) 100 mL/hr at 05/21/23 0945       PRN Meds:.acetaminophen, colchicine, hydrALAZINE, lorazepam, magnesium sulfate IVPB, magnesium sulfate IVPB, metoprolol, ondansetron, polyethylene glycol, potassium bicarbonate, potassium bicarbonate, potassium bicarbonate, sodium chloride 0.9%, sodium phosphate IVPB, sodium phosphate IVPB, sodium phosphate IVPB, Pharmacy to dose Vancomycin consult **AND** vancomycin - pharmacy to dose      Physical Exam  Current Vitals:  Vitals:    05/21/23 0942   BP:    Pulse: 109   Resp:    Temp:      General appearance: intubated, minimal response to verbal commands.  Cardiovascular: Afib  Pulmonary:  Intubated and ventilation with T-piece  GI: soft  MSK: normal passive ROM  Skin: normal color, no lesions    NEUROLOGICAL EXAM:    Mental status: intubated.  Not on sedation.  Minimal response to verbal commands          Intubated: yes          Sedated: no  Response to noxious stimulation:  Yes  Breathes above ventilator:  On T-piece  Opens eyes:  Tries to open eyes to verbal commands  Pupils: symmetric          Left: reactive          Right: reactive  Eye movements: No pathologic movements such as nystagmus, ocular  bobbing, dipping or roving.    Corneal reflex: present  Oculocephalic reflex:  Absent  Cough:  Present  Gag:  Present  Motor exam: Normal muscle tone. Normal muscle bulk. No abnormal movements such as tremors, myoclonus or twitching.  DTRs: 1+ throughout. Babinski response absent.      Laboratory Data & Studies    Recent Labs   Lab 05/19/23  0409 05/20/23  0352 05/21/23  0307   WBC 7.39 6.42 9.07   HGB 15.5 14.3 14.9   * 128* 159   MCV 94 95 95       Recent Labs   Lab 05/19/23  0409 05/20/23  0352 05/21/23  0307   * 147* 148*   K 3.9 3.5 3.7   * 112* 113*   CO2 26 27 26   BUN 42* 47* 46*   * 132* 151*   CALCIUM 8.4* 7.6* 7.9*   MG 2.1 2.0 1.9   PHOS 2.8 3.3 2.1*       Recent Labs   Lab 05/19/23  0409 05/20/23  0352 05/21/23  0307   PROT 5.9* 5.3* 5.4*   ALBUMIN 2.4* 2.0* 2.1*   BILITOT 1.0 1.0 1.0   * 78* 87*   ALT 77* 68* 74*   ALKPHOS 43* 40* 40*       No results for input(s): LABPT, INR, APTT in the last 168 hours.      Recent Labs   Lab 05/15/23  0308   HGBA1C 5.7   CHOL 250*   TRIG 143   LDLCALC 191.4*   HDL 30*         Microbiology:  Microbiology Results (last 7 days)       Procedure Component Value Units Date/Time    Blood culture [[id number]]     Order Status: Sent Specimen: Blood     Blood culture [[id number]]     Order Status: Sent Specimen: Blood     Blood culture [[id number]] Collected: 05/21/23 0743    Order Status: Sent Specimen: Blood Updated: 05/21/23 0817    Blood culture [[id number]] Collected: 05/19/23 1504    Order Status: Completed Specimen: Blood Updated: 05/20/23 1632     Blood Culture, Routine No Growth to date      No Growth to date    Narrative:      Collection has been rescheduled by CIPRIANO at 05/19/2023 11:30 Reason:   RAMYA Aguilar is messaging the dr will call when ready   Collection has been rescheduled by [email or web address] at 05/19/2023 11:30 Reason:   RAMYA Aguilar is messaging the dr will call when ready     Blood culture [[id number]] Collected: 05/19/23 1504    Order Status:  Completed Specimen: Blood Updated: 05/20/23 1632     Blood Culture, Routine No Growth to date      No Growth to date    Narrative:      Collection has been rescheduled by [email or web address] at 05/19/2023 11:30 Reason:   RAMYA Aguilar is messaging the dr will call when ready   Collection has been rescheduled by [email or web address] at 05/19/2023 11:30 Reason:   RAMYA Aguilar is messaging the dr will call when ready     Culture, Respiratory with Gram Stain [[id number]] Collected: 05/15/23 0059    Order Status: Completed Specimen: Respiratory from Sputum Updated: 05/17/23 0738     Respiratory Culture Normal respiratory fredi     Gram Stain (Respiratory) <10 epithelial cells per low power field.     Gram Stain (Respiratory) Few WBC's     Gram Stain (Respiratory) Few Gram positive cocci              Imaging:  CT Head Without Contrast    Result Date: 5/11/2023  CMS MANDATED QUALITY DATA - CT RADIATION  436 All CT scans at this facility utilize dose modulation, iterative reconstruction, and/or weight based dosing when appropriate to reduce radiation dose to as low as reasonably achievable. CT HEAD WITHOUT IV CONTRAST CLINICAL HISTORY: 67 years Male Mental status change, unknown cause COMPARISON: Noncontrast CT head performed earlier today 6:56 AM FINDINGS: Negative for acute intracranial hemorrhage, midline shift, or mass effect. Parenchymal calcifications within the right cerebral hemisphere are stable compared to prior. Patient is status post right pterional craniotomy. Ventricles and sulci are normal in size. Gray-white differentiation is maintained. Cerebellar hemispheres and brainstem are unremarkable. No calvarial lesion or fracture. Mastoid air cells are clear. IMPRESSION: Stable exam. No CT evidence of acute intracranial pathology. Electronically signed by:  Matt Cole MD  5/11/2023 1:41 PM CDT Workstation: [id number]    X-Ray Chest AP Portable    Result Date: 5/11/2023  CLINICAL HISTORY: 67 years (1955) Male Stroke TECHNIQUE: Portable AP  radiograph the chest. COMPARISON: None available. FINDINGS: Nonspecific minimal faint interstitial opacity at the left costophrenic.  No pneumothorax is identified. The heart is mildly enlarged.  Osseous structures show degenerative changes in the spine. The visualized upper abdomen is unremarkable. IMPRESSION: Nonspecific minimal faint interstitial opacity at the left costophrenic sulcus possibly representing atelectasis, scarring, trace pleural fluid, and less likely aspiration or pneumonia. . Electronically signed by:  Solomon Tobias MD  5/11/2023 8:32 AM CDT Workstation: [id number]    CT HEAD FOR STROKE    Result Date: 5/11/2023  CMS MANDATED QUALITY DATA - CT RADIATION - 436 All CT scans at this facility utilize dose modulation, iterative reconstruction, and/or weight based dosing when appropriate to reduce radiation dose to as low as reasonably achievable. EXAMINATION: CT HEAD FOR STROKE CLINICAL HISTORY: Neuro deficit, acute, stroke suspected;  history of glioblastoma TECHNIQUE: CT without IV contrast COMPARISON: None FINDINGS: There are postsurgical changes from prior right frontal temporal These craniotomy.  The ventricles and sulci are mildly prominent compatible with generalized cerebral atrophy.  There is no hemorrhage, mass or midline shift.  There are no extra-axial fluid collections.  The cerebellum and brainstem are normal.  The gray-white differentiation is maintained.  The orbits are unremarkable. The paranasal sinuses and mastoid air cells are clear.     Mild cerebral atrophy with no acute intracranial process Prior right frontal temporal craniotomy Findings were called to Dr. Zhou in the emergency department at 07:09 a.m. Electronically signed by: Yi Hernandez MD Date:    05/11/2023 Time:    07:10    CTA Head and Neck (xpd)    Result Date: 5/11/2023  CMS MANDATED QUALITY DATA - CT RADIATION - 436 All CT scans at this facility utilize dose modulation, iterative reconstruction, and/or weight  based dosing when appropriate to reduce radiation dose to as low as reasonably achievable. CMS MANDATED QUALITY DATA - CAROTID - 195 All measurements and percent stenosis described below were determined using NASCET criteria or criteria similar to NASCET, as defined by the Society of Radiologists in Ultrasound Consensus Conference, Radiology, 2003 Reason: Stroke/TIA, determine embolic source Technique: CT angiography of brain and neck with 100 mL Omnipaque 350.  Maximum intensity projection coronal reformations were obtained at a separate workstation and stored in the patient's permanent medical record. COMPARISON: 5/11/2023 CTA BRAIN: VASCULAR FINDINGS: Major intracranial arteries are widely patent with no stenosis, intraluminal filling, or dissection. Minimal atherosclerotic plaque affects the cavernous segments of bilateral internal carotid arteries. Negative for aneurysm or evidence of vasculitis. Opacified visualized dural venous sinuses are unremarkable. NONVASCULAR FINDINGS: No abnormal intra-axial or extra-axial enhancement. No midline shift. Postsurgical changes of right pterional craniotomy are noted. 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 Minimal atherosclerotic plaque affects left carotid bulb. Left carotid arteries are otherwise without plaque or stenosis. Left vertebral artery is patent. Minimal atherosclerotic plaque affects the right carotid bulb. Right carotid arteries are without additional plaque and remain widely patent. Right vertebral artery is patent. NONVASCULAR FINDINGS: Endotracheal tube has tip proximal to mio. Visualized lung apices show minor dependent atelectasis. Cervical soft tissues are unremarkable. IMPRESSION: 1. Trace atherosclerotic plaque in intracranial internal carotid arteries, without other abnormality of the major intracranial arteries. 2. Trace atherosclerotic plaque in  bilateral carotid bulbs, with no stenosis or other abnormality of cervical ICAs or vertebral arteries. Electronically signed by:  Compa Lopez MD  5/11/2023 2:01 PM CDT Workstation: [id number]    CTA Head and Neck (xpd)    Result Date: 5/11/2023  CMS EXAMINATION: CTA HEAD AND NECK (XPD) CLINICAL INDICATION: Male, 67 years old. Stroke/TIA, determine embolic source history of glioblastoma TECHNIQUE: Axial CT angiogram of the head and neck was performed with contrast. Multiplanar reformations as well as 3-D volume rendered imaging were reviewed at the workstation. Degree of stenosis was measured utilizing the NASCET method. CONTRAST: 100 mL mL of Omnipaque 350 IV. COMPARISON: None FINDINGS: CTA brain: The distal vertebral arteries, basilar artery and cavernous portions of the internal carotid arteries are widely patent. The anterior cerebral arteries, middle cerebral arteries and posterior cerebral arteries are widely patent without evidence of large vessel occlusion, significant stenosis, AVM or aneurysm. The dural venous sinuses are patent. There is no pathologic enhancement. Aortic Arch and Great Vessel Origins: 2 vessel aortic arch which is widely patent Subclavian Arteries: Widely patent. Right Common Carotid Artery: Widely patent. Right Internal Carotid Artery: Widely patent. Right External Carotid Artery: Widely patent. Left Common Carotid Artery: Widely patent. Left Internal Carotid Artery: Widely patent. Left External Carotid Artery: Widely patent. Right Vertebral Artery: Widely patent. Left Vertebral Artery: Widely patent. The paraspinous soft tissues are unremarkable. There are mild degenerative changes of the cervical spine. The lung apices are clear. IMPRESSION: Negative CTA of the head and neck. This exam was performed according to our departmental dose-optimization program which includes automated exposure control, adjustment of the mA and/or kV according to patient size and/or use of iterative  reconstruction technique. Electronically signed by:  Yi Hernandez MD  5/11/2023 7:43 AM CDT Workstation: [id number]        Assessment and Plan:    Status Epilepticus  History of Glioblastoma Multiforme s/p craniectomy, chemo and radiotherapy  Acute ischemic infarct   Atrial fibrillation       67-year-old man with history of glioblastoma multiforme s/p craniectomy, chemotherapy and radiation diagnosed in August 2022, as well as seizure disorder, who presented to the ED with left facial twitching, left facial droop, slurred speech and left arm weakness. Upon arrival to the ED, he was loaded with 1 gr of Keppra and given 2 mg of ativan IV, which seemed to break the seizure. However, he experienced sudden worsening of mental status, with unresponsiveness, weakness and aphasia, so he was taken to CT scan for repeat CT brain to rule out LVO stroke or hemorrhage. He was then intubated, sedated and placed on mechanical ventilation. Clinical picture more consistent with status epilepticus.      - Admitted to hospital medicine in the ICU with q2 hour neuro checks, on telemetry, continuous pulse oximetry  - Seizure precautions while inpatient  - initially underwent 24-hour VEEG continuous monitoring with evidence of 4 electrographic seizures which resolved when dose of propofol was increased to 60 mcg/kg/min. By the end of the recording, a burst suppression pattern with decreased LPEDs was seen.   -on 5/13 patient was again put on continues EEG monitoring while weaning sedation.  Initially EEG showed burst suppression pattern with bursts of 1-2 seconds of delta activity followed by 2-4 seconds of background suppression and also showed right hemispheric lateralized periodic epileptiform discharges.  Background changed to a mixture of delta and theta activity with PLEDs becoming more frequent with lowering sedation to 30mcg/kg/min.    - cEEG stopped on 5/17. Showed intermittent lateralized periodic discharges on the right  frontotemporal leads and significant slowing of the background bilaterally.   -patient has been off sedation for more than 6 days Somewhat improvement in mental status and tries to open eyes with verbal stimulus and able to squeeze my fingers with his right hand to command since 5/18.   -Palliative medicine following   - Patient had transaminitis and slow drop in platelet count which could likely be from Depakote.  Will slowly wean off Depakote.  Monitor for clinical seizures.  Monitor for any clinical seizures or electrographic seizures.  - 5/21:  EEG showed moderate encephalopathy without seizures or epileptiform activity.  - Workup for anxious causes for fever ongoing. On empiric cefepime and vancomycin.  -I discussed with the daughter at bedside getting plan of care.  All questions answered  - Continue Vimpat 200 mg b.i.d..  continue Keppra 1500 mg BID IV  - MRI brain showed right hemispheric infarcts.  Etiology could likely be secondary to recent radiation versus cardioembolism in the setting of atrial fibrillation. Continue anticoagulation with Xarelto 20 mg daily  - Avoid seizure threshold lowering medications such as:  Bupropion, diphenhydramine, tramadol, certain antibiotics  - Maintain Euthermia with Tylenol prn temp > 37.2 degrees C.  - Assessment for rehab with PT/OT/SLP evaluation and treatment.  - workup and treatment of metabolic and infectious abnormalities as per primary team  - Will follow along      DVT prophylaxis with chemo/SCD prophylaxis      Patient to follow up with [facility] at [phone] within 3 days from discharge.        All questions were answered.                              Thank you kindly for including us in the care of this patient. Please do not hesitate to contact us with any questions.       Critical Care:  50 minutes of critical care time has been spent evaluating with the patient. Time includes chart review not limited to diagnostic imaging, labs, and vitals,  patient assessment, discussion with family and nursing, current order evaluations, and new order entries.      Mukund James MD  Neurology/Vascular Neurology

## 2023-05-21 NOTE — PROGRESS NOTES
Pharmacist Renal Dose Adjustment Note    Teto Mendez is a 67 y.o. male being treated with the medication Cefepime    Patient Data:    Vital Signs (Most Recent):  Temp: 99.6 °F (37.6 °C) (05/21/23 0930)  Pulse: 109 (05/21/23 0942)  Resp: (!) 22 (05/21/23 0930)  BP: 111/68 (05/21/23 0900)  SpO2: 96 % (05/21/23 0930) Vital Signs (72h Range):  Temp:  [98 °F (36.7 °C)-102 °F (38.9 °C)]   Pulse:  []   Resp:  [0-40]   BP: ()/()   SpO2:  [90 %-100 %]   Arterial Line BP: (115-163)/()      Recent Labs   Lab 05/19/23  0409 05/20/23  0352 05/21/23  0307   CREATININE 0.9 0.9 0.8     Serum creatinine: 0.8 mg/dL 05/21/23 0307  Estimated creatinine clearance: 103.8 mL/min    Cefepime 2g Q12H will be changed to Cefepime 2g Q8H due to CrCl >60    Pharmacist's Name: Michelle Ta  Pharmacist's Extension: [phone]

## 2023-05-21 NOTE — PROCEDURES
EEG REPORT    NAME: Teto Mendez  : 1955  MRN: [id number]    DATE of EE2023    CLINICAL INDICATION: This is a 67 y.o. male with history of glioblastoma multiforme s/p craniotomy, chemotherapy and radiotherapy, as well as seizure disorder being evaluated for seizures.    MEDICATIONS:    allopurinoL  100 mg Per NG tube Daily    amantadine HCL  100 mg Per NG tube BID    atorvastatin  80 mg Per NG tube Daily    ceFEPime (MAXIPIME) IVPB  2 g Intravenous Q8H    dexAMETHasone  1 mg Per NG tube Q12H    diltiaZEM  30 mg Oral Q6H    lacosamide (VIMPAT) IVPB  200 mg Intravenous Q12H    levetiracetam IV  1,500 mg Intravenous Q12H    metoprolol tartrate  100 mg Per NG tube BID    NORepinephrine bitartrate-D5W        pantoprazole  40 mg Intravenous Daily    rivaroxaban  20 mg Per NG tube Daily with dinner    sacubitriL-valsartan  1 tablet Per G Tube BID    timolol maleate 0.25%  1 drop Both Eyes BID    valproate sodium (DEPACON) IVPB  500 mg Intravenous Q12H    vancomycin (VANCOCIN) IVPB  1,500 mg Intravenous Q12H         EEG DESCRIPTION:  A 16-channel EEG was performed with electrode placement in 10/20 International System. Longitudinal bipolar and referential montages were utilized in analysis.    This is a technically adequate study with minor muscle and motion artifacts.    The dominant posterior background rhythm was a theta frequency rhythm.  Later portions of the tracing showed excess beta activity after patient was given Ativan.    The record was reactive to eye opening and closure. Anterior derivations showed the expected admixture of low-voltage beta and theta frequencies as well as intermittent eye blink artifact.    Sleep architecture was not seen.    Photic stimulation and Hyperventilation  was not performed.    No epileptiform discharges were recorded. No electrographic or electroclinical seizures were recorded.  Eyelid twitching did not have any epileptiform correlate on the EEG.    DIAGNOSIS: This  is a abnormal EEG due to the presence of moderate generalised slowing. No lateralizing or epileptiform features are noted. No electrographic or electroclinical seizures are recorded.  Excess beta activity was noted in the later portions of the tracing likely due to medication effect      CLINICAL INTERPRETATION:  This is an abnormal EEG indicating moderate encephalopathy.      Mukund James MD  Neurology

## 2023-05-22 LAB
ALBUMIN SERPL BCP-MCNC: 1.9 G/DL (ref 3.5–5.2)
ALLENS TEST: ABNORMAL
ALP SERPL-CCNC: 37 U/L (ref 55–135)
ALT SERPL W/O P-5'-P-CCNC: 78 U/L (ref 10–44)
ANION GAP SERPL CALC-SCNC: 7 MMOL/L (ref 8–16)
AST SERPL-CCNC: 86 U/L (ref 10–40)
BASOPHILS # BLD AUTO: 0.04 K/UL (ref 0–0.2)
BASOPHILS NFR BLD: 0.4 % (ref 0–1.9)
BILIRUB SERPL-MCNC: 0.9 MG/DL (ref 0.1–1)
BUN SERPL-MCNC: 38 MG/DL (ref 8–23)
CALCIUM SERPL-MCNC: 7.7 MG/DL (ref 8.7–10.5)
CHLORIDE SERPL-SCNC: 104 MMOL/L (ref 95–110)
CO2 SERPL-SCNC: 28 MMOL/L (ref 23–29)
CREAT SERPL-MCNC: 0.5 MG/DL (ref 0.5–1.4)
DELSYS: ABNORMAL
DIFFERENTIAL METHOD: ABNORMAL
EOSINOPHIL # BLD AUTO: 0.1 K/UL (ref 0–0.5)
EOSINOPHIL NFR BLD: 0.8 % (ref 0–8)
ERYTHROCYTE [DISTWIDTH] IN BLOOD BY AUTOMATED COUNT: 13.5 % (ref 11.5–14.5)
ERYTHROCYTE [SEDIMENTATION RATE] IN BLOOD BY WESTERGREN METHOD: 22 MM/H
EST. GFR  (NO RACE VARIABLE): >60 ML/MIN/1.73 M^2
FIO2: 30
GLUCOSE SERPL-MCNC: 162 MG/DL (ref 70–110)
GLUCOSE SERPL-MCNC: 167 MG/DL (ref 70–110)
HCO3 UR-SCNC: 27.9 MMOL/L (ref 24–28)
HCT VFR BLD AUTO: 39.7 % (ref 40–54)
HCT VFR BLD CALC: 37 %PCV (ref 36–54)
HGB BLD-MCNC: 13.2 G/DL (ref 14–18)
IMM GRANULOCYTES # BLD AUTO: 0.1 K/UL (ref 0–0.04)
IMM GRANULOCYTES NFR BLD AUTO: 1.1 % (ref 0–0.5)
LYMPHOCYTES # BLD AUTO: 1 K/UL (ref 1–4.8)
LYMPHOCYTES NFR BLD: 11.5 % (ref 18–48)
MAGNESIUM SERPL-MCNC: 1.8 MG/DL (ref 1.6–2.6)
MCH RBC QN AUTO: 31.4 PG (ref 27–31)
MCHC RBC AUTO-ENTMCNC: 33.2 G/DL (ref 32–36)
MCV RBC AUTO: 95 FL (ref 82–98)
MIN VOL: 7
MODE: ABNORMAL
MONOCYTES # BLD AUTO: 0.8 K/UL (ref 0.3–1)
MONOCYTES NFR BLD: 9.4 % (ref 4–15)
NEUTROPHILS # BLD AUTO: 6.9 K/UL (ref 1.8–7.7)
NEUTROPHILS NFR BLD: 76.8 % (ref 38–73)
NRBC BLD-RTO: 0 /100 WBC
PCO2 BLDA: 41.2 MMHG (ref 35–45)
PEEP: 5
PH SMN: 7.44 [PH] (ref 7.35–7.45)
PHOSPHATE SERPL-MCNC: 2.9 MG/DL (ref 2.7–4.5)
PIP: 22
PLATELET # BLD AUTO: 124 K/UL (ref 150–450)
PMV BLD AUTO: 10.9 FL (ref 9.2–12.9)
PO2 BLDA: 103 MMHG (ref 80–100)
POC BE: 4 MMOL/L
POC IONIZED CALCIUM: 1.12 MMOL/L (ref 1.06–1.42)
POC SATURATED O2: 98 % (ref 95–100)
POC TCO2: 29 MMOL/L (ref 23–27)
POTASSIUM BLD-SCNC: 3.4 MMOL/L (ref 3.5–5.1)
POTASSIUM SERPL-SCNC: 3.4 MMOL/L (ref 3.5–5.1)
PROT SERPL-MCNC: 5 G/DL (ref 6–8.4)
RBC # BLD AUTO: 4.2 M/UL (ref 4.6–6.2)
SAMPLE: ABNORMAL
SITE: ABNORMAL
SODIUM BLD-SCNC: 140 MMOL/L (ref 136–145)
SODIUM SERPL-SCNC: 139 MMOL/L (ref 136–145)
SP02: 100
VT: 450
WBC # BLD AUTO: 8.97 K/UL (ref 3.9–12.7)

## 2023-05-22 PROCEDURE — 25000003 PHARM REV CODE 250: Performed by: INTERNAL MEDICINE

## 2023-05-22 PROCEDURE — 25000003 PHARM REV CODE 250: Performed by: HOSPITALIST

## 2023-05-22 PROCEDURE — 63600175 PHARM REV CODE 636 W HCPCS

## 2023-05-22 PROCEDURE — 94799 UNLISTED PULMONARY SVC/PX: CPT

## 2023-05-22 PROCEDURE — 99900026 HC AIRWAY MAINTENANCE (STAT)

## 2023-05-22 PROCEDURE — 82330 ASSAY OF CALCIUM: CPT

## 2023-05-22 PROCEDURE — 82803 BLOOD GASES ANY COMBINATION: CPT

## 2023-05-22 PROCEDURE — 25000003 PHARM REV CODE 250: Performed by: STUDENT IN AN ORGANIZED HEALTH CARE EDUCATION/TRAINING PROGRAM

## 2023-05-22 PROCEDURE — C9113 INJ PANTOPRAZOLE SODIUM, VIA: HCPCS | Performed by: STUDENT IN AN ORGANIZED HEALTH CARE EDUCATION/TRAINING PROGRAM

## 2023-05-22 PROCEDURE — 99291 PR CRITICAL CARE, E/M 30-74 MINUTES: ICD-10-PCS | Mod: ,,, | Performed by: INTERNAL MEDICINE

## 2023-05-22 PROCEDURE — 63600175 PHARM REV CODE 636 W HCPCS: Performed by: INTERNAL MEDICINE

## 2023-05-22 PROCEDURE — 80053 COMPREHEN METABOLIC PANEL: CPT | Performed by: STUDENT IN AN ORGANIZED HEALTH CARE EDUCATION/TRAINING PROGRAM

## 2023-05-22 PROCEDURE — 37799 UNLISTED PX VASCULAR SURGERY: CPT

## 2023-05-22 PROCEDURE — 63600175 PHARM REV CODE 636 W HCPCS: Performed by: STUDENT IN AN ORGANIZED HEALTH CARE EDUCATION/TRAINING PROGRAM

## 2023-05-22 PROCEDURE — 99291 CRITICAL CARE FIRST HOUR: CPT | Mod: ,,, | Performed by: INTERNAL MEDICINE

## 2023-05-22 PROCEDURE — 84132 ASSAY OF SERUM POTASSIUM: CPT

## 2023-05-22 PROCEDURE — 94761 N-INVAS EAR/PLS OXIMETRY MLT: CPT

## 2023-05-22 PROCEDURE — 99233 SBSQ HOSP IP/OBS HIGH 50: CPT | Mod: ,,, | Performed by: INTERNAL MEDICINE

## 2023-05-22 PROCEDURE — 94003 VENT MGMT INPAT SUBQ DAY: CPT

## 2023-05-22 PROCEDURE — 99233 PR SUBSEQUENT HOSPITAL CARE,LEVL III: ICD-10-PCS | Mod: ,,, | Performed by: INTERNAL MEDICINE

## 2023-05-22 PROCEDURE — 99900031 HC PATIENT EDUCATION (STAT)

## 2023-05-22 PROCEDURE — 27000221 HC OXYGEN, UP TO 24 HOURS

## 2023-05-22 PROCEDURE — C9254 INJECTION, LACOSAMIDE: HCPCS | Performed by: STUDENT IN AN ORGANIZED HEALTH CARE EDUCATION/TRAINING PROGRAM

## 2023-05-22 PROCEDURE — 20000000 HC ICU ROOM

## 2023-05-22 PROCEDURE — 63600175 PHARM REV CODE 636 W HCPCS: Performed by: HOSPITALIST

## 2023-05-22 PROCEDURE — 84295 ASSAY OF SERUM SODIUM: CPT

## 2023-05-22 PROCEDURE — 83735 ASSAY OF MAGNESIUM: CPT | Performed by: STUDENT IN AN ORGANIZED HEALTH CARE EDUCATION/TRAINING PROGRAM

## 2023-05-22 PROCEDURE — 85025 COMPLETE CBC W/AUTO DIFF WBC: CPT | Performed by: STUDENT IN AN ORGANIZED HEALTH CARE EDUCATION/TRAINING PROGRAM

## 2023-05-22 PROCEDURE — 85014 HEMATOCRIT: CPT

## 2023-05-22 PROCEDURE — 84100 ASSAY OF PHOSPHORUS: CPT | Performed by: STUDENT IN AN ORGANIZED HEALTH CARE EDUCATION/TRAINING PROGRAM

## 2023-05-22 PROCEDURE — 99900035 HC TECH TIME PER 15 MIN (STAT)

## 2023-05-22 RX ORDER — PROPOFOL 10 MG/ML
0-50 INJECTION, EMULSION INTRAVENOUS CONTINUOUS
Status: DISCONTINUED | OUTPATIENT
Start: 2023-05-22 | End: 2023-05-23

## 2023-05-22 RX ORDER — PROPOFOL 10 MG/ML
INJECTION, EMULSION INTRAVENOUS
Status: COMPLETED
Start: 2023-05-22 | End: 2023-05-22

## 2023-05-22 RX ADMIN — DEXTROSE 500 MG: 50 INJECTION, SOLUTION INTRAVENOUS at 01:05

## 2023-05-22 RX ADMIN — TIMOLOL MALEATE 1 DROP: 2.5 SOLUTION/ DROPS OPHTHALMIC at 09:05

## 2023-05-22 RX ADMIN — LEVETIRACETAM INJECTION 1500 MG: 15 INJECTION INTRAVENOUS at 09:05

## 2023-05-22 RX ADMIN — ATORVASTATIN CALCIUM 20 MG: 20 TABLET, FILM COATED ORAL at 09:05

## 2023-05-22 RX ADMIN — METOPROLOL TARTRATE 5 MG: 5 INJECTION, SOLUTION INTRAVENOUS at 07:05

## 2023-05-22 RX ADMIN — METOPROLOL TARTRATE 5 MG: 5 INJECTION, SOLUTION INTRAVENOUS at 10:05

## 2023-05-22 RX ADMIN — HYDRALAZINE HYDROCHLORIDE 10 MG: 20 INJECTION INTRAMUSCULAR; INTRAVENOUS at 07:05

## 2023-05-22 RX ADMIN — PANTOPRAZOLE SODIUM 40 MG: 40 INJECTION, POWDER, FOR SOLUTION INTRAVENOUS at 09:05

## 2023-05-22 RX ADMIN — PROPOFOL 20 MCG/KG/MIN: 10 INJECTION, EMULSION INTRAVENOUS at 06:05

## 2023-05-22 RX ADMIN — DEXAMETHASONE 1 MG: 1 TABLET ORAL at 09:05

## 2023-05-22 RX ADMIN — SODIUM CHLORIDE 200 MG: 9 INJECTION, SOLUTION INTRAVENOUS at 08:05

## 2023-05-22 RX ADMIN — POTASSIUM BICARBONATE 35 MEQ: 391 TABLET, EFFERVESCENT ORAL at 04:05

## 2023-05-22 RX ADMIN — CEFEPIME 2 G: 2 INJECTION, POWDER, FOR SOLUTION INTRAVENOUS at 06:05

## 2023-05-22 RX ADMIN — PROPOFOL 15 MCG/KG/MIN: 10 INJECTION, EMULSION INTRAVENOUS at 12:05

## 2023-05-22 RX ADMIN — POTASSIUM BICARBONATE 35 MEQ: 391 TABLET, EFFERVESCENT ORAL at 10:05

## 2023-05-22 RX ADMIN — MIDAZOLAM HYDROCHLORIDE 1 MG/HR: 5 INJECTION, SOLUTION INTRAMUSCULAR; INTRAVENOUS at 09:05

## 2023-05-22 RX ADMIN — VANCOMYCIN HYDROCHLORIDE 1500 MG: 1.5 INJECTION, POWDER, LYOPHILIZED, FOR SOLUTION INTRAVENOUS at 12:05

## 2023-05-22 RX ADMIN — METOPROLOL TARTRATE 5 MG: 5 INJECTION, SOLUTION INTRAVENOUS at 06:05

## 2023-05-22 RX ADMIN — ALLOPURINOL 100 MG: 100 TABLET ORAL at 09:05

## 2023-05-22 RX ADMIN — CEFEPIME 2 G: 2 INJECTION, POWDER, FOR SOLUTION INTRAVENOUS at 03:05

## 2023-05-22 RX ADMIN — METOPROLOL TARTRATE 5 MG: 5 INJECTION, SOLUTION INTRAVENOUS at 02:05

## 2023-05-22 RX ADMIN — CEFEPIME 2 G: 2 INJECTION, POWDER, FOR SOLUTION INTRAVENOUS at 11:05

## 2023-05-22 RX ADMIN — DEXTROSE 500 MG: 50 INJECTION, SOLUTION INTRAVENOUS at 02:05

## 2023-05-22 RX ADMIN — DEXTROSE: 5 SOLUTION INTRAVENOUS at 08:05

## 2023-05-22 RX ADMIN — MAGNESIUM SULFATE HEPTAHYDRATE 2 G: 40 INJECTION, SOLUTION INTRAVENOUS at 04:05

## 2023-05-22 RX ADMIN — RIVAROXABAN 20 MG: 20 TABLET, FILM COATED ORAL at 06:05

## 2023-05-22 RX ADMIN — SODIUM CHLORIDE 200 MG: 9 INJECTION, SOLUTION INTRAVENOUS at 10:05

## 2023-05-22 NOTE — PLAN OF CARE
Problem: Adult Inpatient Plan of Care  Goal: Patient-Specific Goal (Individualized)  Outcome: Ongoing, Progressing  Goal: Readiness for Transition of Care  Outcome: Ongoing, Progressing     Problem: Communication Impairment (Mechanical Ventilation, Invasive)  Goal: Effective Communication  Outcome: Ongoing, Progressing     Problem: Nutrition Impairment (Mechanical Ventilation, Invasive)  Goal: Optimal Nutrition Delivery  Outcome: Ongoing, Progressing     Problem: Skin and Tissue Injury (Artificial Airway)  Goal: Absence of Device-Related Skin or Tissue Injury  Outcome: Ongoing, Progressing   Pt returned from CT via bed. Tolerated well. Pt more alert this afternoon. Moves right arm bringing it closer to face about 1/2 way, squeezes slightly better this afternoon vs this am. Able to give thumbs up with right hand. Able to squeeze slightly with left hand. Moves left hand slightly back and forth. Pt trying to write with right hand but only able to make a scribble. Daughter at side during attempt to write. RLE pt moves up and down in bed. Unable to hold leg off bed. Flexes both feet up and down.

## 2023-05-22 NOTE — PROGRESS NOTES
[facility]  Adult Nutrition   Progress Note (Nutrition Support Management)    SUMMARY      Recommendations:   1. Continue current TF of Vital HP at 75 mls/h providing 1800 kcals, 158 g protein and 1505 mls fluid  2. Continue FWF's of 30 mls every 4 hours to clear tube.  3. Rd will continue to monitor all aspects of enteral feeding and make recommendations prn.       Goals:   1) Pt to tolerate TF at the goal rate.   2) Nutrition provision to meet 75 to 100% of pts energy needs and 100% or more of his protein needs. 3) Electrolytes to trend towards target range of WNL's.    Dietitian Rounds Brief  F/U Nutrition Note: Observed pt during ICU rounds and TF running at 75 mls/h with pt tolerating it just fine. His LBM was yesterday and will continue to follow prn.    Diet order: NPO    TF: Vital HP  Rate: 75 mls/h  Calories: 1800  Protein: 158 g  Fluid: 1505 mls  Water flushes: 30 mls every 4 hours    % Intake of Estimated Energy Needs: 75 - 100 %  % Meal Intake: NPO    Estimated/Assessed Needs  Weight Used For Calorie Calculations: 92 kg (202 lb 13.2 oz)  Energy Calorie Requirements (kcal): 5054-4212 kcals/day (11-14 kcals/kg ABW), 1977 (modified PSE)  Energy Need Method: Kcal/kg  Protein Requirements: 146-183 g/day (2-2.5 g/kg IBW)  Weight Used For Protein Calculations: 73 kg (160 lb 15 oz)  Fluid Requirements (mL): 1860 (30 ml/kg IBW)  Estimated Fluid Requirement Method: RDA Method  RDA Method (mL): 1012       Weight History:  Wt Readings from Last 5 Encounters:   05/20/23 98.7 kg (217 lb 9.5 oz)   05/13/23 92.1 kg (203 lb)        Reason for Assessment  Reason For Assessment: RD follow-up  Diagnosis: seizures  Relevant Medical History: GERD; gioblastoma; h/o blood clots; HTN; paroxysmal A.fib.; seizures  Interdisciplinary Rounds: attended    Medications:Pertinent Medications Reviewed  Scheduled Meds:   allopurinoL  100 mg Per NG tube Daily    atorvastatin  20 mg Per NG tube Daily    ceFEPime (MAXIPIME)  IVPB  2 g Intravenous Q8H    dexAMETHasone  1 mg Per NG tube Q12H    lacosamide (VIMPAT) IVPB  200 mg Intravenous Q12H    levetiracetam IV  1,500 mg Intravenous Q12H    pantoprazole  40 mg Intravenous Daily    rivaroxaban  20 mg Per NG tube Daily with dinner    timolol maleate 0.25%  1 drop Both Eyes BID    valproate sodium (DEPACON) IVPB  500 mg Intravenous Q12H    vancomycin (VANCOCIN) IVPB  1,500 mg Intravenous Q12H     Continuous Infusions:   dextrose 5 % (D5W) 100 mL/hr at 05/22/23 0856    NORepinephrine bitartrate-D5W Stopped (05/21/23 2200)    propofoL Stopped (05/22/23 1101)     PRN Meds:.acetaminophen, colchicine, hydrALAZINE, ibuprofen, lorazepam, magnesium sulfate IVPB, magnesium sulfate IVPB, metoprolol, ondansetron, polyethylene glycol, potassium bicarbonate, potassium bicarbonate, potassium bicarbonate, sodium chloride 0.9%, sodium phosphate IVPB, sodium phosphate IVPB, sodium phosphate IVPB, Pharmacy to dose Vancomycin consult **AND** vancomycin - pharmacy to dose    Labs: Pertinent Labs Reviewed  Clinical Chemistry:  Recent Labs   Lab 05/21/23  0307 05/21/23  1106 05/22/23  0300   *  --  139   K 3.7 3.3* 3.4*   *  --  104   CO2 26  --  28   *  --  162*   BUN 46*  --  38*   CREATININE 0.8  --  0.5   CALCIUM 7.9*  --  7.7*   PROT 5.4*  --  5.0*   ALBUMIN 2.1*  --  1.9*   BILITOT 1.0  --  0.9   ALKPHOS 40*  --  37*   AST 87*  --  86*   ALT 74*  --  78*   ANIONGAP 9  --  7*   MG 1.9  --  1.8   PHOS 2.1* 2.4* 2.9     CBC:   Recent Labs   Lab 05/22/23  0300 05/22/23  0418   WBC 8.97  --    RBC 4.20*  --    HGB 13.2*  --    HCT 39.7* 37   *  --    MCV 95  --    MCH 31.4*  --    MCHC 33.2  --      Inflammatory Labs:  Recent Labs   Lab 05/21/23  1616   CRP 3.92*     Food and Nutrient Intake: enteral nutrition intake  Food and Nutrient Adminstration: enteral and parenteral nutrition administration  Knowledge/Beliefs/Attitudes: beliefs and attitudes, food and nutrition  knowledge/skill  Physical Activity and Function: nutrition-related ADLs and IADLs, factors affecting access to physical activity  Anthropometric Measurements: weight change, weight, body mass index  Biochemical Data, Medical Tests and Procedures: lipid profile, inflammatory profile, glucose/endocrine profile, gastrointestinal profile, electrolyte and renal panel  Nutrition-Focused Physical Findings: overall appearance     Nutrition Risk  Level of Risk/Frequency of Follow-up: moderate     Nutrition Follow-Up  RD Follow-up?: Yes    Carolina Bush RD 05/22/2023 12:36 PM                Clear

## 2023-05-22 NOTE — PROGRESS NOTES
[facility] Medicine  Progress Note    Patient Name: Teto Mendez  MRN: [id number]  Patient Class: IP- Inpatient   Admission Date: 5/11/2023  Length of Stay: 10 days  Attending Physician: Nicky Espinal MD  Primary Care Provider: Primary Doctor No        Subjective:     Principal Problem:Status epilepticus        HPI:  Teto Mendez is a 67 y.o. White male with known history of  glioblastoma s/p resection, seizures p.afib on xarelto who presented to ER this morning with left arm weakness, facial twitching and change in his speech that he suspected was seizure activity.  He took an extra keppra and presented to ER around 6:30-7 am.  Head CT/CTA in ER without hemorrhage or LVO and he was not a candidate for tPA regardless.  He was given lorazepam and keppra with positive response, however, while still in ER he became less responsive and repeat stat CT was ordered.  While in CT, he became hypoxic with depressed respiratory status and required bag-mask ventilation and emergent intubation on return to ICU.  History was obtained from the ER physician Sign-out.      Overview/Hospital Course:  Teto Mendez is a 67 year old male with a past medical history of glioblastoma s/p craniotomy complicated by seizures, obesity, HTN, AFib and GERD who presented with status epilepticus. Neurology has been consulted. The patient was intubated on propofol sedation with Keppra and valproic acid. Pulmonary has been consulted. Continuous EEG is ordered; the patient continued to have PLEDs throughout his course. MRI brain shows R MCA region infarcts consistent with acute CVA. His course has been complicated by Afib with RVR as well as a HFrEF seen on TTE which appears to be chronic. Cardiology has been consulted. He is on metoprolol and Xarelto with PRN diuresis. Entresto and high dose atorvastatin was also added. Diltiazem was also added by Cardiology 5/18.  Sedation with propofol and Precedex was stopped  "5/15. Vimpat was also added 5/15 by Neurology. He has been monitored for improvement in his neurologic status for > 72 hours and is noted to have some right hand squeeze when directed as well as some eye opening as of 5/18. Neurology has consulted Palliative Care 5/17 for assistance with end of life care; they may consider changing code status to DNR; they are hopeful the patient may continue show signs of neurologic progress, but acknowledge the severity of his case.     HR elevated 120-130s with stimulation and fatigue off vent setting; HR much improved back on vent; low grade temps progressed to high grade temps, SBP 70s, started on levophed. All lines removed, tips cultured and replaced. Cooling blanket, tylenol, ibuprofen and icepacks. On Cefepime and VAncomycin.      Interval History: see "Hospital Course"    Review of Systems   Unable to perform ROS: Patient nonverbal   Objective:     Vital Signs (Most Recent):  Temp: 99.6 °F (37.6 °C) (05/21/23 1945)  Pulse: 90 (05/21/23 1950)  Resp: (!) 23 (05/21/23 1950)  BP: (!) 136/91 (05/21/23 1945)  SpO2: 99 % (05/21/23 1950) Vital Signs (24h Range):  Temp:  [99 °F (37.2 °C)-102 °F (38.9 °C)] 99.6 °F (37.6 °C)  Pulse:  [] 90  Resp:  [0-38] 23  SpO2:  [93 %-100 %] 99 %  BP: ()/() 136/91  Arterial Line BP: ()/() 111/65     Weight: 98.7 kg (217 lb 9.5 oz)  Body mass index is 32.13 kg/m².    Intake/Output Summary (Last 24 hours) at 5/21/2023 2041  Last data filed at 5/21/2023 1828  Gross per 24 hour   Intake 4401.1 ml   Output 1350 ml   Net 3051.1 ml           Physical Exam  Vitals and nursing note reviewed.   Constitutional:       Appearance: He is ill-appearing.   HENT:      Head: Normocephalic and atraumatic.      Right Ear: External ear normal.      Left Ear: External ear normal.      Nose: Nose normal.      Comments: NG tube.     Mouth/Throat:      Mouth: Mucous membranes are moist.      Pharynx: Oropharynx is clear.      Comments: " ETT.  Cardiovascular:      Rate and Rhythm: Tachycardia present. Rhythm irregular.      Pulses: Normal pulses.      Heart sounds: Normal heart sounds.   Pulmonary:      Effort: Pulmonary effort is normal.      Breath sounds: Normal breath sounds.   Abdominal:      General: Bowel sounds are normal.      Palpations: Abdomen is soft.   Genitourinary:     Comments: Iraheta.  Musculoskeletal:      Right lower leg: Edema present.      Left lower leg: Edema present.   Skin:     General: Skin is warm and dry.   Neurological:      GCS: GCS eye subscore is 3. GCS verbal subscore is 1. GCS motor subscore is 6.           Significant Labs: All pertinent labs within the past 24 hours have been reviewed.    Significant Imaging: I have reviewed all pertinent imaging results/findings within the past 24 hours.  IMPRESSION: CXR 5/21/2023     1.  Unchanged enlarged cardiomediastinal silhouette.  2.  Prominence of the central hilar vascular structures and mild perihilar interstitial thickening noted, possibly reflecting pulmonary vascular congestion and mild pulmonary edema.  3.  Left basilar subsegmental atelectasis.  1.  Unchanged enlarged cardiomediastinal silhouette.  2.  Low lung volumes limit evaluation. Prominence of interstitium the lungs likely secondary to low lung volumes.  3.  Left basilar subsegmental atelectasis.        Assessment/Plan:      * Status epilepticus  In setting of glioblastoma. MRI brain also shows R MCA region infarcts. GCS 10 off sedation.  -Continue Depacon and Keppra  -Vimpat added 5/14  -Amantadine  -Neurology following  -Continuous EEG  -Continue intubation with mechanical ventilation  -Palliative Care consulted    Hypernatremia  -Increase free water flushes on tube feeds  -Trend Na      Fever  Unclear if infectious of related to seziures.  -U/A, blood cultures, CXR, and procalcitonin  -lines removed and replaced  -vanc and cefepime       Goals of care, counseling/discussion  -Palliative Care  consulted  -Full code at this time with possible change to DNR    Acute CVA (cerebrovascular accident)    Antithrombotics for secondary stroke prevention: Anticoagulants: Rivaroxaban 20 mg daily    Statins for secondary stroke prevention and hyperlipidemia, if present:   Statins: Atorvastatin- 80 mg daily    Aggressive risk factor modification: HTN, HLD, Obesity, A-Fib     Rehab efforts: The patient has been evaluated by a stroke team provider and the therapy needs have been fully considered based off the presenting complaints and exam findings. The following therapy evaluations are needed: None    Diagnostics ordered/pending: CTA Neck/Arch to assess vasculature, Lipid Profile to assess cholesterol levels, MRI head without contrast to assess brain parenchyma, TTE to assess cardiac function/status     VTE prophylaxis: None: Reason for No Pharmacological VTE Prophylaxis: Currently on anticoagulation    BP parameters: Infarct: No intervention, SBP <220        HFrEF (heart failure with reduced ejection fraction)  Unclear chronicity. BNP unremarkable.  -Cardiology following  -Metoprolol  -Lasix PRN  -Telemetry  -Entresto      GERD (gastroesophageal reflux disease)  -PPI      Gout  -Continue home allopurinol      Obesity  Body mass index is 30.08 kg/m². Morbid obesity complicates all aspects of disease management from diagnostic modalities to treatment.         Glioblastoma  Chronic. Receives care in [place].  -Continue Decadron  -Palliative Care consulted    Paroxysmal atrial fibrillation  -Xarelto  -Metoprolol  -Diltiazem added by Cardiology 5/19  -Telemetry  -Cardiology consulted        Essential hypertension  -Metoprolol  -Entresto  -Continue to monitor    Acute hypoxemic respiratory failure  In setting of status epilepticus.  -Continuous EEG  -Continue mechanical ventilation given neurologic status  -Pulmonary consulted  -ABG PRN  -Palliative Care consulted        VTE Risk Mitigation (From admission, onward)          Ordered     rivaroxaban tablet 20 mg  With dinner         05/13/23 0902     Reason for No Pharmacological VTE Prophylaxis  Once        Question:  Reasons:  Answer:  Already adequately anticoagulated on oral Anticoagulants    05/11/23 1054     IP VTE HIGH RISK PATIENT  Once         05/11/23 1054     Place sequential compression device  Until discontinued         05/11/23 1054                Discharge Planning   BARBARA: 5/22/2023     Code Status: Full Code   Is the patient medically ready for discharge?:     Reason for patient still in hospital (select all that apply): Patient trending condition  Discharge Plan A:  (TBD)                  Nicky Espinal MD  Department of Hospital Medicine   [facility]

## 2023-05-22 NOTE — PLAN OF CARE
05/22/23 0734   Patient Assessment/Suction   Level of Consciousness (AVPU) unresponsive   Respiratory Effort Unlabored   Expansion/Accessory Muscles/Retractions no use of accessory muscles   All Lung Fields Breath Sounds diminished   Rhythm/Pattern, Respiratory assisted mechanically   Cough Frequency with stimulation   Cough Type good   Suction Method tracheal;oral   Suction Pressure (mmHg) -120 mmHg   $ Suction Charges Inline Suction Procedure Stat Charge   Secretions Amount moderate   Secretions Color yellow;pale   Secretions Characteristics thick   Skin Integrity   $ Wound Care Tech Time 15 min   Area Observed Upper lip   Skin Appearance without discoloration   PRE-TX-O2   Device (Oxygen Therapy) ventilator   $ Is the patient on Low Flow Oxygen? Yes   Oxygen Concentration (%) 30   SpO2 98 %   Pulse Oximetry Type Continuous   $ Pulse Oximetry - Multiple Charge Pulse Oximetry - Multiple   Oximetry Probe Site Assessed   Pulse 73   Resp 20   Positioning HOB elevated 30 degrees      Airway Anesthesia 05/11/23   Placement Date/Time: 05/11/23 (c) 8139   Method of Intubation: Video Laryngoscopy  Airway Device: Endotracheal Tube  Mask Ventilation: Moderately difficult with oral airway  Intubated: Other (see comments)  Blade: Glidescope #3  Airway Device Size: 8....   Secured at 24 cm   Measured At Lips   Secured Location Left   Secured by Commercial tube barboza   Bite Block secure and patent   Site Condition Drainage (Comment)   Status Intact;Secured;Patent   Site Assessment Oozing secretions;Drainage   Cuff Volume   (MLT)   General Safety Checklist   Safety Promotion/Fall Prevention side rails raised   Airway Safety   Is Ambu Bag and Mask with Patient? Yes, Adult Ambu Bag and Mask   Suction set is at the bedside? Yes   Respiratory Interventions   Cough And Deep Breathing unable to perform   Airway/Ventilation Management airway patency maintained;calming measures promoted;humidification applied;pulmonary hygiene  promoted   Airway/Ventilation Support comfort measures provided;cough relief provided;dyspnea relief promoted;humidification applied;pulmonary hygiene promoted   Vent Select   Conventional Vent Y   $ Ventilator Subsequent 1   Charged w/in last 24h YES   Preset Conventional Ventilator Settings   Vent ID [id number]   Vent Type    Ventilation Type VC   Vent Mode A/C   Humidity HME   Set Rate 10 BPM   Vt Set 450 mL   PEEP/CPAP 5 cmH20   Peak Flow 60 L/min   Peak End Inspiratory Pressure 16 cmH20   I-Trigger Type  V-TRIG   Trigger Sensitivity Flow/I-Trigger 3 L/min   Patient Ventilator Parameters   Resp Rate Total 18 br/min   Peak Airway Pressure 22 cmH20   Mean Airway Pressure 8.9 cmH20   Plateau Pressure 0 cmH20   Exhaled Vt 463 mL   Total Ve 8.37 L/m   I:E Ratio Measured 1:2.70   Auto PEEP 0 cmH20   Conventional Ventilator Alarms   Alarms On Y   Ve High Alarm 28 L/min   Ve Low Alarm 5 L/min   Vt High Alarm 1200 mL   Vt Low Alarm 200 mL   Resp Rate High Alarm 40 br/min   Press High Alarm 60 cmH2O   Apnea Rate 12   Apnea Volume (mL) 0 mL   Apnea Oxygen Concentration  100   Apnea Flow Rate (L/min) 65   T Apnea 20 sec(s)   IHI Ventilator Associated Pneumonia Bundle (Required)   Daily Awakening Trials Performed No   Head of Bed Elevated  HOB 30   Oral Care Mouth swabbed;Mouth moisturizer;Mouth suctioned;with baking soda solution   Vent Circut Breaks Minimized Yes   Ready to Wean/Extubation Screen   FIO2<=50 (chart decimal) 0.3   MV<16L (chart vol.) 8.37   PEEP <=8 (chart #) 5   Ready to Wean Parameters   F/VT Ratio<105 (RSBI) (!) 43.2   Vital Capacity   Vital Capacity (mL) 0   Education   $ Education Suction;15 min   Respiratory Evaluation   $ Care Plan Tech Time 15 min   $ Eval/Re-eval Charges Re-evaluation   Evaluation For Re-Eval 5+ day

## 2023-05-22 NOTE — SUBJECTIVE & OBJECTIVE
"Interval History: see "Hospital Course"    Review of Systems   Unable to perform ROS: Patient nonverbal   Objective:     Vital Signs (Most Recent):  Temp: 99.6 °F (37.6 °C) (05/21/23 1945)  Pulse: 90 (05/21/23 1950)  Resp: (!) 23 (05/21/23 1950)  BP: (!) 136/91 (05/21/23 1945)  SpO2: 99 % (05/21/23 1950) Vital Signs (24h Range):  Temp:  [99 °F (37.2 °C)-102 °F (38.9 °C)] 99.6 °F (37.6 °C)  Pulse:  [] 90  Resp:  [0-38] 23  SpO2:  [93 %-100 %] 99 %  BP: ()/() 136/91  Arterial Line BP: ()/() 111/65     Weight: 98.7 kg (217 lb 9.5 oz)  Body mass index is 32.13 kg/m².    Intake/Output Summary (Last 24 hours) at 5/21/2023 2041  Last data filed at 5/21/2023 1828  Gross per 24 hour   Intake 4401.1 ml   Output 1350 ml   Net 3051.1 ml           Physical Exam  Vitals and nursing note reviewed.   Constitutional:       Appearance: He is ill-appearing.   HENT:      Head: Normocephalic and atraumatic.      Right Ear: External ear normal.      Left Ear: External ear normal.      Nose: Nose normal.      Comments: NG tube.     Mouth/Throat:      Mouth: Mucous membranes are moist.      Pharynx: Oropharynx is clear.      Comments: ETT.  Cardiovascular:      Rate and Rhythm: Tachycardia present. Rhythm irregular.      Pulses: Normal pulses.      Heart sounds: Normal heart sounds.   Pulmonary:      Effort: Pulmonary effort is normal.      Breath sounds: Normal breath sounds.   Abdominal:      General: Bowel sounds are normal.      Palpations: Abdomen is soft.   Genitourinary:     Comments: Iraheta.  Musculoskeletal:      Right lower leg: Edema present.      Left lower leg: Edema present.   Skin:     General: Skin is warm and dry.   Neurological:      GCS: GCS eye subscore is 3. GCS verbal subscore is 1. GCS motor subscore is 6.           Significant Labs: All pertinent labs within the past 24 hours have been reviewed.    Significant Imaging: I have reviewed all pertinent imaging results/findings within the " past 24 hours.  IMPRESSION: CXR 5/21/2023     1.  Unchanged enlarged cardiomediastinal silhouette.  2.  Prominence of the central hilar vascular structures and mild perihilar interstitial thickening noted, possibly reflecting pulmonary vascular congestion and mild pulmonary edema.  3.  Left basilar subsegmental atelectasis.  1.  Unchanged enlarged cardiomediastinal silhouette.  2.  Low lung volumes limit evaluation. Prominence of interstitium the lungs likely secondary to low lung volumes.  3.  Left basilar subsegmental atelectasis.

## 2023-05-22 NOTE — CARE UPDATE
05/21/23 1950   Patient Assessment/Suction   Level of Consciousness (AVPU) responds to voice   Respiratory Effort Normal;Unlabored   Expansion/Accessory Muscles/Retractions no use of accessory muscles   All Lung Fields Breath Sounds coarse   Rhythm/Pattern, Respiratory assisted mechanically   Cough Frequency with stimulation   Cough Type assisted   Suction Method oral;tracheal   $ Suction Charges Inline Suction Procedure Stat Charge   Secretions Amount small   Secretions Color yellow;pale   Secretions Characteristics thick   PRE-TX-O2   Device (Oxygen Therapy) ventilator   $ Is the patient on Low Flow Oxygen? Yes   Oxygen Concentration (%) 30   SpO2 99 %   Pulse Oximetry Type Continuous   $ Pulse Oximetry - Multiple Charge Pulse Oximetry - Multiple   Pulse 90   Resp (!) 23      Airway Anesthesia 05/11/23   Placement Date/Time: 05/11/23 (c) 8060   Method of Intubation: Video Laryngoscopy  Airway Device: Endotracheal Tube  Mask Ventilation: Moderately difficult with oral airway  Intubated: Other (see comments)  Blade: Glidescope #3  Airway Device Size: 8....   Secured at 24 cm   Measured At Lips   Secured Location Center   Secured by Commercial tube barboza   Bite Block center;secure and patent   Site Condition Cool;Dry   Status Intact;Secured;Patent   Site Assessment Clean;Dry   Airway Safety   Is Ambu Bag and Mask with Patient? Yes, Adult Ambu Bag and Mask   Respiratory Interventions   Airway/Ventilation Management airway patency maintained   Vent Select   Conventional Vent Y   Charged w/in last 24h YES   Preset Conventional Ventilator Settings   Vent ID [id number]   Vent Type    Ventilation Type VC   Vent Mode A/C   Humidity HME   Set Rate 10 BPM   Vt Set 450 mL   PEEP/CPAP 5 cmH20   Peak Flow 60 L/min   Peak End Inspiratory Pressure 16 cmH20   I-Trigger Type  V-TRIG   Trigger Sensitivity Flow/I-Trigger 3 L/min   Patient Ventilator Parameters   Resp Rate Total 23 br/min   Peak Airway Pressure 24 cmH20   Mean Airway  Pressure 9.7 cmH20   Plateau Pressure 0 cmH20   Exhaled Vt 441 mL   Total Ve 10.8 L/m   I:E Ratio Measured 1:2.10   Auto PEEP 0 cmH20   Tubing ID (mm) 8 mm   Tube Type ET   Conventional Ventilator Alarms   Alarms On Y   Resp Rate High Alarm 40 br/min   Press High Alarm 60 cmH2O   Apnea Rate 12   Apnea Volume (mL) 0 mL   Apnea Oxygen Concentration  100   Apnea Flow Rate (L/min) 65   T Apnea 20 sec(s)   IHI Ventilator Associated Pneumonia Bundle (Required)   Oral Care Mouth suctioned   Ready to Wean/Extubation Screen   FIO2<=50 (chart decimal) 0.3   MV<16L (chart vol.) 10.8   PEEP <=8 (chart #) 5   Ready to Wean Parameters   F/VT Ratio<105 (RSBI) (!) 52.15   Vital Capacity   Vital Capacity (mL) 0   Education   $ Education Suction;Ventilator Oxygen;15 min   Respiratory Evaluation   $ Care Plan Tech Time 15 min   $ Eval/Re-eval Charges Evaluation

## 2023-05-22 NOTE — SUBJECTIVE & OBJECTIVE
Interval History: Now following some simple commands with right side of body.    Medications:  Continuous Infusions:   NORepinephrine bitartrate-D5W Stopped (05/21/23 2200)    propofoL Stopped (05/22/23 1101)     Scheduled Meds:   allopurinoL  100 mg Per NG tube Daily    atorvastatin  20 mg Per NG tube Daily    ceFEPime (MAXIPIME) IVPB  2 g Intravenous Q8H    dexAMETHasone  1 mg Per NG tube Q12H    lacosamide (VIMPAT) IVPB  200 mg Intravenous Q12H    levetiracetam IV  1,500 mg Intravenous Q12H    pantoprazole  40 mg Intravenous Daily    rivaroxaban  20 mg Per NG tube Daily with dinner    timolol maleate 0.25%  1 drop Both Eyes BID    [START ON 5/23/2023] valproate sodium (DEPACON) IVPB  250 mg Intravenous Q12H     PRN Meds:acetaminophen, colchicine, hydrALAZINE, ibuprofen, lorazepam, magnesium sulfate IVPB, magnesium sulfate IVPB, metoprolol, ondansetron, polyethylene glycol, potassium bicarbonate, potassium bicarbonate, potassium bicarbonate, sodium chloride 0.9%, sodium phosphate IVPB, sodium phosphate IVPB, sodium phosphate IVPB    Objective:     Vital Signs (Most Recent):  Temp: 99 °F (37.2 °C) (on cooling blanket) (05/22/23 1130)  Pulse: (!) 115 (05/22/23 1400)  Resp: (!) 32 (05/22/23 1400)  BP: (!) 140/88 (hr 135) (05/22/23 1436)  SpO2: 96 % (05/22/23 1400)   Vital Signs (24h Range):  Temp:  [96.7 °F (35.9 °C)-101.8 °F (38.8 °C)] 99 °F (37.2 °C)  Pulse:  [] 115  Resp:  [0-35] 32  SpO2:  [95 %-100 %] 96 %  BP: ()/() 140/88  Arterial Line BP: ()/(52-87) 139/87     Weight: 98.7 kg (217 lb 9.5 oz)  Body mass index is 32.13 kg/m².       Physical Exam  Vitals reviewed.   Constitutional:       General: He is not in acute distress.     Appearance: He is ill-appearing.   HENT:      Head: Normocephalic and atraumatic.      Right Ear: External ear normal.      Left Ear: External ear normal.      Nose: Nose normal. No congestion.      Mouth/Throat:      Mouth: Mucous membranes are moist.       Pharynx: No oropharyngeal exudate.   Eyes:      General:         Right eye: No discharge.         Left eye: No discharge.      Extraocular Movements: Extraocular movements intact.   Cardiovascular:      Rate and Rhythm: Tachycardia present.   Pulmonary:      Effort: Pulmonary effort is normal.      Comments: Mechanically ventilated  Abdominal:      General: There is no distension.      Palpations: Abdomen is soft.      Tenderness: There is no abdominal tenderness.   Skin:     General: Skin is warm.   Neurological:      Comments: Minimally responsive          Review of Symptoms      Symptom Assessment (ESAS 0-10 Scale)  Unable to complete assessment due to Acuity of condition         Pain Assessment in Advanced Demential Scale (PAINAD)   Breathing - Independent of vocalization:  0  Negative vocalization:  0  Facial expression:  1  Body language:  1  Consolability:  1  Total:  3    Performance Status:  20    Psychosocial/Cultural:   See Palliative Psychosocial Note: No  **Primary  to Follow**  Palliative Care  Consult: No      Advance Care Planning   Advance Directives:   Goals of Care: What is most important right now is to focus on curative/life-prolongation (regardless of treatment burdens). Accordingly, we have decided that the best plan to meet the patient's goals includes continuing with treatment.       Significant Labs: BMP:   Recent Labs   Lab 05/22/23 0300   *      K 3.4*      CO2 28   BUN 38*   CREATININE 0.5   CALCIUM 7.7*   MG 1.8     CBC:   Recent Labs   Lab 05/21/23  0307 05/21/23  0411 05/22/23  0300 05/22/23 0418   WBC 9.07  --  8.97  --    HGB 14.9  --  13.2*  --    HCT 45.1 46 39.7* 37     --  124*  --      CBC:   Recent Labs   Lab 05/22/23  0300 05/22/23 0418   WBC 8.97  --    HGB 13.2*  --    HCT 39.7* 37   MCV 95  --    *  --      BMP:  Recent Labs   Lab 05/22/23 0300   *      K 3.4*      CO2 28   BUN 38*    CREATININE 0.5   CALCIUM 7.7*   MG 1.8     LFT:  Lab Results   Component Value Date    AST 86 (H) 05/22/2023    ALKPHOS 37 (L) 05/22/2023    BILITOT 0.9 05/22/2023     Albumin:   Albumin   Date Value Ref Range Status   05/22/2023 1.9 (L) 3.5 - 5.2 g/dL Final     Protein:   Total Protein   Date Value Ref Range Status   05/22/2023 5.0 (L) 6.0 - 8.4 g/dL Final     Lactic acid:   Lab Results   Component Value Date    LACTATE 1.5 05/21/2023       Significant Imaging: I have reviewed all pertinent imaging results/findings within the past 24 hours.

## 2023-05-22 NOTE — PROGRESS NOTES
[facility]  Palliative Medicine  Progress Note    Patient Name: Teto Mendez  MRN: [id number]  Admission Date: 5/11/2023  Hospital Length of Stay: 11 days  Code Status: Full Code   Attending Provider: Nicky Espinal MD  Consulting Provider: Onofre Penny MD  Primary Care Physician: Primary Doctor No  Principal Problem:Status epilepticus    Patient information was obtained from patient, relative(s), past medical records and primary team.      Assessment/Plan:     Palliative Care  Goals of care, counseling/discussion  I reviewed his chart and discussed his case with his team.    I examined Mr. Mendez at bedside.  He was intubated and does lacks capacity for complex medical decision-making.  I spoke with his daughter and cousin at bedside.  Another cousin, Chris, joined us by phone as well.    They were able to accurately recall me and our previous conversation.  They understand he was at the point where they need to make a decision about continued supportive care with tracheostomy, peg, LTAC, etc. versus transitioning to comfort focused approach to care.  I affirmed their understanding.    Given his improvement, albeit slight, from last week to today they desire to continue with supportive measures.  He is described as a fighter and they believe he would be willing to continue with supportive measures in hopes of continued recovery. They do understand my concern that his current condition is likely close to a new baseline; dependent on machines and people for all of his care going forward.     They seemed to be thinking in a patient centered mindset.  I recommended that we pursue trach, peg, and LTAC placement.  I also recommended that we view these interventions as a time-limited trial.  If over the coming weeks he is making progress towards a quality of life, then certainly continue with supportive measures being hopeful for the best.  If at any point it becomes apparent that he was not making  progress towards a quality of life then at that point it would be very reasonable and appropriate to transition to a comfort focused approach with hospice in place.  They understood and agree with that recommendation.    Updated his primary team.    I appreciate being involved.  I hope I have been helpful.          I will follow-up with patient. Please contact us if you have any additional questions.    Subjective:     Chief Complaint:   Chief Complaint   Patient presents with    FACIAL TWITCHING    SLURRED SPEECH       HPI:   67-year-old male with multiple medical problems significant for glioblastoma status post resection, seizure disorder, and atrial fibrillation on anticoagulation.  He initially presented with facial twitching, left arm weakness, and change in speech.  He is currently admitted being treated for status epilepticus.  Course has been complicated by respiratory failure requiring intubation.  MRI brain history right MCA infarcts.  Additionally, TTE has revealed heart failure with reduced ejection fraction.  He has been evaluated by Neurology, pulmonology, and Cardiology.  At this point there was concern that his current condition is his new baseline, which is a completely dependent state.  At this point, his prognosis appears to be very grim.  I have been asked to assist with goals of care.      Hospital Course:  No notes on file    No new subjective & objective note has been filed under this hospital service since the last note was generated.    > 50% of 30 min visit spent in chart review, face to face discussion of goals of care,  symptom assessment, coordination of care and emotional support.     Onofre Penny MD  Palliative Medicine  [facility]

## 2023-05-22 NOTE — ASSESSMENT & PLAN NOTE
I reviewed his chart and discussed his case with his team.    I examined Mr. Mendez at bedside.  He was intubated and does lacks capacity for complex medical decision-making.  I spoke with his daughter and cousin at bedside.  Another cousin, Chris, joined us by phone as well.    They were able to accurately recall me and our previous conversation.  They understand he was at the point where they need to make a decision about continued supportive care with tracheostomy, peg, LTAC, etc. versus transitioning to comfort focused approach to care.  I affirmed their understanding.    Given his improvement, albeit slight, from last week to today they desire to continue with supportive measures.  He is described as a fighter and they believe he would be willing to continue with supportive measures in hopes of continued recovery. They do understand my concern that his current condition is likely close to a new baseline; dependent on machines and people for all of his care going forward.     They seemed to be thinking in a patient centered mindset.  I recommended that we pursue trach, peg, and LTAC placement.  I also recommended that we view these interventions as a time-limited trial.  If over the coming weeks he is making progress towards a quality of life, then certainly continue with supportive measures being hopeful for the best.  If at any point it becomes apparent that he was not making progress towards a quality of life then at that point it would be very reasonable and appropriate to transition to a comfort focused approach with hospice in place.  They understood and agree with that recommendation.    Updated his primary team.    I appreciate being involved.  I hope I have been helpful.

## 2023-05-22 NOTE — PROGRESS NOTES
Pulmonary/Critical Care Progress Note      PATIENT NAME: Teto Mendez  MRN: [id number]  TODAY'S DATE: 2023  12:50 PM  ADMIT DATE: 2023  AGE: 67 y.o. : 1955      HPI:  Called by nurse because they could not reach anesthesia to intubate a patient who was being bagged.  The patient had a change in mental status in the emergency room after having had a CTA earlier in the morning which was negative.  In CT, the patient was not breathing effectively and was cyanotic and the scanner broke so the patient was brought up to the ICU emergently.  The patient's past medical history is significant for glioblastoma multiforme for which he has been receiving radiation and chemotherapy.  He also has had a right frontal craniotomy.  At 3:30 a.m. in the morning the patient was playing a game and noted twitching of his face with slurred speech and weakness to his left arm.  He took an extra Keppra at that time.  He arrived at the ER at 6:45 a.m..     the patient remains on the ventilator and sedated with 60 of propofol.  He is also on 5 Cardizem.  Had further seizure activity during the night.    - pt remains sedated, on vent, on continuous EEG. There is EEG evidence of seizure activity so neuro has ordered VPA. Propofol has been weaned to 30    - continues sedated, on vent. Mri was done today and shows infarct in R parietal and R temporal lobes. Continues on propofol 20mg. He did have seizure activity twice today    5/15 The patient is still on the vent.  He is on Precedex, the propofol is off.  He was still having seizure activity yesterday.  MRI shows parietal and temporal infarcts on the right.     the patient is been off all sedation for 24 hours and has a GCS of 3.  Has pupils and corneals and gag and breathes spontaneously but no response to pain.  He does not respond to his voice.  He follows no commands.     the patient remains unresponsive.  He partially opens his eyes to noxious  stimuli.  He follows no commands.  Dr. Whitfield wants to give him 72 hours from stopping the sedation to reassess his neurologic recovery.  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    5/18 the patient remains densely encephalopathic.  To vigorous tactile stimulation he will partially open his eyes.  The nurse swears that he squeezed with his right hand.    5/19 the patient remains densely encephalopathic.  Actually is lightly squeezing with his right hand today.  Opens his eyes intermediate when stimulated.  His heart rate is 157 because he has not gotten his metoprolol because his OG tube was pulled out during his bath.    5/20 the patient opens his eyes intermediate to verbal stimulation.  He weakly squeezes with his right hand.  Wiggles his right toes to command.  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    5/21 the patient had a 5 minute seizure earlier this morning.  He still has a left lateral nystagmus. His exam is about the same except he will not move his toes today.  He is on the ventilator.  He was febrile this morning to 102.    5/22 the patient is able to move his right leg more today.  He remains hemiplegic on the left.  Neurology has ordered a repeat CT.  The patient was tried on T-piece again today but failed within less than an hour.  It is likely that the patient is going to be ventilator dependent going forward.  Spoke with his daughter about this.  She said she was on her way to the hospital iand would assess his situation then.    REVIEW OF SYSTEMS  Unobtainable    No change in the patient's Past Medical History, Past Surgical History, Social History or Family History since admission.      VITAL SIGNS (MOST RECENT)  Temp: 99 °F (37.2 °C) (on cooling blanket) (05/22/23 1130)  Pulse: 100 (05/22/23 1200)  Resp: (!) 32 (05/22/23 1200)  BP: 138/85 (05/22/23 1200)  SpO2: 100 % (05/22/23 1200)  T-max 102.0°    INTAKE AND OUTPUT (LAST 24 HOURS):  Intake/Output Summary  (Last 24 hours) at 5/22/2023 1306  Last data filed at 5/22/2023 1101  Gross per 24 hour   Intake 3138.38 ml   Output 250 ml   Net 2888.38 ml       WEIGHT  Wt Readings from Last 1 Encounters:   05/20/23 98.7 kg (217 lb 9.5 oz)       PHYSICAL EXAM  GENERAL: Older patient, intubated on mechanical ventilation  HEENT: Pupils equal and reactive.  Corneals are intact.   Nose intact. Pharynx intubated with ET tube and OG tube.  Gag reflex is intact.  NECK: Supple.  Right triple-lumen catheter.  HEART: Regular rate and rhythm. No murmur or gallop auscultated.  LUNGS: Clear to auscultation and percussion. Lung excursion symmetrical. No change in fremitus. No adventitial noises.  ABDOMEN: Bowel sounds present. Non-tender, no masses palpated.  Tolerating enteral nutrition; rectal tube in place  : Normal anatomy.  Iraheta with yellow urine.  EXTREMITIES: Normal muscle tone and joint movement, no cyanosis or clubbing.  Arterial line on the right  LYMPHATICS: No adenopathy palpated, both hands puffy.  SKIN: Dry, intact, no lesions.   NEURO:  Pupils and extraocular movements are intact.  Corneals are intact.  Gag is intact.  There is a partial lid raise to light stimulation.  He will lightly squeeze with his right hand to command.  He tries to hold up 2 fingers to command.  He is not moving his toes and flexing his lower leg.  He appears hemiplegic on the left.  GCS is 10  PSYCH:  Unable to assess      CBC LAST (LAST 24 HOURS)  Recent Labs   Lab 05/22/23  0300 05/22/23 0418   WBC 8.97  --    RBC 4.20*  --    HGB 13.2*  --    HCT 39.7* 37   MCV 95  --    MCH 31.4*  --    MCHC 33.2  --    RDW 13.5  --    *  --    MPV 10.9  --    GRAN 76.8*  6.9  --    LYMPH 11.5*  1.0  --    MONO 9.4  0.8  --    BASO 0.04  --    NRBC 0  --        CHEMISTRY LAST (LAST 24 HOURS)  Recent Labs   Lab 05/22/23  0300 05/22/23 0418     --    K 3.4*  --      --    CO2 28  --    ANIONGAP 7*  --    BUN 38*  --    CREATININE 0.5  --     *  --    CALCIUM 7.7*  --    PH  --  7.438   MG 1.8  --    ALBUMIN 1.9*  --    PROT 5.0*  --    ALKPHOS 37*  --    ALT 78*  --    AST 86*  --    BILITOT 0.9  --            LAST 7 DAYS MICROBIOLOGY   Microbiology Results (last 7 days)       Procedure Component Value Units Date/Time    Blood culture [[id number]] Collected: 05/21/23 1106    Order Status: Completed Specimen: Blood from Line, PICC Left Brachial Updated: 05/22/23 1232     Blood Culture, Routine No Growth to date      No Growth to date    Narrative:      From PICC  Collection has been rescheduled by RE1 at 05/21/2023 10:05 Reason:   Nurse Draw  Collection has been rescheduled by RE1 at 05/21/2023 10:05 Reason:   Nurse Draw    Blood culture [[id number]] Collected: 05/21/23 1107    Order Status: Completed Specimen: Blood from Line, Arterial, Left Updated: 05/22/23 1232     Blood Culture, Routine No Growth to date      No Growth to date    Narrative:      From arterial line  Collection has been rescheduled by RE1 at 05/21/2023 10:05 Reason:   Nurse Draw  Collection has been rescheduled by RE1 at 05/21/2023 10:05 Reason:   Nurse Draw    Blood culture [[id number]] Collected: 05/21/23 0743    Order Status: Completed Specimen: Blood Updated: 05/22/23 0832     Blood Culture, Routine No Growth to date      No Growth to date    IV catheter culture [[id number]] Collected: 05/21/23 1826    Order Status: Completed Specimen: Catheter Tip, Arterial Updated: 05/22/23 0804     Aerobic Culture - Cath tip No growth    IV catheter culture [[id number]] Collected: 05/21/23 1825    Order Status: Completed Specimen: Catheter Tip, PICC Updated: 05/22/23 0804     Aerobic Culture - Cath tip No growth    Culture, Respiratory with Gram Stain [[id number]] Collected: 05/21/23 1723    Order Status: Completed Specimen: Respiratory from Endotracheal Aspirate Updated: 05/22/23 0737     Respiratory Culture Insufficient incubation, culture in progress     Gram Stain (Respiratory) <10  epithelial cells per low power field.     Gram Stain (Respiratory) Many WBC's     Gram Stain (Respiratory) Few Gram negative rods     Gram Stain (Respiratory) Rare Gram positive rods    Urine culture [[id number]]  (Abnormal) Collected: 05/21/23 1104    Order Status: Completed Specimen: Urine Updated: 05/22/23 0721     Urine Culture, Routine GRAM NEGATIVE CRISTOPHER, LACTOSE   >100,000 cfu/ml  Identification and susceptibility pending      Narrative:      Specimen Source->Urine    Blood culture [[id number]] Collected: 05/19/23 1504    Order Status: Completed Specimen: Blood Updated: 05/21/23 1632     Blood Culture, Routine No Growth to date      No Growth to date      No Growth to date    Narrative:      Collection has been rescheduled by [email or web address] at 05/19/2023 11:30 Reason:   RAMYA Aguilar is messaging the dr will call when ready   Collection has been rescheduled by [email or web address] at 05/19/2023 11:30 Reason:   RAMYA Aguilar is messaging the dr will call when ready     Blood culture [[id number]] Collected: 05/19/23 1504    Order Status: Completed Specimen: Blood Updated: 05/21/23 1632     Blood Culture, Routine No Growth to date      No Growth to date      No Growth to date    Narrative:      Collection has been rescheduled by [email or web address] at 05/19/2023 11:30 Reason:   RAMYA Aguilar is messaging the dr will call when ready   Collection has been rescheduled by [email or web address] at 05/19/2023 11:30 Reason:   RAMYA Aguilar is messaging the dr will call when ready     Culture, Respiratory with Gram Stain [[id number]] Collected: 05/15/23 0059    Order Status: Completed Specimen: Respiratory from Sputum Updated: 05/17/23 0738     Respiratory Culture Normal respiratory fredi     Gram Stain (Respiratory) <10 epithelial cells per low power field.     Gram Stain (Respiratory) Few WBC's     Gram Stain (Respiratory) Few Gram positive cocci            MOST RECENT IMAGING  X-Ray Chest 1 View  XR CHEST 1 VIEW 5/21/2023 at 6:29 PM.    CLINICAL STATEMENT: central line placement    COMPARISON:   5/21/2023 at 11:02 AM.    FINDINGS:    Endotracheal tube and enteric tube are in appropriate placement. Right IJ CVC in place with tip in the upper right atrium. No pneumothorax. No pleural effusions.    IMPRESSION:    Lines and tubes of life-support in place.    Electronically signed by:  Anselmo Lee MD  5/21/2023 7:26 PM CDT Workstation: [id number]  X-Ray Chest 1 View  Reason: Fever.    FINDINGS:    Portable chest with comparison chest x-ray May 20, 2023 show unchanged enlarged cardiac mediastinal silhouette. Endotracheal tube and nasogastric tube again noted. Left PICC line again noted.  There is prominence of the central hilar vascular structures with mild perihilar interstitial thickening. Linear opacities of the left lung base likely reflect subsegmental atelectasis. Pulmonary vasculature is normal. No acute osseous abnormality.    IMPRESSION:    1.  Unchanged enlarged cardiomediastinal silhouette.  2.  Prominence of the central hilar vascular structures and mild perihilar interstitial thickening noted, possibly reflecting pulmonary vascular congestion and mild pulmonary edema.  3.  Left basilar subsegmental atelectasis.    Electronically signed by:  Micheal Dumas DO  5/21/2023 11:25 AM CDT Workstation: [id number]      CURRENT VISIT EKG  Results for orders placed or performed during the hospital encounter of 05/11/23   ECG 12 lead    Narrative    Test Reason : I63.9,    Vent. Rate : 120 BPM     Atrial Rate : 150 BPM     P-R Int : 000 ms          QRS Dur : 104 ms      QT Int : 370 ms       P-R-T Axes : 000 016 -12 degrees     QTc Int : 522 ms    Atrial fibrillation with rapid ventricular response  Nonspecific ST abnormality  Prolonged QT  Abnormal ECG  No previous ECGs available    Referred By: [email or web address]   SELF           Confirmed By:        ECHOCARDIOGRAM RESULTS  No results found for this or any previous visit.        VENTILATOR INFORMATION  Vent Mode: A/C  Oxygen Concentration (%):  [30] 30  Resp Rate  Total:  [12 br/min-29 br/min] 22 br/min  Vt Set:  [450 mL] 450 mL  PEEP/CPAP:  [5 cmH20] 5 cmH20  Mean Airway Pressure:  [8.1 cmH20-10 cmH20] 10 cmH20           LAST ARTERIAL BLOOD GAS  ABG  Recent Labs   Lab 05/22/23  0418   PH 7.438   PO2 103*   PCO2 41.2   HCO3 27.9   BE 4       IMPRESSION AND PLAN    Status epilepticus  -MRI shows temporal and parietal infarcts on the right, the patient appears to be hemiplegic on the left  -history of seizures prior to this admission  - no seizures today  Lateral nystagmus  -left  -resolved  History of glioblastoma multiforme under treatment in [place]  Zion coma scale of 10  -patient has been off of sedation for multiple days  -minimally more responsive with a partial eye opening to voice and light squeeze of hand on R, moving his right leg and toes  Fever  - none since 4:00 p.m.  - On cefepime and vanc  - Pseudomonas in his sputum and a Gram-negative britton lactose  in his urine  - will discontinue vancomycin  Mechanical ventilation  - ABG daily  - failed T piece trial again today  Atrial fibrillation  - continue Xarelto  - continue metoprolol  Hypertension  -metoprolol  Hypernatremia  - corrected  - water flushes at 100 cc q.4 hours  - will stop D5W  Hypokalemia  - replace potassium  -Trend labs  Moderate hypoalbuminemia  -enteral feedings tolerated  Hyperlipidemia  Transaminitis  - stable today  Thrombocytopenia  - worse today  - antiplatelet antibodies negative  Anasarca  - exacerbated by free water to correct hypernatremia  Receiving enteral nutrition, on Protonix and Xarelto    Spoke with the patient's daughter about his severely compromised neurologic status.  He is following commands but is hemiplegic on the left and extraordinarily weak on the right.  He failed his spontaneous breathing T-piece trials daily.  We are looking at trach and PEG and Gracy.  She is supposed to come and visit her father today.      Critical care time spent reviewing the chart,  examining the patient, reviewing the labs, reviewing the radiological findings, discussing care with nursing, physicians, and respiratory and creating the note and  has been greater than 35 minutes    Mery Milner MD  Date of Service: 05/22/2023  12:50 PM

## 2023-05-22 NOTE — PROGRESS NOTES
[facility]  Neurology Progress Note    Patient Name: Teto Mendez  MRN: [id number]  : 1955  TODAY'S DATE: 2023  ADMIT DATE: 2023  6:39 AM                                          CONSULT REQUESTED BY: Nicky Espinal MD     Chief Complaint   Patient presents with    FACIAL TWITCHING    SLURRED SPEECH       HPI per EMR:  Patient presents with history of glioblastoma concurrently getting chemo in previous radiation complaining of facial twitching, facial droop, slurred speech, left arm weakness.  Symptom onset at 3:30 a.m..  Patient states at 3:30 a.m. he was playing on a game and then began to have the twitching of the face with associated droop with slurred speech and arm weakness.  Patient states he has had similar episodes in the past secondary to the glioblastoma.  He has not had the left arm weakness.  Patient states he is on Keppra and did take an extra Keppra dose this morning additionally patient is on Xarelto.  At the worst symptoms are severe.    Neurology Consult:  Patient was seen and examined by me today. He is a 67-year-old man with history of glioblastoma multiforme s/p craniectomy, chemotherapy and radiation diagnosed in 2022, as well as seizure disorder, who presented to the ED with left facial twitching, left facial droop, slurred speech and left arm weakness that began the morning of his admission at around 3 am. He was recently decreased on his home Keppra in the setting of side effects as per daughter. Upon arrival to the ED, he was loaded with 1 gr of Keppra and given 2 mg of ativan IV, which seemed to break the seizure. As per ED physician, patient was awake, alert and able to answer questions. At around 11:50 am, I was contacted by ED provider in the setting of sudden worsening of mental status, unresponsiveness, weakness and aphasia, so he was taken to CT scan for repeat CT brain to rule out LVO stroke or hemorrhage. I evaluated patient while on CT,  and noticed O2 Sats were in the high 80s. Due to technical malfunction, CT could not be completed, at which point I decided to abort procedure and transport patient to the ICU immediately. When we arrived to the 3rd floor ICU, patient seemed to be on respiratory failure, with toe and fingertip cyanosis, unable to maintain O2 Sats, so started on manual assisted ventilation and called anesthesia for emergent intubation. Patient was successfully intubated and sedation with propofol was started, but facial twitching recurred, so ativan was given and propofol bolus was administered, after which seizure activity seemed to cease. He was taken for CT after stabilization which showed no evidence of acute stroke or intracranial hemorrhage, and CTA showed no LVO. He is now being monitored on 24H continuous EEG for management of status epilepticus.    5/12/23:  Patient was seen and examined by me today.  He remains on deep sedation for treatment of status epilepticus, being monitored on 24 hour video EEG.  A brief electroclinical seizure was seen at around 4:00 a.m. with left facial twitching, but no clinical events reported after this.  Plan is to continue deep sedation until at least tomorrow morning.  Patient to be hooked up to EEG again when propofol starts to being weaned down.    5/13/23: I, Dr James assumed care on 5/13.  Patient is intubated, sedated with propofol.  He started out continuous EEG this morning shows right hemisphere lateralized periodic discharges with epileptiform potential.  No electrographic seizures were recorded.    5/14/2023:  Patient was seen examined by me.  Remains to be intubated and sedated.  MRI brain done this morning showed right MCA cortical infarcts.  Patient has been on continuous EEG overnight and EEG showed right hemispheric PLEDs with increase in frequency of PLEDs with lowering sedation.  Patient also had a clinical focal seizure involving twitching of the left side of the face this  morning when he was off sedation.    5/15/2023:  Patient was seen examined by me.  He is intubated and Precedex.  Propofol has been turned off yesterday.  Remains to be on continues EEG which shows right hemispheric (predominantly right frontotemporal) periodic lateralized epileptiform discharges.  Episodes of left facial twitching did not have EEG correlate.    5/16: Patient was seen and examined by me. He remains to be intubated and not on sedation. No response any stimulus. On CEEG. Family was at bedside and I discussed plan of care with them. Had one episode of electro clinical seizure yesterday at 12:20 PM lasting for a minute    5/17:  Patient was seen examined by me.  Remains to be intubated and off sedation.  Continuous EEG discontinued this morning. No electrographic seizure  recorded.    5/18:  Patient was seen examined by me.  Remains to be intubated.  Patient tries to open eyes to verbal commands and minimally follows commands with right upper extremity squeezing my fingers.  No further seizure activity was noted.  EEG has been off.    5/19:  Patient was seen and examined by me.  Remains to be intubated.  Minimally follows commands with his right arm and tries to open eyes to verbal stimulus.    5/20: Patient was seen and examined by me this morning.  No acute overnight events, no new neurological complaints.  Vitals have been stable.  Remains to be intubated however not on ventilator.  He is on T-piece.  Tries to open eyes to verbal stimulus somewhat follows commands with right upper extremity.  Patient had transaminitis and slow drop in platelet count which could likely be from Depakote.    5/21:  Patient was seen examined me this morning.  He is back on the ventilator.  He had a fever of 102 this morning.  Nurse noticed left facial twitching lasting for few minutes this morning and resolved on its own.  Patient tries to open eyes to verbal stimulus squeezes fingers with right hand however not  following as brisk as yesterday    5/22:  Patient was seen and examined by me this morning.  He is intubated on AC and sedation with propofol was started last night for vent management.  Examined him off sedation.    Scheduled Meds:   allopurinoL  100 mg Per NG tube Daily    atorvastatin  20 mg Per NG tube Daily    ceFEPime (MAXIPIME) IVPB  2 g Intravenous Q8H    dexAMETHasone  1 mg Per NG tube Q12H    lacosamide (VIMPAT) IVPB  200 mg Intravenous Q12H    levetiracetam IV  1,500 mg Intravenous Q12H    pantoprazole  40 mg Intravenous Daily    rivaroxaban  20 mg Per NG tube Daily with dinner    timolol maleate 0.25%  1 drop Both Eyes BID    valproate sodium (DEPACON) IVPB  500 mg Intravenous Q12H     Continuous Infusions:   NORepinephrine bitartrate-D5W Stopped (05/21/23 2200)    propofoL Stopped (05/22/23 1101)       PRN Meds:.acetaminophen, colchicine, hydrALAZINE, ibuprofen, lorazepam, magnesium sulfate IVPB, magnesium sulfate IVPB, metoprolol, ondansetron, polyethylene glycol, potassium bicarbonate, potassium bicarbonate, potassium bicarbonate, sodium chloride 0.9%, sodium phosphate IVPB, sodium phosphate IVPB, sodium phosphate IVPB      Physical Exam  Current Vitals:  Vitals:    05/22/23 1236   BP:    Pulse: (!) 114   Resp: (!) 35   Temp:      General appearance: intubated, minimal response to verbal commands.  Cardiovascular: Afib  Pulmonary:  Intubated and on ventilator  GI: soft  MSK: normal passive ROM  Skin: normal color, no lesions    NEUROLOGICAL EXAM:    Mental status: intubated.  Minimal response to verbal commands          Intubated: yes       Response to noxious stimulation:  Yes  Breathes above ventilator:  Yes  Opens eyes:  Tries to open eyes to verbal commands  Pupils: symmetric          Left: reactive          Right: reactive  Eye movements: No pathologic movements such as nystagmus, ocular bobbing, dipping or roving.    Corneal reflex: present  Oculocephalic reflex:  Present  Cough:  Present  Gag:   Present  Motor exam: Normal muscle tone. Normal muscle bulk. No abnormal movements such as tremors, myoclonus or twitching.  DTRs: 1+ throughout. Babinski response absent.      Laboratory Data & Studies    Recent Labs   Lab 05/20/23  0352 05/21/23  0307 05/22/23  0300   WBC 6.42 9.07 8.97   HGB 14.3 14.9 13.2*   * 159 124*   MCV 95 95 95       Recent Labs   Lab 05/20/23  0352 05/21/23  0307 05/21/23  1106 05/22/23  0300   * 148*  --  139   K 3.5 3.7 3.3* 3.4*   * 113*  --  104   CO2 27 26  --  28   BUN 47* 46*  --  38*   * 151*  --  162*   CALCIUM 7.6* 7.9*  --  7.7*   MG 2.0 1.9  --  1.8   PHOS 3.3 2.1* 2.4* 2.9       Recent Labs   Lab 05/20/23  0352 05/21/23  0307 05/22/23  0300   PROT 5.3* 5.4* 5.0*   ALBUMIN 2.0* 2.1* 1.9*   BILITOT 1.0 1.0 0.9   AST 78* 87* 86*   ALT 68* 74* 78*   ALKPHOS 40* 40* 37*       No results for input(s): LABPT, INR, APTT in the last 168 hours.      No results for input(s): HGBA1C, CHOL, TRIG, LDLCALC, HDL, TSH in the last 168 hours.        Microbiology:  Microbiology Results (last 7 days)       Procedure Component Value Units Date/Time    Culture, Respiratory with Gram Stain [[id number]]  (Abnormal) Collected: 05/21/23 1723    Order Status: Completed Specimen: Respiratory from Endotracheal Aspirate Updated: 05/22/23 1313     Respiratory Culture PRESUMPTIVE PSEUDOMONAS SPECIES  Moderate  Identification and susceptibility pending       Gram Stain (Respiratory) <10 epithelial cells per low power field.     Gram Stain (Respiratory) Many WBC's     Gram Stain (Respiratory) Few Gram negative rods     Gram Stain (Respiratory) Rare Gram positive rods    Blood culture [[id number]] Collected: 05/21/23 1106    Order Status: Completed Specimen: Blood from Line, PICC Left Brachial Updated: 05/22/23 1232     Blood Culture, Routine No Growth to date      No Growth to date    Narrative:      From PICC  Collection has been rescheduled by RE1 at 05/21/2023 10:05 Reason:   Nurse  Draw  Collection has been rescheduled by RE1 at 05/21/2023 10:05 Reason:   Nurse Draw    Blood culture [[id number]] Collected: 05/21/23 1107    Order Status: Completed Specimen: Blood from Line, Arterial, Left Updated: 05/22/23 1232     Blood Culture, Routine No Growth to date      No Growth to date    Narrative:      From arterial line  Collection has been rescheduled by RE1 at 05/21/2023 10:05 Reason:   Nurse Draw  Collection has been rescheduled by RE1 at 05/21/2023 10:05 Reason:   Nurse Draw    Blood culture [[id number]] Collected: 05/21/23 0743    Order Status: Completed Specimen: Blood Updated: 05/22/23 0832     Blood Culture, Routine No Growth to date      No Growth to date    IV catheter culture [[id number]] Collected: 05/21/23 1826    Order Status: Completed Specimen: Catheter Tip, Arterial Updated: 05/22/23 0804     Aerobic Culture - Cath tip No growth    IV catheter culture [[id number]] Collected: 05/21/23 1825    Order Status: Completed Specimen: Catheter Tip, PICC Updated: 05/22/23 0804     Aerobic Culture - Cath tip No growth    Urine culture [[id number]]  (Abnormal) Collected: 05/21/23 1104    Order Status: Completed Specimen: Urine Updated: 05/22/23 0721     Urine Culture, Routine GRAM NEGATIVE CRISTOPHER, LACTOSE   >100,000 cfu/ml  Identification and susceptibility pending      Narrative:      Specimen Source->Urine    Blood culture [[id number]] Collected: 05/19/23 1504    Order Status: Completed Specimen: Blood Updated: 05/21/23 1632     Blood Culture, Routine No Growth to date      No Growth to date      No Growth to date    Narrative:      Collection has been rescheduled by [email or web address] at 05/19/2023 11:30 Reason:   RAMYA Aguilar is messaging the dr will call when ready   Collection has been rescheduled by [email or web address] at 05/19/2023 11:30 Reason:   RAMYA Aguilar is messaging the dr will call when ready     Blood culture [[id number]] Collected: 05/19/23 1504    Order Status: Completed Specimen: Blood Updated: 05/21/23 1632      Blood Culture, Routine No Growth to date      No Growth to date      No Growth to date    Narrative:      Collection has been rescheduled by [email or web address] at 05/19/2023 11:30 Reason:   RAMYA Aguilar is messaging the dr will call when ready   Collection has been rescheduled by [email or web address] at 05/19/2023 11:30 Reason:   RAMYA Aguilar is messaging the dr will call when ready     Culture, Respiratory with Gram Stain [[id number]] Collected: 05/15/23 0059    Order Status: Completed Specimen: Respiratory from Sputum Updated: 05/17/23 0738     Respiratory Culture Normal respiratory fredi     Gram Stain (Respiratory) <10 epithelial cells per low power field.     Gram Stain (Respiratory) Few WBC's     Gram Stain (Respiratory) Few Gram positive cocci              Imaging:  CT Head Without Contrast    Result Date: 5/11/2023  CMS MANDATED QUALITY DATA - CT RADIATION  436 All CT scans at this facility utilize dose modulation, iterative reconstruction, and/or weight based dosing when appropriate to reduce radiation dose to as low as reasonably achievable. CT HEAD WITHOUT IV CONTRAST CLINICAL HISTORY: 67 years Male Mental status change, unknown cause COMPARISON: Noncontrast CT head performed earlier today 6:56 AM FINDINGS: Negative for acute intracranial hemorrhage, midline shift, or mass effect. Parenchymal calcifications within the right cerebral hemisphere are stable compared to prior. Patient is status post right pterional craniotomy. Ventricles and sulci are normal in size. Gray-white differentiation is maintained. Cerebellar hemispheres and brainstem are unremarkable. No calvarial lesion or fracture. Mastoid air cells are clear. IMPRESSION: Stable exam. No CT evidence of acute intracranial pathology. Electronically signed by:  Matt Cole MD  5/11/2023 1:41 PM CDT Workstation: [id number]    X-Ray Chest AP Portable    Result Date: 5/11/2023  CLINICAL HISTORY: 67 years (1955) Male Stroke TECHNIQUE: Portable AP radiograph the chest. COMPARISON: None  available. FINDINGS: Nonspecific minimal faint interstitial opacity at the left costophrenic.  No pneumothorax is identified. The heart is mildly enlarged.  Osseous structures show degenerative changes in the spine. The visualized upper abdomen is unremarkable. IMPRESSION: Nonspecific minimal faint interstitial opacity at the left costophrenic sulcus possibly representing atelectasis, scarring, trace pleural fluid, and less likely aspiration or pneumonia. . Electronically signed by:  Solomon Tobias MD  5/11/2023 8:32 AM CDT Workstation: [id number]    CT HEAD FOR STROKE    Result Date: 5/11/2023  CMS MANDATED QUALITY DATA - CT RADIATION - 436 All CT scans at this facility utilize dose modulation, iterative reconstruction, and/or weight based dosing when appropriate to reduce radiation dose to as low as reasonably achievable. EXAMINATION: CT HEAD FOR STROKE CLINICAL HISTORY: Neuro deficit, acute, stroke suspected;  history of glioblastoma TECHNIQUE: CT without IV contrast COMPARISON: None FINDINGS: There are postsurgical changes from prior right frontal temporal These craniotomy.  The ventricles and sulci are mildly prominent compatible with generalized cerebral atrophy.  There is no hemorrhage, mass or midline shift.  There are no extra-axial fluid collections.  The cerebellum and brainstem are normal.  The gray-white differentiation is maintained.  The orbits are unremarkable. The paranasal sinuses and mastoid air cells are clear.     Mild cerebral atrophy with no acute intracranial process Prior right frontal temporal craniotomy Findings were called to Dr. Zhou in the emergency department at 07:09 a.m. Electronically signed by: Yi Hernandez MD Date:    05/11/2023 Time:    07:10    CTA Head and Neck (xpd)    Result Date: 5/11/2023  CMS MANDATED QUALITY DATA - CT RADIATION - 436 All CT scans at this facility utilize dose modulation, iterative reconstruction, and/or weight based dosing when appropriate to reduce  radiation dose to as low as reasonably achievable. CMS MANDATED QUALITY DATA - CAROTID - 195 All measurements and percent stenosis described below were determined using NASCET criteria or criteria similar to NASCET, as defined by the Society of Radiologists in Ultrasound Consensus Conference, Radiology, 2003 Reason: Stroke/TIA, determine embolic source Technique: CT angiography of brain and neck with 100 mL Omnipaque 350.  Maximum intensity projection coronal reformations were obtained at a separate workstation and stored in the patient's permanent medical record. COMPARISON: 5/11/2023 CTA BRAIN: VASCULAR FINDINGS: Major intracranial arteries are widely patent with no stenosis, intraluminal filling, or dissection. Minimal atherosclerotic plaque affects the cavernous segments of bilateral internal carotid arteries. Negative for aneurysm or evidence of vasculitis. Opacified visualized dural venous sinuses are unremarkable. NONVASCULAR FINDINGS: No abnormal intra-axial or extra-axial enhancement. No midline shift. Postsurgical changes of right pterional craniotomy are noted. 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 Minimal atherosclerotic plaque affects left carotid bulb. Left carotid arteries are otherwise without plaque or stenosis. Left vertebral artery is patent. Minimal atherosclerotic plaque affects the right carotid bulb. Right carotid arteries are without additional plaque and remain widely patent. Right vertebral artery is patent. NONVASCULAR FINDINGS: Endotracheal tube has tip proximal to mio. Visualized lung apices show minor dependent atelectasis. Cervical soft tissues are unremarkable. IMPRESSION: 1. Trace atherosclerotic plaque in intracranial internal carotid arteries, without other abnormality of the major intracranial arteries. 2. Trace atherosclerotic plaque in bilateral carotid bulbs, with no stenosis or  other abnormality of cervical ICAs or vertebral arteries. Electronically signed by:  Compa Lopez MD  5/11/2023 2:01 PM CDT Workstation: [id number]    CTA Head and Neck (xpd)    Result Date: 5/11/2023  CMS EXAMINATION: CTA HEAD AND NECK (XPD) CLINICAL INDICATION: Male, 67 years old. Stroke/TIA, determine embolic source history of glioblastoma TECHNIQUE: Axial CT angiogram of the head and neck was performed with contrast. Multiplanar reformations as well as 3-D volume rendered imaging were reviewed at the workstation. Degree of stenosis was measured utilizing the NASCET method. CONTRAST: 100 mL mL of Omnipaque 350 IV. COMPARISON: None FINDINGS: CTA brain: The distal vertebral arteries, basilar artery and cavernous portions of the internal carotid arteries are widely patent. The anterior cerebral arteries, middle cerebral arteries and posterior cerebral arteries are widely patent without evidence of large vessel occlusion, significant stenosis, AVM or aneurysm. The dural venous sinuses are patent. There is no pathologic enhancement. Aortic Arch and Great Vessel Origins: 2 vessel aortic arch which is widely patent Subclavian Arteries: Widely patent. Right Common Carotid Artery: Widely patent. Right Internal Carotid Artery: Widely patent. Right External Carotid Artery: Widely patent. Left Common Carotid Artery: Widely patent. Left Internal Carotid Artery: Widely patent. Left External Carotid Artery: Widely patent. Right Vertebral Artery: Widely patent. Left Vertebral Artery: Widely patent. The paraspinous soft tissues are unremarkable. There are mild degenerative changes of the cervical spine. The lung apices are clear. IMPRESSION: Negative CTA of the head and neck. This exam was performed according to our departmental dose-optimization program which includes automated exposure control, adjustment of the mA and/or kV according to patient size and/or use of iterative reconstruction technique. Electronically signed by:   Yi Hernandez MD  5/11/2023 7:43 AM CDT Workstation: [id number]        Assessment and Plan:    Status Epilepticus  History of Glioblastoma Multiforme s/p craniectomy, chemo and radiotherapy  Acute ischemic infarct   Atrial fibrillation       67-year-old man with history of glioblastoma multiforme s/p craniectomy, chemotherapy and radiation diagnosed in August 2022, as well as seizure disorder, who presented to the ED with left facial twitching, left facial droop, slurred speech and left arm weakness. Upon arrival to the ED, he was loaded with 1 gr of Keppra and given 2 mg of ativan IV, which seemed to break the seizure. However, he experienced sudden worsening of mental status, with unresponsiveness, weakness and aphasia, so he was taken to CT scan for repeat CT brain to rule out LVO stroke or hemorrhage. He was then intubated, sedated and placed on mechanical ventilation. Clinical picture more consistent with status epilepticus.      - Admitted to hospital medicine in the ICU with q2 hour neuro checks, on telemetry, continuous pulse oximetry  - Seizure precautions while inpatient  - initially underwent 24-hour VEEG continuous monitoring with evidence of 4 electrographic seizures which resolved when dose of propofol was increased to 60 mcg/kg/min. By the end of the recording, a burst suppression pattern with decreased LPEDs was seen.   -on 5/13 patient was again put on continues EEG monitoring while weaning sedation.  Initially EEG showed burst suppression pattern with bursts of 1-2 seconds of delta activity followed by 2-4 seconds of background suppression and also showed right hemispheric lateralized periodic epileptiform discharges.  Background changed to a mixture of delta and theta activity with PLEDs becoming more frequent with lowering sedation to 30mcg/kg/min.    - cEEG stopped on 5/17. Showed intermittent lateralized periodic discharges on the right frontotemporal leads and significant slowing of the  background bilaterally.   -patient has been off sedation for more than 6 days Somewhat improvement in mental status and tries to open eyes with verbal stimulus and able to squeeze my fingers with his right hand to command since 5/18.   - Patient had transaminitis and slow drop in platelet count which could likely be from Depakote.  Will slowly wean off Depakote.  Monitor for clinical seizures.  Monitor for any clinical seizures or electrographic seizures.  - 5/21:  EEG showed moderate encephalopathy without seizures or epileptiform activity.  - Workup for infectious causes for fever ongoing(Pseudomonas in his sputum Gram-negative rods in urine). On cefepime.  -given no significant improvement patient's mental status, will repeat CT head.  Also patient does not move his left upper and lower extremities to commands and per daughter, this is new and has not been there after GBM or surgery.  -Palliative medicine following - patient's prognosis seems to be guarded based on his prior history of GBM and current history of status epilepticus.  He has not tolerated ventilator weaning hence will likely need tracheostomy tube and PEG tube.  - Continue Vimpat 200 mg b.i.d..  continue Keppra 1500 mg BID IV  - MRI brain showed right hemispheric infarcts.  Etiology could likely be secondary to recent radiation versus cardioembolism in the setting of atrial fibrillation. Continue anticoagulation with Xarelto 20 mg daily  - Avoid seizure threshold lowering medications such as:  Bupropion, diphenhydramine, tramadol, certain antibiotics  - Maintain Euthermia with Tylenol prn temp > 37.2 degrees C.  - workup and treatment of metabolic and infectious abnormalities as per primary team  - Will follow along      DVT prophylaxis with chemo/SCD prophylaxis      Patient to follow up with [facility] at [phone] within 3 days from discharge.        All questions were answered.                              Thank you kindly for  including us in the care of this patient. Please do not hesitate to contact us with any questions.       Critical Care:  40 minutes of critical care time has been spent evaluating with the patient. Time includes chart review not limited to diagnostic imaging, labs, and vitals, patient assessment, discussion with family and nursing, current order evaluations, and new order entries.      Mukund James MD  Neurology/Vascular Neurology

## 2023-05-22 NOTE — ASSESSMENT & PLAN NOTE
Unclear if infectious of related to seziures.  -U/A, blood cultures, CXR, and procalcitonin  -lines removed and replaced  -vanc and cefepime

## 2023-05-22 NOTE — RESPIRATORY THERAPY
SBT not done due to increased agitation and unstable BP.     Latest Reference Range & Units 05/22/23 04:18   Sample  ARTERIAL   POC PH 7.35 - 7.45  7.438   POC PCO2 35 - 45 mmHg 41.2   POC PO2 80 - 100 mmHg 103 (H)   POC HCO3 24 - 28 mmol/L 27.9

## 2023-05-22 NOTE — PLAN OF CARE
Problem: Adult Inpatient Plan of Care  Goal: Plan of Care Review  Outcome: Ongoing, Progressing  Goal: Optimal Comfort and Wellbeing  Outcome: Ongoing, Progressing     Problem: Communication Impairment (Mechanical Ventilation, Invasive)  Goal: Effective Communication  Outcome: Ongoing, Progressing     Problem: Inability to Wean (Mechanical Ventilation, Invasive)  Goal: Mechanical Ventilation Liberation  Outcome: Ongoing, Progressing     Problem: Nutrition Impairment (Mechanical Ventilation, Invasive)  Goal: Optimal Nutrition Delivery  Outcome: Ongoing, Progressing   Pt had a seizure this am. EEG done. Blood cultures taken from lines and per lab stick. PICC line and a line replaced . Old lines removed with tips sent for culture. Cooling blanket added when temp would not break after tylenol, ice packs, and motrin would not bring it down. Minimal response from pt.

## 2023-05-23 LAB
ALBUMIN SERPL BCP-MCNC: 2.1 G/DL (ref 3.5–5.2)
ALLENS TEST: ABNORMAL
ALP SERPL-CCNC: 46 U/L (ref 55–135)
ALT SERPL W/O P-5'-P-CCNC: 99 U/L (ref 10–44)
ANION GAP SERPL CALC-SCNC: 8 MMOL/L (ref 8–16)
AST SERPL-CCNC: 94 U/L (ref 10–40)
BACTERIA CATH TIP CULT: ABNORMAL
BACTERIA SPEC AEROBE CULT: ABNORMAL
BACTERIA SPEC AEROBE CULT: ABNORMAL
BACTERIA UR CULT: ABNORMAL
BASOPHILS # BLD AUTO: 0.06 K/UL (ref 0–0.2)
BASOPHILS NFR BLD: 0.6 % (ref 0–1.9)
BILIRUB SERPL-MCNC: 0.8 MG/DL (ref 0.1–1)
BUN SERPL-MCNC: 35 MG/DL (ref 8–23)
CALCIUM SERPL-MCNC: 8.1 MG/DL (ref 8.7–10.5)
CHLORIDE SERPL-SCNC: 105 MMOL/L (ref 95–110)
CO2 SERPL-SCNC: 28 MMOL/L (ref 23–29)
CREAT SERPL-MCNC: 0.6 MG/DL (ref 0.5–1.4)
DELSYS: ABNORMAL
DIFFERENTIAL METHOD: ABNORMAL
EOSINOPHIL # BLD AUTO: 0.1 K/UL (ref 0–0.5)
EOSINOPHIL NFR BLD: 0.8 % (ref 0–8)
ERYTHROCYTE [DISTWIDTH] IN BLOOD BY AUTOMATED COUNT: 13.2 % (ref 11.5–14.5)
ERYTHROCYTE [SEDIMENTATION RATE] IN BLOOD BY WESTERGREN METHOD: 10 MM/H
EST. GFR  (NO RACE VARIABLE): >60 ML/MIN/1.73 M^2
FIO2: 30
GLUCOSE SERPL-MCNC: 120 MG/DL (ref 70–110)
GLUCOSE SERPL-MCNC: 124 MG/DL (ref 70–110)
GRAM STN SPEC: ABNORMAL
HCO3 UR-SCNC: 28.8 MMOL/L (ref 24–28)
HCT VFR BLD AUTO: 40.4 % (ref 40–54)
HCT VFR BLD CALC: 40 %PCV (ref 36–54)
HGB BLD-MCNC: 13.5 G/DL (ref 14–18)
IMM GRANULOCYTES # BLD AUTO: 0.22 K/UL (ref 0–0.04)
IMM GRANULOCYTES NFR BLD AUTO: 2 % (ref 0–0.5)
LYMPHOCYTES # BLD AUTO: 1 K/UL (ref 1–4.8)
LYMPHOCYTES NFR BLD: 9.5 % (ref 18–48)
MAGNESIUM SERPL-MCNC: 1.9 MG/DL (ref 1.6–2.6)
MCH RBC QN AUTO: 30.8 PG (ref 27–31)
MCHC RBC AUTO-ENTMCNC: 33.4 G/DL (ref 32–36)
MCV RBC AUTO: 92 FL (ref 82–98)
MIN VOL: 12.1
MODE: ABNORMAL
MONOCYTES # BLD AUTO: 1 K/UL (ref 0.3–1)
MONOCYTES NFR BLD: 8.8 % (ref 4–15)
NEUTROPHILS # BLD AUTO: 8.5 K/UL (ref 1.8–7.7)
NEUTROPHILS NFR BLD: 78.3 % (ref 38–73)
NRBC BLD-RTO: 0 /100 WBC
PCO2 BLDA: 36.7 MMHG (ref 35–45)
PEEP: 5
PH SMN: 7.5 [PH] (ref 7.35–7.45)
PHOSPHATE SERPL-MCNC: 2.4 MG/DL (ref 2.7–4.5)
PIP: 24
PLATELET # BLD AUTO: 201 K/UL (ref 150–450)
PMV BLD AUTO: 11.5 FL (ref 9.2–12.9)
PO2 BLDA: 89 MMHG (ref 40–60)
POC BE: 6 MMOL/L
POC IONIZED CALCIUM: 1.16 MMOL/L (ref 1.06–1.42)
POC SATURATED O2: 98 % (ref 95–100)
POC TCO2: 30 MMOL/L (ref 24–29)
POTASSIUM BLD-SCNC: 4.1 MMOL/L (ref 3.5–5.1)
POTASSIUM SERPL-SCNC: 4.1 MMOL/L (ref 3.5–5.1)
PROT SERPL-MCNC: 5.2 G/DL (ref 6–8.4)
RBC # BLD AUTO: 4.38 M/UL (ref 4.6–6.2)
SAMPLE: ABNORMAL
SITE: ABNORMAL
SODIUM BLD-SCNC: 139 MMOL/L (ref 136–145)
SODIUM SERPL-SCNC: 141 MMOL/L (ref 136–145)
SP02: 99
VT: 450
WBC # BLD AUTO: 10.79 K/UL (ref 3.9–12.7)

## 2023-05-23 PROCEDURE — 25000003 PHARM REV CODE 250: Performed by: INTERNAL MEDICINE

## 2023-05-23 PROCEDURE — 97110 THERAPEUTIC EXERCISES: CPT

## 2023-05-23 PROCEDURE — 20000000 HC ICU ROOM

## 2023-05-23 PROCEDURE — 84295 ASSAY OF SERUM SODIUM: CPT

## 2023-05-23 PROCEDURE — 85025 COMPLETE CBC W/AUTO DIFF WBC: CPT | Performed by: STUDENT IN AN ORGANIZED HEALTH CARE EDUCATION/TRAINING PROGRAM

## 2023-05-23 PROCEDURE — 82330 ASSAY OF CALCIUM: CPT

## 2023-05-23 PROCEDURE — 84132 ASSAY OF SERUM POTASSIUM: CPT

## 2023-05-23 PROCEDURE — 99291 CRITICAL CARE FIRST HOUR: CPT | Mod: ,,, | Performed by: INTERNAL MEDICINE

## 2023-05-23 PROCEDURE — 99900026 HC AIRWAY MAINTENANCE (STAT)

## 2023-05-23 PROCEDURE — 97167 OT EVAL HIGH COMPLEX 60 MIN: CPT

## 2023-05-23 PROCEDURE — 97163 PT EVAL HIGH COMPLEX 45 MIN: CPT

## 2023-05-23 PROCEDURE — 63600175 PHARM REV CODE 636 W HCPCS: Performed by: HOSPITALIST

## 2023-05-23 PROCEDURE — 25000003 PHARM REV CODE 250: Performed by: STUDENT IN AN ORGANIZED HEALTH CARE EDUCATION/TRAINING PROGRAM

## 2023-05-23 PROCEDURE — 99291 PR CRITICAL CARE, E/M 30-74 MINUTES: ICD-10-PCS | Mod: ,,, | Performed by: INTERNAL MEDICINE

## 2023-05-23 PROCEDURE — 84100 ASSAY OF PHOSPHORUS: CPT | Performed by: STUDENT IN AN ORGANIZED HEALTH CARE EDUCATION/TRAINING PROGRAM

## 2023-05-23 PROCEDURE — C9113 INJ PANTOPRAZOLE SODIUM, VIA: HCPCS | Performed by: STUDENT IN AN ORGANIZED HEALTH CARE EDUCATION/TRAINING PROGRAM

## 2023-05-23 PROCEDURE — 27000221 HC OXYGEN, UP TO 24 HOURS

## 2023-05-23 PROCEDURE — 80053 COMPREHEN METABOLIC PANEL: CPT | Performed by: STUDENT IN AN ORGANIZED HEALTH CARE EDUCATION/TRAINING PROGRAM

## 2023-05-23 PROCEDURE — 99900031 HC PATIENT EDUCATION (STAT)

## 2023-05-23 PROCEDURE — 85014 HEMATOCRIT: CPT

## 2023-05-23 PROCEDURE — 99900035 HC TECH TIME PER 15 MIN (STAT)

## 2023-05-23 PROCEDURE — 82803 BLOOD GASES ANY COMBINATION: CPT

## 2023-05-23 PROCEDURE — 37799 UNLISTED PX VASCULAR SURGERY: CPT

## 2023-05-23 PROCEDURE — 94003 VENT MGMT INPAT SUBQ DAY: CPT

## 2023-05-23 PROCEDURE — C9254 INJECTION, LACOSAMIDE: HCPCS | Performed by: STUDENT IN AN ORGANIZED HEALTH CARE EDUCATION/TRAINING PROGRAM

## 2023-05-23 PROCEDURE — 83735 ASSAY OF MAGNESIUM: CPT | Performed by: STUDENT IN AN ORGANIZED HEALTH CARE EDUCATION/TRAINING PROGRAM

## 2023-05-23 PROCEDURE — 94760 N-INVAS EAR/PLS OXIMETRY 1: CPT

## 2023-05-23 PROCEDURE — 25000003 PHARM REV CODE 250: Performed by: HOSPITALIST

## 2023-05-23 PROCEDURE — 94761 N-INVAS EAR/PLS OXIMETRY MLT: CPT

## 2023-05-23 PROCEDURE — 63600175 PHARM REV CODE 636 W HCPCS: Performed by: STUDENT IN AN ORGANIZED HEALTH CARE EDUCATION/TRAINING PROGRAM

## 2023-05-23 PROCEDURE — 63600175 PHARM REV CODE 636 W HCPCS: Performed by: INTERNAL MEDICINE

## 2023-05-23 PROCEDURE — 94799 UNLISTED PULMONARY SVC/PX: CPT

## 2023-05-23 RX ORDER — METOPROLOL TARTRATE 50 MG/1
50 TABLET ORAL 2 TIMES DAILY
Status: DISCONTINUED | OUTPATIENT
Start: 2023-05-23 | End: 2023-05-25 | Stop reason: HOSPADM

## 2023-05-23 RX ORDER — DILTIAZEM HYDROCHLORIDE 30 MG/1
30 TABLET, FILM COATED ORAL EVERY 6 HOURS
Status: DISCONTINUED | OUTPATIENT
Start: 2023-05-23 | End: 2023-05-25 | Stop reason: HOSPADM

## 2023-05-23 RX ADMIN — LEVETIRACETAM INJECTION 1500 MG: 15 INJECTION INTRAVENOUS at 08:05

## 2023-05-23 RX ADMIN — DILTIAZEM HYDROCHLORIDE 30 MG: 30 TABLET, FILM COATED ORAL at 05:05

## 2023-05-23 RX ADMIN — SODIUM PHOSPHATE, MONOBASIC, MONOHYDRATE AND SODIUM PHOSPHATE, DIBASIC, ANHYDROUS 15 MMOL: 142; 276 INJECTION, SOLUTION INTRAVENOUS at 11:05

## 2023-05-23 RX ADMIN — METOPROLOL TARTRATE 50 MG: 50 TABLET, FILM COATED ORAL at 08:05

## 2023-05-23 RX ADMIN — DEXTROSE 250 MG: 50 INJECTION, SOLUTION INTRAVENOUS at 02:05

## 2023-05-23 RX ADMIN — METOPROLOL TARTRATE 5 MG: 5 INJECTION, SOLUTION INTRAVENOUS at 02:05

## 2023-05-23 RX ADMIN — DILTIAZEM HYDROCHLORIDE 30 MG: 30 TABLET, FILM COATED ORAL at 11:05

## 2023-05-23 RX ADMIN — PANTOPRAZOLE SODIUM 40 MG: 40 INJECTION, POWDER, FOR SOLUTION INTRAVENOUS at 08:05

## 2023-05-23 RX ADMIN — METOPROLOL TARTRATE 5 MG: 5 INJECTION, SOLUTION INTRAVENOUS at 10:05

## 2023-05-23 RX ADMIN — TIMOLOL MALEATE 1 DROP: 2.5 SOLUTION/ DROPS OPHTHALMIC at 08:05

## 2023-05-23 RX ADMIN — SODIUM CHLORIDE 200 MG: 9 INJECTION, SOLUTION INTRAVENOUS at 10:05

## 2023-05-23 RX ADMIN — CEFEPIME 2 G: 2 INJECTION, POWDER, FOR SOLUTION INTRAVENOUS at 11:05

## 2023-05-23 RX ADMIN — METOPROLOL TARTRATE 5 MG: 5 INJECTION, SOLUTION INTRAVENOUS at 08:05

## 2023-05-23 RX ADMIN — ATORVASTATIN CALCIUM 20 MG: 20 TABLET, FILM COATED ORAL at 08:05

## 2023-05-23 RX ADMIN — ACETAMINOPHEN 650 MG: 325 TABLET ORAL at 05:05

## 2023-05-23 RX ADMIN — DEXAMETHASONE 1 MG: 1 TABLET ORAL at 08:05

## 2023-05-23 RX ADMIN — SODIUM CHLORIDE 200 MG: 9 INJECTION, SOLUTION INTRAVENOUS at 08:05

## 2023-05-23 RX ADMIN — METOPROLOL TARTRATE 50 MG: 50 TABLET, FILM COATED ORAL at 01:05

## 2023-05-23 RX ADMIN — CEFEPIME 2 G: 2 INJECTION, POWDER, FOR SOLUTION INTRAVENOUS at 06:05

## 2023-05-23 RX ADMIN — CEFEPIME 2 G: 2 INJECTION, POWDER, FOR SOLUTION INTRAVENOUS at 02:05

## 2023-05-23 RX ADMIN — METOPROLOL TARTRATE 5 MG: 5 INJECTION, SOLUTION INTRAVENOUS at 12:05

## 2023-05-23 RX ADMIN — ALLOPURINOL 100 MG: 100 TABLET ORAL at 08:05

## 2023-05-23 RX ADMIN — LORAZEPAM 4 MG: 2 INJECTION INTRAMUSCULAR; INTRAVENOUS at 07:05

## 2023-05-23 NOTE — ASSESSMENT & PLAN NOTE
Secondary to VAP and indwelling minaya catheter  -U/A, blood cultures, CXR, and procalcitonin  -lines removed and replaced  -Cefepime   -Resp culture - presumed pseudomonas  -urine cx - GNR  -blood culture - NTD

## 2023-05-23 NOTE — PLAN OF CARE
Per Veronica with [facility], pt is accepted and can admit when trach and peg are placed and when clear per MD.     05/23/23 3504   Post-Acute Status   Post-Acute Authorization Placement   Post-Acute Placement Status Pending post-acute provider review/more information requested

## 2023-05-23 NOTE — CARE UPDATE
05/23/23 1018   PRE-TX-O2   Oxygen Concentration (%) 30   SpO2 100 %   Pulse (!) 115   Resp (!) 29   Vent Select   Charged w/in last 24h NO   Preset Conventional Ventilator Settings   Vent ID [id number]   Vent Type   (pt placed on VCAC due to increase WOB and increased HR)   Ventilation Type VC   Vent Mode A/C   Set Rate 10 BPM   Vt Set 450 mL   PEEP/CPAP 5 cmH20   Pressure Support 10 cmH20   Peak Flow 60 L/min   Peak End Inspiratory Pressure 26 cmH20   Insp Rise Time  70 %   I-Trigger Type  V-TRIG   Trigger Sensitivity Flow/I-Trigger 3 L/min   Patient Ventilator Parameters   Resp Rate Total 19 br/min   Peak Airway Pressure 31 cmH20   Mean Airway Pressure 9.6 cmH20   Plateau Pressure 0 cmH20   Exhaled Vt 482 mL   Total Ve 7.59 L/m   Spont Ve 12.5 L   I:E Ratio Measured 1:2.10   Auto PEEP 0 cmH20   Inspired Tidal Volume (VTI) 0 mL   Conventional Ventilator Alarms   Alarms On Y   Resp Rate High Alarm 40 br/min   Press High Alarm 60 cmH2O   Apnea Rate 12   Apnea Volume (mL) 0 mL   Apnea Oxygen Concentration  100   Apnea Flow Rate (L/min) 65   T Apnea 20 sec(s)   Ready to Wean/Extubation Screen   FIO2<=50 (chart decimal) 0.3   MV<16L (chart vol.) 7.59   PEEP <=8 (chart #) 5   Ready to Wean Parameters   F/VT Ratio<105 (RSBI) (!) 60.17   Vital Capacity   Vital Capacity (mL) 0

## 2023-05-23 NOTE — PT/OT/SLP EVAL
Occupational Therapy   Evaluation    Name: Teto Mendez  MRN: [id number]  Admitting Diagnosis: Status epilepticus  Recent Surgery: * No surgery found *      Recommendations:     Discharge Recommendations: LTACH (long-term acute care hospital) (vs Neuro Rehab facility)  Discharge Equipment Recommendations:   (TBD at next level of care)  Barriers to discharge:  Other (Comment) (currently needs significant assist)    Assessment:     Teto Mendez is a 67 y.o. male with a medical diagnosis of Status epilepticus.  Performance deficits affecting function: weakness, impaired endurance, impaired self care skills, impaired functional mobility, gait instability, impaired balance, decreased coordination, decreased upper extremity function, decreased lower extremity function, impaired cognition, decreased safety awareness, impaired fine motor.      Pt presented supine/on vent; his daughter was present.  He opened his eyes when spoken to and followed various one-step motor commands with both his right and left upper extremities; pt followed motor commands with his LUE as able but demonstrated apparent LUE weakness.      Rehab Prognosis: Good; patient would benefit from acute skilled OT services to address these deficits and reach maximum level of function.       Plan:     Patient to be seen 5 x/week to address the above listed problems via self-care/home management, therapeutic activities, therapeutic exercises, neuromuscular re-education, cognitive retraining, wheelchair management/training  Plan of Care Expires: 06/22/23  Plan of Care Reviewed with: patient, daughter    Subjective     Chief Complaint: none stated  Patient/Family Comments/goals: to maximize function per family    Occupational Profile:  Living Environment: Pt lives in [place] and was in the area visiting family; he shares a home with his daughter who lives in the basement.    Previous level of function: Independent and caring for himself  Equipment Used at  Home: none  Assistance upon Discharge: daughter/family    Pain/Comfort:  Pain Rating 1:  (pt in no apparent discomfort/pain)  Pain Rating Post-Intervention 1:  (no change)    Objective:     Communicated with: nurse prior to session.  Patient found supine with telemetry, ventilator, oxygen, arterial line, pulse ox (continuous), peripheral IV, blood pressure cuff upon OT entry to room.    General Precautions: Standard, fall, aspiration, NPO    Occupational Performance:    Activities of Daily Living:  Pt is currently dependent for all ADLs    Cognitive/Visual Perceptual:  Cognitive/Psychosocial Skills:     -       Follows Commands/attention:Follows one-step commands    Physical Exam:  RUE: Pt demonstrated fair  with his RUE; he was able to actively bend/extend at the elbow through full ROM; he was able to initiate shoulder flexion but needed assist to move through the full ROM    LUE: pt was able to wiggle the fingers of his left hand and demonstrated a poor ; trace muscle activation noted at left elbow and shoulder     Pt completed five repetitions of active/active assisted ROM exercises at all major joints of BUE.    AMPA 6 Click ADL:  AMPAC Total Score: 6    Treatment & Education:  PT/family educated on OT role/POC    Patient left supine with all lines intact, call button in reach, and bed alarm on    GOALS:   Multidisciplinary Problems       Occupational Therapy Goals          Problem: Occupational Therapy    Goal Priority Disciplines Outcome Interventions   Occupational Therapy Goal     OT, PT/OT     Description: Goals to be met by: 6/22/23     Patient will increase functional independence with ADLs by performing:    Patient's family / caregiver will demonstrate independence and safety with assisting patient with bilateral upper extremity active/active assisted exercises.  Pt to follow one-step motor commands with 100% accuracy  Pt will tolerate sitting at the edge of the bed for ten minutes with minimal  assistance.   Patient will complete grooming tasks at the edge of the bed with minimal assistance.                           History:     Past Medical History:   Diagnosis Date    GERD (gastroesophageal reflux disease)     Glioblastoma     H/O blood clots     Hypertension     Paroxysmal atrial fibrillation     Seizures        No past surgical history on file.    Time Tracking:     OT Date of Treatment: 05/23/23  OT Start Time: 1440  OT Stop Time: 1500  OT Total Time (min): 20 min    Billable Minutes:Evaluation 08  Therapeutic Exercise 12    5/23/2023

## 2023-05-23 NOTE — PROGRESS NOTES
Pulmonary/Critical Care Progress Note      PATIENT NAME: Teto Mendez  MRN: [id number]  TODAY'S DATE: 2023  12:50 PM  ADMIT DATE: 2023  AGE: 67 y.o. : 1955      HPI:  Called by nurse because they could not reach anesthesia to intubate a patient who was being bagged.  The patient had a change in mental status in the emergency room after having had a CTA earlier in the morning which was negative.  In CT, the patient was not breathing effectively and was cyanotic and the scanner broke so the patient was brought up to the ICU emergently.  The patient's past medical history is significant for glioblastoma multiforme for which he has been receiving radiation and chemotherapy.  He also has had a right frontal craniotomy.  At 3:30 a.m. in the morning the patient was playing a game and noted twitching of his face with slurred speech and weakness to his left arm.  He took an extra Keppra at that time.  He arrived at the ER at 6:45 a.m..     the patient remains on the ventilator and sedated with 60 of propofol.  He is also on 5 Cardizem.  Had further seizure activity during the night.    - pt remains sedated, on vent, on continuous EEG. There is EEG evidence of seizure activity so neuro has ordered VPA. Propofol has been weaned to 30    - continues sedated, on vent. Mri was done today and shows infarct in R parietal and R temporal lobes. Continues on propofol 20mg. He did have seizure activity twice today    5/15 The patient is still on the vent.  He is on Precedex, the propofol is off.  He was still having seizure activity yesterday.  MRI shows parietal and temporal infarcts on the right.     the patient is been off all sedation for 24 hours and has a GCS of 3.  Has pupils and corneals and gag and breathes spontaneously but no response to pain.  He does not respond to his voice.  He follows no commands.     the patient remains unresponsive.  He partially opens his eyes to noxious  stimuli.  He follows no commands.  Dr. Whitfield wants to give him 72 hours from stopping the sedation to reassess his neurologic recovery.  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    5/18 the patient remains densely encephalopathic.  To vigorous tactile stimulation he will partially open his eyes.  The nurse swears that he squeezed with his right hand.    5/19 the patient remains densely encephalopathic.  Actually is lightly squeezing with his right hand today.  Opens his eyes longterm when stimulated.  His heart rate is 157 because he has not gotten his metoprolol because his OG tube was pulled out during his bath.    5/20 the patient opens his eyes longterm to verbal stimulation.  He weakly squeezes with his right hand.  Wiggles his right toes to command.  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    5/21 the patient had a 5 minute seizure earlier this morning.  He still has a left lateral nystagmus. His exam is about the same except he will not move his toes today.  He is on the ventilator.  He was febrile this morning to 102.    5/22 the patient is able to move his right leg more today.  He remains hemiplegic on the left.  Neurology has ordered a repeat CT.  The patient was tried on T-piece again today but failed within less than an hour.  It is likely that the patient is going to be ventilator dependent going forward.  Spoke with his daughter about this.  She said she was on her way to the hospital iand would assess his situation then.    5/23 the patient is now lightly squeezing with his left hand and wiggling his left toes.  He can not open his eyes today because he is on a mg per hour Versed drip.  He was bleeding from his central line yesterday.    REVIEW OF SYSTEMS  Unobtainable    No change in the patient's Past Medical History, Past Surgical History, Social History or Family History since admission.      VITAL SIGNS (MOST RECENT)  Temp: 98.6 °F (37 °C) (05/23/23  0830)  Pulse: (!) 116 (05/23/23 0830)  Resp: (!) 26 (05/23/23 0830)  BP: (!) 147/97 (05/23/23 0846)  SpO2: 99 % (05/23/23 0830)  T-max 102.0°    INTAKE AND OUTPUT (LAST 24 HOURS):  Intake/Output Summary (Last 24 hours) at 5/23/2023 0852  Last data filed at 5/23/2023 0600  Gross per 24 hour   Intake 1809.76 ml   Output 2425 ml   Net -615.24 ml       WEIGHT  Wt Readings from Last 1 Encounters:   05/20/23 98.7 kg (217 lb 9.5 oz)       PHYSICAL EXAM  GENERAL: Older patient, intubated on mechanical ventilation  HEENT: Pupils equal and reactive.  Corneals are intact.   Nose intact. Pharynx intubated with ET tube and OG tube.  Gag reflex is intact.  Patient having a lot of trouble opening his eyes today.  NECK: Supple.  Right triple-lumen catheter.  HEART: Regular rate and rhythm. No murmur or gallop auscultated.  LUNGS: Clear to auscultation and percussion. Lung excursion symmetrical. No change in fremitus. No adventitial noises.  ABDOMEN: Bowel sounds present. Non-tender, no masses palpated.  Tolerating enteral nutrition; rectal tube in place  : Normal anatomy.  Iraheta with yellow urine.  EXTREMITIES: Normal muscle tone and joint movement, no cyanosis or clubbing.  Arterial line on the right  LYMPHATICS: No adenopathy palpated, both hands puffy.  SKIN: Dry, intact, no lesions.   NEURO:  Pupils and extraocular movements are intact.  Corneals are intact.  Gag is intact.  He can not open his eyes today.  He is on Versed.  He will lightly squeeze with his left hand to command.  He is now flexing his right arm at the elbow.  He tries to hold up 2 fingers to command.  He is wiggling both feet today.  GCS is 10, he remains intubated.  PSYCH:  Unable to assess      CBC LAST (LAST 24 HOURS)  Recent Labs   Lab 05/23/23  0317 05/23/23  0325   WBC 10.79  --    RBC 4.38*  --    HGB 13.5*  --    HCT 40.4 40   MCV 92  --    MCH 30.8  --    MCHC 33.4  --    RDW 13.2  --      --    MPV 11.5  --    GRAN 78.3*  8.5*  --    LYMPH  9.5*  1.0  --    MONO 8.8  1.0  --    BASO 0.06  --    NRBC 0  --        CHEMISTRY LAST (LAST 24 HOURS)  Recent Labs   Lab 05/23/23  0317 05/23/23  0325     --    K 4.1  --      --    CO2 28  --    ANIONGAP 8  --    BUN 35*  --    CREATININE 0.6  --    *  --    CALCIUM 8.1*  --    PH  --  7.503*   MG 1.9  --    ALBUMIN 2.1*  --    PROT 5.2*  --    ALKPHOS 46*  --    ALT 99*  --    AST 94*  --    BILITOT 0.8  --            LAST 7 DAYS MICROBIOLOGY   Microbiology Results (last 7 days)       Procedure Component Value Units Date/Time    Blood culture [[id number]] Collected: 05/21/23 0743    Order Status: Completed Specimen: Blood Updated: 05/23/23 0832     Blood Culture, Routine No Growth to date      No Growth to date      No Growth to date    IV catheter culture [[id number]]  (Abnormal) Collected: 05/21/23 1826    Order Status: Completed Specimen: Catheter Tip, Arterial Updated: 05/23/23 0827     Aerobic Culture - Cath tip COAGULASE-NEGATIVE STAPHYLOCOCCUS SPECIES  > 15 colonies  Multiple morphotyes - probable contaminates      IV catheter culture [[id number]] Collected: 05/21/23 1825    Order Status: Completed Specimen: Catheter Tip, PICC Updated: 05/23/23 0824     Aerobic Culture - Cath tip No growth    Culture, Respiratory with Gram Stain [[id number]]  (Abnormal)  (Susceptibility) Collected: 05/21/23 1723    Order Status: Completed Specimen: Respiratory from Endotracheal Aspirate Updated: 05/23/23 0728     Respiratory Culture No normal respiratory fredi      PSEUDOMONAS AERUGINOSA  Moderate       Gram Stain (Respiratory) <10 epithelial cells per low power field.     Gram Stain (Respiratory) Many WBC's     Gram Stain (Respiratory) Few Gram negative rods     Gram Stain (Respiratory) Rare Gram positive rods    Urine culture [[id number]]  (Abnormal)  (Susceptibility) Collected: 05/21/23 1104    Order Status: Completed Specimen: Urine Updated: 05/23/23 0655     Urine Culture, Routine KLEBSIELLA  PNEUMONIAE  >100,000 cfu/ml      Narrative:      Specimen Source->Urine    Blood culture [[id number]] Collected: 05/19/23 1504    Order Status: Completed Specimen: Blood Updated: 05/22/23 1632     Blood Culture, Routine No Growth to date      No Growth to date      No Growth to date      No Growth to date    Narrative:      Collection has been rescheduled by [email or web address] at 05/19/2023 11:30 Reason:   RAMYA Aguilar is messaging the dr will call when ready   Collection has been rescheduled by [email or web address] at 05/19/2023 11:30 Reason:   RAMYA Aguilar is messaging the dr will call when ready     Blood culture [[id number]] Collected: 05/19/23 1504    Order Status: Completed Specimen: Blood Updated: 05/22/23 1632     Blood Culture, Routine No Growth to date      No Growth to date      No Growth to date      No Growth to date    Narrative:      Collection has been rescheduled by [email or web address] at 05/19/2023 11:30 Reason:   RAMYA Aguilar is messaging the dr will call when ready   Collection has been rescheduled by [email or web address] at 05/19/2023 11:30 Reason:   RAMYA Aguilar is messaging the dr will call when ready     Blood culture [[id number]] Collected: 05/21/23 1106    Order Status: Completed Specimen: Blood from Line, PICC Left Brachial Updated: 05/22/23 1232     Blood Culture, Routine No Growth to date      No Growth to date    Narrative:      From PICC  Collection has been rescheduled by RE1 at 05/21/2023 10:05 Reason:   Nurse Draw  Collection has been rescheduled by RE1 at 05/21/2023 10:05 Reason:   Nurse Draw    Blood culture [[id number]] Collected: 05/21/23 1107    Order Status: Completed Specimen: Blood from Line, Arterial, Left Updated: 05/22/23 1232     Blood Culture, Routine No Growth to date      No Growth to date    Narrative:      From arterial line  Collection has been rescheduled by RE1 at 05/21/2023 10:05 Reason:   Nurse Draw  Collection has been rescheduled by RE1 at 05/21/2023 10:05 Reason:   Nurse Draw    Culture, Respiratory with Gram Stain [[id number]] Collected:  05/15/23 0059    Order Status: Completed Specimen: Respiratory from Sputum Updated: 05/17/23 0738     Respiratory Culture Normal respiratory fredi     Gram Stain (Respiratory) <10 epithelial cells per low power field.     Gram Stain (Respiratory) Few WBC's     Gram Stain (Respiratory) Few Gram positive cocci            MOST RECENT IMAGING  CT Head Without Contrast  CT of the head: 5/22/2023 5:08 PM CDT    HISTORY: 67 years  old Male with Stroke, follow up.    COMPARISON: 5/11/2023    TECHNIQUE: Multiple axial contiguous images were obtained through the head without intravenous contrast administered. This exam was performed according to our departmental dose-optimization program, which includes automated exposure control, adjustment of the mA and/or KV according to the patient's size and/or use of iterative reconstruction technique.    FINDINGS: The ventricles and cerebral sulci demonstrate mild prominence, consistent with cerebral atrophy. There are mild periventricular hypodensities, suggestive of periventricular white matter changes.    The gray-white matter differentiation is within normal limits.    Both globes appear symmetric.    There is partial opacification of the bilateral mastoid air cells, right greater than left. There is minimal mucoperiosteal thickening of the ethmoid sinuses. There is a nasogastric tube partially visualized.    The calvarium is intact.    No extra-axial fluid collection is seen.    No midline shift or mass effect is apparent.    There are no findings to suggest acute intracranial hemorrhage.    IMPRESSION: 1. No acute intracranial hemorrhage is seen.  2. There is mild cerebral atrophy and periventricular white matter changes are seen.  3. There is partial opacification of the mastoid air cells, which can be seen with mastoiditis.    Electronically signed by:  Paola Morales DO  5/22/2023 5:09 PM CDT Workstation: [id number]      CURRENT VISIT EKG  Results for orders placed or performed  during the hospital encounter of 05/11/23   ECG 12 lead    Narrative    Test Reason : I63.9,    Vent. Rate : 120 BPM     Atrial Rate : 150 BPM     P-R Int : 000 ms          QRS Dur : 104 ms      QT Int : 370 ms       P-R-T Axes : 000 016 -12 degrees     QTc Int : 522 ms    Atrial fibrillation with rapid ventricular response  Nonspecific ST abnormality  Prolonged QT  Abnormal ECG  No previous ECGs available    Referred By: [email or web address]   SELF           Confirmed By:        ECHOCARDIOGRAM RESULTS  No results found for this or any previous visit.        VENTILATOR INFORMATION  Vent Mode: A/C  Oxygen Concentration (%):  [30-33] 30  Resp Rate Total:  [0 br/min-35 br/min] 32 br/min  Vt Set:  [450 mL] 450 mL  PEEP/CPAP:  [5 cmH20] 5 cmH20  Mean Airway Pressure:  [0 dhL32-86 cmH20] 10 cmH20           LAST ARTERIAL BLOOD GAS  ABG  Recent Labs   Lab 05/23/23  0325   PH 7.503*   PO2 89*   PCO2 36.7   HCO3 28.8*   BE 6       IMPRESSION AND PLAN    Status epilepticus  -MRI shows temporal and parietal infarcts on the right, the patient is moving more each day, now his left side is moving a little  -history of seizures prior to this admission  - no seizures today  Lateral nystagmus  -left  -resolved  History of glioblastoma multiforme under treatment in [place]  [place] coma scale of 10  -patient is on a Versed drip, this will be stopped  -minimally more responsive with a partial eye opening to voice and more movement with the right arm, now beginning to squeeze with the left hand, and wiggling both feet  - patient placed on a Versed drip last night.  Very hard to improve your Naomi coma scale when you are sedated.  Fever  - resolved  - On cefepime   - Pseudomonas in his sputum, no evidence of pneumonia, and Klebsiella in his urine  Mechanical ventilation  - ABG daily  - on CPAP now  - bibasilar atelectasis  Atrial fibrillation  - continue Xarelto  - continue metoprolol  Hypertension  -metoprolol  Hypernatremia  - corrected  -  water flushes at 100 cc q.4 hours  - continue to trend labs  Hypokalemia  - resolved  -Trend labs  Hypophosphotemia  - being replaced  - trend labs resolved  Moderate hypoalbuminemia  -enteral feedings tolerated  Hyperlipidemia  Transaminitis  - stable today  Thrombocytopenia  - normal today  - antiplatelet antibodies negative  Anasarca  - exacerbated by free water to correct hypernatremia  Receiving enteral nutrition, on Protonix and Xarelto    The patient is moving more.  The patient's family is good with trach and PEG and LTAC to allow him to continue to improve.  It is highly unlikely that the patient is going to improve to the point where he will not need airway protection for a while.  Will go ahead and consult surgery for trach and PEG at this time and begin LTAC placement consult.      Critical care time spent reviewing the chart, examining the patient, reviewing the labs, reviewing the radiological findings, discussing care with nursing, physicians, and respiratory and creating the note and  has been greater than 35 minutes    Mery Milner MD  Date of Service: 05/23/2023  12:50 PM

## 2023-05-23 NOTE — NURSING
Per Dr Hickey, Stop Xarelto in preparation for Trach/PEG placement. Stop TF midnight tonight. Order placed.

## 2023-05-23 NOTE — PROGRESS NOTES
[facility]  Neurology Progress Note    Patient Name: Teot Mendez  MRN: [id number]  : 1955  TODAY'S DATE: 2023  ADMIT DATE: 2023  6:39 AM                                          CONSULT REQUESTED BY: Nicky Espinal MD     Chief Complaint   Patient presents with    FACIAL TWITCHING    SLURRED SPEECH       HPI per EMR:  Patient presents with history of glioblastoma concurrently getting chemo in previous radiation complaining of facial twitching, facial droop, slurred speech, left arm weakness.  Symptom onset at 3:30 a.m..  Patient states at 3:30 a.m. he was playing on a game and then began to have the twitching of the face with associated droop with slurred speech and arm weakness.  Patient states he has had similar episodes in the past secondary to the glioblastoma.  He has not had the left arm weakness.  Patient states he is on Keppra and did take an extra Keppra dose this morning additionally patient is on Xarelto.  At the worst symptoms are severe.    Neurology Consult:  Patient was seen and examined by me today. He is a 67-year-old man with history of glioblastoma multiforme s/p craniectomy, chemotherapy and radiation diagnosed in 2022, as well as seizure disorder, who presented to the ED with left facial twitching, left facial droop, slurred speech and left arm weakness that began the morning of his admission at around 3 am. He was recently decreased on his home Keppra in the setting of side effects as per daughter. Upon arrival to the ED, he was loaded with 1 gr of Keppra and given 2 mg of ativan IV, which seemed to break the seizure. As per ED physician, patient was awake, alert and able to answer questions. At around 11:50 am, I was contacted by ED provider in the setting of sudden worsening of mental status, unresponsiveness, weakness and aphasia, so he was taken to CT scan for repeat CT brain to rule out LVO stroke or hemorrhage. I evaluated patient while on CT,  and noticed O2 Sats were in the high 80s. Due to technical malfunction, CT could not be completed, at which point I decided to abort procedure and transport patient to the ICU immediately. When we arrived to the 3rd floor ICU, patient seemed to be on respiratory failure, with toe and fingertip cyanosis, unable to maintain O2 Sats, so started on manual assisted ventilation and called anesthesia for emergent intubation. Patient was successfully intubated and sedation with propofol was started, but facial twitching recurred, so ativan was given and propofol bolus was administered, after which seizure activity seemed to cease. He was taken for CT after stabilization which showed no evidence of acute stroke or intracranial hemorrhage, and CTA showed no LVO. He is now being monitored on 24H continuous EEG for management of status epilepticus.    5/12/23:  Patient was seen and examined by me today.  He remains on deep sedation for treatment of status epilepticus, being monitored on 24 hour video EEG.  A brief electroclinical seizure was seen at around 4:00 a.m. with left facial twitching, but no clinical events reported after this.  Plan is to continue deep sedation until at least tomorrow morning.  Patient to be hooked up to EEG again when propofol starts to being weaned down.    5/13/23: I, Dr James assumed care on 5/13.  Patient is intubated, sedated with propofol.  He started out continuous EEG this morning shows right hemisphere lateralized periodic discharges with epileptiform potential.  No electrographic seizures were recorded.    5/14/2023:  Patient was seen examined by me.  Remains to be intubated and sedated.  MRI brain done this morning showed right MCA cortical infarcts.  Patient has been on continuous EEG overnight and EEG showed right hemispheric PLEDs with increase in frequency of PLEDs with lowering sedation.  Patient also had a clinical focal seizure involving twitching of the left side of the face this  morning when he was off sedation.    5/15/2023:  Patient was seen examined by me.  He is intubated and Precedex.  Propofol has been turned off yesterday.  Remains to be on continues EEG which shows right hemispheric (predominantly right frontotemporal) periodic lateralized epileptiform discharges.  Episodes of left facial twitching did not have EEG correlate.    5/16: Patient was seen and examined by me. He remains to be intubated and not on sedation. No response any stimulus. On CEEG. Family was at bedside and I discussed plan of care with them. Had one episode of electro clinical seizure yesterday at 12:20 PM lasting for a minute    5/17:  Patient was seen examined by me.  Remains to be intubated and off sedation.  Continuous EEG discontinued this morning. No electrographic seizure  recorded.    5/18:  Patient was seen examined by me.  Remains to be intubated.  Patient tries to open eyes to verbal commands and minimally follows commands with right upper extremity squeezing my fingers.  No further seizure activity was noted.  EEG has been off.    5/19:  Patient was seen and examined by me.  Remains to be intubated.  Minimally follows commands with his right arm and tries to open eyes to verbal stimulus.    5/20: Patient was seen and examined by me this morning.  No acute overnight events, no new neurological complaints.  Vitals have been stable.  Remains to be intubated however not on ventilator.  He is on T-piece.  Tries to open eyes to verbal stimulus somewhat follows commands with right upper extremity.  Patient had transaminitis and slow drop in platelet count which could likely be from Depakote.    5/21:  Patient was seen examined me this morning.  He is back on the ventilator.  He had a fever of 102 this morning.  Nurse noticed left facial twitching lasting for few minutes this morning and resolved on its own.  Patient tries to open eyes to verbal stimulus squeezes fingers with right hand however not  following as brisk as yesterday    5/22:  Patient was seen and examined by me this morning.  He is intubated on AC and sedation with propofol was started last night for vent management.  Examined him off sedation.    5/23:  Patient was seen and examined by me this morning.  He remains to be intubated and on AC.  Opens eyes to stimulus and is able to track.  Follows commands briskly on the right side extremities and to some extent on the left side extremities as well.    Scheduled Meds:   allopurinoL  100 mg Per NG tube Daily    atorvastatin  20 mg Per NG tube Daily    ceFEPime (MAXIPIME) IVPB  2 g Intravenous Q8H    dexAMETHasone  1 mg Per NG tube Q12H    lacosamide (VIMPAT) IVPB  200 mg Intravenous Q12H    levetiracetam IV  1,500 mg Intravenous Q12H    pantoprazole  40 mg Intravenous Daily    rivaroxaban  20 mg Per NG tube Daily with dinner    timolol maleate 0.25%  1 drop Both Eyes BID    valproate sodium (DEPACON) IVPB  250 mg Intravenous Q12H     Continuous Infusions:   midazolam Stopped (05/23/23 0905)       PRN Meds:.acetaminophen, colchicine, hydrALAZINE, ibuprofen, lorazepam, magnesium sulfate IVPB, magnesium sulfate IVPB, metoprolol, ondansetron, polyethylene glycol, potassium bicarbonate, potassium bicarbonate, potassium bicarbonate, sodium chloride 0.9%, sodium phosphate IVPB, sodium phosphate IVPB, sodium phosphate IVPB      Physical Exam  Current Vitals:  Vitals:    05/23/23 0921   BP:    Pulse: 104   Resp: (!) 40   Temp:      General appearance: intubated, opens eyes to stimulus, tracks and follows commands  Cardiovascular: Afib  Pulmonary:  Intubated and on ventilator  GI: soft  MSK: normal passive ROM  Skin: normal color, no lesions    NEUROLOGICAL EXAM:    Mental status: intubated.  Opens eyes to stimulus, tracks and follows commands with bilateral upper lower extremities however briskly on the right          Intubated: yes       Response to noxious stimulation:  Yes  Breathes above ventilator:   Yes  Opens eyes:  Yes  Pupils: symmetric          Left: reactive          Right: reactive  Eye movements: No pathologic movements such as nystagmus, ocular bobbing, dipping or roving.    Corneal reflex: present  Oculocephalic reflex:  Present  Cough:  Present  Gag:  Present  Motor exam: Normal muscle tone. Normal muscle bulk. No abnormal movements such as tremors, myoclonus or twitching.  DTRs: 1+ throughout. Babinski response absent.      Laboratory Data & Studies    Recent Labs   Lab 05/21/23 0307 05/22/23  0300 05/23/23  0317   WBC 9.07 8.97 10.79   HGB 14.9 13.2* 13.5*    124* 201   MCV 95 95 92       Recent Labs   Lab 05/21/23 0307 05/21/23  1106 05/22/23  0300 05/23/23  0317   *  --  139 141   K 3.7 3.3* 3.4* 4.1   *  --  104 105   CO2 26  --  28 28   BUN 46*  --  38* 35*   *  --  162* 120*   CALCIUM 7.9*  --  7.7* 8.1*   MG 1.9  --  1.8 1.9   PHOS 2.1* 2.4* 2.9 2.4*       Recent Labs   Lab 05/21/23 0307 05/22/23  0300 05/23/23  0317   PROT 5.4* 5.0* 5.2*   ALBUMIN 2.1* 1.9* 2.1*   BILITOT 1.0 0.9 0.8   AST 87* 86* 94*   ALT 74* 78* 99*   ALKPHOS 40* 37* 46*       No results for input(s): LABPT, INR, APTT in the last 168 hours.      No results for input(s): HGBA1C, CHOL, TRIG, LDLCALC, HDL, TSH in the last 168 hours.        Microbiology:  Microbiology Results (last 7 days)       Procedure Component Value Units Date/Time    Blood culture [[id number]] Collected: 05/21/23 0743    Order Status: Completed Specimen: Blood Updated: 05/23/23 0832     Blood Culture, Routine No Growth to date      No Growth to date      No Growth to date    IV catheter culture [[id number]]  (Abnormal) Collected: 05/21/23 1826    Order Status: Completed Specimen: Catheter Tip, Arterial Updated: 05/23/23 0827     Aerobic Culture - Cath tip COAGULASE-NEGATIVE STAPHYLOCOCCUS SPECIES  > 15 colonies  Multiple morphotyes - probable contaminates      IV catheter culture [[id number]] Collected: 05/21/23 1825    Order  Status: Completed Specimen: Catheter Tip, PICC Updated: 05/23/23 0824     Aerobic Culture - Cath tip No growth    Culture, Respiratory with Gram Stain [[id number]]  (Abnormal)  (Susceptibility) Collected: 05/21/23 1723    Order Status: Completed Specimen: Respiratory from Endotracheal Aspirate Updated: 05/23/23 0728     Respiratory Culture No normal respiratory fredi      PSEUDOMONAS AERUGINOSA  Moderate       Gram Stain (Respiratory) <10 epithelial cells per low power field.     Gram Stain (Respiratory) Many WBC's     Gram Stain (Respiratory) Few Gram negative rods     Gram Stain (Respiratory) Rare Gram positive rods    Urine culture [[id number]]  (Abnormal)  (Susceptibility) Collected: 05/21/23 1104    Order Status: Completed Specimen: Urine Updated: 05/23/23 0655     Urine Culture, Routine KLEBSIELLA PNEUMONIAE  >100,000 cfu/ml      Narrative:      Specimen Source->Urine    Blood culture [[id number]] Collected: 05/19/23 1504    Order Status: Completed Specimen: Blood Updated: 05/22/23 1632     Blood Culture, Routine No Growth to date      No Growth to date      No Growth to date      No Growth to date    Narrative:      Collection has been rescheduled by [email or web address] at 05/19/2023 11:30 Reason:   RAMYA Aguilar is messaging the dr will call when ready   Collection has been rescheduled by [email or web address] at 05/19/2023 11:30 Reason:   RAMYA Aguilar is messaging the dr will call when ready     Blood culture [[id number]] Collected: 05/19/23 1504    Order Status: Completed Specimen: Blood Updated: 05/22/23 1632     Blood Culture, Routine No Growth to date      No Growth to date      No Growth to date      No Growth to date    Narrative:      Collection has been rescheduled by [email or web address] at 05/19/2023 11:30 Reason:   RAMYA Aguilar is messaging the dr will call when ready   Collection has been rescheduled by [email or web address] at 05/19/2023 11:30 Reason:   RAMYA Aguilar is messaging the dr will call when ready     Blood culture [[id number]] Collected: 05/21/23 1106    Order Status:  Completed Specimen: Blood from Line, PICC Left Brachial Updated: 05/22/23 1232     Blood Culture, Routine No Growth to date      No Growth to date    Narrative:      From PICC  Collection has been rescheduled by RE1 at 05/21/2023 10:05 Reason:   Nurse Draw  Collection has been rescheduled by RE1 at 05/21/2023 10:05 Reason:   Nurse Draw    Blood culture [[id number]] Collected: 05/21/23 1107    Order Status: Completed Specimen: Blood from Line, Arterial, Left Updated: 05/22/23 1232     Blood Culture, Routine No Growth to date      No Growth to date    Narrative:      From arterial line  Collection has been rescheduled by RE1 at 05/21/2023 10:05 Reason:   Nurse Draw  Collection has been rescheduled by RE1 at 05/21/2023 10:05 Reason:   Nurse Draw    Culture, Respiratory with Gram Stain [[id number]] Collected: 05/15/23 0059    Order Status: Completed Specimen: Respiratory from Sputum Updated: 05/17/23 0738     Respiratory Culture Normal respiratory fredi     Gram Stain (Respiratory) <10 epithelial cells per low power field.     Gram Stain (Respiratory) Few WBC's     Gram Stain (Respiratory) Few Gram positive cocci              Imaging:  CT Head Without Contrast    Result Date: 5/11/2023  CMS MANDATED QUALITY DATA - CT RADIATION  436 All CT scans at this facility utilize dose modulation, iterative reconstruction, and/or weight based dosing when appropriate to reduce radiation dose to as low as reasonably achievable. CT HEAD WITHOUT IV CONTRAST CLINICAL HISTORY: 67 years Male Mental status change, unknown cause COMPARISON: Noncontrast CT head performed earlier today 6:56 AM FINDINGS: Negative for acute intracranial hemorrhage, midline shift, or mass effect. Parenchymal calcifications within the right cerebral hemisphere are stable compared to prior. Patient is status post right pterional craniotomy. Ventricles and sulci are normal in size. Gray-white differentiation is maintained. Cerebellar hemispheres and brainstem are  unremarkable. No calvarial lesion or fracture. Mastoid air cells are clear. IMPRESSION: Stable exam. No CT evidence of acute intracranial pathology. Electronically signed by:  Matt Cole MD  5/11/2023 1:41 PM CDT Workstation: [id number]    X-Ray Chest AP Portable    Result Date: 5/11/2023  CLINICAL HISTORY: 67 years (1955) Male Stroke TECHNIQUE: Portable AP radiograph the chest. COMPARISON: None available. FINDINGS: Nonspecific minimal faint interstitial opacity at the left costophrenic.  No pneumothorax is identified. The heart is mildly enlarged.  Osseous structures show degenerative changes in the spine. The visualized upper abdomen is unremarkable. IMPRESSION: Nonspecific minimal faint interstitial opacity at the left costophrenic sulcus possibly representing atelectasis, scarring, trace pleural fluid, and less likely aspiration or pneumonia. . Electronically signed by:  Solomon Tobias MD  5/11/2023 8:32 AM CDT Workstation: [id number]    CT HEAD FOR STROKE    Result Date: 5/11/2023  CMS MANDATED QUALITY DATA - CT RADIATION - 436 All CT scans at this facility utilize dose modulation, iterative reconstruction, and/or weight based dosing when appropriate to reduce radiation dose to as low as reasonably achievable. EXAMINATION: CT HEAD FOR STROKE CLINICAL HISTORY: Neuro deficit, acute, stroke suspected;  history of glioblastoma TECHNIQUE: CT without IV contrast COMPARISON: None FINDINGS: There are postsurgical changes from prior right frontal temporal These craniotomy.  The ventricles and sulci are mildly prominent compatible with generalized cerebral atrophy.  There is no hemorrhage, mass or midline shift.  There are no extra-axial fluid collections.  The cerebellum and brainstem are normal.  The gray-white differentiation is maintained.  The orbits are unremarkable. The paranasal sinuses and mastoid air cells are clear.     Mild cerebral atrophy with no acute intracranial process Prior right frontal  temporal craniotomy Findings were called to Dr. Zhou in the emergency department at 07:09 a.m. Electronically signed by: Yi Hernandez MD Date:    05/11/2023 Time:    07:10    CTA Head and Neck (xpd)    Result Date: 5/11/2023  CMS MANDATED QUALITY DATA - CT RADIATION - 436 All CT scans at this facility utilize dose modulation, iterative reconstruction, and/or weight based dosing when appropriate to reduce radiation dose to as low as reasonably achievable. CMS MANDATED QUALITY DATA - CAROTID - 195 All measurements and percent stenosis described below were determined using NASCET criteria or criteria similar to NASCET, as defined by the Society of Radiologists in Ultrasound Consensus Conference, Radiology, 2003 Reason: Stroke/TIA, determine embolic source Technique: CT angiography of brain and neck with 100 mL Omnipaque 350.  Maximum intensity projection coronal reformations were obtained at a separate workstation and stored in the patient's permanent medical record. COMPARISON: 5/11/2023 CTA BRAIN: VASCULAR FINDINGS: Major intracranial arteries are widely patent with no stenosis, intraluminal filling, or dissection. Minimal atherosclerotic plaque affects the cavernous segments of bilateral internal carotid arteries. Negative for aneurysm or evidence of vasculitis. Opacified visualized dural venous sinuses are unremarkable. NONVASCULAR FINDINGS: No abnormal intra-axial or extra-axial enhancement. No midline shift. Postsurgical changes of right pterional craniotomy are noted. 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 Minimal atherosclerotic plaque affects left carotid bulb. Left carotid arteries are otherwise without plaque or stenosis. Left vertebral artery is patent. Minimal atherosclerotic plaque affects the right carotid bulb. Right carotid arteries are without additional plaque and remain widely patent. Right vertebral  artery is patent. NONVASCULAR FINDINGS: Endotracheal tube has tip proximal to mio. Visualized lung apices show minor dependent atelectasis. Cervical soft tissues are unremarkable. IMPRESSION: 1. Trace atherosclerotic plaque in intracranial internal carotid arteries, without other abnormality of the major intracranial arteries. 2. Trace atherosclerotic plaque in bilateral carotid bulbs, with no stenosis or other abnormality of cervical ICAs or vertebral arteries. Electronically signed by:  Compa Lopez MD  5/11/2023 2:01 PM CDT Workstation: [id number]    CTA Head and Neck (xpd)    Result Date: 5/11/2023  CMS EXAMINATION: CTA HEAD AND NECK (XPD) CLINICAL INDICATION: Male, 67 years old. Stroke/TIA, determine embolic source history of glioblastoma TECHNIQUE: Axial CT angiogram of the head and neck was performed with contrast. Multiplanar reformations as well as 3-D volume rendered imaging were reviewed at the workstation. Degree of stenosis was measured utilizing the NASCET method. CONTRAST: 100 mL mL of Omnipaque 350 IV. COMPARISON: None FINDINGS: CTA brain: The distal vertebral arteries, basilar artery and cavernous portions of the internal carotid arteries are widely patent. The anterior cerebral arteries, middle cerebral arteries and posterior cerebral arteries are widely patent without evidence of large vessel occlusion, significant stenosis, AVM or aneurysm. The dural venous sinuses are patent. There is no pathologic enhancement. Aortic Arch and Great Vessel Origins: 2 vessel aortic arch which is widely patent Subclavian Arteries: Widely patent. Right Common Carotid Artery: Widely patent. Right Internal Carotid Artery: Widely patent. Right External Carotid Artery: Widely patent. Left Common Carotid Artery: Widely patent. Left Internal Carotid Artery: Widely patent. Left External Carotid Artery: Widely patent. Right Vertebral Artery: Widely patent. Left Vertebral Artery: Widely patent. The paraspinous soft  tissues are unremarkable. There are mild degenerative changes of the cervical spine. The lung apices are clear. IMPRESSION: Negative CTA of the head and neck. This exam was performed according to our departmental dose-optimization program which includes automated exposure control, adjustment of the mA and/or kV according to patient size and/or use of iterative reconstruction technique. Electronically signed by:  Yi Hernandez MD  5/11/2023 7:43 AM CDT Workstation: [id number]        Assessment and Plan:    Status Epilepticus  History of Glioblastoma Multiforme s/p craniectomy, chemo and radiotherapy  Acute ischemic infarct   Atrial fibrillation       67-year-old man with history of glioblastoma multiforme s/p craniectomy, chemotherapy and radiation diagnosed in August 2022, as well as seizure disorder, who presented to the ED with left facial twitching, left facial droop, slurred speech and left arm weakness. Upon arrival to the ED, he was loaded with 1 gr of Keppra and given 2 mg of ativan IV, which seemed to break the seizure. However, he experienced sudden worsening of mental status, with unresponsiveness, weakness and aphasia, so he was taken to CT scan for repeat CT brain to rule out LVO stroke or hemorrhage. He was then intubated, sedated and placed on mechanical ventilation. Clinical picture more consistent with status epilepticus.      - Admitted to hospital medicine in the ICU with q2 hour neuro checks, on telemetry, continuous pulse oximetry  - Seizure precautions while inpatient  - initially underwent 24-hour VEEG continuous monitoring with evidence of 4 electrographic seizures which resolved when dose of propofol was increased to 60 mcg/kg/min. By the end of the recording, a burst suppression pattern with decreased LPEDs was seen.   -on 5/13 patient was again put on continues EEG monitoring while weaning sedation.  Initially EEG showed burst suppression pattern with bursts of 1-2 seconds of delta  activity followed by 2-4 seconds of background suppression and also showed right hemispheric lateralized periodic epileptiform discharges.  Background changed to a mixture of delta and theta activity with PLEDs becoming more frequent with lowering sedation to 30mcg/kg/min.    - cEEG stopped on 5/17. Showed intermittent lateralized periodic discharges on the right frontotemporal leads and significant slowing of the background bilaterally.   -patient has been off sedation for more than 6 days Somewhat improvement in mental status and tries to open eyes with verbal stimulus and able to squeeze my fingers with his right hand to command since 5/18.   - MRI brain showed right hemispheric infarcts.  Etiology could likely be secondary to recent radiation versus cardioembolism in the setting of atrial fibrillation. Continue anticoagulation with Xarelto 20 mg daily  - Patient had transaminitis and slow drop in platelet count which could likely be from Depakote.  Depakote weaned off.  Monitor for clinical seizures.  Monitor for any clinical seizures or electrographic seizures.  - 5/21:  EEG showed moderate encephalopathy without seizures or epileptiform activity.  - Fever resolved, Pseudomonas in his sputum. On cefepime.  If mental status continues to improve, okay to continue with cefepime. If decline in mental status is noted, recommend switching cefepime to another antibiotic as it can worsen mental status.  -repeat CT head showed no hemorrhage or acute changes.  Patient's mental status is improving slowly.  -Palliative medicine following -family recommended trach and PEG and full care.  - Continue Vimpat 200 mg b.i.d..  continue Keppra 1500 mg BID IV  - Avoid seizure threshold lowering medications such as:  Bupropion, diphenhydramine, tramadol  - Maintain Euthermia with Tylenol prn temp > 37.2 degrees C.  - workup and treatment of metabolic and infectious abnormalities as per primary team  - Will follow along      DVT  prophylaxis with chemo/SCD prophylaxis      [phone] 3 days from discharge.        All questions were answered.                              Thank you kindly for including us in the care of this patient. Please do not hesitate to contact us with any questions.       Critical Care:  43 minutes of critical care time has been spent evaluating with the patient. Time includes chart review not limited to diagnostic imaging, labs, and vitals, patient assessment, discussion with family and nursing, current order evaluations, and new order entries.      Mukund James MD  Neurology/Vascular Neurology

## 2023-05-23 NOTE — SUBJECTIVE & OBJECTIVE
"Interval History: see "Hospital Course"    Review of Systems   Unable to perform ROS: Patient nonverbal   Objective:     Vital Signs (Most Recent):  Temp: 98.5 °F (36.9 °C) (05/22/23 2301)  Pulse: 83 (05/22/23 2315)  Resp: 18 (05/22/23 2315)  BP: (!) 102/53 (05/22/23 2315)  SpO2: 97 % (05/22/23 2315) Vital Signs (24h Range):  Temp:  [96.7 °F (35.9 °C)-99 °F (37.2 °C)] 98.5 °F (36.9 °C)  Pulse:  [] 83  Resp:  [15-37] 18  SpO2:  [95 %-100 %] 97 %  BP: ()/() 102/53  Arterial Line BP: ()/() 100/66     Weight: 98.7 kg (217 lb 9.5 oz)  Body mass index is 32.13 kg/m².    Intake/Output Summary (Last 24 hours) at 5/23/2023 0007  Last data filed at 5/22/2023 1826  Gross per 24 hour   Intake 5043.71 ml   Output 1125 ml   Net 3918.71 ml           Physical Exam  Vitals and nursing note reviewed.   Constitutional:       Appearance: He is ill-appearing.   HENT:      Head: Normocephalic and atraumatic.      Right Ear: External ear normal.      Left Ear: External ear normal.      Nose: Nose normal.      Comments: NG tube.     Mouth/Throat:      Mouth: Mucous membranes are moist.      Pharynx: Oropharynx is clear.      Comments: ETT.  Cardiovascular:      Rate and Rhythm: Tachycardia present. Rhythm irregular.      Pulses: Normal pulses.      Heart sounds: Normal heart sounds.   Pulmonary:      Effort: Pulmonary effort is normal.      Breath sounds: Normal breath sounds.   Abdominal:      General: Bowel sounds are normal.      Palpations: Abdomen is soft.   Genitourinary:     Comments: Iraheta.  Musculoskeletal:      Right lower leg: Edema present.      Left lower leg: Edema present.   Skin:     General: Skin is warm and dry.   Neurological:      GCS: GCS eye subscore is 3. GCS verbal subscore is 1. GCS motor subscore is 6.           Significant Labs: All pertinent labs within the past 24 hours have been reviewed.    Significant Imaging: I have reviewed all pertinent imaging results/findings within the " past 24 hours.  IMPRESSION: CXR 5/21/2023     1.  Unchanged enlarged cardiomediastinal silhouette.  2.  Prominence of the central hilar vascular structures and mild perihilar interstitial thickening noted, possibly reflecting pulmonary vascular congestion and mild pulmonary edema.  3.  Left basilar subsegmental atelectasis.  1.  Unchanged enlarged cardiomediastinal silhouette.  2.  Low lung volumes limit evaluation. Prominence of interstitium the lungs likely secondary to low lung volumes.  3.  Left basilar subsegmental atelectasis.

## 2023-05-23 NOTE — PT/OT/SLP EVAL
Physical Therapy Evaluation    Patient Name:  Teto Mendez   MRN:  [id number]    Recommendations:     Discharge Recommendations: LTACH (long-term acute care hospital)   Discharge Equipment Recommendations: to be determined by next level of care   Barriers to discharge: None    Assessment:     Teto Mendez is a 67 y.o. male admitted with a medical diagnosis of Status epilepticus.  He presents with the following impairments/functional limitations: weakness, impaired endurance, impaired self care skills, impaired functional mobility, decreased upper extremity function, decreased lower extremity function, decreased ROM, edema, impaired cardiopulmonary response to activity. RN reports patient is appropriate to participate with PT evaluation. He did recently receive Versed per RN report. Patient in afib with HR fluctuating 120s-140s. He cannot open eyes upon request, but did follow commands for strength testing of lower extremities. Gross weakness of all extremities. Restraint present on RUE. He requires TotalA for rolling to the right for pillow to be placed for pressure relief.     Rehab Prognosis: Fair; patient would benefit from acute skilled PT services to address these deficits and reach maximum level of function.    Recent Surgery: * No surgery found *      Plan:     During this hospitalization, patient to be seen 3 x/week to address the identified rehab impairments via therapeutic activities, therapeutic exercises and progress toward the following goals:    Plan of Care Expires:  06/23/23    Subjective     Chief Complaint: unable to verbalize   Patient/Family Comments/goals: unable to verbalize  Pain/Comfort:  Pain Rating 1:  (unable to verbalize)    Patients cultural, spiritual, [employer] conflicts given the current situation:      Living Environment:  Per RN and CM note patient is from [place] and here visiting family  Prior to admission, patients level of function was unknown.  Equipment used at home:  none.  DME owned (not currently used): none.  Upon discharge, patient will have assistance from LTAC.    Objective:     Communicated with RAMYA Meadows prior to session.  Patient found HOB elevated with arterial line, bed alarm, blood pressure cuff, bowel management system, central line, minaya catheter, PEG Tube, pulse ox (continuous), restraints, telemetry, ventilator  upon PT entry to room.    General Precautions: Standard, fall, respiratory  Orthopedic Precautions:N/A   Braces: N/A  Respiratory Status: Ventilator    Exams:  RLE Strength: 2/5  LLE Strength: 2-/5    Functional Mobility:  Bed Mobility:     Rolling Right: total assistance      AM-PAC 6 CLICK MOBILITY  Total Score:7       Treatment & Education:  Attempted AAROM of BLEs in all planes, but ended up being PROM with patient having difficulty following commands to participate    Patient left supine with all lines intact and RN present.    GOALS:   Multidisciplinary Problems       Physical Therapy Goals          Problem: Physical Therapy    Goal Priority Disciplines Outcome Goal Variances Interventions   Physical Therapy Goal     PT, PT/OT      Description: Goals to be met by: 23     Patient will increase functional independence with mobility by performin. Supine to sit with Maximum Assistance  2. Rolling to Left and Right with Maximum Assistance.  3. Lower extremity exercise program x20 reps per handout, with assistance as needed                         History:     Past Medical History:   Diagnosis Date    GERD (gastroesophageal reflux disease)     Glioblastoma     H/O blood clots     Hypertension     Paroxysmal atrial fibrillation     Seizures        No past surgical history on file.    Time Tracking:     PT Received On: 23  PT Start Time: 1152     PT Stop Time: 1208  PT Total Time (min): 16 min     Billable Minutes: Evaluation 16      2023

## 2023-05-23 NOTE — CARE UPDATE
05/23/23 0700   Patient Assessment/Suction   Level of Consciousness (AVPU) responds to voice   Respiratory Effort Unlabored   Expansion/Accessory Muscles/Retractions expansion symmetric   All Lung Fields Breath Sounds diminished   Rhythm/Pattern, Respiratory assisted mechanically   Cough Frequency with stimulation   Cough Type assisted   Suction Method tracheal   Suction Pressure (mmHg) -120 mmHg   $ Suction Charges Inline Suction Procedure Stat Charge   Secretions Amount small   Secretions Color yellow;pale   Secretions Characteristics thick   PRE-TX-O2   Device (Oxygen Therapy) ventilator   $ Is the patient on Low Flow Oxygen? Yes   Oxygen Concentration (%) 30   SpO2 99 %   Pulse Oximetry Type Continuous   $ Pulse Oximetry - Single Charge Pulse Oximetry - Single   Pulse 87   Resp 19      Airway Anesthesia 05/11/23   Placement Date/Time: 05/11/23 (c) 8882   Method of Intubation: Video Laryngoscopy  Airway Device: Endotracheal Tube  Mask Ventilation: Moderately difficult with oral airway  Intubated: Other (see comments)  Blade: Glidescope #3  Airway Device Size: 8....   Secured at 24 cm   Measured At Lips   Secured Location Center   Secured by Commercial tube barboza   Bite Block secure and patent   Site Condition Cool;Dry   Status Intact;Secured   Site Assessment Clean;Dry   Cuff Volume   (MLT)   Vent Select   Conventional Vent Y   Charged w/in last 24h YES   Preset Conventional Ventilator Settings   Vent ID [id number]   Vent Type    Ventilation Type VC   Vent Mode A/C   Humidity HME   Set Rate 10 BPM   Vt Set 450 mL   PEEP/CPAP 5 cmH20   Peak Flow 60 L/min   Peak End Inspiratory Pressure 19 cmH20   I-Trigger Type  V-TRIG   Trigger Sensitivity Flow/I-Trigger 3 L/min   Patient Ventilator Parameters   Resp Rate Total 21 br/min   Peak Airway Pressure 30 cmH20   Mean Airway Pressure 10 cmH20   Plateau Pressure 0 cmH20   Exhaled Vt 543 mL   Total Ve 9.89 L/m   I:E Ratio Measured 1:5.20   Auto PEEP 0 cmH20   Conventional  Ventilator Alarms   Alarms On Y   Ve High Alarm 28 L/min   Ve Low Alarm 5 L/min   Vt High Alarm 1200 mL   Vt Low Alarm 200 mL   Resp Rate High Alarm 45 br/min   Press High Alarm 60 cmH2O   Apnea Rate 12   Apnea Volume (mL) 0 mL   Apnea Oxygen Concentration  100   Apnea Flow Rate (L/min) 65   T Apnea 20 sec(s)   IHI Ventilator Associated Pneumonia Bundle (Required)   Daily Awakening Trials Performed No   Daily Assessment of Readiness to Extubate Yes   Head of Bed Elevated  HOB 30   Oral Care Mouth suctioned   Vent Circut Breaks Minimized Yes   Ready to Wean/Extubation Screen   FIO2<=50 (chart decimal) 0.3   MV<16L (chart vol.) 9.89   PEEP <=8 (chart #) 5   Ready to Wean Parameters   F/VT Ratio<105 (RSBI) (!) 34.99   Vital Capacity   Vital Capacity (mL) 0   Education   $ Education 15 min;Ventilator Oxygen;Suction

## 2023-05-23 NOTE — ASSESSMENT & PLAN NOTE
Antithrombotics for secondary stroke prevention: Anticoagulants: Rivaroxaban 20 mg daily    Statins for secondary stroke prevention and hyperlipidemia, if present:   Statins: Atorvastatin- 80 mg daily    Aggressive risk factor modification: HTN, HLD, Obesity, A-Fib     Rehab efforts: The patient has been evaluated by a stroke team provider and the therapy needs have been fully considered based off the presenting complaints and exam findings. The following therapy evaluations are needed: None    Diagnostics ordered/pending: CTA Neck/Arch to assess vasculature, Lipid Profile to assess cholesterol levels, MRI head without contrast to assess brain parenchyma, TTE to assess cardiac function/status     VTE prophylaxis: None: Reason for No Pharmacological VTE Prophylaxis: Currently on anticoagulation    BP parameters: Infarct: No intervention, SBP <220     More active evening 5/22/23 - failed SBT, will continue to re-attempt

## 2023-05-23 NOTE — ASSESSMENT & PLAN NOTE
In setting of status epilepticus.  -Continuous EEG  -Continue mechanical ventilation given neurologic status  -Pulmonary consulted  -ABG PRN  -Palliative Care consulted- appreciate discussion   -proceed with trach and peg if needed

## 2023-05-23 NOTE — PLAN OF CARE
Referral sent to [facility] via careport for review.     05/23/23 6497   Post-Acute Status   Post-Acute Authorization Placement   Post-Acute Placement Status Referrals Sent   Patient choice form signed by patient/caregiver List from System Post-Acute Care

## 2023-05-23 NOTE — PROGRESS NOTES
[facility] Medicine  Progress Note    Patient Name: Teto Mendez  MRN: [id number]  Patient Class: IP- Inpatient   Admission Date: 5/11/2023  Length of Stay: 12 days  Attending Physician: Nicky Espinal MD  Primary Care Provider: Primary Doctor No        Subjective:     Principal Problem:Status epilepticus        HPI:  Teto Mendez is a 67 y.o. White male with known history of  glioblastoma s/p resection, seizures p.afib on xarelto who presented to ER this morning with left arm weakness, facial twitching and change in his speech that he suspected was seizure activity.  He took an extra keppra and presented to ER around 6:30-7 am.  Head CT/CTA in ER without hemorrhage or LVO and he was not a candidate for tPA regardless.  He was given lorazepam and keppra with positive response, however, while still in ER he became less responsive and repeat stat CT was ordered.  While in CT, he became hypoxic with depressed respiratory status and required bag-mask ventilation and emergent intubation on return to ICU.  History was obtained from the ER physician Sign-out.      Overview/Hospital Course:  Teto Mendez is a 67 year old male with a past medical history of glioblastoma s/p craniotomy complicated by seizures, obesity, HTN, AFib and GERD who presented with status epilepticus. Neurology has been consulted. The patient was intubated on propofol sedation with Keppra and valproic acid. Pulmonary has been consulted. Continuous EEG is ordered; the patient continued to have PLEDs throughout his course. MRI brain shows R MCA region infarcts consistent with acute CVA. His course has been complicated by Afib with RVR as well as a HFrEF seen on TTE which appears to be chronic. Cardiology has been consulted. He is on metoprolol and Xarelto with PRN diuresis. Entresto and high dose atorvastatin was also added. Diltiazem was also added by Cardiology 5/18.  Sedation with propofol and Precedex was stopped  "5/15. Vimpat was also added 5/15 by Neurology. He has been monitored for improvement in his neurologic status for > 72 hours and is noted to have some right hand squeeze when directed as well as some eye opening as of 5/18. Neurology has consulted Palliative Care 5/17 for assistance with end of life care; they may consider changing code status to DNR; they are hopeful the patient may continue show signs of neurologic progress, but acknowledge the severity of his case.     HR elevated 120-130s with stimulation and fatigue off vent setting; HR much improved back on vent; low grade temps progressed to high grade temps, SBP 70s, started on levophed. All lines removed, tips cultured and replaced. Cooling blanket, tylenol, ibuprofen and icepacks. On Cefepime IV. Vanc dc'd  Resp culture (5/21) - Pseudomons -presumptive  Urine culture (5/21) - GNR     Family met with Palliative Care team- they would like to proceed with trach and peg and continue full code. Head CT (5/22) no ICH, +cerebral atrophy; persistent mastoiditis findings. Patient is more active and following commands.       Interval History: see "Hospital Course"    Review of Systems   Unable to perform ROS: Patient nonverbal   Objective:     Vital Signs (Most Recent):  Temp: 98.5 °F (36.9 °C) (05/22/23 2301)  Pulse: 83 (05/22/23 2315)  Resp: 18 (05/22/23 2315)  BP: (!) 102/53 (05/22/23 2315)  SpO2: 97 % (05/22/23 2315) Vital Signs (24h Range):  Temp:  [96.7 °F (35.9 °C)-99 °F (37.2 °C)] 98.5 °F (36.9 °C)  Pulse:  [] 83  Resp:  [15-37] 18  SpO2:  [95 %-100 %] 97 %  BP: ()/() 102/53  Arterial Line BP: ()/() 100/66     Weight: 98.7 kg (217 lb 9.5 oz)  Body mass index is 32.13 kg/m².    Intake/Output Summary (Last 24 hours) at 5/23/2023 0007  Last data filed at 5/22/2023 1826  Gross per 24 hour   Intake 5043.71 ml   Output 1125 ml   Net 3918.71 ml           Physical Exam  Vitals and nursing note reviewed.   Constitutional:       " Appearance: He is ill-appearing.   HENT:      Head: Normocephalic and atraumatic.      Right Ear: External ear normal.      Left Ear: External ear normal.      Nose: Nose normal.      Comments: NG tube.     Mouth/Throat:      Mouth: Mucous membranes are moist.      Pharynx: Oropharynx is clear.      Comments: ETT.  Cardiovascular:      Rate and Rhythm: Tachycardia present. Rhythm irregular.      Pulses: Normal pulses.      Heart sounds: Normal heart sounds.   Pulmonary:      Effort: Pulmonary effort is normal.      Breath sounds: Normal breath sounds.   Abdominal:      General: Bowel sounds are normal.      Palpations: Abdomen is soft.   Genitourinary:     Comments: Iraheta.  Musculoskeletal:      Right lower leg: Edema present.      Left lower leg: Edema present.   Skin:     General: Skin is warm and dry.   Neurological:      GCS: GCS eye subscore is 3. GCS verbal subscore is 1. GCS motor subscore is 6.           Significant Labs: All pertinent labs within the past 24 hours have been reviewed.    Significant Imaging: I have reviewed all pertinent imaging results/findings within the past 24 hours.  IMPRESSION: CXR 5/21/2023     1.  Unchanged enlarged cardiomediastinal silhouette.  2.  Prominence of the central hilar vascular structures and mild perihilar interstitial thickening noted, possibly reflecting pulmonary vascular congestion and mild pulmonary edema.  3.  Left basilar subsegmental atelectasis.  1.  Unchanged enlarged cardiomediastinal silhouette.  2.  Low lung volumes limit evaluation. Prominence of interstitium the lungs likely secondary to low lung volumes.  3.  Left basilar subsegmental atelectasis.        Assessment/Plan:      * Status epilepticus  In setting of glioblastoma. MRI brain also shows R MCA region infarcts. GCS 10 off sedation.  -Continue Depacon and Keppra  -Vimpat added 5/14  -Amantadine  -Neurology following  -Continuous EEG  -Continue intubation with mechanical ventilation  -Palliative  Care consulted    Hypernatremia  -Increase free water flushes on tube feeds  -Trend Na      Fever  Secondary to VAP and indwelling minaya catheter  -U/A, blood cultures, CXR, and procalcitonin  -lines removed and replaced  -Cefepime   -Resp culture - presumed pseudomonas  -urine cx - GNR  -blood culture - NTD        Goals of care, counseling/discussion  -Palliative Care consulted  -Full code at this time with possible change to DNR    Acute CVA (cerebrovascular accident)    Antithrombotics for secondary stroke prevention: Anticoagulants: Rivaroxaban 20 mg daily    Statins for secondary stroke prevention and hyperlipidemia, if present:   Statins: Atorvastatin- 80 mg daily    Aggressive risk factor modification: HTN, HLD, Obesity, A-Fib     Rehab efforts: The patient has been evaluated by a stroke team provider and the therapy needs have been fully considered based off the presenting complaints and exam findings. The following therapy evaluations are needed: None    Diagnostics ordered/pending: CTA Neck/Arch to assess vasculature, Lipid Profile to assess cholesterol levels, MRI head without contrast to assess brain parenchyma, TTE to assess cardiac function/status     VTE prophylaxis: None: Reason for No Pharmacological VTE Prophylaxis: Currently on anticoagulation    BP parameters: Infarct: No intervention, SBP <220     More active evening 5/22/23 - failed SBT, will continue to re-attempt           HFrEF (heart failure with reduced ejection fraction)  Unclear chronicity. BNP unremarkable.  -Cardiology following  -Metoprolol  -Lasix PRN  -Telemetry  -Entresto      GERD (gastroesophageal reflux disease)  -PPI      Gout  -Continue home allopurinol      Obesity  Body mass index is 30.08 kg/m². Morbid obesity complicates all aspects of disease management from diagnostic modalities to treatment.         Glioblastoma  Chronic. Receives care in [place].  -Continue Decadron  -Palliative Care consulted    Paroxysmal atrial  fibrillation  -Xarelto  -Metoprolol  -Diltiazem added by Cardiology 5/19  -Telemetry  -Cardiology consulted        Essential hypertension  -Metoprolol  -Entresto  -Continue to monitor    Acute hypoxemic respiratory failure  In setting of status epilepticus.  -Continuous EEG  -Continue mechanical ventilation given neurologic status  -Pulmonary consulted  -ABG PRN  -Palliative Care consulted- appreciate discussion   -proceed with trach and peg if needed        VTE Risk Mitigation (From admission, onward)         Ordered     rivaroxaban tablet 20 mg  With dinner         05/13/23 0902     Reason for No Pharmacological VTE Prophylaxis  Once        Question:  Reasons:  Answer:  Already adequately anticoagulated on oral Anticoagulants    05/11/23 1054     IP VTE HIGH RISK PATIENT  Once         05/11/23 1054     Place sequential compression device  Until discontinued         05/11/23 1054                Discharge Planning   BARBARA: 5/29/2023     Code Status: Full Code   Is the patient medically ready for discharge?:     Reason for patient still in hospital (select all that apply): Patient trending condition  Discharge Plan A:  (TBD)                  Nicky Espinal MD  Department of Hospital Medicine   [facility] [place]

## 2023-05-24 ENCOUNTER — ANESTHESIA EVENT (OUTPATIENT)
Dept: SURGERY | Facility: HOSPITAL | Age: 68
DRG: 004 | End: 2023-05-24
Payer: MEDICARE

## 2023-05-24 ENCOUNTER — ANESTHESIA (OUTPATIENT)
Dept: SURGERY | Facility: HOSPITAL | Age: 68
DRG: 004 | End: 2023-05-24
Payer: MEDICARE

## 2023-05-24 LAB
ALBUMIN SERPL BCP-MCNC: 2 G/DL (ref 3.5–5.2)
ALLENS TEST: ABNORMAL
ALP SERPL-CCNC: 45 U/L (ref 55–135)
ALT SERPL W/O P-5'-P-CCNC: 90 U/L (ref 10–44)
ANION GAP SERPL CALC-SCNC: 8 MMOL/L (ref 8–16)
AST SERPL-CCNC: 74 U/L (ref 10–40)
BACTERIA BLD CULT: NORMAL
BACTERIA BLD CULT: NORMAL
BASOPHILS # BLD AUTO: 0.04 K/UL (ref 0–0.2)
BASOPHILS NFR BLD: 0.4 % (ref 0–1.9)
BILIRUB SERPL-MCNC: 0.6 MG/DL (ref 0.1–1)
BUN SERPL-MCNC: 34 MG/DL (ref 8–23)
CALCIUM SERPL-MCNC: 7.9 MG/DL (ref 8.7–10.5)
CHLORIDE SERPL-SCNC: 105 MMOL/L (ref 95–110)
CO2 SERPL-SCNC: 30 MMOL/L (ref 23–29)
CREAT SERPL-MCNC: 0.7 MG/DL (ref 0.5–1.4)
DELSYS: ABNORMAL
DIFFERENTIAL METHOD: ABNORMAL
EOSINOPHIL # BLD AUTO: 0 K/UL (ref 0–0.5)
EOSINOPHIL NFR BLD: 0.4 % (ref 0–8)
ERYTHROCYTE [DISTWIDTH] IN BLOOD BY AUTOMATED COUNT: 13.8 % (ref 11.5–14.5)
ERYTHROCYTE [SEDIMENTATION RATE] IN BLOOD BY WESTERGREN METHOD: 30 MM/H
EST. GFR  (NO RACE VARIABLE): >60 ML/MIN/1.73 M^2
FIO2: 30
GLUCOSE SERPL-MCNC: 110 MG/DL (ref 70–110)
GLUCOSE SERPL-MCNC: 128 MG/DL (ref 70–110)
HCO3 UR-SCNC: 30.7 MMOL/L (ref 24–28)
HCT VFR BLD AUTO: 36.4 % (ref 40–54)
HCT VFR BLD CALC: 34 %PCV (ref 36–54)
HGB BLD-MCNC: 12 G/DL (ref 14–18)
IMM GRANULOCYTES # BLD AUTO: 0.16 K/UL (ref 0–0.04)
IMM GRANULOCYTES NFR BLD AUTO: 1.8 % (ref 0–0.5)
IP: 10
LYMPHOCYTES # BLD AUTO: 0.8 K/UL (ref 1–4.8)
LYMPHOCYTES NFR BLD: 9.2 % (ref 18–48)
MAGNESIUM SERPL-MCNC: 1.8 MG/DL (ref 1.6–2.6)
MCH RBC QN AUTO: 31.2 PG (ref 27–31)
MCHC RBC AUTO-ENTMCNC: 33 G/DL (ref 32–36)
MCV RBC AUTO: 95 FL (ref 82–98)
MODE: ABNORMAL
MONOCYTES # BLD AUTO: 0.7 K/UL (ref 0.3–1)
MONOCYTES NFR BLD: 7.3 % (ref 4–15)
NEUTROPHILS # BLD AUTO: 7.3 K/UL (ref 1.8–7.7)
NEUTROPHILS NFR BLD: 80.9 % (ref 38–73)
NRBC BLD-RTO: 0 /100 WBC
PCO2 BLDA: 36.5 MMHG (ref 35–45)
PEEP: 5
PH SMN: 7.53 [PH] (ref 7.35–7.45)
PHOSPHATE SERPL-MCNC: 3.4 MG/DL (ref 2.7–4.5)
PIP: 16
PLATELET # BLD AUTO: 207 K/UL (ref 150–450)
PMV BLD AUTO: 11 FL (ref 9.2–12.9)
PO2 BLDA: 106 MMHG (ref 80–100)
POC BE: 8 MMOL/L
POC IONIZED CALCIUM: 1.17 MMOL/L (ref 1.06–1.42)
POC SATURATED O2: 99 % (ref 95–100)
POC TCO2: 32 MMOL/L (ref 23–27)
POTASSIUM BLD-SCNC: 4.4 MMOL/L (ref 3.5–5.1)
POTASSIUM SERPL-SCNC: 4.1 MMOL/L (ref 3.5–5.1)
PROT SERPL-MCNC: 5 G/DL (ref 6–8.4)
RBC # BLD AUTO: 3.85 M/UL (ref 4.6–6.2)
SAMPLE: ABNORMAL
SITE: ABNORMAL
SODIUM BLD-SCNC: 139 MMOL/L (ref 136–145)
SODIUM SERPL-SCNC: 143 MMOL/L (ref 136–145)
SP02: 99
VT: 402
WBC # BLD AUTO: 8.99 K/UL (ref 3.9–12.7)

## 2023-05-24 PROCEDURE — 27201423 OPTIME MED/SURG SUP & DEVICES STERILE SUPPLY: Performed by: SURGERY

## 2023-05-24 PROCEDURE — 25000003 PHARM REV CODE 250: Performed by: STUDENT IN AN ORGANIZED HEALTH CARE EDUCATION/TRAINING PROGRAM

## 2023-05-24 PROCEDURE — 25000003 PHARM REV CODE 250: Performed by: NURSE ANESTHETIST, CERTIFIED REGISTERED

## 2023-05-24 PROCEDURE — 97110 THERAPEUTIC EXERCISES: CPT

## 2023-05-24 PROCEDURE — D9220A PRA ANESTHESIA: Mod: CRNA,,, | Performed by: NURSE ANESTHETIST, CERTIFIED REGISTERED

## 2023-05-24 PROCEDURE — 85025 COMPLETE CBC W/AUTO DIFF WBC: CPT | Performed by: STUDENT IN AN ORGANIZED HEALTH CARE EDUCATION/TRAINING PROGRAM

## 2023-05-24 PROCEDURE — 27201018 HC KIT, PEG (ANY): Performed by: SURGERY

## 2023-05-24 PROCEDURE — 80053 COMPREHEN METABOLIC PANEL: CPT | Performed by: STUDENT IN AN ORGANIZED HEALTH CARE EDUCATION/TRAINING PROGRAM

## 2023-05-24 PROCEDURE — 27000221 HC OXYGEN, UP TO 24 HOURS

## 2023-05-24 PROCEDURE — 99291 PR CRITICAL CARE, E/M 30-74 MINUTES: ICD-10-PCS | Mod: ,,, | Performed by: INTERNAL MEDICINE

## 2023-05-24 PROCEDURE — 43246 PR EGD, FLEX, W/PLCMT, GASTROSTOMY TUBE: ICD-10-PCS | Mod: 52,51,, | Performed by: SURGERY

## 2023-05-24 PROCEDURE — 36000707: Performed by: SURGERY

## 2023-05-24 PROCEDURE — 94761 N-INVAS EAR/PLS OXIMETRY MLT: CPT

## 2023-05-24 PROCEDURE — D9220A PRA ANESTHESIA: Mod: ANES,,, | Performed by: ANESTHESIOLOGY

## 2023-05-24 PROCEDURE — D9220A PRA ANESTHESIA: ICD-10-PCS | Mod: CRNA,,, | Performed by: NURSE ANESTHETIST, CERTIFIED REGISTERED

## 2023-05-24 PROCEDURE — 63600175 PHARM REV CODE 636 W HCPCS: Performed by: STUDENT IN AN ORGANIZED HEALTH CARE EDUCATION/TRAINING PROGRAM

## 2023-05-24 PROCEDURE — 25000003 PHARM REV CODE 250: Performed by: INTERNAL MEDICINE

## 2023-05-24 PROCEDURE — 25000003 PHARM REV CODE 250: Performed by: HOSPITALIST

## 2023-05-24 PROCEDURE — 94799 UNLISTED PULMONARY SVC/PX: CPT

## 2023-05-24 PROCEDURE — 31600 PR TRACHEOSTOMY, PLANNED: ICD-10-PCS | Mod: ,,, | Performed by: SURGERY

## 2023-05-24 PROCEDURE — 94003 VENT MGMT INPAT SUBQ DAY: CPT

## 2023-05-24 PROCEDURE — 43246 EGD PLACE GASTROSTOMY TUBE: CPT | Mod: 52,51,, | Performed by: SURGERY

## 2023-05-24 PROCEDURE — D9220A PRA ANESTHESIA: ICD-10-PCS | Mod: ANES,,, | Performed by: ANESTHESIOLOGY

## 2023-05-24 PROCEDURE — 99900035 HC TECH TIME PER 15 MIN (STAT)

## 2023-05-24 PROCEDURE — 25000003 PHARM REV CODE 250: Performed by: SURGERY

## 2023-05-24 PROCEDURE — 37799 UNLISTED PX VASCULAR SURGERY: CPT

## 2023-05-24 PROCEDURE — 99291 CRITICAL CARE FIRST HOUR: CPT | Mod: ,,, | Performed by: INTERNAL MEDICINE

## 2023-05-24 PROCEDURE — 97535 SELF CARE MNGMENT TRAINING: CPT

## 2023-05-24 PROCEDURE — 84100 ASSAY OF PHOSPHORUS: CPT | Performed by: STUDENT IN AN ORGANIZED HEALTH CARE EDUCATION/TRAINING PROGRAM

## 2023-05-24 PROCEDURE — 63600175 PHARM REV CODE 636 W HCPCS: Performed by: INTERNAL MEDICINE

## 2023-05-24 PROCEDURE — 63600175 PHARM REV CODE 636 W HCPCS: Performed by: NURSE ANESTHETIST, CERTIFIED REGISTERED

## 2023-05-24 PROCEDURE — 63600175 PHARM REV CODE 636 W HCPCS: Performed by: HOSPITALIST

## 2023-05-24 PROCEDURE — 37000008 HC ANESTHESIA 1ST 15 MINUTES: Performed by: SURGERY

## 2023-05-24 PROCEDURE — 20000000 HC ICU ROOM

## 2023-05-24 PROCEDURE — C9254 INJECTION, LACOSAMIDE: HCPCS | Performed by: STUDENT IN AN ORGANIZED HEALTH CARE EDUCATION/TRAINING PROGRAM

## 2023-05-24 PROCEDURE — 36000706: Performed by: SURGERY

## 2023-05-24 PROCEDURE — 99900026 HC AIRWAY MAINTENANCE (STAT)

## 2023-05-24 PROCEDURE — 37000009 HC ANESTHESIA EA ADD 15 MINS: Performed by: SURGERY

## 2023-05-24 PROCEDURE — 82803 BLOOD GASES ANY COMBINATION: CPT

## 2023-05-24 PROCEDURE — 31600 PLANNED TRACHEOSTOMY: CPT | Mod: ,,, | Performed by: SURGERY

## 2023-05-24 PROCEDURE — 99900031 HC PATIENT EDUCATION (STAT)

## 2023-05-24 PROCEDURE — C9113 INJ PANTOPRAZOLE SODIUM, VIA: HCPCS | Performed by: STUDENT IN AN ORGANIZED HEALTH CARE EDUCATION/TRAINING PROGRAM

## 2023-05-24 PROCEDURE — 83735 ASSAY OF MAGNESIUM: CPT | Performed by: STUDENT IN AN ORGANIZED HEALTH CARE EDUCATION/TRAINING PROGRAM

## 2023-05-24 RX ORDER — PHENYLEPHRINE HYDROCHLORIDE 10 MG/ML
INJECTION INTRAVENOUS
Status: DISCONTINUED | OUTPATIENT
Start: 2023-05-24 | End: 2023-05-24

## 2023-05-24 RX ORDER — ROCURONIUM BROMIDE 10 MG/ML
INJECTION, SOLUTION INTRAVENOUS
Status: DISCONTINUED | OUTPATIENT
Start: 2023-05-24 | End: 2023-05-24

## 2023-05-24 RX ORDER — LORAZEPAM 2 MG/ML
1 INJECTION INTRAMUSCULAR EVERY 6 HOURS PRN
Status: DISCONTINUED | OUTPATIENT
Start: 2023-05-24 | End: 2023-05-25 | Stop reason: HOSPADM

## 2023-05-24 RX ORDER — PROPOFOL 10 MG/ML
VIAL (ML) INTRAVENOUS
Status: DISCONTINUED | OUTPATIENT
Start: 2023-05-24 | End: 2023-05-24

## 2023-05-24 RX ORDER — CEFAZOLIN SODIUM 1 G/3ML
INJECTION, POWDER, FOR SOLUTION INTRAMUSCULAR; INTRAVENOUS
Status: DISCONTINUED | OUTPATIENT
Start: 2023-05-24 | End: 2023-05-24

## 2023-05-24 RX ORDER — DEXMEDETOMIDINE HYDROCHLORIDE 4 UG/ML
0-1.4 INJECTION, SOLUTION INTRAVENOUS CONTINUOUS
Status: DISCONTINUED | OUTPATIENT
Start: 2023-05-24 | End: 2023-05-25 | Stop reason: HOSPADM

## 2023-05-24 RX ORDER — BUPIVACAINE HCL/EPINEPHRINE 0.25-.0005
VIAL (ML) INJECTION
Status: DISCONTINUED | OUTPATIENT
Start: 2023-05-24 | End: 2023-05-24 | Stop reason: HOSPADM

## 2023-05-24 RX ADMIN — DEXAMETHASONE 1 MG: 1 TABLET ORAL at 09:05

## 2023-05-24 RX ADMIN — ATORVASTATIN CALCIUM 20 MG: 20 TABLET, FILM COATED ORAL at 09:05

## 2023-05-24 RX ADMIN — DEXMEDETOMIDINE HYDROCHLORIDE 0.2 MCG/KG/HR: 4 INJECTION, SOLUTION INTRAVENOUS at 06:05

## 2023-05-24 RX ADMIN — SODIUM CHLORIDE, SODIUM LACTATE, POTASSIUM CHLORIDE, AND CALCIUM CHLORIDE: .6; .31; .03; .02 INJECTION, SOLUTION INTRAVENOUS at 03:05

## 2023-05-24 RX ADMIN — SODIUM CHLORIDE 200 MG: 9 INJECTION, SOLUTION INTRAVENOUS at 08:05

## 2023-05-24 RX ADMIN — DILTIAZEM HYDROCHLORIDE 30 MG: 30 TABLET, FILM COATED ORAL at 12:05

## 2023-05-24 RX ADMIN — ROCURONIUM BROMIDE 20 MG: 10 INJECTION, SOLUTION INTRAVENOUS at 03:05

## 2023-05-24 RX ADMIN — LEVETIRACETAM INJECTION 1500 MG: 15 INJECTION INTRAVENOUS at 09:05

## 2023-05-24 RX ADMIN — MAGNESIUM SULFATE HEPTAHYDRATE 2 G: 40 INJECTION, SOLUTION INTRAVENOUS at 09:05

## 2023-05-24 RX ADMIN — DILTIAZEM HYDROCHLORIDE 30 MG: 30 TABLET, FILM COATED ORAL at 05:05

## 2023-05-24 RX ADMIN — CEFEPIME 2 G: 2 INJECTION, POWDER, FOR SOLUTION INTRAVENOUS at 07:05

## 2023-05-24 RX ADMIN — ALLOPURINOL 100 MG: 100 TABLET ORAL at 09:05

## 2023-05-24 RX ADMIN — DEXAMETHASONE 1 MG: 1 TABLET ORAL at 08:05

## 2023-05-24 RX ADMIN — DILTIAZEM HYDROCHLORIDE 30 MG: 30 TABLET, FILM COATED ORAL at 08:05

## 2023-05-24 RX ADMIN — PROPOFOL 50 MG: 10 INJECTION, EMULSION INTRAVENOUS at 03:05

## 2023-05-24 RX ADMIN — METOPROLOL TARTRATE 50 MG: 50 TABLET, FILM COATED ORAL at 09:05

## 2023-05-24 RX ADMIN — METOPROLOL TARTRATE 5 MG: 5 INJECTION, SOLUTION INTRAVENOUS at 03:05

## 2023-05-24 RX ADMIN — CEFEPIME 2 G: 2 INJECTION, POWDER, FOR SOLUTION INTRAVENOUS at 01:05

## 2023-05-24 RX ADMIN — ACETAMINOPHEN 650 MG: 325 TABLET ORAL at 03:05

## 2023-05-24 RX ADMIN — PHENYLEPHRINE HYDROCHLORIDE 100 MCG: 10 INJECTION INTRAVENOUS at 03:05

## 2023-05-24 RX ADMIN — TIMOLOL MALEATE 1 DROP: 2.5 SOLUTION/ DROPS OPHTHALMIC at 08:05

## 2023-05-24 RX ADMIN — LORAZEPAM 1 MG: 2 INJECTION INTRAMUSCULAR; INTRAVENOUS at 02:05

## 2023-05-24 RX ADMIN — CEFEPIME 2 G: 2 INJECTION, POWDER, FOR SOLUTION INTRAVENOUS at 02:05

## 2023-05-24 RX ADMIN — TIMOLOL MALEATE 1 DROP: 2.5 SOLUTION/ DROPS OPHTHALMIC at 09:05

## 2023-05-24 RX ADMIN — LEVETIRACETAM INJECTION 1500 MG: 15 INJECTION INTRAVENOUS at 08:05

## 2023-05-24 RX ADMIN — CEFAZOLIN 2 G: 330 INJECTION, POWDER, FOR SOLUTION INTRAMUSCULAR; INTRAVENOUS at 03:05

## 2023-05-24 RX ADMIN — SODIUM CHLORIDE 200 MG: 9 INJECTION, SOLUTION INTRAVENOUS at 07:05

## 2023-05-24 RX ADMIN — PANTOPRAZOLE SODIUM 40 MG: 40 INJECTION, POWDER, FOR SOLUTION INTRAVENOUS at 09:05

## 2023-05-24 RX ADMIN — ROCURONIUM BROMIDE 30 MG: 10 INJECTION, SOLUTION INTRAVENOUS at 03:05

## 2023-05-24 RX ADMIN — METOPROLOL TARTRATE 5 MG: 5 INJECTION, SOLUTION INTRAVENOUS at 01:05

## 2023-05-24 NOTE — PROGRESS NOTES
[facility] Medicine  Progress Note    Patient Name: Teto Mendez  MRN: [id number]  Patient Class: IP- Inpatient   Admission Date: 5/11/2023  Length of Stay: 13 days  Attending Physician: Nicky Espinal MD  Primary Care Provider: Primary Doctor No        Subjective:     Principal Problem:Status epilepticus        HPI:  Teto Mendez is a 67 y.o. White male with known history of  glioblastoma s/p resection, seizures p.afib on xarelto who presented to ER this morning with left arm weakness, facial twitching and change in his speech that he suspected was seizure activity.  He took an extra keppra and presented to ER around 6:30-7 am.  Head CT/CTA in ER without hemorrhage or LVO and he was not a candidate for tPA regardless.  He was given lorazepam and keppra with positive response, however, while still in ER he became less responsive and repeat stat CT was ordered.  While in CT, he became hypoxic with depressed respiratory status and required bag-mask ventilation and emergent intubation on return to ICU.  History was obtained from the ER physician Sign-out.      Overview/Hospital Course:  Teto Mendez is a 67 year old male with a past medical history of glioblastoma s/p craniotomy complicated by seizures, obesity, HTN, AFib and GERD who presented with status epilepticus. Neurology has been consulted. The patient was intubated on propofol sedation with Keppra and valproic acid. Pulmonary has been consulted. Continuous EEG is ordered; the patient continued to have PLEDs throughout his course. MRI brain shows R MCA region infarcts consistent with acute CVA. His course has been complicated by Afib with RVR as well as a HFrEF seen on TTE which appears to be chronic. Cardiology has been consulted. He is on metoprolol and Xarelto with PRN diuresis. Entresto and high dose atorvastatin was also added. Diltiazem was also added by Cardiology 5/18.  Sedation with propofol and Precedex was stopped  "5/15. Vimpat was also added 5/15 by Neurology. He has been monitored for improvement in his neurologic status for > 72 hours and is noted to have some right hand squeeze when directed as well as some eye opening as of 5/18. Neurology has consulted Palliative Care 5/17 for assistance with end of life care; they may consider changing code status to DNR; they are hopeful the patient may continue show signs of neurologic progress, but acknowledge the severity of his case.     HR elevated 120-130s with stimulation and fatigue off vent setting; HR much improved back on vent; low grade temps progressed to high grade temps, SBP 70s, started on levophed. All lines removed, tips cultured and replaced. Cooling blanket, tylenol, ibuprofen and icepacks. On Cefepime IV. Vanc dc'd  Resp culture (5/21) - Pseudomons -presumptive  Urine culture (5/21) - GNR     Family met with Palliative Care team- they would like to proceed with trach and peg and continue full code. Head CT (5/22) no ICH, +cerebral atrophy; persistent mastoiditis findings. Patient is more active and following commands.   Hold anticoagulation, general surgery consulted. Underwent Trach and peg today. Plan for dc to LTAC tomorrow.       Interval History: see "Hospital Course"    Review of Systems   Unable to perform ROS: Patient nonverbal   Objective:     Vital Signs (Most Recent):  Temp: (P) 98.4 °F (36.9 °C) (05/24/23 0400)  Pulse: 103 (05/24/23 1800)  Resp: (!) 30 (05/24/23 1800)  BP: (!) 146/96 (05/24/23 1800)  SpO2: 99 % (05/24/23 1800) Vital Signs (24h Range):  Temp:  [98.2 °F (36.8 °C)-98.9 °F (37.2 °C)] (P) 98.4 °F (36.9 °C)  Pulse:  [] 103  Resp:  [14-31] 30  SpO2:  [97 %-100 %] 99 %  BP: (113-163)/(67-96) 146/96  Arterial Line BP: (141)/(106) 141/106     Weight: 98.7 kg (217 lb 9.5 oz)  Body mass index is 32.13 kg/m².    Intake/Output Summary (Last 24 hours) at 5/24/2023 1840  Last data filed at 5/24/2023 1645  Gross per 24 hour   Intake 1745.5 ml "   Output 1040 ml   Net 705.5 ml           Physical Exam  Vitals and nursing note reviewed.   Constitutional:       Appearance: He is ill-appearing.   HENT:      Head: Normocephalic and atraumatic.      Right Ear: External ear normal.      Left Ear: External ear normal.      Nose: Nose normal.      Comments: NG tube.     Mouth/Throat:      Mouth: Mucous membranes are moist.      Pharynx: Oropharynx is clear.      Comments: ETT.  Cardiovascular:      Rate and Rhythm: Tachycardia present. Rhythm irregular.      Pulses: Normal pulses.      Heart sounds: Normal heart sounds.   Pulmonary:      Effort: Pulmonary effort is normal.      Breath sounds: Normal breath sounds.   Abdominal:      General: Bowel sounds are normal.      Palpations: Abdomen is soft.   Genitourinary:     Comments: Iraheta.  Musculoskeletal:      Right lower leg: Edema present.      Left lower leg: Edema present.   Skin:     General: Skin is warm and dry.   Neurological:      GCS: GCS eye subscore is 3. GCS verbal subscore is 1. GCS motor subscore is 6.           Significant Labs: All pertinent labs within the past 24 hours have been reviewed.    Significant Imaging: I have reviewed all pertinent imaging results/findings within the past 24 hours.  IMPRESSION: CXR 5/21/2023     1.  Unchanged enlarged cardiomediastinal silhouette.  2.  Prominence of the central hilar vascular structures and mild perihilar interstitial thickening noted, possibly reflecting pulmonary vascular congestion and mild pulmonary edema.  3.  Left basilar subsegmental atelectasis.  1.  Unchanged enlarged cardiomediastinal silhouette.  2.  Low lung volumes limit evaluation. Prominence of interstitium the lungs likely secondary to low lung volumes.  3.  Left basilar subsegmental atelectasis.        Assessment/Plan:      * Status epilepticus  In setting of glioblastoma. MRI brain also shows R MCA region infarcts. GCS 10 off sedation.  -Continue Depacon and Keppra  -Vimpat added  5/14  -Amantadine  -Neurology following  -Continuous EEG  -Continue intubation with mechanical ventilation  -Palliative Care consulted    Hypernatremia  -Increase free water flushes on tube feeds  -Trend Na      Fever  Secondary to VAP and indwelling minaya catheter  -U/A, blood cultures, CXR, and procalcitonin  -lines removed and replaced  -Cefepime   -Resp culture - presumed pseudomonas  -urine cx - GNR  -blood culture - NTD        Goals of care, counseling/discussion  -Palliative Care consulted  -Full code at this time with possible change to DNR    Acute CVA (cerebrovascular accident)    Antithrombotics for secondary stroke prevention: Anticoagulants: Rivaroxaban 20 mg daily    Statins for secondary stroke prevention and hyperlipidemia, if present:   Statins: Atorvastatin- 80 mg daily    Aggressive risk factor modification: HTN, HLD, Obesity, A-Fib     Rehab efforts: The patient has been evaluated by a stroke team provider and the therapy needs have been fully considered based off the presenting complaints and exam findings. The following therapy evaluations are needed: None    Diagnostics ordered/pending: CTA Neck/Arch to assess vasculature, Lipid Profile to assess cholesterol levels, MRI head without contrast to assess brain parenchyma, TTE to assess cardiac function/status     VTE prophylaxis: None: Reason for No Pharmacological VTE Prophylaxis: Currently on anticoagulation    BP parameters: Infarct: No intervention, SBP <220     More active evening 5/22/23 - failed SBT, will continue to re-attempt           HFrEF (heart failure with reduced ejection fraction)  Unclear chronicity. BNP unremarkable.  -Cardiology following  -Metoprolol  -Lasix PRN  -Telemetry  -Entresto      GERD (gastroesophageal reflux disease)  -PPI      Gout  -Continue home allopurinol      Obesity  Body mass index is 30.08 kg/m². Morbid obesity complicates all aspects of disease management from diagnostic modalities to treatment.          Glioblastoma  Chronic. Receives care in [place].  -Continue Decadron  -Palliative Care consulted    Paroxysmal atrial fibrillation  -Xarelto  -Metoprolol  -Diltiazem added by Cardiology 5/19  -Telemetry  -Cardiology consulted        Essential hypertension  -Metoprolol  -Entresto  -Continue to monitor    Acute hypoxemic respiratory failure  In setting of status epilepticus.  -Continuous EEG  -Continue mechanical ventilation given neurologic status  -Pulmonary consulted  -ABG PRN  -Palliative Care consulted- appreciate discussion   -proceed with trach and peg 5/24/23  - LTAC dc tomorrow         VTE Risk Mitigation (From admission, onward)         Ordered     Reason for No Pharmacological VTE Prophylaxis  Once        Question:  Reasons:  Answer:  Already adequately anticoagulated on oral Anticoagulants    05/11/23 1054     IP VTE HIGH RISK PATIENT  Once         05/11/23 1054     Place sequential compression device  Until discontinued         05/11/23 1054                Discharge Planning   BARBARA: 5/25/2023     Code Status: Full Code   Is the patient medically ready for discharge?:     Reason for patient still in hospital (select all that apply): Patient trending condition  Discharge Plan A: Long-term acute care facility (LTAC)   Discharge Delays: None known at this time              Nicky Espinal MD  Department of Hospital Medicine   [facility]

## 2023-05-24 NOTE — ASSESSMENT & PLAN NOTE
In setting of status epilepticus.  -Continuous EEG  -Continue mechanical ventilation given neurologic status  -Pulmonary consulted  -ABG PRN  -Palliative Care consulted- appreciate discussion   -proceed with trach and peg 5/24/23  - LTAC dc tomorrow

## 2023-05-24 NOTE — RESPIRATORY THERAPY
05/23/23 1905   Patient Assessment/Suction   Level of Consciousness (AVPU) responds to pain   Respiratory Effort Unlabored   Expansion/Accessory Muscles/Retractions no use of accessory muscles   All Lung Fields Breath Sounds coarse;diminished   Rhythm/Pattern, Respiratory assisted mechanically   Cough Frequency with stimulation   Cough Type assisted   Suction Method oral;tracheal   Suction Pressure (mmHg) -120 mmHg   $ Suction Charges Inline Suction Procedure Stat Charge   Secretions Amount small   Secretions Color white;tan   Secretions Characteristics thick   Skin Integrity   $ Wound Care Tech Time 15 min   Area Observed Left;Right;Cheek;Upper lip;Lower lip;Corner lip   Skin Appearance without discoloration   PRE-TX-O2   Device (Oxygen Therapy) ventilator   $ Is the patient on Low Flow Oxygen? Yes   Oxygen Concentration (%) 30   SpO2 100 %   Pulse Oximetry Type Continuous   $ Pulse Oximetry - Multiple Charge Pulse Oximetry - Multiple   Pulse 109   Resp (!) 26      Airway Anesthesia 05/11/23   Placement Date/Time: 05/11/23 (c) 0779   Method of Intubation: Video Laryngoscopy  Airway Device: Endotracheal Tube  Mask Ventilation: Moderately difficult with oral airway  Intubated: Other (see comments)  Blade: Glidescope #3  Airway Device Size: 8....   Secured at 24 cm   Measured At Lips   Secured Location Right   Secured by Commercial tube barboza   Bite Block right;secure and patent   Site Condition Dry   Status Intact;Secured;Patent   Site Assessment Clean;Dry   Airway Safety   Is Ambu Bag and Mask with Patient? Yes, Adult Ambu Bag and Mask   Suction set is at the bedside? Yes   Respiratory Interventions   Airway/Ventilation Management airway patency maintained   Vent Select   Conventional Vent Y   Charged w/in last 24h YES   Preset Conventional Ventilator Settings   Vent ID [id number]   Vent Type    Ventilation Type VC   Vent Mode Spont   Humidity HME   PEEP/CPAP 5 cmH20   Pressure Support 10 cmH20   Peak End  Inspiratory Pressure 16 cmH20   Insp Rise Time  70 %   I-Trigger Type  V-TRIG   Trigger Sensitivity Flow/I-Trigger 3 L/min   Patient Ventilator Parameters   Resp Rate Total 27 br/min   Peak Airway Pressure 16 cmH20   Mean Airway Pressure 8.9 cmH20   Plateau Pressure 0 cmH20   Exhaled Vt 338 mL   Total Ve 14.5 L/m   Spont Ve 14.5 L   I:E Ratio Measured 1:2.10   Auto PEEP 0 cmH20   Tubing ID (mm) 8 mm   Tube Type ET   Inspired Tidal Volume (VTI) 0 mL   Conventional Ventilator Alarms   Alarms On Y   Resp Rate High Alarm 40 br/min   Press High Alarm 60 cmH2O   Apnea Rate 12   Apnea Volume (mL) 0 mL   Apnea Oxygen Concentration  100   Apnea Flow Rate (L/min) 65   T Apnea 20 sec(s)   IHI Ventilator Associated Pneumonia Bundle (Required)   Daily Awakening Trials Performed Yes   Daily Assessment of Readiness to Extubate Yes   Oral Care Mouth swabbed;Mouth moisturizer;Mouth suctioned;with mouthwash   Vent Circut Breaks Minimized Yes   Ready to Wean/Extubation Screen   FIO2<=50 (chart decimal) 0.3   MV<16L (chart vol.) 14.5   PEEP <=8 (chart #) 5   Ready to Wean Parameters   F/VT Ratio<105 (RSBI) (!) 76.92   Vital Capacity   Vital Capacity (mL) 0   Education   $ Education Suction;Ventilator Oxygen;15 min   Respiratory Evaluation   $ Care Plan Tech Time 15 min   $ Eval/Re-eval Charges Re-evaluation

## 2023-05-24 NOTE — PROGRESS NOTES
[facility]  Neurology Progress Note    Patient Name: Teto Mendez  MRN: [id number]  : 1955  TODAY'S DATE: 2023  ADMIT DATE: 2023  6:39 AM                                          CONSULT REQUESTED BY: Nicky Espinal MD     Chief Complaint   Patient presents with    FACIAL TWITCHING    SLURRED SPEECH       HPI per EMR:  Patient presents with history of glioblastoma concurrently getting chemo in previous radiation complaining of facial twitching, facial droop, slurred speech, left arm weakness.  Symptom onset at 3:30 a.m..  Patient states at 3:30 a.m. he was playing on a game and then began to have the twitching of the face with associated droop with slurred speech and arm weakness.  Patient states he has had similar episodes in the past secondary to the glioblastoma.  He has not had the left arm weakness.  Patient states he is on Keppra and did take an extra Keppra dose this morning additionally patient is on Xarelto.  At the worst symptoms are severe.    Neurology Consult:  Patient was seen and examined by me today. He is a 67-year-old man with history of glioblastoma multiforme s/p craniectomy, chemotherapy and radiation diagnosed in 2022, as well as seizure disorder, who presented to the ED with left facial twitching, left facial droop, slurred speech and left arm weakness that began the morning of his admission at around 3 am. He was recently decreased on his home Keppra in the setting of side effects as per daughter. Upon arrival to the ED, he was loaded with 1 gr of Keppra and given 2 mg of ativan IV, which seemed to break the seizure. As per ED physician, patient was awake, alert and able to answer questions. At around 11:50 am, I was contacted by ED provider in the setting of sudden worsening of mental status, unresponsiveness, weakness and aphasia, so he was taken to CT scan for repeat CT brain to rule out LVO stroke or hemorrhage. I evaluated patient while on CT,  and noticed O2 Sats were in the high 80s. Due to technical malfunction, CT could not be completed, at which point I decided to abort procedure and transport patient to the ICU immediately. When we arrived to the 3rd floor ICU, patient seemed to be on respiratory failure, with toe and fingertip cyanosis, unable to maintain O2 Sats, so started on manual assisted ventilation and called anesthesia for emergent intubation. Patient was successfully intubated and sedation with propofol was started, but facial twitching recurred, so ativan was given and propofol bolus was administered, after which seizure activity seemed to cease. He was taken for CT after stabilization which showed no evidence of acute stroke or intracranial hemorrhage, and CTA showed no LVO. He is now being monitored on 24H continuous EEG for management of status epilepticus.    5/12/23:  Patient was seen and examined by me today.  He remains on deep sedation for treatment of status epilepticus, being monitored on 24 hour video EEG.  A brief electroclinical seizure was seen at around 4:00 a.m. with left facial twitching, but no clinical events reported after this.  Plan is to continue deep sedation until at least tomorrow morning.  Patient to be hooked up to EEG again when propofol starts to being weaned down.    5/13/23: I, Dr James assumed care on 5/13.  Patient is intubated, sedated with propofol.  He started out continuous EEG this morning shows right hemisphere lateralized periodic discharges with epileptiform potential.  No electrographic seizures were recorded.    5/14/2023:  Patient was seen examined by me.  Remains to be intubated and sedated.  MRI brain done this morning showed right MCA cortical infarcts.  Patient has been on continuous EEG overnight and EEG showed right hemispheric PLEDs with increase in frequency of PLEDs with lowering sedation.  Patient also had a clinical focal seizure involving twitching of the left side of the face this  morning when he was off sedation.    5/15/2023:  Patient was seen examined by me.  He is intubated and Precedex.  Propofol has been turned off yesterday.  Remains to be on continues EEG which shows right hemispheric (predominantly right frontotemporal) periodic lateralized epileptiform discharges.  Episodes of left facial twitching did not have EEG correlate.    5/16: Patient was seen and examined by me. He remains to be intubated and not on sedation. No response any stimulus. On CEEG. Family was at bedside and I discussed plan of care with them. Had one episode of electro clinical seizure yesterday at 12:20 PM lasting for a minute    5/17:  Patient was seen examined by me.  Remains to be intubated and off sedation.  Continuous EEG discontinued this morning. No electrographic seizure  recorded.    5/18:  Patient was seen examined by me.  Remains to be intubated.  Patient tries to open eyes to verbal commands and minimally follows commands with right upper extremity squeezing my fingers.  No further seizure activity was noted.  EEG has been off.    5/19:  Patient was seen and examined by me.  Remains to be intubated.  Minimally follows commands with his right arm and tries to open eyes to verbal stimulus.    5/20: Patient was seen and examined by me this morning.  No acute overnight events, no new neurological complaints.  Vitals have been stable.  Remains to be intubated however not on ventilator.  He is on T-piece.  Tries to open eyes to verbal stimulus somewhat follows commands with right upper extremity.  Patient had transaminitis and slow drop in platelet count which could likely be from Depakote.    5/21:  Patient was seen examined me this morning.  He is back on the ventilator.  He had a fever of 102 this morning.  Nurse noticed left facial twitching lasting for few minutes this morning and resolved on its own.  Patient tries to open eyes to verbal stimulus squeezes fingers with right hand however not  following as brisk as yesterday    5/22:  Patient was seen and examined by me this morning.  He is intubated on AC and sedation with propofol was started last night for vent management.  Examined him off sedation.    5/23:  Patient was seen and examined by me this morning.  He remains to be intubated and on AC.  Opens eyes to stimulus and is able to track.  Follows commands briskly on the right side extremities and to some extent on the left side extremities as well.    5/24:  Patient was seen and examined by me this morning.  Apparently was agitated last night and was briefly put on Versed.  Currently on not on any sedation.  Able to open eyes and track and follow commands which is unchanged from yesterday.  CPAP was tried yesterday however patient did not tolerate well.  Pending trach and PEG today.  No further seizure-like episodes.    Scheduled Meds:   allopurinoL  100 mg Per NG tube Daily    atorvastatin  20 mg Per NG tube Daily    ceFEPime (MAXIPIME) IVPB  2 g Intravenous Q8H    dexAMETHasone  1 mg Per NG tube Q12H    diltiaZEM  30 mg Oral Q6H    lacosamide (VIMPAT) IVPB  200 mg Intravenous Q12H    levetiracetam IV  1,500 mg Intravenous Q12H    metoprolol tartrate  50 mg Per NG tube BID    pantoprazole  40 mg Intravenous Daily    timolol maleate 0.25%  1 drop Both Eyes BID     Continuous Infusions:   midazolam Stopped (05/24/23 0800)       PRN Meds:.acetaminophen, colchicine, hydrALAZINE, ibuprofen, lorazepam, lorazepam, magnesium sulfate IVPB, magnesium sulfate IVPB, metoprolol, ondansetron, polyethylene glycol, potassium bicarbonate, potassium bicarbonate, potassium bicarbonate, sodium chloride 0.9%, sodium phosphate IVPB, sodium phosphate IVPB, sodium phosphate IVPB      Physical Exam  Current Vitals:  Vitals:    05/24/23 0951   BP: 113/72   Pulse: 100   Resp:    Temp:      General appearance: intubated, opens eyes to stimulus, tracks and follows commands  Cardiovascular: Afib  Pulmonary:  Intubated and on  ventilator  GI: soft  MSK: normal passive ROM  Skin: normal color, no lesions    NEUROLOGICAL EXAM:    Mental status: intubated.  Opens eyes to stimulus, tracks and follows commands with bilateral upper lower extremities however briskly on the right          Intubated: yes       Response to noxious stimulation:  Yes  Breathes above ventilator:  Yes  Opens eyes:  Yes  Pupils: symmetric          Left: reactive          Right: reactive  Eye movements: No pathologic movements such as nystagmus, ocular bobbing, dipping or roving.    Corneal reflex: present  Oculocephalic reflex:  Present  Cough:  Present  Gag:  Present  Motor exam: Normal muscle tone. Normal muscle bulk. No abnormal movements such as tremors, myoclonus or twitching.  DTRs: 1+ throughout. Babinski response absent.      Laboratory Data & Studies    Recent Labs   Lab 05/22/23 0300 05/23/23 0317 05/24/23  0335   WBC 8.97 10.79 8.99   HGB 13.2* 13.5* 12.0*   * 201 207   MCV 95 92 95       Recent Labs   Lab 05/22/23 0300 05/23/23 0317 05/24/23  0335    141 143   K 3.4* 4.1 4.1    105 105   CO2 28 28 30*   BUN 38* 35* 34*   * 120* 128*   CALCIUM 7.7* 8.1* 7.9*   MG 1.8 1.9 1.8   PHOS 2.9 2.4* 3.4       Recent Labs   Lab 05/22/23 0300 05/23/23 0317 05/24/23  0335   PROT 5.0* 5.2* 5.0*   ALBUMIN 1.9* 2.1* 2.0*   BILITOT 0.9 0.8 0.6   AST 86* 94* 74*   ALT 78* 99* 90*   ALKPHOS 37* 46* 45*       No results for input(s): LABPT, INR, APTT in the last 168 hours.      No results for input(s): HGBA1C, CHOL, TRIG, LDLCALC, HDL, TSH in the last 168 hours.        Microbiology:  Microbiology Results (last 7 days)       Procedure Component Value Units Date/Time    Blood culture [[id number]] Collected: 05/21/23 0743    Order Status: Completed Specimen: Blood Updated: 05/24/23 0832     Blood Culture, Routine No Growth to date      No Growth to date      No Growth to date      No Growth to date    IV catheter culture [[id number]] Collected:  05/21/23 1825    Order Status: Completed Specimen: Catheter Tip, PICC Updated: 05/24/23 0814     Aerobic Culture - Cath tip No growth    Blood culture [[id number]] Collected: 05/19/23 1504    Order Status: Completed Specimen: Blood Updated: 05/23/23 1632     Blood Culture, Routine No Growth to date      No Growth to date      No Growth to date      No Growth to date      No Growth to date    Narrative:      Collection has been rescheduled by [email or web address] at 05/19/2023 11:30 Reason:   RAMYA Aguilar is messaging the dr will call when ready   Collection has been rescheduled by [email or web address] at 05/19/2023 11:30 Reason:   RAMYA Aguilar is messaging the dr will call when ready     Blood culture [[id number]] Collected: 05/19/23 1504    Order Status: Completed Specimen: Blood Updated: 05/23/23 1632     Blood Culture, Routine No Growth to date      No Growth to date      No Growth to date      No Growth to date      No Growth to date    Narrative:      Collection has been rescheduled by [email or web address] at 05/19/2023 11:30 Reason:   RAMYA Aguilar is messaging the dr will call when ready   Collection has been rescheduled by [email or web address] at 05/19/2023 11:30 Reason:   RAMYA Aguilar is messaging the dr will call when ready     Blood culture [[id number]] Collected: 05/21/23 1106    Order Status: Completed Specimen: Blood from Line, PICC Left Brachial Updated: 05/23/23 1232     Blood Culture, Routine No Growth to date      No Growth to date      No Growth to date    Narrative:      From PICC  Collection has been rescheduled by RE1 at 05/21/2023 10:05 Reason:   Nurse Draw  Collection has been rescheduled by RE1 at 05/21/2023 10:05 Reason:   Nurse Draw    Blood culture [[id number]] Collected: 05/21/23 1107    Order Status: Completed Specimen: Blood from Line, Arterial, Left Updated: 05/23/23 1232     Blood Culture, Routine No Growth to date      No Growth to date      No Growth to date    Narrative:      From arterial line  Collection has been rescheduled by RE1 at 05/21/2023 10:05 Reason:    Nurse Draw  Collection has been rescheduled by RE1 at 05/21/2023 10:05 Reason:   Nurse Draw    IV catheter culture [[id number]]  (Abnormal) Collected: 05/21/23 1826    Order Status: Completed Specimen: Catheter Tip, Arterial Updated: 05/23/23 0827     Aerobic Culture - Cath tip COAGULASE-NEGATIVE STAPHYLOCOCCUS SPECIES  > 15 colonies  Multiple morphotyes - probable contaminates      Culture, Respiratory with Gram Stain [[id number]]  (Abnormal)  (Susceptibility) Collected: 05/21/23 1723    Order Status: Completed Specimen: Respiratory from Endotracheal Aspirate Updated: 05/23/23 0728     Respiratory Culture No normal respiratory fredi      PSEUDOMONAS AERUGINOSA  Moderate       Gram Stain (Respiratory) <10 epithelial cells per low power field.     Gram Stain (Respiratory) Many WBC's     Gram Stain (Respiratory) Few Gram negative rods     Gram Stain (Respiratory) Rare Gram positive rods    Urine culture [[id number]]  (Abnormal)  (Susceptibility) Collected: 05/21/23 1104    Order Status: Completed Specimen: Urine Updated: 05/23/23 0655     Urine Culture, Routine KLEBSIELLA PNEUMONIAE  >100,000 cfu/ml      Narrative:      Specimen Source->Urine              Imaging:  CT Head Without Contrast    Result Date: 5/11/2023  CMS MANDATED QUALITY DATA - CT RADIATION  436 All CT scans at this facility utilize dose modulation, iterative reconstruction, and/or weight based dosing when appropriate to reduce radiation dose to as low as reasonably achievable. CT HEAD WITHOUT IV CONTRAST CLINICAL HISTORY: 67 years Male Mental status change, unknown cause COMPARISON: Noncontrast CT head performed earlier today 6:56 AM FINDINGS: Negative for acute intracranial hemorrhage, midline shift, or mass effect. Parenchymal calcifications within the right cerebral hemisphere are stable compared to prior. Patient is status post right pterional craniotomy. Ventricles and sulci are normal in size. Gray-white differentiation is maintained. Cerebellar  hemispheres and brainstem are unremarkable. No calvarial lesion or fracture. Mastoid air cells are clear. IMPRESSION: Stable exam. No CT evidence of acute intracranial pathology. Electronically signed by:  Matt Cole MD  5/11/2023 1:41 PM CDT Workstation: [id number]    X-Ray Chest AP Portable    Result Date: 5/11/2023  CLINICAL HISTORY: 67 years (1955) Male Stroke TECHNIQUE: Portable AP radiograph the chest. COMPARISON: None available. FINDINGS: Nonspecific minimal faint interstitial opacity at the left costophrenic.  No pneumothorax is identified. The heart is mildly enlarged.  Osseous structures show degenerative changes in the spine. The visualized upper abdomen is unremarkable. IMPRESSION: Nonspecific minimal faint interstitial opacity at the left costophrenic sulcus possibly representing atelectasis, scarring, trace pleural fluid, and less likely aspiration or pneumonia. . Electronically signed by:  Solomon Tobias MD  5/11/2023 8:32 AM CDT Workstation: [id number]    CT HEAD FOR STROKE    Result Date: 5/11/2023  CMS MANDATED QUALITY DATA - CT RADIATION - 436 All CT scans at this facility utilize dose modulation, iterative reconstruction, and/or weight based dosing when appropriate to reduce radiation dose to as low as reasonably achievable. EXAMINATION: CT HEAD FOR STROKE CLINICAL HISTORY: Neuro deficit, acute, stroke suspected;  history of glioblastoma TECHNIQUE: CT without IV contrast COMPARISON: None FINDINGS: There are postsurgical changes from prior right frontal temporal These craniotomy.  The ventricles and sulci are mildly prominent compatible with generalized cerebral atrophy.  There is no hemorrhage, mass or midline shift.  There are no extra-axial fluid collections.  The cerebellum and brainstem are normal.  The gray-white differentiation is maintained.  The orbits are unremarkable. The paranasal sinuses and mastoid air cells are clear.     Mild cerebral atrophy with no acute intracranial  process Prior right frontal temporal craniotomy Findings were called to Dr. Zhou in the emergency department at 07:09 a.m. Electronically signed by: Yi Hernandez MD Date:    05/11/2023 Time:    07:10    CTA Head and Neck (xpd)    Result Date: 5/11/2023  CMS MANDATED QUALITY DATA - CT RADIATION - 436 All CT scans at this facility utilize dose modulation, iterative reconstruction, and/or weight based dosing when appropriate to reduce radiation dose to as low as reasonably achievable. CMS MANDATED QUALITY DATA - CAROTID - 195 All measurements and percent stenosis described below were determined using NASCET criteria or criteria similar to NASCET, as defined by the Society of Radiologists in Ultrasound Consensus Conference, Radiology, 2003 Reason: Stroke/TIA, determine embolic source Technique: CT angiography of brain and neck with 100 mL Omnipaque 350.  Maximum intensity projection coronal reformations were obtained at a separate workstation and stored in the patient's permanent medical record. COMPARISON: 5/11/2023 CTA BRAIN: VASCULAR FINDINGS: Major intracranial arteries are widely patent with no stenosis, intraluminal filling, or dissection. Minimal atherosclerotic plaque affects the cavernous segments of bilateral internal carotid arteries. Negative for aneurysm or evidence of vasculitis. Opacified visualized dural venous sinuses are unremarkable. NONVASCULAR FINDINGS: No abnormal intra-axial or extra-axial enhancement. No midline shift. Postsurgical changes of right pterional craniotomy are noted. 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 Minimal atherosclerotic plaque affects left carotid bulb. Left carotid arteries are otherwise without plaque or stenosis. Left vertebral artery is patent. Minimal atherosclerotic plaque affects the right carotid bulb. Right carotid arteries are without additional plaque and remain widely  patent. Right vertebral artery is patent. NONVASCULAR FINDINGS: Endotracheal tube has tip proximal to mio. Visualized lung apices show minor dependent atelectasis. Cervical soft tissues are unremarkable. IMPRESSION: 1. Trace atherosclerotic plaque in intracranial internal carotid arteries, without other abnormality of the major intracranial arteries. 2. Trace atherosclerotic plaque in bilateral carotid bulbs, with no stenosis or other abnormality of cervical ICAs or vertebral arteries. Electronically signed by:  Compa Lopez MD  5/11/2023 2:01 PM CDT Workstation: [id number]    CTA Head and Neck (xpd)    Result Date: 5/11/2023  CMS EXAMINATION: CTA HEAD AND NECK (XPD) CLINICAL INDICATION: Male, 67 years old. Stroke/TIA, determine embolic source history of glioblastoma TECHNIQUE: Axial CT angiogram of the head and neck was performed with contrast. Multiplanar reformations as well as 3-D volume rendered imaging were reviewed at the workstation. Degree of stenosis was measured utilizing the NASCET method. CONTRAST: 100 mL mL of Omnipaque 350 IV. COMPARISON: None FINDINGS: CTA brain: The distal vertebral arteries, basilar artery and cavernous portions of the internal carotid arteries are widely patent. The anterior cerebral arteries, middle cerebral arteries and posterior cerebral arteries are widely patent without evidence of large vessel occlusion, significant stenosis, AVM or aneurysm. The dural venous sinuses are patent. There is no pathologic enhancement. Aortic Arch and Great Vessel Origins: 2 vessel aortic arch which is widely patent Subclavian Arteries: Widely patent. Right Common Carotid Artery: Widely patent. Right Internal Carotid Artery: Widely patent. Right External Carotid Artery: Widely patent. Left Common Carotid Artery: Widely patent. Left Internal Carotid Artery: Widely patent. Left External Carotid Artery: Widely patent. Right Vertebral Artery: Widely patent. Left Vertebral Artery: Widely patent.  The paraspinous soft tissues are unremarkable. There are mild degenerative changes of the cervical spine. The lung apices are clear. IMPRESSION: Negative CTA of the head and neck. This exam was performed according to our departmental dose-optimization program which includes automated exposure control, adjustment of the mA and/or kV according to patient size and/or use of iterative reconstruction technique. Electronically signed by:  Yi Hernandez MD  5/11/2023 7:43 AM CDT Workstation: [id number]        Assessment and Plan:    Status Epilepticus  History of Glioblastoma Multiforme s/p craniectomy, chemo and radiotherapy  Acute ischemic infarct   Atrial fibrillation       67-year-old man with history of glioblastoma multiforme s/p craniectomy, chemotherapy and radiation diagnosed in August 2022, as well as seizure disorder, who presented to the ED with left facial twitching, left facial droop, slurred speech and left arm weakness. Upon arrival to the ED, he was loaded with 1 gr of Keppra and given 2 mg of ativan IV, which seemed to break the seizure. However, he experienced sudden worsening of mental status, with unresponsiveness, weakness and aphasia, so he was taken to CT scan for repeat CT brain to rule out LVO stroke or hemorrhage. He was then intubated, sedated and placed on mechanical ventilation. Clinical picture more consistent with status epilepticus.      - Admitted to hospital medicine in the ICU with q2 hour neuro checks, on telemetry, continuous pulse oximetry  - Seizure precautions while inpatient  - initially underwent 24-hour VEEG continuous monitoring with evidence of 4 electrographic seizures which resolved when dose of propofol was increased to 60 mcg/kg/min. By the end of the recording, a burst suppression pattern with decreased LPEDs was seen.   -on 5/13 patient was again put on continues EEG monitoring while weaning sedation.  Initially EEG showed burst suppression pattern with bursts of 1-2  seconds of delta activity followed by 2-4 seconds of background suppression and also showed right hemispheric lateralized periodic epileptiform discharges.  Background changed to a mixture of delta and theta activity with PLEDs becoming more frequent with lowering sedation to 30mcg/kg/min.    - cEEG stopped on 5/17. Showed intermittent lateralized periodic discharges on the right frontotemporal leads and significant slowing of the background bilaterally.   -patient has been off sedation for more than 6 days Somewhat improvement in mental status and tries to open eyes with verbal stimulus and able to squeeze my fingers with his right hand to command since 5/18.   - MRI brain showed right hemispheric infarcts.  Etiology could likely be secondary to recent radiation versus cardioembolism in the setting of atrial fibrillation. Continue anticoagulation with Xarelto 20 mg daily  - Patient had transaminitis and slow drop in platelet count which could likely be from Depakote.  Depakote weaned off.  Monitor for clinical seizures.  Monitor for any clinical seizures or electrographic seizures.  Platelet count has been improving.  - 5/21:  EEG showed moderate encephalopathy without seizures or epileptiform activity.  - Fever resolved, Pseudomonas in his sputum. On cefepime.  If mental status continues to improve, okay to continue with cefepime. If decline in mental status is noted, recommend switching cefepime to another antibiotic as it can worsen mental status.  -repeat CT head showed no hemorrhage or acute changes.  Patient's mental status is improving slowly.  -patient planned for trach and PEG today.  - Continue Vimpat 200 mg b.i.d..  continue Keppra 1500 mg BID IV  - Avoid seizure threshold lowering medications such as:  Bupropion, diphenhydramine, tramadol  - workup and treatment of metabolic and infectious abnormalities as per primary team  - Will follow along      DVT prophylaxis with chemo/SCD prophylaxis      Patient  to follow up with [facility] at [phone] within 3 days from discharge.        All questions were answered.                              Thank you kindly for including us in the care of this patient. Please do not hesitate to contact us with any questions.       Critical Care:  38 minutes of critical care time has been spent evaluating with the patient. Time includes chart review not limited to diagnostic imaging, labs, and vitals, patient assessment, discussion with family and nursing, current order evaluations, and new order entries.      Mukund James MD  Neurology/Vascular Neurology

## 2023-05-24 NOTE — PROGRESS NOTES
[facility] Medicine  Progress Note    Patient Name: Teto Mendez  MRN: [id number]  Patient Class: IP- Inpatient   Admission Date: 5/11/2023  Length of Stay: 13 days  Attending Physician: Nicky Espinal MD  Primary Care Provider: Primary Doctor No        Subjective:     Principal Problem:Status epilepticus        HPI:  Teto Mendez is a 67 y.o. White male with known history of  glioblastoma s/p resection, seizures p.afib on xarelto who presented to ER this morning with left arm weakness, facial twitching and change in his speech that he suspected was seizure activity.  He took an extra keppra and presented to ER around 6:30-7 am.  Head CT/CTA in ER without hemorrhage or LVO and he was not a candidate for tPA regardless.  He was given lorazepam and keppra with positive response, however, while still in ER he became less responsive and repeat stat CT was ordered.  While in CT, he became hypoxic with depressed respiratory status and required bag-mask ventilation and emergent intubation on return to ICU.  History was obtained from the ER physician Sign-out.      Overview/Hospital Course:  Teto Mendez is a 67 year old male with a past medical history of glioblastoma s/p craniotomy complicated by seizures, obesity, HTN, AFib and GERD who presented with status epilepticus. Neurology has been consulted. The patient was intubated on propofol sedation with Keppra and valproic acid. Pulmonary has been consulted. Continuous EEG is ordered; the patient continued to have PLEDs throughout his course. MRI brain shows R MCA region infarcts consistent with acute CVA. His course has been complicated by Afib with RVR as well as a HFrEF seen on TTE which appears to be chronic. Cardiology has been consulted. He is on metoprolol and Xarelto with PRN diuresis. Entresto and high dose atorvastatin was also added. Diltiazem was also added by Cardiology 5/18.  Sedation with propofol and Precedex was stopped  "5/15. Vimpat was also added 5/15 by Neurology. He has been monitored for improvement in his neurologic status for > 72 hours and is noted to have some right hand squeeze when directed as well as some eye opening as of 5/18. Neurology has consulted Palliative Care 5/17 for assistance with end of life care; they may consider changing code status to DNR; they are hopeful the patient may continue show signs of neurologic progress, but acknowledge the severity of his case.     HR elevated 120-130s with stimulation and fatigue off vent setting; HR much improved back on vent; low grade temps progressed to high grade temps, SBP 70s, started on levophed. All lines removed, tips cultured and replaced. Cooling blanket, tylenol, ibuprofen and icepacks. On Cefepime IV. Vanc dc'd  Resp culture (5/21) - Pseudomons -presumptive  Urine culture (5/21) - GNR     Family met with Palliative Care team- they would like to proceed with trach and peg and continue full code. Head CT (5/22) no ICH, +cerebral atrophy; persistent mastoiditis findings. Patient is more active and following commands.   Hold anticoagulation, general surgery consulted       Interval History: see "Hospital Course"    Review of Systems   Unable to perform ROS: Patient nonverbal   Objective:     Vital Signs (Most Recent):  Temp: 99 °F (37.2 °C) (05/23/23 1745)  Pulse: 81 (05/23/23 2305)  Resp: (!) 29 (05/23/23 2305)  BP: 113/67 (05/23/23 2105)  SpO2: 98 % (05/24/23 0056) Vital Signs (24h Range):  Temp:  [98.6 °F (37 °C)-99 °F (37.2 °C)] 99 °F (37.2 °C)  Pulse:  [] 81  Resp:  [17-40] 29  SpO2:  [98 %-100 %] 98 %  BP: (113-163)/() 113/67  Arterial Line BP: (102-158)/() 104/89     Weight: 98.7 kg (217 lb 9.5 oz)  Body mass index is 32.13 kg/m².    Intake/Output Summary (Last 24 hours) at 5/24/2023 0117  Last data filed at 5/23/2023 1813  Gross per 24 hour   Intake 3501.06 ml   Output 2100 ml   Net 1401.06 ml           Physical Exam  Vitals and nursing " note reviewed.   Constitutional:       Appearance: He is ill-appearing.   HENT:      Head: Normocephalic and atraumatic.      Right Ear: External ear normal.      Left Ear: External ear normal.      Nose: Nose normal.      Comments: NG tube.     Mouth/Throat:      Mouth: Mucous membranes are moist.      Pharynx: Oropharynx is clear.      Comments: ETT.  Cardiovascular:      Rate and Rhythm: Tachycardia present. Rhythm irregular.      Pulses: Normal pulses.      Heart sounds: Normal heart sounds.   Pulmonary:      Effort: Pulmonary effort is normal.      Breath sounds: Normal breath sounds.   Abdominal:      General: Bowel sounds are normal.      Palpations: Abdomen is soft.   Genitourinary:     Comments: Iraheta.  Musculoskeletal:      Right lower leg: Edema present.      Left lower leg: Edema present.   Skin:     General: Skin is warm and dry.   Neurological:      GCS: GCS eye subscore is 3. GCS verbal subscore is 1. GCS motor subscore is 6.           Significant Labs: All pertinent labs within the past 24 hours have been reviewed.    Significant Imaging: I have reviewed all pertinent imaging results/findings within the past 24 hours.  IMPRESSION: CXR 5/21/2023     1.  Unchanged enlarged cardiomediastinal silhouette.  2.  Prominence of the central hilar vascular structures and mild perihilar interstitial thickening noted, possibly reflecting pulmonary vascular congestion and mild pulmonary edema.  3.  Left basilar subsegmental atelectasis.  1.  Unchanged enlarged cardiomediastinal silhouette.  2.  Low lung volumes limit evaluation. Prominence of interstitium the lungs likely secondary to low lung volumes.  3.  Left basilar subsegmental atelectasis.        Assessment/Plan:      * Status epilepticus  In setting of glioblastoma. MRI brain also shows R MCA region infarcts. GCS 10 off sedation.  -Continue Depacon and Keppra  -Vimpat added 5/14  -Amantadine  -Neurology following  -Continuous EEG  -Continue intubation with  mechanical ventilation  -Palliative Care consulted    Hypernatremia  -Increase free water flushes on tube feeds  -Trend Na      Fever  Secondary to VAP and indwelling minaya catheter  -U/A, blood cultures, CXR, and procalcitonin  -lines removed and replaced  -Cefepime   -Resp culture - presumed pseudomonas  -urine cx - GNR  -blood culture - NTD        Goals of care, counseling/discussion  -Palliative Care consulted  -Full code at this time with possible change to DNR    Acute CVA (cerebrovascular accident)    Antithrombotics for secondary stroke prevention: Anticoagulants: Rivaroxaban 20 mg daily    Statins for secondary stroke prevention and hyperlipidemia, if present:   Statins: Atorvastatin- 80 mg daily    Aggressive risk factor modification: HTN, HLD, Obesity, A-Fib     Rehab efforts: The patient has been evaluated by a stroke team provider and the therapy needs have been fully considered based off the presenting complaints and exam findings. The following therapy evaluations are needed: None    Diagnostics ordered/pending: CTA Neck/Arch to assess vasculature, Lipid Profile to assess cholesterol levels, MRI head without contrast to assess brain parenchyma, TTE to assess cardiac function/status     VTE prophylaxis: None: Reason for No Pharmacological VTE Prophylaxis: Currently on anticoagulation    BP parameters: Infarct: No intervention, SBP <220     More active evening 5/22/23 - failed SBT, will continue to re-attempt           HFrEF (heart failure with reduced ejection fraction)  Unclear chronicity. BNP unremarkable.  -Cardiology following  -Metoprolol  -Lasix PRN  -Telemetry  -Entresto      GERD (gastroesophageal reflux disease)  -PPI      Gout  -Continue home allopurinol      Obesity  Body mass index is 30.08 kg/m². Morbid obesity complicates all aspects of disease management from diagnostic modalities to treatment.         Glioblastoma  Chronic. Receives care in [place].  -Continue Decadron  -Palliative  Care consulted    Paroxysmal atrial fibrillation  -Xarelto  -Metoprolol  -Diltiazem added by Cardiology 5/19  -Telemetry  -Cardiology consulted        Essential hypertension  -Metoprolol  -Entresto  -Continue to monitor    Acute hypoxemic respiratory failure  In setting of status epilepticus.  -Continuous EEG  -Continue mechanical ventilation given neurologic status  -Pulmonary consulted  -ABG PRN  -Palliative Care consulted- appreciate discussion   -proceed with trach and peg if needed        VTE Risk Mitigation (From admission, onward)         Ordered     Reason for No Pharmacological VTE Prophylaxis  Once        Question:  Reasons:  Answer:  Already adequately anticoagulated on oral Anticoagulants    05/11/23 1054     IP VTE HIGH RISK PATIENT  Once         05/11/23 1054     Place sequential compression device  Until discontinued         05/11/23 1054                Discharge Planning   BARBARA: 5/29/2023     Code Status: Full Code   Is the patient medically ready for discharge?:     Reason for patient still in hospital (select all that apply): Patient trending condition  Discharge Plan A: Long-term acute care facility (LTAC)   Discharge Delays: None known at this time              Nicky Espinal MD  Department of Hospital Medicine   [facility]

## 2023-05-24 NOTE — PT/OT/SLP PROGRESS
Occupational Therapy   Treatment    Name: Teto Mendez  MRN: [id number]  Admitting Diagnosis:  Status epilepticus       Recommendations:     Discharge Recommendations: LTACH (long-term acute care hospital) (vs Neuro Rehab Facility)  Discharge Equipment Recommendations:   (TBD at next level of care)  Barriers to discharge:  Other (Comment) (high acuity)    Assessment:     Teto Mendez is a 67 y.o. male with a medical diagnosis of Status epilepticus.  Performance deficits affecting function are weakness, impaired endurance, impaired self care skills, impaired functional mobility, gait instability, impaired balance, impaired cognition, decreased safety awareness, impaired coordination, impaired fine motor, edema, decreased upper extremity function, decreased lower extremity function, decreased coordination, abnormal tone.     Pt again demonstrated consistent simple motor command following today; LUE weakness remains.      Rehab Prognosis:  Good; patient would benefit from acute skilled OT services to address these deficits and reach maximum level of function.       Plan:     Patient to be seen 5 x/week to address the above listed problems via self-care/home management, therapeutic activities, therapeutic exercises, neuromuscular re-education, community/work re-entry, cognitive retraining, wheelchair management/training  Plan of Care Expires: 06/22/23  Plan of Care Reviewed with: patient    Subjective     Pain/Comfort:  Pain Rating 1:  (pt in no apparent pain)  Pain Rating Post-Intervention 1:  (pt in no apparent pain)    Objective:     Communicated with: nurse prior to session.  Patient found right sidelying with telemetry, Tracheostomy, NG tube, pulse ox (continuous), peripheral IV, blood pressure cuff, bowel management system, minaya catheter, arterial line, central line upon OT entry to room.    General Precautions: Standard, fall, NPO, aspiration    Orthopedic Precautions:      Occupational Performance:     Bed  Mobility:    Patient completed Rolling/Turning to Right with total assistance     Activities of Daily Living:  Grooming: maximal assistance hand over hand assistance to wipe eyes with right hand     Treatment & Education:  Pt able to demonstrate very light grasp of left hand; passive/active ROM completed to all major joints of LUE    Pt completed AAROM exercises at all major joints of RUE    Patient left HOB elevated with all lines intact, call button in reach, and bed alarm on    GOALS:   Multidisciplinary Problems       Occupational Therapy Goals          Problem: Occupational Therapy    Goal Priority Disciplines Outcome Interventions   Occupational Therapy Goal     OT, PT/OT     Description: Goals to be met by: 6/22/23     Patient will increase functional independence with ADLs by performing:    Patient's family / caregiver will demonstrate independence and safety with assisting patient with bilateral upper extremity active/active assisted exercises.  Pt to follow one-step motor commands with 100% accuracy  Pt will tolerate sitting at the edge of the bed for ten minutes with minimal assistance.   Patient will complete grooming tasks at the edge of the bed with minimal assistance.                           Time Tracking:     OT Date of Treatment: 05/24/23  OT Start Time: 1200  OT Stop Time: 1229  OT Total Time (min): 29 min    Billable Minutes:Self Care/Home Management 08  Therapeutic Exercise 21    OT/JYOTI: OT          5/24/2023

## 2023-05-24 NOTE — PROGRESS NOTES
[facility]  Adult Nutrition   Progress Note (Nutrition Support Management)    SUMMARY      Recommendations:   1. Continue current TF of Vital HP at 75 mls/h providing 1800 kcals, 158 g protein and 1505 mls fluid  2. Continue FWF's of 30 mls every 4 hours to clear tube.  3. Rd will continue to monitor all aspects of enteral feeding and make recommendations prn.       Goals:   1) Pt to tolerate TF at the goal rate.   2) Nutrition provision to meet 75 to 100% of pts energy needs and 100% or more of his protein needs.   3) Electrolytes to trend towards target range of WNL's.    Dietitian Rounds Brief  F/U Nutrition Note: Observed pt during ICU rounds today and he had another bad night last per MD and was given versed again and is out today. He will have a trach and PEG placed today at 1.45 pm and will follow up after that.    Diet order: NPO    TF: Vital HP  Rate: 75 mls/h  Calories: 1800   Protein: 158 g  Fluid: 1505  Water flushes: 30 mls every 4 hours    % Intake of Estimated Energy Needs: 75 - 100 %  % Meal Intake: NPO    Estimated/Assessed Needs  Weight Used For Calorie Calculations: 92 kg (202 lb 13.2 oz)  Energy Calorie Requirements (kcal): 0671-6736 kcals/day (11-14 kcals/kg ABW), 1977 (modified PSE)  Energy Need Method: Kcal/kg  Protein Requirements: 146-183 g/day (2-2.5 g/kg IBW)  Weight Used For Protein Calculations: 73 kg (160 lb 15 oz)  Fluid Requirements (mL): 1860 (30 ml/kg IBW)  Estimated Fluid Requirement Method: RDA Method  RDA Method (mL): 1012       Weight History:  Wt Readings from Last 5 Encounters:   05/20/23 98.7 kg (217 lb 9.5 oz)   05/13/23 92.1 kg (203 lb)        Reason for Assessment  Reason For Assessment: RD follow-up  Diagnosis: seizures  Relevant Medical History: GERD; gioblastoma; h/o blood clots; HTN; paroxysmal A.fib.; seizures  Interdisciplinary Rounds: attended    Medications:Pertinent Medications Reviewed  Scheduled Meds:   allopurinoL  100 mg Per NG tube Daily     atorvastatin  20 mg Per NG tube Daily    ceFEPime (MAXIPIME) IVPB  2 g Intravenous Q8H    dexAMETHasone  1 mg Per NG tube Q12H    diltiaZEM  30 mg Oral Q6H    lacosamide (VIMPAT) IVPB  200 mg Intravenous Q12H    levetiracetam IV  1,500 mg Intravenous Q12H    metoprolol tartrate  50 mg Per NG tube BID    pantoprazole  40 mg Intravenous Daily    timolol maleate 0.25%  1 drop Both Eyes BID     Continuous Infusions:   dexmedeTOMIDine (Precedex) infusion (titrating)       PRN Meds:.acetaminophen, colchicine, hydrALAZINE, ibuprofen, lorazepam, lorazepam, magnesium sulfate IVPB, magnesium sulfate IVPB, metoprolol, ondansetron, polyethylene glycol, potassium bicarbonate, potassium bicarbonate, potassium bicarbonate, sodium chloride 0.9%, sodium phosphate IVPB, sodium phosphate IVPB, sodium phosphate IVPB    Labs: Pertinent Labs Reviewed  Clinical Chemistry:  Recent Labs   Lab 05/22/23  0300 05/23/23  0317 05/24/23  0335    141 143   K 3.4* 4.1 4.1    105 105   CO2 28 28 30*   * 120* 128*   BUN 38* 35* 34*   CREATININE 0.5 0.6 0.7   CALCIUM 7.7* 8.1* 7.9*   PROT 5.0* 5.2* 5.0*   ALBUMIN 1.9* 2.1* 2.0*   BILITOT 0.9 0.8 0.6   ALKPHOS 37* 46* 45*   AST 86* 94* 74*   ALT 78* 99* 90*   ANIONGAP 7* 8 8   MG 1.8 1.9 1.8   PHOS 2.9 2.4* 3.4     CBC:   Recent Labs   Lab 05/24/23  0335 05/24/23  0419   WBC 8.99  --    RBC 3.85*  --    HGB 12.0*  --    HCT 36.4* 34*     --    MCV 95  --    MCH 31.2*  --    MCHC 33.0  --      Inflammatory Labs:  Recent Labs   Lab 05/21/23  1616   CRP 3.92*     No results for input(s): TSH, FREET4, P8MXMQE, W9GOYBI, THYROIDAB in the last 168 hours.    Monitor and Evaluation  Food and Nutrient Intake: enteral nutrition intake  Food and Nutrient Adminstration: enteral and parenteral nutrition administration  Knowledge/Beliefs/Attitudes: beliefs and attitudes, food and nutrition knowledge/skill  Physical Activity and Function: nutrition-related ADLs and IADLs, factors affecting  access to physical activity  Anthropometric Measurements: weight change, weight, body mass index  Biochemical Data, Medical Tests and Procedures: lipid profile, inflammatory profile, glucose/endocrine profile, gastrointestinal profile, electrolyte and renal panel  Nutrition-Focused Physical Findings: overall appearance     Nutrition Risk  Level of Risk/Frequency of Follow-up: moderate     Nutrition Follow-Up  RD Follow-up?: Yes    Carolina Bush RD 05/24/2023 3:34 PM

## 2023-05-24 NOTE — RESPIRATORY THERAPY
05/24/23 0742   Patient Assessment/Suction   Level of Consciousness (AVPU) responds to pain   Respiratory Effort Unlabored;Normal   Expansion/Accessory Muscles/Retractions expansion symmetric;no retractions;no use of accessory muscles   All Lung Fields Breath Sounds Anterior:;Lateral:;diminished;coarse   Rhythm/Pattern, Respiratory assisted mechanically   Cough Frequency infrequent;with stimulation   Cough Type assisted;good;productive   Suction Method tracheal;oral   Suction Pressure (mmHg) -120 mmHg   $ Suction Charges Inline Suction Procedure Stat Charge   Secretions Amount small   Secretions Color white;tan   Secretions Characteristics thick   Skin Integrity   $ Wound Care Tech Time 15 min   Area Observed Left;Right;Cheek;Upper lip;Lower lip;Corner lip   Skin Appearance without discoloration   PRE-TX-O2   Device (Oxygen Therapy) ventilator   $ Is the patient on Low Flow Oxygen? Yes   Oxygen Concentration (%) 30   SpO2 99 %   Pulse Oximetry Type Continuous   $ Pulse Oximetry - Multiple Charge Pulse Oximetry - Multiple   Pulse 71   Resp 14   Positioning HOB elevated 30 degrees   Positioning   Head of Bed (HOB) Positioning HOB at 30 degrees      Airway Anesthesia 05/11/23   Placement Date/Time: 05/11/23 (c) 6767   Method of Intubation: Video Laryngoscopy  Airway Device: Endotracheal Tube  Mask Ventilation: Moderately difficult with oral airway  Intubated: Other (see comments)  Blade: Glidescope #3  Airway Device Size: 8....   Secured at 24 cm   Measured At Lips   Secured Location Center   Secured by Commercial tube barboza   Bite Block center;secure and patent   Site Condition Dry   Status Intact;Secured;Patent   Site Assessment Clean;Dry;No bleeding;No drainage   Cuff Pressure 25 cm H2O   General Safety Checklist   Safety Promotion/Fall Prevention side rails raised   Airway Safety   Is Ambu Bag and Mask with Patient? Yes, Adult Ambu Bag and Mask   Suction set is at the bedside? Yes   Equipment Change   $ RT  Equipment HME   Vent Select   Conventional Vent Y   Charged w/in last 24h YES   Preset Conventional Ventilator Settings   Vent ID [id number]   Vent Type    Ventilation Type VC   Vent Mode A/C   Humidity HME   Set Rate 10 BPM   Vt Set 450 mL   PEEP/CPAP 5 cmH20   Peak Flow 60 L/min   Peak End Inspiratory Pressure 21 cmH20   I-Trigger Type  V-TRIG   Trigger Sensitivity Flow/I-Trigger 3 L/min   Patient Ventilator Parameters   Resp Rate Total 14 br/min   Peak Airway Pressure 22 cmH20   Mean Airway Pressure 9.2 cmH20   Plateau Pressure 0 cmH20   Exhaled Vt 485 mL   Total Ve 6.47 L/m   I:E Ratio Measured 1:2.60   Auto PEEP 0 cmH20   Conventional Ventilator Alarms   Alarms On Y   Ve High Alarm 28 L/min   Ve Low Alarm 5 L/min   Vt High Alarm 1200 mL   Vt Low Alarm 200 mL   Resp Rate High Alarm 40 br/min   Press High Alarm 60 cmH2O   Apnea Rate 12   Apnea Volume (mL) 0 mL   Apnea Oxygen Concentration  100   Apnea Flow Rate (L/min) 65   T Apnea 20 sec(s)   IHI Ventilator Associated Pneumonia Bundle (Required)   Head of Bed Elevated  HOB 30   Oral Care Mouth suctioned   Vent Circut Breaks Minimized Yes   Ready to Wean/Extubation Screen   FIO2<=50 (chart decimal) 0.3   MV<16L (chart vol.) 6.47   PEEP <=8 (chart #) 5   Ready to Wean Parameters   F/VT Ratio<105 (RSBI) (!) 28.87   Vital Capacity   Vital Capacity (mL) 0

## 2023-05-24 NOTE — NURSING
Pt arrived back to room 3021. Trach/vent in place. Small amount of blood noted to dressing. Pt drowsy. Will raise brows to voice. PEG dsg clean dry and intact. VSS.     Per Dr Hickey. Ok to restart xarelto Friday am. Ok to use PEG tomorrow morning for TF. Can flush PEG with water tonight with meds.

## 2023-05-24 NOTE — SUBJECTIVE & OBJECTIVE
"Interval History: see "Hospital Course"    Review of Systems   Unable to perform ROS: Patient nonverbal   Objective:     Vital Signs (Most Recent):  Temp: (P) 98.4 °F (36.9 °C) (05/24/23 0400)  Pulse: 103 (05/24/23 1800)  Resp: (!) 30 (05/24/23 1800)  BP: (!) 146/96 (05/24/23 1800)  SpO2: 99 % (05/24/23 1800) Vital Signs (24h Range):  Temp:  [98.2 °F (36.8 °C)-98.9 °F (37.2 °C)] (P) 98.4 °F (36.9 °C)  Pulse:  [] 103  Resp:  [14-31] 30  SpO2:  [97 %-100 %] 99 %  BP: (113-163)/(67-96) 146/96  Arterial Line BP: (141)/(106) 141/106     Weight: 98.7 kg (217 lb 9.5 oz)  Body mass index is 32.13 kg/m².    Intake/Output Summary (Last 24 hours) at 5/24/2023 1840  Last data filed at 5/24/2023 1645  Gross per 24 hour   Intake 1745.5 ml   Output 1040 ml   Net 705.5 ml           Physical Exam  Vitals and nursing note reviewed.   Constitutional:       Appearance: He is ill-appearing.   HENT:      Head: Normocephalic and atraumatic.      Right Ear: External ear normal.      Left Ear: External ear normal.      Nose: Nose normal.      Comments: NG tube.     Mouth/Throat:      Mouth: Mucous membranes are moist.      Pharynx: Oropharynx is clear.      Comments: ETT.  Cardiovascular:      Rate and Rhythm: Tachycardia present. Rhythm irregular.      Pulses: Normal pulses.      Heart sounds: Normal heart sounds.   Pulmonary:      Effort: Pulmonary effort is normal.      Breath sounds: Normal breath sounds.   Abdominal:      General: Bowel sounds are normal.      Palpations: Abdomen is soft.   Genitourinary:     Comments: Iraheta.  Musculoskeletal:      Right lower leg: Edema present.      Left lower leg: Edema present.   Skin:     General: Skin is warm and dry.   Neurological:      GCS: GCS eye subscore is 3. GCS verbal subscore is 1. GCS motor subscore is 6.           Significant Labs: All pertinent labs within the past 24 hours have been reviewed.    Significant Imaging: I have reviewed all pertinent imaging results/findings within " the past 24 hours.  IMPRESSION: CXR 5/21/2023     1.  Unchanged enlarged cardiomediastinal silhouette.  2.  Prominence of the central hilar vascular structures and mild perihilar interstitial thickening noted, possibly reflecting pulmonary vascular congestion and mild pulmonary edema.  3.  Left basilar subsegmental atelectasis.  1.  Unchanged enlarged cardiomediastinal silhouette.  2.  Low lung volumes limit evaluation. Prominence of interstitium the lungs likely secondary to low lung volumes.  3.  Left basilar subsegmental atelectasis.

## 2023-05-24 NOTE — TRANSFER OF CARE
"Anesthesia Transfer of Care Note    Patient: Teto Mendez    Procedure(s) Performed: Procedure(s) (LRB):  CREATION, TRACHEOSTOMY (N/A)  INSERTION, PEG TUBE (N/A)    Patient location: ICU    Anesthesia Type: general    Transport from OR: Transported from OR intubated on 100% O2 by AMBU with adequate controlled ventilation    Post pain: adequate analgesia    Post assessment: no apparent anesthetic complications and tolerated procedure well    Post vital signs: stable    Level of consciousness: sedated and unresponsive    Nausea/Vomiting: no nausea/vomiting    Complications: none    Transfer of care protocol was followed      Last vitals:   Visit Vitals  BP (!) 151/90   Pulse (!) 116   Temp (P) 36.9 °C (98.4 °F) (Axillary)   Resp (!) 27   Ht 5' 9" (1.753 m)   Wt 98.7 kg (217 lb 9.5 oz)   SpO2 99%   BMI 32.13 kg/m²     "

## 2023-05-24 NOTE — PT/OT/SLP PROGRESS
Physical Therapy      Patient Name:  Teto Mendez   MRN:  [id number]    Patient not seen today secondary to first attempt patient not appropriate with RN noting patient receving Versed last night. Second attempt patient going to surgery for trach/PEG. Will follow-up 05/25/23.

## 2023-05-24 NOTE — RESPIRATORY THERAPY
05/24/23 1649   Patient Assessment/Suction   Level of Consciousness (AVPU) unresponsive   Respiratory Effort Unlabored   Expansion/Accessory Muscles/Retractions expansion symmetric;no retractions;no use of accessory muscles   All Lung Fields Breath Sounds Anterior:;Lateral:;diminished;coarse   Rhythm/Pattern, Respiratory assisted mechanically   Cough Frequency no cough   Skin Integrity   $ Wound Care Tech Time 15 min   Area Observed Neck under tracheostomy   Skin Appearance redness blanchable   PRE-TX-O2   Device (Oxygen Therapy) ventilator   Oxygen Concentration (%) 30   Pulse Oximetry Type Continuous   Adult Surgical Airway 05/24/23 1600 Bivona Custom Adjustable Trach 8.0 (11.0)   Placement Date/Time: 05/24/23 (c) 1600   Present Prior to Hospital Arrival?: No  Placed By: (c) Other (Comment)  Type: (c) Tracheostomy  Brand: Bivona Custom Adjustable Trach  Shaft Diameter (ID/OD) mm: 8.0 (11.0)   Cuff Pressure   (mlt)   Status Secured   Site Assessment Bleeding   Inner Cannula Care No inner cannula  (set at 14)   Ties Assessment Dry;Clean;Intact;Secure   Airway Safety   Trach Supplies at Bedside Normal Saline Bullets;Trach Ties;10cc Syringes;Obturator;Suction Catheter   Suction set is at the bedside? Yes   Extra trach at bedside? Yes   Extra trach sizes at bedside? 6;8   Is Obturator Available? Yes   Location of Obturator?  Head of bed   Vent Select   Conventional Vent Y   Charged w/in last 24h YES   Preset Conventional Ventilator Settings   Vent ID [id number]   Vent Type    Ventilation Type VC   Vent Mode A/C   Humidity HME   Set Rate 10 BPM   Vt Set 450 mL   PEEP/CPAP 5 cmH20   Peak Flow 60 L/min   Peak End Inspiratory Pressure 25 cmH20   I-Trigger Type  V-TRIG   Trigger Sensitivity Flow/I-Trigger 3 L/min   Patient Ventilator Parameters   Resp Rate Total 16 br/min   Peak Airway Pressure 31 cmH20   Mean Airway Pressure 12 cmH20   Plateau Pressure 0 cmH20   Exhaled Vt 556 mL   Total Ve 7.11 L/m   I:E Ratio Measured  1:2.90   Auto PEEP 0 cmH20   Tubing ID (mm) 8 mm  (set at 14)   Tube Type Trach   Conventional Ventilator Alarms   Alarms On Y   Ve High Alarm 28 L/min   Ve Low Alarm 3 L/min   Vt High Alarm 1200 mL   Vt Low Alarm 200 mL   Resp Rate High Alarm 40 br/min   Press High Alarm 60 cmH2O   Apnea Rate 10   Apnea Volume (mL) 0 mL   Apnea Oxygen Concentration  100   Apnea Flow Rate (L/min) 60   T Apnea 20 sec(s)   Ready to Wean/Extubation Screen   FIO2<=50 (chart decimal) 0.3   MV<16L (chart vol.) 7.11   PEEP <=8 (chart #) 5   Vital Capacity   Vital Capacity (mL) 0       Placed pt on vent at previous settings with rate at 12. Placed spare trachs and supplies at bedside.

## 2023-05-24 NOTE — PLAN OF CARE
Problem: Occupational Therapy  Goal: Occupational Therapy Goal  Description: Goals to be met by: 6/22/23     Patient will increase functional independence with ADLs by performing:    Patient's family / caregiver will demonstrate independence and safety with assisting patient with bilateral upper extremity active/active assisted exercises.  Pt to follow one-step motor commands with 100% accuracy  Pt will tolerate sitting at the edge of the bed for ten minutes with minimal assistance.   Patient will complete grooming tasks at the edge of the bed with minimal assistance.      Outcome: Ongoing, Progressing

## 2023-05-24 NOTE — PROGRESS NOTES
Pulmonary/Critical Care Progress Note      PATIENT NAME: Teto Mendez  MRN: [id number]  TODAY'S DATE: 2023  12:50 PM  ADMIT DATE: 2023  AGE: 67 y.o. : 1955      HPI:  Called by nurse because they could not reach anesthesia to intubate a patient who was being bagged.  The patient had a change in mental status in the emergency room after having had a CTA earlier in the morning which was negative.  In CT, the patient was not breathing effectively and was cyanotic and the scanner broke so the patient was brought up to the ICU emergently.  The patient's past medical history is significant for glioblastoma multiforme for which he has been receiving radiation and chemotherapy.  He also has had a right frontal craniotomy.  At 3:30 a.m. in the morning the patient was playing a game and noted twitching of his face with slurred speech and weakness to his left arm.  He took an extra Keppra at that time.  He arrived at the ER at 6:45 a.m..     the patient remains on the ventilator and sedated with 60 of propofol.  He is also on 5 Cardizem.  Had further seizure activity during the night.    - pt remains sedated, on vent, on continuous EEG. There is EEG evidence of seizure activity so neuro has ordered VPA. Propofol has been weaned to 30    - continues sedated, on vent. Mri was done today and shows infarct in R parietal and R temporal lobes. Continues on propofol 20mg. He did have seizure activity twice today    5/15 The patient is still on the vent.  He is on Precedex, the propofol is off.  He was still having seizure activity yesterday.  MRI shows parietal and temporal infarcts on the right.     the patient is been off all sedation for 24 hours and has a GCS of 3.  Has pupils and corneals and gag and breathes spontaneously but no response to pain.  He does not respond to his voice.  He follows no commands.     the patient remains unresponsive.  He partially opens his eyes to noxious  stimuli.  He follows no commands.  Dr. Whitfield wants to give him 72 hours from stopping the sedation to reassess his neurologic recovery.  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    5/18 the patient remains densely encephalopathic.  To vigorous tactile stimulation he will partially open his eyes.  The nurse swears that he squeezed with his right hand.    5/19 the patient remains densely encephalopathic.  Actually is lightly squeezing with his right hand today.  Opens his eyes snf when stimulated.  His heart rate is 157 because he has not gotten his metoprolol because his OG tube was pulled out during his bath.    5/20 the patient opens his eyes snf to verbal stimulation.  He weakly squeezes with his right hand.  Wiggles his right toes to command.  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    5/21 the patient had a 5 minute seizure earlier this morning.  He still has a left lateral nystagmus. His exam is about the same except he will not move his toes today.  He is on the ventilator.  He was febrile this morning to 102.    5/22 the patient is able to move his right leg more today.  He remains hemiplegic on the left.  Neurology has ordered a repeat CT.  The patient was tried on T-piece again today but failed within less than an hour.  It is likely that the patient is going to be ventilator dependent going forward.  Spoke with his daughter about this.  She said she was on her way to the hospital iand would assess his situation then.    5/23 the patient is now lightly squeezing with his left hand and wiggling his left toes.  He can not open his eyes today because he is on a mg per hour Versed drip.  He was bleeding from his central line yesterday.    5/24 the patient had another bad night.  He was placed back on a Versed drip,back on a ventilatory rate.  He was agitated, tachypneic, tachycardic, hypertensive.  This morning he is still sedated.  Plans for trach and  PEG or at 1:45 p.m..    REVIEW OF SYSTEMS  Unobtainable    No change in the patient's Past Medical History, Past Surgical History, Social History or Family History since admission.      VITAL SIGNS (MOST RECENT)  Temp: (P) 98.4 °F (36.9 °C) (05/24/23 0400)  Pulse: 100 (05/24/23 0951)  Resp: 16 (05/24/23 0852)  BP: 113/72 (05/24/23 0951)  SpO2: 99 % (05/24/23 0852)  T-max 102.0°    INTAKE AND OUTPUT (LAST 24 HOURS):  Intake/Output Summary (Last 24 hours) at 5/24/2023 0957  Last data filed at 5/24/2023 0600  Gross per 24 hour   Intake 3358.48 ml   Output 1840 ml   Net 1518.48 ml       WEIGHT  Wt Readings from Last 1 Encounters:   05/20/23 98.7 kg (217 lb 9.5 oz)       PHYSICAL EXAM  GENERAL: Older patient, intubated on mechanical ventilation  HEENT: Pupils equal and reactive.  Corneals are intact.   Nose intact. Pharynx intubated with ET tube and OG tube.  Gag reflex is intact.    NECK: Supple.  Right triple-lumen catheter.  HEART: Regular rate and rhythm. No murmur or gallop auscultated.  LUNGS: Clear to auscultation and percussion. Lung excursion symmetrical. No change in fremitus. No adventitial noises.  ABDOMEN: Bowel sounds present. Non-tender, no masses palpated.  Tolerating enteral nutrition; rectal tube in place- NPO at present  : Normal anatomy.  Iraheta with yellow urine.  EXTREMITIES: Normal muscle tone and joint movement, no cyanosis or clubbing.  Arterial line on the right  LYMPHATICS: No adenopathy palpated, both hands puffy.  SKIN: Dry, intact, no lesions.   NEURO sedated on a Versed drip  PSYCH:  Unable to assess      CBC LAST (LAST 24 HOURS)  Recent Labs   Lab 05/24/23  0335 05/24/23  0419   WBC 8.99  --    RBC 3.85*  --    HGB 12.0*  --    HCT 36.4* 34*   MCV 95  --    MCH 31.2*  --    MCHC 33.0  --    RDW 13.8  --      --    MPV 11.0  --    GRAN 80.9*  7.3  --    LYMPH 9.2*  0.8*  --    MONO 7.3  0.7  --    BASO 0.04  --    NRBC 0  --        CHEMISTRY LAST (LAST 24 HOURS)  Recent Labs    Lab 05/24/23  0335 05/24/23  0419     --    K 4.1  --      --    CO2 30*  --    ANIONGAP 8  --    BUN 34*  --    CREATININE 0.7  --    *  --    CALCIUM 7.9*  --    PH  --  7.532*   MG 1.8  --    ALBUMIN 2.0*  --    PROT 5.0*  --    ALKPHOS 45*  --    ALT 90*  --    AST 74*  --    BILITOT 0.6  --            LAST 7 DAYS MICROBIOLOGY   Microbiology Results (last 7 days)       Procedure Component Value Units Date/Time    Blood culture [[id number]] Collected: 05/21/23 0743    Order Status: Completed Specimen: Blood Updated: 05/24/23 0832     Blood Culture, Routine No Growth to date      No Growth to date      No Growth to date      No Growth to date    IV catheter culture [[id number]] Collected: 05/21/23 1825    Order Status: Completed Specimen: Catheter Tip, PICC Updated: 05/24/23 0814     Aerobic Culture - Cath tip No growth    Blood culture [[id number]] Collected: 05/19/23 1504    Order Status: Completed Specimen: Blood Updated: 05/23/23 1632     Blood Culture, Routine No Growth to date      No Growth to date      No Growth to date      No Growth to date      No Growth to date    Narrative:      Collection has been rescheduled by [email or web address] at 05/19/2023 11:30 Reason:   RAMYA Aguilar is messaging the dr will call when ready   Collection has been rescheduled by [email or web address] at 05/19/2023 11:30 Reason:   RAMYA Aguilar is messaging the dr will call when ready     Blood culture [[id number]] Collected: 05/19/23 1504    Order Status: Completed Specimen: Blood Updated: 05/23/23 1632     Blood Culture, Routine No Growth to date      No Growth to date      No Growth to date      No Growth to date      No Growth to date    Narrative:      Collection has been rescheduled by [email or web address] at 05/19/2023 11:30 Reason:   RAMYA Aguilar is messaging the dr will call when ready   Collection has been rescheduled by [email or web address] at 05/19/2023 11:30 Reason:   RAMYA Aguilar is messaging the dr will call when ready     Blood culture [[id number]] Collected: 05/21/23 1106    Order  Status: Completed Specimen: Blood from Line, PICC Left Brachial Updated: 05/23/23 1232     Blood Culture, Routine No Growth to date      No Growth to date      No Growth to date    Narrative:      From PICC  Collection has been rescheduled by RE1 at 05/21/2023 10:05 Reason:   Nurse Draw  Collection has been rescheduled by RE1 at 05/21/2023 10:05 Reason:   Nurse Draw    Blood culture [[id number]] Collected: 05/21/23 1107    Order Status: Completed Specimen: Blood from Line, Arterial, Left Updated: 05/23/23 1232     Blood Culture, Routine No Growth to date      No Growth to date      No Growth to date    Narrative:      From arterial line  Collection has been rescheduled by RE1 at 05/21/2023 10:05 Reason:   Nurse Draw  Collection has been rescheduled by RE1 at 05/21/2023 10:05 Reason:   Nurse Draw    IV catheter culture [[id number]]  (Abnormal) Collected: 05/21/23 1826    Order Status: Completed Specimen: Catheter Tip, Arterial Updated: 05/23/23 0827     Aerobic Culture - Cath tip COAGULASE-NEGATIVE STAPHYLOCOCCUS SPECIES  > 15 colonies  Multiple morphotyes - probable contaminates      Culture, Respiratory with Gram Stain [[id number]]  (Abnormal)  (Susceptibility) Collected: 05/21/23 1723    Order Status: Completed Specimen: Respiratory from Endotracheal Aspirate Updated: 05/23/23 0728     Respiratory Culture No normal respiratory fredi      PSEUDOMONAS AERUGINOSA  Moderate       Gram Stain (Respiratory) <10 epithelial cells per low power field.     Gram Stain (Respiratory) Many WBC's     Gram Stain (Respiratory) Few Gram negative rods     Gram Stain (Respiratory) Rare Gram positive rods    Urine culture [[id number]]  (Abnormal)  (Susceptibility) Collected: 05/21/23 1104    Order Status: Completed Specimen: Urine Updated: 05/23/23 0655     Urine Culture, Routine KLEBSIELLA PNEUMONIAE  >100,000 cfu/ml      Narrative:      Specimen Source->Urine            MOST RECENT IMAGING  X-Ray Chest 1 View  Reason: vent  vent    FINDINGS:  Portable chest at 917 compared with 5/21/2023 shows interval removal of left PICC. Other support tubes and lines are unchanged. Cardiomediastinal silhouette unchanged.    Increasing linear low left lower lung zone opacity has developed along with increasing medial right basilar and infrahilar alveolar opacity. Lung volumes remain low. No pleural effusion or pneumothorax. Central pulmonary vascular prominence is evident without jody pulmonary edema.    IMPRESSION:    1. Increasing left basilar linear alveolar opacities in right infrahilar/medial basilar alveolar opacities could represent atelectasis although additional consolidation, or some combination thereof, can also be considered.  2. Removal of left PICC.    Electronically signed by:  Compa Lopez MD  5/23/2023 10:00 AM CDT Workstation: [id number]      CURRENT VISIT EKG  Results for orders placed or performed during the hospital encounter of 05/11/23   ECG 12 lead    Narrative    Test Reason : I63.9,    Vent. Rate : 120 BPM     Atrial Rate : 150 BPM     P-R Int : 000 ms          QRS Dur : 104 ms      QT Int : 370 ms       P-R-T Axes : 000 016 -12 degrees     QTc Int : 522 ms    Atrial fibrillation with rapid ventricular response  Nonspecific ST abnormality  Prolonged QT  Abnormal ECG  No previous ECGs available    Referred By: [email or web address]   SELF           Confirmed By:        ECHOCARDIOGRAM RESULTS  No results found for this or any previous visit.        VENTILATOR INFORMATION  Vent Mode: A/C  Oxygen Concentration (%):  [30] 30  Resp Rate Total:  [14 br/min-35 br/min] 15 br/min  Vt Set:  [450 mL] 450 mL  PEEP/CPAP:  [5 cmH20] 5 cmH20  Pressure Support:  [10 cmH20] 10 cmH20  Mean Airway Pressure:  [8.2 wgM82-58 cmH20] 9.3 cmH20           LAST ARTERIAL BLOOD GAS  ABG  Recent Labs   Lab 05/24/23  0419   PH 7.532*   PO2 106*   PCO2 36.5   HCO3 30.7*   BE 8       IMPRESSION AND PLAN    Status epilepticus  -MRI shows temporal and parietal infarcts  on the right, the patient is moving more each day, now his left side is moving a little  -history of seizures prior to this admission  - no seizures today  Lateral nystagmus  -left  -resolved  History of glioblastoma multiforme under treatment in [place]  Naomi coma scale of 10  -patient is on a Versed drip, this will be stopped again  - patient placed on a Versed drip last night.  Very hard to improve your Naomi coma scale when you are sedated.  - will reassess when the Versed wears off  Fever  - resolved  - On cefepime   - Pseudomonas in his sputum, no evidence of pneumonia, and Klebsiella in his urine  Mechanical ventilation  - ABG daily  - on assist-control ventilation  - bibasilar atelectasis  Atrial fibrillation  - continue Xarelto  - continue metoprolol  Hypertension  -metoprolol  Hypernatremia  - corrected  - water flushes at 100 cc q.4 hours  - continue to trend labs  Hypokalemia  - resolved  -Trend labs  Hypophosphotemia  - being replaced  - trend labs resolved  Moderate hypoalbuminemia  -enteral feedings tolerated  - feedings on hold for PEG and trach  Hyperlipidemia  Transaminitis  - stable  Thrombocytopenia  - resolved  Anasarca  - exacerbated by free water to correct hypernatremia  Receiving enteral nutrition, on Protonix and Xarelto    The patient is moving more.  The patient's family is good with trach and PEG and LTAC to allow him to continue to improve.  It is highly unlikely that the patient is going to improve to the point where he will not need airway protection at least for a while.    No objection to moving to LTAC tomorrow after his trach and PEG today    Critical care time spent reviewing the chart, examining the patient, reviewing the labs, reviewing the radiological findings, discussing care with nursing, physicians, and respiratory and creating the note and  has been greater than 35 minutes    Mery Milner MD  Date of Service: 05/24/2023  12:50 PM

## 2023-05-25 VITALS
HEART RATE: 116 BPM | DIASTOLIC BLOOD PRESSURE: 103 MMHG | OXYGEN SATURATION: 99 % | TEMPERATURE: 98 F | SYSTOLIC BLOOD PRESSURE: 158 MMHG | WEIGHT: 228.63 LBS | BODY MASS INDEX: 33.86 KG/M2 | RESPIRATION RATE: 33 BRPM | HEIGHT: 69 IN

## 2023-05-25 PROBLEM — R50.9 FEVER: Status: RESOLVED | Noted: 2023-05-19 | Resolved: 2023-05-25

## 2023-05-25 PROBLEM — I63.411 ACUTE CEREBROVASCULAR ACCIDENT (CVA) DUE TO EMBOLISM OF RIGHT MIDDLE CEREBRAL ARTERY: Status: ACTIVE | Noted: 2023-05-15

## 2023-05-25 PROBLEM — E87.0 HYPERNATREMIA: Status: RESOLVED | Noted: 2023-05-19 | Resolved: 2023-05-25

## 2023-05-25 LAB
ALBUMIN SERPL BCP-MCNC: 2 G/DL (ref 3.5–5.2)
ALLENS TEST: ABNORMAL
ALP SERPL-CCNC: 37 U/L (ref 55–135)
ALT SERPL W/O P-5'-P-CCNC: 73 U/L (ref 10–44)
ANION GAP SERPL CALC-SCNC: 6 MMOL/L (ref 8–16)
AST SERPL-CCNC: 53 U/L (ref 10–40)
BACTERIA CATH TIP CULT: NO GROWTH
BASOPHILS # BLD AUTO: 0.03 K/UL (ref 0–0.2)
BASOPHILS NFR BLD: 0.3 % (ref 0–1.9)
BILIRUB SERPL-MCNC: 0.6 MG/DL (ref 0.1–1)
BUN SERPL-MCNC: 28 MG/DL (ref 8–23)
CALCIUM SERPL-MCNC: 8.3 MG/DL (ref 8.7–10.5)
CHLORIDE SERPL-SCNC: 107 MMOL/L (ref 95–110)
CO2 SERPL-SCNC: 29 MMOL/L (ref 23–29)
CREAT SERPL-MCNC: 0.7 MG/DL (ref 0.5–1.4)
DELSYS: ABNORMAL
DIFFERENTIAL METHOD: ABNORMAL
EOSINOPHIL # BLD AUTO: 0.1 K/UL (ref 0–0.5)
EOSINOPHIL NFR BLD: 0.6 % (ref 0–8)
ERYTHROCYTE [DISTWIDTH] IN BLOOD BY AUTOMATED COUNT: 13.8 % (ref 11.5–14.5)
ERYTHROCYTE [SEDIMENTATION RATE] IN BLOOD BY WESTERGREN METHOD: 12 MM/H
EST. GFR  (NO RACE VARIABLE): >60 ML/MIN/1.73 M^2
FIO2: 98
GLUCOSE SERPL-MCNC: 102 MG/DL (ref 70–110)
GLUCOSE SERPL-MCNC: 103 MG/DL (ref 70–110)
HCO3 UR-SCNC: 29.1 MMOL/L (ref 24–28)
HCT VFR BLD AUTO: 36.3 % (ref 40–54)
HCT VFR BLD CALC: 33 %PCV (ref 36–54)
HGB BLD-MCNC: 11.7 G/DL (ref 14–18)
IMM GRANULOCYTES # BLD AUTO: 0.1 K/UL (ref 0–0.04)
IMM GRANULOCYTES NFR BLD AUTO: 1 % (ref 0–0.5)
LYMPHOCYTES # BLD AUTO: 0.9 K/UL (ref 1–4.8)
LYMPHOCYTES NFR BLD: 9.1 % (ref 18–48)
MAGNESIUM SERPL-MCNC: 1.9 MG/DL (ref 1.6–2.6)
MCH RBC QN AUTO: 30.9 PG (ref 27–31)
MCHC RBC AUTO-ENTMCNC: 32.2 G/DL (ref 32–36)
MCV RBC AUTO: 96 FL (ref 82–98)
MIN VOL: 6.39
MODE: ABNORMAL
MONOCYTES # BLD AUTO: 0.7 K/UL (ref 0.3–1)
MONOCYTES NFR BLD: 7.6 % (ref 4–15)
NEUTROPHILS # BLD AUTO: 7.8 K/UL (ref 1.8–7.7)
NEUTROPHILS NFR BLD: 81.4 % (ref 38–73)
NRBC BLD-RTO: 0 /100 WBC
PCO2 BLDA: 41.1 MMHG (ref 35–45)
PEEP: 5
PH SMN: 7.46 [PH] (ref 7.35–7.45)
PHOSPHATE SERPL-MCNC: 3.5 MG/DL (ref 2.7–4.5)
PIP: 25
PLATELET # BLD AUTO: 185 K/UL (ref 150–450)
PMV BLD AUTO: 10.2 FL (ref 9.2–12.9)
PO2 BLDA: 101 MMHG (ref 80–100)
POC BE: 5 MMOL/L
POC IONIZED CALCIUM: 1.2 MMOL/L (ref 1.06–1.42)
POC SATURATED O2: 98 % (ref 95–100)
POC TCO2: 30 MMOL/L (ref 23–27)
POTASSIUM BLD-SCNC: 4.2 MMOL/L (ref 3.5–5.1)
POTASSIUM SERPL-SCNC: 4.5 MMOL/L (ref 3.5–5.1)
PROT SERPL-MCNC: 5 G/DL (ref 6–8.4)
RBC # BLD AUTO: 3.79 M/UL (ref 4.6–6.2)
SAMPLE: ABNORMAL
SITE: ABNORMAL
SODIUM BLD-SCNC: 142 MMOL/L (ref 136–145)
SODIUM SERPL-SCNC: 142 MMOL/L (ref 136–145)
SP02: 30
VT: 450
WBC # BLD AUTO: 9.62 K/UL (ref 3.9–12.7)

## 2023-05-25 PROCEDURE — 25000003 PHARM REV CODE 250: Performed by: INTERNAL MEDICINE

## 2023-05-25 PROCEDURE — 99900031 HC PATIENT EDUCATION (STAT)

## 2023-05-25 PROCEDURE — 97129 THER IVNTJ 1ST 15 MIN: CPT

## 2023-05-25 PROCEDURE — 84100 ASSAY OF PHOSPHORUS: CPT | Performed by: STUDENT IN AN ORGANIZED HEALTH CARE EDUCATION/TRAINING PROGRAM

## 2023-05-25 PROCEDURE — 63600175 PHARM REV CODE 636 W HCPCS: Performed by: HOSPITALIST

## 2023-05-25 PROCEDURE — 99900035 HC TECH TIME PER 15 MIN (STAT)

## 2023-05-25 PROCEDURE — 85025 COMPLETE CBC W/AUTO DIFF WBC: CPT | Performed by: STUDENT IN AN ORGANIZED HEALTH CARE EDUCATION/TRAINING PROGRAM

## 2023-05-25 PROCEDURE — 83735 ASSAY OF MAGNESIUM: CPT | Performed by: STUDENT IN AN ORGANIZED HEALTH CARE EDUCATION/TRAINING PROGRAM

## 2023-05-25 PROCEDURE — 97110 THERAPEUTIC EXERCISES: CPT

## 2023-05-25 PROCEDURE — 94761 N-INVAS EAR/PLS OXIMETRY MLT: CPT

## 2023-05-25 PROCEDURE — 99900026 HC AIRWAY MAINTENANCE (STAT)

## 2023-05-25 PROCEDURE — C9254 INJECTION, LACOSAMIDE: HCPCS | Performed by: STUDENT IN AN ORGANIZED HEALTH CARE EDUCATION/TRAINING PROGRAM

## 2023-05-25 PROCEDURE — C9113 INJ PANTOPRAZOLE SODIUM, VIA: HCPCS | Performed by: STUDENT IN AN ORGANIZED HEALTH CARE EDUCATION/TRAINING PROGRAM

## 2023-05-25 PROCEDURE — 63600175 PHARM REV CODE 636 W HCPCS: Performed by: INTERNAL MEDICINE

## 2023-05-25 PROCEDURE — 97530 THERAPEUTIC ACTIVITIES: CPT

## 2023-05-25 PROCEDURE — 25000003 PHARM REV CODE 250: Performed by: HOSPITALIST

## 2023-05-25 PROCEDURE — 80053 COMPREHEN METABOLIC PANEL: CPT | Performed by: STUDENT IN AN ORGANIZED HEALTH CARE EDUCATION/TRAINING PROGRAM

## 2023-05-25 PROCEDURE — 63600175 PHARM REV CODE 636 W HCPCS: Performed by: STUDENT IN AN ORGANIZED HEALTH CARE EDUCATION/TRAINING PROGRAM

## 2023-05-25 PROCEDURE — 27000221 HC OXYGEN, UP TO 24 HOURS

## 2023-05-25 PROCEDURE — 36569 INSJ PICC 5 YR+ W/O IMAGING: CPT

## 2023-05-25 PROCEDURE — 94799 UNLISTED PULMONARY SVC/PX: CPT

## 2023-05-25 PROCEDURE — 94003 VENT MGMT INPAT SUBQ DAY: CPT

## 2023-05-25 PROCEDURE — 99291 CRITICAL CARE FIRST HOUR: CPT | Mod: ,,, | Performed by: INTERNAL MEDICINE

## 2023-05-25 PROCEDURE — 25000003 PHARM REV CODE 250: Performed by: STUDENT IN AN ORGANIZED HEALTH CARE EDUCATION/TRAINING PROGRAM

## 2023-05-25 PROCEDURE — 99291 PR CRITICAL CARE, E/M 30-74 MINUTES: ICD-10-PCS | Mod: ,,, | Performed by: INTERNAL MEDICINE

## 2023-05-25 RX ORDER — DEXMEDETOMIDINE HYDROCHLORIDE 4 UG/ML
0-1.4 INJECTION, SOLUTION INTRAVENOUS CONTINUOUS
Qty: 100 ML | Refills: 0 | Status: ON HOLD
Start: 2023-05-25 | End: 2023-05-31 | Stop reason: HOSPADM

## 2023-05-25 RX ORDER — DILTIAZEM HYDROCHLORIDE 30 MG/1
30 TABLET, FILM COATED ORAL EVERY 6 HOURS
Qty: 120 TABLET | Refills: 0 | Status: ON HOLD
Start: 2023-05-25 | End: 2023-05-31 | Stop reason: HOSPADM

## 2023-05-25 RX ORDER — ATORVASTATIN CALCIUM 20 MG/1
20 TABLET, FILM COATED ORAL DAILY
Qty: 90 TABLET | Refills: 3 | Status: ON HOLD
Start: 2023-05-26 | End: 2023-07-03 | Stop reason: SDUPTHER

## 2023-05-25 RX ORDER — ALLOPURINOL 100 MG/1
100 TABLET ORAL DAILY
Qty: 30 TABLET | Refills: 0 | Status: ON HOLD
Start: 2023-05-26 | End: 2023-07-03

## 2023-05-25 RX ORDER — DEXAMETHASONE 1 MG/1
1 TABLET ORAL EVERY 12 HOURS
Qty: 20 TABLET | Refills: 0
Start: 2023-05-25 | End: 2023-06-04

## 2023-05-25 RX ORDER — TIMOLOL MALEATE 2.5 MG/ML
1 SOLUTION/ DROPS OPHTHALMIC 2 TIMES DAILY
Qty: 15 ML | Refills: 0
Start: 2023-05-25 | End: 2023-09-15

## 2023-05-25 RX ORDER — HYDROCODONE POLISTIREX AND CHLORPHENIRAMINE POLISTIREX 10; 8 MG/5ML; MG/5ML
5 SUSPENSION, EXTENDED RELEASE ORAL EVERY 12 HOURS PRN
Status: DISCONTINUED | OUTPATIENT
Start: 2023-05-25 | End: 2023-05-25 | Stop reason: HOSPADM

## 2023-05-25 RX ORDER — POLYETHYLENE GLYCOL 3350 17 G/17G
17 POWDER, FOR SOLUTION ORAL 2 TIMES DAILY PRN
Qty: 30 EACH | Refills: 0 | Status: ON HOLD
Start: 2023-05-25 | End: 2023-06-16 | Stop reason: HOSPADM

## 2023-05-25 RX ORDER — ACETAMINOPHEN 325 MG/1
650 TABLET ORAL EVERY 8 HOURS PRN
Qty: 30 TABLET | Refills: 0 | Status: ON HOLD
Start: 2023-05-25 | End: 2023-06-14

## 2023-05-25 RX ORDER — LACOSAMIDE 50 MG/1
200 TABLET ORAL EVERY 12 HOURS
Qty: 240 TABLET | Refills: 0 | Status: ON HOLD
Start: 2023-05-25 | End: 2023-07-03

## 2023-05-25 RX ORDER — LEVETIRACETAM 500 MG/1
500 TABLET ORAL 2 TIMES DAILY
Qty: 60 TABLET | Refills: 11 | Status: ON HOLD
Start: 2023-05-25 | End: 2023-05-31 | Stop reason: SDUPTHER

## 2023-05-25 RX ORDER — METOPROLOL TARTRATE 50 MG/1
50 TABLET ORAL 2 TIMES DAILY
Qty: 60 TABLET | Refills: 0 | Status: ON HOLD
Start: 2023-05-25 | End: 2023-05-31 | Stop reason: HOSPADM

## 2023-05-25 RX ORDER — FAMOTIDINE 40 MG/5ML
20 POWDER, FOR SUSPENSION ORAL 2 TIMES DAILY
Qty: 50 ML | Refills: 0 | Status: ON HOLD
Start: 2023-05-25 | End: 2023-07-03

## 2023-05-25 RX ORDER — COLCHICINE 0.6 MG/1
0.6 TABLET ORAL DAILY PRN
Qty: 30 TABLET | Refills: 0 | Status: ON HOLD
Start: 2023-05-25 | End: 2023-07-03 | Stop reason: HOSPADM

## 2023-05-25 RX ORDER — HYDROCODONE POLISTIREX AND CHLORPHENIRAMINE POLISTIREX 10; 8 MG/5ML; MG/5ML
5 SUSPENSION, EXTENDED RELEASE ORAL EVERY 12 HOURS PRN
Qty: 70 ML | Refills: 0
Start: 2023-05-25 | End: 2023-06-04

## 2023-05-25 RX ORDER — TRIPROLIDINE/PSEUDOEPHEDRINE 2.5MG-60MG
200 TABLET ORAL EVERY 6 HOURS PRN
Qty: 30 ML | Refills: 0 | Status: ON HOLD
Start: 2023-05-25 | End: 2023-05-31 | Stop reason: HOSPADM

## 2023-05-25 RX ADMIN — CEFEPIME 2 G: 2 INJECTION, POWDER, FOR SOLUTION INTRAVENOUS at 11:05

## 2023-05-25 RX ADMIN — DILTIAZEM HYDROCHLORIDE 30 MG: 30 TABLET, FILM COATED ORAL at 12:05

## 2023-05-25 RX ADMIN — ACETAMINOPHEN 650 MG: 325 TABLET ORAL at 11:05

## 2023-05-25 RX ADMIN — PANTOPRAZOLE SODIUM 40 MG: 40 INJECTION, POWDER, FOR SOLUTION INTRAVENOUS at 08:05

## 2023-05-25 RX ADMIN — METOPROLOL TARTRATE 5 MG: 5 INJECTION, SOLUTION INTRAVENOUS at 01:05

## 2023-05-25 RX ADMIN — METOPROLOL TARTRATE 50 MG: 50 TABLET, FILM COATED ORAL at 08:05

## 2023-05-25 RX ADMIN — DILTIAZEM HYDROCHLORIDE 30 MG: 30 TABLET, FILM COATED ORAL at 11:05

## 2023-05-25 RX ADMIN — LEVETIRACETAM INJECTION 1500 MG: 15 INJECTION INTRAVENOUS at 08:05

## 2023-05-25 RX ADMIN — ALLOPURINOL 100 MG: 100 TABLET ORAL at 08:05

## 2023-05-25 RX ADMIN — DILTIAZEM HYDROCHLORIDE 30 MG: 30 TABLET, FILM COATED ORAL at 05:05

## 2023-05-25 RX ADMIN — HYDROCODONE POLISTIREX AND CHLORPHENIRAMINE POLISTIREX 5 ML: 10; 8 SUSPENSION, EXTENDED RELEASE ORAL at 08:05

## 2023-05-25 RX ADMIN — DEXAMETHASONE 1 MG: 1 TABLET ORAL at 08:05

## 2023-05-25 RX ADMIN — DEXMEDETOMIDINE HYDROCHLORIDE 0.3 MCG/KG/HR: 4 INJECTION, SOLUTION INTRAVENOUS at 12:05

## 2023-05-25 RX ADMIN — ATORVASTATIN CALCIUM 20 MG: 20 TABLET, FILM COATED ORAL at 08:05

## 2023-05-25 RX ADMIN — CEFEPIME 2 G: 2 INJECTION, POWDER, FOR SOLUTION INTRAVENOUS at 03:05

## 2023-05-25 RX ADMIN — SODIUM CHLORIDE 200 MG: 9 INJECTION, SOLUTION INTRAVENOUS at 08:05

## 2023-05-25 RX ADMIN — TIMOLOL MALEATE 1 DROP: 2.5 SOLUTION/ DROPS OPHTHALMIC at 08:05

## 2023-05-25 NOTE — DISCHARGE INSTRUCTIONS
[facility]  Facility Transfer Orders        Admit to: [facility]     Diagnoses:   Active Hospital Problems    Diagnosis  POA    *Status epilepticus [G40.901]  Yes    Goals of care, counseling/discussion [Z71.89]  Not Applicable    Acute CVA (cerebrovascular accident) [I63.9]  Yes    Obesity [E66.9]  Yes    Gout [M10.9]  Yes    GERD (gastroesophageal reflux disease) [K21.9]  Yes    HFrEF (heart failure with reduced ejection fraction) [I50.20]  Yes    Essential hypertension [I10]  Yes    Paroxysmal atrial fibrillation [I48.0]  Yes    Glioblastoma [C71.9]  Yes    Acute hypoxemic respiratory failure [J96.01]  Yes      Resolved Hospital Problems    Diagnosis Date Resolved POA    Fever [R50.9] 05/25/2023 No    Hypernatremia [E87.0] 05/25/2023 No    Hypokalemia [E87.6] 05/19/2023 Yes     Allergies: Review of patient's allergies indicates:  No Known Allergies    Code Status: Full    Vitals: Routine       Diet: NPO  Peg Tube Feeding: Vital High Protein Increase from 10 mLs/h by 20 mLs every 4 hours as tolerated to the goal rate of 75 mLs/h.        ml every 4 hours.    Activity: Activity as tolerated    Nursing Precautions: Aspiration , Fall, Seizure, and Pressure ulcer prevention    Bed/Surface: Low Air Loss    Consults: PT to evaluate and treat- 5 times a week, OT to evaluate and treat- 5 times a week, and Wound Care    Oxygen: Spont/ Rate 12/Vt 450 ml/Peep 5/ PS 5/ Fio2 30%    Dialysis: Patient is not on dialysis.     Labs: CBC, BMP, Mg, Phos weekly x 3   Pending Diagnostic Studies:       Procedure Component Value Units Date/Time    APTT [[id number]] Collected: 05/22/23 0300    Order Status: Sent Lab Status: In process Updated: 05/22/23 0311    Specimen: Blood           Imaging: Chest Xray portable PRN for fever and drop in O2 sats     Miscellaneous Care:   PEG Care:  Clean site every 24 hours  Iraheta Care: Empty Iraheta bag every shift.  Change Iraheta every month  Routine Skin for  Bedridden Patients:  Apply moisture barrier cream to all  Wound Care: no    IV Access: PICC     Medications: Discontinue all previous medication orders, if any. See new list below.  Current Discharge Medication List        START taking these medications    Details   acetaminophen (TYLENOL) 325 MG tablet 2 tablets (650 mg total) by Per G Tube route every 8 (eight) hours as needed for Pain or Temperature greater than (100.4).  Qty: 30 tablet, Refills: 0      atorvastatin (LIPITOR) 20 MG tablet 1 tablet (20 mg total) by Per G Tube route once daily.  Qty: 90 tablet, Refills: 3      cefepime HCl (CEFEPIME 2 G/100 ML D5W) 2 g/100 mL Inject 100 mLs (2 g total) into the vein every 8 (eight) hours. for 3 days  Qty: 900 mL, Refills: 0, stop 5/28/23      dexmedeTOMIDine in 0.9 % NaCL 400 mcg/100 mL (4 mcg/mL) Soln Inject 0-138.18 mcg/hr into the vein continuous. for 1 day  Qty: 100 mL, Refills: 0      diltiaZEM (CARDIZEM) 30 MG tablet 1 tablet (30 mg total) by Per G Tube route every 6 (six) hours.  Qty: 120 tablet, Refills: 0      famotidine (PEPCID) 40 mg/5 mL (8 mg/mL) suspension 2.5 mLs (20 mg total) by Per G Tube route 2 (two) times daily.  Qty: 50 mL, Refills: 0      hydrocodone-chlorpheniramine (TUSSIONEX) 10-8 mg/5 mL suspension 5 mLs by Per G Tube route every 12 (twelve) hours as needed for Cough or Congestion.  Qty: 70 mL, Refills: 0    Comments: Quantity prescribed more than 7 day supply? No      ibuprofen 20 mg/mL oral liquid 10 mLs (200 mg total) by Per G Tube route every 6 (six) hours as needed for Temperature greater than or Pain (101.0 degrees).  Qty: 30 mL, Refills: 0      lacosamide (VIMPAT) 50 mg Tab 4 tablets (200 mg total) by Per G Tube route every 12 (twelve) hours.  Qty: 240 tablet, Refills: 0      metoprolol tartrate (LOPRESSOR) 50 MG tablet 1 tablet (50 mg total) by Per G Tube route 2 (two) times daily.  Qty: 60 tablet, Refills: 0    Comments: .      polyethylene glycol (GLYCOLAX) 17 gram PwPk 17 g by  Per NG tube route 2 (two) times daily as needed.  Qty: 30 each, Refills: 0           CONTINUE these medications which have CHANGED    Details   allopurinoL (ZYLOPRIM) 100 MG tablet 1 tablet (100 mg total) by Per G Tube route once daily.  Qty: 30 tablet, Refills: 0      colchicine (COLCRYS) 0.6 mg tablet 1 tablet (0.6 mg total) by Per G Tube route daily as needed (gout attack).  Qty: 30 tablet, Refills: 0      dexAMETHasone (DECADRON) 1 MG Tab 1 tablet (1 mg total) by Per G Tube route every 12 (twelve) hours. for 10 days  Qty: 20 tablet, Refills: 0      levETIRAcetam (KEPPRA) 500 MG Tab Take 1 tablet (500 mg total) by mouth 2 (two) times daily.  Qty: 60 tablet, Refills: 11      rivaroxaban (XARELTO) 20 mg Tab 1 tablet (20 mg total) by Per G Tube route daily with dinner or evening meal.  Qty: 30 tablet, Refills: 0      timolol maleate 0.25% (TIMOPTIC) 0.25 % Drop Place 1 drop into both eyes 2 (two) times daily.  Qty: 15 mL, Refills: 0           STOP taking these medications       amLODIPine (NORVASC) 10 MG tablet Comments:   Reason for Stopping:         hydrALAZINE (APRESOLINE) 25 MG tablet Comments:   Reason for Stopping:         metoprolol succinate (TOPROL-XL) 100 MG 24 hr tablet Comments:   Reason for Stopping:         pantoprazole (PROTONIX) 40 MG tablet Comments:   Reason for Stopping:         clotrimazole (MYCELEX) 10 mg kathleen Comments:   Reason for Stopping:         furosemide (LASIX) 20 MG tablet Comments:   Reason for Stopping:         meclizine (ANTIVERT) 12.5 mg tablet Comments:   Reason for Stopping:         timolol maleate 0.5% (TIMOPTIC) 0.5 % Drop Comments:   Reason for Stopping:             Follow up: NA      Immunizations Administered as of 5/25/2023       No immunizations on file.          Covid vaccine history unknown     Some patients may experience side effects after vaccination.  These may include fever, headache, muscle or joint aches.  Most symptoms resolve with 24-48 hours and do not require  urgent medical evaluation unless they persist for more than 72 hours or symptoms are concerning for an unrelated medical condition.          Nicky Espinal MD

## 2023-05-25 NOTE — CONSULTS
Can you sign his opdivo for jose? thanks [facility]  General Surgery  Consult Note    Inpatient consult to General Surgery  Consult performed by: Kapil Hickey III, MD  Consult ordered by: Mery Milner MD  Reason for consult: Request for trach and PEG      Subjective:     Chief Complaint/Reason for Admission:  Request for trach and PEG    History of Present Illness: Trach and PEG requested. Patient has complicated medical history including a-fib on xarelto, seizure disorder, glioblastoma, acute stroke, respiratory failure. Neurologically improving slowly. Currently on vent. Awake and arousable. Hemodynamically stable.  No history of tracheostomy were neck surgery.  No previous upper abdominal surgery.    No current facility-administered medications on file prior to encounter.     Current Outpatient Medications on File Prior to Encounter   Medication Sig    allopurinoL (ZYLOPRIM) 100 MG tablet Take 100 mg by mouth once daily.    amLODIPine (NORVASC) 10 MG tablet Take 10 mg by mouth once daily.    dexAMETHasone (DECADRON) 1 MG Tab Take 1 mg by mouth every 12 (twelve) hours.    hydrALAZINE (APRESOLINE) 25 MG tablet Take 25 mg by mouth 3 (three) times daily.    levETIRAcetam (KEPPRA) 500 MG Tab Take 500 mg by mouth 2 (two) times daily.    metoprolol succinate (TOPROL-XL) 100 MG 24 hr tablet Take 100 mg by mouth once daily.    pantoprazole (PROTONIX) 40 MG tablet Take 40 mg by mouth once daily.    XARELTO 20 mg Tab Take 20 mg by mouth once daily.    clotrimazole (MYCELEX) 10 mg kathleen Take 1 tablet by mouth 3 (three) times daily.    colchicine (COLCRYS) 0.6 mg tablet Take 0.6 mg by mouth once daily.    furosemide (LASIX) 20 MG tablet Take 20 mg by mouth once daily.    meclizine (ANTIVERT) 12.5 mg tablet Take 25 mg by mouth 3 (three) times daily.    rivaroxaban (XARELTO) 20 mg Tab Take 20 mg by mouth daily with dinner or evening meal.    timolol maleate 0.5% (TIMOPTIC) 0.5 % Drop Place 1 drop into both eyes 2 (two) times daily.        Review of patient's allergies indicates:  No Known Allergies    Past Medical History:   Diagnosis Date    GERD (gastroesophageal reflux disease)     Glioblastoma     H/O blood clots     Hypertension     Paroxysmal atrial fibrillation     Seizures      No past surgical history on file.  Family History    None       Tobacco Use    Smoking status: Not on file    Smokeless tobacco: Not on file   Substance and Sexual Activity    Alcohol use: Not on file    Drug use: Not on file    Sexual activity: Not on file     Review of Systems   Unable to perform ROS: Intubated   Objective:     Vital Signs (Most Recent):  Temp: (P) 98.4 °F (36.9 °C) (05/24/23 0400)  Pulse: 103 (05/24/23 1800)  Resp: (!) 30 (05/24/23 1800)  BP: (!) 146/96 (05/24/23 1800)  SpO2: 99 % (05/24/23 1800) Vital Signs (24h Range):  Temp:  [98.2 °F (36.8 °C)-98.4 °F (36.9 °C)] (P) 98.4 °F (36.9 °C)  Pulse:  [] 103  Resp:  [14-31] 30  SpO2:  [97 %-100 %] 99 %  BP: (113-163)/(67-96) 146/96  Arterial Line BP: (141)/(106) 141/106     Weight: 98.7 kg (217 lb 9.5 oz)  Body mass index is 32.13 kg/m².      Intake/Output Summary (Last 24 hours) at 5/24/2023 1909  Last data filed at 5/24/2023 1645  Gross per 24 hour   Intake 1685.5 ml   Output 1040 ml   Net 645.5 ml       Physical Exam  Constitutional:       Appearance: He is morbidly obese. He is ill-appearing.      Interventions: He is sedated and intubated.   Pulmonary:      Effort: He is intubated.   Abdominal:      General: There is no distension.      Palpations: Abdomen is soft.   Musculoskeletal:      Cervical back: Neck supple.       Significant Labs:  CBC:   Recent Labs   Lab 05/24/23  0335 05/24/23  0419   WBC 8.99  --    RBC 3.85*  --    HGB 12.0*  --    HCT 36.4* 34*     --    MCV 95  --    MCH 31.2*  --    MCHC 33.0  --      CMP:   Recent Labs   Lab 05/24/23  0335   *   CALCIUM 7.9*   ALBUMIN 2.0*   PROT 5.0*      K 4.1   CO2 30*      BUN 34*   CREATININE 0.7   ALKPHOS  45*   ALT 90*   AST 74*   BILITOT 0.6       Significant Diagnostics:  I have reviewed all pertinent imaging results/findings within the past 24 hours.    Assessment/Plan:     Active Diagnoses:    Diagnosis Date Noted POA    PRINCIPAL PROBLEM:  Status epilepticus [G40.901] 05/12/2023 Yes    Goals of care, counseling/discussion [Z71.89] 05/19/2023 Not Applicable    Fever [R50.9] 05/19/2023 No    Hypernatremia [E87.0] 05/19/2023 No    Acute CVA (cerebrovascular accident) [I63.9] 05/15/2023 Yes    Obesity [E66.9] 05/13/2023 Yes    Gout [M10.9] 05/13/2023 Yes    GERD (gastroesophageal reflux disease) [K21.9] 05/13/2023 Yes    HFrEF (heart failure with reduced ejection fraction) [I50.20] 05/13/2023 Yes    Essential hypertension [I10] 05/12/2023 Yes    Paroxysmal atrial fibrillation [I48.0] 05/12/2023 Yes    Glioblastoma [C71.9] 05/12/2023 Yes    Acute hypoxemic respiratory failure [J96.01] 05/11/2023 Yes      Problems Resolved During this Admission:    Diagnosis Date Noted Date Resolved POA    Hypokalemia [E87.6] 05/12/2023 05/19/2023 Yes   Plan on tracheostomy and PEG tube placement today.  Risks and benefits of the procedure discussed with the patient's family.  Xarelto has been held since Monday.    Thank you for your consult.     Kapil Hickey III, MD  General Surgery  [facility]

## 2023-05-25 NOTE — OP NOTE
Surgery Date: 5/24/2023     Surgeon(s) and Role:     * Kapil Hickey III, MD - Primary    Assisting Surgeon: None    Pre-op Diagnosis:  Acute hypoxemic respiratory failure [J96.01]  Acute CVA (cerebrovascular accident) [I63.9]  Inanition [R64]    Post-op Diagnosis:  Post-Op Diagnosis Codes:     * Acute hypoxemic respiratory failure [J96.01]     * Acute CVA (cerebrovascular accident) [I63.9]     * Inanition [R64]    Procedure(s) (LRB):  CREATION, TRACHEOSTOMY (N/A)  INSERTION, PEG TUBE (N/A)    Anesthesia: General    Operative Findings: Patient was brought to the operating room from the ICU. He was transferred operating room table in supine position. The patient was already intubated. He was given general anesthesia. The neck and chest was sterilely prepped and draped. A transverse incision was made a fingerbreadth superior to the sternal notch in the anterior neck. Dissection was carried down through the skin and platysma. Strap muscles were encountered. The strap muscles were divided in the midline through the median raphae. Self-retaining retractors were placed to retract the strap muscles laterally. Dissection was carried down to the anterior surface of the trachea.  Trachea hook was used to grasp the first tracheal ring and retracted superiorly and anteriorly. 11 blade was used to make a transverse incision into the trachea at the third tracheal ring.  Trachea was dilated. It was retracted anteriorly and a little superiorly. Anesthesiology slowly removed the ET tube. I could see the tip of the ET tube move superiorly past the tracheotomy. A #8 [place] tracheostomy tube was placed into the trachea. The cuff was inflated. The anesthesia circuit was placed to the tracheostomy tube apparatus. ET tube was completely removed. Self-retaining retractors were removed.  2-0 Vicryl was used to approximate the skin with a subcuticular interrupted stitch at the lateral aspects of our transverse incision. The trach  appliance was sutured down to the skin with 2-0 nylon. Instrument counts were correct   We then turned attention to the PEG   Bite block was placed. Endoscope was introduced through the bite block into the mouth and into the esophagus. Scope advanced into the stomach. Stomach was insufflated. There was transillumination through the abdominal wall. The left upper quadrant was prepped and draped. Angiocath needle was introduced through the abdominal wall and into the stomach. This was done under direct visualization with endoscope. Guidewire was placed through the catheter into the stomach. A snare was used to grasp the wire.  Wire was then brought back out through the stomach and esophagus out of the mouth. The PEG tube was placed onto the guidewire. The PEG was then pulled back through the esophagus into the stomach and through the abdominal wall.  It was in good position on endoscopy.  The PEG apparatus was prepared. Scope was removed. He tolerated the procedure well. He was brought back to the ICU in guarded condition.     Estimated Blood Loss: 10 mL  Complications none.  Estimated Blood Loss has been documented.         Specimens:   Specimen (24h ago, onward)      None            [id number]

## 2023-05-25 NOTE — PLAN OF CARE
Per Veronica with [facility], report can be called to Elen @ [phone]. Admitting to room 223. Transportation set up by Veronica. Primary nurse updated.     05/25/23 1236   Post-Acute Status   Post-Acute Authorization Placement   Post-Acute Placement Status Set-up Complete/Auth obtained

## 2023-05-25 NOTE — NURSING
Report called to Elen at [facility]. Transportation set up by case management.

## 2023-05-25 NOTE — PROGRESS NOTES
Pulmonary/Critical Care Progress Note      PATIENT NAME: Teto Mendez  MRN: [id number]  TODAY'S DATE: 2023  12:50 PM  ADMIT DATE: 2023  AGE: 67 y.o. : 1955      HPI:  Called by nurse because they could not reach anesthesia to intubate a patient who was being bagged.  The patient had a change in mental status in the emergency room after having had a CTA earlier in the morning which was negative.  In CT, the patient was not breathing effectively and was cyanotic and the scanner broke so the patient was brought up to the ICU emergently.  The patient's past medical history is significant for glioblastoma multiforme for which he has been receiving radiation and chemotherapy.  He also has had a right frontal craniotomy.  At 3:30 a.m. in the morning the patient was playing a game and noted twitching of his face with slurred speech and weakness to his left arm.  He took an extra Keppra at that time.  He arrived at the ER at 6:45 a.m..     the patient remains on the ventilator and sedated with 60 of propofol.  He is also on 5 Cardizem.  Had further seizure activity during the night.    - pt remains sedated, on vent, on continuous EEG. There is EEG evidence of seizure activity so neuro has ordered VPA. Propofol has been weaned to 30    - continues sedated, on vent. Mri was done today and shows infarct in R parietal and R temporal lobes. Continues on propofol 20mg. He did have seizure activity twice today    5/15 The patient is still on the vent.  He is on Precedex, the propofol is off.  He was still having seizure activity yesterday.  MRI shows parietal and temporal infarcts on the right.     the patient is been off all sedation for 24 hours and has a GCS of 3.  Has pupils and corneals and gag and breathes spontaneously but no response to pain.  He does not respond to his voice.  He follows no commands.     the patient remains unresponsive.  He partially opens his eyes to noxious  stimuli.  He follows no commands.  Dr. Whitfield wants to give him 72 hours from stopping the sedation to reassess his neurologic recovery.  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    5/18 the patient remains densely encephalopathic.  To vigorous tactile stimulation he will partially open his eyes.  The nurse swears that he squeezed with his right hand.    5/19 the patient remains densely encephalopathic.  Actually is lightly squeezing with his right hand today.  Opens his eyes assisted when stimulated.  His heart rate is 157 because he has not gotten his metoprolol because his OG tube was pulled out during his bath.    5/20 the patient opens his eyes assisted to verbal stimulation.  He weakly squeezes with his right hand.  Wiggles his right toes to command.  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    5/21 the patient had a 5 minute seizure earlier this morning.  He still has a left lateral nystagmus. His exam is about the same except he will not move his toes today.  He is on the ventilator.  He was febrile this morning to 102.    5/22 the patient is able to move his right leg more today.  He remains hemiplegic on the left.  Neurology has ordered a repeat CT.  The patient was tried on T-piece again today but failed within less than an hour.  It is likely that the patient is going to be ventilator dependent going forward.  Spoke with his daughter about this.  She said she was on her way to the hospital iand would assess his situation then.    5/23 the patient is now lightly squeezing with his left hand and wiggling his left toes.  He can not open his eyes today because he is on a mg per hour Versed drip.  He was bleeding from his central line yesterday.    5/24 the patient had another bad night.  He was placed back on a Versed drip,back on a ventilatory rate.  He was agitated, tachypneic, tachycardic, hypertensive.  This morning he is still sedated.  Plans for trach and  PEG or at 1:45 p.m..    5/25 the patient is alert today.  He is trying to talk.  He follows commands with his hands and his feet.  He should go to LTAC today.    REVIEW OF SYSTEMS  Unobtainable    No change in the patient's Past Medical History, Past Surgical History, Social History or Family History since admission.      VITAL SIGNS (MOST RECENT)  Temp: 98.7 °F (37.1 °C) (05/25/23 0701)  Pulse: 81 (05/25/23 0701)  Resp: (!) 22 (05/25/23 0820)  BP: 111/69 (05/25/23 0400)  SpO2: 98 % (05/25/23 0701)  T-max 102.0°    INTAKE AND OUTPUT (LAST 24 HOURS):  Intake/Output Summary (Last 24 hours) at 5/25/2023 0844  Last data filed at 5/25/2023 0629  Gross per 24 hour   Intake 1273.75 ml   Output 1625 ml   Net -351.25 ml       WEIGHT  Wt Readings from Last 1 Encounters:   05/25/23 103.7 kg (228 lb 9.9 oz)       PHYSICAL EXAM  GENERAL: Older patient, intubated on mechanical ventilation  HEENT: Pupils equal and reactive.  Corneals are intact.   Nose intact. Pharynx intubated with ET tube and OG tube.  Gag reflex is intact.  Trying to speak.  Appears to have a left facial droop  NECK: Supple.  Right triple-lumen catheter.  HEART: irregularly irregular rate and rhythm. No murmur or gallop auscultated.  LUNGS: Clear to auscultation and percussion. Lung excursion symmetrical. No change in fremitus. No adventitial noises.  ABDOMEN: Bowel sounds present.  Peg tube in place.  Enteral nutrition restarted.  Non-tender, no masses palpated. Rectal tube in place  : Normal anatomy.  Iraheta with yellow urine.  EXTREMITIES: Normal muscle tone and joint movement, no cyanosis or clubbing.  Arterial line on the right  LYMPHATICS: No adenopathy palpated, both hands puffy.  SKIN: Dry, intact, no lesions.   NEURO spontaneously opening eyes today.  Both right and left hands showing 2 fingers both legs moving.  Trying to talk.  PSYCH:  Unable to assess      CBC LAST (LAST 24 HOURS)  Recent Labs   Lab 05/25/23  0355 05/25/23  0357   WBC 9.62  --     RBC 3.79*  --    HGB 11.7*  --    HCT 36.3* 33*   MCV 96  --    MCH 30.9  --    MCHC 32.2  --    RDW 13.8  --      --    MPV 10.2  --    GRAN 81.4*  7.8*  --    LYMPH 9.1*  0.9*  --    MONO 7.6  0.7  --    BASO 0.03  --    NRBC 0  --        CHEMISTRY LAST (LAST 24 HOURS)  Recent Labs   Lab 05/25/23 0355 05/25/23 0357     --    K 4.5  --      --    CO2 29  --    ANIONGAP 6*  --    BUN 28*  --    CREATININE 0.7  --      --    CALCIUM 8.3*  --    PH  --  7.458*   MG 1.9  --    ALBUMIN 2.0*  --    PROT 5.0*  --    ALKPHOS 37*  --    ALT 73*  --    AST 53*  --    BILITOT 0.6  --            LAST 7 DAYS MICROBIOLOGY   Microbiology Results (last 7 days)       Procedure Component Value Units Date/Time    Blood culture [[id number]] Collected: 05/21/23 0743    Order Status: Completed Specimen: Blood Updated: 05/25/23 0832     Blood Culture, Routine No Growth to date      No Growth to date      No Growth to date      No Growth to date      No Growth to date    Blood culture [[id number]] Collected: 05/19/23 1504    Order Status: Completed Specimen: Blood Updated: 05/24/23 1632     Blood Culture, Routine No growth after 5 days.    Narrative:      Collection has been rescheduled by [email or web address] at 05/19/2023 11:30 Reason:   RAMYA Aguilar is messaging the dr will call when ready   Collection has been rescheduled by [email or web address] at 05/19/2023 11:30 Reason:   RAMYA Aguilar is messaging the dr will call when ready     Blood culture [[id number]] Collected: 05/19/23 1504    Order Status: Completed Specimen: Blood Updated: 05/24/23 1632     Blood Culture, Routine No growth after 5 days.    Narrative:      Collection has been rescheduled by [email or web address] at 05/19/2023 11:30 Reason:   RAMYA Aguilar is messaging the dr will call when ready   Collection has been rescheduled by [email or web address] at 05/19/2023 11:30 Reason:   RAMYA Aguilar is messaging the dr will call when ready     Blood culture [[id number]] Collected: 05/21/23 1106    Order Status: Completed Specimen:  Blood from Line, PICC Left Brachial Updated: 05/24/23 1232     Blood Culture, Routine No Growth to date      No Growth to date      No Growth to date      No Growth to date    Narrative:      From PICC  Collection has been rescheduled by RE1 at 05/21/2023 10:05 Reason:   Nurse Draw  Collection has been rescheduled by RE1 at 05/21/2023 10:05 Reason:   Nurse Draw    Blood culture [[id number]] Collected: 05/21/23 1107    Order Status: Completed Specimen: Blood from Line, Arterial, Left Updated: 05/24/23 1232     Blood Culture, Routine No Growth to date      No Growth to date      No Growth to date      No Growth to date    Narrative:      From arterial line  Collection has been rescheduled by RE1 at 05/21/2023 10:05 Reason:   Nurse Draw  Collection has been rescheduled by RE1 at 05/21/2023 10:05 Reason:   Nurse Draw    IV catheter culture [[id number]] Collected: 05/21/23 1825    Order Status: Completed Specimen: Catheter Tip, PICC Updated: 05/24/23 0814     Aerobic Culture - Cath tip No growth    IV catheter culture [[id number]]  (Abnormal) Collected: 05/21/23 1826    Order Status: Completed Specimen: Catheter Tip, Arterial Updated: 05/23/23 0827     Aerobic Culture - Cath tip COAGULASE-NEGATIVE STAPHYLOCOCCUS SPECIES  > 15 colonies  Multiple morphotyes - probable contaminates      Culture, Respiratory with Gram Stain [[id number]]  (Abnormal)  (Susceptibility) Collected: 05/21/23 1723    Order Status: Completed Specimen: Respiratory from Endotracheal Aspirate Updated: 05/23/23 0728     Respiratory Culture No normal respiratory fredi      PSEUDOMONAS AERUGINOSA  Moderate       Gram Stain (Respiratory) <10 epithelial cells per low power field.     Gram Stain (Respiratory) Many WBC's     Gram Stain (Respiratory) Few Gram negative rods     Gram Stain (Respiratory) Rare Gram positive rods    Urine culture [[id number]]  (Abnormal)  (Susceptibility) Collected: 05/21/23 1104    Order Status: Completed Specimen: Urine Updated:  05/23/23 0655     Urine Culture, Routine KLEBSIELLA PNEUMONIAE  >100,000 cfu/ml      Narrative:      Specimen Source->Urine            MOST RECENT IMAGING  X-Ray Chest 1 View  Reason: vent vent    FINDINGS:  Portable chest at 917 compared with 5/21/2023 shows interval removal of left PICC. Other support tubes and lines are unchanged. Cardiomediastinal silhouette unchanged.    Increasing linear low left lower lung zone opacity has developed along with increasing medial right basilar and infrahilar alveolar opacity. Lung volumes remain low. No pleural effusion or pneumothorax. Central pulmonary vascular prominence is evident without jody pulmonary edema.    IMPRESSION:    1. Increasing left basilar linear alveolar opacities in right infrahilar/medial basilar alveolar opacities could represent atelectasis although additional consolidation, or some combination thereof, can also be considered.  2. Removal of left PICC.    Electronically signed by:  Compa Lopez MD  5/23/2023 10:00 AM CDT Workstation: [id number]      CURRENT VISIT EKG  Results for orders placed or performed during the hospital encounter of 05/11/23   ECG 12 lead    Narrative    Test Reason : I63.9,    Vent. Rate : 120 BPM     Atrial Rate : 150 BPM     P-R Int : 000 ms          QRS Dur : 104 ms      QT Int : 370 ms       P-R-T Axes : 000 016 -12 degrees     QTc Int : 522 ms    Atrial fibrillation with rapid ventricular response  Nonspecific ST abnormality  Prolonged QT  Abnormal ECG  No previous ECGs available    Referred By: [email or web address]   SELF           Confirmed By:        ECHOCARDIOGRAM RESULTS  No results found for this or any previous visit.        VENTILATOR INFORMATION  Vent Mode: A/C  Oxygen Concentration (%):  [30] 30  Resp Rate Total:  [12 br/min-27 br/min] 16 br/min  Vt Set:  [450 mL] 450 mL  PEEP/CPAP:  [5 cmH20] 5 cmH20  Mean Airway Pressure:  [8.3 bcV69-95 cmH20] 9.3 cmH20           LAST ARTERIAL BLOOD GAS  ABG  Recent Labs   Lab  05/25/23  0357   PH 7.458*   PO2 101*   PCO2 41.1   HCO3 29.1*   BE 5       IMPRESSION AND PLAN    Status epilepticus  -MRI shows temporal and parietal infarcts on the right, the patient is moving more each day, now his left side is moving a little  -history of seizures prior to this admission  - no seizures in days  Lateral nystagmus  -left  -resolved  History of glioblastoma multiforme under treatment in [place]  [place] coma scale of 12  Fever  - resolved  - On cefepime, started 5/21, should end 5/28  - Pseudomonas in his sputum, no evidence of pneumonia, and Klebsiella in his urine  Mechanical ventilation  - ABG daily  - on assist-control ventilation, does well on CPAP but fatigues with trach collar  - attempts to wean from ventilator should continue  - bibasilar atelectasis  Atrial fibrillation  - continue Xarelto  - continue metoprolol  Hypertension  -metoprolol  Hypernatremia  - corrected  - water flushes at 100 cc q.4 hours  - continue to trend labs  Hypokalemia  - resolved  Hypophosphotemia  -  resolved  Moderate hypoalbuminemia  -enteral feedings tolerated  - feedings restarted  Hyperlipidemia  Transaminitis  - better  Thrombocytopenia  - resolved  Anasarca  - exacerbated by free water to correct hypernatremia  Receiving enteral nutrition, on Protonix and Xarelto      Critical care time spent reviewing the chart, examining the patient, reviewing the labs, reviewing the radiological findings, discussing care with nursing, physicians, and respiratory and creating the note and  has been greater than 35 minutes    Mery Milner MD  Date of Service: 05/25/2023  12:50 PM

## 2023-05-25 NOTE — PLAN OF CARE
Pt clear for DC from CM standpoint. Discharging to [facility]     05/25/23 1237   Final Note   Assessment Type Final Discharge Note   Anticipated Discharge Disposition LTAC

## 2023-05-25 NOTE — RESPIRATORY THERAPY
05/24/23 1905   Patient Assessment/Suction   Respiratory Effort Unlabored   Expansion/Accessory Muscles/Retractions no use of accessory muscles   All Lung Fields Breath Sounds coarse;diminished   Rhythm/Pattern, Respiratory assisted mechanically   Cough Frequency with stimulation   Cough Type assisted   Skin Integrity   $ Wound Care Tech Time 15 min   Area Observed Neck;Neck under tracheostomy   Skin Appearance redness blanchable   PRE-TX-O2   Device (Oxygen Therapy) ventilator   $ Is the patient on Low Flow Oxygen? Yes   Oxygen Concentration (%) 30   SpO2 98 %   Pulse Oximetry Type Continuous   $ Pulse Oximetry - Multiple Charge Pulse Oximetry - Multiple   Pulse 91   Resp 20   Adult Surgical Airway 05/24/23 1600 Bivona Custom Adjustable Trach 8.0 (11.0)   Placement Date/Time: 05/24/23 (c) 1600   Present Prior to Hospital Arrival?: No  Placed By: (c) Other (Comment)  Type: (c) Tracheostomy  Brand: Bivona Custom Adjustable Trach  Shaft Diameter (ID/OD) mm: 8.0 (11.0)   Cuff Inflation? Inflated   Status Secured   Site Assessment Drainage   Site Care Cleansed;Dried;Dressing applied   Inner Cannula Care No inner cannula   Ties Assessment Dry;Intact;Secure   Airway Safety   Is Ambu Bag and Mask with Patient? Yes, Adult Ambu Bag and Mask   Suction set is at the bedside? Yes   Extra trach at bedside? Yes   Extra trach sizes at bedside? 6;8   Is Obturator Available? Yes   Location of Obturator?  Bedside table   Respiratory Interventions   Airway/Ventilation Management airway patency maintained   Vent Select   Conventional Vent Y   Charged w/in last 24h YES   Preset Conventional Ventilator Settings   Vent ID [id number]   Vent Type    Ventilation Type VC   Vent Mode A/C   Humidity HME   Set Rate 12 BPM   Vt Set 450 mL   PEEP/CPAP 5 cmH20   Peak Flow 60 L/min   Peak End Inspiratory Pressure 20 cmH20   I-Trigger Type  V-TRIG   Trigger Sensitivity Flow/I-Trigger 3 L/min   Patient Ventilator Parameters   Resp Rate Total 18 br/min    Peak Airway Pressure 24 cmH20   Mean Airway Pressure 9.8 cmH20   Plateau Pressure 0 cmH20   Exhaled Vt 469 mL   Total Ve 8.74 L/m   I:E Ratio Measured 1:2.80   Auto PEEP 0 cmH20   Tubing ID (mm) 8 mm   Tube Type Trach   Conventional Ventilator Alarms   Alarms On Y   Resp Rate High Alarm 40 br/min   Press High Alarm 60 cmH2O   Apnea Rate 10   Apnea Volume (mL) 0 mL   Apnea Oxygen Concentration  100   Apnea Flow Rate (L/min) 60   T Apnea 20 sec(s)   IHI Ventilator Associated Pneumonia Bundle (Required)   Oral Care Mouth swabbed;Mouth moisturizer;Mouth suctioned;with mouthwash   Vent Circut Breaks Minimized Yes   Ready to Wean/Extubation Screen   FIO2<=50 (chart decimal) 0.3   MV<16L (chart vol.) 8.74   PEEP <=8 (chart #) 5   Ready to Wean Parameters   F/VT Ratio<105 (RSBI) (!) 42.64   Vital Capacity   Vital Capacity (mL) 0   Education   $ Education Suction;Trach Care;Ventilator Oxygen;30 min   Respiratory Evaluation   $ Care Plan Tech Time 15 min   $ Eval/Re-eval Charges Re-evaluation

## 2023-05-25 NOTE — PT/OT/SLP PROGRESS
Occupational Therapy   Treatment    Name: Teto Mendez  MRN: [id number]  Admitting Diagnosis:  Status epilepticus  1 Day Post-Op    Recommendations:     Discharge Recommendations: LTACH (long-term acute care hospital)  Discharge Equipment Recommendations:   (TBD at next level of care)  Barriers to discharge:  Decreased caregiver support (medical acuity)    Assessment:     Teto Mendez is a 67 y.o. male with a medical diagnosis of Status epilepticus.  Performance deficits affecting function are weakness, impaired self care skills, impaired functional mobility, gait instability, impaired balance, impaired cognition, decreased coordination, decreased safety awareness, pain, abnormal tone, decreased ROM, impaired coordination, impaired fine motor, impaired endurance, decreased upper extremity function, decreased lower extremity function, edema.     Pt presented with both eyes open today; he tracked to the right and left;  pt participated in UE therex and command following; he was trying to communicate but unable; OT tried various alternate ways for pt to communicate (handwriting, pointing to letter board) but without success today.  Pt was able to answer yes/no questions with accuracy.      Rehab Prognosis:  Fair; patient would benefit from acute skilled OT services to address these deficits and reach maximum level of function.       Plan:     Patient to be seen 5 x/week to address the above listed problems via self-care/home management, therapeutic activities, therapeutic exercises, neuromuscular re-education, cognitive retraining, wheelchair management/training  Plan of Care Expires: 06/22/23  Plan of Care Reviewed with: patient    Subjective     Chief Complaint: none stated  Patient/Family Comments/goals: none stated  Pain/Comfort:  Pain Rating 1:  (pt reported some pain/discomfort but not able to express severity or location due to communication deficits)  Pain Addressed 1: Nurse notified    Objective:      Communicated with: nurse prior to session.  Patient found HOB elevated with minaya catheter, Tracheostomy, ventilator, peripheral IV, blood pressure cuff, telemetry, pulse ox (continuous) upon OT entry to room.    General Precautions: Standard, fall, aspiration, NPO    Respiratory Status: Ventilator to trach    Treatment & Education:  Pt tracked to both right and left   Pt followed ~75% of one-step motor commands  Pt tolerated PROM to the LUE  Pt completed AAROM exercises in all major joints/planes of the RUE x 5 repetitions  Attempted to facilitate alternate communication via letter board/handwriting; pt was able to write letters E and C in an attempt to communicate but fatigued and was not able to continue   Patient answered yes/no questions regarding where he is from, current location with 100% accuracy; pt was also able to correctly identify several letters     Patient left HOB elevated with all lines intact, call button in reach, and bed alarm on    GOALS:   Multidisciplinary Problems       Occupational Therapy Goals          Problem: Occupational Therapy    Goal Priority Disciplines Outcome Interventions   Occupational Therapy Goal     OT, PT/OT Ongoing, Progressing    Description: Goals to be met by: 6/22/23     Patient will increase functional independence with ADLs by performing:    Patient's family / caregiver will demonstrate independence and safety with assisting patient with bilateral upper extremity active/active assisted exercises.  Pt to follow one-step motor commands with 100% accuracy  Pt will tolerate sitting at the edge of the bed for ten minutes with minimal assistance.   Patient will complete grooming tasks at the edge of the bed with minimal assistance.                           Time Tracking:     OT Date of Treatment: 05/25/23  OT Start Time: 1216  OT Stop Time: 1259  OT Total Time (min): 43 min    Billable Minutes:Therapeutic Activity 10  Therapeutic Exercise 15  Cognitive Retraining  18    OT/JYOTI: OT          5/25/2023

## 2023-05-25 NOTE — CARE UPDATE
PT placed on PS 5/5      05/25/23 0950   PRE-TX-O2   Oxygen Concentration (%) 30   SpO2 98 %   Pulse 92   Resp (!) 22   Vent Select   Conventional Vent Y   Charged w/in last 24h YES   Preset Conventional Ventilator Settings   Vent ID 8   Vent Type    Ventilation Type VC   Vent Mode Spont   Humidity HME   Set Rate 12 BPM   Vt Set 450 mL   PEEP/CPAP 5 cmH20   Pressure Support 5 cmH20   Peak Flow 60 L/min   Peak End Inspiratory Pressure 11 cmH20   Insp Rise Time  70 %   I-Trigger Type  V-TRIG   Trigger Sensitivity Flow/I-Trigger 3 L/min   Patient Ventilator Parameters   Resp Rate Total 23 br/min   Peak Airway Pressure 11 cmH20   Mean Airway Pressure 6.8 cmH20   Plateau Pressure 0 cmH20   Exhaled Vt 256 mL   Total Ve 5.72 L/m   Spont Ve 5.72 L   I:E Ratio Measured 1:1.80   Auto PEEP 0 cmH20   Inspired Tidal Volume (VTI) 0 mL   Conventional Ventilator Alarms   Alarms On Y   Resp Rate High Alarm 40 br/min   Press High Alarm 60 cmH2O   Apnea Rate 10   Apnea Volume (mL) 0 mL   Apnea Oxygen Concentration  100   Apnea Flow Rate (L/min) 60   T Apnea 20 sec(s)   Ready to Wean/Extubation Screen   FIO2<=50 (chart decimal) 0.3   MV<16L (chart vol.) 5.72   PEEP <=8 (chart #) 5   Ready to Wean Parameters   F/VT Ratio<105 (RSBI) (!) 85.94   Vital Capacity   Vital Capacity (mL) 0

## 2023-05-25 NOTE — DISCHARGE SUMMARY
[facility] Medicine  Discharge Summary      Patient Name: Teto Mendez  MRN: [id number]  JAYA: [id number]  Patient Class: IP- Inpatient  Admission Date: 5/11/2023  Hospital Length of Stay: 14 days  Discharge Date and Time: 05/25/2023  Attending Physician: Helga att. providers found   Discharging Provider: Nicky Espinal MD  Primary Care Provider: Primary Doctor Helga    Primary Care Team: Networked reference to record PCT     HPI:   Teto Mendez is a 67 y.o. White male with known history of  glioblastoma s/p resection, seizures p.afib on xarelto who presented to ER this morning with left arm weakness, facial twitching and change in his speech that he suspected was seizure activity.  He took an extra keppra and presented to ER around 6:30-7 am.  Head CT/CTA in ER without hemorrhage or LVO and he was not a candidate for tPA regardless.  He was given lorazepam and keppra with positive response, however, while still in ER he became less responsive and repeat stat CT was ordered.  While in CT, he became hypoxic with depressed respiratory status and required bag-mask ventilation and emergent intubation on return to ICU.  History was obtained from the ER physician Sign-out.      Procedure(s) (LRB):  CREATION, TRACHEOSTOMY (N/A)  INSERTION, PEG TUBE (N/A)      Hospital Course:   Teto Mendez is a 67 year old male with a past medical history of glioblastoma s/p craniotomy complicated by seizures, obesity, HTN, AFib and GERD who presented with status epilepticus. Neurology has been consulted. The patient was intubated on propofol sedation with Keppra and valproic acid. Pulmonary has been consulted. Continuous EEG is ordered; the patient continued to have PLEDs throughout his course. MRI brain shows R MCA region infarcts consistent with acute CVA. His course has been complicated by Afib with RVR as well as a HFrEF seen on TTE which appears to be chronic. Cardiology has been consulted. He is on  metoprolol and Xarelto with PRN diuresis. Entresto and high dose atorvastatin was also added. Diltiazem was also added by Cardiology 5/18.  Sedation with propofol and Precedex was stopped 5/15. Vimpat was also added 5/15 by Neurology. He has been monitored for improvement in his neurologic status for > 72 hours and is noted to have some right hand squeeze when directed as well as some eye opening as of 5/18. Neurology has consulted Palliative Care 5/17 for assistance with end of life care; they may consider changing code status to DNR; they are hopeful the patient may continue show signs of neurologic progress, but acknowledge the severity of his case.     HR elevated 120-130s with stimulation and fatigue off vent setting; HR much improved back on vent; low grade temps progressed to high grade temps, SBP 70s, started on levophed. All lines removed, tips cultured and replaced. Cooling blanket, tylenol, ibuprofen and icepacks. On Cefepime IV. Vanc dc'd  Resp culture (5/21) - Pseudomons -presumptive  Urine culture (5/21) - GNR     Family met with Palliative Care team- they would like to proceed with trach and peg and continue full code. Head CT (5/22) no ICH, +cerebral atrophy; persistent mastoiditis findings. Patient is more active and following commands.   Hold anticoagulation, general surgery consulted. Underwent Trach and peg today. Plan for dc to LTAC tomorrow.     Patient will complete 3 more days cefepime IV. Anti-seizure medication: keppra and vimpat switched to peg tube. Patient is tolerating tube feeds. He is currently on T-piece trial and tolerating. He has transfer orders for LTAC to return on Vent setting of spontaneous/ Vt 450ml, rate 12, Peep 5, PS 5, FIO2 30%. Vitals signs stable. Ok to dc to LTAC. He will need to have PT/OT/ST/ Pulmonary consultation.        Goals of Care Treatment Preferences:  Code Status: Full Code          What is most important right now is to focus on curative/life-prolongation  (regardless of treatment burdens).  Accordingly, we have decided that the best plan to meet the patient's goals includes continuing with treatment.      Consults:   Consults (From admission, onward)        Status Ordering Provider     Inpatient consult to General Surgery  Once        Provider:  Kapil Hickey III, MD    Completed SARA DELEON     Inpatient consult to Palliative Care  Once        Provider:  Onofre Penny MD    Completed VINCE NUNEZ     Inpatient consult to   Once        Provider:  (Not yet assigned)    Completed SARA DELEON     Inpatient consult to Registered Dietitian/Nutritionist  Once        Provider:  (Not yet assigned)    Completed DIXIE NEVES     Inpatient consult to Neurology  Once        Provider:  (Not yet assigned)    Completed KELLY ANN     Consult to Telemedicine - Acute Stroke  Once        Provider:  (Not yet assigned)    Completed MILLY RIVERA          No new Assessment & Plan notes have been filed under this hospital service since the last note was generated.  Service: Hospital Medicine    Final Active Diagnoses:    Diagnosis Date Noted POA    PRINCIPAL PROBLEM:  Status epilepticus [G40.901] 05/12/2023 Yes    Goals of care, counseling/discussion [Z71.89] 05/19/2023 Not Applicable    Acute cerebrovascular accident (CVA) due to embolism of right middle cerebral artery [I63.411] 05/15/2023 Yes    Obesity [E66.9] 05/13/2023 Yes    Gout [M10.9] 05/13/2023 Yes    GERD (gastroesophageal reflux disease) [K21.9] 05/13/2023 Yes    HFrEF (heart failure with reduced ejection fraction) [I50.20] 05/13/2023 Yes    Essential hypertension [I10] 05/12/2023 Yes    Paroxysmal atrial fibrillation [I48.0] 05/12/2023 Yes    Glioblastoma [C71.9] 05/12/2023 Yes    Acute hypoxemic respiratory failure [J96.01] 05/11/2023 Yes      Problems Resolved During this Admission:    Diagnosis Date Noted Date Resolved POA    Fever [R50.9]  05/19/2023 05/25/2023 No    Hypernatremia [E87.0] 05/19/2023 05/25/2023 No    Hypokalemia [E87.6] 05/12/2023 05/19/2023 Yes       Discharged Condition: stable    Disposition: Long Term Acute Care    Follow Up:    Patient Instructions:      Activity as tolerated       Significant Diagnostic Studies: N/A    Pending Diagnostic Studies:     Procedure Component Value Units Date/Time    APTT [[id number]] Collected: 05/22/23 0300    Order Status: Sent Lab Status: In process Updated: 05/22/23 0311    Specimen: Blood          Medications:  Reconciled Home Medications:      Medication List      START taking these medications    acetaminophen 325 MG tablet  Commonly known as: TYLENOL  2 tablets (650 mg total) by Per G Tube route every 8 (eight) hours as needed for Pain or Temperature greater than (100.4).     atorvastatin 20 MG tablet  Commonly known as: LIPITOR  1 tablet (20 mg total) by Per G Tube route once daily.  Start taking on: May 26, 2023     cefepime 2 g/100 mL D5W 2 g/100 mL   Inject 100 mLs (2 g total) into the vein every 8 (eight) hours. for 3 days     dexmedeTOMIDine in 0.9 % NaCL 400 mcg/100 mL (4 mcg/mL) Soln  Inject 0-138.18 mcg/hr into the vein continuous. for 1 day     diltiaZEM 30 MG tablet  Commonly known as: CARDIZEM  1 tablet (30 mg total) by Per G Tube route every 6 (six) hours.     famotidine 40 mg/5 mL (8 mg/mL) suspension  Commonly known as: PEPCID  2.5 mLs (20 mg total) by Per G Tube route 2 (two) times daily.     hydrocodone-chlorpheniramine 10-8 mg/5 mL suspension  Commonly known as: TUSSIONEX  5 mLs by Per G Tube route every 12 (twelve) hours as needed for Cough or Congestion.     ibuprofen 20 mg/mL oral liquid  10 mLs (200 mg total) by Per G Tube route every 6 (six) hours as needed for Temperature greater than or Pain (101.0 degrees).     lacosamide 50 mg Tab  Commonly known as: VIMPAT  4 tablets (200 mg total) by Per G Tube route every 12 (twelve) hours.     metoprolol tartrate 50 MG  tablet  Commonly known as: LOPRESSOR  1 tablet (50 mg total) by Per G Tube route 2 (two) times daily.     polyethylene glycol 17 gram Pwpk  Commonly known as: GLYCOLAX  17 g by Per NG tube route 2 (two) times daily as needed.     timolol maleate 0.25% 0.25 % Drop  Commonly known as: TIMOPTIC  Place 1 drop into both eyes 2 (two) times daily.  Replaces: timolol maleate 0.5% 0.5 % Drop        CHANGE how you take these medications    allopurinoL 100 MG tablet  Commonly known as: ZYLOPRIM  1 tablet (100 mg total) by Per G Tube route once daily.  Start taking on: May 26, 2023  What changed: how to take this     colchicine 0.6 mg tablet  Commonly known as: COLCRYS  1 tablet (0.6 mg total) by Per G Tube route daily as needed (gout attack).  What changed:   · how to take this  · when to take this  · reasons to take this     dexAMETHasone 1 MG Tab  Commonly known as: DECADRON  1 tablet (1 mg total) by Per G Tube route every 12 (twelve) hours. for 10 days  What changed: how to take this     rivaroxaban 20 mg Tab  Commonly known as: XARELTO  1 tablet (20 mg total) by Per G Tube route daily with dinner or evening meal.  What changed:   · how to take this  · Another medication with the same name was removed. Continue taking this medication, and follow the directions you see here.        CONTINUE taking these medications    levETIRAcetam 500 MG Tab  Commonly known as: KEPPRA  Take 1 tablet (500 mg total) by mouth 2 (two) times daily.        STOP taking these medications    amLODIPine 10 MG tablet  Commonly known as: NORVASC     clotrimazole 10 mg kathleen  Commonly known as: MYCELEX     furosemide 20 MG tablet  Commonly known as: LASIX     hydrALAZINE 25 MG tablet  Commonly known as: APRESOLINE     meclizine 12.5 mg tablet  Commonly known as: ANTIVERT     metoprolol succinate 100 MG 24 hr tablet  Commonly known as: TOPROL-XL     pantoprazole 40 MG tablet  Commonly known as: PROTONIX     timolol maleate 0.5% 0.5 % Drop  Commonly  known as: TIMOPTIC  Replaced by: timolol maleate 0.25% 0.25 % Drop            Indwelling Lines/Drains at time of discharge:   Lines/Drains/Airways     Peripherally Inserted Central Catheter Line  Duration           PICC Double Lumen 05/25/23 1000 right basilic <1 day          Drain  Duration                Urethral Catheter 05/11/23 Silicone 14 days         Gastrostomy/Enterostomy 05/24/23 Percutaneous endoscopic gastrostomy (PEG) LUQ 1 day          Airway  Duration           Adult Surgical Airway 05/24/23 1600 Bivona Custom Adjustable Trach 8.0 (11.0) 1 day                Time spent on the discharge of patient: 60 minutes         Nicky Espinal MD  Department of Hospital Medicine  [facility]

## 2023-05-25 NOTE — PROCEDURES
Picc line placed in right upper arm. Picc line 39 cm in length. Initial arm circumference 39 cm. Picc line confirmed by portable chest x-ray. Occlussive   CHG drsg applied.

## 2023-05-25 NOTE — ANESTHESIA POSTPROCEDURE EVALUATION
Anesthesia Post Evaluation    Patient: Teto Mendez    Procedure(s) Performed: Procedure(s) (LRB):  CREATION, TRACHEOSTOMY (N/A)  INSERTION, PEG TUBE (N/A)    Final Anesthesia Type: general      Patient location during evaluation: ICU  Patient participation: No - Unable to Participate, Intubation  Level of consciousness: sedated and obtunded/minimal responses  Post-procedure vital signs: reviewed and stable  Pain management: adequate  Airway patency: patent    PONV status at discharge: No PONV  Anesthetic complications: no      Cardiovascular status: blood pressure returned to baseline and hemodynamically stable  Respiratory status: ventilator  Hydration status: euvolemic  Follow-up not needed.      Pt remains sedated on vent.   No anesthetic complications noted    Vitals Value Taken Time   /90 05/24/23 1903   Temp 37.2 °C (98.9 °F) 05/24/23 1801   Pulse 76 05/24/23 1947   Resp 15 05/24/23 1947   SpO2 97 % 05/24/23 1947   Vitals shown include unvalidated device data.      No case tracking events are documented in the log.      Pain/Pilo Score: Pain Rating Prior to Med Admin: 3 (5/24/2023  3:43 AM)

## 2023-05-25 NOTE — PT/OT/SLP PROGRESS
Physical Therapy Treatment    Patient Name:  Teto Mendez   MRN:  [id number]    Recommendations:     Discharge Recommendations: LTACH (long-term acute care hospital)  Discharge Equipment Recommendations: to be determined by next level of care  Barriers to discharge: Decreased caregiver support    Assessment:     Teto Mendez is a 67 y.o. male admitted with a medical diagnosis of Status epilepticus.  He presents with the following impairments/functional limitations: weakness, impaired endurance, impaired self care skills, impaired functional mobility, gait instability, impaired balance, decreased upper extremity function, decreased lower extremity function, decreased safety awareness, pain, abnormal tone, impaired fine motor, edema, impaired cardiopulmonary response to activity.    Pt found HOB elevated with eyes closed, but pt opened his eyes to sound of PT's voice and shook his head in agreement with PT session. Pt tolerated session fairly, keeping his eyes closed most of the time, but able to assist with there ex and follow commands approximately 50-75% of the time.     Rehab Prognosis: Fair; patient would benefit from acute skilled PT services to address these deficits and reach maximum level of function.    Recent Surgery: Procedure(s) (LRB):  CREATION, TRACHEOSTOMY (N/A)  INSERTION, PEG TUBE (N/A) 1 Day Post-Op    Plan:     During this hospitalization, patient to be seen 3 x/week to address the identified rehab impairments via therapeutic activities, therapeutic exercises and progress toward the following goals:    Plan of Care Expires:  06/23/23    Subjective     Chief Complaint: Pt did not indicate pain of problem issues  Patient/Family Comments/goals: LTACH  Pain/Comfort:  Pain Rating 1:  (pt did not indicate pain during session)  Pain Rating Post-Intervention 1: 0/10      Objective:     Communicated with RAMYA Garcia prior to session.  Patient found HOB elevated with blood pressure cuff, bowel management  system, central line, minaya catheter, PEG Tube, peripheral IV, pulse ox (continuous), telemetry, restraints, Tracheostomy, ventilator upon PT entry to room.     General Precautions: Standard, aspiration, fall, NPO  Orthopedic Precautions: N/A  Braces: N/A  Respiratory Status: Ventilator/Tracheostomy     Functional Mobility:  N/A      AM-PAC 6 CLICK MOBILITY          Treatment & Education:  Pt performed B LE AAROM there ex supine x 6-10 reps each with vc for proper execution: ap, hs, qs, TKE.      Patient left HOB elevated with all lines intact, call button in reach, and bed alarm on..    GOALS:   Multidisciplinary Problems       Physical Therapy Goals          Problem: Physical Therapy    Goal Priority Disciplines Outcome Goal Variances Interventions   Physical Therapy Goal     PT, PT/OT      Description: Goals to be met by: 23     Patient will increase functional independence with mobility by performin. Supine to sit with Maximum Assistance  2. Rolling to Left and Right with Maximum Assistance.  3. Lower extremity exercise program x20 reps per handout, with assistance as needed                         Time Tracking:     PT Received On: 23  PT Start Time: 940     PT Stop Time: 55  PT Total Time (min): 15 min     Billable Minutes: Therapeutic Exercise 15    Treatment Type: Treatment  PT/PTA: PT     Number of PTA visits since last PT visit: 0     2023

## 2023-05-25 NOTE — PLAN OF CARE
Problem: Feeding Intolerance (Enteral Nutrition)  Goal: Feeding Tolerance  Outcome: Ongoing, Progressing  Intervention: Prevent and Manage Feeding Intolerance  Flowsheets (Taken 5/25/2023 1046)  Nutrition Support Management: tube feeding continued as ordered     TF running at 10 ml/hr. Continue to advance to goal rate of 75 ml/hr as tolerated.

## 2023-05-25 NOTE — CARE UPDATE
05/25/23 0700   Patient Assessment/Suction   Level of Consciousness (AVPU) responds to voice   Respiratory Effort Normal;Unlabored   All Lung Fields Breath Sounds coarse;diminished   Rhythm/Pattern, Respiratory assisted mechanically   Cough Frequency frequent   Cough Type nonproductive   Suction Method tracheal;oral   $ Suction Charges Inline Suction Procedure Stat Charge;Close Suction System Equipment   Secretions Amount scant   Secretions Color white   Secretions Characteristics thin   Skin Integrity   $ Wound Care Tech Time 15 min   Area Observed Neck;Neck under tracheostomy   Skin Appearance redness blanchable   PRE-TX-O2   Device (Oxygen Therapy) ventilator   $ Is the patient on Low Flow Oxygen? Yes   Oxygen Concentration (%) 30   SpO2 98 %   Pulse Oximetry Type Continuous   $ Pulse Oximetry - Multiple Charge Pulse Oximetry - Multiple   Pulse 84   Resp 19   Vent Select   Conventional Vent Y   $ Ventilator Subsequent 1   Charged w/in last 24h YES   Preset Conventional Ventilator Settings   Vent ID [id number]   Vent Type    Ventilation Type VC   Vent Mode A/C   Humidity HME   Set Rate 12 BPM   Vt Set 450 mL   PEEP/CPAP 5 cmH20   Peak Flow 60 L/min   Peak End Inspiratory Pressure 22 cmH20   I-Trigger Type  V-TRIG   Trigger Sensitivity Flow/I-Trigger 3 L/min   Patient Ventilator Parameters   Resp Rate Total 15 br/min   Peak Airway Pressure 26 cmH20   Mean Airway Pressure 9.3 cmH20   Plateau Pressure 0 cmH20   Exhaled Vt 474 mL   Total Ve 6.91 L/m   I:E Ratio Measured 1:3.10   Auto PEEP 0 cmH20   Tubing ID (mm) 8 mm   Tube Type Trach   Conventional Ventilator Alarms   Alarms On Y   Resp Rate High Alarm 40 br/min   Press High Alarm 60 cmH2O   Apnea Rate 10   Apnea Volume (mL) 0 mL   Apnea Oxygen Concentration  100   Apnea Flow Rate (L/min) 60   T Apnea 20 sec(s)   Ready to Wean/Extubation Screen   FIO2<=50 (chart decimal) 0.3   MV<16L (chart vol.) 6.91   PEEP <=8 (chart #) 5   Ready to Wean Parameters   F/VT  Ratio<105 (RSBI) (!) 40.08   Vital Capacity   Vital Capacity (mL) 0   Education   $ Education Ventilator Oxygen;15 min   Respiratory Evaluation   $ Care Plan Tech Time 15 min   $ Eval/Re-eval Charges Re-evaluation

## 2023-05-26 PROBLEM — I48.91 ATRIAL FIBRILLATION WITH RVR: Status: ACTIVE | Noted: 2023-05-26

## 2023-05-26 PROBLEM — M79.89 LEFT ARM SWELLING: Status: ACTIVE | Noted: 2023-05-26

## 2023-05-26 PROBLEM — I48.91 ATRIAL FIBRILLATION WITH RVR: Status: RESOLVED | Noted: 2023-05-26 | Resolved: 2023-05-26

## 2023-05-26 LAB
BACTERIA BLD CULT: NORMAL

## 2023-05-26 NOTE — PHYSICIAN QUERY
PT Name: Teto Mendez  MR #: [id number]     Documentation Clarification      CDS/: Glendy Goel               Contact information: [phone]    This form is a permanent document in the medical record.     Query Date: May 26, 2023    By submitting this query, we are merely seeking further clarification of documentation. Please utilize your independent clinical judgment when addressing the question(s) below.    The Medical Record reflects the following:    Supporting Clinical Findings Location in Medical Record   Fever -Secondary to indwelling minaya catheter  urine cx - GNR  lines removed and replaced IM PN 05/22 - 05/24   Urine Culture - Klebsiella Pneumoniae    IV Cefepime - 05/21 - 05/25 Microbiology 05/21    MAR                                                                                Provider, please provide diagnosis or diagnoses associated with above clinical findings.    [ x  ]   Klebsiella Urinary tract infection due to indwelling minaya catheter                                                                                                            Present on admission (POA) status:   [   ] Yes (Y)                          [  ] Clinically Undetermined (W)  [  x ] No (N)                            [   ] Documentation insufficient to determine if condition is POA (U)

## 2023-05-26 NOTE — PHYSICIAN QUERY
PT Name: Teto Mendez  MR #: [id number]    DOCUMENTATION CLARIFICATION     CDS/: Glendy Goel               Contact information: [phone]  This form is a permanent document in the medical record.    Query Date: May 26, 2023      By submitting this query, we are merely seeking further clarification of documentation.  Please utilize your independent clinical judgment when addressing the question(s) below.    The Medical Record reflects the following:    Clinical Information Location in Medical Record   Pseudomonas in his sputum, no evidence of pneumonia, and Klebsiella in his urine  bibasilar atelectasis    Fever - Secondary to VAP  Resp culture - presumed pseudomonas    Increasing left basilar linear alveolar opacities in right infrahilar/medial basilar alveolar opacities could represent atelectasis although additional consolidation, or some combination thereof, can also be considered.     Mildly increased central hilar interstitial opacities are seen bilaterally suggestive of either mild edema, atypical infection/pneumonia or bronchitis in the appropriate clinical setting    IV Cefepime -05/21 - 05/25  Patient will complete 3 more days cefepime IV.    WBC - wnl  Procalcitonin 0.26 - 05/16                        0.12 - 05/21 Pulmonary PN 05/23-05/25      IM PN 05/22 - 05/24      Chest X-ray 05/23        Chest X-ray 05/25      MAR   Discharge Summary    Epic Lab       Please clarify/confirm the Consultants diagnosis      [  ] Atelectasis only, NO Pneumonia   [ x ] Ventilator Associated Pneumonia (VAP)   [  ] Other Pneumonia, please specify       Present on admission (POA) status:   [   ] Yes (Y)                          [  ] Clinically Undetermined (W)  [ x  ] No (N)                            [   ] Documentation insufficient to determine if condition is POA (U)

## 2023-05-27 PROBLEM — I82.612: Status: ACTIVE | Noted: 2023-05-26

## 2023-05-27 PROBLEM — I50.21 ACUTE SYSTOLIC CHF (CONGESTIVE HEART FAILURE): Status: ACTIVE | Noted: 2023-05-27

## 2023-05-28 PROBLEM — I48.91 ATRIAL FIBRILLATION WITH RVR: Status: ACTIVE | Noted: 2023-05-28

## 2023-05-29 PROBLEM — I48.91 ATRIAL FIBRILLATION WITH RVR: Status: ACTIVE | Noted: 2023-05-29

## 2023-05-29 PROBLEM — J95.851 VENTILATOR ASSOCIATED PNEUMONIA: Status: ACTIVE | Noted: 2023-05-29

## 2023-05-29 PROBLEM — Z99.11 VENTILATOR DEPENDENCE: Status: ACTIVE | Noted: 2023-05-29

## 2023-05-29 PROBLEM — Z93.1 PEG (PERCUTANEOUS ENDOSCOPIC GASTROSTOMY) STATUS: Status: ACTIVE | Noted: 2023-05-29

## 2023-05-29 PROBLEM — Z93.0 TRACHEOSTOMY DEPENDENCE: Status: ACTIVE | Noted: 2023-05-29

## 2023-06-08 PROBLEM — J95.851 VENTILATOR ASSOCIATED PNEUMONIA: Status: RESOLVED | Noted: 2023-05-29 | Resolved: 2023-06-08

## 2023-06-08 PROBLEM — Z99.11 VENTILATOR DEPENDENCE: Status: RESOLVED | Noted: 2023-05-29 | Resolved: 2023-06-08

## 2023-06-12 PROBLEM — G92.9 ENCEPHALOPATHY, TOXIC: Status: ACTIVE | Noted: 2023-06-12

## 2023-06-14 ENCOUNTER — HOSPITAL ENCOUNTER (INPATIENT)
Facility: HOSPITAL | Age: 68
LOS: 2 days | Discharge: NURSING, REHAB OR LONG-TERM CARE | DRG: 193 | End: 2023-06-16
Attending: EMERGENCY MEDICINE | Admitting: INTERNAL MEDICINE
Payer: COMMERCIAL

## 2023-06-14 DIAGNOSIS — R07.9 CHEST PAIN: ICD-10-CM

## 2023-06-14 DIAGNOSIS — N17.9 ACUTE KIDNEY INJURY: Primary | ICD-10-CM

## 2023-06-14 DIAGNOSIS — R06.02 SOB (SHORTNESS OF BREATH): ICD-10-CM

## 2023-06-14 DIAGNOSIS — J98.4 PNEUMONITIS: ICD-10-CM

## 2023-06-14 PROBLEM — J18.9 PNEUMONIA OF RIGHT LOWER LOBE DUE TO INFECTIOUS ORGANISM: Status: ACTIVE | Noted: 2023-06-14

## 2023-06-14 PROBLEM — J96.22 ACUTE ON CHRONIC RESPIRATORY FAILURE WITH HYPOXIA AND HYPERCAPNIA: Status: ACTIVE | Noted: 2023-06-14

## 2023-06-14 PROBLEM — J96.21 ACUTE ON CHRONIC RESPIRATORY FAILURE WITH HYPOXIA AND HYPERCAPNIA: Status: ACTIVE | Noted: 2023-06-14

## 2023-06-14 PROBLEM — G93.41 ACUTE METABOLIC ENCEPHALOPATHY: Status: ACTIVE | Noted: 2023-06-14

## 2023-06-14 PROBLEM — G40.909 SEIZURE DISORDER: Status: ACTIVE | Noted: 2023-06-14

## 2023-06-14 PROBLEM — J96.21 ACUTE ON CHRONIC RESPIRATORY FAILURE WITH HYPOXIA AND HYPERCAPNIA: Status: RESOLVED | Noted: 2023-06-14 | Resolved: 2023-06-14

## 2023-06-14 PROBLEM — J96.22 ACUTE ON CHRONIC RESPIRATORY FAILURE WITH HYPOXIA AND HYPERCAPNIA: Status: RESOLVED | Noted: 2023-06-14 | Resolved: 2023-06-14

## 2023-06-14 LAB
ALBUMIN SERPL BCP-MCNC: 3 G/DL (ref 3.5–5.2)
ALLENS TEST: ABNORMAL
ALLENS TEST: ABNORMAL
ALP SERPL-CCNC: 70 U/L (ref 55–135)
ALT SERPL W/O P-5'-P-CCNC: 67 U/L (ref 10–44)
ANION GAP SERPL CALC-SCNC: 10 MMOL/L (ref 8–16)
AST SERPL-CCNC: 40 U/L (ref 10–40)
BASOPHILS # BLD AUTO: 0.01 K/UL (ref 0–0.2)
BASOPHILS NFR BLD: 0.1 % (ref 0–1.9)
BILIRUB SERPL-MCNC: 0.9 MG/DL (ref 0.1–1)
BILIRUB UR QL STRIP: NEGATIVE
BNP SERPL-MCNC: 73 PG/ML (ref 0–99)
BUN SERPL-MCNC: 63 MG/DL (ref 8–23)
CALCIUM SERPL-MCNC: 8.8 MG/DL (ref 8.7–10.5)
CHLORIDE SERPL-SCNC: 94 MMOL/L (ref 95–110)
CLARITY UR: CLEAR
CO2 SERPL-SCNC: 36 MMOL/L (ref 23–29)
COLOR UR: YELLOW
CREAT SERPL-MCNC: 0.8 MG/DL (ref 0.5–1.4)
DELSYS: ABNORMAL
DELSYS: ABNORMAL
DIFFERENTIAL METHOD: ABNORMAL
EOSINOPHIL # BLD AUTO: 0 K/UL (ref 0–0.5)
EOSINOPHIL NFR BLD: 0 % (ref 0–8)
EP: 5
ERYTHROCYTE [DISTWIDTH] IN BLOOD BY AUTOMATED COUNT: 14.7 % (ref 11.5–14.5)
ERYTHROCYTE [SEDIMENTATION RATE] IN BLOOD BY WESTERGREN METHOD: 18 MM/H
EST. GFR  (NO RACE VARIABLE): >60 ML/MIN/1.73 M^2
FIO2: 40
FLOW: 2
GLUCOSE SERPL-MCNC: 143 MG/DL (ref 70–110)
GLUCOSE SERPL-MCNC: 150 MG/DL (ref 70–110)
GLUCOSE UR QL STRIP: NEGATIVE
HCO3 UR-SCNC: 40.2 MMOL/L (ref 24–28)
HCO3 UR-SCNC: 45.4 MMOL/L (ref 24–28)
HCT VFR BLD AUTO: 43.5 % (ref 40–54)
HCT VFR BLD CALC: 42 %PCV (ref 36–54)
HGB BLD-MCNC: 14.7 G/DL (ref 14–18)
HGB UR QL STRIP: NEGATIVE
IMM GRANULOCYTES # BLD AUTO: 0.05 K/UL (ref 0–0.04)
IMM GRANULOCYTES NFR BLD AUTO: 0.5 % (ref 0–0.5)
IP: 18
KETONES UR QL STRIP: NEGATIVE
LACTATE SERPL-SCNC: 1.7 MMOL/L (ref 0.5–1.9)
LACTATE SERPL-SCNC: 2.3 MMOL/L (ref 0.5–1.9)
LACTATE SERPL-SCNC: 2.3 MMOL/L (ref 0.5–1.9)
LACTATE SERPL-SCNC: 3.5 MMOL/L (ref 0.5–1.9)
LEUKOCYTE ESTERASE UR QL STRIP: NEGATIVE
LYMPHOCYTES # BLD AUTO: 0.7 K/UL (ref 1–4.8)
LYMPHOCYTES NFR BLD: 7.4 % (ref 18–48)
MAGNESIUM SERPL-MCNC: 2.3 MG/DL (ref 1.6–2.6)
MCH RBC QN AUTO: 31.7 PG (ref 27–31)
MCHC RBC AUTO-ENTMCNC: 33.8 G/DL (ref 32–36)
MCV RBC AUTO: 94 FL (ref 82–98)
MODE: ABNORMAL
MODE: ABNORMAL
MONOCYTES # BLD AUTO: 0.7 K/UL (ref 0.3–1)
MONOCYTES NFR BLD: 7 % (ref 4–15)
MRSA SCREEN BY PCR: NEGATIVE
NEUTROPHILS # BLD AUTO: 8.5 K/UL (ref 1.8–7.7)
NEUTROPHILS NFR BLD: 85 % (ref 38–73)
NITRITE UR QL STRIP: NEGATIVE
NRBC BLD-RTO: 0 /100 WBC
PCO2 BLDA: 46.2 MMHG (ref 35–45)
PCO2 BLDA: 58.2 MMHG (ref 35–45)
PH SMN: 7.5 [PH] (ref 7.35–7.45)
PH SMN: 7.55 [PH] (ref 7.35–7.45)
PH UR STRIP: 8 [PH] (ref 5–8)
PHOSPHATE SERPL-MCNC: 3.9 MG/DL (ref 2.7–4.5)
PLATELET # BLD AUTO: 200 K/UL (ref 150–450)
PMV BLD AUTO: 10.3 FL (ref 9.2–12.9)
PO2 BLDA: 193 MMHG (ref 80–100)
PO2 BLDA: 62 MMHG (ref 40–60)
POC BE: 18 MMOL/L
POC BE: 22 MMOL/L
POC IONIZED CALCIUM: 1.16 MMOL/L (ref 1.06–1.42)
POC SATURATED O2: 100 % (ref 95–100)
POC SATURATED O2: 92 % (ref 95–100)
POC TCO2: 42 MMOL/L (ref 23–27)
POC TCO2: 47 MMOL/L (ref 24–29)
POTASSIUM BLD-SCNC: 3.1 MMOL/L (ref 3.5–5.1)
POTASSIUM SERPL-SCNC: 3.6 MMOL/L (ref 3.5–5.1)
PROT SERPL-MCNC: 6.2 G/DL (ref 6–8.4)
PROT UR QL STRIP: ABNORMAL
RBC # BLD AUTO: 4.63 M/UL (ref 4.6–6.2)
SAMPLE: ABNORMAL
SAMPLE: ABNORMAL
SITE: ABNORMAL
SITE: ABNORMAL
SODIUM BLD-SCNC: 139 MMOL/L (ref 136–145)
SODIUM SERPL-SCNC: 140 MMOL/L (ref 136–145)
SP GR UR STRIP: 1.03 (ref 1–1.03)
SPONT RATE: 24
TROPONIN I SERPL HS-MCNC: 11.6 PG/ML (ref 0–14.9)
TROPONIN I SERPL HS-MCNC: 11.7 PG/ML (ref 0–14.9)
URN SPEC COLLECT METH UR: ABNORMAL
UROBILINOGEN UR STRIP-ACNC: NEGATIVE EU/DL
WBC # BLD AUTO: 10.04 K/UL (ref 3.9–12.7)

## 2023-06-14 PROCEDURE — 87077 CULTURE AEROBIC IDENTIFY: CPT | Performed by: STUDENT IN AN ORGANIZED HEALTH CARE EDUCATION/TRAINING PROGRAM

## 2023-06-14 PROCEDURE — 25000003 PHARM REV CODE 250: Performed by: EMERGENCY MEDICINE

## 2023-06-14 PROCEDURE — 82803 BLOOD GASES ANY COMBINATION: CPT

## 2023-06-14 PROCEDURE — 83605 ASSAY OF LACTIC ACID: CPT | Mod: 91 | Performed by: INTERNAL MEDICINE

## 2023-06-14 PROCEDURE — 80053 COMPREHEN METABOLIC PANEL: CPT | Performed by: STUDENT IN AN ORGANIZED HEALTH CARE EDUCATION/TRAINING PROGRAM

## 2023-06-14 PROCEDURE — 93010 ELECTROCARDIOGRAM REPORT: CPT | Mod: ,,, | Performed by: SPECIALIST

## 2023-06-14 PROCEDURE — 85025 COMPLETE CBC W/AUTO DIFF WBC: CPT | Performed by: STUDENT IN AN ORGANIZED HEALTH CARE EDUCATION/TRAINING PROGRAM

## 2023-06-14 PROCEDURE — 25000242 PHARM REV CODE 250 ALT 637 W/ HCPCS: Performed by: INTERNAL MEDICINE

## 2023-06-14 PROCEDURE — 20000000 HC ICU ROOM

## 2023-06-14 PROCEDURE — 99900035 HC TECH TIME PER 15 MIN (STAT)

## 2023-06-14 PROCEDURE — 94640 AIRWAY INHALATION TREATMENT: CPT

## 2023-06-14 PROCEDURE — 63600175 PHARM REV CODE 636 W HCPCS: Performed by: STUDENT IN AN ORGANIZED HEALTH CARE EDUCATION/TRAINING PROGRAM

## 2023-06-14 PROCEDURE — 96365 THER/PROPH/DIAG IV INF INIT: CPT

## 2023-06-14 PROCEDURE — 94660 CPAP INITIATION&MGMT: CPT

## 2023-06-14 PROCEDURE — 27000221 HC OXYGEN, UP TO 24 HOURS

## 2023-06-14 PROCEDURE — 96375 TX/PRO/DX INJ NEW DRUG ADDON: CPT

## 2023-06-14 PROCEDURE — 83880 ASSAY OF NATRIURETIC PEPTIDE: CPT | Performed by: STUDENT IN AN ORGANIZED HEALTH CARE EDUCATION/TRAINING PROGRAM

## 2023-06-14 PROCEDURE — 25000003 PHARM REV CODE 250: Performed by: INTERNAL MEDICINE

## 2023-06-14 PROCEDURE — 99900026 HC AIRWAY MAINTENANCE (STAT)

## 2023-06-14 PROCEDURE — 83735 ASSAY OF MAGNESIUM: CPT | Performed by: STUDENT IN AN ORGANIZED HEALTH CARE EDUCATION/TRAINING PROGRAM

## 2023-06-14 PROCEDURE — 84484 ASSAY OF TROPONIN QUANT: CPT | Mod: 91 | Performed by: INTERNAL MEDICINE

## 2023-06-14 PROCEDURE — 25000242 PHARM REV CODE 250 ALT 637 W/ HCPCS: Performed by: STUDENT IN AN ORGANIZED HEALTH CARE EDUCATION/TRAINING PROGRAM

## 2023-06-14 PROCEDURE — 99900031 HC PATIENT EDUCATION (STAT)

## 2023-06-14 PROCEDURE — 93010 EKG 12-LEAD: ICD-10-PCS | Mod: ,,, | Performed by: SPECIALIST

## 2023-06-14 PROCEDURE — 36415 COLL VENOUS BLD VENIPUNCTURE: CPT | Performed by: INTERNAL MEDICINE

## 2023-06-14 PROCEDURE — 36600 WITHDRAWAL OF ARTERIAL BLOOD: CPT

## 2023-06-14 PROCEDURE — 25000003 PHARM REV CODE 250: Performed by: STUDENT IN AN ORGANIZED HEALTH CARE EDUCATION/TRAINING PROGRAM

## 2023-06-14 PROCEDURE — 99223 1ST HOSP IP/OBS HIGH 75: CPT | Mod: ,,, | Performed by: INTERNAL MEDICINE

## 2023-06-14 PROCEDURE — 87040 BLOOD CULTURE FOR BACTERIA: CPT | Performed by: STUDENT IN AN ORGANIZED HEALTH CARE EDUCATION/TRAINING PROGRAM

## 2023-06-14 PROCEDURE — 83605 ASSAY OF LACTIC ACID: CPT | Performed by: STUDENT IN AN ORGANIZED HEALTH CARE EDUCATION/TRAINING PROGRAM

## 2023-06-14 PROCEDURE — 81003 URINALYSIS AUTO W/O SCOPE: CPT | Performed by: STUDENT IN AN ORGANIZED HEALTH CARE EDUCATION/TRAINING PROGRAM

## 2023-06-14 PROCEDURE — 93005 ELECTROCARDIOGRAM TRACING: CPT | Performed by: SPECIALIST

## 2023-06-14 PROCEDURE — 99291 CRITICAL CARE FIRST HOUR: CPT

## 2023-06-14 PROCEDURE — 87205 SMEAR GRAM STAIN: CPT | Performed by: STUDENT IN AN ORGANIZED HEALTH CARE EDUCATION/TRAINING PROGRAM

## 2023-06-14 PROCEDURE — A4216 STERILE WATER/SALINE, 10 ML: HCPCS | Performed by: INTERNAL MEDICINE

## 2023-06-14 PROCEDURE — 87641 MR-STAPH DNA AMP PROBE: CPT | Performed by: INTERNAL MEDICINE

## 2023-06-14 PROCEDURE — 87070 CULTURE OTHR SPECIMN AEROBIC: CPT | Performed by: STUDENT IN AN ORGANIZED HEALTH CARE EDUCATION/TRAINING PROGRAM

## 2023-06-14 PROCEDURE — 99223 PR INITIAL HOSPITAL CARE,LEVL III: ICD-10-PCS | Mod: ,,, | Performed by: INTERNAL MEDICINE

## 2023-06-14 PROCEDURE — 84100 ASSAY OF PHOSPHORUS: CPT | Performed by: STUDENT IN AN ORGANIZED HEALTH CARE EDUCATION/TRAINING PROGRAM

## 2023-06-14 PROCEDURE — 80177 DRUG SCRN QUAN LEVETIRACETAM: CPT | Performed by: INTERNAL MEDICINE

## 2023-06-14 PROCEDURE — 80235 DRUG ASSAY LACOSAMIDE: CPT | Performed by: INTERNAL MEDICINE

## 2023-06-14 PROCEDURE — 94761 N-INVAS EAR/PLS OXIMETRY MLT: CPT

## 2023-06-14 PROCEDURE — 63600175 PHARM REV CODE 636 W HCPCS: Performed by: EMERGENCY MEDICINE

## 2023-06-14 PROCEDURE — 36415 COLL VENOUS BLD VENIPUNCTURE: CPT | Performed by: STUDENT IN AN ORGANIZED HEALTH CARE EDUCATION/TRAINING PROGRAM

## 2023-06-14 PROCEDURE — 63600175 PHARM REV CODE 636 W HCPCS: Performed by: INTERNAL MEDICINE

## 2023-06-14 PROCEDURE — 87186 SC STD MICRODIL/AGAR DIL: CPT | Performed by: STUDENT IN AN ORGANIZED HEALTH CARE EDUCATION/TRAINING PROGRAM

## 2023-06-14 RX ORDER — BRIMONIDINE TARTRATE 1.5 MG/ML
1 SOLUTION/ DROPS OPHTHALMIC 2 TIMES DAILY
Status: DISCONTINUED | OUTPATIENT
Start: 2023-06-14 | End: 2023-06-16 | Stop reason: HOSPADM

## 2023-06-14 RX ORDER — METOPROLOL TARTRATE 25 MG/1
12.5 TABLET, FILM COATED ORAL 2 TIMES DAILY
Status: ON HOLD | COMMUNITY
End: 2023-07-03 | Stop reason: HOSPADM

## 2023-06-14 RX ORDER — IPRATROPIUM BROMIDE 0.5 MG/2.5ML
500 SOLUTION RESPIRATORY (INHALATION) EVERY 6 HOURS
Status: DISCONTINUED | OUTPATIENT
Start: 2023-06-14 | End: 2023-06-14

## 2023-06-14 RX ORDER — METHYLPREDNISOLONE SOD SUCC 125 MG
125 VIAL (EA) INJECTION
Status: COMPLETED | OUTPATIENT
Start: 2023-06-14 | End: 2023-06-14

## 2023-06-14 RX ORDER — HYDRALAZINE HYDROCHLORIDE 20 MG/ML
10 INJECTION INTRAMUSCULAR; INTRAVENOUS EVERY 6 HOURS PRN
Status: ON HOLD | COMMUNITY
End: 2023-07-03 | Stop reason: HOSPADM

## 2023-06-14 RX ORDER — IPRATROPIUM BROMIDE 0.5 MG/2.5ML
500 SOLUTION RESPIRATORY (INHALATION) EVERY 6 HOURS PRN
COMMUNITY

## 2023-06-14 RX ORDER — IPRATROPIUM BROMIDE AND ALBUTEROL SULFATE 2.5; .5 MG/3ML; MG/3ML
3 SOLUTION RESPIRATORY (INHALATION)
Status: COMPLETED | OUTPATIENT
Start: 2023-06-14 | End: 2023-06-14

## 2023-06-14 RX ORDER — DEXAMETHASONE SODIUM PHOSPHATE 4 MG/ML
4 INJECTION, SOLUTION INTRA-ARTICULAR; INTRALESIONAL; INTRAMUSCULAR; INTRAVENOUS; SOFT TISSUE EVERY 12 HOURS
Status: ON HOLD | COMMUNITY
End: 2023-06-19 | Stop reason: CLARIF

## 2023-06-14 RX ORDER — TRIPROLIDINE/PSEUDOEPHEDRINE 2.5MG-60MG
20 TABLET ORAL EVERY 6 HOURS PRN
Status: ON HOLD | COMMUNITY
End: 2023-06-20 | Stop reason: HOSPADM

## 2023-06-14 RX ORDER — ALBUTEROL SULFATE 0.83 MG/ML
2.5 SOLUTION RESPIRATORY (INHALATION) EVERY 4 HOURS PRN
COMMUNITY

## 2023-06-14 RX ORDER — CLONAZEPAM 0.5 MG/1
0.5 TABLET ORAL 2 TIMES DAILY PRN
COMMUNITY

## 2023-06-14 RX ORDER — LACOSAMIDE 50 MG/1
200 TABLET ORAL EVERY 12 HOURS
Status: DISCONTINUED | OUTPATIENT
Start: 2023-06-14 | End: 2023-06-16 | Stop reason: HOSPADM

## 2023-06-14 RX ORDER — LOSARTAN POTASSIUM 50 MG/1
50 TABLET ORAL 2 TIMES DAILY
Status: DISCONTINUED | OUTPATIENT
Start: 2023-06-14 | End: 2023-06-16 | Stop reason: HOSPADM

## 2023-06-14 RX ORDER — MUPIROCIN 20 MG/G
1 OINTMENT TOPICAL 2 TIMES DAILY PRN
COMMUNITY

## 2023-06-14 RX ORDER — IPRATROPIUM BROMIDE AND ALBUTEROL SULFATE 2.5; .5 MG/3ML; MG/3ML
3 SOLUTION RESPIRATORY (INHALATION) EVERY 6 HOURS
Status: DISCONTINUED | OUTPATIENT
Start: 2023-06-14 | End: 2023-06-16 | Stop reason: HOSPADM

## 2023-06-14 RX ORDER — METOPROLOL TARTRATE 1 MG/ML
5 INJECTION, SOLUTION INTRAVENOUS
Status: ON HOLD | COMMUNITY
End: 2023-06-19 | Stop reason: CLARIF

## 2023-06-14 RX ORDER — SODIUM CHLORIDE 0.9 % (FLUSH) 0.9 %
10 SYRINGE (ML) INJECTION EVERY 12 HOURS
Status: DISCONTINUED | OUTPATIENT
Start: 2023-06-14 | End: 2023-06-16 | Stop reason: HOSPADM

## 2023-06-14 RX ORDER — HYDRALAZINE HYDROCHLORIDE 25 MG/1
25 TABLET, FILM COATED ORAL 2 TIMES DAILY
Status: ON HOLD | COMMUNITY
End: 2023-06-14

## 2023-06-14 RX ORDER — ALLOPURINOL 100 MG/1
100 TABLET ORAL DAILY
Status: DISCONTINUED | OUTPATIENT
Start: 2023-06-14 | End: 2023-06-16 | Stop reason: HOSPADM

## 2023-06-14 RX ORDER — HYDRALAZINE HYDROCHLORIDE 20 MG/ML
10 INJECTION INTRAMUSCULAR; INTRAVENOUS EVERY 6 HOURS PRN
Status: DISCONTINUED | OUTPATIENT
Start: 2023-06-14 | End: 2023-06-16 | Stop reason: HOSPADM

## 2023-06-14 RX ORDER — SODIUM CHLORIDE, SODIUM LACTATE, POTASSIUM CHLORIDE, CALCIUM CHLORIDE 600; 310; 30; 20 MG/100ML; MG/100ML; MG/100ML; MG/100ML
INJECTION, SOLUTION INTRAVENOUS CONTINUOUS
Status: CANCELLED | OUTPATIENT
Start: 2023-06-14

## 2023-06-14 RX ORDER — FLUOXETINE HYDROCHLORIDE 20 MG/1
20 CAPSULE ORAL DAILY
Status: DISCONTINUED | OUTPATIENT
Start: 2023-06-14 | End: 2023-06-16 | Stop reason: HOSPADM

## 2023-06-14 RX ORDER — TALC
6 POWDER (GRAM) TOPICAL NIGHTLY PRN
Status: ON HOLD | COMMUNITY
End: 2023-06-19 | Stop reason: CLARIF

## 2023-06-14 RX ORDER — SODIUM CHLORIDE FOR INHALATION 7 %
4 VIAL, NEBULIZER (ML) INHALATION 2 TIMES DAILY
COMMUNITY

## 2023-06-14 RX ORDER — ALBUTEROL SULFATE 0.83 MG/ML
2.5 SOLUTION RESPIRATORY (INHALATION) EVERY 4 HOURS PRN
Status: DISCONTINUED | OUTPATIENT
Start: 2023-06-14 | End: 2023-06-16 | Stop reason: HOSPADM

## 2023-06-14 RX ORDER — PANTOPRAZOLE SODIUM 40 MG/1
40 TABLET, DELAYED RELEASE ORAL DAILY
Status: ON HOLD | COMMUNITY
End: 2023-06-14

## 2023-06-14 RX ORDER — HYDROCODONE POLISTIREX AND CHLORPHENIRAMINE POLISTIREX 10; 8 MG/5ML; MG/5ML
5 SUSPENSION, EXTENDED RELEASE ORAL EVERY 12 HOURS PRN
COMMUNITY

## 2023-06-14 RX ORDER — ATORVASTATIN CALCIUM 20 MG/1
20 TABLET, FILM COATED ORAL NIGHTLY
Status: DISCONTINUED | OUTPATIENT
Start: 2023-06-14 | End: 2023-06-16 | Stop reason: HOSPADM

## 2023-06-14 RX ORDER — CLONAZEPAM 1 MG/1
1 TABLET ORAL 2 TIMES DAILY PRN
Status: ON HOLD | COMMUNITY
End: 2023-06-16 | Stop reason: HOSPADM

## 2023-06-14 RX ORDER — GUAIFENESIN 100 MG/5ML
200 SOLUTION ORAL EVERY 4 HOURS PRN
COMMUNITY

## 2023-06-14 RX ORDER — DEXAMETHASONE 0.5 MG/1
0.5 TABLET ORAL DAILY
Status: ON HOLD | COMMUNITY
Start: 2023-05-11 | End: 2023-06-14

## 2023-06-14 RX ORDER — COLCHICINE 0.6 MG/1
0.6 TABLET, FILM COATED ORAL DAILY PRN
Status: DISCONTINUED | OUTPATIENT
Start: 2023-06-14 | End: 2023-06-16 | Stop reason: HOSPADM

## 2023-06-14 RX ORDER — BRIMONIDINE TARTRATE 2 MG/ML
1 SOLUTION/ DROPS OPHTHALMIC 2 TIMES DAILY
COMMUNITY
Start: 2023-05-19

## 2023-06-14 RX ORDER — DIGOXIN 125 MCG
0.12 TABLET ORAL DAILY
Status: DISCONTINUED | OUTPATIENT
Start: 2023-06-14 | End: 2023-06-16 | Stop reason: HOSPADM

## 2023-06-14 RX ORDER — ACETAMINOPHEN 650 MG/1
650 SUPPOSITORY RECTAL EVERY 8 HOURS PRN
Status: DISCONTINUED | OUTPATIENT
Start: 2023-06-14 | End: 2023-06-16 | Stop reason: HOSPADM

## 2023-06-14 RX ORDER — TIMOLOL MALEATE 2.5 MG/ML
1 SOLUTION/ DROPS OPHTHALMIC 2 TIMES DAILY
Status: DISCONTINUED | OUTPATIENT
Start: 2023-06-14 | End: 2023-06-16 | Stop reason: HOSPADM

## 2023-06-14 RX ADMIN — TIMOLOL MALEATE 1 DROP: 2.5 SOLUTION OPHTHALMIC at 08:06

## 2023-06-14 RX ADMIN — SODIUM CHLORIDE, PRESERVATIVE FREE 10 ML: 5 INJECTION INTRAVENOUS at 09:06

## 2023-06-14 RX ADMIN — LEVETIRACETAM 750 MG: 250 TABLET, FILM COATED ORAL at 01:06

## 2023-06-14 RX ADMIN — DIGOXIN 0.12 MG: 125 TABLET ORAL at 04:06

## 2023-06-14 RX ADMIN — IPRATROPIUM BROMIDE AND ALBUTEROL SULFATE 3 ML: 2.5; .5 SOLUTION RESPIRATORY (INHALATION) at 05:06

## 2023-06-14 RX ADMIN — VANCOMYCIN HYDROCHLORIDE 1500 MG: 1.5 INJECTION, POWDER, LYOPHILIZED, FOR SOLUTION INTRAVENOUS at 01:06

## 2023-06-14 RX ADMIN — SODIUM CHLORIDE, PRESERVATIVE FREE 10 ML: 5 INJECTION INTRAVENOUS at 08:06

## 2023-06-14 RX ADMIN — LACOSAMIDE 200 MG: 50 TABLET, FILM COATED ORAL at 08:06

## 2023-06-14 RX ADMIN — BRIMONIDINE TARTRATE 1 DROP: 1.5 SOLUTION/ DROPS OPHTHALMIC at 08:06

## 2023-06-14 RX ADMIN — MEROPENEM 2 G: 1 INJECTION, POWDER, FOR SOLUTION INTRAVENOUS at 07:06

## 2023-06-14 RX ADMIN — LOSARTAN POTASSIUM 50 MG: 50 TABLET, FILM COATED ORAL at 01:06

## 2023-06-14 RX ADMIN — LACOSAMIDE 200 MG: 50 TABLET, FILM COATED ORAL at 01:06

## 2023-06-14 RX ADMIN — LEVETIRACETAM 750 MG: 250 TABLET, FILM COATED ORAL at 08:06

## 2023-06-14 RX ADMIN — LOSARTAN POTASSIUM 50 MG: 50 TABLET, FILM COATED ORAL at 08:06

## 2023-06-14 RX ADMIN — METHYLPREDNISOLONE SODIUM SUCCINATE 125 MG: 125 INJECTION, POWDER, FOR SOLUTION INTRAMUSCULAR; INTRAVENOUS at 04:06

## 2023-06-14 RX ADMIN — FLUOXETINE 20 MG: 20 CAPSULE ORAL at 01:06

## 2023-06-14 RX ADMIN — IPRATROPIUM BROMIDE AND ALBUTEROL SULFATE 3 ML: 2.5; .5 SOLUTION RESPIRATORY (INHALATION) at 07:06

## 2023-06-14 RX ADMIN — IPRATROPIUM BROMIDE AND ALBUTEROL SULFATE 3 ML: 2.5; .5 SOLUTION RESPIRATORY (INHALATION) at 11:06

## 2023-06-14 RX ADMIN — IPRATROPIUM BROMIDE AND ALBUTEROL SULFATE 3 ML: 2.5; .5 SOLUTION RESPIRATORY (INHALATION) at 02:06

## 2023-06-14 RX ADMIN — ALLOPURINOL 100 MG: 100 TABLET ORAL at 01:06

## 2023-06-14 RX ADMIN — PIPERACILLIN SODIUM AND TAZOBACTAM SODIUM 4.5 G: 4; .5 INJECTION, POWDER, LYOPHILIZED, FOR SOLUTION INTRAVENOUS at 04:06

## 2023-06-14 RX ADMIN — ATORVASTATIN CALCIUM 20 MG: 20 TABLET, FILM COATED ORAL at 08:06

## 2023-06-14 NOTE — ASSESSMENT & PLAN NOTE
Patient with Hypoxic Respiratory failure which is Acute on chronic.  he is on home oxygen at 2 LPM. Supplemental oxygen was provided and noted-      .   Signs/symptoms of respiratory failure include- tachypnea, increased work of breathing and respiratory distress. Contributing diagnoses includes - COPD Labs and images were reviewed. Patient Has recent ABG, which has been reviewed. Will treat underlying causes and adjust management of respiratory failure as follows- ASHLY, Dr Milner following duoneb treatments admit to ICU

## 2023-06-14 NOTE — PHARMACY MED REC
"    Admission Medication History     The home medication history was taken by Holli Campos.    You may go to "Admission" then "Reconcile Home Medications" tabs to review and/or act upon these items.     The home medication list has been updated by the Pharmacy department.   Please read ALL comments highlighted in yellow.   Please address this information as you see fit.    Feel free to contact us if you have any questions or require assistance.      Medications listed below were obtained from: Nursing home  Current Facility-Administered Medications on File Prior to Encounter   Medication Dose Route Frequency Provider Last Rate Last Admin    [MAR Hold - Suspended Admission] acetaminophen tablet 650 mg  650 mg Per G Tube Q8H PRN Claude Granados MD   650 mg at 06/11/23 0811    [MAR Hold - Suspended Admission] albuterol nebulizer solution 2.5 mg  2.5 mg Nebulization Q4H PRN Claude Granados MD        [MAR Hold - Suspended Admission] allopurinoL tablet 100 mg  100 mg Per G Tube Daily Claude Granados MD   100 mg at 06/13/23 0835    [MAR Hold - Suspended Admission] amLODIPine tablet 2.5 mg  2.5 mg Per G Tube Daily Claude Granados MD   2.5 mg at 06/13/23 0836    [MAR Hold - Suspended Admission] atorvastatin tablet 20 mg  20 mg Per G Tube Daily Claude Granados MD   20 mg at 06/13/23 0836    [MAR Hold - Suspended Admission] brimonidine 0.2% ophthalmic solution 1 drop  1 drop Both Eyes Q12H Claude Granados MD   1 drop at 06/13/23 2141    [MAR Hold - Suspended Admission] clonazePAM tablet 0.5 mg  0.5 mg Oral QHS Kb Mendoza MD   0.5 mg at 06/13/23 2147    [MAR Hold - Suspended Admission] clonazePAM tablet 1 mg  1 mg Oral BID PRN Kb Mendoza MD        [MAR Hold - Suspended Admission] colchicine capsule/tablet 0.6 mg  0.6 mg Per G Tube Daily Claude Granados MD   0.6 mg at 06/13/23 0835    [MAR Hold - Suspended Admission] dexAMETHasone injection 4 mg  4 mg Intravenous Q12H Kb Mendoza MD   4 mg at " 06/13/23 2147    [MAR Hold - Suspended Admission] digoxin tablet 0.125 mg  0.125 mg Per G Tube Daily Claude Granados MD   0.125 mg at 06/13/23 0837    [MAR Hold - Suspended Admission] ezetimibe tablet 10 mg  10 mg Per G Tube QHS Claude Granados MD   10 mg at 06/13/23 2150    [MAR Hold - Suspended Admission] famotidine tablet 20 mg  20 mg Per G Tube BID Claude Granados MD   20 mg at 06/13/23 2147    [MAR Hold - Suspended Admission] FLUoxetine capsule 20 mg  20 mg Per G Tube Daily Claude Granados MD   20 mg at 06/13/23 0835    [MAR Hold - Suspended Admission] guaiFENesin 100 mg/5 ml syrup 200 mg  200 mg Per G Tube Q4H PRN Claude Granados MD   200 mg at 06/13/23 2257    [MAR Hold - Suspended Admission] hydrALAZINE injection 10 mg  10 mg Intravenous Q6H PRN Claude Granados MD        [MAR Hold - Suspended Admission] hydrocodone-chlorpheniramine 10-8 mg/5 mL suspension 5 mL  5 mL Per G Tube Q12H PRN Claude Granados MD        [MAR Hold - Suspended Admission] ibuprofen 20 mg/mL oral liquid 200 mg  200 mg Per G Tube Q6H PRN Claude Granados MD        [MAR Hold - Suspended Admission] ipratropium 0.02 % nebulizer solution 0.5 mg  0.5 mg Nebulization Q6H TODD Casas Jr., DO   0.5 mg at 06/13/23 2011    [MAR Hold - Suspended Admission] lacosamide tablet 200 mg  200 mg Per G Tube Q12H Claude Granados MD   200 mg at 06/13/23 2146    [MAR Hold - Suspended Admission] levETIRAcetam tablet 750 mg  750 mg Oral BID Claude Granados MD   750 mg at 06/13/23 2147    [MAR Hold - Suspended Admission] losartan tablet 50 mg  50 mg Per G Tube Daily Claude Granados MD   50 mg at 06/13/23 0837    [MAR Hold - Suspended Admission] melatonin tablet 6 mg  6 mg Per G Tube Nightly PRN Kb Mendoza MD   6 mg at 06/13/23 2257    [MAR Hold - Suspended Admission] metoprolol injection 5 mg  5 mg Intravenous Q1H PRN Claude Granados MD        [MAR Hold - Suspended Admission] metoprolol tartrate (LOPRESSOR) split tablet 12.5 mg  12.5  mg Per G Tube BID Claude Granados MD   12.5 mg at 06/13/23 2147    [MAR Hold - Suspended Admission] mupirocin 2 % ointment   Topical (Top) BID PRN Claude Granados MD        [MAR Hold - Suspended Admission] ondansetron injection 4 mg  4 mg Intravenous Q8H PRN Claude Granados MD        [MAR Hold - Suspended Admission] polyethylene glycol packet 17 g  17 g Per NG tube BID PRN Claude Granados MD   17 g at 06/08/23 0105    [MAR Hold - Suspended Admission] polyethylene glycol packet 17 g  17 g Per G Tube BID Kb Mendoza MD   17 g at 06/13/23 2145    [MAR Hold - Suspended Admission] potassium bicarbonate disintegrating tablet 25 mEq  25 mEq Per G Tube BID Claude Granados MD   25 mEq at 06/13/23 2146    [MAR Hold - Suspended Admission] rivaroxaban tablet 20 mg  20 mg Per G Tube with dinner Claude Granados MD   20 mg at 06/13/23 1601    [MAR Hold - Suspended Admission] sodium chloride 0.9% flush 10 mL  10 mL Intravenous PRN Claude Granados MD        [MAR Hold - Suspended Admission] sodium chloride 7% nebulizer solution 4 mL  4 mL Nebulization BID Bob Taylor MD   4 mL at 06/13/23 2012    [MAR Hold - Suspended Admission] timolol maleate 0.25% ophthalmic solution 1 drop  1 drop Both Eyes BID Claude Granados MD   1 drop at 06/13/23 2148    [MAR Hold - Suspended Admission] torsemide tablet 40 mg  40 mg Oral Daily Claude Granados MD   40 mg at 06/13/23 0834    [DISCONTINUED] traZODone tablet 50 mg  50 mg Per G Tube QHS Kb Mendoza MD   50 mg at 06/11/23 2133     Current Outpatient Medications on File Prior to Encounter   Medication Sig Dispense Refill    acetaminophen (TYLENOL) 325 MG tablet 2 tablets (650 mg total) by Per G Tube route every 8 (eight) hours as needed for Pain or Temperature greater than (100.4). 30 tablet 0    albuterol (PROVENTIL) 2.5 mg /3 mL (0.083 %) nebulizer solution Take 2.5 mg by nebulization every 4 (four) hours as needed for Wheezing. Rescue      allopurinoL (ZYLOPRIM) 100  MG tablet 1 tablet (100 mg total) by Per G Tube route once daily. 30 tablet 0    atorvastatin (LIPITOR) 20 MG tablet 1 tablet (20 mg total) by Per G Tube route once daily. 90 tablet 3    brimonidine 0.2% (ALPHAGAN) 0.2 % Drop Place 1 drop into both eyes 2 (two) times a day.      clonazePAM (KLONOPIN) 0.5 MG tablet 0.5 mg by Per G Tube route 2 (two) times daily as needed for Anxiety.      clonazePAM (KLONOPIN) 1 MG tablet Take 1 mg by mouth 2 (two) times daily as needed for Anxiety.      colchicine (COLCRYS) 0.6 mg tablet 1 tablet (0.6 mg total) by Per G Tube route daily as needed (gout attack). 30 tablet 0    dexAMETHasone sodium phosphate 4 mg/mL Syrg Inject 4 mg as directed every 12 (twelve) hours.      digoxin (LANOXIN) 125 mcg tablet 1 tablet (0.125 mg total) by Per G Tube route once daily. 30 tablet 11    ezetimibe (ZETIA) 10 mg tablet 1 tablet (10 mg total) by Per G Tube route every evening. 90 tablet 3    famotidine (PEPCID) 40 mg/5 mL (8 mg/mL) suspension 2.5 mLs (20 mg total) by Per G Tube route 2 (two) times daily. 50 mL 0    FLUoxetine 20 MG capsule 1 capsule (20 mg total) by Per G Tube route once daily. 30 capsule 11    guaiFENesin 100 mg/5 ml (ROBITUSSIN) 100 mg/5 mL syrup Take 200 mg by mouth every 4 (four) hours as needed for Cough.      hydrALAZINE (APRESOLINE) 20 mg/mL injection 10 mg every 6 (six) hours as needed.      hydrocodone-chlorpheniramine (TUSSIONEX) 10-8 mg/5 mL suspension Take 5 mLs by mouth every 12 (twelve) hours as needed for Cough.      ibuprofen 20 mg/mL oral liquid (PEDS) 20 mg by Per G Tube route every 6 (six) hours as needed for Temperature greater than.      ipratropium (ATROVENT) 0.02 % nebulizer solution Take 500 mcg by nebulization every 6 (six) hours. Rescue      lacosamide (VIMPAT) 50 mg Tab 4 tablets (200 mg total) by Per G Tube route every 12 (twelve) hours. 240 tablet 0    levETIRAcetam (KEPPRA) 500 MG Tab Take 1.5 tablets (750 mg total) by mouth 2 (two) times daily. 60  tablet 11    losartan (COZAAR) 50 MG tablet 1 tablet (50 mg total) by Per G Tube route 2 (two) times a day. 180 tablet 3    melatonin (MELATIN) 3 mg tablet Take 6 mg by mouth nightly as needed for Insomnia.      metoprolol (LOPRESSOR) 5 mg/5 mL injection Inject 5 mg into the vein every hour as needed.      metoprolol tartrate (LOPRESSOR) 25 MG tablet Take 12.5 mg by mouth 2 (two) times daily.      mupirocin (BACTROBAN) 2 % ointment Apply 1 g topically 2 (two) times daily as needed. For trach care      pantoprazole (PROTONIX) 40 MG tablet Take 40 mg by mouth once daily.      polyethylene glycol (GLYCOLAX) 17 gram PwPk 17 g by Per NG tube route 2 (two) times daily as needed. (Patient taking differently: 17 g by Per G Tube route 2 (two) times a day.) 30 each 0    potassium bicarbonate disintegrating tablet Take 25 mEq by mouth 2 (two) times daily.      rivaroxaban (XARELTO) 20 mg Tab 1 tablet (20 mg total) by Per G Tube route daily with dinner or evening meal. 30 tablet 0    sodium chloride 7% 7 % nebulizer solution Take 4 mLs by nebulization 2 (two) times a day.      timolol maleate 0.25% (TIMOPTIC) 0.25 % Drop Place 1 drop into both eyes 2 (two) times daily. 15 mL 0    torsemide 40 mg Tab 40 mg by Per G Tube route once daily.      dexAMETHasone (DECADRON) 0.5 MG tablet Take 0.5 mg by mouth once daily.      furosemide (LASIX) 40 MG tablet 1 tablet (40 mg total) by Per G Tube route 2 (two) times daily. 60 tablet 11    hydrALAZINE (APRESOLINE) 25 MG tablet Take 25 mg by mouth 2 (two) times a day.      metoprolol tartrate (LOPRESSOR) 100 MG tablet 1 tablet (100 mg total) by Per G Tube route 2 (two) times daily. 60 tablet 11         Holli Campos  [id number]              .

## 2023-06-14 NOTE — HPI
ED Note  Teto Mendez is a 67 y.o. male with a past medical history of AFib, hypertension, heart failure with reduced ejection fracture, glioblastoma status post resection, seizure, CVA, status post trach dependent with recent trach decannulation presents from nursing home for increased airway secretion and sleep apnea.  At baseline patient uses 2 L nasal cannula.    Dr Wilkinson  6/12:  Agitation continues at night.  Discussed with daughter and family at bedside.  Will continue Keppra and Vimpat at current doses.  Will increase Decadron and monitor for positive response.  Adding Klonopin at night with breakthrough dosing which she has responded positively to in the past.  Previously had been followed by his oncologist with monthly MRIs for glioblastoma when he was in [place].  CT of the head yesterday shows no changes from previous.    6/14/2023  Mr Lyles is lethargic and has a saturation of 76%. He has been placed on BIPAP with O 2 saturation > 92%  Case discussed with Dr Milner who agrees with BIPAP and has cared for this patient

## 2023-06-14 NOTE — H&P
[facility]  History & Physical    Patient Name: Teto Mendez  MRN: [id number]  Patient Class: IP- Inpatient  Admission Date: 6/14/2023  Attending Physician: Doron Orozco MD  Primary Care Provider: Primary Doctor No         Patient information was obtained from patient, past medical records and ER records.     Subjective:     Principal Problem:Acute on chronic respiratory failure with hypoxia    Chief Complaint:   Chief Complaint   Patient presents with    Snoring     Sent from [facility] with complaint of not controlling secretions. Recently had trach decanulated. Hx of sleep apnea.         HPI: ED Note  Teto Mendez is a 67 y.o. male with a past medical history of AFib, hypertension, heart failure with reduced ejection fracture, glioblastoma status post resection, seizure, CVA, status post trach dependent with recent trach decannulation presents from nursing home for increased airway secretion and sleep apnea.  At baseline patient uses 2 L nasal cannula.    Dr Wilkinson  6/12:  Agitation continues at night.  Discussed with daughter and family at bedside.  Will continue Keppra and Vimpat at current doses.  Will increase Decadron and monitor for positive response.  Adding Klonopin at night with breakthrough dosing which she has responded positively to in the past.  Previously had been followed by his oncologist with monthly MRIs for glioblastoma when he was in [place].  CT of the head yesterday shows no changes from previous.    6/14/2023  Mr Lyles is lethargic and has a saturation of 76%. 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    Ventilator dependence 5/29/2023       Past Surgical History:   Procedure Laterality Date    CHOLECYSTECTOMY N/A     CRANIOTOMY FOR EXCISION OF INTRACRANIAL TUMOR      INSERTION, PEG TUBE N/A 05/24/2023    Procedure: INSERTION, PEG TUBE;  Surgeon: Kapil Hickey III, MD;  Location: [facility];  Service: General;  Laterality: N/A;    KNEE ARTHROSCOPY Right     PROSTATECTOMY N/A     TRACHEOSTOMY N/A 05/24/2023    Procedure: CREATION, TRACHEOSTOMY;  Surgeon: Kapil Hickey III, MD;  Location: [facility];  Service: General;  Laterality: N/A;       Review of patient's allergies indicates:  No Known Allergies    Current Facility-Administered Medications on File Prior to Encounter   Medication    [MAR Hold - Suspended Admission] acetaminophen tablet 650 mg    [MAR Hold - Suspended Admission] albuterol nebulizer solution 2.5 mg    [MAR Hold - Suspended Admission] allopurinoL tablet 100 mg    [MAR Hold - Suspended Admission] amLODIPine tablet 2.5 mg    [MAR Hold - Suspended Admission] atorvastatin tablet 20 mg    [MAR Hold - Suspended Admission] brimonidine 0.2% ophthalmic solution 1 drop    [MAR Hold - Suspended Admission] clonazePAM tablet 0.5 mg    [MAR Hold - Suspended Admission] clonazePAM tablet 1 mg    [MAR Hold - Suspended Admission] colchicine capsule/tablet 0.6 mg    [MAR Hold - Suspended Admission] dexAMETHasone injection 4 mg    [MAR Hold - Suspended Admission] digoxin tablet 0.125 mg    [MAR Hold - Suspended Admission] ezetimibe tablet 10 mg    [MAR Hold - Suspended Admission] famotidine tablet 20 mg    [MAR Hold - Suspended Admission] FLUoxetine capsule 20 mg    [MAR Hold - Suspended Admission] guaiFENesin 100 mg/5 ml syrup 200 mg    [MAR Hold - Suspended Admission] hydrALAZINE injection 10 mg    [MAR Hold - Suspended Admission] hydrocodone-chlorpheniramine 10-8 mg/5 mL  suspension 5 mL    [MAR Hold - Suspended Admission] ibuprofen 20 mg/mL oral liquid 200 mg    [MAR Hold - Suspended Admission] ipratropium 0.02 % nebulizer solution 0.5 mg    [MAR Hold - Suspended Admission] lacosamide tablet 200 mg    [MAR Hold - Suspended Admission] levETIRAcetam tablet 750 mg    [MAR Hold - Suspended Admission] losartan tablet 50 mg    [MAR Hold - Suspended Admission] melatonin tablet 6 mg    [MAR Hold - Suspended Admission] metoprolol injection 5 mg    [MAR Hold - Suspended Admission] metoprolol tartrate (LOPRESSOR) split tablet 12.5 mg    [MAR Hold - Suspended Admission] mupirocin 2 % ointment    [MAR Hold - Suspended Admission] ondansetron injection 4 mg    [MAR Hold - Suspended Admission] polyethylene glycol packet 17 g    [MAR Hold - Suspended Admission] polyethylene glycol packet 17 g    [MAR Hold - Suspended Admission] potassium bicarbonate disintegrating tablet 25 mEq    [MAR Hold - Suspended Admission] rivaroxaban tablet 20 mg    [MAR Hold - Suspended Admission] sodium chloride 0.9% flush 10 mL    [MAR Hold - Suspended Admission] sodium chloride 7% nebulizer solution 4 mL    [MAR Hold - Suspended Admission] timolol maleate 0.25% ophthalmic solution 1 drop    [MAR Hold - Suspended Admission] torsemide tablet 40 mg    [DISCONTINUED] traZODone tablet 50 mg     Current Outpatient Medications on File Prior to Encounter   Medication Sig    albuterol (PROVENTIL) 2.5 mg /3 mL (0.083 %) nebulizer solution Take 2.5 mg by nebulization every 4 (four) hours as needed for Wheezing. Rescue    allopurinoL (ZYLOPRIM) 100 MG tablet 1 tablet (100 mg total) by Per G Tube route once daily.    atorvastatin (LIPITOR) 20 MG tablet 1 tablet (20 mg total) by Per G Tube route once daily.    brimonidine 0.2% (ALPHAGAN) 0.2 % Drop Place 1 drop into both eyes 2 (two) times a day.    clonazePAM (KLONOPIN) 0.5 MG tablet 0.5 mg by Per G Tube route 2 (two) times daily as needed for Anxiety.     clonazePAM (KLONOPIN) 1 MG tablet Take 1 mg by mouth 2 (two) times daily as needed for Anxiety.    colchicine (COLCRYS) 0.6 mg tablet 1 tablet (0.6 mg total) by Per G Tube route daily as needed (gout attack).    dexAMETHasone sodium phosphate 4 mg/mL Syrg Inject 4 mg as directed every 12 (twelve) hours.    digoxin (LANOXIN) 125 mcg tablet 1 tablet (0.125 mg total) by Per G Tube route once daily.    ezetimibe (ZETIA) 10 mg tablet 1 tablet (10 mg total) by Per G Tube route every evening.    famotidine (PEPCID) 40 mg/5 mL (8 mg/mL) suspension 2.5 mLs (20 mg total) by Per G Tube route 2 (two) times daily.    FLUoxetine 20 MG capsule 1 capsule (20 mg total) by Per G Tube route once daily.    guaiFENesin 100 mg/5 ml (ROBITUSSIN) 100 mg/5 mL syrup Take 200 mg by mouth every 4 (four) hours as needed for Cough.    hydrALAZINE (APRESOLINE) 20 mg/mL injection 10 mg every 6 (six) hours as needed.    hydrocodone-chlorpheniramine (TUSSIONEX) 10-8 mg/5 mL suspension Take 5 mLs by mouth every 12 (twelve) hours as needed for Cough.    ibuprofen 20 mg/mL oral liquid (PEDS) 20 mg by Per G Tube route every 6 (six) hours as needed for Temperature greater than.    ipratropium (ATROVENT) 0.02 % nebulizer solution Take 500 mcg by nebulization every 6 (six) hours. Rescue    lacosamide (VIMPAT) 50 mg Tab 4 tablets (200 mg total) by Per G Tube route every 12 (twelve) hours.    levETIRAcetam (KEPPRA) 500 MG Tab Take 1.5 tablets (750 mg total) by mouth 2 (two) times daily.    losartan (COZAAR) 50 MG tablet 1 tablet (50 mg total) by Per G Tube route 2 (two) times a day.    melatonin (MELATIN) 3 mg tablet Take 6 mg by mouth nightly as needed for Insomnia.    metoprolol (LOPRESSOR) 5 mg/5 mL injection Inject 5 mg into the vein every hour as needed.    metoprolol tartrate (LOPRESSOR) 25 MG tablet Take 12.5 mg by mouth 2 (two) times daily.    mupirocin (BACTROBAN) 2 % ointment Apply 1 g topically 2 (two) times daily as needed. For  trach care    polyethylene glycol (GLYCOLAX) 17 gram PwPk 17 g by Per NG tube route 2 (two) times daily as needed. (Patient taking differently: 17 g by Per G Tube route 2 (two) times a day.)    potassium bicarbonate disintegrating tablet Take 25 mEq by mouth 2 (two) times daily.    rivaroxaban (XARELTO) 20 mg Tab 1 tablet (20 mg total) by Per G Tube route daily with dinner or evening meal.    sodium chloride 7% 7 % nebulizer solution Take 4 mLs by nebulization 2 (two) times a day.    timolol maleate 0.25% (TIMOPTIC) 0.25 % Drop Place 1 drop into both eyes 2 (two) times daily.    torsemide 40 mg Tab 40 mg by Per G Tube route once daily.    [DISCONTINUED] acetaminophen (TYLENOL) 325 MG tablet 2 tablets (650 mg total) by Per G Tube route every 8 (eight) hours as needed for Pain or Temperature greater than (100.4).    [DISCONTINUED] pantoprazole (PROTONIX) 40 MG tablet Take 40 mg by mouth once daily.    [DISCONTINUED] dexAMETHasone (DECADRON) 0.5 MG tablet Take 0.5 mg by mouth once daily.    [DISCONTINUED] furosemide (LASIX) 40 MG tablet 1 tablet (40 mg total) by Per G Tube route 2 (two) times daily.    [DISCONTINUED] hydrALAZINE (APRESOLINE) 25 MG tablet Take 25 mg by mouth 2 (two) times a day.    [DISCONTINUED] metoprolol tartrate (LOPRESSOR) 100 MG tablet 1 tablet (100 mg total) by Per G Tube route 2 (two) times daily.     Family History       Problem Relation (Age of Onset)    Bone cancer Father    Crohn's disease Brother    Diabetes Mother    Prostate cancer Father    Transient ischemic attack Mother          Tobacco Use    Smoking status: Never    Smokeless tobacco: Never   Substance and Sexual Activity    Alcohol use: Not Currently    Drug use: Never    Sexual activity: Not on file     Review of Systems   Unable to perform ROS: Mental status change   Objective:     Vital Signs (Most Recent):  Temp: 98.1 °F (36.7 °C) (06/14/23 0404)  Pulse: 67 (06/14/23 0825)  Resp: 14 (06/14/23 0825)  BP: 127/83  (06/14/23 0404)  SpO2: 100 % (06/14/23 0825) Vital Signs (24h Range):  Temp:  [97.2 °F (36.2 °C)-98.4 °F (36.9 °C)] 98.1 °F (36.7 °C)  Pulse:  [] 67  Resp:  [14-32] 14  SpO2:  [94 %-100 %] 100 %  BP: (127-155)/(82-96) 127/83     Weight: 88.9 kg (196 lb)  Body mass index is 28.94 kg/m².     Physical Exam  Vitals and nursing note reviewed.   Constitutional:       General: He is in acute distress.      Appearance: He is ill-appearing and diaphoretic.   HENT:      Head: Normocephalic and atraumatic.      Nose: Nose normal.      Mouth/Throat:      Mouth: Mucous membranes are moist.   Eyes:      Extraocular Movements: Extraocular movements intact.      Pupils: Pupils are equal, round, and reactive to light.   Neck:      Comments: Tracheostomy site with leak on BIPAP  Cardiovascular:      Rate and Rhythm: Regular rhythm. Tachycardia present.      Heart sounds:     Gallop present.   Pulmonary:      Breath sounds: Rales present. No wheezing or rhonchi.   Abdominal:      General: There is distension.      Tenderness: There is no abdominal tenderness. There is no guarding or rebound.      Comments: PEG   Musculoskeletal:      Cervical back: Neck supple.      Comments: Limited cooperation with the exam   Skin:     General: Skin is warm.   Neurological:      Mental Status: He is alert. He is disoriented.      Motor: Weakness present.   Psychiatric:      Comments: Dulled affect            CRANIAL NERVES     CN III, IV, VI   Pupils are equal, round, and reactive to light.     Significant Labs: All pertinent labs within the past 24 hours have been reviewed.  Recent Lab Results  (Last 5 results in the past 24 hours)        06/14/23  0810   06/14/23  0702   06/14/23  0424   06/14/23  0420   06/14/23  0218        Albumin     3.0           Alkaline Phosphatase     70           Allens Test N/A       N/A   Pass       ALT     67           Anion Gap     10           Appearance, UA   Clear             AST     40           Baso #      0.01           Basophil %     0.1           Bilirubin (UA)   Negative             BILIRUBIN TOTAL     0.9  Comment: For infants and newborns, interpretation of results should be based  on gestational age, weight and in agreement with clinical  observations.    Premature Infant recommended reference ranges:  Up to 24 hours.............<8.0 mg/dL  Up to 48 hours............<12.0 mg/dL  3-5 days..................<15.0 mg/dL  6-29 days.................<15.0 mg/dL             Site RR       Other   RR       BUN     63           Calcium     8.8           Chloride     94           CO2     36           Color, UA   Yellow             Creatinine     0.8           DelSys CPAP/BiPAP       Nasal Can   Nasal Can       Differential Method     Automated           eGFR     >60.0           Eos #     0.0           Eosinophil %     0.0           EP 5               FiO2 40               Flow       2   2       Glucose     150           Glucose, UA   Negative             Gran # (ANC)     8.5           Gran %     85.0           Hematocrit     43.5           Hemoglobin     14.7           Immature Grans (Abs)     0.05  Comment: Mild elevation in immature granulocytes is non specific and   can be seen in a variety of conditions including stress response,   acute inflammation, trauma and pregnancy. Correlation with other   laboratory and clinical findings is essential.             Immature Granulocytes     0.5           IP 18               Ketones, UA   Negative             Lactate, Juan Manuel     1.7  Comment: Falsely low lactic acid results can be found in samples   containing >=13.0 mg/dL total bilirubin and/or >=3.5 mg/dL   direct bilirubin.             Leukocytes, UA   Negative             Lymph #     0.7           Lymph %     7.4           Magnesium     2.3           MCH     31.7           MCHC     33.8           MCV     94           Mode BiPAP       SPONT   SPONT       Mono #     0.7           Mono %     7.0           MPV     10.3            NITRITE UA   Negative             nRBC     0           Occult Blood UA   Negative             pH, UA   8.0             Phosphorus     3.9           Platelets     200           POC BE 18       22   22       POC Glucose 143               POC HCO3 40.2       45.4   44.8       POC Hematocrit 42               POC Ionized Calcium 1.16               POC PCO2 46.2       58.2   51.2       POC PH 7.547       7.500   7.550       POC PO2 193       62   54       Potassium, Blood Gas 3.1               POC SATURATED O2 100       92   91       Sodium, Blood Gas 139               POC TCO2 42       47   46       Potassium     3.6           PROTEIN TOTAL     6.2           Protein, UA   Trace  Comment: Recommend a 24 hour urine protein or a urine   protein/creatinine ratio if globulin induced proteinuria is  clinically suspected.               Rate 18               RBC     4.63           RDW     14.7           Sample ARTERIAL       VENOUS   VENOUS       Sodium     140           Sp02         97       Specific [place], UA   1.030             Specimen UA   Urine, Clean Catch             Spont Rate 24               UROBILINOGEN UA   Negative             WBC     10.04                                  Significant Imaging: I have reviewed all pertinent imaging results/findings within the past 24 hours.    Assessment/Plan:     * Acute on chronic respiratory failure with hypoxia  Patient with Hypoxic Respiratory failure which is Acute on chronic.  he is on home oxygen at 2 LPM. Supplemental oxygen was provided and noted-      .   Signs/symptoms of respiratory failure include- tachypnea, increased work of breathing and respiratory distress. Contributing diagnoses includes - COPD Labs and images were reviewed. Patient Has recent ABG, which has been reviewed. Will treat underlying causes and adjust management of respiratory failure as follows- Dr Alf LIMON following duoneb treatments admit to ICU    Seizure disorder  Altered mental  state  VIMPAT and Keppra levels  Consult neurology      Pneumonia of right lower lobe due to infectious organism  Chronic LLL infiltrate  RLL infiltrate new  Vancomycin therapy  Cefepime  MRSA  Sputum culture  Duoneb treatments        Acute metabolic encephalopathy  Secondary respiratory distress with a chronic multifactorial component      HFrEF (heart failure with reduced ejection fraction)  Last EF 38%  Pt has had 30 cc/kg  Hold further fluids      VTE Risk Mitigation (From admission, onward)    None                     Doron Orozco MD  Department of Hospital Medicine  [facility]

## 2023-06-14 NOTE — CONSULTS
[facility]  Department of Neurology  Neurology Consultation Note        PATIENT NAME: Teto Mendez  MRN: [id number]  CSN: [id number]      TODAY'S DATE: 06/14/2023  ADMIT DATE: 6/14/2023                            CONSULTING PROVIDER: Mukund James MD  CONSULT REQUESTED BY: Doron Orozco MD      Reason for consult:  Altered mental status       History obtained from chart review.    HPI: Teto Mendez is a 67 y.o. male with a history of glioblastoma multiforme status post right craniotomy, chemotherapy and radiotherapy, seizures/status epilepticus (May 2023) status post trach and PEG, AFib on Xarelto, presented to the hospital from nursing home for respiratory failure.     Patient is known to me from prior admission for status epilepticus at that time he was maintained on Keppra 750 mg b.i.d. and Vimpat 200 mg b.i.d. has been seizure-free.  He was discharged to LTAC and transitioned to nursing care.  He was status post trach and PEG and trach was decannulated recently.  He was now sent from nursing home for respiratory distress and increased secretions.     On my assessment, he is awake, speech is dysarthric, on BiPAP.  He is able to follow    PREVIOUS MEDICAL HISTORY:  Past Medical History:   Diagnosis Date    GERD (gastroesophageal reflux disease)     Glioblastoma     Gout, unspecified     H/O blood clots     HFrEF (heart failure with reduced ejection fraction)     Hypertension     Paroxysmal atrial fibrillation     Seizures     Splenic infarct     Ventilator associated pneumonia 5/29/2023    Ventilator dependence 5/29/2023     PREVIOUS SURGICAL HISTORY:  Past Surgical History:   Procedure Laterality Date    CHOLECYSTECTOMY N/A     CRANIOTOMY FOR EXCISION OF INTRACRANIAL TUMOR      INSERTION, PEG TUBE N/A 05/24/2023    Procedure: INSERTION, PEG TUBE;  Surgeon: Kapil Hickey III, MD;  Location: [facility] OR;  Service: General;  Laterality: N/A;    KNEE ARTHROSCOPY Right     PROSTATECTOMY N/A      TRACHEOSTOMY N/A 05/24/2023    Procedure: CREATION, TRACHEOSTOMY;  Surgeon: Kapil Hickey III, MD;  Location: [facility];  Service: General;  Laterality: N/A;     FAMILY MEDICAL HISTORY:  Family History   Problem Relation Age of Onset    Diabetes Mother     Transient ischemic attack Mother     Prostate cancer Father     Bone cancer Father     Crohn's disease Brother      SOCIAL HISTORY:  Social History     Tobacco Use    Smoking status: Never    Smokeless tobacco: Never   Substance Use Topics    Alcohol use: Not Currently    Drug use: Never     ALLERGIES:  Review of patient's allergies indicates:  No Known Allergies  HOME MEDICATIONS:  Prior to Admission medications    Medication Sig Start Date End Date Taking? Authorizing Provider   albuterol (PROVENTIL) 2.5 mg /3 mL (0.083 %) nebulizer solution Take 2.5 mg by nebulization every 4 (four) hours as needed for Wheezing. Rescue   Yes Historical Provider   allopurinoL (ZYLOPRIM) 100 MG tablet 1 tablet (100 mg total) by Per G Tube route once daily. 5/26/23 6/25/23 Yes Nicky Espinal MD   atorvastatin (LIPITOR) 20 MG tablet 1 tablet (20 mg total) by Per G Tube route once daily. 5/26/23 5/25/24 Yes Nicky Espinal MD   brimonidine 0.2% (ALPHAGAN) 0.2 % Drop Place 1 drop into both eyes 2 (two) times a day. 5/19/23  Yes Historical Provider   clonazePAM (KLONOPIN) 0.5 MG tablet 0.5 mg by Per G Tube route 2 (two) times daily as needed for Anxiety.   Yes Historical Provider   clonazePAM (KLONOPIN) 1 MG tablet Take 1 mg by mouth 2 (two) times daily as needed for Anxiety.   Yes Historical Provider   colchicine (COLCRYS) 0.6 mg tablet 1 tablet (0.6 mg total) by Per G Tube route daily as needed (gout attack). 5/25/23 6/24/23 Yes Nicky Espinal MD   dexAMETHasone sodium phosphate 4 mg/mL Syrg Inject 4 mg as directed every 12 (twelve) hours.   Yes Historical Provider   digoxin (LANOXIN) 125 mcg tablet 1 tablet (0.125 mg total) by Per G Tube route once daily. 6/1/23 5/31/24  Yes Mari Simeon MD   ezetimibe (ZETIA) 10 mg tablet 1 tablet (10 mg total) by Per G Tube route every evening. 5/31/23 5/30/24 Yes Mari Simeon MD   famotidine (PEPCID) 40 mg/5 mL (8 mg/mL) suspension 2.5 mLs (20 mg total) by Per G Tube route 2 (two) times daily. 5/25/23 6/24/23 Yes Nicky Espinal MD   FLUoxetine 20 MG capsule 1 capsule (20 mg total) by Per G Tube route once daily. 6/1/23 5/31/24 Yes Mari Simeon MD   guaiFENesin 100 mg/5 ml (ROBITUSSIN) 100 mg/5 mL syrup Take 200 mg by mouth every 4 (four) hours as needed for Cough.   Yes Historical Provider   hydrALAZINE (APRESOLINE) 20 mg/mL injection 10 mg every 6 (six) hours as needed.   Yes Historical Provider   hydrocodone-chlorpheniramine (TUSSIONEX) 10-8 mg/5 mL suspension Take 5 mLs by mouth every 12 (twelve) hours as needed for Cough.   Yes Historical Provider   ibuprofen 20 mg/mL oral liquid (PEDS) 20 mg by Per G Tube route every 6 (six) hours as needed for Temperature greater than.   Yes Historical Provider   ipratropium (ATROVENT) 0.02 % nebulizer solution Take 500 mcg by nebulization every 6 (six) hours. Rescue   Yes Historical Provider   lacosamide (VIMPAT) 50 mg Tab 4 tablets (200 mg total) by Per G Tube route every 12 (twelve) hours. 5/25/23 6/24/23 Yes Nicky Espinal MD   levETIRAcetam (KEPPRA) 500 MG Tab Take 1.5 tablets (750 mg total) by mouth 2 (two) times daily. 5/31/23 5/30/24 Yes Mari Simeon MD   losartan (COZAAR) 50 MG tablet 1 tablet (50 mg total) by Per G Tube route 2 (two) times a day. 5/31/23 5/30/24 Yes Mari Simeon MD   melatonin (MELATIN) 3 mg tablet Take 6 mg by mouth nightly as needed for Insomnia.   Yes Historical Provider   metoprolol (LOPRESSOR) 5 mg/5 mL injection Inject 5 mg into the vein every hour as needed.   Yes Historical Provider   metoprolol tartrate (LOPRESSOR) 25 MG tablet Take 12.5 mg by mouth 2 (two) times daily.   Yes Historical Provider   mupirocin (BACTROBAN) 2 % ointment Apply 1 g topically 2 (two) times  daily as needed. For trach care   Yes Historical Provider   polyethylene glycol (GLYCOLAX) 17 gram PwPk 17 g by Per NG tube route 2 (two) times daily as needed.  Patient taking differently: 17 g by Per G Tube route 2 (two) times a day. 5/25/23 6/24/23 Yes Nicky Espinal MD   potassium bicarbonate disintegrating tablet Take 25 mEq by mouth 2 (two) times daily.   Yes Historical Provider   rivaroxaban (XARELTO) 20 mg Tab 1 tablet (20 mg total) by Per G Tube route daily with dinner or evening meal. 5/25/23 6/24/23 Yes Nicky Espinal MD   sodium chloride 7% 7 % nebulizer solution Take 4 mLs by nebulization 2 (two) times a day.   Yes Historical Provider   timolol maleate 0.25% (TIMOPTIC) 0.25 % Drop Place 1 drop into both eyes 2 (two) times daily. 5/25/23 9/15/23 Yes Nicky Espinal MD   torsemide 40 mg Tab 40 mg by Per G Tube route once daily.   Yes Historical Provider   acetaminophen (TYLENOL) 325 MG tablet 2 tablets (650 mg total) by Per G Tube route every 8 (eight) hours as needed for Pain or Temperature greater than (100.4). 5/25/23 6/14/23 Yes Nicky Espinal MD   pantoprazole (PROTONIX) 40 MG tablet Take 40 mg by mouth once daily.  6/14/23 Yes Historical Provider   dexAMETHasone (DECADRON) 0.5 MG tablet Take 0.5 mg by mouth once daily. 5/11/23 6/14/23  Historical Provider   furosemide (LASIX) 40 MG tablet 1 tablet (40 mg total) by Per G Tube route 2 (two) times daily. 5/31/23 6/14/23  Mari Simeon MD   hydrALAZINE (APRESOLINE) 25 MG tablet Take 25 mg by mouth 2 (two) times a day.  6/14/23  Historical Provider   metoprolol tartrate (LOPRESSOR) 100 MG tablet 1 tablet (100 mg total) by Per G Tube route 2 (two) times daily. 5/31/23 6/14/23  Mari Simeon MD     CURRENT SCHEDULED MEDICATIONS:   albuterol-ipratropium  3 mL Nebulization Q6H    lactated ringers  30 mL/kg Intravenous Once    methylPREDNISolone sodium succinate injection  125 mg Intravenous ED 1 Time    sodium chloride 0.9%  10 mL Intravenous Q12H     vancomycin (VANCOCIN) IVPB  1,500 mg Intravenous Once     CURRENT INFUSIONS:    CURRENT PRN MEDICATIONS:  Pharmacy to dose Vancomycin consult **AND** vancomycin - pharmacy to dose    REVIEW OF SYSTEMS:  A review of systems cannot be obtained as the patient is unable to respond to questions appropriately.       PHYSICAL EXAM:  Patient Vitals for the past 24 hrs:   BP Temp Pulse Resp SpO2 Weight   06/14/23 0954 (!) 164/104 -- 74 20 100 % --   06/14/23 0859 (!) 147/89 -- 72 20 100 % --   06/14/23 0829 (!) 150/96 -- 69 20 100 % --   06/14/23 0825 -- -- 67 20 100 % --   06/14/23 0810 -- -- 69 (!) 21 100 % --   06/14/23 0759 (!) 151/113 -- 68 20 99 % --   06/14/23 0729 (!) 154/89 -- 66 19 99 % --   06/14/23 0659 (!) 152/109 -- 71 20 100 % --   06/14/23 0555 -- -- 85 18 100 % --   06/14/23 0550 -- -- 85 18 100 % --   06/14/23 0545 -- -- 85 18 100 % --   06/14/23 0404 127/83 98.1 °F (36.7 °C) 74 15 100 % 88.9 kg (196 lb)       GENERAL APPEARANCE: Alert, well-developed, well-nourished male in no acute distress.  HEENT: Normocephalic and atraumatic. PERRL. Oropharynx unremarkable.  PULM:  On BiPAP  CV: RRR.  ABDOMEN: Soft, nontender.  EXTREMITIES: No obvious signs of vascular compromise. Pulses present. No cyanosis, clubbing or edema.  SKIN: Clear; no rashes, lesions or skin breaks in exposed areas.    NEURO:  MENTAL STATUS:  Awake, able to follow commands.  Speech is dysarthric, orientation can not be tested.  Patient is also on BiPAP.    CRANIAL NERVES:  Pupils equal round and reactive to light, extraocular movements are intact, mild left facial droop.    MOTOR:  Bulk normal. Tone normal and symmetric throughout.  Abnormal movements absent.  Tremor: none present.  Strength  4/5 in bilateral upper extremities, 3+/5 in right lower extremity and 3/5 in left lower extremity. .    REFLEXES:  DTRs 1+ throughout.  Plantar response equivocal bilaterally.  SENSATION: not tested.  COORDINATION:  Not assessed due to mental status  .  STATION: not tested.  GAIT: not tested.      Labs:  Recent Labs   Lab 06/08/23  0451 06/12/23  0532 06/14/23  0424    143 140   K 3.6 3.4* 3.6   CL 96 91* 94*   CO2 31* 38* 36*   BUN 29* 38* 63*   CREATININE 0.7 0.8 0.8    123* 150*   CALCIUM 8.9 9.1 8.8   PHOS  --   --  3.9   MG  --  2.1 2.3     Recent Labs   Lab 06/08/23  0451 06/12/23  0532 06/14/23  0424 06/14/23  0810   WBC 6.98 9.17 10.04  --    HGB 14.4 15.1 14.7  --    HCT 43.6 45.7 43.5 42    206 200  --      Recent Labs   Lab 06/08/23 0451 06/12/23  0532 06/14/23  0424   ALBUMIN 2.8* 3.0* 3.0*   PROT 6.4 6.5 6.2   BILITOT 0.7 0.6 0.9   ALKPHOS 72 75 70   ALT 60* 60* 67*   AST 39 31 40     Lab Results   Component Value Date    INR 1.2 05/25/2023     Lab Results   Component Value Date    TRIG 143 05/15/2023    HDL 30 (L) 05/15/2023    CHOLHDL 12.0 (L) 05/15/2023     Lab Results   Component Value Date    HGBA1C 5.7 05/15/2023     No results found for: PROTEINCSF, GLUCCSF, WBCCSF    Imaging:  I have reviewed and interpreted the pertinent imaging and lab results.      X-Ray Chest AP Portable  Narrative: EXAMINATION:  XR CHEST AP PORTABLE    CLINICAL HISTORY:  Sepsis;    FINDINGS:  Portable chest at 425 compared with 06/12/2023 shows unchanged cardiomediastinal silhouette.    Medial right basilar alveolar opacity is minimal and new.  Lateral left mid lung opacities are unchanged.  Lung volumes remain low.  No pneumothorax or pleural effusion.  Pulmonary vasculature is normal.    No acute osseous abnormality.  Right upper quadrant surgical clips suggest cholecystectomy.  Impression: New medial right basilar opacities suggesting atelectasis or consolidation.    Electronically signed by: Compa Lopez MD  Date:    06/14/2023  Time:    07:10         ASSESSMENT & PLAN:      Encephalopathy  Respiratory failure  History of seizures  Atrial fibrillation      Plan:   Patient admitted to the hospital with encephalopathy and respiratory failure.   Currently on my exam, patient seems to be back to his baseline mental status.  Encephalopathy could be secondary to respiratory failure.  Did not have any further seizure activity.  Patient is on BiPAP.  Management of respiratory failure per Pulmonary critical Care.  Admitted to the ICU  Continue with Keppra 750 mg b.i.d. and Vimpat 200 mg b.i.d..  Continue with Xarelto and Lipitor for stroke prevention.  Seizure precautions while inpatient  Management of metabolic/infectious derangements per primary team  Will follow as needed.  No further neuro workup is needed at this time. Please call with any questions      Thank you kindly for including us in the care of this patient. Please do not hesitate to contact us with any questions.           Mukund James MD  Neurology/vascular Neurology  Date of Service: 06/14/2023  10:30 AM    --------------------------------------------------------------------------------------------------------------------------------------------------------------------------------------------------------------------------------------------------------------  Please note: This note was transcribed using voice recognition software. Because of this technology there are often uinintended grammatical, spelling, and other transcription errors. Please disregard these errors.

## 2023-06-14 NOTE — NURSING
Nurses Note -- 4 Eyes      6/14/2023   2:50 PM      Skin assessed during: Admit      [x] No Altered Skin Integrity Present    []Prevention Measures Documented      [] Yes- Altered Skin Integrity Present or Discovered   [] LDA Added if Not in Epic (Describe Wound)   [] New Altered Skin Integrity was Present on Admit and Documented in LDA   [] Wound Image Taken    Wound Care Consulted? No    Attending Nurse:  Shreya Heaton RN     Second RN/Staff Member:  Sandra Nichols RN

## 2023-06-14 NOTE — SUBJECTIVE & OBJECTIVE
Past Medical History:   Diagnosis Date    GERD (gastroesophageal reflux disease)     Glioblastoma     Gout, unspecified     H/O blood clots     HFrEF (heart failure with reduced ejection fraction)     Hypertension     Paroxysmal atrial fibrillation     Seizures     Splenic infarct     Ventilator associated pneumonia 5/29/2023    Ventilator dependence 5/29/2023       Past Surgical History:   Procedure Laterality Date    CHOLECYSTECTOMY N/A     CRANIOTOMY FOR EXCISION OF INTRACRANIAL TUMOR      INSERTION, PEG TUBE N/A 05/24/2023    Procedure: INSERTION, PEG TUBE;  Surgeon: Kapil Hickey III, MD;  Location: [facility];  Service: General;  Laterality: N/A;    KNEE ARTHROSCOPY Right     PROSTATECTOMY N/A     TRACHEOSTOMY N/A 05/24/2023    Procedure: CREATION, TRACHEOSTOMY;  Surgeon: Kapil Hickey III, MD;  Location: [facility];  Service: General;  Laterality: N/A;       Review of patient's allergies indicates:  No Known Allergies    Current Facility-Administered Medications on File Prior to Encounter   Medication    [MAR Hold - Suspended Admission] acetaminophen tablet 650 mg    [MAR Hold - Suspended Admission] albuterol nebulizer solution 2.5 mg    [MAR Hold - Suspended Admission] allopurinoL tablet 100 mg    [MAR Hold - Suspended Admission] amLODIPine tablet 2.5 mg    [MAR Hold - Suspended Admission] atorvastatin tablet 20 mg    [MAR Hold - Suspended Admission] brimonidine 0.2% ophthalmic solution 1 drop    [MAR Hold - Suspended Admission] clonazePAM tablet 0.5 mg    [MAR Hold - Suspended Admission] clonazePAM tablet 1 mg    [MAR Hold - Suspended Admission] colchicine capsule/tablet 0.6 mg    [MAR Hold - Suspended Admission] dexAMETHasone injection 4 mg    [MAR Hold - Suspended Admission] digoxin tablet 0.125 mg    [MAR Hold - Suspended Admission] ezetimibe tablet 10 mg    [MAR Hold - Suspended Admission] famotidine tablet 20 mg    [MAR Hold - Suspended Admission] FLUoxetine capsule 20 mg    [MAR Hold - Suspended  Admission] guaiFENesin 100 mg/5 ml syrup 200 mg    [MAR Hold - Suspended Admission] hydrALAZINE injection 10 mg    [MAR Hold - Suspended Admission] hydrocodone-chlorpheniramine 10-8 mg/5 mL suspension 5 mL    [MAR Hold - Suspended Admission] ibuprofen 20 mg/mL oral liquid 200 mg    [MAR Hold - Suspended Admission] ipratropium 0.02 % nebulizer solution 0.5 mg    [MAR Hold - Suspended Admission] lacosamide tablet 200 mg    [MAR Hold - Suspended Admission] levETIRAcetam tablet 750 mg    [MAR Hold - Suspended Admission] losartan tablet 50 mg    [MAR Hold - Suspended Admission] melatonin tablet 6 mg    [MAR Hold - Suspended Admission] metoprolol injection 5 mg    [MAR Hold - Suspended Admission] metoprolol tartrate (LOPRESSOR) split tablet 12.5 mg    [MAR Hold - Suspended Admission] mupirocin 2 % ointment    [MAR Hold - Suspended Admission] ondansetron injection 4 mg    [MAR Hold - Suspended Admission] polyethylene glycol packet 17 g    [MAR Hold - Suspended Admission] polyethylene glycol packet 17 g    [MAR Hold - Suspended Admission] potassium bicarbonate disintegrating tablet 25 mEq    [MAR Hold - Suspended Admission] rivaroxaban tablet 20 mg    [MAR Hold - Suspended Admission] sodium chloride 0.9% flush 10 mL    [MAR Hold - Suspended Admission] sodium chloride 7% nebulizer solution 4 mL    [MAR Hold - Suspended Admission] timolol maleate 0.25% ophthalmic solution 1 drop    [MAR Hold - Suspended Admission] torsemide tablet 40 mg    [DISCONTINUED] traZODone tablet 50 mg     Current Outpatient Medications on File Prior to Encounter   Medication Sig    albuterol (PROVENTIL) 2.5 mg /3 mL (0.083 %) nebulizer solution Take 2.5 mg by nebulization every 4 (four) hours as needed for Wheezing. Rescue    allopurinoL (ZYLOPRIM) 100 MG tablet 1 tablet (100 mg total) by Per G Tube route once daily.    atorvastatin (LIPITOR) 20 MG tablet 1 tablet (20 mg total) by Per G Tube route once daily.    brimonidine 0.2% (ALPHAGAN) 0.2 % Drop  Place 1 drop into both eyes 2 (two) times a day.    clonazePAM (KLONOPIN) 0.5 MG tablet 0.5 mg by Per G Tube route 2 (two) times daily as needed for Anxiety.    clonazePAM (KLONOPIN) 1 MG tablet Take 1 mg by mouth 2 (two) times daily as needed for Anxiety.    colchicine (COLCRYS) 0.6 mg tablet 1 tablet (0.6 mg total) by Per G Tube route daily as needed (gout attack).    dexAMETHasone sodium phosphate 4 mg/mL Syrg Inject 4 mg as directed every 12 (twelve) hours.    digoxin (LANOXIN) 125 mcg tablet 1 tablet (0.125 mg total) by Per G Tube route once daily.    ezetimibe (ZETIA) 10 mg tablet 1 tablet (10 mg total) by Per G Tube route every evening.    famotidine (PEPCID) 40 mg/5 mL (8 mg/mL) suspension 2.5 mLs (20 mg total) by Per G Tube route 2 (two) times daily.    FLUoxetine 20 MG capsule 1 capsule (20 mg total) by Per G Tube route once daily.    guaiFENesin 100 mg/5 ml (ROBITUSSIN) 100 mg/5 mL syrup Take 200 mg by mouth every 4 (four) hours as needed for Cough.    hydrALAZINE (APRESOLINE) 20 mg/mL injection 10 mg every 6 (six) hours as needed.    hydrocodone-chlorpheniramine (TUSSIONEX) 10-8 mg/5 mL suspension Take 5 mLs by mouth every 12 (twelve) hours as needed for Cough.    ibuprofen 20 mg/mL oral liquid (PEDS) 20 mg by Per G Tube route every 6 (six) hours as needed for Temperature greater than.    ipratropium (ATROVENT) 0.02 % nebulizer solution Take 500 mcg by nebulization every 6 (six) hours. Rescue    lacosamide (VIMPAT) 50 mg Tab 4 tablets (200 mg total) by Per G Tube route every 12 (twelve) hours.    levETIRAcetam (KEPPRA) 500 MG Tab Take 1.5 tablets (750 mg total) by mouth 2 (two) times daily.    losartan (COZAAR) 50 MG tablet 1 tablet (50 mg total) by Per G Tube route 2 (two) times a day.    melatonin (MELATIN) 3 mg tablet Take 6 mg by mouth nightly as needed for Insomnia.    metoprolol (LOPRESSOR) 5 mg/5 mL injection Inject 5 mg into the vein every hour as needed.    metoprolol tartrate (LOPRESSOR) 25 MG  tablet Take 12.5 mg by mouth 2 (two) times daily.    mupirocin (BACTROBAN) 2 % ointment Apply 1 g topically 2 (two) times daily as needed. For trach care    polyethylene glycol (GLYCOLAX) 17 gram PwPk 17 g by Per NG tube route 2 (two) times daily as needed. (Patient taking differently: 17 g by Per G Tube route 2 (two) times a day.)    potassium bicarbonate disintegrating tablet Take 25 mEq by mouth 2 (two) times daily.    rivaroxaban (XARELTO) 20 mg Tab 1 tablet (20 mg total) by Per G Tube route daily with dinner or evening meal.    sodium chloride 7% 7 % nebulizer solution Take 4 mLs by nebulization 2 (two) times a day.    timolol maleate 0.25% (TIMOPTIC) 0.25 % Drop Place 1 drop into both eyes 2 (two) times daily.    torsemide 40 mg Tab 40 mg by Per G Tube route once daily.    [DISCONTINUED] acetaminophen (TYLENOL) 325 MG tablet 2 tablets (650 mg total) by Per G Tube route every 8 (eight) hours as needed for Pain or Temperature greater than (100.4).    [DISCONTINUED] pantoprazole (PROTONIX) 40 MG tablet Take 40 mg by mouth once daily.    [DISCONTINUED] dexAMETHasone (DECADRON) 0.5 MG tablet Take 0.5 mg by mouth once daily.    [DISCONTINUED] furosemide (LASIX) 40 MG tablet 1 tablet (40 mg total) by Per G Tube route 2 (two) times daily.    [DISCONTINUED] hydrALAZINE (APRESOLINE) 25 MG tablet Take 25 mg by mouth 2 (two) times a day.    [DISCONTINUED] metoprolol tartrate (LOPRESSOR) 100 MG tablet 1 tablet (100 mg total) by Per G Tube route 2 (two) times daily.     Family History       Problem Relation (Age of Onset)    Bone cancer Father    Crohn's disease Brother    Diabetes Mother    Prostate cancer Father    Transient ischemic attack Mother          Tobacco Use    Smoking status: Never    Smokeless tobacco: Never   Substance and Sexual Activity    Alcohol use: Not Currently    Drug use: Never    Sexual activity: Not on file     Review of Systems   Unable to perform ROS: Mental status change   Objective:     Vital  Signs (Most Recent):  Temp: 98.1 °F (36.7 °C) (06/14/23 0404)  Pulse: 67 (06/14/23 0825)  Resp: 14 (06/14/23 0825)  BP: 127/83 (06/14/23 0404)  SpO2: 100 % (06/14/23 0825) Vital Signs (24h Range):  Temp:  [97.2 °F (36.2 °C)-98.4 °F (36.9 °C)] 98.1 °F (36.7 °C)  Pulse:  [] 67  Resp:  [14-32] 14  SpO2:  [94 %-100 %] 100 %  BP: (127-155)/(82-96) 127/83     Weight: 88.9 kg (196 lb)  Body mass index is 28.94 kg/m².     Physical Exam  Vitals and nursing note reviewed.   Constitutional:       General: He is in acute distress.      Appearance: He is ill-appearing and diaphoretic.   HENT:      Head: Normocephalic and atraumatic.      Nose: Nose normal.      Mouth/Throat:      Mouth: Mucous membranes are moist.   Eyes:      Extraocular Movements: Extraocular movements intact.      Pupils: Pupils are equal, round, and reactive to light.   Neck:      Comments: Tracheostomy site with leak on BIPAP  Cardiovascular:      Rate and Rhythm: Regular rhythm. Tachycardia present.      Heart sounds:     Gallop present.   Pulmonary:      Breath sounds: Rales present. No wheezing or rhonchi.   Abdominal:      General: There is distension.      Tenderness: There is no abdominal tenderness. There is no guarding or rebound.      Comments: PEG   Musculoskeletal:      Cervical back: Neck supple.      Comments: Limited cooperation with the exam   Skin:     General: Skin is warm.   Neurological:      Mental Status: He is alert. He is disoriented.      Motor: Weakness present.   Psychiatric:      Comments: Dulled affect            CRANIAL NERVES     CN III, IV, VI   Pupils are equal, round, and reactive to light.     Significant Labs: All pertinent labs within the past 24 hours have been reviewed.  Recent Lab Results  (Last 5 results in the past 24 hours)        06/14/23  0810   06/14/23  0702   06/14/23  0424   06/14/23  0420   06/14/23  0218        Albumin     3.0           Alkaline Phosphatase     70           Allens Test N/A       N/A    Pass       ALT     67           Anion Gap     10           Appearance, UA   Clear             AST     40           Baso #     0.01           Basophil %     0.1           Bilirubin (UA)   Negative             BILIRUBIN TOTAL     0.9  Comment: For infants and newborns, interpretation of results should be based  on gestational age, weight and in agreement with clinical  observations.    Premature Infant recommended reference ranges:  Up to 24 hours.............<8.0 mg/dL  Up to 48 hours............<12.0 mg/dL  3-5 days..................<15.0 mg/dL  6-29 days.................<15.0 mg/dL             Site RR       Other   RR       BUN     63           Calcium     8.8           Chloride     94           CO2     36           Color, UA   Yellow             Creatinine     0.8           DelSys CPAP/BiPAP       Nasal Can   Nasal Can       Differential Method     Automated           eGFR     >60.0           Eos #     0.0           Eosinophil %     0.0           EP 5               FiO2 40               Flow       2   2       Glucose     150           Glucose, UA   Negative             Gran # (ANC)     8.5           Gran %     85.0           Hematocrit     43.5           Hemoglobin     14.7           Immature Grans (Abs)     0.05  Comment: Mild elevation in immature granulocytes is non specific and   can be seen in a variety of conditions including stress response,   acute inflammation, trauma and pregnancy. Correlation with other   laboratory and clinical findings is essential.             Immature Granulocytes     0.5           IP 18               Ketones, UA   Negative             Lactate, Juan Manuel     1.7  Comment: Falsely low lactic acid results can be found in samples   containing >=13.0 mg/dL total bilirubin and/or >=3.5 mg/dL   direct bilirubin.             Leukocytes, UA   Negative             Lymph #     0.7           Lymph %     7.4           Magnesium     2.3           MCH     31.7           MCHC     33.8           MCV      94           Mode BiPAP       SPONT   SPONT       Mono #     0.7           Mono %     7.0           MPV     10.3           NITRITE UA   Negative             nRBC     0           Occult Blood UA   Negative             pH, UA   8.0             Phosphorus     3.9           Platelets     200           POC BE 18       22   22       POC Glucose 143               POC HCO3 40.2       45.4   44.8       POC Hematocrit 42               POC Ionized Calcium 1.16               POC PCO2 46.2       58.2   51.2       POC PH 7.547       7.500   7.550       POC PO2 193       62   54       Potassium, Blood Gas 3.1               POC SATURATED O2 100       92   91       Sodium, Blood Gas 139               POC TCO2 42       47   46       Potassium     3.6           PROTEIN TOTAL     6.2           Protein, UA   Trace  Comment: Recommend a 24 hour urine protein or a urine   protein/creatinine ratio if globulin induced proteinuria is  clinically suspected.               Rate 18               RBC     4.63           RDW     14.7           Sample ARTERIAL       VENOUS   VENOUS       Sodium     140           Sp02         97       Specific Stitzer, UA   1.030             Specimen UA   Urine, Clean Catch             Spont Rate 24               UROBILINOGEN UA   Negative             WBC     10.04                                  Significant Imaging: I have reviewed all pertinent imaging results/findings within the past 24 hours.

## 2023-06-14 NOTE — PLAN OF CARE
[facility]  Initial Discharge Assessment       Primary Care Provider: Primary Doctor No    Admission Diagnosis: Acute kidney injury [N17.9]    Admission Date: 6/14/2023  Expected Discharge Date:     Discharge assessment completed with patient's family member at bedside. Information verified as correct on facesheet. Patient admitted to [facility] from [facility]. Discharge plan is return to [facility]. Case management to follow for additional needs-     Transition of Care Barriers: None    Payor: MEDICARE / Plan: MEDICARE PART A & B / Product Type: Government /     Extended Emergency Contact Information  Primary Emergency Contact: CHERRIE CARPIO  Mobile Phone: [phone]  Relation: Relative  Preferred language: English   needed? No    Discharge Plan A: Long-term acute care facility (LTAC)  Discharge Plan B: Skilled Nursing Facility      [id number] - [place], [place] - [address]  [address] [address]  Phone: [phone] Fax: [phone]      Initial Assessment (most recent)       Adult Discharge Assessment - 06/14/23 1438          Discharge Assessment    Assessment Type Discharge Planning Assessment     Confirmed/corrected address, phone number and insurance Yes     Confirmed Demographics Correct on Facesheet     Source of Information family     If unable to respond/provide information was family/caregiver contacted? Yes     Communicated BARBARA with patient/caregiver Yes     Reason For Admission resp. failure     People in Home child(brandon), adult     Facility Arrived From: [facility]     Prior to hospitilization cognitive status: Alert/Oriented     Current cognitive status: Not Oriented to Place;Not Oriented to Time     Equipment Currently Used at Home none     Readmission within 30 days? Yes     Patient currently being followed by outpatient case management? No     Do you currently have service(s) that help you manage your care at  home? No     Do you take prescription medications? Yes     Do you have prescription coverage? Yes     Coverage medicare     Do you have any problems affording any of your prescribed medications? No     Is the patient taking medications as prescribed? yes     Who is going to help you get home at discharge? facility transport     How do you get to doctors appointments? health plan transportation     Are you on dialysis? No     Do you take coumadin? No     Discharge Plan A Long-term acute care facility (LTAC)     Discharge Plan B Skilled Nursing Facility     Discharge Plan discussed with: Patient     Transition of Care Barriers None

## 2023-06-14 NOTE — ED PROVIDER NOTES
Encounter Date: 6/14/2023       History     Chief Complaint   Patient presents with    Snoring     Sent from [facility] with complaint of not controlling secretions. Recently had trach decanulated. Hx of sleep apnea.      HPI    Teto Mendez is a 67 y.o. male with a past medical history of AFib, hypertension, heart failure with reduced ejection fracture, glioblastoma status post resection, seizure, CVA, status post trach dependent with recent trach decannulation presents from nursing home for increased airway secretion and sleep apnea.  At baseline patient uses 2 L nasal cannula.    Review of patient's allergies indicates:  No Known Allergies  Past Medical History:   Diagnosis Date    GERD (gastroesophageal reflux disease)     Glioblastoma     Gout, unspecified     H/O blood clots     HFrEF (heart failure with reduced ejection fraction)     Hypertension     Paroxysmal atrial fibrillation     Seizures     Splenic infarct     Ventilator associated pneumonia 5/29/2023    Ventilator dependence 5/29/2023     Past Surgical History:   Procedure Laterality Date    CHOLECYSTECTOMY N/A     CRANIOTOMY FOR EXCISION OF INTRACRANIAL TUMOR      INSERTION, PEG TUBE N/A 05/24/2023    Procedure: INSERTION, PEG TUBE;  Surgeon: Kapil Hickey III, MD;  Location: [facility] OR;  Service: General;  Laterality: N/A;    KNEE ARTHROSCOPY Right     PROSTATECTOMY N/A     TRACHEOSTOMY N/A 05/24/2023    Procedure: CREATION, TRACHEOSTOMY;  Surgeon: Kapil Hickey III, MD;  Location: [facility] OR;  Service: General;  Laterality: N/A;     Family History   Problem Relation Age of Onset    Diabetes Mother     Transient ischemic attack Mother     Prostate cancer Father     Bone cancer Father     Crohn's disease Brother      Social History     Tobacco Use    Smoking status: Never    Smokeless tobacco: Never   Substance Use Topics    Alcohol use: Not Currently    Drug use: Never     Review of Systems   Unable to perform ROS: Mental status  change   Respiratory:  Positive for cough and stridor.    Skin:  Positive for wound.     Physical Exam     Initial Vitals [06/14/23 0404]   BP Pulse Resp Temp SpO2   127/83 74 15 98.1 °F (36.7 °C) 100 %      MAP       --         Physical Exam    Nursing note and vitals reviewed.  Constitutional: He appears well-developed and well-nourished. He is cooperative.   HENT:   Head: Normocephalic and atraumatic.   Mouth/Throat: Mucous membranes are dry.   Eyes: EOM are normal. Pupils are equal, round, and reactive to light.   Neck: Stridor present. No JVD present.       Cardiovascular:  Normal rate and normal heart sounds.           A-fib HR high 70s to 90s   Pulmonary/Chest: Stridor present. He has no wheezes. He has no rhonchi. He exhibits no tenderness.   Transmitted upper airway sounds   Abdominal: Abdomen is soft. He exhibits no distension. There is no abdominal tenderness.   Musculoskeletal:         General: Normal range of motion.     Neurological:   Patient at baseline   Skin: Skin is warm. Capillary refill takes less than 2 seconds.       ED Course   Critical Care    Date/Time: 6/14/2023 5:44 AM  Performed by: Jac Gerardo MD  Authorized by: Jac Gerardo MD   Direct patient critical care time: 20 minutes  Ordering / reviewing critical care time: 5 minutes  Documentation critical care time: 5 minutes  Total critical care time (exclusive of procedural time) : 30 minutes      Labs Reviewed   CBC W/ AUTO DIFFERENTIAL - Abnormal; Notable for the following components:       Result Value    MCH 31.7 (*)     RDW 14.7 (*)     Gran # (ANC) 8.5 (*)     Immature Grans (Abs) 0.05 (*)     Lymph # 0.7 (*)     Gran % 85.0 (*)     Lymph % 7.4 (*)     All other components within normal limits   COMPREHENSIVE METABOLIC PANEL - Abnormal; Notable for the following components:    Chloride 94 (*)     CO2 36 (*)     Glucose 150 (*)     BUN 63 (*)     Albumin 3.0 (*)     ALT 67 (*)     All other components within normal limits   ISTAT  PROCEDURE - Abnormal; Notable for the following components:    POC PH 7.500 (*)     POC PCO2 58.2 (*)     POC PO2 62 (*)     POC HCO3 45.4 (*)     POC SATURATED O2 92 (*)     POC TCO2 47 (*)     All other components within normal limits   CULTURE, BLOOD   CULTURE, BLOOD   CULTURE, RESPIRATORY   LACTIC ACID, PLASMA   PHOSPHORUS   MAGNESIUM   URINALYSIS, REFLEX TO URINE CULTURE     EKG Readings: (Independently Interpreted)   Initial Reading: No STEMI. Rhythm: Atrial Fibrillation. Heart Rate: 77. Ectopy: No Ectopy. Conduction: Normal. Clinical Impression: Atrial Fibrillation   ECG Results              EKG 12-lead (In process)  Result time 06/14/23 05:09:48      In process by Interface, Lab In [facility] (06/14/23 05:09:48)                   Narrative:    Test Reason : R06.02,    Vent. Rate : 077 BPM     Atrial Rate : 326 BPM     P-R Int : 000 ms          QRS Dur : 106 ms      QT Int : 378 ms       P-R-T Axes : 000 -35 239 degrees     QTc Int : 427 ms    Atrial fibrillation with a competing junctional pacemaker  Left axis deviation  ST and T wave abnormality, consider anterolateral ischemia  Abnormal ECG  When compared with ECG of 06-JUN-2023 11:26,  No significant change was found    Referred By: [email or web address]   SELF           Confirmed By:                                   Imaging Results              X-Ray Chest AP Portable (In process)                   X-Rays:   Independently Interpreted Readings:   Other Readings:  Chest x-ray unremarkable  Medications   piperacillin-tazobactam 4.5 g in dextrose 5 % 100 mL IVPB (ready to mix) (4.5 g Intravenous Incomplete 6/14/23 0450)   methylPREDNISolone sodium succinate injection 125 mg (125 mg Intravenous Incomplete 6/14/23 0450)   vancomycin - pharmacy to dose (has no administration in time range)   vancomycin 1.5 g in dextrose 5 % 250 mL IVPB (ready to mix) (has no administration in time range)   albuterol-ipratropium 2.5 mg-0.5 mg/3 mL nebulizer solution 3 mL (has no  "administration in time range)   lactated ringers bolus 2,667 mL (has no administration in time range)     Medical Decision Making:   History:   I obtained history from: someone other than patient.       <> Summary of History: AMS, see HPI  Differential Diagnosis:   Pneumonia, pleural effusion, dehydration, ADITYA, tracheostomy site infection, cellulitis, sepsis, cystitis  Independently Interpreted Test(s):   I have ordered and independently interpreted X-rays - see summary below.  I have ordered and independently interpreted EKG Reading(s) - see summary below  Clinical Tests:   Lab Tests: Ordered and Reviewed       <> Summary of Lab: Sepsis workup was initiated.  CBC patient does not have leukocytosis and H and H is stable.  On CMP patient found to be hypochloremic hypoglycemic with elevated BUN of 63.  Lactic acid is 1.7, and phos and magnesium were within normal limits.  Gas showed a pH of 7.5, pCO2 of 58, and O2 62 with a bicarb of 45.  Chest x-ray did not show any focal pneumonia but diffuse airspace disease.    Radiological Study: Ordered and Reviewed  Medical Tests: Ordered and Reviewed  Sepsis Perfusion Assessment: "I attest a sepsis perfusion exam was performed within 6 hours of sepsis, severe sepsis, or septic shock presentation, following fluid resuscitation."  ED Management:  Septic workup was initiated.  Labs as above.  Patient given vancomycin and Zosyn.  Wound cultures from tracheostomy site were sent given that patient has purulent drainage.  Solu-Medrol, DuoNebs were ordered for increased work of breathing.  30 cc/kg LR bolus was ordered to complete the sepsis protocol.  Medicine was consulted for admission.   Other:   I have discussed this case with another health care provider.       <> Summary of the Discussion: 67-year-old male who recently had tracheostomy removed.  Patient having cough and having difficulty clearing his secretions.  Tracheostomy site suction and clear pus obtained in that area and " so broad-spectrum antibiotics started for presumed infection in that area.  Patient did have improvement of symptoms with treatment with breathing treatment and steroid.  Patient does have evidence of acute kidney injury.  IV fluids given.  Given patient's presentation and symptoms Hospital Medicine consulted for further evaluation and management and treatment  Patient seen and evaluated and examined along with the resident house officer.  Agree with plan and management and evaluation .  Present during all critical portions of procedures including EKG interpretation when EKG and critical procedures performed on patient                          Clinical Impression:   Final diagnoses:  [R06.02] SOB (shortness of breath)  [N17.9] Acute kidney injury (Primary)  [J18.9] Pneumonitis        ED Disposition Condition    Admit Fair                Dorothy Garay MD  Resident  06/14/23 0528       Dorothy Garay MD  Resident  06/14/23 0538       Jac Gerardo MD  06/14/23 0546

## 2023-06-14 NOTE — PROGRESS NOTES
VANCOMYCIN PHARMACOKINETIC NOTE:  Vancomycin Day # 1    Objective/Assessment:    Diagnosis/Indication for Vancomycin: Pneumonia     67 y.o., male; Actual Body Weight = 91 kg (200 lb 9.9 oz).    The patient has the following labs:  6/14/2023 Estimated Creatinine Clearance: 99.9 mL/min (based on SCr of 0.8 mg/dL). Lab Results   Component Value Date    BUN 63 (H) 06/14/2023     Lab Results   Component Value Date    WBC 10.04 06/14/2023          Plan:  Adjust vancomycin dose and/or frequency based on the patient's actual weight and renal function:  Initiate Vancomycin 1500 mg IV every 12 hours.  Orders have been entered into patient's chart.    Vancomycin dose = 15 mg/kg actual body weight    Vancomycin trough level has been ordered for 6/16/23 @ 0000.    Pharmacy will manage vancomycin therapy, monitor serum vancomycin levels, monitor renal function and adjust regimen as necessary.    Thank you for allowing us to participate in this patient's care.     Kathryn Romero 6/14/2023 3:52 PM  Department of Pharmacy  Ext [phone]

## 2023-06-14 NOTE — CONSULTS
Pulmonary/Critical Care Consult      PATIENT NAME: Teto Mendez  MRN: [id number]  TODAY'S DATE: 2023  ADMIT DATE: 2023  AGE: 67 y.o. : 1955    CONSULT REQUESTED BY: Doron Orozco MD    REASON FOR CONSULT:   Chronic respiratory failure  Sepsis  Encephalopathy    HISTORY OF PRESENT ILLNESS   Teto Mendez is a 67 y.o. male with a PMH of glioblastoma s/p R craniectomy, XRT, and chemo (in [place]), s/p trach/PEG (23), trach decannulation (23), seizures, Afib on apixaban, HFrEF, and Pseudomonas lower respiratory infection on whom we have been consulted for chronic hypoxic respiratory failure, sepsis, and acute on chronic encephalopathy.    He has had prior pan-sensitive PsA lower respiratory tract ifection and Klebsiella UTI sensitive to all but nitrofurantoin. He was havingh apneic episodes per RT earlier today, so he was put on BiPAP with a backup rate.      REVIEW OF SYSTEMS  GENERAL: Feeling well. No fevers, chills, or night sweats.  EYES: Vision is good.  ENT: No sinusitis or pharyngitis.   HEART: No chest pain or palpitations.  LUNGS: No cough, sputum, or wheezing.  GI: No abdominal pain, nausea, vomiting, or diarrhea.  : No dysuria, urgency, or frequency.  SKIN: No lesions or rashes.  MUSCULOSKELETAL: No joint pain or myalgias.  NEURO: No headaches or neuropathy.  LYMPH: No edema or adenopathy.  PSYCH: No anxiety or depression.  ENDO: No unexpected weight change.    ALLERGIES  Review of patient's allergies indicates:  No Known Allergies    INPATIENT SCHEDULED MEDICATIONS   albuterol-ipratropium  3 mL Nebulization Q6H    lactated ringers  30 mL/kg Intravenous Once    methylPREDNISolone sodium succinate injection  125 mg Intravenous ED 1 Time    sodium chloride 0.9%  10 mL Intravenous Q12H    vancomycin (VANCOCIN) IVPB  1,500 mg Intravenous Once         MEDICAL AND SURGICAL HISTORY  Past Medical History:   Diagnosis Date    GERD (gastroesophageal reflux disease)     Glioblastoma      Gout, unspecified     H/O blood clots     HFrEF (heart failure with reduced ejection fraction)     Hypertension     Paroxysmal atrial fibrillation     Seizures     Splenic infarct     Ventilator associated pneumonia 5/29/2023    Ventilator dependence 5/29/2023     Past Surgical History:   Procedure Laterality Date    CHOLECYSTECTOMY N/A     CRANIOTOMY FOR EXCISION OF INTRACRANIAL TUMOR      INSERTION, PEG TUBE N/A 05/24/2023    Procedure: INSERTION, PEG TUBE;  Surgeon: Kapil Hickey III, MD;  Location: [facility] OR;  Service: General;  Laterality: N/A;    KNEE ARTHROSCOPY Right     PROSTATECTOMY N/A     TRACHEOSTOMY N/A 05/24/2023    Procedure: CREATION, TRACHEOSTOMY;  Surgeon: Kapil Hickey III, MD;  Location: [facility] OR;  Service: General;  Laterality: N/A;       ALCOHOL, TOBACCO AND DRUG USE  Social History     Tobacco Use   Smoking Status Never   Smokeless Tobacco Never     Social History     Substance and Sexual Activity   Alcohol Use Not Currently     Social History     Substance and Sexual Activity   Drug Use Never       FAMILY HISTORY  Family History   Problem Relation Age of Onset    Diabetes Mother     Transient ischemic attack Mother     Prostate cancer Father     Bone cancer Father     Crohn's disease Brother        VITAL SIGNS (MOST RECENT)  Temp: 98.1 °F (36.7 °C) (06/14/23 0404)  Pulse: 67 (06/14/23 1044)  Resp: 12 (06/14/23 1044)  BP: (!) 164/104 (06/14/23 0954)  SpO2: 99 % (06/14/23 1044)    INTAKE AND OUTPUT (LAST 24 HOURS):  Intake/Output Summary (Last 24 hours) at 6/14/2023 1124  Last data filed at 6/14/2023 1025  Gross per 24 hour   Intake 510 ml   Output 1050 ml   Net -540 ml       WEIGHT  Wt Readings from Last 1 Encounters:   06/14/23 88.9 kg (196 lb)       PHYSICAL EXAM  GENERAL: A&O. NAD.  HEENT: Extraocular movements intact. Pharynx moist.  NECK: Supple. No JVD or hepatojugular reflux.  HEART: Regular rate and normal rhythm. No murmur or gallop auscultated.  LUNGS: Clear to  auscultation and percussion. Lung excursion symmetrical.   ABDOMEN: Soft, non-tender, non-distended, no masses palpated.  EXTREMITIES: Normal muscle tone and joint movement, no cyanosis or clubbing.   LYMPHATICS: No adenopathy palpated, no edema.  SKIN: Dry, intact, no lesions.   NEURO: No gross deficit.  PSYCH: Appropriate affect    ACUTE PHASE REACTANT (LAST 24 HOURS)  No results for input(s): FERRITIN, CRP, LDH, DDIMER in the last 24 hours.    CBC LAST (LAST 24 HOURS)  Recent Labs   Lab 06/14/23 0424 06/14/23  0810   WBC 10.04  --    RBC 4.63  --    HGB 14.7  --    HCT 43.5 42   MCV 94  --    MCH 31.7*  --    MCHC 33.8  --    RDW 14.7*  --      --    MPV 10.3  --    GRAN 85.0*  8.5*  --    LYMPH 7.4*  0.7*  --    MONO 7.0  0.7  --    BASO 0.01  --    NRBC 0  --        CHEMISTRY LAST (LAST 24 HOURS)  Recent Labs   Lab 06/14/23 0424 06/14/23  0810     --    K 3.6  --    CL 94*  --    CO2 36*  --    ANIONGAP 10  --    BUN 63*  --    CREATININE 0.8  --    *  --    CALCIUM 8.8  --    PH  --  7.547*   MG 2.3  --    ALBUMIN 3.0*  --    PROT 6.2  --    ALKPHOS 70  --    ALT 67*  --    AST 40  --    BILITOT 0.9  --        COAGULATION LAST (LAST 24 HOURS)  No results for input(s): LABPT, INR, APTT in the last 24 hours.    CARDIAC PROFILE (LAST 24 HOURS)  No results for input(s): BNP, CPK, CPKMB, LDH, TROPONINI in the last 168 hours.    LAST 7 DAYS MICROBIOLOGY   Microbiology Results (last 7 days)       Procedure Component Value Units Date/Time    Blood culture x two cultures. Draw prior to antibiotics. [[id number]] Collected: 06/14/23 0424    Order Status: Completed Specimen: Blood from Peripheral, Wrist, Left Updated: 06/14/23 1117     Blood Culture, Routine No Growth to date    Narrative:      Aerobic and anaerobic    MRSA Screen by PCR [[id number]]     Order Status: No result Specimen: Nasopharyngeal Swab from Nasal     Blood culture x two cultures. Draw prior to antibiotics. [[id number]]  Collected: 06/14/23 0449    Order Status: Sent Specimen: Blood from Midline, Basilic, Left Updated: 06/14/23 0450    Culture, Respiratory with Gram Stain [[id number]] Collected: 06/14/23 0429    Order Status: Sent Specimen: Respiratory from Tracheal Aspirate Updated: 06/14/23 0433            MOST RECENT IMAGING  X-Ray Chest AP Portable  Narrative: EXAMINATION:  XR CHEST AP PORTABLE    CLINICAL HISTORY:  Sepsis;    FINDINGS:  Portable chest at 425 compared with 06/12/2023 shows unchanged cardiomediastinal silhouette.    Medial right basilar alveolar opacity is minimal and new.  Lateral left mid lung opacities are unchanged.  Lung volumes remain low.  No pneumothorax or pleural effusion.  Pulmonary vasculature is normal.    No acute osseous abnormality.  Right upper quadrant surgical clips suggest cholecystectomy.  Impression: New medial right basilar opacities suggesting atelectasis or consolidation.    Electronically signed by: Compa Lopez MD  Date:    06/14/2023  Time:    07:10      CURRENT VISIT EKG  Results for orders placed or performed during the hospital encounter of 06/14/23   EKG 12-lead    Narrative    Test Reason : R06.02,    Vent. Rate : 077 BPM     Atrial Rate : 326 BPM     P-R Int : 000 ms          QRS Dur : 106 ms      QT Int : 378 ms       P-R-T Axes : 000 -35 239 degrees     QTc Int : 427 ms    Atrial fibrillation with a competing junctional pacemaker  Left axis deviation  ST and T wave abnormality, consider anterolateral ischemia  Abnormal ECG  When compared with ECG of 06-JUN-2023 11:26,  No significant change was found    Referred By: [email or web address]   SELF           Confirmed By:        ECHOCARDIOGRAM RESULTS  Results for orders placed during the hospital encounter of 05/31/23    Echo    Interpretation Summary  · Atrial fibrillation observed.  · The estimated ejection fraction is 40-45%.  · Normal right ventricular size with normal right ventricular systolic function.  · Severe left atrial  enlargement.        RESPIRATORY SUPPORT              LAST ARTERIAL BLOOD GAS  ABG  Recent Labs   Lab 06/14/23  0810   PH 7.547*   PO2 193*   PCO2 46.2*   HCO3 40.2*   BE 18     CXR 6/14/23: low lung volumes, cardiomegaly, hilar fullness.    IMPRESSION AND PLAN  Teto Mendez is a 67 y.o. male with a PMH of glioblastoma s/p R craniectomy, XRT, and chemo (in [place]), s/p trach/PEG (5/24/23), trach decannulation (6/6/23), seizures, Afib on apixaban, HFrEF, and Pseudomonas lower respiratory infection on whom we have been consulted for chronic hypoxic respiratory failure, sepsis, and acute on chronic encephalopathy.    #Sepsis with possible pulmonary source   #Mild lactic acidosis  #Chronic respiratory failure  #Tracheostomy secretions   #Likely central apnea  - BNP added to labs  - f/u tracheal aspirate culture  - f/u blood cultures  - empiric Abx pending cultures  - agree with fluid administration  - f/u lactate repeat  - continue BiPAP w/ backup rate for now    #Acute on chronic encephalopathy  - appreciate neurology assistance    Discussed with RN. I have reviewed the patient's most recent CBC and serum chemistry, as well as the most recent chest x-ray and/or chest CT. My interpretation of the chest imaging is as above. The patient is at high risk of morbidity from the following test or treatment: fluid adminstration, and I have ordered BNP to monitor it.    The patient is at high risk of morbidity or death and should be admitted to the ICU.    Ariel Moscoso MD  Former[facility]  Department of Pulmonology  Date of Service: 06/14/2023  11:24 AM

## 2023-06-14 NOTE — ASSESSMENT & PLAN NOTE
Chronic LLL infiltrate  RLL infiltrate new  Vancomycin therapy  Cefepime  MRSA  Sputum culture  Duoneb treatments

## 2023-06-15 PROBLEM — I50.21 ACUTE SYSTOLIC CHF (CONGESTIVE HEART FAILURE): Status: RESOLVED | Noted: 2023-05-27 | Resolved: 2023-06-15

## 2023-06-15 PROBLEM — J96.01 ACUTE HYPOXEMIC RESPIRATORY FAILURE: Status: RESOLVED | Noted: 2023-05-11 | Resolved: 2023-06-15

## 2023-06-15 PROBLEM — Z86.73 HISTORY OF CVA IN ADULTHOOD: Status: ACTIVE | Noted: 2023-06-15

## 2023-06-15 PROBLEM — I63.411 ACUTE CEREBROVASCULAR ACCIDENT (CVA) DUE TO EMBOLISM OF RIGHT MIDDLE CEREBRAL ARTERY: Status: RESOLVED | Noted: 2023-05-15 | Resolved: 2023-06-15

## 2023-06-15 PROBLEM — G92.9 ENCEPHALOPATHY, TOXIC: Status: RESOLVED | Noted: 2023-06-12 | Resolved: 2023-06-15

## 2023-06-15 PROBLEM — R19.5 LOOSE BOWEL MOVEMENTS: Status: ACTIVE | Noted: 2023-06-15

## 2023-06-15 LAB
ALBUMIN SERPL BCP-MCNC: 2.7 G/DL (ref 3.5–5.2)
ALP SERPL-CCNC: 47 U/L (ref 55–135)
ALT SERPL W/O P-5'-P-CCNC: 55 U/L (ref 10–44)
ANION GAP SERPL CALC-SCNC: 9 MMOL/L (ref 8–16)
AST SERPL-CCNC: 32 U/L (ref 10–40)
BASOPHILS # BLD AUTO: 0 K/UL (ref 0–0.2)
BASOPHILS NFR BLD: 0 % (ref 0–1.9)
BILIRUB SERPL-MCNC: 1.2 MG/DL (ref 0.1–1)
BUN SERPL-MCNC: 47 MG/DL (ref 8–23)
C DIFF GDH STL QL: NEGATIVE
C DIFF TOX A+B STL QL IA: NEGATIVE
CALCIUM SERPL-MCNC: 8.6 MG/DL (ref 8.7–10.5)
CHLORIDE SERPL-SCNC: 94 MMOL/L (ref 95–110)
CO2 SERPL-SCNC: 36 MMOL/L (ref 23–29)
CREAT SERPL-MCNC: 0.6 MG/DL (ref 0.5–1.4)
DIFFERENTIAL METHOD: ABNORMAL
EOSINOPHIL # BLD AUTO: 0 K/UL (ref 0–0.5)
EOSINOPHIL NFR BLD: 0 % (ref 0–8)
ERYTHROCYTE [DISTWIDTH] IN BLOOD BY AUTOMATED COUNT: 14.5 % (ref 11.5–14.5)
EST. GFR  (NO RACE VARIABLE): >60 ML/MIN/1.73 M^2
GLUCOSE SERPL-MCNC: 122 MG/DL (ref 70–110)
HCT VFR BLD AUTO: 41.4 % (ref 40–54)
HGB BLD-MCNC: 14 G/DL (ref 14–18)
IMM GRANULOCYTES # BLD AUTO: 0.04 K/UL (ref 0–0.04)
IMM GRANULOCYTES NFR BLD AUTO: 0.4 % (ref 0–0.5)
LACTATE SERPL-SCNC: 1.9 MMOL/L (ref 0.5–1.9)
LYMPHOCYTES # BLD AUTO: 1.2 K/UL (ref 1–4.8)
LYMPHOCYTES NFR BLD: 11.4 % (ref 18–48)
MAGNESIUM SERPL-MCNC: 2.4 MG/DL (ref 1.6–2.6)
MCH RBC QN AUTO: 31.4 PG (ref 27–31)
MCHC RBC AUTO-ENTMCNC: 33.8 G/DL (ref 32–36)
MCV RBC AUTO: 93 FL (ref 82–98)
MONOCYTES # BLD AUTO: 1 K/UL (ref 0.3–1)
MONOCYTES NFR BLD: 9.9 % (ref 4–15)
NEUTROPHILS # BLD AUTO: 8 K/UL (ref 1.8–7.7)
NEUTROPHILS NFR BLD: 78.3 % (ref 38–73)
NRBC BLD-RTO: 0 /100 WBC
PHOSPHATE SERPL-MCNC: 4 MG/DL (ref 2.7–4.5)
PLATELET # BLD AUTO: 170 K/UL (ref 150–450)
PMV BLD AUTO: 10.6 FL (ref 9.2–12.9)
POTASSIUM SERPL-SCNC: 3 MMOL/L (ref 3.5–5.1)
PROCALCITONIN SERPL IA-MCNC: <0.05 NG/ML (ref 0–0.5)
PROT SERPL-MCNC: 5.6 G/DL (ref 6–8.4)
RBC # BLD AUTO: 4.46 M/UL (ref 4.6–6.2)
SODIUM SERPL-SCNC: 139 MMOL/L (ref 136–145)
WBC # BLD AUTO: 10.22 K/UL (ref 3.9–12.7)

## 2023-06-15 PROCEDURE — 63600175 PHARM REV CODE 636 W HCPCS: Performed by: INTERNAL MEDICINE

## 2023-06-15 PROCEDURE — 84145 PROCALCITONIN (PCT): CPT | Performed by: STUDENT IN AN ORGANIZED HEALTH CARE EDUCATION/TRAINING PROGRAM

## 2023-06-15 PROCEDURE — 25000003 PHARM REV CODE 250: Performed by: INTERNAL MEDICINE

## 2023-06-15 PROCEDURE — 94761 N-INVAS EAR/PLS OXIMETRY MLT: CPT

## 2023-06-15 PROCEDURE — 99900031 HC PATIENT EDUCATION (STAT)

## 2023-06-15 PROCEDURE — 84100 ASSAY OF PHOSPHORUS: CPT | Performed by: INTERNAL MEDICINE

## 2023-06-15 PROCEDURE — 97162 PT EVAL MOD COMPLEX 30 MIN: CPT

## 2023-06-15 PROCEDURE — 99232 SBSQ HOSP IP/OBS MODERATE 35: CPT | Mod: ,,, | Performed by: INTERNAL MEDICINE

## 2023-06-15 PROCEDURE — 25000242 PHARM REV CODE 250 ALT 637 W/ HCPCS: Performed by: INTERNAL MEDICINE

## 2023-06-15 PROCEDURE — 12000002 HC ACUTE/MED SURGE SEMI-PRIVATE ROOM

## 2023-06-15 PROCEDURE — 85025 COMPLETE CBC W/AUTO DIFF WBC: CPT | Performed by: INTERNAL MEDICINE

## 2023-06-15 PROCEDURE — 36415 COLL VENOUS BLD VENIPUNCTURE: CPT | Performed by: STUDENT IN AN ORGANIZED HEALTH CARE EDUCATION/TRAINING PROGRAM

## 2023-06-15 PROCEDURE — 25000003 PHARM REV CODE 250

## 2023-06-15 PROCEDURE — 83735 ASSAY OF MAGNESIUM: CPT | Performed by: INTERNAL MEDICINE

## 2023-06-15 PROCEDURE — 97530 THERAPEUTIC ACTIVITIES: CPT

## 2023-06-15 PROCEDURE — 99232 PR SUBSEQUENT HOSPITAL CARE,LEVL II: ICD-10-PCS | Mod: ,,, | Performed by: INTERNAL MEDICINE

## 2023-06-15 PROCEDURE — 63600175 PHARM REV CODE 636 W HCPCS: Performed by: STUDENT IN AN ORGANIZED HEALTH CARE EDUCATION/TRAINING PROGRAM

## 2023-06-15 PROCEDURE — 36415 COLL VENOUS BLD VENIPUNCTURE: CPT | Performed by: INTERNAL MEDICINE

## 2023-06-15 PROCEDURE — 94640 AIRWAY INHALATION TREATMENT: CPT

## 2023-06-15 PROCEDURE — A4216 STERILE WATER/SALINE, 10 ML: HCPCS | Performed by: INTERNAL MEDICINE

## 2023-06-15 PROCEDURE — 80053 COMPREHEN METABOLIC PANEL: CPT | Performed by: INTERNAL MEDICINE

## 2023-06-15 PROCEDURE — 83605 ASSAY OF LACTIC ACID: CPT | Performed by: INTERNAL MEDICINE

## 2023-06-15 PROCEDURE — 25000003 PHARM REV CODE 250: Performed by: STUDENT IN AN ORGANIZED HEALTH CARE EDUCATION/TRAINING PROGRAM

## 2023-06-15 PROCEDURE — 99900035 HC TECH TIME PER 15 MIN (STAT)

## 2023-06-15 PROCEDURE — 87449 NOS EACH ORGANISM AG IA: CPT | Performed by: STUDENT IN AN ORGANIZED HEALTH CARE EDUCATION/TRAINING PROGRAM

## 2023-06-15 RX ORDER — SODIUM,POTASSIUM PHOSPHATES 280-250MG
2 POWDER IN PACKET (EA) ORAL
Status: DISCONTINUED | OUTPATIENT
Start: 2023-06-15 | End: 2023-06-16 | Stop reason: HOSPADM

## 2023-06-15 RX ORDER — METOPROLOL TARTRATE 25 MG/1
12.5 TABLET ORAL 2 TIMES DAILY
Status: DISCONTINUED | OUTPATIENT
Start: 2023-06-15 | End: 2023-06-16 | Stop reason: HOSPADM

## 2023-06-15 RX ORDER — MUPIROCIN 20 MG/G
OINTMENT TOPICAL 2 TIMES DAILY
Status: DISCONTINUED | OUTPATIENT
Start: 2023-06-15 | End: 2023-06-16 | Stop reason: HOSPADM

## 2023-06-15 RX ORDER — TORSEMIDE 20 MG/1
40 TABLET ORAL DAILY
Status: DISCONTINUED | OUTPATIENT
Start: 2023-06-15 | End: 2023-06-16 | Stop reason: HOSPADM

## 2023-06-15 RX ORDER — LOPERAMIDE HYDROCHLORIDE 2 MG/1
2 CAPSULE ORAL 4 TIMES DAILY PRN
Status: DISCONTINUED | OUTPATIENT
Start: 2023-06-15 | End: 2023-06-16 | Stop reason: HOSPADM

## 2023-06-15 RX ORDER — DEXAMETHASONE SODIUM PHOSPHATE 4 MG/ML
4 INJECTION, SOLUTION INTRA-ARTICULAR; INTRALESIONAL; INTRAMUSCULAR; INTRAVENOUS; SOFT TISSUE EVERY 12 HOURS
Status: DISCONTINUED | OUTPATIENT
Start: 2023-06-15 | End: 2023-06-16 | Stop reason: HOSPADM

## 2023-06-15 RX ORDER — CLONAZEPAM 0.5 MG/1
0.5 TABLET ORAL NIGHTLY PRN
Status: DISCONTINUED | OUTPATIENT
Start: 2023-06-15 | End: 2023-06-16 | Stop reason: HOSPADM

## 2023-06-15 RX ORDER — CHLORHEXIDINE GLUCONATE ORAL RINSE 1.2 MG/ML
15 SOLUTION DENTAL 2 TIMES DAILY
Status: DISCONTINUED | OUTPATIENT
Start: 2023-06-15 | End: 2023-06-16 | Stop reason: HOSPADM

## 2023-06-15 RX ORDER — LANOLIN ALCOHOL/MO/W.PET/CERES
800 CREAM (GRAM) TOPICAL
Status: DISCONTINUED | OUTPATIENT
Start: 2023-06-15 | End: 2023-06-16 | Stop reason: HOSPADM

## 2023-06-15 RX ADMIN — CLONAZEPAM 0.5 MG: 0.5 TABLET ORAL at 08:06

## 2023-06-15 RX ADMIN — CHLORHEXIDINE GLUCONATE 15 ML: 1.2 RINSE ORAL at 08:06

## 2023-06-15 RX ADMIN — SODIUM CHLORIDE, PRESERVATIVE FREE 10 ML: 5 INJECTION INTRAVENOUS at 09:06

## 2023-06-15 RX ADMIN — MUPIROCIN 1 G: 20 OINTMENT TOPICAL at 09:06

## 2023-06-15 RX ADMIN — VANCOMYCIN HYDROCHLORIDE 1500 MG: 1.5 INJECTION, POWDER, LYOPHILIZED, FOR SOLUTION INTRAVENOUS at 01:06

## 2023-06-15 RX ADMIN — IPRATROPIUM BROMIDE AND ALBUTEROL SULFATE 3 ML: 2.5; .5 SOLUTION RESPIRATORY (INHALATION) at 07:06

## 2023-06-15 RX ADMIN — TIMOLOL MALEATE 1 DROP: 2.5 SOLUTION OPHTHALMIC at 09:06

## 2023-06-15 RX ADMIN — MUPIROCIN 1 G: 20 OINTMENT TOPICAL at 08:06

## 2023-06-15 RX ADMIN — POTASSIUM BICARBONATE 60 MEQ: 782 TABLET, EFFERVESCENT ORAL at 12:06

## 2023-06-15 RX ADMIN — IPRATROPIUM BROMIDE AND ALBUTEROL SULFATE 3 ML: 2.5; .5 SOLUTION RESPIRATORY (INHALATION) at 01:06

## 2023-06-15 RX ADMIN — LEVETIRACETAM 750 MG: 250 TABLET, FILM COATED ORAL at 09:06

## 2023-06-15 RX ADMIN — CEFEPIME 1 G: 1 INJECTION, POWDER, FOR SOLUTION INTRAMUSCULAR; INTRAVENOUS at 09:06

## 2023-06-15 RX ADMIN — LEVETIRACETAM 750 MG: 250 TABLET, FILM COATED ORAL at 08:06

## 2023-06-15 RX ADMIN — SODIUM CHLORIDE, PRESERVATIVE FREE 10 ML: 5 INJECTION INTRAVENOUS at 08:06

## 2023-06-15 RX ADMIN — LACOSAMIDE 200 MG: 50 TABLET, FILM COATED ORAL at 09:06

## 2023-06-15 RX ADMIN — DIGOXIN 0.12 MG: 125 TABLET ORAL at 09:06

## 2023-06-15 RX ADMIN — ALLOPURINOL 100 MG: 100 TABLET ORAL at 09:06

## 2023-06-15 RX ADMIN — CHLORHEXIDINE GLUCONATE 15 ML: 1.2 RINSE ORAL at 09:06

## 2023-06-15 RX ADMIN — DEXAMETHASONE SODIUM PHOSPHATE 4 MG: 4 INJECTION, SOLUTION INTRA-ARTICULAR; INTRALESIONAL; INTRAMUSCULAR; INTRAVENOUS; SOFT TISSUE at 11:06

## 2023-06-15 RX ADMIN — LOSARTAN POTASSIUM 50 MG: 50 TABLET, FILM COATED ORAL at 09:06

## 2023-06-15 RX ADMIN — RIVAROXABAN 20 MG: 20 TABLET, FILM COATED ORAL at 04:06

## 2023-06-15 RX ADMIN — LOSARTAN POTASSIUM 50 MG: 50 TABLET, FILM COATED ORAL at 08:06

## 2023-06-15 RX ADMIN — ATORVASTATIN CALCIUM 20 MG: 20 TABLET, FILM COATED ORAL at 08:06

## 2023-06-15 RX ADMIN — HYDRALAZINE HYDROCHLORIDE 10 MG: 20 INJECTION, SOLUTION INTRAMUSCULAR; INTRAVENOUS at 07:06

## 2023-06-15 RX ADMIN — FLUOXETINE 20 MG: 20 CAPSULE ORAL at 09:06

## 2023-06-15 RX ADMIN — POTASSIUM BICARBONATE 60 MEQ: 782 TABLET, EFFERVESCENT ORAL at 05:06

## 2023-06-15 RX ADMIN — TORSEMIDE 40 MG: 20 TABLET ORAL at 04:06

## 2023-06-15 RX ADMIN — CEFEPIME 1 G: 1 INJECTION, POWDER, FOR SOLUTION INTRAMUSCULAR; INTRAVENOUS at 05:06

## 2023-06-15 RX ADMIN — LACOSAMIDE 200 MG: 50 TABLET, FILM COATED ORAL at 08:06

## 2023-06-15 RX ADMIN — BRIMONIDINE TARTRATE 1 DROP: 1.5 SOLUTION/ DROPS OPHTHALMIC at 09:06

## 2023-06-15 RX ADMIN — METOPROLOL TARTRATE 12.5 MG: 25 TABLET, FILM COATED ORAL at 08:06

## 2023-06-15 RX ADMIN — DEXAMETHASONE SODIUM PHOSPHATE 4 MG: 4 INJECTION, SOLUTION INTRA-ARTICULAR; INTRALESIONAL; INTRAMUSCULAR; INTRAVENOUS; SOFT TISSUE at 08:06

## 2023-06-15 NOTE — NURSING
Lactic 3.2. Notifed Cesilia BARTLETT.  Verbal orders to not redraw new lactic in Q4. Will redraw with AM labs.

## 2023-06-15 NOTE — PROGRESS NOTES
Pulmonary/Critical Care Progress Note      PATIENT NAME: Teto Mendez  MRN: [id number]  TODAY'S DATE: 06/15/2023  ADMIT DATE: 2023  AGE: 67 y.o. : 1955    CONSULT REQUESTED BY: Kike Perry MD    REASON FOR CONSULT:   Chronic respiratory failure  Sepsis  Encephalopathy    HISTORY OF PRESENT ILLNESS   Teto Mendez is a 67 y.o. male with a PMH of glioblastoma s/p R craniectomy, XRT, and chemo (in [place]), s/p trach/PEG (23), trach decannulation (23), seizures, Afib on apixaban, HFrEF, and Pseudomonas lower respiratory infection on whom we have been consulted for chronic hypoxic respiratory failure, sepsis, and acute on chronic encephalopathy.    He has had prior pan-sensitive PsA lower respiratory tract ifection and Klebsiella UTI sensitive to all but nitrofurantoin. He was havingh apneic episodes per RT earlier today, so he was put on BiPAP with a backup rate.    6/15/23: Mentation at baseline, alert and oriented. Copious watery diarrhea. Patient wishes  to continue treatment but wants to discuss the possibility of comfort care/Hospice with his daughter.      REVIEW OF SYSTEMS  GENERAL: Feeling well. No fevers, chills, or night sweats.  EYES: Vision is good.  ENT: No sinusitis or pharyngitis.   HEART: No chest pain or palpitations.  LUNGS: No cough, sputum, or wheezing.  GI: No abdominal pain, nausea, vomiting, or diarrhea.  : No dysuria, urgency, or frequency.  SKIN: No lesions or rashes.  MUSCULOSKELETAL: No joint pain or myalgias.  NEURO: No headaches or neuropathy.  LYMPH: No edema or adenopathy.  PSYCH: No anxiety or depression.  ENDO: No unexpected weight change.    ALLERGIES  Review of patient's allergies indicates:  No Known Allergies    INPATIENT SCHEDULED MEDICATIONS   albuterol-ipratropium  3 mL Nebulization Q6H    allopurinoL  100 mg Per G Tube Daily    atorvastatin  20 mg Per G Tube QHS    brimonidine 0.15 % OPTH DROP  1 drop Both Eyes BID    ceFEPime (MAXIPIME) IVPB  1 g  Intravenous Q8H    chlorhexidine  15 mL Mouth/Throat BID    digoxin  0.125 mg Per G Tube Daily    FLUoxetine  20 mg Per G Tube Daily    lacosamide  200 mg Per G Tube Q12H    lactated ringers  30 mL/kg Intravenous Once    levETIRAcetam  750 mg Oral BID    losartan  50 mg Per G Tube BID    mupirocin   Nasal BID    sodium chloride 0.9%  10 mL Intravenous Q12H    timolol maleate 0.25%  1 drop Both Eyes BID         MEDICAL AND SURGICAL HISTORY  Past Medical History:   Diagnosis Date    GERD (gastroesophageal reflux disease)     Glioblastoma     Gout, unspecified     H/O blood clots     HFrEF (heart failure with reduced ejection fraction)     Hypertension     Paroxysmal atrial fibrillation     Seizures     Splenic infarct     Ventilator associated pneumonia 5/29/2023    Ventilator dependence 5/29/2023     Past Surgical History:   Procedure Laterality Date    CHOLECYSTECTOMY N/A     CRANIOTOMY FOR EXCISION OF INTRACRANIAL TUMOR      INSERTION, PEG TUBE N/A 05/24/2023    Procedure: INSERTION, PEG TUBE;  Surgeon: Kapil Hickey III, MD;  Location: [facility] OR;  Service: General;  Laterality: N/A;    KNEE ARTHROSCOPY Right     PROSTATECTOMY N/A     TRACHEOSTOMY N/A 05/24/2023    Procedure: CREATION, TRACHEOSTOMY;  Surgeon: Kapil Hickey III, MD;  Location: [facility] OR;  Service: General;  Laterality: N/A;       ALCOHOL, TOBACCO AND DRUG USE  Social History     Tobacco Use   Smoking Status Never   Smokeless Tobacco Never     Social History     Substance and Sexual Activity   Alcohol Use Not Currently     Social History     Substance and Sexual Activity   Drug Use Never       FAMILY HISTORY  Family History   Problem Relation Age of Onset    Diabetes Mother     Transient ischemic attack Mother     Prostate cancer Father     Bone cancer Father     Crohn's disease Brother        VITAL SIGNS (MOST RECENT)  Temp: 98 °F (36.7 °C) (06/15/23 0730)  Pulse: 74 (06/15/23 0901)  Resp: 15 (06/15/23 0901)  BP: (!) 145/73 (06/15/23  0916)  SpO2: 97 % (06/15/23 0901)    INTAKE AND OUTPUT (LAST 24 HOURS):  Intake/Output Summary (Last 24 hours) at 6/15/2023 1033  Last data filed at 6/15/2023 0601  Gross per 24 hour   Intake 830.1 ml   Output 950 ml   Net -119.9 ml       WEIGHT  Wt Readings from Last 1 Encounters:   06/14/23 91 kg (200 lb 9.9 oz)       PHYSICAL EXAM  GENERAL: A&O. NAD.  HEENT: Extraocular movements intact. Pharynx moist.  NECK: Supple. No JVD or hepatojugular reflux.  HEART: Regular rate and normal rhythm. No murmur or gallop auscultated.  LUNGS: Clear to auscultation and percussion. Lung excursion symmetrical.   ABDOMEN: Soft, non-tender, non-distended, no masses palpated.  EXTREMITIES: Normal muscle tone and joint movement, no cyanosis or clubbing.   LYMPHATICS: No adenopathy palpated, no edema.  SKIN: Dry, intact, no lesions.   NEURO: No acute deficit.  PSYCH: Appropriate affect    ACUTE PHASE REACTANT (LAST 24 HOURS)  No results for input(s): FERRITIN, CRP, LDH, DDIMER in the last 24 hours.    CBC LAST (LAST 24 HOURS)  Recent Labs   Lab 06/15/23  0325   WBC 10.22   RBC 4.46*   HGB 14.0   HCT 41.4   MCV 93   MCH 31.4*   MCHC 33.8   RDW 14.5      MPV 10.6   GRAN 78.3*  8.0*   LYMPH 11.4*  1.2   MONO 9.9  1.0   BASO 0.00   NRBC 0       CHEMISTRY LAST (LAST 24 HOURS)  Recent Labs   Lab 06/15/23  0325      K 3.0*   CL 94*   CO2 36*   ANIONGAP 9   BUN 47*   CREATININE 0.6   *   CALCIUM 8.6*   MG 2.4   ALBUMIN 2.7*   PROT 5.6*   ALKPHOS 47*   ALT 55*   AST 32   BILITOT 1.2*       COAGULATION LAST (LAST 24 HOURS)  No results for input(s): LABPT, INR, APTT in the last 24 hours.    CARDIAC PROFILE (LAST 24 HOURS)  Recent Labs   Lab 06/14/23  0424   BNP 73       LAST 7 DAYS MICROBIOLOGY   Microbiology Results (last 7 days)       Procedure Component Value Units Date/Time    Culture, Respiratory with Gram Stain [[id number]] Collected: 06/14/23 0429    Order Status: Completed Specimen: Respiratory from Tracheal Aspirate  Updated: 06/15/23 0832     Respiratory Culture Insufficient incubation, culture in progress     Gram Stain (Respiratory) <10 epithelial cells per low power field.     Gram Stain (Respiratory) Few WBC's     Gram Stain (Respiratory) Few Gram negative rods     Gram Stain (Respiratory) Rare Gram positive cocci    Blood culture x two cultures. Draw prior to antibiotics. [[id number]] Collected: 06/14/23 0424    Order Status: Completed Specimen: Blood from Peripheral, Wrist, Left Updated: 06/15/23 0632     Blood Culture, Routine No Growth to date      No Growth to date    Narrative:      Aerobic and anaerobic    MRSA Screen by PCR [[id number]] Collected: 06/14/23 1338    Order Status: Completed Specimen: Nasopharyngeal Swab from Nasal Updated: 06/14/23 1510     MRSA SCREEN BY PCR Negative    Blood culture x two cultures. Draw prior to antibiotics. [[id number]] Collected: 06/14/23 0449    Order Status: Sent Specimen: Blood from Midline, Basilic, Left Updated: 06/14/23 0450            MOST RECENT IMAGING  X-Ray Chest AP Portable  Narrative: EXAMINATION:  XR CHEST AP PORTABLE    CLINICAL HISTORY:  Sepsis;    FINDINGS:  Portable chest at 425 compared with 06/12/2023 shows unchanged cardiomediastinal silhouette.    Medial right basilar alveolar opacity is minimal and new.  Lateral left mid lung opacities are unchanged.  Lung volumes remain low.  No pneumothorax or pleural effusion.  Pulmonary vasculature is normal.    No acute osseous abnormality.  Right upper quadrant surgical clips suggest cholecystectomy.  Impression: New medial right basilar opacities suggesting atelectasis or consolidation.    Electronically signed by: Compa Lopez MD  Date:    06/14/2023  Time:    07:10      CURRENT VISIT EKG  Results for orders placed or performed during the hospital encounter of 06/14/23   EKG 12-lead    Narrative    Test Reason : R06.02,    Vent. Rate : 077 BPM     Atrial Rate : 326 BPM     P-R Int : 000 ms          QRS Dur : 106 ms       QT Int : 378 ms       P-R-T Axes : 000 -35 239 degrees     QTc Int : 427 ms    Atrial fibrillation with a competing junctional pacemaker  Left axis deviation  ST and T wave abnormality, consider anterolateral ischemia  Abnormal ECG  When compared with ECG of 06-JUN-2023 11:26,  No significant change was found    Referred By: PADMINI   SELF           Confirmed By:        ECHOCARDIOGRAM RESULTS  Results for orders placed during the hospital encounter of 05/31/23    Echo    Interpretation Summary  · Atrial fibrillation observed.  · The estimated ejection fraction is 40-45%.  · Normal right ventricular size with normal right ventricular systolic function.  · Severe left atrial enlargement.        RESPIRATORY SUPPORT              LAST ARTERIAL BLOOD GAS  ABG  Recent Labs   Lab 06/14/23  0810   PH 7.547*   PO2 193*   PCO2 46.2*   HCO3 40.2*   BE 18     CXR 6/14/23: low lung volumes, cardiomegaly, hilar fullness.    IMPRESSION AND PLAN  Teto Mendez is a 67 y.o. male with a PMH of glioblastoma s/p R craniectomy, XRT, and chemo (in [place]), s/p trach/PEG (5/24/23), trach decannulation (6/6/23), seizures, Afib on apixaban, HFrEF, and Pseudomonas lower respiratory infection on whom we have been consulted for chronic hypoxic respiratory failure, sepsis, and acute on chronic encephalopathy.    #Sepsis with possible pulmonary source   #Mild lactic acidosis, resolved after fluids  #Chronic respiratory failure  #Tracheostomy secretions   #Likely central apnea  BNP normal.  Afebrile.  UA normal.  MRSA nares (-).  - procal added to AM labs  - f/u tracheal aspirate culture: NGTD  - f/u blood cultures: NGTD  - empiric cefepime pending cultures, procal    #Chronic encephalopathy  #History of glioblastoma multiforme  - appreciate neurology assistance  - restart home dexamethasone 4 mg BID    #Afib  - resumed home Xarelto    Dispo: Transfer to med/surg.    Pulmonary & Critical Care Medicine will sign off at this time.   Please call  with any further questions or concerns.    Discussed with RN, hospitalist. I have reviewed the patient's most recent CBC and serum chemistry, as well as the most recent chest x-ray and/or chest CT. My interpretation of the chest imaging is as above. The patient is at high risk of morbidity from the following test or treatment: antibiotic adminstration, and I have ordered procalcitonin to monitor it.    Ariel Moscoso MD  Formerly [facility] / [facility]  Department of Pulmonology  Date of Service: 06/15/2023  11:08 AM

## 2023-06-15 NOTE — PROGRESS NOTES
[facility] Medicine  Progress Note    Patient Name: Teto Mendez  MRN: [id number]  Patient Class: IP- Inpatient   Admission Date: 6/14/2023  Length of Stay: 1 days  Attending Physician: Kike Perry MD  Primary Care Provider: Primary Doctor No        Subjective:     Principal Problem:Acute on chronic respiratory failure with hypoxia        HPI:  ED Note  Teto Mendez is a 67 y.o. male with a past medical history of AFib, hypertension, heart failure with reduced ejection fracture, glioblastoma status post resection, seizure, CVA, status post trach dependent with recent trach decannulation presents from nursing home for increased airway secretion and sleep apnea.  At baseline patient uses 2 L nasal cannula.    Dr Wilkinson  6/12:  Agitation continues at night.  Discussed with daughter and family at bedside.  Will continue Keppra and Vimpat at current doses.  Will increase Decadron and monitor for positive response.  Adding Klonopin at night with breakthrough dosing which she has responded positively to in the past.  Previously had been followed by his oncologist with monthly MRIs for glioblastoma when he was in [place].  CT of the head yesterday shows no changes from previous.    6/14/2023  Mr Lyles is lethargic and has a saturation of 76%. He has been placed on BIPAP with O 2 saturation > 92%  Case discussed with Dr Milner who agrees with BIPAP and has cared for this patient              Overview/Hospital Course:  Admitted from LTAC due to lethargy and hypoxia initially placed on bipap but later refused this. Saturated well on his baseline 2L NC O2 requirement. CXR suspicious for possible new RLL infiltrate. Sputum culture taken. Started on broad spectrum antibiotics. MRSA swab negative so vancomycin discontinued and cefepime continued. Procalcitonin was low. Hypokalemia replaced. He had loose stools but C diff testing was negative antidiarrheals added prn.      Interval History:  Patient seen and examined. Refused bipap overnight. Denies SOB. Admits cough. Wants to talk to his daughter about goals of care. Multiple loose stools today. Cdiff is negative.      Objective:     Vital Signs (Most Recent):  Temp: 97.6 °F (36.4 °C) (06/15/23 1355)  Pulse: 82 (06/15/23 1355)  Resp: 18 (06/15/23 1355)  BP: 116/83 (06/15/23 1355)  SpO2: 95 % (06/15/23 1355) Vital Signs (24h Range):  Temp:  [97.5 °F (36.4 °C)-98 °F (36.7 °C)] 97.6 °F (36.4 °C)  Pulse:  [] 82  Resp:  [11-35] 18  SpO2:  [90 %-100 %] 95 %  BP: (115-174)/() 116/83     Weight: 91 kg (200 lb 9.9 oz)  Body mass index is 29.63 kg/m².    Intake/Output Summary (Last 24 hours) at 6/15/2023 1439  Last data filed at 6/15/2023 1333  Gross per 24 hour   Intake 850.1 ml   Output 1550 ml   Net -699.9 ml         Physical Exam  Vitals reviewed.   Constitutional:       General: He is not in acute distress.  HENT:      Head: Normocephalic and atraumatic.   Neck:      Comments: Trach site  Cardiovascular:      Rate and Rhythm: Normal rate and regular rhythm.      Heart sounds: Normal heart sounds.   Pulmonary:      Effort: Pulmonary effort is normal. No respiratory distress.      Breath sounds: Rales (mild, bibasilar) present.   Abdominal:      Comments: PEG noted   Skin:     General: Skin is warm and dry.   Neurological:      Mental Status: He is alert and oriented to person, place, and time. Mental status is at baseline.      Motor: Weakness present.      Comments: L facial droop  Dysarthria    Psychiatric:         Mood and Affect: Affect normal.         Behavior: Behavior normal.           Significant Labs: All pertinent labs within the past 24 hours have been reviewed.    Significant Imaging: I have reviewed all pertinent imaging results/findings within the past 24 hours.      Assessment/Plan:      * Acute on chronic respiratory failure with hypoxia  Patient with Hypoxic Respiratory failure which is Acute on chronic.  he is on home oxygen at  2 LPM. Supplemental oxygen was provided and noted-      Signs/symptoms of respiratory failure include- tachypnea, increased work of breathing and respiratory distress. Contributing diagnoses includes - Pneumonia. Labs and images were reviewed. Patient Has recent ABG, which has been reviewed. Will treat underlying causes and adjust management of respiratory failure as follows:    - refused bipap  - doing well on home 2L NC O2 now  - treat possible pneumonia  - continue nebs  - if stable can likely go back to LTAC tomorrow 6/16    Loose bowel movements  Possibly antibiotic-induced  Cdiff negative  - loperamide prn    History of CVA in adulthood  Noted hx  - continue statin, xarelto, BP control    Seizure disorder  Chronic, controlled  Neuro saw and did not recommended further neuro workup  - continue vimpat and keppra  - f/u levels    Pneumonia of right lower lobe due to infectious organism  Chronic LLL infiltrate  RLL infiltrate new  Vancomycin dc'd with negative MRSA swab  Procal noted to be low  - continue cefepime  - f/u sputum culture    Acute metabolic encephalopathy  He is appropriate on my exam  Neuro saw and did not recommended further neuro workup    PEG (percutaneous endoscopic gastrostomy) status  Noted hx  - care per nursing    HFrEF (heart failure with reduced ejection fraction)  Chronic, stable  Last EF 40%  - continue losartan, lopressor, torsemide  - monitor fluid status    Glioblastoma  Noted hx s/p resection and XRT and chemo  - continue decadron    Paroxysmal atrial fibrillation  Patient with Persistent (7 days or more) atrial fibrillation which is controlled currently with Beta Blocker and Digoxin. Patient is currently in sinus rhythm.[id number] Score: 2.  Anticoagulation indicated. Anticoagulation done with xarelto.    Essential hypertension  Chronic, controlled  - continue home lopressor and losartan    VTE Risk Mitigation (From admission, onward)         Ordered     rivaroxaban tablet 20 mg   With dinner         06/15/23 1106                Discharge Planning   BARBARA:  6/16    Code Status: Full Code   Is the patient medically ready for discharge?:     Reason for patient still in hospital (select all that apply): Patient trending condition  Discharge Plan A: Long-term acute care facility (LTAC)                  Kike Perry MD  Department of Hospital Medicine   [facility]

## 2023-06-15 NOTE — NURSING
22G IV in left hand removed, IV was leaking.  Catheter intact, pt tolerated well.  Midline in left upper arm intact and flushes well.

## 2023-06-15 NOTE — PROGRESS NOTES
Pharmacy Consult Discontinuation: Vancomycin    Teto Mendez [id number] is a 67 y.o. male was consulted for vancomycin pharmacotherapy management by pharmacy.    Pharmacy consult for vancomycin dosing is no longer required.  Vancomycin was discontinued 06/15/2023.    Thank you for allowing us to participate in this patient's care. Should you have any questions or concerns please feel free to contact the pharmacy department at [phone].    Hugh GrajedaD

## 2023-06-15 NOTE — PLAN OF CARE
"No acute events overnight. Neuro unchanged. Remains in A Fib. Pt refused Bipap overnight.   Slept on/off throughout night. Woke up this AM insisting on talking to daughter about him "not wanting to live like this anymore". Pt stated he "wanted to remove everything off of him because he is done". I stated to patient that daughter will come this AM and he can proceed further with that conversation.         Problem: Adult Inpatient Plan of Care  Goal: Plan of Care Review  Outcome: Ongoing, Progressing  Goal: Optimal Comfort and Wellbeing  Outcome: Ongoing, Progressing     Problem: Fall Injury Risk  Goal: Absence of Fall and Fall-Related Injury  Outcome: Ongoing, Progressing     "

## 2023-06-15 NOTE — PLAN OF CARE
06/14/23 1932   Patient Assessment/Suction   Level of Consciousness (AVPU) alert   Respiratory Effort Normal;Unlabored   All Lung Fields Breath Sounds diminished   PRE-TX-O2   Device (Oxygen Therapy) nasal cannula   $ Is the patient on Low Flow Oxygen? Yes   Flow (L/min) 2   SpO2 97 %   Pulse Oximetry Type Continuous   $ Pulse Oximetry - Multiple Charge Pulse Oximetry - Multiple   Pulse 64   Resp 19   Aerosol Therapy   $ Aerosol Therapy Charges Aerosol Treatment   Daily Review of Necessity (SVN) completed   Respiratory Treatment Status (SVN) given   Treatment Route (SVN) mask   Patient Position (SVN) semi-Verma's   Post Treatment Assessment (SVN) breath sounds improved   Signs of Intolerance (SVN) none   Breath Sounds Post-Respiratory Treatment   Throughout All Fields Post-Treatment All Fields   Throughout All Fields Post-Treatment aeration increased   Post-treatment Heart Rate (beats/min) 70   Post-treatment Resp Rate (breaths/min) 19

## 2023-06-15 NOTE — CARE UPDATE
06/15/23 0754   Patient Assessment/Suction   Level of Consciousness (AVPU) alert   Respiratory Effort Normal;Unlabored   Expansion/Accessory Muscles/Retractions no use of accessory muscles;no retractions;expansion symmetric   All Lung Fields Breath Sounds Lateral:;Anterior:;coarse;diminished   Rhythm/Pattern, Respiratory unlabored;pattern regular;depth regular   Skin Integrity   $ Wound Care Tech Time 15 min   Area Observed Left;Right;Behind ear;Cheek   Skin Appearance without discoloration   PRE-TX-O2   Device (Oxygen Therapy) room air   SpO2 100 %   Pulse Oximetry Type Continuous   $ Pulse Oximetry - Multiple Charge Pulse Oximetry - Multiple   Pulse 89   Resp 20   Aerosol Therapy   $ Aerosol Therapy Charges Aerosol Treatment   Daily Review of Necessity (SVN) completed   Respiratory Treatment Status (SVN) given   Treatment Route (SVN) mask   Patient Position (SVN) HOB elevated   Post Treatment Assessment (SVN) breath sounds unchanged   Signs of Intolerance (SVN) none   Education   $ Education Bronchodilator;15 min

## 2023-06-15 NOTE — ASSESSMENT & PLAN NOTE
Patient with Persistent (7 days or more) atrial fibrillation which is controlled currently with Beta Blocker and Digoxin. Patient is currently in sinus rhythm.[id number] Score: 2.  Anticoagulation indicated. Anticoagulation done with xarelto.

## 2023-06-15 NOTE — ASSESSMENT & PLAN NOTE
Patient with Hypoxic Respiratory failure which is Acute on chronic.  he is on home oxygen at 2 LPM. Supplemental oxygen was provided and noted-      Signs/symptoms of respiratory failure include- tachypnea, increased work of breathing and respiratory distress. Contributing diagnoses includes - Pneumonia. Labs and images were reviewed. Patient Has recent ABG, which has been reviewed. Will treat underlying causes and adjust management of respiratory failure as follows:    - refused bipap  - doing well on home 2L NC O2 now  - treat possible pneumonia  - continue nebs  - if stable can likely go back to LTAC tomorrow 6/16

## 2023-06-15 NOTE — CONSULTS
"Formerly [facility], 1928–1983  Adult Nutrition   Consult Note (Nutrition Support Management)    SUMMARY     Recommendations  Recommendation/Intervention:   1) If supportive care continues, start [employer] Peptide 1.5 d/t diarrhea on Vital 1.5, goal rate 55ml/hr to provide 2030kcal, 97gm Protein, 1218ml free fl. Add 1 pkt Banatrol daily to aid in diarrhea.   2) Water flushes: 30ml every 4 hours.   3) RD will monitor TF rate/tolerance, supportive care vs hospice, labs, weight.    Goals: 1) TF to be initiated, patient to tolerate. 2) Nutrition provision to meet at least 75% EEN/EPN.  Nutrition Goal Status: new  Communication of RD Recs: reviewed with RN    Dietitian Rounds Brief  Patient on chronic TF, on the vent and has trach. He has been having copious watery diarrhea and C-diff negative per MD's note. Patient and family discussing possibility of comfort care.     Diet order:   Current Diet Order: NPO     Evaluation of Received Nutrient/Fluid Intake  Energy Calories Required: not meeting needs  Protein Required: not meeting needs     % Intake of Estimated Energy Needs: 0%  % Meal Intake: NPO      Intake/Output Summary (Last 24 hours) at 6/15/2023 1543  Last data filed at 6/15/2023 1333  Gross per 24 hour   Intake 850.1 ml   Output 1550 ml   Net -699.9 ml        Anthropometrics  Temp: 97.6 °F (36.4 °C)  Height: 5' 9" (175.3 cm)  Height (inches): 69 in  Weight Method: Bed Scale  Weight: 91 kg (200 lb 9.9 oz)  Weight (lb): 200.62 lb  Ideal Body Weight (IBW), Male: 160 lb  % Ideal Body Weight, Male (lb): 125.39 %  BMI (Calculated): 29.6  BMI Grade: 25 - 29.9 - overweight       Estimated/Assessed Needs  Weight Used For Calorie Calculations: 91 kg (200 lb 9.9 oz)  Energy Calorie Requirements (kcal): 7688-1473 (20-25)  Energy Need Method: Kcal/kg  Protein Requirements: 109-137gm (1.2-1.5gm/kg)  Weight Used For Protein Calculations: 91 kg (200 lb 9.9 oz)     Estimated Fluid Requirement Method: RDA Method  RDA Method (mL): " 4250       Reason for Assessment  Reason For Assessment: consult  Diagnosis:  (Acute on chronic respiratory failure with hypoxia)  Relevant Medical History: glioblastoma s/p R craniectomy, XRT, and chemo (in [place]), s/p trach/PEG (5/24/23), trach decannulation (6/6/23), seizures, Afib on apixaban, HFrEF, and Pseudomonas    Nutrition/Diet History  Patient Reported Diet/Restrictions/Preferences: other (see comments) (enteral feeds)  Spiritual, Cultural Beliefs, Sikhism Practices, Values that Affect Care: no  Food Allergies: NKFA  Factors Affecting Nutritional Intake: NPO, on mechanical ventilation  Nutrition Support Formula Prior to Admit: Vital 1.5  Nutrition Support Rate Prior to Admit: 60 (ml)  Nutrtion Support Frequency Prior to Admit: ml/hr  Nutrition Support Provision Prior to Admit: 2160kcal, 97gm Protein, 1100ml free fl.    Nutrition Risk Screen  Nutrition Risk Screen: difficulty chewing/swallowing, tube feeding or parenteral nutrition     MST Score: 3  Have you recently lost weight without trying?: Unsure  Weight loss score: 2  Have you been eating poorly because of a decreased appetite?: Yes  Appetite score: 1       Weight History:  Wt Readings from Last 10 Encounters:   06/14/23 91 kg (200 lb 9.9 oz)   06/11/23 89 kg (196 lb 3.4 oz)   05/27/23 103.8 kg (228 lb 13.4 oz)   05/25/23 103.7 kg (228 lb 9.9 oz)   05/13/23 92.1 kg (203 lb)        Lab/Procedures/Meds: Pertinent Labs/Meds Reviewed    Medications:Pertinent Medications Reviewed  Scheduled Meds:   albuterol-ipratropium  3 mL Nebulization Q6H    allopurinoL  100 mg Per G Tube Daily    atorvastatin  20 mg Per G Tube QHS    brimonidine 0.15 % OPTH DROP  1 drop Both Eyes BID    ceFEPime (MAXIPIME) IVPB  1 g Intravenous Q8H    chlorhexidine  15 mL Mouth/Throat BID    dexAMETHasone  4 mg Intravenous Q12H    digoxin  0.125 mg Per G Tube Daily    FLUoxetine  20 mg Per G Tube Daily    lacosamide  200 mg Per G Tube Q12H    lactated ringers  30 mL/kg  Intravenous Once    levETIRAcetam  750 mg Oral BID    losartan  50 mg Per G Tube BID    metoprolol tartrate  12.5 mg Per G Tube BID    mupirocin   Nasal BID    rivaroxaban  20 mg Per G Tube Daily with dinner    sodium chloride 0.9%  10 mL Intravenous Q12H    timolol maleate 0.25%  1 drop Both Eyes BID    torsemide  40 mg Per G Tube Daily     Continuous Infusions:  PRN Meds:.acetaminophen, albuterol, clonazePAM, colchicine, hydrALAZINE, loperamide, magnesium oxide, magnesium oxide, potassium bicarbonate, potassium bicarbonate, potassium bicarbonate, potassium, sodium phosphates, potassium, sodium phosphates, potassium, sodium phosphates    Labs: Pertinent Labs Reviewed  Clinical Chemistry:  Recent Labs   Lab 06/14/23  0424 06/15/23  0325    139   K 3.6 3.0*   CL 94* 94*   CO2 36* 36*   * 122*   BUN 63* 47*   CREATININE 0.8 0.6   CALCIUM 8.8 8.6*   PROT 6.2 5.6*   ALBUMIN 3.0* 2.7*   BILITOT 0.9 1.2*   ALKPHOS 70 47*   AST 40 32   ALT 67* 55*   ANIONGAP 10 9   MG 2.3 2.4   PHOS 3.9 4.0     CBC:   Recent Labs   Lab 06/15/23  0325   WBC 10.22   RBC 4.46*   HGB 14.0   HCT 41.4      MCV 93   MCH 31.4*   MCHC 33.8     Cardiac Profile:  Recent Labs   Lab 06/14/23  0424   BNP 73     Inflammatory Labs:  Recent Labs   Lab 06/12/23  0532   CRP 3.0       Monitor and Evaluation  Food and Nutrient Intake: enteral nutrition intake  Food and Nutrient Adminstration: enteral and parenteral nutrition administration  Physical Activity and Function: nutrition-related ADLs and IADLs  Anthropometric Measurements: weight change  Biochemical Data, Medical Tests and Procedures: electrolyte and renal panel, gastrointestinal profile, glucose/endocrine profile, inflammatory profile, lipid profile  Nutrition-Focused Physical Findings: overall appearance     Nutrition Risk  Level of Risk/Frequency of Follow-up: high     Nutrition Follow-Up         Cheryle Ardon RD 06/15/2023 3:43 PM

## 2023-06-15 NOTE — ASSESSMENT & PLAN NOTE
Chronic, controlled  Neuro saw and did not recommended further neuro workup  - continue vimpat and keppra  - f/u levels

## 2023-06-15 NOTE — PT/OT/SLP EVAL
"Physical Therapy Evaluation    Patient Name:  Teto Mendez   MRN:  [id number]    Recommendations:     Discharge Recommendations: nursing facility, skilled, rehabilitation facility   Discharge Equipment Recommendations: to be determined by next level of care   Barriers to discharge:  max A with mobility, poor sitting balance unable to transfer or ambulate,     Assessment:     Teto Mendez is a 67 y.o. male admitted with a medical diagnosis of Acute on chronic respiratory failure with hypoxia.  He presents with the following impairments/functional limitations: weakness, impaired endurance, impaired self care skills, impaired functional mobility, impaired balance, decreased lower extremity function, decreased safety awareness, impaired cardiopulmonary response to activity.    Pt found in bed with HOB elevated. Pt agreeable to visit. Pt reports confusion be able to follow command for mobility with increase time. Pt presents with poor sitting balance at EOB initially requiring max A but with increase VI and tactile instruction for R lateral lean to achieve midline positioning able to intermittently maintain with min A x 5 mins.     Rehab Prognosis: Fair; patient would benefit from acute skilled PT services to address these deficits and reach maximum level of function.    Recent Surgery: * No surgery found *      Plan:     During this hospitalization, patient to be seen daily to address the identified rehab impairments via therapeutic activities, therapeutic exercises, neuromuscular re-education and progress toward the following goals:    Plan of Care Expires:  07/15/23    Subjective     Chief Complaint: "confused"  Patient/Family Comments/goals: none stated  Pain/Comfort:  Pain Rating 1: 0/10    Patients cultural, spiritual, Sikhism conflicts given the current situation: no    Living Environment:  Pt lives in [place] specifics unclear at this time  Prior to admission, patients level of function was Independent.  " Equipment used at home: none.  DME owned (not currently used): none.  Upon discharge, patient will have assistance from family.    Objective:     Communicated with RN prior to session.  Patient found HOB elevated with minaya catheter, PEG Tube, peripheral IV, telemetry, pulse ox (continuous), bowel management  upon PT entry to room.    General Precautions: Standard, fall  Orthopedic Precautions:N/A   Braces: N/A  Respiratory Status: Room air    Exams:  RLE ROM: PROM WFL  RLE Strength: 3-/5 throughout  LLE ROM: PROM WFL  LLE Strength: 3-/5 throughout    Functional Mobility:  Bed Mobility:     Supine to Sit: maximal assistance  Sit to Supine: maximal assistance      AM-PAC 6 CLICK MOBILITY  Total Score:8       Treatment & Education:  Pt educated on POC, discharge recommendation, importance of time OOB, midline seated positioning/balance, need for assist with mobility, use of call bell to seek assistance as needed and fall prevention      Patient left HOB elevated with all lines intact, call button in reach, bed alarm on, and cousin present.    GOALS:   Multidisciplinary Problems       Physical Therapy Goals          Problem: Physical Therapy    Goal Priority Disciplines Outcome Goal Variances Interventions   Physical Therapy Goal     PT, PT/OT Ongoing, Progressing     Description: Goals to be met by: 7/15/23     Patient will increase functional independence with mobility by performin. Supine to sit with Moderate Assistance  2. Sit to stand transfer with Moderate Assistance  3. Bed to chair transfer with Moderate Assistance using Rolling Walker  4. Patient to tolerate sitting EOB x 15 mins with CGA                             History:     Past Medical History:   Diagnosis Date    GERD (gastroesophageal reflux disease)     Glioblastoma     Gout, unspecified     H/O blood clots     HFrEF (heart failure with reduced ejection fraction)     Hypertension     Paroxysmal atrial fibrillation     Seizures     Splenic  infarct     Ventilator associated pneumonia 5/29/2023    Ventilator dependence 5/29/2023       Past Surgical History:   Procedure Laterality Date    CHOLECYSTECTOMY N/A     CRANIOTOMY FOR EXCISION OF INTRACRANIAL TUMOR      INSERTION, PEG TUBE N/A 05/24/2023    Procedure: INSERTION, PEG TUBE;  Surgeon: Kapil Hickey III, MD;  Location: [facility];  Service: General;  Laterality: N/A;    KNEE ARTHROSCOPY Right     PROSTATECTOMY N/A     TRACHEOSTOMY N/A 05/24/2023    Procedure: CREATION, TRACHEOSTOMY;  Surgeon: Kapil Hickey III, MD;  Location: [facility];  Service: General;  Laterality: N/A;       Time Tracking:     PT Received On: 06/15/23  PT Start Time: 1130     PT Stop Time: 1146  PT Total Time (min): 16 min     Billable Minutes: Evaluation 8 and Therapeutic Activity 8      06/15/2023

## 2023-06-15 NOTE — PLAN OF CARE
TF ordered.     [place] Peptide 1.5 d/t diarrhea on Vital 1.5, goal rate 55ml/hr.  Add 1 pkt Banatrol daily to aid in diarrhea.   Water flushes: 30ml every 4 hours.     Problem: Feeding Intolerance (Enteral Nutrition)  Goal: Feeding Tolerance  Outcome: Ongoing, Not Progressing

## 2023-06-15 NOTE — SUBJECTIVE & OBJECTIVE
Interval History: Patient seen and examined. Refused bipap overnight. Denies SOB. Admits cough. Wants to talk to his daughter about goals of care. Multiple loose stools today. Cdiff is negative.      Objective:     Vital Signs (Most Recent):  Temp: 97.6 °F (36.4 °C) (06/15/23 1355)  Pulse: 82 (06/15/23 1355)  Resp: 18 (06/15/23 1355)  BP: 116/83 (06/15/23 1355)  SpO2: 95 % (06/15/23 1355) Vital Signs (24h Range):  Temp:  [97.5 °F (36.4 °C)-98 °F (36.7 °C)] 97.6 °F (36.4 °C)  Pulse:  [] 82  Resp:  [11-35] 18  SpO2:  [90 %-100 %] 95 %  BP: (115-174)/() 116/83     Weight: 91 kg (200 lb 9.9 oz)  Body mass index is 29.63 kg/m².    Intake/Output Summary (Last 24 hours) at 6/15/2023 1439  Last data filed at 6/15/2023 1333  Gross per 24 hour   Intake 850.1 ml   Output 1550 ml   Net -699.9 ml         Physical Exam  Vitals reviewed.   Constitutional:       General: He is not in acute distress.  HENT:      Head: Normocephalic and atraumatic.   Neck:      Comments: Trach site  Cardiovascular:      Rate and Rhythm: Normal rate and regular rhythm.      Heart sounds: Normal heart sounds.   Pulmonary:      Effort: Pulmonary effort is normal. No respiratory distress.      Breath sounds: Rales (mild, bibasilar) present.   Abdominal:      Comments: PEG noted   Skin:     General: Skin is warm and dry.   Neurological:      Mental Status: He is alert and oriented to person, place, and time. Mental status is at baseline.      Motor: Weakness present.      Comments: L facial droop  Dysarthria    Psychiatric:         Mood and Affect: Affect normal.         Behavior: Behavior normal.           Significant Labs: All pertinent labs within the past 24 hours have been reviewed.    Significant Imaging: I have reviewed all pertinent imaging results/findings within the past 24 hours.

## 2023-06-15 NOTE — HOSPITAL COURSE
"Admitted from LTAC due to lethargy and hypoxia initially placed on bipap but later refused this. Saturated well on his baseline 2L NC O2 requirement. CXR suspicious for possible new RLL infiltrate. Sputum culture taken. Started on broad spectrum antibiotics. MRSA swab negative so vancomycin discontinued and cefepime continued. Procalcitonin was low. Hypokalemia replaced. He had loose stools but C diff testing was negative antidiarrheals added prn.    06/16  Assumed care. Chart reviewed. Labs reviewed and noted below: normal CBC; mild hypokalemia (treated from sliding scale) with normal renal function. Oxygen saturation on RA: %. Feels "Good" and would like to be discharge back to long term facility. Had rectal tube pulled yesterday--no further loose stools since.  VSS  Lungs: decreased entry without adventitious sounds  Heart S1S2 reg  Abdo soft; PEG  Imp ADITYA resolved; stable for discharge back to long term care facility  Plan Discharge to LTAC; Act as tolerated; PEG feeds per facility protocol; labs per facility protocol; medication as per discharge med rec listed below; follow-up facility protocol  "

## 2023-06-15 NOTE — ASSESSMENT & PLAN NOTE
Chronic LLL infiltrate  RLL infiltrate new  Vancomycin dc'd with negative MRSA swab  Procal noted to be low  - continue cefepime  - f/u sputum culture

## 2023-06-16 VITALS
BODY MASS INDEX: 29.71 KG/M2 | HEART RATE: 63 BPM | DIASTOLIC BLOOD PRESSURE: 91 MMHG | TEMPERATURE: 99 F | HEIGHT: 69 IN | SYSTOLIC BLOOD PRESSURE: 124 MMHG | RESPIRATION RATE: 16 BRPM | WEIGHT: 200.63 LBS | OXYGEN SATURATION: 96 %

## 2023-06-16 PROBLEM — J18.9 PNEUMONIA OF RIGHT LOWER LOBE DUE TO INFECTIOUS ORGANISM: Status: RESOLVED | Noted: 2023-06-14 | Resolved: 2023-06-16

## 2023-06-16 PROBLEM — J96.21 ACUTE ON CHRONIC RESPIRATORY FAILURE WITH HYPOXIA: Status: RESOLVED | Noted: 2023-06-14 | Resolved: 2023-06-16

## 2023-06-16 PROBLEM — R19.5 LOOSE BOWEL MOVEMENTS: Status: RESOLVED | Noted: 2023-06-15 | Resolved: 2023-06-16

## 2023-06-16 PROBLEM — G93.41 ACUTE METABOLIC ENCEPHALOPATHY: Status: RESOLVED | Noted: 2023-06-14 | Resolved: 2023-06-16

## 2023-06-16 LAB
ALBUMIN SERPL BCP-MCNC: 2.7 G/DL (ref 3.5–5.2)
ALP SERPL-CCNC: 51 U/L (ref 55–135)
ALT SERPL W/O P-5'-P-CCNC: 81 U/L (ref 10–44)
ANION GAP SERPL CALC-SCNC: 11 MMOL/L (ref 8–16)
AST SERPL-CCNC: 42 U/L (ref 10–40)
BASOPHILS # BLD AUTO: 0.02 K/UL (ref 0–0.2)
BASOPHILS NFR BLD: 0.2 % (ref 0–1.9)
BILIRUB SERPL-MCNC: 1 MG/DL (ref 0.1–1)
BUN SERPL-MCNC: 43 MG/DL (ref 8–23)
CALCIUM SERPL-MCNC: 8.3 MG/DL (ref 8.7–10.5)
CHLORIDE SERPL-SCNC: 94 MMOL/L (ref 95–110)
CO2 SERPL-SCNC: 33 MMOL/L (ref 23–29)
CREAT SERPL-MCNC: 0.7 MG/DL (ref 0.5–1.4)
DIFFERENTIAL METHOD: ABNORMAL
EOSINOPHIL # BLD AUTO: 0 K/UL (ref 0–0.5)
EOSINOPHIL NFR BLD: 0 % (ref 0–8)
ERYTHROCYTE [DISTWIDTH] IN BLOOD BY AUTOMATED COUNT: 14.5 % (ref 11.5–14.5)
EST. GFR  (NO RACE VARIABLE): >60 ML/MIN/1.73 M^2
GLUCOSE SERPL-MCNC: 111 MG/DL (ref 70–110)
HCT VFR BLD AUTO: 44.3 % (ref 40–54)
HGB BLD-MCNC: 15.3 G/DL (ref 14–18)
IMM GRANULOCYTES # BLD AUTO: 0.06 K/UL (ref 0–0.04)
IMM GRANULOCYTES NFR BLD AUTO: 0.7 % (ref 0–0.5)
LEVETIRACETAM SERPL-MCNC: 16.1 UG/ML (ref 10–40)
LYMPHOCYTES # BLD AUTO: 0.8 K/UL (ref 1–4.8)
LYMPHOCYTES NFR BLD: 9.2 % (ref 18–48)
MCH RBC QN AUTO: 31.5 PG (ref 27–31)
MCHC RBC AUTO-ENTMCNC: 34.5 G/DL (ref 32–36)
MCV RBC AUTO: 91 FL (ref 82–98)
MONOCYTES # BLD AUTO: 0.8 K/UL (ref 0.3–1)
MONOCYTES NFR BLD: 9.1 % (ref 4–15)
NEUTROPHILS # BLD AUTO: 6.9 K/UL (ref 1.8–7.7)
NEUTROPHILS NFR BLD: 80.8 % (ref 38–73)
NRBC BLD-RTO: 0 /100 WBC
PLATELET # BLD AUTO: 187 K/UL (ref 150–450)
PMV BLD AUTO: 10.3 FL (ref 9.2–12.9)
POTASSIUM SERPL-SCNC: 3.2 MMOL/L (ref 3.5–5.1)
PROT SERPL-MCNC: 5.7 G/DL (ref 6–8.4)
RBC # BLD AUTO: 4.85 M/UL (ref 4.6–6.2)
SODIUM SERPL-SCNC: 138 MMOL/L (ref 136–145)
WBC # BLD AUTO: 8.5 K/UL (ref 3.9–12.7)

## 2023-06-16 PROCEDURE — 25000003 PHARM REV CODE 250: Performed by: INTERNAL MEDICINE

## 2023-06-16 PROCEDURE — 63600175 PHARM REV CODE 636 W HCPCS: Performed by: STUDENT IN AN ORGANIZED HEALTH CARE EDUCATION/TRAINING PROGRAM

## 2023-06-16 PROCEDURE — 99900031 HC PATIENT EDUCATION (STAT)

## 2023-06-16 PROCEDURE — 25000003 PHARM REV CODE 250: Performed by: STUDENT IN AN ORGANIZED HEALTH CARE EDUCATION/TRAINING PROGRAM

## 2023-06-16 PROCEDURE — 25000242 PHARM REV CODE 250 ALT 637 W/ HCPCS: Performed by: INTERNAL MEDICINE

## 2023-06-16 PROCEDURE — 99900035 HC TECH TIME PER 15 MIN (STAT)

## 2023-06-16 PROCEDURE — 36415 COLL VENOUS BLD VENIPUNCTURE: CPT | Performed by: INTERNAL MEDICINE

## 2023-06-16 PROCEDURE — 97110 THERAPEUTIC EXERCISES: CPT | Mod: CQ

## 2023-06-16 PROCEDURE — 94640 AIRWAY INHALATION TREATMENT: CPT

## 2023-06-16 PROCEDURE — 94799 UNLISTED PULMONARY SVC/PX: CPT

## 2023-06-16 PROCEDURE — 27000221 HC OXYGEN, UP TO 24 HOURS

## 2023-06-16 PROCEDURE — 80053 COMPREHEN METABOLIC PANEL: CPT | Performed by: INTERNAL MEDICINE

## 2023-06-16 PROCEDURE — 85025 COMPLETE CBC W/AUTO DIFF WBC: CPT | Performed by: INTERNAL MEDICINE

## 2023-06-16 PROCEDURE — 94761 N-INVAS EAR/PLS OXIMETRY MLT: CPT

## 2023-06-16 RX ORDER — AZITHROMYCIN 1 G/1
1 POWDER, FOR SUSPENSION ORAL ONCE
Qty: 1 PACKET | Refills: 0
Start: 2023-06-16 | End: 2023-06-16

## 2023-06-16 RX ADMIN — POTASSIUM BICARBONATE 35 MEQ: 391 TABLET, EFFERVESCENT ORAL at 10:06

## 2023-06-16 RX ADMIN — IPRATROPIUM BROMIDE AND ALBUTEROL SULFATE 3 ML: 2.5; .5 SOLUTION RESPIRATORY (INHALATION) at 12:06

## 2023-06-16 RX ADMIN — CEFEPIME 1 G: 1 INJECTION, POWDER, FOR SOLUTION INTRAMUSCULAR; INTRAVENOUS at 01:06

## 2023-06-16 RX ADMIN — IPRATROPIUM BROMIDE AND ALBUTEROL SULFATE 3 ML: 2.5; .5 SOLUTION RESPIRATORY (INHALATION) at 07:06

## 2023-06-16 NOTE — ASSESSMENT & PLAN NOTE
Discharge to LTAC; Act as tolerated; PEG feeds per facility protocol; labs per facility protocol; medication as per discharge med rec listed below; follow-up facility protocol

## 2023-06-16 NOTE — PT/OT/SLP PROGRESS
Physical Therapy Treatment    Patient Name:  Teto Mendez   MRN:  [id number]    Recommendations:     Discharge Recommendations: nursing facility, skilled, rehabilitation facility  Discharge Equipment Recommendations: to be determined by next level of care  Barriers to discharge: None    Assessment:     Teto Mendez is a 67 y.o. male admitted with a medical diagnosis of Acute on chronic respiratory failure with hypoxia.  He presents with the following impairments/functional limitations: weakness, impaired endurance, decreased lower extremity function, pain, impaired functional mobility.    Pt declined EOB transfer but was agreeable to leg exercises in bed. Good participation.     Rehab Prognosis: Good; patient would benefit from acute skilled PT services to address these deficits and reach maximum level of function.    Recent Surgery: * No surgery found *      Plan:     During this hospitalization, patient to be seen daily to address the identified rehab impairments via therapeutic activities, therapeutic exercises, neuromuscular re-education and progress toward the following goals:    Plan of Care Expires:  07/15/23    Subjective     Chief Complaint: pain at PEG site  Patient/Family Comments/goals: discharge today  Pain/Comfort:  Pain Rating Post-Intervention 1: other (see comments) (unrated)  Location 2:  (PEG tube site)  Pain Rating Post-Intervention 2: other (see comments)      Objective:     Communicated with nurse prior to session.  Patient found HOB elevated with minaya catheter, PEG Tube, peripheral IV, telemetry, pulse ox (continuous) upon PT entry to room.     General Precautions: Standard, fall  Orthopedic Precautions: N/A  Braces: N/A  Respiratory Status: Room air     Functional Mobility:  Bed Mobility:         AM-PAC 6 CLICK MOBILITY          Treatment & Education:  -BLE therex with TC/VC for form, pace, and range: AP, SAQ, Ab/adduction, bridges, SKTC with manual resistance for hip extension, hooklying  isometric adduction, hooklying abduction w/ manual resistance    Patient left HOB elevated with all lines intact, call button in reach, bed alarm on, and nurse notified..    GOALS:   Multidisciplinary Problems       Physical Therapy Goals          Problem: Physical Therapy    Goal Priority Disciplines Outcome Goal Variances Interventions   Physical Therapy Goal     PT, PT/OT Ongoing, Progressing     Description: Goals to be met by: 7/15/23     Patient will increase functional independence with mobility by performin. Supine to sit with Moderate Assistance  2. Sit to stand transfer with Moderate Assistance  3. Bed to chair transfer with Moderate Assistance using Rolling Walker  4. Patient to tolerate sitting EOB x 15 mins with CGA                             Time Tracking:     PT Received On: 23  PT Start Time: 940     PT Stop Time: 959  PT Total Time (min): 19 min     Billable Minutes: Therapeutic Exercise 19    Treatment Type: Treatment        Number of PTA visits since last PT visit: 2023

## 2023-06-16 NOTE — PLAN OF CARE
Problem: Adult Inpatient Plan of Care  Goal: Plan of Care Review  Outcome: Met  Goal: Patient-Specific Goal (Individualized)  Outcome: Met  Goal: Absence of Hospital-Acquired Illness or Injury  Outcome: Met  Goal: Optimal Comfort and Wellbeing  Outcome: Met  Goal: Readiness for Transition of Care  Outcome: Met     Problem: Adult Inpatient Plan of Care  Goal: Plan of Care Review  Outcome: Met  Goal: Patient-Specific Goal (Individualized)  Outcome: Met  Goal: Absence of Hospital-Acquired Illness or Injury  Outcome: Met  Goal: Optimal Comfort and Wellbeing  Outcome: Met  Goal: Readiness for Transition of Care  Outcome: Met     Problem: Fluid Imbalance (Pneumonia)  Goal: Fluid Balance  Outcome: Met     Problem: Infection (Pneumonia)  Goal: Resolution of Infection Signs and Symptoms  Outcome: Met     Problem: Respiratory Compromise (Pneumonia)  Goal: Effective Oxygenation and Ventilation  Outcome: Met     Problem: Infection  Goal: Absence of Infection Signs and Symptoms  Outcome: Met     Problem: Impaired Wound Healing  Goal: Optimal Wound Healing  Outcome: Met     Problem: Fall Injury Risk  Goal: Absence of Fall and Fall-Related Injury  Outcome: Met     Problem: Skin Injury Risk Increased  Goal: Skin Health and Integrity  Outcome: Met     Problem: Aspiration (Enteral Nutrition)  Goal: Absence of Aspiration Signs and Symptoms  Outcome: Met     Problem: Device-Related Complication Risk (Enteral Nutrition)  Goal: Safe, Effective Therapy Delivery  Outcome: Met     Problem: Feeding Intolerance (Enteral Nutrition)  Goal: Feeding Tolerance  Outcome: Met

## 2023-06-16 NOTE — ASSESSMENT & PLAN NOTE
Discharge to LTAC; Act as tolerated; PEG feeds per facility protocol; labs per facility protocol; medication as per discharge med rec listed below; follow-up facility protocoll

## 2023-06-16 NOTE — PT/OT/SLP PROGRESS
Occupational Therapy      Patient Name:  Teto Mendez   MRN:  [id number]    Patient not seen today secondary to pt prepping for discharge.   6/16/2023

## 2023-06-16 NOTE — PLAN OF CARE
06/16/23 0739   Patient Assessment/Suction   Level of Consciousness (AVPU) alert   Respiratory Effort Unlabored;Normal   Expansion/Accessory Muscles/Retractions no use of accessory muscles   All Lung Fields Breath Sounds clear;diminished   Rhythm/Pattern, Respiratory unlabored;pattern regular;depth regular   Cough Frequency no cough   Cough Type nonproductive   PRE-TX-O2   Device (Oxygen Therapy) room air   $ Is the patient on Low Flow Oxygen? Yes   Flow (L/min) 2   SpO2 96 %   Pulse Oximetry Type Intermittent   $ Pulse Oximetry - Multiple Charge Pulse Oximetry - Multiple   Oximetry Probe Site Assessed   Pulse 63   Resp 16   Aerosol Therapy   $ Aerosol Therapy Charges Aerosol Treatment   Daily Review of Necessity (SVN) completed   Respiratory Treatment Status (SVN) given   Treatment Route (SVN) mask;oxygen   Patient Position (SVN) HOB elevated   Post Treatment Assessment (SVN) breath sounds improved   Signs of Intolerance (SVN) none   Breath Sounds Post-Respiratory Treatment   Throughout All Fields Post-Treatment All Fields   Throughout All Fields Post-Treatment aeration increased   Post-treatment Heart Rate (beats/min) 68   Post-treatment Resp Rate (breaths/min) 20   Education   $ Education Bronchodilator;15 min   Respiratory Evaluation   $ Care Plan Tech Time 15 min   $ Eval/Re-eval Charges Re-evaluation

## 2023-06-16 NOTE — PLAN OF CARE
Problem: Adult Inpatient Plan of Care  Goal: Plan of Care Review  Outcome: Ongoing, Progressing  Goal: Patient-Specific Goal (Individualized)  Outcome: Ongoing, Progressing  Goal: Absence of Hospital-Acquired Illness or Injury  Outcome: Ongoing, Progressing  Goal: Optimal Comfort and Wellbeing  Outcome: Ongoing, Progressing  Goal: Readiness for Transition of Care  Outcome: Ongoing, Progressing     Problem: Fluid Imbalance (Pneumonia)  Goal: Fluid Balance  Outcome: Ongoing, Progressing     Problem: Infection (Pneumonia)  Goal: Resolution of Infection Signs and Symptoms  Outcome: Ongoing, Progressing     Problem: Respiratory Compromise (Pneumonia)  Goal: Effective Oxygenation and Ventilation  Outcome: Ongoing, Progressing     Problem: Infection  Goal: Absence of Infection Signs and Symptoms  Outcome: Ongoing, Progressing     Problem: Impaired Wound Healing  Goal: Optimal Wound Healing  Outcome: Ongoing, Progressing     Problem: Fall Injury Risk  Goal: Absence of Fall and Fall-Related Injury  Outcome: Ongoing, Progressing     Problem: Skin Injury Risk Increased  Goal: Skin Health and Integrity  Outcome: Ongoing, Progressing     Problem: Aspiration (Enteral Nutrition)  Goal: Absence of Aspiration Signs and Symptoms  Outcome: Ongoing, Progressing     Problem: Device-Related Complication Risk (Enteral Nutrition)  Goal: Safe, Effective Therapy Delivery  Outcome: Ongoing, Progressing     Problem: Feeding Intolerance (Enteral Nutrition)  Goal: Feeding Tolerance  Outcome: Ongoing, Progressing

## 2023-06-16 NOTE — PLAN OF CARE
Patient is cleared to return to [facility] for continued LTAC services per liasneha Martin.  Report can be called to [facility] and patient will admit to [facility] Phone # [phone].  Nurse Amee notified via Secure Chat.     Discharge orders and chart reviewed with no further post-acute discharge needs identified at this time.  At this time, patient is cleared for discharge from Case Management standpoint.        06/16/23 1019   Final Note   Assessment Type Final Discharge Note   Anticipated Discharge Disposition LTAC   Post-Acute Status   Post-Acute Authorization Placement   Post-Acute Placement Status Set-up Complete/Auth obtained   Discharge Delays (!) Ambulance Transport/Facility Transport

## 2023-06-16 NOTE — DISCHARGE SUMMARY
[facility]  Discharge Summary      Patient Name: Teto Mendez  MRN: [id number]  [facility]: [id number]  Patient Class: IP- Inpatient  Admission Date: 6/14/2023  Hospital Length of Stay: 2 days  Discharge Date and Time:  06/16/2023 9:51 AM  Attending Physician: Doron Slaughter MD   Discharging Provider: Doron Slaughter MD  Primary Care Provider: Primary Doctor Helga    Primary Care Team: Networked reference to record PCT     HPI:   ED Note  Teto Mendez is a 67 y.o. male with a past medical history of AFib, hypertension, heart failure with reduced ejection fracture, glioblastoma status post resection, seizure, CVA, status post trach dependent with recent trach decannulation presents from nursing home for increased airway secretion and sleep apnea.  At baseline patient uses 2 L nasal cannula.    Dr Wilkinson  6/12:  Agitation continues at night.  Discussed with daughter and family at bedside.  Will continue Keppra and Vimpat at current doses.  Will increase Decadron and monitor for positive response.  Adding Klonopin at night with breakthrough dosing which she has responded positively to in the past.  Previously had been followed by his oncologist with monthly MRIs for glioblastoma when he was in [place].  CT of the head yesterday shows no changes from previous.    6/14/2023  Mr Lyles is lethargic and has a saturation of 76%. He has been placed on BIPAP with O 2 saturation > 92%  Case discussed with Dr Milner who agrees with BIPAP and has cared for this patient              * No surgery found *      Hospital Course:   Admitted from LTAC due to lethargy and hypoxia initially placed on bipap but later refused this. Saturated well on his baseline 2L NC O2 requirement. CXR suspicious for possible new RLL infiltrate. Sputum culture taken. Started on broad spectrum antibiotics. MRSA swab negative so vancomycin discontinued and cefepime continued. Procalcitonin was low. Hypokalemia  "replaced. He had loose stools but C diff testing was negative antidiarrheals added prn.    06/16  Assumed care. Chart reviewed. Labs reviewed and noted below: normal CBC; mild hypokalemia (treated from sliding scale) with normal renal function. Oxygen saturation on RA: %. Feels "Good" and would like to be discharge back to long term facility. Had rectal tube pulled yesterday--no further loose stools since.  VSS  Lungs: decreased entry without adventitious sounds  Heart S1S2 reg  Abdo soft; PEG  Imp ADITYA resolved; stable for discharge back to long term care facility  Plan Discharge to LTAC; Act as tolerated; PEG feeds per facility protocol; labs per facility protocol; medication as per discharge med rec listed below; follow-up facility protocol       Goals of Care Treatment Preferences:  Code Status: Full Code          What is most important right now is to focus on curative/life-prolongation (regardless of treatment burdens).  Accordingly, we have decided that the best plan to meet the patient's goals includes continuing with treatment.      Consults:   Consults (From admission, onward)        Status Ordering Provider     Inpatient consult to Registered Dietitian/Nutritionist  Once        Provider:  (Not yet assigned)    Completed JANE SANTIAGO     Inpatient consult to Neurology  Once        Provider:  Mukund James MD    Completed JANE SANTIAGO     Inpatient consult to Pulmonology  Once        Provider:  Ariel Moscoso MD    Completed JANE SANTIAGO          Neuro  History of CVA in adulthood  Discharge to LTAC; Act as tolerated; PEG feeds per facility protocol; labs per facility protocol; medication as per discharge med rec listed below; follow-up facility protocoll    Seizure disorder  Discharge to LTAC; Act as tolerated; PEG feeds per facility protocol; labs per facility protocol; medication as per discharge med rec listed below; follow-up facility protocol    Acute metabolic " encephalopathy-resolved as of 6/16/2023  He is appropriate on my exam  Neuro saw and did not recommended further neuro workup    Cardiac/Vascular  HFrEF (heart failure with reduced ejection fraction)  Discharge to LTAC; Act as tolerated; PEG feeds per facility protocol; labs per facility protocol; medication as per discharge med rec listed below; follow-up facility protocol    Paroxysmal atrial fibrillation  Discharge to LTAC; Act as tolerated; PEG feeds per facility protocol; labs per facility protocol; medication as per discharge med rec listed below; follow-up facility protocol    Essential hypertension  Discharge to LTAC; Act as tolerated; PEG feeds per facility protocol; labs per facility protocol; medication as per discharge med rec listed below; follow-up facility protocol    Oncology  Glioblastoma  Discharge to LTAC; Act as tolerated; PEG feeds per facility protocol; labs per facility protocol; medication as per discharge med rec listed below; follow-up facility protocol    GI  PEG (percutaneous endoscopic gastrostomy) status  Discharge to LTAC; Act as tolerated; PEG feeds per facility protocol; labs per facility protocol; medication as per discharge med rec listed below; follow-up facility protocol      Final Active Diagnoses:    Diagnosis Date Noted POA    History of CVA in adulthood [Z86.73] 06/15/2023 Not Applicable    Seizure disorder [G40.909] 06/14/2023 Yes    PEG (percutaneous endoscopic gastrostomy) status [Z93.1] 05/29/2023 Not Applicable    HFrEF (heart failure with reduced ejection fraction) [I50.20] 05/13/2023 Yes    Essential hypertension [I10] 05/12/2023 Yes    Paroxysmal atrial fibrillation [I48.0] 05/12/2023 Yes    Glioblastoma [C71.9] 05/12/2023 Yes      Problems Resolved During this Admission:    Diagnosis Date Noted Date Resolved POA    PRINCIPAL PROBLEM:  Acute on chronic respiratory failure with hypoxia [J96.21] 06/14/2023 06/16/2023 Yes    Loose bowel movements [R19.5]  06/15/2023 06/16/2023 Yes    Acute metabolic encephalopathy [G93.41] 06/14/2023 06/16/2023 Yes    Pneumonia of right lower lobe due to infectious organism [J18.9] 06/14/2023 06/16/2023 Yes       Discharged Condition: good    Disposition: Long Term Acute Care    Follow Up:    Patient Instructions:      Activity as tolerated       Significant Diagnostic Studies: Labs:   BMP:   Recent Labs   Lab 06/15/23  0325 06/16/23  0449   * 111*    138   K 3.0* 3.2*   CL 94* 94*   CO2 36* 33*   BUN 47* 43*   CREATININE 0.6 0.7   CALCIUM 8.6* 8.3*   MG 2.4  --        Pending Diagnostic Studies:     Procedure Component Value Units Date/Time    Lacosamide (Vimpat) [[id number]] Collected: 06/14/23 1028    Order Status: Sent Lab Status: In process Updated: 06/14/23 1037    Specimen: Blood     Levetiracetam level [[id number]] Collected: 06/14/23 1028    Order Status: Sent Lab Status: In process Updated: 06/14/23 1037    Specimen: Blood          Medications:  Reconciled Home Medications:      Medication List      START taking these medications    azithromycin 1 gram Pack  Commonly known as: ZITHROMAX  Take 1 g by mouth once. for 1 dose        CHANGE how you take these medications    clonazePAM 0.5 MG tablet  Commonly known as: KlonoPIN  0.5 mg by Per G Tube route 2 (two) times daily as needed for Anxiety.  What changed: Another medication with the same name was removed. Continue taking this medication, and follow the directions you see here.        CONTINUE taking these medications    albuterol 2.5 mg /3 mL (0.083 %) nebulizer solution  Commonly known as: PROVENTIL  Take 2.5 mg by nebulization every 4 (four) hours as needed for Wheezing. Rescue     allopurinoL 100 MG tablet  Commonly known as: ZYLOPRIM  1 tablet (100 mg total) by Per G Tube route once daily.     atorvastatin 20 MG tablet  Commonly known as: LIPITOR  1 tablet (20 mg total) by Per G Tube route once daily.     brimonidine 0.2% 0.2 % Drop  Commonly known as:  ALPHAGAN  Place 1 drop into both eyes 2 (two) times a day.     colchicine 0.6 mg tablet  Commonly known as: COLCRYS  1 tablet (0.6 mg total) by Per G Tube route daily as needed (gout attack).     dexAMETHasone sodium phosphate 4 mg/mL Syrg  Inject 4 mg as directed every 12 (twelve) hours.     digoxin 125 mcg tablet  Commonly known as: LANOXIN  1 tablet (0.125 mg total) by Per G Tube route once daily.     ezetimibe 10 mg tablet  Commonly known as: ZETIA  1 tablet (10 mg total) by Per G Tube route every evening.     famotidine 40 mg/5 mL (8 mg/mL) suspension  Commonly known as: PEPCID  2.5 mLs (20 mg total) by Per G Tube route 2 (two) times daily.     FLUoxetine 20 MG capsule  1 capsule (20 mg total) by Per G Tube route once daily.     guaiFENesin 100 mg/5 ml 100 mg/5 mL syrup  Commonly known as: ROBITUSSIN  Take 200 mg by mouth every 4 (four) hours as needed for Cough.     hydrALAZINE 20 mg/mL injection  Commonly known as: APRESOLINE  10 mg every 6 (six) hours as needed.     hydrocodone-chlorpheniramine 10-8 mg/5 mL suspension  Commonly known as: TUSSIONEX  Take 5 mLs by mouth every 12 (twelve) hours as needed for Cough.     ibuprofen 20 mg/mL oral liquid (PEDS)  20 mg by Per G Tube route every 6 (six) hours as needed for Temperature greater than.     ipratropium 0.02 % nebulizer solution  Commonly known as: ATROVENT  Take 500 mcg by nebulization every 6 (six) hours. Rescue     lacosamide 50 mg Tab  Commonly known as: VIMPAT  4 tablets (200 mg total) by Per G Tube route every 12 (twelve) hours.     levETIRAcetam 500 MG Tab  Commonly known as: KEPPRA  Take 1.5 tablets (750 mg total) by mouth 2 (two) times daily.     losartan 50 MG tablet  Commonly known as: COZAAR  1 tablet (50 mg total) by Per G Tube route 2 (two) times a day.     melatonin 3 mg tablet  Commonly known as: MELATIN  Take 6 mg by mouth nightly as needed for Insomnia.     * metoprolol tartrate 25 MG tablet  Commonly known as: LOPRESSOR  Take 12.5 mg by  mouth 2 (two) times daily.     * metoprolol 5 mg/5 mL injection  Commonly known as: LOPRESSOR  Inject 5 mg into the vein every hour as needed.     mupirocin 2 % ointment  Commonly known as: BACTROBAN  Apply 1 g topically 2 (two) times daily as needed. For trach care     potassium bicarbonate disintegrating tablet  Take 25 mEq by mouth 2 (two) times daily.     rivaroxaban 20 mg Tab  Commonly known as: XARELTO  1 tablet (20 mg total) by Per G Tube route daily with dinner or evening meal.     sodium chloride 7% 7 % nebulizer solution  Take 4 mLs by nebulization 2 (two) times a day.     timolol maleate 0.25% 0.25 % Drop  Commonly known as: TIMOPTIC  Place 1 drop into both eyes 2 (two) times daily.     torsemide 40 mg Tab  40 mg by Per G Tube route once daily.         * This list has 2 medication(s) that are the same as other medications prescribed for you. Read the directions carefully, and ask your doctor or other care provider to review them with you.            STOP taking these medications    polyethylene glycol 17 gram Pwpk  Commonly known as: GLYCOLAX            Indwelling Lines/Drains at time of discharge:   Lines/Drains/Airways     Drain  Duration                Gastrostomy/Enterostomy 05/24/23 Percutaneous endoscopic gastrostomy (PEG) LUQ 23 days         Urethral Catheter 05/25/23 0234 Straight-tip 16 Fr. 21 days                Time spent on the discharge of patient: 32  minutes         Doron Slaughter MD  Department of Hospital Medicine  [facility]

## 2023-06-16 NOTE — RESPIRATORY THERAPY
Pt. Receiving aerosol tx via duoneb now and q6hr.  Hr 67 and 02 saturation 96% on room air.  02 on standby.

## 2023-06-16 NOTE — PLAN OF CARE
[facility] liaison Veronica informed of discharge orders.  Awaiting bed assignment and report information.        06/16/23 1005   Post-Acute Status   Post-Acute Authorization Placement   Post-Acute Placement Status Set-up Complete/Auth obtained

## 2023-06-17 LAB
BACTERIA SPEC AEROBE CULT: ABNORMAL
BACTERIA SPEC AEROBE CULT: ABNORMAL
GRAM STN SPEC: ABNORMAL
LACOSAMIDE SERPL-MCNC: 20 UG/ML (ref 5–10)

## 2023-06-19 LAB — BACTERIA BLD CULT: NORMAL

## 2023-06-19 PROCEDURE — 93010 ELECTROCARDIOGRAM REPORT: CPT | Mod: ,,, | Performed by: SPECIALIST

## 2023-06-19 PROCEDURE — 93010 EKG 12-LEAD: ICD-10-PCS | Mod: ,,, | Performed by: SPECIALIST

## 2023-06-20 PROBLEM — D72.829 LEUKOCYTOSIS: Status: ACTIVE | Noted: 2023-06-20

## 2023-06-20 PROBLEM — I50.22 CHRONIC SYSTOLIC CHF (CONGESTIVE HEART FAILURE): Status: ACTIVE | Noted: 2023-06-20

## 2023-06-20 PROBLEM — R79.89 ELEVATED LFTS: Status: ACTIVE | Noted: 2023-06-20

## 2023-06-20 PROBLEM — R41.82 ALTERED MENTAL STATUS: Status: ACTIVE | Noted: 2023-06-20

## 2023-06-20 PROBLEM — E88.09 HYPOALBUMINEMIA: Status: ACTIVE | Noted: 2023-06-20

## 2023-06-20 PROBLEM — I48.91 ATRIAL FIBRILLATION: Status: ACTIVE | Noted: 2023-05-12

## 2023-06-21 PROBLEM — I50.22 CHRONIC SYSTOLIC CHF (CONGESTIVE HEART FAILURE): Status: RESOLVED | Noted: 2023-06-20 | Resolved: 2023-06-21

## 2023-06-21 PROBLEM — E88.09 HYPOALBUMINEMIA: Status: RESOLVED | Noted: 2023-06-20 | Resolved: 2023-06-21

## 2023-06-21 PROBLEM — E87.3 METABOLIC ALKALOSIS: Status: ACTIVE | Noted: 2023-06-21

## 2023-06-25 PROBLEM — F32.A DEPRESSION: Status: ACTIVE | Noted: 2023-06-25

## 2023-06-30 PROBLEM — R00.1 BRADYCARDIA: Status: ACTIVE | Noted: 2023-06-30

## 2023-07-04 NOTE — H&P ADULT - NSHPPHYSICALEXAM_GEN_ALL_CORE
Vital Signs Last 24 Hrs  T(C): 36.4 (04 Jul 2023 15:00), Max: 36.4 (04 Jul 2023 15:00)  T(F): 97.5 (04 Jul 2023 15:00), Max: 97.5 (04 Jul 2023 15:00)  HR: 99 (04 Jul 2023 15:00) (99 - 99)  BP: 137/97 (04 Jul 2023 15:00) (137/97 - 137/97)  BP(mean): --  RR: 18 (04 Jul 2023 15:00) (18 - 18)  SpO2: 100% (04 Jul 2023 15:00) (100% - 100%)    Parameters below as of 04 Jul 2023 15:00  Patient On (Oxygen Delivery Method): room air    GENERAL: NAD, well-developed  HEENT:  Atraumatic, Normocephalic, EOMI, PERRLA, conjunctiva and sclera clear, oral mucosa moist, clear w/o any exudate   NECK: Supple, No JVD  CHEST/LUNG: Clear to auscultation bilaterally; No wheeze  HEART: Regular rate and rhythm; No murmurs, rubs, or gallops  ABDOMEN: Soft, Nontender, Nondistended; Bowel sounds present  EXTREMITIES:  2+ Peripheral Pulses, No clubbing, cyanosis, or edema  PSYCH: AAOx3  NEUROLOGY: L sided weakness, chronic L facial droop.   SKIN: No rashes or lesions

## 2023-07-04 NOTE — H&P ADULT - HISTORY OF PRESENT ILLNESS
66 yo M with Pmhx of glioblastoma on active chemo therapy (last dose 5/2/2023), Afib on xarelto, HTN, and seizure disorder presents to the hospital from [place] for further management of his seizures and glioblastoma. Patient is AAOX3 and was able to provide some hx. Most of the hx was supplemented by his daughter at bedside. Patient went to [place] to visit family and had a seizure. He was taken to the hospital there. His course course was complicated by recurrent seizure and afib, requiring intubation and ICU admission. He had MRI and EEG. MRI showed possible progression of disease vs. small strokes. EEG were indeterminate. He was started on new AEDs which stabilized his seizures. Neurology were consulted but they had difficulty assessing the new changes in his brain as the old MRIs were not available. In the meantime, he underwent trach and PEG placement because of prolonged ventilation requirement. He was then decannulated around 6/15/2023. He had small opening that required further stitching as per the daughter. He underwent S+S on 7/3/2023 which showed microaspiration/leakage. Because all of his oncology care is in [place]. daughter decided to bring him to [facility]. Currently patient reports to have urethral pain from the little catheter. Otherwise, denies any fever, chills, nausea, vomiting, abdominal pain, or chest pain. He has chronic weakness in the L arm.    68 yo M with Pmhx of glioblastoma on active chemo therapy (last dose 5/2/2023), Afib on xarelto, HTN, and seizure disorder presents to the hospital from [place] for further management of his seizures and glioblastoma. Patient is AAOX3 and was able to provide some hx. Most of the hx was supplemented by his daughter at bedside. Patient went to [place] to visit family and had a seizure. He was taken to the hospital there. His course course was complicated by recurrent seizure and afib, requiring intubation and ICU admission. He had MRI and EEG. MRI showed possible progression of disease vs. small strokes. EEG were indeterminate. He was started on new AEDs which stabilized his seizures. Neurology were consulted but they had difficulty assessing the new changes in his brain as the old MRIs were not available. In the meantime, he underwent trach and PEG placement because of prolonged ventilation requirement. He was then decannulated around 6/6/2023. He had small opening that required further stitching as per the daughter. He underwent S+S on 6/30/2023 which showed microaspiration/leakage. Because all of his oncology care is in [place]. daughter decided to bring him to [facility]. Currently patient reports to have urethral pain from the little catheter. Otherwise, denies any fever, chills, nausea, vomiting, abdominal pain, or chest pain. He has chronic weakness in the L arm.

## 2023-07-04 NOTE — H&P ADULT - NSHPREVIEWOFSYSTEMS_GEN_ALL_CORE
REVIEW OF SYSTEMS:    CONSTITUTIONAL: No weakness, fevers or chills  EYES/ENT: No visual changes;  No vertigo or throat pain   NECK: No pain or stiffness  RESPIRATORY: No cough, wheezing, hemoptysis; No shortness of breath  CARDIOVASCULAR: No chest pain or palpitations  GASTROINTESTINAL: No abdominal or epigastric pain. No nausea, vomiting, or hematemesis; No diarrhea or constipation. No melena or hematochezia.  GENITOURINARY: No dysuria, frequency or hematuria. +urethral pain  NEUROLOGICAL: No numbness or weakness  MUSCULOSKELETAL: No joint pain, no muscle ache   SKIN: No itching, burning, rashes, or lesions   All other review of systems is negative unless indicated above.

## 2023-07-04 NOTE — H&P ADULT - ASSESSMENT
68 yo M with Pmhx of glioblastoma on active chemo therapy (last dose 5/2/2023), Afib on xarelto, HTN, and seizure disorder presents to the hospital from [place] for further management of his seizures and glioblastoma.

## 2023-07-04 NOTE — H&P ADULT - NSHPLABSRESULTS_GEN_ALL_CORE
14.7   x     )-----------( x        ( 04 Jul 2023 17:02 )             43.8     Hgb Trend: 14.7<--        Creatinine Trend:

## 2023-07-04 NOTE — PATIENT PROFILE ADULT - FALL HARM RISK - RISK INTERVENTIONS

## 2023-07-04 NOTE — PATIENT PROFILE ADULT - FUNCTIONAL ASSESSMENT - BASIC MOBILITY 6.
1-calculated by average/Not able to assess (calculate score using [facility] averaging method)

## 2023-07-04 NOTE — H&P ADULT - PROBLEM SELECTOR PLAN 1
Patient with hx of glioblastoma s/p RT and chemotherapy   -Recent admission at OSH for seizures, found to have progression of tumor vs. new strokes   -Neurology consult in the AM   -Heme/onc consult in the AM   -MR head ordered. Daughter has MR CDs from OSH which can be used to compare with the new ones

## 2023-07-04 NOTE — H&P ADULT - PROBLEM SELECTOR PLAN 2
Patient had recurrent seizures requiring ICU admission at OSH. As per daughter, EEG at other hospital unable to detect seizures because they were "coming from deep inside his brain." No further seizure since his admission at the OSH  -Neurology consult in the AM   -C/w vimpat, keppra, and valproic acid   -C/w dexamethasone 4 mg BID   -F/u with MRI brain

## 2023-07-05 NOTE — EEG REPORT - NS EEG TEXT BOX
KATIE VALLES [id number]     Study Date: 07-05-23  Duration: 2 hr 3 min  --------------------------------------------------------------------------------------------------  History:  CC/ HPI Patient is a 67y old  Male who presents with a chief complaint of Seizure (05 Jul 2023 14:57)    MEDICATIONS  (STANDING):  allopurinol 100 milliGRAM(s) Oral daily  brimonidine 0.2% Ophthalmic Solution 1 Drop(s) Both EYES two times a day  dexAMETHasone     Tablet 4 milliGRAM(s) Oral two times a day  famotidine   Suspension 40 milliGRAM(s) Oral daily  hydrALAZINE 25 milliGRAM(s) Oral three times a day  lacosamide 200 milliGRAM(s) Oral two times a day  levETIRAcetam 1000 milliGRAM(s) Oral two times a day  phenazopyridine 100 milliGRAM(s) Oral every 8 hours  rivaroxaban 20 milliGRAM(s) Oral with dinner  timolol 0.25% Solution 1 Drop(s) Both EYES two times a day  valproic  acid Syrup 250 milliGRAM(s) Oral three times a day    --------------------------------------------------------------------------------------------------  Study Interpretation:    [[[Abbreviation Key:  PDR=alpha rhythm/posterior dominant rhythm. A-P=anterior posterior.  Amplitude: ‘very low’:<20; ‘low’:20-49; ‘medium’:; ‘high’:>150uV.  Persistence for periodic/rhythmic patterns (% of epoch) ‘rare’:<1%; ‘occasional’:1-10%; ‘frequent’:10-50%; ‘abundant’:50-90%; ‘continuous’:>90%.  Persistence for sporadic discharges: ‘rare’:<1/hr; ‘occasional’:1/min-1/hr; ‘frequent’:>1/min; ‘abundant’:>1/10 sec.  RPP=rhythmic and periodic patterns; GRDA=generalized rhythmic delta activity; FIRDA=frontal intermittent GRDA; LRDA=lateralized rhythmic delta activity; TIRDA=temporal intermittent rhythmic delta activity;  LPD=PLED=lateralized periodic discharges; GPD=generalized periodic discharges; BIPDs =bilateral independent periodic discharges; Mf=multifocal; SIRPDs=stimulus induced rhythmic, periodic, or ictal appearing discharges; BIRDs=brief potentially ictal rhythmic discharges >4 Hz, lasting .5-10s; PFA (paroxysmal bursts >13 Hz or =8 Hz <10s).  Modifiers: +F=with fast component; +S=with spike component; +R=with rhythmic component.  S-B=burst suppression pattern.  Max=maximal. N1-drowsy; N2-stage II sleep; N3-slow wave sleep. SSS/BETS=small sharp spikes/benign epileptiform transients of sleep. HV=hyperventilation; PS=photic stimulation]]]    Daily EEG Visual Analysis    FINDINGS:      Background:  Continuity: Continuous  Symmetry: Asymmetric  Posterior dominant rhythm (PDR): 9 Hz, less well formed on the right, reactive to eye closure. Symmetric low-amplitude frontal beta in wakefulness.  State Change: Present  Voltage: Normal  Anterior Posterior Gradient: Present  Other background findings: None  Breach: Absent    Background Slowing:  Generalized slowing: None  Focal slowing: Frequent right hemispheric focal polymorphic theta and delta slowing.    State Changes:   Drowsiness and stage 2 sleep are not captured.    Sporadic Epileptiform Discharges:    None    Rhythmic and Periodic Patterns (RPPs):  None     Electrographic and Electroclinical seizures:  None    Other Clinical Events:  None    Activation Procedures:   Hyperventilation was not performed.    Photic stimulation was performed and did not elicit any abnormalities. There was symmetric photic driving.      Artifacts:  Intermittent myogenic and movement artifacts are present.    EKG:  Single-lead EKG shows irregularly irregular rhythm.    EEG Classification / Summary:  Abnormal EEG in the awake state.  -Frequent right hemispheric focal slowing  -No epileptiform abnormalities    Clinical Impression:  -Right hemispheric focal cerebral dysfunction can be structural or functional in etiology.  -Single-lead EKG shows irregularly irregular rhythm.          -------------------------------------------------------------------------------------------------------  [facility] EEG Reading Room Ph#: [phone]  Epilepsy Answering Service after 5PM and before 8:30AM: Ph#: [phone]    Krystle Martines MD  Attending Physician, [facility]

## 2023-07-05 NOTE — DISCHARGE NOTE PROVIDER - PROVIDER TOKENS
PROVIDER:[TOKEN:[[id number]],FOLLOWUP:[1 week],ESTABLISHEDPATIENT:[T]] PROVIDER:[TOKEN:[[id number]],FOLLOWUP:[1 week],ESTABLISHEDPATIENT:[T]],PROVIDER:[TOKEN:[[id number]],FOLLOWUP:[2 weeks]] PROVIDER:[TOKEN:[[id number]],FOLLOWUP:[1 week],ESTABLISHEDPATIENT:[T]],PROVIDER:[TOKEN:[[id number]],FOLLOWUP:[2 weeks]],PROVIDER:[TOKEN:[[id number]],FOLLOWUP:[2 weeks]]

## 2023-07-05 NOTE — DISCHARGE NOTE PROVIDER - CARE PROVIDER_API CALL
Janette Carias [facility]  Neurology  [address] [place] [address]  Phone: [phone]  Fax: [phone]  Established Patient  Follow Up Time: 1 week   Janette Carias  Neurology  [address] [place] [address]  Phone: [phone]  Fax: [phone]  Established Patient  Follow Up Time: 1 week    Kwan Serrano  Otolaryngology  [address] [facility], [place] [address]  Phone: [phone]  Fax: [phone]  Follow Up Time: 2 weeks   Janette Carias  Neurology  [address] [place] [address]  Phone: [phone]  Fax: [phone]  Established Patient  Follow Up Time: 1 week    Kwan Serrano  Otolaryngology  [address] [facility], [place] [address]  Phone: [phone]  Fax: [phone]  Follow Up Time: 2 weeks    Whitney Hall  Speech Pathology  [address] [place] [address]  Phone: [phone]  Fax: [phone]  Follow Up Time: 2 weeks

## 2023-07-05 NOTE — PROGRESS NOTE ADULT - ASSESSMENT
66 yo M with Pmhx of glioblastoma on active chemo therapy (last dose 5/2/2023), Afib on xarelto, HTN, and seizure disorder presents to the hospital from [place] for further management of his seizures and glioblastoma.  66 yo M with Pmhx of glioblastoma on active chemo therapy (last dose 5/2/2023), Afib on xarelto, HTN, and seizure disorder presents to the hospital from [place] for further management of his seizures and glioblastoma. Awaiting EEG

## 2023-07-05 NOTE — CONSULT NOTE ADULT - SUBJECTIVE AND OBJECTIVE BOX
HPI:  Patient is a 67y Male with PMH significant for  glioblastoma on active chemo therapy (last dose 5/2/2023), Afib on xarelto, HTN, and seizure disorder presents to the hospital from [place] for further management of his seizures and glioblastoma. Patient is AAOX3 and was able to provide some hx. Most of the hx was supplemented by his daughter at bedside. Patient went to [place] to visit family and had a seizure. He was taken to the hospital there. His course course was complicated by recurrent seizure and afib, requiring intubation and ICU admission. He had MRI and EEG. MRI showed possible progression of disease vs. small strokes. EEG were indeterminate. He was started on new AEDs which stabilized his seizures. Neurology were consulted but they had difficulty assessing the new changes in his brain as the old MRIs were not available. In the meantime, he underwent trach and PEG placement because of prolonged ventilation requirement. He was then decannulated around 6/6/2023. He had small opening that required further stitching as per the daughter. He underwent S+S on 6/30/2023 which showed microaspiration/leakage.    Patient had trach removed ~4 weeks ago. The tracheal stoma is still open with "bubbling" with speaking. Patient denies difficulty with po intake. Denies excessive drainage, erythema, or pain at the stomal site       Physical Exam  T(C): 36.4 (07-04-23 @ 20:48), Max: 36.4 (07-04-23 @ 15:00)  HR: 98 (07-04-23 @ 20:48) (98 - 99)  BP: 153/105 (07-04-23 @ 20:48) (137/97 - 153/105)  RR: 18 (07-04-23 @ 20:48) (18 - 18)  SpO2: 98% (07-04-23 @ 20:48) (98% - 100%)  General: NAD, A+Ox3  No respiratory distress, stridor, or stertor  Voice quality: normal  Face:  Symmetric without masses or lesions  OU: PERRL, EOMI  Nose: nasal cavity clear bilaterally, inferior turbinates normal, mucosa normal without crusting or bleeding  Neck: soft/flat, no LAD, ~1,5cm tracheal stoma opening, with air leakage with speaking. No excessive secretions   CNII-XII intact      Assessmnet and Plan"   67y year old Male with an extensive pmhx who presents with persistent opening of tracheal stoma ~4 weeks after decannulation     -Will discuss with about management (suturing close v conservative management)   -ENT will continue to follow    HPI:  Patient is a 67y Male with PMH significant for  glioblastoma on active chemo therapy (last dose 5/2/2023), Afib on xarelto, HTN, and seizure disorder presents to the hospital from [place] for further management of his seizures and glioblastoma. Patient is AAOX3 and was able to provide some hx. Most of the hx was supplemented by his daughter at bedside. Patient went to [place] to visit family and had a seizure. He was taken to the hospital there. His course course was complicated by recurrent seizure and afib, requiring intubation and ICU admission. He had MRI and EEG. MRI showed possible progression of disease vs. small strokes. EEG were indeterminate. He was started on new AEDs which stabilized his seizures. Neurology were consulted but they had difficulty assessing the new changes in his brain as the old MRIs were not available. In the meantime, he underwent trach and PEG placement because of prolonged ventilation requirement. He was then decannulated around 6/6/2023. He had small opening that required further stitching as per the daughter. He underwent S+S on 6/30/2023 which showed microaspiration/leakage.    Patient had trach removed ~4 weeks ago. The tracheal stoma is still open with "bubbling" with speaking. Patient denies difficulty with po intake. Denies excessive drainage, erythema, or pain at the stomal site       Physical Exam  T(C): 36.4 (07-04-23 @ 20:48), Max: 36.4 (07-04-23 @ 15:00)  HR: 98 (07-04-23 @ 20:48) (98 - 99)  BP: 153/105 (07-04-23 @ 20:48) (137/97 - 153/105)  RR: 18 (07-04-23 @ 20:48) (18 - 18)  SpO2: 98% (07-04-23 @ 20:48) (98% - 100%)  General: NAD, A+Ox3  No respiratory distress, stridor, or stertor  Voice quality: normal  Face:  Symmetric without masses or lesions  OU: PERRL, EOMI  Nose: nasal cavity clear bilaterally, inferior turbinates normal, mucosa normal without crusting or bleeding  Neck: soft/flat, no LAD, ~1,5cm tracheal stoma opening, with air leakage with speaking. No excessive secretions   CNII-XII intact      Assessmnet and Plan"   67y year old Male with an extensive pmhx who presents with persistent opening of tracheal stoma ~4 weeks after decannulation     -Will treat conservatively  -Change stoma dressing daily  -Instruct the patient to hold pressure over the stoma dressing when coughing or speaking. This prevents air from leaking from the stoma and expedites healing  -If the stoma does not close, patient can follow-up in clinic      HPI:  Patient is a 67y Male with PMH significant for  glioblastoma on active chemo therapy (last dose 5/2/2023), Afib on xarelto, HTN, and seizure disorder presents to the hospital from [place] for further management of his seizures and glioblastoma. Patient is AAOX3 and was able to provide some hx. Most of the hx was supplemented by his daughter at bedside. Patient went to [place] to visit family and had a seizure. He was taken to the hospital there. His course course was complicated by recurrent seizure and afib, requiring intubation and ICU admission. He had MRI and EEG. MRI showed possible progression of disease vs. small strokes. EEG were indeterminate. He was started on new AEDs which stabilized his seizures. Neurology were consulted but they had difficulty assessing the new changes in his brain as the old MRIs were not available. In the meantime, he underwent trach and PEG placement because of prolonged ventilation requirement. He was then decannulated around 6/6/2023. He had small opening that required further stitching as per the daughter. He underwent S+S on 6/30/2023 which showed microaspiration/leakage.    Patient had trach removed ~4 weeks ago. The tracheal stoma is still open with "bubbling" with speaking. Patient denies difficulty with po intake. Denies excessive drainage, erythema, or pain at the stomal site       Physical Exam  T(C): 36.4 (07-04-23 @ 20:48), Max: 36.4 (07-04-23 @ 15:00)  HR: 98 (07-04-23 @ 20:48) (98 - 99)  BP: 153/105 (07-04-23 @ 20:48) (137/97 - 153/105)  RR: 18 (07-04-23 @ 20:48) (18 - 18)  SpO2: 98% (07-04-23 @ 20:48) (98% - 100%)  General: NAD, A+Ox3  No respiratory distress, stridor, or stertor  Voice quality: normal  Face:  Symmetric without masses or lesions  OU: PERRL, EOMI  Nose: nasal cavity clear bilaterally, inferior turbinates normal, mucosa normal without crusting or bleeding  Neck: soft/flat, no LAD, ~2.5cm tracheal stoma opening, with air leakage with speaking. No excessive secretions   CNII-XII intact      Assessmnet and Plan"   67y year old Male with an extensive pmhx who presents with persistent opening of tracheal stoma ~4 weeks after decannulation     -Will treat conservatively  -Change stoma dressing daily with and occlusive dressing. fold a 4x4 and tape with silk tape or tegaderm (not paper tape)  -Instruct the patient to hold pressure over the stoma dressing when coughing or speaking. This prevents air from leaking from the stoma and expedites healing  -If the stoma does not close, patient can follow-up in clinic

## 2023-07-05 NOTE — CHART NOTE - NSCHARTNOTEFT_GEN_A_CORE
NEURO-ONCOLOGY ATTENDING    I saw and examined the patient and spoke with daughter by phone  67M with right frontal GBM  diagnosed in 8/22 with seizures, had multifocal lesion, treated with chemoRT, then adjuvant TMZ and DOUGIE added in 4/23 for progressive enhancement  last DOUGIE 5/2/23, last TMZ 4/5/23, last MRI 3/23 with mild POD of enhancement  was in [place], had partial status with left sided shaking, ultimately intubated, with prolonged course with trach/peg  was told that MRI showed "a stroke" but I do not see the complete study - incomplete available MRI 5/14/23 without obvious progression  subsequently had a slow recovery, trach reversed still with PEG, had multiple admissions last MRI in 6/19/23 was told it showed progression and recommended pall care  flew up to [place]  he is improved cognitively, but has residual stable LHP, unable to walk  no seizures on 3 AEDs (vpt, lvt, and vpa) and high dose steroids (4 bid)  EEG here without seizures    On exam he is aox3, able to provide history  mild to moderate LHP with flat NLF and central facial palsy, LUE drift and LE weakness  unable to walk      MRI from 6/23 reviewed showing new patchy enhancement without much FLAIR along parasag right parietal lobe  the primary tumor seems largely stable, perhaps modestly increased insular FLAIR    difficult to say if he has progressive tumor, as he's been off therapy for 2mo    would recc repeat MRI brain with contrast and perfusion prior to dc  will need DEREK, that allows him to come to [facility] for avastin (assuming cleared by ENT given healing trach)  continue current AED regimen  taper steroids to 4 daily  d/w daughter and medical team

## 2023-07-05 NOTE — PROVIDER CONTACT NOTE (CRITICAL VALUE NOTIFICATION) - BACKGROUND
Pmhx of dementia, sent from NH for anemia and hypotension with concerns for sepsis related to UTI and decubitis ulcers

## 2023-07-05 NOTE — CONSULT NOTE ADULT - ASSESSMENT
INCOMPLETE.    Assessment: ***    Impression: ***    Recommendations:   [] *** INCOMPLETE.    Assessment: Patient is a 66 yo M, PMH of glioblastoma on active chemo therapy (last dose 5/2/2023) followed by Dr. Carias, prostate cancer s/p prostatectomy in 2010, Afib on xarelto, HTN, and seizure disorder presents to the hospital from [place] for further management of his seizures and glioblastoma. Neurology now consulted for follow up and seizure management. On exam, patient is AOX3 and able to state his medical history. Physical exam was significant for 2/5 weakness in L UE and 4/5 in L LE, dysarthria, and L sided facial droop. No new seizures since May 11 per patient report.    Impression: L sided facial droop and L sided weakness (UE > LE), likely secondary to progression of GBM vs. small infarcts    Recommendations:   [] Coordinate with family to upload MRI from OSH  [] C/w home lacosamide 200 mg BID, Depakene 250 mg/5 mL TID, Keppra 1 g BID, clonazepam 0.5mg BID  [] routine EEG  [] When ready for discharge, please coordinate outpatient follow up with Dr. Carias.        Will discuss with Dr. Mcpherson INCOMPLETE.    Assessment: Patient is a 66 yo M, PMH of glioblastoma on active chemo therapy (last dose 5/2/2023) followed by Dr. Carias, prostate cancer s/p prostatectomy in 2010, Afib on xarelto, HTN, and seizure disorder presents to the hospital from [place] for further management of his seizures and glioblastoma. Neurology now consulted for follow up and seizure management. On exam, patient is AOX3 and able to state his medical history. Physical exam was significant for 2/5 weakness in L UE and 4/5 in L LE, dysarthria, and L sided facial droop. No new seizures since May 11 per patient report.    Impression: L sided facial droop and L sided weakness (UE > LE), likely secondary to progression of GBM vs. small infarcts    Recommendations:   [] Coordinate with family to upload MRI from OSH  [] Clarify details regarding recent change in AED regimen and hospital course in [place]  [] Will discuss increase in steroids   [] C/w home Xarelto for known Afib  [] C/w home lacosamide 200 mg BID, Depakene 250 mg/5 mL TID, Keppra 1 g BID, clonazepam 0.5mg BID  [] routine EEG  [] When ready for discharge, please coordinate outpatient follow up with Dr. Carias.        Will discuss with Dr. Mcpherson INCOMPLETE.    Assessment: Patient is a 68 yo M, PMH of glioblastoma on active chemo therapy (last dose 5/2/2023) followed by Dr. Carias, prostate cancer s/p prostatectomy in 2010, Afib on xarelto, HTN, and seizure disorder presents to the hospital from [place] for further management of his seizures and glioblastoma. Neurology now consulted for follow up and seizure management. On exam, patient is AOX3 and able to state his medical history. Physical exam was significant for 2/5 weakness in L UE and 4/5 in L LE, dysarthria, and L sided facial droop. No new seizures since May 11 per patient report.    Impression: L sided facial droop and L sided weakness (UE > LE), likely secondary to progression of GBM and associated edema vs. small infarcts    Recommendations:   [] Coordinate with family to upload MRI from OSH  [] Clarify details regarding recent change in AED regimen and hospital course in [place]  [] Will discuss increase in steroids   [] C/w home Xarelto for known Afib  [] C/w home lacosamide 200 mg BID, Depakene 250 mg/5 mL TID, Keppra 1 g BID, clonazepam 0.5mg BID  [] routine EEG  [] When ready for discharge, please coordinate outpatient follow up with Dr. Carias.        Will discuss with Dr. Mcpherson

## 2023-07-05 NOTE — DISCHARGE NOTE PROVIDER - HOSPITAL COURSE
66 yo M with Pmhx of glioblastoma on active chemo therapy (last dose 5/2/2023), Afib on xarelto, HTN, and seizure disorder presents to the hospital from [place] for further management of his seizures and glioblastoma. Awaiting EEG        Glioblastoma.   ·  Plan: Patient with hx of glioblastoma s/p RT and chemotherapy   -Recent admission at OSH for seizures, found to have progression of tumor vs. new strokes   -Neurology consulted, appreciate recs   [ ] Routine EEG   -Neuro onc consulted, awaiting recs   - Will coordinate MRI upload, Daughter has MR CDs from OSH which can be used to compare with the new ones. However, issue with password protected   - Discussed with Dr. Mcpherson - will attempt uploading images, if not may consider CT head   [ ] PT consult. 68 yo M with Pmhx of glioblastoma on active chemo therapy (last dose 5/2/2023), Afib on xarelto, HTN, and seizure disorder presents to the hospital from [place] for further management of his seizures and glioblastoma. EEG with no seizure        Glioblastoma.   ·  Plan: Patient with hx of glioblastoma s/p RT and chemotherapy   -Recent admission at OSH for seizures, found to have progression of tumor vs. new strokes   -Neurology consulted, appreciate recs   -Neuro onc consulted,   -  Routine EEG; -Right hemispheric focal cerebral dysfunction can be structural or functional in etiology.  -Single-lead EKG shows irregularly irregular rhythm.  - Repeat MRi with contrast: Interval progression of the patient's known glioblastoma   within the right cerebral hemisphere when compared with 6/19/2023.    MEdically stable for discharge to rehab 66 yo M with Pmhx of glioblastoma on active chemo therapy (last dose 5/2/2023), Afib on xarelto, HTN, and seizure disorder presents to the hospital from [place] for further management of his seizures and glioblastoma. EEG with no seizure        Glioblastoma.   ·  Plan: Patient with hx of glioblastoma s/p RT and chemotherapy   -Recent admission at OSH for seizures, found to have progression of tumor vs. new strokes   -Neurology consulted, appreciate recs   -Neuro onc consulted,   -  Routine EEG; -Right hemispheric focal cerebral dysfunction can be structural or functional in etiology.  -Single-lead EKG shows irregularly irregular rhythm.  - Repeat MRi with contrast: Interval progression of the patient's known glioblastoma   within the right cerebral hemisphere when compared with 6/19/2023.    MEdically stable for discharge to rehab, follow up outpatient ENT, and with Oncologist Dr Carias, and with [facility].

## 2023-07-05 NOTE — PROGRESS NOTE ADULT - PROBLEM SELECTOR PLAN 1
Patient with hx of glioblastoma s/p RT and chemotherapy   -Recent admission at OSH for seizures, found to have progression of tumor vs. new strokes   -Neurology consulted, appreciate recs   [ ] Routine EEG   -Neuro onc consulted, awaiting recs   - Will coordinate MRI upload, Daughter has MR CDs from OSH which can be used to compare with the new ones. However, issue with password protected   - Discussed with Dr. Mcpherson - will attempt uploading images, if not may consider CT Patient with hx of glioblastoma s/p RT and chemotherapy   -Recent admission at OSH for seizures, found to have progression of tumor vs. new strokes   -Neurology consulted, appreciate recs   [ ] Routine EEG   -Neuro onc consulted, awaiting recs   - Will coordinate MRI upload, Daughter has MR CDs from OSH which can be used to compare with the new ones. However, issue with password protected   - Discussed with Dr. Mcpherson - will attempt uploading images, if not may consider CT head   [ ] PT consult

## 2023-07-05 NOTE — PROGRESS NOTE ADULT - SUBJECTIVE AND OBJECTIVE BOX
[facility]  Division of Hospital Medicine  Bessy Garcia MD  Pager: [phone]      Patient is a 67y old  Male who presents with a chief complaint of Seizure (05 Jul 2023 11:47)      SUBJECTIVE / OVERNIGHT EVENTS: Patient examined at bedside. AOx 3 - [facility], July 5, 2023. Reports some uretheral discomfort. Reports little was placed during transport and irritated site. Otherwise, doing well no. Medical concerns. ENT, neurology, and neuro-oncololgy consulted.   ADDITIONAL REVIEW OF SYSTEMS:    MEDICATIONS  (STANDING):  allopurinol 100 milliGRAM(s) Oral daily  brimonidine 0.2% Ophthalmic Solution 1 Drop(s) Both EYES two times a day  dexAMETHasone     Tablet 4 milliGRAM(s) Oral two times a day  famotidine   Suspension 40 milliGRAM(s) Oral daily  hydrALAZINE 25 milliGRAM(s) Oral three times a day  lacosamide 200 milliGRAM(s) Oral two times a day  levETIRAcetam 1000 milliGRAM(s) Oral two times a day  losartan 50 milliGRAM(s) Oral daily  phenazopyridine 100 milliGRAM(s) Oral every 8 hours  rivaroxaban 20 milliGRAM(s) Oral with dinner  timolol 0.25% Solution 1 Drop(s) Both EYES two times a day  valproic  acid Syrup 250 milliGRAM(s) Oral three times a day    MEDICATIONS  (PRN):  acetaminophen     Tablet .. 650 milliGRAM(s) Oral every 6 hours PRN Temp greater or equal to 38C (100.4F), Mild Pain (1 - 3)  aluminum hydroxide/magnesium hydroxide/simethicone Suspension 30 milliLiter(s) Oral every 4 hours PRN Dyspepsia  clonazePAM  Tablet 0.5 milliGRAM(s) Oral two times a day PRN anxiety  guaiFENesin Oral Liquid (Sugar-Free) 200 milliGRAM(s) Oral every 6 hours PRN Cough  melatonin 3 milliGRAM(s) Oral at bedtime PRN Insomnia  ondansetron Injectable 4 milliGRAM(s) IV Push every 8 hours PRN Nausea and/or Vomiting      CAPILLARY BLOOD GLUCOSE        I&O's Summary    04 Jul 2023 07:01  -  05 Jul 2023 07:00  --------------------------------------------------------  IN: 0 mL / OUT: 450 mL / NET: -450 mL    05 Jul 2023 07:01  -  05 Jul 2023 13:04  --------------------------------------------------------  IN: 0 mL / OUT: 150 mL / NET: -150 mL        PHYSICAL EXAM:  Vital Signs Last 24 Hrs  T(C): 36.2 (05 Jul 2023 12:34), Max: 36.6 (05 Jul 2023 05:40)  T(F): 97.1 (05 Jul 2023 12:34), Max: 97.8 (05 Jul 2023 05:40)  HR: 81 (05 Jul 2023 12:34) (81 - 99)  BP: 151/101 (05 Jul 2023 12:34) (137/97 - 162/102)  BP(mean): --  RR: 18 (05 Jul 2023 12:34) (18 - 18)  SpO2: 100% (05 Jul 2023 12:34) (97% - 100%)    Parameters below as of 05 Jul 2023 12:34  Patient On (Oxygen Delivery Method): room air      CONSTITUTIONAL: NAD, well-developed, well-groomed  EYES: PERRLA; conjunctiva and sclera clear  ENMT: Moist oral mucosa, no pharyngeal injection or exudates; normal dentition  NECK: Supple, no palpable masses; no thyromegaly  RESPIRATORY: Normal respiratory effort; lungs are clear to auscultation bilaterally  CARDIOVASCULAR: Regular rate and rhythm, normal S1 and S2, no murmur/rub/gallop; No lower extremity edema; Peripheral pulses are 2+ bilaterally  ABDOMEN: Nontender to palpation, normoactive bowel sounds, no rebound/guarding; No hepatosplenomegaly  MUSCULOSKELETAL:  Normal gait; no clubbing or cyanosis of digits; no joint swelling or tenderness to palpation  PSYCH: A+O to person, place, and time; affect appropriate  NEUROLOGY: CN 2-12 are intact and symmetric; no gross sensory deficits   SKIN: No rashes; no palpable lesions    LABS:                        15.6   7.48  )-----------( 115      ( 05 Jul 2023 06:59 )             47.9     07-05    136  |  102  |  18  ----------------------------<  99  5.4<H>   |  16<L>  |  0.43<L>    Ca    8.5      05 Jul 2023 06:59  Phos  3.5     07-04  Mg     2.00     07-04    TPro  6.2  /  Alb  3.3  /  TBili  0.6  /  DBili  <0.2  /  AST  83<H>  /  ALT  54<H>  /  AlkPhos  58  07-05    PT/INR - ( 04 Jul 2023 17:02 )   PT: 13.0 sec;   INR: 1.12 ratio         PTT - ( 04 Jul 2023 17:02 )  PTT:25.5 sec      Urinalysis Basic - ( 05 Jul 2023 06:59 )    Color: x / Appearance: x / SG: x / pH: x  Gluc: 99 mg/dL / Ketone: x  / Bili: x / Urobili: x   Blood: x / Protein: x / Nitrite: x   Leuk Esterase: x / RBC: x / WBC x   Sq Epi: x / Non Sq Epi: x / Bacteria: x          RADIOLOGY & ADDITIONAL TESTS:  Results Reviewed:   Imaging Personally Reviewed:  Electrocardiogram Personally Reviewed:    COORDINATION OF CARE:  Care Discussed with Consultants/Other Providers [Y/N]:  Prior or Outpatient Records Reviewed [Y/N]:   [facility]  Division of Hospital Medicine  Bessy Garcia MD  Pager: [phone]      Patient is a 67y old  Male who presents with a chief complaint of Seizure (05 Jul 2023 11:47)      SUBJECTIVE / OVERNIGHT EVENTS: Patient examined at bedside. AOx 3 - [facility], July 5, 2023. Reports some uretheral discomfort. Reports little was placed during transport and irritated site. Otherwise, doing well no. Medical concerns. ENT, neurology, and neuro-oncololgy consulted.   ADDITIONAL REVIEW OF SYSTEMS:    MEDICATIONS  (STANDING):  allopurinol 100 milliGRAM(s) Oral daily  brimonidine 0.2% Ophthalmic Solution 1 Drop(s) Both EYES two times a day  dexAMETHasone     Tablet 4 milliGRAM(s) Oral two times a day  famotidine   Suspension 40 milliGRAM(s) Oral daily  hydrALAZINE 25 milliGRAM(s) Oral three times a day  lacosamide 200 milliGRAM(s) Oral two times a day  levETIRAcetam 1000 milliGRAM(s) Oral two times a day  losartan 50 milliGRAM(s) Oral daily  phenazopyridine 100 milliGRAM(s) Oral every 8 hours  rivaroxaban 20 milliGRAM(s) Oral with dinner  timolol 0.25% Solution 1 Drop(s) Both EYES two times a day  valproic  acid Syrup 250 milliGRAM(s) Oral three times a day    MEDICATIONS  (PRN):  acetaminophen     Tablet .. 650 milliGRAM(s) Oral every 6 hours PRN Temp greater or equal to 38C (100.4F), Mild Pain (1 - 3)  aluminum hydroxide/magnesium hydroxide/simethicone Suspension 30 milliLiter(s) Oral every 4 hours PRN Dyspepsia  clonazePAM  Tablet 0.5 milliGRAM(s) Oral two times a day PRN anxiety  guaiFENesin Oral Liquid (Sugar-Free) 200 milliGRAM(s) Oral every 6 hours PRN Cough  melatonin 3 milliGRAM(s) Oral at bedtime PRN Insomnia  ondansetron Injectable 4 milliGRAM(s) IV Push every 8 hours PRN Nausea and/or Vomiting      CAPILLARY BLOOD GLUCOSE        I&O's Summary    04 Jul 2023 07:01  -  05 Jul 2023 07:00  --------------------------------------------------------  IN: 0 mL / OUT: 450 mL / NET: -450 mL    05 Jul 2023 07:01  -  05 Jul 2023 13:04  --------------------------------------------------------  IN: 0 mL / OUT: 150 mL / NET: -150 mL        PHYSICAL EXAM:  Vital Signs Last 24 Hrs  T(C): 36.2 (05 Jul 2023 12:34), Max: 36.6 (05 Jul 2023 05:40)  T(F): 97.1 (05 Jul 2023 12:34), Max: 97.8 (05 Jul 2023 05:40)  HR: 81 (05 Jul 2023 12:34) (81 - 99)  BP: 151/101 (05 Jul 2023 12:34) (137/97 - 162/102)  BP(mean): --  RR: 18 (05 Jul 2023 12:34) (18 - 18)  SpO2: 100% (05 Jul 2023 12:34) (97% - 100%)    Parameters below as of 05 Jul 2023 12:34  Patient On (Oxygen Delivery Method): room air      CONSTITUTIONAL: Middle age man in NAD, well-developed, well-groomed  EYES: PERRLA; conjunctiva and sclera clear  ENMT: Moist oral mucosa, no pharyngeal injection or exudates; normal dentition  NECK: Supple, no palpable masses; no thyromegaly  RESPIRATORY: Normal respiratory effort; lungs are clear to auscultation bilaterally  CARDIOVASCULAR: Regular rate and rhythm, normal S1 and S2, no murmur/rub/gallop; No lower extremity edema; Peripheral pulses are 2+ bilaterally  ABDOMEN: Nontender to palpation, normoactive bowel sounds, no rebound/guarding; No hepatosplenomegaly; PEG C/D/I   PSYCH: A+O to person, place, and time; affect appropriate  NEUROLOGY: 2/5 weakness in L UE and 4/5 in L LE, dysarthria, and L sided facial droop  SKIN: No rashes; no palpable lesions    LABS:                        15.6   7.48  )-----------( 115      ( 05 Jul 2023 06:59 )             47.9     07-05    136  |  102  |  18  ----------------------------<  99  5.4<H>   |  16<L>  |  0.43<L>    Ca    8.5      05 Jul 2023 06:59  Phos  3.5     07-04  Mg     2.00     07-04    TPro  6.2  /  Alb  3.3  /  TBili  0.6  /  DBili  <0.2  /  AST  83<H>  /  ALT  54<H>  /  AlkPhos  58  07-05    PT/INR - ( 04 Jul 2023 17:02 )   PT: 13.0 sec;   INR: 1.12 ratio         PTT - ( 04 Jul 2023 17:02 )  PTT:25.5 sec      Urinalysis Basic - ( 05 Jul 2023 06:59 )    Color: x / Appearance: x / SG: x / pH: x  Gluc: 99 mg/dL / Ketone: x  / Bili: x / Urobili: x   Blood: x / Protein: x / Nitrite: x   Leuk Esterase: x / RBC: x / WBC x   Sq Epi: x / Non Sq Epi: x / Bacteria: x          RADIOLOGY & ADDITIONAL TESTS:  Results Reviewed:   Imaging Personally Reviewed:  Electrocardiogram Personally Reviewed:    COORDINATION OF CARE:  Care Discussed with Consultants/Other Providers [Y/N]:  Prior or Outpatient Records Reviewed [Y/N]:

## 2023-07-05 NOTE — DISCHARGE NOTE PROVIDER - NSDCFUADDAPPT_GEN_ALL_CORE_FT
APPTS ARE READY TO BE MADE: [X ] YES  ENT appointment   Best Family or Patient Contact (if needed):  Daughter Aretha [facility] [phone]  Additional Information about above appointments (if needed):    1: Needs ENT appointment  following discharge   2:   3:     Other comments or requests:    APPTS ARE READY TO BE MADE: [X ] YES  ENT appointment   Best Family or Patient Contact (if needed):  Daughter Aretha Yeimi [phone]  Additional Information about above appointments (if needed):    1: Needs ENT appointment  following discharge   2: Speech and swallow appointment following discharge   3:     Other comments or requests:    APPTS ARE READY TO BE MADE: [X ] YES  ENT appointment   Best Family or Patient Contact (if needed):  Daughter Aretha Jalloh [phone]  Additional Information about above appointments (if needed):    1: Needs ENT appointment  following discharge   2: Speech and swallow appointment following discharge   3:     Other comments or requests:     Patient is being discharged to rehab. Caregiver will arrange follow up.

## 2023-07-05 NOTE — DISCHARGE NOTE PROVIDER - NSDCCPCAREPLAN_GEN_ALL_CORE_FT
PRINCIPAL DISCHARGE DIAGNOSIS  Diagnosis: Seizure disorder  Assessment and Plan of Treatment: Neurology evaluated you in the hospital. You had a EEG done to evaluate for seizures.      SECONDARY DISCHARGE DIAGNOSES  Diagnosis: Essential hypertension  Assessment and Plan of Treatment: Your blood pressure was above goal. We increased losartan to 100mg daily. Please continue this and repeat blood pressure in one to two weeks.     PRINCIPAL DISCHARGE DIAGNOSIS  Diagnosis: Seizure disorder  Assessment and Plan of Treatment: Neurology evaluated you in the hospital. You had a EEG done to evaluate for seizures, which did not show any seizures.      SECONDARY DISCHARGE DIAGNOSES  Diagnosis: Essential hypertension  Assessment and Plan of Treatment: Your blood pressure was above goal. We increased losartan to 100mg daily and started you on amlodipine. Please continue this and repeat blood pressure in one to two weeks.    Diagnosis: Glioblastoma  Assessment and Plan of Treatment: Please follow up with neuro-oncology, Dr. Carias, once you leave rehab to start chemotherpy    Diagnosis: Swelling of lower leg  Assessment and Plan of Treatment: You were found to have a swollen leg. You had an ultrasound done to rule out a blood clot. the ultrasound showed ___     PRINCIPAL DISCHARGE DIAGNOSIS  Diagnosis: Seizure disorder  Assessment and Plan of Treatment: Neurology evaluated you in the hospital. You had a EEG done to evaluate for seizures, which did not show any seizures.      SECONDARY DISCHARGE DIAGNOSES  Diagnosis: Essential hypertension  Assessment and Plan of Treatment: Your blood pressure was above goal. We increased losartan to 100mg daily and started you on amlodipine. Please continue this and repeat blood pressure in one to two weeks. Continue blood pressure medication regimen as directed. Monitor for any visual changes, headaches or dizziness.  Monitor blood pressure regularly.  Follow up with your Primary Care Physician for further management for high blood pressure.      Diagnosis: Glioblastoma  Assessment and Plan of Treatment: Please follow up with neuro-oncology, Dr. Carias, once you leave rehab to start chemotherpy    Diagnosis: Chronic atrial fibrillation  Assessment and Plan of Treatment: Follow up with cardiologist within one week of discharge. Continue medication Xarelto  as instructed. Please call and schedule a follow up with physician who monitors your anticoagulation. Maintain HR between 60bpm-100bpm.      Diagnosis: Swelling of lower leg  Assessment and Plan of Treatment: You were found to have a swollen leg. You had an ultrasound done to rule out a blood clot. the ultrasound was negative for any blood clots. Elevate your legs while in bed, and when sitting in a chair to decrease swelling.    Diagnosis: History of tracheostomy  Assessment and Plan of Treatment: You were seen by ENT, you had tracheostoma . Change stoma dressing daily with and occlusive dressing. fold a 4x4 and tape with silk tape or tegaderm (not paper tape). You may eat     PRINCIPAL DISCHARGE DIAGNOSIS  Diagnosis: Seizure disorder  Assessment and Plan of Treatment: Neurology evaluated you in the hospital. You had a EEG done to evaluate for seizures, which did not show any seizures.      SECONDARY DISCHARGE DIAGNOSES  Diagnosis: Essential hypertension  Assessment and Plan of Treatment: Your blood pressure was above goal. We increased losartan to 100mg daily and started you on amlodipine. Please continue this and repeat blood pressure in one to two weeks. Continue blood pressure medication regimen as directed. Monitor for any visual changes, headaches or dizziness.  Monitor blood pressure regularly.  Follow up with your Primary Care Physician for further management for high blood pressure.      Diagnosis: Glioblastoma  Assessment and Plan of Treatment: Please follow up with neuro-oncology, Dr. Carias, once you leave rehab to start chemotherpy    Diagnosis: Chronic atrial fibrillation  Assessment and Plan of Treatment: Follow up with cardiologist within one week of discharge. Continue medication Xarelto  as instructed. Please call and schedule a follow up with physician who monitors your anticoagulation. Maintain HR between 60bpm-100bpm.      Diagnosis: Swelling of lower leg  Assessment and Plan of Treatment: You were found to have a swollen leg. You had an ultrasound done to rule out a blood clot. the ultrasound was negative for any blood clots. Elevate your legs while in bed, and when sitting in a chair to decrease swelling.    Diagnosis: History of tracheostomy  Assessment and Plan of Treatment: You were seen by ENT, you had tracheostomy, and  . Change stoma dressing daily with and occlusive dressing. fold a 4x4 and tape with silk tape or tegaderm (not paper tape). You may eat     PRINCIPAL DISCHARGE DIAGNOSIS  Diagnosis: Seizure disorder  Assessment and Plan of Treatment: Neurology evaluated you in the hospital. You had a EEG done to evaluate for seizures, which did not show any seizures.      SECONDARY DISCHARGE DIAGNOSES  Diagnosis: Essential hypertension  Assessment and Plan of Treatment: Your blood pressure was above goal. We increased losartan to 100mg daily and started you on amlodipine. Please continue this and repeat blood pressure in one to two weeks. Continue blood pressure medication regimen as directed. Monitor for any visual changes, headaches or dizziness.  Monitor blood pressure regularly.  Follow up with your Primary Care Physician for further management for high blood pressure.      Diagnosis: Glioblastoma  Assessment and Plan of Treatment: Please follow up with neuro-oncology, Dr. Carias, once you leave rehab to start chemotherpy    Diagnosis: Chronic atrial fibrillation  Assessment and Plan of Treatment: Follow up with cardiologist within one week of discharge. Continue medication Xarelto  as instructed. Please call and schedule a follow up with physician who monitors your anticoagulation. Maintain HR between 60bpm-100bpm.      Diagnosis: Swelling of lower leg  Assessment and Plan of Treatment: You were found to have a swollen leg. You had an ultrasound done to rule out a blood clot. the ultrasound was negative for any blood clots. Elevate your legs while in bed, and when sitting in a chair to decrease swelling.    Diagnosis: Dysphagia  Assessment and Plan of Treatment: Resolved. You were seen by Speech and Swallow and it was determined that you can eat easy to chew foods. You are eating full meals without difficulty, or signs of aspiration. You do not need any supplemental tube feedings at this time.    Diagnosis: History of tracheostomy  Assessment and Plan of Treatment: You were seen by ENT, you had tracheostomy removed and now have a healing stoma. You were evaluated by ENT physician who advised that you may eat with your stoma. Keep stoma covered. Change stoma dressing daily with and occlusive dressing. fold a 4x4 and tape with silk tape or tegaderm (not paper tape). Call and schedule outpatient follow up with ENT physician with in 1-2 weeks following discharge.

## 2023-07-05 NOTE — PROGRESS NOTE ADULT - PROBLEM SELECTOR PLAN 5
DVT ppx-xarelto 20 mg daily  Dispo: PT consult - * Patient would benefit from rehab, which allows him to get chemo drug q2 weeks DVT ppx-xarelto 20 mg daily  Dispo: PT consult - * IF rehab recommended, patient would benefit from center, such as [facility] which allows him to get chemo drug q2 weeks

## 2023-07-05 NOTE — CONSULT NOTE ADULT - ATTENDING COMMENTS
NEURO-ONCOLOGY ATTENDING    I saw and examined the patient and spoke with daughter by phone  67M with right frontal GBM  diagnosed in 8/22 with seizures, had multifocal lesion, treated with chemoRT, then adjuvant TMZ and DOUGIE added in 4/23 for progressive enhancement  last DOUGIE 5/2/23, last TMZ 4/5/23, last MRI 3/23 with mild POD of enhancement  was in [place], had partial status with left sided shaking, ultimately intubated, with prolonged course with trach/peg  was told that MRI showed "a stroke" but I do not see the complete study - incomplete available MRI 5/14/23 without obvious progression  subsequently had a slow recovery, trach reversed still with PEG, had multiple admissions last MRI in 6/19/23 was told it showed progression and recommended pall care  flew up to [place]  he is improved cognitively, but has residual stable LHP, unable to walk  no seizures on 3 AEDs (vpt, lvt, and vpa) and high dose steroids (4 bid)  EEG here without seizures    On exam he is aox3, able to provide history  mild to moderate LHP with flat NLF and central facial palsy, LUE drift and LE weakness  unable to walk      MRI from 6/23 reviewed showing new patchy enhancement without much FLAIR along parasag right parietal lobe  the primary tumor seems largely stable, perhaps modestly increased insular FLAIR    difficult to say if he has progressive tumor, as he's been off therapy for 2mo    would recc repeat MRI brain prior to dc  will need DEREK, that allows him to come to [facility] for avastin (assuming cleared by ENT given healing trach)  continue current AED regimen  taper steroids to 4 daily  d/w daughter and medical team NEURO-ONCOLOGY ATTENDING    I saw and examined the patient and spoke with daughter by phone  67M with right frontal GBM  diagnosed in 8/22 with seizures, had multifocal lesion, treated with chemoRT, then adjuvant TMZ and DOUGIE added in 4/23 for progressive enhancement  last DOUGIE 5/2/23, last TMZ 4/5/23, last MRI 3/23 with mild POD of enhancement  was in [place], had partial status with left sided shaking, ultimately intubated, with prolonged course with trach/peg  was told that MRI showed "a stroke" but I do not see the complete study - incomplete available MRI 5/14/23 without obvious progression  subsequently had a slow recovery, trach reversed still with PEG, had multiple admissions last MRI in 6/19/23 was told it showed progression and recommended pall care  flew up to [place]  he is improved cognitively, but has residual stable LHP, unable to walk  no seizures on 3 AEDs (vpt, lvt, and vpa) and high dose steroids (4 bid)  EEG here without seizures    On exam he is aox3, able to provide history  mild to moderate LHP with flat NLF and central facial palsy, LUE drift and LE weakness  unable to walk      MRI from 6/23 reviewed showing new patchy enhancement without much FLAIR along parasag right parietal lobe  the primary tumor seems largely stable, perhaps modestly increased insular FLAIR    difficult to say if he has progressive tumor, as he's been off therapy for 2mo    would recc repeat MRI brain with gado and perfusion prior to dc  will need DEREK, that allows him to come to [facility] for avastin (assuming cleared by ENT given healing trach)  continue current AED regimen  taper steroids to 4 daily  d/w daughter and medical team

## 2023-07-05 NOTE — CONSULT NOTE ADULT - SUBJECTIVE AND OBJECTIVE BOX
Neurology - Consult Note - 07-05-23  -  Spectra: [phone] ([facility]), [phone] ([facility])  -    Name: KATIE VALLES; 67y (1955)    Reason for Admission: Heart disease        Chief Complaint:   HPI:  66 yo M with Pmhx of glioblastoma on active chemo therapy (last dose 5/2/2023), Afib on xarelto, HTN, and seizure disorder presents to the hospital from [place] for further management of his seizures and glioblastoma. Patient is AAOX3 and was able to provide some hx. Most of the hx was supplemented by his daughter at bedside. Patient went to [place] to visit family and had a seizure. He was taken to the hospital there. His course course was complicated by recurrent seizure and afib, requiring intubation and ICU admission. He had MRI and EEG. MRI showed possible progression of disease vs. small strokes. EEG were indeterminate. He was started on new AEDs which stabilized his seizures. Neurology were consulted but they had difficulty assessing the new changes in his brain as the old MRIs were not available. In the meantime, he underwent trach and PEG placement because of prolonged ventilation requirement. He was then decannulated around 6/6/2023. He had small opening that required further stitching as per the daughter. He underwent S+S on 6/30/2023 which showed microaspiration/leakage. Because all of his oncology care is in [place]. daughter decided to bring him to [facility]. Currently patient reports to have urethral pain from the little catheter. Otherwise, denies any fever, chills, nausea, vomiting, abdominal pain, or chest pain. He has chronic weakness in the L arm.    (04 Jul 2023 17:00)      Review of Systems:  INCOMPLETE   CONSTITUTIONAL: No fevers or chills  EYES/ENT: No visual changes or no throat pain   NECK: No pain or stiffness  RESPIRATORY: No hemoptysis or shortness of breath  CARDIOVASCULAR: No chest pain or palpitations  GASTROINTESTINAL: No melena or hematochezia  GENITOURINARY: No dysuria or hematuria  NEUROLOGICAL: +As stated in HPI above  SKIN: No itching, burning, rashes, or lesions   All other review of systems is negative unless indicated above.    Allergies:  No Known Allergies      PMHx:   GERD (gastroesophageal reflux disease)    Prostate ca    Hypertension    Gout    Atrial fibrillation    Splenic infarction    2019 novel coronavirus disease (COVID-19)    GERD (gastroesophageal reflux disease)      PFHx:   No pertinent family history in first degree relatives    Family history of diabetes mellitus (DM) (Mother)    Family history of TIAs (Mother)    Family hx of prostate cancer (Father)    Family history of bone cancer (Father)    Family history of appendicitis      PSuHx:   No significant past surgical history    H/O prostatectomy    H/O arthroscopy of right knee    H/O colonoscopy    H/O endoscopy    Elective surgery        Medications:  MEDICATIONS  (STANDING):  allopurinol 100 milliGRAM(s) Oral daily  brimonidine 0.2% Ophthalmic Solution 1 Drop(s) Both EYES two times a day  dexAMETHasone     Tablet 4 milliGRAM(s) Oral two times a day  famotidine   Suspension 40 milliGRAM(s) Oral daily  hydrALAZINE 25 milliGRAM(s) Oral three times a day  lacosamide 200 milliGRAM(s) Oral two times a day  levETIRAcetam 1000 milliGRAM(s) Oral two times a day  losartan 50 milliGRAM(s) Oral daily  phenazopyridine 100 milliGRAM(s) Oral every 8 hours  rivaroxaban 20 milliGRAM(s) Oral with dinner  timolol 0.25% Solution 1 Drop(s) Both EYES two times a day  valproic  acid Syrup 250 milliGRAM(s) Oral three times a day    MEDICATIONS  (PRN):  acetaminophen     Tablet .. 650 milliGRAM(s) Oral every 6 hours PRN Temp greater or equal to 38C (100.4F), Mild Pain (1 - 3)  aluminum hydroxide/magnesium hydroxide/simethicone Suspension 30 milliLiter(s) Oral every 4 hours PRN Dyspepsia  clonazePAM  Tablet 0.5 milliGRAM(s) Oral two times a day PRN anxiety  guaiFENesin Oral Liquid (Sugar-Free) 200 milliGRAM(s) Oral every 6 hours PRN Cough  melatonin 3 milliGRAM(s) Oral at bedtime PRN Insomnia  ondansetron Injectable 4 milliGRAM(s) IV Push every 8 hours PRN Nausea and/or Vomiting      Vitals:  T(C): 36.6 (07-05-23 @ 05:40), Max: 36.6 (07-05-23 @ 05:40)  HR: 81 (07-05-23 @ 05:40) (81 - 99)  BP: 162/102 (07-05-23 @ 05:40) (137/97 - 162/102)  RR: 18 (07-05-23 @ 05:40) (18 - 18)  SpO2: 97% (07-05-23 @ 05:40) (97% - 100%)    Physical Examination: INCOMPLETE    Constitutional: well-developed, well-nourished, well-groomed  Eyes: ophthalmoscopic exam deferred secondary to COVID-19 pandemic  Cardiovascular: no swelling, warm-to-touch    Neurological Examination:    - Mental Status: Eyes open, attends to examiner; oriented to person, age, month, year, location, and situation; speech is fluent with intact naming, intact repetition, and follows 1-step and 3-step mid-line crossing commands; good overall fund of knowledge (aware of current events, relevant past history, and vocabulary appropriate for level of education); immediate recall is 3/3 words and delayed recall is 3/3 words at 5 minutes; able to spell WORLD backwards and recite the days of the week in reverse.    - Cranial Nerves:  II: Visual fields are full to confrontation; pupils are equal, round, and reactive to light   III, IV, VI: Extraocular movements are intact without nystagmus  V: Facial sensation is intact in the V1-V3 distribution bilaterally  VII: Face is symmetric with normal eye closure and smile  VIII: Hearing is intact to conversation  IX, X: Uvula is midline and soft palate rises symmetrically  XI: Shoulder shrug intact  XII: Tongue protrudes in the midline    - Motor/Strength Testing:  - No drifts x 4 extremities.                                   Right           Left  Deltoid                     5                 5  Biceps                      5                 5  Triceps                     5                 5  Wrist Ext (radial)       5                 5  Hand                  5                 5    Hip Flex                   5                  5  Knee Ext	      5                  5  Dorsiflex                  5                  5  Plantarflex               5                  5    - There is no pronator drift.   - Normal muscle bulk and tone throughout.    - Reflexes:   Bicep (C5/C6):                  R 2+ --- L 2+   Brachioradialis (C5/C6) :   R 2+ --- L 2+   Patella (L3/L4) :                 R 2+ --- L 2+   Ankle (S1) :                       R 2+ --- L 2+     - Plant responses down bilaterally.    - Sensory: Intact throughout to light touch x 4 extremities.  - Coordination: Finger-nose-finger intact without ataxia or dysmetria.    - Gait: Normal steps, base, arm swing, and turning.         Labs:                        15.6   7.48  )-----------( 115      ( 05 Jul 2023 06:59 )             47.9     07-05    136  |  102  |  18  ----------------------------<  99  5.4<H>   |  16<L>  |  0.43<L>    Ca    8.5      05 Jul 2023 06:59  Phos  3.5     07-04  Mg     2.00     07-04    TPro  6.2  /  Alb  3.3  /  TBili  0.6  /  DBili  <0.2  /  AST  83<H>  /  ALT  54<H>  /  AlkPhos  58  07-05    CAPILLARY BLOOD GLUCOSE        LIVER FUNCTIONS - ( 05 Jul 2023 06:59 )  Alb: 3.3 g/dL / Pro: 6.2 g/dL / ALK PHOS: 58 U/L / ALT: 54 U/L / AST: 83 U/L / GGT: x             PT/INR - ( 04 Jul 2023 17:02 )   PT: 13.0 sec;   INR: 1.12 ratio         PTT - ( 04 Jul 2023 17:02 )  PTT:25.5 sec  CSF:                  Radiology:     Neurology - Consult Note - 07-05-23  -  Spectra: [phone] ([facility]), [phone] ([facility])  -    Name: KATIE VALLES; 67y (1955)    Reason for Admission: Heart disease        Chief Complaint:   HPI:  Patient is a 68 yo M, PMH of glioblastoma on active chemo therapy (last dose 5/2/2023), Afib on xarelto, HTN, and seizure disorder presents to the hospital from [place] for further management of his seizures and glioblastoma. Neurology now consulted for follow up and seizure management. Patient is a XO      Patient is AAOX3 and was able to provide some hx. Most of the hx was supplemented by his daughter at bedside. Patient went to [place] to visit family and had a seizure. He was taken to the hospital there. His course course was complicated by recurrent seizure and afib, requiring intubation and ICU admission. He had MRI and EEG. MRI showed possible progression of disease vs. small strokes. EEG were indeterminate. He was started on new AEDs which stabilized his seizures. Neurology were consulted but they had difficulty assessing the new changes in his brain as the old MRIs were not available. In the meantime, he underwent trach and PEG placement because of prolonged ventilation requirement. He was then decannulated around 6/6/2023. He had small opening that required further stitching as per the daughter. He underwent S+S on 6/30/2023 which showed microaspiration/leakage. Because all of his oncology care is in [place]. daughter decided to bring him to [facility]. Currently patient reports to have urethral pain from the little catheter. Otherwise, denies any fever, chills, nausea, vomiting, abdominal pain, or chest pain. He has chronic weakness in the L arm.    (04 Jul 2023 17:00)      Review of Systems:  INCOMPLETE   CONSTITUTIONAL: No fevers or chills  EYES/ENT: No visual changes or no throat pain   NECK: No pain or stiffness  RESPIRATORY: No hemoptysis or shortness of breath  CARDIOVASCULAR: No chest pain or palpitations  GASTROINTESTINAL: No melena or hematochezia  GENITOURINARY: No dysuria or hematuria  NEUROLOGICAL: +As stated in HPI above  SKIN: No itching, burning, rashes, or lesions   All other review of systems is negative unless indicated above.    Allergies:  No Known Allergies      PMHx:   GERD (gastroesophageal reflux disease)    Prostate ca    Hypertension    Gout    Atrial fibrillation    Splenic infarction    2019 novel coronavirus disease (COVID-19)    GERD (gastroesophageal reflux disease)      PFHx:   No pertinent family history in first degree relatives    Family history of diabetes mellitus (DM) (Mother)    Family history of TIAs (Mother)    Family hx of prostate cancer (Father)    Family history of bone cancer (Father)    Family history of appendicitis      PSuHx:   No significant past surgical history    H/O prostatectomy    H/O arthroscopy of right knee    H/O colonoscopy    H/O endoscopy    Elective surgery        Medications:  MEDICATIONS  (STANDING):  allopurinol 100 milliGRAM(s) Oral daily  brimonidine 0.2% Ophthalmic Solution 1 Drop(s) Both EYES two times a day  dexAMETHasone     Tablet 4 milliGRAM(s) Oral two times a day  famotidine   Suspension 40 milliGRAM(s) Oral daily  hydrALAZINE 25 milliGRAM(s) Oral three times a day  lacosamide 200 milliGRAM(s) Oral two times a day  levETIRAcetam 1000 milliGRAM(s) Oral two times a day  losartan 50 milliGRAM(s) Oral daily  phenazopyridine 100 milliGRAM(s) Oral every 8 hours  rivaroxaban 20 milliGRAM(s) Oral with dinner  timolol 0.25% Solution 1 Drop(s) Both EYES two times a day  valproic  acid Syrup 250 milliGRAM(s) Oral three times a day    MEDICATIONS  (PRN):  acetaminophen     Tablet .. 650 milliGRAM(s) Oral every 6 hours PRN Temp greater or equal to 38C (100.4F), Mild Pain (1 - 3)  aluminum hydroxide/magnesium hydroxide/simethicone Suspension 30 milliLiter(s) Oral every 4 hours PRN Dyspepsia  clonazePAM  Tablet 0.5 milliGRAM(s) Oral two times a day PRN anxiety  guaiFENesin Oral Liquid (Sugar-Free) 200 milliGRAM(s) Oral every 6 hours PRN Cough  melatonin 3 milliGRAM(s) Oral at bedtime PRN Insomnia  ondansetron Injectable 4 milliGRAM(s) IV Push every 8 hours PRN Nausea and/or Vomiting      Vitals:  T(C): 36.6 (07-05-23 @ 05:40), Max: 36.6 (07-05-23 @ 05:40)  HR: 81 (07-05-23 @ 05:40) (81 - 99)  BP: 162/102 (07-05-23 @ 05:40) (137/97 - 162/102)  RR: 18 (07-05-23 @ 05:40) (18 - 18)  SpO2: 97% (07-05-23 @ 05:40) (97% - 100%)    Physical Examination: INCOMPLETE    Constitutional: well-developed, well-nourished, well-groomed  Eyes: ophthalmoscopic exam deferred secondary to COVID-19 pandemic  Cardiovascular: no swelling, warm-to-touch    Neurological Examination:    - Mental Status: Eyes open, attends to examiner; oriented to person, age, month, year, location, and situation; speech is fluent with intact naming, intact repetition, and follows 1-step and 3-step mid-line crossing commands; good overall fund of knowledge (aware of current events, relevant past history, and vocabulary appropriate for level of education); immediate recall is 3/3 words and delayed recall is 3/3 words at 5 minutes; able to spell WORLD backwards and recite the days of the week in reverse.    - Cranial Nerves:  II: Visual fields are full to confrontation; pupils are equal, round, and reactive to light   III, IV, VI: Extraocular movements are intact without nystagmus  V: Facial sensation is intact in the V1-V3 distribution bilaterally  VII: Face is symmetric with normal eye closure and smile  VIII: Hearing is intact to conversation  IX, X: Uvula is midline and soft palate rises symmetrically  XI: Shoulder shrug intact  XII: Tongue protrudes in the midline    - Motor/Strength Testing:  - No drifts x 4 extremities.                                   Right           Left  Deltoid                     5                 5  Biceps                      5                 5  Triceps                     5                 5  Wrist Ext (radial)       5                 5  Hand                  5                 5    Hip Flex                   5                  5  Knee Ext	      5                  5  Dorsiflex                  5                  5  Plantarflex               5                  5    - There is no pronator drift.   - Normal muscle bulk and tone throughout.    - Reflexes:   Bicep (C5/C6):                  R 2+ --- L 2+   Brachioradialis (C5/C6) :   R 2+ --- L 2+   Patella (L3/L4) :                 R 2+ --- L 2+   Ankle (S1) :                       R 2+ --- L 2+     - Plant responses down bilaterally.    - Sensory: Intact throughout to light touch x 4 extremities.  - Coordination: Finger-nose-finger intact without ataxia or dysmetria.    - Gait: Normal steps, base, arm swing, and turning.         Labs:                        15.6   7.48  )-----------( 115      ( 05 Jul 2023 06:59 )             47.9     07-05    136  |  102  |  18  ----------------------------<  99  5.4<H>   |  16<L>  |  0.43<L>    Ca    8.5      05 Jul 2023 06:59  Phos  3.5     07-04  Mg     2.00     07-04    TPro  6.2  /  Alb  3.3  /  TBili  0.6  /  DBili  <0.2  /  AST  83<H>  /  ALT  54<H>  /  AlkPhos  58  07-05    CAPILLARY BLOOD GLUCOSE        LIVER FUNCTIONS - ( 05 Jul 2023 06:59 )  Alb: 3.3 g/dL / Pro: 6.2 g/dL / ALK PHOS: 58 U/L / ALT: 54 U/L / AST: 83 U/L / GGT: x             PT/INR - ( 04 Jul 2023 17:02 )   PT: 13.0 sec;   INR: 1.12 ratio         PTT - ( 04 Jul 2023 17:02 )  PTT:25.5 sec  CSF:                  Radiology:     Neurology - Consult Note - 07-05-23  -  Spectra: [phone] ([facility]), [phone] ([facility])  -    Name: KATIE VALLES; 67y (1955)    Reason for Admission: Heart disease        Chief Complaint:   HPI:  Patient is a 66 yo M, PMH of glioblastoma on active chemo therapy (last dose 5/2/2023) followed by Dr. Carias, prostate cancer s/p prostatectomy in 2010, Afib on xarelto, HTN, and seizure disorder presents to the hospital from [place] for further management of his seizures and glioblastoma. Neurology now consulted for follow up and seizure management. Patient is a AoX3 and able to provide some history.     Pt states that he did not experience any neurological symptoms until 8/26/22 when he was driving and experienced an episode of head and neck turning to the right, dysarthria, and L facial droop. 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 The patient was started on Keppra for seizure management and was well controlled.    On 5/11/23, patient was vacationing in [place] with his family and experienced a witnessed focal seizure (pt describes as facial twitching that spread to upper proximal extremities. Patient was admitted to a hospital in [place] and hospital course was complicated by recurrent seizure and afib, requiring intubation and ICU admission. MRI showed possible progression of disease vs. small strokes. EEG were indeterminate. He was started on new AEDs which stabilized his seizures. Neurology were consulted but they had difficulty assessing the new changes in his brain as the old MRIs were not available.  During this admission in [place], pt also underwent trach and PEG placement because of prolonged ventilation requirement. He was then decannulated around 6/6/2023, needs closure. He underwent S+S on 6/30/2023 which showed microaspiration/leakage. Because all of his oncology care is in [place], daughter decided to bring him to [facility].  He has chronic weakness in the L arm. On interview, patient's main complaint is the L sided weakness in UE and LE. Denies any seizure episode since May 11.    Meds:  lacosamide 200mg BID  Depakene 250mg/5ml 5ml TID  Keppra 1g BID  clonazepam 0.5mg BID  dexamethasone 4mg BID  xarelto 20mg daily  ezetimibe 10mg daily        Review of Systems:  INCOMPLETE   CONSTITUTIONAL: No fevers or chills  EYES/ENT: No visual changes or no throat pain   NECK: No pain or stiffness  RESPIRATORY: No hemoptysis or shortness of breath  CARDIOVASCULAR: No chest pain or palpitations  GASTROINTESTINAL: No melena or hematochezia  GENITOURINARY: No dysuria or hematuria  NEUROLOGICAL: +As stated in HPI above  SKIN: No itching, burning, rashes, or lesions   All other review of systems is negative unless indicated above.    Allergies:  No Known Allergies      PMHx:   GERD (gastroesophageal reflux disease)    Prostate ca    Hypertension    Gout    Atrial fibrillation    Splenic infarction    2019 novel coronavirus disease (COVID-19)    GERD (gastroesophageal reflux disease)      PFHx:   No pertinent family history in first degree relatives    Family history of diabetes mellitus (DM) (Mother)    Family history of TIAs (Mother)    Family hx of prostate cancer (Father)    Family history of bone cancer (Father)    Family history of appendicitis      PSuHx:   No significant past surgical history    H/O prostatectomy    H/O arthroscopy of right knee    H/O colonoscopy    H/O endoscopy    Elective surgery        Medications:  MEDICATIONS  (STANDING):  allopurinol 100 milliGRAM(s) Oral daily  brimonidine 0.2% Ophthalmic Solution 1 Drop(s) Both EYES two times a day  dexAMETHasone     Tablet 4 milliGRAM(s) Oral two times a day  famotidine   Suspension 40 milliGRAM(s) Oral daily  hydrALAZINE 25 milliGRAM(s) Oral three times a day  lacosamide 200 milliGRAM(s) Oral two times a day  levETIRAcetam 1000 milliGRAM(s) Oral two times a day  losartan 50 milliGRAM(s) Oral daily  phenazopyridine 100 milliGRAM(s) Oral every 8 hours  rivaroxaban 20 milliGRAM(s) Oral with dinner  timolol 0.25% Solution 1 Drop(s) Both EYES two times a day  valproic  acid Syrup 250 milliGRAM(s) Oral three times a day    MEDICATIONS  (PRN):  acetaminophen     Tablet .. 650 milliGRAM(s) Oral every 6 hours PRN Temp greater or equal to 38C (100.4F), Mild Pain (1 - 3)  aluminum hydroxide/magnesium hydroxide/simethicone Suspension 30 milliLiter(s) Oral every 4 hours PRN Dyspepsia  clonazePAM  Tablet 0.5 milliGRAM(s) Oral two times a day PRN anxiety  guaiFENesin Oral Liquid (Sugar-Free) 200 milliGRAM(s) Oral every 6 hours PRN Cough  melatonin 3 milliGRAM(s) Oral at bedtime PRN Insomnia  ondansetron Injectable 4 milliGRAM(s) IV Push every 8 hours PRN Nausea and/or Vomiting      Vitals:  T(C): 36.6 (07-05-23 @ 05:40), Max: 36.6 (07-05-23 @ 05:40)  HR: 81 (07-05-23 @ 05:40) (81 - 99)  BP: 162/102 (07-05-23 @ 05:40) (137/97 - 162/102)  RR: 18 (07-05-23 @ 05:40) (18 - 18)  SpO2: 97% (07-05-23 @ 05:40) (97% - 100%)    Physical Examination: INCOMPLETE    Constitutional: well-developed, well-nourished, well-groomed  Eyes: ophthalmoscopic exam deferred secondary to COVID-19 pandemic  Cardiovascular: no swelling, warm-to-touch    Neurological Examination:    - Mental Status: Eyes open, attends to examiner; oriented to person, age, month, year, location, and situation; speech is dysarthric (secondary to L facial droop), fluent with intact naming, intact repetition, and follows 1-step and 3-step mid-line crossing commands; good overall fund of knowledge (aware of current events, relevant past history, and vocabulary appropriate for level of education); immediate recall is 3/3 words and delayed recall is 2/3 words at 5 minutes; Can spell world backwards    - Cranial Nerves:  II: Visual fields are full to confrontation; pupils are equal, round, and reactive to light   III, IV, VI: Extraocular movements are intact without nystagmus  V: Facial sensation is intact in the V1-V3 distribution bilaterally  VII: Face is asymmetric with normal eye closure, L facial droop  VIII: Hearing is intact to conversation  IX, X: Uvula is midline and soft palate rises symmetrically  XI: Shoulder shrug intact  XII: Tongue protrudes in the midline    - Motor/Strength Testing:  - No drifts x 4 extremities.                                   Right           Left  Deltoid                     5                 2  Biceps                      5                 2  Triceps                     5                 2  Wrist Ext (radial)       5                 2  Hand                 5                 2    Hip Flex                   5                 4  Knee Ext	      5                  4  Dorsiflex                  5                 4  Plantarflex               5                  4    - There is no pronator drift.   - Normal muscle bulk and tone throughout. No rigidity noted    - Reflexes:   Bicep (C5/C6):                  R 2+ --- L 2+   Brachioradialis (C5/C6) :   R 2+ --- L 2+   Patella (L3/L4) :                 R 2+ --- L 2+   Ankle (S1) :                       R 2+ --- L 2+     - Plant responses down bilaterally.    - Sensory: Intact throughout to light touch x 4 extremities.  - Coordination: Finger-nose-finger intact without ataxia or dysmetria.    - Gait: Not tested due to pt fatigue        Labs:                        15.6   7.48  )-----------( 115      ( 05 Jul 2023 06:59 )             47.9     07-05    136  |  102  |  18  ----------------------------<  99  5.4<H>   |  16<L>  |  0.43<L>    Ca    8.5      05 Jul 2023 06:59  Phos  3.5     07-04  Mg     2.00     07-04    TPro  6.2  /  Alb  3.3  /  TBili  0.6  /  DBili  <0.2  /  AST  83<H>  /  ALT  54<H>  /  AlkPhos  58  07-05    CAPILLARY BLOOD GLUCOSE        LIVER FUNCTIONS - ( 05 Jul 2023 06:59 )  Alb: 3.3 g/dL / Pro: 6.2 g/dL / ALK PHOS: 58 U/L / ALT: 54 U/L / AST: 83 U/L / GGT: x             PT/INR - ( 04 Jul 2023 17:02 )   PT: 13.0 sec;   INR: 1.12 ratio         PTT - ( 04 Jul 2023 17:02 )  PTT:25.5 sec  CSF:                  Radiology:     Neurology - Consult Note - 07-05-23  -  Spectra: [phone] ([facility]), [id number] ([facility])  -    Name: KATIE VALLES; 67y (1955)    Reason for Admission: Heart disease        Chief Complaint:   HPI:  Patient is a 66 yo M, PMH of glioblastoma on active chemo therapy (last dose 5/2/2023) followed by Dr. Carias, prostate cancer s/p prostatectomy in 2010, Afib on xarelto, HTN, and seizure disorder presents to the hospital from [place] for further management of his seizures and glioblastoma. Neurology now consulted for follow up and seizure management. Patient is a AoX3 and able to provide some history.     Pt states that he did not experience any neurological symptoms until 8/26/22 when he was driving and experienced an episode of head and neck turning to the right, dysarthria, and L facial droop. 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 The patient was started on Keppra for seizure management and was well controlled. Pt completed first round of chemoRT in 10/22. Patient was doing well    On 5/11/23, patient was vacationing in [place] with his family and experienced a witnessed focal seizure (pt describes as facial twitching that spread to upper proximal extremities. Patient was admitted to a hospital in [place] and hospital course was complicated by recurrent seizure and afib, requiring intubation and ICU admission. MRI showed possible progression of disease vs. small strokes. EEG were indeterminate. He was started on new AEDs (Vimpat 200 mg BID) which stabilized his seizures. Neurology were consulted but they had difficulty assessing the new changes in his brain as the old MRIs were not available.    During the admission in [place], pt also underwent trach and PEG placement because of prolonged ventilation requirement. He was then decannulated around 6/6/2023, needs closure. He underwent S+S on 6/30/2023 which showed microaspiration/leakage. Because all of his oncology care is in [place], daughter decided to bring him to [facility].  He has chronic weakness in the L arm. On interview, patient's main complaint is the L sided weakness in UE and LE. Denies any seizure episode since May 11.    Current home medications:  lacosamide 200mg BID  Depakene 250mg/5ml 5ml TID  Keppra 1g BID  clonazepam 0.5mg BID  dexamethasone 4mg BID  xarelto 20mg daily  ezetimibe 10mg daily        Review of Systems:  INCOMPLETE   CONSTITUTIONAL: No fevers or chills  EYES/ENT: No visual changes or no throat pain   NECK: No pain or stiffness  RESPIRATORY: No hemoptysis or shortness of breath  CARDIOVASCULAR: No chest pain or palpitations  GASTROINTESTINAL: No melena or hematochezia  GENITOURINARY: No dysuria or hematuria  NEUROLOGICAL: +As stated in HPI above  SKIN: No itching, burning, rashes, or lesions   All other review of systems is negative unless indicated above.    Allergies:  No Known Allergies      PMHx:   GERD (gastroesophageal reflux disease)    Prostate ca    Hypertension    Gout    Atrial fibrillation    Splenic infarction    2019 novel coronavirus disease (COVID-19)    GERD (gastroesophageal reflux disease)      PFHx:   No pertinent family history in first degree relatives    Family history of diabetes mellitus (DM) (Mother)    Family history of TIAs (Mother)    Family hx of prostate cancer (Father)    Family history of bone cancer (Father)    Family history of appendicitis      PSuHx:   No significant past surgical history    H/O prostatectomy    H/O arthroscopy of right knee    H/O colonoscopy    H/O endoscopy    Elective surgery        Medications:  MEDICATIONS  (STANDING):  allopurinol 100 milliGRAM(s) Oral daily  brimonidine 0.2% Ophthalmic Solution 1 Drop(s) Both EYES two times a day  dexAMETHasone     Tablet 4 milliGRAM(s) Oral two times a day  famotidine   Suspension 40 milliGRAM(s) Oral daily  hydrALAZINE 25 milliGRAM(s) Oral three times a day  lacosamide 200 milliGRAM(s) Oral two times a day  levETIRAcetam 1000 milliGRAM(s) Oral two times a day  losartan 50 milliGRAM(s) Oral daily  phenazopyridine 100 milliGRAM(s) Oral every 8 hours  rivaroxaban 20 milliGRAM(s) Oral with dinner  timolol 0.25% Solution 1 Drop(s) Both EYES two times a day  valproic  acid Syrup 250 milliGRAM(s) Oral three times a day    MEDICATIONS  (PRN):  acetaminophen     Tablet .. 650 milliGRAM(s) Oral every 6 hours PRN Temp greater or equal to 38C (100.4F), Mild Pain (1 - 3)  aluminum hydroxide/magnesium hydroxide/simethicone Suspension 30 milliLiter(s) Oral every 4 hours PRN Dyspepsia  clonazePAM  Tablet 0.5 milliGRAM(s) Oral two times a day PRN anxiety  guaiFENesin Oral Liquid (Sugar-Free) 200 milliGRAM(s) Oral every 6 hours PRN Cough  melatonin 3 milliGRAM(s) Oral at bedtime PRN Insomnia  ondansetron Injectable 4 milliGRAM(s) IV Push every 8 hours PRN Nausea and/or Vomiting      Vitals:  T(C): 36.6 (07-05-23 @ 05:40), Max: 36.6 (07-05-23 @ 05:40)  HR: 81 (07-05-23 @ 05:40) (81 - 99)  BP: 162/102 (07-05-23 @ 05:40) (137/97 - 162/102)  RR: 18 (07-05-23 @ 05:40) (18 - 18)  SpO2: 97% (07-05-23 @ 05:40) (97% - 100%)    Physical Examination: INCOMPLETE    Constitutional: well-developed, well-nourished, well-groomed  Eyes: ophthalmoscopic exam deferred secondary to COVID-19 pandemic  Cardiovascular: no swelling, warm-to-touch    Neurological Examination:    - Mental Status: Eyes open, attends to examiner; oriented to person, age, month, year, location, and situation; speech is dysarthric (secondary to L facial droop), fluent with intact naming, intact repetition, and follows 1-step and 3-step mid-line crossing commands; good overall fund of knowledge (aware of current events, relevant past history, and vocabulary appropriate for level of education); immediate recall is 3/3 words and delayed recall is 2/3 words at 5 minutes; Can spell world backwards    - Cranial Nerves:  II: Visual fields are full to confrontation; pupils are equal, round, and reactive to light   III, IV, VI: Extraocular movements are intact without nystagmus  V: Facial sensation is intact in the V1-V3 distribution bilaterally  VII: Face is asymmetric with normal eye closure, L facial droop  VIII: Hearing is intact to conversation  IX, X: Uvula is midline and soft palate rises symmetrically  XI: Shoulder shrug intact  XII: Tongue protrudes in the midline    - Motor/Strength Testing:  - No drifts x 4 extremities.                                   Right           Left  Deltoid                     5                 2  Biceps                      5                 2  Triceps                     5                 2  Wrist Ext (radial)       5                 2  Hand                 5                 2    Hip Flex                   5                 4  Knee Ext	      5                  4  Dorsiflex                  5                 4  Plantarflex               5                  4    - There is no pronator drift.   - Normal muscle bulk and tone throughout. No rigidity noted    - Reflexes:   Bicep (C5/C6):                  R 2+ --- L 2+   Brachioradialis (C5/C6) :   R 2+ --- L 2+   Patella (L3/L4) :                 R 2+ --- L 2+   Ankle (S1) :                       R 2+ --- L 2+     - Plant responses down bilaterally.    - Sensory: Intact throughout to light touch x 4 extremities.  - Coordination: Finger-nose-finger intact without ataxia or dysmetria.    - Gait: Not tested due to pt fatigue        Labs:                        15.6   7.48  )-----------( 115      ( 05 Jul 2023 06:59 )             47.9     07-05    136  |  102  |  18  ----------------------------<  99  5.4<H>   |  16<L>  |  0.43<L>    Ca    8.5      05 Jul 2023 06:59  Phos  3.5     07-04  Mg     2.00     07-04    TPro  6.2  /  Alb  3.3  /  TBili  0.6  /  DBili  <0.2  /  AST  83<H>  /  ALT  54<H>  /  AlkPhos  58  07-05    CAPILLARY BLOOD GLUCOSE        LIVER FUNCTIONS - ( 05 Jul 2023 06:59 )  Alb: 3.3 g/dL / Pro: 6.2 g/dL / ALK PHOS: 58 U/L / ALT: 54 U/L / AST: 83 U/L / GGT: x             PT/INR - ( 04 Jul 2023 17:02 )   PT: 13.0 sec;   INR: 1.12 ratio         PTT - ( 04 Jul 2023 17:02 )  PTT:25.5 sec  CSF:                  Radiology:     Neurology - Consult Note - 07-05-23  -  Spectra: [phone] ([facility]), [phone] ([facility])  -    Name: KATIE VALLES; 67y (1955)    Reason for Admission: Heart disease        Chief Complaint:   HPI:  Patient is a 68 yo M, PMH of glioblastoma on active chemo therapy ( (IV Avastin today and to start TMZ, last dose 5/2/2023) followed by Dr. Carias, prostate cancer s/p prostatectomy in 2010, Afib on xarelto, HTN, and seizure disorder presents to the hospital from [place] for further management of his seizures and glioblastoma. Neurology now consulted for follow up and seizure management. Patient is a AoX3 and able to provide some history.     Pt states that he did not experience any neurological symptoms until 8/26/22 when he was driving and experienced an episode of head and neck turning to the right, dysarthria, and L facial droop. 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 The patient was started on Keppra for seizure management and was well controlled. Pt completed first round of chemoRT (TMZ) in 10/22. Patient is followed by Dr. Carias and IV Avastin infusion was added to TMZ cycles.    On 5/11/23, patient was vacationing in [place] with his family and experienced a witnessed focal seizure (pt describes as facial twitching that spread to upper proximal extremities. Patient was admitted to a hospital in [place] and hospital course was complicated by recurrent seizure and afib, requiring intubation and ICU admission. MRI showed possible progression of disease vs. small strokes. EEG were indeterminate. He was started on new AEDs (Vimpat 200 mg BID) which stabilized his seizures. Neurology were consulted but they had difficulty assessing the new changes in his brain as the old MRIs were not available.    During the admission in [place], pt also underwent trach and PEG placement because of prolonged ventilation requirement. He was then decannulated around 6/6/2023, needs closure. He underwent S+S on 6/30/2023 which showed microaspiration/leakage. Because all of his oncology care is in [place], daughter decided to bring him to [facility].  He has chronic weakness in the L arm. On interview, patient's main complaint is the L sided weakness in UE and LE. Denies any seizure episode since May 11.    Current home medications:  lacosamide 200mg BID  Depakene 250mg/5ml 5ml TID  Keppra 1g BID  clonazepam 0.5mg BID  dexamethasone 4mg BID  xarelto 20mg daily  ezetimibe 10mg daily        Review of Systems:  INCOMPLETE   CONSTITUTIONAL: No fevers or chills  EYES/ENT: No visual changes or no throat pain   NECK: No pain or stiffness  RESPIRATORY: No hemoptysis or shortness of breath  CARDIOVASCULAR: No chest pain or palpitations  GASTROINTESTINAL: No melena or hematochezia  GENITOURINARY: No dysuria or hematuria  NEUROLOGICAL: +As stated in HPI above  SKIN: No itching, burning, rashes, or lesions   All other review of systems is negative unless indicated above.    Allergies:  No Known Allergies      PMHx:   GERD (gastroesophageal reflux disease)    Prostate ca    Hypertension    Gout    Atrial fibrillation    Splenic infarction    2019 novel coronavirus disease (COVID-19)    GERD (gastroesophageal reflux disease)      PFHx:   No pertinent family history in first degree relatives    Family history of diabetes mellitus (DM) (Mother)    Family history of TIAs (Mother)    Family hx of prostate cancer (Father)    Family history of bone cancer (Father)    Family history of appendicitis      PSuHx:   No significant past surgical history    H/O prostatectomy    H/O arthroscopy of right knee    H/O colonoscopy    H/O endoscopy    Elective surgery        Medications:  MEDICATIONS  (STANDING):  allopurinol 100 milliGRAM(s) Oral daily  brimonidine 0.2% Ophthalmic Solution 1 Drop(s) Both EYES two times a day  dexAMETHasone     Tablet 4 milliGRAM(s) Oral two times a day  famotidine   Suspension 40 milliGRAM(s) Oral daily  hydrALAZINE 25 milliGRAM(s) Oral three times a day  lacosamide 200 milliGRAM(s) Oral two times a day  levETIRAcetam 1000 milliGRAM(s) Oral two times a day  losartan 50 milliGRAM(s) Oral daily  phenazopyridine 100 milliGRAM(s) Oral every 8 hours  rivaroxaban 20 milliGRAM(s) Oral with dinner  timolol 0.25% Solution 1 Drop(s) Both EYES two times a day  valproic  acid Syrup 250 milliGRAM(s) Oral three times a day    MEDICATIONS  (PRN):  acetaminophen     Tablet .. 650 milliGRAM(s) Oral every 6 hours PRN Temp greater or equal to 38C (100.4F), Mild Pain (1 - 3)  aluminum hydroxide/magnesium hydroxide/simethicone Suspension 30 milliLiter(s) Oral every 4 hours PRN Dyspepsia  clonazePAM  Tablet 0.5 milliGRAM(s) Oral two times a day PRN anxiety  guaiFENesin Oral Liquid (Sugar-Free) 200 milliGRAM(s) Oral every 6 hours PRN Cough  melatonin 3 milliGRAM(s) Oral at bedtime PRN Insomnia  ondansetron Injectable 4 milliGRAM(s) IV Push every 8 hours PRN Nausea and/or Vomiting      Vitals:  T(C): 36.6 (07-05-23 @ 05:40), Max: 36.6 (07-05-23 @ 05:40)  HR: 81 (07-05-23 @ 05:40) (81 - 99)  BP: 162/102 (07-05-23 @ 05:40) (137/97 - 162/102)  RR: 18 (07-05-23 @ 05:40) (18 - 18)  SpO2: 97% (07-05-23 @ 05:40) (97% - 100%)    Physical Examination: INCOMPLETE    Constitutional: well-developed, well-nourished, well-groomed  Eyes: ophthalmoscopic exam deferred secondary to COVID-19 pandemic  Cardiovascular: no swelling, warm-to-touch    Neurological Examination:    - Mental Status: Eyes open, attends to examiner; oriented to person, age, month, year, location, and situation; speech is dysarthric (secondary to L facial droop), fluent with intact naming, intact repetition, and follows 1-step and 3-step mid-line crossing commands; good overall fund of knowledge (aware of current events, relevant past history, and vocabulary appropriate for level of education); immediate recall is 3/3 words and delayed recall is 2/3 words at 5 minutes; Can spell world backwards    - Cranial Nerves:  II: Visual fields are full to confrontation; pupils are equal, round, and reactive to light   III, IV, VI: Extraocular movements are intact without nystagmus  V: Facial sensation is intact in the V1-V3 distribution bilaterally  VII: Face is asymmetric with normal eye closure, L facial droop  VIII: Hearing is intact to conversation  IX, X: Uvula is midline and soft palate rises symmetrically  XI: Shoulder shrug intact  XII: Tongue protrudes in the midline    - Motor/Strength Testing:  - No drifts x 4 extremities.                                   Right           Left  Deltoid                     5                 2  Biceps                      5                 2  Triceps                     5                 2  Wrist Ext (radial)       5                 2  Hand                 5                 2    Hip Flex                   5                 4  Knee Ext	      5                  4  Dorsiflex                  5                 4  Plantarflex               5                  4    - There is no pronator drift.   - Normal muscle bulk and tone throughout. No rigidity noted    - Reflexes:   Bicep (C5/C6):                  R 2+ --- L 2+   Brachioradialis (C5/C6) :   R 2+ --- L 2+   Patella (L3/L4) :                 R 2+ --- L 2+   Ankle (S1) :                       R 2+ --- L 2+     - Plant responses down bilaterally.    - Sensory: Intact throughout to light touch x 4 extremities.  - Coordination: Finger-nose-finger intact without ataxia or dysmetria.    - Gait: Not tested due to pt fatigue        Labs:                        15.6   7.48  )-----------( 115      ( 05 Jul 2023 06:59 )             47.9     07-05    136  |  102  |  18  ----------------------------<  99  5.4<H>   |  16<L>  |  0.43<L>    Ca    8.5      05 Jul 2023 06:59  Phos  3.5     07-04  Mg     2.00     07-04    TPro  6.2  /  Alb  3.3  /  TBili  0.6  /  DBili  <0.2  /  AST  83<H>  /  ALT  54<H>  /  AlkPhos  58  07-05    CAPILLARY BLOOD GLUCOSE        LIVER FUNCTIONS - ( 05 Jul 2023 06:59 )  Alb: 3.3 g/dL / Pro: 6.2 g/dL / ALK PHOS: 58 U/L / ALT: 54 U/L / AST: 83 U/L / GGT: x             PT/INR - ( 04 Jul 2023 17:02 )   PT: 13.0 sec;   INR: 1.12 ratio         PTT - ( 04 Jul 2023 17:02 )  PTT:25.5 sec  CSF:                  Radiology:     Neurology - Consult Note - 07-05-23  -  Spectra: [phone] ([facility]), [phone] ([facility])  -    Name: KATIE VALLES; 67y (1955)    Reason for Admission: Heart disease        Chief Complaint:   HPI:  Patient is a 68 yo M, PMH of glioblastoma on active chemo therapy ( (IV Avastin today and to start TMZ, last dose 5/2/2023) followed by Dr. Carias, prostate cancer s/p prostatectomy in 2010, Afib on xarelto, HTN, and seizure disorder presents to the hospital from [place] for further management of his seizures and glioblastoma. Neurology now consulted for follow up and seizure management. Patient is a AoX3 and able to provide some history.     Pt states that he did not experience any neurological symptoms until 8/26/22 when he was driving and experienced an episode of head and neck turning to the right, dysarthria, and L facial droop. 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 The patient was started on Keppra for seizure management and was well controlled. Pt completed first round of chemoRT (TMZ) in 10/22. Patient is followed by Dr. Carias and IV Avastin infusion was added to TMZ cycles.    On 5/11/23, patient was vacationing in [place] with his family and experienced a witnessed focal seizure (pt describes as facial twitching that spread to upper proximal extremities. Patient was admitted to a hospital in [place] and hospital course was complicated by recurrent seizure and afib, requiring intubation and ICU admission. MRI showed possible progression of disease vs. small strokes. EEG were indeterminate. He was started on new AEDs (Vimpat 200 mg BID) which stabilized his seizures. Neurology were consulted but they had difficulty assessing the new changes in his brain as the old MRIs were not available.    During the admission in [place], pt also underwent trach and PEG placement because of prolonged ventilation requirement. He was then decannulated around 6/6/2023, needs closure. He underwent S+S on 6/30/2023 which showed microaspiration/leakage. Because all of his oncology care is in [place], daughter decided to bring him to [facility].  He has chronic weakness in the L arm. On interview, patient's main complaint is the L sided weakness in UE and LE. Denies any seizure episode since May 11.    Current home medications:  lacosamide 200mg BID  Depakene 250mg/5ml 5ml TID  Keppra 1g BID  clonazepam 0.5mg BID  dexamethasone 4mg BID  xarelto 20mg daily  ezetimibe 10mg daily        Review of Systems:  INCOMPLETE   CONSTITUTIONAL: No fevers or chills  EYES/ENT: No visual changes or no throat pain   NECK: No pain or stiffness  RESPIRATORY: No hemoptysis or shortness of breath  CARDIOVASCULAR: No chest pain or palpitations  GASTROINTESTINAL: No melena or hematochezia  GENITOURINARY: No dysuria or hematuria  NEUROLOGICAL: +As stated in HPI above  SKIN: No itching, burning, rashes, or lesions   All other review of systems is negative unless indicated above.    Allergies:  No Known Allergies      PMHx:   GERD (gastroesophageal reflux disease)    Prostate ca    Hypertension    Gout    Atrial fibrillation    Splenic infarction    2019 novel coronavirus disease (COVID-19)    GERD (gastroesophageal reflux disease)      PFHx:   No pertinent family history in first degree relatives    Family history of diabetes mellitus (DM) (Mother)    Family history of TIAs (Mother)    Family hx of prostate cancer (Father)    Family history of bone cancer (Father)    Family history of appendicitis      PSuHx:   No significant past surgical history    H/O prostatectomy    H/O arthroscopy of right knee    H/O colonoscopy    H/O endoscopy    Elective surgery        Medications:  MEDICATIONS  (STANDING):  allopurinol 100 milliGRAM(s) Oral daily  brimonidine 0.2% Ophthalmic Solution 1 Drop(s) Both EYES two times a day  dexAMETHasone     Tablet 4 milliGRAM(s) Oral two times a day  famotidine   Suspension 40 milliGRAM(s) Oral daily  hydrALAZINE 25 milliGRAM(s) Oral three times a day  lacosamide 200 milliGRAM(s) Oral two times a day  levETIRAcetam 1000 milliGRAM(s) Oral two times a day  losartan 50 milliGRAM(s) Oral daily  phenazopyridine 100 milliGRAM(s) Oral every 8 hours  rivaroxaban 20 milliGRAM(s) Oral with dinner  timolol 0.25% Solution 1 Drop(s) Both EYES two times a day  valproic  acid Syrup 250 milliGRAM(s) Oral three times a day    MEDICATIONS  (PRN):  acetaminophen     Tablet .. 650 milliGRAM(s) Oral every 6 hours PRN Temp greater or equal to 38C (100.4F), Mild Pain (1 - 3)  aluminum hydroxide/magnesium hydroxide/simethicone Suspension 30 milliLiter(s) Oral every 4 hours PRN Dyspepsia  clonazePAM  Tablet 0.5 milliGRAM(s) Oral two times a day PRN anxiety  guaiFENesin Oral Liquid (Sugar-Free) 200 milliGRAM(s) Oral every 6 hours PRN Cough  melatonin 3 milliGRAM(s) Oral at bedtime PRN Insomnia  ondansetron Injectable 4 milliGRAM(s) IV Push every 8 hours PRN Nausea and/or Vomiting      Vitals:  T(C): 36.6 (07-05-23 @ 05:40), Max: 36.6 (07-05-23 @ 05:40)  HR: 81 (07-05-23 @ 05:40) (81 - 99)  BP: 162/102 (07-05-23 @ 05:40) (137/97 - 162/102)  RR: 18 (07-05-23 @ 05:40) (18 - 18)  SpO2: 97% (07-05-23 @ 05:40) (97% - 100%)    Physical Examination: INCOMPLETE    Constitutional: well-developed, well-nourished, well-groomed  Eyes: ophthalmoscopic exam deferred secondary to COVID-19 pandemic  Cardiovascular: no swelling, warm-to-touch    Neurological Examination:    - Mental Status: Eyes open, attends to examiner; oriented to person, age, month, year, location, and situation; speech is dysarthric (secondary to L facial droop), fluent with intact naming, intact repetition, and follows 1-step and 3-step mid-line crossing commands; good overall fund of knowledge (aware of current events, relevant past history, and vocabulary appropriate for level of education); immediate recall is 3/3 words and delayed recall is 2/3 words at 5 minutes; Can spell world backwards    - Cranial Nerves:  II: Visual fields are full to confrontation; pupils are equal, round, and reactive to light   III, IV, VI: Extraocular movements are intact without nystagmus  V: Facial sensation is intact in the V1-V3 distribution bilaterally  VII: Face is asymmetric with normal eye closure, L facial droop  VIII: Hearing is intact to conversation  IX, X: Uvula is midline and soft palate rises symmetrically  XI: Shoulder shrug intact  XII: Tongue protrudes in the midline    - Motor/Strength Testing:  - No drifts x 4 extremities.                                   Right           Left  Deltoid                     5                 2  Biceps                      5                 2  Triceps                     5                 2  Wrist Ext (radial)       5                 2  Hand                 5                 2    Hip Flex                   5                 4  Knee Ext	      5                  4  Dorsiflex                  5                 4  Plantarflex               5                  4    - There is no pronator drift.   - Normal muscle bulk and tone throughout. No rigidity noted    - Reflexes:   Bicep (C5/C6):                   R 2+ --- L 2+   Brachioradialis (C5/C6) :    R 2+ --- L 2+   Patella (L3/L4) :                 R 2+ --- L 2+   Ankle (S1) :                       R 2+ --- L 2+     - Plant responses down bilaterally.    - Sensory: Intact throughout to light touch x 4 extremities.  - Coordination: Finger-nose-finger intact without ataxia or dysmetria.    - Gait: Not tested due to pt fatigue        Labs:                        15.6   7.48  )-----------( 115      ( 05 Jul 2023 06:59 )             47.9     07-05    136  |  102  |  18  ----------------------------<  99  5.4<H>   |  16<L>  |  0.43<L>    Ca    8.5      05 Jul 2023 06:59  Phos  3.5     07-04  Mg     2.00     07-04    TPro  6.2  /  Alb  3.3  /  TBili  0.6  /  DBili  <0.2  /  AST  83<H>  /  ALT  54<H>  /  AlkPhos  58  07-05    CAPILLARY BLOOD GLUCOSE        LIVER FUNCTIONS - ( 05 Jul 2023 06:59 )  Alb: 3.3 g/dL / Pro: 6.2 g/dL / ALK PHOS: 58 U/L / ALT: 54 U/L / AST: 83 U/L / GGT: x             PT/INR - ( 04 Jul 2023 17:02 )   PT: 13.0 sec;   INR: 1.12 ratio         PTT - ( 04 Jul 2023 17:02 )  PTT:25.5 sec  CSF:                  Radiology:     Neurology - Consult Note - 07-05-23  -  Spectra: [phone] ([facility]), [phone] ([facility])  -    Name: KATIE VALLES; 67y (1955)    Reason for Admission: Heart disease        Chief Complaint:   HPI:  Patient is a 68 yo M, PMH of glioblastoma on active chemo therapy ( (IV Avastin today and to start TMZ, last dose 5/2/2023) followed by Dr. Carias, prostate cancer s/p prostatectomy in 2010, Afib on xarelto, HTN, and seizure disorder presents to the hospital from [place] for further management of his seizures and glioblastoma. Neurology now consulted for follow up and seizure management. Patient is a AoX3 and able to provide some history.     Pt states that he did not experience any neurological symptoms until 8/26/22 when he was driving and experienced an episode of head and neck turning to the right, dysarthria, and L facial droop. 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 The patient was started on Keppra for seizure management and was well controlled. Pt completed first round of chemoRT (TMZ) in 10/22. Patient is followed by Dr. Carias and IV Avastin infusion was added to TMZ cycles.    On 5/11/23, patient was vacationing in [place] with his family and experienced a witnessed focal seizure (pt describes as facial twitching that spread to upper proximal extremities. Patient was admitted to a hospital in [place] and hospital course was complicated by recurrent seizure and afib, requiring intubation and ICU admission. MRI showed possible progression of disease vs. small strokes. EEG were indeterminate. He was started on new AEDs (Vimpat 200 mg BID) which stabilized his seizures. Neurology were consulted but they had difficulty assessing the new changes in his brain as the old MRIs were not available.    During the admission in [place], pt also underwent trach and PEG placement because of prolonged ventilation requirement. He was then decannulated around 6/6/2023 and underwent S+S on 6/30/2023, which showed microaspiration/leakage. Because all of his oncology care is in [place], daughter decided to bring him to [facility].  He has chronic weakness in the L arm. On interview, patient's main complaint is the L sided weakness in UE and LE. Denies any seizure episode since May 11.    Current home medications:  lacosamide 200mg BID  Depakene 250mg/5ml 5ml TID  Keppra 1g BID  clonazepam 0.5mg BID  dexamethasone 4mg BID  xarelto 20mg daily  ezetimibe 10mg daily        Review of Systems:  INCOMPLETE   CONSTITUTIONAL: No fevers or chills  EYES/ENT: No visual changes or no throat pain   NECK: No pain or stiffness  RESPIRATORY: No hemoptysis or shortness of breath  CARDIOVASCULAR: No chest pain or palpitations  GASTROINTESTINAL: No melena or hematochezia  GENITOURINARY: No dysuria or hematuria  NEUROLOGICAL: +As stated in HPI above  SKIN: No itching, burning, rashes, or lesions   All other review of systems is negative unless indicated above.    Allergies:  No Known Allergies      PMHx:   GERD (gastroesophageal reflux disease)    Prostate ca    Hypertension    Gout    Atrial fibrillation    Splenic infarction    2019 novel coronavirus disease (COVID-19)    GERD (gastroesophageal reflux disease)      PFHx:   No pertinent family history in first degree relatives    Family history of diabetes mellitus (DM) (Mother)    Family history of TIAs (Mother)    Family hx of prostate cancer (Father)    Family history of bone cancer (Father)    Family history of appendicitis      PSuHx:   No significant past surgical history    H/O prostatectomy    H/O arthroscopy of right knee    H/O colonoscopy    H/O endoscopy    Elective surgery        Medications:  MEDICATIONS  (STANDING):  allopurinol 100 milliGRAM(s) Oral daily  brimonidine 0.2% Ophthalmic Solution 1 Drop(s) Both EYES two times a day  dexAMETHasone     Tablet 4 milliGRAM(s) Oral two times a day  famotidine   Suspension 40 milliGRAM(s) Oral daily  hydrALAZINE 25 milliGRAM(s) Oral three times a day  lacosamide 200 milliGRAM(s) Oral two times a day  levETIRAcetam 1000 milliGRAM(s) Oral two times a day  losartan 50 milliGRAM(s) Oral daily  phenazopyridine 100 milliGRAM(s) Oral every 8 hours  rivaroxaban 20 milliGRAM(s) Oral with dinner  timolol 0.25% Solution 1 Drop(s) Both EYES two times a day  valproic  acid Syrup 250 milliGRAM(s) Oral three times a day    MEDICATIONS  (PRN):  acetaminophen     Tablet .. 650 milliGRAM(s) Oral every 6 hours PRN Temp greater or equal to 38C (100.4F), Mild Pain (1 - 3)  aluminum hydroxide/magnesium hydroxide/simethicone Suspension 30 milliLiter(s) Oral every 4 hours PRN Dyspepsia  clonazePAM  Tablet 0.5 milliGRAM(s) Oral two times a day PRN anxiety  guaiFENesin Oral Liquid (Sugar-Free) 200 milliGRAM(s) Oral every 6 hours PRN Cough  melatonin 3 milliGRAM(s) Oral at bedtime PRN Insomnia  ondansetron Injectable 4 milliGRAM(s) IV Push every 8 hours PRN Nausea and/or Vomiting      Vitals:  T(C): 36.6 (07-05-23 @ 05:40), Max: 36.6 (07-05-23 @ 05:40)  HR: 81 (07-05-23 @ 05:40) (81 - 99)  BP: 162/102 (07-05-23 @ 05:40) (137/97 - 162/102)  RR: 18 (07-05-23 @ 05:40) (18 - 18)  SpO2: 97% (07-05-23 @ 05:40) (97% - 100%)    Physical Examination: INCOMPLETE    Constitutional: well-developed, well-nourished, well-groomed  Eyes: ophthalmoscopic exam deferred secondary to COVID-19 pandemic  Cardiovascular: no swelling, warm-to-touch    Neurological Examination:    - Mental Status: Eyes open, attends to examiner; oriented to person, age, month, year, location, and situation; speech is dysarthric (secondary to L facial droop), fluent with intact naming, intact repetition, and follows 1-step and 3-step mid-line crossing commands; good overall fund of knowledge (aware of current events, relevant past history, and vocabulary appropriate for level of education); immediate recall is 3/3 words and delayed recall is 2/3 words at 5 minutes; Can spell world backwards    - Cranial Nerves:  II: Visual fields are full to confrontation; pupils are equal, round, and reactive to light   III, IV, VI: Extraocular movements are intact without nystagmus  V: Facial sensation is intact in the V1-V3 distribution bilaterally  VII: Face is asymmetric with normal eye closure, L facial droop  VIII: Hearing is intact to conversation  IX, X: Uvula is midline and soft palate rises symmetrically  XI: Shoulder shrug intact  XII: Tongue protrudes in the midline    - Motor/Strength Testing:  - No drifts x 4 extremities.                                   Right           Left  Deltoid                     5                 2  Biceps                      5                 2  Triceps                     5                 2  Wrist Ext (radial)       5                 2  Hand                 5                 2    Hip Flex                   5                 4  Knee Ext	      5                  4  Dorsiflex                  5                 4  Plantarflex               5                  4    - There is no pronator drift.   - Normal muscle bulk and tone throughout. No rigidity noted    - Reflexes:   Bicep (C5/C6):                   R 2+ --- L 2+   Brachioradialis (C5/C6) :    R 2+ --- L 2+   Patella (L3/L4) :                 R 2+ --- L 2+   Ankle (S1) :                       R 2+ --- L 2+     - Plant responses down bilaterally.    - Sensory: Intact throughout to light touch x 4 extremities.  - Coordination: Finger-nose-finger intact without ataxia or dysmetria.    - Gait: Not tested due to pt fatigue        Labs:                        15.6   7.48  )-----------( 115      ( 05 Jul 2023 06:59 )             47.9     07-05    136  |  102  |  18  ----------------------------<  99  5.4<H>   |  16<L>  |  0.43<L>    Ca    8.5      05 Jul 2023 06:59  Phos  3.5     07-04  Mg     2.00     07-04    TPro  6.2  /  Alb  3.3  /  TBili  0.6  /  DBili  <0.2  /  AST  83<H>  /  ALT  54<H>  /  AlkPhos  58  07-05    CAPILLARY BLOOD GLUCOSE        LIVER FUNCTIONS - ( 05 Jul 2023 06:59 )  Alb: 3.3 g/dL / Pro: 6.2 g/dL / ALK PHOS: 58 U/L / ALT: 54 U/L / AST: 83 U/L / GGT: x             PT/INR - ( 04 Jul 2023 17:02 )   PT: 13.0 sec;   INR: 1.12 ratio         PTT - ( 04 Jul 2023 17:02 )  PTT:25.5 sec  CSF:                  Radiology:     Neurology - Consult Note - 07-05-23  -  Spectra: [phone] ([facility]), [phone] ([facility])  -    Name: KATIE VALLES; 67y (1955)    Reason for Admission: Heart disease        Chief Complaint:   HPI:  Patient is a 68 yo M, PMH of glioblastoma on active chemo therapy ( (IV Avastin today and to start TMZ, last dose 5/2/2023) followed by Dr. Carias, prostate cancer s/p prostatectomy in 2010, Afib on xarelto, HTN, and seizure disorder presents to the hospital from [place] for further management of his seizures and glioblastoma. Neurology now consulted for follow up and seizure management. Patient is a AoX3 and able to provide some history.     Pt states that he did not experience any neurological symptoms until 8/26/22 when he was driving and experienced an episode of head and neck turning to the right, dysarthria, and L facial droop. 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 The patient was started on Keppra for seizure management and was well controlled. Pt completed first round of chemoRT (TMZ) in 10/22. Patient is followed by Dr. Carias and IV Avastin infusion was added to TMZ cycles.    On 5/11/23, patient was vacationing in [place] with his family and experienced a witnessed focal seizure (pt describes as facial twitching that spread to upper proximal extremities. Patient was admitted to a hospital in [place] and hospital course was complicated by recurrent seizure and afib, requiring intubation and ICU admission. MRI showed possible progression of disease vs. small strokes. EEG were indeterminate. He was started on new AEDs (added Vimpat 200 mg BID and increased Keppra to 1000 mg BID) which stabilized his seizures. Neurology were consulted but they had difficulty assessing the new changes in his brain as the old MRIs were not available.    During the admission in [place], pt also underwent trach and PEG placement because of prolonged ventilation requirement. He was then decannulated around 6/6/2023 and underwent S+S on 6/30/2023, which showed microaspiration/leakage. Because all of his oncology care is in [place], daughter decided to bring him to [facility].  He has chronic weakness in the L arm. On interview, patient's main complaint is the L sided weakness in UE and LE. Denies any seizure episode since May 11.    Current home medications:  lacosamide 200mg BID  Depakene 250mg/5ml 5ml TID  Keppra 1g BID  clonazepam 0.5mg BID  dexamethasone 4mg BID  xarelto 20mg daily  ezetimibe 10mg daily        Review of Systems:  INCOMPLETE   CONSTITUTIONAL: No fevers or chills  EYES/ENT: No visual changes or no throat pain   NECK: No pain or stiffness  RESPIRATORY: No hemoptysis or shortness of breath  CARDIOVASCULAR: No chest pain or palpitations  GASTROINTESTINAL: No melena or hematochezia  GENITOURINARY: No dysuria or hematuria  NEUROLOGICAL: +As stated in HPI above  SKIN: No itching, burning, rashes, or lesions   All other review of systems is negative unless indicated above.    Allergies:  No Known Allergies      PMHx:   GERD (gastroesophageal reflux disease)    Prostate ca    Hypertension    Gout    Atrial fibrillation    Splenic infarction    2019 novel coronavirus disease (COVID-19)    GERD (gastroesophageal reflux disease)      PFHx:   No pertinent family history in first degree relatives    Family history of diabetes mellitus (DM) (Mother)    Family history of TIAs (Mother)    Family hx of prostate cancer (Father)    Family history of bone cancer (Father)    Family history of appendicitis      PSuHx:   No significant past surgical history    H/O prostatectomy    H/O arthroscopy of right knee    H/O colonoscopy    H/O endoscopy    Elective surgery        Medications:  MEDICATIONS  (STANDING):  allopurinol 100 milliGRAM(s) Oral daily  brimonidine 0.2% Ophthalmic Solution 1 Drop(s) Both EYES two times a day  dexAMETHasone     Tablet 4 milliGRAM(s) Oral two times a day  famotidine   Suspension 40 milliGRAM(s) Oral daily  hydrALAZINE 25 milliGRAM(s) Oral three times a day  lacosamide 200 milliGRAM(s) Oral two times a day  levETIRAcetam 1000 milliGRAM(s) Oral two times a day  losartan 50 milliGRAM(s) Oral daily  phenazopyridine 100 milliGRAM(s) Oral every 8 hours  rivaroxaban 20 milliGRAM(s) Oral with dinner  timolol 0.25% Solution 1 Drop(s) Both EYES two times a day  valproic  acid Syrup 250 milliGRAM(s) Oral three times a day    MEDICATIONS  (PRN):  acetaminophen     Tablet .. 650 milliGRAM(s) Oral every 6 hours PRN Temp greater or equal to 38C (100.4F), Mild Pain (1 - 3)  aluminum hydroxide/magnesium hydroxide/simethicone Suspension 30 milliLiter(s) Oral every 4 hours PRN Dyspepsia  clonazePAM  Tablet 0.5 milliGRAM(s) Oral two times a day PRN anxiety  guaiFENesin Oral Liquid (Sugar-Free) 200 milliGRAM(s) Oral every 6 hours PRN Cough  melatonin 3 milliGRAM(s) Oral at bedtime PRN Insomnia  ondansetron Injectable 4 milliGRAM(s) IV Push every 8 hours PRN Nausea and/or Vomiting      Vitals:  T(C): 36.6 (07-05-23 @ 05:40), Max: 36.6 (07-05-23 @ 05:40)  HR: 81 (07-05-23 @ 05:40) (81 - 99)  BP: 162/102 (07-05-23 @ 05:40) (137/97 - 162/102)  RR: 18 (07-05-23 @ 05:40) (18 - 18)  SpO2: 97% (07-05-23 @ 05:40) (97% - 100%)    Physical Examination: INCOMPLETE    Constitutional: well-developed, well-nourished, well-groomed  Eyes: ophthalmoscopic exam deferred secondary to COVID-19 pandemic  Cardiovascular: no swelling, warm-to-touch    Neurological Examination:    - Mental Status: Eyes open, attends to examiner; oriented to person, age, month, year, location, and situation; speech is dysarthric (secondary to L facial droop), fluent with intact naming, intact repetition, and follows 1-step and 3-step mid-line crossing commands; good overall fund of knowledge (aware of current events, relevant past history, and vocabulary appropriate for level of education); immediate recall is 3/3 words and delayed recall is 2/3 words at 5 minutes; Can spell world backwards    - Cranial Nerves:  II: Visual fields are full to confrontation; pupils are equal, round, and reactive to light   III, IV, VI: Extraocular movements are intact without nystagmus  V: Facial sensation is intact in the V1-V3 distribution bilaterally  VII: Face is asymmetric with normal eye closure, L facial droop  VIII: Hearing is intact to conversation  IX, X: Uvula is midline and soft palate rises symmetrically  XI: Shoulder shrug intact  XII: Tongue protrudes in the midline    - Motor/Strength Testing:  - No drifts x 4 extremities.                                   Right           Left  Deltoid                     5                 2  Biceps                      5                 2  Triceps                     5                 2  Wrist Ext (radial)       5                 2  Hand                 5                 2    Hip Flex                   5                 4  Knee Ext	      5                  4  Dorsiflex                  5                 4  Plantarflex               5                  4    - There is no pronator drift.   - Normal muscle bulk and tone throughout. No rigidity noted    - Reflexes:   Bicep (C5/C6):                   R 2+ --- L 2+   Brachioradialis (C5/C6) :    R 2+ --- L 2+   Patella (L3/L4) :                 R 2+ --- L 2+   Ankle (S1) :                       R 2+ --- L 2+     - Plant responses down bilaterally.    - Sensory: Intact throughout to light touch x 4 extremities.  - Coordination: Finger-nose-finger intact without ataxia or dysmetria.    - Gait: Not tested due to pt fatigue        Labs:                        15.6   7.48  )-----------( 115      ( 05 Jul 2023 06:59 )             47.9     07-05    136  |  102  |  18  ----------------------------<  99  5.4<H>   |  16<L>  |  0.43<L>    Ca    8.5      05 Jul 2023 06:59  Phos  3.5     07-04  Mg     2.00     07-04    TPro  6.2  /  Alb  3.3  /  TBili  0.6  /  DBili  <0.2  /  AST  83<H>  /  ALT  54<H>  /  AlkPhos  58  07-05    CAPILLARY BLOOD GLUCOSE        LIVER FUNCTIONS - ( 05 Jul 2023 06:59 )  Alb: 3.3 g/dL / Pro: 6.2 g/dL / ALK PHOS: 58 U/L / ALT: 54 U/L / AST: 83 U/L / GGT: x             PT/INR - ( 04 Jul 2023 17:02 )   PT: 13.0 sec;   INR: 1.12 ratio         PTT - ( 04 Jul 2023 17:02 )  PTT:25.5 sec  CSF:                  Radiology:

## 2023-07-05 NOTE — PROGRESS NOTE ADULT - PROBLEM SELECTOR PLAN 4
0 C/w hydralazine 25 mg TID and losartan 25 mg daily BP above goal   C/w hydralazine 25 mg TID   - Given increased BP, will increase  losartan 100 mg daily

## 2023-07-05 NOTE — PROGRESS NOTE ADULT - PROBLEM SELECTOR PLAN 2
Patient had recurrent seizures requiring ICU admission at [facility]. As per daughter, EEG at other hospital unable to detect seizures because they were "coming from deep inside his brain." No further seizure since his admission at the OSH  -Neurology consulted, appreciate recs   -C/w vimpat, keppra, and valproic acid   -C/w dexamethasone 4 mg BID   -F/u with MRI brain - if neurology feels necessary for repeat Patient had recurrent seizures requiring ICU admission at [facility]SH. As per daughter, EEG at other hospital unable to detect seizures because they were "coming from deep inside his brain." No further seizure since his admission at the OSH  -Neurology consulted, appreciate recs   -C/w vimpat, keppra, and valproic acid   -C/w dexamethasone 4 mg BID   -F/u with MRI brain - if neurology feels necessary for repeat ; may consider CT head if unable to get images

## 2023-07-06 NOTE — PHYSICAL THERAPY INITIAL EVALUATION ADULT - PERTINENT HX OF CURRENT PROBLEM, REHAB EVAL
67 year old male with Pmhx of glioblastoma on active chemo therapy (last dose 5/2/2023), Afib on xarelto, HTN, and seizure disorder presents to the hospital from [place] for further management of his seizures and glioblastoma.

## 2023-07-06 NOTE — PROGRESS NOTE ADULT - SUBJECTIVE AND OBJECTIVE BOX
[facility]  Division of Hospital Medicine  Bessy Garcia MD  Pager: [phone]      Patient is a 67y old  Male who presents with a chief complaint of Seizure (05 Jul 2023 14:57)      SUBJECTIVE / OVERNIGHT EVENTS: Patient examined at bedside. Reports feeling deconditioned. Inquiring about trach fix and also asking about eating. Will repeat barium swallow.   ADDITIONAL REVIEW OF SYSTEMS:    MEDICATIONS  (STANDING):  allopurinol 100 milliGRAM(s) Oral daily  brimonidine 0.2% Ophthalmic Solution 1 Drop(s) Both EYES two times a day  famotidine   Suspension 40 milliGRAM(s) Oral daily  hydrALAZINE 25 milliGRAM(s) Oral three times a day  lacosamide 200 milliGRAM(s) Oral two times a day  levETIRAcetam 1000 milliGRAM(s) Oral two times a day  losartan 100 milliGRAM(s) Oral daily  phenazopyridine 100 milliGRAM(s) Oral every 8 hours  rivaroxaban 20 milliGRAM(s) Oral with dinner  timolol 0.25% Solution 1 Drop(s) Both EYES two times a day  valproic  acid Syrup 250 milliGRAM(s) Oral three times a day    MEDICATIONS  (PRN):  acetaminophen     Tablet .. 650 milliGRAM(s) Oral every 6 hours PRN Temp greater or equal to 38C (100.4F), Mild Pain (1 - 3)  aluminum hydroxide/magnesium hydroxide/simethicone Suspension 30 milliLiter(s) Oral every 4 hours PRN Dyspepsia  clonazePAM  Tablet 0.5 milliGRAM(s) Oral two times a day PRN anxiety  guaiFENesin Oral Liquid (Sugar-Free) 200 milliGRAM(s) Oral every 6 hours PRN Cough  melatonin 3 milliGRAM(s) Oral at bedtime PRN Insomnia  ondansetron Injectable 4 milliGRAM(s) IV Push every 8 hours PRN Nausea and/or Vomiting      CAPILLARY BLOOD GLUCOSE      POCT Blood Glucose.: 100 mg/dL (06 Jul 2023 11:46)    I&O's Summary    05 Jul 2023 07:01  -  06 Jul 2023 07:00  --------------------------------------------------------  IN: 0 mL / OUT: 1050 mL / NET: -1050 mL        PHYSICAL EXAM:  Vital Signs Last 24 Hrs  T(C): 36.4 (06 Jul 2023 12:37), Max: 36.9 (05 Jul 2023 21:00)  T(F): 97.6 (06 Jul 2023 12:37), Max: 98.4 (05 Jul 2023 21:00)  HR: 99 (06 Jul 2023 12:37) (77 - 99)  BP: 162/106 (06 Jul 2023 12:37) (141/106 - 162/106)  BP(mean): --  RR: 18 (06 Jul 2023 12:37) (18 - 18)  SpO2: 100% (06 Jul 2023 12:37) (99% - 100%)    Parameters below as of 06 Jul 2023 09:37  Patient On (Oxygen Delivery Method): room air      GENERAL: Middle age man in NAD, well-developed  HEENT:  Atraumatic, Normocephalic, EOMI, PERRLA, conjunctiva and sclera clear, oral mucosa moist, clear w/o any exudate   NECK: Supple, No JVD  CHEST/LUNG: Clear to auscultation bilaterally; No wheeze  HEART: Regular rate and rhythm; No murmurs, rubs, or gallops  ABDOMEN: Soft, Nontender, Nondistended; Bowel sounds present  EXTREMITIES:  2+ Peripheral Pulses, No clubbing, cyanosis, or edema  PSYCH: AAOx3  NEUROLOGY: L sided weakness, chronic L facial droop.   SKIN: No rashes or lesions    LABS:                        14.6   6.56  )-----------( 109      ( 06 Jul 2023 05:24 )             43.5     07-06    138  |  104  |  20  ----------------------------<  91  3.8   |  21<L>  |  0.48<L>    Ca    8.5      06 Jul 2023 05:24  Phos  3.9     07-06  Mg     2.10     07-06    TPro  5.2<L>  /  Alb  3.1<L>  /  TBili  0.5  /  DBili  <0.2  /  AST  21  /  ALT  40  /  AlkPhos  54  07-06    PT/INR - ( 04 Jul 2023 17:02 )   PT: 13.0 sec;   INR: 1.12 ratio         PTT - ( 04 Jul 2023 17:02 )  PTT:25.5 sec      Urinalysis Basic - ( 06 Jul 2023 05:24 )    Color: x / Appearance: x / SG: x / pH: x  Gluc: 91 mg/dL / Ketone: x  / Bili: x / Urobili: x   Blood: x / Protein: x / Nitrite: x   Leuk Esterase: x / RBC: x / WBC x   Sq Epi: x / Non Sq Epi: x / Bacteria: x          RADIOLOGY & ADDITIONAL TESTS:  Results Reviewed:   Imaging Personally Reviewed:  Electrocardiogram Personally Reviewed:    COORDINATION OF CARE:  Care Discussed with Consultants/Other Providers [Y/N]:  Prior or Outpatient Records Reviewed [Y/N]:

## 2023-07-06 NOTE — DIETITIAN INITIAL EVALUATION ADULT - ENTERAL
Jevity 1.5cal @ 20 ml/hr, increase TF rate by 10 ml every 8 hrs or as tolerates to goal of Jevity 1.5cal @ 55 ml/hr x 24 hrs (to provide ~1320 ml formula, ~1980 Kcal, ~84 gm Protein in 24 hrs)

## 2023-07-06 NOTE — PHYSICAL THERAPY INITIAL EVALUATION ADULT - LEVEL OF INDEPENDENCE: SIT/STAND, REHAB EVAL
attempted 2 times but patient unable to stand despite max effort/dependent (less than 25% patients effort)

## 2023-07-06 NOTE — PROGRESS NOTE ADULT - PROBLEM SELECTOR PLAN 2
Patient had recurrent seizures requiring ICU admission at [facility]SH. As per daughter, EEG at other hospital unable to detect seizures because they were "coming from deep inside his brain." No further seizure since his admission at the OSH  -Neurology consulted, appreciate recs   -C/w vimpat, keppra, and valproic acid   -C/w dexamethasone 4 mg BID   [ ] F/u with MRI brain

## 2023-07-06 NOTE — DIETITIAN INITIAL EVALUATION ADULT - ADD RECOMMEND
1. Monitor Tube Feeding tolerance; Add free water flushes per MD discretion;   2. Add Multivitamin/minerals/iron Solution (CENTRUM) - 15 milliLiter(s) daily, for micronutrient coverage;   3. Monitor labs, hydration status;   4. Suggest SLP evaluation before starting PO diet;   Reconsult nutrition if warranted; RD remains available

## 2023-07-06 NOTE — PROGRESS NOTE ADULT - PROBLEM SELECTOR PLAN 5
DVT ppx-xarelto 20 mg daily  Dispo: PT consult - * IF rehab recommended, patient would benefit from center, such as [facility] which allows him to get chemo drug q2 weeks

## 2023-07-06 NOTE — PHYSICAL THERAPY INITIAL EVALUATION ADULT - ACTIVE RANGE OF MOTION EXAMINATION, REHAB EVAL
left Upper Extremity shoulder flexion 0-100 degrees; left elbow flexion/extension -10 degrees from neutral; left ankle df/pf 0-3 degrees/Right UE Active ROM was WFL (within functional limits)/Right LE Active ROM was WFL (within functional limits)

## 2023-07-06 NOTE — PHYSICAL THERAPY INITIAL EVALUATION ADULT - ADDITIONAL COMMENTS
Pt reports has been bedbound for last few months. As per patient report, patient was in rehab in [place] and was standing with assist of 3 people.

## 2023-07-06 NOTE — PROGRESS NOTE ADULT - PROBLEM SELECTOR PLAN 1
Patient with hx of glioblastoma s/p RT and chemotherapy   -Recent admission at OSH for seizures, found to have progression of tumor vs. new strokes   -Neurology consulted, appreciate recs   -  Routine EEG; -Right hemispheric focal cerebral dysfunction can be structural or functional in etiology.  -Single-lead EKG shows irregularly irregular rhythm.  -Neuro onc consulted, awaiting recs   - Outpatient images uploaded   - Discussed with Dr. Mcpherson - will attempt uploading images, if not may consider CT head   [ ] PT consult  [ ] Repeat MRi with contrast

## 2023-07-06 NOTE — PHYSICAL THERAPY INITIAL EVALUATION ADULT - PLANNED THERAPY INTERVENTIONS, PT EVAL
Patient left positioned for safety in bed in NAD, call bell in reach, all lines intact. +bed alarm./balance training/bed mobility training/gait training/ROM/strengthening/transfer training

## 2023-07-06 NOTE — PROGRESS NOTE ADULT - ASSESSMENT
66 yo M with Pmhx of glioblastoma on active chemo therapy (last dose 5/2/2023), Afib on xarelto, HTN, and seizure disorder presents to the hospital from [place] for further management of his seizures and glioblastoma. Awaiting EEG

## 2023-07-06 NOTE — DIETITIAN INITIAL EVALUATION ADULT - NS FNS DIET ORDER
NPO with Tube Feed: Tube Feeding Modality: Gastrostomy   Vital AF 1.2     Total Volume for 24 Hours (mL): 120 Continuous  Starting Tube Feed Rate {mL per Hour}: 10    Until Goal Tube Feed Rate (mL per Hour): 10     Tube Feed Duration (in Hours): 12   Tube Feed Start Time: 18:00 (07-04-23 @ 18:14) [Active]  --> to provide 144 Kcal, ~9 gm protein

## 2023-07-06 NOTE — DIETITIAN INITIAL EVALUATION ADULT - PERTINENT LABORATORY DATA
07-06    138  |  104  |  20  ----------------------------<  91  3.8   |  21<L>  |  0.48<L>    Ca    8.5      06 Jul 2023 05:24  Phos  3.9     07-06  Mg     2.10     07-06    TPro  5.2<L>  /  Alb  3.1<L>  /  TBili  0.5  /  DBili  <0.2  /  AST  21  /  ALT  40  /  AlkPhos  54  07-06  POCT Blood Glucose.: 100 mg/dL (07-06-23 @ 11:46)  A1C with Estimated Average Glucose Result: 5.5 % (07-05-23 @ 06:59)  A1C with Estimated Average Glucose Result: 5.5 % (08-28-22 @ 08:17)

## 2023-07-06 NOTE — PROGRESS NOTE ADULT - PROBLEM SELECTOR PLAN 4
BP above goal   C/w hydralazine 25 mg TID   - Given increased BP, will increase  losartan 100 mg daily

## 2023-07-06 NOTE — DIETITIAN INITIAL EVALUATION ADULT - HEIGHT FOR BMI (CENTIMETERS)
----- Message from ALEXIS Hunter sent at 2/21/2019  8:03 AM CST -----  Appt to discuss elevated cholesterol.   172.72

## 2023-07-06 NOTE — PHYSICAL THERAPY INITIAL EVALUATION ADULT - PREDICTED DURATION OF THERAPY (DAYS/WKS), PT EVAL
Tetanus immunization given. Verbal order for injection from ALEKSANDRA Lane.  Patient tolerated without incident. See immunization grid for documentation.      4 weeks

## 2023-07-06 NOTE — DIETITIAN INITIAL EVALUATION ADULT - OTHER INFO
Pt 66 yo male with PMHx of glioblastoma on active chemo therapy (last dose 5/2/2023), Afib, HTN, seizure disorder s/p trach and peg presented from [place] - per chart review.     At time of visit, Pt awake, alert. Pt NPO with Tube Feeding via PEG: Vital AF 1.2cal @ 10 ml/hr infusing at time of visit. No report of nausea, vomiting or diarrhea @ this time. +Last BM (7/5) per flow sheet. Per Pt, his height: ~68"; Pt's UBW: ~217# "few months ago". Pt with unintended weight loss since then. Of note, Pt's weights: 81.1 kg (4/7/23), 92.3 kg (HIE - 4/4/23) --> weight loss of 11.2 kg in 3 months -> weight change 12.1% x 3 months. Case discussed with nurse. ENT to evaluate Pt, reported.

## 2023-07-06 NOTE — PROGRESS NOTE ADULT - SUBJECTIVE AND OBJECTIVE BOX
interval history  Patient had trach removed ~4 weeks ago. The tracheal stoma is still open with "bubbling" with speaking. Patient denies difficulty with po intake. Denies excessive drainage, erythema, or pain at the stomal site       Physical Exam    General: NAD, A+Ox3  No respiratory distress, stridor, or stertor  Voice quality: normal  Face:  Symmetric without masses or lesions  OU: PERRL, EOMI  Nose: nasal cavity clear bilaterally, inferior turbinates normal, mucosa normal without crusting or bleeding  Neck: soft/flat, no LAD, ~2.5cm tracheal stoma opening, with air leakage with speaking. No excessive secretions. Steri strips placed   CNII-XII intact      Assessmnet and Plan"   67y year old Male with an extensive pmhx who presents with persistent opening of tracheal stoma ~4 weeks after decannulation     -Will treat conservatively  -do not pull steri strips off. They will fall off on their own.   -Change stoma dressing daily with and occlusive dressing. fold a 4x4 and tape with silk tape or tegaderm (not paper tape)  -Instruct the patient to hold pressure over the stoma dressing when coughing or speaking. This prevents air from leaking from the stoma and expedites healing  -Patient informed that If the stoma does not close, patient can follow-up in clinic to discuss further management.    -ENT signing off  -For new questions please page ENT [phone]       interval history  Patient had trach removed ~4 weeks ago. The tracheal stoma is still open with "bubbling" with speaking. Patient denies difficulty with po intake. Denies excessive drainage, erythema, or pain at the stomal site       Physical Exam    General: NAD, A+Ox3  No respiratory distress, stridor, or stertor  Voice quality: normal  Face:  Symmetric without masses or lesions  OU: PERRL, EOMI  Nose: nasal cavity clear bilaterally, inferior turbinates normal, mucosa normal without crusting or bleeding  Neck: soft/flat, no LAD, ~2.5cm tracheal stoma opening, with air leakage with speaking. No excessive secretions. Steri strips placed   CNII-XII intact      Assessmnet and Plan"   67y year old Male with an extensive pmhx who presents with persistent opening of tracheal stoma ~4 weeks after decannulation     -Will treat conservatively  -do not pull steri strips off. They will fall off on their own.   -Change stoma dressing daily with and occlusive dressing. fold a 4x4 and tape with silk tape or tegaderm (not paper tape)  -Instruct the patient to hold pressure over the stoma dressing when coughing or speaking. This prevents air from leaking from the stoma and expedites healing  -Patient informed that If the stoma does not close, patient can follow-up in clinic to discuss further management.  -Patient can eat with a persistent trach stomal opening    -ENT signing off  -For new questions please page ENT [phone]

## 2023-07-07 NOTE — PROGRESS NOTE ADULT - PROBLEM SELECTOR PLAN 1
Patient with hx of glioblastoma s/p RT and chemotherapy   -Recent admission at OSH for seizures, found to have progression of tumor vs. new strokes   -Neurology consulted, appreciate recs   -  Routine EEG; -Right hemispheric focal cerebral dysfunction can be structural or functional in etiology.  -Single-lead EKG shows irregularly irregular rhythm.   -Neuro onc consulted, awaiting recs   - Outpatient images uploaded   - Discussed with Dr. Mcpherson - will attempt uploading images, if not may consider CT head   [ ] PT consult - restorative rehab   [ ] Repeat MRi with contrast

## 2023-07-07 NOTE — PROGRESS NOTE ADULT - PROBLEM SELECTOR PLAN 2
Patient had recurrent seizures requiring ICU admission at OSH. As per daughter, EEG at other hospital unable to detect seizures because they were "coming from deep inside his brain." No further seizure since his admission at the OSH  -Neurology consulted, appreciate recs   -C/w vimpat, keppra, and valproic acid   -C/w dexamethasone 4 mg BID -> daily   [ ] F/u with MRI brain

## 2023-07-07 NOTE — PROGRESS NOTE ADULT - ASSESSMENT
GBM, was on avastin/temodar, s/p prolonged hospitalization [place] another state for complication related to seizures, now improved, but scan with small volume progression.      MRI brain here pending  continue current aeds, 3 meds  decadron 4 daily, taper outpatient  will need DEREK and f/u with Dr. Carias to discuss resuming oral chemo and likely avastin

## 2023-07-07 NOTE — PROGRESS NOTE ADULT - SUBJECTIVE AND OBJECTIVE BOX
NEURO-ONCOLOGY    67m with right temporal GBM, on TMZ and avastin (last 5/2/23), transferred to [facility] from [place] after prolonged illness with seizures, requiring intubation and trach/peg, improved markedly but with residual LHP  MRI brain with mild progression parasagittally but no tx in 2mo     INTERVAL HISTORY:  no MRI as yet.      MEDICATIONS  (STANDING):  allopurinol 100 milliGRAM(s) Oral daily  brimonidine 0.2% Ophthalmic Solution 1 Drop(s) Both EYES two times a day  dexAMETHasone     Tablet 4 milliGRAM(s) Oral daily  famotidine   Suspension 40 milliGRAM(s) Oral daily  hydrALAZINE 25 milliGRAM(s) Oral three times a day  lacosamide 200 milliGRAM(s) Oral two times a day  levETIRAcetam 1000 milliGRAM(s) Oral two times a day  losartan 100 milliGRAM(s) Oral daily  multivitamin/minerals/iron Oral Solution (CENTRUM) 15 milliLiter(s) Oral daily  rivaroxaban 20 milliGRAM(s) Oral with dinner  timolol 0.25% Solution 1 Drop(s) Both EYES two times a day  valproic  acid Syrup 250 milliGRAM(s) Oral three times a day      Vital Signs Last 24 Hrs  T(C): 36.8 (07 Jul 2023 13:59), Max: 36.9 (07 Jul 2023 12:19)  T(F): 98.2 (07 Jul 2023 13:59), Max: 98.4 (07 Jul 2023 12:19)  HR: 97 (07 Jul 2023 13:59) (75 - 97)  BP: 148/92 (07 Jul 2023 13:59) (133/100 - 158/90)  BP(mean): --  RR: 18 (07 Jul 2023 13:59) (18 - 19)  SpO2: 100% (07 Jul 2023 13:59) (100% - 100%)    Parameters below as of 07 Jul 2023 13:59  Patient On (Oxygen Delivery Method): room air      (Exam as noted with exceptions in parentheses)    General:  Healthy appearing Male well groomed and without deformities.     Mental Status:  Awake, alert and attentive. Oriented to person, place and time. Recent and remote memory intact. Normal concentration. Fluent spontaneous speech with intact naming and repetition. Normal fund of knowledge.      Cranial Nerves: II: Full visual fields. III,IV,VI:Pupils round, reactive to light. Full extraocular movements. No nystagmus. V: Normal bilateral bite, facial sensation. VII: No facial weakness. VIII: Hearing intact. IX,X: Palate midline, intact gag. XI:  Sternocleidomastoids normal. XII: Tongue protrudes midline. No dysarthria.  (mod dysarthria, flat NLF on left)    Motor:  Normal tone, bulk and power throughout including arms and legs, proximal and distal. No pronator drift. No abnormal movements. (LUE mild drift, LLE 4/5)    Sensation: Normal in arms, legs and trunk to pin, proprioception and vibration. Negative Romberg.     Coordination: No dysmetria or dysdiadochokinesis bilaterally. Normal heel-shin testing.     Gait: Normal including heel and toe walking. Normal station.  (unable)    Reflexes: Normoactive and symmetric throughout. Absent Babinski bilaterally.     LCTAB    ABD NT ND    Cor RRR    Vasc No edema      NEUROIMAGING:  personally reviewd MRI 6/23 compared with 3/23, showing new pathcy enhancement along right parasag parietal lobe, primary tumor appears stable

## 2023-07-07 NOTE — PROGRESS NOTE ADULT - ASSESSMENT
66 yo M with Pmhx of glioblastoma on active chemo therapy (last dose 5/2/2023), Afib on xarelto, HTN, and seizure disorder presents to the hospital from [place] for further management of his seizures and glioblastoma. Awaiting MRI brain

## 2023-07-07 NOTE — CHART NOTE - NSCHARTNOTEFT_GEN_A_CORE
Barium swallow without evidence of contrast leak. Per discussion with medicine attending, pureed diet ordered.

## 2023-07-07 NOTE — PROGRESS NOTE ADULT - PROBLEM SELECTOR PLAN 4
BP above goal   C/w hydralazine 25 mg TID   - Given increased BP, will increase  losartan 100 mg daily and add amlodopine on 7/7

## 2023-07-07 NOTE — PROGRESS NOTE ADULT - SUBJECTIVE AND OBJECTIVE BOX
[facility]  Division of Hospital Medicine  Bessy Garcia MD  Pager: [phone]      Patient is a 67y old  Male who presents with a chief complaint of Seizure (06 Jul 2023 16:11)      SUBJECTIVE / OVERNIGHT EVENTS: Feeling okay. worked ith PT. Still feels weak. Pt recmmends rehab. Underwent barium swallow. awaiting results   ADDITIONAL REVIEW OF SYSTEMS:    MEDICATIONS  (STANDING):  allopurinol 100 milliGRAM(s) Oral daily  brimonidine 0.2% Ophthalmic Solution 1 Drop(s) Both EYES two times a day  dexAMETHasone     Tablet 4 milliGRAM(s) Oral daily  famotidine   Suspension 40 milliGRAM(s) Oral daily  hydrALAZINE 25 milliGRAM(s) Oral three times a day  lacosamide 200 milliGRAM(s) Oral two times a day  levETIRAcetam 1000 milliGRAM(s) Oral two times a day  losartan 100 milliGRAM(s) Oral daily  multivitamin/minerals/iron Oral Solution (CENTRUM) 15 milliLiter(s) Oral daily  potassium chloride  10 mEq/100 mL IVPB 10 milliEquivalent(s) IV Intermittent every 1 hour  rivaroxaban 20 milliGRAM(s) Oral with dinner  timolol 0.25% Solution 1 Drop(s) Both EYES two times a day  valproic  acid Syrup 250 milliGRAM(s) Oral three times a day    MEDICATIONS  (PRN):  acetaminophen     Tablet .. 650 milliGRAM(s) Oral every 6 hours PRN Temp greater or equal to 38C (100.4F), Mild Pain (1 - 3)  aluminum hydroxide/magnesium hydroxide/simethicone Suspension 30 milliLiter(s) Oral every 4 hours PRN Dyspepsia  clonazePAM  Tablet 0.5 milliGRAM(s) Oral two times a day PRN anxiety  guaiFENesin Oral Liquid (Sugar-Free) 200 milliGRAM(s) Oral every 6 hours PRN Cough  melatonin 3 milliGRAM(s) Oral at bedtime PRN Insomnia  ondansetron Injectable 4 milliGRAM(s) IV Push every 8 hours PRN Nausea and/or Vomiting      CAPILLARY BLOOD GLUCOSE      POCT Blood Glucose.: 94 mg/dL (07 Jul 2023 11:47)  POCT Blood Glucose.: 84 mg/dL (07 Jul 2023 07:36)  POCT Blood Glucose.: 84 mg/dL (07 Jul 2023 06:55)  POCT Blood Glucose.: 81 mg/dL (07 Jul 2023 00:26)  POCT Blood Glucose.: 115 mg/dL (06 Jul 2023 16:52)    I&O's Summary    06 Jul 2023 07:01  -  07 Jul 2023 07:00  --------------------------------------------------------  IN: 200 mL / OUT: 1070 mL / NET: -870 mL        PHYSICAL EXAM:  Vital Signs Last 24 Hrs  T(C): 36.9 (07 Jul 2023 12:19), Max: 36.9 (07 Jul 2023 12:19)  T(F): 98.4 (07 Jul 2023 12:19), Max: 98.4 (07 Jul 2023 12:19)  HR: 78 (07 Jul 2023 12:19) (75 - 97)  BP: 158/90 (07 Jul 2023 12:19) (133/100 - 158/90)  BP(mean): --  RR: 18 (07 Jul 2023 12:19) (18 - 19)  SpO2: 100% (07 Jul 2023 12:19) (100% - 100%)    Parameters below as of 07 Jul 2023 12:19  Patient On (Oxygen Delivery Method): room air      GENERAL: Middle age man in [facility], well-developed  HEENT:  Atraumatic, Normocephalic, EOMI, PERRLA, conjunctiva and sclera clear, oral mucosa moist, clear w/o any exudate   NECK: Supple, No JVD  CHEST/LUNG: Clear to auscultation bilaterally; No wheeze  HEART: Regular rate and rhythm; No murmurs, rubs, or gallops  ABDOMEN: Soft, Nontender, Nondistended; Bowel sounds present  EXTREMITIES:  2+ Peripheral Pulses, No clubbing, cyanosis, or edema  PSYCH: AAOx3  NEUROLOGY: L sided weakness, chronic L facial droop.   SKIN: No rashes or lesions    LABS:                        14.8   6.07  )-----------( 108      ( 07 Jul 2023 06:24 )             44.4     07-07    139  |  106  |  19  ----------------------------<  89  3.4<L>   |  21<L>  |  0.49<L>    Ca    8.5      07 Jul 2023 06:24  Phos  2.8     07-07  Mg     2.00     07-07    TPro  4.5<L>  /  Alb  3.0<L>  /  TBili  0.5  /  DBili  <0.2  /  AST  18  /  ALT  35  /  AlkPhos  51  07-07          Urinalysis Basic - ( 07 Jul 2023 06:24 )    Color: x / Appearance: x / SG: x / pH: x  Gluc: 89 mg/dL / Ketone: x  / Bili: x / Urobili: x   Blood: x / Protein: x / Nitrite: x   Leuk Esterase: x / RBC: x / WBC x   Sq Epi: x / Non Sq Epi: x / Bacteria: x          RADIOLOGY & ADDITIONAL TESTS:  Results Reviewed:   Imaging Personally Reviewed:  Electrocardiogram Personally Reviewed:    COORDINATION OF CARE:  Care Discussed with Consultants/Other Providers [Y/N]:  Prior or Outpatient Records Reviewed [Y/N]:

## 2023-07-08 NOTE — SWALLOW BEDSIDE ASSESSMENT ADULT - SWALLOW EVAL: RECOMMENDED DIET
oral nutrition/hydration/medication is contraindicated. Continue PEG as means of nutritional/medication/hydration intake. maintain aspiration precautions

## 2023-07-08 NOTE — PROGRESS NOTE ADULT - ASSESSMENT
66 yo M with Pmhx of glioblastoma on active chemo therapy (last dose 5/2/2023), Afib on xarelto, HTN, and seizure disorder presents to the hospital from [place] for further management of his seizures and glioblastoma. Awaiting MRI brain

## 2023-07-08 NOTE — PROGRESS NOTE ADULT - PROBLEM SELECTOR PLAN 2
Patient had recurrent seizures requiring ICU admission at OSH. As per daughter, EEG at other hospital unable to detect seizures because they were "coming from deep inside his brain." No further seizure since his admission at the OSH  -Neurology consulted, appreciate recs   -C/w vimpat, keppra, and valproic acid   -C/w dexamethasone 4 mg BID -> daily   [ ] F/u with MRI brain  - barium swallow: No evidence of contrast leak.

## 2023-07-08 NOTE — SWALLOW BEDSIDE ASSESSMENT ADULT - ASR SWALLOW RECOMMEND DIAG
a cinesophagram is recommended to further assess swallow mechanism and PO candidacy/VFSS/MBS a cinesophagram is recommended to further assess swallow mechanism and PO candidacy given history of dysphagia/PEG and glioblastoma/VFSS/MBS

## 2023-07-08 NOTE — SWALLOW BEDSIDE ASSESSMENT ADULT - ORAL PHASE
suspected posterior loss at BOT for mildly thick and thin liquids/Decreased anterior-posterior movement of the bolus/Delayed oral transit time

## 2023-07-08 NOTE — SWALLOW BEDSIDE ASSESSMENT ADULT - ADDITIONAL RECOMMENDATIONS
This department will continue to follow the patient for PO candidacy during this admission, as schedule permits.

## 2023-07-08 NOTE — PROGRESS NOTE ADULT - SUBJECTIVE AND OBJECTIVE BOX
[facility]  Division of Hospital Medicine  Bessy Garcia MD  Pager: [phone]      Patient is a 67y old  Male who presents with a chief complaint of Seizure (07 Jul 2023 15:00)      SUBJECTIVE / OVERNIGHT EVENTS: Examined at bedside. Frustrated with puree diet and speech and swallow recommendation and awaiting MRI. Discussed with loved on at bedside. No other medical concerns doing okay   ADDITIONAL REVIEW OF SYSTEMS:    MEDICATIONS  (STANDING):  allopurinol 100 milliGRAM(s) Oral daily  amLODIPine   Tablet 5 milliGRAM(s) Oral daily  brimonidine 0.2% Ophthalmic Solution 1 Drop(s) Both EYES two times a day  dexAMETHasone     Tablet 4 milliGRAM(s) Oral daily  famotidine   Suspension 40 milliGRAM(s) Oral daily  hydrALAZINE 25 milliGRAM(s) Oral three times a day  lacosamide 200 milliGRAM(s) Oral two times a day  levETIRAcetam 1000 milliGRAM(s) Oral two times a day  losartan 100 milliGRAM(s) Oral daily  multivitamin/minerals/iron Oral Solution (CENTRUM) 15 milliLiter(s) Oral daily  rivaroxaban 20 milliGRAM(s) Oral with dinner  timolol 0.25% Solution 1 Drop(s) Both EYES two times a day  valproic  acid Syrup 250 milliGRAM(s) Oral three times a day    MEDICATIONS  (PRN):  acetaminophen     Tablet .. 650 milliGRAM(s) Oral every 6 hours PRN Temp greater or equal to 38C (100.4F), Mild Pain (1 - 3)  aluminum hydroxide/magnesium hydroxide/simethicone Suspension 30 milliLiter(s) Oral every 4 hours PRN Dyspepsia  clonazePAM  Tablet 0.5 milliGRAM(s) Oral two times a day PRN anxiety  guaiFENesin Oral Liquid (Sugar-Free) 200 milliGRAM(s) Oral every 6 hours PRN Cough  melatonin 3 milliGRAM(s) Oral at bedtime PRN Insomnia  ondansetron Injectable 4 milliGRAM(s) IV Push every 8 hours PRN Nausea and/or Vomiting      CAPILLARY BLOOD GLUCOSE      POCT Blood Glucose.: 103 mg/dL (08 Jul 2023 08:34)  POCT Blood Glucose.: 99 mg/dL (07 Jul 2023 23:46)  POCT Blood Glucose.: 92 mg/dL (07 Jul 2023 17:37)    I&O's Summary    07 Jul 2023 07:01  -  08 Jul 2023 07:00  --------------------------------------------------------  IN: 580 mL / OUT: 480 mL / NET: 100 mL    08 Jul 2023 07:01  -  08 Jul 2023 12:18  --------------------------------------------------------  IN: 180 mL / OUT: 200 mL / NET: -20 mL        PHYSICAL EXAM:  Vital Signs Last 24 Hrs  T(C): 36.8 (08 Jul 2023 06:03), Max: 36.9 (07 Jul 2023 12:19)  T(F): 98.2 (08 Jul 2023 06:03), Max: 98.4 (07 Jul 2023 12:19)  HR: 74 (08 Jul 2023 06:03) (74 - 97)  BP: 141/98 (08 Jul 2023 06:03) (127/85 - 158/90)  BP(mean): --  RR: 17 (08 Jul 2023 06:03) (17 - 18)  SpO2: 100% (08 Jul 2023 06:03) (98% - 100%)    Parameters below as of 07 Jul 2023 20:41  Patient On (Oxygen Delivery Method): room air      GENERAL: Middle age man in [place], well-developed  HEENT:  Atraumatic, Normocephalic, EOMI, PERRLA, conjunctiva and sclera clear, oral mucosa moist, clear w/o any exudate   NECK: Supple, No JVD  CHEST/LUNG: Clear to auscultation bilaterally; No wheeze  HEART: Regular rate and rhythm; No murmurs, rubs, or gallops  ABDOMEN: Soft, Nontender, Nondistended; Bowel sounds present  EXTREMITIES:  2+ Peripheral Pulses, No clubbing, cyanosis, or edema  PSYCH: AAOx3  NEUROLOGY: L sided weakness, chronic L facial droop.   SKIN: No rashes or lesions  LABS:                        14.9   5.54  )-----------( 119      ( 08 Jul 2023 07:33 )             45.0     07-08    140  |  107  |  20  ----------------------------<  96  3.7   |  22  |  0.48<L>    Ca    8.4      08 Jul 2023 07:33  Phos  2.9     07-08  Mg     2.10     07-08    TPro  4.5<L>  /  Alb  3.0<L>  /  TBili  0.5  /  DBili  <0.2  /  AST  18  /  ALT  35  /  AlkPhos  51  07-07          Urinalysis Basic - ( 08 Jul 2023 07:33 )    Color: x / Appearance: x / SG: x / pH: x  Gluc: 96 mg/dL / Ketone: x  / Bili: x / Urobili: x   Blood: x / Protein: x / Nitrite: x   Leuk Esterase: x / RBC: x / WBC x   Sq Epi: x / Non Sq Epi: x / Bacteria: x          RADIOLOGY & ADDITIONAL TESTS:  Results Reviewed: < from: Xray Esophagram Single Contrast (07.07.23 @ 12:59) >  No evidence of contrast leak.    < end of copied text >    Imaging Personally Reviewed:  Electrocardiogram Personally Reviewed:    COORDINATION OF CARE:  Care Discussed with Consultants/Other Providers [Y/N]:  Prior or Outpatient Records Reviewed [Y/N]:

## 2023-07-08 NOTE — SWALLOW BEDSIDE ASSESSMENT ADULT - COMMENTS
Consult received and chart reviewed. Patient seen at bedside this AM for initial assessment of swallow function, at which time he was alert, oriented x4, cooperative, and denied pain.    Patient left as received, NAD. Full report/recommendations to follow. Consult received and chart reviewed. Patient seen at bedside this AM for initial assessment of swallow function, at which time he was alert, oriented x4, cooperative, and denied pain. Patient demonstrates ability to follow simple commands. Vocal quality noted as breathy with low volume. Per charting, patient with open stoma and was seen by ENT 7/6/23 (see EMR for details). Speech production WFL. Per patient report, he has been NPO/utilizing PEG since May 2023 and began to have "some applesauce" at last hospital prior to transfer to [facility].     Per charting, the patient is a "66 yo M with Pmhx of glioblastoma on active chemo therapy (last dose 5/2/2023), Afib on xarelto, HTN, and seizure disorder presents to the hospital from [place] for further management of his seizures and glioblastoma. Awaiting MRI brain "    WBC WFL.  No CXR administered.   MRH pending.    Discussed results and recommendations with patient, RN, and called out to ACP.

## 2023-07-08 NOTE — SWALLOW BEDSIDE ASSESSMENT ADULT - SWALLOW EVAL: DIAGNOSIS
1. Mild oral dysphagia for pureed, moderately thick, mildly thick, and thin liquids marked by delayed collection and transport. Suspected posterior loss at BOT noted with mildly thick and thin liquids. 2. Moderate pharyngeal dysphagia across all PO consistencies provided marked by suspected delayed pharyngeal swallow trigger, reduced hyolaryngeal elevation noted by digital palapation, and intermittent multiple swallows suggestive of pharyngeal stasis and/or penetration/aspiration. 3. Recommend oral nutrition/hydration/medication is contraindicated. Maintain PEG as means of nutrition/hydration/medication intake. Maintain aspiration precautions. Recommend cinesophagram to further assess swallow mechanism and determine PO candidacy.

## 2023-07-09 NOTE — PROGRESS NOTE ADULT - SUBJECTIVE AND OBJECTIVE BOX
[facility]  Division of Hospital Medicine  Bessy Garcia MD  Pager: [phone]      Patient is a 67y old  Male who presents with a chief complaint of Seizure (08 Jul 2023 12:17)      SUBJECTIVE / OVERNIGHT EVENTS: Awaiting read of MRI brain.   ADDITIONAL REVIEW OF SYSTEMS:    MEDICATIONS  (STANDING):  allopurinol 100 milliGRAM(s) Oral daily  amLODIPine   Tablet 5 milliGRAM(s) Oral daily  brimonidine 0.2% Ophthalmic Solution 1 Drop(s) Both EYES two times a day  dexAMETHasone     Tablet 4 milliGRAM(s) Oral daily  famotidine   Suspension 40 milliGRAM(s) Oral daily  hydrALAZINE 25 milliGRAM(s) Oral three times a day  lacosamide 200 milliGRAM(s) Oral two times a day  levETIRAcetam 1000 milliGRAM(s) Oral two times a day  losartan 100 milliGRAM(s) Oral daily  multivitamin/minerals/iron Oral Solution (CENTRUM) 15 milliLiter(s) Oral daily  rivaroxaban 20 milliGRAM(s) Oral with dinner  timolol 0.25% Solution 1 Drop(s) Both EYES two times a day  valproic  acid Syrup 250 milliGRAM(s) Oral three times a day    MEDICATIONS  (PRN):  acetaminophen     Tablet .. 650 milliGRAM(s) Oral every 6 hours PRN Temp greater or equal to 38C (100.4F), Mild Pain (1 - 3)  aluminum hydroxide/magnesium hydroxide/simethicone Suspension 30 milliLiter(s) Oral every 4 hours PRN Dyspepsia  clonazePAM  Tablet 0.5 milliGRAM(s) Oral two times a day PRN anxiety  guaiFENesin Oral Liquid (Sugar-Free) 200 milliGRAM(s) Oral every 6 hours PRN Cough  melatonin 3 milliGRAM(s) Oral at bedtime PRN Insomnia  ondansetron Injectable 4 milliGRAM(s) IV Push every 8 hours PRN Nausea and/or Vomiting      CAPILLARY BLOOD GLUCOSE      POCT Blood Glucose.: 112 mg/dL (08 Jul 2023 20:51)  POCT Blood Glucose.: 138 mg/dL (08 Jul 2023 16:53)  POCT Blood Glucose.: 134 mg/dL (08 Jul 2023 12:29)    I&O's Summary    08 Jul 2023 07:01  -  09 Jul 2023 07:00  --------------------------------------------------------  IN: 605 mL / OUT: 505 mL / NET: 100 mL        PHYSICAL EXAM:  Vital Signs Last 24 Hrs  T(C): 36.7 (09 Jul 2023 04:48), Max: 36.7 (09 Jul 2023 04:48)  T(F): 98.1 (09 Jul 2023 04:48), Max: 98.1 (09 Jul 2023 04:48)  HR: 80 (09 Jul 2023 04:48) (76 - 91)  BP: 127/82 (09 Jul 2023 04:48) (127/82 - 148/104)  BP(mean): --  RR: 18 (09 Jul 2023 04:48) (17 - 18)  SpO2: 100% (09 Jul 2023 04:48) (99% - 100%)    Parameters below as of 09 Jul 2023 04:48  Patient On (Oxygen Delivery Method): room air      CONSTITUTIONAL: NAD, well-developed, well-groomed  EYES: PERRLA; conjunctiva and sclera clear  ENMT: Moist oral mucosa, no pharyngeal injection or exudates; normal dentition  NECK: Supple, no palpable masses; no thyromegaly  RESPIRATORY: Normal respiratory effort; lungs are clear to auscultation bilaterally  CARDIOVASCULAR: Regular rate and rhythm, normal S1 and S2, no murmur/rub/gallop; No lower extremity edema; Peripheral pulses are 2+ bilaterally  ABDOMEN: Nontender to palpation, normoactive bowel sounds, no rebound/guarding; No hepatosplenomegaly  MUSCULOSKELETAL:  Normal gait; no clubbing or cyanosis of digits; no joint swelling or tenderness to palpation  PSYCH: A+O to person, place, and time; affect appropriate  NEUROLOGY: CN 2-12 are intact and symmetric; no gross sensory deficits   SKIN: No rashes; no palpable lesions    LABS:                        14.1   5.77  )-----------( 119      ( 09 Jul 2023 05:36 )             42.6     07-09    140  |  105  |  17  ----------------------------<  101<H>  3.7   |  23  |  0.44<L>    Ca    8.1<L>      09 Jul 2023 05:36  Phos  2.6     07-09  Mg     2.10     07-09            Urinalysis Basic - ( 09 Jul 2023 05:36 )    Color: x / Appearance: x / SG: x / pH: x  Gluc: 101 mg/dL / Ketone: x  / Bili: x / Urobili: x   Blood: x / Protein: x / Nitrite: x   Leuk Esterase: x / RBC: x / WBC x   Sq Epi: x / Non Sq Epi: x / Bacteria: x          RADIOLOGY & ADDITIONAL TESTS:  Results Reviewed:   Imaging Personally Reviewed:  Electrocardiogram Personally Reviewed:    COORDINATION OF CARE:  Care Discussed with Consultants/Other Providers [Y/N]:  Prior or Outpatient Records Reviewed [Y/N]:   [facility]  Division of Hospital Medicine  Bessy Garcia MD  Pager: [phone]      Patient is a 67y old  Male who presents with a chief complaint of Seizure (08 Jul 2023 12:17)      SUBJECTIVE / OVERNIGHT EVENTS: Awaiting read of MRI brain read. Plan for repeat cinesopagram on monday. Hungry and wanting PO food   ADDITIONAL REVIEW OF SYSTEMS:    MEDICATIONS  (STANDING):  allopurinol 100 milliGRAM(s) Oral daily  amLODIPine   Tablet 5 milliGRAM(s) Oral daily  brimonidine 0.2% Ophthalmic Solution 1 Drop(s) Both EYES two times a day  dexAMETHasone     Tablet 4 milliGRAM(s) Oral daily  famotidine   Suspension 40 milliGRAM(s) Oral daily  hydrALAZINE 25 milliGRAM(s) Oral three times a day  lacosamide 200 milliGRAM(s) Oral two times a day  levETIRAcetam 1000 milliGRAM(s) Oral two times a day  losartan 100 milliGRAM(s) Oral daily  multivitamin/minerals/iron Oral Solution (CENTRUM) 15 milliLiter(s) Oral daily  rivaroxaban 20 milliGRAM(s) Oral with dinner  timolol 0.25% Solution 1 Drop(s) Both EYES two times a day  valproic  acid Syrup 250 milliGRAM(s) Oral three times a day    MEDICATIONS  (PRN):  acetaminophen     Tablet .. 650 milliGRAM(s) Oral every 6 hours PRN Temp greater or equal to 38C (100.4F), Mild Pain (1 - 3)  aluminum hydroxide/magnesium hydroxide/simethicone Suspension 30 milliLiter(s) Oral every 4 hours PRN Dyspepsia  clonazePAM  Tablet 0.5 milliGRAM(s) Oral two times a day PRN anxiety  guaiFENesin Oral Liquid (Sugar-Free) 200 milliGRAM(s) Oral every 6 hours PRN Cough  melatonin 3 milliGRAM(s) Oral at bedtime PRN Insomnia  ondansetron Injectable 4 milliGRAM(s) IV Push every 8 hours PRN Nausea and/or Vomiting      CAPILLARY BLOOD GLUCOSE      POCT Blood Glucose.: 112 mg/dL (08 Jul 2023 20:51)  POCT Blood Glucose.: 138 mg/dL (08 Jul 2023 16:53)  POCT Blood Glucose.: 134 mg/dL (08 Jul 2023 12:29)    I&O's Summary    08 Jul 2023 07:01  -  09 Jul 2023 07:00  --------------------------------------------------------  IN: 605 mL / OUT: 505 mL / NET: 100 mL        PHYSICAL EXAM:  Vital Signs Last 24 Hrs  T(C): 36.7 (09 Jul 2023 04:48), Max: 36.7 (09 Jul 2023 04:48)  T(F): 98.1 (09 Jul 2023 04:48), Max: 98.1 (09 Jul 2023 04:48)  HR: 80 (09 Jul 2023 04:48) (76 - 91)  BP: 127/82 (09 Jul 2023 04:48) (127/82 - 148/104)  BP(mean): --  RR: 18 (09 Jul 2023 04:48) (17 - 18)  SpO2: 100% (09 Jul 2023 04:48) (99% - 100%)    Parameters below as of 09 Jul 2023 04:48  Patient On (Oxygen Delivery Method): room air      GENERAL: Middle age man in NAD, well-developed  HEENT:  Atraumatic, Normocephalic, EOMI, PERRLA, conjunctiva and sclera clear, oral mucosa moist, clear w/o any exudate   NECK: Supple, No JVD  CHEST/LUNG: Clear to auscultation bilaterally; No wheeze  HEART: Regular rate and rhythm; No murmurs, rubs, or gallops  ABDOMEN: Soft, Nontender, Nondistended; Bowel sounds present  EXTREMITIES:  2+ Peripheral Pulses, No clubbing, cyanosis, or edema  PSYCH: AAOx3  NEUROLOGY: L sided weakness, chronic L facial droop.   SKIN: No rashes or lesions    LABS:                        14.1   5.77  )-----------( 119      ( 09 Jul 2023 05:36 )             42.6     07-09    140  |  105  |  17  ----------------------------<  101<H>  3.7   |  23  |  0.44<L>    Ca    8.1<L>      09 Jul 2023 05:36  Phos  2.6     07-09  Mg     2.10     07-09            Urinalysis Basic - ( 09 Jul 2023 05:36 )    Color: x / Appearance: x / SG: x / pH: x  Gluc: 101 mg/dL / Ketone: x  / Bili: x / Urobili: x   Blood: x / Protein: x / Nitrite: x   Leuk Esterase: x / RBC: x / WBC x   Sq Epi: x / Non Sq Epi: x / Bacteria: x          RADIOLOGY & ADDITIONAL TESTS:  Results Reviewed:   Imaging Personally Reviewed:  Electrocardiogram Personally Reviewed:    COORDINATION OF CARE:  Care Discussed with Consultants/Other Providers [Y/N]:  Prior or Outpatient Records Reviewed [Y/N]:

## 2023-07-09 NOTE — PROGRESS NOTE ADULT - PROBLEM SELECTOR PLAN 2
Patient had recurrent seizures requiring ICU admission at [facility]SH. As per daughter, EEG at other hospital unable to detect seizures because they were "coming from deep inside his brain." No further seizure since his admission at the OSH  -Neurology consulted, appreciate recs   -C/w vimpat, keppra, and valproic acid   -C/w dexamethasone 4 mg BID -> daily   [ ] F/u with MRI brain  - barium swallow: No evidence of contrast leak. Patient had recurrent seizures requiring ICU admission at OSH. As per daughter, EEG at other hospital unable to detect seizures because they were "coming from deep inside his brain." No further seizure since his admission at the OSH  -Neurology consulted, appreciate recs   -C/w vimpat, keppra, and valproic acid   -C/w dexamethasone 4 mg BID -> daily   [ ] F/u with MRI brain  - barium swallow: No evidence of contrast leak.  Speech and swallow concern that muscles are weak recommends repeat study   [ ] Barium swallow on 7/10

## 2023-07-09 NOTE — PROGRESS NOTE ADULT - ASSESSMENT
66 yo M with Pmhx of glioblastoma on active chemo therapy (last dose 5/2/2023), Afib on xarelto, HTN, and seizure disorder presents to the hospital from [place] for further management of his seizures and glioblastoma. Awaiting MRI brain

## 2023-07-10 NOTE — PROGRESS NOTE ADULT - ASSESSMENT
68 yo M with Pmhx of glioblastoma on active chemo therapy (last dose 5/2/2023), Afib on xarelto, HTN, and seizure disorder presents to the hospital from [place] for further management of his seizures and glioblastoma. Found to have  MRI brain Interval progression of the patient's known glioblastoma   within the right cerebral hemisphere when compared with 6/19/2023.

## 2023-07-10 NOTE — SWALLOW VFSS/MBS ASSESSMENT ADULT - DIAGNOSTIC IMPRESSIONS
Patient presents with Functional Oral and Pharyngeal Stage swallowing mechanism. The Oral Stage is characterized by adequate oral containment, slow chewing for solid with ability to break down solid, adequate bolus manipulation, adequate tongue motion with adequate anterior to posterior transfer of the bolus for puree/solids; piecemeal transfer/deglutition for solid with adequate oral clearance. The Pharyngeal Stage is characterized by adequate initiation of the pharyngeal swallow, adequate laryngeal elevation, adequate tongue base retraction and adequate pharyngeal constriction. There is trace pharyngeal clearance deficits located in the vallecular and pyriforms post swallow for solids.  There was No Aspiration observed before, during or after the swallow for puree, soft bite size solid, regular solid, moderately thick liquids, mildly thick liquids and thin liquids.

## 2023-07-10 NOTE — PROGRESS NOTE ADULT - SUBJECTIVE AND OBJECTIVE BOX
NEURO-ONCOLOGY    67m with right temporal GBM, on TMZ and avastin (last 5/2/23), transferred to [facility] from [place] after prolonged illness with seizures, requiring intubation and trach/peg, improved markedly but with residual LHP  MRI brain 6/19/23 with mild progression parasagittally but no tx in 2mo     INTERVAL HISTORY:   clinically stable with LHP.  MRI reviewed personally there is clear progression of enhancement both at original tumor site and parasagittally  Passed swallow eval.     MEDICATIONS  (STANDING):  allopurinol 100 milliGRAM(s) Oral daily  amLODIPine   Tablet 5 milliGRAM(s) Oral daily  brimonidine 0.2% Ophthalmic Solution 1 Drop(s) Both EYES two times a day  dexAMETHasone     Tablet 4 milliGRAM(s) Oral daily  famotidine   Suspension 40 milliGRAM(s) Oral daily  hydrALAZINE 25 milliGRAM(s) Oral three times a day  lacosamide 200 milliGRAM(s) Oral two times a day  levETIRAcetam 1000 milliGRAM(s) Oral two times a day  losartan 100 milliGRAM(s) Oral daily  multivitamin/minerals/iron Oral Solution (CENTRUM) 15 milliLiter(s) Oral daily  rivaroxaban 20 milliGRAM(s) Oral with dinner  timolol 0.25% Solution 1 Drop(s) Both EYES two times a day  valproic  acid Syrup 250 milliGRAM(s) Oral three times a day    Vital Signs Last 24 Hrs  T(C): 36.6 (10 Jul 2023 13:17), Max: 36.6 (10 Jul 2023 13:17)  T(F): 97.8 (10 Jul 2023 13:17), Max: 97.8 (10 Jul 2023 13:17)  HR: 82 (10 Jul 2023 13:17) (66 - 87)  BP: 142/99 (10 Jul 2023 13:17) (142/99 - 146/101)  BP(mean): --  RR: 19 (10 Jul 2023 13:17) (18 - 19)  SpO2: 100% (10 Jul 2023 13:17) (100% - 100%)    Parameters below as of 10 Jul 2023 13:17  Patient On (Oxygen Delivery Method): room air        (Exam as noted with exceptions in parentheses)    General:  Healthy appearing Male well groomed and without deformities.     Mental Status:  Awake, alert and attentive. Oriented to person, place and time. Recent and remote memory intact. Normal concentration. Fluent spontaneous speech with intact naming and repetition. Normal fund of knowledge.      Cranial Nerves: II: Full visual fields. III,IV,VI:Pupils round, reactive to light. Full extraocular movements. No nystagmus. V: Normal bilateral bite, facial sensation. VII: No facial weakness. VIII: Hearing intact. IX,X: Palate midline, intact gag. XI:  Sternocleidomastoids normal. XII: Tongue protrudes midline. No dysarthria.  (mod dysarthria, flat NLF on left)    Motor:  Normal tone, bulk and power throughout including arms and legs, proximal and distal. No pronator drift. No abnormal movements. (LUE mild drift, LLE 4/5)    Sensation: Normal in arms, legs and trunk to pin, proprioception and vibration. Negative Romberg.     Coordination: No dysmetria or dysdiadochokinesis bilaterally. Normal heel-shin testing.     Gait: Normal including heel and toe walking. Normal station.  (unable)    Reflexes: Normoactive and symmetric throughout. Absent Babinski bilaterally.     LCTAB    ABD NT ND    Cor RRR    Vasc No edema      NEUROIMAGING:  personally reviewd MRI 6/23 compared with 3/23, showing new pathcy enhancement along right parasag parietal lobe, primary tumor appears stable

## 2023-07-10 NOTE — SWALLOW VFSS/MBS ASSESSMENT ADULT - RECOMMENDED CONSISTENCY
1.) Easy to Chew Solids with Thin Liquids  2.) Continue with PEG Tube Feedings for nutritional support to ensure adequate caloric needs  3.) Aspiration and Reflux Precautions  4.) Maintain Good Oral Hygiene Care

## 2023-07-10 NOTE — SWALLOW VFSS/MBS ASSESSMENT ADULT - ADDITIONAL RECOMMENDATIONS
This service to follow as schedule permits for diet tolerance. Medical Team advised to Reconsult if there is a change in medical status.

## 2023-07-10 NOTE — SWALLOW VFSS/MBS ASSESSMENT ADULT - ESOPHAGEAL STAGE
Pharyngeal Stage - adequate initiation of the pharyngeal swallow, adequate laryngeal elevation, adequate tongue base retraction, adequate pharyngeal constriction

## 2023-07-10 NOTE — PROGRESS NOTE ADULT - PROBLEM SELECTOR PLAN 2
Patient had recurrent seizures requiring ICU admission at OSH. As per daughter, EEG at other hospital unable to detect seizures because they were "coming from deep inside his brain." No further seizure since his admission at the OSH  -Neurology consulted, appreciate recs   -C/w vimpat, keppra, and valproic acid   -C/w dexamethasone 4 mg BID -> daily   [ ] F/u with MRI brain: Interval progression of the patient's known glioblastoma   within the right cerebral hemisphere when compared with 6/19/2023.  - barium swallow: No evidence of contrast leak.  Speech and swallow concern that muscles are weak recommends repeat study   [ ] Barium swallow on 7/10 - awaiting read

## 2023-07-10 NOTE — SWALLOW VFSS/MBS ASSESSMENT ADULT - COMMENTS
Medicine Note 7/9/2023 - 68 yo M with Pmhx of glioblastoma on active chemo therapy (last dose 5/2/2023), Afib on xarelto, HTN, and seizure disorder presents to the hospital from [place] for further management of his seizures and glioblastoma. Awaiting MRI brain. Medicine Note 7/9/2023 - 66 yo M with Pmhx of glioblastoma on active chemo therapy (last dose 5/2/2023), Afib on xarelto, HTN, and seizure disorder presents to the hospital from [place] for further management of his seizures and glioblastoma. Awaiting MRI brain.    Of Note: Patient was seen for a Clinical Swallow Eval on 7/8/2023 (See Consult).     Patient arrived to Radiology for Cinesophagram. Patient transferred to a specialized seating unit with lateral view projection.

## 2023-07-10 NOTE — SWALLOW VFSS/MBS ASSESSMENT ADULT - THE ABOVE FINDINGS WERE DISCUSSED WITH
Call out to Team/Patient Call out to ACP Team Pager# [phone]/Patient Call out to ACP Team Pager# [phone]/Patient

## 2023-07-10 NOTE — SWALLOW VFSS/MBS ASSESSMENT ADULT - DEMONSTRATES NEED FOR REFERRAL TO ANOTHER SERVICE
for oral diet and tube feeding management and ensure adequate caloric/hydration needs/Registered Dietitian

## 2023-07-10 NOTE — PROGRESS NOTE ADULT - SUBJECTIVE AND OBJECTIVE BOX
[facility]  Division of Hospital Medicine  Bessy Garcia MD  Pager: [phone]      Patient is a 67y old  Male who presents with a chief complaint of Seizure (09 Jul 2023 10:15)      SUBJECTIVE / OVERNIGHT EVENTS: Patient underwent swallow test. Awaiting read. Discussed MRI results. No medical concerns at this time.   ADDITIONAL REVIEW OF SYSTEMS:    MEDICATIONS  (STANDING):  allopurinol 100 milliGRAM(s) Oral daily  amLODIPine   Tablet 5 milliGRAM(s) Oral daily  brimonidine 0.2% Ophthalmic Solution 1 Drop(s) Both EYES two times a day  dexAMETHasone     Tablet 4 milliGRAM(s) Oral daily  famotidine   Suspension 40 milliGRAM(s) Oral daily  hydrALAZINE 25 milliGRAM(s) Oral three times a day  lacosamide 200 milliGRAM(s) Oral two times a day  levETIRAcetam 1000 milliGRAM(s) Oral two times a day  losartan 100 milliGRAM(s) Oral daily  multivitamin/minerals/iron Oral Solution (CENTRUM) 15 milliLiter(s) Oral daily  rivaroxaban 20 milliGRAM(s) Oral with dinner  timolol 0.25% Solution 1 Drop(s) Both EYES two times a day  valproic  acid Syrup 250 milliGRAM(s) Oral three times a day    MEDICATIONS  (PRN):  acetaminophen     Tablet .. 650 milliGRAM(s) Oral every 6 hours PRN Temp greater or equal to 38C (100.4F), Mild Pain (1 - 3)  aluminum hydroxide/magnesium hydroxide/simethicone Suspension 30 milliLiter(s) Oral every 4 hours PRN Dyspepsia  clonazePAM  Tablet 0.5 milliGRAM(s) Oral two times a day PRN anxiety  guaiFENesin Oral Liquid (Sugar-Free) 200 milliGRAM(s) Oral every 6 hours PRN Cough  melatonin 3 milliGRAM(s) Oral at bedtime PRN Insomnia  ondansetron Injectable 4 milliGRAM(s) IV Push every 8 hours PRN Nausea and/or Vomiting      CAPILLARY BLOOD GLUCOSE      POCT Blood Glucose.: 125 mg/dL (10 Jul 2023 11:45)  POCT Blood Glucose.: 123 mg/dL (10 Jul 2023 07:28)  POCT Blood Glucose.: 110 mg/dL (10 Jul 2023 03:04)  POCT Blood Glucose.: 120 mg/dL (09 Jul 2023 18:53)  POCT Blood Glucose.: 126 mg/dL (09 Jul 2023 12:04)    I&O's Summary    09 Jul 2023 07:01  -  10 Jul 2023 07:00  --------------------------------------------------------  IN: 660 mL / OUT: 525 mL / NET: 135 mL        PHYSICAL EXAM:  Vital Signs Last 24 Hrs  T(C): 36.3 (10 Jul 2023 05:16), Max: 36.7 (09 Jul 2023 12:18)  T(F): 97.4 (10 Jul 2023 05:16), Max: 98.1 (09 Jul 2023 12:18)  HR: 66 (10 Jul 2023 05:16) (66 - 87)  BP: 143/93 (10 Jul 2023 05:16) (143/93 - 155/101)  BP(mean): --  RR: 18 (10 Jul 2023 05:16) (18 - 18)  SpO2: 100% (10 Jul 2023 05:16) (100% - 100%)    Parameters below as of 10 Jul 2023 05:16  Patient On (Oxygen Delivery Method): room air    GENERAL: Middle age man in NAD, well-developed  HEENT:  Atraumatic, Normocephalic, EOMI, PERRLA, conjunctiva and sclera clear, oral mucosa moist, clear w/o any exudate   NECK: Supple, No JVD  CHEST/LUNG: Clear to auscultation bilaterally; No wheeze  HEART: Regular rate and rhythm; No murmurs, rubs, or gallops  ABDOMEN: Soft, Nontender, Nondistended; Bowel sounds present  EXTREMITIES:  2+ Peripheral Pulses, No clubbing, cyanosis, or edema  PSYCH: AAOx3  NEUROLOGY: L sided weakness, chronic L facial droop.   SKIN: No rashes or lesions      LABS:                        13.3   6.29  )-----------( 128      ( 10 Jul 2023 06:34 )             40.5     07-10    138  |  104  |  15  ----------------------------<  84  3.5   |  25  |  0.52    Ca    8.2<L>      10 Jul 2023 06:34  Phos  2.7     07-10  Mg     2.20     07-10            Urinalysis Basic - ( 10 Jul 2023 06:34 )    Color: x / Appearance: x / SG: x / pH: x  Gluc: 84 mg/dL / Ketone: x  / Bili: x / Urobili: x   Blood: x / Protein: x / Nitrite: x   Leuk Esterase: x / RBC: x / WBC x   Sq Epi: x / Non Sq Epi: x / Bacteria: x          RADIOLOGY & ADDITIONAL TESTS:  Results Reviewed: < from: MR Head w/ IV Cont (07.08.23 @ 14:37) >  IMPRESSION: Interval progression of the patient's known glioblastoma   within the right cerebral hemisphere when compared with 6/19/2023.    < end of copied text >    Imaging Personally Reviewed:  Electrocardiogram Personally Reviewed:    COORDINATION OF CARE:  Care Discussed with Consultants/Other Providers [Y/N]:  Prior or Outpatient Records Reviewed [Y/N]:

## 2023-07-10 NOTE — PROGRESS NOTE ADULT - ASSESSMENT
GBM, was on avastin/temodar, s/p prolonged hospitalization [place] another state for complication related to seizures, now improved, but scan with progression, albeit off active therapy.      continue current aeds, 3 meds  decadron 4 daily, taper outpatient  will need DEREK and f/u with Dr. Carias to discuss resuming oral chemo and avastin   no further inpatient neuro w/u needs dispo planning and treatment asap

## 2023-07-10 NOTE — PROGRESS NOTE ADULT - PROBLEM SELECTOR PLAN 1
Patient with hx of glioblastoma s/p RT and chemotherapy   -Recent admission at OSH for seizures, found to have progression of tumor vs. new strokes   -Neurology consulted, appreciate recs   -  Routine EEG; -Right hemispheric focal cerebral dysfunction can be structural or functional in etiology.  -Single-lead EKG shows irregularly irregular rhythm.   -Neuro onc consulted, awaiting recs   - Outpatient images uploaded   - Discussed with Dr. Mcpherson - will attempt uploading images, if not may consider CT head   [ ] PT consult - restorative rehab   - Repeat MRi with contrast: Interval progression of the patient's known glioblastoma   within the right cerebral hemisphere when compared with 6/19/2023.

## 2023-07-11 NOTE — PROGRESS NOTE ADULT - SUBJECTIVE AND OBJECTIVE BOX
[facility]  Division of Hospital Medicine  Bessy Garcia MD  Pager: [phone]      Patient is a 67y old  Male who presents with a chief complaint of Seizure (10 Jul 2023 18:58)      SUBJECTIVE / OVERNIGHT EVENTS: Pt examined at bedside. Happy passed Speech and swallow. Awaiting rehab   ADDITIONAL REVIEW OF SYSTEMS:    MEDICATIONS  (STANDING):  allopurinol 100 milliGRAM(s) Oral daily  amLODIPine   Tablet 5 milliGRAM(s) Oral daily  brimonidine 0.2% Ophthalmic Solution 1 Drop(s) Both EYES two times a day  dexAMETHasone     Tablet 4 milliGRAM(s) Oral daily  famotidine   Suspension 40 milliGRAM(s) Oral daily  hydrALAZINE 25 milliGRAM(s) Oral three times a day  lacosamide 200 milliGRAM(s) Oral two times a day  levETIRAcetam 1000 milliGRAM(s) Oral two times a day  losartan 100 milliGRAM(s) Oral daily  multivitamin/minerals/iron Oral Solution (CENTRUM) 15 milliLiter(s) Oral daily  rivaroxaban 20 milliGRAM(s) Oral with dinner  timolol 0.25% Solution 1 Drop(s) Both EYES two times a day  valproic  acid Syrup 250 milliGRAM(s) Oral three times a day    MEDICATIONS  (PRN):  acetaminophen     Tablet .. 650 milliGRAM(s) Oral every 6 hours PRN Temp greater or equal to 38C (100.4F), Mild Pain (1 - 3)  aluminum hydroxide/magnesium hydroxide/simethicone Suspension 30 milliLiter(s) Oral every 4 hours PRN Dyspepsia  clonazePAM  Tablet 0.5 milliGRAM(s) Oral two times a day PRN anxiety  guaiFENesin Oral Liquid (Sugar-Free) 200 milliGRAM(s) Oral every 6 hours PRN Cough  melatonin 3 milliGRAM(s) Oral at bedtime PRN Insomnia  ondansetron Injectable 4 milliGRAM(s) IV Push every 8 hours PRN Nausea and/or Vomiting      CAPILLARY BLOOD GLUCOSE      POCT Blood Glucose.: 84 mg/dL (11 Jul 2023 04:09)  POCT Blood Glucose.: 84 mg/dL (10 Jul 2023 22:32)  POCT Blood Glucose.: 130 mg/dL (10 Jul 2023 17:59)    I&O's Summary    10 Jul 2023 07:01  -  11 Jul 2023 07:00  --------------------------------------------------------  IN: 495 mL / OUT: 500 mL / NET: -5 mL    11 Jul 2023 07:01  -  11 Jul 2023 11:54  --------------------------------------------------------  IN: 0 mL / OUT: 300 mL / NET: -300 mL        PHYSICAL EXAM:  Vital Signs Last 24 Hrs  T(C): 36.5 (11 Jul 2023 05:00), Max: 36.6 (10 Jul 2023 13:17)  T(F): 97.7 (11 Jul 2023 05:00), Max: 97.8 (10 Jul 2023 13:17)  HR: 71 (11 Jul 2023 05:00) (71 - 82)  BP: 152/101 (11 Jul 2023 05:00) (142/99 - 154/109)  BP(mean): --  RR: 18 (11 Jul 2023 05:00) (18 - 19)  SpO2: 99% (11 Jul 2023 05:00) (99% - 100%)    Parameters below as of 11 Jul 2023 05:00  Patient On (Oxygen Delivery Method): room air    GENERAL: Middle age man in NAD, well-developed  HEENT:  Atraumatic, Normocephalic, EOMI, PERRLA, conjunctiva and sclera clear, oral mucosa moist, clear w/o any exudate   NECK: Supple, No JVD  CHEST/LUNG: Clear to auscultation bilaterally; No wheeze  HEART: Regular rate and rhythm; No murmurs, rubs, or gallops  ABDOMEN: Soft, Nontender, Nondistended; Bowel sounds present  EXTREMITIES:  2+ Peripheral Pulses, No clubbing, cyanosis, or edema  PSYCH: AAOx3  NEUROLOGY: L sided weakness, chronic L facial droop.   SKIN: No rashes or lesions  LABS:                        13.3   6.29  )-----------( 128      ( 10 Jul 2023 06:34 )             40.5     07-10    138  |  104  |  15  ----------------------------<  84  3.5   |  25  |  0.52    Ca    8.2<L>      10 Jul 2023 06:34  Phos  2.7     07-10  Mg     2.20     07-10            Urinalysis Basic - ( 10 Jul 2023 06:34 )    Color: x / Appearance: x / SG: x / pH: x  Gluc: 84 mg/dL / Ketone: x  / Bili: x / Urobili: x   Blood: x / Protein: x / Nitrite: x   Leuk Esterase: x / RBC: x / WBC x   Sq Epi: x / Non Sq Epi: x / Bacteria: x          RADIOLOGY & ADDITIONAL TESTS:  Results Reviewed:   Imaging Personally Reviewed:  Electrocardiogram Personally Reviewed:    COORDINATION OF CARE:  Care Discussed with Consultants/Other Providers [Y/N]:  Prior or Outpatient Records Reviewed [Y/N]:

## 2023-07-11 NOTE — PROGRESS NOTE ADULT - PROBLEM SELECTOR PLAN 2
Patient had recurrent seizures requiring ICU admission at OSH. As per daughter, EEG at other hospital unable to detect seizures because they were "coming from deep inside his brain." No further seizure since his admission at the OSH  -Neurology consulted, appreciate recs   -C/w vimpat, keppra, and valproic acid   -C/w dexamethasone 4 mg BID -> daily   - F/u with MRI brain: Interval progression of the patient's known glioblastoma   within the right cerebral hemisphere when compared with 6/19/2023.  - barium swallow: No evidence of contrast leak.  Speech and swallow concern that muscles are weak recommends repeat study   [ ] Barium swallow on 7/10 - no contrast leak - on soft and bite sized diet

## 2023-07-11 NOTE — PROGRESS NOTE ADULT - ASSESSMENT
66 yo M with Pmhx of glioblastoma on active chemo therapy (last dose 5/2/2023), Afib on xarelto, HTN, and seizure disorder presents to the hospital from [place] for further management of his seizures and glioblastoma. Found to have  MRI brain Interval progression of the patient's known glioblastoma   within the right cerebral hemisphere when compared with 6/19/2023.

## 2023-07-12 NOTE — PROGRESS NOTE ADULT - ASSESSMENT
66 yo M with Pmhx of glioblastoma on active chemo therapy (last dose 5/2/2023), Afib on xarelto, HTN, and seizure disorder presents to the hospital from [place] for further management of his seizures and glioblastoma. Found to have  MRI brain Interval progression of the patient's known glioblastoma   within the right cerebral hemisphere when compared with 6/19/2023.

## 2023-07-12 NOTE — PROGRESS NOTE ADULT - SUBJECTIVE AND OBJECTIVE BOX
[facility]  Division of Hospital Medicine  Bessy Garcia MD  Pager: [phone]      Patient is a 67y old  Male who presents with a chief complaint of Seizure (11 Jul 2023 11:49)      SUBJECTIVE / OVERNIGHT EVENTS: Examined at bedside. Reports eating all of food. Awaiting rehab, Reached out to Dr. roque for chemo regimen.   ADDITIONAL REVIEW OF SYSTEMS:    MEDICATIONS  (STANDING):  allopurinol 100 milliGRAM(s) Oral daily  amLODIPine   Tablet 5 milliGRAM(s) Oral daily  brimonidine 0.2% Ophthalmic Solution 1 Drop(s) Both EYES two times a day  dexAMETHasone     Tablet 4 milliGRAM(s) Oral daily  famotidine   Suspension 40 milliGRAM(s) Oral daily  hydrALAZINE 25 milliGRAM(s) Oral three times a day  lacosamide 200 milliGRAM(s) Oral two times a day  levETIRAcetam 1000 milliGRAM(s) Oral two times a day  losartan 100 milliGRAM(s) Oral daily  multivitamin/minerals/iron Oral Solution (CENTRUM) 15 milliLiter(s) Oral daily  potassium phosphate / sodium phosphate Powder (PHOS-NaK) 1 Packet(s) Oral once  rivaroxaban 20 milliGRAM(s) Oral with dinner  timolol 0.25% Solution 1 Drop(s) Both EYES two times a day  valproic  acid Syrup 250 milliGRAM(s) Oral three times a day    MEDICATIONS  (PRN):  acetaminophen     Tablet .. 650 milliGRAM(s) Oral every 6 hours PRN Temp greater or equal to 38C (100.4F), Mild Pain (1 - 3)  aluminum hydroxide/magnesium hydroxide/simethicone Suspension 30 milliLiter(s) Oral every 4 hours PRN Dyspepsia  clonazePAM  Tablet 0.5 milliGRAM(s) Oral two times a day PRN anxiety  guaiFENesin Oral Liquid (Sugar-Free) 200 milliGRAM(s) Oral every 6 hours PRN Cough  melatonin 3 milliGRAM(s) Oral at bedtime PRN Insomnia  ondansetron Injectable 4 milliGRAM(s) IV Push every 8 hours PRN Nausea and/or Vomiting      CAPILLARY BLOOD GLUCOSE      POCT Blood Glucose.: 112 mg/dL (11 Jul 2023 17:10)  POCT Blood Glucose.: 119 mg/dL (11 Jul 2023 12:05)    I&O's Summary    11 Jul 2023 07:01  -  12 Jul 2023 07:00  --------------------------------------------------------  IN: 0 mL / OUT: 550 mL / NET: -550 mL        PHYSICAL EXAM:  Vital Signs Last 24 Hrs  T(C): 36.5 (12 Jul 2023 06:20), Max: 36.9 (11 Jul 2023 22:10)  T(F): 97.7 (12 Jul 2023 06:20), Max: 98.5 (11 Jul 2023 22:10)  HR: 72 (12 Jul 2023 06:20) (58 - 72)  BP: 141/95 (12 Jul 2023 06:20) (135/88 - 141/95)  BP(mean): --  RR: 17 (12 Jul 2023 06:20) (17 - 18)  SpO2: 100% (12 Jul 2023 06:20) (100% - 100%)    Parameters below as of 12 Jul 2023 06:20  Patient On (Oxygen Delivery Method): room air      GENERAL: Middle age man in NAD, well-developed  HEENT:  Atraumatic, Normocephalic, EOMI, PERRLA, conjunctiva and sclera clear, oral mucosa moist, clear w/o any exudate   NECK: Supple, No JVD  CHEST/LUNG: Clear to auscultation bilaterally; No wheeze  HEART: Regular rate and rhythm; No murmurs, rubs, or gallops  ABDOMEN: Soft, Nontender, Nondistended; Bowel sounds present  EXTREMITIES:  2+ Peripheral Pulses, No clubbing, cyanosis, or edema  PSYCH: AAOx3  NEUROLOGY: L sided weakness, chronic L facial droop.   SKIN: No rashes or lesions    LABS:                        13.9   5.86  )-----------( 148      ( 12 Jul 2023 07:05 )             42.5     07-12    136  |  109<H>  |  13  ----------------------------<  96  3.6   |  26  |  0.47<L>    Ca    8.3<L>      12 Jul 2023 07:05  Phos  2.4     07-12  Mg     2.30     07-12            Urinalysis Basic - ( 12 Jul 2023 07:05 )    Color: x / Appearance: x / SG: x / pH: x  Gluc: 96 mg/dL / Ketone: x  / Bili: x / Urobili: x   Blood: x / Protein: x / Nitrite: x   Leuk Esterase: x / RBC: x / WBC x   Sq Epi: x / Non Sq Epi: x / Bacteria: x          RADIOLOGY & ADDITIONAL TESTS:  Results Reviewed:   Imaging Personally Reviewed:  Electrocardiogram Personally Reviewed:    COORDINATION OF CARE:  Care Discussed with Consultants/Other Providers [Y/N]:  Prior or Outpatient Records Reviewed [Y/N]:

## 2023-07-12 NOTE — PROGRESS NOTE ADULT - PROBLEM SELECTOR PLAN 2
Patient had recurrent seizures requiring ICU admission at OSH. As per daughter, EEG at other hospital unable to detect seizures because they were "coming from deep inside his brain." No further seizure since his admission at the OSH  -Neurology consulted, appreciate recs   -C/w vimpat, keppra, and valproic acid   -C/w dexamethasone 4 mg BID -> daily   -  F/u with MRI brain: Interval progression of the patient's known glioblastoma   within the right cerebral hemisphere when compared with 6/19/2023.  - barium swallow: No evidence of contrast leak.  Speech and swallow concern that muscles are weak recommends repeat study   [ ] Barium swallow on 7/10 - awaiting read

## 2023-07-13 NOTE — PROGRESS NOTE ADULT - ASSESSMENT
68 yo M with Pmhx of glioblastoma on active chemo therapy (last dose 5/2/2023), Afib on xarelto, HTN, and seizure disorder presents to the hospital from [place] for further management of his seizures and glioblastoma. Found to have  MRI brain Interval progression of the patient's known glioblastoma   within the right cerebral hemisphere when compared with 6/19/2023.

## 2023-07-13 NOTE — PROGRESS NOTE ADULT - SUBJECTIVE AND OBJECTIVE BOX
[facility]  Division of Hospital Medicine  Bessy Garcia MD  Pager: [phone]      Patient is a 67y old  Male who presents with a chief complaint of Seizure (13 Jul 2023 10:55)      SUBJECTIVE / OVERNIGHT EVENTS: PAtient seen and examined at bedside. Discussed with Dr. Carias no plan for chemotherapy while in rehab, Patient plan to get out of  bed to chair.   ADDITIONAL REVIEW OF SYSTEMS:    MEDICATIONS  (STANDING):  allopurinol 100 milliGRAM(s) Oral daily  amLODIPine   Tablet 5 milliGRAM(s) Oral daily  brimonidine 0.2% Ophthalmic Solution 1 Drop(s) Both EYES two times a day  dexAMETHasone     Tablet 4 milliGRAM(s) Oral daily  famotidine   Suspension 40 milliGRAM(s) Oral daily  hydrALAZINE 25 milliGRAM(s) Oral three times a day  lacosamide 200 milliGRAM(s) Oral two times a day  levETIRAcetam 1000 milliGRAM(s) Oral two times a day  losartan 100 milliGRAM(s) Oral daily  multivitamin/minerals/iron Oral Solution (CENTRUM) 15 milliLiter(s) Oral daily  rivaroxaban 20 milliGRAM(s) Oral with dinner  timolol 0.25% Solution 1 Drop(s) Both EYES two times a day  valproic  acid Syrup 250 milliGRAM(s) Oral three times a day    MEDICATIONS  (PRN):  acetaminophen     Tablet .. 650 milliGRAM(s) Oral every 6 hours PRN Temp greater or equal to 38C (100.4F), Mild Pain (1 - 3)  aluminum hydroxide/magnesium hydroxide/simethicone Suspension 30 milliLiter(s) Oral every 4 hours PRN Dyspepsia  clonazePAM  Tablet 0.5 milliGRAM(s) Oral two times a day PRN anxiety  guaiFENesin Oral Liquid (Sugar-Free) 200 milliGRAM(s) Oral every 6 hours PRN Cough  melatonin 3 milliGRAM(s) Oral at bedtime PRN Insomnia  ondansetron Injectable 4 milliGRAM(s) IV Push every 8 hours PRN Nausea and/or Vomiting      CAPILLARY BLOOD GLUCOSE        I&O's Summary    12 Jul 2023 07:01  -  13 Jul 2023 07:00  --------------------------------------------------------  IN: 1280 mL / OUT: 675 mL / NET: 605 mL        PHYSICAL EXAM:  Vital Signs Last 24 Hrs  T(C): 36.4 (13 Jul 2023 12:45), Max: 36.6 (12 Jul 2023 21:31)  T(F): 97.5 (13 Jul 2023 12:45), Max: 97.9 (12 Jul 2023 21:31)  HR: 100 (13 Jul 2023 12:45) (73 - 100)  BP: 121/81 (13 Jul 2023 12:45) (121/81 - 130/90)  BP(mean): --  RR: 17 (13 Jul 2023 12:45) (17 - 17)  SpO2: 98% (13 Jul 2023 12:45) (98% - 98%)    Parameters below as of 13 Jul 2023 12:45  Patient On (Oxygen Delivery Method): room air      GENERAL: Middle age man in NAD, well-developed  HEENT:  Atraumatic, Normocephalic, EOMI, PERRLA, conjunctiva and sclera clear, oral mucosa moist, clear w/o any exudate   NECK: Supple, No JVD  CHEST/LUNG: Clear to auscultation bilaterally; No wheeze  HEART: Regular rate and rhythm; No murmurs, rubs, or gallops  ABDOMEN: Soft, Nontender, Nondistended; Bowel sounds present  EXTREMITIES:  2+ Peripheral Pulses, No clubbing, cyanosis, or edema  PSYCH: AAOx3  NEUROLOGY: L sided weakness, chronic L facial droop.   SKIN: No rashes or lesions  LABS:                        13.6   5.80  )-----------( 167      ( 13 Jul 2023 05:43 )             40.8     07-13    138  |  103  |  16  ----------------------------<  85  3.7   |  23  |  0.50    Ca    8.2<L>      13 Jul 2023 05:43  Phos  2.8     07-13  Mg     2.00     07-13            Urinalysis Basic - ( 13 Jul 2023 05:43 )    Color: x / Appearance: x / SG: x / pH: x  Gluc: 85 mg/dL / Ketone: x  / Bili: x / Urobili: x   Blood: x / Protein: x / Nitrite: x   Leuk Esterase: x / RBC: x / WBC x   Sq Epi: x / Non Sq Epi: x / Bacteria: x          RADIOLOGY & ADDITIONAL TESTS:  Results Reviewed:   Imaging Personally Reviewed:  Electrocardiogram Personally Reviewed:    COORDINATION OF CARE:  Care Discussed with Consultants/Other Providers [Y/N]:  Prior or Outpatient Records Reviewed [Y/N]:

## 2023-07-13 NOTE — PROGRESS NOTE ADULT - TIME BILLING
Review of laboratory data, radiology results, consultants' recommendations, documentation in [place], discussion with patient/house staff/ACP and interdisciplinary staff (such as , social workers, etc). Interventions were performed as documented above.
Review of laboratory data, radiology results, consultants' recommendations, documentation in [place], discussion with patient/house staff/ACP and interdisciplinary staff (such as , social workers, etc). Interventions were performed as documented above.
Review of laboratory data, radiology results, consultants' recommendations, documentation in [place], discussion with patient/house staff/ACP and interdisciplinary staff (such as , social workers, etc). Interventions were performed as documented above.
Review of laboratory data, radiology results, consultants' recommendations, documentation in [place], discussion with patient/house staff/ACP and interdisciplinary staff (such as , social workers, etc). Interventions were performed as documented above.
Review of laboratory data, radiology results, consultants' recommendations, documentation in [place], discussion with patient/house staff/ACP and interdisciplinary staff (such as , social workers, etc). Interventions were performed as documented above.
Review of laboratory data, radiology results, consultants' recommendations, documentation in [place], discussion with patient/house staff/ACP and interdisciplinary staff (such as , social workers, etc). Interventions were performed as documented above.
Review of laboratory data, radiology results, consultants' recommendations, documentation in [place], discussion with patient/house staff/ACP and interdisciplinary staff (such as , social workers, etc). Interventions were performed as documented above.
Review of laboratory data, radiology results, consultants' recommendations, documentation in [place], discussion with patient/house staff/ACP and interdisciplinary staff (such as , social workers, etc). Interventions were performed as documented above.
Review of laboratory data, radiology results, consultants' recommendations, documentation in [place], discussion with patient/house staff/ACP and interdisciplinary staff (such as , social workers, etc). Interventions were performed as documented above.

## 2023-07-13 NOTE — PROGRESS NOTE ADULT - PROBLEM SELECTOR PLAN 5
DVT ppx-xarelto 20 mg daily  Dispo: PT consult - rehab discussed with dr. roque NO plans for chemotherapy while in rehab pending duplex

## 2023-07-13 NOTE — PROVIDER CONTACT NOTE (OTHER) - SITUATION
PT HR has been afib all day with rate >100. Sometimes sustains 130s and up, but normally between 110-120bpm

## 2023-07-13 NOTE — PROGRESS NOTE ADULT - SUBJECTIVE AND OBJECTIVE BOX
Mr. Jalloh was seen in neuro oncologic follow up.  Events of [place] hospitalization noted  - recent MRI reviewed.  He is currently awake and alert - oriented to person, place, and time  Fluent with normal reception  EOMI, VFF  Left NLF flattening  Left UE stronger than appears - when he attends to the left - proximally 5-/5 and hand intrinsics and finger tap 4/5.  LLE weaker - 3-/5  Extinguishes on the left to DSS    LLE - ankle mildly swollen.

## 2023-07-13 NOTE — PROGRESS NOTE ADULT - ASSESSMENT
Mr. 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  He is improved and remains seizure free on Decadron 4 mg daily, Vimpat 200 mg bid, Keppra 1000 mg, Valproic acid 250 mg bid, and clonazepam 0.5 mg bid.  MRI with local progression and neurologic examination notable for left hemiparesis and left sided sensory neglect in UE.  Will need rehab.  Treatment currently on hold until he is physically stronger and stoma have healed - will need follow up with ENT upon discharge.  He remains on xarelto  - please check left LE sonogram - r/o DVT.

## 2023-07-13 NOTE — PROGRESS NOTE ADULT - PROBLEM SELECTOR PLAN 2
Patient had recurrent seizures requiring ICU admission at OSH. As per daughter, EEG at other hospital unable to detect seizures because they were "coming from deep inside his brain." No further seizure since his admission at the OSH  -Neurology consulted, appreciate recs   -C/w vimpat, keppra, and valproic acid   -C/w dexamethasone 4 mg BID -> daily   -  F/u with MRI brain: Interval progression of the patient's known glioblastoma   within the right cerebral hemisphere when compared with 6/19/2023.  - barium swallow: No evidence of contrast leak.  Speech and swallow concern that muscles are weak recommends repeat study   - Barium swallow on 7/10 - no leakage

## 2023-07-14 NOTE — PROGRESS NOTE ADULT - REASON FOR ADMISSION
Seizure

## 2023-07-14 NOTE — DISCHARGE NOTE NURSING/CASE MANAGEMENT/SOCIAL WORK - NSDCPEFALRISK_GEN_ALL_CORE
For information on Fall & Injury Prevention, visit: [email or web address] OR  [email or web address] OR  https://www.cdc.gov/steadi/patient.html

## 2023-07-14 NOTE — DISCHARGE NOTE NURSING/CASE MANAGEMENT/SOCIAL WORK - NSDCCRNAME_GEN_ALL_CORE_FT
[facility] address: [address] [place] [id number], 6pm  via [facility] ambulance

## 2023-07-14 NOTE — DISCHARGE NOTE NURSING/CASE MANAGEMENT/SOCIAL WORK - PATIENT PORTAL LINK FT
You can access the FollowMyHealth Patient Portal offered by [facility] by registering at the following website: [email or web address]. By joining [email or web address] [email or web address] portal, you will also be able to view your health information using other applications (apps) compatible with our system.

## 2023-07-14 NOTE — PROGRESS NOTE ADULT - PROBLEM SELECTOR PLAN 1
Patient with hx of glioblastoma s/p RT and chemotherapy   -Recent admission at OSH for seizures, found to have progression of tumor vs. new strokes   -Neurology consulted, appreciate recs   -  Routine EEG; -Right hemispheric focal cerebral dysfunction can be structural or functional in etiology.  -Single-lead EKG shows irregularly irregular rhythm.   -Neuro onc consulted, awaiting recs   - Outpatient images uploaded   - Discussed with Dr. Mcpherson - will attempt uploading images, if not may consider CT head   - PT consult - restorative rehab   - Repeat MRi with contrast: Interval progression of the patient's known glioblastoma within the right cerebral hemisphere when compared with 6/19/2023.  - No plans for chemotherapy while at rehab, outpatient followup with Dr. Carias

## 2023-07-14 NOTE — PROGRESS NOTE ADULT - PROVIDER SPECIALTY LIST ADULT
Hospitalist
Neurology
Hospitalist
Neurology
ENT
Neurology
Hospitalist
Internal Medicine
Hospitalist

## 2023-07-14 NOTE — DISCHARGE NOTE NURSING/CASE MANAGEMENT/SOCIAL WORK - NSDCFUADDAPPT_GEN_ALL_CORE_FT
APPTS ARE READY TO BE MADE: [X ] YES  ENT appointment   Best Family or Patient Contact (if needed):  Daughter Aretha Yeimi [phone]  Additional Information about above appointments (if needed):    1: Needs ENT appointment  following discharge   2: Speech and swallow appointment following discharge   3:     Other comments or requests:

## 2023-07-14 NOTE — PROGRESS NOTE ADULT - PROBLEM SELECTOR PLAN 3
HR is WNL   -C/w xarelto 20 mg daily
- patient initially with PEG tube in place due to trach, trach now removed, stoma in place  - Seen by ENT, states patient can eat with persistent trach stomal opening; states to "change stoma dressing daily with occlusive dressing  fold a 4x4 and tape with silk tape or Tegaderm (not paper tape)"  - Barium swallow on 7/10 with normal, no contrast leak noted on exam; per S&S documentation patient with" no aspiration observed before, during or after the swallow for puree, soft bite size solid, regular solid, moderately thick liquids, mildly thick liquids and thin liquids"  - patient states that he is able to eat all meals with out difficulty, has not been received PEG tube feeding since 7/10 and has been tolerating diet  - given patient has passed swallow study and able to tolerate diet, no need for PEG tube feedings at this time, receiving adequate nutrition.   - continue with easy to chew diet as ordered
HR is WNL   -C/w xarelto 20 mg daily
HR is WNL   -C/w xarelto 20 mg daily

## 2023-07-14 NOTE — PROGRESS NOTE ADULT - ASSESSMENT
68 yo M with Pmhx of glioblastoma on active chemo therapy (last dose 5/2/2023), Afib on xarelto, HTN, and seizure disorder presents to the hospital from [place] for further management of his seizures and glioblastoma. Found to have  MRI brain Interval progression of the patient's known glioblastoma   within the right cerebral hemisphere when compared with 6/19/2023.

## 2023-07-14 NOTE — PROGRESS NOTE ADULT - PROBLEM SELECTOR PROBLEM 5
Essential hypertension
Preventive measure

## 2023-07-14 NOTE — PROGRESS NOTE ADULT - PROBLEM SELECTOR PLAN 6
DVT ppx- xarelto 20 mg daily  Dispo: PT consult - previous attending discussed with Dr. Carias NO plans for chemotherapy while in rehab

## 2023-07-14 NOTE — PROGRESS NOTE ADULT - PROBLEM SELECTOR PROBLEM 1
Glioblastoma

## 2023-07-14 NOTE — PROGRESS NOTE ADULT - PROBLEM SELECTOR PLAN 2
Patient had recurrent seizures requiring ICU admission at OSH. As per daughter, EEG at other hospital unable to detect seizures because they were "coming from deep inside his brain." No further seizure since his admission at the OSH  - Neurology consulted, appreciate recs   - C/w vimpat, keppra, and valproic acid   - C/w dexamethasone 4 mg BID -> daily   - MRI brain: Interval progression of the patient's known glioblastoma   within the right cerebral hemisphere when compared with 6/19/2023.  - barium swallow: No evidence of contrast leak.  - Barium swallow on 7/10 - no leakage, patient with no aspiration risk with "No Aspiration observed before, during or after the swallow for puree, soft bite size solid, regular solid, moderately thick liquids, mildly thick liquids and thin liquids"

## 2023-07-14 NOTE — PROGRESS NOTE ADULT - PROBLEM SELECTOR PROBLEM 4
Essential hypertension
Chronic atrial fibrillation
Essential hypertension

## 2023-07-14 NOTE — PROGRESS NOTE ADULT - PROBLEM SELECTOR PROBLEM 3
Chronic atrial fibrillation
Dysphagia
Chronic atrial fibrillation
Chronic atrial fibrillation

## 2023-07-14 NOTE — PROGRESS NOTE ADULT - SUBJECTIVE AND OBJECTIVE BOX
[facility] Division of [facility] Medicine  Jamie Flores M.D  Pager [phone]  Available via MS Teams    SUBJECTIVE / OVERNIGHT EVENTS: No acute events overnight. Patient denies any CP or SOB. Spoke with daughter over the phone at bedside via Ipad.      ADDITIONAL REVIEW OF SYSTEMS:    MEDICATIONS  (STANDING):  allopurinol 100 milliGRAM(s) Oral daily  amLODIPine   Tablet 5 milliGRAM(s) Oral daily  brimonidine 0.2% Ophthalmic Solution 1 Drop(s) Both EYES two times a day  dexAMETHasone     Tablet 4 milliGRAM(s) Oral daily  famotidine   Suspension 40 milliGRAM(s) Oral daily  hydrALAZINE 25 milliGRAM(s) Oral three times a day  lacosamide 200 milliGRAM(s) Oral two times a day  levETIRAcetam 1000 milliGRAM(s) Oral two times a day  losartan 100 milliGRAM(s) Oral daily  multivitamin/minerals/iron Oral Solution (CENTRUM) 15 milliLiter(s) Oral daily  rivaroxaban 20 milliGRAM(s) Oral with dinner  timolol 0.25% Solution 1 Drop(s) Both EYES two times a day  valproic  acid Syrup 250 milliGRAM(s) Oral three times a day    MEDICATIONS  (PRN):  acetaminophen     Tablet .. 650 milliGRAM(s) Oral every 6 hours PRN Temp greater or equal to 38C (100.4F), Mild Pain (1 - 3)  aluminum hydroxide/magnesium hydroxide/simethicone Suspension 30 milliLiter(s) Oral every 4 hours PRN Dyspepsia  guaiFENesin Oral Liquid (Sugar-Free) 200 milliGRAM(s) Oral every 6 hours PRN Cough  melatonin 3 milliGRAM(s) Oral at bedtime PRN Insomnia  ondansetron Injectable 4 milliGRAM(s) IV Push every 8 hours PRN Nausea and/or Vomiting      I&O's Summary    13 Jul 2023 07:01  -  14 Jul 2023 07:00  --------------------------------------------------------  IN: 0 mL / OUT: 1200 mL / NET: -1200 mL        PHYSICAL EXAM:  Vital Signs Last 24 Hrs  T(C): 36.6 (14 Jul 2023 11:38), Max: 36.9 (13 Jul 2023 21:21)  T(F): 97.9 (14 Jul 2023 11:38), Max: 98.4 (13 Jul 2023 21:21)  HR: 92 (14 Jul 2023 11:38) (51 - 92)  BP: 129/94 (14 Jul 2023 11:38) (116/83 - 153/104)  BP(mean): --  RR: 18 (14 Jul 2023 11:38) (18 - 18)  SpO2: 100% (14 Jul 2023 11:38) (99% - 100%)    Parameters below as of 14 Jul 2023 04:50  Patient On (Oxygen Delivery Method): room air      CONSTITUTIONAL: NAD,  EYES:  conjunctiva and sclera clear  ENMT: Moist oral mucosa, no pharyngeal injection or exudates;   NECK: + stoma site with guaze, Supple, no palpable masses; no thyromegaly  RESPIRATORY: Normal respiratory effort; lungs are clear to auscultation bilaterally  CARDIOVASCULAR: Regular rate and rhythm, normal S1 and S2, no murmur/rub/gallop; No lower extremity edema;   ABDOMEN: Nontender to palpation, normoactive bowel sounds, no rebound/guarding; No hepatosplenomegaly  MUSCULOSKELETAL: decreased ROM   PSYCH: A+O to person, place, and time; affect appropriate  NEUROLOGY: no gross sensory deficits; decreased strength in ALl extremities   SKIN: No rashes; no palpable lesions    LABS:                        13.9   6.96  )-----------( 158      ( 14 Jul 2023 05:37 )             42.3     07-14    136  |  107  |  17  ----------------------------<  82  4.0   |  22  |  0.51    Ca    8.5      14 Jul 2023 05:37  Phos  2.9     07-14  Mg     2.00     07-14            Urinalysis Basic - ( 14 Jul 2023 05:37 )    Color: x / Appearance: x / SG: x / pH: x  Gluc: 82 mg/dL / Ketone: x  / Bili: x / Urobili: x   Blood: x / Protein: x / Nitrite: x   Leuk Esterase: x / RBC: x / WBC x   Sq Epi: x / Non Sq Epi: x / Bacteria: x            RADIOLOGY & ADDITIONAL TESTS:  New Results Reviewed Today:   New Imaging Personally Reviewed Today:  New Electrocardiogram Personally Reviewed Today:  Prior or Outpatient Records Reviewed Today:

## 2023-07-16 NOTE — ED PROVIDER NOTE - ATTENDING CONTRIBUTION TO CARE
67-year-old male with history of glioblastoma, previously treated with chemotherapy and radiation (last in May 2023), atrial fibrillation on Xarelto, hypertension, seizures, just recently admitted to [facility] for seizures and subsequently found have progression of glioblastoma on MRI. Presenting from rehab facility for seizure via EMS today (was there 1 day). Was lying in bed when seizure occurred. Given 5 mg of Versed x 2 w/ EMS, seizures subsequently stooped, however, patient was then obtunded and required BVM for poor respiratory effort and mental status. Patient not following commands following seizure. Otherwise, patient hemodynamically stable en route to the hospital per EMS. On arrival patient obtunded and no focal neurological findings. No response to painful stimuli in extremities x4. No outward signs of head trauma. Stoma open to air. EMS bagging patient. Rhonchi with bagging bilat. Pupils equal round and reactive to light bilaterally, 3 mm bilaterally. No seizure like activity in the emergency department. Confirmed, full code per EMS. Poor respiratory effort, GCS 4, patient ultimately intubated for airway protection and respiratory support. Patient mildly hypotensive following intubation and started on vasopressors. Titrated levo to phenylephrine given tachycardia related to atrial fibrillation w/ RVR. Rectal temperature found to be 102. Patient with previous history of dysphasia and aspiration per wife. Patient on Vimpat, Keppra, and valproic acid for seizure prevention. Dexamethasone 4 mg twice daily at home. Will provide empiric antibiotics, stress dose steroids, fluid bolus, keppra load, obtain emergent CT imaging. Consider ICH, progression of glioblastoma, status, epilepticus, medication side effect, oversedation, occult infection, sepsis, aspiration, pneumonia, UTI, electrolyte abnormality, afib w/ RVR. No findings and recent history suggest trauma. Will consult ICU. Will reassess following CT imaging for further consultation. Blood pressure and heart rate improving with interventions at end of initial exam. Family in ED an updated on pt status. Will reassess. 67-year-old male with history of glioblastoma, previously treated with chemotherapy and radiation (last in May 2023), atrial fibrillation on Xarelto, hypertension, seizures, just recently admitted to [facility] for seizures and subsequently found have progression of glioblastoma on MRI. Presenting from rehab facility for seizure via EMS today (was there 1 day). Was lying in bed when seizure occurred. Given 5 mg of Versed x 2 w/ EMS, seizures subsequently stooped, however, patient was then obtunded and required BVM for poor respiratory effort and mental status. Patient not following commands following seizure. Otherwise, patient hemodynamically stable en route to the hospital per EMS. On arrival patient obtunded and no focal neurological findings. No response to painful stimuli in extremities x4. No outward signs of head trauma. Stoma open to air. EMS bagging patient. Rhonchi with bagging bilat. Pupils equal round and reactive to light bilaterally, 3 mm bilaterally. No seizure like activity in the emergency department. Confirmed, full code per EMS. Poor respiratory effort, GCS 4, patient ultimately intubated for airway protection and respiratory support. Patient mildly hypotensive following intubation and started on vasopressors. Titrated levo to phenylephrine given tachycardia related to atrial fibrillation w/ RVR. Rectal temperature found to be 102. Patient with previous history of dysphasia and aspiration per wife. Patient on Vimpat, Keppra, and valproic acid for seizure prevention. Dexamethasone 4 mg twice daily at home. Will provide empiric antibiotics, stress dose steroids, fluid bolus, keppra load, obtain emergent CT imaging. Consider ICH, progression of glioblastoma, status, epilepticus, medication side effect, oversedation, occult infection, sepsis, aspiration, pneumonia, UTI, electrolyte abnormality, afib w/ RVR. No findings and recent history suggest trauma. Consider hypovolemia/dehydration, PARKER,  anemia, arrhythmia, vasovagal etiology, orthostatic hypotension, autonomic dysfunction. No focal neuro findings to suggest CVA. Consider CHF, cardiomyopathy. Will consult ICU. Will reassess following CT imaging for further consultation. Blood pressure and heart rate improving with interventions at end of initial exam. Family in ED an updated on pt status. Will reassess.

## 2023-07-16 NOTE — ED ADULT NURSE NOTE - NSFALLHARMRISKINTERV_ED_ALL_ED

## 2023-07-16 NOTE — H&P ADULT - NSHPLABSRESULTS_GEN_ALL_CORE
.  LABS:                         15.5   8.21  )-----------( 224      ( 16 Jul 2023 02:53 )             48.5     07-16    142  |  105  |  17  ----------------------------<  144<H>  3.9   |  19<L>  |  0.70    Ca    8.3<L>      16 Jul 2023 02:53  Phos  5.0     07-16  Mg     1.9     07-16    TPro  5.9<L>  /  Alb  3.5  /  TBili  0.4  /  DBili  x   /  AST  50<H>  /  ALT  80<H>  /  AlkPhos  105  07-16    < from: CT Angio Chest PE Protocol w/ IV Cont (07.16.23 @ 04:27) >    IMPRESSION:    CTA CHEST:  1.  Endotracheal tube in good position.  2.  No evidence of pulmonary embolism.  3.  Partial atelectasis the left lower lobe.  No evidence of pneumonia or   pulmonary edema.  4.  Global cardiomegaly.  Concentric hypertrophy of the left ventricle.    Evidence of elevated right atrial pressures.  Small amount of pericardial   fluid in the region the cardiac apex measuring up to 1 cm in thickness.  5.  Ectatic mid ascending thoracic aorta measures up to 3.8 cm.    CT ABDOMEN/PELVIS:  1.  No evidence of bowel obstruction, active inflammatory process or   intra-abdominal source for infection.  2.  The rectum is distended with stool to 6.3 cm. Fecal impaction should   be excluded clinically.  There is no evidence of stercoral colitis.  3.  The bladder is collapsed around a Pimentel catheter.  Underlying   genitourinary infection should be excluded clinically.  4.  Mild subcutaneous edema in the flanks and proximal thighs.    < end of copied text >    < from: CT Head No Cont (07.16.23 @ 04:14) >    IMPRESSION:  Mass in the right frontoparietal convexity, compatible the patient's   known glioblastoma, has progressed from CT head of 06/07/2023 and is   grossly stable from MRI of 07/08/2023.  No new intracranial findings.    < end of copied text >    < from: Xray Chest 1 View-PORTABLE IMMEDIATE (Xray Chest 1 View-PORTABLE IMMEDIATE .) (07.16.23 @ 02:56) >      IMPRESSION:  Endotracheal tube tip in the distal trachea above the syeda.    Retrocardiac opacity likely representing a combination of a small left   effusion and atelectasis..    < end of copied text >      Urinalysis Basic - ( 16 Jul 2023 02:53 )    Color: x / Appearance: x / SG: x / pH: x  Gluc: 144 mg/dL / Ketone: x  / Bili: x / Urobili: x   Blood: x / Protein: x / Nitrite: x   Leuk Esterase: x / RBC: x / WBC x   Sq Epi: x / Non Sq Epi: x / Bacteria: x

## 2023-07-16 NOTE — CONSULT NOTE ADULT - SUBJECTIVE AND OBJECTIVE BOX
HPI:  Mr. Jalloh is a 66 y/o male with a PMHx of glioblastoma, seizure disorder, atrial fibrillation on xarelto, and HTN who had a seizure at a rehab facility and was brought in by EMS. He was given versed by EMS and upon arrival to ED, patient had a GCS of 6-7 and was unresponsive. At this point, he was intubated and MICU was consulted. Of note, patient was recently discharged from [facility] on 7/14 where he was seen for management of glioblastoma and seizures. He was discharged to rehab on keppra, vimpat, valproic acid, and dexamethasone and set to follow-up with outpatient neurologist before today's episode.    ED course: Patient brought in by EMS after receiving versed en route to hospital. Upon presentation to ED, he had a GCS of 6-7 and was unresponsive, leading to intubation. Patient also noted to be hypotensive and febrile in ED, requiring pressors. Labs upon arrival were significant for WBC of 8.21, glucose of 144, calcium of 8.3, phosphorus of 5, AST of 50, ALT of 80, troponin of 75, BNP of 2832, procal of 0.14, VBG with pH of 7.18, pCO2 of 61, and HCO3 of 23. POCUS in ED with evidence of right heart dilation. (16 Jul 2023 04:26)  -----  On exam at bedside by neurology team patient sedated, intubated, minimally responsive to painful stimuli, PERRL. No evidence of active seizure activity at the time of exam. Comfortably breathing with ventilator.    MEDICATIONS  (STANDING):  cefepime   IVPB 2000 milliGRAM(s) IV Intermittent once  chlorhexidine 0.12% Liquid 15 milliLiter(s) Oral Mucosa every 12 hours  chlorhexidine 4% Liquid 1 Application(s) Topical <User Schedule>  clonazePAM  Tablet 0.5 milliGRAM(s) Oral two times a day  dexAMETHasone  Injectable 4 milliGRAM(s) IV Push daily  heparin   Injectable 5000 Unit(s) SubCutaneous every 8 hours  lacosamide IVPB 200 milliGRAM(s) IV Intermittent every 12 hours  levETIRAcetam  IVPB 1000 milliGRAM(s) IV Intermittent every 12 hours  norepinephrine Infusion 0.05 MICROgram(s)/kG/Min (9.38 mL/Hr) IV Continuous <Continuous>  pantoprazole  Injectable 40 milliGRAM(s) IV Push every 24 hours  phenylephrine    Infusion 1 MICROgram(s)/kG/Min (37.5 mL/Hr) IV Continuous <Continuous>  propofol Infusion 10 MICROgram(s)/kG/Min (6 mL/Hr) IV Continuous <Continuous>  valproate sodium  IVPB 250 milliGRAM(s) IV Intermittent every 8 hours  vancomycin  IVPB. 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      2019 novel coronavirus disease (COVID-19)  February 20201 - received monoclonal AB infusion      GERD (gastroesophageal reflux disease)      H/O prostatectomy  4/27/10      H/O arthroscopy of right knee  1988      H/O colonoscopy      H/O endoscopy      Elective surgery  Percutaneous Cholecystostomy Drainage September 2021        FAMILY HISTORY:  Family history of diabetes mellitus (DM) (Mother)    Family history of TIAs (Mother)    Family hx of prostate cancer (Father)    Family history of bone cancer (Father)    Family history of appendicitis      Allergies    No Known Allergies    Intolerances        SHx - No smoking, No ETOH, No drug abuse      Review of Systems: As noted in HPI      Vital Signs Last 24 Hrs  T(C): 39.1 (16 Jul 2023 02:55), Max: 39.1 (16 Jul 2023 02:55)  T(F): 102.4 (16 Jul 2023 02:55), Max: 102.4 (16 Jul 2023 02:55)  HR: 117 (16 Jul 2023 04:18) (107 - 155)  BP: --  BP(mean): --  RR: --  SpO2: 100% (16 Jul 2023 04:18) (98% - 100%)        General Exam:   General appearance: No acute distress                   Neurological Exam:  Mental Status: Intubated and sedated, minimally responsive to painful stimuli  Cranial Nerves: CN I - not tested.  PERRL.    Strength exam:   Tremor: No resting tremor.  No myoclonus.  Deep Tendon Reflexes: 1+ bilateral biceps, triceps, brachioradialis, knee and ankle  Gait: Deferred unable to assess given pt intubated and sedated    Other:    07-16    142  |  105  |  17  ----------------------------<  144<H>  3.9   |  19<L>  |  0.70    Ca    8.3<L>      16 Jul 2023 02:53  Phos  5.0     07-16  Mg     1.9     07-16    TPro  5.9<L>  /  Alb  3.5  /  TBili  0.4  /  DBili  x   /  AST  50<H>  /  ALT  80<H>  /  AlkPhos  105  07-16    07-16    142  |  105  |  17  ----------------------------<  144<H>  3.9   |  19<L>  |  0.70    Ca    8.3<L>      16 Jul 2023 02:53  Phos  5.0     07-16  Mg     1.9     07-16    TPro  5.9<L>  /  Alb  3.5  /  TBili  0.4  /  DBili  x   /  AST  50<H>  /  ALT  80<H>  /  AlkPhos  105  07-16                          15.5   8.21  )-----------( 224      ( 16 Jul 2023 02:53 )             48.5       Radiology

## 2023-07-16 NOTE — AIRWAY REMOVAL NOTE  ADULT & PEDS - ARTIFICAL AIRWAY REMOVAL COMMENTS
Written order for extubation verified. The patient was identified by full name and birth date compared to the identification band. Present during the procedure was ERNIE Yates.

## 2023-07-16 NOTE — ED ADULT NURSE NOTE - OBJECTIVE STATEMENT
68 yo male PMH Seizures, HTN, afib on Xarelto, BIBEMS unresponsive.  EMS reports told by nursing home pt had a seizures at 0130 66 yo male PMH Seizures, HTN, afib on Xarelto, BIBEMS unresponsive.  EMS reports told by nursing home pt had a seizure at 0130 was given ativan 2mg IM, did not stop the seizures, EMS gave versed 10 mg IM at 0200, began to ventilate patient with BMV enroute to ED. Pt unresponsive, GCS 4 on arrival.  tracheal stoma noted, peg tube in place.  PT was intubated, 7.5 et tube, 24 lipline, ETCO2 34, o2 sat 100%. Pimentel catheter inserted using sterile technique, draining by gravity, secured with StatLock. Second RN present to confirm sterility.

## 2023-07-16 NOTE — PATIENT PROFILE ADULT - FALL HARM RISK - RISK INTERVENTIONS

## 2023-07-16 NOTE — CONSULT NOTE ADULT - ASSESSMENT
Mr. Jalloh is a 68 y/o male with a PMHx of glioblastoma, seizure disorder, atrial fibrillation on xarelto, and HTN who had a seizure at a rehab facility and was brought in by EMS. He was given versed by EMS and upon arrival to ED, patient had a GCS of 6-7 and was unresponsive. At this point, he was intubated and MICU was consulted. Neurology consulted for seizure management.     Impression: Seizure in the setting of glioblastoma, exacerbating factors including but not limited to infection vs toxic vs metabolic encephalopathy    Labs/Studies:  [] Check AED levels with AM labs  [] 24 hour Video EEG   [] Basic labs, UA, Ucx to r/o possible underlying infectious/metabolic etiology  [x] has recent MRI     Meds:  [] Continue with patient's home AEDs that he was d/c on last hospitalization: Vimpat 200 mg q12, Keppra 1000 mg Q12, Depacon 250 mg q8  [] Continue empiric antibiotics as per primary team  [] Seizure rescue medications for a generalized tonic clonic episode lasting >3 min or significant derangement of vital signs:   ---> 1st line: Ativan 2 mg IV. For GTC > 3 min and refractory to Ativan please call [phone].    Other:   [] Telemetry monitoring; Neurochecks/VS per unit protocol  [] Seizure, fall and aspiration precautions  [] Please note: if patient has a convulsion, please document time of onset, progression of limb involvement (upper/lower; R/L) if any, and specifically what patient was doing paying attention to eye opening vs closure, head turn, gaze deviation, shaking of extremities, tongue bite, urinary/bowel incontinence, any derangement of vital signs, length of episode, and duration of postictal period.  [] Given concern for seizure, advise patient to not drive, operate heavy machinery, avoid heights, pools, bathtubs, locked doors until cleared by further follow up outpatient by neurology.  [] Have patient follow up with Dr. Carias, patient's neurooncologist when clear for discharge.

## 2023-07-16 NOTE — H&P ADULT - ATTENDING COMMENTS
Patient seen and examined.  Agree with resident note as above.  Patient with hx as noted including GBM on chemo with progression of disease, seizure disorder on AEDs, L hemiparesis with recent admit at [facility] for management of the above, who presents from rehab with a seizure.  On arrival to ER, patient was reportedly obtunded and febrile/hypoxic, and so was emergently intubated.  Patient is now accepted to MICU in that setting.    On initial eval, patient was beginning to arouse and following commands.  Requiring [place] gtt for shock thought due to sepsis vs cardiogenic vs other.  Will consult neuro, assess for seizure with EEG.  Continue home AEDs.  [place] gtt to maintain MAP>65 (given tachycardia).  Follow cx, empiric vanc/cefepime.  Unclear source of fever/infection.  Will arouse and wean as tolerated.  rest of plan as above and I have edited as appropriate.  FULL CODE>

## 2023-07-16 NOTE — CONSULT NOTE ADULT - SUBJECTIVE AND OBJECTIVE BOX
Patient seen and evaluated at bedside    Reason for consult: CHF/shock     HPI:  Mr. Jalloh is a 66 y/o male with a PMHx of glioblastoma, seizure disorder, atrial fibrillation on xarelto, and HTN who had a seizure at a rehab facility and was brought in by EMS. He was given versed by EMS and upon arrival to ED, patient had a GCS of 6-7 and was unresponsive. At this point, he was intubated and MICU was consulted. Of note, patient was recently discharged from [facility] on 7/14 where he was seen for management of glioblastoma and seizures. He was discharged to rehab on keppra, vimpat, valproic acid, and dexamethasone and set to follow-up with outpatient neurologist before today's episode.    ED course: Patient brought in by EMS after receiving versed en route to hospital. Upon presentation to ED, he had a GCS of 6-7 and was unresponsive, leading to intubation. Patient also noted to be hypotensive and febrile in ED, requiring pressors. Labs upon arrival were significant for WBC of 8.21, glucose of 144, calcium of 8.3, phosphorus of 5, AST of 50, ALT of 80, troponin of 75, BNP of 2832, procal of 0.14, VBG with pH of 7.18, pCO2 of 61, and HCO3 of 23. POCUS in ED with evidence of right heart dilation. (16 Jul 2023 04:26)      PMHx:   GERD (gastroesophageal reflux disease)    Prostate ca    Hypertension    Gout    Atrial fibrillation    Splenic infarction    2019 novel coronavirus disease (COVID-19)    GERD (gastroesophageal reflux disease)        PSHx:   No significant past surgical history    H/O prostatectomy    H/O arthroscopy of right knee    H/O colonoscopy    H/O endoscopy    Elective surgery        Allergies:  No Known Allergies      Home Meds:    Current Medications:   acetaminophen   Oral Liquid .. 650 milliGRAM(s) Oral every 6 hours PRN  buMETAnide Infusion 2 mG/Hr IV Continuous <Continuous>  chlorhexidine 4% Liquid 1 Application(s) Topical <User Schedule>  clonazePAM  Tablet 0.5 milliGRAM(s) Oral two times a day  dexAMETHasone  Injectable 4 milliGRAM(s) IV Push daily  heparin  Infusion.  Unit(s)/Hr IV Continuous <Continuous>  insulin lispro (ADMELOG) corrective regimen sliding scale   SubCutaneous every 6 hours  lacosamide IVPB 200 milliGRAM(s) IV Intermittent every 12 hours  levETIRAcetam  IVPB 1000 milliGRAM(s) IV Intermittent every 12 hours  pantoprazole  Injectable 40 milliGRAM(s) IV Push every 24 hours  phenylephrine    Infusion 1 MICROgram(s)/kG/Min IV Continuous <Continuous>  piperacillin/tazobactam IVPB.- 3.375 Gram(s) IV Intermittent once  polyethylene glycol 3350 17 Gram(s) Oral daily  senna 2 Tablet(s) Oral at bedtime  valproate sodium  IVPB 250 milliGRAM(s) IV Intermittent every 8 hours  vancomycin  IVPB 1500 milliGRAM(s) IV Intermittent every 12 hours      FAMILY HISTORY:  Family history of diabetes mellitus (DM) (Mother)    Family history of TIAs (Mother)    Family hx of prostate cancer (Father)    Family history of bone cancer (Father)    Family history of appendicitis        Social History:  Smoking History:  Alcohol Use:  Drug Use:    Review of Systems:  [ ] Unable to assess ROS due to: intubated     Physical Exam:  T(F): 100.4 (07-16), Max: 102.4 (07-16)  HR: 106 (07-16) (86 - 168)  BP: 93/69 (07-16) (69/50 - 181/92)  RR: 24 (07-16)  SpO2: 100% (07-16)  GENERAL: No acute distress, well-developed  HEAD:  Atraumatic, Normocephalic  ENT: EOMI, PERRLA, conjunctiva and sclera clear, Neck supple, No JVD, moist mucosa  CHEST/LUNG: Clear to auscultation bilaterally; No wheeze, equal breath sounds bilaterally   BACK: No spinal tenderness  HEART: Regular rate and rhythm; No murmurs, rubs, or gallops  ABDOMEN: Soft, Nontender, Nondistended; Bowel sounds present  EXTREMITIES:  No clubbing, cyanosis; 1-2+ pitting edema up to mid shin, mildly cool extremities  PSYCH: Nl behavior, nl affect  NEUROLOGY: AAOx3, non-focal, cranial nerves intact  SKIN: Normal color, No rashes or lesions  LINES:    Cardiovascular Diagnostic Testing:    CXR: Personally reviewed    Labs: Personally reviewed                        13.7   8.04  )-----------( 172      ( 16 Jul 2023 08:25 )             41.2     07-16    138  |  107  |  15  ----------------------------<  116<H>  3.8   |  18<L>  |  0.53    Ca    7.8<L>      16 Jul 2023 08:25  Phos  3.3     07-16  Mg     1.9     07-16    TPro  4.9<L>  /  Alb  2.8<L>  /  TBili  0.4  /  DBili  x   /  AST  42<H>  /  ALT  67<H>  /  AlkPhos  74  07-16    PT/INR - ( 16 Jul 2023 08:25 )   PT: 19.2 sec;   INR: 1.66 ratio         PTT - ( 16 Jul 2023 08:25 )  PTT:27.2 sec         Patient seen and evaluated at bedside    Reason for consult: CHF/shock     HPI:  Mr. Jalloh is a 66 y/o male with a PMHx of glioblastoma, seizure disorder, atrial fibrillation on xarelto, and HTN who had a seizure at a rehab facility and was brought in by EMS. He was given versed by EMS and upon arrival to ED, patient had a GCS of 6-7 and was unresponsive. At this point, he was intubated and MICU was consulted. Of note, patient was recently discharged from [facility] on 7/14 where he was seen for management of glioblastoma and seizures. He was discharged to rehab on keppra, vimpat, valproic acid, and dexamethasone and set to follow-up with outpatient neurologist before today's episode.    ED course: Patient brought in by EMS after receiving versed en route to hospital. Upon presentation to ED, he had a GCS of 6-7 and was unresponsive, leading to intubation. Patient also noted to be hypotensive and febrile in ED, requiring pressors. Labs upon arrival were significant for WBC of 8.21, glucose of 144, calcium of 8.3, phosphorus of 5, AST of 50, ALT of 80, troponin of 75, BNP of 2832, procal of 0.14, VBG with pH of 7.18, pCO2 of 61, and HCO3 of 23. POCUS in ED with evidence of right heart dilation. (16 Jul 2023 04:26)      PMHx:   GERD (gastroesophageal reflux disease)    Prostate ca    Hypertension    Gout    Atrial fibrillation    Splenic infarction    2019 novel coronavirus disease (COVID-19)    GERD (gastroesophageal reflux disease)        PSHx:   No significant past surgical history    H/O prostatectomy    H/O arthroscopy of right knee    H/O colonoscopy    H/O endoscopy    Elective surgery        Allergies:  No Known Allergies      Home Meds:    Current Medications:   acetaminophen   Oral Liquid .. 650 milliGRAM(s) Oral every 6 hours PRN  buMETAnide Infusion 2 mG/Hr IV Continuous <Continuous>  chlorhexidine 4% Liquid 1 Application(s) Topical <User Schedule>  clonazePAM  Tablet 0.5 milliGRAM(s) Oral two times a day  dexAMETHasone  Injectable 4 milliGRAM(s) IV Push daily  heparin  Infusion.  Unit(s)/Hr IV Continuous <Continuous>  insulin lispro (ADMELOG) corrective regimen sliding scale   SubCutaneous every 6 hours  lacosamide IVPB 200 milliGRAM(s) IV Intermittent every 12 hours  levETIRAcetam  IVPB 1000 milliGRAM(s) IV Intermittent every 12 hours  pantoprazole  Injectable 40 milliGRAM(s) IV Push every 24 hours  phenylephrine    Infusion 1 MICROgram(s)/kG/Min IV Continuous <Continuous>  piperacillin/tazobactam IVPB.- 3.375 Gram(s) IV Intermittent once  polyethylene glycol 3350 17 Gram(s) Oral daily  senna 2 Tablet(s) Oral at bedtime  valproate sodium  IVPB 250 milliGRAM(s) IV Intermittent every 8 hours  vancomycin  IVPB 1500 milliGRAM(s) IV Intermittent every 12 hours      FAMILY HISTORY:  Family history of diabetes mellitus (DM) (Mother)    Family history of TIAs (Mother)    Family hx of prostate cancer (Father)    Family history of bone cancer (Father)    Family history of appendicitis        Social History:  Smoking History:  Alcohol Use:  Drug Use:    Review of Systems:  [ ] Unable to assess ROS due to: intubated     Physical Exam:  T(F): 100.4 (07-16), Max: 102.4 (07-16)  HR: 106 (07-16) (86 - 168)  BP: 93/69 (07-16) (69/50 - 181/92)  RR: 24 (07-16)  SpO2: 100% (07-16)  GENERAL: No acute distress, well-developed  HEAD:  Atraumatic, Normocephalic  ENT: EOMI, PERRLA, conjunctiva and sclera clear, Neck supple, No JVD, moist mucosa  CHEST/LUNG: Clear to auscultation bilaterally; No wheeze, equal breath sounds bilaterally   BACK: No spinal tenderness  HEART: Regular rate and rhythm; No murmurs, rubs, or gallops  ABDOMEN: Soft, Nontender, Nondistended; Bowel sounds present  EXTREMITIES:  No clubbing, cyanosis; 1-2+ pitting edema up to mid shin, mildly cool extremities  PSYCH: Nl behavior, nl affect  NEUROLOGY: AAOx3, non-focal, cranial nerves intact  SKIN: Normal color, No rashes or lesions  LINES:    Cardiovascular Diagnostic Testing:    CXR: Personally reviewed    Labs: Personally reviewed                        13.7   8.04  )-----------( 172      ( 16 Jul 2023 08:25 )             41.2     07-16    138  |  107  |  15  ----------------------------<  116<H>  3.8   |  18<L>  |  0.53    Ca    7.8<L>      16 Jul 2023 08:25  Phos  3.3     07-16  Mg     1.9     07-16    TPro  4.9<L>  /  Alb  2.8<L>  /  TBili  0.4  /  DBili  x   /  AST  42<H>  /  ALT  67<H>  /  AlkPhos  74  07-16    PT/INR - ( 16 Jul 2023 08:25 )   PT: 19.2 sec;   INR: 1.66 ratio         PTT - ( 16 Jul 2023 08:25 )  PTT:27.2 sec    EKG 7/16/23: AF RVR    TTE 8/2022  Dimensions:    Normal Values:  LA:     5.0    2.0 - 4.0 cm  Ao:     3.8    2.0 - 3.8 cm  SEPTUM: 1.1    0.6 - 1.2 cm  PWT:   1.0    0.6 - 1.1 cm  LVIDd:  5.4    3.0 - 5.6 cm  LVIDs:         1.8 - 4.0 cm  Derived variables:  LVMI: 106 g/m2  RWT: 0.37  EF (Andrade Rule): 58 %  ------------------------------------------------------------------------  Observations:  Mitral Valve: Mitral annular calcification, otherwise  normal mitral valve. Mild mitral regurgitation.  Aortic Valve/Aorta: Aortic valve not well visualized;  probably normal.  Aortic Root: 3.9 cm.  LVOT diameter: 2 cm.  Left Atrium: Moderately dilated left atrium.  LA volume  index = 43 cc/m2.  Left Ventricle: Normal left ventricular systolic function.  No segmental wall motion abnormalities. Normal left  ventricular internal dimensions and wall thicknesses.  Right Heart: Normal right atrium. Normal right ventricular  size and systolic function. Normal tricuspid valve. Minimal  tricuspid regurgitation. Normal pulmonic valve.  Pericardium/Pleura: Normal pericardium with no pericardial  effusion.  Hemodynamic: Estimated right atrial pressure is 8 mm Hg.  ------------------------------------------------------------------------  Conclusions:  1. Moderately dilated left atrium.  LA volume index = 43  cc/m2.  2. Normal left ventricular internal dimensions and wall  thicknesses.  3. Normal left ventricular systolic function. No segmental  wall motion abnormalities.  4. Normal right ventricular size and systolic function.  ------------------------------------------------------------------------  Confirmed on  8/28/2022 - 13:10:45 by Lilli Reagan M.D.  ------------------------------------------------------------------------       Patient seen and evaluated at bedside    Reason for consult: CHF/shock     HPI:  Mr. Jalloh is a 66 y/o male with a PMHx of glioblastoma, seizure disorder, atrial fibrillation on xarelto, and HTN who had a seizure at a rehab facility and was brought in by EMS. He was given versed by EMS and upon arrival to ED, patient had a GCS of 6-7 and was unresponsive. At this point, he was intubated and MICU was consulted. Of note, patient was recently discharged from [facility] on 7/14 where he was seen for management of glioblastoma and seizures. He was discharged to rehab on keppra, vimpat, valproic acid, and dexamethasone and set to follow-up with outpatient neurologist before today's episode.    ED course: Patient brought in by EMS after receiving versed en route to hospital. Upon presentation to ED, he had a GCS of 6-7 and was unresponsive, leading to intubation. Patient also noted to be hypotensive and febrile in ED, requiring pressors. Labs upon arrival were significant for WBC of 8.21, glucose of 144, calcium of 8.3, phosphorus of 5, AST of 50, ALT of 80, troponin of 75, BNP of 2832, procal of 0.14, VBG with pH of 7.18, pCO2 of 61, and HCO3 of 23. POCUS in ED with evidence of right heart dilation.     Cardiology consulted for concern for acute CHF contributing to shock presentation given ED/MICU bedside POCUS suggestive of systolic dysfunction with reduced LVOT VTI and RV enlargment. MICU team initiated bumex gtt at 2 mg/hr and briefly initiated Dobutamine however pt with worsening tachycardia and AFRVR to 160s immediately following initiation. Lactate       PMHx:   GERD (gastroesophageal reflux disease)    Prostate ca    Hypertension    Gout    Atrial fibrillation    Splenic infarction    2019 novel coronavirus disease (COVID-19)    GERD (gastroesophageal reflux disease)        PSHx:   No significant past surgical history    H/O prostatectomy    H/O arthroscopy of right knee    H/O colonoscopy    H/O endoscopy    Elective surgery        Allergies:  No Known Allergies      Home Meds:    Current Medications:   acetaminophen   Oral Liquid .. 650 milliGRAM(s) Oral every 6 hours PRN  buMETAnide Infusion 2 mG/Hr IV Continuous <Continuous>  chlorhexidine 4% Liquid 1 Application(s) Topical <User Schedule>  clonazePAM  Tablet 0.5 milliGRAM(s) Oral two times a day  dexAMETHasone  Injectable 4 milliGRAM(s) IV Push daily  heparin  Infusion.  Unit(s)/Hr IV Continuous <Continuous>  insulin lispro (ADMELOG) corrective regimen sliding scale   SubCutaneous every 6 hours  lacosamide IVPB 200 milliGRAM(s) IV Intermittent every 12 hours  levETIRAcetam  IVPB 1000 milliGRAM(s) IV Intermittent every 12 hours  pantoprazole  Injectable 40 milliGRAM(s) IV Push every 24 hours  phenylephrine    Infusion 1 MICROgram(s)/kG/Min IV Continuous <Continuous>  piperacillin/tazobactam IVPB.- 3.375 Gram(s) IV Intermittent once  polyethylene glycol 3350 17 Gram(s) Oral daily  senna 2 Tablet(s) Oral at bedtime  valproate sodium  IVPB 250 milliGRAM(s) IV Intermittent every 8 hours  vancomycin  IVPB 1500 milliGRAM(s) IV Intermittent every 12 hours      FAMILY HISTORY:  Family history of diabetes mellitus (DM) (Mother)    Family history of TIAs (Mother)    Family hx of prostate cancer (Father)    Family history of bone cancer (Father)    Family history of appendicitis        Social History:  Smoking History:  Alcohol Use:  Drug Use:    Review of Systems:  [ ] Unable to assess ROS due to: intubated     Physical Exam:  T(F): 100.4 (07-16), Max: 102.4 (07-16)  HR: 106 (07-16) (86 - 168)  BP: 93/69 (07-16) (69/50 - 181/92)  RR: 24 (07-16)  SpO2: 100% (07-16)  GENERAL: No acute distress, well-developed  HEAD:  Atraumatic, Normocephalic  ENT: EOMI, PERRLA, conjunctiva and sclera clear, Neck supple, No JVD, moist mucosa  CHEST/LUNG: Clear to auscultation bilaterally; No wheeze, equal breath sounds bilaterally   BACK: No spinal tenderness  HEART: Regular rate and rhythm; No murmurs, rubs, or gallops  ABDOMEN: Soft, Nontender, Nondistended; Bowel sounds present  EXTREMITIES:  No clubbing, cyanosis; 1-2+ pitting edema up to mid shin, mildly cool extremities  PSYCH: Nl behavior, nl affect  NEUROLOGY: AAOx3, non-focal, cranial nerves intact  SKIN: Normal color, No rashes or lesions  LINES:    Cardiovascular Diagnostic Testing:    CXR: Personally reviewed    Labs: Personally reviewed                        13.7   8.04  )-----------( 172      ( 16 Jul 2023 08:25 )             41.2     07-16    138  |  107  |  15  ----------------------------<  116<H>  3.8   |  18<L>  |  0.53    Ca    7.8<L>      16 Jul 2023 08:25  Phos  3.3     07-16  Mg     1.9     07-16    TPro  4.9<L>  /  Alb  2.8<L>  /  TBili  0.4  /  DBili  x   /  AST  42<H>  /  ALT  67<H>  /  AlkPhos  74  07-16    PT/INR - ( 16 Jul 2023 08:25 )   PT: 19.2 sec;   INR: 1.66 ratio         PTT - ( 16 Jul 2023 08:25 )  PTT:27.2 sec    EKG 7/16/23: AF RVR    TTE 8/2022  Dimensions:    Normal Values:  LA:     5.0    2.0 - 4.0 cm  Ao:     3.8    2.0 - 3.8 cm  SEPTUM: 1.1    0.6 - 1.2 cm  PWT:   1.0    0.6 - 1.1 cm  LVIDd:  5.4    3.0 - 5.6 cm  LVIDs:         1.8 - 4.0 cm  Derived variables:  LVMI: 106 g/m2  RWT: 0.37  EF (Andrade Rule): 58 %  ------------------------------------------------------------------------  Observations:  Mitral Valve: Mitral annular calcification, otherwise  normal mitral valve. Mild mitral regurgitation.  Aortic Valve/Aorta: Aortic valve not well visualized;  probably normal.  Aortic Root: 3.9 cm.  LVOT diameter: 2 cm.  Left Atrium: Moderately dilated left atrium.  LA volume  index = 43 cc/m2.  Left Ventricle: Normal left ventricular systolic function.  No segmental wall motion abnormalities. Normal left  ventricular internal dimensions and wall thicknesses.  Right Heart: Normal right atrium. Normal right ventricular  size and systolic function. Normal tricuspid valve. Minimal  tricuspid regurgitation. Normal pulmonic valve.  Pericardium/Pleura: Normal pericardium with no pericardial  effusion.  Hemodynamic: Estimated right atrial pressure is 8 mm Hg.  ------------------------------------------------------------------------  Conclusions:  1. Moderately dilated left atrium.  LA volume index = 43  cc/m2.  2. Normal left ventricular internal dimensions and wall  thicknesses.  3. Normal left ventricular systolic function. No segmental  wall motion abnormalities.  4. Normal right ventricular size and systolic function.  ------------------------------------------------------------------------  Confirmed on  8/28/2022 - 13:10:45 by Lilli Reagan M.D.  ------------------------------------------------------------------------       Patient seen and evaluated at bedside    Reason for consult: CHF/shock     HPI:  Mr. Jalloh is a 66 y/o male with a PMHx of glioblastoma, seizure disorder, atrial fibrillation on xarelto, and HTN who had a seizure at a rehab facility and was brought in by EMS. He was given versed by EMS and upon arrival to ED, patient had a GCS of 6-7 and was unresponsive. At this point, he was intubated and MICU was consulted. Of note, patient was recently discharged from [facility] on 7/14 where he was seen for management of glioblastoma and seizures. He was discharged to rehab on keppra, vimpat, valproic acid, and dexamethasone and set to follow-up with outpatient neurologist before today's episode.    ED course: Patient brought in by EMS after receiving versed en route to hospital. Upon presentation to ED, he had a GCS of 6-7 and was unresponsive, leading to intubation. Patient also noted to be hypotensive and febrile in ED, requiring pressors. Labs upon arrival were significant for WBC of 8.21, glucose of 144, calcium of 8.3, phosphorus of 5, AST of 50, ALT of 80, troponin of 75, BNP of 2832, procal of 0.14, VBG with pH of 7.18, pCO2 of 61, and HCO3 of 23. POCUS in ED with evidence of right heart dilation.     Cardiology consulted for concern for acute CHF contributing to shock presentation given ED/MICU bedside POCUS suggestive of systolic dysfunction with reduced LVOT VTI and RV enlargment. MICU team initiated bumex gtt at 2 mg/hr and briefly initiated Dobutamine however pt with worsening tachycardia and AFRVR to 160s immediately following initiation. Lactate elevated to 3.1      PMHx:   GERD (gastroesophageal reflux disease)    Prostate ca    Hypertension    Gout    Atrial fibrillation    Splenic infarction    2019 novel coronavirus disease (COVID-19)    GERD (gastroesophageal reflux disease)        PSHx:   No significant past surgical history    H/O prostatectomy    H/O arthroscopy of right knee    H/O colonoscopy    H/O endoscopy    Elective surgery        Allergies:  No Known Allergies      Home Meds:    Current Medications:   acetaminophen   Oral Liquid .. 650 milliGRAM(s) Oral every 6 hours PRN  buMETAnide Infusion 2 mG/Hr IV Continuous <Continuous>  chlorhexidine 4% Liquid 1 Application(s) Topical <User Schedule>  clonazePAM  Tablet 0.5 milliGRAM(s) Oral two times a day  dexAMETHasone  Injectable 4 milliGRAM(s) IV Push daily  heparin  Infusion.  Unit(s)/Hr IV Continuous <Continuous>  insulin lispro (ADMELOG) corrective regimen sliding scale   SubCutaneous every 6 hours  lacosamide IVPB 200 milliGRAM(s) IV Intermittent every 12 hours  levETIRAcetam  IVPB 1000 milliGRAM(s) IV Intermittent every 12 hours  pantoprazole  Injectable 40 milliGRAM(s) IV Push every 24 hours  phenylephrine    Infusion 1 MICROgram(s)/kG/Min IV Continuous <Continuous>  piperacillin/tazobactam IVPB.- 3.375 Gram(s) IV Intermittent once  polyethylene glycol 3350 17 Gram(s) Oral daily  senna 2 Tablet(s) Oral at bedtime  valproate sodium  IVPB 250 milliGRAM(s) IV Intermittent every 8 hours  vancomycin  IVPB 1500 milliGRAM(s) IV Intermittent every 12 hours      FAMILY HISTORY:  Family history of diabetes mellitus (DM) (Mother)    Family history of TIAs (Mother)    Family hx of prostate cancer (Father)    Family history of bone cancer (Father)    Family history of appendicitis        Social History:  Smoking History:  Alcohol Use:  Drug Use:    Review of Systems:  [ ] Unable to assess ROS due to: intubated     Physical Exam:  T(F): 100.4 (07-16), Max: 102.4 (07-16)  HR: 106 (07-16) (86 - 168)  BP: 93/69 (07-16) (69/50 - 181/92)  RR: 24 (07-16)  SpO2: 100% (07-16)  GENERAL: No acute distress, well-developed  HEAD:  Atraumatic, Normocephalic  ENT: EOMI, PERRLA, conjunctiva and sclera clear, Neck supple, No JVD, moist mucosa  CHEST/LUNG: Clear to auscultation bilaterally; No wheeze, equal breath sounds bilaterally   BACK: No spinal tenderness  HEART: Regular rate and rhythm; No murmurs, rubs, or gallops  ABDOMEN: Soft, Nontender, Nondistended; Bowel sounds present  EXTREMITIES:  No clubbing, cyanosis; 1-2+ pitting edema up to mid shin, mildly cool extremities  PSYCH: Nl behavior, nl affect  NEUROLOGY: AAOx3, non-focal, cranial nerves intact  SKIN: Normal color, No rashes or lesions  LINES:    Cardiovascular Diagnostic Testing:    CXR: Personally reviewed    Labs: Personally reviewed                        13.7   8.04  )-----------( 172      ( 16 Jul 2023 08:25 )             41.2     07-16    138  |  107  |  15  ----------------------------<  116<H>  3.8   |  18<L>  |  0.53    Ca    7.8<L>      16 Jul 2023 08:25  Phos  3.3     07-16  Mg     1.9     07-16    TPro  4.9<L>  /  Alb  2.8<L>  /  TBili  0.4  /  DBili  x   /  AST  42<H>  /  ALT  67<H>  /  AlkPhos  74  07-16    PT/INR - ( 16 Jul 2023 08:25 )   PT: 19.2 sec;   INR: 1.66 ratio         PTT - ( 16 Jul 2023 08:25 )  PTT:27.2 sec    EKG 7/16/23: AF RVR    TTE 8/2022  Dimensions:    Normal Values:  LA:     5.0    2.0 - 4.0 cm  Ao:     3.8    2.0 - 3.8 cm  SEPTUM: 1.1    0.6 - 1.2 cm  PWT:   1.0    0.6 - 1.1 cm  LVIDd:  5.4    3.0 - 5.6 cm  LVIDs:         1.8 - 4.0 cm  Derived variables:  LVMI: 106 g/m2  RWT: 0.37  EF (Andrade Rule): 58 %  ------------------------------------------------------------------------  Observations:  Mitral Valve: Mitral annular calcification, otherwise  normal mitral valve. Mild mitral regurgitation.  Aortic Valve/Aorta: Aortic valve not well visualized;  probably normal.  Aortic Root: 3.9 cm.  LVOT diameter: 2 cm.  Left Atrium: Moderately dilated left atrium.  LA volume  index = 43 cc/m2.  Left Ventricle: Normal left ventricular systolic function.  No segmental wall motion abnormalities. Normal left  ventricular internal dimensions and wall thicknesses.  Right Heart: Normal right atrium. Normal right ventricular  size and systolic function. Normal tricuspid valve. Minimal  tricuspid regurgitation. Normal pulmonic valve.  Pericardium/Pleura: Normal pericardium with no pericardial  effusion.  Hemodynamic: Estimated right atrial pressure is 8 mm Hg.  ------------------------------------------------------------------------  Conclusions:  1. Moderately dilated left atrium.  LA volume index = 43  cc/m2.  2. Normal left ventricular internal dimensions and wall  thicknesses.  3. Normal left ventricular systolic function. No segmental  wall motion abnormalities.  4. Normal right ventricular size and systolic function.  ------------------------------------------------------------------------  Confirmed on  8/28/2022 - 13:10:45 by Lilli Reagan M.D.  ------------------------------------------------------------------------

## 2023-07-16 NOTE — H&P ADULT - ASSESSMENT
ASSESSMENT :   ==============  68 y/o male with a PMHx of glioblastoma, seizure disorder, HTN, and atrial fibrillation on xarelto presenting after seizing at rehab facility and being intubated in ED with GCS of 6-7 admitted for acute respiratory failure and septic shock.      PLAN:  ========    NEURO:    #Seizure disorder with hx of glioblastoma  -neuro checks per routine   -lacosamide IVPB 200 milliGRAM(s)  levETIRAcetam  IVPB 1000 milliGRAM(s)  valproate sodium  IVPB 250 milliGRAM(s)      PULM:    #Acute respiratory failure in s/o seizure    Mode: AC/ CMV (Assist Control/ Continuous Mandatory Ventilation), RR (machine): 16, TV (machine): 500, FiO2: 40, PEEP: 5       -maintain O2     CV:    #Shock likely 2/2 sepsis vs sedation    norepinephrine Infusion 0.05 MICROgram(s)/kG/Min  phenylephrine    Infusion 1 MICROgram(s)/kG/Min  -Maintain MAP > 65    #Chronic atrial fibrillation    #Essential hypertension      GI:        RENAL:  -maintain urine output > 0.5cc/kg/hr     :  LORA yes [  ] NO [  ] insertion date       ID:  cefepime   IVPB 2000 milliGRAM(s)  vancomycin  IVPB. 1000 milliGRAM(s)      ENDO:  dexAMETHasone  Injectable 4 milliGRAM(s)      HEME:           ASSESSMENT :   ==============  68 y/o male with a PMHx of glioblastoma, seizure disorder, HTN, and atrial fibrillation on xarelto presenting after seizure at rehab facility and intubation in ED admitted for acute respiratory failure and possible septic shock.      PLAN:  ========    NEURO:    #Seizure disorder with hx of glioblastoma  -EEG ordered  -CT head ordered  -Place neurology consult  -Ammonia level ordered  -Neuro checks per routine   -Continue home antiepileptic regimen of keppra, vimpat, valproic acid and dexamethasone. F/u with neuro for further reccs.      PULM:    #Acute respiratory failure in s/o seizure      CV:    #Shock likely 2/2 sepsis vs sedation    norepinephrine Infusion 0.05 MICROgram(s)/kG/Min  phenylephrine    Infusion 1 MICROgram(s)/kG/Min  -Maintain MAP > 65    #Chronic atrial fibrillation    #Essential hypertension      GI:        RENAL:  -maintain urine output > 0.5cc/kg/hr     :  LORA yes [  ] NO [  ] insertion date       ID:  cefepime   IVPB 2000 milliGRAM(s)  vancomycin  IVPB. 1000 milliGRAM(s)      ENDO:  dexAMETHasone  Injectable 4 milliGRAM(s)      HEME:           ASSESSMENT :   ==============  66 y/o male with a PMHx of glioblastoma, seizure disorder, HTN, and atrial fibrillation on xarelto presenting after seizure at rehab facility and intubation in ED admitted for acute respiratory failure and possible septic shock.      PLAN:  ========    NEURO:    #Seizure disorder with hx of glioblastoma  -EEG ordered  -CT head ordered  -Neurology consulted  -Ammonia level ordered  -Neuro checks per routine   -Continue home antiepileptic regimen of keppra, vimpat, clonazepam, valproic acid and dexamethasone. F/u with neuro for further reccs.      PULM:    #Acute respiratory failure in s/o seizure  -Patient intubated and sedated  -Chest CT without evidence of PE but shows left lower lobe atelectasis  -CTM ventilator settings and wean as appropriate      CV:    #Shock likely 2/2 sepsis vs sedation vs cardiogenic  -Patient received versed en route to ED  -Blood cultures and sputum cultures ordered. Continue to follow.  -Empiric abx coverage with cefepime and vancomycin  -TTE ordered. CT chest and POCUS with evidence of heart failure  -BNP elevated at 2832  -Lactate ordered  -Levo, phenylephrine ordered. Monitor and titrate to MAP>65  -Chest CT, CT abd/pelvis ordered    #Troponin elevation likely 2/2 hypotension  -Troponin of 75 on admission  -BNP elevated and POCUS with evidence of acute HF  -EKG ordered. F/u    #Chronic atrial fibrillation  -F/u EKG  -Consider re-starting patient's xarelto    #Essential hypertension  -Withold HTN medications in setting of shock      GI:  NTD      RENAL:  -maintain urine output > 0.5cc/kg/hr     :  -Pimentel in place      ID:  #Potential septic shock  -See CV section  -Infectious work-up pending. F/u bcx and sputum cx  -Cefepime and vancomycin ordered    ENDO:  #Mild hyperglycemia  -Glucose of 144 on admission  -CTM       HEME:  #Bilateral LE edema L>R  -Doppler ordered. F/u.         ASSESSMENT :   ==============  66 y/o male with a PMHx of glioblastoma, seizure disorder, HTN, and atrial fibrillation on xarelto presenting after seizure at rehab facility and intubation in ED admitted for acute respiratory failure and possible septic shock.      PLAN:  ========    NEURO:    #Seizure disorder with hx of glioblastoma  -EEG ordered  -CT head ordered  -Neurology consulted  -Ammonia level ordered  -Neuro checks per routine   -Continue home antiepileptic regimen of keppra, vimpat, clonazepam, valproic acid and dexamethasone. F/u with neuro for further recs.      PULM:    #Acute hypoxic and hypercarbic respiratory failure in s/o seizure  -Patient intubated and sedated  -Chest CT without evidence of PE but shows left lower lobe atelectasis  -CTM ventilator settings and wean as appropriate      CV:    #Shock likely 2/2 sepsis vs sedation vs cardiogenic  -Patient received versed en route to ED  -Blood cultures and sputum cultures ordered. Continue to follow.  -Empiric abx coverage with cefepime and vancomycin  -TTE ordered. CT chest and POCUS with evidence of heart failure  -BNP elevated at 2832  -Lactate ordered  -Levo, phenylephrine ordered. Monitor and titrate to MAP>65  -Chest CT, CT abd/pelvis ordered    #Troponin elevation likely 2/2 hypotension  -Troponin of 75 on admission  -BNP elevated and POCUS with evidence of acute HF  -EKG ordered. F/u    #Chronic atrial fibrillation  -F/u EKG  -Consider re-starting patient's xarelto    #Essential hypertension  -Withold HTN medications in setting of shock      GI:  NTD      RENAL:  -maintain urine output > 0.5cc/kg/hr     :  -Pimentel in place      ID:  #Potential septic shock  -See CV section  -Infectious work-up pending. F/u bcx and sputum cx  -Cefepime and vancomycin ordered    ENDO:  #Mild hyperglycemia  -Glucose of 144 on admission  -CTM       HEME:  #Bilateral LE edema L>R  -Doppler ordered. F/u.

## 2023-07-16 NOTE — ED ADULT NURSE NOTE - CHPI ED NUR SYMPTOMS POS
-- Message is from the [facility]--    Reason for Call: solitario calling again concerning order for the patient for the epidural. Please advise order needs to be in before friday 12/28/18    Caller Information       Type Contact Phone    12/21/2018 12:43 PM Phone (Incoming)      12/26/2018 10:50 AM Phone (Incoming) solitario (Provider) [phone]          Alternative phone number: ..    Turnaround time given to caller:   \"This message will be sent to [state Provider's name]. The clinical team will fulfill your request as soon as they review your message.\"    
-- Message is from the [facility]--    Reason for Call:Danielle from [facility] requesting a callback regarding an order for an epidural shot. Fax [phone]    Caller Information       Type Contact Phone    12/21/2018 12:43 PM Phone (Incoming)            Alternative phone number: [phone]    Turnaround time given to caller:   \"This message will be sent to [state Provider's name]. The clinical team will fulfill your request as soon as they review your message.\"    
Danielle called back, states the pt's brother mentioned that [facility] was to order a 2nd epidural injection, however, when they call the [facility] to schedule they do not have an order.  Please advise. Asking to fax order to them as well at [phone] and call them at [phone] when done.  Please advise on order  
LVM for Danielle asking for callback to clarify what is needed  
Please confirm that crf was sent for pt's appt- thx  
pls ff up  
CHANGE IN LEVEL OF CONSCIOUSNESS

## 2023-07-16 NOTE — CONSULT NOTE ADULT - ATTENDING COMMENTS
Mr. Azar Jalloh is a 67-year-old with a glioblastoma under the care of Dr. Carias.  He presented on  7/15 with a seizure in rehabilitation. He was intubated.  He was continued on dexamethasone, levetiracetam, valproic acid and lacosamide.  CT of the brain  revealed a right frontal lesion.  EEG is being performed.  Plan is to consult with Dr. Carias, wean and hopefully extubate.
68 y/o male with a PMHx of glioblastoma, seizure disorder, HTN, and chronic atrial fibrillation on xarelto who presented after seizure at rehab, found to have new biventricular dysfunction (LVEF 45-50% in setting of rapid AF, and moderately enlarged RV with decreased RV function).     Pt weaned off pressors this morning. No longer on Bumex gtt. Lactate cleared.    He has minimal LE edema on exam. Lungs are clear anteriorly.   EKG shows Afib with RVR in 120s-130s  Trop 75 to 79  CTA negative for PE on 7/16/23    1) Afib with RVR  2) Acute systolic HF exacerbation with RV dysfunction  3) Elevated troponins  4) GBM, seizures  5) Possible sepsis  - Unclear etiology for newly reduced EF, ?stress-mediated in setting of seizures, possible sepsis  - At this point, would give diuretics as needed to keep net even  - If HRs persistently above 120s, can consider digoxin. If pt remains HD stable, can trial low dose metoprolol tartrate  - on heparin gtt for Afib  - Once HRs better controlled and acute issues resolve, repeat limited TTE to reassess LV function  - Appreciate MICU and neurology care

## 2023-07-16 NOTE — ED PROVIDER NOTE - PHYSICAL EXAMINATION
Initially patient with reactive pupils however her eyes shut, obscure sounds, not moving extremities for total GCS of 4.  Patient intubated with bilateral breath sounds after, nondistended abdomen    General: intubated,  HEENT: PERRLA, EOMI, moist mucous membranes  Neurology: intubated   Respiratory: machine assisted ventilation  CV: RRR, S1S2, no murmurs, rubs or gallops  Abdominal: Soft,, ND +BS,   Extremities: No edema, + peripheral pulses

## 2023-07-16 NOTE — H&P ADULT - HISTORY OF PRESENT ILLNESS
Mr. Jalloh is a 68 y/o male with a PMHx of glioblastoma, seizure disorder, atrial fibrillation on xarelto, and HTN who had a seizure at a rehab facility and was brought in by EMS. He was given versed by EMS and upon arrival to ED, patient had a GCS of 6-7 and was unresponsive. At this point, he was intubated Mr. Jalloh is a 66 y/o male with a PMHx of glioblastoma, seizure disorder, atrial fibrillation on xarelto, and HTN who had a seizure at a rehab facility and was brought in by EMS. He was given versed by EMS and upon arrival to ED, patient had a GCS of 6-7 and was unresponsive. At this point, he was intubated and MICU was consulted. Of note, patient was recently discharged from [facility] on 7/14 where he was seen for management of glioblastoma and seizures. He was discharged to rehab on keppra, vimpat, valproic acid, and dexamethasone and set to follow-up with outpatient neurologist before today's episode.    ED course: Patient brought in by EMS after receiving versed en route to hospital. Upon presentation to ED, he had a GCS of 6-7 and was unresponsive, leading to intubation. Patient also noted to be hypotensive and febrile in ED, requiring pressors. Labs upon arrival were significant for WBC of 8.21, glucose of 144, calcium of 8.3, phosphorus of 5, AST of 50, ALT of 80, troponin of 75, BNP of 2832, procal of 0.14, VBG with pH of 7.18, pCO2 of 61, and HCO3 of 23. POCUS in ED with evidence of right heart dilation.

## 2023-07-16 NOTE — CHART NOTE - NSCHARTNOTEFT_GEN_A_CORE
EEG preliminary read (not final) on the initial recording hour(s) = x 3     No seizures recorded.    Final report to follow tomorrow morning after completion of study.    [facility] EEG Reading Room Ph#: [phone]  Epilepsy Answering Service after 5PM and before 8:30AM: Ph#: [phone]    Humera Pressley MD  Fellow, [facility] [place]

## 2023-07-16 NOTE — ED PROVIDER NOTE - OTHER FINDINGS
Compared to april 4, 2023, Q waves in lead II more pronounced, voltage decreased in V4-V6, now RVR, otherwise ECG largely unchanged.

## 2023-07-16 NOTE — ED PROVIDER NOTE - OBJECTIVE STATEMENT
66 yo M with Pmhx of glioblastoma on active chemo therapy (last dose 5/2/2023), Afib on xarelto, HTN, and seizure disorderNoted by EMS after witnessed seizure on bed at facility.  As per EMS patient was given IM Ativan at approximately 1:30 AM with no response EMS called and patient was given 5 and 5 of Versed at 2 AM  cessation of seizures after.  As per EMS no reported head trauma during seizure, on route patient  unresponsive requiring bagging.  On arrival patient with GCS of 4 intubated for airway protection.

## 2023-07-16 NOTE — CONSULT NOTE ADULT - ASSESSMENT
Recommendations:   - Given elevated BNP and evidence of LE edema, agree with Bumex gtt, would titrate for goal NN 1-2L   - Follow-up formal TTE   - If pt were to require central access, would obtain central sat and CVP  - CTM on telemetry  - K> 4, Mg> 2 66 y/o male with a PMHx of glioblastoma, seizure disorder, HTN, and atrial fibrillation on xarelto presenting after seizure at rehab facility and intubation in ED admitted for acute respiratory failure and possible septic shock. Cardiology consulted with c/f new CHF. On exam, pt appears mildly cool and with 1+ pitting edema bilaterally. Primary team diuresing with bumex gtt and overall pt remains febrile and pressor dependent, appears largely concern for septic shock in the setting of witnessed seizure activity. Bedside echo shows reduced VTI per MICU team, visually with decreased LV systolic function and concern for RV enlargement, suggestive of cardiomyopathy possible stress-mediated.     Recommendations:   - Continued management of septic shock per MICU team  - Given elevated BNP and evidence of LE edema, agree with Bumex gtt, would titrate for goal NN 1-2L   - Follow-up formal TTE   - If pt were to require central access, would obtain central sat and CVP  - CTM on telemetry  - K> 4, Mg> 2 68 y/o male with a PMHx of glioblastoma, seizure disorder, HTN, and atrial fibrillation on xarelto presenting after seizure at rehab facility and intubation in ED admitted for acute respiratory failure and possible septic shock. Cardiology consulted with c/f new CHF. On exam, pt appears mildly cool and with 1+ pitting edema bilaterally. Primary team diuresing with bumex gtt and overall pt remains febrile and pressor dependent, appears largely concern for septic shock in the setting of witnessed seizure activity. Bedside echo shows reduced VTI per MICU team, visually with concern for RV enlargement, suggestive of cardiomyopathy possible stress-mediated.     Recommendations:   - Continued management of septic shock per MICU team  - Given elevated BNP and evidence of LE edema, agree with IV Bumex for diuresis, would titrate for goal NN 1L x 24h; caution against overdiuresis in the setting of septic shock with suspected RV dysfunction   - Follow-up formal TTE done today   - If pt to require central access, would obtain central sat and CVP  - Continue to trend lactate   - CTM on telemetry  - K> 4, Mg> 2 68 y/o male with a PMHx of glioblastoma, seizure disorder, HTN, and atrial fibrillation on xarelto presenting after seizure at rehab facility and intubation in ED admitted for acute respiratory failure and possible septic shock. Cardiology consulted with c/f new CHF. On exam, pt appears mildly cool and with 1+ pitting edema bilaterally. Primary team diuresing with bumex gtt and overall pt remains febrile and pressor dependent, appears largely concern for septic shock in the setting of witnessed seizure activity. Bedside echo shows reduced LVOT VTI per MICU team, visually with concern for RV enlargement, suggestive of cardiomyopathy possible stress-mediated.     Recommendations:   - Continued management of septic shock per MICU team  - Given elevated BNP and evidence of LE edema, agree with IV Bumex for diuresis, would titrate for goal NN 1L x 24h; caution against overdiuresis in the setting of septic shock with suspected RV dysfunction   - Follow-up formal TTE done today   - If pt to require central access, would obtain central sat and CVP  - Continue to trend lactate   - CTM on telemetry  - K> 4, Mg> 2 66 y/o male with a PMHx of glioblastoma, seizure disorder, HTN, and atrial fibrillation on xarelto presenting after seizure at rehab facility and intubation in ED admitted for acute respiratory failure and possible septic shock. Cardiology consulted with c/f new CHF. On exam, pt appears mildly cool and with 1+ pitting edema bilaterally. Primary team diuresing with bumex gtt and overall pt remains febrile and pressor dependent, appears largely concern for septic shock in the setting of witnessed seizure activity. Bedside echo shows reduced LVOT VTI per MICU team, visually with concern for RV enlargement, suggestive of cardiomyopathy possible stress-mediated.     Recommendations:   - Continued management of septic shock per MICU team  - Given elevated BNP and evidence of LE edema, agree with IV Bumex for diuresis, would titrate for goal NN 1L x 24h; caution against overdiuresis in the setting of septic shock with suspected RV dysfunction   - Follow-up formal TTE done today   - If pt to require central access, would obtain central sat and CVP  - Continue to trend lactate   - Continue heparin for AC given Afib; avoid aggressive rate control of AFRVR given septic shock and role of compensatory tachycardia to augment CO  - CTM on telemetry  - K> 4, Mg> 2 68 y/o male with a PMHx of glioblastoma, seizure disorder, HTN, and atrial fibrillation on xarelto presenting after seizure at rehab facility and intubation in ED admitted for airway protection and possible septic shock. Cardiology consulted with c/f new CHF. On exam, pt appears mildly cool and with 1+ pitting edema bilaterally. Primary team diuresing with bumex gtt and overall pt remains febrile and pressor dependent, overall appears largely concern for septic shock in the setting of witnessed seizure activity. BNP elevated to 2800 compared to baseline, bedside echo shows reduced LVOT VTI per MICU team, visually with concern for RV enlargement, suggestive of cardiomyopathy possible stress-mediated.     Recommendations:   - Continued management of septic shock per MICU team  - Given elevated BNP and evidence of LE edema, agree with IV Bumex for diuresis, would titrate for goal NN 1L x 24h; caution against overdiuresis in the setting of septic shock with suspected RV dysfunction   - Follow-up formal TTE done today   - If pt to require central access, would obtain central sat and CVP  - Continue to trend lactate   - Continue heparin for AC given Afib; hold home BB, avoid aggressive rate control of AFRVR given septic shock and role of compensatory tachycardia to augment CO  - Hold home anti-HTN agents (Amlodipine 5 qd, Losartan 100 qd, Hydralazine 25 tid) in the setting of shock  - CTM on telemetry  - K> 4, Mg> 2 68 y/o male with a PMHx of glioblastoma, seizure disorder, HTN, and atrial fibrillation on xarelto presenting after seizure at rehab facility and intubation in ED admitted for airway protection and possible septic shock. Cardiology consulted with c/f new CHF. On exam, pt appears mildly cool and with 1+ pitting edema bilaterally. Primary team diuresing with bumex gtt and overall pt remains febrile and pressor dependent, overall appears largely concern for septic shock in the setting of witnessed seizure activity. BNP elevated to 2800 compared to baseline, bedside echo shows reduced LVOT VTI per MICU team, visually with concern for RV enlargement, suggestive of cardiomyopathy possible stress-mediated.     Recommendations:   - Continued management of septic shock per MICU team  - Given elevated BNP and evidence of LE edema, agree with IV Bumex for diuresis, would titrate for goal NN 1L x 24h; caution against overdiuresis in the setting of septic shock with suspected RV dysfunction   - Follow-up formal TTE done today   - If pt to require central access, would obtain central sat and CVP  - Continue to trend lactate, would obtain serum level to trend  - Continue heparin for AC given Afib; hold home BB, avoid aggressive rate control of AFRVR given septic shock and role of compensatory tachycardia to augment CO  - Hold home anti-HTN agents (Amlodipine 5 qd, Losartan 100 qd, Hydralazine 25 tid) in the setting of shock  - CTM on telemetry  - K> 4, Mg> 2 66 y/o male with a PMHx of glioblastoma, seizure disorder, HTN, and atrial fibrillation on xarelto presenting after seizure at rehab facility and intubation in ED admitted for airway protection and possible septic shock. Cardiology consulted with c/f new CHF. On exam, pt appears mildly cool and with 1+ pitting edema bilaterally. Primary team diuresing with bumex gtt and overall pt remains febrile and pressor dependent, overall appears largely concern for septic shock in the setting of witnessed seizure activity. BNP elevated to 2800 compared to baseline, bedside echo shows reduced LVOT VTI per MICU team, visually with concern for RV enlargement, suggestive of cardiomyopathy possible stress-mediated.       Recommendations:   - Continued management of septic shock per MICU team  - Given elevated BNP and evidence of LE edema, agree with IV Bumex for diuresis, would titrate for goal NN 1L x 24h; caution against overdiuresis in the setting of septic shock with suspected RV dysfunction   - Follow-up formal TTE done today   - If pt to require central access, would obtain central sat and CVP  - Continue to trend lactate, would obtain serum level to trend  - Continue heparin for AC given Afib; hold home BB, avoid aggressive rate control of AFRVR given septic shock and role of compensatory tachycardia to augment CO  - Hold home anti-HTN agents (Amlodipine 5 qd, Losartan 100 qd, Hydralazine 25 tid) in the setting of shock  - CTM on telemetry  - K> 4, Mg> 2 66 y/o male with a PMHx of glioblastoma, seizure disorder, HTN, and atrial fibrillation on xarelto presenting after seizure at rehab facility and intubation in ED admitted for airway protection and possible septic shock. Cardiology consulted with c/f new CHF. On exam, pt appears mildly cool and with 1+ pitting edema bilaterally. Primary team diuresing with bumex gtt and overall pt remains febrile and pressor dependent, overall appears largely concern for septic shock in the setting of witnessed seizure activity. BNP elevated to 2800 compared to baseline, bedside echo shows reduced LVOT VTI per MICU team, visually with concern for RV enlargement, suggestive of cardiomyopathy possible stress-mediated.     #Afib RVR  #C/f new CHF, RV dysfunction    Recommendations:   - Continued management of septic shock per MICU team  - Given elevated BNP and evidence of LE edema, agree with IV Bumex for diuresis, would titrate for goal NN 1L x 24h; caution against overdiuresis in the setting of septic shock with suspected RV dysfunction   - Follow-up formal TTE 7/16  - If pt to require central access, would obtain central sat and CVP  - Continue to trend lactate, would obtain serum level to trend  - Continue heparin for AC given Afib  - Hold home BB for Afib, avoid aggressive rate control given septic shock and role of compensatory tachycardia to augment CO  - Hold home anti-HTN agents (Amlodipine 5 qd, Losartan 100 qd, Hydralazine 25 tid) in the setting of shock  - CTM on telemetry  - K> 4, Mg> 2

## 2023-07-16 NOTE — PATIENT PROFILE ADULT - FUNCTIONAL ASSESSMENT - BASIC MOBILITY 6.
1-calculated by average/Not able to assess (calculate score using [facility] averaging method)

## 2023-07-16 NOTE — ED PROVIDER NOTE - CLINICAL SUMMARY MEDICAL DECISION MAKING FREE TEXT BOX
67-year-old male past medical history as above here for seizures on arrival GCS of 4 emergently intubated for airway protection.  Patient also found to be febrile to 102.4 with heart rate 150s to 160s systolic and low blood pressures.  Seizures likely secondary to GBM however given febrile cannot rule out infection POCUS significant for dilated RV with concerns for right heart strain with possible PE.  We will do sepsis work-up, antibiosis with Vanco and cefepime, Keppra for seizures, pressors and fluids for blood pressure management.  Patient requires ICU level management.

## 2023-07-16 NOTE — H&P ADULT - NSHPPHYSICALEXAM_GEN_ALL_CORE
General: No acute distress. Following commands, arousable to voice, intubated, non-sedated.    HEENT: Pupils equal and symmetrically reactive to light.    PULM: Clear to auscultation bilaterally.    CVS: Regular rate and rhythm, no murmurs, rubs, or gallops.    ABD: Soft, nondistended, no masses.    EXT: 2+ pitting edema of left leg and trace edema on right    NEURO: Patient responsive to commands.  strength normal on right, with weakness on left.    SKIN: Warm and well perfused, no rashes. ICU Vital Signs Last 24 Hrs  T(C): 38.1 (16 Jul 2023 06:59), Max: 39.1 (16 Jul 2023 02:55)  T(F): 100.6 (16 Jul 2023 06:59), Max: 102.4 (16 Jul 2023 02:55)  HR: 103 (16 Jul 2023 07:15) (103 - 168)  BP: 112/84 (16 Jul 2023 07:15) (81/39 - 169/101)  BP(mean): 95 (16 Jul 2023 07:15) (95 - 127)  ABP: --  ABP(mean): --  RR: 20 (16 Jul 2023 07:15) (12 - 23)  SpO2: 100% (16 Jul 2023 07:15) (98% - 100%)    O2 Parameters below as of 16 Jul 2023 06:59  Patient On (Oxygen Delivery Method): ventilator  O2 Flow (L/min): 40    General: No acute distress. Following commands, arousable to voice, intubated, non-sedated.    HEENT: Pupils equal and symmetrically reactive to light.    PULM: Clear to auscultation bilaterally.    CVS: Regular rate and rhythm, no murmurs, rubs, or gallops.    ABD: Soft, nondistended, no masses.    EXT: 2+ pitting edema of left leg and trace edema on right    NEURO: Patient responsive to commands.  strength normal on right, with weakness on left.    SKIN: Warm and well perfused, no rashes.

## 2023-07-17 NOTE — ADVANCED PRACTICE NURSE CONSULT - REASON FOR CONSULT
Wound care consult initiated by RN to assess patient's skin for a possible left cheek stage 1    Reason for Admission: seizure  History of Present Illness:   Mr. Jalloh is a 66 y/o male with a PMHx of glioblastoma, seizure disorder, atrial fibrillation on xarelto, and HTN who had a seizure at a rehab facility and was brought in by EMS. He was given versed by EMS and upon arrival to ED, patient had a GCS of 6-7 and was unresponsive. At this point, he was intubated and MICU was consulted. Of note, patient was recently discharged from [facility] on 7/14 where he was seen for management of glioblastoma and seizures. He was discharged to rehab on keppra, vimpat, valproic acid, and dexamethasone and set to follow-up with outpatient neurologist before today's episode.    ED course: Patient brought in by EMS after receiving versed en route to hospital. Upon presentation to ED, he had a GCS of 6-7 and was unresponsive, leading to intubation. Patient also noted to be hypotensive and febrile in ED, requiring pressors. Labs upon arrival were significant for WBC of 8.21, glucose of 144, calcium of 8.3, phosphorus of 5, AST of 50, ALT of 80, troponin of 75, BNP of 2832, procal of 0.14, VBG with pH of 7.18, pCO2 of 61, and HCO3 of 23. POCUS in ED with evidence of right heart dilation.

## 2023-07-17 NOTE — PROGRESS NOTE ADULT - SUBJECTIVE AND OBJECTIVE BOX
Cardiology Progress Note  ------------------------------------------------------------------------------------------  SUBJECTIVE:   Reporting feeling hungry this AM.   - Tele:   - No events overnight. Denies CP, SOB or Palpitations.   -------------------------------------------------------------------------------------------  ROS:  CV: chest pain (-), palpitation (-), orthopnea (-), PND (-), edema (+)  PULM: SOB (-), cough (-), wheezing (-), hemoptysis (-).   CONST: fever (-), chills (-) or fatigue (-)  GI: abdominal pain (-) , nausea/vomiting (-),  : dysuria (-),  NEURO:  weakness (-), dizziness (-)  MSK: , joint pain (-)   SKIN: itching (-), rash (-)  HEENT:  visual changes (-);  Psych: change in mood (-), anxiety (-), depression (-)     All other review of systems is negative unless indicated above.   -------------------------------------------------------------------------------------------  VS:  T(F): 99.1 (07-17), Max: 100.6 (07-16)  HR: 92 (07-17) (76 - 155)  BP: 89/56 (07-17) (71/50 - 159/101)  RR: 20 (07-17)  SpO2: 96% (07-17)  I&O's Summary    16 Jul 2023 07:01  -  17 Jul 2023 07:00  --------------------------------------------------------  IN: 2930.2 mL / OUT: 4000 mL / NET: -1069.8 mL    17 Jul 2023 07:01  -  17 Jul 2023 12:56  --------------------------------------------------------  IN: 726.8 mL / OUT: 350 mL / NET: 376.8 mL      PHYSICAL EXAM:  GENERAL: NAD  HEAD:  Atraumatic, Normocephalic.  EYES: EOMI, PERRLA, conjunctiva and sclera clear.  ENT: Moist mucous membranes.  NECK: Supple, No elevated JVP.   CHEST/LUNG: Decreased breath sounds at the bases b/l.   HEART: Regular rate and rhythm; No murmurs, rubs, or gallops.  ABDOMEN: Bowel sounds present; Soft, Nontender, Nondistended.   EXTREMITIES: 2+ edema b/l lower extremity.   PSYCH: Normal affect.  SKIN: No rashes or lesions.  -------------------------------------------------------------------------------------------  LABS:                          13.0   6.01  )-----------( 143      ( 16 Jul 2023 23:54 )             37.9     07-17    138  |  102  |  15  ----------------------------<  121<H>  4.1   |  24  |  0.66    Ca    7.5<L>      17 Jul 2023 06:05  Phos  3.6     07-17  Mg     1.9     07-17    TPro  4.8<L>  /  Alb  2.6<L>  /  TBili  0.4  /  DBili  x   /  AST  30  /  ALT  49<H>  /  AlkPhos  56  07-17    PT/INR - ( 16 Jul 2023 23:54 )   PT: 21.8 sec;   INR: 1.88 ratio         PTT - ( 17 Jul 2023 08:32 )  PTT:>200.0 sec  CARDIAC MARKERS ( 16 Jul 2023 08:25 )  79 ng/L / x     / x     / 51 U/L / x     / 4.5 ng/mL  CARDIAC MARKERS ( 16 Jul 2023 02:53 )  75 ng/L / x     / x     / x     / x     / x                -------------------------------------------------------------------------------------------  Meds:  acetaminophen   Oral Liquid .. 650 milliGRAM(s) Oral every 6 hours PRN  buMETAnide Infusion 2 mG/Hr IV Continuous <Continuous>  chlorhexidine 4% Liquid 1 Application(s) Topical <User Schedule>  clonazePAM  Tablet 0.5 milliGRAM(s) Oral two times a day  heparin  Infusion.  Unit(s)/Hr IV Continuous <Continuous>  hydrocortisone sodium succinate Injectable 50 milliGRAM(s) IV Push every 6 hours  insulin lispro (ADMELOG) corrective regimen sliding scale   SubCutaneous every 6 hours  lacosamide IVPB 200 milliGRAM(s) IV Intermittent every 12 hours  levETIRAcetam  IVPB 1000 milliGRAM(s) IV Intermittent every 12 hours  pantoprazole  Injectable 40 milliGRAM(s) IV Push every 24 hours  phenylephrine    Infusion 1 MICROgram(s)/kG/Min IV Continuous <Continuous>  polyethylene glycol 3350 17 Gram(s) Oral daily  senna 2 Tablet(s) Oral at bedtime  valproate sodium  IVPB 250 milliGRAM(s) IV Intermittent every 8 hours    -------------------------------------------------------------------------------------------  Cardiovascular Diagnostic Testing:         Cardiology Progress Note  ------------------------------------------------------------------------------------------  SUBJECTIVE:   Reporting feeling hungry this AM.   Trialed on dobutamine overnight, with elevation in HR discontinued  Bumex gtt discontinued  off pressors.   - Tele: afib 80s-150s,   - No events overnight. Denies CP, SOB or Palpitations.   -------------------------------------------------------------------------------------------  ROS:  CV: chest pain (-), palpitation (-), orthopnea (-), PND (-), edema (+)  PULM: SOB (-), cough (-), wheezing (-), hemoptysis (-).   CONST: fever (-), chills (-) or fatigue (-)  GI: abdominal pain (-) , nausea/vomiting (-),  : dysuria (-),  NEURO:  weakness (-), dizziness (-)  MSK: , joint pain (-)   SKIN: itching (-), rash (-)  HEENT:  visual changes (-);  Psych: change in mood (-), anxiety (-), depression (-)     All other review of systems is negative unless indicated above.   -------------------------------------------------------------------------------------------  VS:  T(F): 99.1 (07-17), Max: 100.6 (07-16)  HR: 92 (07-17) (76 - 155)  BP: 89/56 (07-17) (71/50 - 159/101)  RR: 20 (07-17)  SpO2: 96% (07-17)  I&O's Summary    16 Jul 2023 07:01  -  17 Jul 2023 07:00  --------------------------------------------------------  IN: 2930.2 mL / OUT: 4000 mL / NET: -1069.8 mL    17 Jul 2023 07:01  -  17 Jul 2023 12:56  --------------------------------------------------------  IN: 726.8 mL / OUT: 350 mL / NET: 376.8 mL      PHYSICAL EXAM:  GENERAL: NAD  HEAD:  Atraumatic, Normocephalic.  EYES: EOMI, PERRLA, conjunctiva and sclera clear.  ENT: Moist mucous membranes.  NECK: Supple, No elevated JVP.   CHEST/LUNG: Decreased breath sounds at the bases b/l.   HEART: Regular rate and rhythm; No murmurs, rubs, or gallops.  ABDOMEN: Bowel sounds present; Soft, Nontender, Nondistended.   EXTREMITIES: 2+ edema b/l lower extremity.   PSYCH: Normal affect.  SKIN: No rashes or lesions.  -------------------------------------------------------------------------------------------  LABS:                          13.0   6.01  )-----------( 143      ( 16 Jul 2023 23:54 )             37.9     07-17    138  |  102  |  15  ----------------------------<  121<H>  4.1   |  24  |  0.66    Ca    7.5<L>      17 Jul 2023 06:05  Phos  3.6     07-17  Mg     1.9     07-17    TPro  4.8<L>  /  Alb  2.6<L>  /  TBili  0.4  /  DBili  x   /  AST  30  /  ALT  49<H>  /  AlkPhos  56  07-17    PT/INR - ( 16 Jul 2023 23:54 )   PT: 21.8 sec;   INR: 1.88 ratio         PTT - ( 17 Jul 2023 08:32 )  PTT:>200.0 sec  CARDIAC MARKERS ( 16 Jul 2023 08:25 )  79 ng/L / x     / x     / 51 U/L / x     / 4.5 ng/mL  CARDIAC MARKERS ( 16 Jul 2023 02:53 )  75 ng/L / x     / x     / x     / x     / x                -------------------------------------------------------------------------------------------  Meds:  acetaminophen   Oral Liquid .. 650 milliGRAM(s) Oral every 6 hours PRN  buMETAnide Infusion 2 mG/Hr IV Continuous <Continuous>  chlorhexidine 4% Liquid 1 Application(s) Topical <User Schedule>  clonazePAM  Tablet 0.5 milliGRAM(s) Oral two times a day  heparin  Infusion.  Unit(s)/Hr IV Continuous <Continuous>  hydrocortisone sodium succinate Injectable 50 milliGRAM(s) IV Push every 6 hours  insulin lispro (ADMELOG) corrective regimen sliding scale   SubCutaneous every 6 hours  lacosamide IVPB 200 milliGRAM(s) IV Intermittent every 12 hours  levETIRAcetam  IVPB 1000 milliGRAM(s) IV Intermittent every 12 hours  pantoprazole  Injectable 40 milliGRAM(s) IV Push every 24 hours  phenylephrine    Infusion 1 MICROgram(s)/kG/Min IV Continuous <Continuous>  polyethylene glycol 3350 17 Gram(s) Oral daily  senna 2 Tablet(s) Oral at bedtime  valproate sodium  IVPB 250 milliGRAM(s) IV Intermittent every 8 hours    -------------------------------------------------------------------------------------------  Cardiovascular Diagnostic Testing:

## 2023-07-17 NOTE — PROGRESS NOTE ADULT - ASSESSMENT
68 y/o male with a PMHx of glioblastoma, seizure disorder, HTN, and atrial fibrillation on xarelto presenting after seizure at rehab facility and intubation in ED admitted for airway protection and possible septic shock. Cardiology consulted with c/f new CHF. On exam, pt appears mildly cool and with 1+ pitting edema bilaterally. Primary team diuresing with bumex gtt and overall pt remains febrile and pressor dependent, overall appears largely concern for septic shock in the setting of witnessed seizure activity. BNP elevated to 2800 compared to baseline, bedside echo shows reduced LVOT VTI per MICU team, visually with concern for RV enlargement, suggestive of cardiomyopathy possible stress-mediated. TTE showing EF 45-50%, moderate LVH, decreased RV systolic function.     #Afib RVR  #C/f new CHF, RV dysfunction    Recommendations:   - Continued management of septic shock per MICU team  - TTE showing EF 45-50%, moderate LVH, decreased RV systolic function.   - Now off pressors.   - If pt to require central access, would obtain central sat and CVP  - Continue heparin for AC given Afib  - Hold home BB for Afib, avoid aggressive rate control given septic shock and role of compensatory tachycardia to augment CO  - Hold home anti-HTN agents (Amlodipine 5 qd, Losartan 100 qd, Hydralazine 25 tid) in the setting of shock  - CTM on telemetry  - K> 4, Mg> 2 66 y/o male with a PMHx of glioblastoma, seizure disorder, HTN, and atrial fibrillation on xarelto presenting after seizure at rehab facility and intubation in ED admitted for airway protection and possible septic shock. Cardiology consulted with c/f new CHF. On exam, pt appears mildly cool and with 1+ pitting edema bilaterally. Primary team diuresing with bumex gtt and overall pt remains febrile and pressor dependent, overall appears largely concern for septic shock in the setting of witnessed seizure activity. BNP elevated to 2800 compared to baseline, bedside echo shows reduced LVOT VTI per MICU team, visually with concern for RV enlargement, suggestive of cardiomyopathy possible stress-mediated. TTE showing EF 45-50%, moderate LVH, decreased RV systolic function.     #Afib RVR  #C/f new CHF, RV dysfunction    Recommendations:   - TTE showing EF 45-50%, moderate LVH, decreased RV systolic function.   - Now off pressors.   - Continue heparin for AC given Afib  - can consider digoxin if afib RVR uncontrolled  - ok to d/c bumex gtt, diurese to keep patient net even  - Will plan to start GDMT pending clinical course.   - Will need repeat limited TTE for LV function once clinically improved.   - Hold home BB for Afib, avoid aggressive rate control given septic shock and role of compensatory tachycardia to augment CO  - Hold home anti-HTN agents (Amlodipine 5 qd, Losartan 100 qd, Hydralazine 25 tid) in the setting of shock  - CTM on telemetry  - K> 4, Mg> 2

## 2023-07-17 NOTE — EEG REPORT - NS EEG TEXT BOX
[facility]   COMPREHENSIVE EPILEPSY CENTER   REPORT OF CONTINUOUS VIDEO EEG     [facility]: [address] [place] [address] [address], [place] [address], Ph#: [phone]  [facility]: [id number], [place] [address], Ph#: [phone]  [facility]: [address] [place] [address], Ph#: [phone]    Patient Name: KATIE VALLES  Age and : 67y (55)  MRN #: [id number]  Location: [address]  Referring Physician: Varsha Parikh    Start Time/Date: 1500 on 2023  End Time/Date: 06:30 on 23  Duration: 15H 30min    _____________________________________________________________  STUDY INFORMATION    EEG Recording Technique:  The patient underwent continuous Video-EEG monitoring, using Telemetry System hardware on the XLTek Digital System. EEG and video data were stored on a computer hard drive with important events saved in digital archive files. The material was reviewed by a physician (electroencephalographer / epileptologist) on a daily basis. Issa and seizure detection algorithms were utilized and reviewed. An EEG Technician attended to the patient, and was available throughout daytime work hours.  The epilepsy center neurologist was available in person or on call 24-hours per day.    EEG Placement and Labeling of Electrodes:  The EEG was performed utilizing 20 channel referential EEG connections (coronal over temporal over parasagittal montage) using all standard 10-20 electrode placements with EKG, with additional electrodes placed in the inferior temporal region using the modified 10-10 montage electrode placements for elective admissions, or if deemed necessary. Recording was at a sampling rate of 256 samples per second per channel. Time synchronized digital video recording was done simultaneously with EEG recording. A low light infrared camera was used for low light recording.     _____________________________________________________________  HISTORY    Patient is a 67y old  Male who presents with a chief complaint of seizure (2023 07:49)      PERTINENT MEDICATION:  MEDICATIONS  (STANDING):  buMETAnide Infusion 2 mG/Hr (10 mL/Hr) IV Continuous <Continuous>  chlorhexidine 4% Liquid 1 Application(s) Topical <User Schedule>  clonazePAM  Tablet 0.5 milliGRAM(s) Oral two times a day  dexAMETHasone  Injectable 4 milliGRAM(s) IV Push daily  heparin  Infusion.  Unit(s)/Hr (17 mL/Hr) IV Continuous <Continuous>  insulin lispro (ADMELOG) corrective regimen sliding scale   SubCutaneous every 6 hours  lacosamide IVPB 200 milliGRAM(s) IV Intermittent every 12 hours  levETIRAcetam  IVPB 1000 milliGRAM(s) IV Intermittent every 12 hours  pantoprazole  Injectable 40 milliGRAM(s) IV Push every 24 hours  phenylephrine    Infusion 1 MICROgram(s)/kG/Min (37.5 mL/Hr) IV Continuous <Continuous>  piperacillin/tazobactam IVPB.. 3.375 Gram(s) IV Intermittent every 8 hours  polyethylene glycol 3350 17 Gram(s) Oral daily  senna 2 Tablet(s) Oral at bedtime  valproate sodium  IVPB 250 milliGRAM(s) IV Intermittent every 8 hours  vancomycin  IVPB 1500 milliGRAM(s) IV Intermittent every 12 hours    _____________________________________________________________  STUDY INTERPRETATION    Findings: The background was continuous, spontaneously variable and reactive. During wakefulness, the posterior dominant rhythm consisted of 7.5 Hz activity, with amplitude to 30 uV, that attenuated to eye opening.  Low amplitude frontal beta was noted in wakefulness.    Background Slowing:  Intermittent diffuse irregular theta and polymorphic delta slowing.    Focal Slowing:   Continuous theta/delta activity over the right anterior head region.    Sleep Background:  Drowsiness was characterized by fragmentation, attenuation, and slowing of the background activity.    Sleep was characterized by the presence of vertex waves sleep spindles and K-complexes.    Other Non-Epileptiform Findings:  Breach effect over the right anterior head region characterized by higher amplitude, sharply contoured waves and fast activities.    Interictal Epileptiform Activity:   Occasional to frequent sharp-wave discharges over the right fronto-temporal region (max F8)  Occasional to frequent sharp-wave discharges over the right fronto-central region (max C4)  Both of the above discharges occasionally seen with a periodic appearance of 0.5-1Hz without evolution (LPDs)    Events:  Clinical events: None recorded.  Seizures: None recorded.    Activation Procedures:   Hyperventilation was not performed.    Photic stimulation was not performed.     Artifacts:  Intermittent myogenic and movement artifacts were noted.    ECG:  ECG rhythm irregular.    _____________________________________________________________  EEG SUMMARY/CLASSIFICATION    Abnormal EEG in the awake, drowsy and asleep states.  -Occasional to frequent sharp-wave discharges/LPDs independently over the right fronto-temporal and right fronto-central regions.  -Right anterior slowing  -Right anterior breach artifact  _____________________________________________________________  EEG IMPRESSION/CLINICAL CORRELATE  Epilepsy Fellow, [facility]    Findings indicate increased risk for seizures from the right fronto-temporal and fronto-central regions with correlating regional non-specific cerebral dysfunction and overlying skull defect.    No seizures recorded.    Reza Bardales MD  EEG/Epilepsy Attending

## 2023-07-17 NOTE — PROGRESS NOTE ADULT - ASSESSMENT
Epilepsy, intractable, without status epilepticus  Encephalopathy  Glioblastoma  Atrial fibrillation  HTN    - Patient with worsened seizures, now improved. No clear inciting events. Reports he is at his neurologic baseline and wants to know when EEG can be removed  - vEEG with R sided slowing and R frontotemporal and R frontocentral LPD's at 0.5-1 Hz without seizures. Will continue until tomorrow (7/18) but then stop if no worsening periodic pattern or seizures  - Continue current AED's with Vimpat 200mg IV/PO [id number], Keppra 1000mg IV/PO [id number], and VPA 250mg IV/PO [id number]  - Continue to address above medical problems, as you are doing  - Will continue to follow patient with you

## 2023-07-17 NOTE — PROGRESS NOTE ADULT - ASSESSMENT
ASSESSMENT :   ==============  66 y/o male with a PMHx of glioblastoma, seizure disorder, HTN, and atrial fibrillation on xarelto presenting after seizure at rehab facility and intubation in ED admitted for acute respiratory failure and possible septic shock.      PLAN:  ========    NEURO:    #Seizure disorder with hx of glioblastoma  -CT head negative for acute intracranial findings  -Neurology following, recommended video EEG until 7/18  -Ammonia 38 wnl  -Neuro checks per routine   -Continue home antiepileptic regimen of keppra, vimpat, clonazepam, valproic acid and dexamethasone. F/u with neuro for further recs.      PULM:    #Acute hypoxic and hypercarbic respiratory failure in s/o seizure  -Patient extubated on 7/16 and now on RA  -Chest CT without evidence of PE but shows left lower lobe atelectasis    CV:    #Shock likely 2/2 sepsis vs sedation vs cardiogenic  -Patient received versed en route to ED  -Blood cultures and sputum cultures ordered. Continue to follow.  -Empiric abx coverage with cefepime and vancomycin  -TTE 7/16: LV: mildly reduced systolic function, estimate EF 45-50%. RV: moderately enlarged, midly reduced systolic function.   - CT chest and POCUS with evidence of heart failure  -BNP elevated at 2832  -Lactate 2.0  -Levo, phenylephrine ordered. Monitor and titrate to MAP>65    #Troponin elevation likely 2/2 hypotension  -Troponin of 75 on admission  -BNP elevated and POCUS with evidence of acute HF  -EKG ordered. F/u    #Chronic atrial fibrillation  -F/u EKG  -Hold BB  - Mg  -Consider re-starting patient's xarelto    #Essential hypertension  -Withold HTN medications in setting of shock      GI:  NTD      RENAL:  -maintain urine output > 0.5cc/kg/hr     :  -Pimentel in place      ID:  #Potential septic shock  -See CV section  -Infectious work-up pending. F/u bcx and sputum cx  -Cefepime and vancomycin ordered    ENDO:  #Mild hyperglycemia  -Glucose of 144 on admission  -CTM       HEME:  #Bilateral LE edema L>R  -Doppler ordered. F/u.         ASSESSMENT :   ==============  68 y/o male with a PMHx of glioblastoma, seizure disorder, HTN, and atrial fibrillation on xarelto presenting after seizure at rehab facility and intubation in ED admitted for acute respiratory failure and possible septic shock.      PLAN:  ========    NEURO:    #Seizure disorder with hx of glioblastoma  -CT head negative for acute intracranial findings  -Neurology following, recommended video EEG until 7/18  -Ammonia 38 wnl  -Neuro checks per routine   -Continue home antiepileptic regimen of keppra, vimpat, clonazepam, valproic acid and dexamethasone. F/u with neuro for further recs.      PULM:    #Acute hypoxic and hypercarbic respiratory failure in s/o seizure  -Patient extubated on 7/16 and now on RA  -Chest CT without evidence of PE but shows left lower lobe atelectasis    CV:    #Shock likely 2/2 sepsis vs sedation vs cardiogenic  -Patient received versed en route to ED  -Blood cultures and sputum cultures ordered. Continue to follow.  -Empiric abx coverage with cefepime and vancomycin  -TTE 7/16: LV: mildly reduced systolic function, estimate EF 45-50%. RV: moderately enlarged, midly reduced systolic function.   - CT chest and POCUS with evidence of heart failure  -BNP elevated at 2832  -Lactate 2.0  -Levo, phenylephrine ordered. Monitor and titrate to MAP>65  -Patient does not appear overtly overloaded, holding diuresis    #Troponin elevation likely 2/2 hypotension  -Troponin of 75 on admission  -BNP elevated and POCUS with evidence of acute HF  -EKG ordered. F/u    #Chronic atrial fibrillation  -F/u EKG  -Hold BB  -Keep Mg > 3  -Heparin gtt    #Essential hypertension  -Withold HTN medications in setting of shock      GI:  NTD      RENAL:  -maintain urine output > 0.5cc/kg/hr     :  -Pimentel in place      ID:  #Potential septic shock  -See CV section  -Procal 2.0  -Infectious work-up pending. bcx and sputum culture drawn on 7/16, NGTD  -dc antibiotics    ENDO:  #Mild hyperglycemia  -Glucose of 144 on admission  -CTM       HEME:  #Bilateral LE edema L>R  -Doppler ordered. F/u.         ASSESSMENT :   ==============  66 y/o male with a PMHx of glioblastoma, seizure disorder, HTN, and atrial fibrillation on xarelto presenting after seizure at rehab facility and intubation in ED admitted for acute respiratory failure and possible septic shock.      PLAN:  ========    NEURO:    #Seizure disorder with hx of glioblastoma  -CT head negative for acute intracranial findings  -Neurology following, recommended video EEG until 7/18  -Ammonia 38 wnl  -Neuro checks per routine   -Continue home antiepileptic regimen of keppra, vimpat, clonazepam, valproic acid and dexamethasone. F/u with neuro for further recs.      PULM:    #Acute hypoxic and hypercarbic respiratory failure in s/o seizure  -Patient extubated on 7/16 and now on RA  -Chest CT without evidence of PE but shows left lower lobe atelectasis    CV:    #Shock likely 2/2 sepsis vs sedation vs cardiogenic  -Patient received versed en route to ED  -Blood cultures and sputum cultures ordered. Continue to follow.  -Empiric abx coverage with cefepime and vancomycin  -TTE 7/16: LV: mildly reduced systolic function, estimate EF 45-50%. RV: moderately enlarged, midly reduced systolic function.   - CT chest and POCUS with evidence of heart failure  -BNP elevated at 2832  -Lactate 2.0  -Off pressor  -Patient does not appear overtly fluid overloaded, holding diuresis    #Troponin elevation likely 2/2 hypotension  -Troponin of 75 on admission  -BNP elevated and POCUS with evidence of acute HF  -EKG ordered. F/u    #Chronic atrial fibrillation  -F/u EKG  -Hold BB  -Keep Mg > 3  -Heparin gtt    #Essential hypertension  -Withold HTN medications in setting of shock      GI:  NTD      RENAL:  -maintain urine output > 0.5cc/kg/hr     :  -Pimentel in place      ID:  #Potential septic shock  -See CV section  -Procal 2.0  -Infectious work-up pending. bcx and sputum culture drawn on 7/16, NGTD  -dc antibiotics    ENDO:  #Mild hyperglycemia  -Glucose of 144 on admission  -CTM       HEME:  #Bilateral LE edema L>R  -Doppler ordered. F/u.         ASSESSMENT :   ==============  66 y/o male with a PMHx of glioblastoma, seizure disorder, HTN, and atrial fibrillation on xarelto presenting after seizure at rehab facility and intubation in ED admitted for acute respiratory failure and possible septic shock.      PLAN:  ========    NEURO:    #Seizure disorder with hx of glioblastoma  -CT head negative for acute intracranial findings  -Neurology following, recommended video EEG until 7/18  -Ammonia 38 wnl  -Neuro checks per routine   -Continue home antiepileptic regimen of keppra, vimpat, clonazepam, valproic acid and dexamethasone. F/u with neuro for further recs.      PULM:    #Acute hypoxic and hypercarbic respiratory failure in s/o seizure  -Patient extubated on 7/16 and now on RA  -Chest CT without evidence of PE but shows left lower lobe atelectasis    CV:    #Shock likely 2/2 sepsis vs sedation vs cardiogenic  -Patient received versed en route to ED  -Blood cultures and sputum cultures ordered. Continue to follow.  -Empiric abx coverage with cefepime and vancomycin  -TTE 7/16: LV: mildly reduced systolic function, estimate EF 45-50%. RV: moderately enlarged, mildly reduced systolic function.   - CT chest and POCUS with evidence of heart failure  -BNP elevated at 2832  -Lactate 2.0  -Off pressor  -Patient does not appear overtly fluid overloaded, holding diuresis    #Troponin elevation likely 2/2 hypotension  -Troponin of 75 on admission  -BNP elevated and POCUS with evidence of acute HF  -EKG ordered. F/u    #Chronic atrial fibrillation  -F/u EKG  -Hold BB  -Keep Mg > 3  -Heparin gtt    #Essential hypertension  -Withold HTN medications in setting of shock      GI:  NTD      RENAL:  -maintain urine output > 0.5cc/kg/hr     :  -Pimentel in place      ID:  #Potential septic shock  -See CV section  -Procal 2.0  -Infectious work-up pending. bcx and sputum culture drawn on 7/16, NGTD  -dc antibiotics    ENDO:  #Mild hyperglycemia  -Glucose of 144 on admission  -CTM       HEME:  #Bilateral LE edema L>R  -Doppler ordered. F/u.

## 2023-07-17 NOTE — PROGRESS NOTE ADULT - SUBJECTIVE AND OBJECTIVE BOX
Patient is a 67y old  Male who presents with a chief complaint of seizure (17 Jul 2023 12:55)      24 hour events: NAEO, now breathing comfortably on RA.     OBJECTIVE:  ICU Vital Signs Last 24 Hrs  T(C): 37.3 (17 Jul 2023 12:00), Max: 37.9 (16 Jul 2023 20:00)  T(F): 99.1 (17 Jul 2023 12:00), Max: 100.2 (16 Jul 2023 20:00)  HR: 96 (17 Jul 2023 14:30) (76 - 155)  BP: 90/69 (17 Jul 2023 14:30) (71/50 - 150/51)  BP(mean): 75 (17 Jul 2023 14:30) (56 - 113)  ABP: --  ABP(mean): --  RR: 19 (17 Jul 2023 14:30) (10 - 28)  SpO2: 100% (17 Jul 2023 14:30) (91% - 100%)    O2 Parameters below as of 17 Jul 2023 08:00  Patient On (Oxygen Delivery Method): nasal cannula  O2 Flow (L/min): 2  O2 Concentration (%): 28          07-16 @ 07:01  -  07-17 @ 07:00  --------------------------------------------------------  IN: 2930.2 mL / OUT: 4000 mL / NET: -1069.8 mL    07-17 @ 07:01  -  07-17 @ 15:04  --------------------------------------------------------  IN: 820.6 mL / OUT: 350 mL / NET: 470.6 mL      CAPILLARY BLOOD GLUCOSE      POCT Blood Glucose.: 181 mg/dL (17 Jul 2023 11:13)      PHYSICAL EXAM:  GENERAL: NAD, well-developed  HEAD:  Atraumatic, Normocephalic  EYES: EOMI, PERRLA, conjunctiva and sclera clear  NECK: Supple, No JVD, Thyroid not palpable, non tender, Trachea midline  CHEST/LUNG: ( )ETT in place, ( )Tracheostomy in place, ( )no chest deformity,  ( )  Normal expansion/effort/palpation,  ( )Normal percussion/auscultation,  Clear to auscultation bilaterally; No wheeze  HEART: Regular rate and rhythm; No murmurs, rubs, or gallops, ( ) No JVD, ( )Normal Pulses, ( )Edema   ABDOMEN: Soft, Nontender, Nondistended; Bowel sounds presen, ( ) No Masses, (  ) No organomegaly,  (  ) Non-tender normal BS   : Loar in Place, Voiding freely  Musculoskeletal/EXTREMITIES: 2+ peripheral pulses, b/l trace lower extremity edema  PSYCH: AAOx3, (  ) Normal mood/affect/judgment/insight, (  ) Intact memory,   NEUROLOGY: non-focal, exam  SKIN: No rashes or lesions      LINES:    HOSPITAL MEDICATIONS:  MEDICATIONS  (STANDING):  buMETAnide Infusion 2 mG/Hr (10 mL/Hr) IV Continuous <Continuous>  chlorhexidine 4% Liquid 1 Application(s) Topical <User Schedule>  clonazePAM  Tablet 0.5 milliGRAM(s) Oral two times a day  heparin  Infusion.  Unit(s)/Hr (17 mL/Hr) IV Continuous <Continuous>  hydrocortisone sodium succinate Injectable 50 milliGRAM(s) IV Push every 6 hours  insulin lispro (ADMELOG) corrective regimen sliding scale   SubCutaneous every 6 hours  lacosamide IVPB 200 milliGRAM(s) IV Intermittent every 12 hours  levETIRAcetam  IVPB 1000 milliGRAM(s) IV Intermittent every 12 hours  pantoprazole  Injectable 40 milliGRAM(s) IV Push every 24 hours  phenylephrine    Infusion 1 MICROgram(s)/kG/Min (37.5 mL/Hr) IV Continuous <Continuous>  polyethylene glycol 3350 17 Gram(s) Oral daily  senna 2 Tablet(s) Oral at bedtime  valproate sodium  IVPB 250 milliGRAM(s) IV Intermittent every 8 hours    MEDICATIONS  (PRN):  acetaminophen   Oral Liquid .. 650 milliGRAM(s) Oral every 6 hours PRN Temp greater or equal to 38C (100.4F), Mild Pain (1 - 3)      LABS:                        13.0   6.01  )-----------( 143      ( 16 Jul 2023 23:54 )             37.9     Hgb Trend: 13.0<--, 15.3<--, 13.7<--, 15.5<--, 13.9<--  07-17    138  |  102  |  15  ----------------------------<  121<H>  4.1   |  24  |  0.66    Ca    7.5<L>      17 Jul 2023 06:05  Phos  3.6     07-17  Mg     1.9     07-17    TPro  4.8<L>  /  Alb  2.6<L>  /  TBili  0.4  /  DBili  x   /  AST  30  /  ALT  49<H>  /  AlkPhos  56  07-17    Creatinine Trend: 0.66<--, 0.57<--, 0.64<--, 0.53<--, 0.70<--, 0.51<--  PT/INR - ( 16 Jul 2023 23:54 )   PT: 21.8 sec;   INR: 1.88 ratio         PTT - ( 17 Jul 2023 08:32 )  PTT:>200.0 sec  Urinalysis Basic - ( 17 Jul 2023 06:05 )    Color: x / Appearance: x / SG: x / pH: x  Gluc: 121 mg/dL / Ketone: x  / Bili: x / Urobili: x   Blood: x / Protein: x / Nitrite: x   Leuk Esterase: x / RBC: x / WBC x   Sq Epi: x / Non Sq Epi: x / Bacteria: x      Arterial Blood Gas:  07-17 @ 08:13  7.48/38/153/28/99.9/4.6  ABG lactate: --  Arterial Blood Gas:  07-16 @ 23:50  7.46/37/176/26/99.8/2.5  ABG lactate: --  Arterial Blood Gas:  07-16 @ 12:45  7.38/36/161/21/99.6/-3.2  ABG lactate: --  Arterial Blood Gas:  07-16 @ 08:08  7.46/28/170/20/99.3/-2.6  ABG lactate: --    Venous Blood Gas:  07-17 @ 05:34  7.46/37/168/26/99.2  VBG Lactate: 2.0  Venous Blood Gas:  07-16 @ 16:53  7.33/50/28/26/46.7  VBG Lactate: 3.2  Venous Blood Gas:  07-16 @ 02:30  7.18/61/65/23/88.7  VBG Lactate: --      EKG:    MICROBIOLOGY:     RADIOLOGY:  [ ] Reviewed and interpreted by me    EKG:  MICROBIOLOGY:     Radiology: ***    Bedside lung ultrasound: ***    Bedside ECHO: ***    EKG:    CENTRAL LINE: Y/N          DATE INSERTED:              REMOVE: Y/N    LORA: Y/N                        DATE INSERTED:              REMOVE: Y/N    A-LINE: Y/N                       DATE INSERTED:              REMOVE: Y/N    GLOBAL ISSUE/BEST PRACTICE:  Analgesia:  Sedation:  HOB elevation: yes  Stress ulcer prophylaxis:  VTE prophylaxis:  Glycemic control:  Nutrition:    CODE STATUS: ***  [facility] discussion: Y

## 2023-07-17 NOTE — PROGRESS NOTE ADULT - SUBJECTIVE AND OBJECTIVE BOX
NEUROLOGY FOLLOW-UP CONSULT NOTE    RFC: Seizures    Interval history: No acute neurologic events overnight. Patient without any further clinical or electrographic seizures. No new focal neurologic deficits.    Meds:  MEDICATIONS  (STANDING):  buMETAnide Infusion 2 mG/Hr (10 mL/Hr) IV Continuous <Continuous>  chlorhexidine 4% Liquid 1 Application(s) Topical <User Schedule>  clonazePAM  Tablet 0.5 milliGRAM(s) Oral two times a day  heparin  Infusion.  Unit(s)/Hr (17 mL/Hr) IV Continuous <Continuous>  hydrocortisone sodium succinate Injectable 50 milliGRAM(s) IV Push every 6 hours  insulin lispro (ADMELOG) corrective regimen sliding scale   SubCutaneous every 6 hours  lacosamide IVPB 200 milliGRAM(s) IV Intermittent every 12 hours  levETIRAcetam  IVPB 1000 milliGRAM(s) IV Intermittent every 12 hours  pantoprazole  Injectable 40 milliGRAM(s) IV Push every 24 hours  phenylephrine    Infusion 1 MICROgram(s)/kG/Min (37.5 mL/Hr) IV Continuous <Continuous>  polyethylene glycol 3350 17 Gram(s) Oral daily  senna 2 Tablet(s) Oral at bedtime  valproate sodium  IVPB 250 milliGRAM(s) IV Intermittent every 8 hours    MEDICATIONS  (PRN):  acetaminophen   Oral Liquid .. 650 milliGRAM(s) Oral every 6 hours PRN Temp greater or equal to 38C (100.4F), Mild Pain (1 - 3)      PMHx/PSHx/FHx/SHx:  Convulsions    No pertinent family history in first degree relatives    Family history of diabetes mellitus (DM) (Mother)    Family history of TIAs (Mother)    Family hx of prostate cancer (Father)    Family history of bone cancer (Father)    Family history of appendicitis    Handoff    GERD (gastroesophageal reflux disease)    Prostate ca    Hypertension    Gout    Atrial fibrillation    Splenic infarction    2019 novel coronavirus disease (COVID-19)    GERD (gastroesophageal reflux disease)    Seizures    Suspected deep vein thrombosis (DVT)    No significant past surgical history    H/O prostatectomy    H/O arthroscopy of right knee    H/O colonoscopy    H/O endoscopy    Elective surgery    UNRESPONSIVE    1    [email or web address]        Allergies:  No Known Allergies      ROS: All systems negative except as documented in Interval history    O:  T(C): 37.3 (07-17-23 @ 12:00), Max: 38.1 (07-16-23 @ 13:00)  HR: 92 (07-17-23 @ 12:00) (76 - 155)  BP: 89/56 (07-17-23 @ 12:00) (71/50 - 159/101)  RR: 20 (07-17-23 @ 12:00) (10 - 28)  SpO2: 96% (07-17-23 @ 12:00) (91% - 100%)    Focused neurologic exam:  MS - AAO x2.5 (knew it was 7/2023 but not rest of date), speech hypophonic and dysarthric but otherwise fluent, rep/naming intact, follows simple commands  CN - PERRLA, EOMI, VFF, face sens/str/hearing WNL b/l except for L facial weakness, tongue/palate midline, trap 5/5 b/l  Motor - Normal bulk/tone on R side and mildly dec bulk and dec tone on L side. RUE 5/5, LUE 1/5, RLE 4/5, LLE 1-2/5  Sens - LT/temp intact all  DTR's - Downgoing R and neutral L plantar response  Coord - FtN on R side  Gait and station - Due to fall risk/safety concerns did not assess    Pertinent labs/studies:  CBC with low Hct 38 and MCV WNL, otherwise essentially WNL  PT/INR inc 21.8/1.88, PTT inc > 200  BMP essentially WNL  LFT's with dec albumin 2.6, LFT's with inc ALT 49  Mg WNL, Phos WNL  NH3 WNL, CPK WNL    VPA level (7/16) - 20, corrected 32    vEEG 7/17 -  EEG SUMMARY/CLASSIFICATION    Abnormal EEG in the awake, drowsy and asleep states.  -Occasional to frequent sharp-wave discharges/LPDs independently over the right fronto-temporal and right fronto-central regions.  -Right anterior slowing  -Right anterior breach artifact  _____________________________________________________________  EEG IMPRESSION/CLINICAL CORRELATE  Epilepsy Fellow, [facility]    Findings indicate increased risk for seizures from the right fronto-temporal and fronto-central regions with correlating regional non-specific cerebral dysfunction and overlying skull defect.    No seizures recorded.    < from: CT Head No Cont (07.16.23 @ 04:14) >  Mass in the right frontoparietal convexity, compatible the patient's   known glioblastoma, has progressed from CT head of 06/07/2023 and is   grossly stable from MRI of 07/08/2023.  No new intracranial findings.    < end of copied text >    < from: TTE Limited W or WO Ultrasound Enhancing Agent (07.16.23 @ 15:16) >   1. Technically difficult image quality.   2. Normal left ventricular cavity size. The left ventricular systolic function is mildly decreased with an ejection fraction visually estimated at 45 to 50 %.   3. Systolic function is veryvariable due to irregular RR-intervals.   4. Based on visual assessment, the right ventricle appears moderately enlarged. reduced systolic right ventricular function.    < end of copied text >

## 2023-07-17 NOTE — PROGRESS NOTE ADULT - ATTENDING COMMENTS
Agree with above  H/O GBM with AMS ?seizure, requiring intubation  Now improved mental status, extubated yesterday  On-and-off pressors (on [place])  Remains on AEDs, EEG negative for seizures  On steroids  Monitoring UO/P via condom cath  Monitoring off antibiotics  Speech and swallow eval  PT evaluation    I have personally provided 45 minutes of critical care time.

## 2023-07-18 NOTE — DIETITIAN INITIAL EVALUATION ADULT - PERTINENT MEDS FT
MEDICATIONS  (STANDING):  atorvastatin 20 milliGRAM(s) Oral at bedtime  brimonidine 0.2% Ophthalmic Solution 1 Drop(s) Both EYES two times a day  chlorhexidine 4% Liquid 1 Application(s) Topical <User Schedule>  dexAMETHasone  Injectable 4 milliGRAM(s) IV Push daily  heparin  Infusion.  Unit(s)/Hr (17 mL/Hr) IV Continuous <Continuous>  insulin lispro (ADMELOG) corrective regimen sliding scale   SubCutaneous every 6 hours  lacosamide IVPB 200 milliGRAM(s) IV Intermittent every 12 hours  levETIRAcetam  IVPB 1000 milliGRAM(s) IV Intermittent every 12 hours  pantoprazole  Injectable 40 milliGRAM(s) IV Push every 24 hours  polyethylene glycol 3350 17 Gram(s) Oral daily  senna 2 Tablet(s) Oral at bedtime  timolol 0.25% Solution 1 Drop(s) Both EYES two times a day  valproate sodium  IVPB 250 milliGRAM(s) IV Intermittent every 8 hours    MEDICATIONS  (PRN):  acetaminophen   Oral Liquid .. 650 milliGRAM(s) Oral every 6 hours PRN Temp greater or equal to 38C (100.4F), Mild Pain (1 - 3)  clonazePAM  Tablet 0.5 milliGRAM(s) Oral two times a day PRN For anxiety

## 2023-07-18 NOTE — SWALLOW BEDSIDE ASSESSMENT ADULT - ASR SWALLOW RECOMMEND DIAG
Defer instrumental exam as pt with no change in status from recent MBS completed on 7/10; would pursue repeat exam only if MD/team concerned for silent aspiration

## 2023-07-18 NOTE — PHYSICAL THERAPY INITIAL EVALUATION ADULT - ADDITIONAL COMMENTS
Per patient he was admitted from a [facility], he was transferring out of bed to chair via lift device. Required assistance with all ADLs.

## 2023-07-18 NOTE — PROGRESS NOTE ADULT - SUBJECTIVE AND OBJECTIVE BOX
Patient is a 67y old  Male who presents with a chief complaint of seizure (18 Jul 2023 08:26)      24 hour events: ***    REVIEW OF SYSTEMS:  Constitutional: [ ] fevers [ ] chills [ ] weight loss [ ] weight gain  HEENT: [ ] dry eyes [ ] eye irritation [ ] postnasal drip [ ] nasal congestion  CV: [ ] chest pain [ ] orthopnea [ ] palpitations [ ] murmur  Resp: [ ] cough [ ] shortness of breath [ ] dyspnea [ ] wheezing [ ] sputum [ ] hemoptysis  GI: [ ] nausea [ ] vomiting [ ] diarrhea [ ] constipation [ ] abd pain [ ] dysphagia   : [ ] dysuria [ ] nocturia [ ] hematuria [ ] increased urinary frequency  Musculoskeletal: [ ] back pain [ ] myalgias [ ] arthralgias [ ] fracture  Skin: [ ] rash [ ] itch  Neurological: [ ] headache [ ] dizziness [ ] syncope [ ] weakness [ ] numbness  Psychiatric: [ ] anxiety [ ] depression  Endocrine: [ ] diabetes [ ] thyroid problem  Hematologic/Lymphatic: [ ] anemia [ ] bleeding problem  Allergic/Immunologic: [ ] itchy eyes [ ] nasal discharge [ ] hives [ ] angioedema  [ ] All other systems negative  [ ] Unable to assess ROS because ________    OBJECTIVE:  ICU Vital Signs Last 24 Hrs  T(C): 37 (18 Jul 2023 08:00), Max: 37.3 (17 Jul 2023 12:00)  T(F): 98.6 (18 Jul 2023 08:00), Max: 99.1 (17 Jul 2023 12:00)  HR: 97 (18 Jul 2023 09:12) (76 - 127)  BP: 108/87 (18 Jul 2023 09:12) (72/56 - 129/77)  BP(mean): 89 (18 Jul 2023 09:00) (61 - 104)  ABP: --  ABP(mean): --  RR: 15 (18 Jul 2023 09:00) (15 - 25)  SpO2: 98% (18 Jul 2023 09:12) (94% - 100%)    O2 Parameters below as of 18 Jul 2023 09:12  Patient On (Oxygen Delivery Method): room air              07-17 @ 07:01  -  07-18 @ 07:00  --------------------------------------------------------  IN: 1934.6 mL / OUT: 1850 mL / NET: 84.6 mL    07-18 @ 07:01  - 07-18 @ 09:43  --------------------------------------------------------  IN: 18 mL / OUT: 0 mL / NET: 18 mL      CAPILLARY BLOOD GLUCOSE      POCT Blood Glucose.: 114 mg/dL (18 Jul 2023 06:13)      PHYSICAL EXAM:  GENERAL: NAD, well-developed  HEAD:  Atraumatic, Normocephalic  EYES: EOMI, PERRLA, conjunctiva and sclera clear  NECK: Supple, No JVD, Thyroid not palpable, non tender, Trachea midline  CHEST/LUNG: ( )ETT in place, ( )Tracheostomy in place, ( )no chest deformity,  ( )  Normal expansion/effort/palpation,  ( )Normal percussion/auscultation,  Clear to auscultation bilaterally; No wheeze  HEART: Regular rate and rhythm; No murmurs, rubs, or gallops, ( ) No JVD, ( )Normal Pulses, ( )Edema   ABDOMEN: Soft, Nontender, Nondistended; Bowel sounds presen, ( ) No Masses, (  ) No organomegaly,  (  ) Non-tender normal BS   : Lora in Place, Voiding freely  Musculoskeletal/EXTREMITIES:(  ) Normal strength, movement, and tone, (  ) No focal atropy, (  ) Normal ROM, (  ) Normal digits and nails,  2+ Peripheral Pulses, No clubbing, cyanosis, or edema  PSYCH: AAOx3, (  ) Normal mood/affect/judgment/insight, (  ) Intact memory,   NEUROLOGY: non-focal, exam  SKIN: No rashes or lesions      LINES:    HOSPITAL MEDICATIONS:  MEDICATIONS  (STANDING):  atorvastatin 20 milliGRAM(s) Oral at bedtime  brimonidine 0.2% Ophthalmic Solution 1 Drop(s) Both EYES two times a day  chlorhexidine 4% Liquid 1 Application(s) Topical <User Schedule>  dexAMETHasone  Injectable 4 milliGRAM(s) IV Push daily  heparin  Infusion.  Unit(s)/Hr (17 mL/Hr) IV Continuous <Continuous>  insulin lispro (ADMELOG) corrective regimen sliding scale   SubCutaneous every 6 hours  lacosamide IVPB 200 milliGRAM(s) IV Intermittent every 12 hours  levETIRAcetam  IVPB 1000 milliGRAM(s) IV Intermittent every 12 hours  pantoprazole  Injectable 40 milliGRAM(s) IV Push every 24 hours  polyethylene glycol 3350 17 Gram(s) Oral daily  senna 2 Tablet(s) Oral at bedtime  timolol 0.25% Solution 1 Drop(s) Both EYES two times a day  valproate sodium  IVPB 250 milliGRAM(s) IV Intermittent every 8 hours    MEDICATIONS  (PRN):  acetaminophen   Oral Liquid .. 650 milliGRAM(s) Oral every 6 hours PRN Temp greater or equal to 38C (100.4F), Mild Pain (1 - 3)  clonazePAM  Tablet 0.5 milliGRAM(s) Oral two times a day PRN For anxiety      LABS:                        12.1   7.40  )-----------( 153      ( 18 Jul 2023 00:31 )             35.6     Hgb Trend: 12.1<--, 13.0<--, 15.3<--, 13.7<--, 15.5<--  07-18    140  |  101  |  14  ----------------------------<  112<H>  3.5   |  25  |  0.60    Ca    8.3<L>      18 Jul 2023 00:31  Phos  3.4     07-18  Mg     2.4     07-18    TPro  5.1<L>  /  Alb  2.6<L>  /  TBili  0.5  /  DBili  x   /  AST  32  /  ALT  54<H>  /  AlkPhos  54  07-18    Creatinine Trend: 0.60<--, 0.66<--, 0.57<--, 0.64<--, 0.53<--, 0.70<--  PT/INR - ( 16 Jul 2023 23:54 )   PT: 21.8 sec;   INR: 1.88 ratio         PTT - ( 18 Jul 2023 03:06 )  PTT:182.0 sec  Urinalysis Basic - ( 18 Jul 2023 00:31 )    Color: x / Appearance: x / SG: x / pH: x  Gluc: 112 mg/dL / Ketone: x  / Bili: x / Urobili: x   Blood: x / Protein: x / Nitrite: x   Leuk Esterase: x / RBC: x / WBC x   Sq Epi: x / Non Sq Epi: x / Bacteria: x      Arterial Blood Gas:  07-17 @ 08:13  7.48/38/153/28/99.9/4.6  ABG lactate: --  Arterial Blood Gas:  07-16 @ 23:50  7.46/37/176/26/99.8/2.5  ABG lactate: --  Arterial Blood Gas:  07-16 @ 12:45  7.38/36/161/21/99.6/-3.2  ABG lactate: --    Venous Blood Gas:  07-18 @ 00:15  7.43/45/40/30/69.7  VBG Lactate: 2.1  Venous Blood Gas:  07-17 @ 05:34  7.46/37/168/26/99.2  VBG Lactate: 2.0  Venous Blood Gas:  07-16 @ 16:53  7.33/50/28/26/46.7  VBG Lactate: 3.2      EKG:    MICROBIOLOGY:     RADIOLOGY:  [ ] Reviewed and interpreted by me    EKG:  MICROBIOLOGY:     Radiology: ***    Bedside lung ultrasound: ***    Bedside ECHO: ***    EKG:    CENTRAL LINE: Y/N          DATE INSERTED:              REMOVE: Y/N    LORA: Y/N                        DATE INSERTED:              REMOVE: Y/N    A-LINE: Y/N                       DATE INSERTED:              REMOVE: Y/N    GLOBAL ISSUE/BEST PRACTICE:  Analgesia:  Sedation:  HOB elevation: yes  Stress ulcer prophylaxis:  VTE prophylaxis:  Glycemic control:  Nutrition:    CODE STATUS: ***  [facility] discussion: Y     Patient is a 67y old  Male who presents with a chief complaint of seizure (18 Jul 2023 08:26)      24 hour events: NAEO. EEG shows no seizure activity overnight. Patient more alert today, no acute complaints. Patient has good UOP overnight but dark red urine was noted when examined by bedside.    OBJECTIVE:  ICU Vital Signs Last 24 Hrs  T(C): 37 (18 Jul 2023 08:00), Max: 37.3 (17 Jul 2023 12:00)  T(F): 98.6 (18 Jul 2023 08:00), Max: 99.1 (17 Jul 2023 12:00)  HR: 97 (18 Jul 2023 09:12) (76 - 127)  BP: 108/87 (18 Jul 2023 09:12) (72/56 - 129/77)  BP(mean): 89 (18 Jul 2023 09:00) (61 - 104)  ABP: --  ABP(mean): --  RR: 15 (18 Jul 2023 09:00) (15 - 25)  SpO2: 98% (18 Jul 2023 09:12) (94% - 100%)    O2 Parameters below as of 18 Jul 2023 09:12  Patient On (Oxygen Delivery Method): room air              07-17 @ 07:01  -  07-18 @ 07:00  --------------------------------------------------------  IN: 1934.6 mL / OUT: 1850 mL / NET: 84.6 mL    07-18 @ 07:01  -  07-18 @ 09:43  --------------------------------------------------------  IN: 18 mL / OUT: 0 mL / NET: 18 mL      CAPILLARY BLOOD GLUCOSE      POCT Blood Glucose.: 114 mg/dL (18 Jul 2023 06:13)      PHYSICAL EXAM:  GENERAL: NAD, well-developed  HEAD:  Atraumatic, Normocephalic  EYES: EOMI, PERRLA, conjunctiva and sclera clear  NECK: Supple, No JVD, Thyroid not palpable, non tender, Trachea midline  CHEST/LUNG: CTABL  HEART: Regular rate and rhythm; No murmurs, rubs, or gallops, no JVD, trace b/l lower extremities edema  ABDOMEN: Soft, Nontender, Nondistended; Bowel sounds present  Musculoskeletal/EXTREMITIES: 2+ peripheral pulses, trace b/l lower extremities edema  PSYCH: AAOx3  SKIN: No rashes or lesions      LINES:    HOSPITAL MEDICATIONS:  MEDICATIONS  (STANDING):  atorvastatin 20 milliGRAM(s) Oral at bedtime  brimonidine 0.2% Ophthalmic Solution 1 Drop(s) Both EYES two times a day  chlorhexidine 4% Liquid 1 Application(s) Topical <User Schedule>  dexAMETHasone  Injectable 4 milliGRAM(s) IV Push daily  heparin  Infusion.  Unit(s)/Hr (17 mL/Hr) IV Continuous <Continuous>  insulin lispro (ADMELOG) corrective regimen sliding scale   SubCutaneous every 6 hours  lacosamide IVPB 200 milliGRAM(s) IV Intermittent every 12 hours  levETIRAcetam  IVPB 1000 milliGRAM(s) IV Intermittent every 12 hours  pantoprazole  Injectable 40 milliGRAM(s) IV Push every 24 hours  polyethylene glycol 3350 17 Gram(s) Oral daily  senna 2 Tablet(s) Oral at bedtime  timolol 0.25% Solution 1 Drop(s) Both EYES two times a day  valproate sodium  IVPB 250 milliGRAM(s) IV Intermittent every 8 hours    MEDICATIONS  (PRN):  acetaminophen   Oral Liquid .. 650 milliGRAM(s) Oral every 6 hours PRN Temp greater or equal to 38C (100.4F), Mild Pain (1 - 3)  clonazePAM  Tablet 0.5 milliGRAM(s) Oral two times a day PRN For anxiety      LABS:                        12.1   7.40  )-----------( 153      ( 18 Jul 2023 00:31 )             35.6     Hgb Trend: 12.1<--, 13.0<--, 15.3<--, 13.7<--, 15.5<--  07-18    140  |  101  |  14  ----------------------------<  112<H>  3.5   |  25  |  0.60    Ca    8.3<L>      18 Jul 2023 00:31  Phos  3.4     07-18  Mg     2.4     07-18    TPro  5.1<L>  /  Alb  2.6<L>  /  TBili  0.5  /  DBili  x   /  AST  32  /  ALT  54<H>  /  AlkPhos  54  07-18    Creatinine Trend: 0.60<--, 0.66<--, 0.57<--, 0.64<--, 0.53<--, 0.70<--  PT/INR - ( 16 Jul 2023 23:54 )   PT: 21.8 sec;   INR: 1.88 ratio         PTT - ( 18 Jul 2023 03:06 )  PTT:182.0 sec  Urinalysis Basic - ( 18 Jul 2023 00:31 )    Color: x / Appearance: x / SG: x / pH: x  Gluc: 112 mg/dL / Ketone: x  / Bili: x / Urobili: x   Blood: x / Protein: x / Nitrite: x   Leuk Esterase: x / RBC: x / WBC x   Sq Epi: x / Non Sq Epi: x / Bacteria: x      Arterial Blood Gas:  07-17 @ 08:13  7.48/38/153/28/99.9/4.6  ABG lactate: --  Arterial Blood Gas:  07-16 @ 23:50  7.46/37/176/26/99.8/2.5  ABG lactate: --  Arterial Blood Gas:  07-16 @ 12:45  7.38/36/161/21/99.6/-3.2  ABG lactate: --    Venous Blood Gas:  07-18 @ 00:15  7.43/45/40/30/69.7  VBG Lactate: 2.1  Venous Blood Gas:  07-17 @ 05:34  7.46/37/168/26/99.2  VBG Lactate: 2.0  Venous Blood Gas:  07-16 @ 16:53  7.33/50/28/26/46.7  VBG Lactate: 3.2      EKG:    MICROBIOLOGY:     RADIOLOGY:  [ ] Reviewed and interpreted by me    EKG:  MICROBIOLOGY:     Radiology: ***    Bedside lung ultrasound: ***    Bedside ECHO: ***    EKG:    CENTRAL LINE: Y/N          DATE INSERTED:              REMOVE: Y/N    LORA: Y/N                        DATE INSERTED:              REMOVE: Y/N    A-LINE: Y/N                       DATE INSERTED:              REMOVE: Y/N    GLOBAL ISSUE/BEST PRACTICE:  Analgesia:  Sedation:  HOB elevation: yes  Stress ulcer prophylaxis:  VTE prophylaxis:  Glycemic control:  Nutrition:    CODE STATUS: ***  [facility] discussion: Y

## 2023-07-18 NOTE — SWALLOW BEDSIDE ASSESSMENT ADULT - COMMENTS
ED course: Patient brought in by EMS after receiving versed en route to hospital. Upon presentation to ED, he had a GCS of 6-7 and was unresponsive, leading to intubation. Patient also noted to be hypotensive and febrile in ED, requiring pressors. Labs upon arrival were significant for WBC of 8.21, glucose of 144, calcium of 8.3, phosphorus of 5, AST of 50, ALT of 80, troponin of 75, BNP of 2832, procal of 0.14, VBG with pH of 7.18, pCO2 of 61, and HCO3 of 23. POCUS in ED with evidence of right heart dilation.    Neuro consulted -> Seizure in the setting of glioblastoma, exacerbating factors including but not limited to infection vs toxic vs metabolic encephalopathy    MICU course: Patient admitted to MICU for management of acute hypoxic and hypercarbic respiratory failure in setting of seizure as well as pressor requirement in setting of shock. He was extubated the same day (7/16) he was admitted to MICU and is now satting well on RA. He was on phenylephrine at a rate of 2 initially and was weaned to 0 by 7/17 at 15:00. Patient was also diuresed with bumex drip in setting of heart failure. Infectious work-up pending and should be followed on floor. Neurology recommended video EEG until 7/18 and continuing with home seizure medications. CT head negative for acute intracranial findings.     Swallow hx: Pt seen for MBS at [facility] on 7/10/23 with recommendations for an easy to chew diet with thin liquids, in addition to PEG feeds to ensure adequate caloric needs.

## 2023-07-18 NOTE — PROGRESS NOTE ADULT - SUBJECTIVE AND OBJECTIVE BOX
Cardiology Progress Note  ------------------------------------------------------------------------------------------  SUBJECTIVE:   - Tele:  - No events overnight. Denies CP, SOB or Palpitations.   -------------------------------------------------------------------------------------------  ROS:  CV: chest pain (-), palpitation (-), orthopnea (-), PND (-), edema (-)  PULM: SOB (-), cough (-), wheezing (-), hemoptysis (-).   CONST: fever (-), chills (-) or fatigue (-)  GI: abdominal distension (-), abdominal pain (-) , nausea/vomiting (-), hematemesis, (-), melena (-), hematochezia (-)  : dysuria (-), frequency (-), hematuria (-).   NEURO: numbness (-), weakness (-), dizziness (-)  MSK: myalgia (-), joint pain (-)   SKIN: itching (-), rash (-)  HEENT:  visual changes (-); vertigo or throat pain (-);  neck stiffness (-)   Psych: change in mood (-), anxiety (-), depression (-)     All other review of systems is negative unless indicated above.   -------------------------------------------------------------------------------------------  VS:  T(F): 98.6 (07-18), Max: 99.1 (07-17)  HR: 102 (07-18) (76 - 127)  BP: 122/75 (07-18) (72/56 - 134/75)  RR: 16 (07-18)  SpO2: 97% (07-18)  I&O's Summary    17 Jul 2023 07:01  -  18 Jul 2023 07:00  --------------------------------------------------------  IN: 1934.6 mL / OUT: 1850 mL / NET: 84.6 mL    18 Jul 2023 07:01  -  18 Jul 2023 08:26  --------------------------------------------------------  IN: 9 mL / OUT: 0 mL / NET: 9 mL      PHYSICAL EXAM:  GENERAL: NAD  HEAD:  Atraumatic, Normocephalic.  EYES: conjunctiva and sclera clear.  ENT: Moist mucous membranes.  NECK: Supple, No elevated JVP.   CHEST/LUNG: Clear to auscultation bilaterally  HEART: Regular rate and rhythm; No murmurs, rubs, or gallops.  ABDOMEN: Soft, Nontender, Nondistended.   EXTREMITIES: 1+ edema b/l.   PSYCH: Normal affect.  SKIN: No rashes or lesions.  -------------------------------------------------------------------------------------------  LABS:                          12.1   7.40  )-----------( 153      ( 18 Jul 2023 00:31 )             35.6     07-18    140  |  101  |  14  ----------------------------<  112<H>  3.5   |  25  |  0.60    Ca    8.3<L>      18 Jul 2023 00:31  Phos  3.4     07-18  Mg     2.4     07-18    TPro  5.1<L>  /  Alb  2.6<L>  /  TBili  0.5  /  DBili  x   /  AST  32  /  ALT  54<H>  /  AlkPhos  54  07-18    PT/INR - ( 16 Jul 2023 23:54 )   PT: 21.8 sec;   INR: 1.88 ratio         PTT - ( 18 Jul 2023 03:06 )  PTT:182.0 sec  CARDIAC MARKERS ( 16 Jul 2023 08:25 )  79 ng/L / x     / x     / 51 U/L / x     / 4.5 ng/mL  CARDIAC MARKERS ( 16 Jul 2023 02:53 )  75 ng/L / x     / x     / x     / x     / x                -------------------------------------------------------------------------------------------  Meds:  acetaminophen   Oral Liquid .. 650 milliGRAM(s) Oral every 6 hours PRN  atorvastatin 20 milliGRAM(s) Oral at bedtime  brimonidine 0.2% Ophthalmic Solution 1 Drop(s) Both EYES two times a day  chlorhexidine 4% Liquid 1 Application(s) Topical <User Schedule>  clonazePAM  Tablet 0.5 milliGRAM(s) Oral two times a day PRN  heparin  Infusion.  Unit(s)/Hr IV Continuous <Continuous>  hydrocortisone sodium succinate Injectable 50 milliGRAM(s) IV Push every 6 hours  insulin lispro (ADMELOG) corrective regimen sliding scale   SubCutaneous every 6 hours  lacosamide IVPB 200 milliGRAM(s) IV Intermittent every 12 hours  levETIRAcetam  IVPB 1000 milliGRAM(s) IV Intermittent every 12 hours  pantoprazole  Injectable 40 milliGRAM(s) IV Push every 24 hours  polyethylene glycol 3350 17 Gram(s) Oral daily  senna 2 Tablet(s) Oral at bedtime  timolol 0.25% Solution 1 Drop(s) Both EYES two times a day  valproate sodium  IVPB 250 milliGRAM(s) IV Intermittent every 8 hours    -------------------------------------------------------------------------------------------  Cardiovascular Diagnostic Testing:    ECG:     Echo:     Stress Testing:    Cath:    -------------------------------------------------------------------------------------------   Cardiology Progress Note  ------------------------------------------------------------------------------------------  SUBJECTIVE:   - Tele: afib 100-110s.   - No events overnight. Denies CP, SOB or Palpitations.   -------------------------------------------------------------------------------------------  ROS:  CV: chest pain (-), palpitation (-), orthopnea (-), PND (-), edema (-)  PULM: SOB (-), cough (-), wheezing (-), hemoptysis (-).   CONST: fever (-), chills (-) or fatigue (-)  GI: abdominal distension (-), abdominal pain (-) , nausea/vomiting (-), hematemesis, (-), melena (-), hematochezia (-)  : dysuria (-), frequency (-), hematuria (-).   NEURO: numbness (-), weakness (-), dizziness (-)  MSK: myalgia (-), joint pain (-)   SKIN: itching (-), rash (-)  HEENT:  visual changes (-); vertigo or throat pain (-);  neck stiffness (-)   Psych: change in mood (-), anxiety (-), depression (-)     All other review of systems is negative unless indicated above.   -------------------------------------------------------------------------------------------  VS:  T(F): 98.6 (07-18), Max: 99.1 (07-17)  HR: 102 (07-18) (76 - 127)  BP: 122/75 (07-18) (72/56 - 134/75)  RR: 16 (07-18)  SpO2: 97% (07-18)  I&O's Summary    17 Jul 2023 07:01  -  18 Jul 2023 07:00  --------------------------------------------------------  IN: 1934.6 mL / OUT: 1850 mL / NET: 84.6 mL    18 Jul 2023 07:01  -  18 Jul 2023 08:26  --------------------------------------------------------  IN: 9 mL / OUT: 0 mL / NET: 9 mL      PHYSICAL EXAM:  GENERAL: NAD  HEAD:  Atraumatic, Normocephalic.  EYES: conjunctiva and sclera clear.  ENT: Moist mucous membranes.  NECK: Supple, No elevated JVP.   CHEST/LUNG: Clear to auscultation bilaterally  HEART: Regular rate and rhythm; No murmurs, rubs, or gallops.  ABDOMEN: Soft, Nontender, Nondistended.   EXTREMITIES: 1+ edema b/l.   PSYCH: Normal affect.  SKIN: No rashes or lesions.  -------------------------------------------------------------------------------------------  LABS:                          12.1   7.40  )-----------( 153      ( 18 Jul 2023 00:31 )             35.6     07-18    140  |  101  |  14  ----------------------------<  112<H>  3.5   |  25  |  0.60    Ca    8.3<L>      18 Jul 2023 00:31  Phos  3.4     07-18  Mg     2.4     07-18    TPro  5.1<L>  /  Alb  2.6<L>  /  TBili  0.5  /  DBili  x   /  AST  32  /  ALT  54<H>  /  AlkPhos  54  07-18    PT/INR - ( 16 Jul 2023 23:54 )   PT: 21.8 sec;   INR: 1.88 ratio         PTT - ( 18 Jul 2023 03:06 )  PTT:182.0 sec  CARDIAC MARKERS ( 16 Jul 2023 08:25 )  79 ng/L / x     / x     / 51 U/L / x     / 4.5 ng/mL  CARDIAC MARKERS ( 16 Jul 2023 02:53 )  75 ng/L / x     / x     / x     / x     / x                -------------------------------------------------------------------------------------------  Meds:  acetaminophen   Oral Liquid .. 650 milliGRAM(s) Oral every 6 hours PRN  atorvastatin 20 milliGRAM(s) Oral at bedtime  brimonidine 0.2% Ophthalmic Solution 1 Drop(s) Both EYES two times a day  chlorhexidine 4% Liquid 1 Application(s) Topical <User Schedule>  clonazePAM  Tablet 0.5 milliGRAM(s) Oral two times a day PRN  heparin  Infusion.  Unit(s)/Hr IV Continuous <Continuous>  hydrocortisone sodium succinate Injectable 50 milliGRAM(s) IV Push every 6 hours  insulin lispro (ADMELOG) corrective regimen sliding scale   SubCutaneous every 6 hours  lacosamide IVPB 200 milliGRAM(s) IV Intermittent every 12 hours  levETIRAcetam  IVPB 1000 milliGRAM(s) IV Intermittent every 12 hours  pantoprazole  Injectable 40 milliGRAM(s) IV Push every 24 hours  polyethylene glycol 3350 17 Gram(s) Oral daily  senna 2 Tablet(s) Oral at bedtime  timolol 0.25% Solution 1 Drop(s) Both EYES two times a day  valproate sodium  IVPB 250 milliGRAM(s) IV Intermittent every 8 hours    -------------------------------------------------------------------------------------------

## 2023-07-18 NOTE — DIETITIAN INITIAL EVALUATION ADULT - REASON INDICATOR FOR ASSESSMENT
Pt seen for length of stay  Source: Medical record, pt, and RN Consult for tube feeding (no  longer applicable)  Source: Medical record, pt, and RN

## 2023-07-18 NOTE — PROGRESS NOTE ADULT - ASSESSMENT
******Note incomplete pending attending signature*******    68 y/o male with a PMHx of glioblastoma, seizure disorder, HTN, and atrial fibrillation on xarelto presenting after seizure at rehab facility and intubation in ED admitted for airway protection and possible septic shock. Cardiology consulted with c/f new CHF. On exam, pt appears mildly cool and with 1+ pitting edema bilaterally. Primary team diuresing with bumex gtt and overall pt remains febrile and pressor dependent, overall appears largely concern for septic shock in the setting of witnessed seizure activity. BNP elevated to 2800 compared to baseline, bedside echo shows reduced LVOT VTI per MICU team, visually with concern for RV enlargement, suggestive of cardiomyopathy possible stress-mediated. TTE showing EF 45-50%, moderate LVH, decreased RV systolic function.     #Afib RVR  #C/f new CHF, RV dysfunction    Recommendations:   - TTE showing EF 45-50%, moderate LVH, decreased RV systolic function.   - Now off pressors.   - Continue heparin for AC given Afib  - can consider digoxin if afib RVR uncontrolled  - ok to d/c bumex gtt, diurese to keep patient net even  - Will plan to start GDMT pending clinical course.   - Will need repeat limited TTE for LV function once clinically improved.   - Hold home BB for Afib, avoid aggressive rate control given septic shock and role of compensatory tachycardia to augment CO  - Hold home anti-HTN agents (Amlodipine 5 qd, Losartan 100 qd, Hydralazine 25 tid) in the setting of shock  - CTM on telemetry  - K> 4, Mg> 2 ******Note incomplete pending attending signature*******    68 y/o male with a PMHx of glioblastoma, seizure disorder, HTN, and atrial fibrillation on xarelto presenting after seizure at rehab facility and intubation in ED admitted for airway protection and possible septic shock. Cardiology consulted with c/f new CHF. On exam, pt appears mildly cool and with 1+ pitting edema bilaterally. Primary team diuresing with bumex gtt and overall pt remains febrile and pressor dependent, overall appears largely concern for septic shock in the setting of witnessed seizure activity. BNP elevated to 2800 compared to baseline, bedside echo shows reduced LVOT VTI per MICU team, visually with concern for RV enlargement, suggestive of cardiomyopathy possible stress-mediated. TTE showing EF 45-50%, moderate LVH, decreased RV systolic function.     #Afib RVR  #C/f new CHF, RV dysfunction    Recommendations:   - TTE showing EF 45-50%, moderate LVH, decreased RV systolic function.   - Continue heparin for AC given Afib  - can consider digoxin if afib RVR uncontrolled  - diurese to keep patient net even  - Pt BP 72/56 @ 12PM 7/17/23  - If BP stable can start metoprolol tartrate 12.5 mg PO BID.   - Will need repeat limited TTE for LV function once HR improved.   - Will plan to start GDMT pending clinical course.   - CTM on telemetry  - K> 4, Mg> 2

## 2023-07-18 NOTE — PHYSICAL THERAPY INITIAL EVALUATION ADULT - PERTINENT HX OF CURRENT PROBLEM, REHAB EVAL
66 y/o M w/ a PMHx of glioblastoma, seizure disorder, afib, and HTN who had a seizure at a rehab facility and was brought in by EMS. He was given versed by EMS and upon arrival to ED, patient had a GCS of 6-7 and was unresponsive. Intubated and MICU was consulted. Recently discharged from [facility] on 7/14 where he was seen for management of glioblastoma and seizures. He was discharged to rehab on keppra, vimpat, valproic acid, and dexamethasone and set to follow-up with outpatient neurologist before today's episode. ED course: Patient brought in by EMS after receiving versed en route to hospital. Upon presentation to ED, he had a GCS of 6-7 and was unresponsive, leading to intubation. Patient also noted to be hypotensive and febrile in ED, requiring pressors. VA duplex: negative

## 2023-07-18 NOTE — PROGRESS NOTE ADULT - SUBJECTIVE AND OBJECTIVE BOX
NEUROLOGY FOLLOW-UP CONSULT NOTE    RFC: Seizures    Interval history: No acute neurologic events overnight. Patient without any further clinical or electrographic seizures. EEG at bedside. Sitting in chair. No new focal neurologic deficits.    Meds:  MEDICATIONS  (STANDING):  atorvastatin 20 milliGRAM(s) Oral at bedtime  brimonidine 0.2% Ophthalmic Solution 1 Drop(s) Both EYES two times a day  chlorhexidine 4% Liquid 1 Application(s) Topical <User Schedule>  dexAMETHasone  Injectable 4 milliGRAM(s) IV Push daily  heparin  Infusion.  Unit(s)/Hr (17 mL/Hr) IV Continuous <Continuous>  insulin lispro (ADMELOG) corrective regimen sliding scale   SubCutaneous every 6 hours  lacosamide IVPB 200 milliGRAM(s) IV Intermittent every 12 hours  levETIRAcetam  IVPB 1000 milliGRAM(s) IV Intermittent every 12 hours  magnesium sulfate  IVPB 1 Gram(s) IV Intermittent once  metoprolol tartrate 12.5 milliGRAM(s) Oral two times a day  metoprolol tartrate Injectable 5 milliGRAM(s) IV Push once  pantoprazole  Injectable 40 milliGRAM(s) IV Push every 24 hours  polyethylene glycol 3350 17 Gram(s) Oral daily  senna 2 Tablet(s) Oral at bedtime  timolol 0.25% Solution 1 Drop(s) Both EYES two times a day  valproate sodium  IVPB 250 milliGRAM(s) IV Intermittent every 8 hours    MEDICATIONS  (PRN):  acetaminophen   Oral Liquid .. 650 milliGRAM(s) Oral every 6 hours PRN Temp greater or equal to 38C (100.4F), Mild Pain (1 - 3)  clonazePAM  Tablet 0.5 milliGRAM(s) Oral two times a day PRN For anxiety      PMHx/PSHx/FHx/SHx:  Convulsions    No pertinent family history in first degree relatives    Family history of diabetes mellitus (DM) (Mother)    Family history of TIAs (Mother)    Family hx of prostate cancer (Father)    Family history of bone cancer (Father)    Family history of appendicitis    Handoff    GERD (gastroesophageal reflux disease)    Prostate ca    Hypertension    Gout    Atrial fibrillation    Splenic infarction    2019 novel coronavirus disease (COVID-19)    GERD (gastroesophageal reflux disease)    Seizures    Suspected deep vein thrombosis (DVT)    No significant past surgical history    H/O prostatectomy    H/O arthroscopy of right knee    H/O colonoscopy    H/O endoscopy    Elective surgery    UNRESPONSIVE    1    [email or web address]        Allergies:  No Known Allergies      ROS: All systems negative except as documented in Interval history    O:  T(C): 37.3 (07-18-23 @ 12:00), Max: 37.3 (07-18-23 @ 12:00)  HR: 119 (07-18-23 @ 12:00) (88 - 122)  BP: 121/73 (07-18-23 @ 12:00) (90/69 - 129/77)  RR: 21 (07-18-23 @ 12:00) (15 - 24)  SpO2: 99% (07-18-23 @ 12:00) (96% - 100%)    Focused neurologic exam:  MS - AAO x2.75 (knew all of date except day of the month), speech hypophonic and dysarthric but otherwise fluent, rep/naming intact, follows simple commands  CN - PERRLA, EOMI, VFF, face sens/str/hearing WNL b/l except for L facial weakness, tongue/palate midline, trap 5/5 b/l  Motor - Normal bulk/tone on R side and mildly dec bulk and dec tone on L side. RUE 5/5, LUE 1/5, RLE 4/5, LLE 1-2/5  Sens - LT/temp intact all  DTR's - Downgoing R and neutral L plantar response  Coord - FtN on R side  Gait and station - Due to fall risk/safety concerns did not assess    Pertinent labs/studies:  CBC with low H/H 12/26 and MCV WNL, otherwise essentially WNL  PT/INR inc 21.8/1.88, PTT inc > 82.1  BMP essentially WNL  LFT's with dec albumin 2.6, LFT's with inc ALT 54  Mg WNL, Phos WNL  NH3 WNL, CPK WNL    VPA level (7/16) - 20, corrected 32    vEEG 7/18 -  EEG SUMMARY/CLASSIFICATION    Abnormal EEG in the awake, drowsy and asleep states.  -Occasional to frequent sharp-wave discharges/LPDs independently over the right fronto-temporal and right fronto-central regions.  -Right anterior slowing  -Right anterior breach artifact  _____________________________________________________________  EEG IMPRESSION/CLINICAL CORRELATE  Epilepsy Fellow, [facility]    Findings indicate increased risk for seizures from the right fronto-temporal and fronto-central regions with correlating regional non-specific cerebral dysfunction and overlying skull defect.    No seizures recorded. No change from previous day.    vEEG 7/17 -  EEG SUMMARY/CLASSIFICATION    Abnormal EEG in the awake, drowsy and asleep states.  -Occasional to frequent sharp-wave discharges/LPDs independently over the right fronto-temporal and right fronto-central regions.  -Right anterior slowing  -Right anterior breach artifact  _____________________________________________________________  EEG IMPRESSION/CLINICAL CORRELATE  Epilepsy Fellow, [facility]    Findings indicate increased risk for seizures from the right fronto-temporal and fronto-central regions with correlating regional non-specific cerebral dysfunction and overlying skull defect.    No seizures recorded.    < from: CT Head No Cont (07.16.23 @ 04:14) >  Mass in the right frontoparietal convexity, compatible the patient's   known glioblastoma, has progressed from CT head of 06/07/2023 and is   grossly stable from MRI of 07/08/2023.  No new intracranial findings.    < end of copied text >    < from: TTE Limited W or WO Ultrasound Enhancing Agent (07.16.23 @ 15:16) >   1. Technically difficult image quality.   2. Normal left ventricular cavity size. The left ventricular systolic function is mildly decreased with an ejection fraction visually estimated at 45 to 50 %.   3. Systolic function is veryvariable due to irregular RR-intervals.   4. Based on visual assessment, the right ventricle appears moderately enlarged. reduced systolic right ventricular function.    < end of copied text >

## 2023-07-18 NOTE — SWALLOW BEDSIDE ASSESSMENT ADULT - SWALLOW EVAL: DIAGNOSIS
68 y/o male with a PMHx of glioblastoma, seizure disorder, HTN, afib on xarelto, and hx of trach presenting after seizure at rehab facility and intubation in ED admitted for acute respiratory failure and possible septic shock. Pt presents with a functional oropharyngeal swallow sequence to initiate a PO diet in keeping with recent MBS recs from [facility] on 7/10. Swallow function is notable for slowed mastication of hard solid textures and mild oral residue which is independently cleared with a lingual sweep. No pharyngeal deficits identified at bedside; no signs of aspiration. No complaints of difficulty chewing and/or swallowing.

## 2023-07-18 NOTE — PROGRESS NOTE ADULT - ATTENDING COMMENTS
68 y/o male with a PMHx of glioblastoma, seizure disorder, HTN, and chronic atrial fibrillation on xarelto who presented after seizure at rehab, found to have new biventricular dysfunction (LVEF 45-50% in setting of rapid AF, and moderately enlarged RV with decreased RV function).     He denies any chest pain or SOB today.    He has minimal LE edema on exam. Lungs are clear anteriorly.   EKG shows Afib with RVR in 120s-130s  Trop 75 to 79  CTA negative for PE on 7/16/23    1) Afib with RVR  2) Acute systolic HF exacerbation with RV dysfunction  3) Elevated troponins  4) GBM, seizures  5) Possible sepsis  - Unclear etiology for newly reduced EF, ?stress-mediated in setting of seizures, possible sepsis  - Goal net even  - If HRs persistently above 120s, can consider digoxin. If pt remains HD stable, can trial low dose metoprolol tartrate  - on heparin gtt for Afib  - Once HRs better controlled and acute issues resolve, repeat limited TTE to reassess LV function  - Appreciate MICU and neurology care

## 2023-07-18 NOTE — PROGRESS NOTE ADULT - ATTENDING COMMENTS
Agree with above  Awake alert responsive, EEG no seizures  Off pressors since   c/w AEDs  Off all antibiotics; all cultures negative  PTT high, holding heparin gtt  Intermittent tachycardic to 130–140, will resume beta blockade given off pressors now, transfuse Mag to keep 2–3  Urine output dropped, now changed color from yellow to red  Can consider transfer to general medicine vs EMU

## 2023-07-18 NOTE — PROGRESS NOTE ADULT - ASSESSMENT
ASSESSMENT :   ==============  68 y/o male with a PMHx of glioblastoma, seizure disorder, HTN, and atrial fibrillation on xarelto presenting after seizure at rehab facility and intubation in ED admitted for acute respiratory failure and possible septic shock.      PLAN:  ========    NEURO:    #Seizure disorder with hx of glioblastoma  -CT head negative for acute intracranial findings  -Neurology following, EEG demonstrated normal activity, dc on 7/18  -Ammonia 38 wnl  -Neuro checks per routine   -Continue home antiepileptic regimen of keppra, vimpat, clonazepam, valproic acid and dexamethasone. F/u with neuro for further recs.      PULM:    #Acute hypoxic and hypercarbic respiratory failure in s/o seizure  -Patient extubated on 7/16 and now on RA  -Chest CT without evidence of PE but shows left lower lobe atelectasis    CV:    #Shock likely 2/2 sepsis vs sedation vs cardiogenic  -Patient received versed en route to ED  -Blood cultures and sputum cultures ordered. Continue to follow.  -Empiric abx coverage with cefepime and vancomycin  -TTE 7/16: LV: mildly reduced systolic function, estimate EF 45-50%. RV: moderately enlarged, mildly reduced systolic function.   - CT chest and POCUS with evidence of heart failure  -BNP elevated at 2832  -Lactate 2.0  -Off pressor  -Patient does not appear overtly fluid overloaded, holding diuresis    #Troponin elevation likely 2/2 hypotension  -Troponin of 75 on admission  -BNP elevated and POCUS with evidence of acute HF  -EKG ordered. F/u    #Chronic atrial fibrillation  -F/u EKG  -Start metoprolol for rate control  -Keep Mg > 3  -Heparin gtt    #Essential hypertension  -Withold HTN medications in setting of shock      GI:  NTD      RENAL:  -maintain urine output > 0.5cc/kg/hr   - Dark red urine noted  - Monitor UOP  - Renal/Bladder POCUS wnl    :  -Pimentel in place      ID:  #Potential septic shock  -See CV section  -Procal 2.0  -Infectious work-up pending. bcx and sputum culture drawn on 7/16, NGTD  -dc antibiotics    ENDO:  #Mild hyperglycemia  -Glucose of 144 on admission  -CTM       HEME:  #Bilateral LE edema L>R  -Doppler negative

## 2023-07-18 NOTE — DIETITIAN INITIAL EVALUATION ADULT - EDUCATION DIETARY MODIFICATIONS
RD provided verbal education on increased nutrient needs. Pt rather optimize PO intake with food; declined supplements. Pt made aware RD to remain available./(2) meets goals/outcomes/verbalization

## 2023-07-18 NOTE — DIETITIAN INITIAL EVALUATION ADULT - ENERGY INTAKE
Pt was on enteral feeds of Glucerna 1.2; reached 30 mL/hr prior to holding.  Pt was on enteral feeds of Glucerna 1.2; reached 30 mL/hr prior to holding. Reports when PO, will eat anything put in front of him.

## 2023-07-18 NOTE — PHYSICAL THERAPY INITIAL EVALUATION ADULT - BALANCE TRAINING, PT EVAL
Goal: Patient will be able to maintain his balance on the edge of bed for 30 seconds with modA in 2 weeks

## 2023-07-18 NOTE — SWALLOW BEDSIDE ASSESSMENT ADULT - SLP GENERAL OBSERVATIONS
Pt encountered OOB to chair, awake/alert, on room air. VSS. A&Ox3. +Left-sided weakness. Low vocal volume. Reduced head/ neck control. Able to follow directives for exam purposes.

## 2023-07-18 NOTE — EEG REPORT - NS EEG TEXT BOX
[facility]   COMPREHENSIVE EPILEPSY CENTER   REPORT OF CONTINUOUS VIDEO EEG     [facility]: [address], [place] [address], Ph#: [phone]  [facility]: [id number], [place] [address], Ph#: [phone]  [facility]: [address] [place] [address], Ph#: [phone]    Patient Name: KATIE VALLES  Age and : 67y (55)  MRN #: [id number]  Location: [address]  Referring Physician: Varsha Parikh    Start Time/Date: 0800 on 2023  End Time/Date: 08:00 on 23  Duration: 24H     _____________________________________________________________  STUDY INFORMATION    EEG Recording Technique:  The patient underwent continuous Video-EEG monitoring, using Telemetry System hardware on the XLTek Digital System. EEG and video data were stored on a computer hard drive with important events saved in digital archive files. The material was reviewed by a physician (electroencephalographer / epileptologist) on a daily basis. Issa and seizure detection algorithms were utilized and reviewed. An EEG Technician attended to the patient, and was available throughout daytime work hours.  The epilepsy center neurologist was available in person or on call 24-hours per day.    EEG Placement and Labeling of Electrodes:  The EEG was performed utilizing 20 channel referential EEG connections (coronal over temporal over parasagittal montage) using all standard 10-20 electrode placements with EKG, with additional electrodes placed in the inferior temporal region using the modified 10-10 montage electrode placements for elective admissions, or if deemed necessary. Recording was at a sampling rate of 256 samples per second per channel. Time synchronized digital video recording was done simultaneously with EEG recording. A low light infrared camera was used for low light recording.     _____________________________________________________________  HISTORY    Patient is a 67y old  Male who presents with a chief complaint of seizure (2023 07:49)      PERTINENT MEDICATION:  MEDICATIONS  (STANDING):  buMETAnide Infusion 2 mG/Hr (10 mL/Hr) IV Continuous <Continuous>  chlorhexidine 4% Liquid 1 Application(s) Topical <User Schedule>  clonazePAM  Tablet 0.5 milliGRAM(s) Oral two times a day  dexAMETHasone  Injectable 4 milliGRAM(s) IV Push daily  heparin  Infusion.  Unit(s)/Hr (17 mL/Hr) IV Continuous <Continuous>  insulin lispro (ADMELOG) corrective regimen sliding scale   SubCutaneous every 6 hours  lacosamide IVPB 200 milliGRAM(s) IV Intermittent every 12 hours  levETIRAcetam  IVPB 1000 milliGRAM(s) IV Intermittent every 12 hours  pantoprazole  Injectable 40 milliGRAM(s) IV Push every 24 hours  phenylephrine    Infusion 1 MICROgram(s)/kG/Min (37.5 mL/Hr) IV Continuous <Continuous>  piperacillin/tazobactam IVPB.. 3.375 Gram(s) IV Intermittent every 8 hours  polyethylene glycol 3350 17 Gram(s) Oral daily  senna 2 Tablet(s) Oral at bedtime  valproate sodium  IVPB 250 milliGRAM(s) IV Intermittent every 8 hours  vancomycin  IVPB 1500 milliGRAM(s) IV Intermittent every 12 hours    _____________________________________________________________  STUDY INTERPRETATION    Findings: The background was continuous, spontaneously variable and reactive. During wakefulness, the posterior dominant rhythm consisted of 7.5 Hz activity, with amplitude to 30 uV, that attenuated to eye opening.  Low amplitude frontal beta was noted in wakefulness.    Background Slowing:  Intermittent diffuse irregular theta and polymorphic delta slowing.    Focal Slowing:   Continuous theta/delta activity over the right anterior head region.    Sleep Background:  Drowsiness was characterized by fragmentation, attenuation, and slowing of the background activity.    Sleep was characterized by the presence of vertex waves sleep spindles and K-complexes.    Other Non-Epileptiform Findings:  Breach effect over the right anterior head region characterized by higher amplitude, sharply contoured waves and fast activities.    Interictal Epileptiform Activity:   Occasional to frequent sharp-wave discharges over the right fronto-temporal region (max F8)  Occasional to frequent sharp-wave discharges over the right fronto-central region (max C4)  Both of the above discharges occasionally seen with a periodic appearance of 0.5-1Hz without evolution (LPDs)    Events:  Clinical events: None recorded.  Seizures: None recorded.    Activation Procedures:   Hyperventilation was not performed.    Photic stimulation was not performed.     Artifacts:  Intermittent myogenic and movement artifacts were noted.    ECG:  ECG rhythm irregular.    _____________________________________________________________  EEG SUMMARY/CLASSIFICATION    Abnormal EEG in the awake, drowsy and asleep states.  -Occasional to frequent sharp-wave discharges/LPDs independently over the right fronto-temporal and right fronto-central regions.  -Right anterior slowing  -Right anterior breach artifact  _____________________________________________________________  EEG IMPRESSION/CLINICAL CORRELATE  Epilepsy Fellow, [facility]    Findings indicate increased risk for seizures from the right fronto-temporal and fronto-central regions with correlating regional non-specific cerebral dysfunction and overlying skull defect.    No seizures recorded. No change from previous day.    Reza Bardales MD  EEG/Epilepsy Attending      [facility]   COMPREHENSIVE EPILEPSY CENTER   REPORT OF CONTINUOUS VIDEO EEG     [facility]: [address], [place] [address], Ph#: [phone]  [facility]: [id number], [place] [address], Ph#: [phone]  [facility]: [address] [place] [address], Ph#: [phone]    Patient Name: KATIE VALLES  Age and : 67y (55)  MRN #: [id number]  Location: [address]  Referring Physician: Varsha Parikh    Start Time/Date: 0800 on 2023  End Time/Date: 13:00 on 23  Duration: 29H     _____________________________________________________________  STUDY INFORMATION    EEG Recording Technique:  The patient underwent continuous Video-EEG monitoring, using Telemetry System hardware on the XLTek Digital System. EEG and video data were stored on a computer hard drive with important events saved in digital archive files. The material was reviewed by a physician (electroencephalographer / epileptologist) on a daily basis. Issa and seizure detection algorithms were utilized and reviewed. An EEG Technician attended to the patient, and was available throughout daytime work hours.  The epilepsy center neurologist was available in person or on call 24-hours per day.    EEG Placement and Labeling of Electrodes:  The EEG was performed utilizing 20 channel referential EEG connections (coronal over temporal over parasagittal montage) using all standard 10-20 electrode placements with EKG, with additional electrodes placed in the inferior temporal region using the modified 10-10 montage electrode placements for elective admissions, or if deemed necessary. Recording was at a sampling rate of 256 samples per second per channel. Time synchronized digital video recording was done simultaneously with EEG recording. A low light infrared camera was used for low light recording.     _____________________________________________________________  HISTORY    Patient is a 67y old  Male who presents with a chief complaint of seizure (2023 07:49)      PERTINENT MEDICATION:  MEDICATIONS  (STANDING):  buMETAnide Infusion 2 mG/Hr (10 mL/Hr) IV Continuous <Continuous>  chlorhexidine 4% Liquid 1 Application(s) Topical <User Schedule>  clonazePAM  Tablet 0.5 milliGRAM(s) Oral two times a day  dexAMETHasone  Injectable 4 milliGRAM(s) IV Push daily  heparin  Infusion.  Unit(s)/Hr (17 mL/Hr) IV Continuous <Continuous>  insulin lispro (ADMELOG) corrective regimen sliding scale   SubCutaneous every 6 hours  lacosamide IVPB 200 milliGRAM(s) IV Intermittent every 12 hours  levETIRAcetam  IVPB 1000 milliGRAM(s) IV Intermittent every 12 hours  pantoprazole  Injectable 40 milliGRAM(s) IV Push every 24 hours  phenylephrine    Infusion 1 MICROgram(s)/kG/Min (37.5 mL/Hr) IV Continuous <Continuous>  piperacillin/tazobactam IVPB.. 3.375 Gram(s) IV Intermittent every 8 hours  polyethylene glycol 3350 17 Gram(s) Oral daily  senna 2 Tablet(s) Oral at bedtime  valproate sodium  IVPB 250 milliGRAM(s) IV Intermittent every 8 hours  vancomycin  IVPB 1500 milliGRAM(s) IV Intermittent every 12 hours    _____________________________________________________________  STUDY INTERPRETATION    Findings: The background was continuous, spontaneously variable and reactive. During wakefulness, the posterior dominant rhythm consisted of 7.5 Hz activity, with amplitude to 30 uV, that attenuated to eye opening.  Low amplitude frontal beta was noted in wakefulness.    Background Slowing:  Intermittent diffuse irregular theta and polymorphic delta slowing.    Focal Slowing:   Continuous theta/delta activity over the right anterior head region.    Sleep Background:  Drowsiness was characterized by fragmentation, attenuation, and slowing of the background activity.    Sleep was characterized by the presence of vertex waves sleep spindles and K-complexes.    Other Non-Epileptiform Findings:  Breach effect over the right anterior head region characterized by higher amplitude, sharply contoured waves and fast activities.    Interictal Epileptiform Activity:   Occasional to frequent sharp-wave discharges over the right fronto-temporal region (max F8)  Occasional to frequent sharp-wave discharges over the right fronto-central region (max C4)  Both of the above discharges occasionally seen with a periodic appearance of 0.5-1Hz without evolution (LPDs)    Events:  Clinical events: None recorded.  Seizures: None recorded.    Activation Procedures:   Hyperventilation was not performed.    Photic stimulation was not performed.     Artifacts:  Intermittent myogenic and movement artifacts were noted.    ECG:  ECG rhythm irregular.    _____________________________________________________________  EEG SUMMARY/CLASSIFICATION    Abnormal EEG in the awake, drowsy and asleep states.  -Occasional to frequent sharp-wave discharges/LPDs independently over the right fronto-temporal and right fronto-central regions.  -Right anterior slowing  -Right anterior breach artifact  _____________________________________________________________  EEG IMPRESSION/CLINICAL CORRELATE  Epilepsy Fellow, [facility]    Findings indicate increased risk for seizures from the right fronto-temporal and fronto-central regions with correlating regional non-specific cerebral dysfunction and overlying skull defect.    No seizures recorded. No change from previous day.    Reza Bardales MD  EEG/Epilepsy Attending

## 2023-07-18 NOTE — SWALLOW BEDSIDE ASSESSMENT ADULT - SLP PERTINENT HISTORY OF CURRENT PROBLEM
66 y/o male with a PMHx of glioblastoma, seizure disorder, atrial fibrillation on xarelto, and HTN who had a seizure at a rehab facility and was brought in by EMS. He was given versed by EMS and upon arrival to ED, patient had a GCS of 6-7 and was unresponsive. At this point, he was intubated and MICU was consulted. Of note, patient was recently discharged from [facility] on 7/14 where he was seen for management of glioblastoma and seizures. He was discharged to rehab on keppra, vimpat, valproic acid, and dexamethasone and set to follow-up with outpatient neurologist before today's episode.

## 2023-07-18 NOTE — SWALLOW BEDSIDE ASSESSMENT ADULT - ASPIRATION PRECAUTIONS
Monitor for s/s aspiration/laryngeal penetration. If noted:  D/C p.o. intake, provide non-oral nutrition/hydration/meds, and contact this service @ [phone]

## 2023-07-18 NOTE — DIETITIAN INITIAL EVALUATION ADULT - ADD RECOMMEND
Continue to trend labs, weight, skin integrity, and intake. Reinforce nutrition education as able. Malnutrition alert placed in chart.

## 2023-07-18 NOTE — DIETITIAN INITIAL EVALUATION ADULT - OTHER INFO
Wt Hx:   Dosing wt 91.4 kG/201.5 lbs. Daily wt 91.4 kG/201.5 lbs (7/16).   UBW ~217 lbs and endorses ~25 lb unintentional wt loss since May 9th.    Ht per pt: 57 inches    IBW: 148 lbs    IBW%: 136%    Nutrition-Related Concerns:   -  Wt Hx:   Dosing wt 91.4 kG/201.5 lbs. Daily wt 91.4 kG/201.5 lbs (7/16).   UBW ~217 lbs and endorses ~25 lb unintentional wt loss since May 9th. (7% UBW vs dosing wt)   Ht per pt: 57 inches    IBW: 148 lbs    IBW%: 136%  Wt Hx per HIE (lbs): 214 (10/26/22), 205 (1/3/23/), 210 (3/28/23), 203 (4/4/23), 202 (4/18/23), 178 (6/5/23 ? accuracy).     Nutrition-Related Concerns:   - Glioblastoma  - Shock   - On steroid which can elevate BG; ordered for sliding scale of insulin   - Seen by SLP 7/18 with recommendation, "Easy to chew diet, thin liquids"

## 2023-07-18 NOTE — PROGRESS NOTE ADULT - ASSESSMENT
Epilepsy, intractable, without status epilepticus  Encephalopathy  Glioblastoma  Atrial fibrillation  HTN    - Patient with worsened seizures, now improved. No clear inciting events. Reports he is at his neurologic baseline and wants to know when EEG can be removed. 7/18 - Clinical status stable to improved  - vEEG with R sided slowing and R frontotemporal and R frontocentral LPD's at 0.5-1 Hz without seizures. No seizures. PLEASE STOP  - Continue current AED's with Vimpat 200mg IV/PO [id number], Keppra 1000mg IV/PO [id number], and VPA 250mg IV/PO [id number]  - Continue to address above medical problems, as you are doing  - Will continue to follow patient peripherally with you, please call [phone]) with additional questions or concerns

## 2023-07-18 NOTE — DIETITIAN INITIAL EVALUATION ADULT - ORAL INTAKE PTA/DIET HISTORY
Pt with good PO intake and appetite PTA, however, pt required assistance at mealtimes secondary to left sided weakness. Noted with PEG; no known enteral feeds at rehab. Pt with no known therapeutic diet restrictions PTA. Pt seen by SLP 7/10 with recommendation for Easy to Chew Solids with Thin Liquids. Confirms NKFA. Denies oral nutrition supplement use. Pt took Multiple Vitamins with Minerals per H&P.

## 2023-07-18 NOTE — CHART NOTE - NSCHARTNOTEFT_GEN_A_CORE
History of Present Illness:   Mr. Jalloh is a 66 y/o male with a PMHx of glioblastoma, seizure disorder, atrial fibrillation on xarelto, and HTN who had a seizure at a rehab facility and was brought in by EMS. He was given versed by EMS and upon arrival to ED, patient had a GCS of 6-7 and was unresponsive. At this point, he was intubated and MICU was consulted. Of note, patient was recently discharged from [facility] on 7/14 where he was seen for management of glioblastoma and seizures. He was discharged to rehab on keppra, vimpat, valproic acid, and dexamethasone and set to follow-up with outpatient neurologist before today's episode.    ED course: Patient brought in by EMS after receiving versed en route to hospital. Upon presentation to ED, he had a GCS of 6-7 and was unresponsive, leading to intubation. Patient also noted to be hypotensive and febrile in ED, requiring pressors. Labs upon arrival were significant for WBC of 8.21, glucose of 144, calcium of 8.3, phosphorus of 5, AST of 50, ALT of 80, troponin of 75, BNP of 2832, procal of 0.14, VBG with pH of 7.18, pCO2 of 61, and HCO3 of 23. POCUS in ED with evidence of right heart dilation.    MICU course: Patient admitted to MICU for management of acute hypoxic and hypercarbic respiratory failure in setting of seizure as well as pressor requirement in setting of shock. He was extubated the same day (7/16) he was admitted to MICU and is now satting well on RA. He was on phenylephrine at a rate of 2 initially and was weaned to 0 by 7/17 at 15:00. Patient was also diuresed with bumex drip in setting of heart failure. Infectious work-up pending and should be followed on floor. Neurology recommended video EEG until 7/18 and continuing with home seizure medications. CT head negative for acute intracranial findings.     Items to follow-up:    [ ]       ASSESSMENT :   ==============  66 y/o male with a PMHx of glioblastoma, seizure disorder, HTN, and atrial fibrillation on xarelto presenting after seizure at rehab facility and intubation in ED admitted for acute respiratory failure and possible septic shock.       PLAN:  ========    NEURO:    #Seizure disorder with hx of glioblastoma  -CT head negative for acute intracranial findings  -Neurology following, recommended video EEG until 7/18  -Ammonia 38 wnl  -Neuro checks per routine   -Continue home antiepileptic regimen of keppra, vimpat, clonazepam, valproic acid and dexamethasone. F/u with neuro for further recs.      PULM:    #Acute hypoxic and hypercarbic respiratory failure in s/o seizure  -Patient extubated on 7/16 and now on RA  -Chest CT without evidence of PE but shows left lower lobe atelectasis    CV:    #Shock likely 2/2 sepsis vs sedation vs cardiogenic  -Patient received versed en route to ED  -Blood cultures and sputum cultures ordered. Continue to follow.  -Empiric abx coverage with cefepime and vancomycin  -TTE 7/16: LV: mildly reduced systolic function, estimate EF 45-50%. RV: moderately enlarged, mildly reduced systolic function.   - CT chest and POCUS with evidence of heart failure  -BNP elevated at 2832  -Lactate 2.0  -Off pressor  -Patient does not appear overtly fluid overloaded, holding diuresis    #Troponin elevation likely 2/2 hypotension  -Troponin of 75 on admission  -BNP elevated and POCUS with evidence of acute HF  -EKG ordered. F/u    #Chronic atrial fibrillation  -F/u EKG  -Hold BB  -Keep Mg > 3  -Heparin gtt    #Essential hypertension  -Withold HTN medications in setting of shock      GI:  NTD      RENAL:  -maintain urine output > 0.5cc/kg/hr     :  -Pimentel in place      ID:  #Potential septic shock  -See CV section  -Procal 2.0  -Infectious work-up pending. bcx and sputum culture drawn on 7/16, NGTD  -dc antibiotics    ENDO:  #Mild hyperglycemia  -Glucose of 144 on admission  -CTM       HEME:  #Bilateral LE edema L>R  -Doppler ordered. F/u. History of Present Illness:   Mr. Jalloh is a 66 y/o male with a PMHx of glioblastoma, seizure disorder, atrial fibrillation on xarelto, and HTN who had a seizure at a rehab facility and was brought in by EMS. He was given versed by EMS and upon arrival to ED, patient had a GCS of 6-7 and was unresponsive. At this point, he was intubated and MICU was consulted. Of note, patient was recently discharged from [facility] on 7/14 where he was seen for management of glioblastoma and seizures. He was discharged to rehab on keppra, vimpat, valproic acid, and dexamethasone and set to follow-up with outpatient neurologist before today's episode.    ED course: Patient brought in by EMS after receiving versed en route to hospital. Upon presentation to ED, he had a GCS of 6-7 and was unresponsive, leading to intubation. Patient also noted to be hypotensive and febrile in ED, requiring pressors. Labs upon arrival were significant for WBC of 8.21, glucose of 144, calcium of 8.3, phosphorus of 5, AST of 50, ALT of 80, troponin of 75, BNP of 2832, procal of 0.14, VBG with pH of 7.18, pCO2 of 61, and HCO3 of 23. POCUS in ED with evidence of right heart dilation.    MICU course: Patient admitted to MICU for management of acute hypoxic and hypercarbic respiratory failure in setting of seizure as well as pressor requirement in setting of shock. He was extubated the same day (7/16) he was admitted to MICU and is now satting well on RA. He was on phenylephrine at a rate of 2 initially and was weaned to 0 by 7/17 at 15:00. Patient was also diuresed with bumex drip in setting of heart failure. Infectious work-up pending and should be followed on floor. Neurology recommended video EEG until 7/18 and continuing with home seizure medications. CT head negative for acute intracranial findings.     Items to follow-up:    [ ] Continue current AEDs  [ ] F/u neurology recommendations  [ ] Diuresis as per floor team. Held in MICU as pt not overtly volume overloaded.  [ ] Continue to hold home blood pressure medication   [ ] F/u infectious work-up      ASSESSMENT :   ==============  66 y/o male with a PMHx of glioblastoma, seizure disorder, HTN, and atrial fibrillation on xarelto presenting after seizure at rehab facility and intubation in ED admitted for acute respiratory failure and possible septic shock.       PLAN:  ========    NEURO:    #Seizure disorder with hx of glioblastoma  -CT head negative for acute intracranial findings  -Neurology following, recommended video EEG until 7/18  -Ammonia 38 wnl  -Neuro checks per routine   -Continue home antiepileptic regimen of keppra, vimpat, clonazepam, valproic acid and dexamethasone. F/u with neuro for further recs.      PULM:    #Acute hypoxic and hypercarbic respiratory failure in s/o seizure  -Patient extubated on 7/16 and now on RA  -Chest CT without evidence of PE but shows left lower lobe atelectasis    CV:    #Shock likely 2/2 sepsis vs sedation vs cardiogenic  -Patient received versed en route to ED  -Blood cultures and sputum cultures ordered. Continue to follow.  -abx dc'ed  -TTE 7/16: LV: mildly reduced systolic function, estimate EF 45-50%. RV: moderately enlarged, mildly reduced systolic function.   - CT chest and POCUS with evidence of heart failure  -BNP elevated at 2832  -Lactate 2.0  -Off pressor  -Patient does not appear overtly fluid overloaded, holding diuresis    #Troponin elevation likely 2/2 hypotension  -Troponin of 75 on admission  -BNP elevated and POCUS with evidence of acute HF      #Chronic atrial fibrillation  -F/u EKG  -Hold BB  -Keep Mg > 3  -Heparin gtt    #Essential hypertension  -Withold HTN medications in setting of shock      GI:  NTD      RENAL:  -maintain urine output > 0.5cc/kg/hr     :  -Pimentel in place      ID:  #Potential septic shock  -See CV section  -Procal 2.0  -Infectious work-up pending. bcx and sputum culture drawn on 7/16, NGTD      ENDO:  #Mild hyperglycemia  -Glucose of 144 on admission  -CTM       HEME:  #Bilateral LE edema L>R  -No evidence of DVT on doppler

## 2023-07-18 NOTE — DIETITIAN INITIAL EVALUATION ADULT - PERTINENT LABORATORY DATA
Reason For Visit:   Chief Complaint   Patient presents with     RECHECK     7 weeks post op from right shoulder arthroscopy with arthroscopic biceps tenodesis, anterior labral repair, SLAP repair. DOS: 12/02/2022 with Dr. Godinez. Pt states they fell on ice about a week ago and landed right on their shoulder. They have had more pain since then.        There were no vitals taken for this visit.    Pain Assessment  Patient Currently in Pain: Yes  0-10 Pain Scale: 4 (5 with movement)  Primary Pain Location: Shoulder (Right)  Pain Descriptors: Aching    Luis Antonio Frias, EMT    
07-18    140  |  101  |  14  ----------------------------<  112<H>  3.5   |  25  |  0.60    Ca    8.3<L>      18 Jul 2023 00:31  Phos  3.4     07-18  Mg     2.4     07-18    TPro  5.1<L>  /  Alb  2.6<L>  /  TBili  0.5  /  DBili  x   /  AST  32  /  ALT  54<H>  /  AlkPhos  54  07-18  POCT Blood Glucose.: 114 mg/dL (07-18-23 @ 06:13)  A1C with Estimated Average Glucose Result: 5.5 % (07-05-23 @ 06:59)  A1C with Estimated Average Glucose Result: 5.5 % (08-28-22 @ 08:17)

## 2023-07-19 NOTE — PROGRESS NOTE ADULT - ATTENDING COMMENTS
Agree with above  GBM with seizure D/O p/w AMS requiring intubation  Now extubated, EEG showing no evidence of seizures  c/w AEDs  Currently AC with heparin gtt, will need NOAC on D/C  AFVSS, rate controlled with metoprolol 12.5mg Q12h  More awake, alert, OOB to chair via Raheem  Transfer to general medicine

## 2023-07-19 NOTE — PROGRESS NOTE ADULT - SUBJECTIVE AND OBJECTIVE BOX
Cardiology Progress Note  ------------------------------------------------------------------------------------------  SUBJECTIVE:   - Tele: Afib 80-90s.   - No events overnight. Denies CP, SOB or Palpitations.   -------------------------------------------------------------------------------------------  ROS:  CV: chest pain (-), palpitation (-), orthopnea (-), PND (-), edema (-)  PULM: SOB (-), cough (-), wheezing (-), hemoptysis (-).   CONST: fever (-), chills (-) or fatigue (-)  GI: abdominal distension (-), abdominal pain (-) , nausea/vomiting (-), hematemesis, (-), melena (-), hematochezia (-)  : dysuria (-), frequency (-), hematuria (-).   NEURO: numbness (-), weakness (-), dizziness (-)  MSK: myalgia (-), joint pain (-)   SKIN: itching (-), rash (-)  HEENT:  visual changes (-); vertigo or throat pain (-);  neck stiffness (-)   Psych: change in mood (-), anxiety (-), depression (-)     All other review of systems is negative unless indicated above.   -------------------------------------------------------------------------------------------  VS:  T(F): 97.6 (07-19), Max: 99.1 (07-18)  HR: 71 (07-19) (71 - 144)  BP: 137/111 (07-19) (100/68 - 150/86)  RR: 17 (07-19)  SpO2: 99% (07-19)  I&O's Summary    18 Jul 2023 07:01  -  19 Jul 2023 07:00  --------------------------------------------------------  IN: 780 mL / OUT: 670 mL / NET: 110 mL    19 Jul 2023 07:01  -  19 Jul 2023 11:27  --------------------------------------------------------  IN: 302 mL / OUT: 200 mL / NET: 102 mL    PHYSICAL EXAM:  GENERAL: NAD  HEAD:  Atraumatic, Normocephalic.  EYES: conjunctiva and sclera clear.  ENT: Moist mucous membranes.  NECK: Supple, No elevated JVP.   CHEST/LUNG: Clear to auscultation bilaterally  HEART: Regular rate and rhythm; No murmurs, rubs, or gallops.  ABDOMEN: Soft, Nontender, Nondistended.   EXTREMITIES: 1+ edema b/l.   PSYCH: Normal affect.  SKIN: No rashes or lesions.  -------------------------------------------------------------------------------------------  LABS:                          11.9   7.27  )-----------( 166      ( 19 Jul 2023 00:13 )             35.6     07-19    139  |  103  |  26<H>  ----------------------------<  131<H>  4.1   |  24  |  1.08    Ca    8.3<L>      19 Jul 2023 00:13  Phos  3.2     07-19  Mg     2.4     07-19    TPro  5.1<L>  /  Alb  2.8<L>  /  TBili  0.3  /  DBili  x   /  AST  39  /  ALT  64<H>  /  AlkPhos  68  07-19    PT/INR - ( 19 Jul 2023 00:13 )   PT: 13.1 sec;   INR: 1.14 ratio         PTT - ( 19 Jul 2023 06:44 )  PTT:67.3 sec  CARDIAC MARKERS ( 16 Jul 2023 08:25 )  79 ng/L / x     / x     / 51 U/L / x     / 4.5 ng/mL  CARDIAC MARKERS ( 16 Jul 2023 02:53 )  75 ng/L / x     / x     / x     / x     / x                -------------------------------------------------------------------------------------------  Meds:  acetaminophen     Tablet .. 650 milliGRAM(s) Oral every 6 hours PRN  atorvastatin 20 milliGRAM(s) Oral at bedtime  brimonidine 0.2% Ophthalmic Solution 1 Drop(s) Both EYES two times a day  chlorhexidine 4% Liquid 1 Application(s) Topical <User Schedule>  clonazePAM  Tablet 0.5 milliGRAM(s) Oral two times a day PRN  dexAMETHasone  Injectable 4 milliGRAM(s) IV Push daily  heparin  Infusion.  Unit(s)/Hr IV Continuous <Continuous>  insulin lispro (ADMELOG) corrective regimen sliding scale   SubCutaneous every 6 hours  lacosamide IVPB 200 milliGRAM(s) IV Intermittent every 12 hours  levETIRAcetam  IVPB 1000 milliGRAM(s) IV Intermittent every 12 hours  metoprolol tartrate 12.5 milliGRAM(s) Oral two times a day  pantoprazole  Injectable 40 milliGRAM(s) IV Push every 24 hours  polyethylene glycol 3350 17 Gram(s) Oral daily  senna 2 Tablet(s) Oral at bedtime  timolol 0.25% Solution 1 Drop(s) Both EYES two times a day  valproate sodium  IVPB 250 milliGRAM(s) IV Intermittent every 8 hours    -------------------------------------------------------------------------------------------

## 2023-07-19 NOTE — PROGRESS NOTE ADULT - ASSESSMENT
******Note incomplete pending attending signature*******    66 y/o male with a PMHx of glioblastoma, seizure disorder, HTN, and atrial fibrillation on xarelto presenting after seizure at rehab facility and intubation in ED admitted for airway protection and possible septic shock. Cardiology consulted with c/f new CHF. On exam, pt appears mildly cool and with 1+ pitting edema bilaterally. Primary team diuresing with bumex gtt and overall pt remains febrile and pressor dependent, overall appears largely concern for septic shock in the setting of witnessed seizure activity. BNP elevated to 2800 compared to baseline, bedside echo shows reduced LVOT VTI per MICU team, visually with concern for RV enlargement, suggestive of cardiomyopathy possible stress-mediated. TTE showing EF 45-50%, moderate LVH, decreased RV systolic function.     #Afib RVR  #C/f new CHF, RV dysfunction    Recommendations:   - TTE showing EF 45-50%, moderate LVH, decreased RV systolic function.   - Continue heparin for AC given Afib  - can consider digoxin if afib RVR uncontrolled  - diurese to keep patient net even  - c/w metoprolol tartrate 12.5 mg PO BID  - Will need repeat limited TTE for LV function once HR improved.   - Will plan to start GDMT pending clinical course.   - CTM on telemetry  - K> 4, Mg> 2 ******Note incomplete pending attending signature*******    66 y/o male with a PMHx of glioblastoma, seizure disorder, HTN, and atrial fibrillation on xarelto presenting after seizure at rehab facility and intubation in ED admitted for airway protection and possible septic shock. Cardiology consulted with c/f new CHF. On exam, pt appears mildly cool and with 1+ pitting edema bilaterally. Primary team diuresing with bumex gtt and overall pt remains febrile and pressor dependent, overall appears largely concern for septic shock in the setting of witnessed seizure activity. BNP elevated to 2800 compared to baseline, bedside echo shows reduced LVOT VTI per MICU team, visually with concern for RV enlargement, suggestive of cardiomyopathy possible stress-mediated. TTE showing EF 45-50%, moderate LVH, decreased RV systolic function.     #Afib RVR  #C/f new CHF, RV dysfunction    Recommendations:   - TTE showing EF 45-50%, moderate LVH, decreased RV systolic function.   - Continue heparin for AC given Afib  - can consider digoxin if afib RVR uncontrolled  - diurese to keep patient net even  - c/w metoprolol tartrate 12.5 mg PO BID  - Will need repeat limited TTE for LV function once HR improved.   - Will plan to start GDMT pending clinical course.   - CTM on telemetry  - K> 4, Mg> 2    Discussed with daughter, reintroducing medications, optimizing medically, plan to repeat TTE once HR well controlled.

## 2023-07-19 NOTE — PROGRESS NOTE ADULT - SUBJECTIVE AND OBJECTIVE BOX
Patient is a 67y old  Male who presents with a chief complaint of seizure (18 Jul 2023 14:17)      24 hour events: ***    REVIEW OF SYSTEMS:  Constitutional: [ ] fevers [ ] chills [ ] weight loss [ ] weight gain  HEENT: [ ] dry eyes [ ] eye irritation [ ] postnasal drip [ ] nasal congestion  CV: [ ] chest pain [ ] orthopnea [ ] palpitations [ ] murmur  Resp: [ ] cough [ ] shortness of breath [ ] dyspnea [ ] wheezing [ ] sputum [ ] hemoptysis  GI: [ ] nausea [ ] vomiting [ ] diarrhea [ ] constipation [ ] abd pain [ ] dysphagia   : [ ] dysuria [ ] nocturia [ ] hematuria [ ] increased urinary frequency  Musculoskeletal: [ ] back pain [ ] myalgias [ ] arthralgias [ ] fracture  Skin: [ ] rash [ ] itch  Neurological: [ ] headache [ ] dizziness [ ] syncope [ ] weakness [ ] numbness  Psychiatric: [ ] anxiety [ ] depression  Endocrine: [ ] diabetes [ ] thyroid problem  Hematologic/Lymphatic: [ ] anemia [ ] bleeding problem  Allergic/Immunologic: [ ] itchy eyes [ ] nasal discharge [ ] hives [ ] angioedema  [ ] All other systems negative  [ ] Unable to assess ROS because ________    OBJECTIVE:  ICU Vital Signs Last 24 Hrs  T(C): 36.8 (19 Jul 2023 04:00), Max: 37.3 (18 Jul 2023 12:00)  T(F): 98.3 (19 Jul 2023 04:00), Max: 99.1 (18 Jul 2023 12:00)  HR: 98 (19 Jul 2023 04:00) (91 - 144)  BP: 124/81 (19 Jul 2023 04:00) (100/67 - 150/86)  BP(mean): 93 (19 Jul 2023 04:00) (78 - 115)  ABP: --  ABP(mean): --  RR: 24 (19 Jul 2023 04:00) (15 - 26)  SpO2: 81% (19 Jul 2023 04:00) (81% - 99%)    O2 Parameters below as of 18 Jul 2023 20:00  Patient On (Oxygen Delivery Method): room air              07-18 @ 07:01  -  07-19 @ 07:00  --------------------------------------------------------  IN: 780 mL / OUT: 670 mL / NET: 110 mL      CAPILLARY BLOOD GLUCOSE      POCT Blood Glucose.: 108 mg/dL (19 Jul 2023 05:11)      PHYSICAL EXAM:  GENERAL: NAD, well-developed  HEAD:  Atraumatic, Normocephalic  EYES: EOMI, PERRLA, conjunctiva and sclera clear  NECK: Supple, No JVD, Thyroid not palpable, non tender, Trachea midline  CHEST/LUNG: ( )ETT in place, ( )Tracheostomy in place, ( )no chest deformity,  ( )  Normal expansion/effort/palpation,  ( )Normal percussion/auscultation,  Clear to auscultation bilaterally; No wheeze  HEART: Regular rate and rhythm; No murmurs, rubs, or gallops, ( ) No JVD, ( )Normal Pulses, ( )Edema   ABDOMEN: Soft, Nontender, Nondistended; Bowel sounds presen, ( ) No Masses, (  ) No organomegaly,  (  ) Non-tender normal BS   : Lora in Place, Voiding freely  Musculoskeletal/EXTREMITIES:(  ) Normal strength, movement, and tone, (  ) No focal atropy, (  ) Normal ROM, (  ) Normal digits and nails,  2+ Peripheral Pulses, No clubbing, cyanosis, or edema  PSYCH: AAOx3, (  ) Normal mood/affect/judgment/insight, (  ) Intact memory,   NEUROLOGY: non-focal, exam  SKIN: No rashes or lesions      LINES:    HOSPITAL MEDICATIONS:  MEDICATIONS  (STANDING):  atorvastatin 20 milliGRAM(s) Oral at bedtime  brimonidine 0.2% Ophthalmic Solution 1 Drop(s) Both EYES two times a day  chlorhexidine 4% Liquid 1 Application(s) Topical <User Schedule>  dexAMETHasone  Injectable 4 milliGRAM(s) IV Push daily  heparin  Infusion.  Unit(s)/Hr (17 mL/Hr) IV Continuous <Continuous>  insulin lispro (ADMELOG) corrective regimen sliding scale   SubCutaneous every 6 hours  lacosamide IVPB 200 milliGRAM(s) IV Intermittent every 12 hours  levETIRAcetam  IVPB 1000 milliGRAM(s) IV Intermittent every 12 hours  metoprolol tartrate 12.5 milliGRAM(s) Oral two times a day  pantoprazole  Injectable 40 milliGRAM(s) IV Push every 24 hours  polyethylene glycol 3350 17 Gram(s) Oral daily  senna 2 Tablet(s) Oral at bedtime  timolol 0.25% Solution 1 Drop(s) Both EYES two times a day  valproate sodium  IVPB 250 milliGRAM(s) IV Intermittent every 8 hours    MEDICATIONS  (PRN):  acetaminophen     Tablet .. 650 milliGRAM(s) Oral every 6 hours PRN Temp greater or equal to 38C (100.4F), Moderate Pain (4 - 6)  clonazePAM  Tablet 0.5 milliGRAM(s) Oral two times a day PRN For anxiety      LABS:                        11.9   7.27  )-----------( 166      ( 19 Jul 2023 00:13 )             35.6     Hgb Trend: 11.9<--, 12.1<--, 13.0<--, 15.3<--, 13.7<--  07-19    139  |  103  |  26<H>  ----------------------------<  131<H>  4.1   |  24  |  1.08    Ca    8.3<L>      19 Jul 2023 00:13  Phos  3.2     07-19  Mg     2.4     07-19    TPro  5.1<L>  /  Alb  2.8<L>  /  TBili  0.3  /  DBili  x   /  AST  39  /  ALT  64<H>  /  AlkPhos  68  07-19    Creatinine Trend: 1.08<--, 0.60<--, 0.66<--, 0.57<--, 0.64<--, 0.53<--  PT/INR - ( 19 Jul 2023 00:13 )   PT: 13.1 sec;   INR: 1.14 ratio         PTT - ( 19 Jul 2023 06:44 )  PTT:67.3 sec  Urinalysis Basic - ( 19 Jul 2023 00:13 )    Color: x / Appearance: x / SG: x / pH: x  Gluc: 131 mg/dL / Ketone: x  / Bili: x / Urobili: x   Blood: x / Protein: x / Nitrite: x   Leuk Esterase: x / RBC: x / WBC x   Sq Epi: x / Non Sq Epi: x / Bacteria: x        Venous Blood Gas:  07-19 @ 00:00  7.43/44/39/29/67.6  VBG Lactate: 2.7  Venous Blood Gas:  07-18 @ 00:15  7.43/45/40/30/69.7  VBG Lactate: 2.1      EKG:    MICROBIOLOGY:     RADIOLOGY:  [ ] Reviewed and interpreted by me    EKG:  MICROBIOLOGY:     Radiology: ***    Bedside lung ultrasound: ***    Bedside ECHO: ***    EKG:    CENTRAL LINE: Y/N          DATE INSERTED:              REMOVE: Y/N    LORA: Y/N                        DATE INSERTED:              REMOVE: Y/N    A-LINE: Y/N                       DATE INSERTED:              REMOVE: Y/N    GLOBAL ISSUE/BEST PRACTICE:  Analgesia:  Sedation:  HOB elevation: yes  Stress ulcer prophylaxis:  VTE prophylaxis:  Glycemic control:  Nutrition:    CODE STATUS: ***  [facility] discussion: Y     Patient is a 67y old  Male who presents with a chief complaint of seizure (18 Jul 2023 14:17)      24 hour events: NAEO. More alter, eating well, no acute complaints.    REVIEW OF SYSTEMS:  Constitutional: [ ] fevers [ ] chills [ ] weight loss [ ] weight gain  HEENT: [ ] dry eyes [ ] eye irritation [ ] postnasal drip [ ] nasal congestion  CV: [ ] chest pain [ ] orthopnea [ ] palpitations [ ] murmur  Resp: [ ] cough [ ] shortness of breath [ ] dyspnea [ ] wheezing [ ] sputum [ ] hemoptysis  GI: [ ] nausea [ ] vomiting [ ] diarrhea [ ] constipation [ ] abd pain [ ] dysphagia   : [ ] dysuria [ ] nocturia [ ] hematuria [ ] increased urinary frequency  Musculoskeletal: [ ] back pain [ ] myalgias [ ] arthralgias [ ] fracture  Skin: [ ] rash [ ] itch  Neurological: [ ] headache [ ] dizziness [ ] syncope [ ] weakness [ ] numbness  Psychiatric: [ ] anxiety [ ] depression  Endocrine: [ ] diabetes [ ] thyroid problem  Hematologic/Lymphatic: [ ] anemia [ ] bleeding problem  Allergic/Immunologic: [ ] itchy eyes [ ] nasal discharge [ ] hives [ ] angioedema  [ ] All other systems negative  [ ] Unable to assess ROS because ________    OBJECTIVE:  ICU Vital Signs Last 24 Hrs  T(C): 36.8 (19 Jul 2023 04:00), Max: 37.3 (18 Jul 2023 12:00)  T(F): 98.3 (19 Jul 2023 04:00), Max: 99.1 (18 Jul 2023 12:00)  HR: 98 (19 Jul 2023 04:00) (91 - 144)  BP: 124/81 (19 Jul 2023 04:00) (100/67 - 150/86)  BP(mean): 93 (19 Jul 2023 04:00) (78 - 115)  ABP: --  ABP(mean): --  RR: 24 (19 Jul 2023 04:00) (15 - 26)  SpO2: 81% (19 Jul 2023 04:00) (81% - 99%)    O2 Parameters below as of 18 Jul 2023 20:00  Patient On (Oxygen Delivery Method): room air              07-18 @ 07:01  -  07-19 @ 07:00  --------------------------------------------------------  IN: 780 mL / OUT: 670 mL / NET: 110 mL      CAPILLARY BLOOD GLUCOSE      POCT Blood Glucose.: 108 mg/dL (19 Jul 2023 05:11)      PHYSICAL EXAM:  GENERAL: NAD, well-developed  HEAD:  Atraumatic, Normocephalic  EYES: EOMI, PERRLA, conjunctiva and sclera clear  NECK: Supple, No JVD, Thyroid not palpable, non tender, Trachea midline  CHEST/LUNG: ( )ETT in place, ( )Tracheostomy in place, ( )no chest deformity,  ( )  Normal expansion/effort/palpation,  ( )Normal percussion/auscultation,  Clear to auscultation bilaterally; No wheeze  HEART: Regular rate and rhythm; No murmurs, rubs, or gallops, ( ) No JVD, ( )Normal Pulses, ( )Edema   ABDOMEN: Soft, Nontender, Nondistended; Bowel sounds presen, ( ) No Masses, (  ) No organomegaly,  (  ) Non-tender normal BS   : Lora in Place, Voiding freely  Musculoskeletal/EXTREMITIES:(  ) Normal strength, movement, and tone, (  ) No focal atropy, (  ) Normal ROM, (  ) Normal digits and nails,  2+ Peripheral Pulses, No clubbing, cyanosis, or edema  PSYCH: AAOx3, (  ) Normal mood/affect/judgment/insight, (  ) Intact memory,   NEUROLOGY: non-focal, exam  SKIN: No rashes or lesions      LINES:    HOSPITAL MEDICATIONS:  MEDICATIONS  (STANDING):  atorvastatin 20 milliGRAM(s) Oral at bedtime  brimonidine 0.2% Ophthalmic Solution 1 Drop(s) Both EYES two times a day  chlorhexidine 4% Liquid 1 Application(s) Topical <User Schedule>  dexAMETHasone  Injectable 4 milliGRAM(s) IV Push daily  heparin  Infusion.  Unit(s)/Hr (17 mL/Hr) IV Continuous <Continuous>  insulin lispro (ADMELOG) corrective regimen sliding scale   SubCutaneous every 6 hours  lacosamide IVPB 200 milliGRAM(s) IV Intermittent every 12 hours  levETIRAcetam  IVPB 1000 milliGRAM(s) IV Intermittent every 12 hours  metoprolol tartrate 12.5 milliGRAM(s) Oral two times a day  pantoprazole  Injectable 40 milliGRAM(s) IV Push every 24 hours  polyethylene glycol 3350 17 Gram(s) Oral daily  senna 2 Tablet(s) Oral at bedtime  timolol 0.25% Solution 1 Drop(s) Both EYES two times a day  valproate sodium  IVPB 250 milliGRAM(s) IV Intermittent every 8 hours    MEDICATIONS  (PRN):  acetaminophen     Tablet .. 650 milliGRAM(s) Oral every 6 hours PRN Temp greater or equal to 38C (100.4F), Moderate Pain (4 - 6)  clonazePAM  Tablet 0.5 milliGRAM(s) Oral two times a day PRN For anxiety      LABS:                        11.9   7.27  )-----------( 166      ( 19 Jul 2023 00:13 )             35.6     Hgb Trend: 11.9<--, 12.1<--, 13.0<--, 15.3<--, 13.7<--  07-19    139  |  103  |  26<H>  ----------------------------<  131<H>  4.1   |  24  |  1.08    Ca    8.3<L>      19 Jul 2023 00:13  Phos  3.2     07-19  Mg     2.4     07-19    TPro  5.1<L>  /  Alb  2.8<L>  /  TBili  0.3  /  DBili  x   /  AST  39  /  ALT  64<H>  /  AlkPhos  68  07-19    Creatinine Trend: 1.08<--, 0.60<--, 0.66<--, 0.57<--, 0.64<--, 0.53<--  PT/INR - ( 19 Jul 2023 00:13 )   PT: 13.1 sec;   INR: 1.14 ratio         PTT - ( 19 Jul 2023 06:44 )  PTT:67.3 sec  Urinalysis Basic - ( 19 Jul 2023 00:13 )    Color: x / Appearance: x / SG: x / pH: x  Gluc: 131 mg/dL / Ketone: x  / Bili: x / Urobili: x   Blood: x / Protein: x / Nitrite: x   Leuk Esterase: x / RBC: x / WBC x   Sq Epi: x / Non Sq Epi: x / Bacteria: x        Venous Blood Gas:  07-19 @ 00:00  7.43/44/39/29/67.6  VBG Lactate: 2.7  Venous Blood Gas:  07-18 @ 00:15  7.43/45/40/30/69.7  VBG Lactate: 2.1      EKG:    MICROBIOLOGY:     RADIOLOGY:  [ ] Reviewed and interpreted by me    EKG:  MICROBIOLOGY:     Radiology: ***    Bedside lung ultrasound: ***    Bedside ECHO: ***    EKG:    CENTRAL LINE: Y/N          DATE INSERTED:              REMOVE: Y/N    LORA: Y/N                        DATE INSERTED:              REMOVE: Y/N    A-LINE: Y/N                       DATE INSERTED:              REMOVE: Y/N    GLOBAL ISSUE/BEST PRACTICE:  Analgesia:  Sedation:  HOB elevation: yes  Stress ulcer prophylaxis:  VTE prophylaxis:  Glycemic control:  Nutrition:    CODE STATUS: ***  [facility] discussion: Y

## 2023-07-19 NOTE — PROGRESS NOTE ADULT - ATTENDING COMMENTS
66 y/o male with a PMHx of glioblastoma, seizure disorder, HTN, and chronic atrial fibrillation on xarelto who presented after seizure at rehab, found to have new biventricular dysfunction (LVEF 45-50% in setting of rapid AF, and moderately enlarged RV with decreased RV function).     He denies any chest pain or SOB today.  He has 2+ LE edema up to knees b/l. His lungs are clear.    EKG shows Afib with RVR in 120s-130s  Trop 75 to 79  CTA negative for PE on 7/16/23    1) Afib with RVR  2) Acute systolic HF exacerbation with RV dysfunction  3) Elevated troponins  4) GBM, seizures  5) Possible sepsis  - Unclear etiology for newly reduced EF, ?stress-mediated in setting of seizures, possible sepsis  - He has LE edema on exam, would plan to keep slightly net negative with PRN diuretics  - On metoprolol tartrate 12.5mg BID  - On heparin gtt for Afib  - Eventual GDMT pending clinical course  - Once HRs better controlled and acute issues resolve, repeat limited TTE to reassess LV function  - Appreciate MICU and neurology care 68 y/o male with a PMHx of glioblastoma, seizure disorder, HTN, and chronic atrial fibrillation on xarelto who presented after seizure at rehab, found to have new biventricular dysfunction (LVEF 45-50% in setting of rapid AF, and moderately enlarged RV with decreased RV function).     He denies any chest pain or SOB today.  He has 2+ LE edema up to knees b/l. His lungs are clear.  Cr up to 1.08 today.    EKG shows Afib with RVR in 120s-130s  Trop 75 to 79  CTA negative for PE on 7/16/23    1) Afib with RVR  2) Acute systolic HF exacerbation with RV dysfunction  3) Elevated troponins  4) GBM, seizures  5) Possible sepsis  - Unclear etiology for newly reduced EF, ?stress-mediated in setting of seizures, possible sepsis  - He has LE edema on exam, would give diuretics to keep slightly net negative  - On metoprolol tartrate 12.5mg BID  - On heparin gtt for Afib  - Eventual further GDMT pending clinical course and renal function  - Once HRs better controlled and acute issues resolve, repeat limited TTE to reassess LV function  - Appreciate MICU and neurology care

## 2023-07-19 NOTE — PROGRESS NOTE ADULT - ASSESSMENT
ASSESSMENT :   ==============  68 y/o male with a PMHx of glioblastoma, seizure disorder, HTN, and atrial fibrillation on xarelto presenting after seizure at rehab facility and intubation in ED admitted for acute respiratory failure and possible septic shock.      PLAN:  ========    NEURO:    #Seizure disorder with hx of glioblastoma  -CT head negative for acute intracranial findings  -Neurology following, EEG demonstrated normal activity, dc on 7/18  -Ammonia 38 wnl  -Neuro checks per routine   -Continue home antiepileptic regimen of keppra, vimpat, clonazepam, valproic acid and dexamethasone. F/u with neuro for further recs.      PULM:    #Acute hypoxic and hypercarbic respiratory failure in s/o seizure  -Patient extubated on 7/16 and now on RA  -Chest CT without evidence of PE but shows left lower lobe atelectasis    CV:    #Shock likely 2/2 sepsis vs sedation vs cardiogenic  -Patient received versed en route to ED  -Blood cultures and sputum cultures ordered. Continue to follow.  -Empiric abx coverage with cefepime and vancomycin  -TTE 7/16: LV: mildly reduced systolic function, estimate EF 45-50%. RV: moderately enlarged, mildly reduced systolic function.   - CT chest and POCUS with evidence of heart failure  -BNP elevated at 2832  -Lactate 2.0  -Off pressor  -Patient does not appear overtly fluid overloaded, holding diuresis    #Troponin elevation likely 2/2 hypotension  -Troponin of 75 on admission  -BNP elevated and POCUS with evidence of acute HF  -EKG ordered. F/u    #Chronic atrial fibrillation  -F/u EKG  -Start metoprolol for rate control  -Keep Mg > 3  -Heparin gtt    #Essential hypertension  -Withold HTN medications in setting of shock      GI:  NTD      RENAL:  -maintain urine output > 0.5cc/kg/hr   - Dark red urine noted  - Monitor UOP  - Renal/Bladder POCUS wnl    :  -Pimentel in place      ID:  #Potential septic shock  -See CV section  -Procal 2.0  -Infectious work-up pending. bcx and sputum culture drawn on 7/16, NGTD  -dc antibiotics    ENDO:  #Mild hyperglycemia  -Glucose of 144 on admission  -CTM       HEME:  #Bilateral LE edema L>R  -Doppler negative         ASSESSMENT :   ==============  66 y/o male with a PMHx of glioblastoma, seizure disorder, HTN, and atrial fibrillation on xarelto presenting after seizure at rehab facility and intubation in ED admitted for acute respiratory failure and possible septic shock.      PLAN:  ========    NEURO:    #Seizure disorder with hx of glioblastoma  -CT head negative for acute intracranial findings  -Neurology following, EEG demonstrated normal activity, dc on 7/18  -Ammonia 38 wnl  -Neuro checks per routine   -Continue home antiepileptic regimen of keppra, vimpat, clonazepam, valproic acid and dexamethasone. F/u with neuro for further recs.      PULM:    #Acute hypoxic and hypercarbic respiratory failure in s/o seizure  -Patient extubated on 7/16 and now on RA  -Chest CT without evidence of PE but shows left lower lobe atelectasis    CV:    #Shock likely 2/2 sepsis vs sedation vs cardiogenic  -Patient received versed en route to ED  -Blood cultures and sputum cultures ordered. Continue to follow.  -Empiric abx coverage with cefepime and vancomycin  -TTE 7/16: LV: mildly reduced systolic function, estimate EF 45-50%. RV: moderately enlarged, mildly reduced systolic function.   - CT chest and POCUS with evidence of heart failure  -Off pressor  -Patient does not appear overtly fluid overloaded, holding diuresis    #Troponin elevation likely 2/2 hypotension  -Troponin of 75 on admission  -BNP elevated and POCUS with evidence of acute HF  -EKG ordered. F/u    #Chronic atrial fibrillation  -F/u EKG  -Start metoprolol for rate control  -Keep Mg > 3  -Heparin gtt  -Will consider transition back to Xarelto     #Essential hypertension  -Withold HTN medications in setting of shock    GI:  NTD      RENAL:  -maintain urine output > 0.5cc/kg/hr   - Red urine noted, but clearer than yesterday  - Condom cath in place  - UA showed RBC and protein, will hold off on nephrology/urology consult for now and monitor for improvement  - Cr 1.08 up from 0.6  - Monitor UOP  - Renal/Bladder POCUS wnl on 7/18    :  -Condom cath in place      ID:  #Potential septic shock  -See CV section  -Procal 2.0  -Infectious work-up pending. bcx and sputum culture drawn on 7/16, NGTD  -dc antibiotics    ENDO:  #Mild hyperglycemia  -Glucose of 144 on admission  -CTM       HEME:  #Bilateral LE edema L>R  -Doppler negative

## 2023-07-19 NOTE — CHART NOTE - NSCHARTNOTEFT_GEN_A_CORE
MICU Transfer Note    Transfer from: MICU    Transfer to: (  ) Medicine    (  ) Telemetry     (   ) RCU        (    ) Palliative         (   ) Stroke Unit          (   ) __________________    Accepting physican:      MICU COURSE:    Mr. Jalloh is a 66 y/o male with a PMHx of glioblastoma, seizure disorder, atrial fibrillation on xarelto, and HTN who had a seizure at a rehab facility and was brought in by EMS. He was given versed by EMS and upon arrival to ED, patient had a GCS of 6-7 and was unresponsive and was intubated. In the ED he was to be hypotensive and briefly requires pressor on arrival, ECHO demonstrated moderate reduced systolic function, and he was given vanc-zosyn for possible septic shock. Upon arrival to MICU, patient was extubated and tolerated it well. His infection work up including procal, wbc, bcx, ucx were not suspicious for infection and his abx was dc on 7/17. Video EEG noted no seizure activity.    ASSESSMENT & PLAN:     66 y/o male with a PMHx of glioblastoma, seizure disorder, HTN, and atrial fibrillation on xarelto presenting after seizure at rehab facility and intubation in ED admitted for acute respiratory failure and possible septic shock.    NEURO:    #Seizure disorder with hx of glioblastoma  -CT head negative for acute intracranial findings  -Neurology following, EEG demonstrated normal activity, dc on 7/18  -Ammonia 38 wnl  -Neuro checks per routine   -Continue home antiepileptic regimen of keppra, vimpat, clonazepam, valproic acid and dexamethasone. F/u with neuro for further recs.    PULM:    #Acute hypoxic and hypercarbic respiratory failure in s/o seizure  -Patient extubated on 7/16 and now on RA  -Chest CT without evidence of PE but shows left lower lobe atelectasis    CV:    #Shock likely 2/2 sepsis vs sedation vs cardiogenic  -Patient received versed en route to ED  -All cultures negative  -s/p Empiric abx coverage with cefepime and vancomycin  -TTE 7/16: LV: mildly reduced systolic function, estimate EF 45-50%. RV: moderately enlarged, mildly reduced systolic function.   - CT chest and POCUS with evidence of heart failure  - Off pressor since 7/17    #Troponin elevation likely 2/2 hypotension  -Troponin of 75 on admission  -BNP elevated and POCUS with evidence of acute HF    #Chronic atrial fibrillation  -Start metoprolol 12.5mg BID for rate control  -Keep Mg > 3  -Heparin gtt  -Will consider transition back to Xarelto     #Essential hypertension  -Withold HTN medications in setting of shock    GI:  -Tolerating PO well    RENAL:  -maintain urine output > 0.5cc/kg/hr   - Red urine noted on 7/18, self improved and became clearer on 7/19  - Condom cath in place  - UA showed RBC and protein, will hold off on nephrology/urology consult for now and monitor for improvement  - Cr 1.08 up from 0.6, continue to trend  - Monitor UOP  - Renal/Bladder POCUS wnl on 7/18    :  -Condom cath in place    ID:  #Potential septic shock  -See CV section  -Procal 2.0  -Infectious work up including bcx ucx negative  -s/p Vanc-zosyn in the ED, off abx since 7/17    ENDO:  #Mild hyperglycemia  -Glucose of 144 on admission  -CTM       HEME:  #Bilateral LE edema L>R  -Doppler negative    For Followup:  [ ] Monitor UOP  [ ] Trend Cr  [ ] Consider nephro/urology consult if Cr continues to uptrend / urine become darker  [ ] Consider transition back to Xarelto for AF    Vital Signs Last 24 Hrs  T(C): 36.4 (19 Jul 2023 12:00), Max: 36.9 (18 Jul 2023 16:00)  T(F): 97.6 (19 Jul 2023 12:00), Max: 98.4 (18 Jul 2023 16:00)  HR: 71 (19 Jul 2023 07:00) (71 - 144)  BP: 137/111 (19 Jul 2023 07:00) (100/68 - 150/86)  BP(mean): 93 (19 Jul 2023 04:00) (78 - 115)  RR: 17 (19 Jul 2023 07:00) (16 - 26)  SpO2: 99% (19 Jul 2023 07:00) (81% - 99%)    Parameters below as of 18 Jul 2023 20:00  Patient On (Oxygen Delivery Method): room air      I&O's Summary    18 Jul 2023 07:01  -  19 Jul 2023 07:00  --------------------------------------------------------  IN: 780 mL / OUT: 670 mL / NET: 110 mL    19 Jul 2023 07:01  -  19 Jul 2023 12:57  --------------------------------------------------------  IN: 313 mL / OUT: 400 mL / NET: -87 mL        MEDICATIONS  (STANDING):  atorvastatin 20 milliGRAM(s) Oral at bedtime  brimonidine 0.2% Ophthalmic Solution 1 Drop(s) Both EYES two times a day  chlorhexidine 4% Liquid 1 Application(s) Topical <User Schedule>  dexAMETHasone  Injectable 4 milliGRAM(s) IV Push daily  heparin  Infusion.  Unit(s)/Hr (17 mL/Hr) IV Continuous <Continuous>  insulin lispro (ADMELOG) corrective regimen sliding scale   SubCutaneous every 6 hours  lacosamide IVPB 200 milliGRAM(s) IV Intermittent every 12 hours  levETIRAcetam  IVPB 1000 milliGRAM(s) IV Intermittent every 12 hours  metoprolol tartrate 12.5 milliGRAM(s) Oral two times a day  pantoprazole  Injectable 40 milliGRAM(s) IV Push every 24 hours  polyethylene glycol 3350 17 Gram(s) Oral daily  senna 2 Tablet(s) Oral at bedtime  timolol 0.25% Solution 1 Drop(s) Both EYES two times a day  valproate sodium  IVPB 250 milliGRAM(s) IV Intermittent every 8 hours    MEDICATIONS  (PRN):  acetaminophen     Tablet .. 650 milliGRAM(s) Oral every 6 hours PRN Temp greater or equal to 38C (100.4F), Moderate Pain (4 - 6)  clonazePAM  Tablet 0.5 milliGRAM(s) Oral two times a day PRN For anxiety        LABS                                            11.9                  Neurophils% (auto):   x      (07-19 @ 00:13):    7.27 )-----------(166          Lymphocytes% (auto):  x                                             35.6                   Eosinphils% (auto):   x        Manual%: Neutrophils x    ; Lymphocytes x    ; Eosinophils x    ; Bands%: x    ; Blasts x                                    139    |  103    |  26                  Calcium: 8.3   / iCa: x      (07-19 @ 00:13)    ----------------------------<  131       Magnesium: 2.4                              4.1     |  24     |  1.08             Phosphorous: 3.2      TPro  5.1    /  Alb  2.8    /  TBili  0.3    /  DBili  x      /  AST  39     /  ALT  64     /  AlkPhos  68     19 Jul 2023 00:13    ( 07-19 @ 06:44 )   PT: x    ;   INR: x      aPTT: 67.3 sec MICU Transfer Note    Transfer from: MICU    Transfer to: (  ) Medicine    (  ) Telemetry     (   ) RCU        (    ) Palliative         (   ) Stroke Unit          (   ) __________________    Accepting physican:      MICU COURSE:    Mr. Jalloh is a 66 y/o male with a PMHx of glioblastoma, seizure disorder, atrial fibrillation on xarelto, and HTN who had a seizure at a rehab facility and was brought in by EMS. He was given versed by EMS and upon arrival to ED, patient had a GCS of 6-7 and was unresponsive and was intubated. In the ED he was to be hypotensive and briefly requires pressor on arrival, ECHO demonstrated moderate reduced systolic function, and he was given vanc-zosyn for possible septic shock. Upon arrival to MICU, patient was extubated and tolerated it well. His infection work up including procal, wbc, bcx, ucx were not suspicious for infection and his abx was dc on 7/17. Video EEG noted no seizure activity. MICU stay complicated by decreased UOP and dark red urine noted on 7/18 AM that has been self improving.    ASSESSMENT & PLAN:     66 y/o male with a PMHx of glioblastoma, seizure disorder, HTN, and atrial fibrillation on xarelto presenting after seizure at rehab facility and intubation in ED admitted for acute respiratory failure and possible septic shock.    NEURO:    #Seizure disorder with hx of glioblastoma  -CT head negative for acute intracranial findings  -Neurology following, EEG demonstrated normal activity, dc on 7/18  -Ammonia 38 wnl  -Neuro checks per routine   -Continue home antiepileptic regimen of keppra, vimpat, clonazepam, valproic acid and dexamethasone. F/u with neuro for further recs.    PULM:    #Acute hypoxic and hypercarbic respiratory failure in s/o seizure  -Patient extubated on 7/16 and now on RA  -Chest CT without evidence of PE but shows left lower lobe atelectasis    CV:    #Shock likely 2/2 sepsis vs sedation vs cardiogenic  -Patient received versed en route to ED  -All cultures negative  -s/p Empiric abx coverage with cefepime and vancomycin  -TTE 7/16: LV: mildly reduced systolic function, estimate EF 45-50%. RV: moderately enlarged, mildly reduced systolic function.   - CT chest and POCUS with evidence of heart failure  - Off pressor since 7/17    #Troponin elevation likely 2/2 hypotension  -Troponin of 75 on admission  -BNP elevated and POCUS with evidence of acute HF    #Chronic atrial fibrillation  -Start metoprolol 12.5mg BID for rate control  -Keep Mg > 3  -Heparin gtt  -Will consider transition back to Xarelto     #Essential hypertension  -Withold HTN medications in setting of shock    GI:  -Tolerating PO well    RENAL:  -maintain urine output > 0.5cc/kg/hr   - Red urine noted on 7/18, self improved and became clearer on 7/19  - Condom cath in place  - UA showed RBC and protein, will hold off on nephrology/urology consult for now and monitor for improvement  - Cr 1.08 up from 0.6, continue to trend  - Monitor UOP  - Renal/Bladder POCUS wnl on 7/18    :  -Condom cath in place    ID:  #Potential septic shock  -See CV section  -Procal 2.0  -Infectious work up including bcx ucx negative  -s/p Vanc-zosyn in the ED, off abx since 7/17    ENDO:  #Mild hyperglycemia  -Glucose of 144 on admission  -CTM       HEME:  #Bilateral LE edema L>R  -Doppler negative    For Followup:  [ ] Monitor UOP  [ ] Trend Cr  [ ] Consider nephro/urology consult if Cr continues to uptrend / urine become darker  [ ] Consider transition back to Xarelto for AF    Vital Signs Last 24 Hrs  T(C): 36.4 (19 Jul 2023 12:00), Max: 36.9 (18 Jul 2023 16:00)  T(F): 97.6 (19 Jul 2023 12:00), Max: 98.4 (18 Jul 2023 16:00)  HR: 71 (19 Jul 2023 07:00) (71 - 144)  BP: 137/111 (19 Jul 2023 07:00) (100/68 - 150/86)  BP(mean): 93 (19 Jul 2023 04:00) (78 - 115)  RR: 17 (19 Jul 2023 07:00) (16 - 26)  SpO2: 99% (19 Jul 2023 07:00) (81% - 99%)    Parameters below as of 18 Jul 2023 20:00  Patient On (Oxygen Delivery Method): room air      I&O's Summary    18 Jul 2023 07:01 - 19 Jul 2023 07:00  --------------------------------------------------------  IN: 780 mL / OUT: 670 mL / NET: 110 mL    19 Jul 2023 07:01  -  19 Jul 2023 12:57  --------------------------------------------------------  IN: 313 mL / OUT: 400 mL / NET: -87 mL        MEDICATIONS  (STANDING):  atorvastatin 20 milliGRAM(s) Oral at bedtime  brimonidine 0.2% Ophthalmic Solution 1 Drop(s) Both EYES two times a day  chlorhexidine 4% Liquid 1 Application(s) Topical <User Schedule>  dexAMETHasone  Injectable 4 milliGRAM(s) IV Push daily  heparin  Infusion.  Unit(s)/Hr (17 mL/Hr) IV Continuous <Continuous>  insulin lispro (ADMELOG) corrective regimen sliding scale   SubCutaneous every 6 hours  lacosamide IVPB 200 milliGRAM(s) IV Intermittent every 12 hours  levETIRAcetam  IVPB 1000 milliGRAM(s) IV Intermittent every 12 hours  metoprolol tartrate 12.5 milliGRAM(s) Oral two times a day  pantoprazole  Injectable 40 milliGRAM(s) IV Push every 24 hours  polyethylene glycol 3350 17 Gram(s) Oral daily  senna 2 Tablet(s) Oral at bedtime  timolol 0.25% Solution 1 Drop(s) Both EYES two times a day  valproate sodium  IVPB 250 milliGRAM(s) IV Intermittent every 8 hours    MEDICATIONS  (PRN):  acetaminophen     Tablet .. 650 milliGRAM(s) Oral every 6 hours PRN Temp greater or equal to 38C (100.4F), Moderate Pain (4 - 6)  clonazePAM  Tablet 0.5 milliGRAM(s) Oral two times a day PRN For anxiety        LABS                                            11.9                  Neurophils% (auto):   x      (07-19 @ 00:13):    7.27 )-----------(166          Lymphocytes% (auto):  x                                             35.6                   Eosinphils% (auto):   x        Manual%: Neutrophils x    ; Lymphocytes x    ; Eosinophils x    ; Bands%: x    ; Blasts x                                    139    |  103    |  26                  Calcium: 8.3   / iCa: x      (07-19 @ 00:13)    ----------------------------<  131       Magnesium: 2.4                              4.1     |  24     |  1.08             Phosphorous: 3.2      TPro  5.1    /  Alb  2.8    /  TBili  0.3    /  DBili  x      /  AST  39     /  ALT  64     /  AlkPhos  68     19 Jul 2023 00:13    ( 07-19 @ 06:44 )   PT: x    ;   INR: x      aPTT: 67.3 sec MICU Transfer Note    Transfer from: MICU    Transfer to: (x ) Medicine    (  ) Telemetry     (   ) RCU        (    ) Palliative         (   ) Stroke Unit          (   ) __________________    Accepting physician:      MICU COURSE:    Mr. Jalloh is a 68 y/o male with a PMHx of glioblastoma, seizure disorder, atrial fibrillation on xarelto, and HTN who had a seizure at a rehab facility and was brought in by EMS. He was given versed by EMS and upon arrival to ED, patient had a GCS of 6-7 and was unresponsive and was intubated. In the ED he was to be hypotensive and briefly required pressor on arrival, ECHO demonstrated moderate reduced systolic function, and he was given vanc-zosyn for possible septic shock. Upon arrival to MICU, patient was extubated and tolerated it well. His infection work up including procal, wbc, bcx, ucx were not suspicious for infection and his abx was dc on 7/17. Video EEG noted no seizure activity. MICU stay complicated by decreased UOP and dark red urine noted on 7/18 AM that has been self improving.      Vital Signs Last 24 Hrs  T(C): 36.4 (19 Jul 2023 12:00), Max: 36.9 (18 Jul 2023 16:00)  T(F): 97.6 (19 Jul 2023 12:00), Max: 98.4 (18 Jul 2023 16:00)  HR: 71 (19 Jul 2023 07:00) (71 - 144)  BP: 137/111 (19 Jul 2023 07:00) (100/68 - 150/86)  BP(mean): 93 (19 Jul 2023 04:00) (78 - 115)  RR: 17 (19 Jul 2023 07:00) (16 - 26)  SpO2: 99% (19 Jul 2023 07:00) (81% - 99%)    Parameters below as of 18 Jul 2023 20:00  Patient On (Oxygen Delivery Method): room air      I&O's Summary    18 Jul 2023 07:01  -  19 Jul 2023 07:00  --------------------------------------------------------  IN: 780 mL / OUT: 670 mL / NET: 110 mL    19 Jul 2023 07:01  -  19 Jul 2023 12:57  --------------------------------------------------------  IN: 313 mL / OUT: 400 mL / NET: -87 mL        MEDICATIONS  (STANDING):  atorvastatin 20 milliGRAM(s) Oral at bedtime  brimonidine 0.2% Ophthalmic Solution 1 Drop(s) Both EYES two times a day  chlorhexidine 4% Liquid 1 Application(s) Topical <User Schedule>  dexAMETHasone  Injectable 4 milliGRAM(s) IV Push daily  heparin  Infusion.  Unit(s)/Hr (17 mL/Hr) IV Continuous <Continuous>  insulin lispro (ADMELOG) corrective regimen sliding scale   SubCutaneous every 6 hours  lacosamide IVPB 200 milliGRAM(s) IV Intermittent every 12 hours  levETIRAcetam  IVPB 1000 milliGRAM(s) IV Intermittent every 12 hours  metoprolol tartrate 12.5 milliGRAM(s) Oral two times a day  pantoprazole  Injectable 40 milliGRAM(s) IV Push every 24 hours  polyethylene glycol 3350 17 Gram(s) Oral daily  senna 2 Tablet(s) Oral at bedtime  timolol 0.25% Solution 1 Drop(s) Both EYES two times a day  valproate sodium  IVPB 250 milliGRAM(s) IV Intermittent every 8 hours    MEDICATIONS  (PRN):  acetaminophen     Tablet .. 650 milliGRAM(s) Oral every 6 hours PRN Temp greater or equal to 38C (100.4F), Moderate Pain (4 - 6)  clonazePAM  Tablet 0.5 milliGRAM(s) Oral two times a day PRN For anxiety        LABS                                            11.9                  Neurophils% (auto):   x      (07-19 @ 00:13):    7.27 )-----------(166          Lymphocytes% (auto):  x                                             35.6                   Eosinphils% (auto):   x        Manual%: Neutrophils x    ; Lymphocytes x    ; Eosinophils x    ; Bands%: x    ; Blasts x                                    139    |  103    |  26                  Calcium: 8.3   / iCa: x      (07-19 @ 00:13)    ----------------------------<  131       Magnesium: 2.4                              4.1     |  24     |  1.08             Phosphorous: 3.2      TPro  5.1    /  Alb  2.8    /  TBili  0.3    /  DBili  x      /  AST  39     /  ALT  64     /  AlkPhos  68     19 Jul 2023 00:13    ( 07-19 @ 06:44 )   PT: x    ;   INR: x      aPTT: 67.3 sec    ASSESSMENT & PLAN:     68 y/o male with a PMHx of glioblastoma, seizure disorder, HTN, and atrial fibrillation on xarelto presenting after seizure at rehab facility and intubation in ED admitted for acute respiratory failure and possible septic shock.    NEURO:    #Seizure disorder with hx of glioblastoma  -CT head negative for acute intracranial findings  -Neurology following, EEG demonstrated normal activity, dc on 7/18  -Ammonia 38 wnl  -Neuro checks per routine   -Continue home antiepileptic regimen of keppra, vimpat, clonazepam, valproic acid and dexamethasone. F/u with neuro for further recs.    PULM:    #Acute hypoxic and hypercarbic respiratory failure in s/o seizure  -Patient extubated on 7/16 and now on RA  -Chest CT without evidence of PE but shows left lower lobe atelectasis    CV:    #Shock likely 2/2 sepsis vs sedation vs cardiogenic  -Patient received versed en route to ED  -All cultures negative  -s/p Empiric abx coverage with cefepime and vancomycin  -TTE 7/16: LV: mildly reduced systolic function, estimate EF 45-50%. RV: moderately enlarged, mildly reduced systolic function.   - CT chest and POCUS with evidence of heart failure  - Off pressor since 7/17    #Troponin elevation likely 2/2 hypotension  -Troponin of 75 on admission  -BNP elevated and POCUS with evidence of acute HF    #Chronic atrial fibrillation  -c/w metoprolol 12.5mg BID for rate control  -Keep Mg > 2  -Heparin gtt  -Will consider transition back to Xarelto     #Essential hypertension  -Withold HTN medications in setting of shock    GI:  -Tolerating PO well    RENAL:  -maintain urine output > 0.5cc/kg/hr   - Red urine noted on 7/18, self improved and became clearer on 7/19  - Condom cath in place  - UA showed RBC and protein, will hold off on nephrology/urology consult for now and monitor for improvement  - Cr 1.08 up from 0.6, continue to trend  - Monitor UOP  - Renal/Bladder POCUS wnl on 7/18    :  -Condom cath in place    ID:  #Potential septic shock  -See CV section  -Procal 2.0  -Infectious work up including bcx ucx negative  -s/p Vanc-zosyn in the ED, off abx since 7/17    ENDO:  #Mild hyperglycemia  -Glucose of 144 on admission  -CTM       HEME:  #Bilateral LE edema L>R  -Doppler negative      FOLLOW UP:   [ ] Monitor UOP  [ ] Trend Cr  [ ] Consider nephro/urology consult if Cr continues to uptrend / urine become darker  [ ] Consider transition back to Xarelto for AF MICU Transfer Note    Transfer from: MICU    Transfer to: (x ) Medicine    (  ) Telemetry     (   ) RCU        (    ) Palliative         (   ) Stroke Unit          (   ) __________________    Accepting physician: Dr. Abdi Stephenson      MICU COURSE:    Mr. Jalloh is a 66 y/o male with a PMHx of glioblastoma, seizure disorder, atrial fibrillation on xarelto, and HTN who had a seizure at a rehab facility and was brought in by EMS. He was given versed by EMS and upon arrival to ED, patient had a GCS of 6-7 and was unresponsive and was intubated. In the ED he was to be hypotensive and briefly required pressor on arrival, ECHO demonstrated moderate reduced systolic function, and he was given vanc-zosyn for possible septic shock. Upon arrival to MICU, patient was extubated and tolerated it well. His infection work up including procal, wbc, bcx, ucx were not suspicious for infection and his abx was dc on 7/17. Video EEG noted no seizure activity. MICU stay complicated by decreased UOP and dark red urine noted on 7/18 AM that has been self improving.      Vital Signs Last 24 Hrs  T(C): 36.4 (19 Jul 2023 12:00), Max: 36.9 (18 Jul 2023 16:00)  T(F): 97.6 (19 Jul 2023 12:00), Max: 98.4 (18 Jul 2023 16:00)  HR: 71 (19 Jul 2023 07:00) (71 - 144)  BP: 137/111 (19 Jul 2023 07:00) (100/68 - 150/86)  BP(mean): 93 (19 Jul 2023 04:00) (78 - 115)  RR: 17 (19 Jul 2023 07:00) (16 - 26)  SpO2: 99% (19 Jul 2023 07:00) (81% - 99%)    Parameters below as of 18 Jul 2023 20:00  Patient On (Oxygen Delivery Method): room air      I&O's Summary    18 Jul 2023 07:01  -  19 Jul 2023 07:00  --------------------------------------------------------  IN: 780 mL / OUT: 670 mL / NET: 110 mL    19 Jul 2023 07:01  -  19 Jul 2023 12:57  --------------------------------------------------------  IN: 313 mL / OUT: 400 mL / NET: -87 mL        MEDICATIONS  (STANDING):  atorvastatin 20 milliGRAM(s) Oral at bedtime  brimonidine 0.2% Ophthalmic Solution 1 Drop(s) Both EYES two times a day  chlorhexidine 4% Liquid 1 Application(s) Topical <User Schedule>  dexAMETHasone  Injectable 4 milliGRAM(s) IV Push daily  heparin  Infusion.  Unit(s)/Hr (17 mL/Hr) IV Continuous <Continuous>  insulin lispro (ADMELOG) corrective regimen sliding scale   SubCutaneous every 6 hours  lacosamide IVPB 200 milliGRAM(s) IV Intermittent every 12 hours  levETIRAcetam  IVPB 1000 milliGRAM(s) IV Intermittent every 12 hours  metoprolol tartrate 12.5 milliGRAM(s) Oral two times a day  pantoprazole  Injectable 40 milliGRAM(s) IV Push every 24 hours  polyethylene glycol 3350 17 Gram(s) Oral daily  senna 2 Tablet(s) Oral at bedtime  timolol 0.25% Solution 1 Drop(s) Both EYES two times a day  valproate sodium  IVPB 250 milliGRAM(s) IV Intermittent every 8 hours    MEDICATIONS  (PRN):  acetaminophen     Tablet .. 650 milliGRAM(s) Oral every 6 hours PRN Temp greater or equal to 38C (100.4F), Moderate Pain (4 - 6)  clonazePAM  Tablet 0.5 milliGRAM(s) Oral two times a day PRN For anxiety        LABS                                            11.9                  Neurophils% (auto):   x      (07-19 @ 00:13):    7.27 )-----------(166          Lymphocytes% (auto):  x                                             35.6                   Eosinphils% (auto):   x        Manual%: Neutrophils x    ; Lymphocytes x    ; Eosinophils x    ; Bands%: x    ; Blasts x                                    139    |  103    |  26                  Calcium: 8.3   / iCa: x      (07-19 @ 00:13)    ----------------------------<  131       Magnesium: 2.4                              4.1     |  24     |  1.08             Phosphorous: 3.2      TPro  5.1    /  Alb  2.8    /  TBili  0.3    /  DBili  x      /  AST  39     /  ALT  64     /  AlkPhos  68     19 Jul 2023 00:13    ( 07-19 @ 06:44 )   PT: x    ;   INR: x      aPTT: 67.3 sec    ASSESSMENT & PLAN:     66 y/o male with a PMHx of glioblastoma, seizure disorder, HTN, and atrial fibrillation on xarelto presenting after seizure at rehab facility and intubation in ED admitted for acute respiratory failure and possible septic shock.    NEURO:    #Seizure disorder with hx of glioblastoma  -CT head negative for acute intracranial findings  -Neurology following, EEG demonstrated normal activity, dc on 7/18  -Ammonia 38 wnl  -Neuro checks per routine   -Continue home antiepileptic regimen of keppra, vimpat, clonazepam, valproic acid and dexamethasone. F/u with neuro for further recs.    PULM:    #Acute hypoxic and hypercarbic respiratory failure in s/o seizure  -Patient extubated on 7/16 and now on RA  -Chest CT without evidence of PE but shows left lower lobe atelectasis    CV:    #Shock likely 2/2 sepsis vs sedation vs cardiogenic  -Patient received versed en route to ED  -All cultures negative  -s/p Empiric abx coverage with cefepime and vancomycin  -TTE 7/16: LV: mildly reduced systolic function, estimate EF 45-50%. RV: moderately enlarged, mildly reduced systolic function.   - CT chest and POCUS with evidence of heart failure  - Off pressor since 7/17    #Troponin elevation likely 2/2 hypotension  -Troponin of 75 on admission  -BNP elevated and POCUS with evidence of acute HF    #Chronic atrial fibrillation  -c/w metoprolol 12.5mg BID for rate control  -Keep Mg > 2  -Heparin gtt  -Will consider transition back to Xarelto     #Essential hypertension  -Withold HTN medications in setting of shock    GI:  -Tolerating PO well    RENAL:  -maintain urine output > 0.5cc/kg/hr   - Red urine noted on 7/18, self improved and became clearer on 7/19  - Condom cath in place  - UA showed RBC and protein, will hold off on nephrology/urology consult for now and monitor for improvement  - Cr 1.08 up from 0.6, continue to trend  - Monitor UOP  - Renal/Bladder POCUS wnl on 7/18    :  -Condom cath in place    ID:  #Potential septic shock  -See CV section  -Procal 2.0  -Infectious work up including bcx ucx negative  -s/p Vanc-zosyn in the ED, off abx since 7/17    ENDO:  #Mild hyperglycemia  -Glucose of 144 on admission  -CTM       HEME:  #Bilateral LE edema L>R  -Doppler negative      FOLLOW UP:   [ ] Monitor UOP  [ ] Trend Cr  [ ] Consider nephro/urology consult if Cr continues to uptrend / urine become darker  [ ] Consider transition back to Xarelto for AF MICU Transfer Note    Transfer from: MICU    Transfer to: (x ) Medicine    (  ) Telemetry     (   ) RCU        (    ) Palliative         (   ) Stroke Unit          (   ) __________________    Accepting physician: Dr. Abdi Stephenson      MICU COURSE:    Mr. Jalloh is a 66 y/o male with a PMHx of glioblastoma, seizure disorder, atrial fibrillation on xarelto, and HTN who had a seizure at a rehab facility and was brought in by EMS. He was given versed by EMS and upon arrival to ED, patient had a GCS of 6-7 and was unresponsive and was intubated. In the ED he was to be hypotensive and briefly required pressor on arrival, ECHO demonstrated moderate reduced systolic function, and he was given vanc-zosyn for possible septic shock. Upon arrival to MICU, patient was extubated and tolerated it well. His infection work up including procal, wbc, bcx, ucx were not suspicious for infection and his abx was dc on 7/17. Video EEG noted no seizure activity. MICU stay complicated by decreased UOP and dark red urine noted on 7/18 AM that has been self improving.      Vital Signs Last 24 Hrs  T(C): 36.4 (19 Jul 2023 12:00), Max: 36.9 (18 Jul 2023 16:00)  T(F): 97.6 (19 Jul 2023 12:00), Max: 98.4 (18 Jul 2023 16:00)  HR: 71 (19 Jul 2023 07:00) (71 - 144)  BP: 137/111 (19 Jul 2023 07:00) (100/68 - 150/86)  BP(mean): 93 (19 Jul 2023 04:00) (78 - 115)  RR: 17 (19 Jul 2023 07:00) (16 - 26)  SpO2: 99% (19 Jul 2023 07:00) (81% - 99%)    Parameters below as of 18 Jul 2023 20:00  Patient On (Oxygen Delivery Method): room air      I&O's Summary    18 Jul 2023 07:01  -  19 Jul 2023 07:00  --------------------------------------------------------  IN: 780 mL / OUT: 670 mL / NET: 110 mL    19 Jul 2023 07:01  -  19 Jul 2023 12:57  --------------------------------------------------------  IN: 313 mL / OUT: 400 mL / NET: -87 mL        MEDICATIONS  (STANDING):  atorvastatin 20 milliGRAM(s) Oral at bedtime  brimonidine 0.2% Ophthalmic Solution 1 Drop(s) Both EYES two times a day  chlorhexidine 4% Liquid 1 Application(s) Topical <User Schedule>  dexAMETHasone  Injectable 4 milliGRAM(s) IV Push daily  heparin  Infusion.  Unit(s)/Hr (17 mL/Hr) IV Continuous <Continuous>  insulin lispro (ADMELOG) corrective regimen sliding scale   SubCutaneous every 6 hours  lacosamide IVPB 200 milliGRAM(s) IV Intermittent every 12 hours  levETIRAcetam  IVPB 1000 milliGRAM(s) IV Intermittent every 12 hours  metoprolol tartrate 12.5 milliGRAM(s) Oral two times a day  pantoprazole  Injectable 40 milliGRAM(s) IV Push every 24 hours  polyethylene glycol 3350 17 Gram(s) Oral daily  senna 2 Tablet(s) Oral at bedtime  timolol 0.25% Solution 1 Drop(s) Both EYES two times a day  valproate sodium  IVPB 250 milliGRAM(s) IV Intermittent every 8 hours    MEDICATIONS  (PRN):  acetaminophen     Tablet .. 650 milliGRAM(s) Oral every 6 hours PRN Temp greater or equal to 38C (100.4F), Moderate Pain (4 - 6)  clonazePAM  Tablet 0.5 milliGRAM(s) Oral two times a day PRN For anxiety        LABS                                            11.9                  Neurophils% (auto):   x      (07-19 @ 00:13):    7.27 )-----------(166          Lymphocytes% (auto):  x                                             35.6                   Eosinphils% (auto):   x        Manual%: Neutrophils x    ; Lymphocytes x    ; Eosinophils x    ; Bands%: x    ; Blasts x                                    139    |  103    |  26                  Calcium: 8.3   / iCa: x      (07-19 @ 00:13)    ----------------------------<  131       Magnesium: 2.4                              4.1     |  24     |  1.08             Phosphorous: 3.2      TPro  5.1    /  Alb  2.8    /  TBili  0.3    /  DBili  x      /  AST  39     /  ALT  64     /  AlkPhos  68     19 Jul 2023 00:13    ( 07-19 @ 06:44 )   PT: x    ;   INR: x      aPTT: 67.3 sec    ASSESSMENT & PLAN:     66 y/o male with a PMHx of glioblastoma, seizure disorder, HTN, and atrial fibrillation on xarelto presenting after seizure at rehab facility and intubation in ED admitted for acute respiratory failure and possible septic shock. MICU stay complicated by decreased UOP and dark red urine that is improving.    NEURO  #Seizure disorder with hx of glioblastoma  -CT head negative for acute intracranial findings  -Neurology following, EEG demonstrated normal activity, dc on 7/18  -Ammonia 38 wnl  -Neuro checks per routine   -Continue home antiepileptic regimen of keppra, vimpat, clonazepam, valproic acid and dexamethasone.    PULM  #Acute hypoxic and hypercarbic respiratory failure - Resolved  - i/s/o seizure  - Chest CT without evidence of PE but shows left lower lobe atelectasis  - intubated and then extubated on 7/16  - Sating well on RA    CV  #Shock - Resolved  - likely 2/2 sepsis vs sedation vs cardiogenic  - TTE 7/16: LV: mildly reduced systolic function, estimate EF 45-50%. RV: moderately enlarged, mildly reduced systolic function.   - CT chest and POCUS with evidence of heart failure  - Patient received versed en route to ED  - Blood cultures and sputum cultures negative  - s/p Empiric abx coverage with cefepime and vancomycin  - Off pressor  - Patient does not appear overtly fluid overloaded, holding diuresis    #CHF  - TTE 7/16: LV: mildly reduced systolic function, estimate EF 45-50%. RV: moderately enlarged, mildly reduced systolic function.   - CT chest and POCUS with evidence of heart failure  - c/w Metoprolol 25mg bid, titrate as needed for rate control  - Appreciate cardiology reccs, will hold off losartan for now and wait for Cr, UOP, urine color improvement, will start losartan 25qd once renal function stabilizes  - Repeat TTE once patient stablizes    #Troponin elevation - Resolved  - Troponin of 75 on admission  - Likely i/s/o hypotension  - BNP elevated and POCUS with evidence of acute HF    #Chronic atrial fibrillation  - c/w Metoprolol 25mg bid, titrate as needed for rate control  - Keep Mg > 2  - Transition from Heparin gtt back to Xarelto 20 qD (7/20)    #Essential hypertension  - c/w Metoprolol 25mg BID  - Appreciate cardiology reccs, will hold off losartan for now and wait for Cr, UOP, urine color improvement, will start losartan 25qd once renal function stablizes    GI  - Regular diet    RENAL    -maintain urine output > 0.5cc/kg/hr     #Hematuria  - Dark red urine and decreased UOP noted on 7/18 - improving  - UA (7/18) showed RBC and protein, will hold off on nephrology/urology consult for now and monitor for improvement  - Renal/Bladder POCUS wnl on 7/18  - Improving UOP (~0.5 cc/kg/hr), color now orange (7/20)  - Condom cath in place  - Downtrended Cr 1.08 -> 0.71  - Monitor UOP      -Condom cath in place    ID  #Potential septic shock - resolved  -See CV section  - Procal 2.0  - Infectious work-up pending. bcx and sputum culture drawn on 7/16, NGTD  - dc antibiotics    ENDO  #Mild hyperglycemia  -Glucose of 144 on admission  -CTM     HEME  - Xarelto 20mg qd for chronic AF    #Bilateral LE edema L>R  -Doppler negative    FOLLOW UP:   [ ] Monitor UOP and urine color  [ ] Trend Cr  [ ] Consider nephro/urology consult if Cr continues to uptrend / urine become darker  [ ] Restart losartan at 25mg qd once hematuria improves and Cr normalizes  [ ] c/w Metoprolol for AF rate control, titrate as needed

## 2023-07-20 NOTE — PROGRESS NOTE ADULT - ATTENDING COMMENTS
68 y/o male with a PMHx of glioblastoma, seizure disorder, HTN, and chronic atrial fibrillation on xarelto who presented after seizure at rehab, found to have new biventricular dysfunction (LVEF 45-50% in setting of rapid AF, and moderately enlarged RV with decreased RV function).     He denies any chest pain or SOB today.  He has 2+ LE edema up to knees b/l. His lungs are clear.  Initial EKG shows Afib with RVR in 120s-130s  Trop 75 to 79  CTA negative for PE on 7/16/23    1) Afib with RVR  2) Acute systolic HF exacerbation with RV dysfunction  3) Elevated troponins  4) GBM, seizures  5) Possible sepsis  - Unclear etiology for newly reduced EF, ?stress-mediated in setting of seizures, possible sepsis  - He has LE edema on exam, would give diuretics to keep slightly net negative  - On metoprolol tartrate 12.5mg BID  - Can start losartan 25mg daily for GDMT  - Back on Xarelto for Afib  - Once HRs better controlled and acute issues resolve, repeat limited TTE to reassess LV function  - Appreciate MICU care and neuro recs

## 2023-07-20 NOTE — PROGRESS NOTE ADULT - ASSESSMENT
******Note incomplete pending attending signature*******    68 y/o male with a PMHx of glioblastoma, seizure disorder, HTN, and atrial fibrillation on xarelto presenting after seizure at rehab facility and intubation in ED admitted for airway protection and possible septic shock. Cardiology consulted with c/f new CHF. On exam, pt appears mildly cool and with 1+ pitting edema bilaterally. Primary team diuresing with bumex gtt and overall pt remains febrile and pressor dependent, overall appears largely concern for septic shock in the setting of witnessed seizure activity. BNP elevated to 2800 compared to baseline, bedside echo shows reduced LVOT VTI per MICU team, visually with concern for RV enlargement, suggestive of cardiomyopathy possible stress-mediated. TTE showing EF 45-50%, moderate LVH, decreased RV systolic function.     #Afib RVR  #C/f new CHF, RV dysfunction    Recommendations:   - TTE showing EF 45-50%, moderate LVH, decreased RV systolic function.   - Continue heparin for AC given Afib  - can consider digoxin if afib RVR uncontrolled  - diurese to keep patient net even  - metoprolol tartrate increased to 25 mg PO BID  - Will need repeat limited TTE for LV function once HR improved.   - Will plan to start GDMT pending clinical course.   - CTM on telemetry  - K> 4, Mg> 2    Discussed with daughter, reintroducing medications, optimizing medically, plan to repeat TTE once HR well controlled.  ******Note incomplete pending attending signature*******    66 y/o male with a PMHx of glioblastoma, seizure disorder, HTN, and atrial fibrillation on xarelto presenting after seizure at rehab facility and intubation in ED admitted for airway protection and possible septic shock. Cardiology consulted with c/f new CHF. On exam, pt appears mildly cool and with 1+ pitting edema bilaterally. Primary team diuresing with bumex gtt and overall pt remains febrile and pressor dependent, overall appears largely concern for septic shock in the setting of witnessed seizure activity. BNP elevated to 2800 compared to baseline, bedside echo shows reduced LVOT VTI per MICU team, visually with concern for RV enlargement, suggestive of cardiomyopathy possible stress-mediated. TTE showing EF 45-50%, moderate LVH, decreased RV systolic function.     #Afib RVR  #C/f new CHF, RV dysfunction    Recommendations:   - TTE showing EF 45-50%, moderate LVH, decreased RV systolic function.   - Continue heparin for AC given Afib  - can consider digoxin if afib RVR uncontrolled  - Will need repeat limited TTE for LV function once HR improved.   - Will plan to start GDMT pending clinical course.   - CTM on telemetry  - K> 4, Mg> 2  - metoprolol tartrate increased to 25 mg PO BID  - Diuresis to keep patient net even  - Start losartan 25 mg PO QD    Discussed with daughter, reintroducing medications, optimizing medically, plan to repeat TTE once HR well controlled.

## 2023-07-20 NOTE — PROGRESS NOTE ADULT - SUBJECTIVE AND OBJECTIVE BOX
CHIEF COMPLAINT:    Interval Events:    REVIEW OF SYSTEMS:  CONSTITUTIONAL: No weakness, fevers or chills  EYES/ENT: No visual changes;  No vertigo or throat pain   NECK: No pain or stiffness  RESPIRATORY: No cough, wheezing, hemoptysis; No shortness of breath  CARDIOVASCULAR: No chest pain or palpitations  GASTROINTESTINAL: No abdominal or epigastric pain. No nausea, vomiting, or hematemesis; No diarrhea or constipation. No melena or hematochezia.  GENITOURINARY: No dysuria, frequency or hematuria  NEUROLOGICAL: No numbness or weakness  SKIN: No itching, burning, rashes, or lesions   All other review of systems is negative unless indicated above.    OBJECTIVE:  ICU Vital Signs Last 24 Hrs  T(C): 36.5 (20 Jul 2023 16:02), Max: 36.7 (20 Jul 2023 00:00)  T(F): 97.7 (20 Jul 2023 16:02), Max: 98 (20 Jul 2023 00:00)  HR: 95 (20 Jul 2023 16:02) (66 - 101)  BP: 144/93 (20 Jul 2023 16:02) (121/77 - 156/82)  BP(mean): 98 (20 Jul 2023 12:00) (95 - 101)  ABP: --  ABP(mean): --  RR: 24 (20 Jul 2023 16:02) (15 - 30)  SpO2: 99% (20 Jul 2023 16:02) (94% - 99%)    O2 Parameters below as of 20 Jul 2023 16:02  Patient On (Oxygen Delivery Method): room air              07-19 @ 07:01 - 07-20 @ 07:00  --------------------------------------------------------  IN: 1012 mL / OUT: 1500 mL / NET: -488 mL    07-20 @ 07:01 - 07-20 @ 18:45  --------------------------------------------------------  IN: 1154 mL / OUT: 1000 mL / NET: 154 mL      CAPILLARY BLOOD GLUCOSE      POCT Blood Glucose.: 107 mg/dL (20 Jul 2023 17:48)      PHYSICAL EXAM:  General: WN/WD NAD  Neurology: A&Ox3, nonfocal, LOYOLA x 4  Eyes: PERRLA/ EOMI, Gross vision intact  ENT/Neck: Neck supple, trachea midline, No JVD, Gross hearing intact  Respiratory: CTA B/L, No wheezing, rales, rhonchi  CV: RRR, +S1/S2, -S3/S4, no murmurs, rubs or gallops  Abdominal: Soft, NT, ND +BS, No HSM  MSK: 5/5 strength UE/LE bilaterally  Extremities: No edema, 2+ peripheral pulses  Skin: No Rashes, Hematoma, Ecchymosis  Incisions:   Tubes:    HOSPITAL MEDICATIONS:  MEDICATIONS  (STANDING):  atorvastatin 20 milliGRAM(s) Oral at bedtime  brimonidine 0.2% Ophthalmic Solution 1 Drop(s) Both EYES two times a day  dexAMETHasone  Injectable 4 milliGRAM(s) IV Push daily  insulin lispro (ADMELOG) corrective regimen sliding scale   SubCutaneous three times a day before meals  insulin lispro (ADMELOG) corrective regimen sliding scale   SubCutaneous at bedtime  lacosamide IVPB 200 milliGRAM(s) IV Intermittent every 12 hours  levETIRAcetam  IVPB 1000 milliGRAM(s) IV Intermittent every 12 hours  metoprolol tartrate 25 milliGRAM(s) Oral two times a day  pantoprazole  Injectable 40 milliGRAM(s) IV Push every 24 hours  polyethylene glycol 3350 17 Gram(s) Oral daily  rivaroxaban 20 milliGRAM(s) Oral daily  senna 2 Tablet(s) Oral at bedtime  timolol 0.25% Solution 1 Drop(s) Both EYES two times a day  valproate sodium  IVPB 250 milliGRAM(s) IV Intermittent every 8 hours    MEDICATIONS  (PRN):  acetaminophen     Tablet .. 650 milliGRAM(s) Oral every 6 hours PRN Temp greater or equal to 38C (100.4F), Moderate Pain (4 - 6)  aluminum hydroxide/magnesium hydroxide/simethicone Suspension 30 milliLiter(s) Oral every 4 hours PRN Dyspepsia  clonazePAM  Tablet 0.5 milliGRAM(s) Oral two times a day PRN For anxiety  melatonin 3 milliGRAM(s) Oral at bedtime PRN Insomnia      LABS:                        11.8   7.53  )-----------( 159      ( 20 Jul 2023 00:26 )             35.8     Hgb Trend: 11.8<--, 11.9<--, 12.1<--, 13.0<--, 15.3<--  07-20    142  |  105  |  17  ----------------------------<  109<H>  4.0   |  26  |  0.71    Ca    8.3<L>      20 Jul 2023 00:26  Phos  2.1     07-20  Mg     2.0     07-20    TPro  5.0<L>  /  Alb  2.8<L>  /  TBili  0.2  /  DBili  x   /  AST  34  /  ALT  66<H>  /  AlkPhos  73  07-20    Creatinine Trend: 0.71<--, 1.08<--, 0.60<--, 0.66<--, 0.57<--, 0.64<--  PT/INR - ( 20 Jul 2023 00:26 )   PT: 12.1 sec;   INR: 1.04 ratio         PTT - ( 20 Jul 2023 00:26 )  PTT:82.6 sec  Urinalysis Basic - ( 20 Jul 2023 00:26 )    Color: x / Appearance: x / SG: x / pH: x  Gluc: 109 mg/dL / Ketone: x  / Bili: x / Urobili: x   Blood: x / Protein: x / Nitrite: x   Leuk Esterase: x / RBC: x / WBC x   Sq Epi: x / Non Sq Epi: x / Bacteria: x        Venous Blood Gas:  07-20 @ 00:18  7.39/48/40/29/68.1  VBG Lactate: 3.9  Venous Blood Gas:  07-19 @ 00:00  7.43/44/39/29/67.6  VBG Lactate: 2.7      MICROBIOLOGY:       RADIOLOGY:  [ ] Reviewed by me   CHIEF COMPLAINT: Seizure    Interval Events: Patient reported no issues at the moment.     REVIEW OF SYSTEMS:  CONSTITUTIONAL: Left sided weakness, fevers or chills  EYES/ENT: No visual changes;  No vertigo or throat pain   NECK: No pain or stiffness  RESPIRATORY: No cough, wheezing, hemoptysis; No shortness of breath  CARDIOVASCULAR: No chest pain or palpitations  GASTROINTESTINAL: No abdominal or epigastric pain. No nausea, vomiting, or hematemesis; No diarrhea or constipation. No melena or hematochezia.  GENITOURINARY: No dysuria, frequency or hematuria  NEUROLOGICAL: No numbness or weakness  SKIN: No itching, burning, rashes, or lesions   All other review of systems is negative unless indicated above.    OBJECTIVE:  ICU Vital Signs Last 24 Hrs  T(C): 36.5 (20 Jul 2023 16:02), Max: 36.7 (20 Jul 2023 00:00)  T(F): 97.7 (20 Jul 2023 16:02), Max: 98 (20 Jul 2023 00:00)  HR: 95 (20 Jul 2023 16:02) (66 - 101)  BP: 144/93 (20 Jul 2023 16:02) (121/77 - 156/82)  BP(mean): 98 (20 Jul 2023 12:00) (95 - 101)  ABP: --  ABP(mean): --  RR: 24 (20 Jul 2023 16:02) (15 - 30)  SpO2: 99% (20 Jul 2023 16:02) (94% - 99%)    O2 Parameters below as of 20 Jul 2023 16:02  Patient On (Oxygen Delivery Method): room air              07-19 @ 07:01 - 07-20 @ 07:00  --------------------------------------------------------  IN: 1012 mL / OUT: 1500 mL / NET: -488 mL    07-20 @ 07:01 - 07-20 @ 18:45  --------------------------------------------------------  IN: 1154 mL / OUT: 1000 mL / NET: 154 mL      CAPILLARY BLOOD GLUCOSE      POCT Blood Glucose.: 107 mg/dL (20 Jul 2023 17:48)      PHYSICAL EXAM:  General: WN/WD NAD  Neurology: A&Ox3, Left sided global weakness   Eyes: PERRLA/ EOMI, Gross vision intact  ENT/Neck: Neck supple, trachea midline, No JVD, Gross hearing intact  Respiratory: CTA B/L, No wheezing, rales, rhonchi  CV: Irregular irregular ryhthym    Abdominal: Soft, NT, ND +BS, No HSM  MSK: 3/5 left sided   Extremities: +2 edema   Skin: No Rashes, Hematoma, Ecchymosis  Incisions:   Tubes:    HOSPITAL MEDICATIONS:  MEDICATIONS  (STANDING):  atorvastatin 20 milliGRAM(s) Oral at bedtime  brimonidine 0.2% Ophthalmic Solution 1 Drop(s) Both EYES two times a day  dexAMETHasone  Injectable 4 milliGRAM(s) IV Push daily  insulin lispro (ADMELOG) corrective regimen sliding scale   SubCutaneous three times a day before meals  insulin lispro (ADMELOG) corrective regimen sliding scale   SubCutaneous at bedtime  lacosamide IVPB 200 milliGRAM(s) IV Intermittent every 12 hours  levETIRAcetam  IVPB 1000 milliGRAM(s) IV Intermittent every 12 hours  metoprolol tartrate 25 milliGRAM(s) Oral two times a day  pantoprazole  Injectable 40 milliGRAM(s) IV Push every 24 hours  polyethylene glycol 3350 17 Gram(s) Oral daily  rivaroxaban 20 milliGRAM(s) Oral daily  senna 2 Tablet(s) Oral at bedtime  timolol 0.25% Solution 1 Drop(s) Both EYES two times a day  valproate sodium  IVPB 250 milliGRAM(s) IV Intermittent every 8 hours    MEDICATIONS  (PRN):  acetaminophen     Tablet .. 650 milliGRAM(s) Oral every 6 hours PRN Temp greater or equal to 38C (100.4F), Moderate Pain (4 - 6)  aluminum hydroxide/magnesium hydroxide/simethicone Suspension 30 milliLiter(s) Oral every 4 hours PRN Dyspepsia  clonazePAM  Tablet 0.5 milliGRAM(s) Oral two times a day PRN For anxiety  melatonin 3 milliGRAM(s) Oral at bedtime PRN Insomnia      LABS:                        11.8   7.53  )-----------( 159      ( 20 Jul 2023 00:26 )             35.8     Hgb Trend: 11.8<--, 11.9<--, 12.1<--, 13.0<--, 15.3<--  07-20    142  |  105  |  17  ----------------------------<  109<H>  4.0   |  26  |  0.71    Ca    8.3<L>      20 Jul 2023 00:26  Phos  2.1     07-20  Mg     2.0     07-20    TPro  5.0<L>  /  Alb  2.8<L>  /  TBili  0.2  /  DBili  x   /  AST  34  /  ALT  66<H>  /  AlkPhos  73  07-20    Creatinine Trend: 0.71<--, 1.08<--, 0.60<--, 0.66<--, 0.57<--, 0.64<--  PT/INR - ( 20 Jul 2023 00:26 )   PT: 12.1 sec;   INR: 1.04 ratio         PTT - ( 20 Jul 2023 00:26 )  PTT:82.6 sec  Urinalysis Basic - ( 20 Jul 2023 00:26 )    Color: x / Appearance: x / SG: x / pH: x  Gluc: 109 mg/dL / Ketone: x  / Bili: x / Urobili: x   Blood: x / Protein: x / Nitrite: x   Leuk Esterase: x / RBC: x / WBC x   Sq Epi: x / Non Sq Epi: x / Bacteria: x        Venous Blood Gas:  07-20 @ 00:18  7.39/48/40/29/68.1  VBG Lactate: 3.9  Venous Blood Gas:  07-19 @ 00:00  7.43/44/39/29/67.6  VBG Lactate: 2.7      MICROBIOLOGY:       RADIOLOGY:  [ ] Reviewed by me

## 2023-07-20 NOTE — PROGRESS NOTE ADULT - ASSESSMENT
68 y/o male with a PMHx of glioblastoma, seizure disorder, HTN, and atrial fibrillation on xarelto presenting after seizure at rehab facility and intubation in ED admitted for acute respiratory failure and possible septic shock.

## 2023-07-20 NOTE — CHART NOTE - NSCHARTNOTEFT_GEN_A_CORE
MAR Accept Note  Transfer to:  Medicine  Accepting Attending Physician:  Dr. Abdi Stephenson  Assigned Room:  508D    Patient seen and examined.   Labs and data reviewed.   No findings precluding transfer of service.       HPI/MICU COURSE:   Mr. Jalloh is a 68 y/o male with a PMHx of glioblastoma, seizure disorder, atrial fibrillation on xarelto, and HTN who had a seizure at a rehab facility and was brought in by EMS. He was given versed by EMS and upon arrival to ED, patient had a GCS of 6-7 and was unresponsive and was intubated. In the ED he was to be hypotensive and briefly required pressor on arrival, ECHO demonstrated moderate reduced systolic function, and he was given vanc-zosyn for possible septic shock. Upon arrival to MICU, patient was extubated and tolerated it well. His infection work up including procal, wbc, bcx, ucx were not suspicious for infection and his abx was dc on 7/17. Video EEG noted no seizure activity. MICU stay complicated by decreased UOP and dark red urine noted on 7/18 AM that has been self improving.      FOR FOLLOW-UP:  [ ] Monitor UOP and urine color  [ ] Trend Cr  [ ] Consider nephro/urology consult if Cr continues to uptrend / urine become darker  [ ] Restart losartan at 25mg qd once hematuria improves and Cr normalizes  [ ] c/w Metoprolol for AF rate control, titrate as needed.    Jean Paul Guidry MD  Internal Medicine PGY3/MAR

## 2023-07-20 NOTE — CHART NOTE - NSCHARTNOTEFT_GEN_A_CORE
*Full note to follow 68 y/o male with a PMHx of glioblastoma, seizure disorder, atrial fibrillation on xarelto, and HTN who had a seizure at a rehab facility and was brought in by EMS. He was given versed by EMS and upon arrival to ED, patient had a GCS of 6-7 and was unresponsive and was intubated. In the ED he was to be hypotensive and briefly required pressor on arrival, ECHO demonstrated moderate reduced systolic function, and he was given vanc-zosyn for possible septic shock. Upon arrival to MICU, patient was extubated and tolerated it well. His infection work up including procal, wbc, bcx, ucx were not suspicious for infection and his abx was dc on 7/17. Video EEG noted no seizure activity. MICU stay complicated by decreased UOP and dark red urine noted on 7/18 AM that has been self improving.    Pt seen for initial bedside swallow evaluation on 7/18 with recommendations for an easy to chew diet.    New order received for swallow evaluation today. Per discussion with RD and MD, pt requesting diet advancement. Pt encountered in bed, awake/alert, on room air. +Right leaning. VSS. A&Ox4. L-side weakness, L-facial droop. Speech is intelligible. Pt reports good tolerance of easy to chew textures but that he would like to be on a regular diet. Pt provided with PO trials of regular solids and thin liquids via straw. There is prolonged yet functional mastication of solids; swallow profile otherwise WFL - palpable pharyngeal swallow trigger which is judged to be timely, no overt signs of laryngeal penetration/aspiration observed. Safe swallow strategies discussed with patient who expressed understanding. Pt left in NAD.     Impressions: Pt presents with mild oral phase deficits, however, appears safe to tolerate a regular diet.    Recommendations:  1. Regular diet  2. Monitor for s/s aspiration/laryngeal penetration. If noted:  D/C p.o. intake, provide non-oral nutrition/hydration/meds, and contact this service @ [phone]  3. Good oral care     MICU MD made aware     Betty Boss CCC-SLP ([employer])

## 2023-07-20 NOTE — PROGRESS NOTE ADULT - SUBJECTIVE AND OBJECTIVE BOX
Patient is a 67y old  Male who presents with a chief complaint of seizure (19 Jul 2023 11:27)      24 hour events: ***    REVIEW OF SYSTEMS:  Constitutional: [ ] fevers [ ] chills [ ] weight loss [ ] weight gain  HEENT: [ ] dry eyes [ ] eye irritation [ ] postnasal drip [ ] nasal congestion  CV: [ ] chest pain [ ] orthopnea [ ] palpitations [ ] murmur  Resp: [ ] cough [ ] shortness of breath [ ] dyspnea [ ] wheezing [ ] sputum [ ] hemoptysis  GI: [ ] nausea [ ] vomiting [ ] diarrhea [ ] constipation [ ] abd pain [ ] dysphagia   : [ ] dysuria [ ] nocturia [ ] hematuria [ ] increased urinary frequency  Musculoskeletal: [ ] back pain [ ] myalgias [ ] arthralgias [ ] fracture  Skin: [ ] rash [ ] itch  Neurological: [ ] headache [ ] dizziness [ ] syncope [ ] weakness [ ] numbness  Psychiatric: [ ] anxiety [ ] depression  Endocrine: [ ] diabetes [ ] thyroid problem  Hematologic/Lymphatic: [ ] anemia [ ] bleeding problem  Allergic/Immunologic: [ ] itchy eyes [ ] nasal discharge [ ] hives [ ] angioedema  [ ] All other systems negative  [ ] Unable to assess ROS because ________    OBJECTIVE:  ICU Vital Signs Last 24 Hrs  T(C): 36.6 (20 Jul 2023 07:00), Max: 36.7 (20 Jul 2023 00:00)  T(F): 97.9 (20 Jul 2023 07:00), Max: 98 (20 Jul 2023 00:00)  HR: 66 (20 Jul 2023 07:00) (66 - 123)  BP: 156/82 (20 Jul 2023 07:00) (121/77 - 156/82)  BP(mean): 95 (20 Jul 2023 04:00) (95 - 101)  ABP: --  ABP(mean): --  RR: 18 (20 Jul 2023 07:00) (15 - 31)  SpO2: 98% (20 Jul 2023 07:00) (91% - 99%)          07-19 @ 07:01  -  07-20 @ 07:00  --------------------------------------------------------  IN: 1012 mL / OUT: 1500 mL / NET: -488 mL    07-20 @ 07:01 - 07-20 @ 10:22  --------------------------------------------------------  IN: 304 mL / OUT: 350 mL / NET: -46 mL      CAPILLARY BLOOD GLUCOSE      POCT Blood Glucose.: 97 mg/dL (20 Jul 2023 06:01)      PHYSICAL EXAM:  GENERAL: NAD, well-developed  HEAD:  Atraumatic, Normocephalic  EYES: EOMI, PERRLA, conjunctiva and sclera clear  NECK: Supple, No JVD, Thyroid not palpable, non tender, Trachea midline  CHEST/LUNG: ( )ETT in place, ( )Tracheostomy in place, ( )no chest deformity,  ( )  Normal expansion/effort/palpation,  ( )Normal percussion/auscultation,  Clear to auscultation bilaterally; No wheeze  HEART: Regular rate and rhythm; No murmurs, rubs, or gallops, ( ) No JVD, ( )Normal Pulses, ( )Edema   ABDOMEN: Soft, Nontender, Nondistended; Bowel sounds presen, ( ) No Masses, (  ) No organomegaly,  (  ) Non-tender normal BS   : Lora in Place, Voiding freely  Musculoskeletal/EXTREMITIES:(  ) Normal strength, movement, and tone, (  ) No focal atropy, (  ) Normal ROM, (  ) Normal digits and nails,  2+ Peripheral Pulses, No clubbing, cyanosis, or edema  PSYCH: AAOx3, (  ) Normal mood/affect/judgment/insight, (  ) Intact memory,   NEUROLOGY: non-focal, exam  SKIN: No rashes or lesions      LINES:    HOSPITAL MEDICATIONS:  MEDICATIONS  (STANDING):  atorvastatin 20 milliGRAM(s) Oral at bedtime  brimonidine 0.2% Ophthalmic Solution 1 Drop(s) Both EYES two times a day  chlorhexidine 4% Liquid 1 Application(s) Topical <User Schedule>  dexAMETHasone  Injectable 4 milliGRAM(s) IV Push daily  heparin  Infusion. 1200 Unit(s)/Hr (12 mL/Hr) IV Continuous <Continuous>  insulin lispro (ADMELOG) corrective regimen sliding scale   SubCutaneous every 6 hours  lacosamide IVPB 200 milliGRAM(s) IV Intermittent every 12 hours  levETIRAcetam  IVPB 1000 milliGRAM(s) IV Intermittent every 12 hours  metoprolol tartrate 25 milliGRAM(s) Oral two times a day  pantoprazole  Injectable 40 milliGRAM(s) IV Push every 24 hours  polyethylene glycol 3350 17 Gram(s) Oral daily  senna 2 Tablet(s) Oral at bedtime  timolol 0.25% Solution 1 Drop(s) Both EYES two times a day  valproate sodium  IVPB 250 milliGRAM(s) IV Intermittent every 8 hours    MEDICATIONS  (PRN):  acetaminophen     Tablet .. 650 milliGRAM(s) Oral every 6 hours PRN Temp greater or equal to 38C (100.4F), Moderate Pain (4 - 6)  clonazePAM  Tablet 0.5 milliGRAM(s) Oral two times a day PRN For anxiety      LABS:                        11.8   7.53  )-----------( 159      ( 20 Jul 2023 00:26 )             35.8     Hgb Trend: 11.8<--, 11.9<--, 12.1<--, 13.0<--, 15.3<--  07-20    142  |  105  |  17  ----------------------------<  109<H>  4.0   |  26  |  0.71    Ca    8.3<L>      20 Jul 2023 00:26  Phos  2.1     07-20  Mg     2.0     07-20    TPro  5.0<L>  /  Alb  2.8<L>  /  TBili  0.2  /  DBili  x   /  AST  34  /  ALT  66<H>  /  AlkPhos  73  07-20    Creatinine Trend: 0.71<--, 1.08<--, 0.60<--, 0.66<--, 0.57<--, 0.64<--  PT/INR - ( 20 Jul 2023 00:26 )   PT: 12.1 sec;   INR: 1.04 ratio         PTT - ( 20 Jul 2023 00:26 )  PTT:82.6 sec  Urinalysis Basic - ( 20 Jul 2023 00:26 )    Color: x / Appearance: x / SG: x / pH: x  Gluc: 109 mg/dL / Ketone: x  / Bili: x / Urobili: x   Blood: x / Protein: x / Nitrite: x   Leuk Esterase: x / RBC: x / WBC x   Sq Epi: x / Non Sq Epi: x / Bacteria: x        Venous Blood Gas:  07-20 @ 00:18  7.39/48/40/29/68.1  VBG Lactate: 3.9  Venous Blood Gas:  07-19 @ 00:00  7.43/44/39/29/67.6  VBG Lactate: 2.7      EKG:    MICROBIOLOGY:     RADIOLOGY:  [ ] Reviewed and interpreted by me    EKG:  MICROBIOLOGY:     Radiology: ***    Bedside lung ultrasound: ***    Bedside ECHO: ***    EKG:    CENTRAL LINE: Y/N          DATE INSERTED:              REMOVE: Y/N    LORA: Y/N                        DATE INSERTED:              REMOVE: Y/N    A-LINE: Y/N                       DATE INSERTED:              REMOVE: Y/N    GLOBAL ISSUE/BEST PRACTICE:  Analgesia:  Sedation:  HOB elevation: yes  Stress ulcer prophylaxis:  VTE prophylaxis:  Glycemic control:  Nutrition:    CODE STATUS: ***  [facility] discussion: Y     Patient is a 67y old  Male who presents with a chief complaint of seizure (19 Jul 2023 11:27)      24 hour events: NAEO, UOP continues to improve - 500cc overnight, color now orange. Patient has no acute complaints, eating well, in good spirit.    OBJECTIVE:  ICU Vital Signs Last 24 Hrs  T(C): 36.6 (20 Jul 2023 07:00), Max: 36.7 (20 Jul 2023 00:00)  T(F): 97.9 (20 Jul 2023 07:00), Max: 98 (20 Jul 2023 00:00)  HR: 66 (20 Jul 2023 07:00) (66 - 123)  BP: 156/82 (20 Jul 2023 07:00) (121/77 - 156/82)  BP(mean): 95 (20 Jul 2023 04:00) (95 - 101)  ABP: --  ABP(mean): --  RR: 18 (20 Jul 2023 07:00) (15 - 31)  SpO2: 98% (20 Jul 2023 07:00) (91% - 99%)          07-19 @ 07:01  -  07-20 @ 07:00  --------------------------------------------------------  IN: 1012 mL / OUT: 1500 mL / NET: -488 mL    07-20 @ 07:01  -  07-20 @ 10:22  --------------------------------------------------------  IN: 304 mL / OUT: 350 mL / NET: -46 mL      CAPILLARY BLOOD GLUCOSE      POCT Blood Glucose.: 97 mg/dL (20 Jul 2023 06:01)      PHYSICAL EXAM:  GENERAL: NAD, well-developed  HEAD:  Atraumatic, Normocephalic  EYES: EOMI, PERRLA, conjunctiva and sclera clear  NECK: Supple, No JVD, Thyroid not palpable, non tender, Trachea midline  CHEST/LUNG: Rhonchi bilaterally that cleared with coughs; No wheeze, no crackles  HEART: Regular rate, irregular rhythm, no murmur rubs gallops.  ABDOMEN: Soft, Nontender, Nondistended; Bowel sounds present  : condom cath in place  PSYCH: AAOx3  NEUROLOGY: non-focal, exam  SKIN: No rashes or lesions      LINES:    HOSPITAL MEDICATIONS:  MEDICATIONS  (STANDING):  atorvastatin 20 milliGRAM(s) Oral at bedtime  brimonidine 0.2% Ophthalmic Solution 1 Drop(s) Both EYES two times a day  chlorhexidine 4% Liquid 1 Application(s) Topical <User Schedule>  dexAMETHasone  Injectable 4 milliGRAM(s) IV Push daily  heparin  Infusion. 1200 Unit(s)/Hr (12 mL/Hr) IV Continuous <Continuous>  insulin lispro (ADMELOG) corrective regimen sliding scale   SubCutaneous every 6 hours  lacosamide IVPB 200 milliGRAM(s) IV Intermittent every 12 hours  levETIRAcetam  IVPB 1000 milliGRAM(s) IV Intermittent every 12 hours  metoprolol tartrate 25 milliGRAM(s) Oral two times a day  pantoprazole  Injectable 40 milliGRAM(s) IV Push every 24 hours  polyethylene glycol 3350 17 Gram(s) Oral daily  senna 2 Tablet(s) Oral at bedtime  timolol 0.25% Solution 1 Drop(s) Both EYES two times a day  valproate sodium  IVPB 250 milliGRAM(s) IV Intermittent every 8 hours    MEDICATIONS  (PRN):  acetaminophen     Tablet .. 650 milliGRAM(s) Oral every 6 hours PRN Temp greater or equal to 38C (100.4F), Moderate Pain (4 - 6)  clonazePAM  Tablet 0.5 milliGRAM(s) Oral two times a day PRN For anxiety      LABS:                        11.8   7.53  )-----------( 159      ( 20 Jul 2023 00:26 )             35.8     Hgb Trend: 11.8<--, 11.9<--, 12.1<--, 13.0<--, 15.3<--  07-20    142  |  105  |  17  ----------------------------<  109<H>  4.0   |  26  |  0.71    Ca    8.3<L>      20 Jul 2023 00:26  Phos  2.1     07-20  Mg     2.0     07-20    TPro  5.0<L>  /  Alb  2.8<L>  /  TBili  0.2  /  DBili  x   /  AST  34  /  ALT  66<H>  /  AlkPhos  73  07-20    Creatinine Trend: 0.71<--, 1.08<--, 0.60<--, 0.66<--, 0.57<--, 0.64<--  PT/INR - ( 20 Jul 2023 00:26 )   PT: 12.1 sec;   INR: 1.04 ratio         PTT - ( 20 Jul 2023 00:26 )  PTT:82.6 sec  Urinalysis Basic - ( 20 Jul 2023 00:26 )    Color: x / Appearance: x / SG: x / pH: x  Gluc: 109 mg/dL / Ketone: x  / Bili: x / Urobili: x   Blood: x / Protein: x / Nitrite: x   Leuk Esterase: x / RBC: x / WBC x   Sq Epi: x / Non Sq Epi: x / Bacteria: x        Venous Blood Gas:  07-20 @ 00:18  7.39/48/40/29/68.1  VBG Lactate: 3.9  Venous Blood Gas:  07-19 @ 00:00  7.43/44/39/29/67.6  VBG Lactate: 2.7      EKG:    MICROBIOLOGY:     RADIOLOGY:  [ ] Reviewed and interpreted by me    EKG:  MICROBIOLOGY:     Radiology: ***    Bedside lung ultrasound: ***    Bedside ECHO: ***    EKG:    CENTRAL LINE: Y/N          DATE INSERTED:              REMOVE: Y/N    LORA: Y/N                        DATE INSERTED:              REMOVE: Y/N    A-LINE: Y/N                       DATE INSERTED:              REMOVE: Y/N    GLOBAL ISSUE/BEST PRACTICE:  Analgesia:  Sedation:  HOB elevation: yes  Stress ulcer prophylaxis:  VTE prophylaxis:  Glycemic control:  Nutrition:    CODE STATUS: ***  [facility] discussion: Y

## 2023-07-20 NOTE — PROGRESS NOTE ADULT - SUBJECTIVE AND OBJECTIVE BOX
Cardiology Progress Note  ------------------------------------------------------------------------------------------  SUBJECTIVE:   - Tele: Afib 80-90s on telemetry observed.   - No events overnight. Denies CP, SOB or Palpitations.   -------------------------------------------------------------------------------------------  ROS:  CV: chest pain (-), palpitation (-), orthopnea (-), PND (-), edema (-)  PULM: SOB (-), cough (-), wheezing (-), hemoptysis (-).   CONST: fever (-), chills (-) or fatigue (-)  GI: abdominal distension (-), abdominal pain (-) , nausea/vomiting (-), hematemesis, (-), melena (-), hematochezia (-)  : dysuria (-), frequency (-), hematuria (-).   NEURO: numbness (-), weakness (-), dizziness (-)  MSK: myalgia (-), joint pain (-)   SKIN: itching (-), rash (-)  HEENT:  visual changes (-); vertigo or throat pain (-);  neck stiffness (-)   Psych: change in mood (-), anxiety (-), depression (-)     All other review of systems is negative unless indicated above.   -------------------------------------------------------------------------------------------  VS:  T(F): 97.9 (07-20), Max: 98 (07-20)  HR: 66 (07-20) (66 - 123)  BP: 156/82 (07-20) (121/77 - 156/82)  RR: 18 (07-20)  SpO2: 98% (07-20)  I&O's Summary    19 Jul 2023 07:01  -  20 Jul 2023 07:00  --------------------------------------------------------  IN: 1012 mL / OUT: 1500 mL / NET: -488 mL    20 Jul 2023 07:01  -  20 Jul 2023 11:34  --------------------------------------------------------  IN: 304 mL / OUT: 350 mL / NET: -46 mL      PHYSICAL EXAM:  GENERAL: NAD  HEAD:  Atraumatic, Normocephalic.  EYES: conjunctiva and sclera clear.  ENT: Moist mucous membranes.  NECK: Supple, No elevated JVP.   CHEST/LUNG: Clear to auscultation bilaterally  HEART: Regular rate and rhythm; No murmurs, rubs, or gallops.  ABDOMEN: Soft, Nontender, Nondistended.   EXTREMITIES: 2+ edema b/l.   PSYCH: Normal affect.  SKIN: No rashes or lesions.  -------------------------------------------------------------------------------------------  LABS:                          11.8   7.53  )-----------( 159      ( 20 Jul 2023 00:26 )             35.8     07-20    142  |  105  |  17  ----------------------------<  109<H>  4.0   |  26  |  0.71    Ca    8.3<L>      20 Jul 2023 00:26  Phos  2.1     07-20  Mg     2.0     07-20    TPro  5.0<L>  /  Alb  2.8<L>  /  TBili  0.2  /  DBili  x   /  AST  34  /  ALT  66<H>  /  AlkPhos  73  07-20    PT/INR - ( 20 Jul 2023 00:26 )   PT: 12.1 sec;   INR: 1.04 ratio         PTT - ( 20 Jul 2023 00:26 )  PTT:82.6 sec  CARDIAC MARKERS ( 16 Jul 2023 08:25 )  79 ng/L / x     / x     / 51 U/L / x     / 4.5 ng/mL  CARDIAC MARKERS ( 16 Jul 2023 02:53 )  75 ng/L / x     / x     / x     / x     / x                -------------------------------------------------------------------------------------------  Meds:  acetaminophen     Tablet .. 650 milliGRAM(s) Oral every 6 hours PRN  atorvastatin 20 milliGRAM(s) Oral at bedtime  brimonidine 0.2% Ophthalmic Solution 1 Drop(s) Both EYES two times a day  chlorhexidine 4% Liquid 1 Application(s) Topical <User Schedule>  clonazePAM  Tablet 0.5 milliGRAM(s) Oral two times a day PRN  dexAMETHasone  Injectable 4 milliGRAM(s) IV Push daily  insulin lispro (ADMELOG) corrective regimen sliding scale   SubCutaneous every 6 hours  lacosamide IVPB 200 milliGRAM(s) IV Intermittent every 12 hours  levETIRAcetam  IVPB 1000 milliGRAM(s) IV Intermittent every 12 hours  metoprolol tartrate 25 milliGRAM(s) Oral two times a day  pantoprazole  Injectable 40 milliGRAM(s) IV Push every 24 hours  polyethylene glycol 3350 17 Gram(s) Oral daily  rivaroxaban 20 milliGRAM(s) Oral daily  senna 2 Tablet(s) Oral at bedtime  timolol 0.25% Solution 1 Drop(s) Both EYES two times a day  valproate sodium  IVPB 250 milliGRAM(s) IV Intermittent every 8 hours    -------------------------------------------------------------------------------------------

## 2023-07-20 NOTE — PROGRESS NOTE ADULT - PROBLEM SELECTOR PLAN 2
Dx: Sepsis Vs Cardiac  Patient was febrile and hypotensive on admission, cultures NGTD  EF was also called BNP elevated, reduced EF, Dx: Sepsis Vs Cardiac  Patient was febrile and hypotensive on admission, cultures NGTD  EF was also called BNP elevated, reduced EF  - have PT come see patient

## 2023-07-20 NOTE — PROGRESS NOTE ADULT - ASSESSMENT
ASSESSMENT :   ==============  66 y/o male with a PMHx of glioblastoma, seizure disorder, HTN, and atrial fibrillation on xarelto presenting after seizure at rehab facility and intubation in ED admitted for acute respiratory failure and possible septic shock.      PLAN:  ========    NEURO:    #Seizure disorder with hx of glioblastoma  -CT head negative for acute intracranial findings  -Neurology following, EEG demonstrated normal activity, dc on 7/18  -Ammonia 38 wnl  -Neuro checks per routine   -Continue home antiepileptic regimen of keppra, vimpat, clonazepam, valproic acid and dexamethasone. F/u with neuro for further recs.      PULM:    #Acute hypoxic and hypercarbic respiratory failure in s/o seizure  -Patient extubated on 7/16 and now on RA  -Chest CT without evidence of PE but shows left lower lobe atelectasis    CV:    #Shock likely 2/2 sepsis vs sedation vs cardiogenic  -Patient received versed en route to ED  -Blood cultures and sputum cultures ordered. Continue to follow.  -Empiric abx coverage with cefepime and vancomycin  -TTE 7/16: LV: mildly reduced systolic function, estimate EF 45-50%. RV: moderately enlarged, mildly reduced systolic function.   - CT chest and POCUS with evidence of heart failure  -Off pressor  -Patient does not appear overtly fluid overloaded, holding diuresis    #Troponin elevation likely 2/2 hypotension  -Troponin of 75 on admission  -BNP elevated and POCUS with evidence of acute HF  -EKG ordered. F/u    #Chronic atrial fibrillation  -F/u EKG  -Start metoprolol for rate control  -Keep Mg > 3  -Heparin gtt  -Will consider transition back to Xarelto     #Essential hypertension  -Withold HTN medications in setting of shock    GI:  NTD      RENAL:  -maintain urine output > 0.5cc/kg/hr   - Red urine noted, but clearer than yesterday  - Condom cath in place  - UA showed RBC and protein, will hold off on nephrology/urology consult for now and monitor for improvement  - Cr 1.08 up from 0.6  - Monitor UOP  - Renal/Bladder POCUS wnl on 7/18    :  -Condom cath in place      ID:  #Potential septic shock  -See CV section  -Procal 2.0  -Infectious work-up pending. bcx and sputum culture drawn on 7/16, NGTD  -dc antibiotics    ENDO:  #Mild hyperglycemia  -Glucose of 144 on admission  -CTM       HEME:  #Bilateral LE edema L>R  -Doppler negative         ASSESSMENT :   ==============  66 y/o male with a PMHx of glioblastoma, seizure disorder, HTN, and atrial fibrillation on xarelto presenting after seizure at rehab facility and intubation in ED admitted for acute respiratory failure and possible septic shock.      PLAN:  ========    NEURO    #Seizure disorder with hx of glioblastoma  -CT head negative for acute intracranial findings  -Neurology following, EEG demonstrated normal activity, dc on 7/18  -Ammonia 38 wnl  -Neuro checks per routine   -Continue home antiepileptic regimen of keppra, vimpat, clonazepam, valproic acid and dexamethasone.      PULM  #Acute hypoxic and hypercarbic respiratory failure - Resolved  - i/s/o seizure  - Chest CT without evidence of PE but shows left lower lobe atelectasis  - intubated and then extubated on 7/16  - Sating well on RA      CV  #Shock - Resolved  - likely 2/2 sepsis vs sedation vs cardiogenic  - Patient received versed en route to ED  - Blood cultures and sputum cultures negative  - s/p Empiric abx coverage with cefepime and vancomycin  - TTE 7/16: LV: mildly reduced systolic function, estimate EF 45-50%. RV: moderately enlarged, mildly reduced systolic function.   - CT chest and POCUS with evidence of heart failure  - Off pressor  - Patient does not appear overtly fluid overloaded, holding diuresis    #Troponin elevation - Resolved  - Troponin of 75 on admission  - Likely i/s/o hypotension  - BNP elevated and POCUS with evidence of acute HF    #Chronic atrial fibrillation  - c/w Metoprolol 25mg BID for rate control  -Keep Mg > 3  - Transition from Heparin gtt back to Xarelto 20 qD    #Essential hypertension  - c/w Metoprolol 25mg BID    GI  - Regular diet      RENAL    -maintain urine output > 0.5cc/kg/hr     #Hematuria  - Dark red urine and decreased UOP noted on 7/18 - improving  - UA (7/18) showed RBC and protein, will hold off on nephrology/urology consult for now and monitor for improvement  - Renal/Bladder POCUS wnl on 7/18  - Improving UOP (~0.5 cc/kg/hr), color now orange (7/20)  - Condom cath in place  - Downtrended Cr 1.08 -> 0.71  - Monitor UOP      -Condom cath in place      ID  #Potential septic shock - resolved  -See CV section  - Procal 2.0  - Infectious work-up pending. bcx and sputum culture drawn on 7/16, NGTD  - dc antibiotics    ENDO  #Mild hyperglycemia  -Glucose of 144 on admission  -CTM       HEME  - Xarelto 20mg qd for chronic AF    #Bilateral LE edema L>R  -Doppler negative         ASSESSMENT :   ==============  66 y/o male with a PMHx of glioblastoma, seizure disorder, HTN, and atrial fibrillation on xarelto presenting after seizure at rehab facility and intubation in ED admitted for acute respiratory failure and possible septic shock.      PLAN:  ========    NEURO    #Seizure disorder with hx of glioblastoma  -CT head negative for acute intracranial findings  -Neurology following, EEG demonstrated normal activity, dc on 7/18  -Ammonia 38 wnl  -Neuro checks per routine   -Continue home antiepileptic regimen of keppra, vimpat, clonazepam, valproic acid and dexamethasone.      PULM  #Acute hypoxic and hypercarbic respiratory failure - Resolved  - i/s/o seizure  - Chest CT without evidence of PE but shows left lower lobe atelectasis  - intubated and then extubated on 7/16  - Sating well on RA      CV  #Shock - Resolved  - likely 2/2 sepsis vs sedation vs cardiogenic  - TTE 7/16: LV: mildly reduced systolic function, estimate EF 45-50%. RV: moderately enlarged, mildly reduced systolic function.   - CT chest and POCUS with evidence of heart failure  - Patient received versed en route to ED  - Blood cultures and sputum cultures negative  - s/p Empiric abx coverage with cefepime and vancomycin  - Off pressor  - Patient does not appear overtly fluid overloaded, holding diuresis    #Reduced systolic function  - TTE 7/16: LV: mildly reduced systolic function, estimate EF 45-50%. RV: moderately enlarged, mildly reduced systolic function.   - CT chest and POCUS with evidence of heart failure  - Cardiology recc restarting losartan at lower dose of 25mg qd, but will hold off for now given hematuria and uptrending cr, cardiology recc appreciated    #Troponin elevation - Resolved  - Troponin of 75 on admission  - Likely i/s/o hypotension  - BNP elevated and POCUS with evidence of acute HF    #Chronic atrial fibrillation  - c/w Metoprolol 25mg BID for rate control  -Keep Mg > 3  - Transition from Heparin gtt back to Xarelto 20 qD    #Essential hypertension  - c/w Metoprolol 25mg BID  - Cardiology recc restarting losartan at lower dose of 25mg qd, but will hold off for now given hematuria and uptrending cr, cardiology recc appreciated    GI  - Regular diet      RENAL    -maintain urine output > 0.5cc/kg/hr     #Hematuria  - Dark red urine and decreased UOP noted on 7/18 - improving  - UA (7/18) showed RBC and protein, will hold off on nephrology/urology consult for now and monitor for improvement  - Renal/Bladder POCUS wnl on 7/18  - Improving UOP (~0.5 cc/kg/hr), color now orange (7/20)  - Condom cath in place  - Downtrended Cr 1.08 -> 0.71  - Monitor UOP      -Condom cath in place      ID  #Potential septic shock - resolved  -See CV section  - Procal 2.0  - Infectious work-up pending. bcx and sputum culture drawn on 7/16, NGTD  - dc antibiotics    ENDO  #Mild hyperglycemia  -Glucose of 144 on admission  -CTM       HEME  - Xarelto 20mg qd for chronic AF    #Bilateral LE edema L>R  -Doppler negative         ASSESSMENT :   ==============  66 y/o male with a PMHx of glioblastoma, seizure disorder, HTN, and atrial fibrillation on xarelto presenting after seizure at rehab facility and intubation in ED admitted for acute respiratory failure and possible septic shock.      PLAN:  ========    NEURO    #Seizure disorder with hx of glioblastoma  -CT head negative for acute intracranial findings  -Neurology following, EEG demonstrated normal activity, dc on 7/18  -Ammonia 38 wnl  -Neuro checks per routine   -Continue home antiepileptic regimen of keppra, vimpat, clonazepam, valproic acid and dexamethasone.      PULM  #Acute hypoxic and hypercarbic respiratory failure - Resolved  - i/s/o seizure  - Chest CT without evidence of PE but shows left lower lobe atelectasis  - intubated and then extubated on 7/16  - Sating well on RA      CV  #Shock - Resolved  - likely 2/2 sepsis vs sedation vs cardiogenic  - TTE 7/16: LV: mildly reduced systolic function, estimate EF 45-50%. RV: moderately enlarged, mildly reduced systolic function.   - CT chest and POCUS with evidence of heart failure  - Patient received versed en route to ED  - Blood cultures and sputum cultures negative  - s/p Empiric abx coverage with cefepime and vancomycin  - Off pressor  - Patient does not appear overtly fluid overloaded, holding diuresis    #Reduced systolic function  - TTE 7/16: LV: mildly reduced systolic function, estimate EF 45-50%. RV: moderately enlarged, mildly reduced systolic function.   - CT chest and POCUS with evidence of heart failure  - Appreciate cardiology reccs, will hold off losartan for now and wait for Cr, UOP, urine color improvement, will start losartan 25qd once renal function stablizes    #Troponin elevation - Resolved  - Troponin of 75 on admission  - Likely i/s/o hypotension  - BNP elevated and POCUS with evidence of acute HF    #Chronic atrial fibrillation  - c/w Metoprolol 25mg BID for rate control  -Keep Mg > 3  - Transition from Heparin gtt back to Xarelto 20 qD    #Essential hypertension  - c/w Metoprolol 25mg BID  - Appreciate cardiology reccs, will hold off losartan for now and wait for Cr, UOP, urine color improvement, will start losartan 25qd once renal function stablizes    GI  - Regular diet      RENAL    -maintain urine output > 0.5cc/kg/hr     #Hematuria  - Dark red urine and decreased UOP noted on 7/18 - improving  - UA (7/18) showed RBC and protein, will hold off on nephrology/urology consult for now and monitor for improvement  - Renal/Bladder POCUS wnl on 7/18  - Improving UOP (~0.5 cc/kg/hr), color now orange (7/20)  - Condom cath in place  - Downtrended Cr 1.08 -> 0.71  - Monitor UOP      -Condom cath in place      ID  #Potential septic shock - resolved  -See CV section  - Procal 2.0  - Infectious work-up pending. bcx and sputum culture drawn on 7/16, NGTD  - dc antibiotics    ENDO  #Mild hyperglycemia  -Glucose of 144 on admission  -CTM       HEME  - Xarelto 20mg qd for chronic AF    #Bilateral LE edema L>R  -Doppler negative

## 2023-07-21 NOTE — PROGRESS NOTE ADULT - SUBJECTIVE AND OBJECTIVE BOX
INTERVAL EVENTS/SUBJ:  AF rate controlled    Home Medications:  allopurinol 100 mg oral tablet: 1 tab(s) orally once a day (17 Jul 2023 19:08)  atorvastatin 20 mg oral tablet: 1 tab(s) by gastrostomy tube once a day (at bedtime) (17 Jul 2023 19:12)  brimonidine 0.2% ophthalmic solution: 1 each in each affected eye 2 times a day (17 Jul 2023 19:08)  Depakene 250 mg/5 mL oral liquid: 5 milliliter(s) orally 3 times a day (17 Jul 2023 19:08)  dexAMETHasone 4 mg oral tablet: 1 tab(s) orally 2 times a day (17 Jul 2023 19:08)  FLUoxetine 20 mg oral tablet: 1 tab(s) by gastrostomy tube once a day (17 Jul 2023 19:11)  hydrALAZINE 25 mg oral tablet: 1 tab(s) orally 3 times a day (17 Jul 2023 19:08)  Keppra 750 mg oral tablet: 1 tab(s) by gastrostomy tube 2 times a day (17 Jul 2023 19:15)  lacosamide 200 mg oral tablet: 1 tab(s) orally 2 times a day (17 Jul 2023 19:08)  levETIRAcetam 1000 mg oral tablet: 1 tab(s) orally 2 times a day (17 Jul 2023 19:09)  losartan 50 mg oral tablet: 1 orally 2 times a day (17 Jul 2023 19:11)  metoprolol succinate 100 mg oral capsule, extended release: 1 cap(s) orally once a day (20 Jul 2023 15:19)  Pepcid 40 mg/5 mL oral liquid: 2.5 milliliter(s) by gastrostomy tube 2 times a day (17 Jul 2023 19:13)  timolol maleate 0.25% ophthalmic solution: 1 each in each affected eye 2 times a day (17 Jul 2023 19:09)  Xarelto 20 mg oral tablet: 1 tab(s) orally once a day (at bedtime) (17 Jul 2023 19:09)      MEDICATIONS  (STANDING):  atorvastatin 20 milliGRAM(s) Oral at bedtime  brimonidine 0.2% Ophthalmic Solution 1 Drop(s) Both EYES two times a day  dexAMETHasone  Injectable 4 milliGRAM(s) IV Push daily  insulin lispro (ADMELOG) corrective regimen sliding scale   SubCutaneous at bedtime  insulin lispro (ADMELOG) corrective regimen sliding scale   SubCutaneous three times a day before meals  lacosamide IVPB 200 milliGRAM(s) IV Intermittent every 12 hours  levETIRAcetam  IVPB 1000 milliGRAM(s) IV Intermittent every 12 hours  metoprolol tartrate 25 milliGRAM(s) Oral two times a day  pantoprazole  Injectable 40 milliGRAM(s) IV Push every 24 hours  polyethylene glycol 3350 17 Gram(s) Oral daily  rivaroxaban 20 milliGRAM(s) Oral daily  senna 2 Tablet(s) Oral at bedtime  timolol 0.25% Solution 1 Drop(s) Both EYES two times a day  valproate sodium  IVPB 250 milliGRAM(s) IV Intermittent every 8 hours    MEDICATIONS  (PRN):  acetaminophen     Tablet .. 650 milliGRAM(s) Oral every 6 hours PRN Temp greater or equal to 38C (100.4F), Moderate Pain (4 - 6)  aluminum hydroxide/magnesium hydroxide/simethicone Suspension 30 milliLiter(s) Oral every 4 hours PRN Dyspepsia  clonazePAM  Tablet 0.5 milliGRAM(s) Oral two times a day PRN For anxiety  melatonin 3 milliGRAM(s) Oral at bedtime PRN Insomnia      Vital Signs Last 24 Hrs  T(C): 36.4 (21 Jul 2023 05:02), Max: 36.7 (20 Jul 2023 15:00)  T(F): 97.5 (21 Jul 2023 05:02), Max: 98 (20 Jul 2023 15:00)  HR: 79 (21 Jul 2023 05:02) (79 - 101)  BP: 150/87 (21 Jul 2023 05:02) (128/79 - 150/87)  BP(mean): 98 (20 Jul 2023 12:00) (98 - 98)  RR: 18 (21 Jul 2023 05:02) (18 - 30)  SpO2: 98% (21 Jul 2023 05:02) (94% - 99%)    Parameters below as of 21 Jul 2023 05:02  Patient On (Oxygen Delivery Method): room air        REVIEW OF SYSTEMS:  As per HPI, otherwise unremarkable.     PHYSICAL EXAM:  Constitutional/Appearance: Normal, Well-developed  HEENT:   Normal oral mucosa, no drainage or redness, supple neck  Lymphatic: No lymphadenopathy  Cardiovascular: Normal S1 S2, No edema, II/VI DUYEN  Respiratory: Lungs clear to auscultation, respirations non-labored  Psychiatry: A & O x 3, appropriate affect.   Gastrointestinal:  Soft, Non-tender, no distention  Skin: No rashes, No ecchymoses, No cyanosis	  Neurologic: Non-focal, Alert and oriented x 3  Extremities: Normal range of motion  Vascular: Peripheral pulses palpable 2+ bilaterally (radial)    LABS:  CBC Full  -  ( 21 Jul 2023 06:46 )  WBC Count : 8.66 K/uL  RBC Count : 3.97 M/uL  Hemoglobin : 12.8 g/dL  Hematocrit : 38.3 %  Platelet Count - Automated : 167 K/uL  Mean Cell Volume : 96.5 fl  Mean Cell Hemoglobin : 32.2 pg  Mean Cell Hemoglobin Concentration : 33.4 gm/dL  Auto Neutrophil # : x  Auto Lymphocyte # : x  Auto Monocyte # : x  Auto Eosinophil # : x  Auto Basophil # : x  Auto Neutrophil % : x  Auto Lymphocyte % : x  Auto Monocyte % : x  Auto Eosinophil % : x  Auto Basophil % : x      07-21    143  |  106  |  20  ----------------------------<  105<H>  3.9   |  26  |  0.79    Ca    8.1<L>      21 Jul 2023 06:46  Phos  2.8     07-21  Mg     1.8     07-21    TPro  4.8<L>  /  Alb  2.8<L>  /  TBili  0.2  /  DBili  x   /  AST  32  /  ALT  66<H>  /  AlkPhos  71  07-21          IMPRESSION AND PLAN: 67M w glioblastoma, seizure disorder, HTN, AF on xarelto here after seizure and possible septic shock. ?stress cardiomyopathy TTE EF 45-50%  -tele  -xarelto   -cont metoprolol 25 bid, goal HR <110  -start losartan 25   -lasix PRN, give IV 20mg x1  -rpt TTE once acute illness resolved  ***    Stefano Strickland MD, MPhil, [facility]  Cardiologist, [facility]  ; [employer] at [facility]  email: [email or web address] on-service contact/call information, go to [email or web address] and use [email or web address]" to login.  Outpatient Cardiology appointments, call  [phone] to arrange with a colleague; I do not have outpatient Cardiology clinic.

## 2023-07-21 NOTE — PROGRESS NOTE ADULT - SUBJECTIVE AND OBJECTIVE BOX
INCOMPLETE NOTE    CHIEF COMPLAINT:    Interval Events:    REVIEW OF SYSTEMS:  CONSTITUTIONAL: No weakness, fevers or chills  EYES/ENT: No visual changes;  No vertigo or throat pain   NECK: No pain or stiffness  RESPIRATORY: No cough, wheezing, hemoptysis; No shortness of breath  CARDIOVASCULAR: No chest pain or palpitations  GASTROINTESTINAL: No abdominal or epigastric pain. No nausea, vomiting, or hematemesis; No diarrhea or constipation. No melena or hematochezia.  GENITOURINARY: No dysuria, frequency or hematuria  NEUROLOGICAL: No numbness or weakness  SKIN: No itching, burning, rashes, or lesions   All other review of systems is negative unless indicated above.    OBJECTIVE:  ICU Vital Signs Last 24 Hrs  T(C): 36.4 (21 Jul 2023 05:02), Max: 36.7 (20 Jul 2023 15:00)  T(F): 97.5 (21 Jul 2023 05:02), Max: 98 (20 Jul 2023 15:00)  HR: 79 (21 Jul 2023 05:02) (66 - 101)  BP: 150/87 (21 Jul 2023 05:02) (128/79 - 156/82)  BP(mean): 98 (20 Jul 2023 12:00) (98 - 98)  ABP: --  ABP(mean): --  RR: 18 (21 Jul 2023 05:02) (18 - 30)  SpO2: 98% (21 Jul 2023 05:02) (94% - 99%)    O2 Parameters below as of 21 Jul 2023 05:02  Patient On (Oxygen Delivery Method): room air              07-19 @ 07:01 - 07-20 @ 07:00  --------------------------------------------------------  IN: 1012 mL / OUT: 1500 mL / NET: -488 mL    07-20 @ 07:01 - 07-21 @ 06:56  --------------------------------------------------------  IN: 1884 mL / OUT: 2700 mL / NET: -816 mL      CAPILLARY BLOOD GLUCOSE      POCT Blood Glucose.: 107 mg/dL (20 Jul 2023 21:23)      PHYSICAL EXAM:  General: WN/WD NAD  Neurology: A&Ox3, nonfocal, LOYOLA x 4  Eyes: PERRLA/ EOMI, Gross vision intact  ENT/Neck: Neck supple, trachea midline, No JVD, Gross hearing intact  Respiratory: CTA B/L, No wheezing, rales, rhonchi  CV: RRR, +S1/S2, -S3/S4, no murmurs, rubs or gallops  Abdominal: Soft, NT, ND +BS, No HSM  MSK: 5/5 strength UE/LE bilaterally  Extremities: No edema, 2+ peripheral pulses  Skin: No Rashes, Hematoma, Ecchymosis  Incisions:   Tubes:    HOSPITAL MEDICATIONS:  MEDICATIONS  (STANDING):  atorvastatin 20 milliGRAM(s) Oral at bedtime  brimonidine 0.2% Ophthalmic Solution 1 Drop(s) Both EYES two times a day  dexAMETHasone  Injectable 4 milliGRAM(s) IV Push daily  insulin lispro (ADMELOG) corrective regimen sliding scale   SubCutaneous at bedtime  insulin lispro (ADMELOG) corrective regimen sliding scale   SubCutaneous three times a day before meals  lacosamide IVPB 200 milliGRAM(s) IV Intermittent every 12 hours  levETIRAcetam  IVPB 1000 milliGRAM(s) IV Intermittent every 12 hours  metoprolol tartrate 25 milliGRAM(s) Oral two times a day  pantoprazole  Injectable 40 milliGRAM(s) IV Push every 24 hours  polyethylene glycol 3350 17 Gram(s) Oral daily  rivaroxaban 20 milliGRAM(s) Oral daily  senna 2 Tablet(s) Oral at bedtime  timolol 0.25% Solution 1 Drop(s) Both EYES two times a day  valproate sodium  IVPB 250 milliGRAM(s) IV Intermittent every 8 hours    MEDICATIONS  (PRN):  acetaminophen     Tablet .. 650 milliGRAM(s) Oral every 6 hours PRN Temp greater or equal to 38C (100.4F), Moderate Pain (4 - 6)  aluminum hydroxide/magnesium hydroxide/simethicone Suspension 30 milliLiter(s) Oral every 4 hours PRN Dyspepsia  clonazePAM  Tablet 0.5 milliGRAM(s) Oral two times a day PRN For anxiety  melatonin 3 milliGRAM(s) Oral at bedtime PRN Insomnia      LABS:                        11.8   7.53  )-----------( 159      ( 20 Jul 2023 00:26 )             35.8     Hgb Trend: 11.8<--, 11.9<--, 12.1<--, 13.0<--, 15.3<--  07-20    142  |  105  |  17  ----------------------------<  109<H>  4.0   |  26  |  0.71    Ca    8.3<L>      20 Jul 2023 00:26  Phos  2.1     07-20  Mg     2.0     07-20    TPro  5.0<L>  /  Alb  2.8<L>  /  TBili  0.2  /  DBili  x   /  AST  34  /  ALT  66<H>  /  AlkPhos  73  07-20    Creatinine Trend: 0.71<--, 1.08<--, 0.60<--, 0.66<--, 0.57<--, 0.64<--  PT/INR - ( 20 Jul 2023 00:26 )   PT: 12.1 sec;   INR: 1.04 ratio         PTT - ( 20 Jul 2023 00:26 )  PTT:82.6 sec  Urinalysis Basic - ( 20 Jul 2023 00:26 )    Color: x / Appearance: x / SG: x / pH: x  Gluc: 109 mg/dL / Ketone: x  / Bili: x / Urobili: x   Blood: x / Protein: x / Nitrite: x   Leuk Esterase: x / RBC: x / WBC x   Sq Epi: x / Non Sq Epi: x / Bacteria: x        Venous Blood Gas:  07-20 @ 00:18  7.39/48/40/29/68.1  VBG Lactate: 3.9      MICROBIOLOGY:       RADIOLOGY:  [ ] Reviewed by me   CHIEF COMPLAINT: NA     Interval Events :Patient's primary concern was to get PT to come see him and to get his PEG tube removed.       REVIEW OF SYSTEMS:  CONSTITUTIONAL: +weakness fevers or chills  EYES/ENT: No visual changes;  No vertigo or throat pain   RESPIRATORY: No cough, wheezing, hemoptysis; No shortness of breath  CARDIOVASCULAR: No chest pain or palpitations  GASTROINTESTINAL: No abdominal or epigastric pain. No nausea, vomiting, or hematemesis; No diarrhea or constipation. No melena or hematochezia.  GENITOURINARY: No dysuria, frequency or hematuria  NEUROLOGICAL: Left sided weakness   SKIN: No itching, burning, rashes, or lesions   All other review of systems is negative unless indicated above.    OBJECTIVE:  ICU Vital Signs Last 24 Hrs  T(C): 36.4 (21 Jul 2023 05:02), Max: 36.7 (20 Jul 2023 15:00)  T(F): 97.5 (21 Jul 2023 05:02), Max: 98 (20 Jul 2023 15:00)  HR: 79 (21 Jul 2023 05:02) (66 - 101)  BP: 150/87 (21 Jul 2023 05:02) (128/79 - 156/82)  BP(mean): 98 (20 Jul 2023 12:00) (98 - 98)  ABP: --  ABP(mean): --  RR: 18 (21 Jul 2023 05:02) (18 - 30)  SpO2: 98% (21 Jul 2023 05:02) (94% - 99%)    O2 Parameters below as of 21 Jul 2023 05:02  Patient On (Oxygen Delivery Method): room air              07-19 @ 07:01 - 07-20 @ 07:00  --------------------------------------------------------  IN: 1012 mL / OUT: 1500 mL / NET: -488 mL    07-20 @ 07:01  -  07-21 @ 06:56  --------------------------------------------------------  IN: 1884 mL / OUT: 2700 mL / NET: -816 mL      CAPILLARY BLOOD GLUCOSE      POCT Blood Glucose.: 107 mg/dL (20 Jul 2023 21:23)      PHYSICAL EXAM:  General: Resting comfortably   Neurology: A&Ox3  ENT/Neck: Neck supple, trachea midline, No JVD, Gross hearing intact  Respiratory: CTA B/L, No wheezing, rales, rhonchi  CV: AFIB on auscultation   Abdominal: Soft, NT, ND +BS, No HSM  MSK: 5/5 strength UE/LE bilaterally  Extremities: No edema, 2+ peripheral pulses  Skin: No Rashes, Hematoma, Ecchymosis  Incisions:   Tubes: Peg tube : no signs of erythema     HOSPITAL MEDICATIONS:  MEDICATIONS  (STANDING):  atorvastatin 20 milliGRAM(s) Oral at bedtime  brimonidine 0.2% Ophthalmic Solution 1 Drop(s) Both EYES two times a day  dexAMETHasone  Injectable 4 milliGRAM(s) IV Push daily  insulin lispro (ADMELOG) corrective regimen sliding scale   SubCutaneous at bedtime  insulin lispro (ADMELOG) corrective regimen sliding scale   SubCutaneous three times a day before meals  lacosamide IVPB 200 milliGRAM(s) IV Intermittent every 12 hours  levETIRAcetam  IVPB 1000 milliGRAM(s) IV Intermittent every 12 hours  metoprolol tartrate 25 milliGRAM(s) Oral two times a day  pantoprazole  Injectable 40 milliGRAM(s) IV Push every 24 hours  polyethylene glycol 3350 17 Gram(s) Oral daily  rivaroxaban 20 milliGRAM(s) Oral daily  senna 2 Tablet(s) Oral at bedtime  timolol 0.25% Solution 1 Drop(s) Both EYES two times a day  valproate sodium  IVPB 250 milliGRAM(s) IV Intermittent every 8 hours    MEDICATIONS  (PRN):  acetaminophen     Tablet .. 650 milliGRAM(s) Oral every 6 hours PRN Temp greater or equal to 38C (100.4F), Moderate Pain (4 - 6)  aluminum hydroxide/magnesium hydroxide/simethicone Suspension 30 milliLiter(s) Oral every 4 hours PRN Dyspepsia  clonazePAM  Tablet 0.5 milliGRAM(s) Oral two times a day PRN For anxiety  melatonin 3 milliGRAM(s) Oral at bedtime PRN Insomnia      LABS:                        11.8   7.53  )-----------( 159      ( 20 Jul 2023 00:26 )             35.8     Hgb Trend: 11.8<--, 11.9<--, 12.1<--, 13.0<--, 15.3<--  07-20    142  |  105  |  17  ----------------------------<  109<H>  4.0   |  26  |  0.71    Ca    8.3<L>      20 Jul 2023 00:26  Phos  2.1     07-20  Mg     2.0     07-20    TPro  5.0<L>  /  Alb  2.8<L>  /  TBili  0.2  /  DBili  x   /  AST  34  /  ALT  66<H>  /  AlkPhos  73  07-20    Creatinine Trend: 0.71<--, 1.08<--, 0.60<--, 0.66<--, 0.57<--, 0.64<--  PT/INR - ( 20 Jul 2023 00:26 )   PT: 12.1 sec;   INR: 1.04 ratio         PTT - ( 20 Jul 2023 00:26 )  PTT:82.6 sec  Urinalysis Basic - ( 20 Jul 2023 00:26 )    Color: x / Appearance: x / SG: x / pH: x  Gluc: 109 mg/dL / Ketone: x  / Bili: x / Urobili: x   Blood: x / Protein: x / Nitrite: x   Leuk Esterase: x / RBC: x / WBC x   Sq Epi: x / Non Sq Epi: x / Bacteria: x        Venous Blood Gas:  07-20 @ 00:18  7.39/48/40/29/68.1  VBG Lactate: 3.9      MICROBIOLOGY:       RADIOLOGY:  [ ] Reviewed by me

## 2023-07-21 NOTE — PROGRESS NOTE ADULT - ASSESSMENT
66 y/o male with a PMHx of glioblastoma, seizure disorder, HTN, and atrial fibrillation on xarelto presenting after seizure at rehab facility and intubation in ED admitted for acute respiratory failure and possible septic shock.

## 2023-07-21 NOTE — ADVANCED PRACTICE NURSE CONSULT - RECOMMEDATIONS
healing left cheek medical adhesive related skin injury - scab intact    Recommendations:    1) continue turning and positioning q2 and PRN utilizing offloading assistive devices  2) continue with routine pericare daily and PRN soiling  3) encourage optimal nutrition  4) waffle cushion when oob to chair  5) B/L LE complete cair air fluidized boots to offload heels/feet  6) teresa protective barrier cream to B/L buttocks/sacrum daily and PRN soiling  7) incontinence management - continue external male urinary catheter to divert urine from skin if incontinent  8) left cheek medical adhesive related skin injury- cleanse skin and pat dry then apply cavilon no sting barrier film to skin daily     Plan discussed with ERNIE Graham at bedside
Impression:    left cheek medical adhesive related skin injury     Recommendations:    1) continue turning and positioning q2 and PRN utilizing offloading assistive devices  2) continue with routine pericare daily and PRN soiling  3) encourage optimal nutrition  4) waffle cushion when oob to chair  5) B/L LE complete cair air fluidized boots to offload heels/feet  6) teresa protective barrier cream to B/L buttocks/sacrum daily and PRN soiling  7) incontinence management - continue external male urinary catheter to divert urine from skin if incontinent  8) left cheek medical adhesive related skin injury- cleanse skin and pat dry then apply cavilon no sting barrier film to skin daily     Plan discussed with ERNIE Graham at bedside

## 2023-07-21 NOTE — PROGRESS NOTE ADULT - ATTENDING COMMENTS
Hospitalist- Gavin Kellogg MD  Available on MS Teams  If no response or off-hours, page [phone]  -------------------------------------    Pt clinically stable, afebrile no further seizures or signs of infection. ID consulted- no need for further abx. Pt has been eating ok and is interested in having PEG tube removed- consulted Dr. Carias for her input on whether it can come out, awaiting her recs. Otherwise repeat TTE shows no significant interval change in LV systolic function from admission, further workup can likely be performed as OP.      The necessity of the time spent during the encounter on this date of service was due to:   - Ordering, reviewing, and interpreting labs, testing, and imaging.  - Independently obtaining a review of systems and performing a physical exam  - Reviewing prior hospitalization and where necessary, outpatient records.  - Counselling and educating patient and family regarding interpretation of aforementioned items and plan of care.    Time-based billing (NON-critical care). Total minutes spent: 45

## 2023-07-21 NOTE — ADVANCED PRACTICE NURSE CONSULT - REASON FOR CONSULT
Patient seen for wound care consult follow-up    Reason for Admission: seizure  History of Present Illness:   Mr. Jalloh is a 68 y/o male with a PMHx of glioblastoma, seizure disorder, atrial fibrillation on xarelto, and HTN who had a seizure at a rehab facility and was brought in by EMS. He was given versed by EMS and upon arrival to ED, patient had a GCS of 6-7 and was unresponsive. At this point, he was intubated and MICU was consulted. Of note, patient was recently discharged from [facility] on 7/14 where he was seen for management of glioblastoma and seizures. He was discharged to rehab on keppra, vimpat, valproic acid, and dexamethasone and set to follow-up with outpatient neurologist before today's episode.    ED course: Patient brought in by EMS after receiving versed en route to hospital. Upon presentation to ED, he had a GCS of 6-7 and was unresponsive, leading to intubation. Patient also noted to be hypotensive and febrile in ED, requiring pressors. Labs upon arrival were significant for WBC of 8.21, glucose of 144, calcium of 8.3, phosphorus of 5, AST of 50, ALT of 80, troponin of 75, BNP of 2832, procal of 0.14, VBG with pH of 7.18, pCO2 of 61, and HCO3 of 23. POCUS in ED with evidence of right heart dilation.

## 2023-07-21 NOTE — ADVANCED PRACTICE NURSE CONSULT - ASSESSMENT
When wound care RN arrived on unit, patient was found lying in a low air loss pressure redistribution support surface style bed with lunch tray in front of him, approximately 75% of meal complete. Mr Jalloh was alert and oriented and consented to skin consult. Wound care RN was able to assess the skin on patient's face. Superficial partial thickness skin injury has healed with scab in place. 
When wound care RN arrived on unit, patient was found lying in a low air loss pressure redistribution support surface style bed. Mr Jalloh was sleeping when wound care RN arrived for consult. ID band checked, identity confirmed. Wound care RN was able to assess the skin on patient's face. Primary RN states that there was an endotracheal tube teague in place and once removed, there was a skin injury on left cheek. Superficial partial thickness skin injury noted to measure approximately 1.5cm x 1.5cm x 0.1cm on left cheek. Area cleansed by primary RN and cavilon applied.

## 2023-07-21 NOTE — PROGRESS NOTE ADULT - PROBLEM SELECTOR PLAN 2
Dx: Sepsis Vs Cardiac  Patient was febrile and hypotensive on admission, cultures NGTD  EF was also called BNP elevated, reduced EF  - have PT come see patient Dx: Sepsis Vs Cardiac  Patient was febrile and hypotensive on admission, cultures NGTD  EF was also called BNP elevated, reduced EF  - PT evaluated 07/21  - ECHO showed improvement

## 2023-07-22 NOTE — DISCHARGE NOTE PROVIDER - NSDCFUADDINST_GEN_ALL_CORE_FT
For rehabilitation: please continue airway clearance techniques to your ability including mucomyst inhalation, acapella, incentive spirometry, chest physiotherapy.

## 2023-07-22 NOTE — DISCHARGE NOTE PROVIDER - NSFOLLOWUPCLINICS_GEN_ALL_ED_FT
Cardiology at [facility]  Cardiology  [address] [place] [address]  Phone: [phone]  Fax:     [facility]  Neurology  [address] [facility] [address]rd [place] [address]  Phone: [phone]  Fax:

## 2023-07-22 NOTE — DISCHARGE NOTE PROVIDER - CARE PROVIDER_API CALL
Janette Carias [facility]  Neurology  [address] [place] [id number]  Phone: [phone]  Fax: [phone]  Established Patient  Follow Up Time: 2 weeks

## 2023-07-22 NOTE — DISCHARGE NOTE PROVIDER - HOSPITAL COURSE
Mr. Jalloh is a 68 y/o male with a PMHx of glioblastoma, seizure disorder, atrial fibrillation on xarelto, and HTN who had a seizure at a rehab facility and was brought in by EMS. He was given versed by EMS and upon arrival to ED, patient had a GCS of 6-7 and was unresponsive. At this point, he was intubated and MICU was consulted. Of note, patient was recently discharged from [facility] on 7/14 where he was seen for management of glioblastoma and seizures. He was discharged to rehab on keppra, vimpat, valproic acid, and dexamethasone and set to follow-up with outpatient neurologist before today's episode.    ED course: Patient brought in by EMS after receiving versed en route to hospital. Upon presentation to ED, he had a GCS of 6-7 and was unresponsive, leading to intubation. Patient also noted to be hypotensive and febrile in ED, requiring pressors. Labs upon arrival were significant for WBC of 8.21, glucose of 144, calcium of 8.3, phosphorus of 5, AST of 50, ALT of 80, troponin of 75, BNP of 2832, procal of 0.14, VBG with pH of 7.18, pCO2 of 61, and HCO3 of 23. POCUS in ED with evidence of right heart dilation.    Patient was then admitted to the MICU. Patient was extubated in the MICU and tolerated well. Infectious work up included procal, wbc, blood culture, urine culture. Labs did not indicate a infectious etiology. Antibiotics were discontinued. Video EEG did not show any signs of seizures. Patient's stay was complicated by decreased urine output and dark red urine. Patients urinary concerns were improving and he was downgraded to the floors 5 joel.     In 5 [facility], patient's care was coordinated by the primary internal medicine team 3. Patient was evaluated by cardiology and another ECHO was done. This echo indicated a EF of 52% which was improved from the one he had earlier. Patient's medications were then switched from IV to PO. Patient had a PEG tube in and we went ahead and contacted the doctor who placed it in........ (END here : July 22)      Patient was discharged in a medically cleared state and his admitting concerns were tended to. He continues his care after discharge at a subacute facility. Mr. Jalloh is a 68 y/o male with a PMHx of glioblastoma, seizure disorder, atrial fibrillation on xarelto, and HTN who had a seizure at a rehab facility and was brought in by EMS. He was given versed by EMS and upon arrival to ED, patient had a GCS of 6-7 and was unresponsive. At this point, he was intubated and MICU was consulted. Of note, patient was recently discharged from [facility] on 7/14 where he was seen for management of glioblastoma and seizures. He was discharged to rehab on keppra, vimpat, valproic acid, and dexamethasone and set to follow-up with outpatient neurologist before today's episode.    ED course: Patient brought in by EMS after receiving versed en route to hospital. Upon presentation to ED, he had a GCS of 6-7 and was unresponsive, leading to intubation. Patient also noted to be hypotensive and febrile in ED, requiring pressors. Labs upon arrival were significant for WBC of 8.21, glucose of 144, calcium of 8.3, phosphorus of 5, AST of 50, ALT of 80, troponin of 75, BNP of 2832, procal of 0.14, VBG with pH of 7.18, pCO2 of 61, and HCO3 of 23. POCUS in ED with evidence of right heart dilation.    Patient was then admitted to the MICU. Patient was extubated in the MICU and tolerated well. Infectious work up included procal, wbc, blood culture, urine culture. Labs did not indicate a infectious etiology. Antibiotics were discontinued. Video EEG did not show any signs of seizures. Patient's stay was complicated by decreased urine output and dark red urine. Patients urinary concerns were improving and he was downgraded to the floors 5 joel.     Patient was evaluated by cardiology and another ECHO was done. This echo indicated a EF of 52% which was improved from the one he had earlier. Patient's medications were then switched from IV to PO.       Patient was discharged in a medically cleared state and his admitting concerns were tended to. He continues his care after discharge at a subacute facility.

## 2023-07-22 NOTE — PROGRESS NOTE ADULT - SUBJECTIVE AND OBJECTIVE BOX
INCOMPLETE NOTE    CHIEF COMPLAINT:    Interval Events:    REVIEW OF SYSTEMS:  CONSTITUTIONAL: No weakness, fevers or chills  EYES/ENT: No visual changes;  No vertigo or throat pain   NECK: No pain or stiffness  RESPIRATORY: No cough, wheezing, hemoptysis; No shortness of breath  CARDIOVASCULAR: No chest pain or palpitations  GASTROINTESTINAL: No abdominal or epigastric pain. No nausea, vomiting, or hematemesis; No diarrhea or constipation. No melena or hematochezia.  GENITOURINARY: No dysuria, frequency or hematuria  NEUROLOGICAL: No numbness or weakness  SKIN: No itching, burning, rashes, or lesions   All other review of systems is negative unless indicated above.    OBJECTIVE:  ICU Vital Signs Last 24 Hrs  T(C): 37 (22 Jul 2023 04:34), Max: 37 (22 Jul 2023 04:34)  T(F): 98.6 (22 Jul 2023 04:34), Max: 98.6 (22 Jul 2023 04:34)  HR: 80 (22 Jul 2023 04:34) (80 - 98)  BP: 119/82 (22 Jul 2023 04:34) (119/82 - 149/97)  BP(mean): --  ABP: --  ABP(mean): --  RR: 18 (22 Jul 2023 04:34) (18 - 18)  SpO2: 97% (22 Jul 2023 04:34) (97% - 98%)    O2 Parameters below as of 22 Jul 2023 04:34  Patient On (Oxygen Delivery Method): room air              07-20 @ 07:01 - 07-21 @ 07:00  --------------------------------------------------------  IN: 1884 mL / OUT: 2700 mL / NET: -816 mL    07-21 @ 07:01 - 07-22 @ 06:52  --------------------------------------------------------  IN: 0 mL / OUT: 1100 mL / NET: -1100 mL      CAPILLARY BLOOD GLUCOSE      POCT Blood Glucose.: 115 mg/dL (21 Jul 2023 20:57)      PHYSICAL EXAM:  General: WN/WD NAD  Neurology: A&Ox3, nonfocal, LOYOLA x 4  Eyes: PERRLA/ EOMI, Gross vision intact  ENT/Neck: Neck supple, trachea midline, No JVD, Gross hearing intact  Respiratory: CTA B/L, No wheezing, rales, rhonchi  CV: RRR, +S1/S2, -S3/S4, no murmurs, rubs or gallops  Abdominal: Soft, NT, ND +BS, No HSM  MSK: 5/5 strength UE/LE bilaterally  Extremities: No edema, 2+ peripheral pulses  Skin: No Rashes, Hematoma, Ecchymosis  Incisions:   Tubes:    HOSPITAL MEDICATIONS:  MEDICATIONS  (STANDING):  atorvastatin 20 milliGRAM(s) Oral at bedtime  brimonidine 0.2% Ophthalmic Solution 1 Drop(s) Both EYES two times a day  dexAMETHasone  Injectable 4 milliGRAM(s) IV Push daily  insulin lispro (ADMELOG) corrective regimen sliding scale   SubCutaneous three times a day before meals  insulin lispro (ADMELOG) corrective regimen sliding scale   SubCutaneous at bedtime  lacosamide IVPB 200 milliGRAM(s) IV Intermittent every 12 hours  levETIRAcetam  IVPB 1000 milliGRAM(s) IV Intermittent every 12 hours  losartan 25 milliGRAM(s) Oral daily  metoprolol tartrate 25 milliGRAM(s) Oral two times a day  pantoprazole  Injectable 40 milliGRAM(s) IV Push every 24 hours  polyethylene glycol 3350 17 Gram(s) Oral daily  rivaroxaban 20 milliGRAM(s) Oral daily  senna 2 Tablet(s) Oral at bedtime  timolol 0.25% Solution 1 Drop(s) Both EYES two times a day  valproate sodium  IVPB 250 milliGRAM(s) IV Intermittent every 8 hours    MEDICATIONS  (PRN):  acetaminophen     Tablet .. 650 milliGRAM(s) Oral every 6 hours PRN Temp greater or equal to 38C (100.4F), Moderate Pain (4 - 6)  aluminum hydroxide/magnesium hydroxide/simethicone Suspension 30 milliLiter(s) Oral every 4 hours PRN Dyspepsia  clonazePAM  Tablet 0.5 milliGRAM(s) Oral two times a day PRN For anxiety  melatonin 3 milliGRAM(s) Oral at bedtime PRN Insomnia      LABS:                        12.8   8.66  )-----------( 167      ( 21 Jul 2023 06:46 )             38.3     Hgb Trend: 12.8<--, 11.8<--, 11.9<--, 12.1<--, 13.0<--  07-21    143  |  106  |  20  ----------------------------<  105<H>  3.9   |  26  |  0.79    Ca    8.1<L>      21 Jul 2023 06:46  Phos  2.8     07-21  Mg     1.8     07-21    TPro  4.8<L>  /  Alb  2.8<L>  /  TBili  0.2  /  DBili  x   /  AST  32  /  ALT  66<H>  /  AlkPhos  71  07-21    Creatinine Trend: 0.79<--, 0.71<--, 1.08<--, 0.60<--, 0.66<--, 0.57<--    Urinalysis Basic - ( 21 Jul 2023 06:46 )    Color: x / Appearance: x / SG: x / pH: x  Gluc: 105 mg/dL / Ketone: x  / Bili: x / Urobili: x   Blood: x / Protein: x / Nitrite: x   Leuk Esterase: x / RBC: x / WBC x   Sq Epi: x / Non Sq Epi: x / Bacteria: x            MICROBIOLOGY:       RADIOLOGY:  [ ] Reviewed by me   CHIEF COMPLAINT:     Interval Events: Patient had no concerns at the moment.     REVIEW OF SYSTEMS:  CONSTITUTIONAL: No weakness, fevers or chills  EYES/ENT: No visual changes;  No vertigo or throat pain   NECK: No pain or stiffness  RESPIRATORY: No cough, wheezing, hemoptysis; No shortness of breath  CARDIOVASCULAR: No chest pain or palpitations  GASTROINTESTINAL: No abdominal or epigastric pain. No nausea, vomiting, or hematemesis; No diarrhea or constipation. No melena or hematochezia.  GENITOURINARY: No dysuria, frequency or hematuria  NEUROLOGICAL: No numbness or weakness  SKIN: No itching, burning, rashes, or lesions   All other review of systems is negative unless indicated above.    OBJECTIVE:  ICU Vital Signs Last 24 Hrs  T(C): 37 (22 Jul 2023 04:34), Max: 37 (22 Jul 2023 04:34)  T(F): 98.6 (22 Jul 2023 04:34), Max: 98.6 (22 Jul 2023 04:34)  HR: 80 (22 Jul 2023 04:34) (80 - 98)  BP: 119/82 (22 Jul 2023 04:34) (119/82 - 149/97)  BP(mean): --  ABP: --  ABP(mean): --  RR: 18 (22 Jul 2023 04:34) (18 - 18)  SpO2: 97% (22 Jul 2023 04:34) (97% - 98%)    O2 Parameters below as of 22 Jul 2023 04:34  Patient On (Oxygen Delivery Method): room air              07-20 @ 07:01 - 07-21 @ 07:00  --------------------------------------------------------  IN: 1884 mL / OUT: 2700 mL / NET: -816 mL    07-21 @ 07:01  -  07-22 @ 06:52  --------------------------------------------------------  IN: 0 mL / OUT: 1100 mL / NET: -1100 mL      CAPILLARY BLOOD GLUCOSE      POCT Blood Glucose.: 115 mg/dL (21 Jul 2023 20:57)      PHYSICAL EXAM:  General: WN/WD NAD  Neurology: A&Ox3,   Eyes: PERRLA/ EOMI, Gross vision intact  ENT/Neck: Neck supple, trachea midline, No JVD, Gross hearing intact  Respiratory: CTA B/L, No wheezing, rales, rhonchi  CV: Normal rate irregularly irregular rythym indicative of afib.   Abdominal: Soft, NT, ND +BS, No HSM  MSK: 5/5 right 3/5 left side   Extremities: +2   Skin: No Rashes, Hematoma, Ecchymosis  Incisions:   Tubes: Peg tube     HOSPITAL MEDICATIONS:  MEDICATIONS  (STANDING):  atorvastatin 20 milliGRAM(s) Oral at bedtime  brimonidine 0.2% Ophthalmic Solution 1 Drop(s) Both EYES two times a day  dexAMETHasone  Injectable 4 milliGRAM(s) IV Push daily  insulin lispro (ADMELOG) corrective regimen sliding scale   SubCutaneous three times a day before meals  insulin lispro (ADMELOG) corrective regimen sliding scale   SubCutaneous at bedtime  lacosamide IVPB 200 milliGRAM(s) IV Intermittent every 12 hours  levETIRAcetam  IVPB 1000 milliGRAM(s) IV Intermittent every 12 hours  losartan 25 milliGRAM(s) Oral daily  metoprolol tartrate 25 milliGRAM(s) Oral two times a day  pantoprazole  Injectable 40 milliGRAM(s) IV Push every 24 hours  polyethylene glycol 3350 17 Gram(s) Oral daily  rivaroxaban 20 milliGRAM(s) Oral daily  senna 2 Tablet(s) Oral at bedtime  timolol 0.25% Solution 1 Drop(s) Both EYES two times a day  valproate sodium  IVPB 250 milliGRAM(s) IV Intermittent every 8 hours    MEDICATIONS  (PRN):  acetaminophen     Tablet .. 650 milliGRAM(s) Oral every 6 hours PRN Temp greater or equal to 38C (100.4F), Moderate Pain (4 - 6)  aluminum hydroxide/magnesium hydroxide/simethicone Suspension 30 milliLiter(s) Oral every 4 hours PRN Dyspepsia  clonazePAM  Tablet 0.5 milliGRAM(s) Oral two times a day PRN For anxiety  melatonin 3 milliGRAM(s) Oral at bedtime PRN Insomnia      LABS:                        12.8   8.66  )-----------( 167      ( 21 Jul 2023 06:46 )             38.3     Hgb Trend: 12.8<--, 11.8<--, 11.9<--, 12.1<--, 13.0<--  07-21    143  |  106  |  20  ----------------------------<  105<H>  3.9   |  26  |  0.79    Ca    8.1<L>      21 Jul 2023 06:46  Phos  2.8     07-21  Mg     1.8     07-21    TPro  4.8<L>  /  Alb  2.8<L>  /  TBili  0.2  /  DBili  x   /  AST  32  /  ALT  66<H>  /  AlkPhos  71  07-21    Creatinine Trend: 0.79<--, 0.71<--, 1.08<--, 0.60<--, 0.66<--, 0.57<--    Urinalysis Basic - ( 21 Jul 2023 06:46 )    Color: x / Appearance: x / SG: x / pH: x  Gluc: 105 mg/dL / Ketone: x  / Bili: x / Urobili: x   Blood: x / Protein: x / Nitrite: x   Leuk Esterase: x / RBC: x / WBC x   Sq Epi: x / Non Sq Epi: x / Bacteria: x            MICROBIOLOGY:       RADIOLOGY:  [ ] Reviewed by me

## 2023-07-22 NOTE — DISCHARGE NOTE PROVIDER - NSDCCAREPROVSEEN_GEN_ALL_CORE_FT
[facility] Internal Medicine Team 3  [facility] Team 3 Residents

## 2023-07-22 NOTE — DISCHARGE NOTE PROVIDER - NSDCCPCAREPLAN_GEN_ALL_CORE_FT
PRINCIPAL DISCHARGE DIAGNOSIS  Diagnosis: Seizures  Assessment and Plan of Treatment:       SECONDARY DISCHARGE DIAGNOSES  Diagnosis: Septic shock  Assessment and Plan of Treatment:      PRINCIPAL DISCHARGE DIAGNOSIS  Diagnosis: Seizures  Assessment and Plan of Treatment: Seizure, Adult  A seizure is a sudden burst of abnormal electrical and chemical activity in the brain. Seizures usually last from 30 seconds to 2 minutes. The abnormal activity temporarily interrupts normal brain function.  Symptoms during a seizure  Uncontrollable shaking (convulsions) with fast, jerky movements of muscles.  Stiffening of the body.  Breathing problems.  Confusion, staring, or unresponsiveness.  Head nodding, eye blinking or fluttering, or rapid eye movements.  Drooling, grunting, or making clicking sounds with your mouth.  Loss of bladder control and bowel control.  Seizures may be treated with:  Medicines given through an IV.  Avoiding known triggers, such as medicines that you take for another condition.  Medicines to control seizures or prevent future seizures (antiepileptics), if epilepsy caused your seizures.  Medical devices to prevent and control seizures.  Surgery to stop seizures or to reduce how often seizures happen, if you have epilepsy that does not respond to medicines.  A diet low in carbohydrates and high in fat (ketogenic diet).  Follow these instructions at home:  Medicines  Take over-the-counter and prescription medicines only as told by your health care provider.  Avoid any substances that may prevent your medicine from working properly, such as alcohol.  .  Get help right away if:  You have:  A seizure that does not stop after 5 minutes.  Several seizures in a row without a complete recovery between seizures.  A seizure that makes it harder to breathe.  A seizure that leaves you unable to speak or use a part of your body.  You do not wake up right away after a seizure.  You injure yourself during a seizure.  You have confusion or pain right after a seizure.  These symptoms may represent a serious problem that is an emergency. Do not wait to see if the symptoms will go away. Get medical help right away. Call your local emergency services (911 in the [place] Do not drive yourself to the hospital.        SECONDARY DISCHARGE DIAGNOSES  Diagnosis: Septic shock  Assessment and Plan of Treatment:      PRINCIPAL DISCHARGE DIAGNOSIS  Diagnosis: Seizures  Assessment and Plan of Treatment: You came to the hospital with a seizure. For this, you briefly required endotracheal intubation, meaning you were placed on a breathing machine. After your seizure resolved, you were eventually be able to be taken off of the ventilator. For this, the neurologists gave recommendations for your anti-seizure medications. You should continue these as prescribed at your rehabilitation facility and then on discharge. You should plan to follow up with Dr. Carias on discharge for further recommendations.      SECONDARY DISCHARGE DIAGNOSES  Diagnosis: Septic shock  Assessment and Plan of Treatment: Your blood pressure was low when you arrived at the hospital, which was originally thought to be due to an infection. For this, you required an IV medication to bring up your blood pressure while in the ICU. You were given IV antibiotics but ultimately we did not discover a cause of your infection. You were taken off antibiotics and your blood pressure improved and you had no other signs of infection. We held your hydralazine during the hospitalization, so you should continue to hold this on discharge. Please follow up with your primary care doctor on discharge for further management.

## 2023-07-22 NOTE — PROGRESS NOTE ADULT - PROBLEM SELECTOR PLAN 2
Dx: Sepsis Vs Cardiac  Patient was febrile and hypotensive on admission, cultures NGTD  EF was also called BNP elevated, reduced EF  - PT evaluated 07/21  - ECHO showed improvement

## 2023-07-22 NOTE — DISCHARGE NOTE PROVIDER - DETAILS OF MALNUTRITION DIAGNOSIS/DIAGNOSES
This patient has been assessed with a concern for Malnutrition and was treated during this hospitalization for the following Nutrition diagnosis/diagnoses:     -  07/18/2023: Moderate protein-calorie malnutrition

## 2023-07-22 NOTE — DISCHARGE NOTE PROVIDER - NSDCMRMEDTOKEN_GEN_ALL_CORE_FT
allopurinol 100 mg oral tablet: 1 tab(s) orally once a day  brimonidine 0.2% ophthalmic solution: 1 each in each affected eye 2 times a day  dexAMETHasone 4 mg oral tablet: 1 tab(s) orally 2 times a day  FLUoxetine 20 mg oral tablet: 1 tab(s) by gastrostomy tube once a day  lacosamide 200 mg oral tablet: 1 tab(s) orally 2 times a day  levETIRAcetam 1000 mg oral tablet: 1 tab(s) orally 2 times a day  losartan 25 mg oral tablet: 1 tab(s) orally once a day  metoprolol tartrate 25 mg oral tablet: 1 tab(s) orally 2 times a day  Pepcid 40 mg/5 mL oral liquid: 2.5 milliliter(s) by gastrostomy tube 2 times a day  timolol maleate 0.25% ophthalmic solution: 1 each in each affected eye 2 times a day  valproic acid 250 mg/5 mL oral liquid: 250 milligram(s) orally 3 times a day  Xarelto 20 mg oral tablet: 1 tab(s) orally once a day (at bedtime)   acetylcysteine 20% inhalation solution: 4 milliliter(s) inhaled every 6 hours As needed cough, mucus clearance  allopurinol 100 mg oral tablet: 1 tab(s) orally once a day  brimonidine 0.2% ophthalmic solution: 1 each in each affected eye 2 times a day  chlorhexidine 2% topical pad: 1 Apply topically to affected area once a day  dexAMETHasone 1.5 mg oral tablet: 2 tab(s) orally once a day  FLUoxetine 20 mg oral tablet: 1 tab(s) by gastrostomy tube once a day  ipratropium-albuterol 0.5 mg-2.5 mg/3 mL inhalation solution: 3 milliliter(s) inhaled every 6 hours As needed Wheezing  lacosamide 200 mg oral tablet: 1 tab(s) orally 2 times a day  levETIRAcetam 1000 mg oral tablet: 1 tab(s) orally 2 times a day  losartan 25 mg oral tablet: 1 tab(s) orally once a day  metoprolol tartrate 25 mg oral tablet: 1 tab(s) orally 2 times a day  pantoprazole 40 mg oral delayed release tablet: 1 tab(s) orally once a day (before a meal)  Pepcid 40 mg/5 mL oral liquid: 2.5 milliliter(s) by gastrostomy tube 2 times a day  polyethylene glycol 3350 oral powder for reconstitution: 17 gram(s) orally once a day as needed for  constipation  senna leaf extract oral tablet: 2 tab(s) orally once a day (at bedtime) as needed for  constipation  timolol maleate 0.25% ophthalmic solution: 1 each in each affected eye 2 times a day  valproic acid 250 mg/5 mL oral liquid: 250 milligram(s) orally 3 times a day  Xarelto 20 mg oral tablet: 1 tab(s) orally once a day (at bedtime)   acetylcysteine 20% inhalation solution: 4 milliliter(s) inhaled every 6 hours As needed cough, mucus clearance  allopurinol 100 mg oral tablet: 1 tab(s) orally once a day  atorvastatin 20 mg oral tablet: 1 tab(s) orally once a day (at bedtime)  brimonidine 0.2% ophthalmic solution: 1 each in each affected eye 2 times a day  chlorhexidine 2% topical pad: 1 Apply topically to affected area once a day  clonazePAM 0.5 mg oral tablet: 1 tab(s) orally 2 times a day As needed For anxiety  dexAMETHasone 1.5 mg oral tablet: 2 tab(s) orally once a day  FLUoxetine 20 mg oral tablet: 1 tab(s) orally once a day  ipratropium-albuterol 0.5 mg-2.5 mg/3 mL inhalation solution: 3 milliliter(s) inhaled every 6 hours As needed Wheezing  lacosamide 200 mg oral tablet: 1 tab(s) orally 2 times a day  levETIRAcetam 1000 mg oral tablet: 1 tab(s) orally 2 times a day  losartan 25 mg oral tablet: 1 tab(s) orally once a day  metoprolol tartrate 25 mg oral tablet: 1 tab(s) orally 2 times a day  pantoprazole 40 mg oral delayed release tablet: 1 tab(s) orally once a day (before a meal)  Pepcid 40 mg/5 mL oral liquid: 2.5 milliliter(s) orally 2 times a day  polyethylene glycol 3350 oral powder for reconstitution: 17 gram(s) orally once a day as needed for  constipation  senna leaf extract oral tablet: 2 tab(s) orally once a day (at bedtime) as needed for  constipation  timolol maleate 0.25% ophthalmic solution: 1 each in each affected eye 2 times a day  valproic acid 250 mg/5 mL oral liquid: 250 milligram(s) orally 3 times a day  Xarelto 20 mg oral tablet: 1 tab(s) orally once a day (at bedtime)

## 2023-07-22 NOTE — PROGRESS NOTE ADULT - ATTENDING COMMENTS
Seen and examined at bedside. Currently, afebrile no further seizures or signs of infection. ID consulted- no need for further abx. Pt has been eating ok per staff and is interested in having PEG tube removed- consulted Dr. Carias for her input on whether it can come out, awaiting her recs. Otherwise repeat TTE shows no significant interval change in LV systolic function from admission. Evaluated by cardiology - rec c/w metoprolol, losartan, lasix prn. Neuro following; rec AED- keppra, vimpat, clonazepam, valproic acid and dexamethasone. F/u w/ neuro re: decadron.

## 2023-07-23 NOTE — PROGRESS NOTE ADULT - PROBLEM SELECTOR PLAN 1
Ct negative  EEG normal   Plan:  - Continuing kepra, vimpat, clonazepam, VPA, dexamethasone Ct negative  EEG normal   Plan:  - Continuing Keppra 1000 mg BID, Vimpat 250 mg TID,  mg TID, Dexamethasone 3 mg per neurology

## 2023-07-23 NOTE — PROGRESS NOTE ADULT - PROBLEM SELECTOR PLAN 3
Creatnine improving  - continue to monitor - Pt will need evaluation as outpatient for PEG removal. Pt reports having received it during hospitalization for seizure in [place]. Pt reports is able to eat and wants to eat, not utilizing PEG.

## 2023-07-23 NOTE — PROGRESS NOTE ADULT - ATTENDING COMMENTS
Seen and examined at bedside. Currently, afebrile no further seizures or signs of infection. Seen by ID- no need for further abx. Pt has been eating ok per staff and is interested in having PEG tube removed- Dr. Carias had been consulted last week for her input on whether it can come out, awaiting her recs. Otherwise repeat TTE shows no significant interval change in LV systolic function from admission. Evaluated by cardiology - rec c/w metoprolol, losartan, lasix prn. Neuro following; rec AED- keppra, vimpat, clonazepam, valproic acid and dexamethasone. F/u w/ neuro re: decadron.

## 2023-07-23 NOTE — PROGRESS NOTE ADULT - SUBJECTIVE AND OBJECTIVE BOX
***************************************************************  [facility] ([facility]) [facility] PGY2  Internal Medicine   ***************************************************************    KATIE VALLES  67y  MRN: [id number]    Patient is a 67y old  Male who presents with a chief complaint of seizure (22 Jul 2023 22:31)      Subjective: no events ON. Denies fever, CP, SOB, abn pain, N/V, dysuria. Tolerating diet.      MEDICATIONS  (STANDING):  atorvastatin 20 milliGRAM(s) Oral at bedtime  brimonidine 0.2% Ophthalmic Solution 1 Drop(s) Both EYES two times a day  chlorhexidine 2% Cloths 1 Application(s) Topical <User Schedule>  insulin lispro (ADMELOG) corrective regimen sliding scale   SubCutaneous three times a day before meals  insulin lispro (ADMELOG) corrective regimen sliding scale   SubCutaneous at bedtime  lacosamide 200 milliGRAM(s) Oral two times a day  levETIRAcetam 1000 milliGRAM(s) Oral two times a day  losartan 25 milliGRAM(s) Oral daily  metoprolol tartrate 25 milliGRAM(s) Oral two times a day  pantoprazole    Tablet 40 milliGRAM(s) Oral before breakfast  polyethylene glycol 3350 17 Gram(s) Oral daily  rivaroxaban 20 milliGRAM(s) Oral daily  senna 2 Tablet(s) Oral at bedtime  timolol 0.25% Solution 1 Drop(s) Both EYES two times a day  valproic  acid Syrup 250 milliGRAM(s) Oral three times a day    MEDICATIONS  (PRN):  acetaminophen     Tablet .. 650 milliGRAM(s) Oral every 6 hours PRN Temp greater or equal to 38C (100.4F), Moderate Pain (4 - 6)  aluminum hydroxide/magnesium hydroxide/simethicone Suspension 30 milliLiter(s) Oral every 4 hours PRN Dyspepsia  clonazePAM  Tablet 0.5 milliGRAM(s) Oral two times a day PRN For anxiety  melatonin 3 milliGRAM(s) Oral at bedtime PRN Insomnia      Objective:    Vitals: Vital Signs Last 24 Hrs  T(C): 36.6 (07-23-23 @ 04:20), Max: 36.7 (07-22-23 @ 19:45)  T(F): 97.8 (07-23-23 @ 04:20), Max: 98.1 (07-22-23 @ 19:45)  HR: 83 (07-23-23 @ 04:20) (82 - 83)  BP: 143/87 (07-23-23 @ 04:20) (109/73 - 143/87)  BP(mean): --  RR: 18 (07-23-23 @ 04:20) (18 - 18)  SpO2: 100% (07-23-23 @ 04:20) (98% - 100%)            I&O's Summary    21 Jul 2023 07:01  -  22 Jul 2023 07:00  --------------------------------------------------------  IN: 0 mL / OUT: 1600 mL / NET: -1600 mL    22 Jul 2023 07:01  -  23 Jul 2023 05:24  --------------------------------------------------------  IN: 0 mL / OUT: 750 mL / NET: -750 mL        PHYSICAL EXAM:  GENERAL: NAD  HEAD:  Atraumatic, Normocephalic  EYES: EOMI, conjunctiva and sclera clear  CHEST/LUNG: Clear to auscultation bilaterally; No rales, rhonchi, wheezing, or rubs  HEART: Regular rate and rhythm; No murmurs, rubs, or gallops  ABDOMEN: Soft, Nontender, Nondistended;   SKIN: No rashes or lesions  NERVOUS SYSTEM:  Alert & Oriented X3, no focal deficits    LABS:  07-22    141  |  106  |  19  ----------------------------<  102<H>  4.5   |  26  |  0.68  07-21    143  |  106  |  20  ----------------------------<  105<H>  3.9   |  26  |  0.79    Ca    8.3<L>      22 Jul 2023 07:07  Ca    8.1<L>      21 Jul 2023 06:46  Phos  3.4     07-22  Mg     1.9     07-22    TPro  4.5<L>  /  Alb  2.6<L>  /  TBili  0.3  /  DBili  x   /  AST  26  /  ALT  54<H>  /  AlkPhos  58  07-22  TPro  4.8<L>  /  Alb  2.8<L>  /  TBili  0.2  /  DBili  x   /  AST  32  /  ALT  66<H>  /  AlkPhos  71  07-21                    Urinalysis Basic - ( 22 Jul 2023 07:07 )    Color: x / Appearance: x / SG: x / pH: x  Gluc: 102 mg/dL / Ketone: x  / Bili: x / Urobili: x   Blood: x / Protein: x / Nitrite: x   Leuk Esterase: x / RBC: x / WBC x   Sq Epi: x / Non Sq Epi: x / Bacteria: x                              11.3   9.59  )-----------( 162      ( 22 Jul 2023 07:08 )             34.7                         12.8   8.66  )-----------( 167      ( 21 Jul 2023 06:46 )             38.3     CAPILLARY BLOOD GLUCOSE      POCT Blood Glucose.: 119 mg/dL (22 Jul 2023 21:51)  POCT Blood Glucose.: 150 mg/dL (22 Jul 2023 16:55)  POCT Blood Glucose.: 103 mg/dL (22 Jul 2023 13:32)  POCT Blood Glucose.: 112 mg/dL (22 Jul 2023 09:22)      RADIOLOGY & ADDITIONAL TESTS:    Imaging Personally Reviewed:  [x ] YES  [ ] NO    Consultants involved in case:   Consultant(s) Notes Reviewed:  [ x] YES  [ ] NO:   Care Discussed with Consultants/Other Providers [x ] YES  [ ] NO         ***************************************************************  Robbi Cisneros) [facility] PGY2  Internal Medicine   ***************************************************************    KATIE VALLES  67y  MRN: [id number]    Patient is a 67y old  Male who presents with a chief complaint of seizure (22 Jul 2023 22:31)      Subjective: NAEO. No acute complaints.     MEDICATIONS  (STANDING):  atorvastatin 20 milliGRAM(s) Oral at bedtime  brimonidine 0.2% Ophthalmic Solution 1 Drop(s) Both EYES two times a day  chlorhexidine 2% Cloths 1 Application(s) Topical <User Schedule>  insulin lispro (ADMELOG) corrective regimen sliding scale   SubCutaneous three times a day before meals  insulin lispro (ADMELOG) corrective regimen sliding scale   SubCutaneous at bedtime  lacosamide 200 milliGRAM(s) Oral two times a day  levETIRAcetam 1000 milliGRAM(s) Oral two times a day  losartan 25 milliGRAM(s) Oral daily  metoprolol tartrate 25 milliGRAM(s) Oral two times a day  pantoprazole    Tablet 40 milliGRAM(s) Oral before breakfast  polyethylene glycol 3350 17 Gram(s) Oral daily  rivaroxaban 20 milliGRAM(s) Oral daily  senna 2 Tablet(s) Oral at bedtime  timolol 0.25% Solution 1 Drop(s) Both EYES two times a day  valproic  acid Syrup 250 milliGRAM(s) Oral three times a day    MEDICATIONS  (PRN):  acetaminophen     Tablet .. 650 milliGRAM(s) Oral every 6 hours PRN Temp greater or equal to 38C (100.4F), Moderate Pain (4 - 6)  aluminum hydroxide/magnesium hydroxide/simethicone Suspension 30 milliLiter(s) Oral every 4 hours PRN Dyspepsia  clonazePAM  Tablet 0.5 milliGRAM(s) Oral two times a day PRN For anxiety  melatonin 3 milliGRAM(s) Oral at bedtime PRN Insomnia      Objective:    Vitals: Vital Signs Last 24 Hrs  T(C): 36.6 (07-23-23 @ 04:20), Max: 36.7 (07-22-23 @ 19:45)  T(F): 97.8 (07-23-23 @ 04:20), Max: 98.1 (07-22-23 @ 19:45)  HR: 83 (07-23-23 @ 04:20) (82 - 83)  BP: 143/87 (07-23-23 @ 04:20) (109/73 - 143/87)  BP(mean): --  RR: 18 (07-23-23 @ 04:20) (18 - 18)  SpO2: 100% (07-23-23 @ 04:20) (98% - 100%)            I&O's Summary    21 Jul 2023 07:01  -  22 Jul 2023 07:00  --------------------------------------------------------  IN: 0 mL / OUT: 1600 mL / NET: -1600 mL    22 Jul 2023 07:01  -  23 Jul 2023 05:24  --------------------------------------------------------  IN: 0 mL / OUT: 750 mL / NET: -750 mL        PHYSICAL EXAM:  GENERAL: NAD  HEAD:  Atraumatic, Normocephalic  EYES: EOMI, conjunctiva and sclera clear  CHEST/LUNG: Clear to auscultation bilaterally; No rales, rhonchi, wheezing, or rubs  HEART: Regular rate and rhythm; No murmurs, rubs, or gallops  ABDOMEN: Soft, Nontender, Nondistended. PEG in place, clean without drainage.   SKIN: No rashes or lesions, trace edema to midshins/ankles  NERVOUS SYSTEM:  Alert & Oriented X3, no focal deficits    LABS:  07-22    141  |  106  |  19  ----------------------------<  102<H>  4.5   |  26  |  0.68  07-21    143  |  106  |  20  ----------------------------<  105<H>  3.9   |  26  |  0.79    Ca    8.3<L>      22 Jul 2023 07:07  Ca    8.1<L>      21 Jul 2023 06:46  Phos  3.4     07-22  Mg     1.9     07-22    TPro  4.5<L>  /  Alb  2.6<L>  /  TBili  0.3  /  DBili  x   /  AST  26  /  ALT  54<H>  /  AlkPhos  58  07-22  TPro  4.8<L>  /  Alb  2.8<L>  /  TBili  0.2  /  DBili  x   /  AST  32  /  ALT  66<H>  /  AlkPhos  71  07-21                    Urinalysis Basic - ( 22 Jul 2023 07:07 )    Color: x / Appearance: x / SG: x / pH: x  Gluc: 102 mg/dL / Ketone: x  / Bili: x / Urobili: x   Blood: x / Protein: x / Nitrite: x   Leuk Esterase: x / RBC: x / WBC x   Sq Epi: x / Non Sq Epi: x / Bacteria: x                              11.3   9.59  )-----------( 162      ( 22 Jul 2023 07:08 )             34.7                         12.8   8.66  )-----------( 167      ( 21 Jul 2023 06:46 )             38.3     CAPILLARY BLOOD GLUCOSE      POCT Blood Glucose.: 119 mg/dL (22 Jul 2023 21:51)  POCT Blood Glucose.: 150 mg/dL (22 Jul 2023 16:55)  POCT Blood Glucose.: 103 mg/dL (22 Jul 2023 13:32)  POCT Blood Glucose.: 112 mg/dL (22 Jul 2023 09:22)      RADIOLOGY & ADDITIONAL TESTS:    Imaging Personally Reviewed:  [x ] YES  [ ] NO    Consultants involved in case:   Consultant(s) Notes Reviewed:  [ x] YES  [ ] NO:   Care Discussed with Consultants/Other Providers [x ] YES  [ ] NO

## 2023-07-24 NOTE — PROGRESS NOTE ADULT - REASON FOR ADMISSION
seizure

## 2023-07-24 NOTE — PROGRESS NOTE ADULT - ATTENDING COMMENTS
I saw and evaluated the patient along with the medical housestaff at the bedside. HER reviewed. Case discussed in detail. I agree with the findings and plan documented by Dr. Jain. To this, I add/confirm: Mr. Jalloh is 67 with glioblastoma diagnosed in 2022 after a seizure. Has had periodic seizures despite AED regimen including dexamethasone. Presented 8 days ago with seizure and in septic shock. Received IV antibiotics, no source identified. Has DVT. PE and labs as documented. Seen sitting up in bed getting a breathing treatment. No complaints, feels at baseline. Lungs are clear. Cor reg rhythm. Abd obese. Peg in place. LE edema, no tenderness. Fever and sepsis resolved. AED regimen adjusted. Overall prognosis is poor. Agree with transfer to rehab with goal of getting him home soon. PEG to be removed as outpatient, per PMD. Rest of plan as documented.

## 2023-07-24 NOTE — PROGRESS NOTE ADULT - PROBLEM SELECTOR PLAN 7
DVT PPx: Xarelto  Diet: DASH  Bowel Regimen: PRN  Code: Full  Dispo: [facility]K pt amenable  Pharmacy:  PCP:   Communication:
DVT PPx: Xarelto  Diet: DASH  Bowel Regimen: PRN  Code: Full  Dispo: [facility]K pt amenable  Pharmacy:  PCP:   Communication:

## 2023-07-24 NOTE — PROGRESS NOTE ADULT - ATTENDING SUPERVISION STATEMENT
Resident/Fellow
Resident
Resident/Fellow
Fellow
Resident/Fellow
Resident

## 2023-07-24 NOTE — PROGRESS NOTE ADULT - PROBLEM SELECTOR PLAN 1
Ct negative  EEG normal   Plan:  - Continuing Keppra 1000 mg BID, Vimpat 250 mg TID,  mg TID, Dexamethasone 3 mg per neurology Ct negative  EEG neg for seizures during hospital stay.  Plan:  - Continuing Keppra 1000 mg BID, Vimpat 250 mg TID,  mg TID, Dexamethasone 3 mg per neurology  - f/u w/ outpatient neurologist

## 2023-07-24 NOTE — DISCHARGE NOTE NURSING/CASE MANAGEMENT/SOCIAL WORK - PATIENT PORTAL LINK FT
You can access the FollowMyHealth Patient Portal offered by [facility] by registering at the following website: [email or web address]. By joining [employer]’s FollowMyHealth portal, you will also be able to view your health information using other applications (apps) compatible with our system.

## 2023-07-24 NOTE — DISCHARGE NOTE NURSING/CASE MANAGEMENT/SOCIAL WORK - NSDCPEFALRISK_GEN_ALL_CORE
For information on Fall & Injury Prevention, visit: [email or web address] OR  [email or web address] OR  https://www.cdc.gov/steadi/patient.html

## 2023-07-24 NOTE — PROGRESS NOTE ADULT - NUTRITIONAL ASSESSMENT
This patient has been assessed with a concern for Malnutrition and has been determined to have a diagnosis/diagnoses of Moderate protein-calorie malnutrition.    This patient is being managed with:   Diet Easy to Chew-  Entered: Jul 18 2023 11:13AM  
This patient has been assessed with a concern for Malnutrition and has been determined to have a diagnosis/diagnoses of Moderate protein-calorie malnutrition.    This patient is being managed with:   Diet DASH/TLC-  Sodium & Cholesterol Restricted  Entered: Jul 20 2023  3:38PM  
This patient has been assessed with a concern for Malnutrition and has been determined to have a diagnosis/diagnoses of Moderate protein-calorie malnutrition.    This patient is being managed with:   Diet DASH/TLC-  Sodium & Cholesterol Restricted  Soft and Bite Sized (SOFTBTSZ)  Entered: Jul 23 2023  4:40PM  
This patient has been assessed with a concern for Malnutrition and has been determined to have a diagnosis/diagnoses of Moderate protein-calorie malnutrition.    This patient is being managed with:   Diet Easy to Chew-  Entered: Jul 18 2023 11:13AM  
This patient has been assessed with a concern for Malnutrition and has been determined to have a diagnosis/diagnoses of Moderate protein-calorie malnutrition.    This patient is being managed with:   Diet DASH/TLC-  Sodium & Cholesterol Restricted  Entered: Jul 20 2023  3:38PM  

## 2023-07-24 NOTE — PROGRESS NOTE ADULT - SUBJECTIVE AND OBJECTIVE BOX
KATIE VALLES  67y  Male      Patient is a 67y old  Male who presents with a chief complaint of seizure (23 Jul 2023 05:24)      INTERVAL HPI/OVERNIGHT EVENTS:      REVIEW OF SYSTEMS:  CONSTITUTIONAL: No fever, weight loss, or fatigue  EYES: No eye pain, visual disturbances, or discharge  ENMT:  No difficulty hearing, tinnitus, vertigo; No sinus or throat pain  NECK: No pain or stiffness  BREASTS: No pain, masses, or nipple discharge  RESPIRATORY: No cough, wheezing, chills or hemoptysis; No shortness of breath  CARDIOVASCULAR: No chest pain, palpitations, dizziness, or leg swelling  GASTROINTESTINAL: No abdominal or epigastric pain. No nausea, vomiting, or hematemesis; No diarrhea or constipation. No melena or hematochezia.  GENITOURINARY: No dysuria, frequency, hematuria, or incontinence  NEUROLOGICAL: No headaches, memory loss, loss of strength, numbness, or tremors  SKIN: No itching, burning, rashes, or lesions   LYMPH NODES: No enlarged glands  ENDOCRINE: No heat or cold intolerance; No hair loss  MUSCULOSKELETAL: No joint pain or swelling; No muscle, back, or extremity pain  PSYCHIATRIC: No depression, anxiety, mood swings, or difficulty sleeping  HEME/LYMPH: No easy bruising, or bleeding gums  ALLERY AND IMMUNOLOGIC: No hives or eczema  FAMILY HISTORY:  Family history of diabetes mellitus (DM) (Mother)    Family history of TIAs (Mother)    Family hx of prostate cancer (Father)    Family history of bone cancer (Father)    Family history of appendicitis      T(C): 36.7 (07-24-23 @ 04:08), Max: 36.7 (07-23-23 @ 12:00)  HR: 90 (07-24-23 @ 04:08) (80 - 90)  BP: 143/93 (07-24-23 @ 04:08) (126/84 - 143/93)  RR: 18 (07-24-23 @ 04:08) (18 - 18)  SpO2: 95% (07-24-23 @ 04:08) (95% - 100%)  Wt(kg): --Vital Signs Last 24 Hrs  T(C): 36.7 (24 Jul 2023 04:08), Max: 36.7 (23 Jul 2023 12:00)  T(F): 98.1 (24 Jul 2023 04:08), Max: 98.1 (23 Jul 2023 12:00)  HR: 90 (24 Jul 2023 04:08) (80 - 90)  BP: 143/93 (24 Jul 2023 04:08) (126/84 - 143/93)  BP(mean): --  RR: 18 (24 Jul 2023 04:08) (18 - 18)  SpO2: 95% (24 Jul 2023 04:08) (95% - 100%)    Parameters below as of 24 Jul 2023 04:08  Patient On (Oxygen Delivery Method): room air        PHYSICAL EXAM:  GENERAL: NAD, well-groomed, well-developed  HEAD:  Atraumatic, Normocephalic  EYES: EOMI, PERRLA, conjunctiva and sclera clear  ENMT: No tonsillar erythema, exudates, or enlargement; Moist mucous membranes, Good dentition, No lesions  NECK: Supple, No JVD, Normal thyroid  NERVOUS SYSTEM:  Alert & Oriented X3, Good concentration; Motor Strength 5/5 B/L upper and lower extremities; DTRs 2+ intact and symmetric  CHEST/LUNG: Clear to percussion bilaterally; No rales, rhonchi, wheezing, or rubs  HEART: Regular rate and rhythm; No murmurs, rubs, or gallops  ABDOMEN: Soft, Nontender, Nondistended; Bowel sounds present  EXTREMITIES:  2+ Peripheral Pulses, No clubbing, cyanosis, or edema  LYMPH: No lymphadenopathy noted  SKIN: No rashes or lesions    Consultant(s) Notes Reviewed:  [x ] YES  [ ] NO  Care Discussed with Consultants/Other Providers [ x] YES  [ ] NO    LABS:      RADIOLOGY & ADDITIONAL TESTS:    Imaging Personally Reviewed:  [ ] YES  [ ] NO  acetaminophen     Tablet .. 650 milliGRAM(s) Oral every 6 hours PRN  aluminum hydroxide/magnesium hydroxide/simethicone Suspension 30 milliLiter(s) Oral every 4 hours PRN  atorvastatin 20 milliGRAM(s) Oral at bedtime  brimonidine 0.2% Ophthalmic Solution 1 Drop(s) Both EYES two times a day  chlorhexidine 2% Cloths 1 Application(s) Topical daily  clonazePAM  Tablet 0.5 milliGRAM(s) Oral two times a day PRN  dexAMETHasone     Tablet 3 milliGRAM(s) Oral daily  insulin lispro (ADMELOG) corrective regimen sliding scale   SubCutaneous three times a day before meals  insulin lispro (ADMELOG) corrective regimen sliding scale   SubCutaneous at bedtime  lacosamide 200 milliGRAM(s) Oral two times a day  levETIRAcetam 1000 milliGRAM(s) Oral two times a day  losartan 25 milliGRAM(s) Oral daily  melatonin 3 milliGRAM(s) Oral at bedtime PRN  metoprolol tartrate 25 milliGRAM(s) Oral two times a day  pantoprazole    Tablet 40 milliGRAM(s) Oral before breakfast  polyethylene glycol 3350 17 Gram(s) Oral daily  rivaroxaban 20 milliGRAM(s) Oral daily  senna 2 Tablet(s) Oral at bedtime  timolol 0.25% Solution 1 Drop(s) Both EYES two times a day  valproic  acid Syrup 250 milliGRAM(s) Oral three times a day      HEALTH ISSUES - PROBLEM Dx:  Suspected deep vein thrombosis (DVT)    Seizure    Hematuria    Septic shock    Acute decompensated heart failure    Paroxysmal atrial fibrillation    Fibrillation, atrial    Hypertension    PEG (percutaneous endoscopic gastrostomy) status    Prophylactic measure             KATIE VALLES  67y  Male      Patient is a 67y old  Male who presents with a chief complaint of seizure (23 Jul 2023 05:24)      INTERVAL HPI/OVERNIGHT EVENTS:  No acute events overnight. Patient feels like there is mucus stuck in his throat. Unable to fully cough it up. Denied any fever, chills, chest pain, sob, ab pain, n/v. Appetite is good.     T(C): 36.7 (24 Jul 2023 04:08), Max: 36.7 (23 Jul 2023 12:00)  T(F): 98.1 (24 Jul 2023 04:08), Max: 98.1 (23 Jul 2023 12:00)  HR: 90 (24 Jul 2023 04:08) (80 - 90)  BP: 143/93 (24 Jul 2023 04:08) (126/84 - 143/93)  BP(mean): --  RR: 18 (24 Jul 2023 04:08) (18 - 18)  SpO2: 95% (24 Jul 2023 04:08) (95% - 100%)    Parameters below as of 24 Jul 2023 04:08  Patient On (Oxygen Delivery Method): room air    PHYSICAL EXAM:  GENERAL: NAD, obese, chronic ill-appearing   HEAD: Atraumatic, Normocephalic  EYES: EOMI, PERRLA, conjunctiva and sclera clear  ENMT: Moist mucous membranes  NECK: Supple, tracheostomy site w/ gauge, not fully healed   NERVOUS SYSTEM:  Alert & Oriented X3, Good concentration; sensation of b/l LE intact, minimally able to life LE against gravity, R more than L   CHEST/LUNG: upper airway obstruction heard, no wheezes, crackles   HEART: Regular rate and rhythm; No murmurs, rubs, or gallops  ABDOMEN: obese, Soft, Nontender, Nondistended; Bowel sounds present  EXTREMITIES:  b/l 2+ pitting edema  LYMPH: No lymphadenopathy noted  SKIN: No rashes or lesions    Consultant(s) Notes Reviewed:  [x ] YES  [ ] NO  Care Discussed with Consultants/Other Providers [ x] YES  [ ] NO    LABS:                       12.1   8.87  )-----------( 196      ( 24 Jul 2023 07:27 )             37.0     07-24    142  |  107  |  33<H>  ----------------------------<  109<H>  4.4   |  23  |  0.85    Ca    8.3<L>      24 Jul 2023 07:29  Phos  3.7     07-24  Mg     1.8     07-24    Lactate Trend  07-21 @ 06:46 Lactate:3.1   07-20 @ 06:20 Lactate:2.9       CAPILLARY BLOOD GLUCOSE  POCT Blood Glucose.: 102 mg/dL (24 Jul 2023 17:44)        Culture Results:   No growth at 5 days (07-16 @ 03:05)  Culture Results:   No growth at 5 days (07-16 @ 03:00)      RADIOLOGY & ADDITIONAL TESTS:    Imaging Personally Reviewed:  [ ] YES  [ ] NO  acetaminophen     Tablet .. 650 milliGRAM(s) Oral every 6 hours PRN  aluminum hydroxide/magnesium hydroxide/simethicone Suspension 30 milliLiter(s) Oral every 4 hours PRN  atorvastatin 20 milliGRAM(s) Oral at bedtime  brimonidine 0.2% Ophthalmic Solution 1 Drop(s) Both EYES two times a day  chlorhexidine 2% Cloths 1 Application(s) Topical daily  clonazePAM  Tablet 0.5 milliGRAM(s) Oral two times a day PRN  dexAMETHasone     Tablet 3 milliGRAM(s) Oral daily  insulin lispro (ADMELOG) corrective regimen sliding scale   SubCutaneous three times a day before meals  insulin lispro (ADMELOG) corrective regimen sliding scale   SubCutaneous at bedtime  lacosamide 200 milliGRAM(s) Oral two times a day  levETIRAcetam 1000 milliGRAM(s) Oral two times a day  losartan 25 milliGRAM(s) Oral daily  melatonin 3 milliGRAM(s) Oral at bedtime PRN  metoprolol tartrate 25 milliGRAM(s) Oral two times a day  pantoprazole    Tablet 40 milliGRAM(s) Oral before breakfast  polyethylene glycol 3350 17 Gram(s) Oral daily  rivaroxaban 20 milliGRAM(s) Oral daily  senna 2 Tablet(s) Oral at bedtime  timolol 0.25% Solution 1 Drop(s) Both EYES two times a day  valproic  acid Syrup 250 milliGRAM(s) Oral three times a day      HEALTH ISSUES - PROBLEM Dx:  Suspected deep vein thrombosis (DVT)    Seizure    Hematuria    Septic shock    Acute decompensated heart failure    Paroxysmal atrial fibrillation    Fibrillation, atrial    Hypertension    PEG (percutaneous endoscopic gastrostomy) status    Prophylactic measure

## 2023-07-24 NOTE — PROGRESS NOTE ADULT - SUBJECTIVE AND OBJECTIVE BOX
INTERVAL EVENTS/SUBJ:  No events     Home Medications:  allopurinol 100 mg oral tablet: 1 tab(s) orally once a day (17 Jul 2023 19:08)  brimonidine 0.2% ophthalmic solution: 1 each in each affected eye 2 times a day (17 Jul 2023 19:08)  dexAMETHasone 4 mg oral tablet: 1 tab(s) orally 2 times a day (17 Jul 2023 19:08)  FLUoxetine 20 mg oral tablet: 1 tab(s) by gastrostomy tube once a day (17 Jul 2023 19:11)  lacosamide 200 mg oral tablet: 1 tab(s) orally 2 times a day (17 Jul 2023 19:08)  Pepcid 40 mg/5 mL oral liquid: 2.5 milliliter(s) by gastrostomy tube 2 times a day (17 Jul 2023 19:13)  timolol maleate 0.25% ophthalmic solution: 1 each in each affected eye 2 times a day (17 Jul 2023 19:09)  Xarelto 20 mg oral tablet: 1 tab(s) orally once a day (at bedtime) (17 Jul 2023 19:09)      MEDICATIONS  (STANDING):  atorvastatin 20 milliGRAM(s) Oral at bedtime  brimonidine 0.2% Ophthalmic Solution 1 Drop(s) Both EYES two times a day  chlorhexidine 2% Cloths 1 Application(s) Topical daily  dexAMETHasone     Tablet 3 milliGRAM(s) Oral daily  insulin lispro (ADMELOG) corrective regimen sliding scale   SubCutaneous three times a day before meals  insulin lispro (ADMELOG) corrective regimen sliding scale   SubCutaneous at bedtime  lacosamide 200 milliGRAM(s) Oral two times a day  levETIRAcetam 1000 milliGRAM(s) Oral two times a day  losartan 25 milliGRAM(s) Oral daily  metoprolol tartrate 25 milliGRAM(s) Oral two times a day  pantoprazole    Tablet 40 milliGRAM(s) Oral before breakfast  polyethylene glycol 3350 17 Gram(s) Oral daily  rivaroxaban 20 milliGRAM(s) Oral daily  senna 2 Tablet(s) Oral at bedtime  timolol 0.25% Solution 1 Drop(s) Both EYES two times a day  valproic  acid Syrup 250 milliGRAM(s) Oral three times a day    MEDICATIONS  (PRN):  acetaminophen     Tablet .. 650 milliGRAM(s) Oral every 6 hours PRN Temp greater or equal to 38C (100.4F), Moderate Pain (4 - 6)  aluminum hydroxide/magnesium hydroxide/simethicone Suspension 30 milliLiter(s) Oral every 4 hours PRN Dyspepsia  clonazePAM  Tablet 0.5 milliGRAM(s) Oral two times a day PRN For anxiety  melatonin 3 milliGRAM(s) Oral at bedtime PRN Insomnia      Vital Signs Last 24 Hrs  T(C): 36.7 (24 Jul 2023 04:08), Max: 36.7 (23 Jul 2023 12:00)  T(F): 98.1 (24 Jul 2023 04:08), Max: 98.1 (23 Jul 2023 12:00)  HR: 90 (24 Jul 2023 04:08) (80 - 90)  BP: 143/93 (24 Jul 2023 04:08) (126/84 - 143/93)  BP(mean): --  RR: 18 (24 Jul 2023 04:08) (18 - 18)  SpO2: 95% (24 Jul 2023 04:08) (95% - 100%)    Parameters below as of 24 Jul 2023 04:08  Patient On (Oxygen Delivery Method): room air        REVIEW OF SYSTEMS:  As per HPI, otherwise unremarkable.     PHYSICAL EXAM:  Constitutional/Appearance: Normal, Well-developed  HEENT:   Normal oral mucosa, no drainage or redness, supple neck  Lymphatic: No lymphadenopathy  Cardiovascular: Normal S1 S2, No edema, II/VI DUYEN  Respiratory: Lungs clear to auscultation, respirations non-labored  Psychiatry: A & O x 3, appropriate affect.   Gastrointestinal:  Soft, Non-tender, no distention  Skin: No rashes, No ecchymoses, No cyanosis	  Neurologic: Non-focal, Alert and oriented x 3  Extremities: Normal range of motion  Vascular: Peripheral pulses palpable 2+ bilaterally (radial)    LABS:  CBC Full  -  ( 24 Jul 2023 07:27 )  WBC Count : 8.87 K/uL  RBC Count : 3.84 M/uL  Hemoglobin : 12.1 g/dL  Hematocrit : 37.0 %  Platelet Count - Automated : 196 K/uL  Mean Cell Volume : 96.4 fl  Mean Cell Hemoglobin : 31.5 pg  Mean Cell Hemoglobin Concentration : 32.7 gm/dL  Auto Neutrophil # : x  Auto Lymphocyte # : x  Auto Monocyte # : x  Auto Eosinophil # : x  Auto Basophil # : x  Auto Neutrophil % : x  Auto Lymphocyte % : x  Auto Monocyte % : x  Auto Eosinophil % : x  Auto Basophil % : x      07-23    142  |  106  |  27<H>  ----------------------------<  93  4.5   |  24  |  0.67    Ca    8.4      23 Jul 2023 13:35  Phos  3.7     07-23  Mg     1.9     07-23        TTE  EF 52@    IMPRESSION AND PLAN: 67M w glioblastoma, seizure disorder, HTN, AF on xarelto here after seizure and possible septic shock. ?stress cardiomyopathy TTE EF 45-50%  -tele  -xarelto   -cont metoprolol 25 bid, goal HR <110  -cont losartan 25   -lasix PRN  -rpt TTE EF 52%    ***    Stefano Strickland MD, MPhil, [facility]  Cardiologist, [facility]  ; [employer] [facility] [employer] [facility]  email: [email or web address] on-service contact/call information, go to [email or web address] and use "[email or web address]" to login.  Outpatient Cardiology appointments, call  [phone] to arrange with a colleague; I do not have outpatient Cardiology clinic.

## 2023-07-24 NOTE — PROGRESS NOTE ADULT - PROBLEM SELECTOR PLAN 3
- Pt will need evaluation as outpatient for PEG removal. Pt reports having received it during hospitalization for seizure in [place]. Pt reports is able to eat and wants to eat, not utilizing PEG.

## 2023-07-24 NOTE — PROGRESS NOTE ADULT - PROVIDER SPECIALTY LIST ADULT
Cardiology
Internal Medicine
Neurology
Cardiology
Cardiology
MICU
Cardiology
Cardiology
MICU
MICU
Neurology
Cardiology
MICU
Internal Medicine

## 2023-08-03 NOTE — PHYSICAL THERAPY INITIAL EVALUATION ADULT - THERAPY FREQUENCY, PT EVAL
Pt called again regarding mammogram.  It has to say bilateral diagnostic.  Could you please put in another order?  Ty    
Pt called and states that the mammogram referral is the wrong one and needs a new referral place. Mammogram should say bilateral with ultra sound.  
Spoke with patient  
2-3x/week

## 2023-08-09 PROBLEM — K59.01 SLOW TRANSIT CONSTIPATION: Status: ACTIVE | Noted: 2023-01-01

## 2023-08-09 PROBLEM — R06.02 SHORTNESS OF BREATH AT REST: Status: ACTIVE | Noted: 2023-01-01

## 2023-08-09 PROBLEM — F32.A DEPRESSION, UNSPECIFIED DEPRESSION TYPE: Status: ACTIVE | Noted: 2023-01-01

## 2023-08-09 PROBLEM — E78.5 HYPERLIPIDEMIA: Status: ACTIVE | Noted: 2023-01-01
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
## 2023-08-09 NOTE — DISCUSSION/SUMMARY
[FreeTextEntry1] : Patient seen and examined.  Neurologically worse KPS - 40 Recommended cardiology eval -  Will discuss further plan of care with daughter tomorrow.

## 2023-08-09 NOTE — PHYSICAL EXAM
[General Appearance - Alert] : alert [General Appearance - In No Acute Distress] : in no acute distress [Irregularly Irregular] : the rhythm was irregularly irregular [FreeTextEntry1] : Patient seen and examined Alert, awake  Oriented to self , confused with month , but knows year and President Follows simple commands  Left dense hemiparesis Wheelchair bound

## 2023-09-07 NOTE — H&P ADULT - PROBLEM SELECTOR PLAN 3
Hx of GBM in past. Repeat CTH suggests larger mass with surrounding vasogenic edema and midline shift. S/p trach w/ reversal and PEG since May. Appreciate neurology recommendations  -MRI head w/ and w/o IV Contrast  -Decadron 4mg IV BID  -GI prophylaxis  -Neurochecks  -Cont. AEMs  -As per daughter, has been tolerating some PO and thickened liquids at facility. Asks about removing PEG tube which I advised against doing in the near term.  -Speech/swallow consult  -Nutrition consult  -F/u neurology recommendations Hx of GBM in past. Repeat CTH suggests larger mass with surrounding vasogenic edema and midline shift. S/p trach w/ reversal and PEG since May. Appreciate neurology recommendations  -MRI head w/ and w/o IV Contrast  -Decadron 4mg IV BID  -GI prophylaxis  -Neurochecks  -Cont. AEMs  -As per daughter, has been tolerating some PO and thickened liquids at facility. Asks about removing PEG tube which I advised against doing in the near term.  -Speech/swallow consult  -Nutrition consult  -F/u neurology recommendations  -Falls, aspiration and seizure precautions

## 2023-09-07 NOTE — CONSULT NOTE ADULT - ASSESSMENT
Pt not a candidate for tenecteplase due to being out of therapeutic window. Family expresses concerns about kidney issue in the past and after extensive discussion notes that pt is being followed by neurologist and cardiologist and are aware of the nature of her current condition. The family would not pursue intervention even if any findings would have showed up on CTA or CTP therefore, declined further imaging.     IMPRESSION: Transient episode of Left hemiparesis possibly 2/2 TIA vs. recrudescence of multiple strokes of R hemisphere. Mechanism unknown.    RECOMMENDATIONS:       IMPRESSION: Left hemiplegia likely 2/2 increased vasogenic edema of Right brain w/ midline shift.     RECOMMENDATIONS:  [] MRI brain w/ and w/o contrast  [] Start Decadron 4mg BID  [] Continue home seizure medications   [] Infectious work-up  [] Neurochecks and vitals Q4H  [] Rest of care per primary team      Case d/w Dr. Carias.  Will be seen by neurology attending in morning.  67M PMHx glioblastoma, seizure disorder, A.fib (on Xarelto), and HTN presenting with cough and Left-sided weakness. Progressively worsening since approximately 2 weeks prior. Pt has not been on chemotherapy since 5/2023 due to other medical conditions. Being followed by Dr. Carias. Originally planned for MRI tomorrow, on 9/8/23. Vitals stable. CBC and CMP WNL. CTH showing right frontal parietal region with extensive vasogenic edema w/ 7mm midline shift.     IMPRESSION: Left hemiplegia likely 2/2 increased vasogenic edema of Right brain w/ midline shift.     RECOMMENDATIONS:  [] MRI brain w/ and w/o contrast  [] Start Decadron 4mg BID  [] Continue home seizure medications   [] Infectious work-up  [] Neurochecks and vitals Q4H  [] Rest of care per primary team      Case d/w Dr. Carias.  Will be seen by neurology attending in morning.  67M PMHx glioblastoma, seizure disorder, A.fib (on Xarelto), and HTN presenting with cough and Left-sided weakness. Progressively worsening since approximately 2 weeks prior. Pt has not been on chemotherapy since 5/2023 due to other medical conditions. Being followed by Dr. Carias. Vitals stable. CBC and CMP WNL. CTH showing right frontal parietal region with extensive vasogenic edema w/ 7mm midline shift.     IMPRESSION: Left hemiplegia likely 2/2 increased vasogenic edema of Right brain w/ midline shift.     RECOMMENDATIONS:  [] MRI brain w/ and w/o contrast  [] Start Decadron 4mg BID  [] Continue home seizure medications   [] Continue sx management for viral infection  [] Neurochecks and vitals Q4H  [] Rest of care per primary team      Case d/w Dr. Carias.  Will be seen by neurology attending in morning.

## 2023-09-07 NOTE — H&P ADULT - ASSESSMENT
67M w/ history of glioblastoma, HTN, seizure disorder, A-fib on Xarelto, prior stroke w/ residual L-sided weakness, sent from nursing home for cough and acute encephalopathy 67M w/ history of glioblastoma, HTN, seizure disorder, A-fib on Xarelto, prior stroke w/ residual L-sided weakness, sent from nursing home for cough and acute encephalopathy found to be enterovirus positive, rhinovirus positive and have worsening GBM w/ edema and midline shift

## 2023-09-07 NOTE — H&P ADULT - NSHPLABSRESULTS_GEN_ALL_CORE
I have reviewed the labs, imaging and ekg. EKG with afib hr 109, QTc 474 non-specific ST segment findings, CXR w/o focal consolidations

## 2023-09-07 NOTE — ED ADULT NURSE NOTE - NSFALLHARMRISKINTERV_ED_ALL_ED
Communicate risk of Fall with Harm to all staff, patient, and family/Provide patient with walking aids/Provide visual cue: red socks, yellow wristband, yellow gown, etc/Reinforce activity limits and safety measures with patient and family/Bed in lowest position, wheels locked, appropriate side rails in place/Call bell, personal items and telephone in reach/Instruct patient to call for assistance before getting out of bed/chair/stretcher/Non-slip footwear applied when patient is off stretcher/[place] to call system/Physically safe environment - no spills, clutter or unnecessary equipment/Purposeful Proactive Rounding/Room/bathroom lighting operational, light cord in reach

## 2023-09-07 NOTE — H&P ADULT - PROBLEM SELECTOR PLAN 1
Likely multifactorial given viral infections and larger brain mass w/ increased vasogenic edema as well as midline shift. Appreciate neurology recommendations  -MRI brain w/ IV contrast  -F/u neurology recommendations  -Neurochecks  -Supportive care  -Falls precautions, aspiration risk, NPO for now Likely multifactorial given viral infections and larger brain mass w/ increased vasogenic edema as well as midline shift. Appreciate neurology recommendations  -MRI brain w/ IV contrast  -F/u neurology recommendations  -Neuro checks  -Supportive care  -Falls precautions, aspiration risk, NPO for now Likely multifactorial given viral infections and larger brain mass w/ increased vasogenic edema as well as midline shift. Appreciate neurology recommendations  -MRI brain w/ IV contrast  -F/u neurology recommendations  -Neuro checks  -Supportive care  -Falls precautions, aspiration risk, NPO for now  -F/u BCxs, UCx

## 2023-09-07 NOTE — H&P ADULT - TIME BILLING
Need to interview and examine patient, discuss care with family, provide counseling, coordinate care, place orders, document, personally review imaging, ekg and review prior medical records

## 2023-09-07 NOTE — H&P ADULT - NSHPPHYSICALEXAM_GEN_ALL_CORE
Vital Signs Last 24 Hrs  T(C): 37.3 (09-07-23 @ 14:38), Max: 37.3 (09-07-23 @ 14:38)  T(F): 99.1 (09-07-23 @ 14:38), Max: 99.1 (09-07-23 @ 14:38)  HR: 95 (09-07-23 @ 18:03) (79 - 105)  BP: 118/90 (09-07-23 @ 18:03) (85/60 - 131/84)  BP(mean): 99 (09-07-23 @ 18:03) (92 - 99)  RR: 28 (09-07-23 @ 18:03) (22 - 28)  SpO2: 100% (09-07-23 @ 18:03) (88% - 100%)

## 2023-09-07 NOTE — CONSULT NOTE ADULT - SUBJECTIVE AND OBJECTIVE BOX
Neurology - Consult Note    Spectra: [phone] ([facility]), [phone] ([facility])    HPI:  67M PMHx glioblastoma, seizure disorder, A.fib (on Xarelto), and HTN presenting with cough and Left-sided weakness. Per daughter at bedside, for the past few weeks, pt has been developing a cough and Left-sided weakness that seem to progressively worsen. Pt was brought into ED and CTH showed an increase in vasogenic edema on the right-side of brain w/ midline shift of 7mm. Neurology consulted for these findings. Pt is known to Dr. Carias. Pt has been off chemo for the past several months to recover from recent medical conditions and was almost ready to restart on Avastin when pt became sick again. No seizure-like activity was noted in the past few weeks.     Review of Systems:  CONSTITUTIONAL: +fevers  EYES AND ENT: No visual changes or no throat pain   NECK: No pain or stiffness  RESPIRATORY: No shortness of breath  CARDIOVASCULAR: No chest pain or palpitations  GASTROINTESTINAL: No nausea or vomiting   NEUROLOGICAL: +As stated in HPI above  All other review of systems is negative unless indicated above.    Allergies:  No Known Allergies      PMHx/PSHx/Family Hx: As above, otherwise see below   GERD (gastroesophageal reflux disease)    Prostate ca    Hypertension    Gout    Atrial fibrillation    Splenic infarction    2019 novel coronavirus disease (COVID-19)    GERD (gastroesophageal reflux disease)        Social Hx:  Never smoker; no current use of tobacco, alcohol, or illicit drugs  Lives with ***; occupation ***, baseline functional status is ***    Medications:  MEDICATIONS  (STANDING):  albuterol/ipratropium for Nebulization 3 milliLiter(s) Nebulizer every 6 hours  dexAMETHasone  Injectable 4 milliGRAM(s) IV Push every 12 hours  pantoprazole  Injectable 40 milliGRAM(s) IV Push daily    MEDICATIONS  (PRN):      Vitals:  T(C): 37.3 (09-07-23 @ 14:38), Max: 37.3 (09-07-23 @ 14:38)  HR: 95 (09-07-23 @ 18:03) (79 - 105)  BP: 118/90 (09-07-23 @ 18:03) (85/60 - 131/84)  RR: 28 (09-07-23 @ 18:03) (22 - 28)  SpO2: 100% (09-07-23 @ 18:03) (88% - 100%)    Physical Examination:  General - non-toxic appearing male in mild distress  Cardiovascular - peripheral pulses palpable, no edema  Respiratory - breathing comfortably with no increased work of breathing    Neurologic Exam:  Mental status - Awake, Alert, Oriented to person, place, and time. Speech fluent, repetition and naming intact. Follows simple and complex commands. Attention/concentration, recent and remote memory (including registration 3/3 and recall 3/3), and fund of knowledge intact    Cranial nerves - PERRL (4mm -> 3mm b/l), BTT intact b/l, EOMI (but cannot bury sclera in both directions), face sensation (V1-V3) intact b/l, facial strength intact without asymmetry b/l, hearing intact b/l, palate with symmetric elevation, trapezius 5/5 strength on Right, tongue midline on protrusion with full lateral movement    Motor - Normal bulk and tone on RUE/RLE. Flaccid tone throughout on LUE/LLE.     Strength testing            Deltoid      Biceps      Triceps     Wrist Extension    Wrist Flexion     Interossei         R            5                5               5                   5                 5                       5               5  L             0               0               0                    0                0                       0                 0              Hip Flexion    Hip Extension    Knee Flexion    Knee Extension    Dorsiflexion    Plantar Flexion  R              5                   5                       5                    5                            5                       5  L              0                    0                       0                    0                          0                        0    Sensation - Light touch intact throughout    DTR's -             Biceps      Triceps     Brachioradialis      Patellar    Ankle    Toes/plantar response  R             2+         2+                 2+                  2+            2+                 Down  L              2+        2+                 2+                   2+           2+                 upgoing     Coordination - Finger to Nose intact on RUE. No tremors appreciated    Gait and station - unable to assess due to pt safety       Labs:                        14.0   5.59  )-----------( 168      ( 07 Sep 2023 15:08 )             42.9     09-07    140  |  100  |  15  ----------------------------<  107<H>  3.6   |  26  |  0.70    Ca    9.0      07 Sep 2023 15:08    TPro  6.1  /  Alb  3.1<L>  /  TBili  0.7  /  DBili  x   /  AST  21  /  ALT  32  /  AlkPhos  70  09-07    CAPILLARY BLOOD GLUCOSE        LIVER FUNCTIONS - ( 07 Sep 2023 15:08 )  Alb: 3.1 g/dL / Pro: 6.1 g/dL / ALK PHOS: 70 U/L / ALT: 32 U/L / AST: 21 U/L / GGT: x           PT/INR - ( 07 Sep 2023 15:08 )   PT: 13.6 sec;   INR: 1.24 ratio      PTT - ( 07 Sep 2023 15:08 )  PTT:28.5 sec        Radiology:  CT Head No Cont:  (07 Sep 2023 17:51)  < from: CT Head No Cont (09.07.23 @ 17:51) >  IMPRESSION: Area of hypodensity in the right frontal parietal region with   extensive surrounding vasogenic edema, overall increased in size compared   to prior exam. These findings raise suspicion for tumor progression. New   right-to-left midline shift measuring 7 mm. Consider MRI brain with and   without contrast for further evaluation if clinically warranted.    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 Per daughter at bedside, for the past few weeks, pt has been developing a cough and Left-sided weakness that seem to be getting worse. Pt at baseline has chronic Left arm weakness. Pt was brought into ED and CTH showed an increase in vasogenic edema on the right-side of brain w/ midline shift of 7mm. Neurology consulted for these findings. Pt is known to Dr. Carias. Pt has been off chemo for the past several months to recover from recent medical conditions while in [place] as noted below in HPI. Pt was planning to restart Avastin when he returned to [place]. No seizure-like activity was noted in the past few weeks.     Briefly, in terms of GBM hx, on 8/26/22 when he was driving and experienced an episode of head and neck turning to the right, dysarthria, and L facial droop. 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 The patient was started on Keppra for seizure management and was well controlled. Pt completed first round of chemoRT (TMZ) in 10/22. Patient is followed by Dr. Carias and IV Avastin infusion was added to TMZ cycles. On 5/11/23, patient was vacationing in [place] with his family and experienced a witnessed focal seizure (pt describes as facial twitching that spread to upper proximal extremities. Patient was admitted to a hospital in [place] and hospital course was complicated by recurrent seizure and afib, requiring intubation and ICU admission. MRI showed possible progression of disease vs. small strokes. EEG were indeterminate. He was started on new AEDs (added Vimpat 200 mg BID and increased Keppra to 1000 mg BID) which stabilized his seizures. During the admission in [place], pt also underwent trach and PEG placement because of prolonged ventilation requirement. He was then decannulated around 6/6/2023.    Review of Systems:  CONSTITUTIONAL: +fevers  EYES AND ENT: No visual changes or no throat pain   NECK: No pain or stiffness  RESPIRATORY: No shortness of breath  CARDIOVASCULAR: No chest pain or palpitations  GASTROINTESTINAL: No nausea or vomiting   NEUROLOGICAL: +As stated in HPI above  All other review of systems is negative unless indicated above.    Allergies:  No Known Allergies    PMHx/PSHx/Family Hx: As above, otherwise see below   GERD (gastroesophageal reflux disease)    Prostate ca    Hypertension    Gout    Atrial fibrillation    Splenic infarction    2019 novel coronavirus disease (COVID-19)    GERD (gastroesophageal reflux disease)      Social Hx:  No current use of tobacco, alcohol, or illicit drugs    Medications:  MEDICATIONS  (STANDING):  albuterol/ipratropium for Nebulization 3 milliLiter(s) Nebulizer every 6 hours  dexAMETHasone  Injectable 4 milliGRAM(s) IV Push every 12 hours  pantoprazole  Injectable 40 milliGRAM(s) IV Push daily    MEDICATIONS  (PRN):      Vitals:  T(C): 37.3 (09-07-23 @ 14:38), Max: 37.3 (09-07-23 @ 14:38)  HR: 95 (09-07-23 @ 18:03) (79 - 105)  BP: 118/90 (09-07-23 @ 18:03) (85/60 - 131/84)  RR: 28 (09-07-23 @ 18:03) (22 - 28)  SpO2: 100% (09-07-23 @ 18:03) (88% - 100%)    Physical Examination:  General - non-toxic appearing male in mild distress  Cardiovascular - peripheral pulses palpable, no edema  Respiratory - breathing comfortably with no increased work of breathing    Neurologic Exam:  Mental status - Awake, Alert, Oriented to person, place, and time. Speech fluent, repetition and naming intact. Follows simple and complex commands. Attention/concentration, recent and remote memory (including registration 3/3 and recall 3/3), and fund of knowledge intact    Cranial nerves - PERRL (4mm -> 3mm b/l), BTT intact b/l, EOMI (but cannot bury sclera in both directions), face sensation (V1-V3) intact b/l, facial strength intact without asymmetry b/l, hearing intact b/l, palate with symmetric elevation, trapezius 5/5 strength on Right, tongue midline on protrusion with full lateral movement    Motor - Normal bulk and tone on RUE/RLE. Flaccid tone throughout on LUE/LLE.     Strength testing            Deltoid      Biceps      Triceps     Wrist Extension    Wrist Flexion     Interossei         R            5                5               5                   5                 5                       5               5  L             0               0               0                    0                0                       0                 0              Hip Flexion    Hip Extension    Knee Flexion    Knee Extension    Dorsiflexion    Plantar Flexion  R              5                   5                       5                    5                            5                       5  L              0                    0                       0                    0                          0                        0    Sensation - Light touch intact throughout    DTR's -             Biceps      Triceps     Brachioradialis      Patellar    Ankle    Toes/plantar response  R             2+         2+                 2+                  2+            2+                 Down  L              2+        2+                 2+                   2+           2+                 upgoing     Coordination - Finger to Nose intact on RUE. No tremors appreciated    Gait and station - unable to assess due to pt safety       Labs:                        14.0   5.59  )-----------( 168      ( 07 Sep 2023 15:08 )             42.9     09-07    140  |  100  |  15  ----------------------------<  107<H>  3.6   |  26  |  0.70    Ca    9.0      07 Sep 2023 15:08    TPro  6.1  /  Alb  3.1<L>  /  TBili  0.7  /  DBili  x   /  AST  21  /  ALT  32  /  AlkPhos  70  09-07      LIVER FUNCTIONS - ( 07 Sep 2023 15:08 )  Alb: 3.1 g/dL / Pro: 6.1 g/dL / ALK PHOS: 70 U/L / ALT: 32 U/L / AST: 21 U/L / GGT: x           PT/INR - ( 07 Sep 2023 15:08 )   PT: 13.6 sec;   INR: 1.24 ratio      PTT - ( 07 Sep 2023 15:08 )  PTT:28.5 sec      Radiology:  CT Head No Cont:  (07 Sep 2023 17:51)  < from: CT Head No Cont (09.07.23 @ 17:51) >  IMPRESSION: Area of hypodensity in the right frontal parietal region with   extensive surrounding vasogenic edema, overall increased in size compared   to prior exam. These findings raise suspicion for tumor progression. New   right-to-left midline shift measuring 7 mm. Consider MRI brain with and   without contrast for further evaluation if clinically warranted.    < end of copied text >     Neurology - Consult Note    Spectra: [phone] ([facility]), [phone] ([facility])    HPI:  67M PMHx glioblastoma (WHO grade 4, IDH wt), seizure disorder, A.fib (on Xarelto), prostate cancer s/p prostatectomy in 2010 and HTN presenting with cough and worsening Left-sided weakness. Per daughter at bedside, for the past few weeks, pt has been developing a cough and Left-sided weakness that seem to be getting worse. Pt at baseline has chronic Left arm weakness. Pt was brought into ED and CTH showed an increase in vasogenic edema on the right-side of brain w/ midline shift of 7mm. Neurology consulted for these findings. Pt is known to Dr. Carias. Pt has been off chemo for the past several months to recover from recent medical conditions while in [place] as noted below in HPI. Pt was planning to restart Avastin when he returned to [place]. No seizure-like activity was noted in the past few weeks.     Briefly, in terms of GBM hx, on 8/26/22 when he was driving and experienced an episode of head and neck turning to the right, dysarthria, and L facial droop. 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 The patient was started on Keppra for seizure management and was well controlled. Pt completed first round of chemoRT (TMZ) in 10/22. Patient is followed by Dr. Carias and IV Avastin infusion was added to TMZ cycles. On 5/11/23, patient was vacationing in [place] with his family and experienced a witnessed focal seizure (pt describes as facial twitching that spread to upper proximal extremities. Patient was admitted to a hospital in [place] and hospital course was complicated by recurrent seizure and afib, requiring intubation and ICU admission. MRI showed possible progression of disease vs. small strokes. EEG were indeterminate. He was started on new AEDs (added Vimpat 200 mg BID and increased Keppra to 1000 mg BID) which stabilized his seizures. During the admission in [place], pt also underwent trach and PEG placement because of prolonged ventilation requirement. He was then decannulated around 6/6/2023.    Review of Systems:  CONSTITUTIONAL: +fevers  EYES AND ENT: No visual changes or no throat pain   NECK: No pain or stiffness  RESPIRATORY: No shortness of breath  CARDIOVASCULAR: No chest pain or palpitations  GASTROINTESTINAL: No nausea or vomiting   NEUROLOGICAL: +As stated in HPI above  All other review of systems is negative unless indicated above.    Allergies:  No Known Allergies    PMHx/PSHx/Family Hx: As above, otherwise see below   GERD (gastroesophageal reflux disease)    Prostate ca    Hypertension    Gout    Atrial fibrillation    Splenic infarction    2019 novel coronavirus disease (COVID-19)    GERD (gastroesophageal reflux disease)      Social Hx:  No current use of tobacco, alcohol, or illicit drugs    Medications:  MEDICATIONS  (STANDING):  albuterol/ipratropium for Nebulization 3 milliLiter(s) Nebulizer every 6 hours  dexAMETHasone  Injectable 4 milliGRAM(s) IV Push every 12 hours  pantoprazole  Injectable 40 milliGRAM(s) IV Push daily    MEDICATIONS  (PRN):      Vitals:  T(C): 37.3 (09-07-23 @ 14:38), Max: 37.3 (09-07-23 @ 14:38)  HR: 95 (09-07-23 @ 18:03) (79 - 105)  BP: 118/90 (09-07-23 @ 18:03) (85/60 - 131/84)  RR: 28 (09-07-23 @ 18:03) (22 - 28)  SpO2: 100% (09-07-23 @ 18:03) (88% - 100%)    Physical Examination:  General - non-toxic appearing male in mild distress  Cardiovascular - peripheral pulses palpable, no edema  Respiratory - breathing comfortably with no increased work of breathing  Eyes - Fundoscopy not performed due to infection precautions    Neurologic Exam:  Mental status - Awake, Alert, Oriented to person, place, and time. Speech fluent, repetition and naming intact. Follows simple and complex commands. Attention/concentration, recent and remote memory (including registration 3/3 and recall 3/3), and fund of knowledge intact    Cranial nerves - PERRL (4mm -> 3mm b/l), BTT intact b/l, EOMI (but cannot bury sclera in both directions), face sensation (V1-V3) intact b/l, facial strength intact without asymmetry b/l, hearing intact b/l, palate with symmetric elevation, trapezius 5/5 strength on Right, tongue midline on protrusion with full lateral movement    Motor - Normal bulk and tone on RUE/RLE. Flaccid tone throughout on LUE/LLE.     Strength testing            Deltoid      Biceps      Triceps     Wrist Extension    Wrist Flexion     Interossei         R            5                5               5                   5                 5                       5               5  L             0               0               0                    0                0                       0                 0              Hip Flexion    Hip Extension    Knee Flexion    Knee Extension    Dorsiflexion    Plantar Flexion  R              5                   5                       5                    5                            5                       5  L              0                    0                       0                    0                          0                        0    Sensation - Light touch intact throughout    DTR's -             Biceps      Triceps     Brachioradialis      Patellar    Ankle    Toes/plantar response  R             2+         2+                 2+                  2+            2+                 Down  L              2+        2+                 2+                   2+           2+                 upgoing     Coordination - Finger to Nose intact on RUE. No tremors appreciated    Gait and station - unable to assess due to pt safety and L sided weakness      Labs:                        14.0   5.59  )-----------( 168      ( 07 Sep 2023 15:08 )             42.9     09-07    140  |  100  |  15  ----------------------------<  107<H>  3.6   |  26  |  0.70    Ca    9.0      07 Sep 2023 15:08    TPro  6.1  /  Alb  3.1<L>  /  TBili  0.7  /  DBili  x   /  AST  21  /  ALT  32  /  AlkPhos  70  09-07      LIVER FUNCTIONS - ( 07 Sep 2023 15:08 )  Alb: 3.1 g/dL / Pro: 6.1 g/dL / ALK PHOS: 70 U/L / ALT: 32 U/L / AST: 21 U/L / GGT: x           PT/INR - ( 07 Sep 2023 15:08 )   PT: 13.6 sec;   INR: 1.24 ratio      PTT - ( 07 Sep 2023 15:08 )  PTT:28.5 sec      Radiology:  CT Head No Cont:  (07 Sep 2023 17:51)  < from: CT Head No Cont (09.07.23 @ 17:51) >  IMPRESSION: Area of hypodensity in the right frontal parietal region with   extensive surrounding vasogenic edema, overall increased in size compared   to prior exam. These findings raise suspicion for tumor progression. New   right-to-left midline shift measuring 7 mm. Consider MRI brain with and   without contrast for further evaluation if clinically warranted.    < end of copied text >

## 2023-09-07 NOTE — H&P ADULT - PROBLEM SELECTOR PLAN 2
Also enterovirus infection as well.   -Supportive care  -Cont. supplemental 02 PRN  -Dumarcia Q6hrs Also enterovirus infection as well.   -Supportive care  -Cont. supplemental 02 PRN  -Duo nebs Q6hrs Also enterovirus infection as well. Procalcitonin WNL  -Supportive care  -Cont. supplemental 02 PRN  -Duo nebs Q6hrs

## 2023-09-07 NOTE — ED PROVIDER NOTE - OBJECTIVE STATEMENT
67-year-old male, history of glioblastoma, HTN, seizure disorder, A-fib on Xarelto, prior stroke w/ residual L-sided weakness, sent from nursing home for cough and altered mental status.  On initial evaluation, patient is AO x0, unable to provide history.  Per EMS collateral, nursing home facility noted cough productive of phlegm starting yesterday, gave 1 dose of azithromycin.  Noted worsening mental status, for which daughter was concerned, so she requested ED evaluation.

## 2023-09-07 NOTE — ED PROVIDER NOTE - PHYSICAL EXAMINATION
GEN: awake, opens eyes to voice, non-verbal  SKIN: (+) warm/dry, (-) cyanosis, (-) rash; (+) mild facial erythema  HEAD: (-) scalp swelling, (-) tenderness  EYES: (-) conjunctival pallor, (-) scleral icterus  ENMT: (+) moist mucous membranes, (+) airway patent, (-) stridor  NECK: (-) tenderness, (-) stiffness; (+) s/p trach decannulation  CV: (+) RRR, (-) murmurs/rubs/gallops  RESP: (+) CTABL, (-) increased WOB, (-) rales, (+) diffuse rhonchi vs transmitted upper airway noises, (-) wheezing  ABD: (+) soft, (-) tenderness, (-) guarding; (+) feeding tube in place to anterior abdomen  EXT: (-) joint deformities, (-) edema, (-) tenderness, (+) grossly intact ROM, (+) equal pulses in upper & lower extremities  NEURO: mental status as above, (+) occasional spontaneous movements of RUE and RLE

## 2023-09-07 NOTE — ED PROVIDER NOTE - ATTENDING CONTRIBUTION TO CARE
I was the supervising attending. I have independently seen face-to-face and examined the patient. I have reviewed the history and physical and discussed the MDM with the resident, fellow, KATELYN and/or student. I agree with the assessment and plan as presented unless otherwise documented as follows:    67M hx GBM, HTN, seizure disorder, A-fib on Xarelto, prior stroke w/ residual L-sided weakness, sent from NH for AMS and cough/possible infection. Afebrile on initial evaluation, A-fib to 90-100s, AOx0/unable to provide history or participate in exam. Diffuse rhonchi on exam. Possible sepsis/pneumonia, will empirically treat with antibiotics and obtain labs/UA, cultures. Given known intracranial malignancy and change in mental status, will obtain CTH to assess for progression of GBM or other acute intracranial pathology. -Rosalind Burt MD (Attending)

## 2023-09-07 NOTE — ED PROVIDER NOTE - PROGRESS NOTE DETAILS
Daniel PGY3: Patient endorsed to me at sign out. Seen and assessed at bedside. Patient became hypoxic to 88% started on O2, remains not in distress. Spoke to Neuro who reviewed with Dr Carias - discussed steroids and requested MRI which I ordered.

## 2023-09-07 NOTE — ED ADULT NURSE REASSESSMENT NOTE - NS ED NURSE REASSESS COMMENT FT1
Received patient from [facility] at 1900. Full code, AOx4, continent x2, independent at baseline. S tach on cardiac monitor. No apparent signs of acute discomfort/distress at this time. Infection/fall/safety protocol maintained. POC reviewed, Pt informed of call bell system and placed within reach. Received patient from [facility] at 1900. Full code, AOx1-2 at baseline, continent x2, independent at baseline. afib with HR in 90s to low 100s on cardiac monitor. In c-collar for r/o cspine injury. sating at 97% on RA at this time (removed NC - does not need at this time). No apparent signs of acute discomfort/distress at this time. Infection/fall/safety protocol maintained. POC reviewed, Pt informed of call bell system and placed within reach.

## 2023-09-07 NOTE — H&P ADULT - PROBLEM SELECTOR PLAN 4
-Cont. Eliquis cautiously BID  -Will cont. metoprolol dose from last admission until med rec can be obtained. Daughter states one of his BP med doses increased after prior cardiologist visit but cannot recall which -Cont. Xarelto cautiously  -Will cont. metoprolol dose from last admission until med rec can be obtained. Daughter states one of his BP med doses increased after prior cardiologist visit but cannot recall which -Cont. Xarelto cautiously  -Will cont. metoprolol dose from last admission until med rec can be obtained. Daughter states one of his BP med doses increased after prior cardiologist visit but cannot recall which  -F/u neurology regarding AC in setting of brain mass

## 2023-09-07 NOTE — ED ADULT NURSE NOTE - OBJECTIVE STATEMENT
The patient is a 67y male presenting to the ED [facility] for complaints of fever. EMS reports the patient comes from [facility] where recently he presented with fever @ 100.7, increased yellow secretions, and cough. @ the outside facility EMS reports the patient received Zithromax as treatment. He has a PMH of neoplasm of the head, stroke (Left sided weakness and intermittent right sided twitch), HTN, Feeding Tube, Previous Trach, and Sepsis. Upon assessment the PT is breathing spontaneously and unlabored on room air. No signs of respiratory distress @ this time, however note PT is bubbling secretion in the oral cavity. He is alert and awake however mostly nonverbal with minimal speech. He present with a dressing to the neck that covers the site of a removed trach. He present with a PEG tube with a site that is reddened, moist, and oozing secretions. Dressing in place @ peg site. PT is diaphoretic with visible sweat and redness. Safety and comfort measures provided, bed locked and in lowest position, side rails up for safety. Call bell within reach. Awaiting results.

## 2023-09-07 NOTE — H&P ADULT - PROBLEM SELECTOR PLAN 5
-Cont. IV Keppra BID for now  -Need med rec High seizure risk. Will continue prior AEM and dosages, attempt med rec again in AM  -Cont. IV Keppra BID  -Cont. Vimpat IVPB BID  -Cont. Valproic acid TID  -Need med rec  -F/u neurology recommendations

## 2023-09-07 NOTE — H&P ADULT - HISTORY OF PRESENT ILLNESS
Spoke to daughter who was unable to provide meds. Called [facility] and was transferred to multiple people who either could not help or hung up immediately. History obtained from ER and daughter as below    67M w/ history of glioblastoma, HTN, seizure disorder, A-fib on Xarelto, prior stroke w/ residual L-sided weakness, sent from nursing home for cough and altered mental status.  On initial evaluation, patient is AO x0, unable to provide history.  Per EMS collateral, nursing home facility noted cough productive of phlegm starting yesterday, gave 1 dose of azithromycin.  Noted worsening mental status, for which daughter was concerned, so she requested ED evaluation. Daughter also noticed hallucinations for several days which she states is an indication of infection for him. Pt currently denies any pain or discomfort. States he wants speech/ swallow study and someone to talk to Dr. Carias his neurologist.    In ER: Given IV Zosyn, IV Vancomycin, LR 1L,

## 2023-09-08 NOTE — DIETITIAN INITIAL EVALUATION ADULT - PROBLEM SELECTOR PLAN 4
-Cont. Xarelto cautiously  -Will cont. metoprolol dose from last admission until med rec can be obtained. Daughter states one of his BP med doses increased after prior cardiologist visit but cannot recall which  -F/u neurology regarding AC in setting of brain mass

## 2023-09-08 NOTE — DIETITIAN INITIAL EVALUATION ADULT - CONTINUE CURRENT NUTRITION CARE PLAN
NIKITA RIVERA  : 1961 10:43:30  ACCOUNT:  [id number]  HOME PHONE:  [phone]  WORK PHONE:       Returned call to Mrs. Rivera at number left in message screen. Pt answered phone; attempt to address question about furosemide dose but pt states his wife handles all of this for him and he will ask her to call the office back at her break later today. ATaAlonso     When diet advanced recommend no therapeutic restrictions. Consistency deferred to SLP and provider.

## 2023-09-08 NOTE — DIETITIAN INITIAL EVALUATION ADULT - PROBLEM SELECTOR PLAN 3
Hx of GBM in past. Repeat CTH suggests larger mass with surrounding vasogenic edema and midline shift. S/p trach w/ reversal and PEG since May. Appreciate neurology recommendations  -MRI head w/ and w/o IV Contrast  -Decadron 4mg IV BID  -GI prophylaxis  -Neurochecks  -Cont. AEMs  -As per daughter, has been tolerating some PO and thickened liquids at facility. Asks about removing PEG tube which I advised against doing in the near term.  -Speech/swallow consult  -Nutrition consult  -F/u neurology recommendations  -Falls, aspiration and seizure precautions

## 2023-09-08 NOTE — DIETITIAN INITIAL EVALUATION ADULT - PROBLEM SELECTOR PROBLEM 3
ROOM # 4  Pt presents today for follow up on low back pain and neck pain. Pt reports has improved some but not really much since last visit. Pt states hurts most when rainy and damp outside. Von Aldana presents today for   Chief Complaint   Patient presents with    Back Pain     1 month follow up     Neck Pain       Von Aldana preferred language for health care discussion is english/other. Is someone accompanying this pt? no    Is the patient using any DME equipment during [address]? Kati, cane    Depression Screening:  PHQ over the last two weeks 2/27/2018 2/22/2018 7/11/2017 5/11/2017 3/9/2017 9/6/2016 5/2/2016   PHQ Not Done - - Active Diagnosis of Depression or Bipolar Disorder Active Diagnosis of Depression or Bipolar Disorder - Patient Decline Active Diagnosis of Depression or Bipolar Disorder   Little interest or pleasure in doing things Not at all Not at all Nearly every day - Not at all Nearly every day -   Feeling down, depressed or hopeless Not at all Not at all Several days - Not at all Nearly every day -   Total Score PHQ 2 0 0 4 - 0 6 -       Learning Assessment:  Learning Assessment 4/13/2015 4/17/2014 12/12/2013   PRIMARY LEARNER Patient Patient -   HIGHEST LEVEL OF EDUCATION - PRIMARY LEARNER  SOME COLLEGE - SOME COLLEGE   BARRIERS PRIMARY LEARNER NONE - [address] CAREGIVER No - -   PRIMARY [facility] [address]    NEED - - No   LEARNER PREFERENCE PRIMARY LISTENING DEMONSTRATION PICTURES   LEARNING SPECIAL TOPICS - - none   ANSWERED BY patient - -   RELATIONSHIP SELF - -       Abuse Screening:  Abuse Screening Questionnaire 11/13/2017 4/13/2015   Do you ever feel afraid of your partner? N N   Are you in a relationship with someone who physically or mentally threatens you? N N   Is it safe for you to go home? Y Y       Fall Risk  Fall Risk Assessment, last 12 mths 4/30/2018 2/27/2018 2/22/2018 1/30/2018 11/13/2017 7/11/2017 5/11/2017   Able to walk? Yes Yes Yes Yes Yes Yes Yes   Fall in past 12 months? Yes Yes No Yes Yes Yes Yes   Fall with injury? No No - Yes Yes Yes Yes   Number of falls in past 12 months 1 5 - 4 1 1 1   Fall Risk Score 1 5 - 5 2 2 2       Health Maintenance reviewed and discussed per provider. Yes    Author Faustina is due for   Health Maintenance Due   Topic Date Due    COLONOSCOPY  06/01/2018     Please order/place referral if appropriate. Advance Directive:  1. Do you have an advance directive in place? Patient Reply: no    2. If not, would you like material regarding how to put one in place? Patient Reply: no    Coordination of Care:  1. Have you been to the ER, urgent care clinic since your last visit? Hospitalized since your last visit? Yes for laceration to L hand, 2 stitches placed  2. Have you seen or consulted any other health care providers outside of the [address] [place] [address] since your last visit? Include any pap smears or colon screening.  no Midline shift of brain

## 2023-09-08 NOTE — DIETITIAN INITIAL EVALUATION ADULT - PROBLEM SELECTOR PLAN 2
Also enterovirus infection as well. Procalcitonin WNL  -Supportive care  -Cont. supplemental 02 PRN  -Duo nebs Q6hrs

## 2023-09-08 NOTE — PROGRESS NOTE ADULT - PROBLEM SELECTOR PLAN 7
- DVT ppx: xarelto for hx of A fib  - GI ppx: none  - Diet: tube feeds, pending nutrition recommendations

## 2023-09-08 NOTE — PROGRESS NOTE ADULT - SUBJECTIVE AND OBJECTIVE BOX
Britta Fitzpatrick MD  Hospitalist  Available on MS Teams      PROGRESS NOTE:     Patient is a 67y old  Male who presents with a chief complaint of Viral pneumonia     (08 Sep 2023 12:45)      SUBJECTIVE / OVERNIGHT EVENTS:  No acute events overnight.  Pt endorses cough without associated SOB. Has chronic left-sided weakness after a prior stroke.     MEDICATIONS  (STANDING):  albuterol/ipratropium for Nebulization 3 milliLiter(s) Nebulizer every 6 hours  allopurinol 100 milliGRAM(s) Oral daily  atorvastatin 20 milliGRAM(s) Oral at bedtime  brimonidine 0.2% Ophthalmic Solution 1 Drop(s) Both EYES two times a day  dexAMETHasone  Injectable 4 milliGRAM(s) IV Push every 12 hours  FLUoxetine 20 milliGRAM(s) Oral daily  lacosamide IVPB 200 milliGRAM(s) IV Intermittent every 12 hours  lactated ringers. 500 milliLiter(s) (100 mL/Hr) IV Continuous <Continuous>  levETIRAcetam  IVPB 1000 milliGRAM(s) IV Intermittent every 12 hours  losartan 50 milliGRAM(s) Oral daily  metoprolol tartrate 25 milliGRAM(s) Oral two times a day  pantoprazole  Injectable 40 milliGRAM(s) IV Push daily  rivaroxaban 20 milliGRAM(s) Enteral Tube with dinner  sodium chloride 0.9% Bolus 500 milliLiter(s) IV Bolus once  timolol 0.25% Solution 1 Drop(s) Both EYES two times a day  valproic  acid Syrup 250 milliGRAM(s) Oral three times a day    MEDICATIONS  (PRN):  acetaminophen   Oral Liquid .. 650 milliGRAM(s) Oral every 6 hours PRN Temp greater or equal to 38C (100.4F), Mild Pain (1 - 3)  clonazePAM  Tablet 0.5 milliGRAM(s) Oral two times a day PRN For anxiety  guaiFENesin Oral Liquid (Sugar-Free) 200 milliGRAM(s) Oral every 6 hours PRN Cough  senna 2 Tablet(s) Oral at bedtime PRN for constipation      CAPILLARY BLOOD GLUCOSE        I&O's Summary      PHYSICAL EXAM:  Vital Signs Last 24 Hrs  T(C): 36.4 (08 Sep 2023 05:15), Max: 37.3 (07 Sep 2023 14:38)  T(F): 97.5 (08 Sep 2023 05:15), Max: 99.1 (07 Sep 2023 14:38)  HR: 80 (08 Sep 2023 05:15) (79 - 114)  BP: 140/90 (08 Sep 2023 05:15) (85/60 - 153/96)  BP(mean): 99 (07 Sep 2023 18:03) (92 - 99)  RR: 20 (08 Sep 2023 05:15) (20 - 28)  SpO2: 96% (08 Sep 2023 05:15) (88% - 100%)    Parameters below as of 08 Sep 2023 05:15  Patient On (Oxygen Delivery Method): room air    CONSTITUTIONAL: Well-developed, well-groomed, in no apparent distress  RESPIRATORY: Breathing comfortably; lungs clear to auscultation.  CARDIOVASCULAR: +S1S2, RRR, no M/G/R; no carotid bruits; pedal pulses full and symmetric; no lower extremity edema  CHEST/BREAST: Breasts are symmetric in appearance; no palpable masses or lumps  GASTROINTESTINAL: No palpable masses or tenderness, +BS throughout, no rebound/guarding; no hepatosplenomegaly; no hernia palpated  LYMPHATIC: No cervical LAD; no axillary LAD; no inguinal LAD  MUSCULOSKELETAL: Normal gait and station; no digital clubbing or cyanosis; no paraspinal tenderness; no joint effusions of upper or lower extremities; normal strength and tone of extremities  SKIN: No rashes or ulcers noted; no subcutaneous nodules or induration palpable  NEUROLOGIC: CN II-XII intact; normal reflexes in upper and lower extremities; sensation intact in LEs b/l to light touch  PSYCHIATRIC: A+O x 3; mood and affect appropriate; appropriate insight and judgment    LABS:                        14.0   5.59  )-----------( 168      ( 07 Sep 2023 15:08 )             42.9     09-07    140  |  100  |  15  ----------------------------<  107<H>  3.6   |  26  |  0.70    Ca    9.0      07 Sep 2023 15:08    TPro  6.1  /  Alb  3.1<L>  /  TBili  0.7  /  DBili  x   /  AST  21  /  ALT  32  /  AlkPhos  70  09-07    PT/INR - ( 07 Sep 2023 15:08 )   PT: 13.6 sec;   INR: 1.24 ratio         PTT - ( 07 Sep 2023 15:08 )  PTT:28.5 sec      Urinalysis Basic - ( 07 Sep 2023 15:08 )    Color: x / Appearance: x / SG: x / pH: x  Gluc: 107 mg/dL / Ketone: x  / Bili: x / Urobili: x   Blood: x / Protein: x / Nitrite: x   Leuk Esterase: x / RBC: x / WBC x   Sq Epi: x / Non Sq Epi: x / Bacteria: x          RADIOLOGY & ADDITIONAL TESTS:  Results Reviewed:   Imaging Personally Reviewed:  Electrocardiogram Personally Reviewed:    COORDINATION OF CARE:  Care Discussed with Consultants/Other Providers [Y/N]:  Prior or Outpatient Records Reviewed [Y/N]:   Britta Fitzpatrick MD  Hospitalist  Available on MS Teams      PROGRESS NOTE:     Patient is a 67y old  Male who presents with a chief complaint of Viral pneumonia     (08 Sep 2023 12:45)      SUBJECTIVE / OVERNIGHT EVENTS:  No acute events overnight.  Pt endorses cough without associated SOB. Has chronic left-sided weakness after a prior stroke.     MEDICATIONS  (STANDING):  albuterol/ipratropium for Nebulization 3 milliLiter(s) Nebulizer every 6 hours  allopurinol 100 milliGRAM(s) Oral daily  atorvastatin 20 milliGRAM(s) Oral at bedtime  brimonidine 0.2% Ophthalmic Solution 1 Drop(s) Both EYES two times a day  dexAMETHasone  Injectable 4 milliGRAM(s) IV Push every 12 hours  FLUoxetine 20 milliGRAM(s) Oral daily  lacosamide IVPB 200 milliGRAM(s) IV Intermittent every 12 hours  lactated ringers. 500 milliLiter(s) (100 mL/Hr) IV Continuous <Continuous>  levETIRAcetam  IVPB 1000 milliGRAM(s) IV Intermittent every 12 hours  losartan 50 milliGRAM(s) Oral daily  metoprolol tartrate 25 milliGRAM(s) Oral two times a day  pantoprazole  Injectable 40 milliGRAM(s) IV Push daily  rivaroxaban 20 milliGRAM(s) Enteral Tube with dinner  sodium chloride 0.9% Bolus 500 milliLiter(s) IV Bolus once  timolol 0.25% Solution 1 Drop(s) Both EYES two times a day  valproic  acid Syrup 250 milliGRAM(s) Oral three times a day    MEDICATIONS  (PRN):  acetaminophen   Oral Liquid .. 650 milliGRAM(s) Oral every 6 hours PRN Temp greater or equal to 38C (100.4F), Mild Pain (1 - 3)  clonazePAM  Tablet 0.5 milliGRAM(s) Oral two times a day PRN For anxiety  guaiFENesin Oral Liquid (Sugar-Free) 200 milliGRAM(s) Oral every 6 hours PRN Cough  senna 2 Tablet(s) Oral at bedtime PRN for constipation      CAPILLARY BLOOD GLUCOSE        I&O's Summary      PHYSICAL EXAM:  Vital Signs Last 24 Hrs  T(C): 36.4 (08 Sep 2023 05:15), Max: 37.3 (07 Sep 2023 14:38)  T(F): 97.5 (08 Sep 2023 05:15), Max: 99.1 (07 Sep 2023 14:38)  HR: 80 (08 Sep 2023 05:15) (79 - 114)  BP: 140/90 (08 Sep 2023 05:15) (85/60 - 153/96)  BP(mean): 99 (07 Sep 2023 18:03) (92 - 99)  RR: 20 (08 Sep 2023 05:15) (20 - 28)  SpO2: 96% (08 Sep 2023 05:15) (88% - 100%)    Parameters below as of 08 Sep 2023 05:15  Patient On (Oxygen Delivery Method): room air    CONSTITUTIONAL: Well-developed, well-groomed, in no apparent distress  RESPIRATORY: Breathing comfortably; lungs clear to auscultation.  CARDIOVASCULAR: +S1S2, RRR, no M/G/R; no lower extremity edema  GASTROINTESTINAL: No tenderness, +BS throughout  MUSCULOSKELETAL: Normal gait and station; no digital clubbing or cyanosis; no paraspinal tenderness; no joint effusions of upper or lower extremities; normal strength and tone of extremities  SKIN: No rashes or ulcers noted; no subcutaneous nodules or induration palpable  NEUROLOGIC: CN II-XII intact; normal reflexes in upper and lower extremities; sensation intact in LEs b/l to light touch  PSYCHIATRIC: A+O x 3; mood and affect appropriate; appropriate insight and judgment    LABS:                        14.0   5.59  )-----------( 168      ( 07 Sep 2023 15:08 )             42.9     09-07    140  |  100  |  15  ----------------------------<  107<H>  3.6   |  26  |  0.70    Ca    9.0      07 Sep 2023 15:08    TPro  6.1  /  Alb  3.1<L>  /  TBili  0.7  /  DBili  x   /  AST  21  /  ALT  32  /  AlkPhos  70  09-07    PT/INR - ( 07 Sep 2023 15:08 )   PT: 13.6 sec;   INR: 1.24 ratio         PTT - ( 07 Sep 2023 15:08 )  PTT:28.5 sec      Urinalysis Basic - ( 07 Sep 2023 15:08 )    Color: x / Appearance: x / SG: x / pH: x  Gluc: 107 mg/dL / Ketone: x  / Bili: x / Urobili: x   Blood: x / Protein: x / Nitrite: x   Leuk Esterase: x / RBC: x / WBC x   Sq Epi: x / Non Sq Epi: x / Bacteria: x          RADIOLOGY & ADDITIONAL TESTS:  Results Reviewed:   Imaging Personally Reviewed:  Electrocardiogram Personally Reviewed:    COORDINATION OF CARE:  Care Discussed with Consultants/Other Providers [Y/N]:  Prior or Outpatient Records Reviewed [Y/N]:   Britta Fitzpatrick MD  Hospitalist  Available on MS Teams      PROGRESS NOTE:     Patient is a 67y old  Male who presents with a chief complaint of Viral pneumonia     (08 Sep 2023 12:45)      SUBJECTIVE / OVERNIGHT EVENTS:  No acute events overnight.  Pt endorses cough without associated SOB. Has chronic left-sided weakness after a prior stroke.     MEDICATIONS  (STANDING):  albuterol/ipratropium for Nebulization 3 milliLiter(s) Nebulizer every 6 hours  allopurinol 100 milliGRAM(s) Oral daily  atorvastatin 20 milliGRAM(s) Oral at bedtime  brimonidine 0.2% Ophthalmic Solution 1 Drop(s) Both EYES two times a day  dexAMETHasone  Injectable 4 milliGRAM(s) IV Push every 12 hours  FLUoxetine 20 milliGRAM(s) Oral daily  lacosamide IVPB 200 milliGRAM(s) IV Intermittent every 12 hours  lactated ringers. 500 milliLiter(s) (100 mL/Hr) IV Continuous <Continuous>  levETIRAcetam  IVPB 1000 milliGRAM(s) IV Intermittent every 12 hours  losartan 50 milliGRAM(s) Oral daily  metoprolol tartrate 25 milliGRAM(s) Oral two times a day  pantoprazole  Injectable 40 milliGRAM(s) IV Push daily  rivaroxaban 20 milliGRAM(s) Enteral Tube with dinner  sodium chloride 0.9% Bolus 500 milliLiter(s) IV Bolus once  timolol 0.25% Solution 1 Drop(s) Both EYES two times a day  valproic  acid Syrup 250 milliGRAM(s) Oral three times a day    MEDICATIONS  (PRN):  acetaminophen   Oral Liquid .. 650 milliGRAM(s) Oral every 6 hours PRN Temp greater or equal to 38C (100.4F), Mild Pain (1 - 3)  clonazePAM  Tablet 0.5 milliGRAM(s) Oral two times a day PRN For anxiety  guaiFENesin Oral Liquid (Sugar-Free) 200 milliGRAM(s) Oral every 6 hours PRN Cough  senna 2 Tablet(s) Oral at bedtime PRN for constipation      CAPILLARY BLOOD GLUCOSE        I&O's Summary      PHYSICAL EXAM:  Vital Signs Last 24 Hrs  T(C): 36.4 (08 Sep 2023 05:15), Max: 37.3 (07 Sep 2023 14:38)  T(F): 97.5 (08 Sep 2023 05:15), Max: 99.1 (07 Sep 2023 14:38)  HR: 80 (08 Sep 2023 05:15) (79 - 114)  BP: 140/90 (08 Sep 2023 05:15) (85/60 - 153/96)  BP(mean): 99 (07 Sep 2023 18:03) (92 - 99)  RR: 20 (08 Sep 2023 05:15) (20 - 28)  SpO2: 96% (08 Sep 2023 05:15) (88% - 100%)    Parameters below as of 08 Sep 2023 05:15  Patient On (Oxygen Delivery Method): room air    CONSTITUTIONAL: Well-developed, well-groomed, in no apparent distress  RESPIRATORY: Breathing comfortably; lungs clear to auscultation.  CARDIOVASCULAR: +S1S2, RRR, no M/G/R; no lower extremity edema  GASTROINTESTINAL: No tenderness, +BS throughout  MUSCULOSKELETAL: Normal gait and station; no digital clubbing or cyanosis; no paraspinal tenderness; no joint effusions of upper or lower extremities; normal strength and tone of extremities  NEUROLOGIC: 0/5 strength of the left upper and left lower extremities. 5/5 strength of the right upper and right lower extremities.  PSYCHIATRIC: A+O x 3    LABS:                        14.0   5.59  )-----------( 168      ( 07 Sep 2023 15:08 )             42.9     09-07    140  |  100  |  15  ----------------------------<  107<H>  3.6   |  26  |  0.70    Ca    9.0      07 Sep 2023 15:08    TPro  6.1  /  Alb  3.1<L>  /  TBili  0.7  /  DBili  x   /  AST  21  /  ALT  32  /  AlkPhos  70  09-07    PT/INR - ( 07 Sep 2023 15:08 )   PT: 13.6 sec;   INR: 1.24 ratio         PTT - ( 07 Sep 2023 15:08 )  PTT:28.5 sec

## 2023-09-08 NOTE — DIETITIAN INITIAL EVALUATION ADULT - ORAL INTAKE PTA/DIET HISTORY
Pt admitted from facility, RD unable to locate transfer paperwork. Pt reports eating well with no changes in appetite. Pt unsure if on therapeutic diet (noted consult for tube feeding PTA, although pt with PEG, has not been on enteral feeds in weeks). Pt unsure if was on micronutrient or oral nutrient supplements. Pt with Hx of swallowing issues; on chopped foods and thickened liquids PTA per pt (pending SLP evaluation). Confirms no known food allergies.

## 2023-09-08 NOTE — DIETITIAN INITIAL EVALUATION ADULT - PERTINENT MEDS FT
MEDICATIONS  (STANDING):  albuterol/ipratropium for Nebulization 3 milliLiter(s) Nebulizer every 6 hours  dexAMETHasone  Injectable 4 milliGRAM(s) IV Push every 12 hours  lacosamide IVPB 200 milliGRAM(s) IV Intermittent every 12 hours  lactated ringers. 500 milliLiter(s) (100 mL/Hr) IV Continuous <Continuous>  levETIRAcetam  IVPB 1000 milliGRAM(s) IV Intermittent every 12 hours  metoprolol tartrate 25 milliGRAM(s) Oral two times a day  pantoprazole  Injectable 40 milliGRAM(s) IV Push daily  rivaroxaban 20 milliGRAM(s) Enteral Tube with dinner  valproic  acid Syrup 250 milliGRAM(s) Oral three times a day    MEDICATIONS  (PRN):  acetaminophen   Oral Liquid .. 650 milliGRAM(s) Oral every 6 hours PRN Temp greater or equal to 38C (100.4F), Mild Pain (1 - 3)  guaiFENesin Oral Liquid (Sugar-Free) 200 milliGRAM(s) Oral every 6 hours PRN Cough

## 2023-09-08 NOTE — PROGRESS NOTE ADULT - PROBLEM SELECTOR PLAN 2
Also enterovirus infection as well. Procalcitonin WNL  - given azithromycin at [facility] on 9/7  - continue azithromycin x3 days  - acetaminophen and mucinex PRN  -Cont. supplemental 02 PRN  -Duo nebs Q6hrs

## 2023-09-08 NOTE — DIETITIAN INITIAL EVALUATION ADULT - ENTERAL
If pt to start on enteral feeds & given elevated BG on prednisone, recommend Glucerna 1.5 at 10 mL/hr increasing only as tolerated and electrolytes WNL to goal rate 50 mL/hr x24 hours to provide total volume 1200 mL, 1800 kcals, 99 gm protein, and 911 mL free water. Meets 27 kcals/kG and ~1.5 gm protein/kG based on IBW 67.1 kG.

## 2023-09-08 NOTE — PROGRESS NOTE ADULT - PROBLEM SELECTOR PLAN 5
High seizure risk. Will continue prior AEM and dosages, attempt med rec again in AM  -Cont. IV Keppra BID  -Cont. Vimpat IVPB BID  -Cont. Valproic acid TID  -F/u neurology recommendations

## 2023-09-08 NOTE — DIETITIAN INITIAL EVALUATION ADULT - PROBLEM SELECTOR PLAN 5
High seizure risk. Will continue prior AEM and dosages, attempt med rec again in AM  -Cont. IV Keppra BID  -Cont. Vimpat IVPB BID  -Cont. Valproic acid TID  -Need med rec  -F/u neurology recommendations

## 2023-09-08 NOTE — DIETITIAN INITIAL EVALUATION ADULT - OTHER INFO
Wt Hx:   Dosing wt 113.4 kG/250 lbs (inaccurate). RD obtained wt: 185 lbs.   UBW ~185 lbs and pt denies changes in wt PTA (pt previously reported UBW 7/18/23 ?accuracy)  Ht per pt: 57 inches    IBW: 148 lbs    IBW%: 125% (based on RD obtained wt)  Wt Hx per HIE (lbs): 214 (10/26/22), 205 (1/3/23/), 210 (3/28/23), 203 (4/4/23), 202 (4/18/23), 178 (7/5/23), 202 (7/20/23)  Drastic fluctuations noted, pt previously reported unintentional wt loss, however currently denies. RD will continue to trend as new wts available/able.     Nutrition-Related Concerns:   - Glioblastoma  - On steroid which can elevate BG  - Ordered for lactated ringers   - Ordered for bolus of sodium chloride 0.9%   - Seen by SLP 7/20/23 during previous admission with recommendation, "Regular diet" Pending evaluation for this admission

## 2023-09-08 NOTE — DIETITIAN INITIAL EVALUATION ADULT - PERTINENT LABORATORY DATA
09-07    140  |  100  |  15  ----------------------------<  107<H>  3.6   |  26  |  0.70    Ca    9.0      07 Sep 2023 15:08    TPro  6.1  /  Alb  3.1<L>  /  TBili  0.7  /  DBili  x   /  AST  21  /  ALT  32  /  AlkPhos  70  09-07  A1C with Estimated Average Glucose Result: 5.5 % (07-05-23 @ 06:59)

## 2023-09-08 NOTE — DIETITIAN INITIAL EVALUATION ADULT - PROBLEM SELECTOR PLAN 1
Likely multifactorial given viral infections and larger brain mass w/ increased vasogenic edema as well as midline shift. Appreciate neurology recommendations  -MRI brain w/ IV contrast  -F/u neurology recommendations  -Neuro checks  -Supportive care  -Falls precautions, aspiration risk, NPO for now  -F/u BCxs, UCx

## 2023-09-08 NOTE — PROGRESS NOTE ADULT - NSPROGADDITIONALINFOA_GEN_ALL_CORE
Plan of care discussed with López Manuel.  The patient and his daughter, Aretha, are updated on the plan of care.

## 2023-09-08 NOTE — DIETITIAN INITIAL EVALUATION ADULT - ADD RECOMMEND
As medically feasible, consider addition of multivitamin for micronutrient coverage. Continue to trend labs, weight, skin integrity, and intake.

## 2023-09-08 NOTE — PROGRESS NOTE ADULT - PROBLEM SELECTOR PLAN 4
-Cont. Xarelto   -Will cont. metoprolol dose from last admission until med rec can be obtained. Daughter states one of his BP med doses increased after prior cardiologist visit but cannot recall which  -F/u neurology regarding AC in setting of brain mass

## 2023-09-09 NOTE — PROGRESS NOTE ADULT - PROBLEM SELECTOR PLAN 3
Med rec clarified   -Cont. IV Keppra 1000 BID  -Cont. IV Vimpat IVPB 200 BID  -Cont. Valproic acid 250 mg PO via PEG TID  -F/u neurology recommendations

## 2023-09-09 NOTE — SWALLOW BEDSIDE ASSESSMENT ADULT - COMMENTS
Neuro following for worsening vasogenic edema of known GBM. 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 Now s/p chemo. 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 Neurology IMPRESSION: Left hemiplegia likely 2/2 increased vasogenic edema of Right brain w/ midline shift.     dietary> pt has not used enteral feeds in weeks. Pt with Hx of swallowing issues; on chopped foods and thickened liquids PTA per pt (pending SLP evaluation).     CTH 9/7 IMPRESSION: Area of hypodensity in the right frontal parietal region with extensive surrounding vasogenic edema, overall increased in size compared   to prior exam. These findings raise suspicion for tumor progression. New right-to-left midline shift measuring 7 mm. Consider MRI brain with and without contrast for further evaluation if clinically warranted.  CXR Trace linear opacity along the superior left perihilar region likely secondary to atelectasis versus pulmonary vasculature. No focal consolidations.    Pt known to dysphagia services at [facility] and [facility] since July 2023. Pt did have PEG present at that time. MBS at [facility] on July 10, 2023, recommended easy-to-chew solids w/ thin liquids in addition to PEG feeds to ensure adequate caloric intake. Pt seen for most recent bedside swallow exam at [facility] on July 20, 2023 with recommendations for regular texture solids and thin liquids. Neuro following for worsening vasogenic edema of known GBM. On steroids, pending MRI. Per d/w LENY Dawn, MRI may influence plan of care to contact neurosurgery if indicated. Neurology IMPRESSION: Left hemiplegia likely 2/2 increased vasogenic edema of Right brain w/ midline shift. Hx of GBM: diagnosed 8/2022 of GBM WHO 4. Has had chemo treatment. 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     dietary> pt has not used enteral feeds in weeks. Pt with Hx of swallowing issues; on chopped foods and thickened liquids PTA per pt (pending SLP evaluation).     CTH 9/7 IMPRESSION: Area of hypodensity in the right frontal parietal region with extensive surrounding vasogenic edema, overall increased in size compared to prior exam. These findings raise suspicion for tumor progression. New right-to-left midline shift measuring 7 mm. Consider MRI brain with and without contrast for further evaluation if clinically warranted.  CXR Trace linear opacity along the superior left perihilar region likely secondary to atelectasis versus pulmonary vasculature. No focal consolidations.    Pt known to dysphagia services at [facility] and [facility] since July 2023. Pt did have PEG present at that time. MBS at [facility] on July 10, 2023, recommended easy-to-chew solids w/ thin liquids in addition to PEG feeds to ensure adequate caloric intake. Pt seen for most recent bedside swallow exam at [facility] on July 20, 2023 with recommendations for regular texture solids and thin liquids.

## 2023-09-09 NOTE — PROGRESS NOTE ADULT - SUBJECTIVE AND OBJECTIVE BOX
Britta Fitzpatrick MD  Hospitalist  Available on MS Teams      PROGRESS NOTE:     Patient is a 67y old  Male who presents with a chief complaint of AMS, Worsening GBM w/ shift and Paraflu/ Enterovirus infection (08 Sep 2023 13:36)      SUBJECTIVE / OVERNIGHT EVENTS: No acute events overnight. Pt assessed this morning. Endorses anxiety regarding ongoing acute medical problems. Otherwise denies fevers/chills/SOB/chest pain.    MEDICATIONS  (STANDING):  albuterol/ipratropium for Nebulization 3 milliLiter(s) Nebulizer every 6 hours  allopurinol 100 milliGRAM(s) Oral daily  atorvastatin 20 milliGRAM(s) Oral at bedtime  azithromycin  IVPB      azithromycin  IVPB 500 milliGRAM(s) IV Intermittent every 24 hours  brimonidine 0.2% Ophthalmic Solution 1 Drop(s) Both EYES two times a day  dexAMETHasone  Injectable 4 milliGRAM(s) IV Push every 12 hours  FLUoxetine 20 milliGRAM(s) Oral daily  lacosamide IVPB 200 milliGRAM(s) IV Intermittent every 12 hours  lactated ringers. 500 milliLiter(s) (100 mL/Hr) IV Continuous <Continuous>  levETIRAcetam  IVPB 1000 milliGRAM(s) IV Intermittent every 12 hours  losartan 50 milliGRAM(s) Oral daily  metoprolol tartrate 25 milliGRAM(s) Oral two times a day  pantoprazole  Injectable 40 milliGRAM(s) IV Push daily  rivaroxaban 20 milliGRAM(s) Enteral Tube with dinner  timolol 0.25% Solution 1 Drop(s) Both EYES two times a day  valproic  acid Syrup 250 milliGRAM(s) Oral three times a day    MEDICATIONS  (PRN):  acetaminophen   Oral Liquid .. 650 milliGRAM(s) Oral every 6 hours PRN Temp greater or equal to 38C (100.4F), Mild Pain (1 - 3)  clonazePAM  Tablet 0.5 milliGRAM(s) Oral two times a day PRN For anxiety  guaiFENesin Oral Liquid (Sugar-Free) 200 milliGRAM(s) Oral every 6 hours PRN Cough  senna 2 Tablet(s) Oral at bedtime PRN for constipation      CAPILLARY BLOOD GLUCOSE        I&O's Summary    08 Sep 2023 07:01  -  09 Sep 2023 07:00  --------------------------------------------------------  IN: 1400 mL / OUT: 4 mL / NET: 1396 mL        PHYSICAL EXAM:  Vital Signs Last 24 Hrs  T(C): 36.6 (09 Sep 2023 08:14), Max: 36.6 (09 Sep 2023 08:14)  T(F): 97.8 (09 Sep 2023 08:14), Max: 97.8 (09 Sep 2023 08:14)  HR: 98 (09 Sep 2023 08:14) (68 - 98)  BP: 131/100 (09 Sep 2023 08:14) (124/89 - 150/91)  BP(mean): --  RR: 18 (09 Sep 2023 08:14) (17 - 20)  SpO2: 99% (09 Sep 2023 08:14) (95% - 99%)    Parameters below as of 09 Sep 2023 08:14  Patient On (Oxygen Delivery Method): room air      CONSTITUTIONAL: Well-developed, well-groomed, in no apparent distress  NECK: soft collar in place  RESPIRATORY: Breathing comfortably; lungs clear to auscultation.  CARDIOVASCULAR: +S1S2, RRR, no M/G/R; no lower extremity edema  GASTROINTESTINAL: No tenderness, +BS throughout  MUSCULOSKELETAL: Normal gait and station; no digital clubbing or cyanosis; no paraspinal tenderness; no joint effusions of upper or lower extremities; normal strength and tone of extremities  NEUROLOGIC: 0/5 strength of the left upper and left lower extremities. 5/5 strength of the right upper and right lower extremities.  PSYCHIATRIC: alert and oriented. Very soft spoken.      LABS:                        13.6   6.97  )-----------( 225      ( 09 Sep 2023 08:09 )             41.2     09-09    137  |  99  |  15  ----------------------------<  90  3.4<L>   |  25  |  0.54    Ca    8.8      09 Sep 2023 08:09  Phos  3.3     09-09  Mg     2.0     09-09            Urinalysis Basic - ( 09 Sep 2023 08:09 )    Color: x / Appearance: x / SG: x / pH: x  Gluc: 90 mg/dL / Ketone: x  / Bili: x / Urobili: x   Blood: x / Protein: x / Nitrite: x   Leuk Esterase: x / RBC: x / WBC x   Sq Epi: x / Non Sq Epi: x / Bacteria: x        Culture - Blood (collected 07 Sep 2023 14:52)  Source: .Blood Blood-Peripheral  Preliminary Report (08 Sep 2023 18:02):    No growth at 24 hours    Culture - Blood (collected 07 Sep 2023 14:45)  Source: .Blood Blood-Peripheral  Preliminary Report (08 Sep 2023 18:02):    No growth at 24 hours

## 2023-09-09 NOTE — PROGRESS NOTE ADULT - PROBLEM SELECTOR PLAN 5
-Cont. Xarelto   -continue metoprolol tartrate 25mg BID  -F/u neurology regarding AC in setting of brain mass

## 2023-09-09 NOTE — PROGRESS NOTE ADULT - PROBLEM SELECTOR PLAN 2
Hx of GBM in past. Repeat CTH suggests larger mass with surrounding vasogenic edema and midline shift. S/p trach w/ reversal and PEG since May. Appreciate neurology recommendations  -MRI head w/ and w/o IV Contrast  -Decadron 4mg IV BID  -GI prophylaxis  -Neurochecks  -Cont. AEMs  -As per daughter, has been tolerating some PO and thickened liquids at facility. Asks about removing PEG tube which I advised against doing in the near term.  -Speech/swallow consult -> recommend NPO  -Nutrition consult -> Glucerna tube feeds   -F/u neurology recommendations  -Falls, aspiration and seizure precautions

## 2023-09-09 NOTE — SWALLOW BEDSIDE ASSESSMENT ADULT - PHARYNGEAL PHASE
Unable to digitally palpate given soft C collar, however, pt appears to have observed timely swallow trigger and w/ no overt s/sx of aspiration/penetration/Within functional limits

## 2023-09-09 NOTE — SWALLOW BEDSIDE ASSESSMENT ADULT - ADDITIONAL RECOMMENDATIONS
Dysphagia Goals  1. Pt will participate in objective swallow testing  2. Pt will tolerate least restrictive diet prior to d/c from hospital

## 2023-09-09 NOTE — SWALLOW BEDSIDE ASSESSMENT ADULT - NS SPL SWALLOW CLINIC TRIAL FT
Advanced trials not administered at this time given risk factors for aspiration and pt is a candidate for objective swallow testing

## 2023-09-09 NOTE — PROGRESS NOTE ADULT - PROBLEM SELECTOR PLAN 4
Also enterovirus infection as well. Procalcitonin WNL  - given azithromycin at [facility] on 9/7  - completed 3 days of empiric azithromycin (9/7 - 9/9)  - acetaminophen and mucinex PRN  - Cont. supplemental 02 PRN  - Duo nebs Q6hrs

## 2023-09-10 NOTE — PROGRESS NOTE ADULT - PROBLEM SELECTOR PLAN 5
possible cellulitis about the PEG tube site  - PEG tube is working well  - obtain CT a/p to further evaluate  - for now, continue empiric ancef (started on 9/9)

## 2023-09-10 NOTE — PROGRESS NOTE ADULT - PROBLEM SELECTOR PLAN 4
Also enterovirus infection as well. Procalcitonin WNL  - given azithromycin at [facility] on 9/7  - completed 3 days of empiric azithromycin (9/7 - 9/9)  - acetaminophen and mucinex PRN  - Cont. supplemental 02 PRN  - Duo nebs Q6hrs

## 2023-09-10 NOTE — PROGRESS NOTE ADULT - PROBLEM SELECTOR PLAN 2
Hx of GBM in past. Repeat CTH suggests larger mass with surrounding vasogenic edema and midline shift. S/p trach w/ reversal and PEG since May. Appreciate neurology recommendations  -MRI head w/ and w/o IV Contrast completed and pending radiologist read  -Decadron 4mg IV BID  -GI prophylaxis  -Speech/swallow consult -> recommend NPO  -Nutrition consult -> Glucerna tube feeds   -F/u neurology recommendations  -Falls, aspiration and seizure precautions

## 2023-09-10 NOTE — PROGRESS NOTE ADULT - SUBJECTIVE AND OBJECTIVE BOX
Britta Fitzpatrick MD  Hospitalist  Available on MS Teams      PROGRESS NOTE:     Patient is a 67y old  Male who presents with a chief complaint of AMS, Worsening GBM w/ shift and Paraflu/ Enterovirus infection (09 Sep 2023 16:35)      SUBJECTIVE / OVERNIGHT EVENTS:  No acute events overnight.  Pt this morning has no acute complaints. He is only AAOx2 this morning (was AAOx3 on prior days)    He is started on ancef empirically for possible PEG tube related infection. MR brain completed yesterday.    MEDICATIONS  (STANDING):  albuterol/ipratropium for Nebulization 3 milliLiter(s) Nebulizer every 6 hours  allopurinol 100 milliGRAM(s) Oral daily  atorvastatin 20 milliGRAM(s) Oral at bedtime  brimonidine 0.2% Ophthalmic Solution 1 Drop(s) Both EYES two times a day  ceFAZolin   IVPB 1000 milliGRAM(s) IV Intermittent every 8 hours  ceFAZolin   IVPB      dexAMETHasone  Injectable 4 milliGRAM(s) IV Push every 12 hours  FLUoxetine 20 milliGRAM(s) Oral daily  lacosamide IVPB 200 milliGRAM(s) IV Intermittent every 12 hours  lactated ringers. 500 milliLiter(s) (100 mL/Hr) IV Continuous <Continuous>  levETIRAcetam  IVPB 1000 milliGRAM(s) IV Intermittent every 12 hours  losartan 50 milliGRAM(s) Oral daily  metoprolol tartrate 25 milliGRAM(s) Oral two times a day  pantoprazole  Injectable 40 milliGRAM(s) IV Push daily  rivaroxaban 20 milliGRAM(s) Enteral Tube with dinner  timolol 0.25% Solution 1 Drop(s) Both EYES two times a day  valproic  acid Syrup 250 milliGRAM(s) Oral three times a day    MEDICATIONS  (PRN):  acetaminophen   Oral Liquid .. 650 milliGRAM(s) Oral every 6 hours PRN Temp greater or equal to 38C (100.4F), Mild Pain (1 - 3)  clonazePAM  Tablet 0.5 milliGRAM(s) Oral two times a day PRN For anxiety  guaiFENesin Oral Liquid (Sugar-Free) 200 milliGRAM(s) Oral every 6 hours PRN Cough  senna 2 Tablet(s) Oral at bedtime PRN for constipation      CAPILLARY BLOOD GLUCOSE      POCT Blood Glucose.: 111 mg/dL (10 Sep 2023 12:38)  POCT Blood Glucose.: 125 mg/dL (10 Sep 2023 00:26)    I&O's Summary    09 Sep 2023 07:01  -  10 Sep 2023 07:00  --------------------------------------------------------  IN: 420 mL / OUT: 0 mL / NET: 420 mL        PHYSICAL EXAM:  Vital Signs Last 24 Hrs  T(C): 36.7 (10 Sep 2023 04:44), Max: 37.1 (09 Sep 2023 21:53)  T(F): 98 (10 Sep 2023 04:44), Max: 98.7 (09 Sep 2023 21:53)  HR: 111 (10 Sep 2023 04:44) (90 - 113)  BP: 125/88 (10 Sep 2023 04:44) (125/88 - 129/91)  BP(mean): --  RR: 18 (10 Sep 2023 04:44) (18 - 18)  SpO2: 100% (10 Sep 2023 04:44) (99% - 100%)    Parameters below as of 10 Sep 2023 04:44  Patient On (Oxygen Delivery Method): room air    CONSTITUTIONAL: Well-developed, well-groomed, in no apparent distress  NECK: soft collar in place  RESPIRATORY: Breathing comfortably; lungs clear to auscultation.  CARDIOVASCULAR: +S1S2, RRR, no M/G/R; no lower extremity edema  GASTROINTESTINAL: No tenderness, +BS throughout. +PEG tube with surrounding area small amount of red-colored fluid that can be expressed when pressed. Area is nontender.  MUSCULOSKELETAL: Normal gait and station; no digital clubbing or cyanosis; no paraspinal tenderness; no joint effusions of upper or lower extremities; normal strength and tone of extremities  NEUROLOGIC: 0/5 strength of the left upper and left lower extremities. 5/5 strength of the right upper and right lower extremities.  PSYCHIATRIC: AAOx2. Very soft spoken and difficult to hear.      LABS:                        13.8   7.33  )-----------( 230      ( 10 Sep 2023 07:00 )             42.4     09-10    139  |  102  |  16  ----------------------------<  102<H>  3.8   |  21<L>  |  0.51    Ca    9.3      10 Sep 2023 06:58  Phos  3.1     09-10  Mg     2.2     09-10    TPro  6.0  /  Alb  2.9<L>  /  TBili  0.3  /  DBili  x   /  AST  41<H>  /  ALT  53<H>  /  AlkPhos  71  09-10          Urinalysis Basic - ( 10 Sep 2023 06:58 )    Color: x / Appearance: x / SG: x / pH: x  Gluc: 102 mg/dL / Ketone: x  / Bili: x / Urobili: x   Blood: x / Protein: x / Nitrite: x   Leuk Esterase: x / RBC: x / WBC x   Sq Epi: x / Non Sq Epi: x / Bacteria: x        Culture - Urine (collected 07 Sep 2023 15:07)  Source: Clean Catch Clean Catch (Midstream)  Final Report (09 Sep 2023 22:08):    >=3 organisms. Probable collection contamination.    Culture - Blood (collected 07 Sep 2023 14:52)  Source: .Blood Blood-Peripheral  Preliminary Report (09 Sep 2023 18:01):    No growth at 48 Hours    Culture - Blood (collected 07 Sep 2023 14:45)  Source: .Blood Blood-Peripheral  Preliminary Report (09 Sep 2023 18:01):    No growth at 48 Hours

## 2023-09-11 NOTE — PROGRESS NOTE ADULT - PROBLEM SELECTOR PLAN 4
Also enterovirus infection as well. Procalcitonin WNL  - given azithromycin at [facility] on 9/7  - completed 3 days of empiric azithromycin (9/7 - 9/9)  - acetaminophen and mucinex PRN  - Cont. supplemental 02 PRN  - Duo nebs Q6hrs

## 2023-09-11 NOTE — PROGRESS NOTE ADULT - SUBJECTIVE AND OBJECTIVE BOX
PROGRESS NOTE:     Patient is a 67y old  Male who presents with a chief complaint of AMS, Worsening GBM w/ shift and Paraflu/ Enterovirus infection (10 Sep 2023 12:49)      SUBJECTIVE / OVERNIGHT EVENTS:  Patient seen and evaluated at bedside. Patient very soft spoken, denies chest pain/SOB, denies abdominal pain.     ADDITIONAL REVIEW OF SYSTEMS:    MEDICATIONS  (STANDING):  albuterol/ipratropium for Nebulization 3 milliLiter(s) Nebulizer every 6 hours  allopurinol 100 milliGRAM(s) Oral daily  atorvastatin 20 milliGRAM(s) Oral at bedtime  brimonidine 0.2% Ophthalmic Solution 1 Drop(s) Both EYES two times a day  ceFAZolin   IVPB 1000 milliGRAM(s) IV Intermittent every 8 hours  ceFAZolin   IVPB      dexAMETHasone  Injectable 4 milliGRAM(s) IV Push every 12 hours  FLUoxetine 20 milliGRAM(s) Oral daily  lacosamide IVPB 200 milliGRAM(s) IV Intermittent every 12 hours  lactated ringers. 500 milliLiter(s) (100 mL/Hr) IV Continuous <Continuous>  levETIRAcetam  IVPB 1000 milliGRAM(s) IV Intermittent every 12 hours  losartan 50 milliGRAM(s) Oral daily  metoprolol tartrate 25 milliGRAM(s) Oral two times a day  pantoprazole  Injectable 40 milliGRAM(s) IV Push daily  timolol 0.25% Solution 1 Drop(s) Both EYES two times a day  valproic  acid Syrup 250 milliGRAM(s) Oral three times a day    MEDICATIONS  (PRN):  acetaminophen   Oral Liquid .. 650 milliGRAM(s) Oral every 6 hours PRN Temp greater or equal to 38C (100.4F), Mild Pain (1 - 3)  clonazePAM  Tablet 0.5 milliGRAM(s) Oral two times a day PRN For anxiety  guaiFENesin Oral Liquid (Sugar-Free) 200 milliGRAM(s) Oral every 6 hours PRN Cough  senna 2 Tablet(s) Oral at bedtime PRN for constipation      CAPILLARY BLOOD GLUCOSE      POCT Blood Glucose.: 112 mg/dL (11 Sep 2023 12:04)  POCT Blood Glucose.: 119 mg/dL (11 Sep 2023 06:21)  POCT Blood Glucose.: 109 mg/dL (10 Sep 2023 17:37)    I&O's Summary    10 Sep 2023 07:01  -  11 Sep 2023 07:00  --------------------------------------------------------  IN: 600 mL / OUT: 0 mL / NET: 600 mL        PHYSICAL EXAM:  Vital Signs Last 24 Hrs  T(C): 36.9 (11 Sep 2023 07:51), Max: 37.3 (11 Sep 2023 05:15)  T(F): 98.4 (11 Sep 2023 07:51), Max: 99.2 (11 Sep 2023 05:15)  HR: 62 (11 Sep 2023 07:51) (62 - 97)  BP: 135/92 (11 Sep 2023 07:51) (134/75 - 135/92)  BP(mean): --  RR: 18 (11 Sep 2023 07:51) (18 - 18)  SpO2: 96% (11 Sep 2023 07:51) (93% - 97%)    Parameters below as of 11 Sep 2023 07:51  Patient On (Oxygen Delivery Method): room air        CONSTITUTIONAL: NAD  RESPIRATORY: Normal respiratory effort; lungs are clear to auscultation bilaterally  CARDIOVASCULAR: Regular rate and rhythm, normal S1 and S2, no murmur/rub/gallop; No lower extremity edema  ABDOMEN: Nontender to palpation, normoactive bowel sounds, no rebound/guarding  NEURO: LUE and LLE 0 out of 5 in muscle strength; RUE 5/5 strength, RLE 4/5 strength   SKIN: PEG tube incision site erythema, no drainage   PSYCH: A+O to person, place, and time; affect appropriate    LABS:                        13.1   7.79  )-----------( 230      ( 11 Sep 2023 10:21 )             40.0     09-11    139  |  102  |  21  ----------------------------<  119<H>  3.6   |  26  |  0.55    Ca    8.5      11 Sep 2023 10:15  Phos  3.1     09-10  Mg     2.2     09-10    TPro  6.0  /  Alb  2.9<L>  /  TBili  0.3  /  DBili  x   /  AST  41<H>  /  ALT  53<H>  /  AlkPhos  71  09-10          Urinalysis Basic - ( 11 Sep 2023 10:15 )    Color: x / Appearance: x / SG: x / pH: x  Gluc: 119 mg/dL / Ketone: x  / Bili: x / Urobili: x   Blood: x / Protein: x / Nitrite: x   Leuk Esterase: x / RBC: x / WBC x   Sq Epi: x / Non Sq Epi: x / Bacteria: x        Culture - Blood (collected 09 Sep 2023 23:10)  Source: .Blood Blood-Peripheral  Preliminary Report (11 Sep 2023 02:02):    No growth at 24 hours    Culture - Blood (collected 09 Sep 2023 22:52)  Source: .Blood Blood-Peripheral  Preliminary Report (11 Sep 2023 02:02):    No growth at 24 hours        RADIOLOGY & ADDITIONAL TESTS:      COORDINATION OF CARE:  Care Discussed with Consultants/Other Providers [Y/N]:  Prior or Outpatient Records Reviewed [Y/N]:   PROGRESS NOTE:     Patient is a 67y old  Male who presents with a chief complaint of AMS, Worsening GBM w/ shift and Paraflu/ Enterovirus infection (10 Sep 2023 12:49)      SUBJECTIVE / OVERNIGHT EVENTS:  Patient seen and evaluated at bedside. Patient very soft spoken, denies chest pain/SOB, denies abdominal pain.     ADDITIONAL REVIEW OF SYSTEMS:    MEDICATIONS  (STANDING):  albuterol/ipratropium for Nebulization 3 milliLiter(s) Nebulizer every 6 hours  allopurinol 100 milliGRAM(s) Oral daily  atorvastatin 20 milliGRAM(s) Oral at bedtime  brimonidine 0.2% Ophthalmic Solution 1 Drop(s) Both EYES two times a day  ceFAZolin   IVPB 1000 milliGRAM(s) IV Intermittent every 8 hours  ceFAZolin   IVPB      dexAMETHasone  Injectable 4 milliGRAM(s) IV Push every 12 hours  FLUoxetine 20 milliGRAM(s) Oral daily  lacosamide IVPB 200 milliGRAM(s) IV Intermittent every 12 hours  lactated ringers. 500 milliLiter(s) (100 mL/Hr) IV Continuous <Continuous>  levETIRAcetam  IVPB 1000 milliGRAM(s) IV Intermittent every 12 hours  losartan 50 milliGRAM(s) Oral daily  metoprolol tartrate 25 milliGRAM(s) Oral two times a day  pantoprazole  Injectable 40 milliGRAM(s) IV Push daily  timolol 0.25% Solution 1 Drop(s) Both EYES two times a day  valproic  acid Syrup 250 milliGRAM(s) Oral three times a day    MEDICATIONS  (PRN):  acetaminophen   Oral Liquid .. 650 milliGRAM(s) Oral every 6 hours PRN Temp greater or equal to 38C (100.4F), Mild Pain (1 - 3)  clonazePAM  Tablet 0.5 milliGRAM(s) Oral two times a day PRN For anxiety  guaiFENesin Oral Liquid (Sugar-Free) 200 milliGRAM(s) Oral every 6 hours PRN Cough  senna 2 Tablet(s) Oral at bedtime PRN for constipation      CAPILLARY BLOOD GLUCOSE      POCT Blood Glucose.: 112 mg/dL (11 Sep 2023 12:04)  POCT Blood Glucose.: 119 mg/dL (11 Sep 2023 06:21)  POCT Blood Glucose.: 109 mg/dL (10 Sep 2023 17:37)    I&O's Summary    10 Sep 2023 07:01  -  11 Sep 2023 07:00  --------------------------------------------------------  IN: 600 mL / OUT: 0 mL / NET: 600 mL        PHYSICAL EXAM:  Vital Signs Last 24 Hrs  T(C): 36.9 (11 Sep 2023 07:51), Max: 37.3 (11 Sep 2023 05:15)  T(F): 98.4 (11 Sep 2023 07:51), Max: 99.2 (11 Sep 2023 05:15)  HR: 62 (11 Sep 2023 07:51) (62 - 97)  BP: 135/92 (11 Sep 2023 07:51) (134/75 - 135/92)  BP(mean): --  RR: 18 (11 Sep 2023 07:51) (18 - 18)  SpO2: 96% (11 Sep 2023 07:51) (93% - 97%)    Parameters below as of 11 Sep 2023 07:51  Patient On (Oxygen Delivery Method): room air        CONSTITUTIONAL: NAD  RESPIRATORY: Normal respiratory effort; lungs are clear to auscultation bilaterally  CARDIOVASCULAR: Regular rate and rhythm, normal S1 and S2, no murmur/rub/gallop; No lower extremity edema  ABDOMEN: Nontender to palpation, normoactive bowel sounds, no rebound/guarding  NEURO: LUE and LLE 0 out of 5 in muscle strength; RUE 5/5 strength, RLE 4/5 strength   SKIN: PEG tube incision site erythema, no drainage   PSYCH: A+O to person, place, says the year is 20 something ; affect appropriate    LABS:                        13.1   7.79  )-----------( 230      ( 11 Sep 2023 10:21 )             40.0     09-11    139  |  102  |  21  ----------------------------<  119<H>  3.6   |  26  |  0.55    Ca    8.5      11 Sep 2023 10:15  Phos  3.1     09-10  Mg     2.2     09-10    TPro  6.0  /  Alb  2.9<L>  /  TBili  0.3  /  DBili  x   /  AST  41<H>  /  ALT  53<H>  /  AlkPhos  71  09-10          Urinalysis Basic - ( 11 Sep 2023 10:15 )    Color: x / Appearance: x / SG: x / pH: x  Gluc: 119 mg/dL / Ketone: x  / Bili: x / Urobili: x   Blood: x / Protein: x / Nitrite: x   Leuk Esterase: x / RBC: x / WBC x   Sq Epi: x / Non Sq Epi: x / Bacteria: x        Culture - Blood (collected 09 Sep 2023 23:10)  Source: .Blood Blood-Peripheral  Preliminary Report (11 Sep 2023 02:02):    No growth at 24 hours    Culture - Blood (collected 09 Sep 2023 22:52)  Source: .Blood Blood-Peripheral  Preliminary Report (11 Sep 2023 02:02):    No growth at 24 hours        RADIOLOGY & ADDITIONAL TESTS:  < from: MR Head w/wo IV Cont (09.09.23 @ 21:02) >  There has been a substantial interval progression of the enhancing tumor   involving the right cerebral hemisphere compared to the prior brain MRI   study from 07/08/2023.    The vasogenic edema, mass effect, and midline shift is stable compared to   the more recent 09/07/2023 head CT.    < end of copied text >    < from: CT Abdomen and Pelvis w/ IV Cont (09.10.23 @ 18:56) >  IMPRESSION:  Percutaneous gastrostomy tube is in satisfactory position. No   inflammation along the course of the gastrostomy tract. No abdominal wall   collection.    < end of copied text >  < from: CT Head No Cont (09.07.23 @ 17:51) >  IMPRESSION: Area of hypodensity in the right frontal parietal region with   extensive surrounding vasogenic edema, overall increased in size compared   to prior exam. These findings raise suspicion for tumor progression. New   right-to-left midline shift measuring 7 mm. Consider MRI brain with and   without contrast for further evaluation if clinically warranted.      < end of copied text >    COORDINATION OF CARE:  Care Discussed with Consultants/Other Providers [Y/N]:  Prior or Outpatient Records Reviewed [Y/N]:

## 2023-09-11 NOTE — PROGRESS NOTE ADULT - PROBLEM SELECTOR PLAN 2
Hx of GBM in past. Repeat CTH suggests larger mass with surrounding vasogenic edema and midline shift. S/p trach w/ reversal and PEG since May. Appreciate neurology recommendations  -MRI head w/ and w/o IV Contrast completed, and noted " a substantial interval progression of the enhancing tumor   involving the right cerebral hemisphere compared to the prior brain MRI study from 07/08/2023."  -Decadron 4mg IV BID  -GI prophylaxis  -Speech/swallow consult -> recommend NPO  -Nutrition consult -> Glucerna tube feeds   -F/u neurology recommendations  -Falls, aspiration and seizure precautions

## 2023-09-11 NOTE — PROGRESS NOTE ADULT - PROBLEM SELECTOR PLAN 5
possible cellulitis about the PEG tube site  - PEG tube is working well  - s/p CT A/P Percutaneous gastrostomy tube is in satisfactory position. No inflammation along the course of the gastrostomy tract. No abdominal wall collection.  -MRSA negative in July 2023  - continue empiric ancef (started on 9/9-   ), plan for 5 day course

## 2023-09-11 NOTE — PROGRESS NOTE ADULT - PROBLEM SELECTOR PLAN 6
-continue metoprolol tartrate 25mg BID  -hold xarelto per above due to concern for hemorrhage from tumor

## 2023-09-11 NOTE — CHART NOTE - NSCHARTNOTEFT_GEN_A_CORE
Patient was discussed with Dr. Carias today. Dr. Carias reviewed available imaging: Patient was discussed with Dr. Carias today. Dr. Carias reviewed available imaging:    MRI brain w/wo contrast (9/9/2023):  IMPRESSION:    There has been a substantial interval progression of the enhancing tumor   involving the right cerebral hemisphere compared to the prior brain MRI   study from 07/08/2023.    The vasogenic edema, mass effect, and midline shift is stable compared to   the more recent 09/07/2023 head CT.    [facility] (9/11/2023):  FINDINGS:    Exam appears essentially unchanged since the prior CT head of 9/7/2023.   Reidentified is a right temporal craniotomy for prior resection of tumor.   Extensive area of hypoattenuation within the right frontal parietal and   temporal lobes is reidentified, suggestive of known tumor with   surrounding vasogenic edema and/or nonenhancing tumor. Scattered areas of   calcification within the tumor are present. Additional small   subcentimeter foci of hypoattenuation that are too small to characterize,   and may reflect additional calcifications however small areas of   hemorrhage cannot be entirely excluded.    Chart reviewed with Dr. Carias. Primary team is c/f hemorrhage:  "- Reviewed brain MRI with attending radiologist Dr. Ok Hurtado, noted " Increasing areas of decreased SWI signal are noted within the lesion likely reflecting evolving hemorrhagic products, calcifications, neovascularity, ora combination of these possibilities. " discussed with Dr. Hurtado, it is impossible to tell on the MRI if it is hemorrhage from tumor vs. calcification vs. neovascularity of tumor, and AC (xareltol) does increase the risk of bleeding from tumor, Dr. Hurtado recommend repeat CT head to further clarify   - hold xarelto for now, pending repeat CT head to evaluate possible hemorrhage from tumor   - reached out to neurology team this AM, pending rec."    - I discussed with Dr. Carias restarting rivaroxaban for chronic atrial fibrillation in the setting of possible intracranial hemorrhage.  - Imaging thus far has been equivocal - per CT report: "small areas of hemorrhage CANNOT be entirely excluded"  - This is a risk vs. benefit discussion with regard to restarting anticoagulation - particularly in the setting of progression of tumor seen on MRI. Recommend Palliative Care consult to clarify goals of care.  - Dr. Carias will be in tomorrow AM to see patient. Dr. Carias has spoken to patient's daughter today.    Christine Oneal MD, PGY-2 (Neurology) Patient was discussed with Dr. Carias today. Dr. Carias reviewed available imaging:    MRI brain w/wo contrast (9/9/2023):  IMPRESSION:    There has been a substantial interval progression of the enhancing tumor   involving the right cerebral hemisphere compared to the prior brain MRI   study from 07/08/2023.    The vasogenic edema, mass effect, and midline shift is stable compared to   the more recent 09/07/2023 head CT.    [facility] (9/11/2023):  FINDINGS:    Exam appears essentially unchanged since the prior CT head of 9/7/2023.   Reidentified is a right temporal craniotomy for prior resection of tumor.   Extensive area of hypoattenuation within the right frontal parietal and   temporal lobes is reidentified, suggestive of known tumor with   surrounding vasogenic edema and/or nonenhancing tumor. Scattered areas of   calcification within the tumor are present. Additional small   subcentimeter foci of hypoattenuation that are too small to characterize,   and may reflect additional calcifications however small areas of   hemorrhage cannot be entirely excluded.    Chart reviewed with Dr. Carias. Primary team is c/f hemorrhage:  "- Reviewed brain MRI with attending radiologist Dr. Ok Hurtado, noted " Increasing areas of decreased SWI signal are noted within the lesion likely reflecting evolving hemorrhagic products, calcifications, neovascularity, ora combination of these possibilities. " discussed with Dr. Hurtado, it is impossible to tell on the MRI if it is hemorrhage from tumor vs. calcification vs. neovascularity of tumor, and AC (xareltol) does increase the risk of bleeding from tumor, Dr. Hurtado recommend repeat CT head to further clarify   - hold xarelto for now, pending repeat CT head to evaluate possible hemorrhage from tumor   - reached out to neurology team this AM, pending rec."    - I discussed with Dr. Carias restarting rivaroxaban for chronic atrial fibrillation in the setting of possible intracranial hemorrhage.  - Imaging thus far has been equivocal - per CT report: "small areas of hemorrhage CANNOT be entirely excluded"  - This is a risk vs. benefit discussion with regard to restarting anticoagulation - particularly in the setting of progression of tumor seen on MRI (given prognosis, need to determine if risk of complications from AC fits into patient's current goals of care). Recommend Palliative Care consult to clarify goals of care.  - Dr. Carias will be in tomorrow AM to see patient. Dr. Carias has spoken to patient's daughter today.    Christine Oneal MD, PGY-2 (Neurology)

## 2023-09-11 NOTE — PROGRESS NOTE ADULT - PROBLEM SELECTOR PLAN 8
- DVT ppx: SCD, hold chemical DVT prophylaxis due to concern for hemorrhage from brain tumor   - GI ppx: protonix  - Diet: tube feeds, glucerna

## 2023-09-12 NOTE — PROGRESS NOTE ADULT - SUBJECTIVE AND OBJECTIVE BOX
PROGRESS NOTE:     Patient is a 67y old  Male who presents with a chief complaint of AMS, Worsening GBM w/ shift and Paraflu/ Enterovirus infection (12 Sep 2023 09:55)      SUBJECTIVE / OVERNIGHT EVENTS:  Patient seen and evaluated at bedside. Patient lethargic compared to yesterday, did not answer any ROS questions.   ADDITIONAL REVIEW OF SYSTEMS:    MEDICATIONS  (STANDING):  albuterol/ipratropium for Nebulization 3 milliLiter(s) Nebulizer every 6 hours  allopurinol 100 milliGRAM(s) Oral daily  atorvastatin 20 milliGRAM(s) Oral at bedtime  brimonidine 0.2% Ophthalmic Solution 1 Drop(s) Both EYES two times a day  dexAMETHasone  Injectable 4 milliGRAM(s) IV Push every 12 hours  FLUoxetine 20 milliGRAM(s) Oral daily  lacosamide IVPB 200 milliGRAM(s) IV Intermittent every 12 hours  lactated ringers. 500 milliLiter(s) (100 mL/Hr) IV Continuous <Continuous>  levETIRAcetam  IVPB 1000 milliGRAM(s) IV Intermittent every 12 hours  losartan 50 milliGRAM(s) Oral daily  metoprolol tartrate 25 milliGRAM(s) Oral two times a day  pantoprazole  Injectable 40 milliGRAM(s) IV Push daily  timolol 0.25% Solution 1 Drop(s) Both EYES two times a day  valproic  acid Syrup 250 milliGRAM(s) Oral three times a day  vancomycin  IVPB        MEDICATIONS  (PRN):  acetaminophen   Oral Liquid .. 650 milliGRAM(s) Oral every 6 hours PRN Temp greater or equal to 38C (100.4F), Mild Pain (1 - 3)  clonazePAM  Tablet 0.5 milliGRAM(s) Oral two times a day PRN For anxiety  guaiFENesin Oral Liquid (Sugar-Free) 200 milliGRAM(s) Oral every 6 hours PRN Cough  senna 2 Tablet(s) Oral at bedtime PRN for constipation      CAPILLARY BLOOD GLUCOSE      POCT Blood Glucose.: 110 mg/dL (12 Sep 2023 05:09)  POCT Blood Glucose.: 122 mg/dL (12 Sep 2023 00:37)  POCT Blood Glucose.: 112 mg/dL (11 Sep 2023 12:04)    I&O's Summary    11 Sep 2023 07:01  -  12 Sep 2023 07:00  --------------------------------------------------------  IN: 840 mL / OUT: 0 mL / NET: 840 mL        PHYSICAL EXAM:  Vital Signs Last 24 Hrs  T(C): 36.8 (12 Sep 2023 08:30), Max: 36.9 (11 Sep 2023 23:59)  T(F): 98.3 (12 Sep 2023 08:30), Max: 98.4 (11 Sep 2023 23:59)  HR: 108 (12 Sep 2023 08:30) (90 - 108)  BP: 138/95 (12 Sep 2023 08:30) (112/82 - 149/101)  BP(mean): --  RR: 18 (12 Sep 2023 08:30) (18 - 20)  SpO2: 97% (12 Sep 2023 08:30) (92% - 97%)    Parameters below as of 12 Sep 2023 08:30  Patient On (Oxygen Delivery Method): room air        CONSTITUTIONAL: lethargic, with tactile stimulation patient opened eyes, mumbles words, warm to touch   RESPIRATORY: Normal respiratory effort; lungs are clear to auscultation bilaterally  CARDIOVASCULAR: tachycardiac, no murmur  ABDOMEN: Nontender to palpation, normoactive bowel sounds, no rebound/guarding; + PEG tube, incision site noted yellow exudate and surrounding erythema       LABS:                        11.9   6.84  )-----------( 192      ( 12 Sep 2023 10:26 )             35.6     09-12    140  |  103  |  16  ----------------------------<  114<H>  3.9   |  26  |  0.50    Ca    8.3<L>      12 Sep 2023 10:26  Phos  3.0     09-12  Mg     2.3     09-12    TPro  5.2<L>  /  Alb  2.6<L>  /  TBili  0.2  /  DBili  x   /  AST  25  /  ALT  30  /  AlkPhos  57  09-12          Urinalysis Basic - ( 12 Sep 2023 10:26 )    Color: x / Appearance: x / SG: x / pH: x  Gluc: 114 mg/dL / Ketone: x  / Bili: x / Urobili: x   Blood: x / Protein: x / Nitrite: x   Leuk Esterase: x / RBC: x / WBC x   Sq Epi: x / Non Sq Epi: x / Bacteria: x        Culture - Blood (collected 09 Sep 2023 23:10)  Source: .Blood Blood-Peripheral  Preliminary Report (12 Sep 2023 02:02):    No growth at 48 Hours    Culture - Blood (collected 09 Sep 2023 22:52)  Source: .Blood Blood-Peripheral  Preliminary Report (12 Sep 2023 02:02):    No growth at 48 Hours        RADIOLOGY & ADDITIONAL TESTS:  < from: CT Head No Cont (09.11.23 @ 14:50) >    FINDINGS:  Exam appears essentially unchanged since the prior CT head of 9/7/2023.   Reidentified is a right temporal craniotomy for prior resection of tumor.   Extensive area of hypoattenuation within the right frontal parietal and   temporal lobes is reidentified, suggestive of known tumor with   surrounding vasogenic edema and/or nonenhancing tumor. Scattered areas of   calcification within the tumor are present. Additional small   subcentimeter foci of hypoattenuation that are too small to characterize,   and may reflect additional calcifications however smallareas of   hemorrhage cannot be entirely excluded.    IMPRESSION:  Exam appears essentially unchanged since the prior CT head of 9/7/2023.

## 2023-09-12 NOTE — PROGRESS NOTE ADULT - CONVERSATION DETAILS
Spoke to daughter Aretha Jalloh.   Reviewed brain MRI, CT head findings with Aretha, noted patient had significant progression with his GBM with vasogenic edema/midline shift, and neurologist Dr. Carias noted no oncologic treatment recommended.   Discussed overall guarded prognosis, discussed potential palliative care approach/symptom focused treatment plan.   Aretha noted she is under substantial stress lately. She will see patient in person, and further discuss with Dr. Carias and the primary medicine team before making decisions.

## 2023-09-12 NOTE — CONSULT NOTE ADULT - ASSESSMENT
In summary, Mr. Jalloh is a 66 yo man with a significantly progressed right sided GBM - now admitted with RSV.  KPS is at best 40.  No oncologic treatment is recommended.  I spoke with his daughter Aretha yesterday re. recommendations for palliative care.  I will speak to her again today.  Would have palliative care consult re hospice.  Do not think there is hemorrhage on neuroimaging - however big picture is that prognosis is grim and should pursue comfort care.  Please call if any questions.  [phone].

## 2023-09-12 NOTE — PROGRESS NOTE ADULT - PROBLEM SELECTOR PLAN 5
possible cellulitis about the PEG tube site--- this AM, noted exudate and erythema at PEG site   - PEG tube is working well  - s/p CT A/P Percutaneous gastrostomy tube is in satisfactory position. No inflammation along the course of the gastrostomy tract. No abdominal wall collection.  -MRSA negative in July 2023  - s/p ancef (started on 9/9- 9/12  )--- this morning lethargic mental status, exudate and erythema at site, upgrade antbiotics to vancomycin , f/u BCx x2, f/u lactate, f/u rectal temp, trend vanc trough

## 2023-09-12 NOTE — PROGRESS NOTE ADULT - PROBLEM SELECTOR PLAN 4
Also enterovirus infection as well. Procalcitonin WNL  - given azithromycin at [facility] on 9/7  - completed 3 days of empiric azithromycin (9/7 - 9/9)  - acetaminophen and mucinex PRN  - Cont. supplemental 02 PRN  - Duo nebs Q6hrs

## 2023-09-12 NOTE — CONSULT NOTE ADULT - SUBJECTIVE AND OBJECTIVE BOX
Mr. Jalloh is a 66 yo man who I have been treating for a Glioblastoma, IDH wy - posterior right frontal region - diagnosed on 8/31/2022 - presented with a left focal seizure while driving.  PMH notable for prostatectomy, atrial fibrillation, splenic infarct, and cholecystectomy,  Patient had a biopsy followed by RT with Temodar and adjuvant Temodar - last in 5/2023 - Avastin was added in March for radiographic progression.    Mr. Jalloh has been visiting family in [place] in May and developed seizures - was admitted to a hospital - intubated - course complicated by exacerbation of atrial fibrillation.  He was in the hospital until 7/4 when the daughter brought him back to [place] and he was admitted to [facility] and then transferred to subacute rehab.  He has clinically declined steadily and I have spoken to his daughter over the past few months regarding poor clinical status and recommendation for palliation.    Daughter had requested he be evaluated in the ER for cognitive decline last week - found to be RSV+.    MRI here reviewed and shows significant disease progression.  There was a concern re possible blood on MRI - films reviewed personally as well as CT brain - suspect brightness is calcification - certainly stable.    He is awake and confused.  He is able to name and follow simple commands.  His speech is slurred - he thinks he is at a friend's house - knows the year but not the month.  He has a left VF neglect and dense hemiparesis.  He also has extinction to DSS on the left.    He is on Decadron 4 mg bid.  He is unable to swallow and has a PEG in place.

## 2023-09-12 NOTE — PROGRESS NOTE ADULT - PROBLEM SELECTOR PLAN 2
Hx of GBM in past. Repeat CTH suggests larger mass with surrounding vasogenic edema and midline shift. S/p trach w/ reversal and PEG since May. Appreciate neurology recommendations  -MRI head w/ and w/o IV Contrast completed, and noted " a substantial interval progression of the enhancing tumor   involving the right cerebral hemisphere compared to the prior brain MRI study from 07/08/2023."  -Decadron 4mg IV BID  -GI prophylaxis  - s/p evaluation by neurologist Dr. Carias, no oncologic treatment recommended, rec:big picture is that prognosis is grim and should pursue comfort care.

## 2023-09-13 NOTE — CONSULT NOTE ADULT - PROBLEM SELECTOR RECOMMENDATION 2
per chart review, not a candidate for further therapy  Dr. Carias recommending symptom directed approach  patient in agreement with hospice care

## 2023-09-13 NOTE — CONSULT NOTE ADULT - PROBLEM SELECTOR RECOMMENDATION 4
DNR/DNI per discussion with patient. He was awake, alert, and appropriately participating in [facility] narrative above, he wants to go home with hospice  would send home hospice referral    attempted to contact daughter but no answer. non urgent voicemail left with call back number DNR/DNI per discussion with patient. He was awake, alert, and appropriately participating in [facility] narrative above, he wants to go home with hospice  would send home hospice referral    discussed with daughter as well. reviewed options for care  disposition is per case management at this point

## 2023-09-13 NOTE — PROGRESS NOTE ADULT - PROBLEM SELECTOR PLAN 3
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 No inflammation along the course of the gastrostomy tract. No abdominal wall collection.  -MRSA negative in July 2023  - s/p ancef (started on 9/9- 9/12  )--- 9/12 lethargic mental status, exudate and erythema at site, upgrade antbiotics to vancomycin (9/13-   ) , f/u BCx x2, f/u lactate 2.8, f/u rectal temp 98 WNL, trend vanc trough, tentatively plan for 5 day course

## 2023-09-13 NOTE — CONSULT NOTE ADULT - CONVERSATION DETAILS
Met with patient at bedside, introduced self and role  patient able to state that he spoke with Dr. Carias and understands his prognosis  He states he had to "make a choice." Asked him what his thoughts were in terms of what was discussed with him  He states that he has "decided to let go" and he wants to go home. Explored what he meant by this and he confirmed he would not want CPR, life support machines and wanted to go home with hospice and live pain free for whatever amount of time he has. Primary team NP present during conversation as well.    Attempted outreach to daughter Aretha to update. No answer, non urgent voicemail left  DNR/DNI completed on MOLST on this date. CM should send Home hospice referral    Patient does not meet criteria for inpatient hospice. Met with patient at bedside, introduced self and role  patient able to state that he spoke with Dr. Carias and understands his prognosis  He states he had to "make a choice." Asked him what his thoughts were in terms of what was discussed with him  He states that he has "decided to let go" and he wants to go home. Explored what he meant by this and he confirmed he would not want CPR, life support machines and wanted to go home with hospice and live pain free for whatever amount of time he has. Primary team NP present during conversation as well.    Attempted outreach to daughter Aretha to update. No answer, non urgent voicemail left  Call back completed and discussed with Aretha regarding disposition options.  Reviewed [facility] +/- their comfort care program vs. home hospice.   Reviewed basic of hospice eligibility, criteria and program details. Daughter amenable to hospice referral and understands likely need to private hire additional help. Medicaid application in progress but not approved.   DNR/DNI completed on MOLST on this date based on patients wishes and daughter in agreement to support.  should send Home hospice referral    Patient does not meet criteria for inpatient hospice. this was reviewed with primary team and family.

## 2023-09-13 NOTE — CONSULT NOTE ADULT - PROBLEM SELECTOR RECOMMENDATION 5
remain available. discussed with primary team NP who was present for GOC discussion.  [phone] no further role identified as goals defined and CM to follow up re: dispo. discussed with primary team NP who was present for GOC discussion and assisted with MOLST completion.  [phone]

## 2023-09-13 NOTE — CONSULT NOTE ADULT - SUBJECTIVE AND OBJECTIVE BOX
HPI:  Spoke to daughter who was unable to provide meds. Called [facility] and was transferred to multiple people who either could not help or hung up immediately. History obtained from ER and daughter as below    67M w/ history of glioblastoma, HTN, seizure disorder, A-fib on Xarelto, prior stroke w/ residual L-sided weakness, sent from nursing home for cough and altered mental status.  On initial evaluation, patient is AO x0, unable to provide history.  Per EMS collateral, nursing home facility noted cough productive of phlegm starting yesterday, gave 1 dose of azithromycin.  Noted worsening mental status, for which daughter was concerned, so she requested ED evaluation. Daughter also noticed hallucinations for several days which she states is an indication of infection for him. Pt currently denies any pain or discomfort. States he wants speech/ swallow study and someone to talk to Dr. Carias his neurologist.    In ER: Given IV Zosyn, IV Vancomycin, LR 1L,       (07 Sep 2023 23:30)    PERTINENT PM/SXH:   GERD (gastroesophageal reflux disease)    Prostate ca    Hypertension    Gout    Atrial fibrillation    Splenic infarction    2019 novel coronavirus disease (COVID-19)    GERD (gastroesophageal reflux disease)      No significant past surgical history    H/O prostatectomy    H/O arthroscopy of right knee    H/O colonoscopy    H/O endoscopy    Elective surgery      FAMILY HISTORY:  Family history of diabetes mellitus (DM) (Mother)    Family history of TIAs (Mother)    Family hx of prostate cancer (Father)    Family history of bone cancer (Father)    Family history of appendicitis      Family Hx substance abuse [ ]yes [x ]no  ITEMS NOT CHECKED ARE NOT PRESENT    SOCIAL HISTORY:   Significant other/partner[ ]  Children[x ]  Scientology/Spirituality:  Substance hx:  [ ]   Tobacco hx:  [ ]   Alcohol hx: [ ]   Home Opioid hx:  [ ] I-Stop Reference No:  Living Situation: [ x]Home  [ ]Long term care  [ ]Rehab [ ]Other    ADVANCE DIRECTIVES:    DNR/MOLST  [ ]  Living Will  [ ]   DECISION MAKER(s):  [ ] Health Care Proxy(s)  [x ] Surrogate(s)  [ ] Guardian           Name(s): Phone Number(s): tereza Carias, [phone]    BASELINE (I)ADL(s) (prior to admission):  [place]: [ ]Total  [ ] Moderate [ ]Dependent    Allergies    No Known Allergies    Intolerances    MEDICATIONS  (STANDING):  albuterol/ipratropium for Nebulization 3 milliLiter(s) Nebulizer every 6 hours  allopurinol 100 milliGRAM(s) Oral daily  atorvastatin 20 milliGRAM(s) Oral at bedtime  brimonidine 0.2% Ophthalmic Solution 1 Drop(s) Both EYES two times a day  dexAMETHasone  Injectable 4 milliGRAM(s) IV Push every 12 hours  FLUoxetine 20 milliGRAM(s) Oral daily  lacosamide IVPB 200 milliGRAM(s) IV Intermittent every 12 hours  lactated ringers. 500 milliLiter(s) (100 mL/Hr) IV Continuous <Continuous>  levETIRAcetam  IVPB 1000 milliGRAM(s) IV Intermittent every 12 hours  losartan 50 milliGRAM(s) Oral daily  metoprolol tartrate 25 milliGRAM(s) Oral two times a day  pantoprazole  Injectable 40 milliGRAM(s) IV Push daily  timolol 0.25% Solution 1 Drop(s) Both EYES two times a day  valproic  acid Syrup 250 milliGRAM(s) Oral three times a day  vancomycin  IVPB      vancomycin  IVPB 1500 milliGRAM(s) IV Intermittent every 12 hours    MEDICATIONS  (PRN):  acetaminophen   Oral Liquid .. 650 milliGRAM(s) Oral every 6 hours PRN Temp greater or equal to 38C (100.4F), Mild Pain (1 - 3)  clonazePAM  Tablet 0.5 milliGRAM(s) Oral two times a day PRN For anxiety  guaiFENesin Oral Liquid (Sugar-Free) 200 milliGRAM(s) Oral every 6 hours PRN Cough  senna 2 Tablet(s) Oral at bedtime PRN for constipation    PRESENT SYMPTOMS: [ ]Unable to self-report  [ ] CPOT [ ] PAINADs [ ] RDOS  Source if other than patient:  [ ]Family   [ ]Team     Pain: [x ]yes [ ]no  QOL impact -   Location -    right shoulder, neck                Aggravating factors - position  Quality - aching  Radiation - localized  Timing- intermittent  Severity (0-10 scale):  Minimal acceptable level (0-10 scale):     CPOT:    https://www.sccm.org/getattachment/kmy35x71-9q9y-2i7l-6f2z-7240w3106n9p/Critical-Care-Pain-Observation-Tool-(CPOT)    PAIN AD Score:   http://geriatrictoolkit.[facility]/cog/painad.pdf (press ctrl +  left click to view)    Dyspnea:                           [ ]Mild [ ]Moderate [ ]Severe      RDOS:  0 to 2  minimal or no respiratory distress   3  mild distress  4 to 6 moderate distress  >7 severe distress  https://homecareinformation.net/handouts/hen/Respiratory_Distress_Observation_Scale.pdf (Ctrl +  left click to view)     Anxiety:                             [ ]Mild [ ]Moderate [ ]Severe  Fatigue:                             [ ]Mild [ ]Moderate [ ]Severe  Nausea:                             [ ]Mild [ ]Moderate [ ]Severe  Loss of appetite:              [ ]Mild [ ]Moderate [ ]Severe  Constipation:                    [ ]Mild [ ]Moderate [ ]Severe    PCSSQ[Palliative Care Spiritual Screening Question]   Severity (0-10):  Score of 4 or > indicate consideration of Chaplaincy referral.  Chaplaincy Referral: [x ] yes [ ] refused [ ] following [ ] Deferred     Caregiver [place]? : [x ] yes [ ] no [ ] Deferred [ ] Declined             Social work referral [ ] Patient & Family Centered Care Referral [ ]     Anticipatory Grief present?:  [ ] yes [ x] no  [ ] Deferred                  Social work referral [ ] Chaplaincy Referral[ ]      Other Symptoms:  [ x]All other review of systems negative     Palliative Performance Status Version 2:     30-40  %    http://npcrc.org/files/news/palliative_performance_scale_ppsv2.pdf  PHYSICAL EXAM:  Vital Signs Last 24 Hrs  T(C): 36.7 (13 Sep 2023 12:44), Max: 36.7 (12 Sep 2023 17:28)  T(F): 98.1 (13 Sep 2023 12:44), Max: 98.1 (12 Sep 2023 17:28)  HR: 107 (13 Sep 2023 12:44) (77 - 107)  BP: 137/95 (13 Sep 2023 12:44) (132/83 - 141/94)  BP(mean): --  RR: 18 (13 Sep 2023 12:44) (18 - 18)  SpO2: 96% (13 Sep 2023 12:44) (95% - 98%)    Parameters below as of 13 Sep 2023 12:44  Patient On (Oxygen Delivery Method): room air     I&O's Summary    12 Sep 2023 07:01  -  13 Sep 2023 07:00  --------------------------------------------------------  IN: 0 mL / OUT: 800 mL / NET: -800 mL      GENERAL: [ ]Cachexia    [x ]Alert  [x ]Oriented x   [ ]Lethargic  [ ]Unarousable  [ ]Verbal  [ ]Non-Verbal  Behavioral:   [ ] Anxiety  [ ] Delirium [ ] Agitation [ ] Other  HEENT:  [ ]Normal   [ x]Dry mouth   [ ]ET Tube/Trach  [ ]Oral lesions  PULMONARY:   [x ]Clear [ ]Tachypnea  [ ]Audible excessive secretions   [ ]Rhonchi        [ ]Right [ ]Left [ ]Bilateral  [ ]Crackles        [ ]Right [ ]Left [ ]Bilateral  [ ]Wheezing     [ ]Right [ ]Left [ ]Bilateral  [ ]Diminished breath sounds [ ]right [ ]left [ ]bilateral  CARDIOVASCULAR:    [x ]Regular [ ]Irregular [ ]Tachy  [ ]Juan Daniel [ ]Murmur [ ]Other  GASTROINTESTINAL:  [ x]Soft  [ ]Distended   [ ]+BS  [ ]Non tender [ ]Tender  [ ]Other [ x]PEG [ ]OGT/ NGT  Last BM:  GENITOURINARY:  [ ]Normal [x ] Incontinent   [ ]Oliguria/Anuria   [ ]Pimentel  MUSCULOSKELETAL:   [ ]Normal   [ ]Weakness  [x ]Bed/Wheelchair bound [ ]Edema  NEUROLOGIC:   [ ]No focal deficits  [ ]Cognitive impairment  [ x]Dysphagia [ ]Dysarthria [ ]Paresis [ ]Other   SKIN:   [ ]Normal  [ ]Rash  [ ]Other  [ ]Pressure ulcer(s)       Present on admission [ ]y [ ]n    CRITICAL CARE:  [ ] Shock Present  [ ]Septic [ ]Cardiogenic [ ]Neurologic [ ]Hypovolemic  [ ]  Vasopressors [ ]  Inotropes   [ ]Respiratory failure present [ ]Mechanical ventilation [ ]Non-invasive ventilatory support [ ]High flow    [ ]Acute  [ ]Chronic [ ]Hypoxic  [ ]Hypercarbic [ ]Other  [ ]Other organ failure     LABS:                        13.0   9.20  )-----------( 205      ( 13 Sep 2023 07:31 )             40.2   09-13    134<L>  |  103  |  16  ----------------------------<  67<L>  5.1   |  17<L>  |  0.33<L>    Ca    8.4      13 Sep 2023 07:31  Phos  3.0     09-12  Mg     2.3     09-12    TPro  5.2<L>  /  Alb  2.6<L>  /  TBili  0.2  /  DBili  x   /  AST  25  /  ALT  30  /  AlkPhos  57  09-12      Urinalysis Basic - ( 13 Sep 2023 07:31 )    Color: x / Appearance: x / SG: x / pH: x  Gluc: 67 mg/dL / Ketone: x  / Bili: x / Urobili: x   Blood: x / Protein: x / Nitrite: x   Leuk Esterase: x / RBC: x / WBC x   Sq Epi: x / Non Sq Epi: x / Bacteria: x      RADIOLOGY & ADDITIONAL STUDIES:  < from: CT Head No Cont (09.12.23 @ 20:18) >    ACC: [id number] EXAM:  CT BRAIN   ORDERED BY:  LAMONT GUTIERREZ     PROCEDURE DATE:  09/12/2023          INTERPRETATION:  CLINICAL INDICATION: Right frontal temporal glioblastoma   off chemotherapy, follow-up, mental status change    5mm axial sections of the brain were obtained from base to vertex,   without the intravenous administration of contrast material. Coronal and   sagittal computer generated reconstructed views are available.    Comparison is made with the prior CT of 9/11/2023 and the MRI of9/9/2023.    The examination is limited by head positioning.        Heterogeneous density is identified in the right frontal temporal region   with vasogenic edema consistent with neoplasm. There is mass effect on   the right lateral ventricle. Thereis no midline shift. There is no   change since the prior exam. There is no significant hemorrhage.      IMPRESSION: No change since 9/11/2023. Right frontal temporal neoplasm   with vasogenic edema and mass effect on the right lateral ventricle..    --- End of Report ---            JOSE ALFREDO ODONNELL MD; Attending Radiologist  This document has been electronically signed. Sep 12 2023  8:35PM    < end of copied text >      PROTEIN CALORIE MALNUTRITION PRESENT: [ ]mild [ ]moderate [ ]severe [ ]underweight [ ]morbid obesity  https://www.andeal.org/vault/8488/web/files/ONC/Table_Clinical%20Characteristics%20to%20Document%20Malnutrition-White%20JV%20et%20al%868797.pdf    Height (cm): 185.4 (09-07-23 @ 14:38), 185.4 (07-16-23 @ 06:59), 172.7 (07-04-23 @ 15:00)  Weight (kg): 113.4 (09-07-23 @ 14:38), 92 (07-20-23 @ 00:00), 81.1 (07-04-23 @ 15:00)  BMI (kg/m2): 33 (09-07-23 @ 14:38), 26.8 (07-20-23 @ 00:00), 23.6 (07-16-23 @ 06:59)    [ ]PPSV2 < or = to 30% [ ]significant weight loss  [ ]poor nutritional intake  [ ]anasarca[ ]Artificial Nutrition      Other REFERRALS:  [ ]Hospice  [ ]Child Life  [ ]Social Work  [ ]Case management [ ]Holistic Therapy     Goals of Care Document:

## 2023-09-13 NOTE — PROGRESS NOTE ADULT - PROBLEM SELECTOR PLAN 2
S/p trach w/ reversal and PEG since May.   -MRI head w/ and w/o IV Contrast completed, and noted " a substantial interval progression of the enhancing tumor involving the right cerebral hemisphere compared to the prior brain MRI study from 07/08/2023."  -Decadron 4mg IV BID  - s/p evaluation by neurologist Dr. Carias, no oncologic treatment recommended, rec: "big picture is that prognosis is grim and should pursue comfort care."  - palliative care consulted, f/u rec S/p trach w/ reversal and PEG since May.   -MRI head w/ and w/o IV Contrast completed, and noted " a substantial interval progression of the enhancing tumor involving the right cerebral hemisphere compared to the prior brain MRI study from 07/08/2023."  -Decadron 4mg IV BID  - s/p evaluation by neurologist Dr. Carias, no oncologic treatment recommended, rec: "big picture is that prognosis is grim and should pursue comfort care."  - palliative care consulted, d/w Dr. Norris, f/u rec

## 2023-09-13 NOTE — CONSULT NOTE ADULT - ASSESSMENT
Pt GINA WEINER from Home, Family called EMS reports Drinking Alcohol all day and having Chest pain non radiating. Pt denies pain right now HA dizziness NV palpitation. Pt poor historian  PMHX 5 stent 67M w/ history of glioblastoma, HTN, seizure disorder, A-fib on Xarelto, prior stroke w/ residual L-sided weakness, sent from nursing home for cough and altered mental status. Palliative consulted for GOC

## 2023-09-13 NOTE — PROGRESS NOTE ADULT - SUBJECTIVE AND OBJECTIVE BOX
PROGRESS NOTE:     Patient is a 67y old  Male who presents with a chief complaint of AMS, Worsening GBM w/ shift and Paraflu/ Enterovirus infection (12 Sep 2023 11:59)      SUBJECTIVE / OVERNIGHT EVENTS:  Patient seen and evaluated at bedside. Patient alert, denies headache/nausea/vomiting, denies chest pain or SOB.     ADDITIONAL REVIEW OF SYSTEMS:    MEDICATIONS  (STANDING):  albuterol/ipratropium for Nebulization 3 milliLiter(s) Nebulizer every 6 hours  allopurinol 100 milliGRAM(s) Oral daily  atorvastatin 20 milliGRAM(s) Oral at bedtime  brimonidine 0.2% Ophthalmic Solution 1 Drop(s) Both EYES two times a day  dexAMETHasone  Injectable 4 milliGRAM(s) IV Push every 12 hours  FLUoxetine 20 milliGRAM(s) Oral daily  lacosamide IVPB 200 milliGRAM(s) IV Intermittent every 12 hours  lactated ringers. 500 milliLiter(s) (100 mL/Hr) IV Continuous <Continuous>  levETIRAcetam  IVPB 1000 milliGRAM(s) IV Intermittent every 12 hours  losartan 50 milliGRAM(s) Oral daily  metoprolol tartrate 25 milliGRAM(s) Oral two times a day  pantoprazole  Injectable 40 milliGRAM(s) IV Push daily  timolol 0.25% Solution 1 Drop(s) Both EYES two times a day  valproic  acid Syrup 250 milliGRAM(s) Oral three times a day  vancomycin  IVPB 1500 milliGRAM(s) IV Intermittent every 12 hours  vancomycin  IVPB        MEDICATIONS  (PRN):  acetaminophen   Oral Liquid .. 650 milliGRAM(s) Oral every 6 hours PRN Temp greater or equal to 38C (100.4F), Mild Pain (1 - 3)  clonazePAM  Tablet 0.5 milliGRAM(s) Oral two times a day PRN For anxiety  guaiFENesin Oral Liquid (Sugar-Free) 200 milliGRAM(s) Oral every 6 hours PRN Cough  senna 2 Tablet(s) Oral at bedtime PRN for constipation      CAPILLARY BLOOD GLUCOSE      POCT Blood Glucose.: 111 mg/dL (13 Sep 2023 05:48)  POCT Blood Glucose.: 100 mg/dL (13 Sep 2023 00:16)  POCT Blood Glucose.: 164 mg/dL (12 Sep 2023 17:19)  POCT Blood Glucose.: 119 mg/dL (12 Sep 2023 13:13)    I&O's Summary    12 Sep 2023 07:01  -  13 Sep 2023 07:00  --------------------------------------------------------  IN: 0 mL / OUT: 800 mL / NET: -800 mL        PHYSICAL EXAM:  Vital Signs Last 24 Hrs  T(C): 36.3 (13 Sep 2023 05:03), Max: 36.7 (12 Sep 2023 17:28)  T(F): 97.3 (13 Sep 2023 05:03), Max: 98.1 (12 Sep 2023 17:28)  HR: 77 (13 Sep 2023 05:03) (77 - 98)  BP: 141/94 (13 Sep 2023 05:03) (132/83 - 143/88)  BP(mean): --  RR: 18 (13 Sep 2023 05:03) (18 - 18)  SpO2: 95% (13 Sep 2023 05:03) (95% - 98%)    Parameters below as of 13 Sep 2023 05:03  Patient On (Oxygen Delivery Method): room air        CONSTITUTIONAL: NAD  RESPIRATORY: Normal respiratory effort; lungs are clear to auscultation bilaterally  CARDIOVASCULAR: Regular rate and rhythm, normal S1 and S2, no murmur; No lower extremity edema.  ABDOMEN: Nondistended, soft, nontender to palpation, PEG incision site + erythema  PSYCH: A+O to person, place, and time    LABS:                        13.0   9.20  )-----------( 205      ( 13 Sep 2023 07:31 )             40.2     09-13    134<L>  |  103  |  16  ----------------------------<  67<L>  5.1   |  17<L>  |  0.33<L>    Ca    8.4      13 Sep 2023 07:31  Phos  3.0     09-12  Mg     2.3     09-12    TPro  5.2<L>  /  Alb  2.6<L>  /  TBili  0.2  /  DBili  x   /  AST  25  /  ALT  30  /  AlkPhos  57  09-12          Urinalysis Basic - ( 13 Sep 2023 07:31 )    Color: x / Appearance: x / SG: x / pH: x  Gluc: 67 mg/dL / Ketone: x  / Bili: x / Urobili: x   Blood: x / Protein: x / Nitrite: x   Leuk Esterase: x / RBC: x / WBC x   Sq Epi: x / Non Sq Epi: x / Bacteria: x          RADIOLOGY & ADDITIONAL TESTS:  < from: CT Head No Cont (09.12.23 @ 20:18) >  Heterogeneous density is identified in the right frontal temporal region   with vasogenic edema consistent with neoplasm. There is mass effect on   the right lateral ventricle. Thereis no midline shift. There is no   change since the prior exam. There is no significant hemorrhage.      IMPRESSION: No change since 9/11/2023. Right frontal temporal neoplasm   with vasogenic edema and mass effect on the right lateral ventricle..      < end of copied text >      COORDINATION OF CARE:  Care Discussed with Consultants/Other Providers [Y/N]:  Prior or Outpatient Records Reviewed [Y/N]:

## 2023-09-13 NOTE — PROGRESS NOTE ADULT - PROBLEM SELECTOR PLAN 5
Also enterovirus infection as well. Procalcitonin WNL  - given azithromycin at [facility] on 9/7  - completed 3 days of empiric azithromycin (9/7 - 9/9)  - acetaminophen and mucinex PRN  - Cont. supplemental 02 PRN  - Duo nebs Q6hrs

## 2023-09-14 NOTE — PROGRESS NOTE ADULT - NSPROGADDITIONALINFOA_GEN_ALL_CORE
Detail Level: Detailed D/w daughter Yumi, all questions answered   D/w ACP Libia Rebollar Detail Level: Generalized Detail Level: Simple

## 2023-09-14 NOTE — PROGRESS NOTE ADULT - PROBLEM SELECTOR PLAN 5
Also enterovirus infection as well. Procalcitonin WNL  - given azithromycin at [facility] on 9/7  - completed 3 days of empiric azithromycin (9/7 - 9/9)  - acetaminophen and mucinex PRN  - Cont. supplemental 02 PRN  - Duo nebs Q6hrs

## 2023-09-14 NOTE — PROGRESS NOTE ADULT - PROBLEM SELECTOR PLAN 3
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 No inflammation along the course of the gastrostomy tract. No abdominal wall collection.  -MRSA negative in July 2023  - s/p ancef (started on 9/9- 9/12  )--- 9/12 lethargic mental status, exudate and erythema at site, upgrade antbiotics to vancomycin (9/12-   ) , f/u BCx 9/12 NGTD, f/u lactate 2.8, f/u rectal temp 98 WNL, trend vanc trough, tentatively plan for 5 day course

## 2023-09-14 NOTE — PROGRESS NOTE ADULT - PROBLEM SELECTOR PLAN 6
-continue metoprolol tartrate 25mg BID  -s/p extensive chart review: a fib diagnosed in 2019, has been on xarelto since then. 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 Carias per above, resume xarelto

## 2023-09-14 NOTE — PROGRESS NOTE ADULT - PROBLEM SELECTOR PLAN 2
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   -MRI head w/ and w/o IV Contrast 9/9 completed, and noted " a substantial interval progression of the enhancing tumor involving the right cerebral hemisphere compared to the prior brain MRI study from 07/08/2023."  - Reviewed brain MRI with attending radiologist Dr. Ok Hurtado, noted " Increasing areas of decreased SWI signal are noted within the lesion likely reflecting evolving hemorrhagic products, calcifications, neovascularity, ora combination of these possibilities. " discussed with Dr. Hurtado, it is impossible to tell on the MRI if it is hemorrhage from tumor vs. calcification vs. neovascularity of tumor, and AC (xareltol) does increase the risk of bleeding from tumor, Dr. Hurtado recommend repeat CT head to further clarify   - repeat CT head noted " Additional small subcentimeter foci of hypoattenuation that are too small to characterize,   and may reflect additional calcifications however smallareas of hemorrhage cannot be entirely excluded."  - repeat CT head on 9/12, stable   - 9/14, spoke with neurologist Dr. Carias, and Dr. Carias reviewed brain MRI and CTs with radiologist, per her discussions with radiologists they think there is no intracranial hemorrhage, above finding more consistent with calcifications. Dr. Carias recommend re-initaite AC with xarelto, per Dr. Carias the risk of AC in this patient with GBM is acceptable --- thus xarelto resumed today 9/14   - Dr. Carias will also reevaluate patient tomorrow AM now that patient mental status improving, will f/u rec  -c/w Decadron 4mg IV BID  - palliative care on board

## 2023-09-14 NOTE — PROGRESS NOTE ADULT - SUBJECTIVE AND OBJECTIVE BOX
PROGRESS NOTE:     Patient is a 67y old  Male who presents with a chief complaint of AMS, Worsening GBM w/ shift and Paraflu/ Enterovirus infection (13 Sep 2023 14:02)      SUBJECTIVE / OVERNIGHT EVENTS:   Patient seen and evaluated at bedside. Patient denies chest pain/SOB/abdominal pain, reports he would like to move around more. Asked RN to reposition patient   ADDITIONAL REVIEW OF SYSTEMS:    MEDICATIONS  (STANDING):  albuterol/ipratropium for Nebulization 3 milliLiter(s) Nebulizer every 6 hours  allopurinol 100 milliGRAM(s) Oral daily  atorvastatin 20 milliGRAM(s) Oral at bedtime  brimonidine 0.2% Ophthalmic Solution 1 Drop(s) Both EYES two times a day  dexAMETHasone  Injectable 4 milliGRAM(s) IV Push every 12 hours  FLUoxetine 20 milliGRAM(s) Oral daily  lacosamide IVPB 200 milliGRAM(s) IV Intermittent every 12 hours  lactated ringers. 500 milliLiter(s) (100 mL/Hr) IV Continuous <Continuous>  levETIRAcetam  IVPB 1000 milliGRAM(s) IV Intermittent every 12 hours  losartan 50 milliGRAM(s) Oral daily  metoprolol tartrate 25 milliGRAM(s) Oral two times a day  pantoprazole  Injectable 40 milliGRAM(s) IV Push daily  rivaroxaban 20 milliGRAM(s) Enteral Tube with dinner  timolol 0.25% Solution 1 Drop(s) Both EYES two times a day  valproic  acid Syrup 250 milliGRAM(s) Oral three times a day  vancomycin  IVPB      vancomycin  IVPB 1500 milliGRAM(s) IV Intermittent every 12 hours    MEDICATIONS  (PRN):  acetaminophen   Oral Liquid .. 650 milliGRAM(s) Oral every 6 hours PRN Temp greater or equal to 38C (100.4F), Mild Pain (1 - 3)  clonazePAM  Tablet 0.5 milliGRAM(s) Oral two times a day PRN For anxiety  guaiFENesin Oral Liquid (Sugar-Free) 200 milliGRAM(s) Oral every 6 hours PRN Cough  senna 2 Tablet(s) Oral at bedtime PRN for constipation      CAPILLARY BLOOD GLUCOSE      POCT Blood Glucose.: 114 mg/dL (14 Sep 2023 12:24)  POCT Blood Glucose.: 99 mg/dL (14 Sep 2023 08:29)  POCT Blood Glucose.: 93 mg/dL (14 Sep 2023 07:18)  POCT Blood Glucose.: 114 mg/dL (14 Sep 2023 00:14)    I&O's Summary      PHYSICAL EXAM:  Vital Signs Last 24 Hrs  T(C): 36.4 (14 Sep 2023 05:00), Max: 36.9 (13 Sep 2023 20:30)  T(F): 97.6 (14 Sep 2023 05:00), Max: 98.4 (13 Sep 2023 20:30)  HR: 97 (14 Sep 2023 05:00) (95 - 111)  BP: 170/84 (14 Sep 2023 05:00) (111/77 - 170/84)  BP(mean): --  RR: 18 (14 Sep 2023 05:00) (18 - 18)  SpO2: 100% (14 Sep 2023 05:00) (97% - 100%)    Parameters below as of 14 Sep 2023 05:00  Patient On (Oxygen Delivery Method): room air        CONSTITUTIONAL: NAD  RESPIRATORY: Normal respiratory effort; lungs are clear to auscultation bilaterally  CARDIOVASCULAR: Regular rate and rhythm, no murmur; No lower extremity edema  ABDOMEN: Nontender to palpation, normoactive bowel sounds, no rebound/guarding, + PEG (erythema at incision site)  PSYCH: A+O to person, place, and time; affect appropriate    LABS:                        13.0   9.20  )-----------( 205      ( 13 Sep 2023 07:31 )             40.2     09-13    134<L>  |  103  |  16  ----------------------------<  67<L>  5.1   |  17<L>  |  0.33<L>    Ca    8.4      13 Sep 2023 07:31            Urinalysis Basic - ( 13 Sep 2023 07:31 )    Color: x / Appearance: x / SG: x / pH: x  Gluc: 67 mg/dL / Ketone: x  / Bili: x / Urobili: x   Blood: x / Protein: x / Nitrite: x   Leuk Esterase: x / RBC: x / WBC x   Sq Epi: x / Non Sq Epi: x / Bacteria: x        Culture - Blood (collected 12 Sep 2023 17:17)  Source: .Blood Blood-Venous  Preliminary Report (13 Sep 2023 22:01):    No growth at 24 hours        RADIOLOGY & ADDITIONAL TESTS:  < from: CT Head No Cont (09.12.23 @ 20:18) >  IMPRESSION: No change since 9/11/2023. Right frontal temporal neoplasm   with vasogenic edema and mass effect on the right lateral ventricle..    < end of copied text >      COORDINATION OF CARE:  Care Discussed with Consultants/Other Providers [Y/N]:  Prior or Outpatient Records Reviewed [Y/N]:

## 2023-09-14 NOTE — CHART NOTE - NSCHARTNOTEFT_GEN_A_CORE
Medicine PA Note    Informed by RN that pt with LUE and LLE swelling. Pt seen and assessed at bedside, in no acute distress. Daughter stating pt has had LUE/LLE swelling in the past and has received Lasix for management. Pt denies pain in LUE/LLE, difficulty with ROM, and offers no further complaints. Of note, pt was restarted on Xarelto for Afib today after discussion with neuro-onc.    Vital Signs Last 24 Hrs  T(C): 36.6 (09-14-23 @ 14:41), Max: 36.9 (09-13-23 @ 20:30)  HR: 92 (09-14-23 @ 14:41) (92 - 97)  BP: 162/108 (09-14-23 @ 14:41) (111/77 - 170/84)  RR: 18 (09-14-23 @ 14:41) (18 - 18)  SpO2: 97% (09-14-23 @ 14:41) (97% - 100%)    Physical exam:  CONSTITUTIONAL: Well groomed, no apparent distress  MSK: 3+ pitting edema in LUE and LLE, no edema noted in RUE/RLE, full ROM of all four extremities  SKIN: No rashes or erythema noted in LLE or LUE; no hematoma palpated in LLE/LUE, no change in temperature in any extremity noted    A&P  >LLE dopplers ordered urgently, r/o DVT  >LUE dopplers ordered urgently, r/o DVT  >Elevate PRN  >Discussed with daughter at bedside and Dr. Nils Rebollar, PA-C  [id number]

## 2023-09-14 NOTE — PROGRESS NOTE ADULT - PROBLEM SELECTOR PLAN 8
- DVT ppx: SCD, hold chemical DVT prophylaxis due to concern for hemorrhage from brain tumor   - GI ppx: protonix  - Diet: tube feeds, glucerna  - CODE status: DNR/DNI

## 2023-09-15 NOTE — SWALLOW BEDSIDE ASSESSMENT ADULT - ASR SWALLOW RECOMMEND DIAG
FEES
Pt is a candidate for FEES, however, pending candidacy post potential plan for chemo. Not a candidate for MBS as pt w/ reduced trunk support and has not been OOB to chair.

## 2023-09-15 NOTE — SWALLOW BEDSIDE ASSESSMENT ADULT - SLP GENERAL OBSERVATIONS
Prior to exam, ERNIE Soto reporting pt w/ bandages over trach site and +soft C collar to protect dressing; daughter via phone reported that soft C-collar was placed a few weeks ago given pt poor neck strength to maintain upright head position; both reported that it was NOT for any spinal injury indications. RN reporting requires minh lift and is not a candidate to sit in chair. Daughter reporting weakness, confusion, and dysphagia began ~2-3 weeks ago; pt was placed on nectar thick liquids at rehab due to coughing on thin liquids although she didn't notice coughing with nectar thick liquids or easy-to-chew foods. Pt received in bed, on room air, head leaning toward R shoulder and pt unable to shift to head neutral positioning, +soft C collar, +PEG. Pt Aox3, but with intermittent confusion/hallucination (claiming there is a telephone at foot of bed when there are no telephone and fixated on calling family), pt generally pleasant and cooperative with all directives, and verbally able to make all wants/needs known. Prior to exam, pt speaking at a very loud volume at the television, but when clinician entered room, pt began to whisper, claiming his voice was tired from yelling. Pt w/ sufficient vocal volume and clear vocal quality when cued to speak at normal volume, suspect primarily behavioral and vocal cord etiology. Pt is a somewhat unreliable historian, but reports no difficulty eating prior dysphagia diet, reports coughing at baseline without P.O., although no coughing noted during exam. Pt agreeable to potential FEES exam when procedure explained.
Pt received upright at bedside, slouched to L and head leaning slightly toward L side w/ reduced trunk support. No soft C-collar noted. On room air, Aox4, following all directives, verbally making all wants/needs known. Pt noted to attempt fast rate of speech, however, running out of breath for longer utterances and with slight increase in WOB. Per general observations last exam. No WOB at baseline and w/ adequate voicing w/ shorter utterances. 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

## 2023-09-15 NOTE — SWALLOW BEDSIDE ASSESSMENT ADULT - SPECIFY REASON(S)
to clinically assess swallow safety/function; r/o dysphagia.
to clinically assess swallow safety/function; r/o dysphagia.

## 2023-09-15 NOTE — PROGRESS NOTE ADULT - PROBLEM SELECTOR PLAN 7
- continue losartan 50mg daily  - continue metoprolol tartrate 25mg BID -continue metoprolol tartrate 25mg BID  -s/p extensive chart review: a fib diagnosed in 2019, has been on xarelto since then. 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

## 2023-09-15 NOTE — SWALLOW BEDSIDE ASSESSMENT ADULT - DIET PRIOR TO ADMI
Soft food and nectar thick liquids per daughter and pt
Soft food and nectar thick liquids per daughter and pt

## 2023-09-15 NOTE — PROGRESS NOTE ADULT - SUBJECTIVE AND OBJECTIVE BOX
Mr. Jalloh was seen in neuro oncologic follow up.  I had spoken to both his daughter Aretha as well as Medical Attending, Dr. Wray, who had noted that patient had improved cognitively since antibiotic was changed to Vancomycin.  I agree that he is more alert - today he is oriented to place, year, and medical situation.  Asks appropriate question re. tumor progression.    He appears slightly SOB although he denies.  He is attending more reliably to the left - still with left VF defect but much less neglect.  EOMI and left facial weakness.  Dense left hemiparesis.  Left UE and LE edema noted.    KPS at best 40.

## 2023-09-15 NOTE — PROGRESS NOTE ADULT - PROBLEM SELECTOR PLAN 2
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   -MRI head w/ and w/o IV Contrast 9/9 completed, and noted " a substantial interval progression of the enhancing tumor involving the right cerebral hemisphere compared to the prior brain MRI study from 07/08/2023."  - 9/15, spoke with neurologist/neurooncologist Dr. Carias--- now plan to initiate treatment with Avastin next week    -c/w Decadron 4mg IV BID

## 2023-09-15 NOTE — PROGRESS NOTE ADULT - PROBLEM SELECTOR PLAN 5
Also enterovirus infection as well. Procalcitonin WNL  - given azithromycin at [facility] on 9/7  - completed 3 days of empiric azithromycin (9/7 - 9/9)  - acetaminophen and mucinex PRN  - Cont. supplemental 02 PRN  - Duo nebs Q6hrs Med rec clarified   -Cont. IV Keppra 1000 BID  -Cont. IV Vimpat IVPB 200 BID  -Cont. Valproic acid 250 mg PO via PEG TID  -F/u neurology recommendations

## 2023-09-15 NOTE — PROGRESS NOTE ADULT - PROBLEM SELECTOR PLAN 3
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 No inflammation along the course of the gastrostomy tract. No abdominal wall collection.  -MRSA negative in July 2023  - s/p ancef (started on 9/9- 9/12  )--- 9/12 lethargic mental status, exudate and erythema at site, upgrade antbiotics to vancomycin (9/12-   ) , f/u BCx 9/12 NGTD, f/u lactate 2.8, f/u rectal temp 98 WNL, trend vanc trough, tentatively plan for 5 day course VA duplex noted DVT from the femoral vein through the external iliac vein  - c/w xarelto VA duplex noted DVT from the left side femoral vein through the external iliac vein; hemodynamically stable   - c/w xarelto 20 mg daily   - patient has been on xarelto since 2019 with A fib as indication home dose xarelto 20mg daily; xarelto was interrupted from 9/11- 9/13 due to concern for intracranial hemorrhage in the setting of advancing GBM  - per review of guideline, when first start xarelto for DVT , 15mg BID x21 days followed by 20mg daily; however in the setting of xarelto interruption there is no guideline regarding whether restart BID loading or continue with maintenance dosing, per  Zander individual clinical judgment is to be used in this scenario.  - Per review of literature, the rate of symptomatic bleeding into high grade gliomas (eg, glioblastoma) is estimated at approximately 1 to 12% in the absence of antithrombotic therapy. In observational studies, the rate of hemorrhage is several percentage points higher in patients receiving AC.   - in this patient with high burden of glioblastoma, reloading with xarelto 15mg BID may increase risk of intracranial bleeding substantially balancing against the risk of DVT   - another question is it is difficult to establish the timeline of onset of DVT, as per daughter patient had LLE swelling in the past , and on re-exam this morning no appreciable size difference between both lower extremities --- this DVT could have happened while patient was on xarelto outpatient.   - will consult hematology team in AM to discuss whether or not to switch to a different agent , such as subQ lovenox for AC , as patient potentially have failed xarelto, and subQ lovenox has added benefit in the setting of active malignancy.

## 2023-09-15 NOTE — PROGRESS NOTE ADULT - PROBLEM SELECTOR PLAN 6
-continue metoprolol tartrate 25mg BID  -s/p extensive chart review: a fib diagnosed in 2019, has been on xarelto since then. 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 Procalcitonin WNL  - given azithromycin at [facility] on 9/7  - completed 3 days of empiric azithromycin (9/7 - 9/9)  - acetaminophen and mucinex PRN  - Cont. supplemental 02 PRN  - Duo nebs Q6hrs

## 2023-09-15 NOTE — SWALLOW BEDSIDE ASSESSMENT ADULT - SWALLOW EVAL: SECRETION MANAGEMENT
No cough, no drooling or pooling of secretions
no pooling or drooling noted at baseline. No coughing prior to exam, however, prior to P.O. trials pt w/ prolonged episode of coughing up yellow/white phlegm w/ self-suctioning via [place] for 5 minutes. Friends in room reporting this hasn't happened all day while they were present. Coughing not replicated at any other time during or after P.O.

## 2023-09-15 NOTE — PROGRESS NOTE ADULT - SUBJECTIVE AND OBJECTIVE BOX
PROGRESS NOTE:     Patient is a 67y old  Male who presents with a chief complaint of AMS, Worsening GBM w/ shift and Paraflu/ Enterovirus infection (15 Sep 2023 09:30)      SUBJECTIVE / OVERNIGHT EVENTS:  Patient seen and evaluated at bedside. Patient alert, denies chest pain/SOB/abdominal pain, denies leg pain,  reports ample urine output.     ADDITIONAL REVIEW OF SYSTEMS:    MEDICATIONS  (STANDING):  albuterol/ipratropium for Nebulization 3 milliLiter(s) Nebulizer every 6 hours  allopurinol 100 milliGRAM(s) Oral daily  atorvastatin 20 milliGRAM(s) Oral at bedtime  brimonidine 0.2% Ophthalmic Solution 1 Drop(s) Both EYES two times a day  dexAMETHasone  Injectable 4 milliGRAM(s) IV Push every 12 hours  FLUoxetine 20 milliGRAM(s) Oral daily  lacosamide IVPB 200 milliGRAM(s) IV Intermittent every 12 hours  lactated ringers. 500 milliLiter(s) (100 mL/Hr) IV Continuous <Continuous>  levETIRAcetam  IVPB 1000 milliGRAM(s) IV Intermittent every 12 hours  losartan 50 milliGRAM(s) Oral daily  metoprolol tartrate 25 milliGRAM(s) Oral two times a day  pantoprazole  Injectable 40 milliGRAM(s) IV Push daily  rivaroxaban 20 milliGRAM(s) Enteral Tube with dinner  timolol 0.25% Solution 1 Drop(s) Both EYES two times a day  valproic  acid Syrup 250 milliGRAM(s) Oral three times a day  vancomycin  IVPB      vancomycin  IVPB 1500 milliGRAM(s) IV Intermittent every 12 hours    MEDICATIONS  (PRN):  acetaminophen   Oral Liquid .. 650 milliGRAM(s) Oral every 6 hours PRN Temp greater or equal to 38C (100.4F), Mild Pain (1 - 3)  clonazePAM  Tablet 0.5 milliGRAM(s) Oral two times a day PRN For anxiety  guaiFENesin Oral Liquid (Sugar-Free) 200 milliGRAM(s) Oral every 6 hours PRN Cough  senna 2 Tablet(s) Oral at bedtime PRN for constipation      CAPILLARY BLOOD GLUCOSE      POCT Blood Glucose.: 135 mg/dL (15 Sep 2023 06:26)  POCT Blood Glucose.: 157 mg/dL (14 Sep 2023 18:02)  POCT Blood Glucose.: 114 mg/dL (14 Sep 2023 12:24)    I&O's Summary      PHYSICAL EXAM:  Vital Signs Last 24 Hrs  T(C): 36.3 (15 Sep 2023 04:25), Max: 36.7 (14 Sep 2023 21:05)  T(F): 97.3 (15 Sep 2023 04:25), Max: 98.1 (14 Sep 2023 21:05)  HR: 92 (15 Sep 2023 04:25) (80 - 92)  BP: 132/92 (15 Sep 2023 04:25) (125/80 - 162/108)  BP(mean): --  RR: 18 (15 Sep 2023 04:25) (18 - 18)  SpO2: 98% (15 Sep 2023 04:25) (94% - 98%)    Parameters below as of 15 Sep 2023 04:25  Patient On (Oxygen Delivery Method): room air        CONSTITUTIONAL: NAD  RESPIRATORY: Normal respiratory effort; lungs are clear to auscultation bilaterally  CARDIOVASCULAR: Regular rate and rhythm, normal S1 and S2, no murmur  ABDOMEN: Nontender to palpation, normoactive bowel sounds, no rebound/guarding; + PEG tube incision site surrounding erythema + debris   SKIN: LUE edema, bilateral lower extremities symmetric in size and nontender to palpation   NEURO: LUE and LLE 0 out of 5 in strength   PSYCH: A+O to person, place, and time; affect appropriate    LABS:                        11.8   8.02  )-----------( 214      ( 15 Sep 2023 07:26 )             35.3     09-15    142  |  101  |  17  ----------------------------<  100<H>  3.5   |  27  |  0.43<L>    Ca    8.2<L>      15 Sep 2023 07:24            Urinalysis Basic - ( 15 Sep 2023 07:24 )    Color: x / Appearance: x / SG: x / pH: x  Gluc: 100 mg/dL / Ketone: x  / Bili: x / Urobili: x   Blood: x / Protein: x / Nitrite: x   Leuk Esterase: x / RBC: x / WBC x   Sq Epi: x / Non Sq Epi: x / Bacteria: x        Culture - Blood (collected 12 Sep 2023 17:17)  Source: .Blood Blood-Venous  Preliminary Report (14 Sep 2023 22:01):    No growth at 48 Hours        RADIOLOGY & ADDITIONAL TESTS:  Results Reviewed:   Imaging Personally Reviewed:  Electrocardiogram Personally Reviewed:    COORDINATION OF CARE:  Care Discussed with Consultants/Other Providers [Y/N]:  Prior or Outpatient Records Reviewed [Y/N]:   PROGRESS NOTE:     Patient is a 67y old  Male who presents with a chief complaint of AMS, Worsening GBM w/ shift and Paraflu/ Enterovirus infection (15 Sep 2023 09:30)      SUBJECTIVE / OVERNIGHT EVENTS:  Patient seen and evaluated at bedside. Patient alert, denies chest pain/SOB/abdominal pain, denies leg pain,  reports ample urine output.     ADDITIONAL REVIEW OF SYSTEMS:    MEDICATIONS  (STANDING):  albuterol/ipratropium for Nebulization 3 milliLiter(s) Nebulizer every 6 hours  allopurinol 100 milliGRAM(s) Oral daily  atorvastatin 20 milliGRAM(s) Oral at bedtime  brimonidine 0.2% Ophthalmic Solution 1 Drop(s) Both EYES two times a day  dexAMETHasone  Injectable 4 milliGRAM(s) IV Push every 12 hours  FLUoxetine 20 milliGRAM(s) Oral daily  lacosamide IVPB 200 milliGRAM(s) IV Intermittent every 12 hours  lactated ringers. 500 milliLiter(s) (100 mL/Hr) IV Continuous <Continuous>  levETIRAcetam  IVPB 1000 milliGRAM(s) IV Intermittent every 12 hours  losartan 50 milliGRAM(s) Oral daily  metoprolol tartrate 25 milliGRAM(s) Oral two times a day  pantoprazole  Injectable 40 milliGRAM(s) IV Push daily  rivaroxaban 20 milliGRAM(s) Enteral Tube with dinner  timolol 0.25% Solution 1 Drop(s) Both EYES two times a day  valproic  acid Syrup 250 milliGRAM(s) Oral three times a day  vancomycin  IVPB      vancomycin  IVPB 1500 milliGRAM(s) IV Intermittent every 12 hours    MEDICATIONS  (PRN):  acetaminophen   Oral Liquid .. 650 milliGRAM(s) Oral every 6 hours PRN Temp greater or equal to 38C (100.4F), Mild Pain (1 - 3)  clonazePAM  Tablet 0.5 milliGRAM(s) Oral two times a day PRN For anxiety  guaiFENesin Oral Liquid (Sugar-Free) 200 milliGRAM(s) Oral every 6 hours PRN Cough  senna 2 Tablet(s) Oral at bedtime PRN for constipation      CAPILLARY BLOOD GLUCOSE      POCT Blood Glucose.: 135 mg/dL (15 Sep 2023 06:26)  POCT Blood Glucose.: 157 mg/dL (14 Sep 2023 18:02)  POCT Blood Glucose.: 114 mg/dL (14 Sep 2023 12:24)    I&O's Summary      PHYSICAL EXAM:  Vital Signs Last 24 Hrs  T(C): 36.3 (15 Sep 2023 04:25), Max: 36.7 (14 Sep 2023 21:05)  T(F): 97.3 (15 Sep 2023 04:25), Max: 98.1 (14 Sep 2023 21:05)  HR: 92 (15 Sep 2023 04:25) (80 - 92)  BP: 132/92 (15 Sep 2023 04:25) (125/80 - 162/108)  BP(mean): --  RR: 18 (15 Sep 2023 04:25) (18 - 18)  SpO2: 98% (15 Sep 2023 04:25) (94% - 98%)    Parameters below as of 15 Sep 2023 04:25  Patient On (Oxygen Delivery Method): room air        CONSTITUTIONAL: NAD  RESPIRATORY: Normal respiratory effort; lungs are clear to auscultation bilaterally  CARDIOVASCULAR: Regular rate and rhythm, normal S1 and S2, no murmur  ABDOMEN: Nontender to palpation, normoactive bowel sounds, no rebound/guarding; + PEG tube incision site surrounding erythema + debris   SKIN: LUE edema, bilateral lower extremities symmetric in size and nontender to palpation   NEURO: LUE and LLE 0 out of 5 in strength   PSYCH: A+O to person, place, and time; affect appropriate    LABS:                        11.8   8.02  )-----------( 214      ( 15 Sep 2023 07:26 )             35.3     09-15    142  |  101  |  17  ----------------------------<  100<H>  3.5   |  27  |  0.43<L>    Ca    8.2<L>      15 Sep 2023 07:24            Urinalysis Basic - ( 15 Sep 2023 07:24 )    Color: x / Appearance: x / SG: x / pH: x  Gluc: 100 mg/dL / Ketone: x  / Bili: x / Urobili: x   Blood: x / Protein: x / Nitrite: x   Leuk Esterase: x / RBC: x / WBC x   Sq Epi: x / Non Sq Epi: x / Bacteria: x        Culture - Blood (collected 12 Sep 2023 17:17)  Source: .Blood Blood-Venous  Preliminary Report (14 Sep 2023 22:01):    No growth at 48 Hours        RADIOLOGY & ADDITIONAL TESTS:  < from: [facility] Duplex Lower Ext Vein Scan, Left (09.15.23 @ 10:15) >  FINDINGS:    Nonocclusive thrombus is present in the left external iliac, common   femoral, deep femoral and femoral vein in the upper thigh. Incomplete   compression of these vein segments is noted. There is normal   compressibility of the left popliteal veins.  The contralateral common femoral vein is patent.  Doppler examination shows normal spontaneous and phasic flow.    No calf vein thrombosis is detected.    IMPRESSION:  Acute above the knee DVT from the femoral vein through the external iliac     < end of copied text >      COORDINATION OF CARE:  Care Discussed with Consultants/Other Providers [Y/N]:  Prior or Outpatient Records Reviewed [Y/N]:

## 2023-09-15 NOTE — SWALLOW BEDSIDE ASSESSMENT ADULT - SWALLOW EVAL: RECOMMENDED DIET
NPO; Continue with PEG as primary means of nutrition/hydration/medication
NPO, with non-oral nutrition/hydration/medications.

## 2023-09-15 NOTE — PROGRESS NOTE ADULT - PROBLEM SELECTOR PLAN 4
Med rec clarified   -Cont. IV Keppra 1000 BID  -Cont. IV Vimpat IVPB 200 BID  -Cont. Valproic acid 250 mg PO via PEG TID  -F/u neurology recommendations exudative cellulitis at PEG incision site   - PEG tube is working well  - s/p CT A/P Percutaneous gastrostomy tube is in satisfactory position. No inflammation along the course of the gastrostomy tract. No abdominal wall collection.  -MRSA negative in July 2023  - s/p ancef (started on 9/9- 9/12  )--- 9/12 lethargic mental status, exudate and erythema at site, upgrade antbiotics to vancomycin (9/12-   ) , f/u BCx 9/12 NGTD, f/u lactate 2.8, f/u rectal temp 98 WNL, trend vanc trough, tentatively plan for 5 day course

## 2023-09-15 NOTE — SWALLOW BEDSIDE ASSESSMENT ADULT - SLP PERTINENT HISTORY OF CURRENT PROBLEM
67M w/ history of glioblastoma, HTN, seizure disorder, A-fib on Xarelto, prior stroke w/ residual L-sided weakness, sent from nursing home for worsening cough, worsening L sided weakness, and acute encephalopathy found to be enterovirus positive, rhinovirus positive and have worsening GBM w/ edema and midline shift.  Pt "states he wants speech/ swallow study.As per daughter, has been tolerating some PO and thickened liquids at facility. Asks about removing PEG tube which I advised against doing in the near term."
67M w/ history of glioblastoma, HTN, seizure disorder, A-fib on Xarelto, prior stroke w/ residual L-sided weakness, sent from nursing home for worsening cough, worsening L sided weakness, and acute encephalopathy found to be enterovirus positive, rhinovirus positive and have worsening GBM w/ edema and midline shift.  Pt "states he wants speech/ swallow study.As per daughter, has been tolerating some PO and thickened liquids at facility. Asks about removing PEG tube which I advised against doing in the near term."

## 2023-09-15 NOTE — SWALLOW BEDSIDE ASSESSMENT ADULT - ASR SWALLOW ASPIRATION MONITOR
change of breathing pattern/oral hygiene/cough/gurgly voice/fever/pneumonia/throat clearing/upper respiratory infection
change of breathing pattern/cough/gurgly voice/fever/pneumonia/throat clearing/upper respiratory infection

## 2023-09-15 NOTE — PROGRESS NOTE ADULT - PROBLEM SELECTOR PLAN 8
- GI ppx: protonix  - Diet: tube feeds, glucerna--- S & S reevaluation requested   - CODE status: DNR/DNI - continue losartan 50mg daily  - continue metoprolol tartrate 25mg BID

## 2023-09-15 NOTE — SWALLOW BEDSIDE ASSESSMENT ADULT - COMMENTS
9/9 initial bedside swallow exam completed. Recs for NPO and alternative means. pending possible objective swallow testing. Pt w/ soft C collar for head support per wife, no spinal injury. Since then, GOC conversation were for possible home-hospice and testing deferred.  9/14 LUE and LLE swelling, dopplers revealed acute above the knee DVT from the femoral vein through the external iliac vein in the upper left thigh.    Neuro following for worsening vasogenic edema of known GBM. Neurology IMPRESSION: Left hemiplegia likely 2/2 increased vasogenic edema of Right brain w/ midline shift. Hx of GBM: diagnosed 8/2022 w/ GBM WHO 4. Hx of chemo tx. hx of GBM related seizures w/ trach and PEG placement during May 2023 hospital course due to prolonged ventilation requirement. decannulated 6/6/2023. This admission, neuro-onc initially recommended palliative care for home hospice and no oncologic tx, however, then on 9/15, reinitiated discussions for possible chemo w/ daughter/pt.    dietary> pt has not used enteral feeds in weeks. Pt with Hx of swallowing issues; on chopped foods and thickened liquids PTA per pt.    CTH 9/7 IMPRESSION: Area of hypodensity in the right frontal parietal region with extensive surrounding vasogenic edema, overall increased in size compared to prior exam. These findings raise suspicion for tumor progression. New right-to-left midline shift measuring 7 mm. Consider MRI brain with and without contrast for further evaluation if clinically warranted.  CXR: No focal consolidations.    Pt known to dysphagia services at [facility] and [facility] since July 2023. Pt did have PEG present at that time. MBS at [facility] on July 10, 2023, recommended easy-to-chew solids w/ thin liquids in addition to PEG feeds to ensure adequate caloric intake. Pt seen for most recent bedside swallow exam at [facility] on July 20, 2023 with recommendations for regular texture solids and thin liquids. Additional and advanced trials deferred at this time

## 2023-09-16 NOTE — CONSULT NOTE ADULT - ASSESSMENT
67M DNR Hx GBM s/p rsxn w/ you 08/31/2022. Adm medicine from nursing home for encephalopathy. Has LE DVT above the knee. This occurred while pt on xarelto 20 daily, d/c’d since 9/8. Starting Avastin Monday. Primary inquiring re: therapeutic AC. CTH 9/12/23 shows significant interval progression c/t 09/2022, w/ calcification ([place] ) and vasogenic edema. Exam: AOx3. Confused/hallucinating others are in the room. Min-moderately verbal. Complete Lt facial. Dysarthric. CNs otherwise grossly intact. RUE and RLE 5/5. LUE and LLE 0/5. SILT.  - Primary team requested a full consult to discuss surgical options with the pt and daughter.   Pt/family express that their GOC are to increase his quality of life in the time he has left.  -given this is a recurrent GBM, and given that surgery represents a high risk of significant morbidity, the risks and benefits were discussed with the patient and his daughter, who expressed that such a surgery would not be within their GOC.  -no acute neurosurgery intervention  -med mgmt per neuro onc

## 2023-09-16 NOTE — PROGRESS NOTE ADULT - PROBLEM SELECTOR PLAN 3
VA duplex noted DVT from the left side femoral vein through the external iliac vein; hemodynamically stable  - s/p hematology team evaluation, extensive discussion with Dr. Gray, recommend change AC from DOAC to subQ lovenox 1mg /kg BID, repeat CT head, f/u antiphospholid syndrome workup   - s/p neurosurgery evaluation, extensive discussion with Dr. Benson, neurosurgery team reviewed CT head, MRI head, no clinically significant ICH noted, no absolute contraindication to AC, though noted high risk of AC in setting of advanced GBM  - discussed with daughter Yumi, reviewed risks/benefits of AC, agree with  AC with subQ lovenox   - c/w neurocheck q4h, f/u repeat CT head

## 2023-09-16 NOTE — CONSULT NOTE ADULT - CONSULT REASON
Problem: Falls - Risk of  Goal: *Absence of Falls  Document Jose Fall Risk and appropriate interventions in the flowsheet. Outcome: Progressing Towards Goal  Fall Risk Interventions:  Mobility Interventions: Assess mobility with egress test, Communicate number of staff needed for ambulation/transfer, Patient to call before getting OOB, PT Consult for assist device competence           Medication Interventions: Patient to call before getting OOB     Elimination Interventions: Call light in reach, Patient to call for help with toileting needs, Toilet paper/wipes in reach, Toileting schedule/hourly rounds, Urinal in reach     History of Falls Interventions: Door open when patient unattended, Room close to nurse's station           Problem: Patient Education: Go to Patient Education Activity  Goal: Patient/Family Education  Outcome: Progressing Towards Goal  Call bell within reach, siderails upx3, walker & urinal next to patient. DVT with concern for failure of Eliquis DVT with concern for failure of Xarelto

## 2023-09-16 NOTE — CONSULT NOTE ADULT - ATTENDING COMMENTS
history of GBM s/p resection and chemoradiation  admitted for AMS  MRI head with progression of disease  left LE with extensive DVT with above mentioned recommendation  please ask neurosurg and rad onc for consultation of local management
HPI as per resident note, personally verified by me. Patient with worsening L sided weakness to plegia in setting of known R frontal GBM with vasogenic edema as well as current parainfluenza and enterovirus infections. No abnormal movements noted or other focal neurologic deficits. He is a patient of neuro-oncologist Dr. Janette Carias.    Neurologic exam as per resident note with additions as below:  AAO x2.5 (9/2023 but not rest of date), speech with mild dysarthria but otherwise fluent  CN's II-XII intact except for L facial weakness  Strength RUE 4/5, RLE 2+/5, L side 0/5 all  Sens intact all  FtN intact on RUE with postural tremor and could not assess LUE due to weakness  Downgoing R and neutral L plantar response    Albumin dec 3.1, LFT's WNL  UA (-)    A/P:  L sided weakness  R frontal GBM with vasogenic edema  Epilepsy, not intractable, without status epilepticus  Atrial fibrillation on Xarelto  HTN  Parainfluenza 3 infection  Enterovirus infection    - Etiology for L sided worsening weakness is most consistent with increased vasogenic edema with midline shift coupled with toxic/metabolic factors with acute systemic infections. No abnormal movements noted to suggest current seizure. Less likely acute cerebrovascular process but will exclude as per Dr. Carias's input  - MRI brain w/ and w/o to assess for above  - Continue dexamethasone 4mg PO/IV BID  - Continue AED's with lacosamide 200mg IV/PO BID, Keppra 1g IV/PO BID, and VPA 250mg PO/IV TID  - Check VPA trough (30-60 minutes before dose) level  - Continue to address above medical issues, as you are doing  - Will continue to follow patient with you

## 2023-09-16 NOTE — CONSULT NOTE ADULT - SUBJECTIVE AND OBJECTIVE BOX
p ([id number])     HPI:  67M DNR Hx GBM s/p rsxn w/ you 08/31/2022. Adm medicine from nursing home for encephalopathy. Has LE DVT above the knee. This occurred while pt on xarelto 20 daily, d/c’d since 9/8. Starting Avastin Monday. Primary inquiring re: therapeutic AC. CTH 9/12/23 shows significant interval progression c/t 09/2022, w/ calcification ([place] ) and vasogenic edema. Exam: AOx3. Confused/hallucinating others are in the room. Min-moderately verbal. Complete Lt facial. Dysarthric. CNs otherwise grossly intact. RUE and RLE 5/5. LUE and LLE 0/5. SILT.  - Primary team requested a full consult to discuss surgical options with the pt and daughter.     --Anticoagulation:  enoxaparin Injectable 110 milliGRAM(s) SubCutaneous every 12 hours    =====================  PAST MEDICAL HISTORY   Prostate ca    Hypertension    Gout    Atrial fibrillation    Splenic infarction    2019 novel coronavirus disease (COVID-19)    GERD (gastroesophageal reflux disease)      PAST SURGICAL HISTORY   No significant past surgical history    H/O prostatectomy    H/O arthroscopy of right knee    H/O colonoscopy    H/O endoscopy    Elective surgery      No Known Allergies      MEDICATIONS:  Antibiotics:  vancomycin  IVPB      vancomycin  IVPB 1500 milliGRAM(s) IV Intermittent every 12 hours    Neuro:  acetaminophen   Oral Liquid .. 650 milliGRAM(s) Oral every 6 hours PRN  FLUoxetine 20 milliGRAM(s) Oral daily  lacosamide IVPB 200 milliGRAM(s) IV Intermittent every 12 hours  levETIRAcetam  IVPB 1000 milliGRAM(s) IV Intermittent every 12 hours  valproic  acid Syrup 250 milliGRAM(s) Oral three times a day    Other:  albuterol/ipratropium for Nebulization 3 milliLiter(s) Nebulizer every 6 hours  allopurinol 100 milliGRAM(s) Oral daily  atorvastatin 20 milliGRAM(s) Oral at bedtime  dexAMETHasone  Injectable 4 milliGRAM(s) IV Push every 12 hours  guaiFENesin Oral Liquid (Sugar-Free) 200 milliGRAM(s) Oral every 6 hours PRN  losartan 50 milliGRAM(s) Oral daily  metoprolol tartrate 25 milliGRAM(s) Oral two times a day  pantoprazole  Injectable 40 milliGRAM(s) IV Push daily  senna 2 Tablet(s) Oral at bedtime PRN      SOCIAL HISTORY:   Occupation:   Marital Status:     FAMILY HISTORY:  No pertinent family history in first degree relatives    Family history of diabetes mellitus (DM) (Mother)    Family history of TIAs (Mother)    Family hx of prostate cancer (Father)    Family history of bone cancer (Father)    Family history of appendicitis        ROS: Negative except per HPI    LABS:                          12.4   10.48 )-----------( 227      ( 16 Sep 2023 07:10 )             37.7     09-16    139  |  99  |  14  ----------------------------<  107<H>  3.3<L>   |  26  |  0.43<L>    Ca    8.5      16 Sep 2023 07:08

## 2023-09-16 NOTE — PROGRESS NOTE ADULT - PROBLEM SELECTOR PLAN 2
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   -MRI head w/ and w/o IV Contrast 9/9 completed, and noted " a substantial interval progression of the enhancing tumor involving the right cerebral hemisphere compared to the prior brain MRI study from 07/08/2023."   -c/w Decadron 4mg IV BID  -9/16 s/p discussion with patient and daughter, interested in disease modifying treatment   - neurosurgery team consulted regarding surgical options  - will consult radiation oncology team, f/u rec  - neurooncology Dr. Carias following, appreciate recs

## 2023-09-16 NOTE — PROGRESS NOTE ADULT - PROBLEM SELECTOR PLAN 6
Also enterovirus infection as well. Procalcitonin WNL  - given azithromycin at [facility] on 9/7  - completed 3 days of empiric azithromycin (9/7 - 9/9)  - acetaminophen and mucinex PRN  - Cont. supplemental 02 PRN  - Duo nebs Q6hrs

## 2023-09-16 NOTE — CONSULT NOTE ADULT - ASSESSMENT
67M w/ history of glioblastoma c/b seizures, A-fib on Xarelto, prior stroke w/ residual L-sided weakness who is brought in from nursing home for cough and acute encephalopathy found to have worsening GBM with edema and midline shift, and a lower extremity DVT. Hematology consulted for anticoagulation recommendations.    #DVT  VA duplex noted DVT from the left side femoral vein through the external iliac vein while patient was on xarelto 20 mg daily (since 2019 for a fib, with an interruption in dosing from 9/11-9/13) given concern for intracranial hemorrhage  - Given that GBM has a high rate of intracranial hemorrhage, would recommend against a loading dose of a DOAC  - Also, given the extent of DVT and the timing, unclear if this is a true failure of Xarelto.  - Please check APLS labs (anticardiolipin, beta-2 glycoprotein, and LA)  - If APLS labs are positive, would recommend Coumadin with a goal INR of 2-3. If APLS labs are negative, Lovenox 1 mg/kg BID would be ideal for a malignancy associated thrombus while on prophylactic Xarelto. However, if patient is electing to pursue hospice, would recommend continuing Xarleto to minimize discomfort from injections.    #Glioblastoma Multiforme  - Pt follows Dr. Carias  - Per chart review, patient is not a candidate for further treatment and was recommended for hospice.  - While family, is in agreement with plan for hospice, patient told me he was "approved for more treatment."  - Appreciate Neuro-oncology, Medicine, Palliative Care and hospice input.    #Debility  - PPSV2 of 30%  - ECOG 4    Attending recommendations to follow. 67M w/ history of glioblastoma c/b seizures, A-fib on Xarelto, prior stroke w/ residual L-sided weakness who is brought in from nursing home for cough and acute encephalopathy found to have worsening GBM with edema and midline shift, and a lower extremity DVT. Hematology consulted for anticoagulation recommendations.    #DVT  VA duplex noted DVT from the left side femoral vein through the external iliac vein while patient was on xarelto 20 mg daily (since 2019 for a fib, with an interruption in dosing from 9/11-9/13) given concern for intracranial hemorrhage  - Given that GBM has a high rate of intracranial hemorrhage, would recommend against a loading dose of a DOAC  - Also, given the extent of DVT and the timing, unclear if this is a true failure of Xarelto.  - Please check APLS labs (anticardiolipin, beta-2 glycoprotein, and LA)  - Neurosurgery consult given high risk for ICH.  - If APLS labs are positive, would recommend Coumadin with a goal INR of 2-3. If APLS labs are negative, Lovenox 1 mg/kg BID would be ideal for a malignancy associated thrombus while on prophylactic Xarelto. However, if patient is electing to pursue hospice, would recommend continuing Xarleto to minimize discomfort from injections.    #Glioblastoma Multiforme  - Pt follows Dr. Carias  - Per chart review, patient is not a candidate for further treatment and was recommended for hospice.  - While family, is in agreement with plan for hospice, patient told me he was "approved for more treatment."  - Appreciate Neuro-oncology, Medicine, Palliative Care and hospice input.    #Debility  - PPSV2 of 30%  - ECOG 4    Attending recommendations to follow.    Elodia Gray M.D.  Hematology and Medical Oncology Fellow  Pager: [phone]  For weekends and evenings (5 pm - 8 am), please page Heme/Onc fellow on call. 67M w/ history of glioblastoma c/b seizures, A-fib on Xarelto, prior stroke w/ residual L-sided weakness who is brought in from nursing home for cough and acute encephalopathy found to have worsening GBM with edema and midline shift, and a lower extremity DVT. Hematology consulted for anticoagulation recommendations.    #DVT  VA duplex noted DVT from the left side femoral vein through the external iliac vein while patient was on xarelto 20 mg daily (since 2019 for a fib, with an interruption in dosing from 9/11-9/13) given concern for intracranial hemorrhage  - Given that GBM has a high rate of intracranial hemorrhage, would recommend against a loading dose of a DOAC  - Also, given the extent of DVT and the timing, unclear if this is a true failure of Xarelto.  - Please check APLS labs (anticardiolipin, beta-2 glycoprotein, and LA)  - Neurosurgery consult given high risk for ICH.  - If APLS labs are positive, would recommend Coumadin with a goal INR of 2-3. If APLS labs are negative, Lovenox 1 mg/kg BID would be ideal for a malignancy associated thrombus while on prophylactic Xarelto. However, if patient is electing to pursue hospice, would recommend continuing Xarleto to minimize discomfort from injections.    #Glioblastoma Multiforme  Pt follows Dr. Carias (Neuro-oncologist). He was initially diagnosed in August 2022 with a high grade glioblastoma. He underwent a craniotomy in August 2022 with Dr. Chaney. He subsequently was treated with adjuvant chemoRT with Temdar for 5 cycles with Avastin added for cycles 4 and 5 and RT and  30 fractions for a total of 6000 cgy on 12/1/2023 to the right brain.  ? Per chart review, patient is not a candidate for further treatment and was recommended for hospice.  - While family, is in agreement with plan for hospice, patient told me he was "approved for more treatment."  - Appreciate Neuro-oncology, Medicine, Palliative Care and hospice input.    #Debility  - PPSV2 of 30%  - ECOG 4    Attending recommendations to follow.    Elodia Gray M.D.  Hematology and Medical Oncology Fellow  Pager: [phone]  For weekends and evenings (5 pm - 8 am), please page Heme/Onc fellow on call. 67M w/ history of glioblastoma c/b seizures, A-fib on Xarelto, prior stroke w/ residual L-sided weakness who is brought in from nursing home for cough and acute encephalopathy found to have worsening GBM with edema and midline shift, and a lower extremity DVT. Hematology consulted for anticoagulation recommendations.    #DVT  VA duplex noted DVT from the left side femoral vein through the external iliac vein while patient was on xarelto 20 mg daily (since 2019 for a fib, with an interruption in dosing from 9/11-9/13) given concern for intracranial hemorrhage  - Given that GBM has a high rate of intracranial hemorrhage, would recommend against a loading dose of a DOAC  - Also, given the extent of DVT and the timing, unclear if this is a true failure of Xarelto.  - Please check APLS labs (anticardiolipin, beta-2 glycoprotein, and LA)  - Neurosurgery consult given high risk for ICH.  - If APLS labs are positive, would recommend Coumadin with a goal INR of 2-3. If APLS labs are negative, Lovenox 1 mg/kg BID would be ideal for a malignancy associated thrombus while on prophylactic Xarelto. Please get a repeat CT head 24 hrs after starting full dose AC.    #Glioblastoma Multiforme  Pt follows Dr. Carias (Neuro-oncologist). He was initially diagnosed in August 2022 with a high grade glioblastoma. He underwent a craniotomy in August 2022 with Dr. Chaney. He subsequently was treated with adjuvant chemoRT with Temdar for 5 cycles with Avastin added for cycles 4 and 5 and RT and  30 fractions for a total of 6000 cgy on 12/1/2023 to the right brain.  - Per chart review, patient is not a candidate for further treatment and was recommended for hospice. However, after discussion with Dr. Wray, plan is to start Avastin on Monday.  - Please consult Radiation Oncology and Neurosurgery given recent progression of disease.    #Debility  - PPSV2 of 30%  - ECOG 4    Case d/w Dr. Mcdonald.    Elodia Gray M.D.  Hematology and Medical Oncology Fellow  Pager: [phone]  For weekends and evenings (5 pm - 8 am), please page Heme/Onc fellow on call.

## 2023-09-16 NOTE — CHART NOTE - NSCHARTNOTEFT_GEN_A_CORE
Chart and imaging reviewed.     While there is no absolute neurosurgical contraindication to systemic anti coagulation, there does exist an increased risk of intracranial hemorrhage which can result in significant morbidity including but not limited to headache, seizure, stroke, paralysis, and in severe cases even death.    The risks and benefits of starting systemic anticoagulation must be considered carefully in the setting of the patients medical history and current clinical condition.    If AC is started...  - Recommend NO bolus with low ptt goal of ~60-65  - Recommend obtaining follow up CTH after patient is therapeutic and to notify neurosurgery immediately/obtain stat CTH with any changes in the patients neurologic exam. Chart and imaging reviewed.     While there is no absolute neurosurgical contraindication to systemic anti coagulation, there does exist an increased risk of intracranial hemorrhage which can result in significant morbidity including but not limited to headache, seizure, stroke, paralysis, and in severe cases even death.    The risks and benefits of starting systemic anticoagulation must be considered carefully in the setting of the patients medical history and current clinical condition.    If AC is started...  - Recommend NO bolus with low ptt goal of ~60-65  - Recommend obtaining follow up CTH after patient is therapeutic and to notify neurosurgery immediately/obtain stat CTH with any changes in the patients neurologic exam.    -Would also recc consideration of IVCF with IR in lieu of therapeutic AC.

## 2023-09-16 NOTE — CONSULT NOTE ADULT - SUBJECTIVE AND OBJECTIVE BOX
HEMATOLOGY ONCOLOGY CONSULT     Patient is a 67y old  Male who presents with a chief complaint of AMS, Worsening GBM w/ shift and Paraflu/ Enterovirus infection (15 Sep 2023 11:14)      HPI:  Spoke to daughter who was unable to provide meds. Called [facility] and was transferred to multiple people who either could not help or hung up immediately. History obtained from ER and daughter as below    67M w/ history of glioblastoma, HTN, seizure disorder, A-fib on Xarelto, prior stroke w/ residual L-sided weakness, sent from nursing home for cough and altered mental status.  On initial evaluation, patient is AO x0, unable to provide history.  Per EMS collateral, nursing home facility noted cough productive of phlegm starting yesterday, gave 1 dose of azithromycin.  Noted worsening mental status, for which daughter was concerned, so she requested ED evaluation. Daughter also noticed hallucinations for several days which she states is an indication of infection for him. Pt currently denies any pain or discomfort. States he wants speech/ swallow study and someone to talk to Dr. Carias his neurologist.    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      2019 novel coronavirus disease (COVID-19)  February 20201 - received monoclonal AB infusion      GERD (gastroesophageal reflux disease)      H/O prostatectomy  4/27/10      H/O arthroscopy of right knee  1988      H/O colonoscopy      H/O endoscopy      Elective surgery  Percutaneous Cholecystostomy Drainage September 2021          SOCIAL HISTORY:    FAMILY HISTORY:  Family history of diabetes mellitus (DM) (Mother)    Family history of TIAs (Mother)    Family hx of prostate cancer (Father)    Family history of bone cancer (Father)    Family history of appendicitis        MEDICATIONS  (STANDING):  albuterol/ipratropium for Nebulization 3 milliLiter(s) Nebulizer every 6 hours  allopurinol 100 milliGRAM(s) Oral daily  atorvastatin 20 milliGRAM(s) Oral at bedtime  brimonidine 0.2% Ophthalmic Solution 1 Drop(s) Both EYES two times a day  dexAMETHasone  Injectable 4 milliGRAM(s) IV Push every 12 hours  FLUoxetine 20 milliGRAM(s) Oral daily  lacosamide IVPB 200 milliGRAM(s) IV Intermittent every 12 hours  levETIRAcetam  IVPB 1000 milliGRAM(s) IV Intermittent every 12 hours  losartan 50 milliGRAM(s) Oral daily  metoprolol tartrate 25 milliGRAM(s) Oral two times a day  pantoprazole  Injectable 40 milliGRAM(s) IV Push daily  rivaroxaban 20 milliGRAM(s) Enteral Tube with dinner  timolol 0.25% Solution 1 Drop(s) Both EYES two times a day  valproic  acid Syrup 250 milliGRAM(s) Oral three times a day  vancomycin  IVPB      vancomycin  IVPB 1500 milliGRAM(s) IV Intermittent every 12 hours    MEDICATIONS  (PRN):  acetaminophen   Oral Liquid .. 650 milliGRAM(s) Oral every 6 hours PRN Temp greater or equal to 38C (100.4F), Mild Pain (1 - 3)  guaiFENesin Oral Liquid (Sugar-Free) 200 milliGRAM(s) Oral every 6 hours PRN Cough  senna 2 Tablet(s) Oral at bedtime PRN for constipation      Allergies    No Known Allergies    Intolerances        Vital Signs Last 24 Hrs  T(C): 36.8 (16 Sep 2023 11:46), Max: 36.9 (16 Sep 2023 04:44)  T(F): 98.3 (16 Sep 2023 11:46), Max: 98.4 (16 Sep 2023 04:44)  HR: 102 (16 Sep 2023 11:46) (64 - 116)  BP: 140/95 (16 Sep 2023 11:46) (110/82 - 152/88)  BP(mean): --  RR: 18 (16 Sep 2023 11:46) (18 - 18)  SpO2: 96% (16 Sep 2023 11:46) (95% - 100%)    Parameters below as of 16 Sep 2023 11:46  Patient On (Oxygen Delivery Method): room air        PHYSICAL EXAM  General: adult in NAD  HEENT: clear oropharynx, anicteric sclera, pink conjunctiva  Neck: supple  CV: normal S1/S2 with no murmur rubs or gallops  Lungs: positive air movement b/l ant lungs,clear to auscultation, no wheezes, no rales  Abdomen: soft non-tender non-distended, no hepatosplenomegaly  Ext: no clubbing cyanosis or edema  Skin: no rashes and no petechiae  Neuro: alert and oriented X 4, no focal deficits      09-15-23 @ 07:01  -  09-16-23 @ 07:00  --------------------------------------------------------  IN: 600 mL / OUT: 0 mL / NET: 600 mL      LABS:                          12.4   10.48 )-----------( 227      ( 16 Sep 2023 07:10 )             37.7         Mean Cell Volume : 92.4 fl  Mean Cell Hemoglobin : 30.4 pg  Mean Cell Hemoglobin Concentration : 32.9 gm/dL  Auto Neutrophil # : x  Auto Lymphocyte # : x  Auto Monocyte # : x  Auto Eosinophil # : x  Auto Basophil # : x  Auto Neutrophil % : x  Auto Lymphocyte % : x  Auto Monocyte % : x  Auto Eosinophil % : x  Auto Basophil % : x      09-16    139  |  99  |  14  ----------------------------<  107<H>  3.3<L>   |  26  |  0.43<L>    Ca    8.5      16 Sep 2023 07:08                        BLOOD SMEAR INTERPRETATION:       RADIOLOGY & ADDITIONAL STUDIES:

## 2023-09-16 NOTE — PROGRESS NOTE ADULT - SUBJECTIVE AND OBJECTIVE BOX
PROGRESS NOTE:     Patient is a 67y old  Male who presents with a chief complaint of AMS, Worsening GBM w/ shift and Paraflu/ Enterovirus infection (16 Sep 2023 11:51)      SUBJECTIVE / OVERNIGHT EVENTS: Patient seen and evaluated at bedside. Patient denies chest pain, denies SOB, denies abdominal pain/nausea/vomiting.     ADDITIONAL REVIEW OF SYSTEMS:    MEDICATIONS  (STANDING):  albuterol/ipratropium for Nebulization 3 milliLiter(s) Nebulizer every 6 hours  allopurinol 100 milliGRAM(s) Oral daily  atorvastatin 20 milliGRAM(s) Oral at bedtime  brimonidine 0.2% Ophthalmic Solution 1 Drop(s) Both EYES two times a day  dexAMETHasone  Injectable 4 milliGRAM(s) IV Push every 12 hours  enoxaparin Injectable 110 milliGRAM(s) SubCutaneous every 12 hours  FLUoxetine 20 milliGRAM(s) Oral daily  lacosamide IVPB 200 milliGRAM(s) IV Intermittent every 12 hours  levETIRAcetam  IVPB 1000 milliGRAM(s) IV Intermittent every 12 hours  losartan 50 milliGRAM(s) Oral daily  metoprolol tartrate 25 milliGRAM(s) Oral two times a day  pantoprazole  Injectable 40 milliGRAM(s) IV Push daily  potassium chloride   Solution 40 milliEquivalent(s) Oral once  timolol 0.25% Solution 1 Drop(s) Both EYES two times a day  valproic  acid Syrup 250 milliGRAM(s) Oral three times a day  vancomycin  IVPB      vancomycin  IVPB 1500 milliGRAM(s) IV Intermittent every 12 hours    MEDICATIONS  (PRN):  acetaminophen   Oral Liquid .. 650 milliGRAM(s) Oral every 6 hours PRN Temp greater or equal to 38C (100.4F), Mild Pain (1 - 3)  guaiFENesin Oral Liquid (Sugar-Free) 200 milliGRAM(s) Oral every 6 hours PRN Cough  senna 2 Tablet(s) Oral at bedtime PRN for constipation      CAPILLARY BLOOD GLUCOSE      POCT Blood Glucose.: 121 mg/dL (16 Sep 2023 12:08)  POCT Blood Glucose.: 110 mg/dL (16 Sep 2023 06:05)  POCT Blood Glucose.: 110 mg/dL (16 Sep 2023 00:44)  POCT Blood Glucose.: 112 mg/dL (15 Sep 2023 18:22)    I&O's Summary    15 Sep 2023 07:01  -  16 Sep 2023 07:00  --------------------------------------------------------  IN: 600 mL / OUT: 0 mL / NET: 600 mL        PHYSICAL EXAM:  Vital Signs Last 24 Hrs  T(C): 36.8 (16 Sep 2023 11:46), Max: 36.9 (16 Sep 2023 04:44)  T(F): 98.3 (16 Sep 2023 11:46), Max: 98.4 (16 Sep 2023 04:44)  HR: 102 (16 Sep 2023 11:46) (64 - 116)  BP: 140/95 (16 Sep 2023 11:46) (131/95 - 152/88)  BP(mean): --  RR: 18 (16 Sep 2023 11:46) (18 - 18)  SpO2: 96% (16 Sep 2023 11:46) (95% - 96%)    Parameters below as of 16 Sep 2023 11:46  Patient On (Oxygen Delivery Method): room air        CONSTITUTIONAL: NAD, frail appearance   RESPIRATORY: Normal respiratory effort; lungs are clear to auscultation bilaterally  CARDIOVASCULAR: Regular rate and rhythm, normal S1 and S2, no murmur/rub/gallop  ABDOMEN: Nontender to palpation, normoactive bowel sounds, no rebound/guarding; + PEG tube incision site no exudate   EXTREMITIES: 2+ pitting edema LLE ,and 2+ pitting edema LUE   NEURO: LUE and LLE 0/5 in strength   PSYCH: A+O to person, place, and time; affect appropriate    LABS:                        12.4   10.48 )-----------( 227      ( 16 Sep 2023 07:10 )             37.7     09-16    139  |  99  |  14  ----------------------------<  107<H>  3.3<L>   |  26  |  0.43<L>    Ca    8.5      16 Sep 2023 07:08            Urinalysis Basic - ( 16 Sep 2023 07:08 )    Color: x / Appearance: x / SG: x / pH: x  Gluc: 107 mg/dL / Ketone: x  / Bili: x / Urobili: x   Blood: x / Protein: x / Nitrite: x   Leuk Esterase: x / RBC: x / WBC x   Sq Epi: x / Non Sq Epi: x / Bacteria: x          RADIOLOGY & ADDITIONAL TESTS:  Results Reviewed:   Imaging Personally Reviewed:  Electrocardiogram Personally Reviewed:    COORDINATION OF CARE:  Care Discussed with Consultants/Other Providers [Y/N]:  Prior or Outpatient Records Reviewed [Y/N]:

## 2023-09-16 NOTE — PROGRESS NOTE ADULT - PROBLEM SELECTOR PLAN 4
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 No inflammation along the course of the gastrostomy tract. No abdominal wall collection.  -MRSA negative in July 2023  - s/p ancef (started on 9/9- 9/12  )--- 9/12 lethargic mental status, exudate and erythema at site, upgrade antbiotics to vancomycin (9/12- 9/17  ) , f/u BCx 9/12 NGTD, f/u lactate 2.8, f/u rectal temp 98 WNL, trend vanc trough, tentatively plan for 5 day course

## 2023-09-16 NOTE — PROGRESS NOTE ADULT - PROBLEM SELECTOR PLAN 7
-continue metoprolol tartrate 25mg BID  -s/p extensive chart review: a fib diagnosed in 2019, has been on xarelto since then. hx of splenic infarct x2 in 2019, 2021 in setting of holding AC   - AC management  per above

## 2023-09-17 NOTE — PROGRESS NOTE ADULT - PROBLEM SELECTOR PLAN 8
Schedule surgery for Maurilio Leslie with Dr. Ernst Lees has reviewed the risks, benefits, indications, complications and alternatives with the patient.  An opportunity to ask questions provided and all were answered.  Patient expresses understanding of the recommended procedure(s) outlined below and wishes to proceed with the surgery.     Surgery scheduling requirements include              Procedure:  Cataract surgery [id number]             Laterality: both eyes              Special surgical needs:               ?Pupil dilating ring              ? Healon 5               ? LRI left eye              Diagnosis: Cataract right eye followed by left eye 4 weeks later             Duration of Procedure: 45 minutes              Location of Procedure: [facility]              Anesthesia: Topical             Type of Admit: Day surgery/Outpatient             Pre-Op labs: As per anesthesia guidelines              Date: Next available              Medical Pre-op: perform at a-scan              A-scan: needed                    Patient special requests: none             Post op visits: post-op appt 1 day Dr. Bello, 1 week and 4 weeks with Dr. Lees:   post-op appt 1 day, 1 week with Dr. Lees and 4 weeks with Dr. Bello   - continue losartan 50mg daily  - continue metoprolol tartrate 25mg BID

## 2023-09-17 NOTE — PROGRESS NOTE ADULT - SUBJECTIVE AND OBJECTIVE BOX
PROGRESS NOTE:     Patient is a 67y old  Male who presents with a chief complaint of AMS, Worsening GBM w/ shift and Paraflu/ Enterovirus infection (16 Sep 2023 19:47)      SUBJECTIVE / OVERNIGHT EVENTS:  No acute events overnight. Patient seen and evaluated at bedside. No fever. Denies SOB at rest, chest pain, abdominal pain, nausea/vomiting. Patient anxious about starting cancer treatment.     ADDITIONAL REVIEW OF SYSTEMS:    MEDICATIONS  (STANDING):  albuterol/ipratropium for Nebulization 3 milliLiter(s) Nebulizer every 6 hours  allopurinol 100 milliGRAM(s) Oral daily  atorvastatin 20 milliGRAM(s) Oral at bedtime  brimonidine 0.2% Ophthalmic Solution 1 Drop(s) Both EYES two times a day  dexAMETHasone  Injectable 4 milliGRAM(s) IV Push every 12 hours  enoxaparin Injectable 110 milliGRAM(s) SubCutaneous every 12 hours  FLUoxetine 20 milliGRAM(s) Oral daily  lacosamide IVPB 200 milliGRAM(s) IV Intermittent every 12 hours  levETIRAcetam  IVPB 1000 milliGRAM(s) IV Intermittent every 12 hours  losartan 50 milliGRAM(s) Oral daily  metoprolol tartrate 25 milliGRAM(s) Oral two times a day  pantoprazole  Injectable 40 milliGRAM(s) IV Push daily  timolol 0.25% Solution 1 Drop(s) Both EYES two times a day  valproic  acid Syrup 250 milliGRAM(s) Oral three times a day  vancomycin  IVPB 1500 milliGRAM(s) IV Intermittent every 12 hours  vancomycin  IVPB        MEDICATIONS  (PRN):  acetaminophen   Oral Liquid .. 650 milliGRAM(s) Oral every 6 hours PRN Temp greater or equal to 38C (100.4F), Mild Pain (1 - 3)  guaiFENesin Oral Liquid (Sugar-Free) 200 milliGRAM(s) Oral every 6 hours PRN Cough  senna 2 Tablet(s) Oral at bedtime PRN for constipation      CAPILLARY BLOOD GLUCOSE      POCT Blood Glucose.: 98 mg/dL (17 Sep 2023 06:48)  POCT Blood Glucose.: 133 mg/dL (17 Sep 2023 00:40)  POCT Blood Glucose.: 100 mg/dL (16 Sep 2023 18:31)  POCT Blood Glucose.: 121 mg/dL (16 Sep 2023 12:08)    I&O's Summary    16 Sep 2023 07:01  -  17 Sep 2023 07:00  --------------------------------------------------------  IN: 1350 mL / OUT: 0 mL / NET: 1350 mL        PHYSICAL EXAM:  Vital Signs Last 24 Hrs  T(C): 36.7 (17 Sep 2023 08:02), Max: 37.2 (17 Sep 2023 04:44)  T(F): 98.1 (17 Sep 2023 08:02), Max: 98.9 (17 Sep 2023 04:44)  HR: 118 (17 Sep 2023 08:02) (80 - 118)  BP: 133/97 (17 Sep 2023 08:02) (133/97 - 152/107)  BP(mean): --  RR: 18 (17 Sep 2023 08:02) (18 - 18)  SpO2: 97% (17 Sep 2023 08:02) (96% - 97%)    Parameters below as of 17 Sep 2023 08:02  Patient On (Oxygen Delivery Method): room air        CONSTITUTIONAL: NAD,frail appearance   RESPIRATORY: Normal respiratory effort; lungs are clear to auscultation bilaterally  CARDIOVASCULAR: Regular rate and rhythm, no murmur;   ABDOMEN: Nontender to palpation, normoactive bowel sounds, no rebound/guarding; + PEG tube incision site no exudate   NEURO: LLE and LUE 0 out of 5 in strength   EXTREMITIES: 2+ pitting edema LUE and LLE   PSYCH: A+O to person, place, and time; flat affect     LABS:                        12.9   9.55  )-----------( 230      ( 17 Sep 2023 07:23 )             38.9     09-17    140  |  105  |  15  ----------------------------<  91  3.6   |  23  |  0.39<L>    Ca    7.7<L>      17 Sep 2023 07:20  Phos  2.7     09-17  Mg     2.0     09-17      PTT - ( 17 Sep 2023 07:24 )  PTT:27.1 sec      Urinalysis Basic - ( 17 Sep 2023 07:20 )    Color: x / Appearance: x / SG: x / pH: x  Gluc: 91 mg/dL / Ketone: x  / Bili: x / Urobili: x   Blood: x / Protein: x / Nitrite: x   Leuk Esterase: x / RBC: x / WBC x   Sq Epi: x / Non Sq Epi: x / Bacteria: x          RADIOLOGY & ADDITIONAL TESTS:  Results Reviewed:   Imaging Personally Reviewed:  Electrocardiogram Personally Reviewed:    COORDINATION OF CARE:  Care Discussed with Consultants/Other Providers [Y/N]:  Prior or Outpatient Records Reviewed [Y/N]:

## 2023-09-17 NOTE — PROGRESS NOTE ADULT - PROBLEM SELECTOR PLAN 2
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   -MRI head w/ and w/o IV Contrast 9/9 completed, and noted " a substantial interval progression of the enhancing tumor involving the right cerebral hemisphere compared to the prior brain MRI study from 07/08/2023."   -c/w Decadron 4mg IV BID, neuro check q4h  -9/16 s/p discussion with patient and daughter, interested in disease modifying treatment   - s/p neurosurgery evaluation, rec: "given this is a recurrent GBM, and given that surgery represents a high risk of significant morbidity, the risks and benefits were discussed with the patient and his daughter, who expressed that such a surgery would not be within their GOC."  - radiation oncology consulted, f/u rec   - neurooncology Dr. Carias following, appreciate recs

## 2023-09-17 NOTE — PROGRESS NOTE ADULT - PROBLEM SELECTOR PLAN 4
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 No inflammation along the course of the gastrostomy tract. No abdominal wall collection.  -MRSA negative in July 2023  - s/p ancef (started on 9/9- 9/12  )--- 9/12 lethargic mental status, exudate and erythema at site, upgrade antbiotics to vancomycin (9/12- 9/17  ) , f/u BCx 9/12 NGTD, f/u lactate 2.8, f/u rectal temp 98 WNL, trend vanc trough, clinically responded well, will finish 5 day course

## 2023-09-17 NOTE — CHART NOTE - NSCHARTNOTEFT_GEN_A_CORE
-No neurosurgical intervention as outlined by original family discussion.  -Further management/optimization per primary team.  -No additional work-up/studies needed from neurosurgery perspective. -No neurosurgical intervention as outlined by original family discussion.  -Further management/optimization per primary team.  -CT reviewed w/ minimal to no change. Neurosurgery would recommend no absolute c/i to AC. Repeat CT in AM

## 2023-09-17 NOTE — CONSULT NOTE ADULT - ASSESSMENT
Interventional Radiology    Evaluate for Procedure: ivc filter placement    HPI: 68 yo male with GBM and imaging demonstrating few new hemorrhagic foci. Lower extremity duplex demonstrates left external iliac & femoral DVT. IR consulted for IVC filter placement.     Allergies: No Known Allergies    Medications (Abx/Cardiac/Anticoagulation/Blood Products)    enoxaparin Injectable: 110 milliGRAM(s) SubCutaneous (09-17 @ 06:24)  losartan: 50 milliGRAM(s) Oral (09-17 @ 06:25)  metoprolol tartrate: 25 milliGRAM(s) Oral (09-17 @ 02:08)  rivaroxaban: 20 milliGRAM(s) Enteral Tube (09-15 @ 17:50)  vancomycin  IVPB: 300 mL/Hr IV Intermittent (09-17 @ 13:03)    Data:    T(C): 36.7  HR: 104  BP: 152/95  RR: 18  SpO2: 96%    -WBC 9.55 / HgB 12.9 / Hct 38.9 / Plt 230  -Na 140 / Cl 105 / BUN 15 / Glucose 91  -K 3.6 / CO2 23 / Cr 0.39  -ALT -- / Alk Phos -- / T.Bili --  -INR -- / PTT 27.1      Radiology: CT head reviewed    Assessment/Plan:   68 yo male with GBM and imaging demonstrating few new hemorrhagic foci. Lower extremity duplex demonstrates left external iliac & femoral DVT. IR consulted for IVC filter placement.   -- imaging and chart reviewed  -- IR will plan to perform IVC filter placement tentatively tomorrow (pending further discussion with neurosurgery & family on risks/benefits)  -- no need for NPO  -- please place IR procedure request order under Dr. Treviño    --  Michael Mattson DO/CRESENCIO, PGY-5  Vascular and Interventional Radiology   Available on [employer] Teams    - Non-emergent consults: Place IR consult order in [place]  - Emergent issues (pager): [facility] [phone]; [facility] [phone]; [phone]  - Scheduling questions: [facility] [phone]; [facility] [phone]  - Clinic/outpatient booking: [facility] [phone]; [facility] [phone]

## 2023-09-17 NOTE — PROGRESS NOTE ADULT - PROBLEM SELECTOR PLAN 3
VA duplex noted DVT from the left side femoral vein through the external iliac vein; hemodynamically stable  - s/p hematology team evaluation, extensive discussion with Dr. Gray, recommend change AC from DOAC to subQ lovenox 1mg /kg BID, repeat CT head, f/u antiphospholid syndrome workup   - s/p neurosurgery evaluation, extensive discussion with Dr. Benson, neurosurgery team reviewed CT head, MRI head, no clinically significant ICH noted, no absolute contraindication to AC, though noted high risk of AC in setting of advanced GBM  - discussed with daughter Yumi, reviewed risks/benefits of AC, agree with  AC with subQ lovenox   - c/w neurocheck q4h, f/u repeat CT head 9/17  - consider IR consult for IVC filter per neurosurgery rec pending further discussion with daughter, patient, and neurooncology team

## 2023-09-17 NOTE — CONSULT NOTE ADULT - TIME BILLING
As above, agree with indication for filter insertion with DVT and if anticoagulation is contraindicated.

## 2023-09-17 NOTE — PROGRESS NOTE ADULT - PROBLEM SELECTOR PLAN 6
Also enterovirus infection as well. Procalcitonin WNL  - given azithromycin at [facility] on 9/7  - completed 3 days of empiric azithromycin (9/7 - 9/9)  - acetaminophen and mucinex PRN  - Cont. supplemental 02 PRN  - Duo nebs Q6hrs

## 2023-09-17 NOTE — CHART NOTE - NSCHARTNOTEFT_GEN_A_CORE
< from: CT Head No Cont (09.17.23 @ 11:10) >    IMPRESSION:    Redemonstration of irregularly shaped heterogeneous low density lesion   with surrounding edema in the rightlateral frontal and parietal lobes.    A few new subcentimeter foci of hemorrhage along the anterior superior   aspect of the lesion. Other peripheral hemorrhagic/calcific foci are   redemonstrated.    No midline shift or effacement of basal cisterns. No hydrocephalus.    Report discussed with PRINCESS Prabhakar of the primary covering team on   9/17/2023 at 12:42 PM with read back confirmation.    --- End of Report ---    Reviewed above CT head, noted "A few new subcentimeter foci of hemorrhage along the anterior superior   aspect of the lesion"  Plan:   - due to concern for new intracranial hemorrhage, full dose subQ lovenox held   - repeat CT head placed in AM   - Neurosurgery reconsulted, f/u rec. Discussed with neurosurgery resident Dr. Allegra Mackenzie, awaits neurosurgery team's full recommendations.   - Neurology team consulted, f/u rec  - d/w LENY Prabhakar Tetracycline Pregnancy And Lactation Text: This medication is Pregnancy Category D and not consider safe during pregnancy. It is also excreted in breast milk.

## 2023-09-18 NOTE — CHART NOTE - NSCHARTNOTEFT_GEN_A_CORE
Nutrition Follow Up Note  Patient seen for: f/u for nutrition service     Chart reviewed, events noted. Per chart: "67M w/ history of glioblastoma c/b seizures, A-fib on Xarelto, prior stroke w/ residual L-sided weakness who is brought in from nursing home for cough and acute encephalopathy found to have worsening GBM with edema and midline shift."    Source: [x] Patient       [x] EMR        [x] RN        [x] Family at bedside           Diet Order:   Diet, NPO with Tube Feed:   Tube Feeding Modality: Gastrostomy  Glucerna 1.5 [place] (GLUCERNA1.5RTH)  Total Volume for 24 Hours (mL): 1200  Continuous  Starting Tube Feed Rate {mL per Hour}: 10  Until Goal Tube Feed Rate (mL per Hour): 50  Tube Feed Duration (in Hours): 24  Tube Feed Start Time: 17:00 (09-08-23)    - Is current order appropriate/adequate?: family requesting for pt to eat foods, S & S reevaluation requested.     - TF infusion:    -- EN Order Provides: 1200 mL, 1800 kcals, 99 gm protein, and 911 mL free water.   -- Meets 27 kcals/kG and ~1.5 gm protein/kG based on IBW 67.1 kG.  -- Current Rate: 50 mL/hr with no known intolerances thus far   -- EN provision per flowsheet:    9/17: 1200ml, 100% of EN regimen  9/16: 600ml, 50% of EN regimen  9/15: no documentation in flowsheet     - Nutrition-related concerns:  -- Last seen by SLP on 9/15: remains NPO with non-oral nutrition/hydration/medications.  -- Ordered for Atorvastatin, Losartan, Metoprolol, Senna, Decadron, Protonix  -- s/p IVF (Lactated Ringers and NaCl 0.9% bolus)   -- s/p multiple Antibiotics  -- Pt w/ episodes of hypokalemia. Current lab on 9/18 K 3.4L, s/p KCl today.   -- Encounter for GOC discussion  -- Denies nausea/vomiting/constipation/diarrhea at present. Last BM today per flowsheet, on Senna    Weights:   Drug Dosing Weight: 113.4 (07 Sep 2023 14:38), BMI (kg/m2): 33 (07 Sep 2023 14:38)  Ht per pt and family: 67"  Daily: no documented thus far.   Wt obtained by RD at bedside: 96.7kg/212.7lb (9/18 bedscale), BMI: 33.4 (obese)   UBW: ~210lb PTA per daughter at bedside, states pt loss ~20lb since July 2023 (-20lb/8.6%) in 2 months due to medical condition   IBW: 163lb (adjusted for high BMI)     Nutritionally Pertinent MEDICATIONS  (STANDING):  allopurinol  atorvastatin  dexAMETHasone  Injectable  losartan  metoprolol tartrate  pantoprazole  Injectable    Pertinent Labs: 09-18 @ 07:22: Na 137, BUN 16, Cr 0.40<L>, <H>, K+ 3.4<L>, Phos --, Mg 2.3, Alk Phos --, ALT/SGPT --, AST/SGOT --, HbA1c --    A1C with Estimated Average Glucose Result: 5.5 % (07-05-23 @ 06:59)    Finger Sticks:  POCT Blood Glucose.: 105 mg/dL (09-18 @ 11:34)  POCT Blood Glucose.: 118 mg/dL (09-18 @ 07:57)  POCT Blood Glucose.: 116 mg/dL (09-18 @ 05:56)  POCT Blood Glucose.: 109 mg/dL (09-18 @ 00:39)  POCT Blood Glucose.: 92 mg/dL (09-17 @ 21:02)    Skin per nursing documentation: no pressure injury     Edema: edema +4 to right and left arm per flowsheet 9/18     Estimated Needs: based on adjusted IBW of 163lb/74kg in consideration of malnutrition, dx GBM  [x] recalculated:   Calories: 1661-6061 kcal (27-32 kcal/kg IBW)   Proteins: 104-118g (1.4-1.6 g/kg IBW)   Fluids: defer to team     Previous Nutrition Diagnosis: Increased Nutrient Needs.  Nutrition Diagnosis is: [x] ongoing     New Nutrition Diagnosis: [x] chronic severe malnutrition related to inadequate protein-energy intake in setting of increased physiological demand for nutrients as evidenced by severe edema and unintentional wt loss of 20lb/8.6% in 2 months PTA.     Nutrition Care Plan:  [x] In Progress    Nutrition Interventions:     Education Provided:       [x] No: N/A at present        Recommendations:      -- Recommend to increase TF to Glucerna 1.5 @ 60ml/hr for 1440ml, 2160kcal, 119g proteins, 1093ml fluids daily. This EN regimen will meet 29kcal/kg IBW and 1.6g/kg protein needs of pt.   -- If PO, recommend no therapeutic restrictions. Consistency deferred to SLP and provider.   -- Recommend multivitamins/minerals, pending no medical contraindications, for micronutrient support.   -- Monitor EN infusion, GI tolerance, skin integrity, labs, weight, and bowel movement regularity.   -- Malnutrition alert placed in chart.     Monitoring and Evaluation:   Continue to monitor nutritional intake, tolerance to diet prescription, weights, labs, skin integrity    RD remains available upon request and will follow up per protocol  Sujatha Trujillo MS, RDN, CDN (Teams)

## 2023-09-18 NOTE — PROGRESS NOTE ADULT - PROBLEM SELECTOR PLAN 3
VA duplex noted DVT from the left side femoral vein through the external iliac vein; hemodynamically stable  - s/p hematology team evaluation, extensive discussion with Dr. Gray, recommend change AC from DOAC to subQ lovenox 1mg /kg BID, repeat CT head, f/u antiphospholid syndrome workup   - s/p neurosurgery evaluation, extensive discussion with Dr. Benson, neurosurgery team reviewed CT head, MRI head, no clinically significant ICH noted, no absolute contraindication to AC, though noted high risk of AC in setting of advanced GBM  - previous hospitalist discussed with daughter Yumi, reviewed risks/benefits of AC, agree with  AC with subQ lovenox   - c/w neurocheck q4h, f/u repeat CT head 9/17 showing no change   - Per Neurosurgery no contraindications for FD AC, will obtain STAT CT if there is a change in neuro exam and reach out to NSx.

## 2023-09-18 NOTE — CONSULT NOTE ADULT - ASSESSMENT
ASSESSMENT/PLAN    KATIE VALLES :  67 y.o. M h/o GBM s/p resection 8/2022, and s/p Right brain RT with Dr. Trujillo to 60gy completed 12/2022.  Resides at [facility], brought in for arm weakness noted on 9/7 ED admission.  Daughter relayed during a visit to [place] in May, he had violent seizures, and was hospitalized and intubated/ventilated x 2 weeks with a hospitalization lasting two months.  Imaging shows disease progression to the same site of previous RT.  Daughter indicated some response months ago from Avastin and prefers infusions with Avastin if possible.  Daughter is not incline for more RT after discussion, and does not want transfer to another hospital now.  Palliative RT will offer little benefit if any.  It will not change his prognosis nor life quality.  Thao Carias will f/u Dr. Carias for discussion of Avastin? if possible.  Also pending IVC filter for prophylaxis of DVT's seen.   KPS is 40 at best.   D/w Dr. Riggs, pt seen by Dr. Riggs

## 2023-09-18 NOTE — CHART NOTE - NSCHARTNOTEFT_GEN_A_CORE
66 yo male with GBM and imaging demonstrating few new hemorrhagic foci. Lower extremity duplex demonstrates left external iliac & femoral DVT.     IR planned for IVC filter placement.   However, per neurosurgery note today, No absolute contraindication to AC. While there is no absolute neurosurgical contraindication to systemic anti coagulation, there does exist an increased risk of intracranial hemorrhage     Given no absolute contraindication to AC, would not place IVC filter with associated risks, including filter migration and iliocaval thrombosis. Please discuss goals of care with family. If anticoagulation is again deemed contraindicated by primary team and a procedure is within the goals of care of family, given risks, can reconsult IR for filter placement. 66 yo male with GBM and imaging demonstrating few new hemorrhagic foci. Lower extremity duplex demonstrates left external iliac & femoral DVT.     IR planned for IVC filter placement.     However, per neurosurgery note today, No absolute contraindication to AC. While there is no absolute neurosurgical contraindication to systemic anti coagulation, there does exist an increased risk of intracranial hemorrhage     Given no absolute contraindication to AC, would not place IVC filter with associated risks, including filter migration and iliocaval thrombosis. Please discuss goals of care with family. If anticoagulation is again deemed contraindicated by primary team and a procedure is within the goals of care of family, given risks, can reconsult IR for filter placement.

## 2023-09-18 NOTE — ADVANCED PRACTICE NURSE CONSULT - REASON FOR CONSULT
Midline Catheter Insertion Note    Catheter type: 4F  : Bard  Power injectable: Yes  LOT#[id number]  EXP DATE 2024-08-31                                                                                                                                                                                                                        Procedure assisted by: ERICH Quiroz RN  Time out was preformed, confirming the patient's first and last name, date of birth, MR #, procedure, and correct site prior to start of procedure.    Patient was placed with HOB 30 degrees. Patient placement site was prepped with chlorhexidine solution, then draped using maximum sterile barrier protection. Using the Bard Site Rite 8, the catheter was placed using the Modified Seldinger Technique. Strict adherence to outline aseptic technique including handwashing, glove and gown, utilizing mask and cap, patient placed mask,  plus draping the patient with a sterile drape was observed. Lidocaine 1% 2 ml given. Upon completion of line placement, the insertion site was covered with a sterile occlusive CHG dressing. Pt tolerated procedure well.     All materials used for catheter insertion, including the intact guide wires, were accounted for at the end of the procedure.  Number of attempts: 1  Complications/Comments: None    Emergency Placement: No  Site: New  Anatomical Site of insertion: Left Basilic  Catheter size/length: 4F, 20cm  US guided Bard single lumen power midline placed

## 2023-09-18 NOTE — PROGRESS NOTE ADULT - PROBLEM SELECTOR PLAN 7
-continue metoprolol tartrate 25mg BID  -s/p extensive chart review: a fib diagnosed in 2019, has been on xarelto since then. 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 Segal will see in AM

## 2023-09-18 NOTE — CHART NOTE - NSCHARTNOTEFT_GEN_A_CORE
Medicine ACP Event Note  Date of Service: 09-18-23 @ 03:46    Subjective: Notified by RN that patient complaining of headache and has a blood pressure of 151/117, heart rate 123. Patient examined at bedside. Patient describes headache 10/10, non throbbing. Patient denies any chest pain, sob, or dizziness at this time. Repeated bp manually 140/100.        Objective Findings:  T(C): 36.3 (09-18-23 @ 03:02), Max: 37.2 (09-17-23 @ 04:44)  HR: 123 (09-18-23 @ 03:02) (102 - 123)  BP: 140/100 (09-18-23 @ 03:30) (118/87 - 152/95)  RR: 18 (09-18-23 @ 03:02) (18 - 18)  SpO2: 99% (09-18-23 @ 03:02) (96% - 100%)  Wt(kg): --  Daily     Daily       Physical Exam:  Gen: NAD; Patient resting comfortably  HEENT: EOMI, Normocephalic/ atraumatic  CV: RRR, normal S1 + S2, no m/r/g  Lungs:  Normal respiratory effort; clear to auscultation bilaterally  Abd: soft, non-tender; bowel sounds present  Ext: No edema; warm and well perfused  Neuro: Residual l sided weakness from previous CVA, No new focal deficits     Telemetry:     Laboratory Data:                        12.9   9.55  )-----------( 230      ( 17 Sep 2023 07:23 )             38.9     09-17    140  |  105  |  15  ----------------------------<  91  3.6   |  23  |  0.39<L>    Ca    7.7<L>      17 Sep 2023 07:20  Phos  2.7     09-17  Mg     2.0     09-17      PTT - ( 17 Sep 2023 07:24 )  PTT:27.1 sec          Inpatient Medications:  MEDICATIONS  (STANDING):  albuterol/ipratropium for Nebulization 3 milliLiter(s) Nebulizer every 6 hours  allopurinol 100 milliGRAM(s) Oral daily  atorvastatin 20 milliGRAM(s) Oral at bedtime  brimonidine 0.2% Ophthalmic Solution 1 Drop(s) Both EYES two times a day  dexAMETHasone  Injectable 4 milliGRAM(s) IV Push every 12 hours  FLUoxetine 20 milliGRAM(s) Oral daily  lacosamide IVPB 200 milliGRAM(s) IV Intermittent every 12 hours  levETIRAcetam  IVPB 1000 milliGRAM(s) IV Intermittent every 12 hours  losartan 50 milliGRAM(s) Oral daily  metoprolol tartrate 25 milliGRAM(s) Oral two times a day  pantoprazole  Injectable 40 milliGRAM(s) IV Push daily  timolol 0.25% Solution 1 Drop(s) Both EYES two times a day  valproic  acid Syrup 250 milliGRAM(s) Oral three times a day      Assessment/ Plan of Care:  67M w/ history of glioblastoma c/b seizures, A-fib on Xarelto, prior stroke w/ residual L-sided weakness who is brought in from nursing home for cough and acute encephalopathy found to have worsening GBM with edema and midline shift.    #headache/ elevated BP  - IV tylenol STAT  - Will give Labetolol 5mg STAT  - Will monitor patient closely overnight  - Will endorse to day team in AM  - Cardiology and neuro f/u         Jackson De La Garza PA-C  Medicine ACP Medicine ACP Event Note  Date of Service: 09-18-23 @ 03:46    Subjective: Notified by RN that patient complaining of headache and has a blood pressure of 151/117, heart rate 123. Patient examined at bedside. Patient describes headache 10/10, non throbbing. Patient denies any chest pain, sob, or dizziness at this time. Repeated bp manually 140/100.        Objective Findings:  T(C): 36.3 (09-18-23 @ 03:02), Max: 37.2 (09-17-23 @ 04:44)  HR: 123 (09-18-23 @ 03:02) (102 - 123)  BP: 140/100 (09-18-23 @ 03:30) (118/87 - 152/95)  RR: 18 (09-18-23 @ 03:02) (18 - 18)  SpO2: 99% (09-18-23 @ 03:02) (96% - 100%)  Wt(kg): --  Daily     Daily       Physical Exam:  Gen: NAD; Patient resting comfortably  HEENT: EOMI, Normocephalic/ atraumatic  CV: RRR, normal S1 + S2, no m/r/g  Lungs:  Normal respiratory effort; clear to auscultation bilaterally  Abd: soft, non-tender; bowel sounds present  Ext: No edema; warm and well perfused  Neuro: Residual l sided weakness from previous CVA, No new focal deficits     Telemetry:     Laboratory Data:                        12.9   9.55  )-----------( 230      ( 17 Sep 2023 07:23 )             38.9     09-17    140  |  105  |  15  ----------------------------<  91  3.6   |  23  |  0.39<L>    Ca    7.7<L>      17 Sep 2023 07:20  Phos  2.7     09-17  Mg     2.0     09-17      PTT - ( 17 Sep 2023 07:24 )  PTT:27.1 sec          Inpatient Medications:  MEDICATIONS  (STANDING):  albuterol/ipratropium for Nebulization 3 milliLiter(s) Nebulizer every 6 hours  allopurinol 100 milliGRAM(s) Oral daily  atorvastatin 20 milliGRAM(s) Oral at bedtime  brimonidine 0.2% Ophthalmic Solution 1 Drop(s) Both EYES two times a day  dexAMETHasone  Injectable 4 milliGRAM(s) IV Push every 12 hours  FLUoxetine 20 milliGRAM(s) Oral daily  lacosamide IVPB 200 milliGRAM(s) IV Intermittent every 12 hours  levETIRAcetam  IVPB 1000 milliGRAM(s) IV Intermittent every 12 hours  losartan 50 milliGRAM(s) Oral daily  metoprolol tartrate 25 milliGRAM(s) Oral two times a day  pantoprazole  Injectable 40 milliGRAM(s) IV Push daily  timolol 0.25% Solution 1 Drop(s) Both EYES two times a day  valproic  acid Syrup 250 milliGRAM(s) Oral three times a day      Assessment/ Plan of Care:  67M w/ history of glioblastoma c/b seizures, A-fib on Xarelto, prior stroke w/ residual L-sided weakness who is brought in from nursing home for cough and acute encephalopathy found to have worsening GBM with edema and midline shift.    #headache/ elevated BP  - IV tylenol STAT  - EKG STAT- Afib/aFlutter  - Will give Lopressor 5mg STAT  - Will monitor patient closely overnight  - Will endorse to day team in AM  - Cardiology and neuro f/u         Jackson De La Garza PA-C  Medicine ACP

## 2023-09-18 NOTE — PROGRESS NOTE ADULT - PROBLEM SELECTOR PLAN 2
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   -MRI head w/ and w/o IV Contrast 9/9 completed, and noted " a substantial interval progression of the enhancing tumor involving the right cerebral hemisphere compared to the prior brain MRI study from 07/08/2023."   -c/w Decadron 4mg IV BID, neuro check q4h  -9/16 s/p discussion with patient and daughter, interested in disease modifying treatment   - s/p neurosurgery evaluation, rec: "given this is a recurrent GBM, and given that surgery represents a high risk of significant morbidity, the risks and benefits were discussed with the patient and his daughter, who expressed that such a surgery would not be within their GOC."  - radiation oncology consulted, per notes pt's daughter is in favor of Avastin over RT   - neurooncology Dr. Carias following, will meet with family tomorrow at 4 pm to discuss treatment vs palliation

## 2023-09-18 NOTE — PROGRESS NOTE ADULT - PROBLEM SELECTOR PLAN 6
Also enterovirus infection as well. Procalcitonin WNL  - given azithromycin at [facility] on 9/7  - completed 3 days of empiric azithromycin (9/7 - 9/9)  - acetaminophen and mucinex PRN  - Cont. supplemental 02 PRN  - Duo nebs Q6hrs

## 2023-09-18 NOTE — PROGRESS NOTE ADULT - SUBJECTIVE AND OBJECTIVE BOX
[facility] Division of Hospital Medicine  Elisabeth Gunter MD  Available via MS Teams      SUBJECTIVE / OVERNIGHT EVENTS: Overnight pt had a headache. Received IV tylenol. Received Lopressor for afib/ flutter. Pt being changed by staff     ADDITIONAL REVIEW OF SYSTEMS:   Unable to gather full ROS , pt being changed by staff     MEDICATIONS  (STANDING):  albuterol/ipratropium for Nebulization 3 milliLiter(s) Nebulizer every 6 hours  allopurinol 100 milliGRAM(s) Oral daily  atorvastatin 20 milliGRAM(s) Oral at bedtime  brimonidine 0.2% Ophthalmic Solution 1 Drop(s) Both EYES two times a day  dexAMETHasone  Injectable 4 milliGRAM(s) IV Push every 12 hours  FLUoxetine 20 milliGRAM(s) Oral daily  lacosamide IVPB 200 milliGRAM(s) IV Intermittent every 12 hours  levETIRAcetam  IVPB 1000 milliGRAM(s) IV Intermittent every 12 hours  losartan 50 milliGRAM(s) Oral daily  metoprolol tartrate 25 milliGRAM(s) Oral two times a day  pantoprazole  Injectable 40 milliGRAM(s) IV Push daily  timolol 0.25% Solution 1 Drop(s) Both EYES two times a day  valproic  acid Syrup 250 milliGRAM(s) Oral three times a day    MEDICATIONS  (PRN):  acetaminophen   Oral Liquid .. 650 milliGRAM(s) Oral every 6 hours PRN Temp greater or equal to 38C (100.4F), Mild Pain (1 - 3)  guaiFENesin Oral Liquid (Sugar-Free) 200 milliGRAM(s) Oral every 6 hours PRN Cough  senna 2 Tablet(s) Oral at bedtime PRN for constipation      I&O's Summary    18 Sep 2023 07:01  -  18 Sep 2023 15:35  --------------------------------------------------------  IN: 0 mL / OUT: 0 mL / NET: 0 mL        PHYSICAL EXAM:  Vital Signs Last 24 Hrs  T(C): 36.8 (18 Sep 2023 11:25), Max: 37 (18 Sep 2023 07:05)  T(F): 98.2 (18 Sep 2023 11:25), Max: 98.6 (18 Sep 2023 07:05)  HR: 118 (18 Sep 2023 11:25) (94 - 145)  BP: 144/105 (18 Sep 2023 11:25) (118/87 - 151/117)  BP(mean): --  RR: 18 (18 Sep 2023 11:25) (18 - 20)  SpO2: 97% (18 Sep 2023 11:25) (95% - 100%)    Parameters below as of 18 Sep 2023 11:25  Patient On (Oxygen Delivery Method): room air      Gen: NAD; Patient resting comfortably  HEENT: EOMI, Normocephalic/ atraumatic  CV: normal S1 + S2  Lungs:  Normal respiratory effort; clear to auscultation bilaterally  Abd: soft, non-tender; bowel sounds present  Ext: No edema; warm and well perfused  Neuro: Residual l sided weakness from previous CVA, No new focal deficits     LABS:                        13.6   11.25 )-----------( 206      ( 18 Sep 2023 07:22 )             41.1     09-18    137  |  99  |  16  ----------------------------<  110<H>  3.4<L>   |  23  |  0.40<L>    Ca    8.7      18 Sep 2023 07:22  Phos  2.7     09-17  Mg     2.3     09-18      PT/INR - ( 18 Sep 2023 07:22 )   PT: 11.5 sec;   INR: 1.10 ratio         PTT - ( 18 Sep 2023 07:22 )  PTT:24.9 sec      SARS-CoV-2: NotDetec (07 Sep 2023 15:06)  SARS-CoV-2: NotDetec (16 Jul 2023 06:22)           [facility] Division of Hospital Medicine  Elisabeth Gunter MD  Available via MS Teams      SUBJECTIVE / OVERNIGHT EVENTS: Overnight pt had a headache. Received IV tylenol. Received Lopressor for afib/ flutter. Pt being changed by staff.    ADDITIONAL REVIEW OF SYSTEMS:   Unable to gather full ROS , pt being changed by staff     MEDICATIONS  (STANDING):  albuterol/ipratropium for Nebulization 3 milliLiter(s) Nebulizer every 6 hours  allopurinol 100 milliGRAM(s) Oral daily  atorvastatin 20 milliGRAM(s) Oral at bedtime  brimonidine 0.2% Ophthalmic Solution 1 Drop(s) Both EYES two times a day  dexAMETHasone  Injectable 4 milliGRAM(s) IV Push every 12 hours  FLUoxetine 20 milliGRAM(s) Oral daily  lacosamide IVPB 200 milliGRAM(s) IV Intermittent every 12 hours  levETIRAcetam  IVPB 1000 milliGRAM(s) IV Intermittent every 12 hours  losartan 50 milliGRAM(s) Oral daily  metoprolol tartrate 25 milliGRAM(s) Oral two times a day  pantoprazole  Injectable 40 milliGRAM(s) IV Push daily  timolol 0.25% Solution 1 Drop(s) Both EYES two times a day  valproic  acid Syrup 250 milliGRAM(s) Oral three times a day    MEDICATIONS  (PRN):  acetaminophen   Oral Liquid .. 650 milliGRAM(s) Oral every 6 hours PRN Temp greater or equal to 38C (100.4F), Mild Pain (1 - 3)  guaiFENesin Oral Liquid (Sugar-Free) 200 milliGRAM(s) Oral every 6 hours PRN Cough  senna 2 Tablet(s) Oral at bedtime PRN for constipation      I&O's Summary    18 Sep 2023 07:01  -  18 Sep 2023 15:35  --------------------------------------------------------  IN: 0 mL / OUT: 0 mL / NET: 0 mL        PHYSICAL EXAM:  Vital Signs Last 24 Hrs  T(C): 36.8 (18 Sep 2023 11:25), Max: 37 (18 Sep 2023 07:05)  T(F): 98.2 (18 Sep 2023 11:25), Max: 98.6 (18 Sep 2023 07:05)  HR: 118 (18 Sep 2023 11:25) (94 - 145)  BP: 144/105 (18 Sep 2023 11:25) (118/87 - 151/117)  BP(mean): --  RR: 18 (18 Sep 2023 11:25) (18 - 20)  SpO2: 97% (18 Sep 2023 11:25) (95% - 100%)    Parameters below as of 18 Sep 2023 11:25  Patient On (Oxygen Delivery Method): room air      Gen: NAD; Patient resting comfortably  HEENT: EOMI, Normocephalic/ atraumatic  CV: normal S1 + S2  Lungs:  Normal respiratory effort; clear to auscultation bilaterally  Abd: soft, non-tender; bowel sounds present  Ext: No edema; warm and well perfused  Neuro: Residual l sided weakness from previous CVA, No new focal deficits     LABS:                        13.6   11.25 )-----------( 206      ( 18 Sep 2023 07:22 )             41.1     09-18    137  |  99  |  16  ----------------------------<  110<H>  3.4<L>   |  23  |  0.40<L>    Ca    8.7      18 Sep 2023 07:22  Phos  2.7     09-17  Mg     2.3     09-18      PT/INR - ( 18 Sep 2023 07:22 )   PT: 11.5 sec;   INR: 1.10 ratio         PTT - ( 18 Sep 2023 07:22 )  PTT:24.9 sec      SARS-CoV-2: NotDetec (07 Sep 2023 15:06)  SARS-CoV-2: NotDetec (16 Jul 2023 06:22)

## 2023-09-18 NOTE — CONSULT NOTE ADULT - NS ATTEND AMEND GEN_ALL_CORE FT
67M w/ GBM, s/p chemoradiation December 2022 to the right brain, with significant in-field progression on recent imaging and worsening neurological function.     We would not recommend additional radiation therapy, as he has already progressed on full dose radiation therapy and further treatment would be limited in dose, without any significant benefit. Additionally, neither the patient nor his daughter wish for him to undergo additional radiation therapy. If possible, would consider palliative systemic therapy such as bevacizumab if neuro-oncology feels it would be reasonable. Patient's condition is very deteriorated and he is hospice appropriate; comfort measures would also be reasonable.

## 2023-09-18 NOTE — CONSULT NOTE ADULT - SUBJECTIVE AND OBJECTIVE BOX
HPI:  Spoke to daughter Aretha today to gain further history.  Her father is 67 y.o. M h/o GBM s/p resection 8/2022, and s/p 60gy Right brain RT with Dr. Trujillo completed 12/2022.  Resides at [facility] and was transferred to multiple people who either could not help or hung up immediately.   No NSG intervention for disease progression seen on imaging.  Daughter also states while on vacation in [place]- May 11, 2023 Mr. Valles had violent seizures, was hospitalized and intubated/ventilated for two weeks while in [place].   Also stated Dr. Carias is the neuro - oncologist- will discuss.       Patient per RN notes, c/o headache today and HTN noted with A-fib being managed.     67M w/ history of glioblastoma, HTN, seizure disorder, A-fib on Xarelto, prior stroke w/ residual L-sided weakness, sent from nursing home for cough and altered mental status.  On initial evaluation, patient is AO x0, unable to provide history.  Per EMS collateral, nursing home facility noted cough productive of phlegm starting yesterday, gave 1 dose of azithromycin.  Noted worsening mental status, for which daughter was concerned, so she requested ED evaluation. Daughter also noticed hallucinations for several days which she states is an indication of infection for him. Pt currently denies any pain or discomfort. States he wants speech/ swallow study and someone to talk to Dr. Carias his neurologist.    Pall care note: 9/13- family opted for hospice.  Spoke to daughter Aretha, she is more interested in the Avastin over more RT.   Daugther spoke to Dr. Benson- no NSG plans and confirmed again she prefers Avastin.     Pre-Op Diagnosis, Post-Op Diagnosis and Procedure:   Pre-Op, Post-Op and Procedure Selector:  ·  PRE-OP DIAGNOSIS:  Brain tumor 31-Aug-2022 12:48:05  Doris Mercer.  ·  POST-OP DIAGNOSIS:  Brain tumor 31-Aug-2022 12:48:20  Doris Mercer.  ·  PROCEDURES:  Craniotomy, with stereotactic guidance, for tumor 31-Aug-2022 12:47:51  Doris Mercer.      Operative Findings:  · Operative Findings	Right craniotomy with resection/open biopsy of tumor, frozen, glial        patient seen by palliative care:     · Participants	Family    Conversation Discussion:  · Conversation	Diagnosis; Prognosis; MOLST Discussed; Hospice Referral  · Conversation Details	Met with patient at bedside, introduced self and role  patient able to state that he spoke with Dr. Carias and understands his prognosis  He states he had to "make a choice." Asked him what his thoughts were in terms of what was discussed with him  He states that he has "decided to let go" and he wants to go home. Explored what he meant by this and he confirmed he would not want CPR, life support machines and wanted to go home with hospice and live pain free for whatever amount of time he has. Primary team NP present during conversation as well.    Attempted outreach to daughter Aretha to update. No answer, non urgent voicemail left  Call back completed and discussed with Aretha regarding disposition options.  Reviewed [facility] +/- their comfort care program vs. home hospice.   Reviewed basic of hospice eligibility, criteria and program details. Daughter amenable to hospice referral and understands likely need to private hire additional help. Medicaid application in progress but not approved.   DNR/DNI completed on MOLST on this date based on patients wishes and daughter in agreement to support. CM should send Home hospice referral    Patient does not meet criteria for inpatient hospice. this was reviewed with primary team and family.    Interventional:   HPI: 68 yo male with GBM and imaging demonstrating few new hemorrhagic foci. Lower extremity duplex demonstrates left external iliac & femoral DVT. IR consulted for IVC filter placement.     < from: CT Head No Cont (09.18.23 @ 06:54) >  IMPRESSION:   Unchanged residual neoplasm in the RIGHT parietal lobe with   calcifications probable microhemorrhages. Mild surrounding vasogenic   edema is again noted with minimal mass effect on the RIGHT lateral   ventricle but no midline shift.  mucosal thickening in the RIGHT maxillary sinus.    --- End of Report ---      < from: MR Head w/wo IV Cont (09.09.23 @ 21:02) >  IMPRESSION:    There has been a substantial interval progression of the enhancing tumor   involving the right cerebral hemisphere compared to the prior brain MRI   study from 07/08/2023.    The vasogenic edema, mass effect, and midline shift is stable compared to   the more recent 09/07/2023 head CT.    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    2019 novel coronavirus disease (COVID-19)  February 20201 - received monoclonal AB infusion    GERD (gastroesophageal reflux disease)    No significant past surgical history    H/O prostatectomy  4/27/10    H/O arthroscopy of right knee  1988    H/O colonoscopy    H/O endoscopy    Elective surgery  Percutaneous Cholecystostomy Drainage September 2021        FAMILY HISTORY:  Family history of diabetes mellitus (DM) (Mother)    Family history of TIAs (Mother)    Family hx of prostate cancer (Father)    Family history of bone cancer (Father)    Family history of appendicitis        MEDICATIONS  (STANDING):  albuterol/ipratropium for Nebulization 3 milliLiter(s) Nebulizer every 6 hours  allopurinol 100 milliGRAM(s) Oral daily  atorvastatin 20 milliGRAM(s) Oral at bedtime  brimonidine 0.2% Ophthalmic Solution 1 Drop(s) Both EYES two times a day  dexAMETHasone  Injectable 4 milliGRAM(s) IV Push every 12 hours  FLUoxetine 20 milliGRAM(s) Oral daily  lacosamide IVPB 200 milliGRAM(s) IV Intermittent every 12 hours  levETIRAcetam  IVPB 1000 milliGRAM(s) IV Intermittent every 12 hours  losartan 50 milliGRAM(s) Oral daily  metoprolol tartrate 25 milliGRAM(s) Oral two times a day  pantoprazole  Injectable 40 milliGRAM(s) IV Push daily  potassium chloride    Tablet ER 20 milliEquivalent(s) Oral once  timolol 0.25% Solution 1 Drop(s) Both EYES two times a day  valproic  acid Syrup 250 milliGRAM(s) Oral three times a day    MEDICATIONS  (PRN):  acetaminophen   Oral Liquid .. 650 milliGRAM(s) Oral every 6 hours PRN Temp greater or equal to 38C (100.4F), Mild Pain (1 - 3)  guaiFENesin Oral Liquid (Sugar-Free) 200 milliGRAM(s) Oral every 6 hours PRN Cough  senna 2 Tablet(s) Oral at bedtime PRN for constipation      PHYSICAL EXAM  Vital Signs Last 24 Hrs  T(C): 37 (18 Sep 2023 07:05), Max: 37 (18 Sep 2023 07:05)  T(F): 98.6 (18 Sep 2023 07:05), Max: 98.6 (18 Sep 2023 07:05)  HR: 94 (18 Sep 2023 07:05) (94 - 145)  BP: 149/110 (18 Sep 2023 07:05) (118/87 - 152/95)  BP(mean): --  RR: 20 (18 Sep 2023 07:05) (18 - 20)  SpO2: 95% (18 Sep 2023 07:05) (95% - 100%)    Parameters below as of 18 Sep 2023 07:05  Patient On (Oxygen Delivery Method): room air        General: appears stated age,  no acute distress  HEENT: NC/AT; EOMI, PERRL, sclera nonicteric; external ears normal; no rhinorrhea or epistaxis; mucous membranes moist; oropharynx clear and without erythema  CV: NR, RR; no appreciable r/m/g  Lungs: CTAB, no increased work of breathing  Abdomen: Bowel sounds present; soft, NTND  MSK: Vertebral spine non-tender to palpation  Neuro: AAOx3; cranial nerves II-XII intact; strength 5/5 in upper and lower extremities; sensation to light touch in tact bilaterally.  Psych: Full affect; mood congruent  Skin: no visible rashes on limited examination    IMAGING/LABS/PATHOLOGY: I have personally reviewed the relevant labs, pathology, and imaging as noted in the HPI.  In addition,                          13.6   11.25 )-----------( 206      ( 18 Sep 2023 07:22 )             41.1     09-18    137  |  99  |  16  ----------------------------<  110<H>  3.4<L>   |  23  |  0.40<L>    Ca    8.7      18 Sep 2023 07:22  Phos  2.7     09-17  Mg     2.3     09-18          ASSESSMENT/PLAN    KATIE VALLES :  67 y.o. M h/o GBM s/p resection 8/2022, and s/p Right brain RT with Dr. Trujillo to 60gy completed 12/2022.  Resides at [facility], brought in for arm weakness noted on 9/7 ED admission.  Daughter relayed during a visit to [place] in May, he had violent seizures, and was hospitalized and intubated/ventilated x 2 weeks with a hospitalization lasting two months.  Imaging shows disease progression to the same site  of previous RT.  Daughter indicated some response months ago from Avastin and prefers infusions with Avastin if possible.  Daughter is not incline for more RT after discussion, and does not want transfer to another hospital now.  She will f/u Dr. Carias for discussion of Avastin? if possible.  Also pending IVC filter for prophylaxis of DVT.      HPI:  Spoke to daughter Aretha today to gain further history.  Her father is 67 y.o. M h/o GBM s/p resection 8/2022, and s/p 60gy Right brain RT with Dr. Trujillo completed 12/2022.  Resides at [facility] and was transferred to multiple people who either could not help or hung up immediately.   No NSG intervention for disease progression seen on imaging.  Daughter also states while on vacation in [place]- May 11, 2023 Mr. Valles had violent seizures, was hospitalized and intubated/ventilated for two weeks while in [place].   Also stated Dr. Carias is the neuro - oncologist- will discuss.     Patient seen/examined today, brother Juanpablo? at bedside.  Stable, NAD when seen, left side cannot move.     Patient per RN notes, c/o headache today and HTN noted with A-fib being managed.     67M w/ history of glioblastoma, HTN, seizure disorder, A-fib on Xarelto, prior stroke w/ residual L-sided weakness, sent from nursing home for cough and altered mental status.  On initial evaluation, patient is AO x0, unable to provide history.  Per EMS collateral, nursing home facility noted cough productive of phlegm starting yesterday, gave 1 dose of azithromycin.  Noted worsening mental status, for which daughter was concerned, so she requested ED evaluation. Daughter also noticed hallucinations for several days which she states is an indication of infection for him. Pt currently denies any pain or discomfort. States he wants speech/ swallow study and someone to talk to Dr. Carias his neurologist.    Pall care note: 9/13- family opted for hospice.  Spoke to daughter Aretha, she is more interested in the Avastin over more RT.   Jose spoke to Dr. Benson- no NSG plans and confirmed again she prefers Avastin.     Pre-Op Diagnosis, Post-Op Diagnosis and Procedure:   Pre-Op, Post-Op and Procedure Selector:  ·  PRE-OP DIAGNOSIS:  Brain tumor 31-Aug-2022 12:48:05  Doris Mercer.  ·  POST-OP DIAGNOSIS:  Brain tumor 31-Aug-2022 12:48:20  Doris Mercer.  ·  PROCEDURES:  Craniotomy, with stereotactic guidance, for tumor 31-Aug-2022 12:47:51  Doris Mercer.      Operative Findings:  · Operative Findings	Right craniotomy with resection/open biopsy of tumor, frozen, glial        patient seen by palliative care:     · Participants	Family    Conversation Discussion:  · Conversation	Diagnosis; Prognosis; MOLST Discussed; Hospice Referral  · Conversation Details	Met with patient at bedside, introduced self and role  patient able to state that he spoke with Dr. Carias and understands his prognosis  He states he had to "make a choice." Asked him what his thoughts were in terms of what was discussed with him  He states that he has "decided to let go" and he wants to go home. Explored what he meant by this and he confirmed he would not want CPR, life support machines and wanted to go home with hospice and live pain free for whatever amount of time he has. Primary team NP present during conversation as well.    Attempted outreach to daughter Aretha to update. No answer, non urgent voicemail left  Call back completed and discussed with Aretha regarding disposition options.  Reviewed [facility] +/- their comfort care program vs. home hospice.   Reviewed basic of hospice eligibility, criteria and program details. Daughter amenable to hospice referral and understands likely need to private hire additional help. Medicaid application in progress but not approved.   DNR/DNI completed on MOLST on this date based on patients wishes and daughter in agreement to support. CM should send Home hospice referral    Patient does not meet criteria for inpatient hospice. this was reviewed with primary team and family.    Interventional:   HPI: 68 yo male with GBM and imaging demonstrating few new hemorrhagic foci. Lower extremity duplex demonstrates left external iliac & femoral DVT. IR consulted for IVC filter placement.     < from: CT Head No Cont (09.18.23 @ 06:54) >  IMPRESSION:   Unchanged residual neoplasm in the RIGHT parietal lobe with   calcifications probable microhemorrhages. Mild surrounding vasogenic   edema is again noted with minimal mass effect on the RIGHT lateral   ventricle but no midline shift.  mucosal thickening in the RIGHT maxillary sinus.    --- End of Report ---      < from: MR Head w/wo IV Cont (09.09.23 @ 21:02) >  IMPRESSION:    There has been a substantial interval progression of the enhancing tumor   involving the right cerebral hemisphere compared to the prior brain MRI   study from 07/08/2023.    The vasogenic edema, mass effect, and midline shift is stable compared to   the more recent 09/07/2023 head CT.    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    2019 novel coronavirus disease (COVID-19)  February 20201 - received monoclonal AB infusion    GERD (gastroesophageal reflux disease)    No significant past surgical history    H/O prostatectomy  4/27/10    H/O arthroscopy of right knee  1988    H/O colonoscopy    H/O endoscopy    Elective surgery  Percutaneous Cholecystostomy Drainage September 2021        FAMILY HISTORY:  Family history of diabetes mellitus (DM) (Mother)    Family history of TIAs (Mother)    Family hx of prostate cancer (Father)    Family history of bone cancer (Father)    Family history of appendicitis        MEDICATIONS  (STANDING):  albuterol/ipratropium for Nebulization 3 milliLiter(s) Nebulizer every 6 hours  allopurinol 100 milliGRAM(s) Oral daily  atorvastatin 20 milliGRAM(s) Oral at bedtime  brimonidine 0.2% Ophthalmic Solution 1 Drop(s) Both EYES two times a day  dexAMETHasone  Injectable 4 milliGRAM(s) IV Push every 12 hours  FLUoxetine 20 milliGRAM(s) Oral daily  lacosamide IVPB 200 milliGRAM(s) IV Intermittent every 12 hours  levETIRAcetam  IVPB 1000 milliGRAM(s) IV Intermittent every 12 hours  losartan 50 milliGRAM(s) Oral daily  metoprolol tartrate 25 milliGRAM(s) Oral two times a day  pantoprazole  Injectable 40 milliGRAM(s) IV Push daily  potassium chloride    Tablet ER 20 milliEquivalent(s) Oral once  timolol 0.25% Solution 1 Drop(s) Both EYES two times a day  valproic  acid Syrup 250 milliGRAM(s) Oral three times a day    MEDICATIONS  (PRN):  acetaminophen   Oral Liquid .. 650 milliGRAM(s) Oral every 6 hours PRN Temp greater or equal to 38C (100.4F), Mild Pain (1 - 3)  guaiFENesin Oral Liquid (Sugar-Free) 200 milliGRAM(s) Oral every 6 hours PRN Cough  senna 2 Tablet(s) Oral at bedtime PRN for constipation      PHYSICAL EXAM  Vital Signs Last 24 Hrs  T(C): 37 (18 Sep 2023 07:05), Max: 37 (18 Sep 2023 07:05)  T(F): 98.6 (18 Sep 2023 07:05), Max: 98.6 (18 Sep 2023 07:05)  HR: 94 (18 Sep 2023 07:05) (94 - 145)  BP: 149/110 (18 Sep 2023 07:05) (118/87 - 152/95)  BP(mean): --  RR: 20 (18 Sep 2023 07:05) (18 - 20)  SpO2: 95% (18 Sep 2023 07:05) (95% - 100%)    Parameters below as of 18 Sep 2023 07:05  Patient On (Oxygen Delivery Method): room air      KPS 40  General: appears stated age, NAD,   HEENT: NC/AT; +glasses, left facial palsy seen, EOMI, PERRL, sclera nonicteric; external ears normal; no rhinorrhea or epistaxis; mucous membranes moist; oropharynx clear and without erythema  CV: NR, RR; no appreciable r/m/g  Lungs: CTAB, no increased work of breathing  Abdomen: Bowel sounds present; soft, NTND  MSK: Vertebral spine non-tender to palpation  Neuro: AAOx2; delirium at times, left arm/immobile, left leg/foot ; cannot raise, cannot wiggle toes.  able to wiggle toes on right foot.       IMAGING/LABS/PATHOLOGY: I have personally reviewed the relevant labs, pathology, and imaging as noted in the HPI.  In addition,                          13.6   11.25 )-----------( 206      ( 18 Sep 2023 07:22 )             41.1     09-18    137  |  99  |  16  ----------------------------<  110<H>  3.4<L>   |  23  |  0.40<L>    Ca    8.7      18 Sep 2023 07:22  Phos  2.7     09-17  Mg     2.3     09-18          ASSESSMENT/PLAN    KATIE VALLES :  67 y.o. M h/o GBM s/p resection 8/2022, and s/p Right brain RT with Dr. Trujillo to 60gy completed 12/2022.  Resides at [facility], brought in for arm weakness noted on 9/7 ED admission.  Daughter relayed during a visit to [place] in May, he had violent seizures, and was hospitalized and intubated/ventilated x 2 weeks with a hospitalization lasting two months.  Imaging shows disease progression to the same site of previous RT.  Daughter indicated some response months ago from Avastin and prefers infusions with Avastin if possible.  Daughter is not incline for more RT after discussion, and does not want transfer to another hospital now.  Palliative RT will offer little benefit if any.  It will not change his prognosis nor life quality.  Daughter Aretha will f/u Dr. Carias for discussion of Avastin? if possible.  Also pending IVC filter for prophylaxis of DVT's seen.   KPS is 40 at best.   D/w Dr. Riggs.      HPI:  Spoke to daughter Aretha today to gain further history.  Her father is 67 y.o. M h/o GBM s/p resection 8/2022, and s/p 60gy Right brain RT with Dr. Trujillo completed 12/2022.  Resides at [facility] and was transferred to multiple people who either could not help or hung up immediately.   No NSG intervention for disease progression seen on imaging.  Daughter also states while on vacation in [place]- May 11, 2023 Mr. Jalloh had violent seizures, was hospitalized and intubated/ventilated for two weeks while in [place]. He has not been ambulatory or out of bed since that admission.   Also stated Dr. Carias is the neuro - oncologist- will discuss.     Patient seen/examined today, brother Juanpablo at bedside.  Stable, NAD when seen, left side cannot move. States that he feels lousy, about to have midline placed for access.      Patient per RN notes, c/o headache today and HTN noted with A-fib being managed.     67M w/ history of glioblastoma, HTN, seizure disorder, A-fib on Xarelto, prior stroke w/ residual L-sided weakness, sent from nursing home for cough and altered mental status.  On initial evaluation, patient is AO x0, unable to provide history.  Per EMS collateral, nursing home facility noted cough productive of phlegm starting yesterday, gave 1 dose of azithromycin.  Noted worsening mental status, for which daughter was concerned, so she requested ED evaluation. Daughter also noticed hallucinations for several days which she states is an indication of infection for him. Pt currently denies any pain or discomfort. States he wants speech/ swallow study and someone to talk to Dr. Carias his neurologist.    Pall care note: 9/13- family opted for hospice.  Spoke to daughter Aretha, she is more interested in the Avastin over more RT.   Daugther spoke to Dr. Benson- no NSG plans and confirmed again she prefers Avastin.     Pre-Op Diagnosis, Post-Op Diagnosis and Procedure:   Pre-Op, Post-Op and Procedure Selector:  ·  PRE-OP DIAGNOSIS:  Brain tumor 31-Aug-2022 12:48:05  Doris Mercer.  ·  POST-OP DIAGNOSIS:  Brain tumor 31-Aug-2022 12:48:20  Doris Mercer.  ·  PROCEDURES:  Craniotomy, with stereotactic guidance, for tumor 31-Aug-2022 12:47:51  Doris Mercer.      Operative Findings:  · Operative Findings	Right craniotomy with resection/open biopsy of tumor, frozen, glial        patient seen by palliative care:     · Participants	Family    Conversation Discussion:  · Conversation	Diagnosis; Prognosis; MOLST Discussed; Hospice Referral  · Conversation Details	Met with patient at bedside, introduced self and role  patient able to state that he spoke with Dr. Carias and understands his prognosis  He states he had to "make a choice." Asked him what his thoughts were in terms of what was discussed with him  He states that he has "decided to let go" and he wants to go home. Explored what he meant by this and he confirmed he would not want CPR, life support machines and wanted to go home with hospice and live pain free for whatever amount of time he has. Primary team NP present during conversation as well.    Attempted outreach to daughter Aretha to update. No answer, non urgent voicemail left  Call back completed and discussed with Aretha regarding disposition options.  Reviewed [facility] +/- their comfort care program vs. home hospice.   Reviewed basic of hospice eligibility, criteria and program details. Daughter amenable to hospice referral and understands likely need to private hire additional help. Medicaid application in progress but not approved.   [facility] based on patients wishes and daughter in agreement to support. CM should send Home hospice referral    Patient does not meet criteria for inpatient hospice. this was reviewed with primary team and family.    Interventional:   HPI: 68 yo male with GBM and imaging demonstrating few new hemorrhagic foci. Lower extremity duplex demonstrates left external iliac & femoral DVT. IR consulted for IVC filter placement.     < from: CT Head No Cont (09.18.23 @ 06:54) >  IMPRESSION:   Unchanged residual neoplasm in the RIGHT parietal lobe with   calcifications probable microhemorrhages. Mild surrounding vasogenic   edema is again noted with minimal mass effect on the RIGHT lateral   ventricle but no midline shift.  mucosal thickening in the RIGHT maxillary sinus.    --- End of Report ---      < from: MR Head w/wo IV Cont (09.09.23 @ 21:02) >  IMPRESSION:    There has been a substantial interval progression of the enhancing tumor   involving the right cerebral hemisphere compared to the prior brain MRI   study from 07/08/2023.    The vasogenic edema, mass effect, and midline shift is stable compared to   the more recent 09/07/2023 head CT.    Allergies  - No Known Allergies  Intolerances  - none    ROS: [  ] Fever  [  ] Chills  [  ]Chest Pain [  ] SOB  [  ]Cough [  ] N/V  [  ] Diarrhea [  ]Constipation [  ]Other ROS:  [  ] ROS otherwise negative    PAST MEDICAL & SURGICAL HISTORY:  GERD (gastroesophageal reflux disease)  Prostate ca  - s/p prostatectomy 4/27/10  Hypertension  Gout  Atrial fibrillation  - paroxysmal  Splenic infarction  - in 2019 and 2021, 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  2019 novel coronavirus disease (COVID-19)  February 20201 - received monoclonal AB infusion  GERD (gastroesophageal reflux disease)  H/O arthroscopy of right knee  - 1988  H/O colonoscopy  H/O endoscopy  Elective surgery  - Percutaneous Cholecystostomy Drainage September 2021    FAMILY HISTORY:  Family history of diabetes mellitus (DM) (Mother)  Family history of TIAs (Mother)  Family hx of prostate cancer (Father)  Family history of bone cancer (Father)  Family history of appendicitis    MEDICATIONS  (STANDING):  albuterol/ipratropium for Nebulization 3 milliLiter(s) Nebulizer every 6 hours  allopurinol 100 milliGRAM(s) Oral daily  atorvastatin 20 milliGRAM(s) Oral at bedtime  brimonidine 0.2% Ophthalmic Solution 1 Drop(s) Both EYES two times a day  dexAMETHasone  Injectable 4 milliGRAM(s) IV Push every 12 hours  FLUoxetine 20 milliGRAM(s) Oral daily  lacosamide IVPB 200 milliGRAM(s) IV Intermittent every 12 hours  levETIRAcetam  IVPB 1000 milliGRAM(s) IV Intermittent every 12 hours  losartan 50 milliGRAM(s) Oral daily  metoprolol tartrate 25 milliGRAM(s) Oral two times a day  pantoprazole  Injectable 40 milliGRAM(s) IV Push daily  potassium chloride    Tablet ER 20 milliEquivalent(s) Oral once  timolol 0.25% Solution 1 Drop(s) Both EYES two times a day  valproic  acid Syrup 250 milliGRAM(s) Oral three times a day    MEDICATIONS  (PRN):  acetaminophen   Oral Liquid .. 650 milliGRAM(s) Oral every 6 hours PRN Temp greater or equal to 38C (100.4F), Mild Pain (1 - 3)  guaiFENesin Oral Liquid (Sugar-Free) 200 milliGRAM(s) Oral every 6 hours PRN Cough  senna 2 Tablet(s) Oral at bedtime PRN for constipation      PHYSICAL EXAM  Vital Signs Last 24 Hrs  T(C): 37 (18 Sep 2023 07:05), Max: 37 (18 Sep 2023 07:05)  T(F): 98.6 (18 Sep 2023 07:05), Max: 98.6 (18 Sep 2023 07:05)  HR: 94 (18 Sep 2023 07:05) (94 - 145)  BP: 149/110 (18 Sep 2023 07:05) (118/87 - 152/95)  BP(mean): --  RR: 20 (18 Sep 2023 07:05) (18 - 20)  SpO2: 95% (18 Sep 2023 07:05) (95% - 100%)    Parameters below as of 18 Sep 2023 07:05  Patient On (Oxygen Delivery Method): room air    KPS 40  General: appears stated age, NAD,   HEENT: NC/AT; +glasses, left facial palsy seen, EOMI, PERRL, sclera nonicteric; external ears normal; no rhinorrhea or epistaxis; mucous membranes moist; oropharynx clear and without erythema  CV: NR, RR; no appreciable r/m/g  Lungs: CTAB, no increased work of breathing  Abdomen: Bowel sounds present; soft, NTND  MSK: Vertebral spine non-tender to palpation  Neuro: AAOx2; left arm/immobile, left leg/foot ; cannot raise, cannot wiggle toes. able to wiggle toes on right foot.     IMAGING/LABS/PATHOLOGY: I have personally reviewed the relevant labs, pathology, and imaging as noted in the HPI.  In addition,                          13.6   11.25 )-----------( 206      ( 18 Sep 2023 07:22 )             41.1     09-18    137  |  99  |  16  ----------------------------<  110<H>  3.4<L>   |  23  |  0.40<L>    Ca    8.7      18 Sep 2023 07:22  Phos  2.7     09-17  Mg     2.3     09-18

## 2023-09-18 NOTE — CHART NOTE - NSCHARTNOTEFT_GEN_A_CORE
Repeat CT reviewed w/ stable findings.    -No absolute contraindication to AC. While there is no absolute neurosurgical contraindication to systemic anti coagulation, there does exist an increased risk of intracranial hemorrhage which can result in significant morbidity including but not limited to headache, seizure, stroke, paralysis, and in severe cases even death.    The risks and benefits of starting systemic anticoagulation must be considered carefully in the setting of the patients medical history and current clinical condition.    -Please obtain stat CT if change in neuro exam and contact neurosurgery

## 2023-09-18 NOTE — DIETITIAN NUTRITION RISK NOTIFICATION - TREATMENT: THE FOLLOWING DIET HAS BEEN RECOMMENDED
Diet, NPO with Tube Feed:   Tube Feeding Modality: Gastrostomy  Glucerna 1.5 [place] (GLUCERNA1.5RTH)  Total Volume for 24 Hours (mL): 1200  Continuous  Starting Tube Feed Rate {mL per Hour}: 10  Until Goal Tube Feed Rate (mL per Hour): 50  Tube Feed Duration (in Hours): 24  Tube Feed Start Time: 17:00 (09-08-23 @ 16:03) [Active]

## 2023-09-19 NOTE — PROGRESS NOTE ADULT - SUBJECTIVE AND OBJECTIVE BOX
Geraldo Belcher MD PGY-5 Hematology Oncology  Reachable on TEAMS    INTERVAL HPI/OVERNIGHT EVENTS:  Patient S&E at bedside. No acute events, no active complaints    VITAL SIGNS:  T(F): 97.8 (09-19-23 @ 07:19)  HR: 86 (09-19-23 @ 07:19)  BP: 157/98 (09-19-23 @ 07:19)  RR: 17 (09-19-23 @ 07:19)  SpO2: 99% (09-19-23 @ 07:19)  Wt(kg): --    PHYSICAL EXAM:    Constitutional: NAD  Eyes: EOMI, sclera non-icteric  Neck: supple, no masses, no JVD  Respiratory: CTA b/l  Cardiovascular: RRR, no M/R/G  Gastrointestinal: soft, NTND, no masses palpable, + BS, no hepatosplenomegaly  Extremities: no c/c/e  Neurological: no focal deficits      MEDICATIONS  (STANDING):  albuterol/ipratropium for Nebulization 3 milliLiter(s) Nebulizer every 6 hours  allopurinol 100 milliGRAM(s) Oral daily  atorvastatin 20 milliGRAM(s) Oral at bedtime  brimonidine 0.2% Ophthalmic Solution 1 Drop(s) Both EYES two times a day  dexAMETHasone  Injectable 4 milliGRAM(s) IV Push every 12 hours  enoxaparin Injectable 110 milliGRAM(s) SubCutaneous every 12 hours  FLUoxetine 20 milliGRAM(s) Oral daily  lacosamide IVPB 200 milliGRAM(s) IV Intermittent every 12 hours  levETIRAcetam  IVPB 1000 milliGRAM(s) IV Intermittent every 12 hours  losartan 50 milliGRAM(s) Oral daily  metoprolol tartrate 50 milliGRAM(s) Enteral Tube three times a day  pantoprazole  Injectable 40 milliGRAM(s) IV Push daily  timolol 0.25% Solution 1 Drop(s) Both EYES two times a day  valproic  acid Syrup 250 milliGRAM(s) Oral three times a day    MEDICATIONS  (PRN):  acetaminophen   Oral Liquid .. 650 milliGRAM(s) Oral every 6 hours PRN Temp greater or equal to 38C (100.4F), Mild Pain (1 - 3)  guaiFENesin Oral Liquid (Sugar-Free) 200 milliGRAM(s) Oral every 6 hours PRN Cough  senna 2 Tablet(s) Oral at bedtime PRN for constipation      Allergies    No Known Allergies    Intolerances        LABS:                        12.7   9.23  )-----------( 208      ( 19 Sep 2023 11:31 )             37.5     09-19    142  |  103  |  20  ----------------------------<  119<H>  4.8   |  26  |  0.40<L>    Ca    8.3<L>      19 Sep 2023 11:31  Phos  3.6     09-19  Mg     2.4     09-19      PT/INR - ( 18 Sep 2023 07:22 )   PT: 11.5 sec;   INR: 1.10 ratio         PTT - ( 18 Sep 2023 07:22 )  PTT:24.9 sec  Urinalysis Basic - ( 19 Sep 2023 11:31 )    Color: x / Appearance: x / SG: x / pH: x  Gluc: 119 mg/dL / Ketone: x  / Bili: x / Urobili: x   Blood: x / Protein: x / Nitrite: x   Leuk Esterase: x / RBC: x / WBC x   Sq Epi: x / Non Sq Epi: x / Bacteria: x        RADIOLOGY & ADDITIONAL TESTS:  Studies reviewed.

## 2023-09-19 NOTE — CHART NOTE - NSCHARTNOTEFT_GEN_A_CORE
#Glioblastoma Multiforme  Pt follows Dr. Carias (Neuro-oncologist). He was initially diagnosed in August 2022 with a high grade glioblastoma. He underwent a craniotomy in August 2022 with Dr. Chaney. He subsequently was treated with adjuvant chemoRT with Temdar for 5 cycles with Avastin added for cycles 4 and 5 and RT and  30 fractions for a total of 6000 cgy on 12/1/2023 to the right brain.    Hematology consulted in setting of new DVT.   Recommended AC; however neurosurgery preference of IVC filter.     Hematology will sign off at this time. Please call for any further questions.     Emily Richards MD   PGY6 heme  onc fellow

## 2023-09-19 NOTE — CONSULT NOTE ADULT - SUBJECTIVE AND OBJECTIVE BOX
CHIEF COMPLAINT:Patient is a 67y old  Male who presents with a chief complaint of AMS, Worsening GBM w/ shift and Paraflu/ Enterovirus infection (18 Sep 2023 15:35)      HISTORY OF PRESENT ILLNESS:    67 male with glioblastoma c/b seizures, A-fib on Xarelto, prior stroke w/ residual L-sided weakness who is brought in from nursing home for cough and acute encephalopathy found to have worsening GBM with edema and midline shift.  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      2019 novel coronavirus disease (COVID-19)  February 20201 - received monoclonal AB infusion      GERD (gastroesophageal reflux disease)      H/O prostatectomy  4/27/10      H/O arthroscopy of right knee  1988      H/O colonoscopy      H/O endoscopy      Elective surgery  Percutaneous Cholecystostomy Drainage September 2021              MEDICATIONS:  enoxaparin Injectable 110 milliGRAM(s) SubCutaneous every 12 hours  losartan 50 milliGRAM(s) Oral daily  metoprolol tartrate 25 milliGRAM(s) Oral two times a day      albuterol/ipratropium for Nebulization 3 milliLiter(s) Nebulizer every 6 hours  guaiFENesin Oral Liquid (Sugar-Free) 200 milliGRAM(s) Oral every 6 hours PRN    acetaminophen   Oral Liquid .. 650 milliGRAM(s) Oral every 6 hours PRN  FLUoxetine 20 milliGRAM(s) Oral daily  lacosamide IVPB 200 milliGRAM(s) IV Intermittent every 12 hours  levETIRAcetam  IVPB 1000 milliGRAM(s) IV Intermittent every 12 hours  valproic  acid Syrup 250 milliGRAM(s) Oral three times a day    pantoprazole  Injectable 40 milliGRAM(s) IV Push daily  senna 2 Tablet(s) Oral at bedtime PRN    allopurinol 100 milliGRAM(s) Oral daily  atorvastatin 20 milliGRAM(s) Oral at bedtime  dexAMETHasone  Injectable 4 milliGRAM(s) IV Push every 12 hours    brimonidine 0.2% Ophthalmic Solution 1 Drop(s) Both EYES two times a day  timolol 0.25% Solution 1 Drop(s) Both EYES two times a day      FAMILY HISTORY:  Family history of diabetes mellitus (DM) (Mother)    Family history of TIAs (Mother)    Family hx of prostate cancer (Father)    Family history of bone cancer (Father)    Family history of appendicitis        Non-contributory    SOCIAL HISTORY:    No tobacco, drugs or etoh    Allergies    No Known Allergies    Intolerances    	    REVIEW OF SYSTEMS:  as above  The rest of the 14 points ROS reviewed and except above they are unremarkable.        PHYSICAL EXAM:  T(C): 36.6 (09-19-23 @ 03:00), Max: 36.8 (09-18-23 @ 11:25)  HR: 107 (09-19-23 @ 04:00) (78 - 131)  BP: 153/104 (09-19-23 @ 04:00) (138/114 - 184/107)  RR: 18 (09-19-23 @ 04:00) (18 - 18)  SpO2: 98% (09-19-23 @ 04:00) (94% - 100%)  Wt(kg): --  I&O's Summary    18 Sep 2023 07:01  -  19 Sep 2023 07:00  --------------------------------------------------------  IN: 0 mL / OUT: 150 mL / NET: -150 mL        JVP: Normal  Neck: supple  Lung: clear   CV: S1 S2 , Murmur:  Abd: soft  Ext: pos edema  neuro: Awake / alert  Psych: flat affect  Skin: normal      LABS/DATA:    TELEMETRY: 	    ECG:  	   	  CARDIAC MARKERS:                                      13.6   11.25 )-----------( 206      ( 18 Sep 2023 07:22 )             41.1     09-19    140  |  101  |  20  ----------------------------<  131<H>  4.2   |  24  |  0.44<L>    Ca    8.7      19 Sep 2023 00:30  Phos  3.5     09-19  Mg     2.4     09-19      proBNP:   Lipid Profile:   HgA1c:   TSH:     < from: TTE Limited W or WO Ultrasound Enhancing Agent (07.21.23 @ 07:29) >   1. The left ventricular systolic function is mildly decreased with an ejection fraction of 52 % by Andrade's method of disks. The patient is in Afib, there is significant azhr-[id number] variation.   2. Normal right ventricular cavity size, normal right ventricular wall thickness and normal right ventricular systolic function.   3. Normal atria.   4. The inferior vena cava is normal in size (normal <2.1cm) with normal inspiratory collapse (normal >50%) consistent with normal right atrial pressure (~3, range 0-5mmHg).   5. Estimated pulmonaryartery systolic pressure is 36 mmHg.   6. Compared to the transthoracic echocardiogram performed on 7/16/2023 there have been no significant interval changes.    ________________________________________________________________________________________    < end of copied text >

## 2023-09-19 NOTE — PROGRESS NOTE ADULT - PROBLEM SELECTOR PLAN 7
-continue metoprolol tartrate 50mg TID  -s/p extensive chart review: a fib diagnosed in 2019, has been on xarelto since then. hx of splenic infarct x2 in 2019, 2021 in setting of holding AC   - AC management  per above  - recommendation as above per cards to increase metoprolol to 50 tid Dr. Segal

## 2023-09-19 NOTE — PROGRESS NOTE ADULT - NSPROGADDITIONALINFOA_GEN_ALL_CORE
Plan of care discussed with ACP Keli.   Brother at bedside- he will plan to touch base with family with discussion with Dr. Carias

## 2023-09-19 NOTE — PROGRESS NOTE ADULT - PROBLEM SELECTOR PLAN 3
VA duplex noted DVT from the left side femoral vein through the external iliac vein; hemodynamically stable  - s/p hematology team evaluation, extensive discussion with Dr. Gray, recommend change AC from DOAC to subQ lovenox 1mg /kg BID, repeat CT head, f/u antiphospholid syndrome workup   - s/p neurosurgery evaluation, extensive discussion with Dr. Benson, neurosurgery team reviewed CT head, MRI head, no clinically significant ICH noted, no absolute contraindication to AC, though noted high risk of AC in setting of advanced GBM  - previous hospitalist discussed with tereza Carias, reviewed risks/benefits of AC, agree with  AC with subQ lovenox   - c/w neurocheck q4h, f/u repeat CT head 9/17 showing no change   - Per Neurosurgery no contraindications for FD AC, will obtain STAT CT if there is a change in neuro exam and reach out to NSx.- discussed this plan with tereza Aretha

## 2023-09-19 NOTE — PROGRESS NOTE ADULT - PROBLEM SELECTOR PLAN 2
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   -MRI head w/ and w/o IV Contrast 9/9 completed, and noted " a substantial interval progression of the enhancing tumor involving the right cerebral hemisphere compared to the prior brain MRI study from 07/08/2023."   -c/w Decadron 4mg IV BID, neuro check q4h  -9/16 s/p discussion with patient and daughter, interested in disease modifying treatment   - s/p neurosurgery evaluation, rec: "given this is a recurrent GBM, and given that surgery represents a high risk of significant morbidity, the risks and benefits were discussed with the patient and his daughter, who expressed that such a surgery would not be within their GOC."  - radiation oncology consulted, per notes pt's daughter is in favor of Avastin over RT   - neurooncology Dr. Carias following, will meet with family today at 4 pm to discuss treatment vs palliation

## 2023-09-19 NOTE — PATIENT PROFILE ADULT - SAFE PLACE TO LIVE
Pt is experiencing break bleeding while on BC. Has further questions regarding BC.      Pls advise no

## 2023-09-19 NOTE — PROGRESS NOTE ADULT - PROBLEM SELECTOR PLAN 4
exudative cellulitis at PEG incision site   - PEG tube is working well, no current tenderness or erythema  - s/p CT A/P Percutaneous gastrostomy tube is in satisfactory position. No inflammation along the course of the gastrostomy tract. No abdominal wall collection.  -MRSA negative in July 2023  - s/p ancef (started on 9/9- 9/12  )--- 9/12 lethargic mental status, exudate and erythema at site, upgrade antibiotics to vancomycin (9/12- 9/17  ) , f/u BCx 9/12 NGTD, f/u lactate 2.8, f/u rectal temp 98 WNL, trend vanc trough, clinically responded well, completed 5 day course

## 2023-09-19 NOTE — PROGRESS NOTE ADULT - PROBLEM SELECTOR PLAN 6
Also enterovirus infection as well. Procalcitonin WNL  - given azithromycin at [facility] on 9/7  - completed 3 days of empiric azithromycin (9/7 - 9/9)  - acetaminophen and mucinex PRN  - Cont. supplemental 02 PRN  - Duo nebs Q6hrs

## 2023-09-19 NOTE — PROGRESS NOTE ADULT - ATTENDING COMMENTS
67M w/ history of glioblastoma c/b seizures, A-fib on Xarelto, prior stroke w/ residual L-sided weakness who is brought in from nursing home for cough and acute encephalopathy found to have worsening GBM with edema and midline shift, and a lower extremity DVT. He is on Lovenox 1 mg/kg BID - repeat CT head on AC negative for worsening hemorrhage. Pt has overall poor prognosis and candidate for hospice. family meeting with Dr Carias today

## 2023-09-19 NOTE — PATIENT PROFILE ADULT - FALL HARM RISK - HARM RISK INTERVENTIONS

## 2023-09-19 NOTE — CONSULT NOTE ADULT - ASSESSMENT
long standing afib  HR a bit elevated  increase metoprolol to 50 tid  cont a/c    HTN  stable      Advanced care planning was discussed with patient and family.  Risks, benefits and alternatives of the cardiac treatments and medical therapy including procedures were discussed in detail and all questions were answered. Importance of compliance with medical therapy and lifestyle modification to improve cardiovascular health were addressed. Appropriate forms and patient educational materials were reviewed. 30 minutes face to face spent.    discussed with brother at the bedside

## 2023-09-19 NOTE — PROGRESS NOTE ADULT - SUBJECTIVE AND OBJECTIVE BOX
[facility] Division of Hospital Medicine  Elisabeth Gunter MD  Available via MS Teams      SUBJECTIVE / OVERNIGHT EVENTS: No acute events overnight. Denies HA, blurry vision, abdominal pain. Brother at bedside. Reports some confusion which daughter Aretha noted on phone call yesterday since symptoms started.     ADDITIONAL REVIEW OF SYSTEMS: ROS reviewed and found to be negative.    MEDICATIONS  (STANDING):  albuterol/ipratropium for Nebulization 3 milliLiter(s) Nebulizer every 6 hours  allopurinol 100 milliGRAM(s) Oral daily  atorvastatin 20 milliGRAM(s) Oral at bedtime  brimonidine 0.2% Ophthalmic Solution 1 Drop(s) Both EYES two times a day  dexAMETHasone  Injectable 4 milliGRAM(s) IV Push every 12 hours  enoxaparin Injectable 110 milliGRAM(s) SubCutaneous every 12 hours  FLUoxetine 20 milliGRAM(s) Oral daily  lacosamide IVPB 200 milliGRAM(s) IV Intermittent every 12 hours  levETIRAcetam  IVPB 1000 milliGRAM(s) IV Intermittent every 12 hours  losartan 50 milliGRAM(s) Oral daily  metoprolol tartrate 50 milliGRAM(s) Enteral Tube three times a day  pantoprazole  Injectable 40 milliGRAM(s) IV Push daily  timolol 0.25% Solution 1 Drop(s) Both EYES two times a day  valproic  acid Syrup 250 milliGRAM(s) Oral three times a day    MEDICATIONS  (PRN):  acetaminophen   Oral Liquid .. 650 milliGRAM(s) Oral every 6 hours PRN Temp greater or equal to 38C (100.4F), Mild Pain (1 - 3)  guaiFENesin Oral Liquid (Sugar-Free) 200 milliGRAM(s) Oral every 6 hours PRN Cough  senna 2 Tablet(s) Oral at bedtime PRN for constipation      I&O's Summary    18 Sep 2023 07:01  -  19 Sep 2023 07:00  --------------------------------------------------------  IN: 0 mL / OUT: 150 mL / NET: -150 mL        PHYSICAL EXAM:  Vital Signs Last 24 Hrs  T(C): 36.6 (19 Sep 2023 03:00), Max: 36.8 (18 Sep 2023 11:25)  T(F): 97.8 (19 Sep 2023 03:00), Max: 98.2 (18 Sep 2023 11:25)  HR: 107 (19 Sep 2023 04:00) (78 - 131)  BP: 153/104 (19 Sep 2023 04:00) (138/114 - 184/107)  BP(mean): --  RR: 18 (19 Sep 2023 04:00) (18 - 18)  SpO2: 98% (19 Sep 2023 04:00) (94% - 100%)    Parameters below as of 19 Sep 2023 04:00  Patient On (Oxygen Delivery Method): room air      CONSTITUTIONAL: NAD, Laying in bed  EYES: Conjunctiva and sclera clear  ENMT: Moist oral mucosa  NECK: Supple  RESPIRATORY: Normal respiratory effort; lungs are clear to auscultation bilaterally  CARDIOVASCULAR: Regular rate and rhythm, normal S1 and S2  ABDOMEN: Nontender to palpation, normoactive bowel sounds  PSYCH: Alert  NEUROLOGY: Left sided weakness baseline  SKIN: No noted rashes    LABS:                        13.6   11.25 )-----------( 206      ( 18 Sep 2023 07:22 )             41.1     09-19    140  |  101  |  20  ----------------------------<  131<H>  4.2   |  24  |  0.44<L>    Ca    8.7      19 Sep 2023 00:30  Phos  3.5     09-19  Mg     2.4     09-19      PT/INR - ( 18 Sep 2023 07:22 )   PT: 11.5 sec;   INR: 1.10 ratio         PTT - ( 18 Sep 2023 07:22 )  PTT:24.9 sec              SARS-CoV-2: NotDetec (07 Sep 2023 15:06)  SARS-CoV-2: NotDetec (16 Jul 2023 06:22)

## 2023-09-20 NOTE — PROGRESS NOTE ADULT - SUBJECTIVE AND OBJECTIVE BOX
[facility] Division of Hospital Medicine  Elisabeth Gunter MD  Available via MS Teams      SUBJECTIVE / OVERNIGHT EVENTS: No acute events overnight. Family had meeting with Dr. Carias. They will discuss and call back in AM.    ADDITIONAL REVIEW OF SYSTEMS: ROS reviewed and found to be negative.    MEDICATIONS  (STANDING):  albuterol/ipratropium for Nebulization 3 milliLiter(s) Nebulizer every 6 hours  allopurinol 100 milliGRAM(s) Oral daily  atorvastatin 20 milliGRAM(s) Oral at bedtime  brimonidine 0.2% Ophthalmic Solution 1 Drop(s) Both EYES two times a day  dexAMETHasone  Injectable 4 milliGRAM(s) IV Push every 12 hours  enoxaparin Injectable 110 milliGRAM(s) SubCutaneous every 12 hours  FLUoxetine 20 milliGRAM(s) Oral daily  lacosamide IVPB 200 milliGRAM(s) IV Intermittent every 12 hours  levETIRAcetam  IVPB 1000 milliGRAM(s) IV Intermittent every 12 hours  losartan 50 milliGRAM(s) Oral daily  metoprolol tartrate 50 milliGRAM(s) Enteral Tube three times a day  pantoprazole  Injectable 40 milliGRAM(s) IV Push daily  timolol 0.25% Solution 1 Drop(s) Both EYES two times a day  valproic  acid Syrup 250 milliGRAM(s) Oral three times a day    MEDICATIONS  (PRN):  acetaminophen   Oral Liquid .. 650 milliGRAM(s) Oral every 6 hours PRN Temp greater or equal to 38C (100.4F), Mild Pain (1 - 3)  guaiFENesin Oral Liquid (Sugar-Free) 200 milliGRAM(s) Oral every 6 hours PRN Cough  senna 2 Tablet(s) Oral at bedtime PRN for constipation      I&O's Summary    18 Sep 2023 07:01  -  19 Sep 2023 07:00  --------------------------------------------------------  IN: 0 mL / OUT: 150 mL / NET: -150 mL        PHYSICAL EXAM:  Vital Signs Last 24 Hrs  T(C): 36.6 (19 Sep 2023 03:00), Max: 36.8 (18 Sep 2023 11:25)  T(F): 97.8 (19 Sep 2023 03:00), Max: 98.2 (18 Sep 2023 11:25)  HR: 107 (19 Sep 2023 04:00) (78 - 131)  BP: 153/104 (19 Sep 2023 04:00) (138/114 - 184/107)  BP(mean): --  RR: 18 (19 Sep 2023 04:00) (18 - 18)  SpO2: 98% (19 Sep 2023 04:00) (94% - 100%)    Parameters below as of 19 Sep 2023 04:00  Patient On (Oxygen Delivery Method): room air      CONSTITUTIONAL: NAD, Laying in bed  EYES: Conjunctiva and sclera clear  ENMT: Moist oral mucosa  NECK: Supple  RESPIRATORY: Normal respiratory effort; lungs are clear to auscultation bilaterally  CARDIOVASCULAR: Regular rate and rhythm, normal S1 and S2  ABDOMEN: Nontender to palpation, normoactive bowel sounds  PSYCH: Alert  NEUROLOGY: Left sided weakness   SKIN: No noted rashes    LABS:                        13.6   11.25 )-----------( 206      ( 18 Sep 2023 07:22 )             41.1     09-19    140  |  101  |  20  ----------------------------<  131<H>  4.2   |  24  |  0.44<L>    Ca    8.7      19 Sep 2023 00:30  Phos  3.5     09-19  Mg     2.4     09-19      PT/INR - ( 18 Sep 2023 07:22 )   PT: 11.5 sec;   INR: 1.10 ratio         PTT - ( 18 Sep 2023 07:22 )  PTT:24.9 sec              SARS-CoV-2: NotDetec (07 Sep 2023 15:06)  SARS-CoV-2: NotDetec (16 Jul 2023 06:22)

## 2023-09-20 NOTE — PROGRESS NOTE ADULT - SUBJECTIVE AND OBJECTIVE BOX
Please let the patient know her duplex results were normal     Subjective: Patient seen and examined. No new events except as noted.     SUBJECTIVE/ROS:  nad      MEDICATIONS:  MEDICATIONS  (STANDING):  albuterol/ipratropium for Nebulization 3 milliLiter(s) Nebulizer every 6 hours  allopurinol 100 milliGRAM(s) Oral daily  atorvastatin 20 milliGRAM(s) Oral at bedtime  brimonidine 0.2% Ophthalmic Solution 1 Drop(s) Both EYES two times a day  chlorhexidine 2% Cloths 1 Application(s) Topical daily  dexAMETHasone  Injectable 4 milliGRAM(s) IV Push every 12 hours  enoxaparin Injectable 110 milliGRAM(s) SubCutaneous every 12 hours  FLUoxetine 20 milliGRAM(s) Oral daily  lacosamide IVPB 200 milliGRAM(s) IV Intermittent every 12 hours  levETIRAcetam  IVPB 1000 milliGRAM(s) IV Intermittent every 12 hours  losartan 50 milliGRAM(s) Oral daily  metoprolol tartrate 50 milliGRAM(s) Enteral Tube three times a day  pantoprazole  Injectable 40 milliGRAM(s) IV Push daily  timolol 0.25% Solution 1 Drop(s) Both EYES two times a day  valproic  acid Syrup 250 milliGRAM(s) Oral three times a day      PHYSICAL EXAM:  T(C): 36.8 (09-20-23 @ 11:57), Max: 36.8 (09-20-23 @ 11:57)  HR: 87 (09-20-23 @ 11:57) (84 - 119)  BP: 170/87 (09-20-23 @ 11:57) (142/86 - 170/87)  RR: 17 (09-20-23 @ 11:57) (17 - 18)  SpO2: 94% (09-20-23 @ 11:57) (93% - 99%)  Wt(kg): --  I&O's Summary    19 Sep 2023 07:01  -  20 Sep 2023 07:00  --------------------------------------------------------  IN: 120 mL / OUT: 700 mL / NET: -580 mL    20 Sep 2023 07:01  -  20 Sep 2023 14:51  --------------------------------------------------------  IN: 0 mL / OUT: 0 mL / NET: 0 mL            JVP: Normal  Neck: supple  Lung: clear   CV: S1 S2 , Murmur:  Abd: soft  Ext: No edema  neuro: Awake   Psych: flat affect  Skin: normal``    LABS/DATA:    CARDIAC MARKERS:                                12.8   9.01  )-----------( 190      ( 20 Sep 2023 09:46 )             39.9     09-20    143  |  103  |  19  ----------------------------<  111<H>  3.7   |  28  |  0.39<L>    Ca    8.4      20 Sep 2023 09:46  Phos  3.1     09-20  Mg     2.4     09-20      proBNP:   Lipid Profile:   HgA1c:   TSH:     TELE:  EKG:

## 2023-09-20 NOTE — PROGRESS NOTE ADULT - PROBLEM SELECTOR PLAN 6
Also enterovirus infection as well. Procalcitonin WNL  - given azithromycin at [facility] on 9/7  - completed 3 days of empiric azithromycin (9/7 - 9/9)  - acetaminophen and mucinex PRN  - Cont. supplemental 02 PRN  - Duo nebs Q6hrs

## 2023-09-20 NOTE — PROGRESS NOTE ADULT - PROBLEM SELECTOR PLAN 2
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   -MRI head w/ and w/o IV Contrast 9/9 completed, and noted " a substantial interval progression of the enhancing tumor involving the right cerebral hemisphere compared to the prior brain MRI study from 07/08/2023."   -c/w Decadron 4mg IV BID, neuro check q4h  -9/16 s/p discussion with patient and daughter, interested in disease modifying treatment   - s/p neurosurgery evaluation, rec: "given this is a recurrent GBM, and given that surgery represents a high risk of significant morbidity, the risks and benefits were discussed with the patient and his daughter, who expressed that such a surgery would not be within their GOC."  - radiation oncology consulted, per notes pt's daughter is in favor of Avastin over RT   - neurooncology Dr. Carias following, family met with Dr. Carias, will discuss amongst each other and call back in AM

## 2023-09-21 NOTE — PROGRESS NOTE ADULT - PROBLEM SELECTOR PLAN 7
-continue metoprolol tartrate 50mg TID  -s/p extensive chart review: a fib diagnosed in 2019, has been on xarelto since then. 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

## 2023-09-21 NOTE — CONSULT NOTE ADULT - CONSULT REQUESTED BY NAME
Dr Rodriguez
Dr. Wray
primary team
surgery
Primary team
Emilee Wray
[facility]
Emergency Medicine
Dr. Emilee Wray

## 2023-09-21 NOTE — PROGRESS NOTE ADULT - PROBLEM SELECTOR PLAN 2
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   -MRI head w/ and w/o IV Contrast 9/9 completed, and noted " a substantial interval progression of the enhancing tumor involving the right cerebral hemisphere compared to the prior brain MRI study from 07/08/2023."   -c/w Decadron 4mg IV BID, neuro check q4h  -9/16 s/p discussion with patient and daughter, interested in disease modifying treatment   - s/p neurosurgery evaluation, rec: "given this is a recurrent GBM, and given that surgery represents a high risk of significant morbidity, the risks and benefits were discussed with the patient and his daughter, who expressed that such a surgery would not be within their GOC."  - radiation oncology consulted, per notes pt's daughter is in favor of Avastin over RT   - neurooncology Dr. Carias following, family met with Dr. Carias, wants to try Avastin inpatient- will reachout to Dr. Carias to set up   - pt Failed FEESST recommending NPO

## 2023-09-21 NOTE — CONSULT NOTE ADULT - PROVIDER SPECIALTY LIST ADULT
Rad Onc
Neurology
Intervent Radiology
Neurology
Neurosurgery
Cardiology
Heme/Onc
Palliative Care
Palliative Care

## 2023-09-21 NOTE — CONSULT NOTE ADULT - CONSULT REQUESTED DATE/TIME
07-Sep-2023 19:41
12-Sep-2023 08:30
17-Sep-2023 16:36
16-Sep-2023 11:52
16-Sep-2023 19:48
19-Sep-2023 08:30
18-Sep-2023 10:00
21-Sep-2023 13:00
13-Sep-2023

## 2023-09-21 NOTE — CONSULT NOTE ADULT - PROBLEM SELECTOR RECOMMENDATION 2
Right frontal GBM dx 2022 with seizures, found to have multfocal lesion treated with chemo/RT then TMZ and DOUGIE for progressive enhancement   S/P trach (reversed) S/P PEG MAY  MRI with progression of GBM involving the right cerebral hemisphere as compared to 7 Right frontal GBM dx 2022 with seizures, found to have multfocal lesion treated with chemo/RT then TMZ and DOUGIE for progressive enhancement   S/P trach (reversed) S/P PEG MAY  MRI with progression of GBM involving the right cerebral hemisphere as compared to July  Followed by Dr Carias  Offering Avastin infusions

## 2023-09-21 NOTE — PROGRESS NOTE ADULT - NSPROGADDITIONALINFOA_GEN_ALL_CORE
Plan discussed with Daughter Aretha. She wants to try Avastin inpatient. Updated on BB change and FEESST failure. Pt remains NPO

## 2023-09-21 NOTE — PROGRESS NOTE ADULT - PROBLEM SELECTOR PLAN 6
Also enterovirus infection as well. Procalcitonin WNL  - given azithromycin at [facility] on 9/7  - completed 3 days of empiric azithromycin (9/7 - 9/9)  - acetaminophen and mucinex PRN  - Cont. supplemental 02 PRN  - Duo nebs Q6hrs

## 2023-09-21 NOTE — CONSULT NOTE ADULT - CONVERSATION DETAILS
Spoke with surrogate/daughter Aretha by phone for greater than 55 minutes, spoke to cousin William by phone as well at the request of Aretha.  Aretha shares meeting with Dr Carias and the potential for Avastin infusion treatments as a last line therapy. Family in agreement with pursuit of this treatment and agree that if the patient demonstrates adverse symptoms from the infusion , palliative care will evaluate patient for end of life care.   Infusion cannot be administered in the palliative care unit as discussed with team and family.  Discussed case with Dr Gunter who will reach out to Dr Carias to begin infusions and will discuss plan of care with daughter as well.  Please reconsult with any acute issues, for now we will sign off.

## 2023-09-21 NOTE — SWALLOW FEES ASSESSMENT ADULT - ADDITIONAL RECOMMENDATIONS
May consider GOC conversation regarding nutrition/hydration. Pt already has PEG, however, has been requesting ice chips. Recommendations to discuss aspiration risk, code status, pleasure feeds for ice chips vs additional trials. May consider GOC conversation regarding nutrition/hydration. Pt already has PEG, however, has been requesting ice chips. Recommendations to discuss aspiration risk, code status, pleasure feeds for ice chips vs additional trials vs PEG only

## 2023-09-21 NOTE — PROGRESS NOTE ADULT - SUBJECTIVE AND OBJECTIVE BOX
[facility] Division of Hospital Medicine  Elisabeth Gunter MD  Available via MS Teams      SUBJECTIVE / OVERNIGHT EVENTS: No acute events over night BB changed to nadolol. Pt feels weak today, tired. Failed FEESST. He feels confused today though is alert and oriented x3 stable with exam yesterday    ADDITIONAL REVIEW OF SYSTEMS: ROS reviewed and found to be negative     MEDICATIONS  (STANDING):  albuterol/ipratropium for Nebulization 3 milliLiter(s) Nebulizer every 6 hours  allopurinol 100 milliGRAM(s) Oral daily  atorvastatin 20 milliGRAM(s) Oral at bedtime  brimonidine 0.2% Ophthalmic Solution 1 Drop(s) Both EYES two times a day  chlorhexidine 2% Cloths 1 Application(s) Topical daily  dexAMETHasone  Injectable 4 milliGRAM(s) IV Push every 12 hours  enoxaparin Injectable 110 milliGRAM(s) SubCutaneous every 12 hours  FLUoxetine 20 milliGRAM(s) Oral daily  lacosamide IVPB 200 milliGRAM(s) IV Intermittent every 12 hours  levETIRAcetam  IVPB 1000 milliGRAM(s) IV Intermittent every 12 hours  losartan 50 milliGRAM(s) Oral daily  nadolol 40 milliGRAM(s) Oral two times a day  pantoprazole  Injectable 40 milliGRAM(s) IV Push daily  timolol 0.25% Solution 1 Drop(s) Both EYES two times a day  valproic  acid Syrup 250 milliGRAM(s) Oral three times a day    MEDICATIONS  (PRN):  acetaminophen   Oral Liquid .. 650 milliGRAM(s) Oral every 6 hours PRN Temp greater or equal to 38C (100.4F), Mild Pain (1 - 3)  guaiFENesin Oral Liquid (Sugar-Free) 200 milliGRAM(s) Oral every 6 hours PRN Cough  senna 2 Tablet(s) Oral at bedtime PRN for constipation      I&O's Summary    20 Sep 2023 07:01  -  21 Sep 2023 07:00  --------------------------------------------------------  IN: 600 mL / OUT: 400 mL / NET: 200 mL    21 Sep 2023 07:01  -  21 Sep 2023 15:09  --------------------------------------------------------  IN: 0 mL / OUT: 200 mL / NET: -200 mL        PHYSICAL EXAM:  Vital Signs Last 24 Hrs  T(C): 36.8 (21 Sep 2023 13:35), Max: 36.8 (21 Sep 2023 13:35)  T(F): 98.3 (21 Sep 2023 13:35), Max: 98.3 (21 Sep 2023 13:35)  HR: 112 (21 Sep 2023 13:35) (89 - 112)  BP: 166/116 (21 Sep 2023 13:35) (149/114 - 166/116)  BP(mean): --  RR: 18 (21 Sep 2023 13:35) (18 - 18)  SpO2: 95% (21 Sep 2023 13:35) (93% - 95%)    Parameters below as of 21 Sep 2023 13:35  Patient On (Oxygen Delivery Method): room air      CONSTITUTIONAL: NAD, Laying in bed, hoarse voice   EYES: Conjunctiva and sclera clear  ENMT: Moist oral mucosa  NECK: Supple  RESPIRATORY: Normal respiratory effort; lungs are clear to auscultation bilaterally  CARDIOVASCULAR: Regular rate and rhythm, normal S1 and S2  ABDOMEN: Nontender to palpation, normoactive bowel sounds, PEG tube in place   PSYCH: Alert, AO 3  NEUROLOGY: Left sided weakness   SKIN: No noted rashes    LABS:                        12.8   9.12  )-----------( 181      ( 21 Sep 2023 10:11 )             38.9     09-21    137  |  101  |  18  ----------------------------<  104<H>  3.8   |  23  |  0.31<L>    Ca    8.2<L>      21 Sep 2023 10:11  Phos  2.7     09-21  Mg     2.2     09-21    SARS-CoV-2: NotDetec (07 Sep 2023 15:06)  SARS-CoV-2: NotDetec (16 Jul 2023 06:22)

## 2023-09-21 NOTE — SWALLOW FEES ASSESSMENT ADULT - RECOMMENDED CONSISTENCY
NPO, with non-oral nutrition/hydration/medications. See additional details for additional recommendations NPO, with non-oral nutrition, hydration, medications is recommended based upon the results of this examination; however, would suggest deferring the decision to the Palliative Care team who will determine the GOC with re: to provision of nutrition/hydration.

## 2023-09-21 NOTE — CONSULT NOTE ADULT - SUBJECTIVE AND OBJECTIVE BOX
HPI:  History obtained from ER and daughter as below    67M w/ history of glioblastoma, HTN, seizure disorder, A-fib on Xarelto, prior stroke w/ residual L-sided weakness, sent from nursing home for cough and altered mental status.  On initial evaluation, patient is AO x0, unable to provide history.  Per EMS collateral, nursing home facility noted cough productive of phlegm starting yesterday, gave 1 dose of azithromycin.  Noted worsening mental status, for which daughter was concerned, so she requested ED evaluation. Daughter also noticed hallucinations for several days which she states is an indication of infection for him. Pt currently denies any pain or discomfort. States he wants speech/ swallow study and someone to talk to Dr. Carias his neurologist.    In ER: Given IV Zosyn, IV Vancomycin, LR 1L,       (07 Sep 2023 23:30)    PERTINENT PM/SXH:   GERD (gastroesophageal reflux disease)    Prostate ca    Hypertension    Gout    Atrial fibrillation    Splenic infarction    2019 novel coronavirus disease (COVID-19)    GERD (gastroesophageal reflux disease)      No significant past surgical history    H/O prostatectomy    H/O arthroscopy of right knee    H/O colonoscopy    H/O endoscopy    Elective surgery      FAMILY HISTORY:  Family history of diabetes mellitus (DM) (Mother)    Family history of TIAs (Mother)    Family hx of prostate cancer (Father)    Family history of bone cancer (Father)    Family history of appendicitis      Family Hx substance abuse [ ]yes [ ]no  ITEMS NOT CHECKED ARE NOT PRESENT    SOCIAL HISTORY:   Significant other/partner[ ]  Children[ X]  Protestant/Spirituality:  Substance hx:  [ ]   Tobacco hx:  [ ]   Alcohol hx: [ ]   Home Opioid hx:  [ ] I-Stop Reference No:  Living Situation: [ ]Home  [ X]Long term care  [ ]Rehab [ ]Other    ADVANCE DIRECTIVES:    DNR/MOLST  [ X]  Living Will  [ ]   DECISION MAKER(s):  [ ] Health Care Proxy(s)  [ X] Surrogate(s)  [ ] Guardian           Name(s): Phone Number(s):  Aretha Jalloh :  daughter /surrogate   BASELINE (I)ADL(s) (prior to admission):  [place]: [ ]Total  [ ] Moderate [ x]Dependent    Allergies    No Known Allergies    Intolerances    MEDICATIONS  (STANDING):  albuterol/ipratropium for Nebulization 3 milliLiter(s) Nebulizer every 6 hours  allopurinol 100 milliGRAM(s) Oral daily  atorvastatin 20 milliGRAM(s) Oral at bedtime  brimonidine 0.2% Ophthalmic Solution 1 Drop(s) Both EYES two times a day  chlorhexidine 2% Cloths 1 Application(s) Topical daily  dexAMETHasone  Injectable 4 milliGRAM(s) IV Push every 12 hours  enoxaparin Injectable 110 milliGRAM(s) SubCutaneous every 12 hours  FLUoxetine 20 milliGRAM(s) Oral daily  lacosamide IVPB 200 milliGRAM(s) IV Intermittent every 12 hours  levETIRAcetam  IVPB 1000 milliGRAM(s) IV Intermittent every 12 hours  losartan 50 milliGRAM(s) Oral daily  nadolol 40 milliGRAM(s) Oral two times a day  pantoprazole  Injectable 40 milliGRAM(s) IV Push daily  timolol 0.25% Solution 1 Drop(s) Both EYES two times a day  valproic  acid Syrup 250 milliGRAM(s) Oral three times a day    MEDICATIONS  (PRN):  acetaminophen   Oral Liquid .. 650 milliGRAM(s) Oral every 6 hours PRN Temp greater or equal to 38C (100.4F), Mild Pain (1 - 3)  guaiFENesin Oral Liquid (Sugar-Free) 200 milliGRAM(s) Oral every 6 hours PRN Cough  senna 2 Tablet(s) Oral at bedtime PRN for constipation    PRESENT SYMPTOMS: [ ]Unable to self-report  [ ] CPOT [ ] PAINADs [ ] RDOS  Source if other than patient:  [ ]Family   [ ]Team     Pain: [ ]yes [ x]no  QOL impact -   Location -                    Aggravating factors -  Quality -  Radiation -  Timing-  Severity (0-10 scale):  Minimal acceptable level (0-10 scale):     CPOT:    https://www.sccm.org/getattachment/mrp53b25-9f4f-2n4b-4x0t-3988i0586f1z/Critical-Care-Pain-Observation-Tool-(CPOT)    PAIN AD Score:   http://geriatrictoolkit.[place].[facility]/cog/painad.pdf (press ctrl +  left click to view)    Dyspnea:                           [ ]Mild [ ]Moderate [ ]Severe      RDOS:  0 to 2  minimal or no respiratory distress 0  3  mild distress  4 to 6 moderate distress  >7 severe distress  https://[email or web address]/handouts/hen/Respiratory_Distress_Observation_Scale.pdf (Ctrl +  left click to view)     Anxiety:                             [ x]Mild [ ]Moderate [ ]Severe  Fatigue:                             [ ]Mild [x ]Moderate [ ]Severe  Nausea:                             [ ]Mild [ ]Moderate [ ]Severe  Loss of appetite:              [ ]Mild [x ]Moderate [ ]Severe  Constipation:                    [ ]Mild [ ]Moderate [ ]Severe    PCSSQ [Palliative Care Spiritual Screening Question]   Severity (0-10):3  Score of 4 or > indicate consideration of Chaplaincy referral.  Chaplaincy Referral: [ ] yes [ ] refused [ ] following  xx deferred     Caregiver Clyo? : [x ] yes [ ] no  Social work referral [ ] Patient & Family Centered Care Referral [ ]     Anticipatory Grief Present?: [x ] yes [ ] no  Social work referral [ ]  Patient & Family Centered Care Referral [ ]       Other Symptoms:  [ x]All other review of systems negative     Palliative Performance Status Version 2:    30     %    http://[place].org/files/news/palliative_performance_scale_[email or web address].pdf  PHYSICAL EXAM:  Vital Signs Last 24 Hrs  T(C): 36.5 (21 Sep 2023 04:40), Max: 36.5 (21 Sep 2023 04:40)  T(F): 97.7 (21 Sep 2023 04:40), Max: 97.7 (21 Sep 2023 04:40)  HR: 108 (21 Sep 2023 04:40) (89 - 108)  BP: 149/114 (21 Sep 2023 04:40) (149/114 - 166/114)  BP(mean): --  RR: 18 (21 Sep 2023 04:40) (18 - 18)  SpO2: 94% (21 Sep 2023 04:40) (93% - 95%)    Parameters below as of 21 Sep 2023 04:40  Patient On (Oxygen Delivery Method): room air     I&O's Summary    20 Sep 2023 07:01  -  21 Sep 2023 07:00  --------------------------------------------------------  IN: 600 mL / OUT: 400 mL / NET: 200 mL    21 Sep 2023 07:01  -  21 Sep 2023 13:11  --------------------------------------------------------  IN: 0 mL / OUT: 0 mL / NET: 0 mL      GENERAL: [ ]Cachexia    [x ]Alert  [ x]Oriented x  2-3  [ ]Lethargic  [ ]Unarousable  [x ]Verbal  [ ]Non-Verbal  Behavioral:   [ x] Anxiety  [ ] Delirium [ ] Agitation [ ] Other  HEENT:  [ ]Normal   [x ]Dry mouth   [ ]ET Tube/Trach  [ ]Oral lesions  PULMONARY:   [x ]Clear [ ]Tachypnea  [ ]Audible excessive secretions   [ ]Rhonchi        [ ]Right [ ]Left [ ]Bilateral  [ ]Crackles        [ ]Right [ ]Left [ ]Bilateral  [ ]Wheezing     [ ]Right [ ]Left [ ]Bilateral  [ ]Diminished breath sounds [ ]right [ ]left [ ]bilateral  CARDIOVASCULAR:    [x ]Regular [ ]Irregular [ ]Tachy  [ ]Juan Daniel [ ]Murmur [ ]Other  GASTROINTESTINAL:  [x ]Soft  [ ]Distended   [ ]+BS  [ ]Non tender [ ]Tender  [ ]Other [x ]PEG [ ]OGT/ NGT  Last BM:  GENITOURINARY:  [ ]Normal [x ] Incontinent   [ ]Oliguria/Anuria   [ ]Pimentel  MUSCULOSKELETAL:   [ ]Normal   [x ]Weakness  [ ]Bed/Wheelchair bound [ ]Edema  NEUROLOGIC:   [ ]No focal deficits  [ ]Cognitive impairment  [x ]Dysphagia [ ]Dysarthria [x ]Paresis [ ]Other   SKIN:   [x ]Normal  [ ]Rash  [ ]Other  [ ]Pressure ulcer(s)       Present on admission [ ]y [ ]n    CRITICAL CARE:  [ ] Shock Present  [ ]Septic [ ]Cardiogenic [ x]Neurologic [ ]Hypovolemic  [ ]  Vasopressors [ ]  Inotropes   [ ]Respiratory failure present [ ]Mechanical ventilation [ ]Non-invasive ventilatory support [ ]High flow    [ ]Acute  [ ]Chronic [ ]Hypoxic  [ ]Hypercarbic [ ]Other  [ ]Other organ failure     LABS:                        12.8   9.12  )-----------( 181      ( 21 Sep 2023 10:11 )             38.9   09-21    137  |  101  |  18  ----------------------------<  104<H>  3.8   |  23  |  0.31<L>    Ca    8.2<L>      21 Sep 2023 10:11  Phos  2.7     09-21  Mg     2.2     09-21        Urinalysis Basic - ( 21 Sep 2023 10:11 )    Color: x / Appearance: x / SG: x / pH: x  Gluc: 104 mg/dL / Ketone: x  / Bili: x / Urobili: x   Blood: x / Protein: x / Nitrite: x   Leuk Esterase: x / RBC: x / WBC x   Sq Epi: x / Non Sq Epi: x / Bacteria: x      RADIOLOGY & ADDITIONAL STUDIES:    < from: CT Head No Cont (09.18.23 @ 06:54) >    ACC: [id number] EXAM:  CT BRAIN   ORDERED BY:  LAMONT GUTIERREZ     PROCEDURE DATE:  09/18/2023          INTERPRETATION:  CT head without IV contrast    CLINICAL INFORMATION: Status post partial tumor resection, GBM, evaluate   for hemorrhage    TECHNIQUE: Contiguous axial 5 mm sections were obtained through the head.   Sagittal and coronal 2-D reformatted images were also obtained.   This   scan was performed using automatic exposure control (radiation dose   reduction software) to obtain a diagnostic image quality scan with   patient dose as low as reasonably achievable.    FINDINGS:   CT dated 09/17/2023 available for review.    The brain demonstrates unchanged residual neoplasm in the RIGHT parietal   lobe with calcifications probable microhemorrhages. Mild surrounding   vasogenic edema is again noted with minimal mass effect on the RIGHT   lateral ventricle but no midline shift.   No acute cerebral cortical   infarct is seen.  No intracranial hemorrhage is found.  No mass effect is   found in the brain.    The ventricles, sulci and basal cisterns appear unremarkable.    The orbits are unremarkable.  The paranasal sinuses are for mild mucosal   thickening in the RIGHT maxillary sinus.  The nasal cavity appears   intact.  The nasopharynx is symmetric.  The central skull base, petrous   temporal bones and calvarium demonstrate RIGHT frontal craniotomy.      IMPRESSION:   Unchanged residual neoplasm in the RIGHT parietal lobe with   calcifications probable microhemorrhages. Mild surrounding vasogenic   edema is again noted with minimal mass effect on the RIGHT lateral   ventricle but no midline shift.  mucosal thickening in the RIGHT maxillary sinus.    --- End of Report ---        < end of copied text >  < from: MR Head w/wo IV Cont (09.09.23 @ 21:02) >    ACC: [id number] EXAM:  MR BRAIN WAW IC   ORDERED BY: RIGO MENCHACA     PROCEDURE DATE:  09/09/2023          INTERPRETATION:  HISTORY: Brain cancer. Patient off chemotherapy.   Worsening symptoms..    Description: MRI of the brain with and without gadolinium contrast was   performed.    COMPARISON: Head CT 09/07/2023, brain MRI 07/08/2023.    Sagittal T1, coronal T2, axial T1, T2, FLAIR, SWI, and diffusion-weighted   series were obtained before contrast. After intravenous gadolinium   contrast administration, postcontrast axial T2 FLAIR, and sagittal,   coronal, and axial T1 postcontrast series were obtained.    10 cc intravenous Gadavist gadolinium contrast was administered, 0 cc   contrast was discarded.    Right-sided craniotomy changes are again noted.    Compared to the 07/08/2023 brain MRI study, there has been a marked   interval progression of the enhancing tumor involving the right posterior   frontal, right parietal, and superior right temporal lobes, with   surrounding vasogenic edema and mass effect. The largest individual area   of enhancing tumor measures roughly 5.3 cm x 5 cm in the axial plane,   previously 2.4 cm x 1.9 cm in the axial plane on the prior MRI study.   Some of the solid portions of the tumor demonstrateperipheral increased   diffusion and decreased ADC map signal, consistent with cellularity.   Increasing areas of decreased SWI signal are noted within the lesion   likely reflecting evolving hemorrhagic products, calcifications,   neovascularity, ora combination of these possibilities.    The extensive vasogenic edema and mass effect in the right cerebral   hemisphere and right to left midline shift are stable compared to the   09/07/2023 head CT study, but are substantially progressed comparedto   the older 07/08/2023 brain MRI study.    No enhancing leptomeningeal nodules are appreciated remote from the   multifocal right cerebral hemisphere tumor.    There is no evidence for acute vascular distribution ischemia.    A subcentimeter focusof increased T2 and FLAIR signal left corona   radiata is unchanged.    The ventricular system is stable in size compared to the prior CT.    IMPRESSION:    There has been a substantial interval progression of the enhancing tumor   involving the right cerebral hemisphere compared to the prior brain MRI   study from 07/08/2023.    The vasogenic edema, mass effect, and midline shift is stable compared to   the more recent 09/07/2023 head CT.    < end of copied text >      PROTEIN CALORIE MALNUTRITION PRESENT: [ ]mild [ ]moderate [ ]severe [ ]underweight [ ]morbid obesity  https://www.andeal.org/vault/8930/web/files/ONC/Table_Clinical%20Characteristics%20to%20Document%20Malnutrition-White%20JV%20et%20al%202012.pdf    Height (cm): 185.4 (09-07-23 @ 14:38), 185.4 (07-16-23 @ 06:59), 172.7 (07-04-23 @ 15:00)  Weight (kg): 113.4 (09-07-23 @ 14:38), 92 (07-20-23 @ 00:00), 81.1 (07-04-23 @ 15:00)  BMI (kg/m2): 33 (09-07-23 @ 14:38), 26.8 (07-20-23 @ 00:00), 23.6 (07-16-23 @ 06:59)    [ ]PPSV2 < or = to 30% [ ]significant weight loss  [ ]poor nutritional intake  [ ]anasarca[ ]Artificial Nutrition      Other REFERRALS:  [ ]Hospice  [ ]Child Life  [ ]Social Work  [x ]Case management [ ]Holistic Therapy

## 2023-09-21 NOTE — PROGRESS NOTE ADULT - SUBJECTIVE AND OBJECTIVE BOX
Subjective: Patient seen and examined. No new events except as noted.     SUBJECTIVE/ROS:  MEDICATIONS:  MEDICATIONS  (STANDING):  albuterol/ipratropium for Nebulization 3 milliLiter(s) Nebulizer every 6 hours  allopurinol 100 milliGRAM(s) Oral daily  atorvastatin 20 milliGRAM(s) Oral at bedtime  brimonidine 0.2% Ophthalmic Solution 1 Drop(s) Both EYES two times a day  chlorhexidine 2% Cloths 1 Application(s) Topical daily  dexAMETHasone  Injectable 4 milliGRAM(s) IV Push every 12 hours  enoxaparin Injectable 110 milliGRAM(s) SubCutaneous every 12 hours  FLUoxetine 20 milliGRAM(s) Oral daily  lacosamide IVPB 200 milliGRAM(s) IV Intermittent every 12 hours  levETIRAcetam  IVPB 1000 milliGRAM(s) IV Intermittent every 12 hours  losartan 50 milliGRAM(s) Oral daily  nadolol 40 milliGRAM(s) Oral two times a day  pantoprazole  Injectable 40 milliGRAM(s) IV Push daily  timolol 0.25% Solution 1 Drop(s) Both EYES two times a day  valproic  acid Syrup 250 milliGRAM(s) Oral three times a day      PHYSICAL EXAM:  T(C): 36.5 (09-21-23 @ 04:40), Max: 36.8 (09-20-23 @ 11:57)  HR: 108 (09-21-23 @ 04:40) (87 - 108)  BP: 149/114 (09-21-23 @ 04:40) (149/114 - 170/87)  RR: 18 (09-21-23 @ 04:40) (17 - 18)  SpO2: 94% (09-21-23 @ 04:40) (93% - 95%)  Wt(kg): --  I&O's Summary    20 Sep 2023 07:01  -  21 Sep 2023 07:00  --------------------------------------------------------  IN: 600 mL / OUT: 400 mL / NET: 200 mL            JVP: Normal  Neck: supple  Lung: clear   CV: S1 S2 , Murmur:  Abd: soft  Ext: No edema  neuro: Awake / alert  Psych: flat affect  Skin: normal``    LABS/DATA:    CARDIAC MARKERS:                                12.8   9.01  )-----------( 190      ( 20 Sep 2023 09:46 )             39.9     09-20    143  |  103  |  19  ----------------------------<  111<H>  3.7   |  28  |  0.39<L>    Ca    8.4      20 Sep 2023 09:46  Phos  3.1     09-20  Mg     2.4     09-20      proBNP:   Lipid Profile:   HgA1c:   TSH:     TELE:  EKG:

## 2023-09-21 NOTE — CONSULT NOTE ADULT - ASSESSMENT
67M w/ history of glioblastoma c/b seizures, A-fib on Xarelto, prior stroke w/ residual L-sided weakness who is brought in from nursing home for cough and acute encephalopathy found to have progression of  GBM with edema and midline shift.  Palliative care called for goals of care.

## 2023-09-21 NOTE — SWALLOW FEES ASSESSMENT ADULT - SLP GENERAL OBSERVATIONS
Exam conducted in conjunction w/ SLP Virginia Owusu. Pt received upright at bedside, requiring manual repositioning by clinician. Pt on room air, Ao4, following all directives, verbally  expressing all wants/needs, but now w/ new hypophonia. Pt w/ very low vocal quality, and able to generate some increase in volume only w/ max cueing for diaphragmatic breathing and modeling loud voice, but unable to sustain volume. Pt reported new hypophonic since Monday, which, of note, pt experienced 10/10 headache Monday, suspect ongoing progression of GBM edema, now impacting vocal quality and expiratory strength. Exam results explained to pt and brother who expressed verbal understanding.

## 2023-09-21 NOTE — SWALLOW FEES ASSESSMENT ADULT - LARYNGEAL PENETRATION DURING SWALLOW - SILENT
Deep laryngeal penetration to the level of the vocal cords. Pt unable to fully eject material despite max cueing for cough./Trace/Mild

## 2023-09-21 NOTE — SWALLOW FEES ASSESSMENT ADULT - ASPIRATION DURING SWALLOW - SILENT
Material eventually descending below vocal cords after swallow as trials progressed./Trace Penetrated material eventually descending below vocal cords after swallow as trials progressed. Pt unable to fully eject material despite max cueing for cough./Trace

## 2023-09-21 NOTE — SWALLOW FEES ASSESSMENT ADULT - DIAGNOSTIC IMPRESSIONS
PT is a 67yoM w/ GBM, seizure disorder, prior stroke w/ residual L weakness, p/w worsening cough, L sided weakness, and acute encephalopathy, found w/ enterovirus, rhinovirus, and worsening GBM edea w/ midline shift. Mentation resolved. Pt p/w oropharyngeal dysphagia and reduced secretion management, likely progressive i/s/ worsening GBM. Oral Phase: premature spillage of material to aryepiglottic folds. Pharyngeal Phase: diffuse residue along pharyngeal structures. Pt demonstrated deep laryngeal penetration w/ eventual aspiration with ice chips and tsp of moderately thick liquids. Use of chin tuck unsuccessful in improving airway protection, pt unable to eject material from airway despite max cueing for cough/throat clearing. Pt is at high risk of aspiration w/ P.O.     Disorders; Pt w/ suspected reduced: lingual strength/ROM/Rate of motion, tongue-palate seal, reduced BOT to posterior pharyngeal wall contact, delay in trigger of the swallow reflex, hyo-laryngeal excursion and elevation, laryngeal closure, pharyngeal contractility, supraglottic sensation, subglottic sensation. PT is a 67yoM w/ GBM, seizure disorder, prior stroke w/ residual L weakness, p/w worsening cough, L sided weakness, and acute encephalopathy, found w/ enterovirus, rhinovirus, and worsening GBM edea w/ midline shift. Mentation resolved. Pt p/w oropharyngeal dysphagia and reduced secretion management, likely progressive i/s/ worsening GBM. Oral Phase: premature spillage of material to lateral channels. Pharyngeal Phase: slight delay initiation of swallow. Pt noted w/ persistent and diffuse residue along pharyngeal structures, increasing as trials progressed. Pt demonstrated deep laryngeal penetration w/ eventual silent aspiration with ice chips and tsp of moderately thick liquids. Use of chin tuck unsuccessful in improving airway protection, pt unable to eject material from airway despite max cueing for cough/throat clearing. Pt is at high risk of aspiration w/ P.O.     Disorders; Pt w/ suspected reduced: lingual strength/ROM/Rate of motion, tongue-palate seal, reduced BOT to posterior pharyngeal wall contact, delay in trigger of the swallow reflex, hyo-laryngeal excursion and elevation, laryngeal closure, pharyngeal contractility, supraglottic sensation, subglottic sensation.

## 2023-09-21 NOTE — CONSULT NOTE ADULT - PROBLEM SELECTOR RECOMMENDATION 9
PPS 30% needs assistance with all basic needs   Per :    Prior to MAY 2023, patient was living with his daughter Aretah Jalloh [phone] in a private  house in [place], [place]  Pt had a seizure in May 2023 when on vacation in [place],  was hospitalized and went to LTAC in [place] . Per Aretha- pt has been  bedbound and is declining functional status ever since. Patient was dependent  with ADLs and ambulation . Patient has been at  [facility] for  Short term rehab since JULY 2023.
pps 20-30%, Requires total care  artificial nutrition via PEG  awake, alert, able to make needs known, participating fully in conversation

## 2023-09-21 NOTE — SWALLOW FEES ASSESSMENT ADULT - ORAL PHASE COMMENTS
+premature spillage up to max in valleculae and along lateral channels/aryepiglottic folds +premature spillage up to max in valleculae and along lateral channels

## 2023-09-21 NOTE — SWALLOW FEES ASSESSMENT ADULT - COMMENTS
Neuro following for worsening vasogenic edema of known GBM. Neurology IMPRESSION: Left hemiplegia likely 2/2 increased vasogenic edema of Right brain w/ midline shift. Hx of GBM: diagnosed 8/2022 w/ GBM WHO 4. Hx of chemo tx. hx of GBM related seizures w/ trach and PEG placement during May 2023 hospital course due to prolonged ventilation requirement. decannulated 6/6/2023.     dietary> pt has not used enteral feeds in weeks. Pt with Hx of swallowing issues; on chopped foods and thickened liquids PTA per pt.    CTH 9/7 IMPRESSION: Area of hypodensity in the right frontal parietal region with extensive surrounding vasogenic edema, overall increased in size compared to prior exam. These findings raise suspicion for tumor progression. New right-to-left midline shift measuring 7 mm. Consider MRI brain with and without contrast for further evaluation if clinically warranted.  CXR: No focal consolidations.    Pt known to dysphagia services at [facility] and [facility] since July 2023. Pt did have PEG present at that time. MBS at [facility] on July 10, 2023, recommended easy-to-chew solids w/ thin liquids in addition to PEG feeds to ensure adequate caloric intake. Pt seen for most recent bedside swallow exam at [facility] on July 20, 2023 with recommendations for regular texture solids and thin liquids.  9/9 initial bedside swallow exam completed. Recs for NPO and alternative means. pending possible objective swallow testing. Pt w/ soft C collar for head support per wife, no spinal injury. Since then, GOC conversation were for possible home-hospice and testing deferred.  9/14 LUE and LLE swelling, dopplers revealed acute above the knee DVT from the femoral vein through the external iliac vein in the upper left thigh.  9/15 Re-evaluation of swallow. Improved vocal quality. Overtly functional swallow w/ puree and moderately thick liquids, however, oral diet deferred due to ongoing discussions of palliative/hospice vs possible chemo.   9/18 10/10 headache +glottic gap  +reduced approximation of vocal cords, suspected i/s/o overall deconditioning and likely progression of GBM

## 2023-09-21 NOTE — CONSULT NOTE ADULT - PROBLEM SELECTOR RECOMMENDATION 3
on IV vanco, 5 days per ID  would need to complete this or transition to something via PEG before dc home  hospice unlikely to cover IV abx
Multifactoral:  GBM, progression of disease , hospital acquired delirium

## 2023-09-21 NOTE — SWALLOW FEES ASSESSMENT ADULT - SLP PERTINENT HISTORY OF CURRENT PROBLEM
67M w/ history of glioblastoma, HTN, seizure disorder, A-fib on Xarelto, prior stroke w/ residual L-sided weakness, sent from nursing home for worsening cough, worsening L sided weakness, and acute encephalopathy found to be enterovirus positive, rhinovirus positive and have worsening GBM w/ edema and midline shift.  Pt "states he wants speech/ swallow study.As per daughter, has been tolerating some PO and thickened liquids at facility. Asks about removing PEG tube which I advised against doing in the near term." Course c/b L thigh DVT, but IVC filter deferred given pt w/ high risk of AC i/s/o advanced GBM. Rad Onc and neurosurgery both recommending hospice; GOC discussion ongoing.

## 2023-09-21 NOTE — CONSULT NOTE ADULT - REASON FOR ADMISSION
AMS, Worsening GBM w/ shift and Paraflu/ Enterovirus infection

## 2023-09-22 NOTE — PROGRESS NOTE ADULT - PROBLEM SELECTOR PLAN 4
exudative cellulitis at PEG incision site   - PEG tube is working well, no current tenderness or erythema  - s/p CT A/P Percutaneous gastrostomy tube is in satisfactory position. No inflammation along the course of the gastrostomy tract. No abdominal wall collection.  -MRSA negative in July 2023  - s/p ancef (started on 9/9- 9/12  )--- 9/12 lethargic mental status, exudate and erythema at site, upgrade antibiotics to vancomycin (9/12- 9/17 ) , f/u BCx 9/12 NGTD, f/u lactate 2.8, f/u rectal temp 98 clinically responded well, completed 5 day course

## 2023-09-22 NOTE — PROGRESS NOTE ADULT - SUBJECTIVE AND OBJECTIVE BOX
Subjective: Patient seen and examined. No new events except as noted.     SUBJECTIVE/ROS:  nad      MEDICATIONS:  MEDICATIONS  (STANDING):  albuterol/ipratropium for Nebulization 3 milliLiter(s) Nebulizer every 6 hours  allopurinol 100 milliGRAM(s) Oral daily  atorvastatin 20 milliGRAM(s) Oral at bedtime  brimonidine 0.2% Ophthalmic Solution 1 Drop(s) Both EYES two times a day  chlorhexidine 2% Cloths 1 Application(s) Topical daily  dexAMETHasone  Injectable 4 milliGRAM(s) IV Push every 12 hours  enoxaparin Injectable 110 milliGRAM(s) SubCutaneous every 12 hours  FLUoxetine 20 milliGRAM(s) Oral daily  lacosamide IVPB 200 milliGRAM(s) IV Intermittent every 12 hours  levETIRAcetam  IVPB 1000 milliGRAM(s) IV Intermittent every 12 hours  losartan 50 milliGRAM(s) Oral daily  nadolol 40 milliGRAM(s) Oral two times a day  pantoprazole  Injectable 40 milliGRAM(s) IV Push daily  timolol 0.25% Solution 1 Drop(s) Both EYES two times a day  valproic  acid Syrup 250 milliGRAM(s) Oral three times a day      PHYSICAL EXAM:  T(C): 36.4 (09-22-23 @ 04:04), Max: 36.8 (09-21-23 @ 13:35)  HR: 104 (09-22-23 @ 04:04) (75 - 112)  BP: 141/119 (09-22-23 @ 04:04) (141/119 - 176/104)  RR: 18 (09-22-23 @ 04:04) (18 - 18)  SpO2: 99% (09-22-23 @ 04:04) (94% - 99%)  Wt(kg): --  I&O's Summary    21 Sep 2023 07:01  -  22 Sep 2023 07:00  --------------------------------------------------------  IN: 0 mL / OUT: 450 mL / NET: -450 mL            JVP: Normal  Neck: supple  Lung: clear   CV: S1 S2 , Murmur:  Abd: soft  Ext: No edema  Psych: flat affect  Skin: normal``    LABS/DATA:    CARDIAC MARKERS:                                12.8   9.12  )-----------( 181      ( 21 Sep 2023 10:11 )             38.9     09-21    137  |  101  |  18  ----------------------------<  104<H>  3.8   |  23  |  0.31<L>    Ca    8.2<L>      21 Sep 2023 10:11  Phos  2.7     09-21  Mg     2.2     09-21      proBNP:   Lipid Profile:   HgA1c:   TSH:     TELE:  EKG:

## 2023-09-22 NOTE — PROGRESS NOTE ADULT - PROBLEM SELECTOR PLAN 2
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   -MRI head w/ and w/o IV Contrast 9/9 completed, and noted " a substantial interval progression of the enhancing tumor involving the right cerebral hemisphere compared to the prior brain MRI study from 07/08/2023."   -c/w Decadron 4mg IV BID, neuro check q4h  - s/p neurosurgery evaluation, rec: "given this is a recurrent GBM, and given that surgery represents a high risk of significant morbidity, the risks and benefits were discussed with the patient and his daughter, who expressed that such a surgery would not be within their GOC."  - radiation oncology consulted, per notes pt's daughter is in favor of Avastin over RT   - neurooncology Dr. Carias following  - pt Failed FEESST recommending NPO

## 2023-09-22 NOTE — PROGRESS NOTE ADULT - PROBLEM SELECTOR PLAN 8
- changed losartan 50mg to valsartan 320mg qd per cards   - Interchange losartan 100mg is valsartan 320mg per pharm  - will add valsartan 160mg today, then start valsartan 320 tomorrow  - continue nadolol BID, switched from metoprolol - changed losartan 50mg to valsartan 320mg qd per cards   - per pharmacy recs add valsartan 160 today and resume 320mg tomorrow   - continue nadolol 40mg  BID, switched from metoprolol

## 2023-09-22 NOTE — PROGRESS NOTE ADULT - PROBLEM SELECTOR PLAN 6
Also enterovirus infection as well. Procalcitonin WNL  - given azithromycin at [facility] on 9/7  - completed 3 days of empiric azithromycin (9/7 - 9/9)  - acetaminophen and mucinex PRN  - Cont. supplemental 02 PRN  - Duo nebs Q6hrs

## 2023-09-22 NOTE — PROGRESS NOTE ADULT - SUBJECTIVE AND OBJECTIVE BOX
[facility] Division of Hospital Medicine  Elisabeth Gunter MD  Available via MS Teams      SUBJECTIVE / OVERNIGHT EVENTS: Pts diastolic BP remains high. He wants ice chips. He understands that he failed the FEESST study.    ADDITIONAL REVIEW OF SYSTEMS: ROS reviewed and found to be negative     MEDICATIONS  (STANDING):  albuterol/ipratropium for Nebulization 3 milliLiter(s) Nebulizer every 6 hours  allopurinol 100 milliGRAM(s) Oral daily  atorvastatin 20 milliGRAM(s) Oral at bedtime  brimonidine 0.2% Ophthalmic Solution 1 Drop(s) Both EYES two times a day  chlorhexidine 2% Cloths 1 Application(s) Topical daily  dexAMETHasone  Injectable 4 milliGRAM(s) IV Push every 12 hours  enoxaparin Injectable 110 milliGRAM(s) SubCutaneous every 12 hours  FLUoxetine 20 milliGRAM(s) Oral daily  lacosamide Solution 200 milliGRAM(s) Oral two times a day  levETIRAcetam  Solution 1000 milliGRAM(s) Enteral Tube two times a day  losartan 50 milliGRAM(s) Oral daily  nadolol 40 milliGRAM(s) Oral two times a day  NIFEdipine XL 30 milliGRAM(s) Oral daily  pantoprazole   Suspension 40 milliGRAM(s) Enteral Tube daily  timolol 0.25% Solution 1 Drop(s) Both EYES two times a day  valproic  acid Syrup 250 milliGRAM(s) Oral three times a day    MEDICATIONS  (PRN):  acetaminophen   Oral Liquid .. 650 milliGRAM(s) Oral every 6 hours PRN Temp greater or equal to 38C (100.4F), Mild Pain (1 - 3)  guaiFENesin Oral Liquid (Sugar-Free) 200 milliGRAM(s) Oral every 6 hours PRN Cough  senna 2 Tablet(s) Oral at bedtime PRN for constipation      I&O's Summary    21 Sep 2023 07:01  -  22 Sep 2023 07:00  --------------------------------------------------------  IN: 0 mL / OUT: 450 mL / NET: -450 mL    22 Sep 2023 07:01  -  22 Sep 2023 11:45  --------------------------------------------------------  IN: 0 mL / OUT: 0 mL / NET: 0 mL        PHYSICAL EXAM:  Vital Signs Last 24 Hrs  T(C): 36.4 (22 Sep 2023 04:04), Max: 36.8 (21 Sep 2023 13:35)  T(F): 97.5 (22 Sep 2023 04:04), Max: 98.3 (21 Sep 2023 13:35)  HR: 104 (22 Sep 2023 04:04) (75 - 112)  BP: 141/119 (22 Sep 2023 04:04) (141/119 - 176/104)  BP(mean): --  RR: 18 (22 Sep 2023 04:04) (18 - 18)  SpO2: 99% (22 Sep 2023 04:04) (94% - 99%)    Parameters below as of 22 Sep 2023 04:04  Patient On (Oxygen Delivery Method): room air      CONSTITUTIONAL: NAD, Laying in bed, little 9in place   EYES: Conjunctiva and sclera clear  ENMT: Moist oral mucosa  NECK: Supple  RESPIRATORY: Normal respiratory effort; lungs are clear to auscultation bilaterally  CARDIOVASCULAR: Regular rate and rhythm, normal S1 and S2  ABDOMEN: Nontender to palpation, normoactive bowel sounds, PEG tube in place   PSYCH: Alert, AO 3  NEUROLOGY: Left sided weakness   SKIN: No noted rashes    LABS:                        12.8   8.05  )-----------( 176      ( 22 Sep 2023 09:48 )             39.4     09-22    141  |  103  |  22  ----------------------------<  121<H>  3.7   |  26  |  0.35<L>    Ca    8.1<L>      22 Sep 2023 09:48  Phos  2.7     09-22  Mg     2.2     09-22    SARS-CoV-2: NotDetec (07 Sep 2023 15:06)  SARS-CoV-2: NotDetec (16 Jul 2023 06:22)

## 2023-09-22 NOTE — PHARMACOTHERAPY INTERVENTION NOTE - COMMENTS
Patient is a 67 year old male on levetiracetam, lacosamide, and pantoprazole from home, switched to IV inpatient due to trouble swallowing, now receiving nutrition and medications via PEG. Recommended changing to levetiracetam and lacosamide solution and pantoprazole suspension via PEG, if no contraindications.    Indy Zeng, PharmD, [facility]  Clinical Pharmacy Specialist  [phone] or Teams

## 2023-09-22 NOTE — PROGRESS NOTE ADULT - PROBLEM SELECTOR PLAN 3
VA duplex noted DVT from the left side femoral vein through the external iliac vein; hemodynamically stable  - s/p hematology team evaluation, extensive discussion with Dr. Gray, recommend change AC from DOAC to subQ lovenox 1mg /kg BID  - s/p neurosurgery evaluation, extensive discussion with Dr. Benson, neurosurgery team reviewed CT head, MRI head, no clinically significant ICH noted, no absolute contraindication to AC, though noted high risk of AC in setting of advanced GBM  - previous hospitalist discussed with tereza Carias, reviewed risks/benefits of AC, agree with  AC with subQ lovenox   - c/w neurocheck q4h, f/u repeat CT head 9/17 showing no change   - Per Neurosurgery no contraindications for FD AC, will obtain STAT CT if there is a change in neuro exam and reach out to NSx.- discussed this plan with tereza Aretha

## 2023-09-23 NOTE — PHYSICAL THERAPY INITIAL EVALUATION ADULT - ADDITIONAL COMMENTS
Per care coordination note, pt is completely dependent at baseline from [facility]. Prior to SNF pt was living in private house with dtr +2 JUNE however has been completely dependent with declining functional status since May 2023.

## 2023-09-23 NOTE — PROGRESS NOTE ADULT - SUBJECTIVE AND OBJECTIVE BOX
[facility] Division of Hospital Medicine  Elisabeth Gunter MD  Available via MS Teams      SUBJECTIVE / OVERNIGHT EVENTS: No acute events overnight.     ADDITIONAL REVIEW OF SYSTEMS: ROS reviewed and found to be negative     MEDICATIONS  (STANDING):  albuterol/ipratropium for Nebulization 3 milliLiter(s) Nebulizer every 6 hours  allopurinol 100 milliGRAM(s) Oral daily  atorvastatin 20 milliGRAM(s) Oral at bedtime  brimonidine 0.2% Ophthalmic Solution 1 Drop(s) Both EYES two times a day  chlorhexidine 2% Cloths 1 Application(s) Topical daily  dexAMETHasone  Injectable 4 milliGRAM(s) IV Push every 12 hours  enoxaparin Injectable 110 milliGRAM(s) SubCutaneous every 12 hours  FLUoxetine 20 milliGRAM(s) Oral daily  lacosamide Solution 200 milliGRAM(s) Oral two times a day  levETIRAcetam  Solution 1000 milliGRAM(s) Enteral Tube two times a day  losartan 50 milliGRAM(s) Oral daily  nadolol 40 milliGRAM(s) Oral two times a day  NIFEdipine XL 30 milliGRAM(s) Oral daily  pantoprazole   Suspension 40 milliGRAM(s) Enteral Tube daily  timolol 0.25% Solution 1 Drop(s) Both EYES two times a day  valproic  acid Syrup 250 milliGRAM(s) Oral three times a day    MEDICATIONS  (PRN):  acetaminophen   Oral Liquid .. 650 milliGRAM(s) Oral every 6 hours PRN Temp greater or equal to 38C (100.4F), Mild Pain (1 - 3)  guaiFENesin Oral Liquid (Sugar-Free) 200 milliGRAM(s) Oral every 6 hours PRN Cough  senna 2 Tablet(s) Oral at bedtime PRN for constipation      I&O's Summary    21 Sep 2023 07:01  -  22 Sep 2023 07:00  --------------------------------------------------------  IN: 0 mL / OUT: 450 mL / NET: -450 mL    22 Sep 2023 07:01  -  22 Sep 2023 11:45  --------------------------------------------------------  IN: 0 mL / OUT: 0 mL / NET: 0 mL        PHYSICAL EXAM:  Vital Signs Last 24 Hrs  T(C): 36.4 (22 Sep 2023 04:04), Max: 36.8 (21 Sep 2023 13:35)  T(F): 97.5 (22 Sep 2023 04:04), Max: 98.3 (21 Sep 2023 13:35)  HR: 104 (22 Sep 2023 04:04) (75 - 112)  BP: 141/119 (22 Sep 2023 04:04) (141/119 - 176/104)  BP(mean): --  RR: 18 (22 Sep 2023 04:04) (18 - 18)  SpO2: 99% (22 Sep 2023 04:04) (94% - 99%)    Parameters below as of 22 Sep 2023 04:04  Patient On (Oxygen Delivery Method): room air      CONSTITUTIONAL: NAD, Laying in bed, little 9in place   EYES: Conjunctiva and sclera clear  ENMT: Moist oral mucosa  NECK: Supple  RESPIRATORY: Normal respiratory effort; lungs are clear to auscultation bilaterally  CARDIOVASCULAR: Regular rate and rhythm, normal S1 and S2  ABDOMEN: Nontender to palpation, normoactive bowel sounds, PEG tube in place   PSYCH: Alert, AO 3  NEUROLOGY: Left sided weakness   SKIN: No noted rashes                            13.5   7.95  )-----------( 174      ( 23 Sep 2023 11:25 )             42.4       09-23    143  |  104  |  21  ----------------------------<  126<H>  3.8   |  27  |  0.32<L>    Ca    8.3<L>      23 Sep 2023 11:25  Phos  2.7     09-22  Mg     2.2     09-22                          SARS-CoV-2: NotDetec (07 Sep 2023 15:06)  SARS-CoV-2: NotDetec (16 Jul 2023 06:22)

## 2023-09-23 NOTE — PROGRESS NOTE ADULT - PROBLEM SELECTOR PLAN 2
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   -MRI head w/ and w/o IV Contrast 9/9 completed, and noted " a substantial interval progression of the enhancing tumor involving the right cerebral hemisphere compared to the prior brain MRI study from 07/08/2023."   -c/w Decadron 4mg IV BID, neuro check q4h  - s/p neurosurgery evaluation, rec: "given this is a recurrent GBM, and given that surgery represents a high risk of significant morbidity, the risks and benefits were discussed with the patient and his daughter, who expressed that such a surgery would not be within their GOC."  - radiation oncology consulted, per notes pt's daughter is in favor of Avastin over RT   - neurooncology Dr. Carias following - after family meeting on 9/22 pt will be discharged home/  to help daughter set up care at home   - pt Failed FEESST recommending NPO

## 2023-09-23 NOTE — PROGRESS NOTE ADULT - PROBLEM SELECTOR PLAN 8
- changed losartan 50mg to valsartan 320mg qd per cards   - per pharmacy recs add valsartan 160 today and resume 320mg tomorrow   - continue nadolol 40mg  BID, switched from metoprolol

## 2023-09-23 NOTE — PROGRESS NOTE ADULT - PROBLEM SELECTOR PLAN 6
Also enterovirus infection as well. Procalcitonin WNL  - given azithromycin at [facility] on 9/7  - completed 3 days of empiric azithromycin (9/7 - 9/9)  - acetaminophen and mucinex PRN  - Cont. supplemental 02 PRN  - Duo nebs Q6hrs

## 2023-09-23 NOTE — PROGRESS NOTE ADULT - SUBJECTIVE AND OBJECTIVE BOX
Subjective: Patient seen and examined. No new events except as noted.     SUBJECTIVE/ROS:  ROS limited       MEDICATIONS:  MEDICATIONS  (STANDING):  albuterol/ipratropium for Nebulization 3 milliLiter(s) Nebulizer every 6 hours  allopurinol 100 milliGRAM(s) Oral daily  atorvastatin 20 milliGRAM(s) Oral at bedtime  brimonidine 0.2% Ophthalmic Solution 1 Drop(s) Both EYES two times a day  chlorhexidine 2% Cloths 1 Application(s) Topical daily  dexAMETHasone  Injectable 4 milliGRAM(s) IV Push every 12 hours  enoxaparin Injectable 110 milliGRAM(s) SubCutaneous every 12 hours  FLUoxetine 20 milliGRAM(s) Oral daily  lacosamide Solution 200 milliGRAM(s) Oral two times a day  levETIRAcetam  Solution 1000 milliGRAM(s) Enteral Tube two times a day  nadolol 40 milliGRAM(s) Oral two times a day  pantoprazole   Suspension 40 milliGRAM(s) Enteral Tube daily  timolol 0.25% Solution 1 Drop(s) Both EYES two times a day  valproic  acid Syrup 250 milliGRAM(s) Oral three times a day  valsartan 320 milliGRAM(s) Oral daily      PHYSICAL EXAM:  T(C): 36.7 (09-23-23 @ 04:21), Max: 37.1 (09-23-23 @ 00:14)  HR: 114 (09-23-23 @ 04:21) (88 - 114)  BP: 169/101 (09-23-23 @ 04:21) (121/86 - 169/101)  RR: 17 (09-23-23 @ 04:21) (16 - 17)  SpO2: 99% (09-23-23 @ 04:21) (97% - 99%)  Wt(kg): --  I&O's Summary    22 Sep 2023 07:01  -  23 Sep 2023 07:00  --------------------------------------------------------  IN: 0 mL / OUT: 0 mL / NET: 0 mL            JVP: Normal  Neck: supple  Lung: clear   CV: S1 S2 , Murmur:  Abd: soft  Ext: pos edema  Psych: flat affect  Skin: normal``    LABS/DATA:    CARDIAC MARKERS:                                12.8   8.05  )-----------( 176      ( 22 Sep 2023 09:48 )             39.4     09-22    141  |  103  |  22  ----------------------------<  121<H>  3.7   |  26  |  0.35<L>    Ca    8.1<L>      22 Sep 2023 09:48  Phos  2.7     09-22  Mg     2.2     09-22      proBNP:   Lipid Profile:   HgA1c:   TSH:     TELE:  EKG:

## 2023-09-23 NOTE — PHYSICAL THERAPY INITIAL EVALUATION ADULT - PERTINENT HX OF CURRENT PROBLEM, REHAB EVAL
67M w/ history of glioblastoma, HTN, seizure disorder, A-fib on Xarelto, prior stroke w/ residual L-sided weakness, sent from nursing home for cough and altered mental status.  On initial evaluation, patient is AO x0, unable to provide history.  Per EMS collateral, nursing home facility noted cough productive of phlegm starting yesterday, gave 1 dose of azithromycin.  Noted worsening mental status, for which daughter was concerned, so she requested ED evaluation. Daughter also noticed hallucinations for several days which she states is an indication of infection for him. Pt currently denies any pain or discomfort. States he wants speech/ swallow study and someone to talk to Dr. Carias his neurologist.

## 2023-09-23 NOTE — PHYSICAL THERAPY INITIAL EVALUATION ADULT - NSPTDMEREC_GEN_A_CORE
Pt to be discharged from inpatient PT services at this time as he is currently at functional baseline. If pt were to go home, he would require assist with all ADLs and mobility. Pt would require assistance into home, wheelchair, hospital bed and total patient lift device for transfers./patient handling equipment/hospital bed/wheelchair

## 2023-09-23 NOTE — PHYSICAL THERAPY INITIAL EVALUATION ADULT - RANGE OF MOTION EXAMINATION, REHAB EVAL
except LUE/LLE AAROM WFL/bilateral upper extremity ROM was WFL (within functional limits)/bilateral lower extremity ROM was WFL (within functional limits)

## 2023-09-24 NOTE — PROGRESS NOTE ADULT - PROBLEM SELECTOR PLAN 8
- changed losartan 50mg to valsartan 320mg qd per cards added clonidine 0.1mg bid   - continue nadolol 40mg  BID, switched from metoprolol  - Have been trying different BP meds to titrate BP, continues to vary

## 2023-09-24 NOTE — PROGRESS NOTE ADULT - PROBLEM SELECTOR PLAN 2
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   -MRI head w/ and w/o IV Contrast 9/9 completed, and noted " a substantial interval progression of the enhancing tumor involving the right cerebral hemisphere compared to the prior brain MRI study from 07/08/2023."   -c/w Decadron 4mg IV BID, neuro check q4h  - s/p neurosurgery evaluation, rec: "given this is a recurrent GBM, and given that surgery represents a high risk of significant morbidity, the risks and benefits were discussed with the patient and his daughter, who expressed that such a surgery would not be within their GOC."  - radiation oncology consulted, per notes pt's daughter is in favor of Avastin over RT   - neurooncology Dr. Carias following -  - pt Failed FEESST recommending NPO

## 2023-09-24 NOTE — CHART NOTE - NSCHARTNOTEFT_GEN_A_CORE
Due to condition R53.2/ c71.9 , patient has a severe mobility limitation and requires a transport wheelchair to assist in daily adls. Patient cannot ambulate safely with a can or a walker. Patient does not have sufficient upper body strength to self propel a standard wheelchair. Patient has a caregiver to assist with maneuvering the wheelchair. Pt has not expressed an unwillingness to use the wheelchair. Patient has ample room in the home for said wheelchair. Use of a transport wheelchair will significantly improve the patients ability to participate in daily adls and the patient will use it on a regular basis in the home .

## 2023-09-24 NOTE — PROGRESS NOTE ADULT - SUBJECTIVE AND OBJECTIVE BOX
Subjective: Patient seen and examined. No new events except as noted.     SUBJECTIVE/ROS:        MEDICATIONS:  MEDICATIONS  (STANDING):  albuterol/ipratropium for Nebulization 3 milliLiter(s) Nebulizer every 6 hours  allopurinol 100 milliGRAM(s) Oral daily  amLODIPine   Tablet 5 milliGRAM(s) Oral daily  atorvastatin 20 milliGRAM(s) Oral at bedtime  brimonidine 0.2% Ophthalmic Solution 1 Drop(s) Both EYES two times a day  chlorhexidine 2% Cloths 1 Application(s) Topical daily  cloNIDine 0.1 milliGRAM(s) Oral two times a day  dexAMETHasone  Injectable 4 milliGRAM(s) IV Push every 12 hours  enoxaparin Injectable 110 milliGRAM(s) SubCutaneous every 12 hours  FLUoxetine 20 milliGRAM(s) Oral daily  lacosamide Solution 200 milliGRAM(s) Oral two times a day  levETIRAcetam  Solution 1000 milliGRAM(s) Enteral Tube two times a day  nadolol 40 milliGRAM(s) Oral two times a day  pantoprazole   Suspension 40 milliGRAM(s) Enteral Tube daily  timolol 0.25% Solution 1 Drop(s) Both EYES two times a day  valproic  acid Syrup 250 milliGRAM(s) Oral three times a day  valsartan 320 milliGRAM(s) Oral daily      PHYSICAL EXAM:  T(C): 36.4 (09-24-23 @ 04:23), Max: 36.6 (09-23-23 @ 11:15)  HR: 105 (09-24-23 @ 04:23) (102 - 109)  BP: 147/96 (09-24-23 @ 04:23) (147/96 - 170/109)  RR: 18 (09-24-23 @ 04:23) (18 - 18)  SpO2: 97% (09-24-23 @ 04:23) (95% - 100%)  Wt(kg): --  I&O's Summary    23 Sep 2023 07:01  -  24 Sep 2023 07:00  --------------------------------------------------------  IN: 0 mL / OUT: 0 mL / NET: 0 mL            JVP: Normal  Neck: supple  Lung: clear   CV: S1 S2 , Murmur:  Abd: soft  Ext: No edema  Psych: flat affect  Skin: normal``    LABS/DATA:    CARDIAC MARKERS:                                13.5   7.95  )-----------( 174      ( 23 Sep 2023 11:25 )             42.4     09-23    143  |  104  |  21  ----------------------------<  126<H>  3.8   |  27  |  0.32<L>    Ca    8.3<L>      23 Sep 2023 11:25      proBNP:   Lipid Profile:   HgA1c:   TSH:     TELE:  EKG:

## 2023-09-24 NOTE — CHART NOTE - NSCHARTNOTEFT_GEN_A_CORE
Based on patient's ongoing issues with deconditioning and generalized weakness secondary to patient's diagnosis of R53.2/ c71.9 patient will require a semi-electric hospital bed. This is necessary to achieve positioning and elevation not able to obtain in an ordinary bed. Bed pillows and wedges have been tried and ruled out. .

## 2023-09-24 NOTE — PROGRESS NOTE ADULT - NSPROGADDITIONALINFOA_GEN_ALL_CORE
After family meeting on 9/22 pt will be discharged home/  to help daughter set up care at home though she appears to be overwhelmed with the home care requirements. Discussion included continuing to manage BP to see if pt is even eligible for Avastin treatment. As it currently stands BP seems to be varied.     Daughter Aretha provides pt's Cardiologist's numbers work [phone] and cell [phone] (Dr. Daria Freire) for reference

## 2023-09-24 NOTE — PROGRESS NOTE ADULT - SUBJECTIVE AND OBJECTIVE BOX
[facility] Division of Hospital Medicine  Elisabeth Gunter MD  Available via MS Teams      SUBJECTIVE / OVERNIGHT EVENTS: No acute events overnight. BP seems to be varied.     ADDITIONAL REVIEW OF SYSTEMS: ROS reviewed and found to be negative     MEDICATIONS  (STANDING):  albuterol/ipratropium for Nebulization 3 milliLiter(s) Nebulizer every 6 hours  allopurinol 100 milliGRAM(s) Oral daily  atorvastatin 20 milliGRAM(s) Oral at bedtime  brimonidine 0.2% Ophthalmic Solution 1 Drop(s) Both EYES two times a day  chlorhexidine 2% Cloths 1 Application(s) Topical daily  dexAMETHasone  Injectable 4 milliGRAM(s) IV Push every 12 hours  enoxaparin Injectable 110 milliGRAM(s) SubCutaneous every 12 hours  FLUoxetine 20 milliGRAM(s) Oral daily  lacosamide Solution 200 milliGRAM(s) Oral two times a day  levETIRAcetam  Solution 1000 milliGRAM(s) Enteral Tube two times a day  losartan 50 milliGRAM(s) Oral daily  nadolol 40 milliGRAM(s) Oral two times a day  NIFEdipine XL 30 milliGRAM(s) Oral daily  pantoprazole   Suspension 40 milliGRAM(s) Enteral Tube daily  timolol 0.25% Solution 1 Drop(s) Both EYES two times a day  valproic  acid Syrup 250 milliGRAM(s) Oral three times a day    MEDICATIONS  (PRN):  acetaminophen   Oral Liquid .. 650 milliGRAM(s) Oral every 6 hours PRN Temp greater or equal to 38C (100.4F), Mild Pain (1 - 3)  guaiFENesin Oral Liquid (Sugar-Free) 200 milliGRAM(s) Oral every 6 hours PRN Cough  senna 2 Tablet(s) Oral at bedtime PRN for constipation      I&O's Summary    21 Sep 2023 07:01  -  22 Sep 2023 07:00  --------------------------------------------------------  IN: 0 mL / OUT: 450 mL / NET: -450 mL    22 Sep 2023 07:01  -  22 Sep 2023 11:45  --------------------------------------------------------  IN: 0 mL / OUT: 0 mL / NET: 0 mL        PHYSICAL EXAM:  Vital Signs Last 24 Hrs  T(C): 36.4 (22 Sep 2023 04:04), Max: 36.8 (21 Sep 2023 13:35)  T(F): 97.5 (22 Sep 2023 04:04), Max: 98.3 (21 Sep 2023 13:35)  HR: 104 (22 Sep 2023 04:04) (75 - 112)  BP: 141/119 (22 Sep 2023 04:04) (141/119 - 176/104)  BP(mean): --  RR: 18 (22 Sep 2023 04:04) (18 - 18)  SpO2: 99% (22 Sep 2023 04:04) (94% - 99%)    Parameters below as of 22 Sep 2023 04:04  Patient On (Oxygen Delivery Method): room air      CONSTITUTIONAL: NAD, Laying in bed, little 9in place   EYES: Conjunctiva and sclera clear  ENMT: Moist oral mucosa  NECK: Supple  RESPIRATORY: Normal respiratory effort; lungs are clear to auscultation bilaterally  CARDIOVASCULAR: Regular rate and rhythm, normal S1 and S2  ABDOMEN: Nontender to palpation, normoactive bowel sounds, PEG tube in place   PSYCH: Alert, AO 3  NEUROLOGY: Left sided weakness   SKIN: No noted rashes                            13.5   7.95  )-----------( 174      ( 23 Sep 2023 11:25 )             42.4       09-23    143  |  104  |  21  ----------------------------<  126<H>  3.8   |  27  |  0.32<L>    Ca    8.3<L>      23 Sep 2023 11:25  Phos  2.7     09-22  Mg     2.2     09-22    SARS-CoV-2: NotDetec (07 Sep 2023 15:06)  SARS-CoV-2: NotDetec (16 Jul 2023 06:22)

## 2023-09-24 NOTE — PROGRESS NOTE ADULT - PROBLEM SELECTOR PLAN 6
Also enterovirus infection as well. Procalcitonin WNL  - given azithromycin at [facility] on 9/7  - completed 3 days of empiric azithromycin (9/7 - 9/9)  - acetaminophen and mucinex PRN  - Cont. supplemental 02 PRN  - Duo nebs Q6hrs

## 2023-09-25 NOTE — PROGRESS NOTE ADULT - SUBJECTIVE AND OBJECTIVE BOX
Markell Carpenter MD    Patient is a 67y old  Male who presents with a chief complaint of AMS, Worsening GBM w/ shift and Paraflu/ Enterovirus infection (24 Sep 2023 15:37)        SUBJECTIVE / OVERNIGHT EVENTS: Patient seen and examined. No new events/complaints  TELEMETRY: AF       MEDICATIONS  (STANDING):  albuterol/ipratropium for Nebulization 3 milliLiter(s) Nebulizer every 6 hours  allopurinol 100 milliGRAM(s) Oral daily  atorvastatin 20 milliGRAM(s) Oral at bedtime  brimonidine 0.2% Ophthalmic Solution 1 Drop(s) Both EYES two times a day  chlorhexidine 2% Cloths 1 Application(s) Topical daily  cloNIDine 0.1 milliGRAM(s) Oral two times a day  dexAMETHasone  Injectable 4 milliGRAM(s) IV Push every 12 hours  enoxaparin Injectable 110 milliGRAM(s) SubCutaneous every 12 hours  FLUoxetine 20 milliGRAM(s) Oral daily  lacosamide Solution 200 milliGRAM(s) Oral two times a day  levETIRAcetam  Solution 1000 milliGRAM(s) Enteral Tube two times a day  nadolol 40 milliGRAM(s) Oral two times a day  pantoprazole   Suspension 40 milliGRAM(s) Enteral Tube daily  timolol 0.25% Solution 1 Drop(s) Both EYES two times a day  valproic  acid Syrup 250 milliGRAM(s) Oral three times a day  valsartan 320 milliGRAM(s) Oral daily    MEDICATIONS  (PRN):  acetaminophen   Oral Liquid .. 650 milliGRAM(s) Oral every 6 hours PRN Temp greater or equal to 38C (100.4F), Mild Pain (1 - 3)  guaiFENesin Oral Liquid (Sugar-Free) 200 milliGRAM(s) Oral every 6 hours PRN Cough  senna 2 Tablet(s) Oral at bedtime PRN for constipation      Vital Signs Last 24 Hrs  T(C): 36.5 (25 Sep 2023 04:51), Max: 36.7 (24 Sep 2023 17:24)  T(F): 97.7 (25 Sep 2023 04:51), Max: 98 (24 Sep 2023 17:24)  HR: 105 (25 Sep 2023 04:51) (73 - 112)  BP: 151/78 (25 Sep 2023 04:51) (135/86 - 172/94)  BP(mean): --  RR: 18 (25 Sep 2023 04:51) (18 - 18)  SpO2: 97% (25 Sep 2023 04:51) (97% - 98%)    Parameters below as of 25 Sep 2023 08:34  Patient On (Oxygen Delivery Method): room air      CAPILLARY BLOOD GLUCOSE      POCT Blood Glucose.: 113 mg/dL (25 Sep 2023 07:33)  POCT Blood Glucose.: 125 mg/dL (25 Sep 2023 00:11)  POCT Blood Glucose.: 120 mg/dL (24 Sep 2023 18:14)  POCT Blood Glucose.: 107 mg/dL (24 Sep 2023 12:29)    I&O's Summary    24 Sep 2023 07:01  -  25 Sep 2023 07:00  --------------------------------------------------------  IN: 0 mL / OUT: 700 mL / NET: -700 mL          PHYSICAL EXAM  GENERAL: NAD, cushingoid   HEAD:  Atraumatic, normocephalic  EYES: conjunctiva and sclera clear  CHEST/LUNG: Clear to auscultation anteriorly; no wheezes  HEART: +S1+S2  ABDOMEN: Soft, nontender, nondistended; +PEG; bowel sounds present  EXTREMITIES:  2+ peripheral pulses; no clubbing, cyanosis, or edema; RUE midline  PSYCH: AAOx3      LABS:                        12.3   6.58  )-----------( 140      ( 25 Sep 2023 08:59 )             38.1     09-25    140  |  104  |  20  ----------------------------<  117<H>  4.1   |  24  |  0.30<L>    Ca    7.9<L>      25 Sep 2023 08:59  Phos  3.0     09-25  Mg     2.2     09-25            Urinalysis Basic - ( 25 Sep 2023 08:59 )    Color: x / Appearance: x / SG: x / pH: x  Gluc: 117 mg/dL / Ketone: x  / Bili: x / Urobili: x   Blood: x / Protein: x / Nitrite: x   Leuk Esterase: x / RBC: x / WBC x   Sq Epi: x / Non Sq Epi: x / Bacteria: x          RADIOLOGY & ADDITIONAL TESTS:    Imaging Personally Reviewed:  Consultant(s) Notes Reviewed:    Care Discussed with Consultants/Other Providers:

## 2023-09-25 NOTE — PROGRESS NOTE ADULT - PROBLEM SELECTOR PLAN 2
Continue Decadron 4mg IV Q12  Radiation oncology consulted, per notes pt's daughter is in favor of Avastin over RT   Neuro-oncologist is Dr. Carias

## 2023-09-25 NOTE — PROGRESS NOTE ADULT - PROBLEM SELECTOR PLAN 3
Seen by NSx- no absolute contraindication to AC, though noted high risk of AC in setting of advanced GBM  Continue Lovenox 110 mg SG Q12  Obtain STAT CT if there is a change in neuro exam with NSx f/u.  Daughter Aretha agreeable with plan per prior documentation

## 2023-09-26 NOTE — PROGRESS NOTE ADULT - SUBJECTIVE AND OBJECTIVE BOX
Markell Carpenter MD    Patient is a 67y old  Male who presents with a chief complaint of AMS, Worsening GBM w/ shift and Paraflu/ Enterovirus infection (26 Sep 2023 08:47)        SUBJECTIVE / OVERNIGHT EVENTS: Patient seen and examined. No new events  TELEMETRY: AF       MEDICATIONS  (STANDING):  albuterol/ipratropium for Nebulization 3 milliLiter(s) Nebulizer every 6 hours  allopurinol 100 milliGRAM(s) Oral daily  atorvastatin 20 milliGRAM(s) Oral at bedtime  brimonidine 0.2% Ophthalmic Solution 1 Drop(s) Both EYES two times a day  chlorhexidine 2% Cloths 1 Application(s) Topical daily  cloNIDine 0.1 milliGRAM(s) Oral two times a day  dexAMETHasone  Injectable 4 milliGRAM(s) IV Push every 12 hours  enoxaparin Injectable 110 milliGRAM(s) SubCutaneous every 12 hours  FLUoxetine 20 milliGRAM(s) Oral daily  lacosamide Solution 200 milliGRAM(s) Oral two times a day  levETIRAcetam  Solution 1000 milliGRAM(s) Enteral Tube two times a day  nadolol 60 milliGRAM(s) Oral two times a day  pantoprazole   Suspension 40 milliGRAM(s) Enteral Tube daily  timolol 0.25% Solution 1 Drop(s) Both EYES two times a day  valproic  acid Syrup 250 milliGRAM(s) Oral three times a day  valsartan 320 milliGRAM(s) Oral daily    MEDICATIONS  (PRN):  acetaminophen   Oral Liquid .. 650 milliGRAM(s) Oral every 6 hours PRN Temp greater or equal to 38C (100.4F), Mild Pain (1 - 3)  guaiFENesin Oral Liquid (Sugar-Free) 200 milliGRAM(s) Oral every 6 hours PRN Cough  senna 2 Tablet(s) Oral at bedtime PRN for constipation      Vital Signs Last 24 Hrs  T(C): 36.9 (26 Sep 2023 11:48), Max: 36.9 (26 Sep 2023 11:48)  T(F): 98.4 (26 Sep 2023 11:48), Max: 98.4 (26 Sep 2023 11:48)  HR: 82 (26 Sep 2023 11:48) (82 - 98)  BP: 150/89 (26 Sep 2023 11:48) (138/97 - 167/87)  BP(mean): --  RR: 18 (26 Sep 2023 11:48) (18 - 18)  SpO2: 96% (26 Sep 2023 10:16) (96% - 100%)    Parameters below as of 26 Sep 2023 11:48  Patient On (Oxygen Delivery Method): room air  O2 Flow (L/min): 99    CAPILLARY BLOOD GLUCOSE      POCT Blood Glucose.: 118 mg/dL (26 Sep 2023 12:00)  POCT Blood Glucose.: 120 mg/dL (26 Sep 2023 08:05)  POCT Blood Glucose.: 115 mg/dL (25 Sep 2023 23:53)  POCT Blood Glucose.: 114 mg/dL (25 Sep 2023 17:57)    I&O's Summary    25 Sep 2023 07:01  -  26 Sep 2023 07:00  --------------------------------------------------------  IN: 360 mL / OUT: 950 mL / NET: -590 mL    26 Sep 2023 07:01  -  26 Sep 2023 15:17  --------------------------------------------------------  IN: 0 mL / OUT: 100 mL / NET: -100 mL          PHYSICAL EXAM  GENERAL: NAD, cushingoid   HEAD:  Atraumatic, normocephalic  EYES: conjunctiva and sclera clear  CHEST/LUNG: Clear to auscultation anteriorly; no wheezes  HEART: +S1+S2  ABDOMEN: Soft, nontender, nondistended; +PEG; bowel sounds present  EXTREMITIES:  2+ peripheral pulses; no clubbing, cyanosis, or edema; RUE midline  PSYCH: AAOx3      LABS:                        13.3   8.26  )-----------( 143      ( 26 Sep 2023 09:35 )             41.8     09-26    136  |  101  |  22  ----------------------------<  114<H>  4.0   |  23  |  <0.30<L>    Ca    8.3<L>      26 Sep 2023 09:35  Phos  3.0     09-25  Mg     2.2     09-25            Urinalysis Basic - ( 26 Sep 2023 09:35 )    Color: x / Appearance: x / SG: x / pH: x  Gluc: 114 mg/dL / Ketone: x  / Bili: x / Urobili: x   Blood: x / Protein: x / Nitrite: x   Leuk Esterase: x / RBC: x / WBC x   Sq Epi: x / Non Sq Epi: x / Bacteria: x          RADIOLOGY & ADDITIONAL TESTS:    Imaging Personally Reviewed:  Consultant(s) Notes Reviewed:    Care Discussed with Consultants/Other Providers:

## 2023-09-26 NOTE — PROGRESS NOTE ADULT - PROBLEM SELECTOR PLAN 2
Continue Decadron 4mg IV Q12  Patient is deciding between continuing Avastin vs going home with homecare/hospice   If patient wants to continue Avastin he will need to go to [facility] as the family alfaro not have the resources to get the patient to his Avastin treatments  Avastin infusions are every 14 days  Neuro-oncologist is Dr. Carias

## 2023-09-26 NOTE — PROGRESS NOTE ADULT - SUBJECTIVE AND OBJECTIVE BOX
Subjective: Patient seen and examined. No new events except as noted.     SUBJECTIVE/ROS:        MEDICATIONS:  MEDICATIONS  (STANDING):  albuterol/ipratropium for Nebulization 3 milliLiter(s) Nebulizer every 6 hours  allopurinol 100 milliGRAM(s) Oral daily  atorvastatin 20 milliGRAM(s) Oral at bedtime  brimonidine 0.2% Ophthalmic Solution 1 Drop(s) Both EYES two times a day  chlorhexidine 2% Cloths 1 Application(s) Topical daily  cloNIDine 0.1 milliGRAM(s) Oral two times a day  dexAMETHasone  Injectable 4 milliGRAM(s) IV Push every 12 hours  enoxaparin Injectable 110 milliGRAM(s) SubCutaneous every 12 hours  FLUoxetine 20 milliGRAM(s) Oral daily  lacosamide Solution 200 milliGRAM(s) Oral two times a day  levETIRAcetam  Solution 1000 milliGRAM(s) Enteral Tube two times a day  nadolol 60 milliGRAM(s) Oral two times a day  pantoprazole   Suspension 40 milliGRAM(s) Enteral Tube daily  timolol 0.25% Solution 1 Drop(s) Both EYES two times a day  valproic  acid Syrup 250 milliGRAM(s) Oral three times a day  valsartan 320 milliGRAM(s) Oral daily      PHYSICAL EXAM:  T(C): 36.3 (09-26-23 @ 04:38), Max: 36.5 (09-25-23 @ 21:04)  HR: 84 (09-26-23 @ 04:38) (84 - 98)  BP: 138/97 (09-26-23 @ 04:38) (138/97 - 175/97)  RR: 18 (09-26-23 @ 04:38) (18 - 18)  SpO2: 99% (09-26-23 @ 04:38) (97% - 100%)  Wt(kg): --  I&O's Summary    25 Sep 2023 07:01  -  26 Sep 2023 07:00  --------------------------------------------------------  IN: 360 mL / OUT: 950 mL / NET: -590 mL            JVP: Normal  Neck: supple  Lung: clear   CV: S1 S2 , Murmur:  Abd: soft  Ext: No edema  Psych: flat affect  Skin: normal``    LABS/DATA:    CARDIAC MARKERS:                                12.3   6.58  )-----------( 140      ( 25 Sep 2023 08:59 )             38.1     09-25    140  |  104  |  20  ----------------------------<  117<H>  4.1   |  24  |  0.30<L>    Ca    7.9<L>      25 Sep 2023 08:59  Phos  3.0     09-25  Mg     2.2     09-25      proBNP:   Lipid Profile:   HgA1c:   TSH:     TELE:  EKG:

## 2023-09-26 NOTE — CHART NOTE - NSCHARTNOTEFT_GEN_A_CORE
This patient requires a Raheem lift for in home use. DX R53.2  This patient requires two person max assist for transfer from the bed to the chair.  Without a raheem lift this patient will be confined to the bed.    Emily Moser PA-C  Medicine

## 2023-09-27 NOTE — PROGRESS NOTE ADULT - SUBJECTIVE AND OBJECTIVE BOX
Subjective: Patient seen and examined. No new events except as noted.     SUBJECTIVE/ROS:  MEDICATIONS:  MEDICATIONS  (STANDING):  albuterol/ipratropium for Nebulization 3 milliLiter(s) Nebulizer every 6 hours  allopurinol 100 milliGRAM(s) Oral daily  atorvastatin 20 milliGRAM(s) Oral at bedtime  brimonidine 0.2% Ophthalmic Solution 1 Drop(s) Both EYES two times a day  chlorhexidine 2% Cloths 1 Application(s) Topical daily  cloNIDine 0.1 milliGRAM(s) Oral two times a day  dexAMETHasone  Injectable 4 milliGRAM(s) IV Push every 12 hours  enoxaparin Injectable 110 milliGRAM(s) SubCutaneous every 12 hours  FLUoxetine 20 milliGRAM(s) Oral daily  lacosamide Solution 200 milliGRAM(s) Oral two times a day  levETIRAcetam  Solution 1000 milliGRAM(s) Enteral Tube two times a day  nadolol 60 milliGRAM(s) Oral two times a day  pantoprazole   Suspension 40 milliGRAM(s) Enteral Tube daily  timolol 0.25% Solution 1 Drop(s) Both EYES two times a day  valproic  acid Syrup 250 milliGRAM(s) Oral three times a day  valsartan 320 milliGRAM(s) Oral daily      PHYSICAL EXAM:  T(C): 36.4 (09-27-23 @ 05:07), Max: 36.9 (09-26-23 @ 11:48)  HR: 106 (09-27-23 @ 05:07) (82 - 106)  BP: 146/81 (09-27-23 @ 05:07) (146/81 - 161/90)  RR: 18 (09-27-23 @ 05:07) (18 - 18)  SpO2: 98% (09-27-23 @ 05:07) (96% - 100%)  Wt(kg): --  I&O's Summary    26 Sep 2023 07:01  -  27 Sep 2023 07:00  --------------------------------------------------------  IN: 0 mL / OUT: 100 mL / NET: -100 mL            JVP: Normal  Neck: supple  Lung: clear   CV: S1 S2 , Murmur:  Abd: soft  Ext: No edema  neuro: Awake   Psych: flat affect  Skin: normal``    LABS/DATA:    CARDIAC MARKERS:                                13.3   8.26  )-----------( 143      ( 26 Sep 2023 09:35 )             41.8     09-26    136  |  101  |  22  ----------------------------<  114<H>  4.0   |  23  |  <0.30<L>    Ca    8.3<L>      26 Sep 2023 09:35  Phos  3.0     09-25  Mg     2.2     09-25      proBNP:   Lipid Profile:   HgA1c:   TSH:     TELE:  EKG:

## 2023-09-27 NOTE — PROGRESS NOTE ADULT - SUBJECTIVE AND OBJECTIVE BOX
Markell Carpenter MD    Patient is a 68y old  Male who presents with a chief complaint of AMS, Worsening GBM w/ shift and Paraflu/ Enterovirus infection (27 Sep 2023 08:34)        SUBJECTIVE / OVERNIGHT EVENTS: Patient seen and examined. No new events/complaints. Patient states he wants to defer further treatment with Avastin and pursue discharge to home      MEDICATIONS  (STANDING):  albuterol/ipratropium for Nebulization 3 milliLiter(s) Nebulizer every 6 hours  allopurinol 100 milliGRAM(s) Oral daily  atorvastatin 20 milliGRAM(s) Oral at bedtime  brimonidine 0.2% Ophthalmic Solution 1 Drop(s) Both EYES two times a day  chlorhexidine 2% Cloths 1 Application(s) Topical daily  cloNIDine 0.1 milliGRAM(s) Oral two times a day  dexAMETHasone  Injectable 4 milliGRAM(s) IV Push every 12 hours  enoxaparin Injectable 110 milliGRAM(s) SubCutaneous every 12 hours  FLUoxetine 20 milliGRAM(s) Oral daily  lacosamide Solution 200 milliGRAM(s) Oral two times a day  levETIRAcetam  Solution 1000 milliGRAM(s) Enteral Tube two times a day  nadolol 60 milliGRAM(s) Oral two times a day  pantoprazole   Suspension 40 milliGRAM(s) Enteral Tube daily  timolol 0.25% Solution 1 Drop(s) Both EYES two times a day  valproic  acid Syrup 250 milliGRAM(s) Oral three times a day  valsartan 320 milliGRAM(s) Oral daily    MEDICATIONS  (PRN):  acetaminophen   Oral Liquid .. 650 milliGRAM(s) Oral every 6 hours PRN Temp greater or equal to 38C (100.4F), Mild Pain (1 - 3)  guaiFENesin Oral Liquid (Sugar-Free) 200 milliGRAM(s) Oral every 6 hours PRN Cough  senna 2 Tablet(s) Oral at bedtime PRN for constipation      Vital Signs Last 24 Hrs  T(C): 36.7 (27 Sep 2023 11:50), Max: 36.7 (27 Sep 2023 00:54)  T(F): 98.1 (27 Sep 2023 11:50), Max: 98.1 (27 Sep 2023 11:50)  HR: 66 (27 Sep 2023 11:50) (66 - 106)  BP: 153/102 (27 Sep 2023 11:50) (146/81 - 161/90)  BP(mean): --  RR: 18 (27 Sep 2023 11:50) (18 - 18)  SpO2: 100% (27 Sep 2023 11:50) (98% - 100%)    Parameters below as of 27 Sep 2023 11:50  Patient On (Oxygen Delivery Method): room air      CAPILLARY BLOOD GLUCOSE      POCT Blood Glucose.: 123 mg/dL (27 Sep 2023 12:08)  POCT Blood Glucose.: 108 mg/dL (27 Sep 2023 08:20)  POCT Blood Glucose.: 114 mg/dL (27 Sep 2023 00:19)  POCT Blood Glucose.: 100 mg/dL (26 Sep 2023 18:09)    I&O's Summary    26 Sep 2023 07:01  -  27 Sep 2023 07:00  --------------------------------------------------------  IN: 0 mL / OUT: 100 mL / NET: -100 mL    27 Sep 2023 07:01  -  27 Sep 2023 14:17  --------------------------------------------------------  IN: 0 mL / OUT: 200 mL / NET: -200 mL          PHYSICAL EXAM  GENERAL: NAD, cushingoid   HEAD:  Atraumatic, normocephalic  EYES: conjunctiva and sclera clear  CHEST/LUNG: Clear to auscultation anteriorly; no wheezes  HEART: +S1+S2  ABDOMEN: Soft, nontender, nondistended; +PEG; bowel sounds present  EXTREMITIES:  2+ peripheral pulses; no clubbing, cyanosis, or edema; RUE midline  PSYCH: AAOx3      LABS:                        13.3   7.30  )-----------( 151      ( 27 Sep 2023 08:43 )             41.5     09-27    140  |  102  |  23  ----------------------------<  120<H>  5.3   |  27  |  <0.30<L>    Ca    8.1<L>      27 Sep 2023 08:43            Urinalysis Basic - ( 27 Sep 2023 08:43 )    Color: x / Appearance: x / SG: x / pH: x  Gluc: 120 mg/dL / Ketone: x  / Bili: x / Urobili: x   Blood: x / Protein: x / Nitrite: x   Leuk Esterase: x / RBC: x / WBC x   Sq Epi: x / Non Sq Epi: x / Bacteria: x          RADIOLOGY & ADDITIONAL TESTS:    Imaging Personally Reviewed:  Consultant(s) Notes Reviewed:    Care Discussed with Consultants/Other Providers:

## 2023-09-27 NOTE — CHART NOTE - NSCHARTNOTEFT_GEN_A_CORE
Nutrition Follow Up Note  Patient seen for: malnutrition follow up.    Chart reviewed, events noted. Pt is a "67M hx GBM with seizures, AF on Xarelto, CVA w/ residual L-sided weakness p/w cough and acute encephalopathy, found to have worsening GBM with edema and midline shift."    Source: [] Patient       [x] EMR        [x] RN        [] Family at bedside       [] Other:    -If unable to interview patient: [] Trach/Vent/BiPAP  [x] Disoriented/confused/inappropriate to interview    Diet Order:   Diet, NPO with Tube Feed:   Tube Feeding Modality: Gastrostomy  Glucerna 1.5 [place] (GLUCERNA1.5RTH)  Total Volume for 24 Hours (mL): 1440  Continuous  Starting Tube Feed Rate {mL per Hour}: 10  Until Goal Tube Feed Rate (mL per Hour): 60  Tube Feed Duration (in Hours): 24  Tube Feed Start Time: 17:00 (23)    - Is current order appropriate/adequate? See recommendations below.    - Current EN regimen provides: 1440ml volume, 2160kcal, 119g protein, 1093ml free water.    - Nutrition-related concerns:   - GOC/Palliative consult noted on . Dispo planning in progress; hospice, home with private hire vs SNF.   - Pt tolerating TF as ordered per chart/staff   - Pt s/p FEES , recommendations noted for NPO with non-oral meals of nutrition/hydration. Per SLP f/u  pt is not a candidate to safely take PO.    GI:  Last BM 9/26 x 3.   Bowel Regimen? [] Yes   [x] No    Weights:   Daily Weight in k.5 (), Weight in k.1 (), Weight in k.7 (), Weight in k.7 ()  - weight fluctuations noted    MEDICATIONS  (STANDING):  albuterol/ipratropium for Nebulization 3 milliLiter(s) Nebulizer every 6 hours  allopurinol 100 milliGRAM(s) Oral daily  atorvastatin 20 milliGRAM(s) Oral at bedtime  brimonidine 0.2% Ophthalmic Solution 1 Drop(s) Both EYES two times a day  chlorhexidine 2% Cloths 1 Application(s) Topical daily  cloNIDine 0.1 milliGRAM(s) Oral two times a day  dexAMETHasone  Injectable 4 milliGRAM(s) IV Push every 12 hours  enoxaparin Injectable 110 milliGRAM(s) SubCutaneous every 12 hours  FLUoxetine 20 milliGRAM(s) Oral daily  lacosamide Solution 200 milliGRAM(s) Oral two times a day  levETIRAcetam  Solution 1000 milliGRAM(s) Enteral Tube two times a day  nadolol 60 milliGRAM(s) Oral two times a day  pantoprazole   Suspension 40 milliGRAM(s) Enteral Tube daily  timolol 0.25% Solution 1 Drop(s) Both EYES two times a day  valproic  acid Syrup 250 milliGRAM(s) Oral three times a day  valsartan 320 milliGRAM(s) Oral daily    MEDICATIONS  (PRN):  acetaminophen   Oral Liquid .. 650 milliGRAM(s) Oral every 6 hours PRN Temp greater or equal to 38C (100.4F), Mild Pain (1 - 3)  guaiFENesin Oral Liquid (Sugar-Free) 200 milliGRAM(s) Oral every 6 hours PRN Cough  senna 2 Tablet(s) Oral at bedtime PRN for constipation    Pertinent Labs:  @ 08:43: Na 140, BUN 23, Cr <0.30<L>, <H>, K+ 5.3, Phos --, Mg --, Alk Phos --, ALT/SGPT --, AST/SGOT --, HbA1c --    A1C with Estimated Average Glucose Result: 5.5 % (23 @ 06:59)    Finger Sticks:  POCT Blood Glucose.: 108 mg/dL ( @ 08:20)  POCT Blood Glucose.: 114 mg/dL ( @ 00:19)  POCT Blood Glucose.: 100 mg/dL ( @ 18:09)  POCT Blood Glucose.: 118 mg/dL ( @ 12:00)    Skin per nursing documentation: No noted pressure injuries as per documentation.   Edema: +1 generalized, 2+ socorro. leg, 3+ left arm    Estimated Needs: based on adjusted IBW of 163lb/74kg  Calories: 2391-8452 kcal (27-32 kcal/kg IBW)   Protein: 104-118g (1.4-1.6 g/kg IBW)   Fluids: defer to team     Previous Nutrition Diagnosis: Increased Nutrient Needs, Severe Chronic Malnutrition  Nutrition Diagnosis is: [x] ongoing  [] resolved [] not applicable     New Nutrition Diagnosis: [x] Not applicable    Nutrition Care Plan:  [x] In Progress  [] Achieved  [] Not applicable    Nutrition Interventions:     Education Provided:       [] Yes:  [x] No: N/A    Recommendations:   1. Continue current regimen of Glucerna 1.5 at 60ml/hr x 24hr to provide 1440ml volume, 2160kcal, 119g protein, 1093ml free water. Meets 29kcal/kg, 1.6g/kg protein based on IBW 74kg. Defer additional free water to team.   - If bolus feeds medically feasible/required (pending discharge decision): can consider bolus feeds of Glucerna 1.5, 360ml (~1.5 cans) 4 x daily or q6H to provide same as above. Recommend elevated HOB during and after feeds to decrease aspiration risk.   2. Monitor GI tolerance to feeds, RD remains available to adjust TF regimen/formulary as needed/upon request.     Monitoring and Evaluation:   Continue to monitor nutritional intake, tolerance to diet prescription, weights, labs, skin integrity    RD remains available upon request and will follow up per protocol    Perla Quintanilla MS, RD, CDN, CNSC; available via MS Teams

## 2023-09-27 NOTE — PROGRESS NOTE ADULT - PROBLEM SELECTOR PLAN 2
Continue Decadron 4mg IV Q12- will transition to via PEG at d/c   Patient has decided to forego further treatment with Avastin and pursue home with homecare vs hospice

## 2023-09-28 NOTE — PROGRESS NOTE ADULT - SUBJECTIVE AND OBJECTIVE BOX
Markell Carpenter MD    Patient is a 68y old  Male who presents with a chief complaint of AMS, Worsening GBM w/ shift and Paraflu/ Enterovirus infection (28 Sep 2023 09:17)        SUBJECTIVE / OVERNIGHT EVENTS: Patient seen and examined. No new events      MEDICATIONS  (STANDING):  albuterol/ipratropium for Nebulization 3 milliLiter(s) Nebulizer every 6 hours  allopurinol 100 milliGRAM(s) Oral daily  atorvastatin 20 milliGRAM(s) Oral at bedtime  brimonidine 0.2% Ophthalmic Solution 1 Drop(s) Both EYES two times a day  chlorhexidine 2% Cloths 1 Application(s) Topical daily  cloNIDine 0.1 milliGRAM(s) Oral two times a day  dexAMETHasone  Injectable 4 milliGRAM(s) IV Push every 12 hours  enoxaparin Injectable 100 milliGRAM(s) SubCutaneous every 12 hours  FLUoxetine 20 milliGRAM(s) Oral daily  lacosamide Solution 200 milliGRAM(s) Oral two times a day  levETIRAcetam  Solution 1000 milliGRAM(s) Enteral Tube two times a day  nadolol 60 milliGRAM(s) Oral two times a day  pantoprazole   Suspension 40 milliGRAM(s) Enteral Tube daily  timolol 0.25% Solution 1 Drop(s) Both EYES two times a day  valproic  acid Syrup 250 milliGRAM(s) Oral three times a day  valsartan 320 milliGRAM(s) Oral daily    MEDICATIONS  (PRN):  acetaminophen   Oral Liquid .. 650 milliGRAM(s) Oral every 6 hours PRN Temp greater or equal to 38C (100.4F), Mild Pain (1 - 3)  guaiFENesin Oral Liquid (Sugar-Free) 200 milliGRAM(s) Oral every 6 hours PRN Cough  senna 2 Tablet(s) Oral at bedtime PRN for constipation      Vital Signs Last 24 Hrs  T(C): 36.8 (28 Sep 2023 04:31), Max: 36.8 (28 Sep 2023 04:31)  T(F): 98.3 (28 Sep 2023 04:31), Max: 98.3 (28 Sep 2023 04:31)  HR: 96 (28 Sep 2023 04:31) (66 - 97)  BP: 140/84 (28 Sep 2023 04:31) (137/99 - 155/109)  BP(mean): --  RR: 17 (28 Sep 2023 04:31) (17 - 18)  SpO2: 93% (28 Sep 2023 04:31) (93% - 100%)    Parameters below as of 28 Sep 2023 04:31  Patient On (Oxygen Delivery Method): room air      CAPILLARY BLOOD GLUCOSE      POCT Blood Glucose.: 112 mg/dL (28 Sep 2023 06:37)  POCT Blood Glucose.: 104 mg/dL (28 Sep 2023 03:23)  POCT Blood Glucose.: 104 mg/dL (27 Sep 2023 17:53)  POCT Blood Glucose.: 123 mg/dL (27 Sep 2023 12:08)    I&O's Summary    27 Sep 2023 07:01  -  28 Sep 2023 07:00  --------------------------------------------------------  IN: 0 mL / OUT: 200 mL / NET: -200 mL          PHYSICAL EXAM  GENERAL: NAD, cushingoid   HEAD:  Atraumatic, normocephalic  EYES: conjunctiva and sclera clear  CHEST/LUNG: Clear to auscultation anteriorly; no wheezes  HEART: +S1+S2  ABDOMEN: Soft, nontender, nondistended; +PEG; bowel sounds present  EXTREMITIES:  2+ peripheral pulses; no clubbing, cyanosis, or edema; RUE midline  PSYCH: AAOx3      LABS:                        13.3   7.30  )-----------( 151      ( 27 Sep 2023 08:43 )             41.5     09-28    143  |  103  |  26<H>  ----------------------------<  125<H>  3.8   |  26  |  <0.30<L>    Ca    8.4      28 Sep 2023 09:43            Urinalysis Basic - ( 28 Sep 2023 09:43 )    Color: x / Appearance: x / SG: x / pH: x  Gluc: 125 mg/dL / Ketone: x  / Bili: x / Urobili: x   Blood: x / Protein: x / Nitrite: x   Leuk Esterase: x / RBC: x / WBC x   Sq Epi: x / Non Sq Epi: x / Bacteria: x          RADIOLOGY & ADDITIONAL TESTS:    Imaging Personally Reviewed:  Consultant(s) Notes Reviewed:    Care Discussed with Consultants/Other Providers:

## 2023-09-28 NOTE — PROGRESS NOTE ADULT - SUBJECTIVE AND OBJECTIVE BOX
Subjective: Patient seen and examined. No new events except as noted.     SUBJECTIVE/ROS:  nad      MEDICATIONS:  MEDICATIONS  (STANDING):  albuterol/ipratropium for Nebulization 3 milliLiter(s) Nebulizer every 6 hours  allopurinol 100 milliGRAM(s) Oral daily  atorvastatin 20 milliGRAM(s) Oral at bedtime  brimonidine 0.2% Ophthalmic Solution 1 Drop(s) Both EYES two times a day  chlorhexidine 2% Cloths 1 Application(s) Topical daily  cloNIDine 0.1 milliGRAM(s) Oral two times a day  dexAMETHasone  Injectable 4 milliGRAM(s) IV Push every 12 hours  enoxaparin Injectable 110 milliGRAM(s) SubCutaneous every 12 hours  FLUoxetine 20 milliGRAM(s) Oral daily  lacosamide Solution 200 milliGRAM(s) Oral two times a day  levETIRAcetam  Solution 1000 milliGRAM(s) Enteral Tube two times a day  nadolol 60 milliGRAM(s) Oral two times a day  pantoprazole   Suspension 40 milliGRAM(s) Enteral Tube daily  timolol 0.25% Solution 1 Drop(s) Both EYES two times a day  valproic  acid Syrup 250 milliGRAM(s) Oral three times a day  valsartan 320 milliGRAM(s) Oral daily      PHYSICAL EXAM:  T(C): 36.8 (09-28-23 @ 04:31), Max: 36.8 (09-28-23 @ 04:31)  HR: 96 (09-28-23 @ 04:31) (66 - 97)  BP: 140/84 (09-28-23 @ 04:31) (137/99 - 155/109)  RR: 17 (09-28-23 @ 04:31) (17 - 18)  SpO2: 93% (09-28-23 @ 04:31) (93% - 100%)  Wt(kg): --  I&O's Summary    27 Sep 2023 07:01  -  28 Sep 2023 07:00  --------------------------------------------------------  IN: 0 mL / OUT: 200 mL / NET: -200 mL            JVP: Normal  Neck: supple  Lung: clear   CV: S1 S2 , Murmur:  Abd: soft  Ext: No edema  Psych: flat affect  Skin: normal``    LABS/DATA:    CARDIAC MARKERS:                                13.3   7.30  )-----------( 151      ( 27 Sep 2023 08:43 )             41.5     09-27    140  |  102  |  23  ----------------------------<  120<H>  5.3   |  27  |  <0.30<L>    Ca    8.1<L>      27 Sep 2023 08:43      proBNP:   Lipid Profile:   HgA1c:   TSH:     TELE:  EKG:

## 2023-09-28 NOTE — PROGRESS NOTE ADULT - PROBLEM SELECTOR PLAN 3
Seen by NSx- no absolute contraindication to AC, though noted high risk of AC in setting of advanced GBM  Change Lovenox to 100 mg SG Q12 based on most recent weights  Obtain STAT CT if there is a change in neuro exam with NSx f/u.  Daughter Aretha agreeable with plan per prior documentation

## 2023-09-28 NOTE — PROGRESS NOTE ADULT - PROBLEM SELECTOR PLAN 2
Continue Decadron 4mg IV Q12- will transition to via PEG today for d/c   Patient has decided to forego further treatment with Avastin and pursue home hospice

## 2023-09-29 NOTE — PROGRESS NOTE ADULT - SUBJECTIVE AND OBJECTIVE BOX
Markell Carpenter MD    Patient is a 68y old  Male who presents with a chief complaint of AMS, Worsening GBM w/ shift and Paraflu/ Enterovirus infection (28 Sep 2023 11:03)        SUBJECTIVE / OVERNIGHT EVENTS: Patient seen and examined. No new events      MEDICATIONS  (STANDING):  albuterol/ipratropium for Nebulization 3 milliLiter(s) Nebulizer every 6 hours  allopurinol 100 milliGRAM(s) Oral daily  atorvastatin 20 milliGRAM(s) Oral at bedtime  brimonidine 0.2% Ophthalmic Solution 1 Drop(s) Both EYES two times a day  chlorhexidine 2% Cloths 1 Application(s) Topical daily  cloNIDine 0.1 milliGRAM(s) Oral two times a day  dexAMETHasone   Concentrate 4 milliGRAM(s) Oral every 12 hours  enoxaparin Injectable 100 milliGRAM(s) SubCutaneous every 12 hours  FLUoxetine 20 milliGRAM(s) Oral daily  lacosamide Solution 200 milliGRAM(s) Oral two times a day  levETIRAcetam  Solution 1000 milliGRAM(s) Enteral Tube two times a day  nadolol 60 milliGRAM(s) Oral two times a day  pantoprazole   Suspension 40 milliGRAM(s) Enteral Tube daily  timolol 0.25% Solution 1 Drop(s) Both EYES two times a day  valproic  acid Syrup 250 milliGRAM(s) Oral three times a day  valsartan 320 milliGRAM(s) Oral daily    MEDICATIONS  (PRN):  acetaminophen   Oral Liquid .. 650 milliGRAM(s) Oral every 6 hours PRN Temp greater or equal to 38C (100.4F), Mild Pain (1 - 3)  guaiFENesin Oral Liquid (Sugar-Free) 200 milliGRAM(s) Oral every 6 hours PRN Cough  oxycodone    5 mG/acetaminophen 325 mG 1 Tablet(s) Oral/Enteral Tube every 6 hours PRN Severe Pain (7 - 10)  senna 2 Tablet(s) Oral at bedtime PRN for constipation      Vital Signs Last 24 Hrs  T(C): 36.7 (29 Sep 2023 04:24), Max: 36.7 (28 Sep 2023 21:51)  T(F): 98.1 (29 Sep 2023 04:24), Max: 98.1 (29 Sep 2023 04:24)  HR: 90 (29 Sep 2023 05:39) (64 - 95)  BP: 145/90 (29 Sep 2023 05:39) (136/91 - 167/88)  BP(mean): --  RR: 18 (29 Sep 2023 04:24) (18 - 18)  SpO2: 98% (29 Sep 2023 04:24) (98% - 99%)    Parameters below as of 29 Sep 2023 04:24  Patient On (Oxygen Delivery Method): room air      CAPILLARY BLOOD GLUCOSE      POCT Blood Glucose.: 82 mg/dL (29 Sep 2023 05:55)  POCT Blood Glucose.: 90 mg/dL (29 Sep 2023 02:07)  POCT Blood Glucose.: 93 mg/dL (28 Sep 2023 19:42)  POCT Blood Glucose.: 111 mg/dL (28 Sep 2023 12:12)    I&O's Summary    28 Sep 2023 07:01  -  29 Sep 2023 07:00  --------------------------------------------------------  IN: 0 mL / OUT: 350 mL / NET: -350 mL    29 Sep 2023 07:01  -  29 Sep 2023 11:16  --------------------------------------------------------  IN: 0 mL / OUT: 200 mL / NET: -200 mL          PHYSICAL EXAM  GENERAL: NAD, cushingoid   HEAD:  Atraumatic, normocephalic  EYES: conjunctiva and sclera clear  CHEST/LUNG: Clear to auscultation anteriorly; no wheezes  HEART: +S1+S2  ABDOMEN: Soft, nontender, nondistended; +PEG; bowel sounds present  EXTREMITIES:  2+ peripheral pulses; no clubbing, cyanosis, or edema; RUE midline  PSYCH: AAOx3        LABS:    09-28    143  |  103  |  26<H>  ----------------------------<  125<H>  3.8   |  26  |  <0.30<L>    Ca    8.4      28 Sep 2023 09:43            Urinalysis Basic - ( 28 Sep 2023 09:43 )    Color: x / Appearance: x / SG: x / pH: x  Gluc: 125 mg/dL / Ketone: x  / Bili: x / Urobili: x   Blood: x / Protein: x / Nitrite: x   Leuk Esterase: x / RBC: x / WBC x   Sq Epi: x / Non Sq Epi: x / Bacteria: x          RADIOLOGY & ADDITIONAL TESTS:    Imaging Personally Reviewed:  Consultant(s) Notes Reviewed:    Care Discussed with Consultants/Other Providers:

## 2023-09-29 NOTE — PROGRESS NOTE ADULT - PROBLEM SELECTOR PLAN 2
Continue Decadron 4mg via PEG Q12  Patient has decided to forego further treatment with Avastin and pursue home hospice

## 2023-09-29 NOTE — PROGRESS NOTE ADULT - SUBJECTIVE AND OBJECTIVE BOX
Subjective: Patient seen and examined. No new events except as noted.     SUBJECTIVE/ROS:        MEDICATIONS:  MEDICATIONS  (STANDING):  albuterol/ipratropium for Nebulization 3 milliLiter(s) Nebulizer every 6 hours  allopurinol 100 milliGRAM(s) Oral daily  atorvastatin 20 milliGRAM(s) Oral at bedtime  brimonidine 0.2% Ophthalmic Solution 1 Drop(s) Both EYES two times a day  chlorhexidine 2% Cloths 1 Application(s) Topical daily  cloNIDine 0.1 milliGRAM(s) Oral two times a day  dexAMETHasone   Concentrate 4 milliGRAM(s) Oral every 12 hours  enoxaparin Injectable 100 milliGRAM(s) SubCutaneous every 12 hours  FLUoxetine 20 milliGRAM(s) Oral daily  lacosamide Solution 200 milliGRAM(s) Oral two times a day  levETIRAcetam  Solution 1000 milliGRAM(s) Enteral Tube two times a day  nadolol 60 milliGRAM(s) Oral two times a day  pantoprazole   Suspension 40 milliGRAM(s) Enteral Tube daily  timolol 0.25% Solution 1 Drop(s) Both EYES two times a day  valproic  acid Syrup 250 milliGRAM(s) Oral three times a day  valsartan 320 milliGRAM(s) Oral daily      PHYSICAL EXAM:  T(C): 36.6 (09-29-23 @ 11:39), Max: 36.7 (09-28-23 @ 21:51)  HR: 90 (09-29-23 @ 11:39) (64 - 95)  BP: 148/73 (09-29-23 @ 11:39) (145/78 - 167/88)  RR: 18 (09-29-23 @ 11:39) (18 - 18)  SpO2: 97% (09-29-23 @ 11:39) (97% - 99%)  Wt(kg): --  I&O's Summary    28 Sep 2023 07:01  -  29 Sep 2023 07:00  --------------------------------------------------------  IN: 0 mL / OUT: 350 mL / NET: -350 mL    29 Sep 2023 07:01  -  29 Sep 2023 13:12  --------------------------------------------------------  IN: 0 mL / OUT: 200 mL / NET: -200 mL            JVP: Normal  Neck: supple  Lung: clear   CV: S1 S2 , Murmur:  Abd: soft  Ext: No edema  neuro: Awake   Psych: flat affect  Skin: normal``    LABS/DATA:    CARDIAC MARKERS:            09-28    143  |  103  |  26<H>  ----------------------------<  125<H>  3.8   |  26  |  <0.30<L>    Ca    8.4      28 Sep 2023 09:43      proBNP:   Lipid Profile:   HgA1c:   TSH:     TELE:  EKG:

## 2023-09-30 NOTE — PROGRESS NOTE ADULT - SUBJECTIVE AND OBJECTIVE BOX
Subjective: Patient seen and examined. No new events except as noted.     SUBJECTIVE/ROS:  MEDICATIONS:  MEDICATIONS  (STANDING):  albuterol/ipratropium for Nebulization 3 milliLiter(s) Nebulizer every 6 hours  allopurinol 100 milliGRAM(s) Oral daily  atorvastatin 20 milliGRAM(s) Oral at bedtime  brimonidine 0.2% Ophthalmic Solution 1 Drop(s) Both EYES two times a day  chlorhexidine 2% Cloths 1 Application(s) Topical daily  cloNIDine 0.1 milliGRAM(s) Oral two times a day  dexAMETHasone   Concentrate 4 milliGRAM(s) Oral every 12 hours  enoxaparin Injectable 100 milliGRAM(s) SubCutaneous every 12 hours  FLUoxetine 20 milliGRAM(s) Oral daily  lacosamide Solution 200 milliGRAM(s) Oral two times a day  levETIRAcetam  Solution 1000 milliGRAM(s) Enteral Tube two times a day  nadolol 60 milliGRAM(s) Oral two times a day  pantoprazole   Suspension 40 milliGRAM(s) Enteral Tube daily  timolol 0.25% Solution 1 Drop(s) Both EYES two times a day  valproic  acid Syrup 250 milliGRAM(s) Oral three times a day  valsartan 320 milliGRAM(s) Oral daily      PHYSICAL EXAM:  T(C): 36.6 (09-30-23 @ 04:29), Max: 37.3 (09-29-23 @ 22:09)  HR: 105 (09-30-23 @ 06:00) (77 - 105)  BP: 162/80 (09-30-23 @ 06:00) (125/85 - 162/80)  RR: 18 (09-30-23 @ 04:29) (18 - 18)  SpO2: 96% (09-30-23 @ 04:29) (96% - 98%)  Wt(kg): --  I&O's Summary    29 Sep 2023 07:01  -  30 Sep 2023 07:00  --------------------------------------------------------  IN: 0 mL / OUT: 900 mL / NET: -900 mL            JVP: Normal  Neck: supple  Lung: clear   CV: S1 S2 , Murmur:  Abd: soft  Ext: No edema  Psych: flat affect  Skin: normal``    LABS/DATA:    CARDIAC MARKERS:            09-28    143  |  103  |  26<H>  ----------------------------<  125<H>  3.8   |  26  |  <0.30<L>    Ca    8.4      28 Sep 2023 09:43      proBNP:   Lipid Profile:   HgA1c:   TSH:     TELE:  EKG:

## 2023-09-30 NOTE — PROGRESS NOTE ADULT - NSPROGADDITIONALINFOA_GEN_ALL_CORE
d/w daughter Aretha on 9/30, meeting with private aides this weekend/monday. Delivery of hospital bed pending

## 2023-09-30 NOTE — PROGRESS NOTE ADULT - SUBJECTIVE AND OBJECTIVE BOX
PROGRESS NOTE:   Authored by Dr. Mia Black MD, Available on MS Teams    Patient is a 68y old  Male who presents with a chief complaint of AMS, Worsening GBM w/ shift and Paraflu/ Enterovirus infection (30 Sep 2023 08:16)      SUBJECTIVE / OVERNIGHT EVENTS: Patient seen and examined, friends/family at bedside. Patient has some pain at the back of his head. No chest pain or shortness of breath, nausea, or abdominal pain.     ADDITIONAL REVIEW OF SYSTEMS:    MEDICATIONS  (STANDING):  albuterol/ipratropium for Nebulization 3 milliLiter(s) Nebulizer every 6 hours  allopurinol 100 milliGRAM(s) Oral daily  atorvastatin 20 milliGRAM(s) Oral at bedtime  brimonidine 0.2% Ophthalmic Solution 1 Drop(s) Both EYES two times a day  chlorhexidine 2% Cloths 1 Application(s) Topical daily  cloNIDine 0.1 milliGRAM(s) Oral two times a day  dexAMETHasone   Concentrate 4 milliGRAM(s) Oral every 12 hours  enoxaparin Injectable 100 milliGRAM(s) SubCutaneous every 12 hours  FLUoxetine 20 milliGRAM(s) Oral daily  lacosamide Solution 200 milliGRAM(s) Oral two times a day  levETIRAcetam  Solution 1000 milliGRAM(s) Enteral Tube two times a day  nadolol 60 milliGRAM(s) Oral two times a day  pantoprazole   Suspension 40 milliGRAM(s) Enteral Tube daily  timolol 0.25% Solution 1 Drop(s) Both EYES two times a day  valproic  acid Syrup 250 milliGRAM(s) Oral three times a day  valsartan 320 milliGRAM(s) Oral daily    MEDICATIONS  (PRN):  acetaminophen   Oral Liquid .. 650 milliGRAM(s) Oral every 6 hours PRN Temp greater or equal to 38C (100.4F), Mild Pain (1 - 3)  guaiFENesin Oral Liquid (Sugar-Free) 200 milliGRAM(s) Oral every 6 hours PRN Cough  oxycodone    5 mG/acetaminophen 325 mG 1 Tablet(s) Oral/Enteral Tube every 6 hours PRN Severe Pain (7 - 10)  senna 2 Tablet(s) Oral at bedtime PRN for constipation      CAPILLARY BLOOD GLUCOSE      POCT Blood Glucose.: 135 mg/dL (30 Sep 2023 11:44)  POCT Blood Glucose.: 117 mg/dL (30 Sep 2023 06:05)  POCT Blood Glucose.: 120 mg/dL (30 Sep 2023 00:17)  POCT Blood Glucose.: 122 mg/dL (29 Sep 2023 21:30)    I&O's Summary    29 Sep 2023 07:01  -  30 Sep 2023 07:00  --------------------------------------------------------  IN: 0 mL / OUT: 900 mL / NET: -900 mL        PHYSICAL EXAM:  Vital Signs Last 24 Hrs  T(C): 37 (30 Sep 2023 11:24), Max: 37.3 (29 Sep 2023 22:09)  T(F): 98.6 (30 Sep 2023 11:24), Max: 99.1 (29 Sep 2023 22:09)  HR: 83 (30 Sep 2023 11:24) (77 - 105)  BP: 147/93 (30 Sep 2023 11:24) (125/85 - 162/80)  BP(mean): --  RR: 18 (30 Sep 2023 11:24) (18 - 18)  SpO2: 97% (30 Sep 2023 11:24) (96% - 98%)    Parameters below as of 30 Sep 2023 11:24  Patient On (Oxygen Delivery Method): room air        CONSTITUTIONAL: lethargic  RESPIRATORY: Normal respiratory effort; lungs are clear to auscultation bilaterally  CARDIOVASCULAR: Regular rate and rhythm, normal S1 and S2, no murmur/rub/gallop; b/l LE L >R   ABDOMEN: Nontender to palpation, normoactive bowel sounds, no rebound/guarding  MUSCLOSKELETAL: no clubbing or cyanosis of digits; no joint swelling or tenderness to palpation  PSYCH: A+O to person, place

## 2023-10-01 NOTE — PROGRESS NOTE ADULT - SUBJECTIVE AND OBJECTIVE BOX
PROGRESS NOTE:   Authored by Dr. Mia Black MD, Available on MS Teams    Patient is a 68y old  Male who presents with a chief complaint of AMS, Worsening GBM w/ shift and Paraflu/ Enterovirus infection (01 Oct 2023 08:07)      SUBJECTIVE / OVERNIGHT EVENTS: Patient has a headache. No chest pain or shortness of breath, abdominal pain.     ADDITIONAL REVIEW OF SYSTEMS:    MEDICATIONS  (STANDING):  albuterol/ipratropium for Nebulization 3 milliLiter(s) Nebulizer every 6 hours  allopurinol 100 milliGRAM(s) Oral daily  atorvastatin 20 milliGRAM(s) Oral at bedtime  brimonidine 0.2% Ophthalmic Solution 1 Drop(s) Both EYES two times a day  chlorhexidine 2% Cloths 1 Application(s) Topical daily  cloNIDine 0.1 milliGRAM(s) Oral two times a day  dexAMETHasone   Concentrate 4 milliGRAM(s) Oral every 12 hours  enoxaparin Injectable 100 milliGRAM(s) SubCutaneous every 12 hours  FLUoxetine 20 milliGRAM(s) Oral daily  lacosamide Solution 200 milliGRAM(s) Oral two times a day  levETIRAcetam  Solution 1000 milliGRAM(s) Enteral Tube two times a day  nadolol 60 milliGRAM(s) Oral two times a day  pantoprazole   Suspension 40 milliGRAM(s) Enteral Tube daily  timolol 0.25% Solution 1 Drop(s) Both EYES two times a day  valproic  acid Syrup 250 milliGRAM(s) Oral three times a day  valsartan 320 milliGRAM(s) Oral daily    MEDICATIONS  (PRN):  acetaminophen   Oral Liquid .. 650 milliGRAM(s) Oral every 6 hours PRN Temp greater or equal to 38C (100.4F), Mild Pain (1 - 3)  guaiFENesin Oral Liquid (Sugar-Free) 200 milliGRAM(s) Oral every 6 hours PRN Cough  oxycodone    5 mG/acetaminophen 325 mG 1 Tablet(s) Oral/Enteral Tube every 6 hours PRN Severe Pain (7 - 10)  senna 2 Tablet(s) Oral at bedtime PRN for constipation      CAPILLARY BLOOD GLUCOSE      POCT Blood Glucose.: 113 mg/dL (01 Oct 2023 11:50)  POCT Blood Glucose.: 88 mg/dL (01 Oct 2023 06:23)  POCT Blood Glucose.: 108 mg/dL (01 Oct 2023 00:11)  POCT Blood Glucose.: 121 mg/dL (30 Sep 2023 17:51)    I&O's Summary    30 Sep 2023 07:01  -  01 Oct 2023 07:00  --------------------------------------------------------  IN: 0 mL / OUT: 500 mL / NET: -500 mL    01 Oct 2023 07:01  -  01 Oct 2023 12:36  --------------------------------------------------------  IN: 0 mL / OUT: 300 mL / NET: -300 mL        PHYSICAL EXAM:  Vital Signs Last 24 Hrs  T(C): 36.5 (01 Oct 2023 11:31), Max: 36.9 (30 Sep 2023 22:40)  T(F): 97.7 (01 Oct 2023 11:31), Max: 98.5 (01 Oct 2023 06:00)  HR: 107 (01 Oct 2023 11:31) (92 - 107)  BP: 130/89 (01 Oct 2023 11:31) (130/89 - 158/86)  BP(mean): --  RR: 18 (01 Oct 2023 11:31) (18 - 18)  SpO2: 100% (01 Oct 2023 11:31) (97% - 100%)    Parameters below as of 01 Oct 2023 11:31  Patient On (Oxygen Delivery Method): room air        CONSTITUTIONAL: Ill appearing, NAD  RESPIRATORY: Normal respiratory effort; lungs are clear to auscultation bilaterally  CARDIOVASCULAR: Regular rate and rhythm, normal S1 and S2, no murmur/rub/gallop; b/l LE feet swelling  ABDOMEN: Nontender to palpation, normoactive bowel sounds, no rebound/guarding  MUSCLOSKELETAL: no clubbing or cyanosis of digits; no joint swelling or tenderness to palpation  PSYCH: A+O to person, place  NEURO: follows commands, able to squeeze w/ R hand, difficult with L hand. Sensation in tact in all extremities

## 2023-10-01 NOTE — PROGRESS NOTE ADULT - SUBJECTIVE AND OBJECTIVE BOX
Subjective: Patient seen and examined. No new events except as noted.     SUBJECTIVE/ROS:  MEDICATIONS:  MEDICATIONS  (STANDING):  albuterol/ipratropium for Nebulization 3 milliLiter(s) Nebulizer every 6 hours  allopurinol 100 milliGRAM(s) Oral daily  atorvastatin 20 milliGRAM(s) Oral at bedtime  brimonidine 0.2% Ophthalmic Solution 1 Drop(s) Both EYES two times a day  chlorhexidine 2% Cloths 1 Application(s) Topical daily  cloNIDine 0.1 milliGRAM(s) Oral two times a day  dexAMETHasone   Concentrate 4 milliGRAM(s) Oral every 12 hours  enoxaparin Injectable 100 milliGRAM(s) SubCutaneous every 12 hours  FLUoxetine 20 milliGRAM(s) Oral daily  lacosamide Solution 200 milliGRAM(s) Oral two times a day  levETIRAcetam  Solution 1000 milliGRAM(s) Enteral Tube two times a day  nadolol 60 milliGRAM(s) Oral two times a day  pantoprazole   Suspension 40 milliGRAM(s) Enteral Tube daily  timolol 0.25% Solution 1 Drop(s) Both EYES two times a day  valproic  acid Syrup 250 milliGRAM(s) Oral three times a day  valsartan 320 milliGRAM(s) Oral daily      PHYSICAL EXAM:  T(C): 36.9 (10-01-23 @ 06:00), Max: 37 (09-30-23 @ 11:24)  HR: 92 (10-01-23 @ 06:00) (83 - 98)  BP: 158/86 (10-01-23 @ 06:00) (134/85 - 158/86)  RR: 18 (10-01-23 @ 06:00) (18 - 18)  SpO2: 97% (10-01-23 @ 06:00) (97% - 98%)  Wt(kg): --  I&O's Summary    30 Sep 2023 07:01  -  01 Oct 2023 07:00  --------------------------------------------------------  IN: 0 mL / OUT: 500 mL / NET: -500 mL            JVP: Normal  Neck: supple  Lung: clear   CV: S1 S2 , Murmur:  Abd: soft  neuro: Awake   Psych: flat affect  Skin: normal``    LABS/DATA:    CARDIAC MARKERS:                  proBNP:   Lipid Profile:   HgA1c:   TSH:     TELE:  EKG:

## 2023-10-02 NOTE — DISCHARGE NOTE PROVIDER - NSDCMRMEDTOKEN_GEN_ALL_CORE_FT
acetylcysteine 20% inhalation solution: 4 milliliter(s) inhaled every 6 hours As needed cough, mucus clearance  allopurinol 100 mg oral tablet: 1 tab(s) orally once a day  atorvastatin 20 mg oral tablet: 1 tab(s) orally once a day (at bedtime)  azithromycin 500 mg oral tablet: 1 tab(s) orally once a day for 5 days   Note:started 9-7-23 took 1 dose.  brimonidine 0.2% ophthalmic solution: 1 each in each affected eye 2 times a day  clonazePAM 0.5 mg oral tablet: 1 tab(s) orally 2 times a day As needed For anxiety  dexAMETHasone 1.5 mg oral tablet: 2 tab(s) orally once a day  FLUoxetine 20 mg oral tablet: 1 tab(s) orally once a day  Gel overlay: gel overlay  Hospital Bed/ R53.2/C71.9: HOSPITAL BED  Raheem Lift: .  ipratropium-albuterol 0.5 mg-2.5 mg/3 mL inhalation solution: 3 milliliter(s) inhaled every 6 hours As needed Wheezing  lacosamide 200 mg oral tablet: 1 tab(s) orally 2 times a day  Lasix 20 mg oral tablet: 1 tab(s) orally every other day  levETIRAcetam 1000 mg oral tablet: 1 tab(s) orally 2 times a day  LMN: LMN MDD: 1  losartan 50 mg oral tablet: 1 tab(s) orally once a day  metoprolol tartrate 25 mg oral tablet: 1 tab(s) orally 2 times a day  pantoprazole 40 mg oral delayed release tablet: 1 tab(s) orally once a day (before a meal)  Pepcid 40 mg/5 mL oral liquid: 2.5 milliliter(s) orally 2 times a day  senna leaf extract oral tablet: 2 tab(s) orally once a day (at bedtime) as needed for  constipation  timolol maleate 0.25% ophthalmic solution: 1 each in each affected eye 2 times a day  Transport wheelchair - C71.9/ R53.2: Wheelchair  Tylenol 325 mg oral tablet: 1 tab(s) orally as needed  valproic acid 250 mg/5 mL oral liquid: 250 milligram(s) orally 3 times a day  Voltaren 1% topical gel: Apply topically to affected area 3 times a day  Xarelto 20 mg oral tablet: 1 tab(s) orally once a day (at bedtime)  Zinc Oxide Topical Ointment: Apply to sacrum twice a day

## 2023-10-02 NOTE — PROVIDER CONTACT NOTE (OTHER) - ASSESSMENT
Pt alert, RN unable to flush the PEG tube. RN communicated with charge RN and unit ANM.
A0X3, no c/o pain, left side body flaccid. upper extremities edema +4
No complaints of cp, sob, palpitations, No s/s of distress noted.  Scheduled Metoprolol via Peg tube administered
Patient is A&Ox2-3, no changes from baseline, no dizziness, no SOB, VSS, see in flow sheets.
Patient asymptomatic. Denies pain.
Pt asymptomatic. Denies CP or SOB.
Patient is hemodynamically stable and denies any pain or discomfort at this time.
Heart rate 123, temperature 97.3, blood pressure 151/117, oxygen saturation 99%. Pt c/o headache, denies chest pain, shortness of breath, lightheadedness, dizziness.
pt is Aox4, confuse at times, c/o pain, left side body flaccid.

## 2023-10-02 NOTE — PROGRESS NOTE ADULT - PROBLEM SELECTOR PLAN 7
Group Therapy Note    Date: 4/10/2022    Group Start Time: 1245  Group End Time: 1886  Group Topic: Healthy Living/Wellness    [id number]    Nelida Rainey        Group Therapy Note    Attendees: 9          Patient's Goal:  To attend group    Notes:  Pt was attentive     Status After Intervention:  Unchanged    Participation Level: Interactive    Participation Quality: Attentive      Speech:  normal      Thought Process/Content: Logical      Affective Functioning: Congruent      Mood: euthymic      Level of consciousness:  Alert      Response to Learning: Able to verbalize current knowledge/experience      Endings: None Reported    Modes of Intervention: Activity      Discipline Responsible: PCA      Signature:   Sofia Horner Continue Nadolol  AC management as above

## 2023-10-02 NOTE — PROGRESS NOTE ADULT - PROBLEM SELECTOR PLAN 3
Seen by NSx- no absolute contraindication to AC, though noted high risk of AC in setting of advanced GBM  Lovenox to 100 mg SG Q12 based on most recent weights

## 2023-10-02 NOTE — PROVIDER CONTACT NOTE (OTHER) - REASON
/117, heart rate 123
Pt /113
RN unable to flush pt's PEG tube
/101 on R leg
unable to finish Vanco IV
RRT called for possible sepsis
unable to give 6pm IV med due to no IV access
Blood Cultures were missing
high BP
HTN

## 2023-10-02 NOTE — PROGRESS NOTE ADULT - SUBJECTIVE AND OBJECTIVE BOX
[facility] Division of Hospital Medicine  Esther Menard DO   Available via Microsoft Teams      Patient is a 68y old  Male who presents with a chief complaint of AMS, Worsening GBM w/ shift and Paraflu/ Enterovirus infection (02 Oct 2023 11:06)      SUBJECTIVE / OVERNIGHT EVENTS:  lethargic, not verbalizing much     MEDICATIONS  (STANDING):  albuterol/ipratropium for Nebulization 3 milliLiter(s) Nebulizer every 6 hours  allopurinol 100 milliGRAM(s) Oral daily  atorvastatin 20 milliGRAM(s) Oral at bedtime  brimonidine 0.2% Ophthalmic Solution 1 Drop(s) Both EYES two times a day  chlorhexidine 2% Cloths 1 Application(s) Topical daily  cloNIDine 0.1 milliGRAM(s) Oral two times a day  dexAMETHasone   Concentrate 4 milliGRAM(s) Oral every 12 hours  enoxaparin Injectable 100 milliGRAM(s) SubCutaneous every 12 hours  FLUoxetine 20 milliGRAM(s) Oral daily  furosemide    Tablet 20 milliGRAM(s) Oral daily  lacosamide Solution 200 milliGRAM(s) Oral two times a day  levETIRAcetam  Solution 1000 milliGRAM(s) Enteral Tube two times a day  nadolol 60 milliGRAM(s) Oral two times a day  pantoprazole   Suspension 40 milliGRAM(s) Enteral Tube daily  timolol 0.25% Solution 1 Drop(s) Both EYES two times a day  valproic  acid Syrup 250 milliGRAM(s) Oral three times a day  valsartan 320 milliGRAM(s) Oral daily    MEDICATIONS  (PRN):  acetaminophen   Oral Liquid .. 650 milliGRAM(s) Oral every 6 hours PRN Temp greater or equal to 38C (100.4F), Mild Pain (1 - 3)  guaiFENesin Oral Liquid (Sugar-Free) 200 milliGRAM(s) Oral every 6 hours PRN Cough  oxycodone    5 mG/acetaminophen 325 mG 1 Tablet(s) Oral/Enteral Tube every 6 hours PRN Severe Pain (7 - 10)  senna 2 Tablet(s) Oral at bedtime PRN for constipation      CAPILLARY BLOOD GLUCOSE      POCT Blood Glucose.: 110 mg/dL (02 Oct 2023 12:28)  POCT Blood Glucose.: 96 mg/dL (02 Oct 2023 06:16)  POCT Blood Glucose.: 114 mg/dL (02 Oct 2023 00:12)  POCT Blood Glucose.: 136 mg/dL (01 Oct 2023 22:04)  POCT Blood Glucose.: 124 mg/dL (01 Oct 2023 20:03)    I&O's Summary    01 Oct 2023 07:01  -  02 Oct 2023 07:00  --------------------------------------------------------  IN: 0 mL / OUT: 1150 mL / NET: -1150 mL    02 Oct 2023 07:01  -  02 Oct 2023 13:58  --------------------------------------------------------  IN: 0 mL / OUT: 350 mL / NET: -350 mL        PHYSICAL EXAM:  Vital Signs Last 24 Hrs  T(C): 36.8 (02 Oct 2023 06:40), Max: 36.9 (01 Oct 2023 20:22)  T(F): 98.3 (02 Oct 2023 06:40), Max: 98.5 (01 Oct 2023 20:22)  HR: 90 (02 Oct 2023 06:40) (90 - 98)  BP: 155/101 (02 Oct 2023 06:40) (136/86 - 155/101)  BP(mean): --  RR: 18 (02 Oct 2023 06:40) (18 - 18)  SpO2: 98% (02 Oct 2023 06:40) (96% - 98%)    Parameters below as of 02 Oct 2023 06:40  Patient On (Oxygen Delivery Method): room air      CONSTITUTIONAL: Chronically ill appearing   EYES: conjunctiva and sclera clear  ENMT: Moist oral mucosa  RESPIRATORY: Normal respiratory effort; lungs are clear to auscultation bilaterally  CARDIOVASCULAR: Regular rate and rhythm, normal S1 and S2; + lower extremity edema  ABDOMEN: Nontender to palpation, normoactive bowel sounds, no rebound/guarding, + PEG  MUSCULOSKELETAL: no clubbing or cyanosis of digits; no joint swelling or tenderness to palpation  NEUROLOGY: Lethargic, not really opening eyes   SKIN: ecchymosis in UE        LABS:                      RADIOLOGY & ADDITIONAL TESTS:  Results Reviewed:   Imaging Personally Reviewed:  Electrocardiogram Personally Reviewed:    COORDINATION OF CARE:  Care Discussed with Consultants/Other Providers [Y/N]:  Prior or Outpatient Records Reviewed [Y/N]:

## 2023-10-02 NOTE — PROVIDER CONTACT NOTE (OTHER) - ACTION/TREATMENT ORDERED:
Night NP Luis Alberto Valdez made aware. Metoprolol 50 mg as ordered given. Continue to monitor vital signs Q4.
Provider notified and made aware. EKG performed, A-fib 130s. 5 mg IVP Lopressor ordered and administered.
Provider re-ordered blood cultures and they were re sent.
provider notified, midline ordered.
Repeat BP in an hour, endorsed on report to night nurse.
Will continue to monitor. No new orders at this time.
Provider aware. All medications administered. Cont to monitor.
Provider notified & aware.
provider notified, midline ordered.

## 2023-10-02 NOTE — PROGRESS NOTE ADULT - SUBJECTIVE AND OBJECTIVE BOX
Subjective: Patient seen and examined. No new events except as noted.     SUBJECTIVE/ROS:  MEDICATIONS:  MEDICATIONS  (STANDING):  albuterol/ipratropium for Nebulization 3 milliLiter(s) Nebulizer every 6 hours  allopurinol 100 milliGRAM(s) Oral daily  atorvastatin 20 milliGRAM(s) Oral at bedtime  brimonidine 0.2% Ophthalmic Solution 1 Drop(s) Both EYES two times a day  chlorhexidine 2% Cloths 1 Application(s) Topical daily  cloNIDine 0.1 milliGRAM(s) Oral two times a day  dexAMETHasone   Concentrate 4 milliGRAM(s) Oral every 12 hours  enoxaparin Injectable 100 milliGRAM(s) SubCutaneous every 12 hours  FLUoxetine 20 milliGRAM(s) Oral daily  lacosamide Solution 200 milliGRAM(s) Oral two times a day  levETIRAcetam  Solution 1000 milliGRAM(s) Enteral Tube two times a day  nadolol 60 milliGRAM(s) Oral two times a day  pantoprazole   Suspension 40 milliGRAM(s) Enteral Tube daily  timolol 0.25% Solution 1 Drop(s) Both EYES two times a day  valproic  acid Syrup 250 milliGRAM(s) Oral three times a day  valsartan 320 milliGRAM(s) Oral daily      PHYSICAL EXAM:  T(C): 36.8 (10-02-23 @ 06:40), Max: 36.9 (10-01-23 @ 20:22)  HR: 90 (10-02-23 @ 06:40) (90 - 107)  BP: 155/101 (10-02-23 @ 06:40) (130/89 - 155/101)  RR: 18 (10-02-23 @ 06:40) (18 - 18)  SpO2: 98% (10-02-23 @ 06:40) (96% - 100%)  Wt(kg): --  I&O's Summary    01 Oct 2023 07:01  -  02 Oct 2023 07:00  --------------------------------------------------------  IN: 0 mL / OUT: 1150 mL / NET: -1150 mL            JVP: Normal  Neck: supple  Lung: clear   CV: S1 S2 , Murmur:  Abd: soft  Ext: No edema  neuro: Awake / alert  Psych: flat affect  Skin: normal``    LABS/DATA:    CARDIAC MARKERS:                  proBNP:   Lipid Profile:   HgA1c:   TSH:     TELE:  EKG:

## 2023-10-02 NOTE — PROVIDER CONTACT NOTE (OTHER) - RECOMMENDATIONS
notify provider
notify provider
Provider aware. All medications administered. Cont to monitor.
Contacted Provider.
Notify the provider.
Provider notified

## 2023-10-02 NOTE — PROVIDER CONTACT NOTE (OTHER) - NAME OF MD/NP/PA/DO NOTIFIED:
Kendra SILVERMAN
Lizeth Washington
RRT called.
Arabella Reeves
Jelly Whittington, ACP
Luis Alberto Valdez
HERRERA Monroy
PRINCESS Prabhakar
HERRERA Culver
PRINCESS Duran

## 2023-10-02 NOTE — CHART NOTE - NSCHARTNOTEFT_GEN_A_CORE
Pt s/p RRT for AMS. Upon arrival, pt lethargic and hypoxic to 80s. Pt placed on non-rebreather mask and oxygen sats improved to 100%. Daughter called by Rapid response team to discuss prognosis. Daughter verbally decided on comfort care and to stop blood draws and RRT ended.  Post RRT had family discussion with family at bedside, including daughter Aretha who decided to make patient comfortable and not to escalate to a higher level of care.  Family wishes to continue pre-existing medications, PEG tube feeds as tolerated, non-invasive oxygen as needed  Family wishes to stop IV blood draws and RRTs  New MOLST filled out and placed in patients chart    Kendra Schmidt PA-C  Department of Medicine Pt s/p RRT for AMS. Upon arrival, pt lethargic and hypoxic to 80s. Pt placed on non-rebreather mask and oxygen sats improved to 100%. Daughter called by Rapid response team to discuss prognosis. Daughter verbally decided on comfort care and to stop blood draws and RRT ended.  Post RRT had family discussion with family at bedside, including daughter Aretha who decided to make patient comfort care and not to escalate to a higher level of care.  Family wishes to continue pre-existing medications, PEG tube feeds as tolerated, non-invasive oxygen as needed  Family wishes to stop IV blood draws and RRTs  New MOLST filled out and placed in patients chart    Kendra Schmidt PA-C  Department of Medicine

## 2023-10-02 NOTE — PROGRESS NOTE ADULT - PROBLEM SELECTOR PLAN 9
Continue tube feeds via PEG  Family asking for S&S re-eval
Continue tube feeds via PEG  Per S&S unable to make diet recommendation given condition- if patient opts for home hospice will recommended pleasure feeds
- GI ppx: protonix  - Diet: tube feeds, glucerna, follow up FEESST, NPO  - CODE status: DNR/DNI
- GI ppx: protonix  - Diet: tube feeds, glucerna, follow up FEESST pending   - CODE status: DNR/DNI
Continue tube feeds via PEG  Per S&S unable to make diet recommendation given condition- patient opting for home hospice will recommended pleasure feeds
- GI ppx: protonix  - Diet: tube feeds, glucerna--- S & S reevaluation requested   - CODE status: DNR/DNI
- GI ppx: protonix  - Diet: tube feeds, glucerna, follow up FEESST pending   - CODE status: DNR/DNI
Continue tube feeds via PEG
Continue tube feeds via PEG  Per S&S unable to make diet recommendation given condition- patient opting for home hospice will recommended pleasure feeds
Continue tube feeds via PEG  Per S&S unable to make diet recommendation given condition- if patient opts for home hospice will recommended pleasure feeds
Continue tube feeds via PEG  Per S&S unable to make diet recommendation given condition- patient opting for home hospice will recommended pleasure feeds
- GI ppx: protonix  - Diet: tube feeds, glucerna, follow up FEESST pending   - CODE status: DNR/DNI
- GI ppx: protonix  - Diet: tube feeds, glucerna--- S & S reevaluation requested   - CODE status: DNR/DNI
- GI ppx: protonix  - Diet: tube feeds, glucerna, follow up FEESST pending   - CODE status: DNR/DNI
Continue tube feeds via PEG  FEES 9/21 w/ silent aspiration. d/w daughter today, opting to forgo pleasure feeding
- GI ppx: protonix  - Diet: tube feeds, glucerna--- S & S reevaluation requested   - CODE status: DNR/DNI

## 2023-10-02 NOTE — CHART NOTE - NSCHARTNOTEFT_GEN_A_CORE
Nutrition Follow Up Note  Patient seen for: follow-up for malnutrition    Chart reviewed, events noted.    Source:      [x] EMR        [x] RN         -If unable to interview patient:   [x] Disoriented/confused/inappropriate to interview    Diet Order:   Diet, NPO with Tube Feed:   Tube Feeding Modality: Gastro-Jejunostomy  Glucerna 1.5 [place] (GLUCERNA1.5RTH)  Total Volume for 24 Hours (mL): 1440  Bolus  Total Volume of Bolus (mL):  360  Total # of Feeds: 4  Tube Feed Frequency: Every 6 hours   Tube Feed Start Time: 06:00  Bolus Feed Rate (mL per Hour): 360   Bolus Feed Duration (in Hours): 1 (10-01-23)    - Is current order appropriate/adequate? [x] Yes. TF was changed from continuous feed to bolus feed since 10/1. Currently tolerating bolus feed well (per staff). This EN formula provides 1440ml volume, 2160kcal, 119g protein, 1093ml free water (Meets 29kcal/kg, 1.6g/kg protein based on IBW 74kg).     - PO intake :   NPO on TF     - Nutrition-related concerns:  -- Dispo planning in progress; hospice, home with private hire  -- Ordered for Lasix since 10/2  -- Ordered for Decadron, Protonix, Senna  -- Staff denies pt w/ GI distress. Last BM x 1 on 10/1, on Senna.     Weights:   Drug Dosing Weight  Height (cm): 185.4 (07 Sep 2023 14:38)  Weight (kg): 113.4 (07 Sep 2023 14:38)  BMI (kg/m2): 33 (07 Sep 2023 14:38)  Daily Weight in k.5 (-), Weight in k.1 (), Weight in k.7 (), Weight in k.7 ()  -- wt fluctuation might due to diuretic and edema +. Will continue to monitor weight trends as available/able.     Nutritionally Pertinent MEDICATIONS  (STANDING):  allopurinol  atorvastatin  cloNIDine  dexAMETHasone   Concentrate  furosemide    Tablet  nadolol  pantoprazole   Suspension  valsartan    Pertinent Labs:   A1C with Estimated Average Glucose Result: 5.5 % (23 @ 06:59)    Finger Sticks:  POCT Blood Glucose.: 110 mg/dL (10-02 @ 12:28)  POCT Blood Glucose.: 96 mg/dL (10-02 @ 06:16)  POCT Blood Glucose.: 114 mg/dL (10-02 @ 00:12)  POCT Blood Glucose.: 136 mg/dL (10-01 @ 22:04)  POCT Blood Glucose.: 124 mg/dL (10-01 @ 20:03)    Skin per nursing documentation: no pressure injury per nursing flowsheet 10/1    Edema: generalized +1 edema, +2 to left and right legs per flowsheet 10/1    Estimated Needs: based on adjusted IBW of 163lb/74kg  [x] no change since previous assessment  Calories: 9418-5851 kcal (27-32 kcal/kg IBW)   Protein: 104-118g (1.4-1.6 g/kg IBW)   Fluids: defer to team     Previous Nutrition Diagnosis: Increased Nutrient Needs, Severe Chronic Malnutrition  Nutrition Diagnosis is: [x] ongoing    New Nutrition Diagnosis: [x] Not applicable    Nutrition Care Plan:  [x] In Progress    Nutrition Interventions:     Education Provided:       [x] No: N/A    Recommendations:      -- Continue current regimen as tolerated (bolus feed Glucerna 1.5, 360ml/~1.5 cans 4 x daily). RD remains available to adjust diet as needed.   -- Recommend continue to elevate HOB during and after feeds to decrease aspiration risk.   -- Monitor GI tolerance to feeds.     Monitoring and Evaluation:   Continue to monitor nutritional intake, tolerance to diet prescription, weights, labs, skin integrity    RD remains available upon request and will follow up per protocol  Sujatha Trujillo, MS, RDN, CDN (Teams) Nutrition Follow Up Note  Patient seen for: follow-up for malnutrition    Chart reviewed, events noted.    Source:      [x] EMR        [x] RN         -If unable to interview patient:   [x] Disoriented/confused/inappropriate to interview    Diet Order:   Diet, NPO with Tube Feed:   Tube Feeding Modality: Gastro-Jejunostomy  Glucerna 1.5 [place] (GLUCERNA1.5RTH)  Total Volume for 24 Hours (mL): 1440  Bolus  Total Volume of Bolus (mL):  360  Total # of Feeds: 4  Tube Feed Frequency: Every 6 hours   Tube Feed Start Time: 06:00  Bolus Feed Rate (mL per Hour): 360   Bolus Feed Duration (in Hours): 1 (10-01-23)    - Is current order appropriate/adequate? [x] Yes. TF was changed from continuous feed to bolus feed since 10/1. Currently tolerating bolus feed well (per staff). This EN formula provides 1440ml volume, 2160kcal, 119g protein, 1093ml free water daily (Meets 29kcal/kg, 1.6g/kg protein based on IBW 74kg).     - PO intake :   NPO on TF     - Nutrition-related concerns:  -- Dispo planning in progress; hospice, home with private hire  -- Ordered for Lasix since 10/2  -- Ordered for Decadron, Protonix, Senna  -- Staff denies pt w/ GI distress. Last BM x 1 on 10/1, on Senna.     Weights:   Drug Dosing Weight  Height (cm): 185.4 (07 Sep 2023 14:38)  Weight (kg): 113.4 (07 Sep 2023 14:38)  BMI (kg/m2): 33 (07 Sep 2023 14:38)  Daily Weight in k.5 (-), Weight in k.1 (), Weight in k.7 (-), Weight in k.7 ()  -- wt fluctuation might due to diuretic and edema +. Will continue to monitor weight trends as available/able.     Nutritionally Pertinent MEDICATIONS  (STANDING):  allopurinol  atorvastatin  cloNIDine  dexAMETHasone   Concentrate  furosemide    Tablet  nadolol  pantoprazole   Suspension  valsartan    Pertinent Labs:   A1C with Estimated Average Glucose Result: 5.5 % (23 @ 06:59)    Finger Sticks:  POCT Blood Glucose.: 110 mg/dL (10-02 @ 12:28)  POCT Blood Glucose.: 96 mg/dL (10-02 @ 06:16)  POCT Blood Glucose.: 114 mg/dL (10-02 @ 00:12)  POCT Blood Glucose.: 136 mg/dL (10-01 @ 22:04)  POCT Blood Glucose.: 124 mg/dL (10-01 @ 20:03)    Skin per nursing documentation: no pressure injury per nursing flowsheet 10/1    Edema: generalized +1 edema, +2 to left and right legs per flowsheet 10/1    Estimated Needs: based on adjusted IBW of 163lb/74kg  [x] no change since previous assessment  Calories: 8843-8727 kcal (27-32 kcal/kg IBW)   Protein: 104-118g (1.4-1.6 g/kg IBW)   Fluids: defer to team     Previous Nutrition Diagnosis: Increased Nutrient Needs, Severe Chronic Malnutrition  Nutrition Diagnosis is: [x] ongoing    New Nutrition Diagnosis: [x] Not applicable    Nutrition Care Plan:  [x] In Progress    Nutrition Interventions:     Education Provided:       [x] No: N/A    Recommendations:      -- Continue current regimen as tolerated (bolus feed Glucerna 1.5, 360ml/~1.5 cans 4 x daily). RD remains available to adjust diet as needed.   -- Recommend continue to elevate HOB during and after feeds to decrease aspiration risk.   -- Monitor GI tolerance to feeds.     Monitoring and Evaluation:   Continue to monitor nutritional intake, tolerance to diet prescription, weights, labs, skin integrity    RD remains available upon request and will follow up per protocol  Sujatha Trujillo, MS, RDN, CDN (Teams)

## 2023-10-02 NOTE — DISCHARGE NOTE PROVIDER - NSDCFUADDAPPT_GEN_ALL_CORE_FT
APPTS ARE READY TO BE MADE: [ ] YES    Best Family or Patient Contact (if needed):    Additional Information about above appointments (if needed):    1:   2:   3:     Other comments or requests:    APPTS ARE READY TO BE MADE: [ x] YES    Best Family or Patient Contact (if needed):    Additional Information about above appointments (if needed):    1:   2:   3:     Other comments or requests:

## 2023-10-02 NOTE — CHART NOTE - NSCHARTNOTEFT_GEN_A_CORE
Provider alerted by RN to patient "not looking well." Rectal temp 103.9 F and patient vomited.    - IV Tylenol STAT  - 1L NC  - STAT CBC, CMP, T&S, BCx, UCx  - Urgent CTH  - Zofran 4 mg IVP x1 for nausea  - Push evening meds 2 hrs  - f/u CXR done today  - Dr. Menard informed

## 2023-10-02 NOTE — PROGRESS NOTE ADULT - PROBLEM SELECTOR PROBLEM 2
GBM (glioblastoma multiforme)
Midline shift of brain
Parainfluenza infection
Midline shift of brain
GBM (glioblastoma multiforme)
Midline shift of brain
GBM (glioblastoma multiforme)
Midline shift of brain
GBM (glioblastoma multiforme)

## 2023-10-02 NOTE — PROGRESS NOTE ADULT - PROBLEM SELECTOR PLAN 1
RESOLVING. patient now alert, oriented x 2-3, though intermittently has confusion  likely 2/2 viral infection and cellulitis and progressive GBM
RESOLVING. patient now alert, oriented x 2-3, though intermittently has confusion  likely 2/2 viral infection and cellulitis and progressive GBM
RESOLVING. patient now alert, oriented x 3, though intermittently has confusion  likely 2/2 viral infection and cellulitis and progressive GBM
RESOLVED. patient now alert, oriented x3, had situational awareness and participated in discussion regarding his care ---- s/p discussion 9/15 AM with patient, he said he is interested in treatment with avastin as he wants to do this for his daughter, and he said he had good results with it before.   likely 2/2 viral infection and cellulitis and progressive GBM
RESOLVING. patient now alert, oriented x3, participated in his care decisions, though intermittently had hallucinations   likely 2/2 viral infection and cellulitis and progressive GBM
Resolved- likely 2/2 infection and progressive GBM
Likely multifactorial given viral infection and brain mass w/ increased vasogenic edema and midline shift. Appreciate neurology recommendations  - mental status improving  - dexamethasone 4mg IV BID for worsening vasogenic edema  - F/u neurology recommendations  - Neuro checks  - aspiration risk, NPO for now. Seen by speech pathology team  - Reviewed brain MRI with attending radiologist Dr. Ok Hurtado, noted " Increasing areas of decreased SWI signal are noted within the lesion likely reflecting evolving hemorrhagic products, calcifications, neovascularity, ora combination of these possibilities. " discussed with Dr. Hurtado, it is impossible to tell on the MRI if it is hemorrhage from tumor vs. calcification vs. neovascularity of tumor, and AC (xareltol) does increase the risk of bleeding from tumor, Dr. Hurtado recommend repeat CT head to further clarify   - hold xarelto for now, pending repeat CT head to evaluate possible hemorrhage from tumor   - reached out to neurology team this AM, pending rec
Likely multifactorial given viral infections and larger brain mass w/ increased vasogenic edema as well as midline shift. Appreciate neurology recommendations  -MRI brain w/ IV contrast  -F/u neurology recommendations  -Neuro checks  -Supportive care  -Falls precautions, aspiration risk, NPO for now  -F/u BCxs, UCx
Likely multifactorial given viral infection and brain mass w/ increased vasogenic edema and midline shift. Appreciate neurology recommendations  - 9/12, worsening mental status, f/u repeat CT head STAT   - dexamethasone 4mg IV BID for worsening vasogenic edema  - F/u neurology recommendations  - Neuro checks  - Reviewed brain MRI with attending radiologist Dr. Ok Hurtado, noted " Increasing areas of decreased SWI signal are noted within the lesion likely reflecting evolving hemorrhagic products, calcifications, neovascularity, ora combination of these possibilities. " discussed with Dr. Hurtado, it is impossible to tell on the MRI if it is hemorrhage from tumor vs. calcification vs. neovascularity of tumor, and AC (xareltol) does increase the risk of bleeding from tumor, Dr. Hurtado recommend repeat CT head to further clarify   - repeat CT head noted " Additional small subcentimeter foci of hypoattenuation that are too small to characterize,   and may reflect additional calcifications however smallareas of hemorrhage cannot be entirely excluded."  - s/p evaluation by neurologist Dr. Carias, no oncologic treatment recommended, rec:big picture is that prognosis is grim and should pursue comfort care.  - considering overall prognosis, the risk of a catastrophic brain bleed from GBM outweighs risk of stroke from A fib, will continue hold xarelto, c/w discussion with daughter regarding palliative care/comfort care.   - reached out to neurology team this AM, pending rec
Likely multifactorial given viral infection and brain mass w/ increased vasogenic edema and midline shift. Appreciate neurology recommendations  - mental status improving  - dexamethasone 4mg IV BID for worsening vasogenic edema  - completed MRI brain w/ IV contrast, pending radiology read  - F/u neurology recommendations  - Neuro checks  - Falls precautions  - aspiration risk, NPO for now. Seen by speech pathology team
RESOLVED. patient now alert, oriented x3, had situational awareness and participated in discussion regarding his care   likely 2/2 viral infection and cellulitis and progressive GBM
Resolved- likely 2/2 infection and progressive GBM
RESOLVING. patient now alert, oriented x 3, though intermittently has confusion  likely 2/2 viral infection and cellulitis and progressive GBM
Resolved- likely 2/2 infection and progressive GBM
Resolved- likely 2/2 infection and progressive GBM
patient now alert, oriented x 3, though intermittently has confusion  likely 2/2 viral infection and cellulitis and progressive GBM
Resolved- likely 2/2 infection and progressive GBM
patient now alert, oriented x 3, though intermittently has confusion  likely 2/2 viral infection and cellulitis and progressive GBM
RESOLVED. patient now alert, oriented x3, had situational awareness and participated in discussion regarding his care   likely 2/2 viral infection and cellulitis and progressive GBM
RESOLVING. patient now alert, oriented x3, though intermittently had hallucinations   likely 2/2 viral infection and cellulitis and progressive GBM
Likely multifactorial given viral infection and brain mass w/ increased vasogenic edema and midline shift. Appreciate neurology recommendations  - mental status improving  - dexamethasone 4mg IV BID for worsening vasogenic edema  - obtain MRI brain w/ IV contrast (scheduled for later today)  - F/u neurology recommendations  - Neuro checks  - Falls precautions  - aspiration risk, NPO for now. Seen by speech pathology team  - blood culture negative thus far
Likely multifactorial given viral infection and brain mass w/ increased vasogenic edema and midline shift.   - 9/12, worsening mental status, f/u repeat CT head STAT --stable   - dexamethasone 4mg IV BID for worsening vasogenic edema  - F/u neurology recommendations  - Neuro checks  - Reviewed brain MRI with attending radiologist Dr. Ok Hurtado, noted " Increasing areas of decreased SWI signal are noted within the lesion likely reflecting evolving hemorrhagic products, calcifications, neovascularity, ora combination of these possibilities. " discussed with Dr. Hurtado, it is impossible to tell on the MRI if it is hemorrhage from tumor vs. calcification vs. neovascularity of tumor, and AC (xareltol) does increase the risk of bleeding from tumor, Dr. Hurtado recommend repeat CT head to further clarify   - repeat CT head noted " Additional small subcentimeter foci of hypoattenuation that are too small to characterize,   and may reflect additional calcifications however smallareas of hemorrhage cannot be entirely excluded."  - s/p evaluation by neurologist Dr. Carias, no oncologic treatment recommended, rec:big picture is that prognosis is grim and should pursue comfort care.  - considering overall prognosis, the risk of a catastrophic brain bleed from GBM outweighs risk of stroke from A fib, will continue hold xarelto, c/w discussion with daughter regarding palliative care/comfort care.   -palliative care consult placed
likely 2/2 infection and progressive GBM  Daughter c/f possible repeat infection given lethargy. We reviewed aspiration risk from previous FEES. CXR + RVP
Resolved- likely 2/2 infection and progressive GBM
Resolved- likely 2/2 infection and progressive GBM

## 2023-10-02 NOTE — PROGRESS NOTE ADULT - PROBLEM SELECTOR PROBLEM 9
At risk for aspiration
Prophylactic measure
Prophylactic measure
At risk for aspiration
Prophylactic measure
At risk for aspiration
At risk for aspiration
Prophylactic measure
At risk for aspiration
Prophylactic measure

## 2023-10-02 NOTE — PROGRESS NOTE ADULT - NUTRITIONAL ASSESSMENT
This patient has been assessed with a concern for Malnutrition and has been determined to have a diagnosis/diagnoses of Severe protein-calorie malnutrition.    This patient is being managed with:   Diet NPO with Tube Feed-  Tube Feeding Modality: Gastrostomy  Glucerna 1.5 [place] (GLUCERNA1.5RTH)  Total Volume for 24 Hours (mL): 1440  Continuous  Starting Tube Feed Rate {mL per Hour}: 10  Until Goal Tube Feed Rate (mL per Hour): 60  Tube Feed Duration (in Hours): 24  Tube Feed Start Time: 17:00  Entered: Sep 18 2023  5:05PM  
This patient has been assessed with a concern for Malnutrition and has been determined to have a diagnosis/diagnoses of Severe protein-calorie malnutrition.    This patient is being managed with:   Diet NPO with Tube Feed-  Tube Feeding Modality: Gastrostomy  Glucerna 1.5 [place] (GLUCERNA1.5RTH)  Total Volume for 24 Hours (mL): 1440  Continuous  Starting Tube Feed Rate {mL per Hour}: 10  Until Goal Tube Feed Rate (mL per Hour): 60  Tube Feed Duration (in Hours): 24  Tube Feed Start Time: 17:00  Entered: Sep 18 2023  5:05PM  
This patient has been assessed with a concern for Malnutrition and has been determined to have a diagnosis/diagnoses of Severe protein-calorie malnutrition.    This patient is being managed with:   Diet NPO with Tube Feed-  Tube Feeding Modality: Gastrostomy  Glucerna 1.5 [place] (GLUCERNA1.5RTH)  Total Volume for 24 Hours (mL): 1440  Continuous  Starting Tube Feed Rate {mL per Hour}: 10  Until Goal Tube Feed Rate (mL per Hour): 60  Tube Feed Duration (in Hours): 24  Tube Feed Start Time: 17:00  Entered: Sep 18 2023  5:05PM    This patient has been assessed with a concern for Malnutrition and has been determined to have a diagnosis/diagnoses of Severe protein-calorie malnutrition.    This patient is being managed with:   Diet NPO with Tube Feed-  Tube Feeding Modality: Gastrostomy  Glucerna 1.5 [place] (GLUCERNA1.5RTH)  Total Volume for 24 Hours (mL): 1440  Continuous  Starting Tube Feed Rate {mL per Hour}: 10  Until Goal Tube Feed Rate (mL per Hour): 60  Tube Feed Duration (in Hours): 24  Tube Feed Start Time: 17:00  Entered: Sep 18 2023  5:05PM  
This patient has been assessed with a concern for Malnutrition and has been determined to have a diagnosis/diagnoses of Severe protein-calorie malnutrition.    This patient is being managed with:   Diet NPO with Tube Feed-  Tube Feeding Modality: Gastrostomy  Glucerna 1.5 [place] (GLUCERNA1.5RTH)  Total Volume for 24 Hours (mL): 1440  Continuous  Starting Tube Feed Rate {mL per Hour}: 10  Until Goal Tube Feed Rate (mL per Hour): 60  Tube Feed Duration (in Hours): 24  Tube Feed Start Time: 17:00  Entered: Sep 18 2023  5:05PM  
This patient has been assessed with a concern for Malnutrition and has been determined to have a diagnosis/diagnoses of Severe protein-calorie malnutrition.    This patient is being managed with:   Diet NPO with Tube Feed-  Tube Feeding Modality: Gastro-Jejunostomy  Glucerna 1.5 [place] (GLUCERNA1.5RTH)  Total Volume for 24 Hours (mL): 1440  Bolus  Total Volume of Bolus (mL):  360  Total # of Feeds: 4  Tube Feed Frequency: Every 6 hours   Tube Feed Start Time: 06:00  Bolus Feed Rate (mL per Hour): 360   Bolus Feed Duration (in Hours): 1  Entered: Oct  1 2023  6:57AM  
This patient has been assessed with a concern for Malnutrition and has been determined to have a diagnosis/diagnoses of Severe protein-calorie malnutrition.    This patient is being managed with:   Diet NPO with Tube Feed-  Tube Feeding Modality: Gastrostomy  Glucerna 1.5 [place] (GLUCERNA1.5RTH)  Total Volume for 24 Hours (mL): 1440  Continuous  Starting Tube Feed Rate {mL per Hour}: 10  Until Goal Tube Feed Rate (mL per Hour): 60  Tube Feed Duration (in Hours): 24  Tube Feed Start Time: 17:00  Entered: Sep 18 2023  5:05PM  
This patient has been assessed with a concern for Malnutrition and has been determined to have a diagnosis/diagnoses of Severe protein-calorie malnutrition.    This patient is being managed with:   Diet NPO with Tube Feed-  Tube Feeding Modality: Gastrostomy  Glucerna 1.5 [place] (GLUCERNA1.5RTH)  Total Volume for 24 Hours (mL): 1440  Continuous  Starting Tube Feed Rate {mL per Hour}: 10  Until Goal Tube Feed Rate (mL per Hour): 60  Tube Feed Duration (in Hours): 24  Tube Feed Start Time: 17:00  Entered: Sep 18 2023  5:05PM    This patient has been assessed with a concern for Malnutrition and has been determined to have a diagnosis/diagnoses of Severe protein-calorie malnutrition.    This patient is being managed with:   Diet NPO with Tube Feed-  Tube Feeding Modality: Gastrostomy  Glucerna 1.5 [place] (GLUCERNA1.5RTH)  Total Volume for 24 Hours (mL): 1440  Continuous  Starting Tube Feed Rate {mL per Hour}: 10  Until Goal Tube Feed Rate (mL per Hour): 60  Tube Feed Duration (in Hours): 24  Tube Feed Start Time: 17:00  Entered: Sep 18 2023  5:05PM  
This patient has been assessed with a concern for Malnutrition and has been determined to have a diagnosis/diagnoses of Severe protein-calorie malnutrition.    This patient is being managed with:   Diet NPO with Tube Feed-  Tube Feeding Modality: Gastrostomy  Glucerna 1.5 [place] (GLUCERNA1.5RTH)  Total Volume for 24 Hours (mL): 1440  Continuous  Starting Tube Feed Rate {mL per Hour}: 10  Until Goal Tube Feed Rate (mL per Hour): 60  Tube Feed Duration (in Hours): 24  Tube Feed Start Time: 17:00  Entered: Sep 18 2023  5:05PM  

## 2023-10-02 NOTE — PROGRESS NOTE ADULT - PROVIDER SPECIALTY LIST ADULT
Cardiology
Heme/Onc
Internal Medicine
Cardiology
Cardiology
Internal Medicine
Neurology
Cardiology
Internal Medicine
Cardiology
Cardiology
Internal Medicine
Hospitalist
Internal Medicine

## 2023-10-02 NOTE — PROGRESS NOTE ADULT - REASON FOR ADMISSION
AMS, Worsening GBM w/ shift and Paraflu/ Enterovirus infection

## 2023-10-02 NOTE — PROGRESS NOTE ADULT - TIME BILLING
Time-based billing (NON-critical care).     The necessity of the time spent during the encounter on this date of service was due to:     - Ordering, reviewing, and interpreting labs, testing, and imaging.  - Independently obtaining a review of systems and performing a physical exam  - Reviewing prior hospitalization and where necessary, outpatient records.  - Counselling and educating patient and/or family regarding interpretation of aforementioned items and plan of care.
Review of tests, imaging, labs, documents, medical management, coordination of care and counseling.
extensive chart review (previous hospitalization, current admission labs/images/notes), interviewing and examining patient, discussion with family, consultants, ACPs, placing orders, and writing notes.
- Ordering, reviewing, and interpreting labs  - Independently obtaining a review of systems and performing a physical exam  - Reviewing consultant documentation/recommendations in addition to discussing plan of care with consultants.  - Counselling and educating patient and family regarding interpretation of aforementioned items and plan of care.
- Ordering, reviewing, and interpreting labs, testing, and imaging.  - Independently obtaining a review of systems and performing a physical exam  - Counselling and educating patient regarding interpretation of aforementioned items and plan of care.
- Ordering, reviewing, and interpreting labs, testing  - Independently obtaining a review of systems and performing a physical exam  - Reviewing consultant documentation/recommendations in addition to discussing plan of care with consultants.  - Counselling and educating patient and family regarding interpretation of aforementioned items and plan of care.
chart review (current admission labs/images/notes), interviewing and examining patient, discussion with consultants, ACPs, placing orders and writing notes.
Time-based billing (NON-critical care).     The necessity of the time spent during the encounter on this date of service was due to:     - Ordering, reviewing, and interpreting labs, testing, and imaging.  - Independently obtaining a review of systems and performing a physical exam  - Reviewing prior hospitalization and where necessary, outpatient records.  - Counselling and educating patient and/or family regarding interpretation of aforementioned items and plan of care.
- Ordering, reviewing, and interpreting labs, testing, and imaging.  - Independently obtaining a review of systems and performing a physical exam  - Counselling and educating patient and family regarding interpretation of aforementioned items and plan of care.
Time-based billing (NON-critical care).     The necessity of the time spent during the encounter on this date of service was due to:     - Ordering, reviewing, and interpreting labs, testing, and imaging.  - Independently obtaining a review of systems and performing a physical exam  - Reviewing prior hospitalization and where necessary, outpatient records.  - Counselling and educating patient and/or family regarding interpretation of aforementioned items and plan of care.
extensive chart review (current admission labs/images/notes), interviewing and examining patient, discussion with consultants, ACPs, placing orders and writing notes.
extensive chart review (previous hospitalization, current admission labs/images/notes), interviewing and examining patient, discussion with consultants (neurosurger, heme onc) , daughter, ACPs, placing orders and writing notes.
Time-based billing (NON-critical care).     The necessity of the time spent during the encounter on this date of service was due to:     - Ordering, reviewing, and interpreting labs, testing, and imaging.  - Independently obtaining a review of systems and performing a physical exam  - Reviewing prior hospitalization and where necessary, outpatient records.  - Counselling and educating patient and/or family regarding interpretation of aforementioned items and plan of care.
chart review , interviewing and examining patient, discussion with daughter, consultants, ACPs, placing orders and writing notes.
- Ordering, reviewing, and interpreting labs  - Independently obtaining a review of systems and performing a physical exam  - Reviewing consultant documentation
- Ordering, reviewing, and interpreting labs  - Independently obtaining a review of systems and performing a physical exam  - Counselling and educating family regarding interpretation of aforementioned items and plan of care.
- Ordering, reviewing, and interpreting labs, testing  - Independently obtaining a review of systems and performing a physical exam  - Reviewing consultant documentation/recommendations in addition to discussing plan of care with consultants.
extensive chart review (current admission labs/images/notes), interviewing and examining patient, discussion with consultants, daughter, ACPs, placing orders and writing notes.
- Ordering, reviewing, and interpreting labs  - Independently obtaining a review of systems and performing a physical exam
- Ordering, reviewing, and interpreting labs  - Independently obtaining a review of systems and performing a physical exam  - Reviewing consultant documentation  - Counselling and educating family regarding interpretation of aforementioned items and plan of care.
Time-based billing (NON-critical care).     The necessity of the time spent during the encounter on this date of service was due to:     - Ordering, reviewing, and interpreting labs, testing, and imaging.  - Independently obtaining a review of systems and performing a physical exam  - Reviewing prior hospitalization and where necessary, outpatient records.  - Counselling and educating patient and/or family regarding interpretation of aforementioned items and plan of care.

## 2023-10-02 NOTE — DISCHARGE NOTE PROVIDER - HOSPITAL COURSE
HPI:  Spoke to daughter who was unable to provide meds. Called [facility] and was transferred to multiple people who either could not help or hung up immediately. History obtained from ER and daughter as below    67M w/ history of glioblastoma, HTN, seizure disorder, A-fib on Xarelto, prior stroke w/ residual L-sided weakness, sent from nursing home for cough and altered mental status.  On initial evaluation, patient is AO x0, unable to provide history.  Per EMS collateral, nursing home facility noted cough productive of phlegm starting yesterday, gave 1 dose of azithromycin.  Noted worsening mental status, for which daughter was concerned, so she requested ED evaluation. Daughter also noticed hallucinations for several days which she states is an indication of infection for him. Pt currently denies any pain or discomfort. States he wants speech/ swallow study and someone to talk to Dr. Carias his neurologist.    In ER: Given IV Zosyn, IV Vancomycin, LR 1L,       (07 Sep 2023 23:30)    Hospital Course: ***      Important Medication Changes and Reason: ***      Active or Pending Issues Requiring Follow-up:  F/u PCP    Advanced Directives:   [ ] Full code  [x ] DNR  [ ] Hospice    Discharge Diagnoses:  Glioblastoma  HTN  Seizure Disorder  Atrial Fibrillation  hx CVA  Parainfluenza, Rhinovirus  DVT

## 2023-10-02 NOTE — PROGRESS NOTE ADULT - ASSESSMENT
67M hx GBM with seizures, AF on Xarelto, CVA w/ residual L-sided weakness p/w cough and acute encephalopathy, found to have worsening GBM with edema and midline shift    
67M hx GBM with seizures, AF on Xarelto, CVA w/ residual L-sided weakness p/w cough and acute encephalopathy, found to have worsening GBM with edema and midline shift    
67M w/ history of glioblastoma c/b seizures, A-fib on Xarelto, prior stroke w/ residual L-sided weakness who is brought in from nursing home for cough and acute encephalopathy found to have worsening GBM with edema and midline shift.
long standing afib  HR better  cont nadolol   cont a/c    HTN  likely has element of autonomic dysfunction with labile BP   cont current meds  cont clonidine         
long standing afib  HR better  cont nadolol   cont a/c    HTN  likely has element of autonomic dysfunction with labile BP   cont current meds  cont clonidine         
67M w/ history of glioblastoma c/b seizures, A-fib on Xarelto, prior stroke w/ residual L-sided weakness who is brought in from nursing home for cough and acute encephalopathy found to have worsening GBM with edema and midline shift.
67M w/ history of glioblastoma c/b seizures, A-fib on Xarelto, prior stroke w/ residual L-sided weakness who is brought in from nursing home for cough and acute encephalopathy found to have worsening GBM with edema and midline shift.      
long standing afib  HR a bit elevated  change metoprolol to atenolol   cont a/c    HTN  stable     
long standing afib  HR better  cont nadolol   cont a/c    HTN  likely has element of autonomic dysfunction with labile BP   cont current meds  can add clonidine if needed     
long standing afib  HR better  cont nadolol   cont a/c    HTN  likely has element of autonomic dysfunction with labile BP   cont current meds  cont clonidine         
long standing afib  HR better  cont nadolol   cont a/c    HTN  likely has element of autonomic dysfunction with labile BP   cont current meds  cont clonidine         
long standing afib  HR better now in 80s   cont nadolol   cont a/c    HTN  stable     
67M hx GBM with seizures, AF on Xarelto, CVA w/ residual L-sided weakness p/w cough and acute encephalopathy, found to have worsening GBM with edema and midline shift    
67M w/ history of glioblastoma c/b seizures, A-fib on Xarelto, prior stroke w/ residual L-sided weakness who is brought in from nursing home for cough and acute encephalopathy found to have worsening GBM with edema and midline shift.      
long standing afib  HR a bit elevated  change to nadolol   cont a/c    HTN  stable     
long standing afib  HR better  cont nadolol   cont a/c    HTN  likely has element of autonomic dysfunction with labile BP   cont current meds  cont clonidine       
long standing afib  HR better  cont nadolol   cont a/c    HTN  likely has element of autonomic dysfunction with labile BP   cont current meds  cont clonidine         
long standing afib  HR better  cont nadolol   cont a/c    HTN  likely has element of autonomic dysfunction with labile BP   cont current meds  cont clonidine   BP better this am       
67M w/ history of glioblastoma c/b seizures, A-fib on Xarelto, prior stroke w/ residual L-sided weakness who is brought in from nursing home for cough and acute encephalopathy found to have worsening GBM with edema and midline shift, and a lower extremity DVT. Oncology following for anticoagulation recommendations.    #DVT  VA duplex noted DVT from the left side femoral vein through the external iliac vein while patient was on xarelto 20 mg daily (since 2019 for a fib, with an interruption in dosing from 9/11-9/13) given concern for intracranial hemorrhage  - Given that GBM has a high rate of intracranial hemorrhage, would recommend against a loading dose of a DOAC  - Also, given the extent of DVT and the timing, unclear if this is a true failure of Xarelto.  - APLS labs negative  - on Lovenox 1 mg/kg BID - repeat CT head on AC negative for worsening hemorrhage, NSG following    #Glioblastoma Multiforme  Pt follows Dr. Carias (Neuro-oncologist). He was initially diagnosed in August 2022 with a high grade glioblastoma. He underwent a craniotomy in August 2022 with Dr. Chaney. He subsequently was treated with adjuvant chemoRT with Temdar for 5 cycles with Avastin added for cycles 4 and 5 and RT and  30 fractions for a total of 6000 cgy on 12/1/2023 to the right brain.  - Per chart review, patient is not a candidate for further treatment and was recommended for hospice.   - Rad-onc, NSG- both agree w/ hospice, pending family meeting with Dr. Carias 9/19    #Debility  - PPSV2 of 30%  - ECOG 4  
67M w/ history of glioblastoma c/b seizures, A-fib on Xarelto, prior stroke w/ residual L-sided weakness who is brought in from nursing home for cough and acute encephalopathy found to have worsening GBM with edema and midline shift.      
In summary, this is a 66 yo man with a progressive right sided GBM with significant functional decline since May when hospitalized with seizures and atrial fibrillation.  He had a tracheostomy and PEG placement.  Recently admitted from subacute rehab for decline in mental status and found to have both RSV and cellulitis. Had been eating in rehab - not since here.  Much better today re. mental status - still with significant neurologic findings - left VF defect and hemiparesis.  KPS at best 40.    He does appear SOB - will discuss with Dr. Wray - re evaluation  Daughter is interested in Avastin - data of use with poor functional status is limited. Risk of hemorrhage and impaired wound healing.   Would recommend a swallow reevaluation - quality of life issue.  Likelihood of treatment improving neurologic function is slim - more likely to prolong current function - question is whether current quality is felt by patient and family to be good enough to warrant treatment. Will discuss with daughter again. 
long standing afib  HR better  cont nadolol   cont a/c    HTN  likely has element of autonomic dysfunction with labile BP   cont current meds  cont clonidine         
67M hx GBM with seizures, AF on Xarelto, CVA w/ residual L-sided weakness p/w cough and acute encephalopathy, found to have worsening GBM with edema and midline shift    
67M hx GBM with seizures, AF on Xarelto, CVA w/ residual L-sided weakness p/w cough and acute encephalopathy, found to have worsening GBM with edema and midline shift    
67M w/ history of glioblastoma c/b seizures, A-fib on Xarelto, prior stroke w/ residual L-sided weakness who is brought in from nursing home for cough and acute encephalopathy found to be enterovirus positive, rhinovirus positive and have worsening GBM w/ edema and midline shift.
67M hx GBM with seizures, AF on Xarelto, CVA w/ residual L-sided weakness p/w cough and acute encephalopathy, found to have worsening GBM with edema and midline shift    
67M w/ history of glioblastoma c/b seizures, A-fib on Xarelto, prior stroke w/ residual L-sided weakness who is brought in from nursing home for cough and acute encephalopathy found to have worsening GBM with edema and midline shift.
67M hx GBM with seizures, AF on Xarelto, CVA w/ residual L-sided weakness p/w cough and acute encephalopathy, found to have worsening GBM with edema and midline shift    
67M w/ history of glioblastoma c/b seizures, A-fib on Xarelto, prior stroke w/ residual L-sided weakness who is brought in from nursing home for cough and acute encephalopathy found to have worsening GBM with edema and midline shift.      
67M hx GBM with seizures, AF on Xarelto, CVA w/ residual L-sided weakness p/w cough and acute encephalopathy, found to have worsening GBM with edema and midline shift    
67M w/ history of glioblastoma c/b seizures, A-fib on Xarelto, prior stroke w/ residual L-sided weakness who is brought in from nursing home for cough and acute encephalopathy found to have worsening GBM with edema and midline shift.    
67M w/ history of glioblastoma c/b seizures, A-fib on Xarelto, prior stroke w/ residual L-sided weakness who is brought in from nursing home for cough and acute encephalopathy found to have worsening GBM with edema and midline shift.

## 2023-10-02 NOTE — RAPID RESPONSE TEAM SUMMARY - NSSITUATIONBACKGROUNDRRT_GEN_ALL_CORE
67M hx GBM with seizures, AF on Xarelto, CVA w/ residual L-sided weakness p/w cough and acute encephalopathy, found to have worsening GBM with edema and midline shift. RRT called for acute change in mental status. Per RN, pt was more awake this AM but lethargic. Was found to be febrile an hour ago and was given IV tylenol. Currently afebrile, hypoxic to the 80s with no gag reflex, unable to get accurate BP. AMS likely 2/2 sepsis , started pt on IVF bolus. Pt DNR/DNI per previous discussion with family. Discussed poor prognosis with family, they decided on comfort care and to stop blood draws. Recommend primary team to keep pt comfortable, no further workup

## 2023-10-02 NOTE — PROVIDER CONTACT NOTE (OTHER) - SITUATION
Please refer to rapid sheet
Pt c/o 10 out of 10 headache, blood pressure 151/117, heart rate 123.
Pt pulled out his IV line during in the vanco IV infusion, tried multiple times to inserted a new one but unsuccessful. Pt is hard stick.
Pt /123
/107 HR 91 repeated 175/104 
Pt /113
/101 on R leg
One set of blood cultures were missing.
Pt pulled out his IV line, tried multiple times to insert IV but unsuccessful, unable to give 6pm IV med due to no IV access
RN unable to flush pt's PEG tube

## 2023-10-02 NOTE — PROVIDER CONTACT NOTE (OTHER) - DATE AND TIME:
02-Oct-2023 06:50
21-Sep-2023 08:24
17-Sep-2023 15:35
02-Oct-2023 19:05
18-Sep-2023 18:23
20-Sep-2023 20:48
18-Sep-2023 02:54
29-Sep-2023 01:43
15-Sep-2023 23:08
17-Sep-2023 17:45

## 2023-10-02 NOTE — PROGRESS NOTE ADULT - PROBLEM SELECTOR PLAN 10
Daughter to inform CM of decision for home with hospice or homecare- will opt for hospice if current medications can continue to be given
Daughter obtaining private hire assistance for care at home- once in place d/c to home with hospice. Aids need teaching for feeds + medications
Daughter obtaining private hire assistance for care at home- once in place d/c to home with hospice, hopefully in next 24 hours
Family meeting on 9/22: Plan for discharge home- CM assisting with DME, Daughter to arrange private hire.
Daughter obtaining private hire assistance for care at home- once in place d/c to home with hospice
Daughter obtaining private hire assistance for care at home- once in place d/c to home with hospice
Met with daughter, patient and CM- will apply for acceptance to [facility] if patient wants to continue Avastin  If patient decides to not continue Avastin or if he is not accepted to an [facility] he will go home with homecare vs hospice
Daughter obtaining private hire assistance for care at home- once in place d/c to home with hospice

## 2023-10-02 NOTE — PROGRESS NOTE ADULT - PROBLEM SELECTOR PROBLEM 1
Encephalopathy acute

## 2023-10-02 NOTE — PROVIDER CONTACT NOTE (OTHER) - BACKGROUND
Patient diagnose with viral pneumonia.
AMS 2/2 glioblastoma
Dx: Viral PNA; NPO with continuous PEG tube feeding
Pt admitted for AMS, 2/2 GBM with worsening vasogenic edema.
dx AMS, 2/2 BM with vasogenic edema
Admitted for viral pneumonia.

## 2023-10-02 NOTE — DISCHARGE NOTE PROVIDER - NSDCCPCAREPLAN_GEN_ALL_CORE_FT
PRINCIPAL DISCHARGE DIAGNOSIS  Diagnosis: Pneumonia due to virus  Assessment and Plan of Treatment:       SECONDARY DISCHARGE DIAGNOSES  Diagnosis: Cellulitis  Assessment and Plan of Treatment:     Diagnosis: GBM (glioblastoma multiforme)  Assessment and Plan of Treatment:     Diagnosis: Chronic atrial fibrillation  Assessment and Plan of Treatment:     Diagnosis: DVT, lower extremity  Assessment and Plan of Treatment:

## 2023-10-03 NOTE — CHART NOTE - NSCHARTNOTESELECT_GEN_ALL_CORE
Event Note
Fever
Neurosurgery/Event Note
Neurosurgery/Event Note
Off Service Note
wheelchair/Event Note
Event Note
IR
Neurology
Neurosurgery/Event Note
Nutrition Services
Raheem Lift/Event Note
hematology sign off

## 2023-10-03 NOTE — CHART NOTE - NSCHARTNOTEFT_GEN_A_CORE
Patient passed prior to my evaluation today.  Notified by Emily. Family aware and saw the patient with  at bedside.   I reached out to daughter, they have an appointment with the  home.

## 2023-10-03 NOTE — DISCHARGE NOTE FOR THE EXPIRED PATIENT - HOSPITAL COURSE
Patient is a 68y old  Male who presents with a chief complaint of AMS, Worsening GBM w/ shift and Paraflu/ Enterovirus infection     Problem/Plan - 1:  ·  Problem: Encephalopathy acute.   ·  Plan: likely 2/2 infection and progressive GBM       Problem/Plan - 2:  ·  Problem: GBM (glioblastoma multiforme).   ·  Plan:  Decadron 4mg via PEG Q12       Problem/Plan - 3:  ·  Problem: DVT, lower extremity.   ·  Plan: Seen by NSx- no absolute contraindication to AC, though noted high risk of AC in setting of advanced GBM  Lovenox to 100 mg SG Q12 based on most recent weights.     Problem/Plan - 4:  ·  Problem: Cellulitis.   ·  Plan: Exudative cellulitis at PEG incision site- completed course of abx.     Problem/Plan - 5:  ·  Problem: At risk of seizures.   ·  Plan: Continue Keppra 1000 mg BID, Vimpat 200 mg BID and Valproic acid 250 mg TID all via PEG.     Problem/Plan - 6:  ·  Problem: Parainfluenza infection.   ·  Plan: Resolved  Acetaminophen and Mucinex PRN  Continue supplemental 02 PRN  Duo nebs Q6hrs.     Problem/Plan - 7:  ·  Problem: Chronic atrial fibrillation.   ·  Plan: Continue Nadolol  AC management as above.     Problem/Plan - 8:  ·  Problem: Essential hypertension.   ·  Plan: Continue valsartan 320 mg daily, clonidine 0.1 mg BID, Nadolol  On lasix for UE + LE swelling.     Problem/Plan - 9:  ·  Problem: At risk for aspiration.   ·  Plan: Continue tube feeds via PEG  FEES  w/ silent aspiration.    Rapid response was called at night on 10/2 for altered mental status and hypoxia.  Patient had a temperature of 103F and placed on non-rebreather. Family was called during rapid response and agreed to comfort care.   Called by nurse to pronounce patient with cardiopulmonary arrest secondary to right-sided malignant glioblastoma on 10/3.  Heart sounds were absent.  No spontaneous respirations.  No palpable pulse, pupils fixed and dilated. Daughter called (Aretha Jalloh).  Attending Dr. Menard notified.    Patient pronounced  on 10/3 at 7:45AM.

## 2024-01-01 NOTE — DIETITIAN INITIAL EVALUATION ADULT. - ENTER FROM (CAL/KG)
The patient has been re-examined and I agree with the above assessment or I updated with my findings.
25

## 2024-04-09 NOTE — PATIENT PROFILE ADULT - LAST BOWEL MOVEMENT DATE
[facility] EMERGENCY DEPARTMENT  [address]  [facility] [address]  Phone: [phone]    Rodrigo Baum was seen and treated in our emergency department on 4/9/2024.  He may return to work on 04/12/2024.         Thank you for choosing [facility].    Estefany To RN      
31-Mar-2019

## 2024-04-22 NOTE — ED ADULT NURSE NOTE - TOBACCO USE
Patient is here for a Nurse Check.      Labs, vitals and assessment reviewed with . Per MD give 1L IVF.     Reviewed and verified Advanced Directives: No: Patient declined to create/provide document at this time     Is the patient on an active anti-cancer treatment plan? Yes,   Regimen: RITUXIMAB-xxxx 375 MG/M2 + POLATUZUMAB VEDOTIN-PIIQ 1.8 MG/KG + BENDAMUSTINE 90 MG/M2 D1, 2 EVERY 21 DAYS X 6 CYCLES: RELAPSED, REFRACTORY DLBCL   Cycle/Day: C1D7    Oral Chemotherapy: No    ECOG [04/22/24 0845]   ECOG Performance Status 0        Nursing Assessment:   A focused nursing assessment addressing the toxicity of chemotherapy was performed and the patient reports the following:  Anxiety/Depression/Insomnia: Anxiety: No, Depression: No, and Insomnia: No  Pain: NO    Toxicity Assessment    Auditory/Ear  Assessment: Yes (Within Defined Limits)    Cardiac General  Assessment: Yes (Within Defined Limits)    Constitutional  Assessment: Yes (w/ Exceptions to WDL)  Fatigue: Grade 1    Dermatology/Skin  Assessment: Yes (Within Defined Limits)    Endocrine  Assessment: Yes (Within Defined Limits)    Gastrointestinal  Assessment: Yes (Within Defined Limits)    Hemorrhage/Bleeding  Assessment: Yes (Within Defined Limits)    Infection  Assessment: Yes (Within Defined Limits)    Lymphatics  Assessment: Yes (Within Defined Limits)    Musculoskeletal  Assessment: Yes (Within Defined Limits)    Neurology  Assessment: Yes (Within Defined Limits)    Ocular  Assessment: Yes (Within Defined Limits)    Pain  Assessment: Yes (Within Defined Limits)    Pulmonary/Upper Respiratory  Assessment: Yes (w/ Exceptions to WDL)  Cough: Grade 1    Genitourinary  Assessment: Yes (Within Defined Limits)        Is this patient status post Stem Cell Transplant? No     Aracelis Taylor MD is supervising clinician today.    Vitals:    04/22/24 0844 04/22/24 0845   BP: 107/62 92/51   BP Location: RUE - Right upper extremity RUE - Right upper extremity    Patient Position: Sitting Sitting   Cuff Size: Regular Regular   Pulse: (!) 102 (!) 108   Resp: 16 16   Temp: 98.5 °F (36.9 °C)    TempSrc: Oral    SpO2: 96%    PainSc:  0         Central Line Care: See Invasive (Oncology) Lines Flowsheet and MAR for CVAD documentation as appropriate    Transfusion: Not needed    Education: Patient Education: Current medication were reviewed and verified. (See Medications)    Next appointment scheduled:   Future Appointments   Date Time Provider Department Center   4/25/2024  8:15 AM [facility] ACL LAB [facility]   4/25/2024  8:30 AM [facility] INFUSION RN [facility]   4/29/2024  8:15 AM [facility]   4/29/2024  8:30 AM [facility] INFUSION RN [facility]   5/2/2024  8:15 AM [facility]   5/2/2024  8:30 AM [facility] INFUSION RN [facility]   5/6/2024  8:15 AM [facility]   5/6/2024  8:45 AM Aracelis Taylor MD [facility]   2/17/2025  9:45 AM Tiffany Padilla MD [facility]        Patient instructed to call the office with any questions or concerns.    Patient Discharged: patient discharged to home per self, ambulatory    Former smoker

## 2024-08-01 NOTE — SUBJECTIVE & OBJECTIVE
"Interval History: see "Hospital Course"    Review of Systems   Unable to perform ROS: Patient nonverbal   Objective:     Vital Signs (Most Recent):  Temp: 99 °F (37.2 °C) (05/23/23 1745)  Pulse: 81 (05/23/23 2305)  Resp: (!) 29 (05/23/23 2305)  BP: 113/67 (05/23/23 2105)  SpO2: 98 % (05/24/23 0056) Vital Signs (24h Range):  Temp:  [98.6 °F (37 °C)-99 °F (37.2 °C)] 99 °F (37.2 °C)  Pulse:  [] 81  Resp:  [17-40] 29  SpO2:  [98 %-100 %] 98 %  BP: (113-163)/() 113/67  Arterial Line BP: (102-158)/() 104/89     Weight: 98.7 kg (217 lb 9.5 oz)  Body mass index is 32.13 kg/m².    Intake/Output Summary (Last 24 hours) at 5/24/2023 0117  Last data filed at 5/23/2023 1813  Gross per 24 hour   Intake 3501.06 ml   Output 2100 ml   Net 1401.06 ml           Physical Exam  Vitals and nursing note reviewed.   Constitutional:       Appearance: He is ill-appearing.   HENT:      Head: Normocephalic and atraumatic.      Right Ear: External ear normal.      Left Ear: External ear normal.      Nose: Nose normal.      Comments: NG tube.     Mouth/Throat:      Mouth: Mucous membranes are moist.      Pharynx: Oropharynx is clear.      Comments: ETT.  Cardiovascular:      Rate and Rhythm: Tachycardia present. Rhythm irregular.      Pulses: Normal pulses.      Heart sounds: Normal heart sounds.   Pulmonary:      Effort: Pulmonary effort is normal.      Breath sounds: Normal breath sounds.   Abdominal:      General: Bowel sounds are normal.      Palpations: Abdomen is soft.   Genitourinary:     Comments: Iraheta.  Musculoskeletal:      Right lower leg: Edema present.      Left lower leg: Edema present.   Skin:     General: Skin is warm and dry.   Neurological:      GCS: GCS eye subscore is 3. GCS verbal subscore is 1. GCS motor subscore is 6.           Significant Labs: All pertinent labs within the past 24 hours have been reviewed.    Significant Imaging: I have reviewed all pertinent imaging results/findings within the past 24 " hours.  IMPRESSION: CXR 5/21/2023     1.  Unchanged enlarged cardiomediastinal silhouette.  2.  Prominence of the central hilar vascular structures and mild perihilar interstitial thickening noted, possibly reflecting pulmonary vascular congestion and mild pulmonary edema.  3.  Left basilar subsegmental atelectasis.  1.  Unchanged enlarged cardiomediastinal silhouette.  2.  Low lung volumes limit evaluation. Prominence of interstitium the lungs likely secondary to low lung volumes.  3.  Left basilar subsegmental atelectasis.     Additional Notes: Will f/u to discuss. \\nPt to take prednisone as previously prescribed by her pcp Detail Level: Simple Render Risk Assessment In Note?: no

## 2024-08-26 NOTE — END OF VISIT
Detail Level: Generalized Detail Level: Zone Detail Level: Detailed [Time Spent: ___ minutes] : I have spent [unfilled] minutes of time on the encounter.

## 2024-10-02 NOTE — ED PROVIDER NOTE - CLINICAL SUMMARY MEDICAL DECISION MAKING FREE TEXT BOX
fall 67-year-old male brought in for altered mental status.  Vital signs are unremarkable.  Borderline tachycardia, otherwise unremarkable vital signs.  Somnolent, however arousable.  Head to toe exam is negative for evidence of soft tissue infection.  His rectal temp is unremarkable.  , CMP, CBC.  Differential diagnosis includes but is not limited to occult infection, intracranial emergency secondary to anatomic derangement, acid-base disturbance, metabolic derangements .Will work-up for generalized altered mental status, head CT, urinalysis, urine culture,   Chest x-ray, CMP, CBC, coags.  Close reassessment.  Will likely require admission for

## 2024-10-28 NOTE — ED PROVIDER NOTE - CHIEF COMPLAINT
The patient is a 65y Male complaining of abdominal pain. Detail Level: Generalized Detail Level: Detailed Medical Necessity Clause: Botulinum toxin hyperhidrosis therapy is medically necessary because Medical Necessity Information: LCD Guidelines vary from payer to payer. Please check with your payer's policy to determine medical necessity.

## 2024-11-11 NOTE — H&P ADULT - CARDIOVASCULAR DETAILS
Order created for [facility] Karlos Darlene and scheduled for 12/12/2024 at [facility].   irregular rate and rhythm/positive S1/positive S2

## 2024-12-09 NOTE — ED ADULT TRIAGE NOTE - MEANS OF ARRIVAL
"Patient Education       Type 2 Diabetes   The Basics   Written by the doctors and editors at [facility], [place]   What is type 2 diabetes? -- Type 2 diabetes is a disorder that disrupts the way your body uses sugar. It is sometimes called type 2 diabetes mellitus.  All the cells in your body need sugar to work normally. Sugar gets into the cells with the help of a hormone called insulin. Insulin is made by the pancreas, an organ in the belly. If there is not enough insulin, or if your body stops responding to insulin, sugar builds up in the blood. That is what happens to people with diabetes.  There are two different types of diabetes:   Type 1 diabetes - In type 1 diabetes, the pancreas does not make insulin or makes very little insulin.  Type 2 diabetes - In most people with type 2 diabetes, the body stops responding to insulin normally. Then, over time, the pancreas stops making enough insulin.   Being overweight or obese increases a person's risk of developing type 2 diabetes. But people who are not overweight can get diabetes, too.  What are the symptoms of type 2 diabetes? -- Type 2 diabetes usually causes no symptoms. When symptoms do occur, they include:  Needing to urinate often  Intense thirst  Blurry vision  Can diabetes lead to other health problems? -- Yes. Type 2 diabetes might not make you feel sick. But if it is not managed, it can lead to serious problems over time, such as:  Heart attacks  Strokes  Kidney disease  Vision problems (or even blindness)  Pain or loss of feeling in the hands and feet  Needing to have fingers, toes, or other body parts removed (amputated)  How do I know if I have type 2 diabetes? -- To find out if you have type 2 diabetes, your doctor or nurse can do a blood test. There are 2 tests that can be used for this. Both involve measuring the amount of sugar in your blood, called your "blood sugar" or "blood glucose":   One of the tests measures your blood sugar at the time the blood " "sample is taken. This test is done in the morning. You can't eat or drink anything except water for at least 8 hours before the test.   The other test shows what your average blood sugar has been for the past 2 to 3 months. This blood test is called "hemoglobin A1C" or just "A1C." It can be checked at any time of the day, even if you have recently eaten.  How is type 2 diabetes treated? -- The goals of treatment are to control your blood sugar and lower the risk of future problems that can happen in people with diabetes. An important part of managing diabetes is to eat healthy foods and get plenty of physical activity.  There are a few medicines that help control blood sugar. Some people need to take pills that help the body make more insulin or that help insulin do its job. Others need insulin shots.  Depending on what medicines you take, you might need to check your blood sugar regularly at home. But not everyone with type 2 diabetes needs to do this. Your doctor or nurse will tell you if you should be checking your blood sugar, and when and how to do this.  Sometimes, people with type 2 diabetes also need medicines to reduce the problems caused by the disease. For instance, medicines used to lower blood pressure can reduce the chances of a heart attack or stroke.  It's also important to get certain vaccines, such as vaccines to protect against the flu and coronavirus disease 2019 (COVID-19). Some people also need a vaccine to prevent pneumonia.  Can type 2 diabetes be prevented? -- Yes. To lower your chances of getting type 2 diabetes, the most important thing you can do is eat a healthy diet and get plenty of physical activity. This can help you lose weight if you are overweight. But eating well and being active are also good for your overall health. Even gentle activity, like walking, has benefits.  If you smoke, quitting can also lower your risk of type 2 diabetes. Quitting smoking can be hard to do, but your " "doctor or nurse can help.  All topics are updated as new evidence becomes available and our peer review process is complete.  This topic retrieved from [employer] on: Sep 21, 2021.  Topic 33600 Version 14.0  Release: 29.4.2 - C29.263  © 2021 UpToDate, Inc. and/or its affiliates. All rights reserved.  Consumer Information Use and Disclaimer   This information is not specific medical advice and does not replace information you receive from your health care provider. This is only a brief summary of general information. It does NOT include all information about conditions, illnesses, injuries, tests, procedures, treatments, therapies, discharge instructions or life-style choices that may apply to you. You must talk with your health care provider for complete information about your health and treatment options. This information should not be used to decide whether or not to accept your health care provider's advice, instructions or recommendations. Only your health care provider has the knowledge and training to provide advice that is right for you. The use of this information is governed by the [employer] End User License Agreement, available at https://www.OSOYOU.com/en/solutions/Treater/about/tracey.The use of [employer] content is governed by the [employer] Terms of Use. ©2021 UpToDate, Inc. All rights reserved.  Copyright   © 2021 UpToDate, Inc. and/or its affiliates. All rights reserved.    Patient Education       High Blood Pressure in Adults   The Basics   Written by the doctors and editors at [facility]   What is high blood pressure? -- High blood pressure is a condition that puts you at risk for heart attack, stroke, and kidney disease. It does not usually cause symptoms. But it can be serious.  When your doctor or nurse tells you your blood pressure, they will say 2 numbers. For instance, your doctor or nurse might say that your blood pressure is "130 over 80." The top number is the pressure inside your arteries " "when your heart is emerson. The bottom number is the pressure inside your arteries when your heart is relaxed.  "Elevated blood pressure" is a term doctors or nurses use as a warning. People with elevated blood pressure do not yet have high blood pressure. But their blood pressure is not as low as it should be for good health.  Many experts define high, elevated, and normal blood pressure as follows:  High - Top number of 130 or above and/or bottom number of 80 or above  Elevated - Top number between 120 and 129 and bottom number of 79 or below  Normal - Top number of 119 or below and bottom number of 79 or below  This information is also in the table (table 1).   How can I lower my blood pressure? -- If your doctor or nurse has prescribed blood pressure medicine, the most important thing you can do is to take it. If it causes side effects, do not just stop taking it. Instead, talk to your doctor or nurse about the problems it causes. They might be able to lower your dose or switch you to another medicine. If cost is a problem, mention that too. They might be able to put you on a less expensive medicine. Taking your blood pressure medicine can keep you from having a heart attack or stroke, and it can save your life!  Can I do anything on my own? -- You have a lot of control over your blood pressure. To lower it:  Lose weight (if you are overweight)  Choose a diet low in fat and rich in fruits, vegetables, and low-fat dairy products  Reduce the amount of salt you eat  Do something active for at least 30 minutes a day on most days of the week  Cut down on alcohol (if you drink more than 2 alcoholic drinks per day)  It's also a good idea to get a home blood pressure meter. People who check their own blood pressure at home do better at keeping it low and can sometimes even reduce the amount of medicine they take.  All topics are updated as new evidence becomes available and our peer review process is complete.  This " "topic retrieved from [employer] on: Sep 21, 2021.  Topic 93355 Version 15.0  Release: 29.4.2 - C29.263  © 2021 UpToDate, Inc. and/or its affiliates. All rights reserved.  table 1: Definition of normal and high blood pressure  Level  Top number  Bottom number    High 130 or above 80 or above   Elevated 120 to 129 79 or below   Normal 119 or below 79 or below   These definitions are from the American College of Cardiology/American Heart Association. Other expert groups might use slightly different definitions.  "Elevated blood pressure" is a term doctor or nurses use as a warning. It means you do not yet have high blood pressure, but your blood pressure is not as low as it should be for good health.  Graphic 30522 Version 6.0  Consumer Information Use and Disclaimer   This information is not specific medical advice and does not replace information you receive from your health care provider. This is only a brief summary of general information. It does NOT include all information about conditions, illnesses, injuries, tests, procedures, treatments, therapies, discharge instructions or life-style choices that may apply to you. You must talk with your health care provider for complete information about your health and treatment options. This information should not be used to decide whether or not to accept your health care provider's advice, instructions or recommendations. Only your health care provider has the knowledge and training to provide advice that is right for you. The use of this information is governed by the [employer] End User License Agreement, available at https://www.LinPrim.[employer]/en/solutions/QuickSolar/about/tracey.The use of [employer] content is governed by the [employer] Terms of Use. ©2021 UpToDate, Inc. All rights reserved.  Copyright   © 2021 UpToDate, Inc. and/or its affiliates. All rights reserved.    Patient Education       High Cholesterol   The Basics   Written by the doctors and editors at [employer] " "  What is cholesterol? -- Cholesterol is a substance that is found in the blood. Everyone has some. It is needed for good health. The problem is, people sometimes have too much cholesterol. Compared with people with normal cholesterol, people with high cholesterol have a higher risk of heart attacks, strokes, and other health problems. The higher your cholesterol, the higher your risk of these problems.  Are there different types of cholesterol? -- Yes, there are a few different types. If you get a cholesterol test, you might hear your doctor or nurse talk about:  Total cholesterol  LDL cholesterol - Some people call this the "bad" cholesterol. That's because having high LDL levels raises your risk of heart attacks, strokes, and other health problems.  HDL cholesterol - Some people call this the "good" cholesterol. That's because people with high HDL levels tend to have a lower risk of heart attacks, strokes, and other health problems.   Non-HDL cholesterol - Non-HDL cholesterol is your total cholesterol minus your HDL cholesterol.  Triglycerides - Triglycerides are not cholesterol. They are another type of fat. But they often get measured when cholesterol is measured. (Having high triglycerides also seems to increase the risk of heart attacks and strokes.)  What should my numbers be? -- Ask your doctor or nurse what your numbers should be. Different people need different goals. (If you live outside the [place], see the table (table 1)). In general, people who do not already have heart disease should aim for:  Total cholesterol below 200  LDL cholesterol below 130 - or much lower, if they are at risk of heart attacks or strokes  HDL cholesterol above 60  Non-HDL cholesterol below 160 - or lower, if they are at risk of heart attacks or strokes  Triglycerides below 150  Keep in mind, though, that many people who cannot meet these goals still have a low risk of heart attacks and strokes.  What should I do if my " "doctor tells me I have high cholesterol? -- Ask your doctor what your overall risk of heart attacks and strokes is. High cholesterol, by itself, is not always a reason to worry. Having high cholesterol is just one of many things that can increase your risk of heart attacks and strokes. Other factors that increase your risk include:  Cigarette smoking  High blood pressure  Having a parent, sister, or brother who got heart disease at a young age - Young, in this case, means younger than 55 for men and younger than 65 for women.  A diet that is not heart healthy - A "heart-healthy" diet includes lots of fruits and vegetables, fiber, and healthy fats (like those found in fish and certain oils). It also means limiting sugar and unhealthy fats.  Older age  If you are at high risk of heart attacks and strokes, having high cholesterol is a problem. On the other hand, if you are at low risk, having high cholesterol might not lead to treatment.  Should I take medicine to lower cholesterol? -- Not everyone who has high cholesterol needs medicines. Your doctor or nurse will decide if you need them based on your age, family history, and other health concerns.  You should probably take a cholesterol-lowering medicine called a statin if you:  Already had a heart attack or stroke  Have known heart disease  Have diabetes  Have a condition called peripheral artery disease, which makes it painful to walk, and happens when the arteries in your legs get clogged with fatty deposits  Have an abdominal aortic aneurysm, which is a widening of the main artery in the belly  Most people with any of the conditions listed above should take a statin no matter what their cholesterol level is. If your doctor or nurse puts you on a statin, stay on it. The medicine might not make you feel any different. But it can help prevent heart attacks, strokes, and death.  Can I lower my cholesterol without medicines? -- Yes, you can lower your cholesterol some " by:  Avoiding red meat, butter, fried foods, cheese, and other foods that have a lot of saturated fat  Losing weight (if you are overweight)  Being more active  Even if these steps do little to change your cholesterol, they can improve your health in many ways.  All topics are updated as new evidence becomes available and our peer review process is complete.  This topic retrieved from [employer] on: Sep 21, 2021.  Topic 75743 Version 19.0  Release: 29.4.2 - C29.263  © 2021 UpToDate, Inc. and/or its affiliates. All rights reserved.  table 1: Cholesterol and triglyceride measurements in the [place] and elsewhere     Measurement used within the [place] Milligrams/deciliter (mg/dL)  Measurement used most places outside the [place] Millimoles/liter (mmol/Liter)     Level to aim for  Level to aim for    Total cholesterol  Below 200 Below 5.17   LDL cholesterol  Below 130 - or much lower if at risk of heart attack and stroke Below 3.36 - or much lower if at risk of heart attack and stroke   HDL cholesterol  Above 60 Above 1.55   Triglycerides  Below 150 Below 1.7   Cholesterol is measured differently in the [place] than it is in most other countries. This table shows values used within and outside the [place]. It includes the cholesterol and triglyceride levels that most people who do not have heart disease should aim for.  Graphic 37543 Version 3.0  Consumer Information Use and Disclaimer   This information is not specific medical advice and does not replace information you receive from your health care provider. This is only a brief summary of general information. It does NOT include all information about conditions, illnesses, injuries, tests, procedures, treatments, therapies, discharge instructions or life-style choices that may apply to you. You must talk with your health care provider for complete information about your health and treatment options. This information should not be used to  decide whether or not to accept your health care provider's advice, instructions or recommendations. Only your health care provider has the knowledge and training to provide advice that is right for you. The use of this information is governed by the [employer] End User License Agreement, available at https://www.[employer]/en/solutions/[employer]/about/tracey.The use of [employer] content is governed by the [employer] Terms of Use. ©2021 UpToDate, Inc. All rights reserved.  Copyright   © 2021 UpToDate, Inc. and/or its affiliates. All rights reserved.     stretcher

## 2025-03-27 NOTE — DISCHARGE NOTE PROVIDER - NSDCQMERRANDS_GEN_ALL_CORE
Notified that patient is  as of 25 per insurance inquiry.  Verified information via [facility] obituary   Yes
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
## 2025-06-03 NOTE — PATIENT PROFILE ADULT - FUNCTIONAL ASSESSMENT - DAILY ACTIVITY 2.
Please call and make an appointment with orthopedic surgery for further evaluation as soon as possible  Please take over-the-counter ibuprofen/Tylenol every 6-8 hours as needed for pain  Please return to the emergency department for any new or worsening symptoms  
2 = A lot of assistance

## 2025-07-25 NOTE — ED ADULT NURSE NOTE - CADM POA URETHRAL CATHETER
[Recent Weight Loss (___ Lbs)] : recent weight loss: [unfilled] lbs [Negative] : Heme/Lymph [FreeTextEntry6] : excessive snoring had negative sleep study few years ago. No

## (undated) DEVICE — PAD BOVIE ADULT

## (undated) DEVICE — SYRINGE HYPODERMC LL 22G 1.5 ECLIPSE 30

## (undated) DEVICE — MARKING PEN W RULER

## (undated) DEVICE — DRAPE MICROSCOPE OPMI VISIONGUARD 48X118"

## (undated) DEVICE — SNAP ON SPHERZ 5 PACK

## (undated) DEVICE — PREP CHLORAPREP HI-LITE ORANGE 26ML

## (undated) DEVICE — Device

## (undated) DEVICE — WARMING BLANKET FULL ADULT

## (undated) DEVICE — STAPLER SKIN VISI-STAT 35 WIDE

## (undated) DEVICE — GOWN TRIMAX LG

## (undated) DEVICE — DRAPE MAYO STAND 30"

## (undated) DEVICE — ELCTR BIPOLAR CORD IRRIGATION AESCULAP DISP

## (undated) DEVICE — GLV 7.5 PROTEXIS (WHITE)

## (undated) DEVICE — SOL IRR POUR H2O 250ML

## (undated) DEVICE — DRAPE SURGICAL #1010

## (undated) DEVICE — GLOVE BIOGEL MICRO SURGEON PINK SZ 7.5

## (undated) DEVICE — VISITEC 4X4

## (undated) DEVICE — GOWN SMART LRG 044673

## (undated) DEVICE — DRAIN ROUND 100CC 1/8 0073310

## (undated) DEVICE — SUT NUROLON 4-0 8-18" TF (POP-OFF)

## (undated) DEVICE — HOLDER TUBE TRACH ADULT TTH1001

## (undated) DEVICE — ELCTR SUBDERMAL CORKSCREW NDL 1.2MM

## (undated) DEVICE — BLADE SCALPEL SAFETYLOCK #15

## (undated) DEVICE — UNDERGLOVE BIOGEL PI MICRO BLUE SZ 8

## (undated) DEVICE — ELCTR SUBDERMAL NDL CLASSIC 1.5M X 59" (6 COLOR)

## (undated) DEVICE — CODMAN PERFORATOR 14MM (BLUE)

## (undated) DEVICE — MIDAS REX LEGEND TAPERED SM BORE 2.3MM X 8CM

## (undated) DEVICE — GLV 7 PROTEXIS (WHITE)

## (undated) DEVICE — LAP PAD 18 X 18"

## (undated) DEVICE — CANISTER SUCTION 2000CC

## (undated) DEVICE — ELCTR PEDICLE SCREW PROBE 3MM BALL 1.8MM X 100MM

## (undated) DEVICE — VAGINAL PACKING 2 X 6"

## (undated) DEVICE — ELCTR 4-DISC 20MM 49" (RED, BLUE, GREEN, BLACK)

## (undated) DEVICE — MEDICATION LABELS W MARKER

## (undated) DEVICE — DISSECTOR EXACT LIGASURE 20.6MM-21CM

## (undated) DEVICE — SUTURE SILK 2-0 18 A185H

## (undated) DEVICE — TAPE SILK 3"

## (undated) DEVICE — SUT SOFSILK 4-0 18" CV-23

## (undated) DEVICE — TRAY GENERAL SURGERY

## (undated) DEVICE — ELCTR BIPOLAR PROBE

## (undated) DEVICE — SUT VICRYL 2-0 18" CP-2 UNDYED (POP-OFF)

## (undated) DEVICE — GLV 6.5 PROTEXIS (WHITE)

## (undated) DEVICE — POSITIONER FOAM EGG CRATE ULNAR 2PCS (PINK)

## (undated) DEVICE — DRSG TELFA 3 X 8

## (undated) DEVICE — ELCTR MONOPOLAR STIMULATOR PROBE FLUSH-TIP

## (undated) DEVICE — SOL IRR POUR NS 0.9% 500ML

## (undated) DEVICE — DRAPE 1/2 SHEET 40X57"

## (undated) DEVICE — GLV 8.5 PROTEXIS (WHITE)

## (undated) DEVICE — ELCTR SUBDERMAL NDL 27G X 1/2" WITH TWISTED PAIR

## (undated) DEVICE — DISSECTING TOOL MIDAS REX 6MM BALL FLUTED

## (undated) DEVICE — TOWEL OR BLUE      MDT2168284

## (undated) DEVICE — SUT VICRYL 3-0 18" X-1 (POP-OFF)

## (undated) DEVICE — SYR LUER LOK 20CC

## (undated) DEVICE — GLV 8 PROTEXIS (WHITE)

## (undated) DEVICE — DRSG XEROFORM 1 X 8"

## (undated) DEVICE — BIPOLAR FORCEP CODMAN VERSATRU 8" X 0.5MM (BLUE)

## (undated) DEVICE — AESCULAP SCALPFIX 10 CLIPS

## (undated) DEVICE — SPECIMEN CONTAINER 100ML

## (undated) DEVICE — DRSG XEROFORM 5 X 9"

## (undated) DEVICE — ELCTR BOVIE TIP BLADE INSULATED 2.75" EDGE

## (undated) DEVICE — BLADE SCALPEL SAFETYLOCK #10

## (undated) DEVICE — FOLEY TRAY 16FR 5CC LTX UMETER CLOSED

## (undated) DEVICE — SUTURE SILK 0 SH 30 K834H

## (undated) DEVICE — DRAPE THYROID 89255

## (undated) DEVICE — SUTURE VICRYL 3-0 SH 27 VCP416H

## (undated) DEVICE — MIDAS REX LEGEND LUBRICANT DIFFUSER CARTRIDGE

## (undated) DEVICE — DRAPE CAMERA VIDEO 7"X96"

## (undated) DEVICE — VENODYNE/SCD SLEEVE CALF LARGE

## (undated) DEVICE — DRAPE LIGHT HANDLE COVER (BLUE)

## (undated) DEVICE — DRAPE TOWEL BLUE 17" X 24"

## (undated) DEVICE — SUTURE SILK 0 18 A186H

## (undated) DEVICE — SOLUTION IRRI NS BOTTLE 1000ML R5200-01

## (undated) DEVICE — SUTURE SILK 3-0 18 A184H

## (undated) DEVICE — DRAPE 3/4 SHEET W REINFORCEMENT 56X77"

## (undated) DEVICE — SUTURE ETHILON 2-0 PS-2 18 593H

## (undated) DEVICE — TIP BOVIE TEFLON E1450X

## (undated) DEVICE — DRAPE VARI-LENS2 MICROSCPOPE 68MM

## (undated) DEVICE — BLADE SCALPEL #11 371111

## (undated) DEVICE — DRSG CURITY GAUZE SPONGE 4 X 4" 12-PLY